# Patient Record
Sex: MALE | Race: BLACK OR AFRICAN AMERICAN | NOT HISPANIC OR LATINO | Employment: FULL TIME | URBAN - METROPOLITAN AREA
[De-identification: names, ages, dates, MRNs, and addresses within clinical notes are randomized per-mention and may not be internally consistent; named-entity substitution may affect disease eponyms.]

---

## 2017-02-07 ENCOUNTER — PRE VISIT (OUTPATIENT)
Dept: CARDIOLOGY | Facility: CLINIC | Age: 52
End: 2017-02-07

## 2017-02-07 DIAGNOSIS — I50.22 CHRONIC SYSTOLIC HEART FAILURE (H): Primary | ICD-10-CM

## 2017-02-07 PROBLEM — I47.29 PAROXYSMAL VENTRICULAR TACHYCARDIA (H): Status: ACTIVE | Noted: 2017-02-07

## 2017-02-07 PROBLEM — I47.20 PAROXYSMAL VENTRICULAR TACHYCARDIA (H): Status: ACTIVE | Noted: 2017-02-07

## 2017-02-07 PROBLEM — Z95.2 S/P MITRAL VALVE REPLACEMENT: Status: ACTIVE | Noted: 2017-02-07

## 2017-02-09 ENCOUNTER — ALLIED HEALTH/NURSE VISIT (OUTPATIENT)
Dept: CARDIOLOGY | Facility: CLINIC | Age: 52
End: 2017-02-09
Attending: INTERNAL MEDICINE
Payer: COMMERCIAL

## 2017-02-09 VITALS
SYSTOLIC BLOOD PRESSURE: 128 MMHG | HEIGHT: 70 IN | OXYGEN SATURATION: 99 % | DIASTOLIC BLOOD PRESSURE: 93 MMHG | WEIGHT: 197.1 LBS | BODY MASS INDEX: 28.22 KG/M2 | HEART RATE: 80 BPM

## 2017-02-09 DIAGNOSIS — I50.22 CHRONIC SYSTOLIC HEART FAILURE (H): ICD-10-CM

## 2017-02-09 DIAGNOSIS — I50.22 CHRONIC SYSTOLIC CONGESTIVE HEART FAILURE (H): Primary | ICD-10-CM

## 2017-02-09 DIAGNOSIS — I47.29 PAROXYSMAL VENTRICULAR TACHYCARDIA (H): Primary | ICD-10-CM

## 2017-02-09 LAB
ANION GAP SERPL CALCULATED.3IONS-SCNC: 6 MMOL/L (ref 3–14)
BUN SERPL-MCNC: 19 MG/DL (ref 7–30)
CALCIUM SERPL-MCNC: 8.8 MG/DL (ref 8.5–10.1)
CHLORIDE SERPL-SCNC: 109 MMOL/L (ref 94–109)
CO2 SERPL-SCNC: 29 MMOL/L (ref 20–32)
CREAT SERPL-MCNC: 1.25 MG/DL (ref 0.66–1.25)
GFR SERPL CREATININE-BSD FRML MDRD: 61 ML/MIN/1.7M2
GLUCOSE SERPL-MCNC: 88 MG/DL (ref 70–99)
POTASSIUM SERPL-SCNC: 4.2 MMOL/L (ref 3.4–5.3)
SODIUM SERPL-SCNC: 144 MMOL/L (ref 133–144)

## 2017-02-09 PROCEDURE — 99212 OFFICE O/P EST SF 10 MIN: CPT | Mod: 25,ZF

## 2017-02-09 PROCEDURE — 80048 BASIC METABOLIC PNL TOTAL CA: CPT | Performed by: INTERNAL MEDICINE

## 2017-02-09 PROCEDURE — 93289 INTERROG DEVICE EVAL HEART: CPT | Mod: 26 | Performed by: INTERNAL MEDICINE

## 2017-02-09 PROCEDURE — 36415 COLL VENOUS BLD VENIPUNCTURE: CPT | Performed by: INTERNAL MEDICINE

## 2017-02-09 PROCEDURE — 99214 OFFICE O/P EST MOD 30 MIN: CPT | Performed by: INTERNAL MEDICINE

## 2017-02-09 PROCEDURE — 99212 OFFICE O/P EST SF 10 MIN: CPT

## 2017-02-09 PROCEDURE — 93284 PRGRMG EVAL IMPLANTABLE DFB: CPT | Mod: ZF

## 2017-02-09 RX ORDER — LISINOPRIL 5 MG/1
5 TABLET ORAL 2 TIMES DAILY
Qty: 60 TABLET | Refills: 6 | Status: SHIPPED | OUTPATIENT
Start: 2017-02-09 | End: 2017-08-10

## 2017-02-09 RX ORDER — POTASSIUM CHLORIDE 1.5 G/1.58G
20 POWDER, FOR SOLUTION ORAL 2 TIMES DAILY
COMMUNITY
End: 2019-06-11

## 2017-02-09 ASSESSMENT — PAIN SCALES - GENERAL: PAINLEVEL: NO PAIN (0)

## 2017-02-09 NOTE — PATIENT INSTRUCTIONS
It was a pleasure seeing you in clinic today.  Please do not hesitate to call with any questions or concerns. We look forward to seeing you in clinic at your next device check on 8/10/2017.      Georgia Prado RN  Electrophysiology Nurse Clinician  [facility]    During Business Hours Please Call:  [phone]  After Hours Please Call:  [phone]- select option #4 and ask for job code [id number]

## 2017-02-09 NOTE — PROGRESS NOTES
Quick Note:    Results discussed directly with patient while patient was present. Any further details documented in the note.     Tamie Kasper RN  ______

## 2017-02-09 NOTE — MR AVS SNAPSHOT
After Visit Summary   2/9/2017    Tej Morfin    MRN: [id number]           Patient Information     Date Of Birth          1965        Visit Information        Provider Department      2/9/2017 11:30 AM 1, Uc Cv Device [facility]         Follow-ups after your visit        Your next 10 appointments already scheduled     Aug 10, 2017  2:30 PM   Lab with UC LAB   [facility] ([facility])    [address]  [address] [facility] [address]   [phone]            Aug 10, 2017  3:00 PM   (Arrive by 2:45 PM)   Implanted Defibulator with Uc Cv Device 1   [facility] ([facility])    [address] [place] [address]  3rd [facility] [id number] [phone]            Aug 10, 2017  3:30 PM   (Arrive by 3:15 PM)   RETURN HEART FAILURE with Dunia Hdz MD   [facility] ([facility])    [address] [place] [id number] [phone]              Future tests that were ordered for you today     Open Future Orders        Priority Expected Expires Ordered    Basic metabolic panel Routine 2/16/2017 2/17/2017 2/9/2017    Echocardiogram Routine  2/9/2018 2/9/2017            Who to contact     If you have questions or need follow up information about today's clinic visit or your schedule please contact [facility] directly at [phone].  Normal or non-critical lab and imaging results will be communicated to you by MyChart, letter or phone within 4 business days after the clinic has received the results. If you do not hear from us within 7 days, please contact the clinic through MyChart or phone. If you have a critical or abnormal lab result, we will notify you by phone as soon as possible.  Submit refill requests through [email or web address] or call your pharmacy and they will forward the refill request to us. Please allow 3 business days for your refill to be  completed.          Additional Information About Your Visit        [employer] System[employer] Information     [employer] gives you secure access to your electronic health record. If you see a primary care provider, you can also send messages to your care team and make appointments. If you have questions, please call your primary care clinic.  If you do not have a primary care provider, please call [phone] and they will assist you.        Care EveryWhere ID     This is your Care EveryWhere ID. This could be used by other organizations to access your [place] medical records  [id number]         Blood Pressure from Last 3 Encounters:   02/09/17 128/93   08/11/16 114/75   06/16/16 105/73    Weight from Last 3 Encounters:   02/09/17 89.404 kg (197 lb 1.6 oz)   08/11/16 85.73 kg (189 lb)   06/16/16 87.091 kg (192 lb)              Today, you had the following     No orders found for display         Today's Medication Changes          These changes are accurate as of: 2/9/17  1:20 PM.  If you have any questions, ask your nurse or doctor.               These medicines have changed or have updated prescriptions.        Dose/Directions    lisinopril 5 MG tablet   Commonly known as:  PRINIVIL/ZESTRIL   This may have changed:    - medication strength  - how much to take  - how to take this  - when to take this  - additional instructions   Used for:  Chronic systolic congestive heart failure (H)   Changed by:  Dunia Hdz MD        Dose:  5 mg   Take 1 tablet (5 mg) by mouth 2 times daily   Quantity:  60 tablet   Refills:  6         Stop taking these medicines if you haven't already. Please contact your care team if you have questions.     sacubitril-valsartan 24-26 MG per tablet   Commonly known as:  ENTRESTO   Stopped by:  Dunia Hdz MD                Where to get your medicines      These medications were sent to [id number] - [place], [place] - [address]  [address], [place] [address]      Phone:  [phone]    - lisinopril 5 MG tablet             Primary Care Provider Office Phone # Fax #    Marlin Hunter [phone]       [facility] MEDICINE [address] [place] [address]        Thank you!     Thank you for choosing [facility]  for your care. Our goal is always to provide you with excellent care. Hearing back from our patients is one way we can continue to improve our services. Please take a few minutes to complete the written survey that you may receive in the mail after your visit with us. Thank you!             Your Updated Medication List - Protect others around you: Learn how to safely use, store and throw away your medicines at www.disposemymeds.org.          This list is accurate as of: 2/9/17  1:20 PM.  Always use your most recent med list.                   Brand Name Dispense Instructions for use    albuterol 108 (90 BASE) MCG/ACT Inhaler    PROAIR HFA/PROVENTIL HFA/VENTOLIN HFA     Inhale 2 puffs into the lungs every 4 hours as needed for shortness of breath / dyspnea or wheezing       ASPIRIN PO      Take 81 mg by mouth daily       budesonide 32 MCG/ACT spray    RINOCORT AQUA     Spray 1 spray into both nostrils daily       CARVEDILOL PO      Take 6.25 mg by mouth 2 times daily (with meals)       CLONAZEPAM PO      Take 0.5 mg by mouth At Bedtime       furosemide 20 MG tablet    LASIX    90 tablet    Take 1 tablet (20 mg) by mouth daily       hydrALAZINE 25 MG tablet    APRESOLINE    270 tablet    Take 1 tablet (25 mg) by mouth 3 times daily       isosorbide mononitrate 30 MG 24 hr tablet    IMDUR    90 tablet    Take 1 tablet (30 mg) by mouth daily       lisinopril 5 MG tablet    PRINIVIL/ZESTRIL    60 tablet    Take 1 tablet (5 mg) by mouth 2 times daily       METOPROLOL SUCCINATE ER PO      Take 50 mg by mouth 2 times daily       NITROSTAT SL      Place 0.4 mg under the tongue every 5 minutes as needed for chest pain       PANTOPRAZOLE  SODIUM PO      Take 40 mg by mouth 2 times daily (before meals)       potassium chloride 20 MEQ Packet    KLOR-CON     Take 20 mEq by mouth 2 times daily       SOTALOL HCL PO      Take 80 mg by mouth

## 2017-02-09 NOTE — NURSING NOTE
Chief Complaint   Patient presents with     Follow Up For     51 yr old male with h/o chronic systolic HF presenting for follow up with labs and device check

## 2017-02-09 NOTE — PROGRESS NOTES
Patient seen in clinic for evaluation and iterative programming of a [employer] Multi Lead ICD per MD orders. Patient has an appointment to see Dr. Hdz today. Normal ICD function.  4 Ventricular episodes recorded between the dates of 4/11/2016 - 2/9/2017, 1 VF episode on 9/26/2016 at 5:41 AM for 43 seconds, rate of 224 BPM with ATP times one and a 31 J shock delivered with a successful shock. 3 NSVT, durations of 14-15 seconds.  His intrinsic rate is SB @ 54 BPM. AP= 89% and = 97%. Estimated battery longevity = 8 years to JACOB. No short v-v intervals recorded.  RV lead impedance trends appear stable. Patient reports that he is feeling well and denies complaints. Plan for patient to return to clinic on 8/10/2017.

## 2017-02-09 NOTE — PATIENT INSTRUCTIONS
You were seen today in the Cardiovascular Clinic at the [facility].     Cardiology Providers you saw during your visit:  Dr. Hdz    Recommendations:  Do not take Entresto  Increase Lisinopril to 5 mg twice per day (we've prescribed new 5 mg tablets)  Have labs repeated next week, locally ([facility])  Have an echo done at your convenience  Eat a heart healthy, low sodium diet.  Get 20 to 30 minutes of aerobic exercise 4 to 5 times per week as tolerated. (Examples of aerobic exercise include: walking, bicycling, swimming, running).    Follow-up:  With Dr. Hdz in 6 months with labs and device check    Results:      Orders Only on 02/09/2017   Component Date Value Ref Range Status     Sodium 02/09/2017 144  133 - 144 mmol/L Final     Potassium 02/09/2017 4.2  3.4 - 5.3 mmol/L Final     Chloride 02/09/2017 109  94 - 109 mmol/L Final     Carbon Dioxide 02/09/2017 29  20 - 32 mmol/L Final     Anion Gap 02/09/2017 6  3 - 14 mmol/L Final     Glucose 02/09/2017 88  70 - 99 mg/dL Final     Urea Nitrogen 02/09/2017 19  7 - 30 mg/dL Final     Creatinine 02/09/2017 1.25  0.66 - 1.25 mg/dL Final     GFR Estimate 02/09/2017 61  >60 mL/min/1.7m2 Final    Non  GFR Calc     GFR Estimate If Black 02/09/2017 74  >60 mL/min/1.7m2 Final    African American GFR Calc     Calcium 02/09/2017 8.8  8.5 - 10.1 mg/dL Final       For emergencies call 911.    For any scheduling needs, please call [phone], option #1 then option # 1.    Thank you for entrusting us with your care!     Please call if you have any questions or concerns.    Michelle Kasper RN  Cardiology Care Coordinator  [phone], press option # 1 to be routed to the [place] then option # 3 for medical questions to speak with a nurse    If you have an urgent need after business hours or over the weekend please call [phone] and ask for the cardiology fellow on call.     If you have a hard time paying for your medications visit  http://www.needymeds.org/ to see where you might be able to get your medications the cheapest along with patient assistance programs available.    Heart failure patients and family members are welcome you to come to one of our heart failure support group meetings on the following dates from 1-2 pm :12/5/16. These all take place on the 8th floor of the [place] hospital building in the [facility] Cafeteria Conference Room. Let us know if you have any questions.

## 2017-02-09 NOTE — NURSING NOTE
Diet: Patient instructed regarding a heart healthy diet, including discussion of reduced fat and sodium intake. Patient demonstrated understanding of this information and agreed to call with further questions or concerns.  Labs: Patient was given results of the laboratory testing obtained today. Patient was instructed to return for the next laboratory testing in 1 week. Patient demonstrated understanding of this information and agreed to call with further questions or concerns.   Return Appointment: Patient given instructions regarding scheduling next clinic visit. Patient demonstrated understanding of this information and agreed to call with further questions or concerns.  Medication Change: Patient was educated regarding prescribed medication change, including discussion of the indication, administration, side effects, and when to report to MD or RN. Patient demonstrated understanding of this information and agreed to call with further questions or concerns.  Patient stated he understood all health information given and agreed to call with further questions or concerns.

## 2017-02-09 NOTE — MR AVS SNAPSHOT
After Visit Summary   2/9/2017    Tej Morfin    MRN: [id number]           Patient Information     Date Of Birth          1965        Visit Information        Provider Department      2/9/2017 12:00 PM Dunia Hdz MD [facility]        Today's Diagnoses     Chronic systolic congestive heart failure (H)    -  1       Care Instructions    You were seen today in the Cardiovascular Clinic at the [facility].     Cardiology Providers you saw during your visit:  Dr. Hdz    Recommendations:  Do not take Entresto  Increase Lisinopril to 5 mg twice per day (we've prescribed new 5 mg tablets)  Have labs repeated next week, locally ([facility])  Have an echo done at your convenience  Eat a heart healthy, low sodium diet.  Get 20 to 30 minutes of aerobic exercise 4 to 5 times per week as tolerated. (Examples of aerobic exercise include: walking, bicycling, swimming, running).    Follow-up:  With Dr. Hdz in 6 months with labs and device check    Results:      Orders Only on 02/09/2017   Component Date Value Ref Range Status     Sodium 02/09/2017 144  133 - 144 mmol/L Final     Potassium 02/09/2017 4.2  3.4 - 5.3 mmol/L Final     Chloride 02/09/2017 109  94 - 109 mmol/L Final     Carbon Dioxide 02/09/2017 29  20 - 32 mmol/L Final     Anion Gap 02/09/2017 6  3 - 14 mmol/L Final     Glucose 02/09/2017 88  70 - 99 mg/dL Final     Urea Nitrogen 02/09/2017 19  7 - 30 mg/dL Final     Creatinine 02/09/2017 1.25  0.66 - 1.25 mg/dL Final     GFR Estimate 02/09/2017 61  >60 mL/min/1.7m2 Final    Non  GFR Calc     GFR Estimate If Black 02/09/2017 74  >60 mL/min/1.7m2 Final    African American GFR Calc     Calcium 02/09/2017 8.8  8.5 - 10.1 mg/dL Final       For emergencies call 911.    For any scheduling needs, please call [phone], option #1 then option # 1.    Thank you for entrusting us with your care!     Please call if you have any questions or  concerns.    Michelle Kasper RN  Cardiology Care Coordinator  [phone], press option # 1 to be routed to the [place] then option # 3 for medical questions to speak with a nurse    If you have an urgent need after business hours or over the weekend please call [phone] and ask for the cardiology fellow on call.     If you have a hard time paying for your medications visit http://www.needymeds.org/ to see where you might be able to get your medications the cheapest along with patient assistance programs available.    Heart failure patients and family members are welcome you to come to one of our heart failure support group meetings on the following dates from 1-2 pm :12/5/16. These all take place on the 8th floor of the our hospital building in the [facility] Cafeteria Conference Room. Let us know if you have any questions.          Follow-ups after your visit        Your next 10 appointments already scheduled     Aug 10, 2017  2:30 PM   Lab with UC LAB   [facility] ([facility])    [address] [place] [id number] [phone]            Aug 10, 2017  3:00 PM   (Arrive by 2:45 PM)   Implanted Defibulator with Uc Cv Device 1   [facility] ([facility])    [address] [place] [address] [phone]            Aug 10, 2017  3:30 PM   (Arrive by 3:15 PM)   RETURN HEART FAILURE with Dunia Hdz MD   [facility] ([facility])    [address] [place] [address] [phone]              Future tests that were ordered for you today     Open Future Orders        Priority Expected Expires Ordered    Basic metabolic panel Routine 2/16/2017 2/17/2017 2/9/2017    Echocardiogram Routine  2/9/2018 2/9/2017            Who to contact     If you have questions or need follow up information about today's clinic visit or your schedule  "please contact [facility] directly at [phone].  Normal or non-critical lab and imaging results will be communicated to you by MyChart, letter or phone within 4 business days after the clinic has received the results. If you do not hear from us within 7 days, please contact the clinic through [employer] or phone. If you have a critical or abnormal lab result, we will notify you by phone as soon as possible.  Submit refill requests through yeppt or call your pharmacy and they will forward the refill request to us. Please allow 3 business days for your refill to be completed.          Additional Information About Your Visit        yeppt Information     yeppt gives you secure access to your electronic health record. If you see a primary care provider, you can also send messages to your care team and make appointments. If you have questions, please call your primary care clinic.  If you do not have a primary care provider, please call [phone] and they will assist you.        Care EveryWhere ID     This is your Care EveryWhere ID. This could be used by other organizations to access your [place] medical records  [id number]        Your Vitals Were     Pulse Height BMI (Body Mass Index) Pulse Oximetry          80 1.778 m (5' 10\") 28.28 kg/m2 99%         Blood Pressure from Last 3 Encounters:   02/09/17 128/93   08/11/16 114/75   06/16/16 105/73    Weight from Last 3 Encounters:   02/09/17 89.404 kg (197 lb 1.6 oz)   08/11/16 85.73 kg (189 lb)   06/16/16 87.091 kg (192 lb)                 Today's Medication Changes          These changes are accurate as of: 2/9/17  1:20 PM.  If you have any questions, ask your nurse or doctor.               These medicines have changed or have updated prescriptions.        Dose/Directions    lisinopril 5 MG tablet   Commonly known as:  PRINIVIL/ZESTRIL   This may have changed:    - medication strength  - how much to take  - how to take this  - when to take this  - " additional instructions   Used for:  Chronic systolic congestive heart failure (H)   Changed by:  Dunia Hdz MD        Dose:  5 mg   Take 1 tablet (5 mg) by mouth 2 times daily   Quantity:  60 tablet   Refills:  6         Stop taking these medicines if you haven't already. Please contact your care team if you have questions.     sacubitril-valsartan 24-26 MG per tablet   Commonly known as:  ENTRESTO   Stopped by:  Dunia Hdz MD                Where to get your medicines      These medications were sent to [facility] [address] [facility] - [place], [place] - [address]  [address], [place] [address]     Phone:  [phone]    - lisinopril 5 MG tablet             Primary Care Provider Office Phone # Fax #    Marlin Hunter [phone]       [facility] MEDICINE [address]  [place] [address]        Thank you!     Thank you for choosing [facility]  for your care. Our goal is always to provide you with excellent care. Hearing back from our patients is one way we can continue to improve our services. Please take a few minutes to complete the written survey that you may receive in the mail after your visit with us. Thank you!             Your Updated Medication List - Protect others around you: Learn how to safely use, store and throw away your medicines at www.disposemymeds.org.          This list is accurate as of: 2/9/17  1:20 PM.  Always use your most recent med list.                   Brand Name Dispense Instructions for use    albuterol 108 (90 BASE) MCG/ACT Inhaler    PROAIR HFA/PROVENTIL HFA/VENTOLIN HFA     Inhale 2 puffs into the lungs every 4 hours as needed for shortness of breath / dyspnea or wheezing       ASPIRIN PO      Take 81 mg by mouth daily       budesonide 32 MCG/ACT spray    RINOCORT AQUA     Spray 1 spray into both nostrils daily       CARVEDILOL PO      Take 6.25 mg by mouth 2 times daily (with meals)       CLONAZEPAM PO       Take 0.5 mg by mouth At Bedtime       furosemide 20 MG tablet    LASIX    90 tablet    Take 1 tablet (20 mg) by mouth daily       hydrALAZINE 25 MG tablet    APRESOLINE    270 tablet    Take 1 tablet (25 mg) by mouth 3 times daily       isosorbide mononitrate 30 MG 24 hr tablet    IMDUR    90 tablet    Take 1 tablet (30 mg) by mouth daily       lisinopril 5 MG tablet    PRINIVIL/ZESTRIL    60 tablet    Take 1 tablet (5 mg) by mouth 2 times daily       METOPROLOL SUCCINATE ER PO      Take 50 mg by mouth 2 times daily       NITROSTAT SL      Place 0.4 mg under the tongue every 5 minutes as needed for chest pain       PANTOPRAZOLE SODIUM PO      Take 40 mg by mouth 2 times daily (before meals)       potassium chloride 20 MEQ Packet    KLOR-CON     Take 20 mEq by mouth 2 times daily       SOTALOL HCL PO      Take 80 mg by mouth

## 2017-02-09 NOTE — PROGRESS NOTES
February 9, 2017     Dear Dr. Felix:      I had the pleasure of seeing, Tej Morfin in the [facility] today.     As you know, he is a very pleasant 51-year-old man with a first presentation with cardiomegaly approximately 3 years ago.  He underwent echocardiogram and he was found to have severe mitral regurgitation and an ejection fraction of 25%.  He underwent an angiogram at that time that showed only mild coronary disease.  He was referred to Dr. Graves for mitral regurgitation, and on 01/21/2015, he underwent mitral valve repair with an annuloplasty ring and was discharged home with an eventful postop course.        He later presented with ventricular tachycardia which was sustained and therefore he underwent a single-chamber defibrillator for secondary prevention.  Shortly after that, he had his first hospitalization for heart failure with reduced ejection fraction, and given his left ventricular end-diastolic dimension was 7 cm and the ventricular arrhythmias, you sent him to me for consideration of advanced therapies.      When I first saw him, he had an excellent cardiopulmonary stress test.  He now had a BIV upgrade to a CRT.     He reports overall no real changes in health or symptoms since I last saw him.  He did have a recent URI that wiped him out. He denies PND or orthopnea.  He is able to exercise on a treadmill for 15min at a speed 3 at a time, walking at a brisk pace.  He denies syncope.  He denies palpitations. But did have ICD shock in September 2016, since then 2 more episodes of NSVT reported in October and November, no further shocks. At that time he had a low K and was repleted. He otherwise denies any chest pain, fatigue, or lightheadedness / syncope. He underwent a repeat coronary angiography that showed mild non obstructive disease.     He underwent a PET scan of his chest to rule out cardiac sarcoidosis, which was not detected on this test.   However, there was an area showed ischemia subtended by the circumflex artery and he underwent a follow up coronary angiography as noted above which showed mild non obstructive disease.     PAST MEDICAL HISTORY:     1.  Hemorrhoidectomy.     2.  Arthroscopy.     3.  Right partial medial meniscectomy 08/2013.     4.  Status post ICD implantation 02/02/2015, [employer], by Dr. Felix.     5.  Mitral valve repair with annuloplasty ring and atrial appendage ligation by Dr. Graves in [place], [place]; date on that was 01/21/2015.     CURRENT MEDICATIONS:    Prescription Medications as of 2/9/2017             lisinopril (PRINIVIL,ZESTRIL) 10 MG tablet Take 30 mg (3 tabs) daily.    hydrALAZINE (APRESOLINE) 25 MG tablet Take 1 tablet (25 mg) by mouth 3 times daily    furosemide (LASIX) 20 MG tablet Take 1 tablet (20 mg) by mouth daily    isosorbide mononitrate (IMDUR) 30 MG 24 hr tablet Take 1 tablet (30 mg) by mouth daily    ASPIRIN PO Take 81 mg by mouth daily    CARVEDILOL PO Take 6.25 mg by mouth 2 times daily (with meals)    METOPROLOL SUCCINATE ER PO Take 50 mg by mouth 2 times daily    Nitroglycerin (NITROSTAT SL) Place 0.4 mg under the tongue every 5 minutes as needed for chest pain    PANTOPRAZOLE SODIUM PO Take 40 mg by mouth 2 times daily (before meals)    CLONAZEPAM PO Take 0.5 mg by mouth At Bedtime    SOTALOL HCL PO Take 80 mg by mouth    albuterol (PROAIR HFA, PROVENTIL HFA, VENTOLIN HFA) 108 (90 BASE) MCG/ACT inhaler Inhale 2 puffs into the lungs every 4 hours as needed for shortness of breath / dyspnea or wheezing          SOCIAL HISTORY:  The patient lives with his wife.  They have 2 children who live at home.  One is 10, and one is 15.  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  Alcohol rarely, 1-2 times per month.  No drug use.  Smoking, he smoked 1/2 pack per day for 10 years, quit in 2014.        FAMILY HISTORY:   "Mother had coronary disease and end-stage renal disease in her 40s.  He has 1 younger brother and 1 sister who are alive and well.  Dad  at age 51, but not related to cardiac problems.  There is no other family history of cardiomyopathy or sudden cardiac death.     ROS:   Constitutional: No fever, chills, or sweats. Weight is stable    ENT: No visual disturbance, ear ache, epistaxis, sore throat.   Allergies/Immunologic: Negative.   Respiratory: No cough, hemoptysis.   Cardiovascular: As per HPI.   GI: No nausea, vomiting, hematemesis, melena, or hematochezia.   : No urinary frequency, dysuria, or hematuria.   Integument: Negative.   Psychiatric: negative   Neuro: Negative.   Endocrinology: Negative.   Musculoskeletal: Negative.    EXAM:  /93 mmHg  Pulse 80  Ht 1.778 m (5' 10\")  Wt 89.404 kg (197 lb 1.6 oz)  BMI 28.28 kg/m2  SpO2 99%  General: appears comfortable, alert and articulate  Head: normocephalic, atraumatic  Eyes: anicteric sclera, EOMI  Neck: no adenopathy  Orophyarynx: moist mucosa, no lesions, dentition intact  Heart: PMI sustained,  regular, S1/S2, II/IV systolic murmur at the apex, no gallop, no rub, estimated JVP < 10 cm   Lungs: clear, no rales or wheezing  Abdomen: soft, non-tender, bowel sounds present, no hepatosplenomegaly  Extremities: no clubbing, cyanosis, no LE edema  Neurological: normal speech and affect, no gross motor deficits    Labs:  CBC RESULTS:  Lab Results   Component Value Date    WBC 3.7* 2016    RBC 4.11* 2016    HGB 13.1* 2016    HCT 40.0 2016    MCV 97 2016    MCH 31.9 2016    MCHC 32.8 2016    RDW 12.3 2016    * 2016       CMP RESULTS:  Lab Results   Component Value Date     2016    POTASSIUM 3.8 2016    CHLORIDE 108 2016    CO2 22 2016    ANIONGAP 10 2016    * 2016    BUN 27 2016    CR 1.17 2016    GFRESTIMATED 66 2016    " GFRESTBLACK 80 08/11/2016    YESICA 9.1 08/11/2016    BILITOTAL 0.8 11/30/2015    ALBUMIN 3.8 11/30/2015    ALKPHOS 60 11/30/2015    ALT 29 11/30/2015    AST 22 11/30/2015        INR RESULTS:  Lab Results   Component Value Date    INR 1.05 11/30/2015       Lab Results   Component Value Date    MAG 2.1 11/30/2015       Exercise stress test performed today at [facility] on 12/01/2015 shows resting heart rate of 50, resting blood pressure of 120/74, peak heart rate of 144, peak blood pressure of 144/81, peak VO2 of 27.7, which is 80% of predicted.  PVCs during rest recovery.  EKG is negative for ischemia.  RER is 1.2.  VE/VCO2 slope is 32.5.      Echocardiogram from 11/09/2015:  Ejection fraction 10%-15%, global hypokinesis, grade 3 diastolic dysfunction.  RV is dilated.  LA is moderately dilated.  Trace aortic regurgitation, moderate mitral annular calcification.  Slow fill exacerbation suggestive of functional mitral stenosis, mild MR, thickening of posterior annulus consistent with previous repair, mild tricuspid regurgitation by Doppler, moderate pulmonic regurgitation, LV end-diastolic dimension 6.6 cm.  There is no comment on RV function, although the RV is reported to be mildly dilated.  Echo images here have yet to be loaded for my view.     Coronary angiogram from 01/12/2015:  30% mid LAD, normal left main, circumflex normal.  Right coronary artery patent.  Op report from 01/21/2015:  Median sternotomy, mitral valve repair with 28 mm annuloplasty ring, left atrial appendage ligation with a 45 mm atrial clip.     Right heart catheterization   RA: 2   PA: 23/13   PCWP: 6   PVR: 3.0   CI: 1.7   CO: 3.7   SVR: 1600     Assessment and Plan:   In summary, this is a very pleasant, 51-year-old man with a history of what is likely valvular cardiomyopathy, status post mitral valve repair in 01/2015, who then had continued symptoms, a failure to improve his LV ejection fraction. He is presently New York Heart Association  functional class II and is euvolemic on exam. He has tolerated up titration of neurohormonal blockade and also now has a biventricular pacer.     His baseline pulmonary artery pressures are within an acceptable range.  His cardiac output was a the lower end, however, he is dry during his right heart catheterization.  Overall, his filling pressures were relatively reassuring.  He would like to hold off on entresto at this time due to expense. He may also have been too well to be included in the trial at this point.  He has blood pressure room, and is currently on coreg, lisinopril, hydralazine and imdur. We are increasing his ace-i today.     For now we can hold off on LVAD/transplant eval as he is doing well enough from a cardiac perspective.       1. Chronic systolic heart failure secondary to valvular cardiomyopathy   Stage C  NYHA Class II  ACEi/ARB yes  BB titration ongoing  Aldosterone antagonist no (high potassium in the past with administration)  SCD prophylaxis ICD  % BiV pacing: yes   Fluid status euvolemic  NSAID use: none  Sleep Apnea Evaluation: now on CPAP   Follow-up 6 months with me, echo today, [place]  Cardiovascular fellow  [phone]    I have seen and examined the patient with the house staff on February 9, 2017 and agree with the outlined assessment and plan.      Dunia Hdz MD   of Medicine   [facility] Division of Cardiology       CC  Yoan Felix,   Gerry, ROSMERY Norman, ROSMERY Roberts

## 2017-02-09 NOTE — Clinical Note
2/9/2017      RE: Tej Morfin  [address]  [place] [id number]       Dear Colleague,    Thank you for the opportunity to participate in the care of your patient, Tej Morfin, at the [facility] at [facility]. Please see a copy of my visit note below.    February 9, 2017     Dear Dr. Felix:      I had the pleasure of seeing, Tej Morfin in the [facility] today.     As you know, he is a very pleasant 51-year-old man with a first presentation with cardiomegaly approximately 3 years ago.  He underwent echocardiogram and he was found to have severe mitral regurgitation and an ejection fraction of 25%.  He underwent an angiogram at that time that showed only mild coronary disease.  He was referred to Dr. Graves for mitral regurgitation, and on 01/21/2015, he underwent mitral valve repair with an annuloplasty ring and was discharged home with an eventful postop course.        He later presented with ventricular tachycardia which was sustained and therefore he underwent a single-chamber defibrillator for secondary prevention.  Shortly after that, he had his first hospitalization for heart failure with reduced ejection fraction, and given his left ventricular end-diastolic dimension was 7 cm and the ventricular arrhythmias, you sent him to me for consideration of advanced therapies.      When I first saw him, he had an excellent cardiopulmonary stress test.  He now had a BIV upgrade to a CRT.     He reports overall no real changes in health or symptoms since I last saw him.  He did have a recent URI that wiped him out. He denies PND or orthopnea.  He is able to exercise on a treadmill for 15min at a speed 3 at a time, walking at a brisk pace.  He denies syncope.  He denies palpitations. But did have ICD shock in September 2016, since then 2 more episodes of NSVT reported in October and November, no further  shocks. At that time he had a low K and was repleted. He otherwise denies any chest pain, fatigue, or lightheadedness / syncope. He underwent a repeat coronary angiography that showed mild non obstructive disease.     He underwent a PET scan of his chest to rule out cardiac sarcoidosis, which was not detected on this test.  However, there was an area showed ischemia subtended by the circumflex artery and he underwent a follow up coronary angiography as noted above which showed mild non obstructive disease.     PAST MEDICAL HISTORY:     1.  Hemorrhoidectomy.     2.  Arthroscopy.     3.  Right partial medial meniscectomy 08/2013.     4.  Status post ICD implantation 02/02/2015, [employer], by Dr. Felix.     5.  Mitral valve repair with annuloplasty ring and atrial appendage ligation by Dr. Graves in [place], [place]; date on that was 01/21/2015.     CURRENT MEDICATIONS:    Prescription Medications as of 2/9/2017             lisinopril (PRINIVIL,ZESTRIL) 10 MG tablet Take 30 mg (3 tabs) daily.    hydrALAZINE (APRESOLINE) 25 MG tablet Take 1 tablet (25 mg) by mouth 3 times daily    furosemide (LASIX) 20 MG tablet Take 1 tablet (20 mg) by mouth daily    isosorbide mononitrate (IMDUR) 30 MG 24 hr tablet Take 1 tablet (30 mg) by mouth daily    ASPIRIN PO Take 81 mg by mouth daily    CARVEDILOL PO Take 6.25 mg by mouth 2 times daily (with meals)    METOPROLOL SUCCINATE ER PO Take 50 mg by mouth 2 times daily    Nitroglycerin (NITROSTAT SL) Place 0.4 mg under the tongue every 5 minutes as needed for chest pain    PANTOPRAZOLE SODIUM PO Take 40 mg by mouth 2 times daily (before meals)    CLONAZEPAM PO Take 0.5 mg by mouth At Bedtime    SOTALOL HCL PO Take 80 mg by mouth    albuterol (PROAIR HFA, PROVENTIL HFA, VENTOLIN HFA) 108 (90 BASE) MCG/ACT inhaler Inhale 2 puffs into the lungs every 4 hours as needed for shortness of breath / dyspnea or wheezing          SOCIAL HISTORY:  The patient lives with his wife.   "They have 2 children who live at home.  One is 10, and one is 15.  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  Alcohol rarely, 1-2 times per month.  No drug use.  Smoking, he smoked 1/2 pack per day for 10 years, quit in .        FAMILY HISTORY:  Mother had coronary disease and end-stage renal disease in her 40s.  He has 1 younger brother and 1 sister who are alive and well.  Dad  at age 51, but not related to cardiac problems.  There is no other family history of cardiomyopathy or sudden cardiac death.     ROS:   Constitutional: No fever, chills, or sweats. Weight is stable    ENT: No visual disturbance, ear ache, epistaxis, sore throat.   Allergies/Immunologic: Negative.   Respiratory: No cough, hemoptysis.   Cardiovascular: As per HPI.   GI: No nausea, vomiting, hematemesis, melena, or hematochezia.   : No urinary frequency, dysuria, or hematuria.   Integument: Negative.   Psychiatric: negative   Neuro: Negative.   Endocrinology: Negative.   Musculoskeletal: Negative.    EXAM:  /93 mmHg  Pulse 80  Ht 1.778 m (5' 10\")  Wt 89.404 kg (197 lb 1.6 oz)  BMI 28.28 kg/m2  SpO2 99%  General: appears comfortable, alert and articulate  Head: normocephalic, atraumatic  Eyes: anicteric sclera, EOMI  Neck: no adenopathy  Orophyarynx: moist mucosa, no lesions, dentition intact  Heart: PMI sustained,  regular, S1/S2, II/IV systolic murmur at the apex, no gallop, no rub, estimated JVP < 10 cm   Lungs: clear, no rales or wheezing  Abdomen: soft, non-tender, bowel sounds present, no hepatosplenomegaly  Extremities: no clubbing, cyanosis, no LE edema  Neurological: normal speech and affect, no gross motor deficits    Labs:  CBC RESULTS:  Lab Results   Component Value Date    WBC 3.7* 2016    RBC 4.11* 2016    HGB 13.1* 2016    HCT 40.0 2016    MCV 97 2016    MCH 31.9 2016    MCHC 32.8 2016    RDW 12.3 " 06/16/2016    * 06/16/2016       CMP RESULTS:  Lab Results   Component Value Date     08/11/2016    POTASSIUM 3.8 08/11/2016    CHLORIDE 108 08/11/2016    CO2 22 08/11/2016    ANIONGAP 10 08/11/2016    * 08/11/2016    BUN 27 08/11/2016    CR 1.17 08/11/2016    GFRESTIMATED 66 08/11/2016    GFRESTBLACK 80 08/11/2016    YESICA 9.1 08/11/2016    BILITOTAL 0.8 11/30/2015    ALBUMIN 3.8 11/30/2015    ALKPHOS 60 11/30/2015    ALT 29 11/30/2015    AST 22 11/30/2015        INR RESULTS:  Lab Results   Component Value Date    INR 1.05 11/30/2015       Lab Results   Component Value Date    MAG 2.1 11/30/2015       Exercise stress test performed today at [facility] on 12/01/2015 shows resting heart rate of 50, resting blood pressure of 120/74, peak heart rate of 144, peak blood pressure of 144/81, peak VO2 of 27.7, which is 80% of predicted.  PVCs during rest recovery.  EKG is negative for ischemia.  RER is 1.2.  VE/VCO2 slope is 32.5.      Echocardiogram from 11/09/2015:  Ejection fraction 10%-15%, global hypokinesis, grade 3 diastolic dysfunction.  RV is dilated.  LA is moderately dilated.  Trace aortic regurgitation, moderate mitral annular calcification.  Slow fill exacerbation suggestive of functional mitral stenosis, mild MR, thickening of posterior annulus consistent with previous repair, mild tricuspid regurgitation by Doppler, moderate pulmonic regurgitation, LV end-diastolic dimension 6.6 cm.  There is no comment on RV function, although the RV is reported to be mildly dilated.  Echo images here have yet to be loaded for my view.     Coronary angiogram from 01/12/2015:  30% mid LAD, normal left main, circumflex normal.  Right coronary artery patent.  Op report from 01/21/2015:  Median sternotomy, mitral valve repair with 28 mm annuloplasty ring, left atrial appendage ligation with a 45 mm atrial clip.     Right heart catheterization   RA: 2   PA: 23/13   PCWP: 6   PVR: 3.0   CI: 1.7   CO: 3.7   SVR: 1600      Assessment and Plan:   In summary, this is a very pleasant, 51-year-old man with a history of what is likely valvular cardiomyopathy, status post mitral valve repair in 01/2015, who then had continued symptoms, a failure to improve his LV ejection fraction. He is presently New York Heart Association functional class II and is euvolemic on exam. He has tolerated up titration of neurohormonal blockade and also now has a biventricular pacer.     His baseline pulmonary artery pressures are within an acceptable range.  His cardiac output was a the lower end, however, he is dry during his right heart catheterization.  Overall, his filling pressures were relatively reassuring.  He would like to hold off on entresto at this time due to expense. He may also have been too well to be included in the trial at this point.  He has blood pressure room, and is currently on coreg, lisinopril, hydralazine and imdur. We are increasing his ace-i today.     For now we can hold off on LVAD/transplant eval as he is doing well enough from a cardiac perspective.       1. Chronic systolic heart failure secondary to valvular cardiomyopathy   Stage C  NYHA Class II  ACEi/ARB yes  BB titration ongoing  Aldosterone antagonist no (high potassium in the past with administration)  SCD prophylaxis ICD  % BiV pacing: yes   Fluid status euvolemic  NSAID use: none  Sleep Apnea Evaluation: now on CPAP   Follow-up 6 months with me, echo today, BMP next week in [place]       Luis Miguel Bartonh  Cardiovascular fellow  [phone]    I have seen and examined the patient with the house staff on February 9, 2017 and agree with the outlined assessment and plan.      Dunia Hdz MD   of Medicine   [facility] Division of Cardiology       CC  Yoan Felix,   Gerry, ROSMERY Norman, ROSMERY Roberts

## 2017-02-22 ENCOUNTER — TRANSFERRED RECORDS (OUTPATIENT)
Dept: HEALTH INFORMATION MANAGEMENT | Facility: CLINIC | Age: 52
End: 2017-02-22

## 2017-02-22 DIAGNOSIS — I50.31 ACUTE DIASTOLIC CONGESTIVE HEART FAILURE (H): ICD-10-CM

## 2017-02-22 RX ORDER — HYDRALAZINE HYDROCHLORIDE 25 MG/1
25 TABLET, FILM COATED ORAL 3 TIMES DAILY
Qty: 270 TABLET | Refills: 3 | Status: SHIPPED | OUTPATIENT
Start: 2017-02-22 | End: 2018-03-31

## 2017-02-24 ENCOUNTER — DOCUMENTATION ONLY (OUTPATIENT)
Dept: CARDIOLOGY | Facility: CLINIC | Age: 52
End: 2017-02-24

## 2017-02-24 LAB
ANION GAP SERPL CALCULATED.3IONS-SCNC: 12 MMOL/L
BUN SERPL-MCNC: 19 MG/DL
CALCIUM SERPL-MCNC: 9.3 MG/DL
CHLORIDE SERPLBLD-SCNC: 104 MMOL/L
CO2 SERPL-SCNC: 26 MMOL/L
CREAT SERPL-MCNC: 1.32 MG/DL
GFR SERPL CREATININE-BSD FRML MDRD: 58 ML/MIN/1.73M2
GLUCOSE SERPL-MCNC: 81 MG/DL (ref 70–99)
POTASSIUM SERPL-SCNC: 4.2 MMOL/L
SODIUM SERPL-SCNC: 138 MMOL/L

## 2017-03-03 DIAGNOSIS — I42.9 CARDIOMYOPATHY (H): ICD-10-CM

## 2017-03-06 RX ORDER — ISOSORBIDE MONONITRATE 30 MG/1
30 TABLET, EXTENDED RELEASE ORAL DAILY
Qty: 90 TABLET | Refills: 3 | Status: SHIPPED | OUTPATIENT
Start: 2017-03-06 | End: 2017-08-10

## 2017-05-05 DIAGNOSIS — I42.9 CARDIOMYOPATHY (H): ICD-10-CM

## 2017-05-05 DIAGNOSIS — I50.22 CHRONIC SYSTOLIC HEART FAILURE (H): Primary | ICD-10-CM

## 2017-05-05 RX ORDER — FUROSEMIDE 20 MG
20 TABLET ORAL DAILY
Qty: 90 TABLET | Refills: 3 | Status: SHIPPED | OUTPATIENT
Start: 2017-05-05 | End: 2017-05-18

## 2017-05-18 DIAGNOSIS — I42.9 CARDIOMYOPATHY (H): ICD-10-CM

## 2017-05-18 RX ORDER — FUROSEMIDE 20 MG
20 TABLET ORAL DAILY
Qty: 90 TABLET | Refills: 3 | Status: SHIPPED | OUTPATIENT
Start: 2017-05-18 | End: 2018-08-03

## 2017-07-31 ASSESSMENT — ENCOUNTER SYMPTOMS
BLOATING: 1
JAUNDICE: 0
HOARSE VOICE: 0
SYNCOPE: 0
HYPOTENSION: 1
SORE THROAT: 0
PALPITATIONS: 0
TASTE DISTURBANCE: 0
BOWEL INCONTINENCE: 0
TROUBLE SWALLOWING: 0
LEG SWELLING: 0
BLOOD IN STOOL: 0
SINUS PAIN: 0
DIARRHEA: 1
RECTAL BLEEDING: 0
HEARTBURN: 1
RECTAL PAIN: 0
CLAUDICATION: 0
SINUS CONGESTION: 1
ORTHOPNEA: 0
HYPERTENSION: 0
TACHYCARDIA: 0
NECK MASS: 0
LIGHT-HEADEDNESS: 0
SMELL DISTURBANCE: 0
CONSTIPATION: 1
ABDOMINAL PAIN: 1
VOMITING: 1
LEG PAIN: 0
EXERCISE INTOLERANCE: 0
NAUSEA: 1
SLEEP DISTURBANCES DUE TO BREATHING: 0

## 2017-08-03 ENCOUNTER — PRE VISIT (OUTPATIENT)
Dept: CARDIOLOGY | Facility: CLINIC | Age: 52
End: 2017-08-03

## 2017-08-03 DIAGNOSIS — I50.22 CHRONIC SYSTOLIC HEART FAILURE (H): Primary | ICD-10-CM

## 2017-08-03 DIAGNOSIS — I47.29 PAROXYSMAL VENTRICULAR TACHYCARDIA (H): ICD-10-CM

## 2017-08-03 DIAGNOSIS — Z95.2 S/P MITRAL VALVE REPLACEMENT: ICD-10-CM

## 2017-08-10 ENCOUNTER — OFFICE VISIT (OUTPATIENT)
Dept: CARDIOLOGY | Facility: CLINIC | Age: 52
End: 2017-08-10
Attending: INTERNAL MEDICINE
Payer: COMMERCIAL

## 2017-08-10 ENCOUNTER — TRANSFERRED RECORDS (OUTPATIENT)
Dept: HEALTH INFORMATION MANAGEMENT | Facility: CLINIC | Age: 52
End: 2017-08-10

## 2017-08-10 VITALS
BODY MASS INDEX: 27.97 KG/M2 | WEIGHT: 195.4 LBS | HEART RATE: 52 BPM | HEIGHT: 70 IN | OXYGEN SATURATION: 97 % | SYSTOLIC BLOOD PRESSURE: 124 MMHG | DIASTOLIC BLOOD PRESSURE: 88 MMHG

## 2017-08-10 DIAGNOSIS — I50.22 CHRONIC SYSTOLIC CONGESTIVE HEART FAILURE (H): ICD-10-CM

## 2017-08-10 DIAGNOSIS — I50.22 CHRONIC SYSTOLIC HEART FAILURE (H): Primary | ICD-10-CM

## 2017-08-10 DIAGNOSIS — Z95.2 S/P MITRAL VALVE REPLACEMENT: ICD-10-CM

## 2017-08-10 DIAGNOSIS — I50.22 CHRONIC SYSTOLIC HEART FAILURE (H): ICD-10-CM

## 2017-08-10 DIAGNOSIS — I47.29 PAROXYSMAL VENTRICULAR TACHYCARDIA (H): ICD-10-CM

## 2017-08-10 DIAGNOSIS — I42.8 VALVULAR CARDIOMYOPATHY (H): Primary | ICD-10-CM

## 2017-08-10 LAB
ALBUMIN SERPL-MCNC: 3.8 G/DL (ref 3.4–5)
ALP SERPL-CCNC: 62 U/L (ref 40–150)
ALT SERPL W P-5'-P-CCNC: 48 U/L (ref 0–70)
ANION GAP SERPL CALCULATED.3IONS-SCNC: 6 MMOL/L (ref 3–14)
AST SERPL W P-5'-P-CCNC: 21 U/L (ref 0–45)
BILIRUB SERPL-MCNC: 0.6 MG/DL (ref 0.2–1.3)
BUN SERPL-MCNC: 23 MG/DL (ref 7–30)
CALCIUM SERPL-MCNC: 9.3 MG/DL (ref 8.5–10.1)
CHLORIDE SERPL-SCNC: 106 MMOL/L (ref 94–109)
CO2 SERPL-SCNC: 28 MMOL/L (ref 20–32)
CREAT SERPL-MCNC: 1.2 MG/DL (ref 0.66–1.25)
GFR SERPL CREATININE-BSD FRML MDRD: 64 ML/MIN/1.7M2
GLUCOSE SERPL-MCNC: 81 MG/DL (ref 70–99)
MAGNESIUM SERPL-MCNC: 2.1 MG/DL (ref 1.6–2.3)
POTASSIUM SERPL-SCNC: 4.1 MMOL/L (ref 3.4–5.3)
PROT SERPL-MCNC: 7.3 G/DL (ref 6.8–8.8)
SODIUM SERPL-SCNC: 141 MMOL/L (ref 133–144)

## 2017-08-10 PROCEDURE — 83735 ASSAY OF MAGNESIUM: CPT | Performed by: INTERNAL MEDICINE

## 2017-08-10 PROCEDURE — 80053 COMPREHEN METABOLIC PANEL: CPT | Performed by: INTERNAL MEDICINE

## 2017-08-10 PROCEDURE — 93284 PRGRMG EVAL IMPLANTABLE DFB: CPT | Mod: ZF

## 2017-08-10 PROCEDURE — 99214 OFFICE O/P EST MOD 30 MIN: CPT | Mod: 25 | Performed by: INTERNAL MEDICINE

## 2017-08-10 PROCEDURE — 36415 COLL VENOUS BLD VENIPUNCTURE: CPT | Performed by: INTERNAL MEDICINE

## 2017-08-10 PROCEDURE — 93284 PRGRMG EVAL IMPLANTABLE DFB: CPT | Mod: 26 | Performed by: INTERNAL MEDICINE

## 2017-08-10 RX ORDER — ONDANSETRON 8 MG/1
8 TABLET, FILM COATED ORAL
COMMUNITY
Start: 2017-07-12 | End: 2019-06-11

## 2017-08-10 RX ORDER — DIAZEPAM 5 MG
5 TABLET ORAL
COMMUNITY
Start: 2017-05-31 | End: 2019-06-11

## 2017-08-10 RX ORDER — LISINOPRIL 5 MG/1
7.5 TABLET ORAL 2 TIMES DAILY
Qty: 60 TABLET | Refills: 6 | Status: SHIPPED | OUTPATIENT
Start: 2017-08-10 | End: 2017-08-10

## 2017-08-10 RX ORDER — ONDANSETRON 4 MG/1
TABLET, FILM COATED ORAL
COMMUNITY
Start: 2017-08-01 | End: 2019-06-11

## 2017-08-10 RX ORDER — LISINOPRIL 5 MG/1
7.5 TABLET ORAL 2 TIMES DAILY
Qty: 270 TABLET | Refills: 3 | Status: SHIPPED | OUTPATIENT
Start: 2017-08-10 | End: 2018-10-18

## 2017-08-10 RX ORDER — METOCLOPRAMIDE 10 MG/1
10 TABLET ORAL
COMMUNITY
Start: 2017-07-25 | End: 2019-06-11

## 2017-08-10 RX ORDER — OMEPRAZOLE 40 MG/1
40 CAPSULE, DELAYED RELEASE ORAL
COMMUNITY
Start: 2017-07-25 | End: 2019-06-11

## 2017-08-10 ASSESSMENT — PAIN SCALES - GENERAL: PAINLEVEL: NO PAIN (0)

## 2017-08-10 NOTE — PROGRESS NOTES
Patient seen in clinic for evaluation and iterative programming of his [employer] multi lead ICD per MD orders.  Patient has an appointment to see Dr. Hdz today.  Normal ICD function.  2 NSVT episodes recorded - 9-18 beats, 178-199 bpm.  Intrinsic rhythm = AS- @ 30 bpm.  AP = 98%.   = 96%.  BVP = 96%.  Estimated battery longevity to JACOB = 8 years.  BiV trigger programmed on.  Max track rate programmed from 140 to 130 bpm due to interlocks.  RVRP programmed from 230 to 190 ms due to interlocks.  Patient reports that he is feeling pretty well today.  Patient does report that he has been experiencing chest tightness and pain over the past several weeks.  The chest discomfort does not appear to be associated with exertion.  Patient reports that the chest discomfort is a new finding for him.  In addition, the patient reports that he has been having digestive problems.  Patient routinely has his device checked in [place], [place].  Dr. Hdz notified of interrogation results.  Programming changes made per Dr. Hdz's orders.    Multi lead ICD

## 2017-08-10 NOTE — LETTER
8/10/2017      RE: Tej Morfin  [address]  [place] [address]       Dear Colleague,    Thank you for the opportunity to participate in the care of your patient, Tej Morfin, at the [facility] HEART [facility]. Please see a copy of my visit note below.    See above     August 10, 2017    Dear Dr. Felix:      I had the pleasure of seeing, Tej Morfin in the [facility] today.     As you know, he is a very pleasant 51-year-old man with a first presentation with cardiomegaly approximately 4 years ago.  He underwent echocardiogram and he was found to have severe mitral regurgitation and an ejection fraction of 25%.  He underwent an angiogram at that time that showed only mild coronary disease.  He was referred to Dr. Graves for mitral regurgitation, and on 01/21/2015, he underwent mitral valve repair with an annuloplasty ring and was discharged home.        He later presented with ventricular tachycardia which was sustained and therefore he underwent a single-chamber defibrillator for secondary prevention.  Shortly after that, he had his first hospitalization for heart failure with reduced ejection fraction, and given his left ventricular end-diastolic dimension was 7 cm and the ventricular arrhythmias, you sent him to me for consideration of advanced therapies.      When I first saw him, he had an excellent cardiopulmonary stress test.  He now had a BIV upgrade to a CRT.     He reports overall no real changes in health or symptoms since I last saw him.   He denies PND or orthopnea.  He is able to exercise on a treadmill for 15min at a speed 3 at a time, walking at a brisk pace.  He denies syncope.  He denies palpitations. But did have ICD shock in September 2016, since then 2 more episodes of NSVT reported in October and November, no further shocks.  He otherwise denies any chest pain, fatigue, or  lightheadedness / syncope. He underwent a repeat coronary angiography that showed mild non obstructive disease.     He underwent a PET scan of his chest to rule out cardiac sarcoidosis, which was not detected on this test.  However, there was an area showed ischemia subtended by the circumflex artery and he underwent a follow up coronary angiography as noted above which showed mild non obstructive disease.    The biggest issue he is faced in the last few months has been gastroparesis with several ER visits. This is thought to be idiopathic. Otherwise he reports a good quality of life.        PAST MEDICAL HISTORY:     1.  Hemorrhoidectomy.     2.  Arthroscopy.     3.  Right partial medial meniscectomy 08/2013.     4.  Status post ICD implantation 02/02/2015, [employer], by Dr. Felix.     5.  Mitral valve repair with annuloplasty ring and atrial appendage ligation by Dr. Graves in [place], [place]; date on that was 01/21/2015.     CURRENT MEDICATIONS:    Prescription Medications as of 8/10/2017             diazepam (VALIUM) 5 MG tablet Take 5 mg by mouth    metoclopramide (REGLAN) 10 MG tablet Take 10 mg by mouth    omeprazole (PRILOSEC) 40 MG capsule Take 40 mg by mouth    ondansetron (ZOFRAN) 4 MG tablet Take one tablet po 30 minutes before each half of bowel prep    ondansetron (ZOFRAN) 8 MG tablet Take 8 mg by mouth    furosemide (LASIX) 20 MG tablet Take 1 tablet (20 mg) by mouth daily    hydrALAZINE (APRESOLINE) 25 MG tablet Take 1 tablet (25 mg) by mouth 3 times daily    potassium chloride (KLOR-CON) 20 MEQ Packet Take 20 mEq by mouth 2 times daily    lisinopril (PRINIVIL/ZESTRIL) 5 MG tablet Take 1 tablet (5 mg) by mouth 2 times daily    ASPIRIN PO Take 81 mg by mouth daily    CARVEDILOL PO Take 6.25 mg by mouth 2 times daily (with meals)    METOPROLOL SUCCINATE ER PO Take 50 mg by mouth 2 times daily    CLONAZEPAM PO Take 0.5 mg by mouth At Bedtime    SOTALOL HCL PO Take 80 mg by mouth     "albuterol (PROAIR HFA, PROVENTIL HFA, VENTOLIN HFA) 108 (90 BASE) MCG/ACT inhaler Inhale 2 puffs into the lungs every 4 hours as needed for shortness of breath / dyspnea or wheezing    budesonide (RINOCORT AQUA) 32 MCG/ACT spray Spray 1 spray into both nostrils daily    Nitroglycerin (NITROSTAT SL) Place 0.4 mg under the tongue every 5 minutes as needed for chest pain    PANTOPRAZOLE SODIUM PO Take 40 mg by mouth 2 times daily (before meals)          SOCIAL HISTORY:  The patient lives with his wife.  They have 2 children who live at home.  One is 10, and one is 15.  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  Alcohol rarely, 1-2 times per month.  No drug use.  Smoking, he smoked 1/2 pack per day for 10 years, quit in .        FAMILY HISTORY:  Mother had coronary disease and end-stage renal disease in her 40s.  He has 1 younger brother and 1 sister who are alive and well.  Dad  at age 51, but not related to cardiac problems.  There is no other family history of cardiomyopathy or sudden cardiac death.     ROS:   Constitutional: No fever, chills, or sweats. Weight is stable    ENT: No visual disturbance, ear ache, epistaxis, sore throat.   Allergies/Immunologic: Negative.   Respiratory: No cough, hemoptysis.   Cardiovascular: As per HPI.   GI: No nausea, vomiting, hematemesis, melena, or hematochezia.   : No urinary frequency, dysuria, or hematuria.   Integument: Negative.   Psychiatric: negative   Neuro: Negative.   Endocrinology: Negative.   Musculoskeletal: Negative.    EXAM:  /88 (BP Location: Left arm, Patient Position: Chair, Cuff Size: Adult Regular)  Pulse 52  Ht 1.778 m (5' 10\")  Wt 88.6 kg (195 lb 6.4 oz)  SpO2 97%  BMI 28.04 kg/m2  General: appears comfortable, alert and articulate  Head: normocephalic, atraumatic  Eyes: anicteric sclera, EOMI  Neck: no adenopathy  Orophyarynx: moist mucosa, no lesions, dentition " intact  Heart: PMI sustained,  regular, S1/S2, II/IV systolic murmur at the apex, no gallop, no rub, estimated JVP < 10 cm   Lungs: clear, no rales or wheezing  Abdomen: soft, non-tender, bowel sounds present, no hepatosplenomegaly  Extremities: no clubbing, cyanosis, no LE edema  Neurological: normal speech and affect, no gross motor deficits    Labs:  CBC RESULTS:  Lab Results   Component Value Date    WBC 3.7 (L) 06/16/2016    RBC 4.11 (L) 06/16/2016    HGB 13.1 (L) 06/16/2016    HCT 40.0 06/16/2016    MCV 97 06/16/2016    MCH 31.9 06/16/2016    MCHC 32.8 06/16/2016    RDW 12.3 06/16/2016     (L) 06/16/2016       CMP RESULTS:  Lab Results   Component Value Date     08/10/2017    POTASSIUM 4.1 08/10/2017    CHLORIDE 106 08/10/2017    CO2 28 08/10/2017    ANIONGAP 6 08/10/2017    GLC 81 08/10/2017    BUN 23 08/10/2017    CR 1.20 08/10/2017    GFRESTIMATED 64 08/10/2017    GFRESTBLACK 77 08/10/2017    YESICA 9.3 08/10/2017    BILITOTAL 0.6 08/10/2017    ALBUMIN 3.8 08/10/2017    ALKPHOS 62 08/10/2017    ALT 48 08/10/2017    AST 21 08/10/2017        INR RESULTS:  Lab Results   Component Value Date    INR 1.05 11/30/2015       Lab Results   Component Value Date    MAG 2.1 08/10/2017       Exercise stress test performed today at [facility] on 12/01/2015 shows resting heart rate of 50, resting blood pressure of 120/74, peak heart rate of 144, peak blood pressure of 144/81, peak VO2 of 27.7, which is 80% of predicted.  PVCs during rest recovery.  EKG is negative for ischemia.  RER is 1.2.  VE/VCO2 slope is 32.5.      Echocardiogram from 11/09/2015:  Ejection fraction 10%-15%, global hypokinesis, grade 3 diastolic dysfunction.  RV is dilated.  LA is moderately dilated.  Trace aortic regurgitation, moderate mitral annular calcification.  Slow fill exacerbation suggestive of functional mitral stenosis, mild MR, thickening of posterior annulus consistent with previous repair, mild tricuspid regurgitation by Doppler,  moderate pulmonic regurgitation, LV end-diastolic dimension 6.6 cm.  There is no comment on RV function, although the RV is reported to be mildly dilated.  Echo images here have yet to be loaded for my view.     Coronary angiogram from 01/12/2015:  30% mid LAD, normal left main, circumflex normal.  Right coronary artery patent.  Op report from 01/21/2015:  Median sternotomy, mitral valve repair with 28 mm annuloplasty ring, left atrial appendage ligation with a 45 mm atrial clip.     Right heart catheterization   RA: 2   PA: 23/13   PCWP: 6   PVR: 3.0   CI: 1.7   CO: 3.7   SVR: 1600     Las echo from [facility] [place]- EF in the 15-20 % range - LV dimension improved to 6.4 cm, MV without regurgitation, MV gradient mean 4 mmHg    ICD interrogation:  Bi-V pacing only at 96 %    Assessment and Plan:   In summary, this is a very pleasant, 51-year-old man with a history of what is likely valvular cardiomyopathy, status post mitral valve repair in 01/2015, who then had continued symptoms, a failure to improve his LV ejection fraction. He is presently New York Heart Association functional class II and is euvolemic on exam. He has tolerated up titration of neurohormonal blockade and also now has a biventricular pacer.     His baseline pulmonary artery pressures are within an acceptable range.  His cardiac output was a the lower end, however, he is dry during his right heart catheterization.  Overall, his filling pressures were relatively reassuring.  He would like to hold off on entresto at this time due to expense..  He has blood pressure room, and is currently on coreg, lisinopril, hydralazine.  We are increasing his lisinoprili today to 7.5 mg BID.    For now we can hold off on LVAD/transplant eval as he is doing well enough from a cardiac perspective.  At this point I will plan to see him back in a year with a cardiopulmonary stress test prior.      1. 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 Felix, 1 year with me with CPX        Bi-V pacing: - given complete AV block the somewhat low bi-v pacing indicates he maybe having a high PVC burden- it may be worth a holter to get a capture a more accurate 24-48 hours to determine PVC burden. We did enable programing to increase BI-V pacing.     Dunia Hdz MD   of Medicine   [facility] Division of Cardiology       CC  Yoan Felix,   Gerry, ROSMERY Norman, ROSMERY Roberts

## 2017-08-10 NOTE — MR AVS SNAPSHOT
After Visit Summary   8/10/2017    Tej Morfin    MRN: [id number]           Patient Information     Date Of Birth          1965        Visit Information        Provider Department      8/10/2017 3:00 PM 1, Uc Cv Device [facility]        Today's Diagnoses     Valvular cardiomyopathy (H)    -  1      Care Instructions    It was a pleasure to see you in clinic today.  Please do not hesitate to call with any questions or concerns.      Triny Ley, RN, MS, CCRN  Electrophysiology Nurse Clinician  [facility] [place]    During Business Hours Please Call:  [phone]  After Hours Please Call:  [phone] - select option #4 and ask for job code [phone]                    Follow-ups after your visit        Follow-up notes from your care team     Return if symptoms worsen or fail to improve, for ICD Check.      Who to contact     If you have questions or need follow up information about today's clinic visit or your schedule please contact [facility] directly at [phone].  Normal or non-critical lab and imaging results will be communicated to you by Accupalhart, letter or phone within 4 business days after the clinic has received the results. If you do not hear from us within 7 days, please contact the clinic through X-IOt or phone. If you have a critical or abnormal lab result, we will notify you by phone as soon as possible.  Submit refill requests through Chrends or call your pharmacy and they will forward the refill request to us. Please allow 3 business days for your refill to be completed.          Additional Information About Your Visit        Accupalhart Information     Chrends gives you secure access to your electronic health record. If you see a primary care provider, you can also send messages to your care team and make appointments. If you have questions, please call your primary care clinic.  If you do not have a primary care provider, please call  [phone] and they will assist you.        Care EveryWhere ID     This is your Care EveryWhere ID. This could be used by other organizations to access your [place] medical records  [id number]         Blood Pressure from Last 3 Encounters:   08/10/17 124/88   02/09/17 (!) 128/93   08/11/16 114/75    Weight from Last 3 Encounters:   08/10/17 88.6 kg (195 lb 6.4 oz)   02/09/17 89.4 kg (197 lb 1.6 oz)   08/11/16 85.7 kg (189 lb)              We Performed the Following     ICD DEVICE PROGRAMMING EVAL, MULTI LEAD ICD        Primary Care Provider Office Phone # Fax #    Marlin Hunter [phone]       [facility] MEDICINE [address]  [place] [facility] [address]        Equal Access to Services     [facility] : Hadii aad ku hadasho Soomaali, waaxda luqadaha, qaybta kaalmada adeegyada, arnold florianin haynathan vinson . So [facility] [phone].    ATENCIÓN: Si habla español, tiene a cameron disposición servicios gratuitos de asistencia lingüística. Llame al [phone].    We comply with applicable federal civil rights laws and [place] laws. We do not discriminate on the basis of race, color, national origin, age, disability sex, sexual orientation or gender identity.            Thank you!     Thank you for choosing [facility]  for your care. Our goal is always to provide you with excellent care. Hearing back from our patients is one way we can continue to improve our services. Please take a few minutes to complete the written survey that you may receive in the mail after your visit with us. Thank you!             Your Updated Medication List - Protect others around you: Learn how to safely use, store and throw away your medicines at www.disposemymeds.org.          This list is accurate as of: 8/10/17  3:58 PM.  Always use your most recent med list.                   Brand Name Dispense Instructions for use Diagnosis    albuterol 108 (90 BASE) MCG/ACT Inhaler    PROAIR  HFA/PROVENTIL HFA/VENTOLIN HFA     Inhale 2 puffs into the lungs every 4 hours as needed for shortness of breath / dyspnea or wheezing        ASPIRIN PO      Take 81 mg by mouth daily        budesonide 32 MCG/ACT spray    RINOCORT AQUA     Spray 1 spray into both nostrils daily        CARVEDILOL PO      Take 6.25 mg by mouth 2 times daily (with meals)        CLONAZEPAM PO      Take 0.5 mg by mouth At Bedtime        diazepam 5 MG tablet    VALIUM     Take 5 mg by mouth        furosemide 20 MG tablet    LASIX    90 tablet    Take 1 tablet (20 mg) by mouth daily    Cardiomyopathy (H)       hydrALAZINE 25 MG tablet    APRESOLINE    270 tablet    Take 1 tablet (25 mg) by mouth 3 times daily    Acute diastolic congestive heart failure (H)       isosorbide mononitrate 30 MG 24 hr tablet    IMDUR    90 tablet    Take 1 tablet (30 mg) by mouth daily    Cardiomyopathy (H)       lisinopril 5 MG tablet    PRINIVIL/ZESTRIL    60 tablet    Take 1 tablet (5 mg) by mouth 2 times daily    Chronic systolic congestive heart failure (H)       metoclopramide 10 MG tablet    REGLAN     Take 10 mg by mouth        METOPROLOL SUCCINATE ER PO      Take 50 mg by mouth 2 times daily        NITROSTAT SL      Place 0.4 mg under the tongue every 5 minutes as needed for chest pain        omeprazole 40 MG capsule    priLOSEC     Take 40 mg by mouth        * ondansetron 8 MG tablet    ZOFRAN     Take 8 mg by mouth        * ondansetron 4 MG tablet    ZOFRAN     Take one tablet po 30 minutes before each half of bowel prep        PANTOPRAZOLE SODIUM PO      Take 40 mg by mouth 2 times daily (before meals)        potassium chloride 20 MEQ Packet    KLOR-CON     Take 20 mEq by mouth 2 times daily        SOTALOL HCL PO      Take 80 mg by mouth        * Notice:  This list has 2 medication(s) that are the same as other medications prescribed for you. Read the directions carefully, and ask your doctor or other care provider to review them with you.

## 2017-08-10 NOTE — PATIENT INSTRUCTIONS
It was a pleasure to see you in clinic today.  Please do not hesitate to call with any questions or concerns.      Triny Ley, RN, MS, CCRN  Electrophysiology Nurse Clinician  [facility]    During Business Hours Please Call:  [phone]  After Hours Please Call:  [phone] - select option #4 and ask for job code [phone]

## 2017-08-10 NOTE — PATIENT INSTRUCTIONS
Return to see Dr. Hdz in a year.     We will do an exercise stress test at that time.     Increase your lisinopril to 7.5 mg twice daily.

## 2017-08-10 NOTE — MR AVS SNAPSHOT
After Visit Summary   8/10/2017    Tej Morfin    MRN: [id number]           Patient Information     Date Of Birth          1965        Visit Information        Provider Department      8/10/2017 3:30 PM Dunia Hdz MD [facility]        Today's Diagnoses     Chronic systolic heart failure (H)    -  1    Chronic systolic congestive heart failure (H)          Care Instructions    Return to see Dr. Hdz in a year.     We will do an exercise stress test at that time.     Increase your lisinopril to 7.5 mg twice daily.               Follow-ups after your visit        Follow-up notes from your care team     Return in about 1 year (around 8/10/2018).      Who to contact     If you have questions or need follow up information about today's clinic visit or your schedule please contact [facility] directly at [phone].  Normal or non-critical lab and imaging results will be communicated to you by Appseehart, letter or phone within 4 business days after the clinic has received the results. If you do not hear from us within 7 days, please contact the clinic through Appseehart or phone. If you have a critical or abnormal lab result, we will notify you by phone as soon as possible.  Submit refill requests through [email or web address] or call your pharmacy and they will forward the refill request to us. Please allow 3 business days for your refill to be completed.          Additional Information About Your Visit        MyChart Information     [email or web address] gives you secure access to your electronic health record. If you see a primary care provider, you can also send messages to your care team and make appointments. If you have questions, please call your primary care clinic.  If you do not have a primary care provider, please call [phone] and they will assist you.        Care EveryWhere ID     This is your Care EveryWhere ID. This could be used by other organizations to access your [place]  "medical records  [id number]        Your Vitals Were     Pulse Height Pulse Oximetry BMI (Body Mass Index)          52 1.778 m (5' 10\") 97% 28.04 kg/m2         Blood Pressure from Last 3 Encounters:   08/10/17 124/88   02/09/17 (!) 128/93   08/11/16 114/75    Weight from Last 3 Encounters:   08/10/17 88.6 kg (195 lb 6.4 oz)   02/09/17 89.4 kg (197 lb 1.6 oz)   08/11/16 85.7 kg (189 lb)              Today, you had the following     No orders found for display         Today's Medication Changes          These changes are accurate as of: 8/10/17  4:16 PM.  If you have any questions, ask your nurse or doctor.               These medicines have changed or have updated prescriptions.        Dose/Directions    lisinopril 5 MG tablet   Commonly known as:  PRINIVIL/ZESTRIL   This may have changed:  how much to take   Used for:  Chronic systolic congestive heart failure (H)   Changed by:  Dunia Hdz MD        Dose:  7.5 mg   Take 1.5 tablets (7.5 mg) by mouth 2 times daily   Quantity:  60 tablet   Refills:  6         Stop taking these medicines if you haven't already. Please contact your care team if you have questions.     isosorbide mononitrate 30 MG 24 hr tablet   Commonly known as:  IMDUR   Stopped by:  Dunia Hdz MD                Where to get your medicines      These medications were sent to [facility] [facility] [place] - [address]  [address] [place] [address]     Phone:  [phone]     lisinopril 5 MG tablet                Primary Care Provider Office Phone # Fax #    Marlin ALMEIDA Elsa [phone]       [facility] FAM[facility] MEDICINE [address]  [place] [address]        Equal Access to Services     [facility] DAVIDE : Beto champagne Soeaston, waaxda luqadaha, qaybta kaalmada adeegyaedi, arnold vinson . [facility] [phone].    ATENCIÓN: Si habla español, tiene a cameron disposición servicios gratuitos de asistencia " lingüística. Virgen al [phone].    We comply with applicable federal civil rights laws and [place] laws. We do not discriminate on the basis of race, color, national origin, age, disability sex, sexual orientation or gender identity.            Thank you!     Thank you for choosing [facility]  for your care. Our goal is always to provide you with excellent care. Hearing back from our patients is one way we can continue to improve our services. Please take a few minutes to complete the written survey that you may receive in the mail after your visit with us. Thank you!             Your Updated Medication List - Protect others around you: Learn how to safely use, store and throw away your medicines at www.disposemymeds.org.          This list is accurate as of: 8/10/17  4:16 PM.  Always use your most recent med list.                   Brand Name Dispense Instructions for use Diagnosis    albuterol 108 (90 BASE) MCG/ACT Inhaler    PROAIR HFA/PROVENTIL HFA/VENTOLIN HFA     Inhale 2 puffs into the lungs every 4 hours as needed for shortness of breath / dyspnea or wheezing        ASPIRIN PO      Take 81 mg by mouth daily        budesonide 32 MCG/ACT spray    RINOCORT AQUA     Spray 1 spray into both nostrils daily        CARVEDILOL PO      Take 6.25 mg by mouth 2 times daily (with meals)        CLONAZEPAM PO      Take 0.5 mg by mouth At Bedtime        diazepam 5 MG tablet    VALIUM     Take 5 mg by mouth        furosemide 20 MG tablet    LASIX    90 tablet    Take 1 tablet (20 mg) by mouth daily    Cardiomyopathy (H)       hydrALAZINE 25 MG tablet    APRESOLINE    270 tablet    Take 1 tablet (25 mg) by mouth 3 times daily    Acute diastolic congestive heart failure (H)       lisinopril 5 MG tablet    PRINIVIL/ZESTRIL    60 tablet    Take 1.5 tablets (7.5 mg) by mouth 2 times daily    Chronic systolic congestive heart failure (H)       metoclopramide 10 MG tablet    REGLAN     Take 10 mg by mouth         METOPROLOL SUCCINATE ER PO      Take 50 mg by mouth 2 times daily        NITROSTAT SL      Place 0.4 mg under the tongue every 5 minutes as needed for chest pain        omeprazole 40 MG capsule    priLOSEC     Take 40 mg by mouth        * ondansetron 8 MG tablet    ZOFRAN     Take 8 mg by mouth        * ondansetron 4 MG tablet    ZOFRAN     Take one tablet po 30 minutes before each half of bowel prep        PANTOPRAZOLE SODIUM PO      Take 40 mg by mouth 2 times daily (before meals)        potassium chloride 20 MEQ Packet    KLOR-CON     Take 20 mEq by mouth 2 times daily        SOTALOL HCL PO      Take 80 mg by mouth        * Notice:  This list has 2 medication(s) that are the same as other medications prescribed for you. Read the directions carefully, and ask your doctor or other care provider to review them with you.

## 2017-08-10 NOTE — NURSING NOTE
Chief Complaint   Patient presents with     Follow Up For     6 mo. follow up for HF, labs, device ck     Vitals were taken and medications were reconciled.     Danial Napier MA  3:01 PM

## 2017-08-10 NOTE — PROGRESS NOTES
August 10, 2017    Dear Dr. Felix:      I had the pleasure of seeing, Tej Morfin in the [facility] today.     As you know, he is a very pleasant 51-year-old man with a first presentation with cardiomegaly approximately 4 years ago.  He underwent echocardiogram and he was found to have severe mitral regurgitation and an ejection fraction of 25%.  He underwent an angiogram at that time that showed only mild coronary disease.  He was referred to Dr. Graves for mitral regurgitation, and on 01/21/2015, he underwent mitral valve repair with an annuloplasty ring and was discharged home.        He later presented with ventricular tachycardia which was sustained and therefore he underwent a single-chamber defibrillator for secondary prevention.  Shortly after that, he had his first hospitalization for heart failure with reduced ejection fraction, and given his left ventricular end-diastolic dimension was 7 cm and the ventricular arrhythmias, you sent him to me for consideration of advanced therapies.      When I first saw him, he had an excellent cardiopulmonary stress test.  He now had a BIV upgrade to a CRT.     He reports overall no real changes in health or symptoms since I last saw him.   He denies PND or orthopnea.  He is able to exercise on a treadmill for 15min at a speed 3 at a time, walking at a brisk pace.  He denies syncope.  He denies palpitations. But did have ICD shock in September 2016, since then 2 more episodes of NSVT reported in October and November, no further shocks.  He otherwise denies any chest pain, fatigue, or lightheadedness / syncope. He underwent a repeat coronary angiography that showed mild non obstructive disease.     He underwent a PET scan of his chest to rule out cardiac sarcoidosis, which was not detected on this test.  However, there was an area showed ischemia subtended by the circumflex artery and he underwent a follow up coronary  angiography as noted above which showed mild non obstructive disease.    The biggest issue he is faced in the last few months has been gastroparesis with several ER visits. This is thought to be idiopathic. Otherwise he reports a good quality of life.        PAST MEDICAL HISTORY:     1.  Hemorrhoidectomy.     2.  Arthroscopy.     3.  Right partial medial meniscectomy 08/2013.     4.  Status post ICD implantation 02/02/2015, [employer], by Dr. Felix.     5.  Mitral valve repair with annuloplasty ring and atrial appendage ligation by Dr. Graves in [place], [place]; date on that was 01/21/2015.     CURRENT MEDICATIONS:    Prescription Medications as of 8/10/2017             diazepam (VALIUM) 5 MG tablet Take 5 mg by mouth    metoclopramide (REGLAN) 10 MG tablet Take 10 mg by mouth    omeprazole (PRILOSEC) 40 MG capsule Take 40 mg by mouth    ondansetron (ZOFRAN) 4 MG tablet Take one tablet po 30 minutes before each half of bowel prep    ondansetron (ZOFRAN) 8 MG tablet Take 8 mg by mouth    furosemide (LASIX) 20 MG tablet Take 1 tablet (20 mg) by mouth daily    hydrALAZINE (APRESOLINE) 25 MG tablet Take 1 tablet (25 mg) by mouth 3 times daily    potassium chloride (KLOR-CON) 20 MEQ Packet Take 20 mEq by mouth 2 times daily    lisinopril (PRINIVIL/ZESTRIL) 5 MG tablet Take 1 tablet (5 mg) by mouth 2 times daily    ASPIRIN PO Take 81 mg by mouth daily    CARVEDILOL PO Take 6.25 mg by mouth 2 times daily (with meals)    METOPROLOL SUCCINATE ER PO Take 50 mg by mouth 2 times daily    CLONAZEPAM PO Take 0.5 mg by mouth At Bedtime    SOTALOL HCL PO Take 80 mg by mouth    albuterol (PROAIR HFA, PROVENTIL HFA, VENTOLIN HFA) 108 (90 BASE) MCG/ACT inhaler Inhale 2 puffs into the lungs every 4 hours as needed for shortness of breath / dyspnea or wheezing    budesonide (RINOCORT AQUA) 32 MCG/ACT spray Spray 1 spray into both nostrils daily    Nitroglycerin (NITROSTAT SL) Place 0.4 mg under the tongue every 5  "minutes as needed for chest pain    PANTOPRAZOLE SODIUM PO Take 40 mg by mouth 2 times daily (before meals)          SOCIAL HISTORY:  The patient lives with his wife.  They have 2 children who live at home.  One is 10, and one is 15.  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  Alcohol rarely, 1-2 times per month.  No drug use.  Smoking, he smoked 1/2 pack per day for 10 years, quit in .        FAMILY HISTORY:  Mother had coronary disease and end-stage renal disease in her 40s.  He has 1 younger brother and 1 sister who are alive and well.  Dad  at age 51, but not related to cardiac problems.  There is no other family history of cardiomyopathy or sudden cardiac death.     ROS:   Constitutional: No fever, chills, or sweats. Weight is stable    ENT: No visual disturbance, ear ache, epistaxis, sore throat.   Allergies/Immunologic: Negative.   Respiratory: No cough, hemoptysis.   Cardiovascular: As per HPI.   GI: No nausea, vomiting, hematemesis, melena, or hematochezia.   : No urinary frequency, dysuria, or hematuria.   Integument: Negative.   Psychiatric: negative   Neuro: Negative.   Endocrinology: Negative.   Musculoskeletal: Negative.    EXAM:  /88 (BP Location: Left arm, Patient Position: Chair, Cuff Size: Adult Regular)  Pulse 52  Ht 1.778 m (5' 10\")  Wt 88.6 kg (195 lb 6.4 oz)  SpO2 97%  BMI 28.04 kg/m2  General: appears comfortable, alert and articulate  Head: normocephalic, atraumatic  Eyes: anicteric sclera, EOMI  Neck: no adenopathy  Orophyarynx: moist mucosa, no lesions, dentition intact  Heart: PMI sustained,  regular, S1/S2, II/IV systolic murmur at the apex, no gallop, no rub, estimated JVP < 10 cm   Lungs: clear, no rales or wheezing  Abdomen: soft, non-tender, bowel sounds present, no hepatosplenomegaly  Extremities: no clubbing, cyanosis, no LE edema  Neurological: normal speech and affect, no gross motor " deficits    Labs:  CBC RESULTS:  Lab Results   Component Value Date    WBC 3.7 (L) 06/16/2016    RBC 4.11 (L) 06/16/2016    HGB 13.1 (L) 06/16/2016    HCT 40.0 06/16/2016    MCV 97 06/16/2016    MCH 31.9 06/16/2016    MCHC 32.8 06/16/2016    RDW 12.3 06/16/2016     (L) 06/16/2016       CMP RESULTS:  Lab Results   Component Value Date     08/10/2017    POTASSIUM 4.1 08/10/2017    CHLORIDE 106 08/10/2017    CO2 28 08/10/2017    ANIONGAP 6 08/10/2017    GLC 81 08/10/2017    BUN 23 08/10/2017    CR 1.20 08/10/2017    GFRESTIMATED 64 08/10/2017    GFRESTBLACK 77 08/10/2017    YESICA 9.3 08/10/2017    BILITOTAL 0.6 08/10/2017    ALBUMIN 3.8 08/10/2017    ALKPHOS 62 08/10/2017    ALT 48 08/10/2017    AST 21 08/10/2017        INR RESULTS:  Lab Results   Component Value Date    INR 1.05 11/30/2015       Lab Results   Component Value Date    MAG 2.1 08/10/2017       Exercise stress test performed today at [facility] on 12/01/2015 shows resting heart rate of 50, resting blood pressure of 120/74, peak heart rate of 144, peak blood pressure of 144/81, peak VO2 of 27.7, which is 80% of predicted.  PVCs during rest recovery.  EKG is negative for ischemia.  RER is 1.2.  VE/VCO2 slope is 32.5.      Echocardiogram from 11/09/2015:  Ejection fraction 10%-15%, global hypokinesis, grade 3 diastolic dysfunction.  RV is dilated.  LA is moderately dilated.  Trace aortic regurgitation, moderate mitral annular calcification.  Slow fill exacerbation suggestive of functional mitral stenosis, mild MR, thickening of posterior annulus consistent with previous repair, mild tricuspid regurgitation by Doppler, moderate pulmonic regurgitation, LV end-diastolic dimension 6.6 cm.  There is no comment on RV function, although the RV is reported to be mildly dilated.  Echo images here have yet to be loaded for my view.     Coronary angiogram from 01/12/2015:  30% mid LAD, normal left main, circumflex normal.  Right coronary artery patent.  Op report  from 01/21/2015:  Median sternotomy, mitral valve repair with 28 mm annuloplasty ring, left atrial appendage ligation with a 45 mm atrial clip.     Right heart catheterization   RA: 2   PA: 23/13   PCWP: 6   PVR: 3.0   CI: 1.7   CO: 3.7   SVR: 1600     Las echo from [facility]- EF in the 15-20 % range - LV dimension improved to 6.4 cm, MV without regurgitation, MV gradient mean 4 mmHg    ICD interrogation:  Bi-V pacing only at 96 %    Assessment and Plan:   In summary, this is a very pleasant, 51-year-old man with a history of what is likely valvular cardiomyopathy, status post mitral valve repair in 01/2015, who then had continued symptoms, a failure to improve his LV ejection fraction. He is presently New York Heart Association functional class II and is euvolemic on exam. He has tolerated up titration of neurohormonal blockade and also now has a biventricular pacer.     His baseline pulmonary artery pressures are within an acceptable range.  His cardiac output was a the lower end, however, he is dry during his right heart catheterization.  Overall, his filling pressures were relatively reassuring.  He would like to hold off on entresto at this time due to expense..  He has blood pressure room, and is currently on coreg, lisinopril, hydralazine.  We are increasing his lisinoprili today to 7.5 mg BID.    For now we can hold off on LVAD/transplant eval as he is doing well enough from a cardiac perspective.  At this point I will plan to see him back in a year with a cardiopulmonary stress test prior.      1. Chronic systolic heart failure secondary to valvular cardiomyopathy   Stage C  NYHA Class II  ACEi/ARB yes  BB titration ongoing  Aldosterone antagonist no (high potassium in the past with administration)  SCD prophylaxis ICD  % BiV pacing: yes    Fluid status euvolemic  NSAID use: none  Sleep Apnea Evaluation: now on CPAP   Follow-up 3-4 montsh with Dr. Felix, 1 year with me with CPX        Bi-V pacing:  - given complete AV block the somewhat low bi-v pacing indicates he maybe having a high PVC burden- it may be worth a holter to get a capture a more accurate 24-48 hours to determine PVC burden. We did enable programing to increase BI-V pacing.     Dunia Hdz MD   of Medicine   [facility] Division of Cardiology       CC  Gerry Chatman, ROSMERY Norman, RUTHANN Bahen[place]

## 2018-03-31 DIAGNOSIS — I50.31 ACUTE DIASTOLIC CONGESTIVE HEART FAILURE (H): ICD-10-CM

## 2018-04-02 RX ORDER — HYDRALAZINE HYDROCHLORIDE 25 MG/1
TABLET, FILM COATED ORAL
Qty: 270 TABLET | Refills: 3 | Status: SHIPPED | OUTPATIENT
Start: 2018-04-02 | End: 2019-04-01

## 2018-08-03 DIAGNOSIS — I42.9 CARDIOMYOPATHY (H): ICD-10-CM

## 2018-08-03 RX ORDER — FUROSEMIDE 20 MG
TABLET ORAL
Qty: 90 TABLET | Refills: 3 | Status: SHIPPED | OUTPATIENT
Start: 2018-08-03 | End: 2019-06-11

## 2018-10-15 DIAGNOSIS — I50.22 CHRONIC SYSTOLIC HEART FAILURE (H): Primary | ICD-10-CM

## 2018-10-18 ENCOUNTER — OFFICE VISIT (OUTPATIENT)
Dept: CARDIOLOGY | Facility: CLINIC | Age: 53
End: 2018-10-18
Attending: INTERNAL MEDICINE
Payer: COMMERCIAL

## 2018-10-18 VITALS
OXYGEN SATURATION: 97 % | BODY MASS INDEX: 29.13 KG/M2 | SYSTOLIC BLOOD PRESSURE: 124 MMHG | HEART RATE: 87 BPM | DIASTOLIC BLOOD PRESSURE: 87 MMHG | HEIGHT: 71 IN | WEIGHT: 208.1 LBS

## 2018-10-18 DIAGNOSIS — I50.22 CHRONIC SYSTOLIC HEART FAILURE (H): Primary | ICD-10-CM

## 2018-10-18 DIAGNOSIS — I50.22 CHRONIC SYSTOLIC HEART FAILURE (H): ICD-10-CM

## 2018-10-18 LAB
ANION GAP SERPL CALCULATED.3IONS-SCNC: 5 MMOL/L (ref 3–14)
BUN SERPL-MCNC: 17 MG/DL (ref 7–30)
CALCIUM SERPL-MCNC: 8.4 MG/DL (ref 8.5–10.1)
CHLORIDE SERPL-SCNC: 108 MMOL/L (ref 94–109)
CO2 SERPL-SCNC: 26 MMOL/L (ref 20–32)
CREAT SERPL-MCNC: 1.28 MG/DL (ref 0.66–1.25)
GFR SERPL CREATININE-BSD FRML MDRD: 59 ML/MIN/1.7M2
GLUCOSE SERPL-MCNC: 88 MG/DL (ref 70–99)
NT-PROBNP SERPL-MCNC: 714 PG/ML (ref 0–125)
POTASSIUM SERPL-SCNC: 3.9 MMOL/L (ref 3.4–5.3)
SODIUM SERPL-SCNC: 139 MMOL/L (ref 133–144)

## 2018-10-18 PROCEDURE — 99215 OFFICE O/P EST HI 40 MIN: CPT | Mod: ZP | Performed by: INTERNAL MEDICINE

## 2018-10-18 PROCEDURE — 83880 ASSAY OF NATRIURETIC PEPTIDE: CPT | Performed by: INTERNAL MEDICINE

## 2018-10-18 PROCEDURE — 80048 BASIC METABOLIC PNL TOTAL CA: CPT | Performed by: INTERNAL MEDICINE

## 2018-10-18 PROCEDURE — G0463 HOSPITAL OUTPT CLINIC VISIT: HCPCS

## 2018-10-18 PROCEDURE — 36415 COLL VENOUS BLD VENIPUNCTURE: CPT | Performed by: INTERNAL MEDICINE

## 2018-10-18 ASSESSMENT — PAIN SCALES - GENERAL: PAINLEVEL: NO PAIN (0)

## 2018-10-18 NOTE — PATIENT INSTRUCTIONS
Return to see Dr. Hdz in 6 months.     We will do an exercise stress test and echo at that time.     Increase your Entresto to 97/103 at night (2 tabs of the 47/51 mg) at night and one tab in the morning for one month, an then increase to 97/103 two times daily (this is the maximum dose) - we will send in your new script.

## 2018-10-18 NOTE — LETTER
10/18/2018      RE: Tej Morfin  [address]  [place] [address]       Dear Colleague,    Thank you for the opportunity to participate in the care of your patient, Tej Morfin, at the [facility]. Please see a copy of my visit note below.    October 18, 2018    Dear Dr. Felix:      I had the pleasure of seeing, Tej Morfin in the [facility] today.     As you know, he is a very pleasant 53-year-old man with a first presentation with cardiomegaly approximately 4 years ago.  He underwent echocardiogram and he was found to have severe mitral regurgitation and an ejection fraction of 25%.  He underwent an angiogram at that time that showed only mild coronary disease.  He was referred to Dr. Graves for mitral regurgitation, and on 01/21/2015, he underwent mitral valve repair with an annuloplasty ring and was discharged home.        He later presented with ventricular tachycardia which was sustained and therefore he underwent a single-chamber defibrillator for secondary prevention.  Shortly after that, he had his first hospitalization for heart failure with reduced ejection fraction, and given his left ventricular end-diastolic dimension was 7 cm and the ventricular arrhythmias, you sent him to me for consideration of advanced therapies.      When I first saw him, he had an excellent cardiopulmonary stress test.  He now had a BiV upgrade to a CRT. He also underwent a PET scan of his chest to rule out cardiac sarcoidosis, which was not detected on this test.  However, there was an area showed ischemia subtended by the circumflex artery and he underwent a follow up coronary angiography as noted above which showed mild non obstructive disease.    The biggest issue he is faced in the last few months has been gastroparesis with several ER visits. This is thought to be idiopathic.     Presently he  says that his exercise tolerance is stable.  He has not been exercising as much as he did previously due to hip pain.  He did have one brief admission to the hospital for diuresis the summer.  He has recently been started on Entresto as his insurance changed and he has been feeling pretty well on this medication.  He denies any dizziness or lightheadedness.  He denies lower extremity edema PND orthopnea.  He says his quality of life is excellent.        PAST MEDICAL HISTORY:     1.  Hemorrhoidectomy.     2.  Arthroscopy.     3.  Right partial medial meniscectomy 08/2013.     4.  Status post ICD implantation 02/02/2015, [employer], by Dr. Felix.     5.  Mitral valve repair with annuloplasty ring and atrial appendage ligation by Dr. Graves in [place], [place]; date on that was 01/21/2015.     CURRENT MEDICATIONS:    Prescription Medications as of 10/18/2018             ASPIRIN PO Take 81 mg by mouth daily    CARVEDILOL PO Take 6.25 mg by mouth 2 times daily (with meals)    CLONAZEPAM PO Take 0.5 mg by mouth At Bedtime    diazepam (VALIUM) 5 MG tablet Take 5 mg by mouth    furosemide (LASIX) 20 MG tablet TAKE 1 TABLET (20 MG) BY MOUTH DAILY    hydrALAZINE (APRESOLINE) 25 MG tablet TAKE 1 TABLET (25 MG) BY MOUTH 3 TIMES DAILY    metoclopramide (REGLAN) 10 MG tablet Take 10 mg by mouth    METOPROLOL SUCCINATE ER PO Take 50 mg by mouth 2 times daily    Nitroglycerin (NITROSTAT SL) Place 0.4 mg under the tongue every 5 minutes as needed for chest pain    omeprazole (PRILOSEC) 40 MG capsule Take 40 mg by mouth    potassium chloride (KLOR-CON) 20 MEQ Packet Take 20 mEq by mouth 2 times daily    sacubitril-valsartan (ENTRESTO) 49-51 MG per tablet Take 1 tablet by mouth 2 times daily    SOTALOL HCL PO Take 80 mg by mouth    albuterol (PROAIR HFA, PROVENTIL HFA, VENTOLIN HFA) 108 (90 BASE) MCG/ACT inhaler Inhale 2 puffs into the lungs every 4 hours as needed for shortness of breath / dyspnea or wheezing     "budesonide (RINOCORT AQUA) 32 MCG/ACT spray Spray 1 spray into both nostrils daily    lisinopril (PRINIVIL/ZESTRIL) 5 MG tablet Take 1.5 tablets (7.5 mg) by mouth 2 times daily    ondansetron (ZOFRAN) 4 MG tablet Take one tablet po 30 minutes before each half of bowel prep    ondansetron (ZOFRAN) 8 MG tablet Take 8 mg by mouth    PANTOPRAZOLE SODIUM PO Take 40 mg by mouth 2 times daily (before meals)          SOCIAL HISTORY:  The patient lives with his wife.  They have 2 children who live at home.  One is 10, and one is 15.  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  Alcohol rarely, 1-2 times per month.  No drug use.  Smoking, he smoked 1/2 pack per day for 10 years, quit in .        FAMILY HISTORY:  Mother had coronary disease and end-stage renal disease in her 40s.  He has 1 younger brother and 1 sister who are alive and well.  Dad  at age 51, but not related to cardiac problems.  There is no other family history of cardiomyopathy or sudden cardiac death.     ROS:   Constitutional: No fever, chills, or sweats. Weight is stable    ENT: No visual disturbance, ear ache, epistaxis, sore throat.   Allergies/Immunologic: Negative.   Respiratory: No cough, hemoptysis.   Cardiovascular: As per HPI.   GI: No nausea, vomiting, hematemesis, melena, or hematochezia.   : No urinary frequency, dysuria, or hematuria.   Integument: Negative.   Psychiatric: negative   Neuro: Negative.   Endocrinology: Negative.   Musculoskeletal: Negative.    EXAM:  /87 (BP Location: Right arm, Patient Position: Chair, Cuff Size: Adult Large)  Pulse 87  Ht 1.791 m (5' 10.5\")  Wt 94.4 kg (208 lb 1.6 oz)  SpO2 97%  BMI 29.44 kg/m2  General: appears comfortable, alert and articulate  Head: normocephalic, atraumatic  Eyes: anicteric sclera, EOMI  Neck: no adenopathy  Orophyarynx: moist mucosa, no lesions, dentition intact  Heart: PMI sustained,  regular, " S1/S2, II/IV systolic murmur at the apex, no gallop, no rub, estimated JVP < 10 cm   Lungs: clear, no rales or wheezing  Abdomen: soft, non-tender, bowel sounds present, no hepatosplenomegaly  Extremities: no clubbing, cyanosis, no LE edema  Neurological: normal speech and affect, no gross motor deficits    Labs:  CBC RESULTS:  Lab Results   Component Value Date    WBC 3.7 (L) 06/16/2016    RBC 4.11 (L) 06/16/2016    HGB 13.1 (L) 06/16/2016    HCT 40.0 06/16/2016    MCV 97 06/16/2016    MCH 31.9 06/16/2016    MCHC 32.8 06/16/2016    RDW 12.3 06/16/2016     (L) 06/16/2016       CMP RESULTS:  Lab Results   Component Value Date     10/18/2018    POTASSIUM 3.9 10/18/2018    CHLORIDE 108 10/18/2018    CO2 26 10/18/2018    ANIONGAP 5 10/18/2018    GLC 88 10/18/2018    BUN 17 10/18/2018    CR 1.28 (H) 10/18/2018    GFRESTIMATED 59 (L) 10/18/2018    GFRESTBLACK 72 10/18/2018    YESICA 8.4 (L) 10/18/2018    BILITOTAL 0.6 08/10/2017    ALBUMIN 3.8 08/10/2017    ALKPHOS 62 08/10/2017    ALT 48 08/10/2017    AST 21 08/10/2017        INR RESULTS:  Lab Results   Component Value Date    INR 1.05 11/30/2015       Lab Results   Component Value Date    MAG 2.1 08/10/2017       Exercise stress test performed today at [facility] on 12/01/2015 shows resting heart rate of 50, resting blood pressure of 120/74, peak heart rate of 144, peak blood pressure of 144/81, peak VO2 of 27.7, which is 80% of predicted.  PVCs during rest recovery.  EKG is negative for ischemia.  RER is 1.2.  VE/VCO2 slope is 32.5.      Echocardiogram from 11/09/2015:  Ejection fraction 10%-15%, global hypokinesis, grade 3 diastolic dysfunction.  RV is dilated.  LA is moderately dilated.  Trace aortic regurgitation, moderate mitral annular calcification.  Slow fill exacerbation suggestive of functional mitral stenosis, mild MR, thickening of posterior annulus consistent with previous repair, mild tricuspid regurgitation by Doppler, moderate pulmonic regurgitation,  LV end-diastolic dimension 6.6 cm.  There is no comment on RV function, although the RV is reported to be mildly dilated.  Echo images here have yet to be loaded for my view.     Coronary angiogram from 01/12/2015:  30% mid LAD, normal left main, circumflex normal.  Right coronary artery patent.  Op report from 01/21/2015:  Median sternotomy, mitral valve repair with 28 mm annuloplasty ring, left atrial appendage ligation with a 45 mm atrial clip.     Right heart catheterization   RA: 2   PA: 23/13   PCWP: 6   PVR: 3.0   CI: 1.7   CO: 3.7   SVR: 1600     Last echocardiogram from [place]-in September 2017 LV ejection fraction was around 10-15%-see scanned in report for further details    lastICD interrogation:  Bi-V pacing at 96 %    Assessment and Plan:   In summary, this is a very pleasant, 53-year-old man with a history of what is likely valvular cardiomyopathy, status post mitral valve repair in 01/2015, who then had continued symptoms, a failure to improve his LV ejection fraction. He is presently New York Heart Association functional class II and is euvolemic on exam. He has tolerated up titration of neurohormonal blockade and also now has a biventricular pacer.    Overall we have had a period of stability for several years, however am concerned that he had one brief admission to the hospital for diuresis within the last year.  He has since been put on Entresto - and given his stable endorgan function and overall quality of life I believe it is reasonable to continue postponing an evaluation for advanced therapies.      I will see him back in 6 months with a cardiopulmonary stress test and echocardiogram at that visit.  If he has any further decline in functional status, any further ventricular arrhythmias, any further admissions to the hospital for fluid overload, an increase in his LV size or elevation in PA pressures I would recommend completing a full evaluation.     Chronic systolic heart failure  secondary to valvular cardiomyopathy   Stage C  NYHA Class II  ACEi/ARB/ARNI yes-increasing his Entresto today to maximum dose as listed below   BB titration ongoing  Aldosterone antagonist no (high potassium in the past with administration)  SCD prophylaxis ICD  % BiV pacing: yes    Fluid status euvolemic  NSAID use: none  Sleep Apnea Evaluation: now on CPAP     Bi-V pacing: - given complete AV block the somewhat low bi-v pacing indicates he maybe having a high PVC burden- it may be worth a holter to get a capture a more accurate 24-48 hours to determine PVC burden. We did enable programing to increase Bi-V pacing.     Instructions given to the patient:   Increase your Entresto to 97/103 at night (2 tabs of the 47/51 mg) at night and one tab in the morning for one month, an then increase to 97/103 two times daily (this is the maximum dose)    We will schedule echocardiogram and cardiopulmonary stress test at the return visit.  Return to clinic sooner as needed    Dunia Hdz MD   of Medicine   [facility] Division of Cardiology       CC  Yoan Felix,   Gerry, ROSMERY Norman, ROSMERY Roberts

## 2018-10-18 NOTE — PROGRESS NOTES
October 18, 2018    Dear Dr. Felix:      I had the pleasure of seeing, Tej Morfin in the [facility] today.     As you know, he is a very pleasant 53-year-old man with a first presentation with cardiomegaly approximately 4 years ago.  He underwent echocardiogram and he was found to have severe mitral regurgitation and an ejection fraction of 25%.  He underwent an angiogram at that time that showed only mild coronary disease.  He was referred to Dr. Graves for mitral regurgitation, and on 01/21/2015, he underwent mitral valve repair with an annuloplasty ring and was discharged home.        He later presented with ventricular tachycardia which was sustained and therefore he underwent a single-chamber defibrillator for secondary prevention.  Shortly after that, he had his first hospitalization for heart failure with reduced ejection fraction, and given his left ventricular end-diastolic dimension was 7 cm and the ventricular arrhythmias, you sent him to me for consideration of advanced therapies.      When I first saw him, he had an excellent cardiopulmonary stress test.  He now had a BiV upgrade to a CRT. He also underwent a PET scan of his chest to rule out cardiac sarcoidosis, which was not detected on this test.  However, there was an area showed ischemia subtended by the circumflex artery and he underwent a follow up coronary angiography as noted above which showed mild non obstructive disease.    The biggest issue he is faced in the last few months has been gastroparesis with several ER visits. This is thought to be idiopathic.     Presently he says that his exercise tolerance is stable.  He has not been exercising as much as he did previously due to hip pain.  He did have one brief admission to the hospital for diuresis the summer.  He has recently been started on Entresto as his insurance changed and he has been feeling pretty well on this medication.  He denies  any dizziness or lightheadedness.  He denies lower extremity edema PND orthopnea.  He says his quality of life is excellent.        PAST MEDICAL HISTORY:     1.  Hemorrhoidectomy.     2.  Arthroscopy.     3.  Right partial medial meniscectomy 08/2013.     4.  Status post ICD implantation 02/02/2015, Variable, by Dr. Felix.     5.  Mitral valve repair with annuloplasty ring and atrial appendage ligation by Dr. Graves in [place], [place]; date on that was 01/21/2015.     CURRENT MEDICATIONS:    Prescription Medications as of 10/18/2018             ASPIRIN PO Take 81 mg by mouth daily    CARVEDILOL PO Take 6.25 mg by mouth 2 times daily (with meals)    CLONAZEPAM PO Take 0.5 mg by mouth At Bedtime    diazepam (VALIUM) 5 MG tablet Take 5 mg by mouth    furosemide (LASIX) 20 MG tablet TAKE 1 TABLET (20 MG) BY MOUTH DAILY    hydrALAZINE (APRESOLINE) 25 MG tablet TAKE 1 TABLET (25 MG) BY MOUTH 3 TIMES DAILY    metoclopramide (REGLAN) 10 MG tablet Take 10 mg by mouth    METOPROLOL SUCCINATE ER PO Take 50 mg by mouth 2 times daily    Nitroglycerin (NITROSTAT SL) Place 0.4 mg under the tongue every 5 minutes as needed for chest pain    omeprazole (PRILOSEC) 40 MG capsule Take 40 mg by mouth    potassium chloride (KLOR-CON) 20 MEQ Packet Take 20 mEq by mouth 2 times daily    sacubitril-valsartan (ENTRESTO) 49-51 MG per tablet Take 1 tablet by mouth 2 times daily    SOTALOL HCL PO Take 80 mg by mouth    albuterol (PROAIR HFA, PROVENTIL HFA, VENTOLIN HFA) 108 (90 BASE) MCG/ACT inhaler Inhale 2 puffs into the lungs every 4 hours as needed for shortness of breath / dyspnea or wheezing    budesonide (RINOCORT AQUA) 32 MCG/ACT spray Spray 1 spray into both nostrils daily    lisinopril (PRINIVIL/ZESTRIL) 5 MG tablet Take 1.5 tablets (7.5 mg) by mouth 2 times daily    ondansetron (ZOFRAN) 4 MG tablet Take one tablet po 30 minutes before each half of bowel prep    ondansetron (ZOFRAN) 8 MG tablet Take 8 mg by mouth     "PANTOPRAZOLE SODIUM PO Take 40 mg by mouth 2 times daily (before meals)          SOCIAL HISTORY:  The patient lives with his wife.  They have 2 children who live at home.  One is 10, and one is 15.  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  Alcohol rarely, 1-2 times per month.  No drug use.  Smoking, he smoked 1/2 pack per day for 10 years, quit in .        FAMILY HISTORY:  Mother had coronary disease and end-stage renal disease in her 40s.  He has 1 younger brother and 1 sister who are alive and well.  Dad  at age 51, but not related to cardiac problems.  There is no other family history of cardiomyopathy or sudden cardiac death.     ROS:   Constitutional: No fever, chills, or sweats. Weight is stable    ENT: No visual disturbance, ear ache, epistaxis, sore throat.   Allergies/Immunologic: Negative.   Respiratory: No cough, hemoptysis.   Cardiovascular: As per HPI.   GI: No nausea, vomiting, hematemesis, melena, or hematochezia.   : No urinary frequency, dysuria, or hematuria.   Integument: Negative.   Psychiatric: negative   Neuro: Negative.   Endocrinology: Negative.   Musculoskeletal: Negative.    EXAM:  /87 (BP Location: Right arm, Patient Position: Chair, Cuff Size: Adult Large)  Pulse 87  Ht 1.791 m (5' 10.5\")  Wt 94.4 kg (208 lb 1.6 oz)  SpO2 97%  BMI 29.44 kg/m2  General: appears comfortable, alert and articulate  Head: normocephalic, atraumatic  Eyes: anicteric sclera, EOMI  Neck: no adenopathy  Orophyarynx: moist mucosa, no lesions, dentition intact  Heart: PMI sustained,  regular, S1/S2, II/IV systolic murmur at the apex, no gallop, no rub, estimated JVP < 10 cm   Lungs: clear, no rales or wheezing  Abdomen: soft, non-tender, bowel sounds present, no hepatosplenomegaly  Extremities: no clubbing, cyanosis, no LE edema  Neurological: normal speech and affect, no gross motor deficits    Labs:  CBC RESULTS:  Lab " Results   Component Value Date    WBC 3.7 (L) 06/16/2016    RBC 4.11 (L) 06/16/2016    HGB 13.1 (L) 06/16/2016    HCT 40.0 06/16/2016    MCV 97 06/16/2016    MCH 31.9 06/16/2016    MCHC 32.8 06/16/2016    RDW 12.3 06/16/2016     (L) 06/16/2016       CMP RESULTS:  Lab Results   Component Value Date     10/18/2018    POTASSIUM 3.9 10/18/2018    CHLORIDE 108 10/18/2018    CO2 26 10/18/2018    ANIONGAP 5 10/18/2018    GLC 88 10/18/2018    BUN 17 10/18/2018    CR 1.28 (H) 10/18/2018    GFRESTIMATED 59 (L) 10/18/2018    GFRESTBLACK 72 10/18/2018    YESICA 8.4 (L) 10/18/2018    BILITOTAL 0.6 08/10/2017    ALBUMIN 3.8 08/10/2017    ALKPHOS 62 08/10/2017    ALT 48 08/10/2017    AST 21 08/10/2017        INR RESULTS:  Lab Results   Component Value Date    INR 1.05 11/30/2015       Lab Results   Component Value Date    MAG 2.1 08/10/2017       Exercise stress test performed today at [facility] on 12/01/2015 shows resting heart rate of 50, resting blood pressure of 120/74, peak heart rate of 144, peak blood pressure of 144/81, peak VO2 of 27.7, which is 80% of predicted.  PVCs during rest recovery.  EKG is negative for ischemia.  RER is 1.2.  VE/VCO2 slope is 32.5.      Echocardiogram from 11/09/2015:  Ejection fraction 10%-15%, global hypokinesis, grade 3 diastolic dysfunction.  RV is dilated.  LA is moderately dilated.  Trace aortic regurgitation, moderate mitral annular calcification.  Slow fill exacerbation suggestive of functional mitral stenosis, mild MR, thickening of posterior annulus consistent with previous repair, mild tricuspid regurgitation by Doppler, moderate pulmonic regurgitation, LV end-diastolic dimension 6.6 cm.  There is no comment on RV function, although the RV is reported to be mildly dilated.  Echo images here have yet to be loaded for my view.     Coronary angiogram from 01/12/2015:  30% mid LAD, normal left main, circumflex normal.  Right coronary artery patent.  Op report from 01/21/2015:  Median  sternotomy, mitral valve repair with 28 mm annuloplasty ring, left atrial appendage ligation with a 45 mm atrial clip.     Right heart catheterization   RA: 2   PA: 23/13   PCWP: 6   PVR: 3.0   CI: 1.7   CO: 3.7   SVR: 1600     Last echocardiogram from [facility]-in September 2017 LV ejection fraction was around 10-15%-see scanned in report for further details    lastICD interrogation:  Bi-V pacing at 96 %    Assessment and Plan:   In summary, this is a very pleasant, 53-year-old man with a history of what is likely valvular cardiomyopathy, status post mitral valve repair in 01/2015, who then had continued symptoms, a failure to improve his LV ejection fraction. He is presently New York Heart Association functional class II and is euvolemic on exam. He has tolerated up titration of neurohormonal blockade and also now has a biventricular pacer.    Overall we have had a period of stability for several years, however am concerned that he had one brief admission to the hospital for diuresis within the last year.  He has since been put on Entresto - and given his stable endorgan function and overall quality of life I believe it is reasonable to continue postponing an evaluation for advanced therapies.      I will see him back in 6 months with a cardiopulmonary stress test and echocardiogram at that visit.  If he has any further decline in functional status, any further ventricular arrhythmias, any further admissions to the hospital for fluid overload, an increase in his LV size or elevation in PA pressures I would recommend completing a full evaluation.     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 We did enable programing to increase Bi-V pacing.     Instructions given to the patient:   Increase your Entresto to 97/103 at night (2 tabs of the 47/51 mg) at night and one tab in the morning for one month, an then increase to 97/103 two times daily (this is the maximum dose)    We will schedule echocardiogram and cardiopulmonary stress test at the return visit.  Return to clinic sooner as needed    Dunia Hdz MD   of Medicine   [facility] Division of Cardiology       CC  Yoan Felix,   Gerry, ROSMERY Norman, ROSMERY Roberts

## 2018-10-18 NOTE — MR AVS SNAPSHOT
After Visit Summary   10/18/2018    Tej Morfin    MRN: [id number]           Patient Information     Date Of Birth          1965        Visit Information        Provider Department      10/18/2018 2:00 PM Dunia Hdz MD [facility]        Today's Diagnoses     Chronic systolic heart failure (H)    -  1      Care Instructions    Return to see Dr. Hdz in 6 months.     We will do an exercise stress test and echo at that time.     Increase your Entresto to 97/103 at night (2 tabs of the 47/51 mg) at night and one tab in the morning for one month, an then increase to 97/103 two times daily (this is the maximum dose) - we will send in your new script.                             Follow-ups after your visit        Additional Services     Follow-Up with Advanced Heart Failure Cardiologist       With cpx and echo                  Your next 10 appointments already scheduled     Apr 11, 2019 10:30 AM CDT   Card Cardpul Stress Tst Adult with UUEKGS   [facility] ([facility] [place])    [address]  [facility] [address]               Apr 11, 2019  1:30 PM CDT   Lab with  LAB   [facility] Lab ([facility])    [address] [place] [id number] [phone]            Apr 11, 2019  2:00 PM CDT   Ech Complete with [id number]   [facility] Echo ([facility])    [address] [place] [address]  [address] [facility] [id number] [phone]           1.  Please bring or wear a comfortable two-piece outfit. 2.  You may eat, drink and take your normal medicines. 3.  For any questions that cannot be answered, please contact the ordering physician 4.  Please do not wear perfumes or scented lotions on the day of your exam.            Apr 11, 2019  3:00 PM CDT   (Arrive by 2:45 PM)   RETURN HEART FAILURE with Dunia Hdz MD   [facility] Heart Care ([facility])    [address] [facility]  [address] [place] [address] [id number] [phone]              Future tests that were ordered for you today     Open Future Orders        Priority Expected Expires Ordered    Follow-Up with Advanced Heart Failure Cardiologist Routine 4/25/2019 10/18/2019 10/18/2018    Card Cardiopulmonary Stress Test - Adult Routine 4/25/2019 10/18/2019 10/18/2018    Echocardiogram Routine 4/25/2019 10/18/2019 10/18/2018    Basic metabolic panel Routine 4/25/2019 10/18/2019 10/18/2018            Who to contact     If you have questions or need follow up information about today's clinic visit or your schedule please contact [facility] directly at [phone].  Normal or non-critical lab and imaging results will be communicated to you by [employer], letter or phone within 4 business days after the clinic has received the results. If you do not hear from us within 7 days, please contact the clinic through [employer] or phone. If you have a critical or abnormal lab result, we will notify you by phone as soon as possible.  Submit refill requests through Inter[employer] or call your pharmacy and they will forward the refill request to us. Please allow 3 business days for your refill to be completed.          Additional Information About Your Visit        Intercom Information     [employer] gives you secure access to your electronic health record. If you see a primary care provider, you can also send messages to your care team and make appointments. If you have questions, please call your primary care clinic.  If you do not have a primary care provider, please call [phone] and they will assist you.        Care EveryWhere ID     This is your Care EveryWhere ID. This could be used by other organizations to access your [facility] medical records  [id number]        Your Vitals Were     Pulse Height Pulse Oximetry BMI (Body Mass Index)          87 1.791 m (5' 10.5\") 97% 29.44 kg/m2         Blood Pressure " from Last 3 Encounters:   10/18/18 124/87   08/10/17 124/88   02/09/17 (!) 128/93    Weight from Last 3 Encounters:   10/18/18 94.4 kg (208 lb 1.6 oz)   08/10/17 88.6 kg (195 lb 6.4 oz)   02/09/17 89.4 kg (197 lb 1.6 oz)                 Today's Medication Changes          These changes are accurate as of 10/18/18  3:32 PM.  If you have any questions, ask your nurse or doctor.               Stop taking these medicines if you haven't already. Please contact your care team if you have questions.     lisinopril 5 MG tablet   Commonly known as:  PRINIVIL/ZESTRIL   Stopped by:  Dunia Hdz MD                    Primary Care Provider Office Phone # Fax #    Marlin Hunter [phone]       [facility] MEDICINE [address]  [place] [address]        Equal Access to Services     [facility]: Hadii aad ku hadasho Soomaali, waaxda luqadaha, qaybta kaalmada adeegyada, waxay idiin hayaan adeeg kharajami vinson . So [facility] [phone].    ATENCIÓN: Si habla español, tiene a cameron disposición servicios gratuitos de asistencia lingüística. Llame al [phone].    We comply with applicable federal civil rights laws and [place] laws. We do not discriminate on the basis of race, color, national origin, age, disability, sex, sexual orientation, or gender identity.            Thank you!     Thank you for choosing [facility]  for your care. Our goal is always to provide you with excellent care. Hearing back from our patients is one way we can continue to improve our services. Please take a few minutes to complete the written survey that you may receive in the mail after your visit with us. Thank you!             Your Updated Medication List - Protect others around you: Learn how to safely use, store and throw away your medicines at www.disposemymeds.org.          This list is accurate as of 10/18/18  3:32 PM.  Always use your most recent med list.                   Brand Name  Dispense Instructions for use Diagnosis    albuterol 108 (90 Base) MCG/ACT inhaler    PROAIR HFA/PROVENTIL HFA/VENTOLIN HFA     Inhale 2 puffs into the lungs every 4 hours as needed for shortness of breath / dyspnea or wheezing        ASPIRIN PO      Take 81 mg by mouth daily        budesonide 32 MCG/ACT spray    RINOCORT AQUA     Spray 1 spray into both nostrils daily        CARVEDILOL PO      Take 6.25 mg by mouth 2 times daily (with meals)        CLONAZEPAM PO      Take 0.5 mg by mouth At Bedtime        diazepam 5 MG tablet    VALIUM     Take 5 mg by mouth        furosemide 20 MG tablet    LASIX    90 tablet    TAKE 1 TABLET (20 MG) BY MOUTH DAILY    Cardiomyopathy (H)       hydrALAZINE 25 MG tablet    APRESOLINE    270 tablet    TAKE 1 TABLET (25 MG) BY MOUTH 3 TIMES DAILY    Acute diastolic congestive heart failure (H)       metoclopramide 10 MG tablet    REGLAN     Take 10 mg by mouth        METOPROLOL SUCCINATE ER PO      Take 50 mg by mouth 2 times daily        NITROSTAT SL      Place 0.4 mg under the tongue every 5 minutes as needed for chest pain        omeprazole 40 MG capsule    priLOSEC     Take 40 mg by mouth        * ondansetron 8 MG tablet    ZOFRAN     Take 8 mg by mouth        * ondansetron 4 MG tablet    ZOFRAN     Take one tablet po 30 minutes before each half of bowel prep        PANTOPRAZOLE SODIUM PO      Take 40 mg by mouth 2 times daily (before meals)        potassium chloride 20 MEQ Packet    KLOR-CON     Take 20 mEq by mouth 2 times daily        sacubitril-valsartan 49-51 MG per tablet    ENTRESTO     Take 1 tablet by mouth 2 times daily        SOTALOL HCL PO      Take 80 mg by mouth        * Notice:  This list has 2 medication(s) that are the same as other medications prescribed for you. Read the directions carefully, and ask your doctor or other care provider to review them with you.

## 2018-10-18 NOTE — NURSING NOTE
Chief Complaint   Patient presents with     Follow Up For     reason for visit: Chronic systolic congestive heart failure     Vitals were taken and medications were reconciled.     HUMPHREY Gross  2:22 PM

## 2018-10-19 NOTE — NURSING NOTE
Cardiac Testing: Patient given instructions regarding  echocardiogram and CPX. Discussed purpose, preparation, procedure and when to expect results reported back to the patient. Patient demonstrated understanding of this information and agreed to call with further questions or concerns.    Labs: Patient was given results of the laboratory testing obtained today. Patient was instructed to return for the next laboratory testing in 6 mo . Patient demonstrated understanding of this information and agreed to call with further questions or concerns.     Return Appointment: Follow up in 6 mo with echo, CPX, and labs. Patient given instructions regarding scheduling next clinic visit. Patient demonstrated understanding of this information and agreed to call with further questions or concerns.    Medication Change: Patient was educated regarding prescribed medication change, including discussion of the indication, administration, side effects, and when to report to MD or RN. Patient demonstrated understanding of this information and agreed to call with further questions or concerns.    Patient stated he understood all health information given and agreed to call with further questions or concerns.    Shan Self, RN  RN Care Coordinator  [facility]  [phone]

## 2019-04-01 DIAGNOSIS — I50.31 ACUTE DIASTOLIC CONGESTIVE HEART FAILURE (H): ICD-10-CM

## 2019-04-04 RX ORDER — HYDRALAZINE HYDROCHLORIDE 25 MG/1
25 TABLET, FILM COATED ORAL 3 TIMES DAILY
Qty: 270 TABLET | Refills: 2 | Status: SHIPPED | OUTPATIENT
Start: 2019-04-04 | End: 2021-01-12

## 2019-04-25 ENCOUNTER — TELEPHONE (OUTPATIENT)
Dept: CARDIOLOGY | Facility: CLINIC | Age: 54
End: 2019-04-25

## 2019-04-25 NOTE — TELEPHONE ENCOUNTER
[facility] Call Center    Phone Message    May a detailed message be left on voicemail: yes    Reason for Call: Other: Pt called stating that he was supposed to see Dr. Hdz on April 11th but had to cancel due to bad weather. Pt states that his doctor at [facility] didn't want him to wait so he had some tests done there. Pt is wondering since he had some stuff done there if he still needs to come to the clinic for anything or to see Dr. Hdz. Please give pt a call back to advise.     Action Taken: Message routed to:  [facility] ([facility]): Cardiology

## 2019-04-29 NOTE — TELEPHONE ENCOUNTER
Attempted to call patient back but cell phone wouldn't let me leave message.  Patient was initially seen her and after his last visit it was recommended he have a CPX and echo.  He was unable to keep appt on 4/11 due to inclement weather and as per his message, he had the testing done in [place].  As Dr. Hdz will be going to [place] once/[facility] for outreach clinic, I will let patient know he may be able to see her there possibly in June.

## 2019-06-11 PROBLEM — K31.84 GASTROPARESIS: Status: ACTIVE | Noted: 2018-04-23

## 2019-06-11 PROBLEM — Z86.14 HX MRSA INFECTION: Status: ACTIVE | Noted: 2019-06-11

## 2019-06-11 PROBLEM — J32.9 SINUSITIS, CHRONIC: Status: ACTIVE | Noted: 2019-06-11

## 2019-06-11 RX ORDER — DIAZEPAM 10 MG
10 TABLET ORAL EVERY 4 HOURS PRN
COMMUNITY
Start: 2018-04-09 | End: 2022-05-18

## 2019-06-11 RX ORDER — CARVEDILOL 12.5 MG/1
1 TABLET ORAL 2 TIMES DAILY WITH MEALS
COMMUNITY
Start: 2018-10-24 | End: 2022-02-04

## 2019-06-11 RX ORDER — METOCLOPRAMIDE 5 MG/1
1 TABLET ORAL 2 TIMES DAILY
COMMUNITY
Start: 2018-12-07 | End: 2021-01-12

## 2019-06-11 RX ORDER — FLUTICASONE PROPIONATE 50 MCG
1 SPRAY, SUSPENSION (ML) NASAL 2 TIMES DAILY PRN
COMMUNITY
Start: 2018-04-09 | End: 2022-04-12

## 2019-06-11 RX ORDER — CEFDINIR 300 MG/1
300 CAPSULE ORAL 2 TIMES DAILY
COMMUNITY
Start: 2017-12-15 | End: 2021-01-12

## 2019-06-11 RX ORDER — FUROSEMIDE 40 MG
TABLET ORAL
COMMUNITY
Start: 2018-11-12 | End: 2021-01-12

## 2019-06-11 RX ORDER — SOTALOL HYDROCHLORIDE 80 MG/1
80 TABLET ORAL 2 TIMES DAILY
COMMUNITY
Start: 2018-11-16 | End: 2021-01-12

## 2019-06-12 ENCOUNTER — OFFICE VISIT (OUTPATIENT)
Dept: CARDIOLOGY | Facility: CLINIC | Age: 54
End: 2019-06-12
Attending: INTERNAL MEDICINE
Payer: MEDICARE

## 2019-06-12 VITALS
WEIGHT: 209.3 LBS | TEMPERATURE: 97.7 F | OXYGEN SATURATION: 100 % | HEART RATE: 83 BPM | DIASTOLIC BLOOD PRESSURE: 76 MMHG | SYSTOLIC BLOOD PRESSURE: 108 MMHG | BODY MASS INDEX: 29.61 KG/M2

## 2019-06-12 DIAGNOSIS — I50.21 ACUTE SYSTOLIC HEART FAILURE (H): ICD-10-CM

## 2019-06-12 DIAGNOSIS — I42.8 NICM (NONISCHEMIC CARDIOMYOPATHY) (H): Primary | ICD-10-CM

## 2019-06-12 PROCEDURE — 99215 OFFICE O/P EST HI 40 MIN: CPT | Mod: ZP | Performed by: INTERNAL MEDICINE

## 2019-06-12 PROCEDURE — G0463 HOSPITAL OUTPT CLINIC VISIT: HCPCS | Mod: ZF

## 2019-06-12 ASSESSMENT — PAIN SCALES - GENERAL: PAINLEVEL: NO PAIN (0)

## 2019-06-12 NOTE — LETTER
6/12/2019      RE: Tej Morfin  [address]  [place] [address]       Dear Colleague,    Thank you for the opportunity to participate in the care of your patient, Tej Morfin, at the [facility] at [facility]. Please see a copy of my visit note below.      June 11, 2019    Dear Dr. Felix:      I had the pleasure of seeing, Tej Morfin in the [facility] today.     As you know, he is a very pleasant 53-year-old man with a first presentation with cardiomegaly approximately 4 years ago.  He underwent echocardiogram and he was found to have severe mitral regurgitation and an ejection fraction of 25%.  He underwent an angiogram at that time that showed only mild coronary disease.  He was referred to Dr. Graves for mitral regurgitation, and on 01/21/2015, he underwent mitral valve repair with an annuloplasty ring and was discharged home.        He later presented with ventricular tachycardia which was sustained and therefore he underwent a single-chamber defibrillator for secondary prevention.  Shortly after that, he had his first hospitalization for heart failure with reduced ejection fraction, and given his left ventricular end-diastolic dimension was 7 cm and the ventricular arrhythmias, you sent him to me for consideration of advanced therapies.      When I first saw him, he had an excellent cardiopulmonary stress test.  He now had a BiV upgrade to a CRT. He also underwent a PET scan of his chest to rule out cardiac sarcoidosis, which was not detected on this test.  However, there was an area showed ischemia subtended by the circumflex artery and he underwent a follow up coronary angiography as noted above which showed mild non obstructive disease.    The biggest issue he is faced in the last few months has been gastroparesis with several ER visits. This is thought to be idiopathic.     Presently  he says that his exercise tolerance is stable.  He has not been exercising as much as he did previously due to hip pain.  He did have one brief admission to the hospital for diuresis the summer.  He has recently been started on Entresto as his insurance changed and he has been feeling pretty well on this medication.  He denies any dizziness or lightheadedness.  He denies lower extremity edema PND orthopnea.  He says his quality of life is excellent.        PAST MEDICAL HISTORY:     1.  Hemorrhoidectomy.     2.  Arthroscopy.     3.  Right partial medial meniscectomy 2013.     4.  Status post ICD implantation 2015, [employer], by Dr. Felix.     5.  Mitral valve repair with annuloplasty ring and atrial appendage ligation by Dr. Graves in [place], [place]; date on that was 2015.     CURRENT MEDICATIONS:    SOCIAL HISTORY:  The patient lives with his wife.  They have 2 children who live at home.  One is 10, and one is 15.  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  Alcohol rarely, 1-2 times per month.  No drug use.  Smoking, he smoked 1/2 pack per day for 10 years, quit in .        FAMILY HISTORY:  Mother had coronary disease and end-stage renal disease in her 40s.  He has 1 younger brother and 1 sister who are alive and well.  Dad  at age 51, but not related to cardiac problems.  There is no other family history of cardiomyopathy or sudden cardiac death.     ROS:   Constitutional: No fever, chills, or sweats. Weight is stable    ENT: No visual disturbance, ear ache, epistaxis, sore throat.   Allergies/Immunologic: Negative.   Respiratory: No cough, hemoptysis.   Cardiovascular: As per HPI.   GI: No nausea, vomiting, hematemesis, melena, or hematochezia.   : No urinary frequency, dysuria, or hematuria.   Integument: Negative.   Psychiatric: negative   Neuro: Negative.   Endocrinology: Negative.    Musculoskeletal: Negative.    EXAM:  There were no vitals taken for this visit.  General: appears comfortable, alert and articulate  Head: normocephalic, atraumatic  Eyes: anicteric sclera, EOMI  Neck: no adenopathy  Orophyarynx: moist mucosa, no lesions, dentition intact  Heart: PMI sustained,  regular, S1/S2, II/IV systolic murmur at the apex, no gallop, no rub, estimated JVP < 10 cm   Lungs: clear, no rales or wheezing  Abdomen: soft, non-tender, bowel sounds present, no hepatosplenomegaly  Extremities: no clubbing, cyanosis, no LE edema  Neurological: normal speech and affect, no gross motor deficits    Labs:  CBC RESULTS:  Lab Results   Component Value Date    WBC 3.7 (L) 06/16/2016    RBC 4.11 (L) 06/16/2016    HGB 13.1 (L) 06/16/2016    HCT 40.0 06/16/2016    MCV 97 06/16/2016    MCH 31.9 06/16/2016    MCHC 32.8 06/16/2016    RDW 12.3 06/16/2016     (L) 06/16/2016       CMP RESULTS:  Lab Results   Component Value Date     10/18/2018    POTASSIUM 3.9 10/18/2018    CHLORIDE 108 10/18/2018    CO2 26 10/18/2018    ANIONGAP 5 10/18/2018    GLC 88 10/18/2018    BUN 17 10/18/2018    CR 1.28 (H) 10/18/2018    GFRESTIMATED 59 (L) 10/18/2018    GFRESTBLACK 72 10/18/2018    YESICA 8.4 (L) 10/18/2018    BILITOTAL 0.6 08/10/2017    ALBUMIN 3.8 08/10/2017    ALKPHOS 62 08/10/2017    ALT 48 08/10/2017    AST 21 08/10/2017        INR RESULTS:  Lab Results   Component Value Date    INR 1.05 11/30/2015       Lab Results   Component Value Date    MAG 2.1 08/10/2017         Last Right heart catheterization   RA: 2   PA: 23/13   PCWP: 6   PVR: 3.0   CI: 1.7   CO: 3.7   SVR: 1600     Last echocardiogram from [facility] September 2017 LV ejection fraction was around 10-15%-see scanned in report for further details    lastICD interrogation:  Bi-V pacing at 96 %    CPX: December 2019     RESULTS:  Oxygen Consumption: The patient's VO2 max was 1790 ml/min, which represents 62% of predicted maximum.  When normalize for  body weight, VO2 max was 19.2 ml/kg/min, which was 62% of predicted maximum.  Anaerobic or ventilatory threshold (AT) was noted at 1064 ml/min or 11.4 ml/kg/min, which represents 59% of predicted maximum. RER was 0.87 at rest and increased to 1.13 at maximal exercise.    Cardiac Response: The resting EKG showed paced rhythm.  At maximal exercise, no ischemic changes were noted.  The HR was 80 beats/min, and alonso to 123 beats/min at maximal exercise, which is 73% of age-predicted maximum. HRR calculated as 46.  The O2 pulse was 4 ml/beat at rest and increased to 15 ml/beat at maximal exercise, which is 85 % of age-predicted maximum. The BP at rest was 138/80 and increased to 166/76 at maximal exercise.    6MWD:    Distance:    360 meters    Dec 2018 echo    Patient Information Name: NATASHA HARVEY  Study Date: 2018  MRN: [id number]  : 1965  Gender: M  Account Number: [id number]  Reason for Study: Low Ejection Fraction  Patient Location: Adult Echo Lab  Height: 177.8cm  Weight: 92.988kg  Patient Header: BSA: 2.51941 m2  Study Location: [facility]     Interp Summary M-Mode measurements reveal the left ventricle is moderately enlarged.  Left ventricular systolic function is severely diminished with an estimated left ventricular  ejection fraction of 15%.  Global hypokinesis is noted.  The left atrium is moderately enlarged.  Trace aortic regurgitation.     Procedures The study performed was a(n) complete 2-D echo with M-Mode, color and spectral Doppler.  The study was performed by [facility].  The study quality was diagnostic.  The study was performed in the Echo Lab.     Aortic Valve There is no aortic valvular vegetation.     Right Ventricle There is normal right ventricular wall thickness.     Left Ventricle Global hypokinesis is noted.  There is no thrombus.     Right Ventricle The right ventricular wall motion is normal.     Atria The left atrium is moderately  enlarged.  The interatrial septum is intact with no evidence for an atrial septal defect.     Aortic Valve Trace aortic regurgitation.     Mitral Valve The mitral valve is normal in structure.  There is no evidence of mitral valve prolapse.  There is no vegetation seen on the mitral valve.  No mitral valve stenosis.  There is mild (+1) mitral regurgitation.     Tricuspid Valve The tricuspid valve is normal in structure.     Pulmonary Valve The pulmonary valve is poorly visualized.     Aorta and PA The pulmonary artery is normal size.     Left Ventricle M-Mode measurements reveal the left ventricle is moderately enlarged.  There is no evidence of left ventricular hypertrophy.  Left ventricular systolic function is severely diminished with an estimated left ventricular  ejection fraction of 15%.     Right Ventricle M-Mode measurements reveal the right ventricle is enlarged.     Atria The right atrium is enlarged.     Aortic Valve The aortic valve is normal in structure.     Aorta and PA The aortic root is normal in size.     Tricuspid Valve Mild tricuspid regurgitation by color Doppler.  Right ventricular systolic pressure is normal.     Pericardial Pleural Effusion Pericardium without effusion.  There is no pleural effusion.     Aortic Valve No aortic valve stenosis.     Pulmonary Valve There is trace pulmonic regurgitation.     MMODE 2D MEASUREMENTS CALCULATIONS IVSd: 1.00780xe  IVSs: 1.74508cn  LVIDd: 6.10062lw  LVIDs: 6.30702nf  LVPWd: 1.30593dk  LVPWs: 1.2991cm  IVS/LVPW: 0.98  FS: 2.81424%  EDV(Teich): 203.702ml  ESV(Teich): 192.791ml  EF(Teich): 5.97119%  EDV(cubed): 254.175ml  ESV(cubed): 236.394ml  EF(cubed): 6.18698%  % IVS thick: 28.8074%  % LVPW thick: 24.5238%  LV mass(C)d: 281.882grams  LV mass(C)dI: 133.635grams/m^2  LV mass(C)s: 370.19grams  LV mass(C)sI: 175.5grams/m^2  SV(Teich): 10.9109ml  SI(Teich): 5.58003up/m^2  SV(cubed): 17.7814ml  SI(cubed): 8.98187jj/m^2  Ao root diam: 2.53098xe  Ao root  area: 6.42548cu^2  ACS: 2.10252gd  LA dimension: 5.94828oe  LA/Ao: 1.7  LVOT diam: 2.2253cm  LVOT area: 3.08434yl^2  LVOT area(traced): 3.8cm^2  LVLd ap4: 8.78cm  EDV(MOD-sp4): 139ml  LVLs ap4: 8.38cm  ESV(MOD-sp4): 117ml  EF(MOD-sp4): 15.8273%  LVLd ap2: 8.33cm  EDV(MOD-sp2): 167ml  LVLs ap2: 7.45cm  ESV(MOD-sp2): 137ml  EF(MOD-sp2): 17.9641%  SV(MOD-sp4): 22ml  SI(MOD-sp4): 10.4298ml/m^2  SV(MOD-sp2): 30ml  SI(MOD-sp2): 14.2224ml/m^2     Time Measurements Aortic R-R: 0.75sec  Aortic HR: 80BPM     DOPPLER MEASUREMENTS CALCULATIONS MV A dur: 0.843441fao  MV E max walt: 169.4cm/sec  MV A max walt: 85.7859cm/sec  MV E/A: 2.0  MV V2 max: 173.744cm/sec  MV max P.0747mmHg  MV V2 mean: 99.6359cm/sec  MV mean P.53301muSl  MV V2 VTI: 39.2923cm  MVA(VTI): 1.17158wz^2  MV dec time: 0.462367zqq  Ao V2 max: 103.168cm/sec  Ao max P.2575mmHg  Ao max PG (full): 2.26715tdOz  SHUN(V,A): 2.86065sf^2  SHUN(V,D): 2.44997om^2  LV V1 max P.28983haYc  LV V1 mean P.99611pyWu  LV V1 max: 70.028cm/sec  LV V1 mean: 56.8906cm/sec  LV V1 VTI: 12.8202cm  CO(LVOT): 3.72192m/min  CI(LVOT): 1.21748l/min/m^2  SV(LVOT): 49.8609ml  SI(LVOT): 23.6381ml/m^2  TV V2 max: 28.3814cm/sec  TV max P.324746myRt  PA V2 max: 48.5877cm/sec  PA max P.949182iuQp  PA acc slope: 325.88cm/sec^2  PA acc time: 0.920064man  TR max walt: 257.913cm/sec  RVSP(TR): 29.6076mmHg  RAP systole: 3mmHg  PA pr(Accel): 14.0364mmHg  Pulm Sys Walt: 17.7384cm/sec          Assessment and Plan:   In summary, this is a very pleasant, 53-year-old man with a history of what is likely valvular cardiomyopathy, status post mitral valve repair in 2015, who then had continued symptoms, a failure to improve his LV ejection fraction. He is presently New York Heart Association functional class II and is euvolemic on exam. He has tolerated up titration of neurohormonal blockade and also now has a biventricular pacer.    Overall we have had a period of stability for several  years, however am concerned that he had one brief admission to the hospital for diuresis within the last year.  He has since been put on Entresto - and given his stable endorgan function and overall quality of life I believe it is reasonable to continue postponing an evaluation for advanced therapies.      I will see him back in 6 months with a cardiopulmonary stress test and echocardiogram at that visit.  If he has any further decline in functional status, any further ventricular arrhythmias, any further admissions to the hospital for fluid overload, an increase in his LV size or elevation in PA pressures I would recommend completing a full evaluation.     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 We did enable programing to increase Bi-V pacing.     Instructions given to the patient:   Increase your Entresto to 97/103 at night (2 tabs of the 47/51 mg) at night and one tab in the morning for one month, an then increase to 97/103 two times daily (this is the maximum dose)    We will schedule echocardiogram and cardiopulmonary stress test at the return visit.  Return to clinic sooner as needed    Dunia Hdz MD   of Medicine   [facility] Division of Cardiology       CC  Yoan Felix,   Gerry, ROSMERY Norman, ROSMERY Roberts         Review symptoms and labs   Had recent CPX     June 12, 2019      Dear Dr. Felix:      I had the pleasure of seeing, Tej Morfin in the [facility] today.     As you know, he is a very pleasant 53-year-old man with a first presentation with cardiomegaly approximately 4 years ago.  He underwent echocardiogram and he was found to have severe mitral regurgitation and an ejection fraction of 25%.  He underwent an angiogram at that time that showed only mild coronary disease.  He was referred to Dr. Graves for mitral regurgitation, and on 01/21/2015, he underwent mitral valve repair with an annuloplasty ring and was discharged home.        He later presented with ventricular tachycardia which was sustained and therefore he underwent a single-chamber defibrillator for secondary prevention.  Shortly after that, he had his first hospitalization for heart failure with reduced ejection fraction, and given his left ventricular end-diastolic dimension was 7 cm and the ventricular arrhythmias, you sent him to me for consideration of advanced therapies.      When I first saw him, he had an excellent cardiopulmonary stress test.  He now had a BiV upgrade to a CRT. He also underwent a PET scan of his chest to rule out cardiac sarcoidosis, which was not detected on this test.  However, there was an area showed ischemia subtended by the circumflex artery and he underwent a follow up coronary angiography as noted above which showed mild non obstructive disease.      Last saw him around 6 months ago and at that time he was starting to feel worse than when I had seen him previously.  We increase his Entresto.  The main symptom he has had since increasing his current Entresto is dizziness.  Per his wife he has been slowing down gradually over the last year.  He is taking naps during the day he is engaging in less and less family activities.  He is exercising less as well.  He is also been having some anxiety and difficulty laying flat he denies lower  extremity edema or increasing abdominal girth.  Appetite has been stable overall he is feeling     Overall his quality of life is on the decline.         PAST MEDICAL HISTORY:     1.  Hemorrhoidectomy.     2.  Arthroscopy.     3.  Right partial medial meniscectomy 08/2013.     4.  Status post ICD implantation 02/02/2015, [email or web address], by Dr. Felix.     5.  Mitral valve repair with annuloplasty ring and atrial appendage ligation by Dr. Graves in [place], [place]; date on that was 01/21/2015.     CURRENT MEDICATIONS:    Current Outpatient Medications   Medication Sig Dispense Refill     aspirin (ASA) 81 MG EC tablet Take 81 mg by mouth daily       carvedilol (COREG) 12.5 MG tablet Take 1 tablet by mouth 2 times daily (with meals)       cefdinir (OMNICEF) 300 MG capsule Take 300 mg by mouth 2 times daily       diazepam (VALIUM) 10 MG tablet Take 10 mg by mouth every 4 hours as needed for anxiety       fluticasone (FLONASE) 50 MCG/ACT nasal spray Spray 1 spray into both nostrils 2 times daily       furosemide (LASIX) 40 MG tablet 40 mg in the morning and a 40 mg PRN dose in the afternoon for weight gain of 3 lbs/day or 5 lbs/week or increase in shortness of breath.       hydrALAZINE (APRESOLINE) 25 MG tablet Take 1 tablet (25 mg) by mouth 3 times daily 270 tablet 2     metoclopramide (REGLAN) 5 MG tablet Take 1 tablet by mouth 2 times daily       Nitroglycerin (NITROSTAT SL) Place 0.4 mg under the tongue every 5 minutes as needed for chest pain       omeprazole (PRILOSEC) 20 MG DR capsule Take 20 mg by mouth every morning       sacubitril-valsartan (ENTRESTO)  MG per tablet Take 1 tablet by mouth 2 times daily       sotalol (BETAPACE) 80 MG tablet Take 80 mg by mouth 2 times daily         SOCIAL HISTORY:  The patient lives with his wife.  They have 2 children who live at home.  One is 10, and one is 15.  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  Alcohol rarely, 1-2 times per month.  No drug use.  Smoking, he smoked 1/2 pack per day for 10 years, quit in .        FAMILY HISTORY:  Mother had coronary disease and end-stage renal disease in her 40s.  He has 1 younger brother and 1 sister who are alive and well.  Dad  at age 51, but not related to cardiac problems.  There is no other family history of cardiomyopathy or sudden cardiac death.     ROS:   Constitutional: No fever, chills, or sweats. Weight is stable    ENT: No visual disturbance, ear ache, epistaxis, sore throat.   Allergies/Immunologic: Negative.   Respiratory: No cough, hemoptysis.   Cardiovascular: As per HPI.   GI: No nausea, vomiting, hematemesis, melena, or hematochezia.   : No urinary frequency, dysuria, or hematuria.   Integument: Negative.   Psychiatric: negative   Neuro: Negative.   Endocrinology: Negative.   Musculoskeletal: Negative.    EXAM:  Vital signs:  Temp: 97.7  F (36.5  C)   BP: 108/76 Pulse: 83     SpO2: 100 %       Weight: 94.9 kg (209 lb 4.8 oz)  Estimated body mass index is 29.61 kg/m  as calculated from the following:    Height as of 10/18/18: 1.791 m (5' 10.5\").    Weight as of this encounter: 94.9 kg (209 lb 4.8 oz).    General: appears comfortable, alert and articulate-appears to have low energy  Head: normocephalic, atraumatic  Eyes: anicteric sclera, EOMI  Neck: no adenopathy  Orophyarynx: moist mucosa, no lesions, dentition intact  Heart: PMI sustained,  regular, S1/S2, II/IV systolic murmur at the apex, no gallop, no rub, estimated JVP 9 cm Lungs: clear, no rales or wheezing  Abdomen: soft, non-tender, bowel sounds present, no hepatosplenomegaly  Extremities: no clubbing, cyanosis, trace lower extremity edema  Neurological: normal speech and affect, no gross motor deficits    Labs:     Labs pending from today    Last Right heart catheterization   RA: 2   PA: 23/13   PCWP: 6   PVR: 3.0   CI: 1.7   CO: 3.7   SVR: 1600     Last echocardiogram " from [facility]-in 2017 LV ejection fraction was around 10-15%-see scanned in report for further details    lastICD interrogation:  Bi-V pacing at 96 %    CPX: 2019     RESULTS:  Oxygen Consumption: The patient's VO2 max was 1790 ml/min, which represents 62% of predicted maximum.  When normalize for body weight, VO2 max was 19.2 ml/kg/min, which was 62% of predicted maximum.  Anaerobic or ventilatory threshold (AT) was noted at 1064 ml/min or 11.4 ml/kg/min, which represents 59% of predicted maximum. RER was 0.87 at rest and increased to 1.13 at maximal exercise.    Cardiac Response: The resting EKG showed paced rhythm.  At maximal exercise, no ischemic changes were noted.  The HR was 80 beats/min, and alonso to 123 beats/min at maximal exercise, which is 73% of age-predicted maximum. HRR calculated as 46.  The O2 pulse was 4 ml/beat at rest and increased to 15 ml/beat at maximal exercise, which is 85 % of age-predicted maximum. The BP at rest was 138/80 and increased to 166/76 at maximal exercise.    6MWD:    Distance:    360 meters    Dec 2018 echo    Patient Information Name: NATASHA HARVEY  Study Date: 2018  MRN: [id number]  : 1965  Gender: M  Account Number: [id number]  Reason for Study: Low Ejection Fraction  Patient Location: Adult Echo Lab  Height: 177.8cm  Weight: 92.988kg  Patient Header: BSA: 2.19200 m2  Study Location: [facility]     Interp Summary M-Mode measurements reveal the left ventricle is moderately enlarged.  Left ventricular systolic function is severely diminished with an estimated left ventricular  ejection fraction of 15%.  Global hypokinesis is noted.  The left atrium is moderately enlarged.  Trace aortic regurgitation.     Procedures The study performed was a(n) complete 2-D echo with M-Mode, color and spectral Doppler.  The study was performed by [facility].  The study quality was diagnostic.  The study was performed in the Echo  Lab.     Aortic Valve There is no aortic valvular vegetation.     Right Ventricle There is normal right ventricular wall thickness.     Left Ventricle Global hypokinesis is noted.  There is no thrombus.     Right Ventricle The right ventricular wall motion is normal.     Atria The left atrium is moderately enlarged.  The interatrial septum is intact with no evidence for an atrial septal defect.     Aortic Valve Trace aortic regurgitation.     Mitral Valve The mitral valve is normal in structure.  There is no evidence of mitral valve prolapse.  There is no vegetation seen on the mitral valve.  No mitral valve stenosis.  There is mild (+1) mitral regurgitation.     Tricuspid Valve The tricuspid valve is normal in structure.     Pulmonary Valve The pulmonary valve is poorly visualized.     Aorta and PA The pulmonary artery is normal size.     Left Ventricle M-Mode measurements reveal the left ventricle is moderately enlarged.  There is no evidence of left ventricular hypertrophy.  Left ventricular systolic function is severely diminished with an estimated left ventricular  ejection fraction of 15%.     Right Ventricle M-Mode measurements reveal the right ventricle is enlarged.     Atria The right atrium is enlarged.     Aortic Valve The aortic valve is normal in structure.     Aorta and PA The aortic root is normal in size.     Tricuspid Valve Mild tricuspid regurgitation by color Doppler.  Right ventricular systolic pressure is normal.     Pericardial Pleural Effusion Pericardium without effusion.  There is no pleural effusion.     Aortic Valve No aortic valve stenosis.     Pulmonary Valve There is trace pulmonic regurgitation.     MMODE 2D MEASUREMENTS CALCULATIONS IVSd: 1.70328hj  IVSs: 1.74387nb  LVIDd: 6.85090rn  LVIDs: 6.57892li  LVPWd: 1.23458al  LVPWs: 1.2991cm  IVS/LVPW: 0.98  FS: 2.33618%  EDV(Teich): 203.702ml  ESV(Teich): 192.791ml  EF(Teich): 5.71298%  EDV(cubed): 254.175ml  ESV(cubed):  236.394ml  EF(cubed): 6.53413%  % IVS thick: 28.8074%  % LVPW thick: 24.5238%  LV mass(C)d: 281.882grams  LV mass(C)dI: 133.635grams/m^2  LV mass(C)s: 370.19grams  LV mass(C)sI: 175.5grams/m^2  SV(Teich): 10.9109ml  SI(Teich): 5.57744bt/m^2  SV(cubed): 17.7814ml  SI(cubed): 8.58883nt/m^2  Ao root diam: 2.68462ep  Ao root area: 6.21419is^2  ACS: 2.01435vb  LA dimension: 5.91008rf  LA/Ao: 1.7  LVOT diam: 2.2253cm  LVOT area: 3.72272nw^2  LVOT area(traced): 3.8cm^2  LVLd ap4: 8.78cm  EDV(MOD-sp4): 139ml  LVLs ap4: 8.38cm  ESV(MOD-sp4): 117ml  EF(MOD-sp4): 15.8273%  LVLd ap2: 8.33cm  EDV(MOD-sp2): 167ml  LVLs ap2: 7.45cm  ESV(MOD-sp2): 137ml  EF(MOD-sp2): 17.9641%  SV(MOD-sp4): 22ml  SI(MOD-sp4): 10.4298ml/m^2  SV(MOD-sp2): 30ml  SI(MOD-sp2): 14.2224ml/m^2     Time Measurements Aortic R-R: 0.75sec  Aortic HR: 80BPM     DOPPLER MEASUREMENTS CALCULATIONS MV A dur: 0.211096erl  MV E max walt: 169.4cm/sec  MV A max walt: 85.7859cm/sec  MV E/A: 2.0  MV V2 max: 173.744cm/sec  MV max P.0747mmHg  MV V2 mean: 99.6359cm/sec  MV mean P.40066idPk  MV V2 VTI: 39.2923cm  MVA(VTI): 1.40328vz^2  MV dec time: 0.218336omz  Ao V2 max: 103.168cm/sec  Ao max P.2575mmHg  Ao max PG (full): 2.45260szYc  SHUN(V,A): 2.32974uw^2  SHUN(V,D): 2.98234qr^2  LV V1 max P.16363gqPs  LV V1 mean P.00115dsHf  LV V1 max: 70.028cm/sec  LV V1 mean: 56.8906cm/sec  LV V1 VTI: 12.8202cm  CO(LVOT): 3.21408r/min  CI(LVOT): 1.07291d/min/m^2  SV(LVOT): 49.8609ml  SI(LVOT): 23.6381ml/m^2  TV V2 max: 28.3814cm/sec  TV max P.506681fnLz  PA V2 max: 48.5877cm/sec  PA max P.795430hoZx  PA acc slope: 325.88cm/sec^2  PA acc time: 0.451455vmb  TR max walt: 257.913cm/sec  RVSP(TR): 29.6076mmHg  RAP systole: 3mmHg  PA pr(Accel): 14.0364mmHg  Pulm Sys Walt: 17.7384cm/sec          Assessment and Plan:   In summary, this is a very pleasant, 53-year-old man with a history of what is likely valvular cardiomyopathy, status post mitral valve repair in 2015,  who then had continued symptoms, a failure to improve his LV ejection fraction.      I have been following him for years with stable cardiopulmonary stress testing and endorgan function without hospitalizations.    I am concerned about him today as I feel as though he has had a slow, a progressive decline.  Although his peak VO2 was 19 mL's per kilogram per meter squared on his last visit this is been declining steadily over time.  At this point given his progressive symptoms and decreasing exercise tolerance I would like to repeat his right heart catheterization.  In my mind it is not a matter of if but a matter of when he will need to move forward with advanced therapies.  We will obtain a right heart catheterization locally here if the hemodynamics are poor (low index, high left-sided filling pressures or high PVR) we will plan to proceed with an advanced therapy evaluation.  90% of this could be done in the [place] and with final visits for neuropsych, LVAD so until transplant teaching etc. in the [facility].    We spent 45 minutes today reviewing the process of advanced therapies.  Ideally would like to go to cardiac transplant first however he is a blood group O and would not have another bad contraindication and so we may end up giving him 2 weeks in the hospital with an attempt at status to when the time comes to see if we get him transplanted otherwise he will need to have an LVAD on the way to cardiac transplant.       Chronic systolic heart failure secondary to valvular cardiomyopathy   Stage C--> D   NYHA Class III B   ACEi/ARB/ARNI yes   BB titration ongoing  Aldosterone antagonist no (high potassium in the past with administration)  SCD prophylaxis ICD  % BiV pacing: yes    Fluid status euvolemic  NSAID use: none  Sleep Apnea Evaluation: now on CPAP     Bi-V pacing: - given complete AV block the somewhat low bi-v pacing indicates he maybe having a high PVC burden- it may be worth a holter to get a  capture a more accurate 24-48 hours to determine PVC burden. We did enable programing to increase Bi-V pacing.         Dunia Hdz MD   of Medicine   [facility] Division of Cardiology       CC  Gerry Chatman, ROSMERY Norman, RUTHANN Bahena SD

## 2019-06-12 NOTE — PROGRESS NOTES
June 11, 2019    Dear Dr. Felix:      I had the pleasure of seeing, Tej Morfin in the [facility] today.     As you know, he is a very pleasant 53-year-old man with a first presentation with cardiomegaly approximately 4 years ago.  He underwent echocardiogram and he was found to have severe mitral regurgitation and an ejection fraction of 25%.  He underwent an angiogram at that time that showed only mild coronary disease.  He was referred to Dr. Graves for mitral regurgitation, and on 01/21/2015, he underwent mitral valve repair with an annuloplasty ring and was discharged home.        He later presented with ventricular tachycardia which was sustained and therefore he underwent a single-chamber defibrillator for secondary prevention.  Shortly after that, he had his first hospitalization for heart failure with reduced ejection fraction, and given his left ventricular end-diastolic dimension was 7 cm and the ventricular arrhythmias, you sent him to me for consideration of advanced therapies.      When I first saw him, he had an excellent cardiopulmonary stress test.  He now had a BiV upgrade to a CRT. He also underwent a PET scan of his chest to rule out cardiac sarcoidosis, which was not detected on this test.  However, there was an area showed ischemia subtended by the circumflex artery and he underwent a follow up coronary angiography as noted above which showed mild non obstructive disease.    The biggest issue he is faced in the last few months has been gastroparesis with several ER visits. This is thought to be idiopathic.     Presently he says that his exercise tolerance is stable.  He has not been exercising as much as he did previously due to hip pain.  He did have one brief admission to the hospital for diuresis the summer.  He has recently been started on Entresto as his insurance changed and he has been feeling pretty well on this medication.  He denies any  dizziness or lightheadedness.  He denies lower extremity edema PND orthopnea.  He says his quality of life is excellent.        PAST MEDICAL HISTORY:     1.  Hemorrhoidectomy.     2.  Arthroscopy.     3.  Right partial medial meniscectomy 2013.     4.  Status post ICD implantation 2015, [employer], by Dr. Felix.     5.  Mitral valve repair with annuloplasty ring and atrial appendage ligation by Dr. Graves in [place], [place]; date on that was 2015.     CURRENT MEDICATIONS:    SOCIAL HISTORY:  The patient lives with his wife.  They have 2 children who live at home.  One is 10, and one is 15.  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  Alcohol rarely, 1-2 times per month.  No drug use.  Smoking, he smoked 1/2 pack per day for 10 years, quit in .        FAMILY HISTORY:  Mother had coronary disease and end-stage renal disease in her 40s.  He has 1 younger brother and 1 sister who are alive and well.  Dad  at age 51, but not related to cardiac problems.  There is no other family history of cardiomyopathy or sudden cardiac death.     ROS:   Constitutional: No fever, chills, or sweats. Weight is stable    ENT: No visual disturbance, ear ache, epistaxis, sore throat.   Allergies/Immunologic: Negative.   Respiratory: No cough, hemoptysis.   Cardiovascular: As per HPI.   GI: No nausea, vomiting, hematemesis, melena, or hematochezia.   : No urinary frequency, dysuria, or hematuria.   Integument: Negative.   Psychiatric: negative   Neuro: Negative.   Endocrinology: Negative.   Musculoskeletal: Negative.    EXAM:  There were no vitals taken for this visit.  General: appears comfortable, alert and articulate  Head: normocephalic, atraumatic  Eyes: anicteric sclera, EOMI  Neck: no adenopathy  Orophyarynx: moist mucosa, no lesions, dentition intact  Heart: PMI sustained,  regular, S1/S2, II/IV systolic murmur at the  apex, no gallop, no rub, estimated JVP < 10 cm   Lungs: clear, no rales or wheezing  Abdomen: soft, non-tender, bowel sounds present, no hepatosplenomegaly  Extremities: no clubbing, cyanosis, no LE edema  Neurological: normal speech and affect, no gross motor deficits    Labs:  CBC RESULTS:  Lab Results   Component Value Date    WBC 3.7 (L) 06/16/2016    RBC 4.11 (L) 06/16/2016    HGB 13.1 (L) 06/16/2016    HCT 40.0 06/16/2016    MCV 97 06/16/2016    MCH 31.9 06/16/2016    MCHC 32.8 06/16/2016    RDW 12.3 06/16/2016     (L) 06/16/2016       CMP RESULTS:  Lab Results   Component Value Date     10/18/2018    POTASSIUM 3.9 10/18/2018    CHLORIDE 108 10/18/2018    CO2 26 10/18/2018    ANIONGAP 5 10/18/2018    GLC 88 10/18/2018    BUN 17 10/18/2018    CR 1.28 (H) 10/18/2018    GFRESTIMATED 59 (L) 10/18/2018    GFRESTBLACK 72 10/18/2018    YESICA 8.4 (L) 10/18/2018    BILITOTAL 0.6 08/10/2017    ALBUMIN 3.8 08/10/2017    ALKPHOS 62 08/10/2017    ALT 48 08/10/2017    AST 21 08/10/2017        INR RESULTS:  Lab Results   Component Value Date    INR 1.05 11/30/2015       Lab Results   Component Value Date    MAG 2.1 08/10/2017         Last Right heart catheterization   RA: 2   PA: 23/13   PCWP: 6   PVR: 3.0   CI: 1.7   CO: 3.7   SVR: 1600     Last echocardiogram from [facility] September 2017 LV ejection fraction was around 10-15%-see scanned in report for further details    lastICD interrogation:  Bi-V pacing at 96 %    CPX: December 2019     RESULTS:  Oxygen Consumption: The patient's VO2 max was 1790 ml/min, which represents 62% of predicted maximum.  When normalize for body weight, VO2 max was 19.2 ml/kg/min, which was 62% of predicted maximum.  Anaerobic or ventilatory threshold (AT) was noted at 1064 ml/min or 11.4 ml/kg/min, which represents 59% of predicted maximum. RER was 0.87 at rest and increased to 1.13 at maximal exercise.    Cardiac Response: The resting EKG showed paced rhythm.  At maximal  exercise, no ischemic changes were noted.  The HR was 80 beats/min, and alonso to 123 beats/min at maximal exercise, which is 73% of age-predicted maximum. HRR calculated as 46.  The O2 pulse was 4 ml/beat at rest and increased to 15 ml/beat at maximal exercise, which is 85 % of age-predicted maximum. The BP at rest was 138/80 and increased to 166/76 at maximal exercise.    6MWD:    Distance:    360 meters    Dec 2018 echo    Patient Information Name: NATASHA HARVEY  Study Date: 2018  MRN: [id number]  : 1965  Gender: M  Account Number: [id number]  Reason for Study: Low Ejection Fraction  Patient Location: Adult Echo Lab  Height: 177.8cm  Weight: 92.988kg  Patient Header: BSA: 2.61006 m2  Study Location: [facility]     Interp Summary M-Mode measurements reveal the left ventricle is moderately enlarged.  Left ventricular systolic function is severely diminished with an estimated left ventricular  ejection fraction of 15%.  Global hypokinesis is noted.  The left atrium is moderately enlarged.  Trace aortic regurgitation.     Procedures The study performed was a(n) complete 2-D echo with M-Mode, color and spectral Doppler.  The study was performed by [facility].  The study quality was diagnostic.  The study was performed in the Echo Lab.     Aortic Valve There is no aortic valvular vegetation.     Right Ventricle There is normal right ventricular wall thickness.     Left Ventricle Global hypokinesis is noted.  There is no thrombus.     Right Ventricle The right ventricular wall motion is normal.     Atria The left atrium is moderately enlarged.  The interatrial septum is intact with no evidence for an atrial septal defect.     Aortic Valve Trace aortic regurgitation.     Mitral Valve The mitral valve is normal in structure.  There is no evidence of mitral valve prolapse.  There is no vegetation seen on the mitral valve.  No mitral valve stenosis.  There is mild (+1) mitral  regurgitation.     Tricuspid Valve The tricuspid valve is normal in structure.     Pulmonary Valve The pulmonary valve is poorly visualized.     Aorta and PA The pulmonary artery is normal size.     Left Ventricle M-Mode measurements reveal the left ventricle is moderately enlarged.  There is no evidence of left ventricular hypertrophy.  Left ventricular systolic function is severely diminished with an estimated left ventricular  ejection fraction of 15%.     Right Ventricle M-Mode measurements reveal the right ventricle is enlarged.     Atria The right atrium is enlarged.     Aortic Valve The aortic valve is normal in structure.     Aorta and PA The aortic root is normal in size.     Tricuspid Valve Mild tricuspid regurgitation by color Doppler.  Right ventricular systolic pressure is normal.     Pericardial Pleural Effusion Pericardium without effusion.  There is no pleural effusion.     Aortic Valve No aortic valve stenosis.     Pulmonary Valve There is trace pulmonic regurgitation.     MMODE 2D MEASUREMENTS CALCULATIONS IVSd: 1.52695ca  IVSs: 1.30382bz  LVIDd: 6.96120jf  LVIDs: 6.93925sp  LVPWd: 1.61596yy  LVPWs: 1.2991cm  IVS/LVPW: 0.98  FS: 2.19761%  EDV(Teich): 203.702ml  ESV(Teich): 192.791ml  EF(Teich): 5.09144%  EDV(cubed): 254.175ml  ESV(cubed): 236.394ml  EF(cubed): 6.27726%  % IVS thick: 28.8074%  % LVPW thick: 24.5238%  LV mass(C)d: 281.882grams  LV mass(C)dI: 133.635grams/m^2  LV mass(C)s: 370.19grams  LV mass(C)sI: 175.5grams/m^2  SV(Teich): 10.9109ml  SI(Teich): 5.18359rq/m^2  SV(cubed): 17.7814ml  SI(cubed): 8.60569mj/m^2  Ao root diam: 2.64855ga  Ao root area: 6.20143cn^2  ACS: 2.70455zu  LA dimension: 5.63915hu  LA/Ao: 1.7  LVOT diam: 2.2253cm  LVOT area: 3.07586mf^2  LVOT area(traced): 3.8cm^2  LVLd ap4: 8.78cm  EDV(MOD-sp4): 139ml  LVLs ap4: 8.38cm  ESV(MOD-sp4): 117ml  EF(MOD-sp4): 15.8273%  LVLd ap2: 8.33cm  EDV(MOD-sp2): 167ml  LVLs ap2: 7.45cm  ESV(MOD-sp2): 137ml  EF(MOD-sp2):  17.9641%  SV(MOD-sp4): 22ml  SI(MOD-sp4): 10.4298ml/m^2  SV(MOD-sp2): 30ml  SI(MOD-sp2): 14.2224ml/m^2     Time Measurements Aortic R-R: 0.75sec  Aortic HR: 80BPM     DOPPLER MEASUREMENTS CALCULATIONS MV A dur: 0.213778rym  MV E max walt: 169.4cm/sec  MV A max walt: 85.7859cm/sec  MV E/A: 2.0  MV V2 max: 173.744cm/sec  MV max P.0747mmHg  MV V2 mean: 99.6359cm/sec  MV mean P.42586wiBq  MV V2 VTI: 39.2923cm  MVA(VTI): 1.86882hn^2  MV dec time: 0.265673amj  Ao V2 max: 103.168cm/sec  Ao max P.2575mmHg  Ao max PG (full): 2.46284qoIk  SHUN(V,A): 2.19273gn^2  SHUN(V,D): 2.49118lr^2  LV V1 max P.03340rnFv  LV V1 mean P.68572nxLh  LV V1 max: 70.028cm/sec  LV V1 mean: 56.8906cm/sec  LV V1 VTI: 12.8202cm  CO(LVOT): 3.24852t/min  CI(LVOT): 1.05907i/min/m^2  SV(LVOT): 49.8609ml  SI(LVOT): 23.6381ml/m^2  TV V2 max: 28.3814cm/sec  TV max P.903015crMi  PA V2 max: 48.5877cm/sec  PA max P.990404zqXu  PA acc slope: 325.88cm/sec^2  PA acc time: 0.840479fen  TR max walt: 257.913cm/sec  RVSP(TR): 29.6076mmHg  RAP systole: 3mmHg  PA pr(Accel): 14.0364mmHg  Pulm Sys Walt: 17.7384cm/sec          Assessment and Plan:   In summary, this is a very pleasant, 53-year-old man with a history of what is likely valvular cardiomyopathy, status post mitral valve repair in 2015, who then had continued symptoms, a failure to improve his LV ejection fraction. He is presently New York Heart Association functional class II and is euvolemic on exam. He has tolerated up titration of neurohormonal blockade and also now has a biventricular pacer.    Overall we have had a period of stability for several years, however am concerned that he had one brief admission to the hospital for diuresis within the last year.  He has since been put on Entresto - and given his stable endorgan function and overall quality of life I believe it is reasonable to continue postponing an evaluation for advanced therapies.      I will see him back in 6  months with a cardiopulmonary stress test and echocardiogram at that visit.  If he has any further decline in functional status, any further ventricular arrhythmias, any further admissions to the hospital for fluid overload, an increase in his LV size or elevation in PA pressures I would recommend completing a full evaluation.     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 We did enable programing to increase Bi-V pacing.     Instructions given to the patient:   Increase your Entresto to 97/103 at night (2 tabs of the 47/51 mg) at night and one tab in the morning for one month, an then increase to 97/103 two times daily (this is the maximum dose)    We will schedule echocardiogram and cardiopulmonary stress test at the return visit.  Return to clinic sooner as needed    Dunia Hdz MD   of Medicine   [facility] Division of Cardiology       CC  Yoan Felix,   Gerry, ROSMERY Norman, ROSMERY Roberts

## 2019-06-12 NOTE — NURSING NOTE
Chief Complaint   Patient presents with     Heart Failure     Med rec done   Marcos Victoria CMA   CORE and Heart Failure   Advanced Heart Failure Referrals

## 2019-06-12 NOTE — PROGRESS NOTES
Review symptoms and labs   Had recent CPX     June 12, 2019     Dear Dr. Felix:      I had the pleasure of seeing, Tej Morfin in the [facility] today.     As you know, he is a very pleasant 53-year-old man with a first presentation with cardiomegaly approximately 4 years ago.  He underwent echocardiogram and he was found to have severe mitral regurgitation and an ejection fraction of 25%.  He underwent an angiogram at that time that showed only mild coronary disease.  He was referred to Dr. Graves for mitral regurgitation, and on 01/21/2015, he underwent mitral valve repair with an annuloplasty ring and was discharged home.        He later presented with ventricular tachycardia which was sustained and therefore he underwent a single-chamber defibrillator for secondary prevention.  Shortly after that, he had his first hospitalization for heart failure with reduced ejection fraction, and given his left ventricular end-diastolic dimension was 7 cm and the ventricular arrhythmias, you sent him to me for consideration of advanced therapies.      When I first saw him, he had an excellent cardiopulmonary stress test.  He now had a BiV upgrade to a CRT. He also underwent a PET scan of his chest to rule out cardiac sarcoidosis, which was not detected on this test.  However, there was an area showed ischemia subtended by the circumflex artery and he underwent a follow up coronary angiography as noted above which showed mild non obstructive disease.      Last saw him around 6 months ago and at that time he was starting to feel worse than when I had seen him previously.  We increase his Entresto.  The main symptom he has had since increasing his current Entresto is dizziness.  Per his wife he has been slowing down gradually over the last year.  He is taking naps during the day he is engaging in less and less family activities.  He is exercising less as well.  He is also been  having some anxiety and difficulty laying flat he denies lower extremity edema or increasing abdominal girth.  Appetite has been stable overall he is feeling     Overall his quality of life is on the decline.         PAST MEDICAL HISTORY:     1.  Hemorrhoidectomy.     2.  Arthroscopy.     3.  Right partial medial meniscectomy 08/2013.     4.  Status post ICD implantation 02/02/2015, "[employer] by Dr. Felix.     5.  Mitral valve repair with annuloplasty ring and atrial appendage ligation by Dr. Graves in [place], [place]; date on that was 01/21/2015.     CURRENT MEDICATIONS:    Current Outpatient Medications   Medication Sig Dispense Refill     aspirin (ASA) 81 MG EC tablet Take 81 mg by mouth daily       carvedilol (COREG) 12.5 MG tablet Take 1 tablet by mouth 2 times daily (with meals)       cefdinir (OMNICEF) 300 MG capsule Take 300 mg by mouth 2 times daily       diazepam (VALIUM) 10 MG tablet Take 10 mg by mouth every 4 hours as needed for anxiety       fluticasone (FLONASE) 50 MCG/ACT nasal spray Spray 1 spray into both nostrils 2 times daily       furosemide (LASIX) 40 MG tablet 40 mg in the morning and a 40 mg PRN dose in the afternoon for weight gain of 3 lbs/day or 5 lbs/week or increase in shortness of breath.       hydrALAZINE (APRESOLINE) 25 MG tablet Take 1 tablet (25 mg) by mouth 3 times daily 270 tablet 2     metoclopramide (REGLAN) 5 MG tablet Take 1 tablet by mouth 2 times daily       Nitroglycerin (NITROSTAT SL) Place 0.4 mg under the tongue every 5 minutes as needed for chest pain       omeprazole (PRILOSEC) 20 MG DR capsule Take 20 mg by mouth every morning       sacubitril-valsartan (ENTRESTO)  MG per tablet Take 1 tablet by mouth 2 times daily       sotalol (BETAPACE) 80 MG tablet Take 80 mg by mouth 2 times daily         SOCIAL HISTORY:  The patient lives with his wife.  They have 2 children who live at home.  One is 10, and one is 15.  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  Alcohol rarely, 1-2 times per month.  No drug use.  Smoking, he smoked 1/2 pack per day for 10 years, quit in .        FAMILY HISTORY:  Mother had coronary disease and end-stage renal disease in her 40s.  He has 1 younger brother and 1 sister who are alive and well.  Dad  at age 51, but not related to cardiac problems.  There is no other family history of cardiomyopathy or sudden cardiac death.     ROS:   Constitutional: No fever, chills, or sweats. Weight is stable    ENT: No visual disturbance, ear ache, epistaxis, sore throat.   Allergies/Immunologic: Negative.   Respiratory: No cough, hemoptysis.   Cardiovascular: As per HPI.   GI: No nausea, vomiting, hematemesis, melena, or hematochezia.   : No urinary frequency, dysuria, or hematuria.   Integument: Negative.   Psychiatric: negative   Neuro: Negative.   Endocrinology: Negative.   Musculoskeletal: Negative.    EXAM:  Vital signs:  Temp: 97.7  F (36.5  C)   BP: 108/76 Pulse: 83     SpO2: 100 %       Weight: 94.9 kg (209 lb 4.8 oz)  Estimated body mass index is 29.61 kg/m  as calculated from the following:    Height as of 10/18/18: 1.791 m (5' 10.5\").    Weight as of this encounter: 94.9 kg (209 lb 4.8 oz).    General: appears comfortable, alert and articulate-appears to have low energy  Head: normocephalic, atraumatic  Eyes: anicteric sclera, EOMI  Neck: no adenopathy  Orophyarynx: moist mucosa, no lesions, dentition intact  Heart: PMI sustained,  regular, S1/S2, II/IV systolic murmur at the apex, no gallop, no rub, estimated JVP 9 cm Lungs: clear, no rales or wheezing  Abdomen: soft, non-tender, bowel sounds present, no hepatosplenomegaly  Extremities: no clubbing, cyanosis, trace lower extremity edema  Neurological: normal speech and affect, no gross motor deficits    Labs:     Labs pending from today    Last Right heart catheterization   RA: 2   PA:    PCWP: 6 "   PVR: 3.0   CI: 1.7   CO: 3.7   SVR: 1600     Last echocardiogram from [place]-in 2017 LV ejection fraction was around 10-15%-see scanned in report for further details    lastICD interrogation:  Bi-V pacing at 96 %    CPX: 2019     RESULTS:  Oxygen Consumption: The patient's VO2 max was 1790 ml/min, which represents 62% of predicted maximum.  When normalize for body weight, VO2 max was 19.2 ml/kg/min, which was 62% of predicted maximum.  Anaerobic or ventilatory threshold (AT) was noted at 1064 ml/min or 11.4 ml/kg/min, which represents 59% of predicted maximum. RER was 0.87 at rest and increased to 1.13 at maximal exercise.    Cardiac Response: The resting EKG showed paced rhythm.  At maximal exercise, no ischemic changes were noted.  The HR was 80 beats/min, and alonso to 123 beats/min at maximal exercise, which is 73% of age-predicted maximum. HRR calculated as 46.  The O2 pulse was 4 ml/beat at rest and increased to 15 ml/beat at maximal exercise, which is 85 % of age-predicted maximum. The BP at rest was 138/80 and increased to 166/76 at maximal exercise.    6MWD:    Distance:    360 meters    Dec 2018 echo    Patient Information Name: NATASHA HARVEY  Study Date: 2018  MRN: [id number]  : 1965  Gender: M  Account Number: [id number]  Reason for Study: Low Ejection Fraction  Patient Location: Adult Echo Lab  Height: 177.8cm  Weight: 92.988kg  Patient Header: BSA: 2.22401 m2  Study Location: [facility]     Interp Summary M-Mode measurements reveal the left ventricle is moderately enlarged.  Left ventricular systolic function is severely diminished with an estimated left ventricular  ejection fraction of 15%.  Global hypokinesis is noted.  The left atrium is moderately enlarged.  Trace aortic regurgitation.     Procedures The study performed was a(n) complete 2-D echo with M-Mode, color and spectral Doppler.  The study was performed by [facility].  The  study quality was diagnostic.  The study was performed in the Echo Lab.     Aortic Valve There is no aortic valvular vegetation.     Right Ventricle There is normal right ventricular wall thickness.     Left Ventricle Global hypokinesis is noted.  There is no thrombus.     Right Ventricle The right ventricular wall motion is normal.     Atria The left atrium is moderately enlarged.  The interatrial septum is intact with no evidence for an atrial septal defect.     Aortic Valve Trace aortic regurgitation.     Mitral Valve The mitral valve is normal in structure.  There is no evidence of mitral valve prolapse.  There is no vegetation seen on the mitral valve.  No mitral valve stenosis.  There is mild (+1) mitral regurgitation.     Tricuspid Valve The tricuspid valve is normal in structure.     Pulmonary Valve The pulmonary valve is poorly visualized.     Aorta and PA The pulmonary artery is normal size.     Left Ventricle M-Mode measurements reveal the left ventricle is moderately enlarged.  There is no evidence of left ventricular hypertrophy.  Left ventricular systolic function is severely diminished with an estimated left ventricular  ejection fraction of 15%.     Right Ventricle M-Mode measurements reveal the right ventricle is enlarged.     Atria The right atrium is enlarged.     Aortic Valve The aortic valve is normal in structure.     Aorta and PA The aortic root is normal in size.     Tricuspid Valve Mild tricuspid regurgitation by color Doppler.  Right ventricular systolic pressure is normal.     Pericardial Pleural Effusion Pericardium without effusion.  There is no pleural effusion.     Aortic Valve No aortic valve stenosis.     Pulmonary Valve There is trace pulmonic regurgitation.     MMODE 2D MEASUREMENTS CALCULATIONS IVSd: 1.57757ql  IVSs: 1.67102wa  LVIDd: 6.89796fg  LVIDs: 6.47067si  LVPWd: 1.18854ku  LVPWs: 1.2991cm  IVS/LVPW: 0.98  FS: 2.61354%  EDV(Teich): 203.702ml  ESV(Teich):  192.791ml  EF(Teich): 5.02504%  EDV(cubed): 254.175ml  ESV(cubed): 236.394ml  EF(cubed): 6.96164%  % IVS thick: 28.8074%  % LVPW thick: 24.5238%  LV mass(C)d: 281.882grams  LV mass(C)dI: 133.635grams/m^2  LV mass(C)s: 370.19grams  LV mass(C)sI: 175.5grams/m^2  SV(Teich): 10.9109ml  SI(Teich): 5.99372ow/m^2  SV(cubed): 17.7814ml  SI(cubed): 8.02141if/m^2  Ao root diam: 2.21305gv  Ao root area: 6.18115ga^2  ACS: 2.49151aw  LA dimension: 5.62342km  LA/Ao: 1.7  LVOT diam: 2.2253cm  LVOT area: 3.86085jr^2  LVOT area(traced): 3.8cm^2  LVLd ap4: 8.78cm  EDV(MOD-sp4): 139ml  LVLs ap4: 8.38cm  ESV(MOD-sp4): 117ml  EF(MOD-sp4): 15.8273%  LVLd ap2: 8.33cm  EDV(MOD-sp2): 167ml  LVLs ap2: 7.45cm  ESV(MOD-sp2): 137ml  EF(MOD-sp2): 17.9641%  SV(MOD-sp4): 22ml  SI(MOD-sp4): 10.4298ml/m^2  SV(MOD-sp2): 30ml  SI(MOD-sp2): 14.2224ml/m^2     Time Measurements Aortic R-R: 0.75sec  Aortic HR: 80BPM     DOPPLER MEASUREMENTS CALCULATIONS MV A dur: 0.265431cma  MV E max walt: 169.4cm/sec  MV A max walt: 85.7859cm/sec  MV E/A: 2.0  MV V2 max: 173.744cm/sec  MV max P.0747mmHg  MV V2 mean: 99.6359cm/sec  MV mean P.53225wmMy  MV V2 VTI: 39.2923cm  MVA(VTI): 1.11084iw^2  MV dec time: 0.081418voo  Ao V2 max: 103.168cm/sec  Ao max P.2575mmHg  Ao max PG (full): 2.07520seXj  SHUN(V,A): 2.74422sc^2  SHUN(V,D): 2.55505qy^2  LV V1 max P.09152voKy  LV V1 mean P.22716tfHz  LV V1 max: 70.028cm/sec  LV V1 mean: 56.8906cm/sec  LV V1 VTI: 12.8202cm  CO(LVOT): 3.85817d/min  CI(LVOT): 1.94377f/min/m^2  SV(LVOT): 49.8609ml  SI(LVOT): 23.6381ml/m^2  TV V2 max: 28.3814cm/sec  TV max P.794980fsYv  PA V2 max: 48.5877cm/sec  PA max P.629178akVj  PA acc slope: 325.88cm/sec^2  PA acc time: 0.615047rza  TR max walt: 257.913cm/sec  RVSP(TR): 29.6076mmHg  RAP systole: 3mmHg  PA pr(Accel): 14.0364mmHg  Pulm Sys Walt: 17.7384cm/sec          Assessment and Plan:   In summary, this is a very pleasant, 53-year-old man with a history of what is likely  valvular cardiomyopathy, status post mitral valve repair in 01/2015, who then had continued symptoms, a failure to improve his LV ejection fraction.      I have been following him for years with stable cardiopulmonary stress testing and endorgan function without hospitalizations.    I am concerned about him today as I feel as though he has had a slow, a progressive decline.  Although his peak VO2 was 19 mL's per kilogram per meter squared on his last visit this is been declining steadily over time.  At this point given his progressive symptoms and decreasing exercise tolerance I would like to repeat his right heart catheterization.  In my mind it is not a matter of if but a matter of when he will need to move forward with advanced therapies.  We will obtain a right heart catheterization locally here if the hemodynamics are poor (low index, high left-sided filling pressures or high PVR) we will plan to proceed with an advanced therapy evaluation.  90% of this could be done in the [place] and with final visits for neuropsych, LVAD so until transplant teaching etc. in the [facility].    We spent 45 minutes today reviewing the process of advanced therapies.  Ideally would like to go to cardiac transplant first however he is a blood group O and would not have another bad contraindication and so we may end up giving him 2 weeks in the hospital with an attempt at status to when the time comes to see if we get him transplanted otherwise he will need to have an LVAD on the way to cardiac transplant.       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 We did enable programing to increase Bi-V pacing.         Dunia Hdz MD   of Medicine   [facility] Division of Cardiology       CC  Yoan Felix,   Gerry, ROSMERY Norman, RUTHANN Bahena [place]

## 2019-06-20 ENCOUNTER — CARE COORDINATION (OUTPATIENT)
Dept: TRANSPLANT | Facility: CLINIC | Age: 54
End: 2019-06-20

## 2019-07-22 ENCOUNTER — TELEPHONE (OUTPATIENT)
Dept: CARDIOLOGY | Facility: CLINIC | Age: 54
End: 2019-07-22

## 2019-07-22 NOTE — TELEPHONE ENCOUNTER
Health Call Center    Phone Message    May a detailed message be left on voicemail: no    Reason for Call: Medication Question or concern regarding medication   Prescription Clarification  Name of Medication: sacubitril-valsartan (ENTRESTO)  MG per tablet  Prescribing Provider: listed as reported, Dr. Hdz   Pharmacy: n/a   What on the order needs clarification? Pt says [employer], the , faxed over a form/paperwork for the Pt to help with copay assistance due to high cost of the med. Pt is OUT of his medication and needs a refill. Please call Pt back to discuss.          Action Taken: Message routed to:  [facility] ([facility]): [facility] CARDIOLOGY ADULT [facility]

## 2019-07-22 NOTE — TELEPHONE ENCOUNTER
Returned Tej's call. Was able to locate prescription request, have Dr Hdz sign it and get it faxed to [facility].  Per the assistance program, the process will take 2-3 days. Tej is currently out of the medication, so spoke with the pharmacy and they will fill 6 pills, which will hopefully get Tej to the point where patient assistance is approved.  He stated understanding of the plan and will call me by Thursday if he hasn't heard from [employer].     Margret Self RN

## 2019-07-23 NOTE — TELEPHONE ENCOUNTER
Followed up with [employer] to ensure they had received fax.  They informed me that the fax number they had provided was incorrect, obtained correct fax and refaxed.  They will send the formal paperwork that will need to be completed in the next 90 days to both the clinic and to the patient.  Tej has his paperwork and is working on getting all the necessary information and will send it in.    Margret Self RN

## 2019-07-25 ENCOUNTER — MYC MEDICAL ADVICE (OUTPATIENT)
Dept: CARDIOLOGY | Facility: CLINIC | Age: 54
End: 2019-07-25

## 2019-07-25 NOTE — TELEPHONE ENCOUNTER
Health Call Center    Phone Message    May a detailed message be left on voicemail: no    Reason for Call: Other: Pt is following up on his Entresto medication refill and he wants to follow-up with Margret to get it taken care of. Please call Pt back to provide progress update.     Action Taken: Message routed to:  [facility] ([facility]): [facility]OLOG[facility] ADULT [facility]

## 2019-07-25 NOTE — TELEPHONE ENCOUNTER
Returned Tej's call after speaking to [employer].  [employer] reports that they have received a fax but are not sure yet whether it is the prescription or another document, they are waiting for  to upload the information.  They advised me to call back tomorrow for updates.  Additonally, they reported that they would likely be unable to get the medication sent to Tej before Tuesday.  Updated Tej with this information. He confirmed that he is now out of Entresto and paying for small amounts is becoming difficult (last prescription was for 6 pills and cost approx $64).  Will discuss with Dr Hdz and come up with a plan    Margret Self RN

## 2019-07-26 NOTE — TELEPHONE ENCOUNTER
Called Tej to ensure that he had received medications from the [facility] yesterday and to update him on his [employer] patient assistance application.  They have received the prescription and are waiting for the remaining pieces of the application. Those have all been faxed, but will call back next week to ensure that they have made it through the  step    Margret Self RN

## 2019-07-30 NOTE — TELEPHONE ENCOUNTER
Followed up with [facility] Patient assistance.  They report that they have received all the necessary paperwork from the provider but are waiting on the patient application, income verification and insurance information.  They further reported that the prescription had been sent to their speciality pharmacy and Tej could expect a phone call to set up delivery in the next 1-2 days.  I updated Tej with all this information.  He plans to call [facility] to confirm the fax number and will watch for the 1800 number to answer for medication delivery.  He still has medications from the [facility] that were delivered last week.  Will follow up with Tej at the end of the week to confirm that his application is continuing to move forward.    Margret Self RN

## 2019-08-05 ENCOUNTER — DOCUMENTATION ONLY (OUTPATIENT)
Dept: CARDIOLOGY | Facility: CLINIC | Age: 54
End: 2019-08-05

## 2019-08-05 DIAGNOSIS — I50.20 SYSTOLIC HEART FAILURE (H): ICD-10-CM

## 2019-08-05 DIAGNOSIS — Z01.89 PATIENT ASSESSMENT FOR PROCEDURE NOT DONE: ICD-10-CM

## 2019-08-05 DIAGNOSIS — Z53.9 PATIENT ASSESSMENT FOR PROCEDURE NOT DONE: ICD-10-CM

## 2019-08-05 DIAGNOSIS — Z01.818 PREOP EXAMINATION: ICD-10-CM

## 2019-08-05 DIAGNOSIS — M85.80 OSTEOPENIA: ICD-10-CM

## 2019-08-05 DIAGNOSIS — Z76.82 HEART TRANSPLANT CANDIDATE: ICD-10-CM

## 2019-08-05 DIAGNOSIS — Z13.1 DIABETES MELLITUS SCREENING: ICD-10-CM

## 2019-08-05 DIAGNOSIS — Z01.818 ENCOUNTER FOR OTHER PREPROCEDURAL EXAMINATION: Primary | ICD-10-CM

## 2019-08-05 DIAGNOSIS — Z87.891 HISTORY OF TOBACCO USE: ICD-10-CM

## 2019-08-05 DIAGNOSIS — I42.9 CARDIOMYOPATHY (H): ICD-10-CM

## 2019-08-05 DIAGNOSIS — Z12.5 SCREENING FOR PROSTATE CANCER: ICD-10-CM

## 2019-08-05 DIAGNOSIS — Z12.12 SCREENING FOR COLORECTAL CANCER: ICD-10-CM

## 2019-08-05 DIAGNOSIS — Z78.9 PROBLEM WITH VASCULAR ACCESS: ICD-10-CM

## 2019-08-05 DIAGNOSIS — Z12.11 SCREENING FOR COLORECTAL CANCER: ICD-10-CM

## 2019-08-05 DIAGNOSIS — I70.209 ATHEROSCLEROTIC PERIPHERAL VASCULAR DISEASE (H): ICD-10-CM

## 2019-08-05 DIAGNOSIS — Z11.59 SCREENING FOR VIRAL DISEASE: ICD-10-CM

## 2019-08-05 NOTE — TELEPHONE ENCOUNTER
Followed up with [employer] about application, they still haven't received Tej's portions. Followed up with Tej.  Apparently the fax machine that he used was malfunctioning and he was currently on the way to re-fax.  He has received medications from the company under the prescreen process so is currently set with medication.    Will follow up on application next week    Margret Self RN

## 2019-08-06 NOTE — PROGRESS NOTES
Transplant Evaluation:  Mr Morfin is in need of a hybrid evaluation for VAD/Transplant.  Today we received confirmation that his insurance coverage has approved this evaluation, which will be done as an outpt.

## 2019-08-07 ENCOUNTER — TELEPHONE (OUTPATIENT)
Dept: TRANSPLANT | Facility: CLINIC | Age: 54
End: 2019-08-07

## 2019-08-07 DIAGNOSIS — I50.20 SYSTOLIC HEART FAILURE (H): Primary | ICD-10-CM

## 2019-08-07 PROBLEM — Z87.891 HISTORY OF TOBACCO USE: Status: ACTIVE | Noted: 2019-08-07

## 2019-08-07 NOTE — PROGRESS NOTES
Needs repeat CPEST:  Tej had a CPEST with his referring cardiologist in [place] last November, and his functional status was fairly good, with an MVO2 of 19.2.  Per Dr Hdz, we will repeat the CPEST here.

## 2019-08-07 NOTE — PROGRESS NOTES
Spoke to patient's wife, Offered date of 8/26/19-8/29/19 for Transplant Evaluation Appointments. Patient's wife states that that week will not work for them and she requested the week of 9/9/19.

## 2019-08-07 NOTE — TELEPHONE ENCOUNTER
"Needs Transplant Eval:  Tej was referred for heart transplant in June, but he needed time to get insurance issues worked out.  We received notice this week that insurance is in place and we are ready to move forward with his eval.  Today I spoke to Tej to see if he has any questions, and to review his health status since his initial tx referral in 2015.  At that time, he was smoking 2-3 cigars per week, and also was using marijuana on occasion.  His response when I inquired about these substances was, \"It will be taken care of by the time I get there.\"  Will plan to check serum cotinine and drug screen testing, as per protocol, when the patient is here for his eval.  Tej was given the [email or web address] website, as well as the phone number for the Transplant Office if he has questions or concerns in the interim.  "

## 2019-08-14 ENCOUNTER — TELEPHONE (OUTPATIENT)
Dept: TRANSPLANT | Facility: CLINIC | Age: 54
End: 2019-08-14

## 2019-08-14 NOTE — TELEPHONE ENCOUNTER
Spoke with the patient and confirmed Evals: Pre-Heart Eval appointments on 9/10/19, 9/11/19, 9/12/19 and 9/13/19.  Informed patient an itinerary can be accessed via [employer], and will be sent via [facility].

## 2019-08-14 NOTE — LETTER
PRE HEART TRANSPLANT EVALUATION/CLINIC APPOINTMENTS    Patient: Tej Morfin  MR#: [id number]  Coordinator: Deuce [phone]  : Stacey STAPLETON [phone]  Dates: September 10, 2019 & September 11, 2019 September 12, 2019 & September 13, 2019    This is your evaluation schedule, please follow dates and times.  You will receive reminder phone calls for other tests, but please follow this schedule only!  If you have any questions about dates and times, please call us on number listed above.  Thank you, Transplant Clinic         No caffeine for 8 hours before testing    Nothing to eat or drink after 10:00 PM        Day/Date:  Tuesday, September 10, 2019  Time Location Activity   6:00 AM [facility] [place] [id number] [facility]; [facility] [address]  Imaging and Lab Testing  1st floor Blood tests   (Nothing to eat or drink after 10:00 PM)   6:15 AM [facility]  Imaging and Lab Testing  1st floor Abdominal Ultrasound  (Nothing to eat or drink after midnight)   7:00 AM [facility] [employer]  Imaging and Lab Testing  1st floor Head CT  (You do not need to do anything special to prepare for your exam.)   7:20 AM [facility] [employer]  Imaging and Lab Testing  1st floor Chest/Abdomen/Pelvis CT   8:00 AM [facility]  Imaging and Lab Testing  1st floor ROSALIND Doppler Ultrasound, No Exercise  (No caffeine or tobacco for 1 hour prior to exam.)   8:30 AM [facility]  Imaging and Lab Testing  1st floor Bilateral Carotid Ultrasound  (You do not need to do anything special to prepare for your exam.)   9:30 AM [facility]  Imaging and Lab Testing  1st floor Bilateral Upper Extremity Arterial Ultrasound  (You do not need to do anything special to prepare for your exam.)   10:30 AM [facility]  Imaging and Lab Testing  1st floor Bilateral Lower Extremity Arterial Ultrasound  (You do not need to do anything special to prepare for your exam.)   11:30 AM [facility]   Surgery Center  Imaging and Lab Testing  1st floor Chest X-ray   1:00 PM [facility]  Pulmonary Function Lab  3rd floor Pulmonary Function Test  (Please do not wear any perfume, deodorant or scented products)   2:00 PM [facility]  Pulmonary Function Lab  3rd floor Six-Minute Walk                 Day/Date:  Wednesday, September 11, 2019  Time Location Activity   8:00 AM [facility]  Heart Care Clinic  3rd floor; Clinic 3L; Room 3.81 Appointment with Batool Martinez,  Transplant    9:00 AM [facility]  Palliative Care Clinic  [facility]  2nd floor; Clinic 2C Appointment with Dr. Hay,   Pallitive Care   10:00 AM [facility]  Heart Care Clinic  3rd floor; Clinic 3L LVAD teaching Show and Tell     12:30 PM-  4:30 PM [facility]  Neurosciences Services  3rd floor Appointment with Emmett Jimenez, Ph.D., Neuropsychologist                       Day/Date:  Thursday, September 12, 2019  Time Location Activity   7:00 AM [facility]  Heart Care Clinic  3rd floor; Clinic 3L Echocardiogram   8:30 AM [facility]  Transplant Clinic  3rd floor; Clinic 3A Appointment with Sarah Butler,  Nutritionist   9:30 AM [facility]  Heart Care Clinic  3rd floor; Clinic 3L Appointment with Dr. Hdz,  Cardiologist   10:00 AM [facility]  Heart Care Clinic  3rd floor; Clinic 3L Appointment with Dr. Lechuga,  Thoracic Surgeon   11:00 AM Take SHUTTLE to the [facility] at  [address]   12:00 PM-  1:00 PM     [facility]  [employer]  Room [phone]  8th floor Transplant Support Group   1:00 PM [address]; [facility] [address]  [facility] Waiting Room  2nd floor  Exercise Stress Test  (No caffeine or alcohol for twelve (12) hours before testing; do not eat or drink for three  hours before test)               Day/Date:  Friday, September 13, 2019  Time Location Activity   10:00 AM-  12:00 PM   [facility]  Transplant Clinic  3rd floor; Clinic 3A; Room 3.330 Transplant teaching; Deuce Hernandez RN, Transplant Coordinator

## 2019-08-19 ENCOUNTER — MYC MEDICAL ADVICE (OUTPATIENT)
Dept: CARDIOLOGY | Facility: CLINIC | Age: 54
End: 2019-08-19

## 2019-08-27 NOTE — TELEPHONE ENCOUNTER
Called Tej to let him know that [employer] Patient assistance still had not received his application.  His application will  on 10/14/19.  Tej was able to tell me that they had received his application back in the mail and resent it today.  In the even that [facility] does not receive the application by the time of his next appointment on , he will bring it with him and I will fax it from here.  I will call on  to verify.    Margret Self RN'

## 2019-09-06 NOTE — TELEPHONE ENCOUNTER
Called [facility] regarding Tej's patient assistance.  They still haven't received his paperwork.  Called Tej to let him know to bring his paperwork with to clinic next week.  He told me that he had to switch his insurance coverage and now has coverage for [employer].    Margret Self RN

## 2019-09-09 ENCOUNTER — CARE COORDINATION (OUTPATIENT)
Dept: CARDIOLOGY | Facility: CLINIC | Age: 54
End: 2019-09-09

## 2019-09-09 DIAGNOSIS — I50.22 CHRONIC SYSTOLIC CONGESTIVE HEART FAILURE (H): Primary | ICD-10-CM

## 2019-09-09 NOTE — PROGRESS NOTES
I called Tej to make sure he had a 30 day caregiver plan for the potential VAD. He said his 30 day caregiver will be his wife and she is coming to [place] this week with him for the eval appointments and VAD Show and Tell.

## 2019-09-10 ENCOUNTER — ANCILLARY PROCEDURE (OUTPATIENT)
Dept: GENERAL RADIOLOGY | Facility: CLINIC | Age: 54
End: 2019-09-10
Attending: INTERNAL MEDICINE
Payer: MEDICARE

## 2019-09-10 ENCOUNTER — ANCILLARY PROCEDURE (OUTPATIENT)
Dept: CT IMAGING | Facility: CLINIC | Age: 54
End: 2019-09-10
Attending: INTERNAL MEDICINE
Payer: MEDICARE

## 2019-09-10 ENCOUNTER — ANCILLARY PROCEDURE (OUTPATIENT)
Dept: ULTRASOUND IMAGING | Facility: CLINIC | Age: 54
End: 2019-09-10
Attending: INTERNAL MEDICINE
Payer: MEDICARE

## 2019-09-10 DIAGNOSIS — Z13.1 DIABETES MELLITUS SCREENING: ICD-10-CM

## 2019-09-10 DIAGNOSIS — Z01.89 PATIENT ASSESSMENT FOR PROCEDURE NOT DONE: ICD-10-CM

## 2019-09-10 DIAGNOSIS — Z76.82 HEART TRANSPLANT CANDIDATE: ICD-10-CM

## 2019-09-10 DIAGNOSIS — M85.80 OSTEOPENIA: ICD-10-CM

## 2019-09-10 DIAGNOSIS — Z12.12 SCREENING FOR COLORECTAL CANCER: ICD-10-CM

## 2019-09-10 DIAGNOSIS — I70.209 ATHEROSCLEROTIC PERIPHERAL VASCULAR DISEASE (H): ICD-10-CM

## 2019-09-10 DIAGNOSIS — Z12.5 SCREENING FOR PROSTATE CANCER: ICD-10-CM

## 2019-09-10 DIAGNOSIS — I42.9 CARDIOMYOPATHY (H): ICD-10-CM

## 2019-09-10 DIAGNOSIS — Z01.818 ENCOUNTER FOR OTHER PREPROCEDURAL EXAMINATION: ICD-10-CM

## 2019-09-10 DIAGNOSIS — Z53.9 PATIENT ASSESSMENT FOR PROCEDURE NOT DONE: ICD-10-CM

## 2019-09-10 DIAGNOSIS — I50.20 SYSTOLIC HEART FAILURE (H): ICD-10-CM

## 2019-09-10 DIAGNOSIS — Z01.818 PREOP EXAMINATION: ICD-10-CM

## 2019-09-10 DIAGNOSIS — Z11.59 SCREENING FOR VIRAL DISEASE: ICD-10-CM

## 2019-09-10 DIAGNOSIS — Z12.11 SCREENING FOR COLORECTAL CANCER: ICD-10-CM

## 2019-09-10 DIAGNOSIS — Z78.9 PROBLEM WITH VASCULAR ACCESS: ICD-10-CM

## 2019-09-10 DIAGNOSIS — I50.22 CHRONIC SYSTOLIC CONGESTIVE HEART FAILURE (H): ICD-10-CM

## 2019-09-10 DIAGNOSIS — I50.22 CHRONIC SYSTOLIC HEART FAILURE (H): ICD-10-CM

## 2019-09-10 LAB
6 MIN WALK (FT): 1465 FT
6 MIN WALK (M): 447 M
ABO + RH BLD: NORMAL
ABO + RH BLD: NORMAL
ALBUMIN SERPL-MCNC: 4 G/DL (ref 3.4–5)
ALBUMIN UR-MCNC: 100 MG/DL
ALP SERPL-CCNC: 61 U/L (ref 40–150)
ALT SERPL W P-5'-P-CCNC: 25 U/L (ref 0–70)
ANION GAP SERPL CALCULATED.3IONS-SCNC: 4 MMOL/L (ref 3–14)
APPEARANCE UR: ABNORMAL
APTT PPP: 30 SEC (ref 22–37)
AST SERPL W P-5'-P-CCNC: 17 U/L (ref 0–45)
BASOPHILS # BLD AUTO: 0 10E9/L (ref 0–0.2)
BASOPHILS NFR BLD AUTO: 0.7 %
BILIRUB SERPL-MCNC: 0.6 MG/DL (ref 0.2–1.3)
BILIRUB UR QL STRIP: NEGATIVE
BUN SERPL-MCNC: 18 MG/DL (ref 7–30)
CALCIUM SERPL-MCNC: 9.1 MG/DL (ref 8.5–10.1)
CARDIOLIPIN ANTIBODY IGG: <1.6 GPL-U/ML (ref 0–19.9)
CARDIOLIPIN ANTIBODY IGM: 0.7 MPL-U/ML (ref 0–19.9)
CHLORIDE SERPL-SCNC: 108 MMOL/L (ref 94–109)
CHOLEST SERPL-MCNC: 205 MG/DL
CMV IGG SERPL QL IA: >8 AI (ref 0–0.8)
CO2 SERPL-SCNC: 26 MMOL/L (ref 20–32)
COLOR UR AUTO: YELLOW
CREAT SERPL-MCNC: 1.23 MG/DL (ref 0.66–1.25)
CRP SERPL HS-MCNC: 2 MG/L
CRP SERPL-MCNC: <2.9 MG/L (ref 0–8)
DIFFERENTIAL METHOD BLD: NORMAL
DLCOCOR-%PRED-PRE: 76 %
DLCOCOR-PRE: 22.66 ML/MIN/MMHG
DLCOUNC-%PRED-PRE: 79 %
DLCOUNC-PRE: 23.4 ML/MIN/MMHG
DLCOUNC-PRED: 29.47 ML/MIN/MMHG
EBV VCA IGG SER QL IA: 6.4 AI (ref 0–0.8)
EOSINOPHIL # BLD AUTO: 0.2 10E9/L (ref 0–0.7)
EOSINOPHIL NFR BLD AUTO: 4.6 %
ERV-%PRED-PRE: 53 %
ERV-PRE: 0.7 L
ERV-PRED: 1.31 L
ERYTHROCYTE [DISTWIDTH] IN BLOOD BY AUTOMATED COUNT: 12.9 % (ref 10–15)
EXPTIME-PRE: 7.59 SEC
FEF2575-%PRED-PRE: 65 %
FEF2575-PRE: 2.19 L/SEC
FEF2575-PRED: 3.32 L/SEC
FEFMAX-%PRED-PRE: 82 %
FEFMAX-PRE: 8.15 L/SEC
FEFMAX-PRED: 9.84 L/SEC
FERRITIN SERPL-MCNC: 95 NG/ML (ref 26–388)
FEV1-%PRED-PRE: 81 %
FEV1-PRE: 2.98 L
FEV1FEV6-PRE: 73 %
FEV1FEV6-PRED: 80 %
FEV1FVC-PRE: 73 %
FEV1FVC-PRED: 80 %
FEV1SVC-PRE: 72 %
FEV1SVC-PRED: 70 %
FIFMAX-PRE: 5.86 L/SEC
FRCPLETH-%PRED-PRE: 97 %
FRCPLETH-PRE: 3.47 L
FRCPLETH-PRED: 3.57 L
FVC-%PRED-PRE: 88 %
FVC-PRE: 4.07 L
FVC-PRED: 4.61 L
GFR SERPL CREATININE-BSD FRML MDRD: 67 ML/MIN/{1.73_M2}
GLUCOSE SERPL-MCNC: 89 MG/DL (ref 70–99)
GLUCOSE UR STRIP-MCNC: NEGATIVE MG/DL
HBA1C MFR BLD: 5.8 % (ref 0–5.6)
HCT VFR BLD AUTO: 48.9 % (ref 40–53)
HDLC SERPL-MCNC: 50 MG/DL
HGB BLD-MCNC: 15.8 G/DL (ref 13.3–17.7)
HGB FREE PLAS-MCNC: 30 MG/DL
HGB UR QL STRIP: NEGATIVE
HSV1 IGG SERPL QL IA: >8 AI (ref 0–0.8)
HSV2 IGG SERPL QL IA: 8 AI (ref 0–0.8)
IC-%PRED-PRE: 86 %
IC-PRE: 3.42 L
IC-PRED: 3.95 L
IMM GRANULOCYTES # BLD: 0 10E9/L (ref 0–0.4)
IMM GRANULOCYTES NFR BLD: 0.5 %
INR PPP: 1.02 (ref 0.86–1.14)
IRON SATN MFR SERPL: 22 % (ref 15–46)
IRON SERPL-MCNC: 73 UG/DL (ref 35–180)
KETONES UR STRIP-MCNC: NEGATIVE MG/DL
LDH SERPL L TO P-CCNC: 224 U/L (ref 85–227)
LDLC SERPL CALC-MCNC: 133 MG/DL
LEUKOCYTE ESTERASE UR QL STRIP: NEGATIVE
LYMPHOCYTES # BLD AUTO: 1.5 10E9/L (ref 0.8–5.3)
LYMPHOCYTES NFR BLD AUTO: 37 %
MAGNESIUM SERPL-MCNC: 1.8 MG/DL (ref 1.6–2.3)
MCH RBC QN AUTO: 31.7 PG (ref 26.5–33)
MCHC RBC AUTO-ENTMCNC: 32.3 G/DL (ref 31.5–36.5)
MCV RBC AUTO: 98 FL (ref 78–100)
MISCELLANEOUS TEST: NORMAL
MISCELLANEOUS TEST: NORMAL
MONOCYTES # BLD AUTO: 0.5 10E9/L (ref 0–1.3)
MONOCYTES NFR BLD AUTO: 10.8 %
MUCOUS THREADS #/AREA URNS LPF: PRESENT /LPF
NEUTROPHILS # BLD AUTO: 1.9 10E9/L (ref 1.6–8.3)
NEUTROPHILS NFR BLD AUTO: 46.4 %
NITRATE UR QL: NEGATIVE
NONHDLC SERPL-MCNC: 156 MG/DL
NRBC # BLD AUTO: 0 10*3/UL
NRBC BLD AUTO-RTO: 0 /100
PH UR STRIP: 6 PH (ref 5–7)
PHOSPHATE SERPL-MCNC: 4 MG/DL (ref 2.5–4.5)
PLATELET # BLD AUTO: 177 10E9/L (ref 150–450)
POTASSIUM SERPL-SCNC: 3.4 MMOL/L (ref 3.4–5.3)
PROT SERPL-MCNC: 8.1 G/DL (ref 6.8–8.8)
PSA SERPL-ACNC: 1.44 UG/L (ref 0–4)
RBC # BLD AUTO: 4.98 10E12/L (ref 4.4–5.9)
RBC #/AREA URNS AUTO: 4 /HPF (ref 0–2)
RVPLETH-%PRED-PRE: 122 %
RVPLETH-PRE: 2.78 L
RVPLETH-PRED: 2.26 L
SODIUM SERPL-SCNC: 139 MMOL/L (ref 133–144)
SOURCE: ABNORMAL
SP GR UR STRIP: 1.02 (ref 1–1.03)
SPECIMEN EXP DATE BLD: NORMAL
SQUAMOUS #/AREA URNS AUTO: <1 /HPF (ref 0–1)
T GONDII IGG SER QL: <3 IU/ML (ref 0–7.1)
T PALLIDUM AB SER QL: NONREACTIVE
TIBC SERPL-MCNC: 338 UG/DL (ref 240–430)
TLCPLETH-%PRED-PRE: 95 %
TLCPLETH-PRE: 6.9 L
TLCPLETH-PRED: 7.23 L
TRANSFERRIN SERPL-MCNC: 267 MG/DL (ref 210–360)
TRIGL SERPL-MCNC: 112 MG/DL
TSH SERPL DL<=0.005 MIU/L-ACNC: 3.49 MU/L (ref 0.4–4)
URATE SERPL-MCNC: 8 MG/DL (ref 3.5–7.2)
UROBILINOGEN UR STRIP-MCNC: 2 MG/DL (ref 0–2)
VA-%PRED-PRE: 90 %
VA-PRE: 6.12 L
VC-%PRED-PRE: 78 %
VC-PRE: 4.12 L
VC-PRED: 5.26 L
VIT B12 SERPL-MCNC: 453 PG/ML (ref 193–986)
VZV IGG SER QL IA: >8 AI (ref 0–0.8)
WBC # BLD AUTO: 4.2 10E9/L (ref 4–11)
WBC #/AREA URNS AUTO: 1 /HPF (ref 0–5)

## 2019-09-10 NOTE — PROGRESS NOTES
Palliative Care Outpatient Clinic Consultation Note    Patient:  Tej Morfin    Chief Complaint:   Tej Morfin 54 year old male who is presenting to the palliative medicine clinic today at the request of Dr. Hdz for a palliative care consultation secondary to pre-heart transplant.   The patient's primary care provider is:  Marlin Hunter     History of Present Illness:  Mr. Morfin is a 54 yo M with a Hx of severe mitral regurgitation s/p MV repair, severe NICM with EF 15% and ICD in place, hx of VT, RYAN on CPAP and idiopathic gastroparesis who presents for palliative care evaluation pre-heart transplant or LVAD as planned BTT.      He says he felt pretty well after his surgery, but over the last few years has had decreasing function and increasing fatigue.  He previously was very active and frequently exercised at the gym, became progressively unable to do this, and was having to call off work frequently due to feeling poorly.  He stopped working about 2 years ago.  He is driving infrequently, and cannot do heavy chores like vacuuming or outdoor work, but can do laundry and other IADLs.  Feels most limited by fatigue, does not feel short of breath.  Has occasional palpitations.  Will sometimes nap during the day.      Patient's Disease Understanding: Good - understands that his heart failure will be slowly progressive.  Relates his changes in energy and function to worsening heart failure.  Has discussed prognosis with heart transplant, has not discussed prognosis without or with LVAD.     Coping:  Says family and wanting to be there for his daughter's milestones (graduation, eventual college, grandkids)    Social History  Living Situation: Lives with wife  Children: 2 children, 2 grandchildren  Actual/Potential Caregiver(s): Wife  Support System: Mainly immediate family, has extended family in [place]  Occupation: Previously worked for [employer] in [employer]  Hobbies: Exercising,  "playing with grandkids  Substance Use/History of misuse: Rare alcohol, quit smoking in 2014.    Financial Concerns: Is concerned about wife taking off work to care for him as that's their only income  Spiritual Background: Taoism - grew up going to [employer], does not now but has private spirituality  Spiritual Concerns/Needs: None  Social History     Tobacco Use     Smoking status: Former Smoker     Smokeless tobacco: Never Used   Substance Use Topics     Alcohol use: No     Alcohol/week: 0.0 oz     Drug use: No       Family History  Family History   Problem Relation Age of Onset     Coronary Artery Disease Mother      Kidney failure Mother      Cardiomyopathy Father          age 51     Patient's Involvement with Prior History of Serious Illness in Family: Mother with ESRD who eventually had worsening QOL and decided to stop HD and go home to die with family.  Also had a family member with hemiplegia from a stroke.     Advance Care Planning:  Advance Directive:    Has not completed  Health Care Agent Contact Information: Would be his wife    Palliative Care questions for pre- LVAD patients:    What are your personal hopes/goals for receiving the LVAD? To be able to live more \"normally\" - which to him is to be able to play with grandkids and do some exercise.  Is hoping to live longer, to be able to see daughter go to college.  Would like more energy.     What are the activities/abilities that make your life worth living? Being able to play with grandkids, be active    What does a typical day look like for you? Will wake up and take meds, walk his dog, often naps during the day.  Has more energy in the morning, usually has to be sedentary at the end of the day.     There are risks for kidney failure in patients with advancing heart disease who need LVAD support.  The places/locations that offer dialysis and accept patients with LVADs may be limited in your community.  What do you know about dialysis? How would " your possible need for dialysis influence your quality of life? He is familiar with dialysis from his mother's experience, and recounts how you have to spend the time at dialysis, not with his family,  and then she'd feel wiped out the rest of the day.  He says it would be a big change and impact on his QOL, but if he were able to keep living and have more time, would be worth it to him.       The need to be on a ventilator/breathing machine through a tracheostomy (a tube in your neck) is a risk with LVAD. What are with circumstances you would want your medical providers and family to keep you alive with long term mechanical ventilation?  He cannot think of a circumstance that he would want to stop, but if were not communicative would want his family to make that decision.     An LVAD carries a risk of stroke which, for some people, may limit their ability to communicate their wishes to others.  After a stroke, what sort of outcomes would be unacceptable to you (ie needing to live in nursing facility, loss of independence.. Etc.) He cannot think of a condition that he would not want to live.  He had a family member with hemiplegia and saw how that affected him, but that he thinks he'd weather that as it happened.      LVAD s are often placed with future heart transplant consideration. Some of our patients are determined not to be heart transplant candidates, or choose to forego heart transplant surgery for personal reasons.  If you had an LVAD placed inside of you for the remainder of your life, what would be your concerns?  He doesn't feel he knows enough about how long he could expect to live with this, what would be his function, and would want to know more about care.  He is most worried about being a burden for his family.      What circumstances or events would lead you to decide or ask your health care agent to decide that you would want the LVAD turned off and be allowed to die a natural death? He says if he  were in an otherwise terminal condition or in terrible pain, that he would consider wanting a natural death like his mother did.      Allergies   Allergen Reactions     Lactose Cramps     Lactase Diarrhea     Current Outpatient Medications   Medication Sig Dispense Refill     aspirin (ASA) 81 MG EC tablet Take 81 mg by mouth daily       carvedilol (COREG) 12.5 MG tablet Take 1 tablet by mouth 2 times daily (with meals)       cefdinir (OMNICEF) 300 MG capsule Take 300 mg by mouth 2 times daily       diazepam (VALIUM) 10 MG tablet Take 10 mg by mouth every 4 hours as needed for anxiety       fluticasone (FLONASE) 50 MCG/ACT nasal spray Spray 1 spray into both nostrils 2 times daily       furosemide (LASIX) 40 MG tablet 40 mg in the morning and a 40 mg PRN dose in the afternoon for weight gain of 3 lbs/day or 5 lbs/week or increase in shortness of breath.       hydrALAZINE (APRESOLINE) 25 MG tablet Take 1 tablet (25 mg) by mouth 3 times daily 270 tablet 2     metoclopramide (REGLAN) 5 MG tablet Take 1 tablet by mouth 2 times daily       Nitroglycerin (NITROSTAT SL) Place 0.4 mg under the tongue every 5 minutes as needed for chest pain       omeprazole (PRILOSEC) 20 MG DR capsule Take 20 mg by mouth every morning       sacubitril-valsartan (ENTRESTO)  MG per tablet Take 1 tablet by mouth 2 times daily       sotalol (BETAPACE) 80 MG tablet Take 80 mg by mouth 2 times daily       Past Medical History:   Diagnosis Date     Chronic systolic heart failure (H) 2/7/2017     Gastroparesis      ICD (implantable cardioverter-defibrillator) in place      Non-ischemic cardiomyopathy (H)      Paroxysmal ventricular tachycardia (H) 2/7/2017     S/P mitral valve replacement 2/7/2017     Tobacco abuse, episodic     occasional cigar - ~ 3x/wk     Past Surgical History:   Procedure Laterality Date     HEMORRHOIDECTOMY       ICD implantation        R partial medial meniscectomy       REPAIR VALVE MITRAL         REVIEW OF SYSTEMS:    "ROS: 10 point ROS neg other than the symptoms noted above in the HPI and here:  Palliative Symptom Review (0=no symptom/no concern, 1=mild, 2=moderate, 3=severe):      Pain: 0      Fatigue: 2      Nausea: 0      Constipation: 0      Diarrhea: 0      Depressive Symptoms: 0      Anxiety: 1 - starting to get worries about the transplant and surgery      Drowsiness: 0      Poor Appetite: 0      Shortness of Breath: 0      Insomnia: 0      Other: 0      Overall (0 good/no concerns, 3 very poor):  1    /87 (BP Location: Right arm, Patient Position: Sitting, Cuff Size: Adult Regular)   Pulse 78   Temp 96.6  F (35.9  C) (Oral)   Resp 16   Ht 1.791 m (5' 10.51\")   Wt 93.5 kg (206 lb 3.2 oz)   SpO2 100%   BMI 29.16 kg/m       Constitutional: Sitting up in chair, robust-appearing, comfortable, in no distress   HENT: MMM, no oropharyngeal lesions  Eyes: Conjunctiva clear. Sclera anicteric  Cardiovascular: Normal rate, regular rhythm.  No murmur.   Respiratory:  Normal respiratory effort and breath sounds.  No wheezes or rales.   GI:  Soft, normoactive bowel sounds; Non-tender, non-distended  Musculoskeletal: No lower extremity edema.  Gait: ambulating with cane, steady  Neuro: Conversational, normal bulk and tone, no tremor.  Motor and sensation grossly intact.  Psych: Alert, appropriately interactive, full affect, clear sensorium.   Skin: Warm, no rash    Data Reviewed:  LABS:   Recent Labs   Lab Test 09/10/19  0649 10/18/18  1401    139   POTASSIUM 3.4 3.9   CHLORIDE 108 108   CO2 26 26   ANIONGAP 4 5   GLC 89 88   BUN 18 17   CR 1.23 1.28*   YESICA 9.1 8.4*     GFR 67  LFTs normal  a1c 5.8    WBC 4.2, Hb 15.8, Plt 177    TTE 11/19/18 - LV EF 15%    Impressions:  Palliative Performance Score:  70  Decision Making Capacity:  Intact    Mr. Morfin is a 55yo M with NICM with EF 15% being considered for an LVAD/heart transplant. He comes today with his wife.  He has good understanding of his disease, and is " appropriately concerned about the upcoming decisions and likely surgery, but also willing to do this if it will help him live longer and have more energy to spend time with his family.   He was engaged in the process and understands this procedure comes with risk but is hopeful and willing to work for a positive outcome. I believe he has the emotional capability to cope with complications, if they occur.  I have no concerns from a palliative standpoint with going forward with LVAD as bridge to transplant.      Recommendations & Counseling:  - Recommended reviewing and completing a health care directive  - Discussed the role of palliative care team, and our role in patient support in and out of the hospital, before and after transplantation.   - If anxiety is increasing, discussed palliative SW or behavioral health for counseling.      RTC PRN    Mila Hay MD  Palliative Medicine  Pager [phone]     60 minutes spent face-to-face, >50% in counseling

## 2019-09-11 ENCOUNTER — CARE COORDINATION (OUTPATIENT)
Dept: CARDIOLOGY | Facility: CLINIC | Age: 54
End: 2019-09-11

## 2019-09-11 ENCOUNTER — OFFICE VISIT (OUTPATIENT)
Dept: PALLIATIVE CARE | Facility: CLINIC | Age: 54
End: 2019-09-11
Attending: INTERNAL MEDICINE
Payer: MEDICARE

## 2019-09-11 ENCOUNTER — ALLIED HEALTH/NURSE VISIT (OUTPATIENT)
Dept: CARDIOLOGY | Facility: CLINIC | Age: 54
End: 2019-09-11
Attending: INTERNAL MEDICINE
Payer: MEDICARE

## 2019-09-11 ENCOUNTER — OFFICE VISIT (OUTPATIENT)
Dept: NEUROPSYCHOLOGY | Facility: CLINIC | Age: 54
End: 2019-09-11
Payer: MEDICARE

## 2019-09-11 VITALS
RESPIRATION RATE: 16 BRPM | DIASTOLIC BLOOD PRESSURE: 87 MMHG | BODY MASS INDEX: 28.87 KG/M2 | TEMPERATURE: 96.6 F | OXYGEN SATURATION: 100 % | HEART RATE: 78 BPM | WEIGHT: 206.2 LBS | SYSTOLIC BLOOD PRESSURE: 122 MMHG | HEIGHT: 71 IN

## 2019-09-11 DIAGNOSIS — F41.9 HYPOSOMNIA, INSOMNIA, OR SLEEPLESSNESS ASSOCIATED WITH ANXIETY: ICD-10-CM

## 2019-09-11 DIAGNOSIS — I50.22 CHRONIC SYSTOLIC HEART FAILURE (H): ICD-10-CM

## 2019-09-11 DIAGNOSIS — Z76.82 HEART TRANSPLANT CANDIDATE: ICD-10-CM

## 2019-09-11 DIAGNOSIS — Z51.5 ENCOUNTER FOR PALLIATIVE CARE: Primary | ICD-10-CM

## 2019-09-11 DIAGNOSIS — F51.05 HYPOSOMNIA, INSOMNIA, OR SLEEPLESSNESS ASSOCIATED WITH ANXIETY: ICD-10-CM

## 2019-09-11 DIAGNOSIS — F54 PSYCHOLOGICAL AND BEHAVIORAL FACTORS ASSOCIATED WITH DISORDERS OR DISEASES CLASSIFIED ELSEWHERE: Primary | ICD-10-CM

## 2019-09-11 LAB
GAMMA INTERFERON BACKGROUND BLD IA-ACNC: 0.03 IU/ML
HBV CORE AB SERPL QL IA: NONREACTIVE
HBV SURFACE AB SERPL IA-ACNC: 0.86 M[IU]/ML
HBV SURFACE AG SERPL QL IA: NONREACTIVE
HCV AB SERPL QL IA: NONREACTIVE
HIV 1+2 AB+HIV1 P24 AG SERPL QL IA: NONREACTIVE
LA PPP-IMP: NEGATIVE
M TB IFN-G BLD-IMP: NEGATIVE
M TB IFN-G CD4+ BCKGRND COR BLD-ACNC: >10 IU/ML
MITOGEN IGNF BCKGRD COR BLD-ACNC: 0 IU/ML
MITOGEN IGNF BCKGRD COR BLD-ACNC: 0 IU/ML

## 2019-09-11 PROCEDURE — G0463 HOSPITAL OUTPT CLINIC VISIT: HCPCS | Mod: ZF

## 2019-09-11 PROCEDURE — 99205 OFFICE O/P NEW HI 60 MIN: CPT | Performed by: INTERNAL MEDICINE

## 2019-09-11 ASSESSMENT — PAIN SCALES - GENERAL: PAINLEVEL: NO PAIN (0)

## 2019-09-11 ASSESSMENT — MIFFLIN-ST. JEOR: SCORE: 1799.7

## 2019-09-11 NOTE — LETTER
9/11/2019       RE: Tej Morfin  [address]  [place] [id number]     Dear Colleague,    Thank you for referring your patient, Tej Morfin, to the [facility] at [facility]. Please see a copy of my visit note below.    Palliative Care Outpatient Clinic Consultation Note    Patient:  Tej Morfin    Chief Complaint:   Tej Morfin 54 year old male who is presenting to the palliative medicine clinic today at the request of Dr. Hdz for a palliative care consultation secondary to pre-heart transplant.   The patient's primary care provider is:  Marlin Hunter     History of Present Illness:  Mr. Morfin is a 52 yo M with a Hx of severe mitral regurgitation s/p MV repair, severe NICM with EF 15% and ICD in place, hx of VT, RYAN on CPAP and idiopathic gastroparesis who presents for palliative care evaluation pre-heart transplant or LVAD as planned BTT.      He says he felt pretty well after his surgery, but over the last few years has had decreasing function and increasing fatigue.  He previously was very active and frequently exercised at the gym, became progressively unable to do this, and was having to call off work frequently due to feeling poorly.  He stopped working about 2 years ago.  He is driving infrequently, and cannot do heavy chores like vacuuming or outdoor work, but can do laundry and other IADLs.  Feels most limited by fatigue, does not feel short of breath.  Has occasional palpitations.  Will sometimes nap during the day.      Patient's Disease Understanding: Good - understands that his heart failure will be slowly progressive.  Relates his changes in energy and function to worsening heart failure.  Has discussed prognosis with heart transplant, has not discussed prognosis without or with LVAD.     Coping:  Says family and wanting to be there for his daughter's milestones (graduation, eventual college,  "grandkids)    Social History  Living Situation: Lives with wife  Children: 2 children, 2 grandchildren  Actual/Potential Caregiver(s): Wife  Support System: Mainly immediate family, has extended family in [place]  Occupation: Previously worked for [employer] in [employer]  Hobbies: Exercising, playing with grandkids  Substance Use/History of misuse: Rare alcohol, quit smoking in .    Financial Concerns: Is concerned about wife taking off work to care for him as that's their only income  Spiritual Background: Mu-ism - grew up going to [employer], does not now but has private spirituality  Spiritual Concerns/Needs: None  Social History     Tobacco Use     Smoking status: Former Smoker     Smokeless tobacco: Never Used   Substance Use Topics     Alcohol use: No     Alcohol/week: 0.0 oz     Drug use: No       Family History  Family History   Problem Relation Age of Onset     Coronary Artery Disease Mother      Kidney failure Mother      Cardiomyopathy Father          age 51     Patient's Involvement with Prior History of Serious Illness in Family: Mother with ESRD who eventually had worsening QOL and decided to stop HD and go home to die with family.  Also had a family member with hemiplegia from a stroke.     Advance Care Planning:  Advance Directive:    Has not completed  Health Care Agent Contact Information: Would be his wife    Palliative Care questions for pre- LVAD patients:    What are your personal hopes/goals for receiving the LVAD? To be able to live more \"normally\" - which to him is to be able to play with grandkids and do some exercise.  Is hoping to live longer, to be able to see daughter go to college.  Would like more energy.     What are the activities/abilities that make your life worth living? Being able to play with grandkids, be active    What does a typical day look like for you? Will wake up and take meds, walk his dog, often naps during the day.  Has more energy in the morning, usually " has to be sedentary at the end of the day.     There are risks for kidney failure in patients with advancing heart disease who need LVAD support.  The places/locations that offer dialysis and accept patients with LVADs may be limited in your community.  What do you know about dialysis? How would your possible need for dialysis influence your quality of life? He is familiar with dialysis from his mother's experience, and recounts how you have to spend the time at dialysis, not with his family,  and then she'd feel wiped out the rest of the day.  He says it would be a big change and impact on his QOL, but if he were able to keep living and have more time, would be worth it to him.       The need to be on a ventilator/breathing machine through a tracheostomy (a tube in your neck) is a risk with LVAD. What are with circumstances you would want your medical providers and family to keep you alive with long term mechanical ventilation?  He cannot think of a circumstance that he would want to stop, but if were not communicative would want his family to make that decision.     An LVAD carries a risk of stroke which, for some people, may limit their ability to communicate their wishes to others.  After a stroke, what sort of outcomes would be unacceptable to you (ie needing to live in nursing facility, loss of independence.. Etc.) He cannot think of a condition that he would not want to live.  He had a family member with hemiplegia and saw how that affected him, but that he thinks he'd weather that as it happened.      LVAD s are often placed with future heart transplant consideration. Some of our patients are determined not to be heart transplant candidates, or choose to forego heart transplant surgery for personal reasons.  If you had an LVAD placed inside of you for the remainder of your life, what would be your concerns?  He doesn't feel he knows enough about how long he could expect to live with this, what would be his  function, and would want to know more about care.  He is most worried about being a burden for his family.      What circumstances or events would lead you to decide or ask your health care agent to decide that you would want the LVAD turned off and be allowed to die a natural death? He says if he were in an otherwise terminal condition or in terrible pain, that he would consider wanting a natural death like his mother did.      Allergies   Allergen Reactions     Lactose Cramps     Lactase Diarrhea     Current Outpatient Medications   Medication Sig Dispense Refill     aspirin (ASA) 81 MG EC tablet Take 81 mg by mouth daily       carvedilol (COREG) 12.5 MG tablet Take 1 tablet by mouth 2 times daily (with meals)       cefdinir (OMNICEF) 300 MG capsule Take 300 mg by mouth 2 times daily       diazepam (VALIUM) 10 MG tablet Take 10 mg by mouth every 4 hours as needed for anxiety       fluticasone (FLONASE) 50 MCG/ACT nasal spray Spray 1 spray into both nostrils 2 times daily       furosemide (LASIX) 40 MG tablet 40 mg in the morning and a 40 mg PRN dose in the afternoon for weight gain of 3 lbs/day or 5 lbs/week or increase in shortness of breath.       hydrALAZINE (APRESOLINE) 25 MG tablet Take 1 tablet (25 mg) by mouth 3 times daily 270 tablet 2     metoclopramide (REGLAN) 5 MG tablet Take 1 tablet by mouth 2 times daily       Nitroglycerin (NITROSTAT SL) Place 0.4 mg under the tongue every 5 minutes as needed for chest pain       omeprazole (PRILOSEC) 20 MG DR capsule Take 20 mg by mouth every morning       sacubitril-valsartan (ENTRESTO)  MG per tablet Take 1 tablet by mouth 2 times daily       sotalol (BETAPACE) 80 MG tablet Take 80 mg by mouth 2 times daily       Past Medical History:   Diagnosis Date     Chronic systolic heart failure (H) 2/7/2017     Gastroparesis      ICD (implantable cardioverter-defibrillator) in place      Non-ischemic cardiomyopathy (H)      Paroxysmal ventricular tachycardia (H)  "2/7/2017     S/P mitral valve replacement 2/7/2017     Tobacco abuse, episodic     occasional cigar - ~ 3x/wk     Past Surgical History:   Procedure Laterality Date     HEMORRHOIDECTOMY       ICD implantation        R partial medial meniscectomy       REPAIR VALVE MITRAL         REVIEW OF SYSTEMS:   ROS: 10 point ROS neg other than the symptoms noted above in the HPI and here:  Palliative Symptom Review (0=no symptom/no concern, 1=mild, 2=moderate, 3=severe):      Pain: 0      Fatigue: 2      Nausea: 0      Constipation: 0      Diarrhea: 0      Depressive Symptoms: 0      Anxiety: 1 - starting to get worries about the transplant and surgery      Drowsiness: 0      Poor Appetite: 0      Shortness of Breath: 0      Insomnia: 0      Other: 0      Overall (0 good/no concerns, 3 very poor):  1    /87 (BP Location: Right arm, Patient Position: Sitting, Cuff Size: Adult Regular)   Pulse 78   Temp 96.6  F (35.9  C) (Oral)   Resp 16   Ht 1.791 m (5' 10.51\")   Wt 93.5 kg (206 lb 3.2 oz)   SpO2 100%   BMI 29.16 kg/m        Constitutional: Sitting up in chair, robust-appearing, comfortable, in no distress   HENT: MMM, no oropharyngeal lesions  Eyes: Conjunctiva clear. Sclera anicteric  Cardiovascular: Normal rate, regular rhythm.  No murmur.   Respiratory:  Normal respiratory effort and breath sounds.  No wheezes or rales.   GI:  Soft, normoactive bowel sounds; Non-tender, non-distended  Musculoskeletal: No lower extremity edema.  Gait: ambulating with cane, steady  Neuro: Conversational, normal bulk and tone, no tremor.  Motor and sensation grossly intact.  Psych: Alert, appropriately interactive, full affect, clear sensorium.   Skin: Warm, no rash    Data Reviewed:  LABS:   Recent Labs   Lab Test 09/10/19  0649 10/18/18  1401    139   POTASSIUM 3.4 3.9   CHLORIDE 108 108   CO2 26 26   ANIONGAP 4 5   GLC 89 88   BUN 18 17   CR 1.23 1.28*   YESICA 9.1 8.4*     GFR 67  LFTs normal  a1c 5.8    WBC 4.2, Hb 15.8, " Plt 177    TTE 11/19/18 - LV EF 15%    Impressions:  Palliative Performance Score:  70  Decision Making Capacity:  Intact    Mr. Morfin is a 55yo M with NICM with EF 15% being considered for an LVAD/heart transplant. He comes today with his wife.  He has good understanding of his disease, and is appropriately concerned about the upcoming decisions and likely surgery, but also willing to do this if it will help him live longer and have more energy to spend time with his family.   He was engaged in the process and understands this procedure comes with risk but is hopeful and willing to work for a positive outcome. I believe he has the emotional capability to cope with complications, if they occur.  I have no concerns from a palliative standpoint with going forward with LVAD as bridge to transplant.      Recommendations & Counseling:  - Recommended reviewing and completing a health care directive  - Discussed the role of palliative care team, and our role in patient support in and out of the hospital, before and after transplantation.   - If anxiety is increasing, discussed palliative SW or behavioral health for counseling.      RTC PRN    Mila Hay MD  Palliative Medicine  Pager [phone]     60 minutes spent face-to-face, >50% in counseling

## 2019-09-11 NOTE — PATIENT INSTRUCTIONS
Thank you for coming into the Palliative Care Clinic today.      We discussed advanced care planning and risks today.      I encourage you to complete a health care directive.     Return in clinic as needed for a follow-up.      You can reach the Palliative Care Team during business hours at the following numbers:   -For the [facility], call [phone]     To reach the Palliative Care Provider on-call After-hours or on holidays and weekends, call: [phone].  Please note that we are not able to provide pain medication refills on evenings or weekends.

## 2019-09-11 NOTE — NURSING NOTE
"Oncology Rooming Note    September 11, 2019 9:23 AM   Tej Morfin is a 54 year old male who presents for:    Chief Complaint   Patient presents with     Oncology Clinic Visit     pre heart transplant     Initial Vitals: /87 (BP Location: Right arm, Patient Position: Sitting, Cuff Size: Adult Regular)   Pulse 78   Temp 96.6  F (35.9  C) (Oral)   Resp 16   Ht 1.791 m (5' 10.51\")   Wt 93.5 kg (206 lb 3.2 oz)   SpO2 100%   BMI 29.16 kg/m   Estimated body mass index is 29.16 kg/m  as calculated from the following:    Height as of this encounter: 1.791 m (5' 10.51\").    Weight as of this encounter: 93.5 kg (206 lb 3.2 oz). Body surface area is 2.16 meters squared.  No Pain (0) Comment: Data Unavailable   No LMP for male patient.  Allergies reviewed: Yes  Medications reviewed: Yes    Medications: Medication refills not needed today.  Pharmacy name entered into [facility]: [id number] [place] [address]    Clinical concerns: none        Breonna Cal, CMA              "

## 2019-09-11 NOTE — PROGRESS NOTES
"Met with patient and wife Jessenia to present the HeartWare and HeartMate 3 VADs as possible treatment option.     Discussed patient and caregiver responsibilities before and after VAD implant.  Clarified the need for a caregiver to be present for education and to assist patient for at least first 30 days after a patient returns home. Patient's designated caregiver is Jessenia. There is another family member who may be able to serve as a 30 day caregiver but they think she would only be able to be here on one day per week for training. I said it is best if the caregivers all come for the training during the training period while the patient is inpatient.    Discussed basic overview of VAD equipment, on going management, need for anticoagulation, regular dressing change, grounded three-pronged outlet and functioning telephone. Also discussed frequency of follow-up clinic appointments and the need to stay locally for at least 4 weeks. They said they have a relative in [facility] who they will stay with for the 30 days. Reviewed restrictions of having a VAD such as no swimming, bathing, boats, MRI's, or arc welding.     Provided and discussed the VAD educational brochures, information regarding the VAD and transplant support groups, information on INTERMACs registry, and \"VAD What You Should Know\" with additional details. Patient and Jessenia signed \"VAD What You Should Know\" document. VAD coordinator contact information provided.  Encouraged patient and Jessenia to call with questions.       "

## 2019-09-11 NOTE — PROGRESS NOTES
The patient was seen for neuropsychological evaluation at the request of Marlin Hunter MD for the purposes of diagnostic clarification and treatment planning.  165 minutes of test administration and scoring were provided by this writer.  Please see Dr. Emmett Jimenez's report for a full interpretation of the findings.    Martha Campos  Psychometrist

## 2019-09-11 NOTE — TELEPHONE ENCOUNTER
Received message from patient Transplant Coordinator to move the Transplant Class from Friday (9/13) to Thursday (9/12) This has been rescheduled to 9/12. Left voicemail to patient's wife requesting she call me back regarding this appointment.

## 2019-09-12 ENCOUNTER — OFFICE VISIT (OUTPATIENT)
Dept: CARDIOLOGY | Facility: CLINIC | Age: 54
End: 2019-09-12
Attending: INTERNAL MEDICINE
Payer: COMMERCIAL

## 2019-09-12 ENCOUNTER — HOSPITAL ENCOUNTER (OUTPATIENT)
Dept: CARDIOLOGY | Facility: CLINIC | Age: 54
Discharge: HOME | End: 2019-09-12
Attending: INTERNAL MEDICINE | Admitting: INTERNAL MEDICINE
Payer: COMMERCIAL

## 2019-09-12 ENCOUNTER — DOCUMENTATION ONLY (OUTPATIENT)
Dept: TRANSPLANT | Facility: CLINIC | Age: 54
End: 2019-09-12

## 2019-09-12 ENCOUNTER — ANCILLARY PROCEDURE (OUTPATIENT)
Dept: CARDIOLOGY | Facility: CLINIC | Age: 54
End: 2019-09-12
Attending: INTERNAL MEDICINE
Payer: MEDICARE

## 2019-09-12 ENCOUNTER — APPOINTMENT (OUTPATIENT)
Dept: TRANSPLANT | Facility: CLINIC | Age: 54
End: 2019-09-12
Attending: INTERNAL MEDICINE
Payer: COMMERCIAL

## 2019-09-12 ENCOUNTER — APPOINTMENT (OUTPATIENT)
Dept: TRANSPLANT | Facility: CLINIC | Age: 54
End: 2019-09-12
Attending: INTERNAL MEDICINE
Payer: MEDICARE

## 2019-09-12 VITALS
WEIGHT: 205 LBS | HEART RATE: 79 BPM | BODY MASS INDEX: 29.35 KG/M2 | OXYGEN SATURATION: 100 % | SYSTOLIC BLOOD PRESSURE: 131 MMHG | DIASTOLIC BLOOD PRESSURE: 83 MMHG | HEIGHT: 70 IN

## 2019-09-12 VITALS
HEART RATE: 79 BPM | WEIGHT: 205 LBS | SYSTOLIC BLOOD PRESSURE: 131 MMHG | DIASTOLIC BLOOD PRESSURE: 83 MMHG | HEIGHT: 70 IN | OXYGEN SATURATION: 100 % | BODY MASS INDEX: 29.35 KG/M2

## 2019-09-12 DIAGNOSIS — Z11.59 SCREENING FOR VIRAL DISEASE: ICD-10-CM

## 2019-09-12 DIAGNOSIS — Z01.818 ENCOUNTER FOR OTHER PREPROCEDURAL EXAMINATION: ICD-10-CM

## 2019-09-12 DIAGNOSIS — Z12.5 SCREENING FOR PROSTATE CANCER: ICD-10-CM

## 2019-09-12 DIAGNOSIS — Z78.9 PROBLEM WITH VASCULAR ACCESS: ICD-10-CM

## 2019-09-12 DIAGNOSIS — I70.209 ATHEROSCLEROTIC PERIPHERAL VASCULAR DISEASE (H): ICD-10-CM

## 2019-09-12 DIAGNOSIS — Z01.818 PREOP EXAMINATION: ICD-10-CM

## 2019-09-12 DIAGNOSIS — Z13.1 DIABETES MELLITUS SCREENING: ICD-10-CM

## 2019-09-12 DIAGNOSIS — Z76.82 HEART TRANSPLANT CANDIDATE: ICD-10-CM

## 2019-09-12 DIAGNOSIS — I50.22 CHRONIC SYSTOLIC HEART FAILURE (H): ICD-10-CM

## 2019-09-12 DIAGNOSIS — Z53.9 PATIENT ASSESSMENT FOR PROCEDURE NOT DONE: ICD-10-CM

## 2019-09-12 DIAGNOSIS — Z12.12 SCREENING FOR COLORECTAL CANCER: ICD-10-CM

## 2019-09-12 DIAGNOSIS — I50.20 SYSTOLIC HEART FAILURE (H): ICD-10-CM

## 2019-09-12 DIAGNOSIS — I42.9 CARDIOMYOPATHY (H): ICD-10-CM

## 2019-09-12 DIAGNOSIS — M85.80 OSTEOPENIA: ICD-10-CM

## 2019-09-12 DIAGNOSIS — I50.22 CHRONIC SYSTOLIC CHF (CONGESTIVE HEART FAILURE) (H): Primary | ICD-10-CM

## 2019-09-12 DIAGNOSIS — Z12.11 SCREENING FOR COLORECTAL CANCER: ICD-10-CM

## 2019-09-12 DIAGNOSIS — I42.8 NON-ISCHEMIC CARDIOMYOPATHY (H): ICD-10-CM

## 2019-09-12 DIAGNOSIS — Z01.89 PATIENT ASSESSMENT FOR PROCEDURE NOT DONE: ICD-10-CM

## 2019-09-12 DIAGNOSIS — M85.80 OSTEOPENIA, UNSPECIFIED LOCATION: ICD-10-CM

## 2019-09-12 LAB — COPATH REPORT: NORMAL

## 2019-09-12 PROCEDURE — 94621 CARDIOPULM EXERCISE TESTING: CPT | Mod: 26 | Performed by: INTERNAL MEDICINE

## 2019-09-12 PROCEDURE — G0463 HOSPITAL OUTPT CLINIC VISIT: HCPCS | Mod: 25

## 2019-09-12 PROCEDURE — 99215 OFFICE O/P EST HI 40 MIN: CPT | Mod: GC | Performed by: INTERNAL MEDICINE

## 2019-09-12 PROCEDURE — 94621 CARDIOPULM EXERCISE TESTING: CPT

## 2019-09-12 RX ADMIN — Medication 5 ML: at 07:45

## 2019-09-12 ASSESSMENT — PAIN SCALES - GENERAL
PAINLEVEL: NO PAIN (0)
PAINLEVEL: NO PAIN (0)

## 2019-09-12 ASSESSMENT — ENCOUNTER SYMPTOMS
HEARTBURN: 0
LEG PAIN: 0
BLOOD IN STOOL: 0
NAUSEA: 0
PALPITATIONS: 1
NAUSEA: 0
PALPITATIONS: 1
ABDOMINAL PAIN: 0
HYPOTENSION: 0
HEARTBURN: 0
BLOOD IN STOOL: 0
SLEEP DISTURBANCES DUE TO BREATHING: 0
VOMITING: 0
CONSTIPATION: 0
BLOATING: 1
HYPOTENSION: 0
LIGHT-HEADEDNESS: 0
DIARRHEA: 0
EXERCISE INTOLERANCE: 0
SYNCOPE: 0
VOMITING: 0
CONSTIPATION: 0
SLEEP DISTURBANCES DUE TO BREATHING: 0
ORTHOPNEA: 0
BLOATING: 1
HYPERTENSION: 0
EXERCISE INTOLERANCE: 0
SYNCOPE: 0
DIARRHEA: 0
HYPERTENSION: 0
LEG PAIN: 0
ABDOMINAL PAIN: 0
ORTHOPNEA: 0
LIGHT-HEADEDNESS: 0

## 2019-09-12 ASSESSMENT — MIFFLIN-ST. JEOR
SCORE: 1786.12
SCORE: 1786.12

## 2019-09-12 NOTE — NURSING NOTE
Diet: Patient instructed regarding a heart failure healthy diet, including discussion of reduced fat and 2000 mg daily sodium restriction, daily weights, medication purpose and compliance, fluid restrictions and resources for patient and family to access for assistance with heart failure management.       Labs: Patient was given results of the laboratory testing obtained today and patient was instructed about when to return for the next laboratory testing.     Med Reconcile: Reviewed and verified all current medications with the patient. The updated medication list was printed and given to the patient.     Med changes: change lasix to lasix 40 mg twice a day    Return Appointment: Patient given instructions regarding scheduling next clinic visit: Follow up with Dr Hdz in [place] in 4 months    Patient stated he understood all health information given and agreed to call with further questions or concerns.     Margret Self RN

## 2019-09-12 NOTE — PROGRESS NOTES
Clinic with Dr Hdz:  Tej is here completing an outpt hybrid evaluation for heart tx/VAD this week, and today he met with Dr Hdz to review his current status and discuss eval results that are available so far.   Per Dr Hdz, the pt has had some gradual progression of his AHF symptoms, though does not seem to be in urgent need of transplant or VAD at this point.   In addition to his HF sx, he has also had trouble with abdominal discomfort related to gastroparesis, etiology unknown.  He was prescribed to take Reglan, which improved his sx, but when he tried to wean off of this med, his symptoms worsened.  In addition, he has had issues with anxiety and insomnia, and admits to using THC on occasion to help with these problems.  We discussed the Transplant Program's policy on Chemical Cessation, along with rationale for our recommendations to stop THC use.   He and his wife understand our recommendations, and Tej states he's hoping to connect with a therapist in his hometown to help him work with these issues.      Plan:   Will do Transplant Teaching with Tej and Jessenia this afternoon, and will formally present his case the AHF team at our weekly meeting next Friday.  Tej will be scheduled to see Dr Hdz at her [place] outreach clinic in 4 months, but knows to contact the Transplant Office in the interim for questions or concerns.

## 2019-09-12 NOTE — NURSING NOTE
Chief Complaint   Patient presents with     New Patient     Heart eval     Vitals were taken and medications were reconciled.     Amanda Carter CMA    7:47 AM

## 2019-09-12 NOTE — LETTER
9/12/2019      RE: Tej Morfin  [address]  [place] [id number]       Dear Colleague,    Thank you for the opportunity to participate in the care of your patient, Tej Morfin, at the [facility] acute hospital. Please see a copy of my visit note below.    09/12/2019    Dear Dr. Felix:      We had the pleasure of seeing, Tej Morfin in the [facility] today.     As you know, he is a very pleasant 54-year-old man with a first presentation with cardiomegaly approximately 4 years ago.  He underwent echocardiogram and he was found to have severe mitral regurgitation and an ejection fraction of 25%.  He underwent an angiogram at that time that showed only mild coronary disease. He was referred to Dr. Graves for mitral regurgitation, and on 01/21/2015, he underwent mitral valve repair with an annuloplasty ring and was discharged home.        He later presented with ventricular tachycardia which was sustained and therefore he underwent a single-chamber defibrillator for secondary prevention. Shortly after that, he had his first hospitalization for heart failure with reduced ejection fraction, and given his left ventricular end-diastolic dimension was 7 cm and the ventricular arrhythmias, you sent him to us for consideration of advanced therapies.      When we first saw him, he had an excellent cardiopulmonary stress test.  He now had a BiV upgrade to a CRT. He also underwent a PET scan of his chest to rule out cardiac sarcoidosis, which was not detected on this test. However, there was an area showed ischemia subtended by the circumflex artery and he underwent a follow up coronary angiography as noted above which showed mild non obstructive disease.    Last saw him around 3 months ago and at that time he was starting to feel worse than when I had seen him previously. We repeated RHC in June 2019 that  showed elevated PCWP, normal right-sided filling pressures, mild pulmonary artery pressures, and CI 1.8. Per his wife he has been slowing down gradually over the last year. He feels a little better in the last month and has been doing more activity. He is exercising a little more recently. He is also been having some anxiety with his sleep. Appetite has been stable overall and he has been maintaining a stable weight. Overall his quality of life is on the decline in the last year or so. In the last month, he has been taking his PRN dose of afternoon lasix scheduled instead.       PAST MEDICAL HISTORY:     1.  Hemorrhoidectomy.     2.  Arthroscopy.     3.  Right partial medial meniscectomy 08/2013.     4.  Status post ICD implantation 02/02/2015, [employer], by Dr. Felix.     5.  Mitral valve repair with annuloplasty ring and atrial appendage ligation by Dr. Graves in [place], [place]; date on that was 01/21/2015.     CURRENT MEDICATIONS:  Current Outpatient Medications   Medication Sig Dispense Refill     aspirin (ASA) 81 MG EC tablet Take 81 mg by mouth daily       carvedilol (COREG) 12.5 MG tablet Take 1 tablet by mouth 2 times daily (with meals)       cefdinir (OMNICEF) 300 MG capsule Take 300 mg by mouth 2 times daily       diazepam (VALIUM) 10 MG tablet Take 10 mg by mouth every 4 hours as needed for anxiety       fluticasone (FLONASE) 50 MCG/ACT nasal spray Spray 1 spray into both nostrils 2 times daily       furosemide (LASIX) 40 MG tablet 40 mg in the morning and a 40 mg PRN dose in the afternoon for weight gain of 3 lbs/day or 5 lbs/week or increase in shortness of breath.       hydrALAZINE (APRESOLINE) 25 MG tablet Take 1 tablet (25 mg) by mouth 3 times daily 270 tablet 2     metoclopramide (REGLAN) 5 MG tablet Take 1 tablet by mouth 2 times daily       Nitroglycerin (NITROSTAT SL) Place 0.4 mg under the tongue every 5 minutes as needed for chest pain       omeprazole (PRILOSEC) 20 MG   "capsule Take 20 mg by mouth every morning       sacubitril-valsartan (ENTRESTO)  MG per tablet Take 1 tablet by mouth 2 times daily       sotalol (BETAPACE) 80 MG tablet Take 80 mg by mouth 2 times daily         SOCIAL HISTORY:  The patient lives with his wife. They have 2 children who live at home. One is 10, and one is 15.  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 Alcohol rarely, 1-2 times per month. No drug use.  Smoking, he smoked 1/2 pack per day for 10 years, quit in .        FAMILY HISTORY:  Mother had coronary disease and end-stage renal disease in her 40s. He has 1 younger brother and 1 sister who are alive and well.  Dad  at age 51, but not related to cardiac problems. There is no other family history of cardiomyopathy or sudden cardiac death.     ROS:   Constitutional: No fever, chills, or sweats. Weight is stable    ENT: No visual disturbance, ear ache, epistaxis, sore throat.   Allergies/Immunologic: Negative.   Respiratory: No cough, hemoptysis.   Cardiovascular: As per HPI.   GI: No nausea, vomiting, hematemesis, melena, or hematochezia.   : No urinary frequency, dysuria, or hematuria.   Integument: Negative.   Psychiatric: negative   Neuro: Negative.   Endocrinology: Negative.   Musculoskeletal: Negative.    EXAM:  Vital signs:      BP: 131/83 Pulse: 79     SpO2: 100 %     Height: 177.8 cm (5' 10\") Weight: 93 kg (205 lb)  Estimated body mass index is 29.41 kg/m  as calculated from the following:    Height as of this encounter: 1.778 m (5' 10\").    Weight as of this encounter: 93 kg (205 lb).  General: appears comfortable, alert and articulate-appears to have low energy  Head: normocephalic, atraumatic  Eyes: anicteric sclera, EOMI  Neck: no adenopathy  Orophyarynx: moist mucosa, no lesions, dentition intact  Heart: PMI sustained,  regular, S1/S2, II/IV systolic murmur at the apex, no gallop, no rub, estimated JVP " 8 cm   Lungs: clear, no rales or wheezing  Abdomen: soft, non-tender, bowel sounds present, no hepatosplenomegaly  Extremities: no clubbing, cyanosis, no lower extremity edema  Neurological: normal speech and affect, no gross motor deficits    Labs:  Last Comprehensive Metabolic Panel:  Sodium   Date Value Ref Range Status   09/10/2019 139 133 - 144 mmol/L Final     Potassium   Date Value Ref Range Status   09/10/2019 3.4 3.4 - 5.3 mmol/L Final     Chloride   Date Value Ref Range Status   09/10/2019 108 94 - 109 mmol/L Final     Carbon Dioxide   Date Value Ref Range Status   09/10/2019 26 20 - 32 mmol/L Final     Anion Gap   Date Value Ref Range Status   09/10/2019 4 3 - 14 mmol/L Final     Glucose   Date Value Ref Range Status   09/10/2019 89 70 - 99 mg/dL Final     Urea Nitrogen   Date Value Ref Range Status   09/10/2019 18 7 - 30 mg/dL Final     Creatinine   Date Value Ref Range Status   09/10/2019 1.23 0.66 - 1.25 mg/dL Final     GFR Estimate   Date Value Ref Range Status   09/10/2019 67 >60 mL/min/[1.73_m2] Final     Comment:     Non  GFR Calc  Starting 12/18/2018, serum creatinine based estimated GFR (eGFR) will be   calculated using the Chronic Kidney Disease Epidemiology Collaboration   (CKD-EPI) equation.       Calcium   Date Value Ref Range Status   09/10/2019 9.1 8.5 - 10.1 mg/dL Final     Bilirubin Total   Date Value Ref Range Status   09/10/2019 0.6 0.2 - 1.3 mg/dL Final     Alkaline Phosphatase   Date Value Ref Range Status   09/10/2019 61 40 - 150 U/L Final     ALT   Date Value Ref Range Status   09/10/2019 25 0 - 70 U/L Final     AST   Date Value Ref Range Status   09/10/2019 17 0 - 45 U/L Final     Last Right heart catheterization       Last echocardiogram from [facility]-in September 2017 LV ejection fraction was around 10-15%-see scanned in report for further details    Last ICD interrogation:  Bi-V pacing at 96 %    CPX: December 2019   RESULTS:  Oxygen Consumption: The patient's  VO2 max was 1790 ml/min, which represents 62% of predicted maximum.  When normalize for body weight, VO2 max was 19.2 ml/kg/min, which was 62% of predicted maximum.  Anaerobic or ventilatory threshold (AT) was noted at 1064 ml/min or 11.4 ml/kg/min, which represents 59% of predicted maximum. RER was 0.87 at rest and increased to 1.13 at maximal exercise.    Cardiac Response: The resting EKG showed paced rhythm.  At maximal exercise, no ischemic changes were noted.  The HR was 80 beats/min, and alonso to 123 beats/min at maximal exercise, which is 73% of age-predicted maximum. HRR calculated as 46.  The O2 pulse was 4 ml/beat at rest and increased to 15 ml/beat at maximal exercise, which is 85 % of age-predicted maximum. The BP at rest was 138/80 and increased to 166/76 at maximal exercise.    6MWD:  Distance:    360 meters    Dec 2018 echo  Patient Information Name: NATASHA HARVEY  Study Date: 2018  MRN: [id number]  : 1965  Gender: M  Account Number: [id number]  Reason for Study: Low Ejection Fraction  Patient Location: Adult Echo Lab  Height: 177.8cm  Weight: 92.988kg  Patient Header: BSA: 2.45117 m2  Study Location: [facility]     Interp Summary M-Mode measurements reveal the left ventricle is moderately enlarged.  Left ventricular systolic function is severely diminished with an estimated left ventricular  ejection fraction of 15%.  Global hypokinesis is noted.  The left atrium is moderately enlarged.  Trace aortic regurgitation.     Procedures The study performed was a(n) complete 2-D echo with M-Mode, color and spectral Doppler.  The study was performed by [facility].  The study quality was diagnostic.  The study was performed in the Echo Lab.     Aortic Valve There is no aortic valvular vegetation.     Right Ventricle There is normal right ventricular wall thickness.     Left Ventricle Global hypokinesis is noted.  There is no thrombus.     Right Ventricle The right  ventricular wall motion is normal.     Atria The left atrium is moderately enlarged.  The interatrial septum is intact with no evidence for an atrial septal defect.     Aortic Valve Trace aortic regurgitation.     Mitral Valve The mitral valve is normal in structure.  There is no evidence of mitral valve prolapse.  There is no vegetation seen on the mitral valve.  No mitral valve stenosis.  There is mild (+1) mitral regurgitation.     Tricuspid Valve The tricuspid valve is normal in structure.     Pulmonary Valve The pulmonary valve is poorly visualized.     Aorta and PA The pulmonary artery is normal size.     Left Ventricle M-Mode measurements reveal the left ventricle is moderately enlarged.  There is no evidence of left ventricular hypertrophy.  Left ventricular systolic function is severely diminished with an estimated left ventricular  ejection fraction of 15%.     Right Ventricle M-Mode measurements reveal the right ventricle is enlarged.     Atria The right atrium is enlarged.     Aortic Valve The aortic valve is normal in structure.     Aorta and PA The aortic root is normal in size.     Tricuspid Valve Mild tricuspid regurgitation by color Doppler.  Right ventricular systolic pressure is normal.     Pericardial Pleural Effusion Pericardium without effusion.  There is no pleural effusion.     Aortic Valve No aortic valve stenosis.     Pulmonary Valve There is trace pulmonic regurgitation.     MMODE 2D MEASUREMENTS CALCULATIONS IVSd: 1.69221si  IVSs: 1.85150zn  LVIDd: 6.94946pd  LVIDs: 6.89621lw  LVPWd: 1.81290ab  LVPWs: 1.2991cm  IVS/LVPW: 0.98  FS: 2.55059%  EDV(Teich): 203.702ml  ESV(Teich): 192.791ml  EF(Teich): 5.71988%  EDV(cubed): 254.175ml  ESV(cubed): 236.394ml  EF(cubed): 6.75120%  % IVS thick: 28.8074%  % LVPW thick: 24.5238%  LV mass(C)d: 281.882grams  LV mass(C)dI: 133.635grams/m^2  LV mass(C)s: 370.19grams  LV mass(C)sI: 175.5grams/m^2  SV(Teich): 10.9109ml  SI(Teich): 5.54686pj/m^2  SV(cubed):  17.7814ml  SI(cubed): 8.86661rh/m^2  Ao root diam: 2.05073di  Ao root area: 6.62774yh^2  ACS: 2.83396vu  LA dimension: 5.02764py  LA/Ao: 1.7  LVOT diam: 2.2253cm  LVOT area: 3.25011oz^2  LVOT area(traced): 3.8cm^2  LVLd ap4: 8.78cm  EDV(MOD-sp4): 139ml  LVLs ap4: 8.38cm  ESV(MOD-sp4): 117ml  EF(MOD-sp4): 15.8273%  LVLd ap2: 8.33cm  EDV(MOD-sp2): 167ml  LVLs ap2: 7.45cm  ESV(MOD-sp2): 137ml  EF(MOD-sp2): 17.9641%  SV(MOD-sp4): 22ml  SI(MOD-sp4): 10.4298ml/m^2  SV(MOD-sp2): 30ml  SI(MOD-sp2): 14.2224ml/m^2     Time Measurements Aortic R-R: 0.75sec  Aortic HR: 80BPM     DOPPLER MEASUREMENTS CALCULATIONS MV A dur: 0.983257wix  MV E max walt: 169.4cm/sec  MV A max walt: 85.7859cm/sec  MV E/A: 2.0  MV V2 max: 173.744cm/sec  MV max P.0747mmHg  MV V2 mean: 99.6359cm/sec  MV mean P.39547psEu  MV V2 VTI: 39.2923cm  MVA(VTI): 1.44741mm^2  MV dec time: 0.223521bzn  Ao V2 max: 103.168cm/sec  Ao max P.2575mmHg  Ao max PG (full): 2.08720vnPa  SHUN(V,A): 2.31257bk^2  SHUN(V,D): 2.09599ix^2  LV V1 max P.90123uaNu  LV V1 mean P.36823kgKi  LV V1 max: 70.028cm/sec  LV V1 mean: 56.8906cm/sec  LV V1 VTI: 12.8202cm  CO(LVOT): 3.82415y/min  CI(LVOT): 1.51551l/min/m^2  SV(LVOT): 49.8609ml  SI(LVOT): 23.6381ml/m^2  TV V2 max: 28.3814cm/sec  TV max P.287503qfPi  PA V2 max: 48.5877cm/sec  PA max P.516671buEh  PA acc slope: 325.88cm/sec^2  PA acc time: 0.035563aiq  TR max walt: 257.913cm/sec  RVSP(TR): 29.6076mmHg  RAP systole: 3mmHg  PA pr(Accel): 14.0364mmHg  Pulm Sys Walt: 17.7384cm/sec        Assessment and Plan:   In summary, this is a very pleasant, 54-year-old man with a history of what is likely valvular cardiomyopathy, status post mitral valve repair in 2015, who then had continued symptoms, a failure to improve his LV ejection fraction.    We have been following him for years with stable cardiopulmonary stress testing and endorgan function without hospitalizations.    At this point his symptoms are somewhat  stable, and slightly better than his last appt. Weight stable, renal function stable, and recent RHC showed elevated left-sided filling pressures with slightly reduced CI/CO. It is not a matter of if, but a matter of when he will need to move forward with advanced therapies, and we will continue his advanced therapy evaluation. 90% of this could be done in the [place] and with final visits for neuropsych, LVAD so until transplant teaching etc. in the [facility].  For now, we will continue watchful waiting period. We have increased his lasix to 40 mg twice daily given he has been taking his PRN dose regularly for the last month with improvement in his symptoms.    We spent half of the clinic time today reviewing the process of advanced therapies. Ideally would like to go to cardiac transplant first however he is a blood group O and would not have another bad contraindication and so we may end up giving him 2 weeks in the hospital with an attempt at status to when the time comes to see if we get him transplanted otherwise he will need to have an LVAD on the way to cardiac transplant.     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     Follow-up: 4 months in [place] with Dr. Hdz or sooner if needed    Pt was seen and discussed with staff attending, Dr. Hdz, who agrees with the above.    Dianne Damon MD  Cardiology Fellow      I have seen and examined the patient with the house staff on September 12, 2019  and agree with the outlined assessment and plan.      Dunia Hdz MD   of Medicine   [facility] Division of  Cardiology         CC  Yoan Felix,   Gerry, Helena Bahen[place], ROSMERY [place], NP   Gerry [place], [place]

## 2019-09-12 NOTE — PROGRESS NOTES
09/12/2019    Dear Dr. Felix:      We had the pleasure of seeing, Tej Morfin in the [facility] today.     As you know, he is a very pleasant 54-year-old man with a first presentation with cardiomegaly approximately 4 years ago.  He underwent echocardiogram and he was found to have severe mitral regurgitation and an ejection fraction of 25%.  He underwent an angiogram at that time that showed only mild coronary disease. He was referred to Dr. Graves for mitral regurgitation, and on 01/21/2015, he underwent mitral valve repair with an annuloplasty ring and was discharged home.        He later presented with ventricular tachycardia which was sustained and therefore he underwent a single-chamber defibrillator for secondary prevention. Shortly after that, he had his first hospitalization for heart failure with reduced ejection fraction, and given his left ventricular end-diastolic dimension was 7 cm and the ventricular arrhythmias, you sent him to us for consideration of advanced therapies.      When we first saw him, he had an excellent cardiopulmonary stress test.  He now had a BiV upgrade to a CRT. He also underwent a PET scan of his chest to rule out cardiac sarcoidosis, which was not detected on this test. However, there was an area showed ischemia subtended by the circumflex artery and he underwent a follow up coronary angiography as noted above which showed mild non obstructive disease.    Last saw him around 3 months ago and at that time he was starting to feel worse than when I had seen him previously. We repeated RHC in June 2019 that showed elevated PCWP, normal right-sided filling pressures, mild pulmonary artery pressures, and CI 1.8. Per his wife he has been slowing down gradually over the last year. He feels a little better in the last month and has been doing more activity. He is exercising a little more recently. He is also been having some anxiety with his  sleep. Appetite has been stable overall and he has been maintaining a stable weight. Overall his quality of life is on the decline in the last year or so. In the last month, he has been taking his PRN dose of afternoon lasix scheduled instead.       PAST MEDICAL HISTORY:     1.  Hemorrhoidectomy.     2.  Arthroscopy.     3.  Right partial medial meniscectomy 08/2013.     4.  Status post ICD implantation 02/02/2015, [employer], by Dr. Felix.     5.  Mitral valve repair with annuloplasty ring and atrial appendage ligation by Dr. Graves in [place], [place]; date on that was 01/21/2015.     CURRENT MEDICATIONS:  Current Outpatient Medications   Medication Sig Dispense Refill     aspirin (ASA) 81 MG EC tablet Take 81 mg by mouth daily       carvedilol (COREG) 12.5 MG tablet Take 1 tablet by mouth 2 times daily (with meals)       cefdinir (OMNICEF) 300 MG capsule Take 300 mg by mouth 2 times daily       diazepam (VALIUM) 10 MG tablet Take 10 mg by mouth every 4 hours as needed for anxiety       fluticasone (FLONASE) 50 MCG/ACT nasal spray Spray 1 spray into both nostrils 2 times daily       furosemide (LASIX) 40 MG tablet 40 mg in the morning and a 40 mg PRN dose in the afternoon for weight gain of 3 lbs/day or 5 lbs/week or increase in shortness of breath.       hydrALAZINE (APRESOLINE) 25 MG tablet Take 1 tablet (25 mg) by mouth 3 times daily 270 tablet 2     metoclopramide (REGLAN) 5 MG tablet Take 1 tablet by mouth 2 times daily       Nitroglycerin (NITROSTAT SL) Place 0.4 mg under the tongue every 5 minutes as needed for chest pain       omeprazole (PRILOSEC) 20 MG DR capsule Take 20 mg by mouth every morning       sacubitril-valsartan (ENTRESTO)  MG per tablet Take 1 tablet by mouth 2 times daily       sotalol (BETAPACE) 80 MG tablet Take 80 mg by mouth 2 times daily         SOCIAL HISTORY:  The patient lives with his wife. They have 2 children who live at home. One is 10, and one is 15.  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 Alcohol rarely, 1-2 times per month. No drug use.  Smoking, he smoked 1/2 pack per day for 10 years, quit in .        FAMILY HISTORY:  Mother had coronary disease and end-stage renal disease in her 40s. He has 1 younger brother and 1 sister who are alive and well.  Dad  at age 51, but not related to cardiac problems. There is no other family history of cardiomyopathy or sudden cardiac death.     ROS:   Constitutional: No fever, chills, or sweats. Weight is stable    ENT: No visual disturbance, ear ache, epistaxis, sore throat.   Allergies/Immunologic: Negative.   Respiratory: No cough, hemoptysis.   Cardiovascular: As per HPI.   GI: No nausea, vomiting, hematemesis, melena, or hematochezia.   : No urinary frequency, dysuria, or hematuria.   Integument: Negative.   Psychiatric: negative   Neuro: Negative.   Endocrinology: Negative.   Musculoskeletal: Negative.    EXAM:  Vital signs:      BP: 131/83 Pulse: 79     SpO2: 100 %     Height: 177.8 cm (5' 10\") Weight: 93 kg (205 lb)  Estimated body mass index is 29.41 kg/m  as calculated from the following:    Height as of this encounter: 1.778 m (5' 10\").    Weight as of this encounter: 93 kg (205 lb).  General: appears comfortable, alert and articulate-appears to have low energy  Head: normocephalic, atraumatic  Eyes: anicteric sclera, EOMI  Neck: no adenopathy  Orophyarynx: moist mucosa, no lesions, dentition intact  Heart: PMI sustained,  regular, S1/S2, II/IV systolic murmur at the apex, no gallop, no rub, estimated JVP 8 cm   Lungs: clear, no rales or wheezing  Abdomen: soft, non-tender, bowel sounds present, no hepatosplenomegaly  Extremities: no clubbing, cyanosis, no lower extremity edema  Neurological: normal speech and affect, no gross motor deficits    Labs:  Last Comprehensive Metabolic Panel:  Sodium   Date Value Ref Range Status "   09/10/2019 139 133 - 144 mmol/L Final     Potassium   Date Value Ref Range Status   09/10/2019 3.4 3.4 - 5.3 mmol/L Final     Chloride   Date Value Ref Range Status   09/10/2019 108 94 - 109 mmol/L Final     Carbon Dioxide   Date Value Ref Range Status   09/10/2019 26 20 - 32 mmol/L Final     Anion Gap   Date Value Ref Range Status   09/10/2019 4 3 - 14 mmol/L Final     Glucose   Date Value Ref Range Status   09/10/2019 89 70 - 99 mg/dL Final     Urea Nitrogen   Date Value Ref Range Status   09/10/2019 18 7 - 30 mg/dL Final     Creatinine   Date Value Ref Range Status   09/10/2019 1.23 0.66 - 1.25 mg/dL Final     GFR Estimate   Date Value Ref Range Status   09/10/2019 67 >60 mL/min/[1.73_m2] Final     Comment:     Non  GFR Calc  Starting 12/18/2018, serum creatinine based estimated GFR (eGFR) will be   calculated using the Chronic Kidney Disease Epidemiology Collaboration   (CKD-EPI) equation.       Calcium   Date Value Ref Range Status   09/10/2019 9.1 8.5 - 10.1 mg/dL Final     Bilirubin Total   Date Value Ref Range Status   09/10/2019 0.6 0.2 - 1.3 mg/dL Final     Alkaline Phosphatase   Date Value Ref Range Status   09/10/2019 61 40 - 150 U/L Final     ALT   Date Value Ref Range Status   09/10/2019 25 0 - 70 U/L Final     AST   Date Value Ref Range Status   09/10/2019 17 0 - 45 U/L Final     Last Right heart catheterization       Last echocardiogram from [facility]-in September 2017 LV ejection fraction was around 10-15%-see scanned in report for further details    Last ICD interrogation:  Bi-V pacing at 96 %    CPX: December 2019   RESULTS:  Oxygen Consumption: The patient's VO2 max was 1790 ml/min, which represents 62% of predicted maximum.  When normalize for body weight, VO2 max was 19.2 ml/kg/min, which was 62% of predicted maximum.  Anaerobic or ventilatory threshold (AT) was noted at 1064 ml/min or 11.4 ml/kg/min, which represents 59% of predicted maximum. RER was 0.87 at rest and  increased to 1.13 at maximal exercise.    Cardiac Response: The resting EKG showed paced rhythm.  At maximal exercise, no ischemic changes were noted.  The HR was 80 beats/min, and alonso to 123 beats/min at maximal exercise, which is 73% of age-predicted maximum. HRR calculated as 46.  The O2 pulse was 4 ml/beat at rest and increased to 15 ml/beat at maximal exercise, which is 85 % of age-predicted maximum. The BP at rest was 138/80 and increased to 166/76 at maximal exercise.    6MWD:  Distance:    360 meters    Dec 2018 echo  Patient Information Name: NATASHA HARVEY  Study Date: 2018  MRN: [id number]  : 1965  Gender: M  Account Number: [id number]  Reason for Study: Low Ejection Fraction  Patient Location: Adult Echo Lab  Height: 177.8cm  Weight: 92.988kg  Patient Header: BSA: 2.41302 m2  Study Location: [facility]     Interp Summary M-Mode measurements reveal the left ventricle is moderately enlarged.  Left ventricular systolic function is severely diminished with an estimated left ventricular  ejection fraction of 15%.  Global hypokinesis is noted.  The left atrium is moderately enlarged.  Trace aortic regurgitation.     Procedures The study performed was a(n) complete 2-D echo with M-Mode, color and spectral Doppler.  The study was performed by [facility].  The study quality was diagnostic.  The study was performed in the Echo Lab.     Aortic Valve There is no aortic valvular vegetation.     Right Ventricle There is normal right ventricular wall thickness.     Left Ventricle Global hypokinesis is noted.  There is no thrombus.     Right Ventricle The right ventricular wall motion is normal.     Atria The left atrium is moderately enlarged.  The interatrial septum is intact with no evidence for an atrial septal defect.     Aortic Valve Trace aortic regurgitation.     Mitral Valve The mitral valve is normal in structure.  There is no evidence of mitral valve  prolapse.  There is no vegetation seen on the mitral valve.  No mitral valve stenosis.  There is mild (+1) mitral regurgitation.     Tricuspid Valve The tricuspid valve is normal in structure.     Pulmonary Valve The pulmonary valve is poorly visualized.     Aorta and PA The pulmonary artery is normal size.     Left Ventricle M-Mode measurements reveal the left ventricle is moderately enlarged.  There is no evidence of left ventricular hypertrophy.  Left ventricular systolic function is severely diminished with an estimated left ventricular  ejection fraction of 15%.     Right Ventricle M-Mode measurements reveal the right ventricle is enlarged.     Atria The right atrium is enlarged.     Aortic Valve The aortic valve is normal in structure.     Aorta and PA The aortic root is normal in size.     Tricuspid Valve Mild tricuspid regurgitation by color Doppler.  Right ventricular systolic pressure is normal.     Pericardial Pleural Effusion Pericardium without effusion.  There is no pleural effusion.     Aortic Valve No aortic valve stenosis.     Pulmonary Valve There is trace pulmonic regurgitation.     MMODE 2D MEASUREMENTS CALCULATIONS IVSd: 1.60534og  IVSs: 1.51949sc  LVIDd: 6.68889zj  LVIDs: 6.42616pi  LVPWd: 1.42993kc  LVPWs: 1.2991cm  IVS/LVPW: 0.98  FS: 2.07852%  EDV(Teich): 203.702ml  ESV(Teich): 192.791ml  EF(Teich): 5.59706%  EDV(cubed): 254.175ml  ESV(cubed): 236.394ml  EF(cubed): 6.31825%  % IVS thick: 28.8074%  % LVPW thick: 24.5238%  LV mass(C)d: 281.882grams  LV mass(C)dI: 133.635grams/m^2  LV mass(C)s: 370.19grams  LV mass(C)sI: 175.5grams/m^2  SV(Teich): 10.9109ml  SI(Teich): 5.41468kg/m^2  SV(cubed): 17.7814ml  SI(cubed): 8.26334ws/m^2  Ao root diam: 2.33800ru  Ao root area: 6.96270dv^2  ACS: 2.99584ne  LA dimension: 5.09700tk  LA/Ao: 1.7  LVOT diam: 2.2253cm  LVOT area: 3.22037ia^2  LVOT area(traced): 3.8cm^2  LVLd ap4: 8.78cm  EDV(MOD-sp4): 139ml  LVLs ap4: 8.38cm  ESV(MOD-sp4): 117ml  EF(MOD-sp4):  15.8273%  LVLd ap2: 8.33cm  EDV(MOD-sp2): 167ml  LVLs ap2: 7.45cm  ESV(MOD-sp2): 137ml  EF(MOD-sp2): 17.9641%  SV(MOD-sp4): 22ml  SI(MOD-sp4): 10.4298ml/m^2  SV(MOD-sp2): 30ml  SI(MOD-sp2): 14.2224ml/m^2     Time Measurements Aortic R-R: 0.75sec  Aortic HR: 80BPM     DOPPLER MEASUREMENTS CALCULATIONS MV A dur: 0.492265cnb  MV E max walt: 169.4cm/sec  MV A max walt: 85.7859cm/sec  MV E/A: 2.0  MV V2 max: 173.744cm/sec  MV max P.0747mmHg  MV V2 mean: 99.6359cm/sec  MV mean P.55962tsVr  MV V2 VTI: 39.2923cm  MVA(VTI): 1.45843qs^2  MV dec time: 0.517797uos  Ao V2 max: 103.168cm/sec  Ao max P.2575mmHg  Ao max PG (full): 2.47827xgCj  SHUN(V,A): 2.94690up^2  SHUN(V,D): 2.29513zt^2  LV V1 max P.50456klZl  LV V1 mean P.26991wxQh  LV V1 max: 70.028cm/sec  LV V1 mean: 56.8906cm/sec  LV V1 VTI: 12.8202cm  CO(LVOT): 3.01540r/min  CI(LVOT): 1.42635u/min/m^2  SV(LVOT): 49.8609ml  SI(LVOT): 23.6381ml/m^2  TV V2 max: 28.3814cm/sec  TV max P.926147xxGh  PA V2 max: 48.5877cm/sec  PA max P.216421lcTi  PA acc slope: 325.88cm/sec^2  PA acc time: 0.146064cru  TR max walt: 257.913cm/sec  RVSP(TR): 29.6076mmHg  RAP systole: 3mmHg  PA pr(Accel): 14.0364mmHg  Pulm Sys Walt: 17.7384cm/sec        Assessment and Plan:   In summary, this is a very pleasant, 54-year-old man with a history of what is likely valvular cardiomyopathy, status post mitral valve repair in 2015, who then had continued symptoms, a failure to improve his LV ejection fraction.    We have been following him for years with stable cardiopulmonary stress testing and endorgan function without hospitalizations.    At this point his symptoms are somewhat stable, and slightly better than his last appt. Weight stable, renal function stable, and recent RHC showed elevated left-sided filling pressures with slightly reduced CI/CO. It is not a matter of if, but a matter of when he will need to move forward with advanced therapies, and we will continue his  advanced therapy evaluation. 90% of this could be done in the [place] and with final visits for neuropsych, LVAD so until transplant teaching etc. in the [facility].  For now, we will continue watchful waiting period. We have increased his lasix to 40 mg twice daily given he has been taking his PRN dose regularly for the last month with improvement in his symptoms.    We spent half of the clinic time today reviewing the process of advanced therapies. Ideally would like to go to cardiac transplant first however he is a blood group O and would not have another bad contraindication and so we may end up giving him 2 weeks in the hospital with an attempt at status to when the time comes to see if we get him transplanted otherwise he will need to have an LVAD on the way to cardiac transplant.     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     Follow-up: 4 months in [place] with Dr. Hdz or sooner if needed    Pt was seen and discussed with staff attending, Dr. Hdz, who agrees with the above.    Dianne Damon MD  Cardiology Fellow      I have seen and examined the patient with the house staff on September 12, 2019  and agree with the outlined assessment and plan.      Dunia Hdz MD   of Medicine   [facility] of Card[facility]         CC  Yoan Felix,   Gerry, ROSMERY Norman, ROSMERY Roberts

## 2019-09-12 NOTE — NURSING NOTE
Chief Complaint   Patient presents with     Follow Up     RHF-- Okay per Dr. Hdz     Vitals were taken and medications were reconciled.     Amanda Carter CMA    7:50 AM

## 2019-09-12 NOTE — LETTER
9/12/2019       RE: Tej Morfin  [address]  [place] [id number]     Dear Colleague,    Thank you for referring your patient, Tej Morfin, to the [facility] HEART CARE at [facility]. Please see a copy of my visit note below.    [facility] CARDIOTHORACIC SURGERY CONSULT  Patient Name: Tej Morfin  Medical Record Number: [id number]  YOB: 1965  Room Number: Room/bed info not found  Referring Physician: Dr. 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  He later developed ventricular tachycardia and had single-chamber defibrillator placed.  He has had hospitalizations for heart failure with reduced ejection fraction and recent echocardiogram showed left ventricular end diastolic dimension of 7 cm.  He was referred to Dr. Hdz for evaluation of his heart failure.  He was recently started on Entresto.  His stamina has decreased over the last 6 months.  He is seen in clinic today with his wife present.  They are eager to proceed with heart failure evaluation for LVAD or transplant.    Assessment and Plan:  Tej Morfin is a 54 year old male with valvular cardiomyopathy.  1) Agree with advanced therapy work-up - no surgical contraindication to heart transplant or LVAD  2) Please call with questions or concerns.     Thank you for the opportunity to participate in the care of this patient.    Mainor Lechuga MD  Cardiothoracic Surgery  [phone]    Past Medical History:  Past Medical History:   Diagnosis Date     Chronic systolic heart failure (H) 2/7/2017     Gastroparesis      ICD (implantable cardioverter-defibrillator) in place     [employer]     Non-ischemic cardiomyopathy (H)      Paroxysmal ventricular tachycardia (H) 2/7/2017     S/P mitral valve replacement  2017     Tetrahydrocannabinol (THC) use disorder, mild, in controlled environment, abuse 2019    occasional edible THC. reportedly for sleep, anxiety     Tobacco abuse, episodic     occasional cigar - ~ 3x/wk       Past Surgical History:  Past Surgical History:   Procedure Laterality Date     HEMORRHOIDECTOMY       ICD implantation        R partial medial meniscectomy       REPAIR VALVE MITRAL          Family History:   Family History   Problem Relation Age of Onset     Coronary Artery Disease Mother      Kidney failure Mother      Cardiomyopathy Father          age 51       Social History:  Social History     Socioeconomic History     Marital status:      Spouse name: Not on file     Number of children: Not on file     Years of education: Not on file     Highest education level: Not on file   Occupational History     Not on file   Social Needs     Financial resource strain: Not on file     Food insecurity:     Worry: Not on file     Inability: Not on file     Transportation needs:     Medical: Not on file     Non-medical: Not on file   Tobacco Use     Smoking status: Former Smoker     Smokeless tobacco: Never Used   Substance and Sexual Activity     Alcohol use: No     Alcohol/week: 0.0 oz     Drug use: No     Sexual activity: Not on file   Lifestyle     Physical activity:     Days per week: Not on file     Minutes per session: Not on file     Stress: Not on file   Relationships     Social connections:     Talks on phone: Not on file     Gets together: Not on file     Attends [employer] service: Not on file     Active member of club or organization: Not on file     Attends meetings of clubs or organizations: Not on file     Relationship status: Not on file     Intimate partner violence:     Fear of current or ex partner: Not on file     Emotionally abused: Not on file     Physically abused: Not on file     Forced sexual activity: Not on file   Other Topics Concern     Not on file   Social  History Narrative     Not on file       Allergies:     Allergies   Allergen Reactions     Lactose Cramps     Lactase Diarrhea       Medications:  Current Outpatient Medications   Medication     aspirin (ASA) 81 MG EC tablet     carvedilol (COREG) 12.5 MG tablet     cefdinir (OMNICEF) 300 MG capsule     diazepam (VALIUM) 10 MG tablet     fluticasone (FLONASE) 50 MCG/ACT nasal spray     furosemide (LASIX) 40 MG tablet     hydrALAZINE (APRESOLINE) 25 MG tablet     metoclopramide (REGLAN) 5 MG tablet     Nitroglycerin (NITROSTAT SL)     omeprazole (PRILOSEC) 20 MG DR capsule     sacubitril-valsartan (ENTRESTO)  MG per tablet     sotalol (BETAPACE) 80 MG tablet     No current facility-administered medications for this visit.      Facility-Administered Medications Ordered in Other Visits   Medication     sodium chloride (PF) 0.9% PF flush 10 mL       Review of Systems: A 10 point ROS was performed and is negative other than HPI.    Physical Exam:       Gen: NAD, resting comfortably in chair   Lungs: CTAB, non-labored breathing   CV: regular rhythm, normal rate   Abd: Soft, not tender, not distended   Ext: Motor, sensation, pulses intact   Neuro: AOx3    Labs:  Lab Results   Component Value Date    WBC 4.2 09/10/2019     Lab Results   Component Value Date    RBC 4.98 09/10/2019     Lab Results   Component Value Date    HGB 15.8 09/10/2019     Lab Results   Component Value Date    HCT 48.9 09/10/2019     No components found for: MCT  Lab Results   Component Value Date    MCV 98 09/10/2019     Lab Results   Component Value Date    MCH 31.7 09/10/2019     Lab Results   Component Value Date    MCHC 32.3 09/10/2019     Lab Results   Component Value Date    RDW 12.9 09/10/2019     Lab Results   Component Value Date     09/10/2019       Last Comprehensive Metabolic Panel:  Sodium   Date Value Ref Range Status   09/10/2019 139 133 - 144 mmol/L Final     Potassium   Date Value Ref Range Status   09/10/2019 3.4 3.4 -  5.3 mmol/L Final     Chloride   Date Value Ref Range Status   09/10/2019 108 94 - 109 mmol/L Final     Carbon Dioxide   Date Value Ref Range Status   09/10/2019 26 20 - 32 mmol/L Final     Anion Gap   Date Value Ref Range Status   09/10/2019 4 3 - 14 mmol/L Final     Glucose   Date Value Ref Range Status   09/10/2019 89 70 - 99 mg/dL Final     Urea Nitrogen   Date Value Ref Range Status   09/10/2019 18 7 - 30 mg/dL Final     Creatinine   Date Value Ref Range Status   09/10/2019 1.23 0.66 - 1.25 mg/dL Final     GFR Estimate   Date Value Ref Range Status   09/10/2019 67 >60 mL/min/[1.73_m2] Final     Comment:     Non  GFR Calc  Starting 12/18/2018, serum creatinine based estimated GFR (eGFR) will be   calculated using the Chronic Kidney Disease Epidemiology Collaboration   (CKD-EPI) equation.       Calcium   Date Value Ref Range Status   09/10/2019 9.1 8.5 - 10.1 mg/dL Final     Imaging:  Transthoracic echocardiogram (9/12/19):  Interpretation Summary  Severe left ventricular dilation is present (LVIDd 7.6cm). Severely (EF 10-  20%) reduced left ventricular function is present. Biplane LVEF of 18%. There  is no LV thrombus.  The right ventricle is normal size and global function.  An annuloplasty ring is noted in the mitral position. Mean gradient of 5mmHg  across the mitral valve at 80bpm.  IVC diameter <2.1 cm collapsing >50% with sniff suggests a normal RA pressure  of 3 mmHg.     Compared with the study from 2015, the LV function and mitral valve  hemodynamics are unchanged. The LV diameter has increased.  _____________________________________________________________________________  __        Left Ventricle  Severe left ventricular dilation is present. Eccentric hypertrophy. Diastolic  function not assessed due to presence of prosthetic mitral valve. Severely (EF  10-20%) reduced left ventricular function is present. Severe diffuse  hypokinesis is present. Akinesis in the lateral segments. There  is no  thrombus.     Right Ventricle  The right ventricle is normal size. Global right ventricular function is  normal. A pacemaker lead is noted in the right ventricle.     Atria  Severe left atrial enlargement is present. Mild right atrial enlargement is  present.     Mitral Valve  An annuloplasty ring is noted in the mitral position. Mean gradient of 5mmHg  across the mitral valve at 80bpm.        Aortic Valve  Aortic valve is normal in structure and function. The aortic valve is  tricuspid.     Tricuspid Valve  The tricuspid valve is normal. Trace tricuspid insufficiency is present. The  right ventricular systolic pressure is approximated at 19.5 mmHg plus the  right atrial pressure. Pulmonary artery systolic pressure is normal.     Pulmonic Valve  The pulmonic valve is normal. Trace pulmonic insufficiency is present.  Estimated diastolic PA pressure is 11 mmHg.     Vessels  Normal diameter aortic root and proximal ascending aorta. IVC diameter <2.1 cm  collapsing >50% with sniff suggests a normal RA pressure of 3 mmHg.     Pericardium  No pericardial effusion is present.        Compared to Previous Study  Compared with the study from 2015, the LV function and mitral valve  hemodynamics are unchanged. The LV diameter has increased.  _____________________________________________________________________________  __  MMode/2D Measurements & Calculations  RVDd: 4.1 cm  IVSd: 0.94 cm     LVIDd: 7.6 cm  LVIDs: 7.0 cm  LVPWd: 0.66 cm  FS: 8.9 %  LV mass(C)d: 286.3 grams  LV mass(C)dI: 135.4 grams/m2  Ao root diam: 3.3 cm  asc Aorta Diam: 3.1 cm  LVOT diam: 2.3 cm  LVOT area: 4.3 cm2  LA Volume (BP): 111.0 ml  LA Volume Index (BP): 52.6 ml/m2  RWT: 0.17           Doppler Measurements & Calculations  MV max P.0 mmHg  MV mean P.7 mmHg  MV V2 VTI: 38.5 cm  MV P1/2t max will: 166.0 cm/sec  MV P1/2t: 93.0 msec  MVA(P1/2t): 2.4 cm2  MV dec slope: 522.9 cm/sec2  PA V2 max: 90.1 cm/sec  PA max PG: 3.2 mmHg  PA acc time:  0.08 sec  TR max will: 219.4 cm/sec  TR max P.5 mmHg       Coronary angiogram (16):    POST PROCEDURE NOTES:  Operative Date: 2016  Postoperative Diagnosis: Mild coronary artery disease. Severe non-ischemic  cardiomyopathy.  Surgeon: Yoan Felix MD  Findings:  DOMINANCE:  Right  LMCA:  Patent, Short - Left Main  LAD:  30 % Lesion Distal - LAD  CIRCUMFLEX:  30 % Lesion Ostial - Circumflex  RCA:  20 % Lesion Proximal - RCA  Large - RCA  Estimated Blood Loss :  Negligible  Specimens/Disposition of :  None    Signed By Yoan Felix MD On 2016 09:44:36    Right heart cath (19):  AIR REST  RA  /  (8)  SV    06:22:23  RA    (8)      06:22:32  RV  35/5,  8      06:23:29  PW  20/  (25)  PV    06:24:18  PA  34/19  (25)  PA    06:25:06    CT Chest (9/10/19):  Study Result     EXAMINATION: CT CHEST ABDOMEN PELVIS W/O CONTRAST  9/10/2019 7:27 AM       CLINICAL HISTORY: potential VAD/Transplant candidate; Systolic heart  failure (H)     COMPARISON: None         PROCEDURE COMMENTS: CT of the chest, abdomen, and pelvis was performed  without intravenous and oral contrast. Axial MIP  images of the chest,  and coronal and sagittal reformatted images of the chest, abdomen, and  pelvis obtained.     FINDINGS:    Support devices: Left-sided implantable cardiac defibrillator device  is intact.     Chest: Heart size is mildly enlarged. No significant pericardial  effusion. Postsurgical changes of mitral valve replacement and left  atrial appendage clip placement. No mediastinal lymphadenopathy.     Tracheobronchial tree is patent. No focal consolidation, pleural  effusion, or pneumothorax. No suspicious pulmonary nodule.     Abdomen/pelvis:  Nonenhanced liver, gallbladder, spleen, pancreas, and adrenal glands  are unremarkable. Mild perinephric fat stranding without  hydronephrosis. No nephrolithiasis. Urinary bladder is unremarkable.  Centrally calcified prostate and right spermatic cord.     Esophagus  and stomach are unremarkable. Small and large bowels are not  dilated. Normal appendix. Several ovoid hyperdensity within the small  and large bowels, presumably related to prior contrast. Colonic  diverticulosis without diverticulitis. Few prominent right inguinal  lymph node with the largest one measuring up to 1.0 cm (series 4 image  [phone]), presumed to be reactive. No retroperitoneal or mesenteric  lymphadenopathy.     Soft tissue/bones: Mediastinal wires. Mild to moderate degenerative  changes of bilateral hips, right greater than left associated with  subchondral cysts formation and subchondral sclerosis. Mild  degenerative changes of pubic symphysis and bilateral sacroiliac  joints.                                                                      IMPRESSION:  1. Mild cardiomegaly. No pericardial effusion.  2. No acute finding in the chest, abdomen or pelvis.     I have personally reviewed the examination and initial interpretation  and I agree with the findings.     DENISE MERCHANT, DO       Again, thank you for allowing me to participate in the care of your patient.      Sincerely,    Mainor Lechuga MD

## 2019-09-12 NOTE — PATIENT INSTRUCTIONS
Cardiology Providers you saw during your visit:  Dr. Hdz    Medication changes:  1.  Take lasix 40 mg BID    Follow up:  1.  Follow up with Dr Hdz in 4 months in [place].  Our  will reach out to you with the time and date.      Labs:  Results for NATASHA HARVEY (MRN [id number]) as of 9/12/2019 09:38   Ref. Range 9/10/2019 06:49   Sodium Latest Ref Range: 133 - 144 mmol/L 139   Potassium Latest Ref Range: 3.4 - 5.3 mmol/L 3.4   Chloride Latest Ref Range: 94 - 109 mmol/L 108   Carbon Dioxide Latest Ref Range: 20 - 32 mmol/L 26   Urea Nitrogen Latest Ref Range: 7 - 30 mg/dL 18   Creatinine Latest Ref Range: 0.66 - 1.25 mg/dL 1.23   GFR Estimate Latest Ref Range: >60 mL/min/1.73_m2 67   GFR Estimate If Black Latest Ref Range: >60 mL/min/1.73_m2 78   Calcium Latest Ref Range: 8.5 - 10.1 mg/dL 9.1   Anion Gap Latest Ref Range: 3 - 14 mmol/L 4   Magnesium Latest Ref Range: 1.6 - 2.3 mg/dL 1.8   Phosphorus Latest Ref Range: 2.5 - 4.5 mg/dL 4.0   Albumin Latest Ref Range: 3.4 - 5.0 g/dL 4.0   Protein Total Latest Ref Range: 6.8 - 8.8 g/dL 8.1   Bilirubin Total Latest Ref Range: 0.2 - 1.3 mg/dL 0.6   Alkaline Phosphatase Latest Ref Range: 40 - 150 U/L 61   ALT Latest Ref Range: 0 - 70 U/L 25   AST Latest Ref Range: 0 - 45 U/L 17   Hemoglobin A1C Latest Ref Range: 0 - 5.6 % 5.8 (H)   Cholesterol Latest Ref Range: <200 mg/dL 205 (H)   CRP Inflammation Latest Ref Range: 0.0 - 8.0 mg/L <2.9   Ferritin Latest Ref Range: 26 - 388 ng/mL 95   HDL Cholesterol Latest Ref Range: >39 mg/dL 50   Hemoglobin Plasma Latest Ref Range: <30 mg/dL 30 (H)   Iron Latest Ref Range: 35 - 180 ug/dL 73   Iron Binding Cap Latest Ref Range: 240 - 430 ug/dL 338   Iron Saturation Index Latest Ref Range: 15 - 46 % 22   Lactate Dehydrogenase Latest Ref Range: 85 - 227 U/L 224   LDL Cholesterol Calculated Latest Ref Range: <100 mg/dL 133 (H)   Non HDL Cholesterol Latest Ref Range: <130 mg/dL 156 (H)   PSA Latest Ref Range: 0 - 4  ug/L 1.44   Transferrin Latest Ref Range: 210 - 360 mg/dL 267   Triglycerides Latest Ref Range: <150 mg/dL 112   TSH Latest Ref Range: 0.40 - 4.00 mU/L 3.49   Uric Acid Latest Ref Range: 3.5 - 7.2 mg/dL 8.0 (H)   Vitamin B12 Latest Ref Range: 193 - 986 pg/mL 453   Glucose Latest Ref Range: 70 - 99 mg/dL 89   WBC Latest Ref Range: 4.0 - 11.0 10e9/L 4.2   Hemoglobin Latest Ref Range: 13.3 - 17.7 g/dL 15.8   Hematocrit Latest Ref Range: 40.0 - 53.0 % 48.9   Platelet Count Latest Ref Range: 150 - 450 10e9/L 177   RBC Count Latest Ref Range: 4.4 - 5.9 10e12/L 4.98   MCV Latest Ref Range: 78 - 100 fl 98   MCH Latest Ref Range: 26.5 - 33.0 pg 31.7   MCHC Latest Ref Range: 31.5 - 36.5 g/dL 32.3   RDW Latest Ref Range: 10.0 - 15.0 % 12.9   Diff Method Unknown Automated Method   % Neutrophils Latest Units: % 46.4   % Lymphocytes Latest Units: % 37.0   % Monocytes Latest Units: % 10.8   % Eosinophils Latest Units: % 4.6   % Basophils Latest Units: % 0.7   % Immature Granulocytes Latest Units: % 0.5   Nucleated RBCs Latest Ref Range: 0 /100 0   Absolute Neutrophil Latest Ref Range: 1.6 - 8.3 10e9/L 1.9   Absolute Lymphocytes Latest Ref Range: 0.8 - 5.3 10e9/L 1.5   Absolute Monocytes Latest Ref Range: 0.0 - 1.3 10e9/L 0.5   Absolute Eosinophils Latest Ref Range: 0.0 - 0.7 10e9/L 0.2   Absolute Basophils Latest Ref Range: 0.0 - 0.2 10e9/L 0.0   Abs Immature Granulocytes Latest Ref Range: 0 - 0.4 10e9/L 0.0   Absolute Nucleated RBC Unknown 0.0   INR Latest Ref Range: 0.86 - 1.14  1.02   PTT Latest Ref Range: 22 - 37 sec 30   Lupus Result Latest Ref Range: NEG^Negative  Negative   ABO Unknown O   RH(D) Unknown Pos   Specimen Expires Unknown 09/13/2019   QUANTIFERON TB GOLD PLUS Unknown Rpt   Toxoplasma Antibody IgG Latest Ref Range: 0.0 - 7.1 IU/mL <3.0   Treponema Antibodies Latest Ref Range: NR^Nonreactive  Nonreactive   CMV Antibody IgG Latest Ref Range: 0.0 - 0.8 AI >8.0 (H)   EBV Capsid Antibody IgG Latest Ref Range: 0.0 -  "0.8 AI 6.4 (H)   HERPES SIMPLEX VIRUS TYPE 1 AND 2 IGG Unknown Rpt (A)   Hep B Surface Agn Latest Ref Range: NR^Nonreactive  Nonreactive   Hepatitis B Surface Antibody Latest Ref Range: <8.00 m[IU]/mL 0.86   Hepatitis B Core Beba Latest Ref Range: NR^Nonreactive  Nonreactive   Hepatitis C Antibody Latest Ref Range: NR^Nonreactive  Nonreactive   HIV Antigen Antibody Combo Pretransplant Latest Ref Range: NR^Nonreactive  Nonreactive   Varicella Zoster Virus Antibody IgG Latest Ref Range: 0.0 - 0.8 AI >8.0 (H)   Cardiolipin Antibody IgG Latest Ref Range: 0.0 - 19.9 GPL-U/mL <1.6   Cardiolipin Antibody IgM Latest Ref Range: 0.0 - 19.9 MPL-U/mL 0.7   CRP Cardiac Risk Latest Units: mg/L 2.0       Please call if you have :  1. Weight gain of more than 2 pounds in a day or 5 pounds in a week  2. Increased shortness of breath, swelling or bloating  3. Dizziness, lightheadedness   4. Any questions or concerns.       Follow the American Heart Association Diet and Lifestyle recommendations:  Limit saturated fat, trans fat, sodium, red meat, sweets and sugar-sweetened beverages. If you choose to eat red meat, compare labels and select the leanest cuts available.  Aim for at least 150 minutes of moderate physical activity or 75 minutes of vigorous physical activity - or an equal combination of both - each week.      During business hours: [phone], press option # 1 to be routed to the [employer] then option # 4 for \"To send a message to your care team\"      After hours, weekends or holidays: On Call Cardiologist- [phone]   option #4 and ask to speak to the on-call Cardiologist. Inform them you are a CORE/heart failure patient at the [facility].      Margret Self RN BSN  Cardiology Care Coordinator - Heart Failure/ [facility] Clinic  [facility]   Questions and [phone]   First press #1 for the [employer] and then press #4 for \"To send a message to your care team\"    "

## 2019-09-12 NOTE — LETTER
9/12/2019      RE: Tej Morfin  [address]  [place] [id number]       Dear Colleague,    Thank you for the opportunity to participate in the care of your patient, Tej Mofrin, at the [facility] at [facility]. Please see a copy of my visit note below.    [facility] CARDIOTHORACIC SURGERY CONSULT  Patient Name: Tej Morfin  Medical Record Number: [id number]  YOB: 1965  Room Number: Room/bed info not found  Referring Physician: Dr. 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  He later developed ventricular tachycardia and had single-chamber defibrillator placed.  He has had hospitalizations for heart failure with reduced ejection fraction and recent echocardiogram showed left ventricular end diastolic dimension of 7 cm.  He was referred to Dr. Hdz for evaluation of his heart failure.  He was recently started on Entresto.  His stamina has decreased over the last 6 months.  He is seen in clinic today with his wife present.  They are eager to proceed with heart failure evaluation for LVAD or transplant.    Assessment and Plan:  Tej Morfin is a 54 year old male with valvular cardiomyopathy.  1) Agree with advanced therapy work-up - no surgical contraindication to heart transplant or LVAD  2) Please call with questions or concerns.     Thank you for the opportunity to participate in the care of this patient.    Mainor Lechuga MD  Cardiothoracic Surgery  [phone]    Past Medical History:  Past Medical History:   Diagnosis Date     Chronic systolic heart failure (H) 2/7/2017     Gastroparesis      ICD (implantable cardioverter-defibrillator) in place     [employer]     Non-ischemic cardiomyopathy (H)      Paroxysmal ventricular tachycardia (H)  2017     S/P mitral valve replacement 2017     Tetrahydrocannabinol (THC) use disorder, mild, in controlled environment, abuse 2019    occasional edible THC. reportedly for sleep, anxiety     Tobacco abuse, episodic     occasional cigar - ~ 3x/wk       Past Surgical History:  Past Surgical History:   Procedure Laterality Date     HEMORRHOIDECTOMY       ICD implantation        R partial medial meniscectomy       REPAIR VALVE MITRAL          Family History:   Family History   Problem Relation Age of Onset     Coronary Artery Disease Mother      Kidney failure Mother      Cardiomyopathy Father          age 51       Social History:  Social History     Socioeconomic History     Marital status:      Spouse name: Not on file     Number of children: Not on file     Years of education: Not on file     Highest education level: Not on file   Occupational History     Not on file   Social Needs     Financial resource strain: Not on file     Food insecurity:     Worry: Not on file     Inability: Not on file     Transportation needs:     Medical: Not on file     Non-medical: Not on file   Tobacco Use     Smoking status: Former Smoker     Smokeless tobacco: Never Used   Substance and Sexual Activity     Alcohol use: No     Alcohol/week: 0.0 oz     Drug use: No     Sexual activity: Not on file   Lifestyle     Physical activity:     Days per week: Not on file     Minutes per session: Not on file     Stress: Not on file   Relationships     Social connections:     Talks on phone: Not on file     Gets together: Not on file     Attends Religion service: Not on file     Active member of club or organization: Not on file     Attends meetings of clubs or organizations: Not on file     Relationship status: Not on file     Intimate partner violence:     Fear of current or ex partner: Not on file     Emotionally abused: Not on file     Physically abused: Not on file     Forced sexual activity: Not on file   Other  Topics Concern     Not on file   Social History Narrative     Not on file       Allergies:     Allergies   Allergen Reactions     Lactose Cramps     Lactase Diarrhea       Medications:  Current Outpatient Medications   Medication     aspirin (ASA) 81 MG EC tablet     carvedilol (COREG) 12.5 MG tablet     cefdinir (OMNICEF) 300 MG capsule     diazepam (VALIUM) 10 MG tablet     fluticasone (FLONASE) 50 MCG/ACT nasal spray     furosemide (LASIX) 40 MG tablet     hydrALAZINE (APRESOLINE) 25 MG tablet     metoclopramide (REGLAN) 5 MG tablet     Nitroglycerin (NITROSTAT SL)     omeprazole (PRILOSEC) 20 MG DR capsule     sacubitril-valsartan (ENTRESTO)  MG per tablet     sotalol (BETAPACE) 80 MG tablet     No current facility-administered medications for this visit.      Facility-Administered Medications Ordered in Other Visits   Medication     sodium chloride (PF) 0.9% PF flush 10 mL       Review of Systems: A 10 point ROS was performed and is negative other than HPI.    Physical Exam:       Gen: NAD, resting comfortably in chair   Lungs: CTAB, non-labored breathing   CV: regular rhythm, normal rate   Abd: Soft, not tender, not distended   Ext: Motor, sensation, pulses intact   Neuro: AOx3    Labs:  Lab Results   Component Value Date    WBC 4.2 09/10/2019     Lab Results   Component Value Date    RBC 4.98 09/10/2019     Lab Results   Component Value Date    HGB 15.8 09/10/2019     Lab Results   Component Value Date    HCT 48.9 09/10/2019     No components found for: MCT  Lab Results   Component Value Date    MCV 98 09/10/2019     Lab Results   Component Value Date    MCH 31.7 09/10/2019     Lab Results   Component Value Date    MCHC 32.3 09/10/2019     Lab Results   Component Value Date    RDW 12.9 09/10/2019     Lab Results   Component Value Date     09/10/2019       Last Comprehensive Metabolic Panel:  Sodium   Date Value Ref Range Status   09/10/2019 139 133 - 144 mmol/L Final     Potassium   Date Value  Ref Range Status   09/10/2019 3.4 3.4 - 5.3 mmol/L Final     Chloride   Date Value Ref Range Status   09/10/2019 108 94 - 109 mmol/L Final     Carbon Dioxide   Date Value Ref Range Status   09/10/2019 26 20 - 32 mmol/L Final     Anion Gap   Date Value Ref Range Status   09/10/2019 4 3 - 14 mmol/L Final     Glucose   Date Value Ref Range Status   09/10/2019 89 70 - 99 mg/dL Final     Urea Nitrogen   Date Value Ref Range Status   09/10/2019 18 7 - 30 mg/dL Final     Creatinine   Date Value Ref Range Status   09/10/2019 1.23 0.66 - 1.25 mg/dL Final     GFR Estimate   Date Value Ref Range Status   09/10/2019 67 >60 mL/min/[1.73_m2] Final     Comment:     Non  GFR Calc  Starting 12/18/2018, serum creatinine based estimated GFR (eGFR) will be   calculated using the Chronic Kidney Disease Epidemiology Collaboration   (CKD-EPI) equation.       Calcium   Date Value Ref Range Status   09/10/2019 9.1 8.5 - 10.1 mg/dL Final     Imaging:  Transthoracic echocardiogram (9/12/19):  Interpretation Summary  Severe left ventricular dilation is present (LVIDd 7.6cm). Severely (EF 10-  20%) reduced left ventricular function is present. Biplane LVEF of 18%. There  is no LV thrombus.  The right ventricle is normal size and global function.  An annuloplasty ring is noted in the mitral position. Mean gradient of 5mmHg  across the mitral valve at 80bpm.  IVC diameter <2.1 cm collapsing >50% with sniff suggests a normal RA pressure  of 3 mmHg.     Compared with the study from 2015, the LV function and mitral valve  hemodynamics are unchanged. The LV diameter has increased.  _____________________________________________________________________________  __        Left Ventricle  Severe left ventricular dilation is present. Eccentric hypertrophy. Diastolic  function not assessed due to presence of prosthetic mitral valve. Severely (EF  10-20%) reduced left ventricular function is present. Severe diffuse  hypokinesis is present.  Akinesis in the lateral segments. There is no  thrombus.     Right Ventricle  The right ventricle is normal size. Global right ventricular function is  normal. A pacemaker lead is noted in the right ventricle.     Atria  Severe left atrial enlargement is present. Mild right atrial enlargement is  present.     Mitral Valve  An annuloplasty ring is noted in the mitral position. Mean gradient of 5mmHg  across the mitral valve at 80bpm.        Aortic Valve  Aortic valve is normal in structure and function. The aortic valve is  tricuspid.     Tricuspid Valve  The tricuspid valve is normal. Trace tricuspid insufficiency is present. The  right ventricular systolic pressure is approximated at 19.5 mmHg plus the  right atrial pressure. Pulmonary artery systolic pressure is normal.     Pulmonic Valve  The pulmonic valve is normal. Trace pulmonic insufficiency is present.  Estimated diastolic PA pressure is 11 mmHg.     Vessels  Normal diameter aortic root and proximal ascending aorta. IVC diameter <2.1 cm  collapsing >50% with sniff suggests a normal RA pressure of 3 mmHg.     Pericardium  No pericardial effusion is present.        Compared to Previous Study  Compared with the study from 2015, the LV function and mitral valve  hemodynamics are unchanged. The LV diameter has increased.  _____________________________________________________________________________  __  MMode/2D Measurements & Calculations  RVDd: 4.1 cm  IVSd: 0.94 cm     LVIDd: 7.6 cm  LVIDs: 7.0 cm  LVPWd: 0.66 cm  FS: 8.9 %  LV mass(C)d: 286.3 grams  LV mass(C)dI: 135.4 grams/m2  Ao root diam: 3.3 cm  asc Aorta Diam: 3.1 cm  LVOT diam: 2.3 cm  LVOT area: 4.3 cm2  LA Volume (BP): 111.0 ml  LA Volume Index (BP): 52.6 ml/m2  RWT: 0.17           Doppler Measurements & Calculations  MV max P.0 mmHg  MV mean P.7 mmHg  MV V2 VTI: 38.5 cm  MV P1/2t max will: 166.0 cm/sec  MV P1/2t: 93.0 msec  MVA(P1/2t): 2.4 cm2  MV dec slope: 522.9 cm/sec2  PA V2 max: 90.1  cm/sec  PA max PG: 3.2 mmHg  PA acc time: 0.08 sec  TR max will: 219.4 cm/sec  TR max P.5 mmHg       Coronary angiogram (16):    POST PROCEDURE NOTES:  Operative Date: 2016  Postoperative Diagnosis: Mild coronary artery disease. Severe non-ischemic  cardiomyopathy.  Surgeon: Yoan Felix MD  Findings:  DOMINANCE:  Right  LMCA:  Patent, Short - Left Main  LAD:  30 % Lesion Distal - LAD  CIRCUMFLEX:  30 % Lesion Ostial - Circumflex  RCA:  20 % Lesion Proximal - RCA  Large - RCA  Estimated Blood Loss :  Negligible  Specimens/Disposition of :  None    Signed By Yoan Felix MD On 2016 09:44:36    Right heart cath (19):  AIR REST  RA  /  (8)  SV    06:22:23  RA    (8)      06:22:32  RV  35/5,  8      06:23:29  PW  20/21  (25)  PV    06:24:18  PA  34/19  (25)  PA    06:25:06    CT Chest (9/10/19):  Study Result     EXAMINATION: CT CHEST ABDOMEN PELVIS W/O CONTRAST  9/10/2019 7:27 AM       CLINICAL HISTORY: potential VAD/Transplant candidate; Systolic heart  failure (H)     COMPARISON: None         PROCEDURE COMMENTS: CT of the chest, abdomen, and pelvis was performed  without intravenous and oral contrast. Axial MIP  images of the chest,  and coronal and sagittal reformatted images of the chest, abdomen, and  pelvis obtained.     FINDINGS:    Support devices: Left-sided implantable cardiac defibrillator device  is intact.     Chest: Heart size is mildly enlarged. No significant pericardial  effusion. Postsurgical changes of mitral valve replacement and left  atrial appendage clip placement. No mediastinal lymphadenopathy.     Tracheobronchial tree is patent. No focal consolidation, pleural  effusion, or pneumothorax. No suspicious pulmonary nodule.     Abdomen/pelvis:  Nonenhanced liver, gallbladder, spleen, pancreas, and adrenal glands  are unremarkable. Mild perinephric fat stranding without  hydronephrosis. No nephrolithiasis. Urinary bladder is unremarkable.  Centrally calcified prostate  and right spermatic cord.     Esophagus and stomach are unremarkable. Small and large bowels are not  dilated. Normal appendix. Several ovoid hyperdensity within the small  and large bowels, presumably related to prior contrast. Colonic  diverticulosis without diverticulitis. Few prominent right inguinal  lymph node with the largest one measuring up to 1.0 cm (series 4 image  624), presumed to be reactive. No retroperitoneal or mesenteric  lymphadenopathy.     Soft tissue/bones: Mediastinal wires. Mild to moderate degenerative  changes of bilateral hips, right greater than left associated with  subchondral cysts formation and subchondral sclerosis. Mild  degenerative changes of pubic symphysis and bilateral sacroiliac  joints.                                                                      IMPRESSION:  1. Mild cardiomegaly. No pericardial effusion.  2. No acute finding in the chest, abdomen or pelvis.     I have personally reviewed the examination and initial interpretation  and I agree with the findings.     DENISE MERCHANT, DO       Please do not hesitate to contact me if you have any questions/concerns.     Sincerely,     Mainor Lechuga MD

## 2019-09-12 NOTE — PROGRESS NOTES
Patient of Dr. Hdz seen in clinic for psychosocial assessment.   60 minutes spent with patient. 100% of visit consisted of counseling related to issues surrounding LVAD and Heart Transplant.    Psychosocial Assessment   Name: Tej Morfin     MRN:  [id number]        Patient Name / Age / Race: Tej Morfin 54 year old  and -American   Source of Information: Patient and his wife, Jessenia   : [facility]   Interview Date: September 11, 2019   Reason for Referral: Heart Transplant and Mechanical Circulatory Support     Current Living Situation   Location (City/State): [address]  [place] [id number]   With Whom: Living arrangements - the patient lives w/ his wife, Jessenia, and 14yr old dtr, Salma. Pt's stepdaughter, Monse, has also been living in the home but will be moving out in the next several weeks. .   Type of Home: [facility]-12 steps up to bedroom, but all needs could be met on first floor   Working Phone? Yes    Three Pronged Outlet? Yes    Distance from Hospital: 280mi/4hrs 15min   Need to Relocate Post Surgery? Yes    Discussed? Yes -pt's sister-in-law lives in [place] and pt and wife will be able to stay there. Pt also reports he has several friends in the area that he would also be options. Pt and wife are not stressed or concerned about temporary re-location requirement.      Vocational/Employment/Financial   Employment: Disabled-has been unable to work for 2yrs   Occupation: Pt had worked at [employer] as a  for [employer] [place].   Education: High School and college-bachelor's degree in Criminal Justice   ? No   Income: SSD and spouse's income   Insurance: Medicare and Private Insurance   Name of Private Insurance: BCBS   Medication Coverage: Yes    In current situation, pt. can afford medication and supply costs:  Yes    Other Financial Concerns/Issues: Finances are tight, but pt and wife report being able to  manage.      Family/Social Support   Marital Status:    Partner Name: Jessenia   Length of Time : 8yrs, but have been together for 16yrs   Partner s Employment: [employer]   Relationship: stable/supportive   Children: Pt and Jessenia have one child together, Salma, Pt has two children from previous relationship, Chriss (29yrs old) and Tammy (22 yrs old), and Jessenia has two dtrs from a previous relationship, Lexis (24yo) and Monse (19yrs old)   Parents:     Siblings: Pt has two siblings    Other Family or Friends Close by: Sister-in-law, Miladis-lives in [place]  Has multiple friends in the [facility]   Support System: available, helpful   Primary Support Person: Pt's wife Jessenia   Issues: Discussed caregiver support role and responsibilities including 30 day 24hr caregiver. Pt's wife had no concerns and feels capable of performing caregiver role and responsibilities.      Activities/Functional Ability   Current Level: independent with ADL's   Daily Activities: Household tasks, walks the dog around the block-has been limited by fatigue   Transportation: self      Medical Status   Patient s understanding of need for surgical intervention: Pt and wife expressed good understanding in need for advanced therapy. Pt had started LVAD/Heart Transplant evaluation in , but was doing great and did not complete it.    Advanced Directive? No    Details: Provided education and copy of HCD.    Adherence History: Pt reports that he has done well with adherence in going to appts, following recommendations and taking medications. Pt's wife concurs that pt has done well.    Ability to Adhere to Complex Medical Regime: Good     Behavioral   Chemical Dependency Issues: Yes : Pt endorses using THC (edibles) periodically to help with anxiety and sleep. Pt reports last use was the  weekend (2019).   Pt denies IV drug use or ETOH.   Smoker? No: Pt reports he quite in 2015  "  Psychiatric impairment: Yes : Pt is reporting a high level of anxiety that is impacting daily life. Pt reports that he is hypervigilant about any slight heart pain and being alone. Pt reports this is impacting his sleep and there are times when he wakes up in the middle of the night worried that he is having a heart attack. Pt's wife has noticed increased anxiety and pt being worried about being alone.   Pt reports his PCP has put him on diazepam, but he does not feel it is working. Pt is planning on returning to PCP to discuss switching to a different medication or increasing current medication. Writer also advised that he could also request psychiatry consult. Pt and wife appear to be on top of this issue and plan to f/u. Pt reports \"if I could get rid of this anxiety I would be fine\".    Coping Style/Strategies: Being around his family   Recent Legal History: No   Teaching Completed During Assessment Related To Transplant and Mechanical Circulatory Support: 1. Housing and relocation needs post surgery.  2. Caregiver needs post surgery.  3. Financial issues related to surgery.  4. Risks of alcohol use post surgery.  5. Common psychosocial stressors pre/post surgery.  6. Support group availability.   Psychosocial Risks Reviewed Related To Transplant and Mechanical Circulatory Support: 1. Increased stress related to your emotional, family, social, employment, or financial situation.  2. Affect on work and/or disability benefits.  3. Affect on future health and life insurance.  4. Outcome expectations may not be met.  5. Mental Health risks: anxiety, depression, PTSD, guilt, grief and chronic fatigue.     Observed Behavior   Well informed? Yes  Angry? No   Process info well? Yes  Hostile? No   Evasive? No Oriented X3? Yes    Cautious/Suspicious? No Motivated? Yes    Appropriate Behavior? Yes  Depressed? No   Appropriate Affect? Yes  Anxious? Yes    Interview Observations: Pt and wife are very engaged and eagerly " participated in assessment.      Selection Criteria Met   Plan for Support Yes    Chemical Dependence No: Pending tox screen   Smoking Yes    Mental Health Yes    Adequate Finances Yes      Risk Issues:    Tox screen not back and pt reports THC use in w/in last two months.     Final Evaluation/Assessment:     Pt and wife were pleasant and engaged throughout assessment. Pt and wife asked appropriate questions and processed information well.     Pt has an established caregiver plan in that his wife will be the primary caregiver. They have a good support network in the [place] and temporary relocation is not a concern for them.     Pt reports he occasionally does use THC, in the form of edibles, to help with his anxiety and sleep. Pt is well aware that he should not be using THC and this problematic if he would go forward with heart transplantation. At time of assessment tox screen was not yet in. Pt denies ETOH abuse, IV drug use, Vaping or smokeless tobacco.     Pt has a hx of smoking, but reports that he quit in December 2015. Awaiting test results.     Pt expresses concerns regarding his anxiety and feels it has gotten worse as his tolerance for activity has decreased steadily over the past several months. Pt reports he has been self medicating with THC periodically; last use was July 4th weekend (2019). Pt is on an antianxiety medication, but does not feel that is working. Pt and wife plan to schedule an appt w/ PCP to discuss. Writer also suggested to them that they could also have their PCP refer pt to a psychiatric provider for medication management. Pt and wife appear to be motivated as they recognize that pt's anxiety is beginning to affect pt's quality of life.     Pt has adequate insurance and finances.    From a psychosocial perspective, pt appears to be a good candidate for LVAD or Heart Transplant as long as drug screening is negative.       Patient understands risks and benefits of Heart Transplant  and Mechanical Circulatory Support: Yes     Recommendation:    Good-pending drug test results     Signature: JOHN Andrade     Title: Licensed Independent Clinical                Interview Date: September 11, 2019

## 2019-09-13 PROBLEM — Z76.82 HEART TRANSPLANT CANDIDATE: Status: ACTIVE | Noted: 2019-09-13

## 2019-09-13 LAB
SA1 CELL: NORMAL
SA1 COMMENTS: NORMAL
SA1 HI RISK ABY: NORMAL
SA1 MOD RISK ABY: NORMAL
SA1 TEST METHOD: NORMAL
SA2 CELL: NORMAL
SA2 COMMENTS: NORMAL
SA2 HI RISK ABY UA: NORMAL
SA2 MOD RISK ABY: NORMAL
SA2 TEST METHOD: NORMAL
UNACCEPTABLE ANTIGEN: NORMAL
UNOS CPRA: 0

## 2019-09-13 NOTE — PROGRESS NOTES
Transplant Teaching:  Tej has been completing an outpt hybrid evaluation for transplant/VAD, and today I did transplant teaching with him and his wife, Jessenia.   He was given a copy of the UNOS brochure on Multiple Waitlisting, the  Heart Transplant booklet including current program statistics as published in July 2019, and a copy of the Transplant Handbook.   He was previously given the tutorial on [email or web address].   In addition, we reviewed the Program's Chemical Cessation Policy, and he agreed to random testing.   He has smoked an occasional cigar in the past, and also used recreational marijuana on occasion for sleep and anxiety.      We reviewed the candidate selection process, insurance prior authorization, UNOS listing and priority categories, the waiting period, donor issues, and the possibility of a CDC Increased Risk donor.  We also discussed the pre-, roge-, and post-op periods, the post-hospital clinic and biopsy routines, and the major potential risks of transplant including rejection, transplant vasculopathy, infection, and rejection.   Throughout the session, the Chappells were attentive, took written notes, and asked appropriate questions.  They are aware that Tej might not be sick enough at this point to be listed for transplant, but they should continue with regular follow-up, and call the Transplant Office if he has a worsening of his condition.

## 2019-09-16 LAB
COTININE SERPL-MCNC: 2 NG/ML
NICOTINE SERPL-MCNC: NORMAL NG/ML

## 2019-09-16 NOTE — PROGRESS NOTES
[facility] CARDIOTHORACIC SURGERY CONSULT  Patient Name: Tej Morfin  Medical Record Number: [id number]  YOB: 1965  Room Number: Room/bed info not found  Referring Physician: Dr. 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  He later developed ventricular tachycardia and had single-chamber defibrillator placed.  He has had hospitalizations for heart failure with reduced ejection fraction and recent echocardiogram showed left ventricular end diastolic dimension of 7 cm.  He was referred to Dr. Hdz for evaluation of his heart failure.  He was recently started on Entresto.  His stamina has decreased over the last 6 months.  He is seen in clinic today with his wife present.  They are eager to proceed with heart failure evaluation for LVAD or transplant.    Assessment and Plan:  Tej Morfin is a 54 year old male with valvular cardiomyopathy.  1) Agree with advanced therapy work-up - no surgical contraindication to heart transplant or LVAD  2) Please call with questions or concerns.     Thank you for the opportunity to participate in the care of this patient.    Mainor Lechuga MD  Cardiothoracic Surgery  [phone]    Past Medical History:  Past Medical History:   Diagnosis Date     Chronic systolic heart failure (H) 2/7/2017     Gastroparesis      ICD (implantable cardioverter-defibrillator) in place     [employer]     Non-ischemic cardiomyopathy (H)      Paroxysmal ventricular tachycardia (H) 2/7/2017     S/P mitral valve replacement 2/7/2017     Tetrahydrocannabinol (THC) use disorder, mild, in controlled environment, abuse 09/12/2019    occasional edible THC. reportedly for sleep, anxiety     Tobacco abuse, episodic     occasional cigar - ~ 3x/wk       Past Surgical History:  Past Surgical History:   Procedure Laterality  Date     HEMORRHOIDECTOMY       ICD implantation        R partial medial meniscectomy       REPAIR VALVE MITRAL          Family History:   Family History   Problem Relation Age of Onset     Coronary Artery Disease Mother      Kidney failure Mother      Cardiomyopathy Father          age 51       Social History:  Social History     Socioeconomic History     Marital status:      Spouse name: Not on file     Number of children: Not on file     Years of education: Not on file     Highest education level: Not on file   Occupational History     Not on file   Social Needs     Financial resource strain: Not on file     Food insecurity:     Worry: Not on file     Inability: Not on file     Transportation needs:     Medical: Not on file     Non-medical: Not on file   Tobacco Use     Smoking status: Former Smoker     Smokeless tobacco: Never Used   Substance and Sexual Activity     Alcohol use: No     Alcohol/week: 0.0 oz     Drug use: No     Sexual activity: Not on file   Lifestyle     Physical activity:     Days per week: Not on file     Minutes per session: Not on file     Stress: Not on file   Relationships     Social connections:     Talks on phone: Not on file     Gets together: Not on file     Attends Mosque service: Not on file     Active member of club or organization: Not on file     Attends meetings of clubs or organizations: Not on file     Relationship status: Not on file     Intimate partner violence:     Fear of current or ex partner: Not on file     Emotionally abused: Not on file     Physically abused: Not on file     Forced sexual activity: Not on file   Other Topics Concern     Not on file   Social History Narrative     Not on file       Allergies:     Allergies   Allergen Reactions     Lactose Cramps     Lactase Diarrhea       Medications:  Current Outpatient Medications   Medication     aspirin (ASA) 81 MG EC tablet     carvedilol (COREG) 12.5 MG tablet     cefdinir (OMNICEF) 300 MG capsule      diazepam (VALIUM) 10 MG tablet     fluticasone (FLONASE) 50 MCG/ACT nasal spray     furosemide (LASIX) 40 MG tablet     hydrALAZINE (APRESOLINE) 25 MG tablet     metoclopramide (REGLAN) 5 MG tablet     Nitroglycerin (NITROSTAT SL)     omeprazole (PRILOSEC) 20 MG DR capsule     sacubitril-valsartan (ENTRESTO)  MG per tablet     sotalol (BETAPACE) 80 MG tablet     No current facility-administered medications for this visit.      Facility-Administered Medications Ordered in Other Visits   Medication     sodium chloride (PF) 0.9% PF flush 10 mL       Review of Systems: A 10 point ROS was performed and is negative other than HPI.    Physical Exam:       Gen: NAD, resting comfortably in chair   Lungs: CTAB, non-labored breathing   CV: regular rhythm, normal rate   Abd: Soft, not tender, not distended   Ext: Motor, sensation, pulses intact   Neuro: AOx3    Labs:  Lab Results   Component Value Date    WBC 4.2 09/10/2019     Lab Results   Component Value Date    RBC 4.98 09/10/2019     Lab Results   Component Value Date    HGB 15.8 09/10/2019     Lab Results   Component Value Date    HCT 48.9 09/10/2019     No components found for: MCT  Lab Results   Component Value Date    MCV 98 09/10/2019     Lab Results   Component Value Date    MCH 31.7 09/10/2019     Lab Results   Component Value Date    MCHC 32.3 09/10/2019     Lab Results   Component Value Date    RDW 12.9 09/10/2019     Lab Results   Component Value Date     09/10/2019       Last Comprehensive Metabolic Panel:  Sodium   Date Value Ref Range Status   09/10/2019 139 133 - 144 mmol/L Final     Potassium   Date Value Ref Range Status   09/10/2019 3.4 3.4 - 5.3 mmol/L Final     Chloride   Date Value Ref Range Status   09/10/2019 108 94 - 109 mmol/L Final     Carbon Dioxide   Date Value Ref Range Status   09/10/2019 26 20 - 32 mmol/L Final     Anion Gap   Date Value Ref Range Status   09/10/2019 4 3 - 14 mmol/L Final     Glucose   Date Value Ref Range  Status   09/10/2019 89 70 - 99 mg/dL Final     Urea Nitrogen   Date Value Ref Range Status   09/10/2019 18 7 - 30 mg/dL Final     Creatinine   Date Value Ref Range Status   09/10/2019 1.23 0.66 - 1.25 mg/dL Final     GFR Estimate   Date Value Ref Range Status   09/10/2019 67 >60 mL/min/[1.73_m2] Final     Comment:     Non  GFR Calc  Starting 12/18/2018, serum creatinine based estimated GFR (eGFR) will be   calculated using the Chronic Kidney Disease Epidemiology Collaboration   (CKD-EPI) equation.       Calcium   Date Value Ref Range Status   09/10/2019 9.1 8.5 - 10.1 mg/dL Final     Imaging:  Transthoracic echocardiogram (9/12/19):  Interpretation Summary  Severe left ventricular dilation is present (LVIDd 7.6cm). Severely (EF 10-  20%) reduced left ventricular function is present. Biplane LVEF of 18%. There  is no LV thrombus.  The right ventricle is normal size and global function.  An annuloplasty ring is noted in the mitral position. Mean gradient of 5mmHg  across the mitral valve at 80bpm.  IVC diameter <2.1 cm collapsing >50% with sniff suggests a normal RA pressure  of 3 mmHg.     Compared with the study from 2015, the LV function and mitral valve  hemodynamics are unchanged. The LV diameter has increased.  _____________________________________________________________________________  __        Left Ventricle  Severe left ventricular dilation is present. Eccentric hypertrophy. Diastolic  function not assessed due to presence of prosthetic mitral valve. Severely (EF  10-20%) reduced left ventricular function is present. Severe diffuse  hypokinesis is present. Akinesis in the lateral segments. There is no  thrombus.     Right Ventricle  The right ventricle is normal size. Global right ventricular function is  normal. A pacemaker lead is noted in the right ventricle.     Atria  Severe left atrial enlargement is present. Mild right atrial enlargement is  present.     Mitral Valve  An annuloplasty  ring is noted in the mitral position. Mean gradient of 5mmHg  across the mitral valve at 80bpm.        Aortic Valve  Aortic valve is normal in structure and function. The aortic valve is  tricuspid.     Tricuspid Valve  The tricuspid valve is normal. Trace tricuspid insufficiency is present. The  right ventricular systolic pressure is approximated at 19.5 mmHg plus the  right atrial pressure. Pulmonary artery systolic pressure is normal.     Pulmonic Valve  The pulmonic valve is normal. Trace pulmonic insufficiency is present.  Estimated diastolic PA pressure is 11 mmHg.     Vessels  Normal diameter aortic root and proximal ascending aorta. IVC diameter <2.1 cm  collapsing >50% with sniff suggests a normal RA pressure of 3 mmHg.     Pericardium  No pericardial effusion is present.        Compared to Previous Study  Compared with the study from 2015, the LV function and mitral valve  hemodynamics are unchanged. The LV diameter has increased.  _____________________________________________________________________________  __  MMode/2D Measurements & Calculations  RVDd: 4.1 cm  IVSd: 0.94 cm     LVIDd: 7.6 cm  LVIDs: 7.0 cm  LVPWd: 0.66 cm  FS: 8.9 %  LV mass(C)d: 286.3 grams  LV mass(C)dI: 135.4 grams/m2  Ao root diam: 3.3 cm  asc Aorta Diam: 3.1 cm  LVOT diam: 2.3 cm  LVOT area: 4.3 cm2  LA Volume (BP): 111.0 ml  LA Volume Index (BP): 52.6 ml/m2  RWT: 0.17           Doppler Measurements & Calculations  MV max P.0 mmHg  MV mean P.7 mmHg  MV V2 VTI: 38.5 cm  MV P1/2t max will: 166.0 cm/sec  MV P1/2t: 93.0 msec  MVA(P1/2t): 2.4 cm2  MV dec slope: 522.9 cm/sec2  PA V2 max: 90.1 cm/sec  PA max PG: 3.2 mmHg  PA acc time: 0.08 sec  TR max will: 219.4 cm/sec  TR max P.5 mmHg       Coronary angiogram (16):    POST PROCEDURE NOTES:  Operative Date: 2016  Postoperative Diagnosis: Mild coronary artery disease. Severe non-ischemic  cardiomyopathy.  Surgeon: Elvira  Yoan COX  Findings:  DOMINANCE:  Right  LMCA:  Patent, Short - Left Main  LAD:  30 % Lesion Distal - LAD  CIRCUMFLEX:  30 % Lesion Ostial - Circumflex  RCA:  20 % Lesion Proximal - RCA  Large - RCA  Estimated Blood Loss :  Negligible  Specimens/Disposition of :  None    Signed By Yoan Felix MD On 8/26/2016 09:44:36    Right heart cath (6/19/19):  AIR REST  RA  8/12  (8)  SV    06:22:23  RA  9/8  (8)      06:22:32  RV  35/5,  8      06:23:29  PW  20/21  (25)  PV    06:24:18  PA  34/19  (25)  PA    06:25:06    CT Chest (9/10/19):  Study Result     EXAMINATION: CT CHEST ABDOMEN PELVIS W/O CONTRAST  9/10/2019 7:27 AM       CLINICAL HISTORY: potential VAD/Transplant candidate; Systolic heart  failure (H)     COMPARISON: None         PROCEDURE COMMENTS: CT of the chest, abdomen, and pelvis was performed  without intravenous and oral contrast. Axial MIP  images of the chest,  and coronal and sagittal reformatted images of the chest, abdomen, and  pelvis obtained.     FINDINGS:    Support devices: Left-sided implantable cardiac defibrillator device  is intact.     Chest: Heart size is mildly enlarged. No significant pericardial  effusion. Postsurgical changes of mitral valve replacement and left  atrial appendage clip placement. No mediastinal lymphadenopathy.     Tracheobronchial tree is patent. No focal consolidation, pleural  effusion, or pneumothorax. No suspicious pulmonary nodule.     Abdomen/pelvis:  Nonenhanced liver, gallbladder, spleen, pancreas, and adrenal glands  are unremarkable. Mild perinephric fat stranding without  hydronephrosis. No nephrolithiasis. Urinary bladder is unremarkable.  Centrally calcified prostate and right spermatic cord.     Esophagus and stomach are unremarkable. Small and large bowels are not  dilated. Normal appendix. Several ovoid hyperdensity within the small  and large bowels, presumably related to prior contrast. Colonic  diverticulosis without diverticulitis. Few prominent right  inguinal  lymph node with the largest one measuring up to 1.0 cm (series 4 image  [phone]), presumed to be reactive. No retroperitoneal or mesenteric  lymphadenopathy.     Soft tissue/bones: Mediastinal wires. Mild to moderate degenerative  changes of bilateral hips, right greater than left associated with  subchondral cysts formation and subchondral sclerosis. Mild  degenerative changes of pubic symphysis and bilateral sacroiliac  joints.                                                                      IMPRESSION:  1. Mild cardiomegaly. No pericardial effusion.  2. No acute finding in the chest, abdomen or pelvis.     I have personally reviewed the examination and initial interpretation  and I agree with the findings.     DENISE MERCHANT, DO

## 2019-09-18 LAB
LAB SCANNED RESULT: ABNORMAL
LAB SCANNED RESULT: NORMAL

## 2019-09-20 ENCOUNTER — CARE COORDINATION (OUTPATIENT)
Dept: CARDIOLOGY | Facility: CLINIC | Age: 54
End: 2019-09-20

## 2019-09-20 DIAGNOSIS — I50.22 CHRONIC SYSTOLIC CONGESTIVE HEART FAILURE (H): Primary | ICD-10-CM

## 2019-10-01 NOTE — PROGRESS NOTES
NAME: Tej Morfin  MRN: [id number]  : 1965  FINNEGAN: 2019  [facility]  [address]  [address] [phone]    NEUROPSYCHOLOGICAL EVALUATION    RELEVANT HISTORY AND REASON FOR REFERRAL    This is a report of neuropsychological consultation regarding Tej Morfin, a 54-year-old, right-handed, -American man with 16 years of formal education. Medical history includes cardiomegaly, congestive heart failure, severe mitral regurgitation, s/p mitral valve replacement, ventricular tachycardia, and s/p defibrillator placement. He is being considered for heart transplant or possibly LVAD as a bridge to transplant. He is referred for neuropsychological evaluation of brain function by Dr. Dunia Hdz.     Mr. Morfin is known to this service. He was first evaluated by Dr. Mei Zamora in 2015. Please see her report dated 2015 for additional background not recapitulated here. In , the test results were seen as mildly abnormal. There were inconsistent problems with learning/memory, attentional/executive control, and fine-motor speed. Limited word reading and pronunciation skills were suggestive of neurodevelopmental limitations on reading abilities. Dr. Zamora felt there were no strong, consistent indicators of focal brain disease. On the mental and behavioral health side, there were suggestions of reduced self-awareness or defensiveness, as well as some social withdrawal, on his MMPI-2-RF profile. However, there were no overt indications of significant psychopathology. There were no indications of chemical dependency, at that time or historically, though he had had some scattered use of various substances in the past and marijuana use a few times per month. He was advised to discontinue marijuana use.    In today s interview, Mr. Morfin demonstrates a good understanding of his medical concerns requiring  consideration of transplantation and LVAD. He has an appropriate understanding of where he stands in the workup process. Treatment goals include having more energy, hopefully getting back to work, and being able to spend more time with his daughters and grandchildren. He understands that he faces risks including stroke and possibly death. He is not aware of specific long-term risks, though he does note that some medical providers have told him there might be some issues with his kidney functioning and possible needs to change medications to avoid adverse interactions.    His wife daughter, and other members of his immediate family provide excellent support. His wife, Jessenia, will be able to stay with him to provide postsurgical aftercare as needed.    He describes anxiety that interferes with sleep, as well as ruminative worry in general. He tends to have problems with insomnia because he worries his ICD might go off. There have been issues in the past about shortness of breath leading to anxiety when lying down. He says he is open to considering talk therapy as well as taking psychiatric medications, though he does have a general wish to minimize his medication load. He has never participated in psychotherapy previously. He has not had a psychiatric hospitalization. He has a prescription for diazepam to help with anxiety and sleep, but he does not use it every night.    Historically, marijuana had been helpful for sleep. He says his last use of a cannabis product was using edible cannabis to help with sleep, while he was traveling out of state to go to a . He did not have his diazepam with him, and a local pharmacy wanted $500 to refill his prescription remotely. He says he does not use any cannabis products on a regular basis anymore. He does not feel compelled to use or any craving to use, and he denies any history of problematic use or dependence. He denies any alcohol use currently and denies any history  of alcohol problems. He quit smoking cigarettes in 2015, after receiving his initial cardiac diagnoses.    He denies any legal issues, gambling problems, or addictive/compulsive behavioral problems.    He does not perceive any cognitive problems in his daily life. He lives at home with his wife, 14-year-old daughter, and 20-year-old stepdaughter (she will be moving out next week). His wife has always done the financial management at home, but there has been no change in his level of involvement. His wife also sorts out his medications for him, but he takes them independently. He does not feel comfortable driving these days, for concern about the prospect of sudden medical events (e.g., his ICD firing). There has been no cognitively-based interference with cooking/housekeeping or general basic personal care.    Academic history includes earning a college degree on a football scholarship. He previously worked as a  in a [employer] and later in a [employer] and [employer] center. 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 He wants to get back to work at some point. Work is a main source of getting out and socializing, which see finds beneficial for his anxiety.    Neurologic history is negative for concerns about stroke, seizure, TBI, tremor/parkinsonism, migraines, unilateral weakness or numbness, or balance or coordination difficulties. There have been no declines in his senses of smell or taste (they have improved since he quit smoking). He does not have any issues with hearing. He feels like his visual acuity is changing, but there have been no obvious visual abnormalities. He does have some headaches and issues related so neck stiffness, seen as related to sleep positioning (cannot lie flat).     BEHAVIORAL OBSERVATIONS    Mr. Morfin was polite and cooperative with the evaluation. Mood was euthymic to  neutral, with some slight underlying tension notable during the testing session. He was alert and not somnolent. He was open candid in interview. Immediate attentional control was appropriate. There were some articulation and pronunciation errors in open conversation, suggestive of possible developmental limitations on vocabulary knowledge. Speech was normal in terms of fluency, prosody, and speed. Comprehension was full and immediate. Effort and persistence were good throughout the evaluation. The test results are seen as valid estimations of his cognitive functioning.    MEASURES ADMINISTERED    The following measures were administered by a trained psychometrist, under my direct supervision:    Orientation: Time, Place, Basic Personal Information, Recent [place] Presidents; Wide Range Achievement Test 4: Word Reading; Wechsler Abbreviated Scales of Intelligence-2: Vocabulary, Block Design; Wechsler Adult Intelligence Scale-IV: Coding; Controlled Oral Word Association Test; Shiloh Naming Test; Theron Visual Acuity Screen; Finger Tapping; Trail Making Test; Test of Sustained Attention & Tracking; Tommy Auditory Verbal Learning Test; Wechsler Memory Scale-IV: Logical Memory, Visual Reproduction; Robert Depression Inventory-II; State-Trait Anxiety Inventory; [place] Multiphasic Personality Joueoeozm-5-VX.    RESULTS AND INTERPRETATION    Orientation: Orientation was normal for time, place, basic personal information, and basic cultural information.    Intellectual Abilities: Demonstrating vocabulary knowledge was average. Nonverbal reasoning through hands-on object assembly was low average.     Language & Related Skills: Basic word reading and pronunciation skills were mildly impaired (1st %ile, 3rd grade equivalent). Confrontation naming was lower average to borderline. Associative verbal fluency was average.    Visual Perceptual & Constructional Skills: Binocular, corrected, near-point visual acuity was 20/70 on  Theron screening. None of his confrontation naming errors could be attributed to misperceptions. Drawings of simple shapes and figures were a little rushed but reasonably organized and immediately recognizable.    Motor Skills: Speeded finger tapping performances were below average to borderline and bilaterally equivalent.    Mental Speed & Executive Functioning: As noted above, associative verbal fluency was average. Performances were average to low average across a variety of other brief verbal tasks requiring executive control over attention and working memory. Graphomotor clerical speed was average. Visual scanning and graphomotor sequencing was average under simple conditions and high average under more complex executive demands to control divided attention.    Learning & Anterograde Memory: Learning an extensive word list over repeated readings was high average. Free recall of the list was high average after a brief delay with active interference. List recall remained high average after a 30-minute delay, and delayed recognition of the list was normal. Immediate verbal memory for short stories was low average, delayed recall of the stories was commensurately low average, and delayed recognition of story details was average. Immediate visual memory for simple designs was average, delayed free recall of the designs was average, and recognition of the designs was perfect.    Emotional, Behavioral, & Personality Functioning: On brief self-report inventories, he endorsed minimally present symptoms related to depression (BDI-II = 3) and average-range experiences with anxiety in his daily life (Trait Anxiety = 39th percentile) and for his in-the-moment anxiety during the testing session (State Anxiety = 52nd percentile).    His response set to a longer questionnaire for objective assessment of personality functioning and emotional coping patterns (MMPI-2-RF) was reasonably interpretable, although there were  indications of mild tendencies for defensiveness or reduced insight into mental health concerns. This was the case in 2015 as well, and most likely represents some form of impression management, such that he would want to  look good  in order to receive desired medical services. He is very open in interview about the presence of significant anxiety that interferes with activities like sleep and reduces his willingness to drive. In contrast, on the MMPI-2-RF, measures related to anxiety and worry, were all at or near their lowest possible levels. He did endorse high degree of gastrointestinal complaints and tendencies for excessive behavioral activation. It is possible that these reflect some manifestations of anxious stress or agitation.    IMPRESSIONS AND RECOMMENDATIONS    The neuropsychological results are mildly abnormal but do not suggest the presence of the serious cognitive disorder that would interfere with LVAD/transplant candidacy.    Compared to testing on the same or similar measures in 2015, all of his performances are stable or improved. In fact, several areas that were abnormally low last time around are average to high average this time. 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 Otherwise, his cognitive functioning is largely intact and in the average range. I have no concerns about his ability to understand his medical issues and to make medical decisions in his own best interest.    He reports excellent psychosocial support from family members. He is receiving clinical attention to anxiety, and he is open to adding psychotherapeutic approaches to current pharmacologic approaches. This would certainly be appropriate.    He has some chronic sleep difficulties at least partially based and anxiety. He is so occasionally use marijuana to help with sleep, but he says he is no longer doing so. He now has a benzodiazepine prescription that he uses less than  nightly. At this time, there are no indications for concern about substance abuse. I presume the transplant team will tell him that he needs to be fully abstinent from cannabis products, and he and his family member should be clearly informed about any consequences for noncompliance in these regards.    Overall, he continues to be an appropriate candidate for LVAD/transplant consideration, from a neuropsychological perspective. I would be happy to see him for reevaluation against this up-to-date baseline, whenever clinically indicated.    Emmett Jimenez, PhD, LP, ABPP-CN  Board Certified in Clinical Neuropsychology  Licensed Psychologist [id number]     Department of Rehabilitation Medicine  Division of Adult Neuropsychology  [facility]      Time spent: One hour neurobehavioral status exam including interview, clinical assessment by licensed and board-certified neuropsychologist (CPT 91292). One hour neuropsychological testing evaluation by licensed and board-certified neuropsychologist, including integration of patient data, interpretation of standardized test results and clinical data, clinical decision-making, treatment planning, report, and interactive feedback to the patient, first hour (CPT 64210). One hour of neuropsychological testing evaluation by licensed and board-certified neuropsychologist, including integration of patient data, interpretation of standardized test results and clinical data, clinical decision-making, treatment planning, report, and interactive feedback to the patient, subsequent hours (CPT 95552). 30 minutes of psychological and neuropsychological test administration and scoring by technician, first 30 minutes (CPT 34847). 135 minutes psychological or neuropsychological test administration and scoring by technician, subsequent 30-minute intervals (CPT 19706). Diagnoses: F54, I50.22, R41.9

## 2019-10-01 NOTE — PROGRESS NOTES
Name: Tej Morfin MRN: [id number]  :   FINNEGAN: 2019  Staff: STEPHANIE Tech: NN Age: 54  Sex: M Hand: RH Educ: 16  Vision: 20/70 ?with correction / ?without correction    ORIENTATION     Time  -0     Place       Personal info          Presidents     WRAT4   SS %ile Grade Equiv.     Word Reading  64 1 3.2    WASI-2  Raw SSa     Vocabulary 29 43     Block Design 39 50    WAIS-IV  Raw SSa     Coding 62 9    COWAT (FAS)     Raw: 43  SS: 11 T: 52    BOSTON NAMING TEST     Raw: 26  SS: 6 T: 35    FINGER TAPPING   Avg  SS T     RH  40.67 6 34     LH 39.67 7 37    TRAILS Raw  Err SS T     A 37  0 8 46     B 59  0 11 59    TSAT     Time: 93 z: 0.06     Errors: 6 z: -1.48    AVLT (<55, [place]es metanorms)     Trial 1 2 3 4 5 B 6 30              7 11 10 13 14 4 12 13      Raw M(SD)     Trial 5    14 12.1(2.1)     Short Delay Recall  12 9.9(2.8)     30  Recall   13 9.9(3.2)     30  Recognition Hits/FPs  13/ 13.9(1.4)    WMS-IV  Raw SS / %ile     LM I  8 7     LM II  11 6     LM Recog. 23 26th-50th     VR I  36 10     VR II  24 10     VR Recog. 7 >75th    BDI-II     Raw:  3 Interpretation: Minimal    STAI     S: 33; %ile: 52 Interpretation: Average     T: 30; %ile: 39 Interpretation: Average    MMPI-2-RF   RCd: 46 VRIN-r:  34   RC1: 51 MANJINDER-r:  50   RC2: 46 F-r:  47   RC3: 43 Fp-r:  42   RC4: 43 Fs:  42   RC6: 43 FBS-r:  70   RC7: 38 RBS:  54   RC8: 52 L-r:  62   RC9: 46 K-r:  62

## 2019-11-29 DIAGNOSIS — I50.31 ACUTE DIASTOLIC CONGESTIVE HEART FAILURE (H): ICD-10-CM

## 2020-01-02 RX ORDER — POTASSIUM CHLORIDE 1500 MG/1
2 TABLET, EXTENDED RELEASE ORAL DAILY
COMMUNITY
Start: 2019-11-27 | End: 2021-01-12

## 2020-01-02 RX ORDER — OMEPRAZOLE 40 MG/1
1 CAPSULE, DELAYED RELEASE ORAL DAILY
COMMUNITY
Start: 2019-11-27 | End: 2021-01-12

## 2020-01-08 ENCOUNTER — OFFICE VISIT (OUTPATIENT)
Dept: CARDIOLOGY | Facility: CLINIC | Age: 55
End: 2020-01-08
Attending: INTERNAL MEDICINE
Payer: MEDICARE

## 2020-01-08 VITALS
HEIGHT: 70 IN | HEART RATE: 79 BPM | WEIGHT: 213.2 LBS | TEMPERATURE: 98 F | DIASTOLIC BLOOD PRESSURE: 89 MMHG | BODY MASS INDEX: 30.52 KG/M2 | SYSTOLIC BLOOD PRESSURE: 118 MMHG | OXYGEN SATURATION: 100 %

## 2020-01-08 DIAGNOSIS — I50.22 CHRONIC SYSTOLIC HEART FAILURE (H): ICD-10-CM

## 2020-01-08 PROCEDURE — 99214 OFFICE O/P EST MOD 30 MIN: CPT | Mod: ZP | Performed by: INTERNAL MEDICINE

## 2020-01-08 ASSESSMENT — PAIN SCALES - GENERAL: PAINLEVEL: NO PAIN (0)

## 2020-01-08 ASSESSMENT — MIFFLIN-ST. JEOR: SCORE: 1823.32

## 2020-01-08 NOTE — LETTER
1/8/2020      RE: Tej Morfin  [address]  [place] [address]       Dear Colleague,    Thank you for the opportunity to participate in the care of your patient, Tej Morfin, at the [facility]. Please see a copy of my visit note below.    01/07/2020    Dear Dr. Felix:      We had the pleasure of seeing, Tej Morfin in the [facility] today.     As you know, he is a very pleasant 54-year-old man with a first presentation with cardiomegaly approximately 4 years ago.  He underwent echocardiogram and he was found to have severe mitral regurgitation and an ejection fraction of 25%.  He underwent an angiogram at that time that showed only mild coronary disease. He was referred to Dr. Graves for mitral regurgitation, and on 01/21/2015, he underwent mitral valve repair with an annuloplasty ring and was discharged home.        He later presented with ventricular tachycardia which was sustained and therefore he underwent a single-chamber defibrillator for secondary prevention. Shortly after that, he had his first hospitalization for heart failure with reduced ejection fraction, and given his left ventricular end-diastolic dimension was 7 cm and the ventricular arrhythmias, you sent him to us for consideration of advanced therapies.      When we first saw him, he had an excellent cardiopulmonary stress test.  He now had a BiV upgrade to a CRT. He also underwent a PET scan of his chest to rule out cardiac sarcoidosis, which was not detected on this test. However, there was an area showed ischemia subtended by the circumflex artery and he underwent a follow up coronary angiography as noted above which showed mild non obstructive disease.    Around  6 months ago and at that time he was starting to feel worse than when I had seen him previously. We repeated RHC in June 2019 that showed elevated  PCWP, normal right-sided filling pressures, mild pulmonary artery pressure elevation, and CI 1.8. Per his wife he has been slowing down gradually over the last year.  At that time we started his evaluation for both LVAD and transplant and most of this is been complete.  Since that time he is settled out into a new normal.  He feels similar to our last visit.  He has not had any further admissions for heart failure.  He is fatigued most days but is able to do what he wants to do and feels he has stable quality of life.  He is walking the dog a few blocks every morning.  He can do 2 flights of stairs.  He is doing some exercising at the gym although not as much as he was previously.  He presently denies PND orthopnea.  He denies any ICD shocks.     He continues to have difficulty with gastroparesis and that is being evaluated by GI.  He occasionally does take an extra dose of diuretic .       PAST MEDICAL HISTORY:     1.  Hemorrhoidectomy.     2.  Arthroscopy.     3.  Right partial medial meniscectomy 08/2013.     4.  Status post ICD implantation 02/02/2015, [facility], by Dr. Felix.     5.  Mitral valve repair with annuloplasty ring and atrial appendage ligation by Dr. Graves in [place], [place]; date on that was 01/21/2015.   6.  Gastroparesis    CURRENT MEDICATIONS:  Current Outpatient Medications   Medication Sig Dispense Refill     aspirin (ASA) 81 MG EC tablet Take 81 mg by mouth daily       carvedilol (COREG) 12.5 MG tablet Take 1 tablet by mouth 2 times daily (with meals)       cefdinir (OMNICEF) 300 MG capsule Take 300 mg by mouth 2 times daily       diazepam (VALIUM) 10 MG tablet Take 10 mg by mouth every 4 hours as needed for anxiety       fluticasone (FLONASE) 50 MCG/ACT nasal spray Spray 1 spray into both nostrils 2 times daily       furosemide (LASIX) 40 MG tablet 40 mg in the morning and a 40 mg PRN dose in the afternoon for weight gain of 3 lbs/day or 5 lbs/week or increase in shortness  of breath.       hydrALAZINE (APRESOLINE) 25 MG tablet Take 1 tablet (25 mg) by mouth 3 times daily 270 tablet 2     metoclopramide (REGLAN) 5 MG tablet Take 1 tablet by mouth 2 times daily       Nitroglycerin (NITROSTAT SL) Place 0.4 mg under the tongue every 5 minutes as needed for chest pain       omeprazole (PRILOSEC) 20 MG DR capsule Take 20 mg by mouth every morning       omeprazole (PRILOSEC) 40 MG DR capsule Take 1 capsule by mouth daily       potassium chloride ER (KLOR-CON) 20 MEQ CR tablet Take 2 tablets by mouth daily       sacubitril-valsartan (ENTRESTO)  MG per tablet Take 1 tablet by mouth 2 times daily       sotalol (BETAPACE) 80 MG tablet Take 80 mg by mouth 2 times daily         SOCIAL HISTORY:  The patient lives with his wife. They have 2 children who live at home.  They are both teenagers.  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 Alcohol rarely, 1-2 times per month. No drug use.  Smoking, he smoked 1/2 pack per day for 10 years, quit in .        FAMILY HISTORY:  Mother had coronary disease and end-stage renal disease in her 40s. He has 1 younger brother and 1 sister who are alive and well.  Dad  at age 51, but not related to cardiac problems. There is no other family history of cardiomyopathy or sudden cardiac death.     ROS:   Constitutional: No fever, chills, or sweats. Weight is stable    ENT: No visual disturbance, ear ache, epistaxis, sore throat.   Allergies/Immunologic: Negative.   Respiratory: No cough, hemoptysis.   Cardiovascular: As per HPI.   GI: No nausea, vomiting, hematemesis, melena, or hematochezia.   : No urinary frequency, dysuria, or hematuria.   Integument: Negative.   Psychiatric: negative   Neuro: Negative.   Endocrinology: Negative.   Musculoskeletal: He had a rib injury after a mechanical fall    EXAM:  Vital signs:    /89 (BP Location: Right arm, Patient Position: Chair, Cuff  "Size: Adult Large)   Pulse 79   Temp 98  F (36.7  C) (Temporal)   Ht 1.778 m (5' 10\")   Wt 96.7 kg (213 lb 3.2 oz)   SpO2 100%   BMI 30.59 kg/m     General: appears comfortable, alert and articulate-  Head: normocephalic, atraumatic  Eyes: anicteric sclera, EOMI  Neck: no adenopathy  Orophyarynx: moist mucosa, no lesions, dentition intact  Heart: PMI sustained,  regular, S1/S2, II/IV systolic murmur at the apex, no gallop, no rub, estimated JVP 9 cm   Lungs: clear, no rales or wheezing  Abdomen: soft, non-tender, bowel sounds present, no hepatosplenomegaly  Extremities: no clubbing, cyanosis, no lower extremity edema  Neurological: normal speech and affect, no gross motor deficits    Labs:      Last Right heart catheterization       Last echocardiogram from [place]-in 2017 LV ejection fraction was around 10-15%-see scanned in report for further details    Last ICD interrogation:  Bi-V pacing at 96 %    CPX: 2019   RESULTS:  Oxygen Consumption: The patient's VO2 max was 1790 ml/min, which represents 62% of predicted maximum.  When normalize for body weight, VO2 max was 19.2 ml/kg/min, which was 62% of predicted maximum.  Anaerobic or ventilatory threshold (AT) was noted at 1064 ml/min or 11.4 ml/kg/min, which represents 59% of predicted maximum. RER was 0.87 at rest and increased to 1.13 at maximal exercise.    Cardiac Response: The resting EKG showed paced rhythm.  At maximal exercise, no ischemic changes were noted.  The HR was 80 beats/min, and alonso to 123 beats/min at maximal exercise, which is 73% of age-predicted maximum. HRR calculated as 46.  The O2 pulse was 4 ml/beat at rest and increased to 15 ml/beat at maximal exercise, which is 85 % of age-predicted maximum. The BP at rest was 138/80 and increased to 166/76 at maximal exercise.    6MWD:  Distance:    360 meters    Dec 2018 echo  Patient Information Name: NATASHA HARVEY  Study Date: 2018  MRN: [id number]  : " 1965  Gender: M  Account Number: [id number]  Reason for Study: Low Ejection Fraction  Patient Location: Adult Echo Lab  Height: 177.8cm  Weight: 92.988kg  Patient Header: BSA: 2.26140 m2  Study Location: [facility]     Interp Summary M-Mode measurements reveal the left ventricle is moderately enlarged.  Left ventricular systolic function is severely diminished with an estimated left ventricular  ejection fraction of 15%.  Global hypokinesis is noted.  The left atrium is moderately enlarged.  Trace aortic regurgitation.     Procedures The study performed was a(n) complete 2-D echo with M-Mode, color and spectral Doppler.  The study was performed by [facility].  The study quality was diagnostic.  The study was performed in the Echo Lab.     Aortic Valve There is no aortic valvular vegetation.     Right Ventricle There is normal right ventricular wall thickness.     Left Ventricle Global hypokinesis is noted.  There is no thrombus.     Right Ventricle The right ventricular wall motion is normal.     Atria The left atrium is moderately enlarged.  The interatrial septum is intact with no evidence for an atrial septal defect.     Aortic Valve Trace aortic regurgitation.     Mitral Valve The mitral valve is normal in structure.  There is no evidence of mitral valve prolapse.  There is no vegetation seen on the mitral valve.  No mitral valve stenosis.  There is mild (+1) mitral regurgitation.     Tricuspid Valve The tricuspid valve is normal in structure.     Pulmonary Valve The pulmonary valve is poorly visualized.     Aorta and PA The pulmonary artery is normal size.     Left Ventricle M-Mode measurements reveal the left ventricle is moderately enlarged.  There is no evidence of left ventricular hypertrophy.  Left ventricular systolic function is severely diminished with an estimated left ventricular  ejection fraction of 15%.     Right Ventricle M-Mode measurements reveal the right  ventricle is enlarged.     Atria The right atrium is enlarged.     Aortic Valve The aortic valve is normal in structure.     Aorta and PA The aortic root is normal in size.     Tricuspid Valve Mild tricuspid regurgitation by color Doppler.  Right ventricular systolic pressure is normal.     Pericardial Pleural Effusion Pericardium without effusion.  There is no pleural effusion.     Aortic Valve No aortic valve stenosis.     Pulmonary Valve There is trace pulmonic regurgitation.     MMODE 2D MEASUREMENTS CALCULATIONS IVSd: 1.73552io  IVSs: 1.46510wf  LVIDd: 6.94585ar  LVIDs: 6.57877tb  LVPWd: 1.00366zv  LVPWs: 1.2991cm  IVS/LVPW: 0.98  FS: 2.29235%  EDV(Teich): 203.702ml  ESV(Teich): 192.791ml  EF(Teich): 5.01716%  EDV(cubed): 254.175ml  ESV(cubed): 236.394ml  EF(cubed): 6.78337%  % IVS thick: 28.8074%  % LVPW thick: 24.5238%  LV mass(C)d: 281.882grams  LV mass(C)dI: 133.635grams/m^2  LV mass(C)s: 370.19grams  LV mass(C)sI: 175.5grams/m^2  SV(Teich): 10.9109ml  SI(Teich): 5.62431jm/m^2  SV(cubed): 17.7814ml  SI(cubed): 8.25421wc/m^2  Ao root diam: 2.24318ff  Ao root area: 6.74334rt^2  ACS: 2.31331tu  LA dimension: 5.69244fy  LA/Ao: 1.7  LVOT diam: 2.2253cm  LVOT area: 3.00252lv^2  LVOT area(traced): 3.8cm^2  LVLd ap4: 8.78cm  EDV(MOD-sp4): 139ml  LVLs ap4: 8.38cm  ESV(MOD-sp4): 117ml  EF(MOD-sp4): 15.8273%  LVLd ap2: 8.33cm  EDV(MOD-sp2): 167ml  LVLs ap2: 7.45cm  ESV(MOD-sp2): 137ml  EF(MOD-sp2): 17.9641%  SV(MOD-sp4): 22ml  SI(MOD-sp4): 10.4298ml/m^2  SV(MOD-sp2): 30ml  SI(MOD-sp2): 14.2224ml/m^2     Time Measurements Aortic R-R: 0.75sec  Aortic HR: 80BPM     DOPPLER MEASUREMENTS CALCULATIONS MV A dur: 0.906213crp  MV E max will: 169.4cm/sec  MV A max will: 85.7859cm/sec  MV E/A: 2.0  MV V2 max: 173.744cm/sec  MV max P.0747mmHg  MV V2 mean: 99.6359cm/sec  MV mean P.35112cdMo  MV V2 VTI: 39.2923cm  MVA(VTI): 1.65234dw^2  MV dec time: 0.735482hav  Ao V2 max: 103.168cm/sec  Ao max P.2575mmHg  Ao max PG  (full): 2.21420ssOq  SHUN(V,A): 2.26519sp^2  SHUN(V,D): 2.45711np^2  LV V1 max P.81122tuHf  LV V1 mean P.26666eeZg  LV V1 max: 70.028cm/sec  LV V1 mean: 56.8906cm/sec  LV V1 VTI: 12.8202cm  CO(LVOT): 3.21516v/min  CI(LVOT): 1.27665d/min/m^2  SV(LVOT): 49.8609ml  SI(LVOT): 23.6381ml/m^2  TV V2 max: 28.3814cm/sec  TV max P.609936nxOq  PA V2 max: 48.5877cm/sec  PA max P.416954kyEm  PA acc slope: 325.88cm/sec^2  PA acc time: 0.873162msf  TR max walt: 257.913cm/sec  RVSP(TR): 29.6076mmHg  RAP systole: 3mmHg  PA pr(Accel): 14.0364mmHg  Pulm Sys Walt: 17.7384cm/sec      Nov labs 2019 - [facility]    BNP low 200s       Assessment and Plan:   In summary, this is a very pleasant, 54-year-old man with a history of what is likely valvular cardiomyopathy, status post mitral valve repair in 2015, who then had continued symptoms, a failure to improve his LV ejection fraction.    He definitively has low cardiac output and significantly reduced ejection fraction however his endorgan function is remained stable and his exercise test is above where we would need to move forward.  He also feels his quality of life is acceptable at the moment.  We are in a holding pattern on timing.     I do think that advanced therapies are in his future although we will need to likely wait for slight deterioration from here (either more fatigue, fluid overload hospitalization for heart failure or VT or slightly worsened renal function or unintentional weight loss) any of these would prompt me to repeat his right heart catheterization and if cardiac output is worsened then we would be moving forward.     I do think he has adapted to a new normal and so we will have to be on the look out for subtle signs of worsening.     I will presently keep his neurohormonal blockade at their current doses and his diuretics as well.     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     Status post mitral repair: Stable gradient on last echo    I will plan to see him back in July in the [place] sooner as needed.     Dunia Hdz MD        CC  Gerry Chatman, ROSMERY Norman, ROSMERY Roberts

## 2020-01-08 NOTE — NURSING NOTE
Chief Complaint   Patient presents with     RECHECK     return HF     VITALS DONE AND MEDICATIONS REVIEWED    Marcos Victoria  2:00 PM

## 2020-01-08 NOTE — PROGRESS NOTES
01/07/2020    Dear Dr. Felix:      We had the pleasure of seeing, Tej Morfin in the [facility] today.     As you know, he is a very pleasant 54-year-old man with a first presentation with cardiomegaly approximately 4 years ago.  He underwent echocardiogram and he was found to have severe mitral regurgitation and an ejection fraction of 25%.  He underwent an angiogram at that time that showed only mild coronary disease. He was referred to Dr. Graves for mitral regurgitation, and on 01/21/2015, he underwent mitral valve repair with an annuloplasty ring and was discharged home.        He later presented with ventricular tachycardia which was sustained and therefore he underwent a single-chamber defibrillator for secondary prevention. Shortly after that, he had his first hospitalization for heart failure with reduced ejection fraction, and given his left ventricular end-diastolic dimension was 7 cm and the ventricular arrhythmias, you sent him to us for consideration of advanced therapies.      When we first saw him, he had an excellent cardiopulmonary stress test.  He now had a BiV upgrade to a CRT. He also underwent a PET scan of his chest to rule out cardiac sarcoidosis, which was not detected on this test. However, there was an area showed ischemia subtended by the circumflex artery and he underwent a follow up coronary angiography as noted above which showed mild non obstructive disease.    Around  6 months ago and at that time he was starting to feel worse than when I had seen him previously. We repeated RHC in June 2019 that showed elevated PCWP, normal right-sided filling pressures, mild pulmonary artery pressure elevation, and CI 1.8. Per his wife he has been slowing down gradually over the last year.  At that time we started his evaluation for both LVAD and transplant and most of this is been complete.  Since that time he is settled out into a new normal.  He  feels similar to our last visit.  He has not had any further admissions for heart failure.  He is fatigued most days but is able to do what he wants to do and feels he has stable quality of life.  He is walking the dog a few blocks every morning.  He can do 2 flights of stairs.  He is doing some exercising at the gym although not as much as he was previously.  He presently denies PND orthopnea.  He denies any ICD shocks.     He continues to have difficulty with gastroparesis and that is being evaluated by GI.  He occasionally does take an extra dose of diuretic .       PAST MEDICAL HISTORY:     1.  Hemorrhoidectomy.     2.  Arthroscopy.     3.  Right partial medial meniscectomy 08/2013.     4.  Status post ICD implantation 02/02/2015, [facility], by Dr. Felix.     5.  Mitral valve repair with annuloplasty ring and atrial appendage ligation by Dr. Graves in [place], [place]; date on that was 01/21/2015.   6.  Gastroparesis    CURRENT MEDICATIONS:  Current Outpatient Medications   Medication Sig Dispense Refill     aspirin (ASA) 81 MG EC tablet Take 81 mg by mouth daily       carvedilol (COREG) 12.5 MG tablet Take 1 tablet by mouth 2 times daily (with meals)       cefdinir (OMNICEF) 300 MG capsule Take 300 mg by mouth 2 times daily       diazepam (VALIUM) 10 MG tablet Take 10 mg by mouth every 4 hours as needed for anxiety       fluticasone (FLONASE) 50 MCG/ACT nasal spray Spray 1 spray into both nostrils 2 times daily       furosemide (LASIX) 40 MG tablet 40 mg in the morning and a 40 mg PRN dose in the afternoon for weight gain of 3 lbs/day or 5 lbs/week or increase in shortness of breath.       hydrALAZINE (APRESOLINE) 25 MG tablet Take 1 tablet (25 mg) by mouth 3 times daily 270 tablet 2     metoclopramide (REGLAN) 5 MG tablet Take 1 tablet by mouth 2 times daily       Nitroglycerin (NITROSTAT SL) Place 0.4 mg under the tongue every 5 minutes as needed for chest pain       omeprazole (PRILOSEC) 20  "MG DR capsule Take 20 mg by mouth every morning       omeprazole (PRILOSEC) 40 MG DR capsule Take 1 capsule by mouth daily       potassium chloride ER (KLOR-CON) 20 MEQ CR tablet Take 2 tablets by mouth daily       sacubitril-valsartan (ENTRESTO)  MG per tablet Take 1 tablet by mouth 2 times daily       sotalol (BETAPACE) 80 MG tablet Take 80 mg by mouth 2 times daily         SOCIAL HISTORY:  The patient lives with his wife. They have 2 children who live at home.  They are both teenagers.  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 Alcohol rarely, 1-2 times per month. No drug use.  Smoking, he smoked 1/2 pack per day for 10 years, quit in .        FAMILY HISTORY:  Mother had coronary disease and end-stage renal disease in her 40s. He has 1 younger brother and 1 sister who are alive and well.  Dad  at age 51, but not related to cardiac problems. There is no other family history of cardiomyopathy or sudden cardiac death.     ROS:   Constitutional: No fever, chills, or sweats. Weight is stable    ENT: No visual disturbance, ear ache, epistaxis, sore throat.   Allergies/Immunologic: Negative.   Respiratory: No cough, hemoptysis.   Cardiovascular: As per HPI.   GI: No nausea, vomiting, hematemesis, melena, or hematochezia.   : No urinary frequency, dysuria, or hematuria.   Integument: Negative.   Psychiatric: negative   Neuro: Negative.   Endocrinology: Negative.   Musculoskeletal: He had a rib injury after a mechanical fall    EXAM:  Vital signs:    /89 (BP Location: Right arm, Patient Position: Chair, Cuff Size: Adult Large)   Pulse 79   Temp 98  F (36.7  C) (Temporal)   Ht 1.778 m (5' 10\")   Wt 96.7 kg (213 lb 3.2 oz)   SpO2 100%   BMI 30.59 kg/m    General: appears comfortable, alert and articulate-  Head: normocephalic, atraumatic  Eyes: anicteric sclera, EOMI  Neck: no adenopathy  Orophyarynx: moist mucosa, no " lesions, dentition intact  Heart: PMI sustained,  regular, S1/S2, II/IV systolic murmur at the apex, no gallop, no rub, estimated JVP 9 cm   Lungs: clear, no rales or wheezing  Abdomen: soft, non-tender, bowel sounds present, no hepatosplenomegaly  Extremities: no clubbing, cyanosis, no lower extremity edema  Neurological: normal speech and affect, no gross motor deficits    Labs:      Last Right heart catheterization       Last echocardiogram from [place]-in 2017 LV ejection fraction was around 10-15%-see scanned in report for further details    Last ICD interrogation:  Bi-V pacing at 96 %    CPX: 2019   RESULTS:  Oxygen Consumption: The patient's VO2 max was 1790 ml/min, which represents 62% of predicted maximum.  When normalize for body weight, VO2 max was 19.2 ml/kg/min, which was 62% of predicted maximum.  Anaerobic or ventilatory threshold (AT) was noted at 1064 ml/min or 11.4 ml/kg/min, which represents 59% of predicted maximum. RER was 0.87 at rest and increased to 1.13 at maximal exercise.    Cardiac Response: The resting EKG showed paced rhythm.  At maximal exercise, no ischemic changes were noted.  The HR was 80 beats/min, and alonso to 123 beats/min at maximal exercise, which is 73% of age-predicted maximum. HRR calculated as 46.  The O2 pulse was 4 ml/beat at rest and increased to 15 ml/beat at maximal exercise, which is 85 % of age-predicted maximum. The BP at rest was 138/80 and increased to 166/76 at maximal exercise.    6MWD:  Distance:    360 meters    Dec 2018 echo  Patient Information Name: NATASHA HARVEY  Study Date: 2018  MRN: [id number]  : 1965  Gender: M  Account Number: [id number]  Reason for Study: Low Ejection Fraction  Patient Location: Adult Echo Lab  Height: 177.8cm  Weight: 92.988kg  Patient Header: BSA: 2.35788 m2  Study Location: [facility]     Interp Summary M-Mode measurements reveal the left ventricle is moderately enlarged.  Left  ventricular systolic function is severely diminished with an estimated left ventricular  ejection fraction of 15%.  Global hypokinesis is noted.  The left atrium is moderately enlarged.  Trace aortic regurgitation.     Procedures The study performed was a(n) complete 2-D echo with M-Mode, color and spectral Doppler.  The study was performed by [facility].  The study quality was diagnostic.  The study was performed in the Echo Lab.     Aortic Valve There is no aortic valvular vegetation.     Right Ventricle There is normal right ventricular wall thickness.     Left Ventricle Global hypokinesis is noted.  There is no thrombus.     Right Ventricle The right ventricular wall motion is normal.     Atria The left atrium is moderately enlarged.  The interatrial septum is intact with no evidence for an atrial septal defect.     Aortic Valve Trace aortic regurgitation.     Mitral Valve The mitral valve is normal in structure.  There is no evidence of mitral valve prolapse.  There is no vegetation seen on the mitral valve.  No mitral valve stenosis.  There is mild (+1) mitral regurgitation.     Tricuspid Valve The tricuspid valve is normal in structure.     Pulmonary Valve The pulmonary valve is poorly visualized.     Aorta and PA The pulmonary artery is normal size.     Left Ventricle M-Mode measurements reveal the left ventricle is moderately enlarged.  There is no evidence of left ventricular hypertrophy.  Left ventricular systolic function is severely diminished with an estimated left ventricular  ejection fraction of 15%.     Right Ventricle M-Mode measurements reveal the right ventricle is enlarged.     Atria The right atrium is enlarged.     Aortic Valve The aortic valve is normal in structure.     Aorta and PA The aortic root is normal in size.     Tricuspid Valve Mild tricuspid regurgitation by color Doppler.  Right ventricular systolic pressure is normal.     Pericardial Pleural Effusion Pericardium  without effusion.  There is no pleural effusion.     Aortic Valve No aortic valve stenosis.     Pulmonary Valve There is trace pulmonic regurgitation.     MMODE 2D MEASUREMENTS CALCULATIONS IVSd: 1.67150hp  IVSs: 1.82874rw  LVIDd: 6.98192ae  LVIDs: 6.13400im  LVPWd: 1.27710ms  LVPWs: 1.2991cm  IVS/LVPW: 0.98  FS: 2.53587%  EDV(Teich): 203.702ml  ESV(Teich): 192.791ml  EF(Teich): 5.55369%  EDV(cubed): 254.175ml  ESV(cubed): 236.394ml  EF(cubed): 6.39229%  % IVS thick: 28.8074%  % LVPW thick: 24.5238%  LV mass(C)d: 281.882grams  LV mass(C)dI: 133.635grams/m^2  LV mass(C)s: 370.19grams  LV mass(C)sI: 175.5grams/m^2  SV(Teich): 10.9109ml  SI(Teich): 5.70412iy/m^2  SV(cubed): 17.7814ml  SI(cubed): 8.61596rt/m^2  Ao root diam: 2.17424tx  Ao root area: 6.37965tt^2  ACS: 2.00763vl  LA dimension: 5.75132lx  LA/Ao: 1.7  LVOT diam: 2.2253cm  LVOT area: 3.55467ek^2  LVOT area(traced): 3.8cm^2  LVLd ap4: 8.78cm  EDV(MOD-sp4): 139ml  LVLs ap4: 8.38cm  ESV(MOD-sp4): 117ml  EF(MOD-sp4): 15.8273%  LVLd ap2: 8.33cm  EDV(MOD-sp2): 167ml  LVLs ap2: 7.45cm  ESV(MOD-sp2): 137ml  EF(MOD-sp2): 17.9641%  SV(MOD-sp4): 22ml  SI(MOD-sp4): 10.4298ml/m^2  SV(MOD-sp2): 30ml  SI(MOD-sp2): 14.2224ml/m^2     Time Measurements Aortic R-R: 0.75sec  Aortic HR: 80BPM     DOPPLER MEASUREMENTS CALCULATIONS MV A dur: 0.472438vcf  MV E max will: 169.4cm/sec  MV A max will: 85.7859cm/sec  MV E/A: 2.0  MV V2 max: 173.744cm/sec  MV max P.0747mmHg  MV V2 mean: 99.6359cm/sec  MV mean P.66027zlFk  MV V2 VTI: 39.2923cm  MVA(VTI): 1.30487sx^2  MV dec time: 0.331564upg  Ao V2 max: 103.168cm/sec  Ao max P.2575mmHg  Ao max PG (full): 2.06822zoLn  SHUN(V,A): 2.72368gq^2  SHUN(V,D): 2.16621ld^2  LV V1 max P.88444ojGe  LV V1 mean P.39950lfOg  LV V1 max: 70.028cm/sec  LV V1 mean: 56.8906cm/sec  LV V1 VTI: 12.8202cm  CO(LVOT): 3.42269a/min  CI(LVOT): 1.76217t/min/m^2  SV(LVOT): 49.8609ml  SI(LVOT): 23.6381ml/m^2  TV V2 max: 28.3814cm/sec  TV max PG:  0.298406pbFh  PA V2 max: 48.5877cm/sec  PA max P.503356zkXx  PA acc slope: 325.88cm/sec^2  PA acc time: 0.537119dqq  TR max walt: 257.913cm/sec  RVSP(TR): 29.6076mmHg  RAP systole: 3mmHg  PA pr(Accel): 14.0364mmHg  Pulm Sys Walt: 17.7384cm/sec      Nov labs 2019 - [place]    BNP low 200s       Assessment and Plan:   In summary, this is a very pleasant, 54-year-old man with a history of what is likely valvular cardiomyopathy, status post mitral valve repair in 2015, who then had continued symptoms, a failure to improve his LV ejection fraction.    He definitively has low cardiac output and significantly reduced ejection fraction however his endorgan function is remained stable and his exercise test is above where we would need to move forward.  He also feels his quality of life is acceptable at the moment.  We are in a holding pattern on timing.     I do think that advanced therapies are in his future although we will need to likely wait for slight deterioration from here (either more fatigue, fluid overload hospitalization for heart failure or VT or slightly worsened renal function or unintentional weight loss) any of these would prompt me to repeat his right heart catheterization and if cardiac output is worsened then we would be moving forward.     I do think he has adapted to a new normal and so we will have to be on the look out for subtle signs of worsening.     I will presently keep his neurohormonal blockade at their current doses and his diuretics as well.     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     Status post mitral repair: Stable gradient on last  echo    I will plan to see him back in July in the [place] clinic sooner as needed.     Dunia Hdz MD        CC  Gerry Chatman, Helena Bahena, RUTHANN [place]

## 2020-03-10 ENCOUNTER — HEALTH MAINTENANCE LETTER (OUTPATIENT)
Age: 55
End: 2020-03-10

## 2020-07-22 ENCOUNTER — VIRTUAL VISIT (OUTPATIENT)
Dept: CARDIOLOGY | Facility: CLINIC | Age: 55
End: 2020-07-22
Attending: INTERNAL MEDICINE
Payer: MEDICARE

## 2020-07-22 DIAGNOSIS — I50.22 CHRONIC SYSTOLIC HEART FAILURE (H): Primary | ICD-10-CM

## 2020-07-22 PROCEDURE — 99214 OFFICE O/P EST MOD 30 MIN: CPT | Mod: 95 | Performed by: INTERNAL MEDICINE

## 2020-07-22 NOTE — PROGRESS NOTES
"Tej Morfin is a 55 year old male who is being evaluated via a billable video visit.      The patient has been notified of following:     \"This video visit will be conducted via a call between you and your physician/provider. We have found that certain health care needs can be provided without the need for an in-person physical exam.  This service lets us provide the care you need with a video conversation.  If a prescription is necessary we can send it directly to your pharmacy.  If lab work is needed we can place an order for that and you can then stop by our lab to have the test done at a later time.    Video visits are billed at different rates depending on your insurance coverage.  Please reach out to your insurance provider with any questions.    If during the course of the call the physician/provider feels a video visit is not appropriate, you will not be charged for this service.\"    Patient has given verbal consent for Video visit? Yes  How would you like to obtain your AVS? MyChart  If you are dropped from the video visit, the video invite should be resent to: Text to cell phone: [phone]  Will anyone else be joining your video visit? No        Video-Visit Details    Type of service:  Video Visit    Video Start Time: 2:40   Video End Time: 3:05     Originating Location (pt. Location): home    Distant Location (provider location):  [facility]     Platform used for Video Visit: [employer]       07/22/2020    Dear Dr. Felix:      Follow up HFrEF:     54, first diagnosed with severe mitral regurgitation and an ejection fraction of 25%.  He underwent an angiogram at that time that showed only mild coronary disease. He was referred to Dr. Graves for mitral regurgitation, and on 01/21/2015, he underwent mitral valve repair with an annuloplasty ring and was discharged home.        He later presented with ventricular tachycardia which was sustained and therefore he underwent a single-chamber " defibrillator for secondary prevention. Shortly after that, he had his first hospitalization for heart failure with reduced ejection fraction, and given his left ventricular end-diastolic dimension was 7 cm and the ventricular arrhythmias, you sent him to us for consideration of advanced therapies.      When we first saw him, he had an excellent cardiopulmonary stress test.  He now had a BiV upgrade to a CRT. He also underwent a PET scan of his chest to rule out cardiac sarcoidosis, which was not detected on this test. However, there was an area showed ischemia subtended by the circumflex artery and he underwent a follow up coronary angiography as noted above which showed mild non obstructive disease.    Around  6 months ago and at that time he was starting to feel worse than when I had seen him previously. We repeated RHC in June 2019 that showed elevated PCWP, normal right-sided filling pressures, mild pulmonary artery pressure elevation, and CI 1.8. Per his wife he has been slowing down gradually over the last year.  At that time we started his evaluation for both LVAD and transplant and most of this is been complete.     Since that time he is settled out into a new normal.  He feels similar to our last visit.  He has not had any further admissions for heart failure.  He is fatigued most days but is able to do what he wants to do and feels he has stable quality of life.  He is walking the dog a few blocks every morning.  He can do 2 flights of stairs.  He presently denies PND orthopnea.  He denies any ICD shocks.     He does feel he is getting less activity due to COVID-19.       PAST MEDICAL HISTORY:     1.  Hemorrhoidectomy.     2.  Arthroscopy.     3.  Right partial medial meniscectomy 08/2013.     4.  Status post ICD implantation 02/02/2015, [employer], by Dr. Felix.     5.  Mitral valve repair with annuloplasty ring and atrial appendage ligation by Dr. Graves in [place], [place]; date on that  was 2015.   6.  Gastroparesis    CURRENT MEDICATIONS:  Current Outpatient Medications   Medication Sig Dispense Refill     aspirin (ASA) 81 MG EC tablet Take 81 mg by mouth daily       carvedilol (COREG) 12.5 MG tablet Take 1 tablet by mouth 2 times daily (with meals)       cefdinir (OMNICEF) 300 MG capsule Take 300 mg by mouth 2 times daily       diazepam (VALIUM) 10 MG tablet Take 10 mg by mouth every 4 hours as needed for anxiety       fluticasone (FLONASE) 50 MCG/ACT nasal spray Spray 1 spray into both nostrils 2 times daily       furosemide (LASIX) 40 MG tablet 40 mg in the morning and a 40 mg PRN dose in the afternoon for weight gain of 3 lbs/day or 5 lbs/week or increase in shortness of breath.       hydrALAZINE (APRESOLINE) 25 MG tablet Take 1 tablet (25 mg) by mouth 3 times daily 270 tablet 2     metoclopramide (REGLAN) 5 MG tablet Take 1 tablet by mouth 2 times daily       Nitroglycerin (NITROSTAT SL) Place 0.4 mg under the tongue every 5 minutes as needed for chest pain       omeprazole (PRILOSEC) 20 MG DR capsule Take 20 mg by mouth every morning       omeprazole (PRILOSEC) 40 MG DR capsule Take 1 capsule by mouth daily       potassium chloride ER (KLOR-CON) 20 MEQ CR tablet Take 2 tablets by mouth daily       sacubitril-valsartan (ENTRESTO)  MG per tablet Take 1 tablet by mouth 2 times daily       sotalol (BETAPACE) 80 MG tablet Take 80 mg by mouth 2 times daily         SOCIAL HISTORY:  The patient lives with his wife. They have 2 children who live at home.  They are both teenagers.  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 Alcohol rarely, 1-2 times per month. No drug use.  Smoking, he smoked 1/2 pack per day for 10 years, quit in .        FAMILY HISTORY:  Mother had coronary disease and end-stage renal disease in her 40s. He has 1 younger brother and 1 sister who are alive and well.  Dad  at age 51, but not  related to cardiac problems. There is no other family history of cardiomyopathy or sudden cardiac death.     ROS:   Constitutional: No fever, chills, or sweats. Weight is stable    ENT: No visual disturbance, ear ache, epistaxis, sore throat.   Allergies/Immunologic: Negative.   Respiratory: No cough, hemoptysis.   Cardiovascular: As per HPI.   GI: No nausea, vomiting, hematemesis, melena, or hematochezia.   : No urinary frequency, dysuria, or hematuria.   Integument: Negative.   Psychiatric: negative   Neuro: Negative.   Endocrinology: Negative.   Musculoskeletal: He had a rib injury after a mechanical fall    EXAM:  Vital signs:      Physical Exam Elements:    Constitutional - well appearing     Eyes - no redness, discharge    Respiratory - no cough, breathing is comfortable     Musculoskeletal - normal range of motion    Skin - no discoloration, lesions    Neurological - no tremors, headaches, normal gait     Psychiatric - no anxiety, patient is alert & oriented    The rest of a comprehensive physical examination is deferred due to PHE (public health emergency) video visit restrictions      Labs:      Last Right heart catheterization       Last echocardiogram from [place]-in September 2017 LV ejection fraction was around 10-15%-see scanned in report for further details    Last ICD interrogation:  Bi-V pacing at 96 %    CPX: December 2019   RESULTS:  Oxygen Consumption: The patient's VO2 max was 1790 ml/min, which represents 62% of predicted maximum.  When normalize for body weight, VO2 max was 19.2 ml/kg/min, which was 62% of predicted maximum.  Anaerobic or ventilatory threshold (AT) was noted at 1064 ml/min or 11.4 ml/kg/min, which represents 59% of predicted maximum. RER was 0.87 at rest and increased to 1.13 at maximal exercise.    Cardiac Response: The resting EKG showed paced rhythm.  At maximal exercise, no ischemic changes were noted.  The HR was 80 beats/min, and alonso to 123 beats/min at maximal  exercise, which is 73% of age-predicted maximum. HRR calculated as 46.  The O2 pulse was 4 ml/beat at rest and increased to 15 ml/beat at maximal exercise, which is 85 % of age-predicted maximum. The BP at rest was 138/80 and increased to 166/76 at maximal exercise.    6MWD:  Distance:    360 meters    Nov labs 2019 - [place]    Repeat labs being ordered today     Assessment and Plan:   54-year-old man with a history of what is likely valvular cardiomyopathy, status post mitral valve repair in 01/2015, who then had continued symptoms, a failure to improve his LV ejection fraction.    He definitively has low cardiac output and significantly reduced ejection fraction however his endorgan function is remained stable and his exercise test is above where we would need to move forward.  He also feels his quality of life is acceptable at the moment.  We are in a holding pattern on timing.     I do think that advanced therapies are in his future although we will need to likely wait for slight deterioration from here (either more fatigue, fluid overload hospitalization for heart failure or VT or slightly worsened renal function or unintentional weight loss) any of these would prompt me to repeat his right heart catheterization and if cardiac output is worsened then we would be moving forward.     I do think he has adapted to a new normal and so we will have to be on the look out for subtle signs of worsening.     I will presently keep his neurohormonal blockade at their current doses and his diuretics as well.     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     Status post mitral  repair: Stable gradient on last echo    Will plan echo for now, labs, 1 will see in 6 months, sooner as needed     Dunia Hdz MD      CC  Yoan Felix,   Gerry, Helena Bahena, ROSMERY [place], RUTHANN Bahena [place]

## 2020-07-22 NOTE — LETTER
7/22/2020      RE: Tej Morfin  [address]  [place] [id number]       Dear Colleague,    Thank you for the opportunity to participate in the care of your patient, Tej Morfin, at the [facility] at [facility]. Please see a copy of my visit note below.    Tej Morfin is a 55 year old male who is being evaluated via a billable video visit.      07/22/2020    Dear Dr. Felix:      Follow up HFrEF:     54, first diagnosed with severe mitral regurgitation and an ejection fraction of 25%.  He underwent an angiogram at that time that showed only mild coronary disease. He was referred to Dr. Graves for mitral regurgitation, and on 01/21/2015, he underwent mitral valve repair with an annuloplasty ring and was discharged home.        He later presented with ventricular tachycardia which was sustained and therefore he underwent a single-chamber defibrillator for secondary prevention. Shortly after that, he had his first hospitalization for heart failure with reduced ejection fraction, and given his left ventricular end-diastolic dimension was 7 cm and the ventricular arrhythmias, you sent him to us for consideration of advanced therapies.      When we first saw him, he had an excellent cardiopulmonary stress test.  He now had a BiV upgrade to a CRT. He also underwent a PET scan of his chest to rule out cardiac sarcoidosis, which was not detected on this test. However, there was an area showed ischemia subtended by the circumflex artery and he underwent a follow up coronary angiography as noted above which showed mild non obstructive disease.    Around  6 months ago and at that time he was starting to feel worse than when I had seen him previously. We repeated RHC in June 2019 that showed elevated PCWP, normal right-sided filling pressures, mild pulmonary artery pressure elevation, and CI 1.8. Per his wife he has been slowing down gradually over  the last year.  At that time we started his evaluation for both LVAD and transplant and most of this is been complete.     Since that time he is settled out into a new normal.  He feels similar to our last visit.  He has not had any further admissions for heart failure.  He is fatigued most days but is able to do what he wants to do and feels he has stable quality of life.  He is walking the dog a few blocks every morning.  He can do 2 flights of stairs.  He presently denies PND orthopnea.  He denies any ICD shocks.     He does feel he is getting less activity due to COVID-19.       PAST MEDICAL HISTORY:     1.  Hemorrhoidectomy.     2.  Arthroscopy.     3.  Right partial medial meniscectomy 08/2013.     4.  Status post ICD implantation 02/02/2015, [employer], by Dr. Felix.     5.  Mitral valve repair with annuloplasty ring and atrial appendage ligation by Dr. Graves in [place], [place]; date on that was 01/21/2015.   6.  Gastroparesis    CURRENT MEDICATIONS:  Current Outpatient Medications   Medication Sig Dispense Refill     aspirin (ASA) 81 MG EC tablet Take 81 mg by mouth daily       carvedilol (COREG) 12.5 MG tablet Take 1 tablet by mouth 2 times daily (with meals)       cefdinir (OMNICEF) 300 MG capsule Take 300 mg by mouth 2 times daily       diazepam (VALIUM) 10 MG tablet Take 10 mg by mouth every 4 hours as needed for anxiety       fluticasone (FLONASE) 50 MCG/ACT nasal spray Spray 1 spray into both nostrils 2 times daily       furosemide (LASIX) 40 MG tablet 40 mg in the morning and a 40 mg PRN dose in the afternoon for weight gain of 3 lbs/day or 5 lbs/week or increase in shortness of breath.       hydrALAZINE (APRESOLINE) 25 MG tablet Take 1 tablet (25 mg) by mouth 3 times daily 270 tablet 2     metoclopramide (REGLAN) 5 MG tablet Take 1 tablet by mouth 2 times daily       Nitroglycerin (NITROSTAT SL) Place 0.4 mg under the tongue every 5 minutes as needed for chest pain       omeprazole  (PRILOSEC) 20 MG DR capsule Take 20 mg by mouth every morning       omeprazole (PRILOSEC) 40 MG DR capsule Take 1 capsule by mouth daily       potassium chloride ER (KLOR-CON) 20 MEQ CR tablet Take 2 tablets by mouth daily       sacubitril-valsartan (ENTRESTO)  MG per tablet Take 1 tablet by mouth 2 times daily       sotalol (BETAPACE) 80 MG tablet Take 80 mg by mouth 2 times daily         SOCIAL HISTORY:  The patient lives with his wife. They have 2 children who live at home.  They are both teenagers.  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 Alcohol rarely, 1-2 times per month. No drug use.  Smoking, he smoked 1/2 pack per day for 10 years, quit in .        FAMILY HISTORY:  Mother had coronary disease and end-stage renal disease in her 40s. He has 1 younger brother and 1 sister who are alive and well.  Dad  at age 51, but not related to cardiac problems. There is no other family history of cardiomyopathy or sudden cardiac death.     ROS:   Constitutional: No fever, chills, or sweats. Weight is stable    ENT: No visual disturbance, ear ache, epistaxis, sore throat.   Allergies/Immunologic: Negative.   Respiratory: No cough, hemoptysis.   Cardiovascular: As per HPI.   GI: No nausea, vomiting, hematemesis, melena, or hematochezia.   : No urinary frequency, dysuria, or hematuria.   Integument: Negative.   Psychiatric: negative   Neuro: Negative.   Endocrinology: Negative.   Musculoskeletal: He had a rib injury after a mechanical fall    EXAM:  Vital signs:      Physical Exam Elements:    Constitutional - well appearing     Eyes - no redness, discharge    Respiratory - no cough, breathing is comfortable     Musculoskeletal - normal range of motion    Skin - no discoloration, lesions    Neurological - no tremors, headaches, normal gait     Psychiatric - no anxiety, patient is alert & oriented    The rest of a comprehensive physical  examination is deferred due to PHE (public health emergency) video visit restrictions      Labs:      Last Right heart catheterization       Last echocardiogram from [facility]-in September 2017 LV ejection fraction was around 10-15%-see scanned in report for further details    Last ICD interrogation:  Bi-V pacing at 96 %    CPX: December 2019   RESULTS:  Oxygen Consumption: The patient's VO2 max was 1790 ml/min, which represents 62% of predicted maximum.  When normalize for body weight, VO2 max was 19.2 ml/kg/min, which was 62% of predicted maximum.  Anaerobic or ventilatory threshold (AT) was noted at 1064 ml/min or 11.4 ml/kg/min, which represents 59% of predicted maximum. RER was 0.87 at rest and increased to 1.13 at maximal exercise.    Cardiac Response: The resting EKG showed paced rhythm.  At maximal exercise, no ischemic changes were noted.  The HR was 80 beats/min, and alonso to 123 beats/min at maximal exercise, which is 73% of age-predicted maximum. HRR calculated as 46.  The O2 pulse was 4 ml/beat at rest and increased to 15 ml/beat at maximal exercise, which is 85 % of age-predicted maximum. The BP at rest was 138/80 and increased to 166/76 at maximal exercise.    6MWD:  Distance:    360 meters    Nov labs 2019 - [place]    Repeat labs being ordered today     Assessment and Plan:   54-year-old man with a history of what is likely valvular cardiomyopathy, status post mitral valve repair in 01/2015, who then had continued symptoms, a failure to improve his LV ejection fraction.    He definitively has low cardiac output and significantly reduced ejection fraction however his endorgan function is remained stable and his exercise test is above where we would need to move forward.  He also feels his quality of life is acceptable at the moment.  We are in a holding pattern on timing.     I do think that advanced therapies are in his future although we will need to likely wait for slight deterioration from  here (either more fatigue, fluid overload hospitalization for heart failure or VT or slightly worsened renal function or unintentional weight loss) any of these would prompt me to repeat his right heart catheterization and if cardiac output is worsened then we would be moving forward.     I do think he has adapted to a new normal and so we will have to be on the look out for subtle signs of worsening.     I will presently keep his neurohormonal blockade at their current doses and his diuretics as well.     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     Status post mitral repair: Stable gradient on last echo    Will plan echo for now, labs, 1 will see in 6 months, sooner as needed     Dunia Hdz MD      CC  Gerry Chatman [place], [place] Kristi Metzger, ROSMERY [place]         Please do not hesitate to contact me if you have any questions/concerns.     Sincerely,     Dunia Hdz MD

## 2020-12-27 ENCOUNTER — HEALTH MAINTENANCE LETTER (OUTPATIENT)
Age: 55
End: 2020-12-27

## 2021-01-12 ENCOUNTER — CARE COORDINATION (OUTPATIENT)
Dept: CARDIOLOGY | Facility: CLINIC | Age: 56
End: 2021-01-12

## 2021-01-12 RX ORDER — SPIRONOLACTONE 50 MG/1
50 TABLET, FILM COATED ORAL DAILY
COMMUNITY
Start: 2020-09-23 | End: 2021-09-28

## 2021-01-12 RX ORDER — SOTALOL HYDROCHLORIDE 80 MG/1
TABLET ORAL
COMMUNITY
Start: 2020-08-31 | End: 2022-07-01

## 2021-01-12 RX ORDER — FUROSEMIDE 40 MG
TABLET ORAL
COMMUNITY
Start: 2020-12-03 | End: 2022-03-03

## 2021-01-12 RX ORDER — POTASSIUM CHLORIDE 1500 MG/1
20 TABLET, EXTENDED RELEASE ORAL 2 TIMES DAILY
COMMUNITY
Start: 2020-09-30 | End: 2022-03-03

## 2021-01-12 RX ORDER — CYCLOBENZAPRINE HCL 10 MG
10 TABLET ORAL 3 TIMES DAILY PRN
Status: ON HOLD | COMMUNITY
Start: 2020-03-18 | End: 2022-03-23

## 2021-01-12 RX ORDER — HYDRALAZINE HYDROCHLORIDE 25 MG/1
TABLET, FILM COATED ORAL
COMMUNITY
Start: 2020-06-01 | End: 2022-03-03

## 2021-01-12 RX ORDER — METOCLOPRAMIDE 10 MG/1
10 TABLET ORAL 3 TIMES DAILY
Status: ON HOLD | COMMUNITY
Start: 2021-01-04 | End: 2022-04-08

## 2021-01-12 RX ORDER — DAPAGLIFLOZIN 10 MG/1
10 TABLET, FILM COATED ORAL DAILY
COMMUNITY
End: 2022-03-03

## 2021-01-12 RX ORDER — OMEPRAZOLE 40 MG/1
40 CAPSULE, DELAYED RELEASE ORAL EVERY MORNING
COMMUNITY
Start: 2021-01-05

## 2021-01-12 NOTE — PROGRESS NOTES
Patient is scheduled for video outreach visit tomorrow with Dr. Hdz.  He was last seen by her July, 2020.  He had f/u echo in [place] on 8/31/20 and a 6MWT on 8/28.     Meds updated for appt tomorrow  Last llabs 10/2020 but too short notice to get before tomorrow appt.

## 2021-01-12 NOTE — PROGRESS NOTES
"Tej Morfin is a 55 year old male who is being evaluated via a billable video visit.      Echo same 8/2020     Labs good (bno 500s)     Neck mass?         ** note not complete     The patient has been notified of following:     \"This video visit will be conducted via a call between you and your physician/provider. We have found that certain health care needs can be provided without the need for an in-person physical exam.  This service lets us provide the care you need with a video conversation.  If a prescription is necessary we can send it directly to your pharmacy.  If lab work is needed we can place an order for that and you can then stop by our lab to have the test done at a later time.    Video visits are billed at different rates depending on your insurance coverage.  Please reach out to your insurance provider with any questions.    If during the course of the call the physician/provider feels a video visit is not appropriate, you will not be charged for this service.\"    Patient has given verbal consent for Video visit? Yes  How would you like to obtain your AVS? MyChart  If you are dropped from the video visit, the video invite should be resent to: Text to cell phone: [phone]  Will anyone else be joining your video visit? No        Video-Visit Details    Type of service:  Video Visit    Video Start Time:    Video End Time:       Originating Location (pt. Location): home    Distant Location (provider location):  [facility]     Platform used for Video Visit: [employer]       01/12/2021    Dear Dr. Felix:      Follow up HFrEF:     54, first diagnosed with severe mitral regurgitation and an ejection fraction of 25%.  He underwent an angiogram at that time that showed only mild coronary disease. He was referred to Dr. Graves for mitral regurgitation, and on 01/21/2015, he underwent mitral valve repair with an annuloplasty ring and was discharged home.        He later presented with ventricular " tachycardia which was sustained and therefore he underwent a single-chamber defibrillator for secondary prevention. Shortly after that, he had his first hospitalization for heart failure with reduced ejection fraction, and given his left ventricular end-diastolic dimension was 7 cm and the ventricular arrhythmias, you sent him to us for consideration of advanced therapies.      When we first saw him, he had an excellent cardiopulmonary stress test.  He now had a BiV upgrade to a CRT. He also underwent a PET scan of his chest to rule out cardiac sarcoidosis, which was not detected on this test. However, there was an area showed ischemia subtended by the circumflex artery and he underwent a follow up coronary angiography as noted above which showed mild non obstructive disease.    Around  6 months ago and at that time he was starting to feel worse than when I had seen him previously. We repeated RHC in June 2019 that showed elevated PCWP, normal right-sided filling pressures, mild pulmonary artery pressure elevation, and CI 1.8. Per his wife he has been slowing down gradually over the last year.  At that time we started his evaluation for both LVAD and transplant and most of this is been complete.     Since that time he is settled out into a new normal.  He feels similar to our last visit.  He has not had any further admissions for heart failure.  He is fatigued most days but is able to do what he wants to do and feels he has stable quality of life.  He is walking the dog a few blocks every morning.  He can do 2 flights of stairs.  He presently denies PND orthopnea.  He denies any ICD shocks.     He does feel he is getting less activity due to COVID-19.     PAST MEDICAL HISTORY:     1.  Hemorrhoidectomy.     2.  Arthroscopy.     3.  Right partial medial meniscectomy 08/2013.     4.  Status post ICD implantation 02/02/2015, [employer], by Dr. Felix.     5.  Mitral valve repair with annuloplasty ring and atrial  appendage ligation by Dr. Graves in [place], [place]; date on that was 2015.   6.  Gastroparesis    CURRENT MEDICATIONS:  Current Outpatient Medications   Medication Sig Dispense Refill     carvedilol (COREG) 12.5 MG tablet Take 1 tablet by mouth 2 times daily (with meals)       cyclobenzaprine (FLEXERIL) 10 MG tablet Take 10 mg by mouth 3 times daily as needed for muscle spasms       dapagliflozin (FARXIGA) 10 MG TABS tablet Take 10 mg by mouth daily       diazepam (VALIUM) 10 MG tablet Take 10 mg by mouth every 4 hours as needed for anxiety       fluticasone (FLONASE) 50 MCG/ACT nasal spray Spray 1 spray into both nostrils 2 times daily       furosemide (LASIX) 40 MG tablet TAKE 1 TABLET BY MOUTH EVERY DAY IN THE MORNING AND 1 TABLET IN THE AFTERNOON AS NEEDED FOR SWELLING       hydrALAZINE (APRESOLINE) 25 MG tablet TAKE 1 TABLET BY MOUTH THREE TIMES A DAY       metoclopramide (REGLAN) 10 MG tablet Take 10 mg by mouth 3 times daily For 3 weeks then off 1 week       omeprazole (PRILOSEC) 40 MG DR capsule Take 40 mg by mouth every morning       potassium chloride ER (KLOR-CON M) 20 MEQ CR tablet Take 20 mEq by mouth daily       sacubitril-valsartan (ENTRESTO)  MG per tablet TAKE 1 TABLET BY MOUTH TWICE A DAY       sotalol (BETAPACE) 80 MG tablet TAKE 1 TABLET BY MOUTH TWICE A DAY       spironolactone (ALDACTONE) 50 MG tablet Take 50 mg by mouth daily         SOCIAL HISTORY:  The patient lives with his wife. They have 2 children who live at home.  They are both teenagers.  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 Alcohol rarely, 1-2 times per month. No drug use.  Smoking, he smoked 1/2 pack per day for 10 years, quit in .        FAMILY HISTORY:  Mother had coronary disease and end-stage renal disease in her 40s. He has 1 younger brother and 1 sister who are alive and well.  Dad  at age 51, but not related to cardiac  problems. There is no other family history of cardiomyopathy or sudden cardiac death.     ROS:   Constitutional: No fever, chills, or sweats. Weight is stable    ENT: No visual disturbance, ear ache, epistaxis, sore throat.   Allergies/Immunologic: Negative.   Respiratory: No cough, hemoptysis.   Cardiovascular: As per HPI.   GI: No nausea, vomiting, hematemesis, melena, or hematochezia.   : No urinary frequency, dysuria, or hematuria.   Integument: Negative.   Psychiatric: negative   Neuro: Negative.   Endocrinology: Negative.   Musculoskeletal: He had a rib injury after a mechanical fall    EXAM:  Vital signs:    Physical Exam Elements:    Constitutional - well appearing     Eyes - no redness, discharge    Respiratory - no cough, breathing is comfortable     Musculoskeletal - normal range of motion    Skin - no discoloration, lesions    Neurological - no tremors, headaches, normal gait     Psychiatric - no anxiety, patient is alert & oriented    The rest of a comprehensive physical examination is deferred due to PHE (public health emergency) video visit restrictions    Labs:    Last Right heart catheterization       Last echocardiogram from [facility]-in September 2017 LV ejection fraction was around 10-15%-see scanned in report for further details    Last ICD interrogation:  Bi-V pacing at 96 %    CPX: December 2019   RESULTS:  Oxygen Consumption: The patient's VO2 max was 1790 ml/min, which represents 62% of predicted maximum.  When normalize for body weight, VO2 max was 19.2 ml/kg/min, which was 62% of predicted maximum.  Anaerobic or ventilatory threshold (AT) was noted at 1064 ml/min or 11.4 ml/kg/min, which represents 59% of predicted maximum. RER was 0.87 at rest and increased to 1.13 at maximal exercise.    Cardiac Response: The resting EKG showed paced rhythm.  At maximal exercise, no ischemic changes were noted.  The HR was 80 beats/min, and alonso to 123 beats/min at maximal exercise, which is 73% of  age-predicted maximum. HRR calculated as 46.  The O2 pulse was 4 ml/beat at rest and increased to 15 ml/beat at maximal exercise, which is 85 % of age-predicted maximum. The BP at rest was 138/80 and increased to 166/76 at maximal exercise.    6MWD:  Distance:    360 meters    Nov labs 2019 - [place]    Repeat labs being ordered today     Echo 2020     Patient Information Name: NATASHA HARVEY  Study Date: 2020  MRN: [id number]  : 1965  Gender: M  Account Number: [id number]  Reason for Study: Congestive heart failure  Patient Location: Adult Echo Lab  Height: 180.3cm  Weight: 98.4kg  Patient Header: BSA: 2.18 m2  Study Location: [facility]     Interp Summary M-Mode measurements reveal the left ventricle is moderately to severely enlarged.  Left ventricular systolic function is severely diminished with an estimated left ventricular  ejection fraction of 15-20%.  Global hypokinesis is noted.  The left atrium is moderately enlarged.  Mild aortic regurgitation.  There is mild mitral valve stenosis.  Right sided pressures indicate mild pulmonary hypertension.     Procedures The study performed was a(n) complete 2-D echo with M-Mode, color and spectral Doppler.  The study was performed by [facility].  The study quality was diagnostic.  The study was performed in the Echo Lab.     Right Ventricle There is normal right ventricular wall thickness.     Left Ventricle Global hypokinesis is noted.  There is no thrombus.     Right Ventricle The right ventricular wall motion is normal.     Atria The left atrium is moderately enlarged.  The interatrial septum is intact with no evidence for an atrial septal defect.     Aortic Valve Mild aortic regurgitation.     Mitral Valve The anterior and posterior mitral valve leaflets appear thickened, but opens well.  There is mild mitral valve stenosis.  There is mild (+1) mitral regurgitation.     Tricuspid Valve The tricuspid valve is normal  in structure.     Pulmonary Valve The pulmonary valve is poorly visualized.     Aorta and PA The pulmonary artery is normal size.     Left Ventricle M-Mode measurements reveal the left ventricle is moderately to severely enlarged.  There is no evidence of left ventricular hypertrophy.  Left ventricular systolic function is severely diminished with an estimated left ventricular  ejection fraction of 15-20%.     Right Ventricle M-Mode measurements reveal the right ventricle is moderately enlarged.     Atria The right atrium is enlarged.     Aortic Valve The aortic valve is normal in structure.     Aorta and PA The aortic root is normal in size.     Tricuspid Valve Moderate tricuspid regurgitation by color Doppler.  The calculated right ventricular systolic pressure is 41 mmHg.  Right sided pressures indicate mild pulmonary hypertension.     Pericardial Pleural Effusion Pericardium without effusion.     Aortic Valve No aortic valve stenosis.     Pulmonary Valve There is trace pulmonic regurgitation.     MMODE 2D MEASUREMENTS CALCULATIONS IVSd: 1.2cm  IVSs: 1.8cm  LVIDd: 6.6cm  LVIDs: 5.4cm  LVPWd: 1.2cm  LVPWs: 1.6cm  Ao root diam: 3.5cm  Ao root area: 9.6cm^2  ACS: 2.0cm  LA dimension: 5.1cm  asc Aorta Diam: 3.5cm  LVOT diam: 2.1cm  LVOT area: 3.5cm^2  LVOT area(traced): 3.8cm^2  EDV(MOD-sp4): 242ml  ESV(MOD-sp4): 181ml  EF(MOD-sp4): 25.2%  LVLd ap2: 9.6cm  EDV(MOD-sp2): 144ml  ESV(MOD-sp2): 120ml  EF(MOD-sp2): 16.7%     Time Measurements Aortic R-R: 0.8sec  Aortic HR: 80BPM     DOPPLER MEASUREMENTS CALCULATIONS MV A dur: 0.1sec  MV E max will: 193.2cm/sec  MV A max will: 39.5cm/sec  MV E/A: 4.9  MV V2 max: 206.5cm/sec  MV max P.1mmHg  MV mean P.8mmHg  MV V2 VTI: 39.0cm  MVA(VTI): 0.8cm^2  MV P1/2t: 71.8msec  MVA(P1/2t): 3.1cm^2  MV dec slope: 850.3cm/sec^2  MV dec time: 0.25sec  Ao V2 max: 119.4cm/sec  Ao max P.7mmHg  SHUN(V,A): 1.4cm^2  SHUN(V,D): 1.4cm^2  LV V1 max P.9mmHg  LV V1 mean P.5mmHg  LV  V1 max: 48.1cm/sec  LV V1 VTI: 8.4cm  CO(LVOT): 2.4l/min  CI(LVOT): 1.1l/min/m^2  SV(LVOT): 29.5ml  TV V2 max: 73.4cm/sec  TV max P.2mmHg  PA V2 max: 69.0cm/sec  PA acc time: 0.18sec  TR max will: 288.9cm/sec  RVSP(TR): 41.4mmHg  RAP systole: 8mmHg        Assessment and Plan:   54-year-old man with a history of what is likely valvular cardiomyopathy, status post mitral valve repair in 2015, who then had continued symptoms, a failure to improve his LV ejection fraction.    He definitively has low cardiac output and significantly reduced ejection fraction however his endorgan function is remained stable and his exercise test is above where we would need to move forward.  He also feels his quality of life is acceptable at the moment.  We are in a holding pattern on timing.     I do think that advanced therapies are in his future although we will need to likely wait for slight deterioration from here (either more fatigue, fluid overload hospitalization for heart failure or VT or slightly worsened renal function or unintentional weight loss) any of these would prompt me to repeat his right heart catheterization and if cardiac output is worsened then we would be moving forward.     I do think he has adapted to a new normal and so we will have to be on the look out for subtle signs of worsening.     I will presently keep his neurohormonal blockade at their current doses and his diuretics as well.     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     Status post mitral repair: Stable gradient on last echo    Will plan echo for now, labs, 1 will see in 6 months, sooner as needed      Dunia Hdz MD      Anesthesia :  perfer local   If not- then with cardiac anesthesia in [place]   if hypotensive would recommend dopamine and not fluids and vasopressors         CC  Gerry Chatman, [place], ROSMERY [place], RUTHANN [place], [place]

## 2021-01-13 ENCOUNTER — VIRTUAL VISIT (OUTPATIENT)
Dept: CARDIOLOGY | Facility: CLINIC | Age: 56
End: 2021-01-13
Attending: INTERNAL MEDICINE
Payer: MEDICARE

## 2021-01-13 DIAGNOSIS — I50.22 CHRONIC SYSTOLIC HEART FAILURE (H): Primary | ICD-10-CM

## 2021-01-13 PROCEDURE — 99215 OFFICE O/P EST HI 40 MIN: CPT | Mod: 95 | Performed by: INTERNAL MEDICINE

## 2021-01-13 NOTE — PROGRESS NOTES
"Tej Morfin is a 55 year old male who is being evaluated via a billable video visit.      The patient has been notified of following:     \"This video visit will be conducted via a call between you and your physician/provider. We have found that certain health care needs can be provided without the need for an in-person physical exam.  This service lets us provide the care you need with a video conversation.  If a prescription is necessary we can send it directly to your pharmacy.  If lab work is needed we can place an order for that and you can then stop by our lab to have the test done at a later time.    Video visits are billed at different rates depending on your insurance coverage.  Please reach out to your insurance provider with any questions.    If during the course of the call the physician/provider feels a video visit is not appropriate, you will not be charged for this service.\"    Patient has given verbal consent for Video visit? Yes  How would you like to obtain your AVS? MyChart  If you are dropped from the video visit, the video invite should be resent to: Text to cell phone: [phone]  Will anyone else be joining your video visit? No        Video-Visit Details    Type of service:  Video Visit    Video Start Time:    Video End Time:       Originating Location (pt. Location): home    Distant Location (provider location):  [facility]     Platform used for Video Visit: [employer]     Application: MD Lingo   patient location: her house    My location [facility] remote clinic   Start time: 9:30 am     End time: 9::55 am     01/12/2021       Follow up HFrEF:     54, first diagnosed with severe mitral regurgitation and an ejection fraction of 25%.  He underwent an angiogram at that time that showed only mild coronary disease. He was referred to Dr. Graves for mitral regurgitation, and on 01/21/2015, he underwent mitral valve repair with an annuloplasty ring and was discharged home.        He " later presented with ventricular tachycardia which was sustained and therefore he underwent a single-chamber defibrillator for secondary prevention. Shortly after that, he had his first hospitalization for heart failure with reduced ejection fraction, and given his left ventricular end-diastolic dimension was 7 cm and the ventricular arrhythmias, you sent him to us for consideration of advanced therapies.      When we first saw him, he had an excellent cardiopulmonary stress test.  He now had a BiV upgrade to a CRT. He also underwent a PET scan of his chest to rule out cardiac sarcoidosis, which was not detected on this test. However, there was an area showed ischemia subtended by the circumflex artery and he underwent a follow up coronary angiography as noted above which showed mild non obstructive disease.    Around  6 months ago and at that time he was starting to feel worse than when I had seen him previously. We repeated RHC in June 2019 that showed elevated PCWP, normal right-sided filling pressures, mild pulmonary artery pressure elevation, and CI 1.8. Per his wife he has been slowing down gradually over the last year.  At that time we started his evaluation for both LVAD and transplant and most of this is been complete.     Since that time he is settled out into a new normal.  He feels similar to our last visit.    The only exception is that he had more fluid retention around the holidays- about 10 lbs, requiring more diuretics. He is back down to his baseline now and feels how he did before. Of note, he is exercising much less than he was previously so it is hard for him to gauge his limitation.        He has not had any further admissions for heart failure.  He is fatigued most days but is able to do what he wants to do and feels he has stable quality of life. .  He can do 2 flights of stairs.  He presently denies PND orthopnea.  He denies any ICD shocks. Wife also feels his symptoms are the same.     Of  note, he is planning to have  A neck lipoma removed and there is consideration for general anesthesia.       PAST MEDICAL HISTORY:     1.  Hemorrhoidectomy.     2.  Arthroscopy.     3.  Right partial medial meniscectomy 08/2013.     4.  Status post ICD implantation 02/02/2015, [employer], by Dr. Felix.     5.  Mitral valve repair with annuloplasty ring and atrial appendage ligation by Dr. Graves in [place], [place]; date on that was 01/21/2015.   6.  Gastroparesis    CURRENT MEDICATIONS:  Current Outpatient Medications   Medication Sig Dispense Refill     carvedilol (COREG) 12.5 MG tablet Take 1 tablet by mouth 2 times daily (with meals)       cyclobenzaprine (FLEXERIL) 10 MG tablet Take 10 mg by mouth 3 times daily as needed for muscle spasms       dapagliflozin (FARXIGA) 10 MG TABS tablet Take 10 mg by mouth daily       diazepam (VALIUM) 10 MG tablet Take 10 mg by mouth every 4 hours as needed for anxiety       fluticasone (FLONASE) 50 MCG/ACT nasal spray Spray 1 spray into both nostrils 2 times daily       furosemide (LASIX) 40 MG tablet TAKE 1 TABLET BY MOUTH EVERY DAY IN THE MORNING AND 1 TABLET IN THE AFTERNOON AS NEEDED FOR SWELLING       hydrALAZINE (APRESOLINE) 25 MG tablet TAKE 1 TABLET BY MOUTH THREE TIMES A DAY       metoclopramide (REGLAN) 10 MG tablet Take 10 mg by mouth 3 times daily For 3 weeks then off 1 week       omeprazole (PRILOSEC) 40 MG DR capsule Take 40 mg by mouth every morning       potassium chloride ER (KLOR-CON M) 20 MEQ CR tablet Take 20 mEq by mouth daily       sacubitril-valsartan (ENTRESTO)  MG per tablet TAKE 1 TABLET BY MOUTH TWICE A DAY       sotalol (BETAPACE) 80 MG tablet TAKE 1 TABLET BY MOUTH TWICE A DAY       spironolactone (ALDACTONE) 50 MG tablet Take 50 mg by mouth daily         SOCIAL HISTORY:  The patient lives with his wife. They have 2 children who live at home.  They are both teenagers.  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 Alcohol rarely, 1-2 times per month. No drug use.  Smoking, he smoked 1/2 pack per day for 10 years, quit in .        FAMILY HISTORY:  Mother had coronary disease and end-stage renal disease in her 40s. He has 1 younger brother and 1 sister who are alive and well.  Dad  at age 51, but not related to cardiac problems. There is no other family history of cardiomyopathy or sudden cardiac death.     ROS:   Constitutional: No fever, chills, or sweats. Weight is stable    ENT: No visual disturbance, ear ache, epistaxis, sore throat.   Allergies/Immunologic: Negative.   Respiratory: No cough, hemoptysis.   Cardiovascular: As per HPI.   GI: No nausea, vomiting, hematemesis, melena, or hematochezia.   : No urinary frequency, dysuria, or hematuria.   Integument: Negative.   Psychiatric: negative   Neuro: Negative.   Endocrinology: Negative.   Musculoskeletal:negative     EXAM:  Vital signs:    Physical Exam Elements:    Constitutional - well appearing     Eyes - no redness, discharge    Respiratory - no cough, breathing is comfortable     Musculoskeletal - normal range of motion    Skin - no discoloration, lesions    Neurological - no tremors, headaches, normal gait     Psychiatric - no anxiety, patient is alert & oriented    The rest of a comprehensive physical examination is deferred due to PHE (public health emergency) video visit restrictions    Labs:    Last Right heart catheterization       Last echocardiogram from [place]-in 2017 LV ejection fraction was around 10-15%-see scanned in report for further details    Last ICD interrogation:  Bi-V pacing at 96 %    CPX: 2019   RESULTS:  Oxygen Consumption: The patient's VO2 max was 1790 ml/min, which represents 62% of predicted maximum.  When normalize for body weight, VO2 max was 19.2 ml/kg/min, which was 62% of predicted maximum.  Anaerobic or ventilatory threshold (AT) was noted at  1064 ml/min or 11.4 ml/kg/min, which represents 59% of predicted maximum. RER was 0.87 at rest and increased to 1.13 at maximal exercise.    Cardiac Response: The resting EKG showed paced rhythm.  At maximal exercise, no ischemic changes were noted.  The HR was 80 beats/min, and alonso to 123 beats/min at maximal exercise, which is 73% of age-predicted maximum. HRR calculated as 46.  The O2 pulse was 4 ml/beat at rest and increased to 15 ml/beat at maximal exercise, which is 85 % of age-predicted maximum. The BP at rest was 138/80 and increased to 166/76 at maximal exercise.    6MWD:  Distance:    360 meters    Nov labs 2019 - [place]    Repeat labs being ordered today     Echo 2020     Patient Information Name: NATASHA HARVEY  Study Date: 2020  MRN: [id number]  : 1965  Gender: M  Account Number: [id number]  Reason for Study: Congestive heart failure  Patient Location: Adult Echo Lab  Height: 180.3cm  Weight: 98.4kg  Patient Header: BSA: 2.18 m2  Study Location: [facility]     Interp Summary M-Mode measurements reveal the left ventricle is moderately to severely enlarged.  Left ventricular systolic function is severely diminished with an estimated left ventricular  ejection fraction of 15-20%.  Global hypokinesis is noted.  The left atrium is moderately enlarged.  Mild aortic regurgitation.  There is mild mitral valve stenosis.  Right sided pressures indicate mild pulmonary hypertension.     Procedures The study performed was a(n) complete 2-D echo with M-Mode, color and spectral Doppler.  The study was performed by [facility].  The study quality was diagnostic.  The study was performed in the Echo Lab.     Right Ventricle There is normal right ventricular wall thickness.     Left Ventricle Global hypokinesis is noted.  There is no thrombus.     Right Ventricle The right ventricular wall motion is normal.     Atria The left atrium is moderately enlarged.  The  interatrial septum is intact with no evidence for an atrial septal defect.     Aortic Valve Mild aortic regurgitation.     Mitral Valve The anterior and posterior mitral valve leaflets appear thickened, but opens well.  There is mild mitral valve stenosis.  There is mild (+1) mitral regurgitation.     Tricuspid Valve The tricuspid valve is normal in structure.     Pulmonary Valve The pulmonary valve is poorly visualized.     Aorta and PA The pulmonary artery is normal size.     Left Ventricle M-Mode measurements reveal the left ventricle is moderately to severely enlarged.  There is no evidence of left ventricular hypertrophy.  Left ventricular systolic function is severely diminished with an estimated left ventricular  ejection fraction of 15-20%.     Right Ventricle M-Mode measurements reveal the right ventricle is moderately enlarged.     Atria The right atrium is enlarged.     Aortic Valve The aortic valve is normal in structure.     Aorta and PA The aortic root is normal in size.     Tricuspid Valve Moderate tricuspid regurgitation by color Doppler.  The calculated right ventricular systolic pressure is 41 mmHg.  Right sided pressures indicate mild pulmonary hypertension.     Pericardial Pleural Effusion Pericardium without effusion.     Aortic Valve No aortic valve stenosis.     Pulmonary Valve There is trace pulmonic regurgitation.     MMODE 2D MEASUREMENTS CALCULATIONS IVSd: 1.2cm  IVSs: 1.8cm  LVIDd: 6.6cm  LVIDs: 5.4cm  LVPWd: 1.2cm  LVPWs: 1.6cm  Ao root diam: 3.5cm  Ao root area: 9.6cm^2  ACS: 2.0cm  LA dimension: 5.1cm  asc Aorta Diam: 3.5cm  LVOT diam: 2.1cm  LVOT area: 3.5cm^2  LVOT area(traced): 3.8cm^2  EDV(MOD-sp4): 242ml  ESV(MOD-sp4): 181ml  EF(MOD-sp4): 25.2%  LVLd ap2: 9.6cm  EDV(MOD-sp2): 144ml  ESV(MOD-sp2): 120ml  EF(MOD-sp2): 16.7%     Time Measurements Aortic R-R: 0.8sec  Aortic HR: 80BPM     DOPPLER MEASUREMENTS CALCULATIONS MV A dur: 0.1sec  MV E max will: 193.2cm/sec  MV A max will:  39.5cm/sec  MV E/A: 4.9  MV V2 max: 206.5cm/sec  MV max P.1mmHg  MV mean P.8mmHg  MV V2 VTI: 39.0cm  MVA(VTI): 0.8cm^2  MV P1/2t: 71.8msec  MVA(P1/2t): 3.1cm^2  MV dec slope: 850.3cm/sec^2  MV dec time: 0.25sec  Ao V2 max: 119.4cm/sec  Ao max P.7mmHg  SHUN(V,A): 1.4cm^2  SHUN(V,D): 1.4cm^2  LV V1 max P.9mmHg  LV V1 mean P.5mmHg  LV V1 max: 48.1cm/sec  LV V1 VTI: 8.4cm  CO(LVOT): 2.4l/min  CI(LVOT): 1.1l/min/m^2  SV(LVOT): 29.5ml  TV V2 max: 73.4cm/sec  TV max P.2mmHg  PA V2 max: 69.0cm/sec  PA acc time: 0.18sec  TR max will: 288.9cm/sec  RVSP(TR): 41.4mmHg  RAP systole: 8mmHg        Assessment and Plan:   54-year-old man with a history of what is likely valvular cardiomyopathy, status post mitral valve repair in 2015, who then had continued symptoms, a failure to improve his LV ejection fraction.    He definitively has low cardiac output and significantly reduced ejection fraction however his endorgan function is remained stable and his exercise test is above where we would need to move forward.  He also feels his quality of life is acceptable at the moment.  We are in a holding pattern on timing.     I do think that advanced therapies are in his future although we will need to likely wait for slight deterioration from here (either more fatigue, fluid overload hospitalization for heart failure or VT or slightly worsened renal function or unintentional weight loss) any of these would prompt me to repeat his right heart catheterization and if cardiac output is worsened then we would be moving forward.     I do think he has adapted to a new normal and so we will have to be on the look out for subtle signs of worsening.     I will presently keep his neurohormonal blockade at their current doses and his diuretics as well.     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     Status post mitral repair: Stable gradient on last echo    Will plan to see him in 4 months in person in [place] with CMP, [id number]-bnp , sooner if needed.     Regarding his surgical risk for lipoma resection      He is at moderate risk for hypotension, arrhthymias with general anesthesia - and pulmonary edema with fluid administration  or if vasopressors are given.     Would prefer local anaesthesia.     perfer local anesthesia.     If not- then with cardiac anesthesia support in [place], ROSMERY Norman, NP   ROSMERY Harmon

## 2021-01-13 NOTE — LETTER
"1/13/2021      RE: Tej Morfin  [address]  [place] [address]       Dear Colleague,    Thank you for the opportunity to participate in the care of your patient, Tej Morfin, at the [facility] [place] at [facility]. Please see a copy of my visit note below.    Tej Morfin is a 55 year old male who is being evaluated via a billable video visit.      Echo same 8/2020     Labs good (bno 500s)     Neck mass?         ** note not complete     The patient has been notified of following:     \"This video visit will be conducted via a call between you and your physician/provider. We have found that certain health care needs can be provided without the need for an in-person physical exam.  This service lets us provide the care you need with a video conversation.  If a prescription is necessary we can send it directly to your pharmacy.  If lab work is needed we can place an order for that and you can then stop by our lab to have the test done at a later time.    Video visits are billed at different rates depending on your insurance coverage.  Please reach out to your insurance provider with any questions.    If during the course of the call the physician/provider feels a video visit is not appropriate, you will not be charged for this service.\"    Patient has given verbal consent for Video visit? Yes  How would you like to obtain your AVS? MyChart  If you are dropped from the video visit, the video invite should be resent to: Text to cell phone: [phone]  Will anyone else be joining your video visit? No        Video-Visit Details    Type of service:  Video Visit    Video Start Time:    Video End Time:       Originating Location (pt. Location): home    Distant Location (provider location):  [facility]     Platform used for Video Visit: [employer]       01/12/2021    Dear Dr. Felix:      Follow up HFrEF:     54, first diagnosed " with severe mitral regurgitation and an ejection fraction of 25%.  He underwent an angiogram at that time that showed only mild coronary disease. He was referred to Dr. Graves for mitral regurgitation, and on 01/21/2015, he underwent mitral valve repair with an annuloplasty ring and was discharged home.        He later presented with ventricular tachycardia which was sustained and therefore he underwent a single-chamber defibrillator for secondary prevention. Shortly after that, he had his first hospitalization for heart failure with reduced ejection fraction, and given his left ventricular end-diastolic dimension was 7 cm and the ventricular arrhythmias, you sent him to us for consideration of advanced therapies.      When we first saw him, he had an excellent cardiopulmonary stress test.  He now had a BiV upgrade to a CRT. He also underwent a PET scan of his chest to rule out cardiac sarcoidosis, which was not detected on this test. However, there was an area showed ischemia subtended by the circumflex artery and he underwent a follow up coronary angiography as noted above which showed mild non obstructive disease.    Around  6 months ago and at that time he was starting to feel worse than when I had seen him previously. We repeated RHC in June 2019 that showed elevated PCWP, normal right-sided filling pressures, mild pulmonary artery pressure elevation, and CI 1.8. Per his wife he has been slowing down gradually over the last year.  At that time we started his evaluation for both LVAD and transplant and most of this is been complete.     Since that time he is settled out into a new normal.  He feels similar to our last visit.  He has not had any further admissions for heart failure.  He is fatigued most days but is able to do what he wants to do and feels he has stable quality of life.  He is walking the dog a few blocks every morning.  He can do 2 flights of stairs.  He presently denies PND orthopnea.  He  denies any ICD shocks.     He does feel he is getting less activity due to COVID-19.     PAST MEDICAL HISTORY:     1.  Hemorrhoidectomy.     2.  Arthroscopy.     3.  Right partial medial meniscectomy 08/2013.     4.  Status post ICD implantation 02/02/2015, [employer] (acquired by [employer]), by Dr. Felix.     5.  Mitral valve repair with annuloplasty ring and atrial appendage ligation by Dr. Graves in [place], [place]; date on that was 01/21/2015.   6.  Gastroparesis    CURRENT MEDICATIONS:  Current Outpatient Medications   Medication Sig Dispense Refill     carvedilol (COREG) 12.5 MG tablet Take 1 tablet by mouth 2 times daily (with meals)       cyclobenzaprine (FLEXERIL) 10 MG tablet Take 10 mg by mouth 3 times daily as needed for muscle spasms       dapagliflozin (FARXIGA) 10 MG TABS tablet Take 10 mg by mouth daily       diazepam (VALIUM) 10 MG tablet Take 10 mg by mouth every 4 hours as needed for anxiety       fluticasone (FLONASE) 50 MCG/ACT nasal spray Spray 1 spray into both nostrils 2 times daily       furosemide (LASIX) 40 MG tablet TAKE 1 TABLET BY MOUTH EVERY DAY IN THE MORNING AND 1 TABLET IN THE AFTERNOON AS NEEDED FOR SWELLING       hydrALAZINE (APRESOLINE) 25 MG tablet TAKE 1 TABLET BY MOUTH THREE TIMES A DAY       metoclopramide (REGLAN) 10 MG tablet Take 10 mg by mouth 3 times daily For 3 weeks then off 1 week       omeprazole (PRILOSEC) 40 MG DR capsule Take 40 mg by mouth every morning       potassium chloride ER (KLOR-CON M) 20 MEQ CR tablet Take 20 mEq by mouth daily       sacubitril-valsartan (ENTRESTO)  MG per tablet TAKE 1 TABLET BY MOUTH TWICE A DAY       sotalol (BETAPACE) 80 MG tablet TAKE 1 TABLET BY MOUTH TWICE A DAY       spironolactone (ALDACTONE) 50 MG tablet Take 50 mg by mouth daily         SOCIAL HISTORY:  The patient lives with his wife. They have 2 children who live at home.  They are both teenagers.  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 Alcohol rarely, 1-2 times per month. No drug use.  Smoking, he smoked 1/2 pack per day for 10 years, quit in .        FAMILY HISTORY:  Mother had coronary disease and end-stage renal disease in her 40s. He has 1 younger brother and 1 sister who are alive and well.  Dad  at age 51, but not related to cardiac problems. There is no other family history of cardiomyopathy or sudden cardiac death.     ROS:   Constitutional: No fever, chills, or sweats. Weight is stable    ENT: No visual disturbance, ear ache, epistaxis, sore throat.   Allergies/Immunologic: Negative.   Respiratory: No cough, hemoptysis.   Cardiovascular: As per HPI.   GI: No nausea, vomiting, hematemesis, melena, or hematochezia.   : No urinary frequency, dysuria, or hematuria.   Integument: Negative.   Psychiatric: negative   Neuro: Negative.   Endocrinology: Negative.   Musculoskeletal: He had a rib injury after a mechanical fall    EXAM:  Vital signs:    Physical Exam Elements:    Constitutional - well appearing     Eyes - no redness, discharge    Respiratory - no cough, breathing is comfortable     Musculoskeletal - normal range of motion    Skin - no discoloration, lesions    Neurological - no tremors, headaches, normal gait     Psychiatric - no anxiety, patient is alert & oriented    The rest of a comprehensive physical examination is deferred due to PHE (public health emergency) video visit restrictions    Labs:    Last Right heart catheterization       Last echocardiogram from [place]-in 2017 LV ejection fraction was around 10-15%-see scanned in report for further details    Last ICD interrogation:  Bi-V pacing at 96 %    CPX: 2019   RESULTS:  Oxygen Consumption: The patient's VO2 max was 1790 ml/min, which represents 62% of predicted maximum.  When normalize for body weight, VO2 max was 19.2 ml/kg/min, which was 62% of predicted maximum.  Anaerobic or ventilatory threshold  (AT) was noted at 1064 ml/min or 11.4 ml/kg/min, which represents 59% of predicted maximum. RER was 0.87 at rest and increased to 1.13 at maximal exercise.    Cardiac Response: The resting EKG showed paced rhythm.  At maximal exercise, no ischemic changes were noted.  The HR was 80 beats/min, and alonso to 123 beats/min at maximal exercise, which is 73% of age-predicted maximum. HRR calculated as 46.  The O2 pulse was 4 ml/beat at rest and increased to 15 ml/beat at maximal exercise, which is 85 % of age-predicted maximum. The BP at rest was 138/80 and increased to 166/76 at maximal exercise.    6MWD:  Distance:    360 meters    Nov labs 2019 - [place]    Repeat labs being ordered today     Echo 2020     Patient Information Name: NATASHA HARVEY  Study Date: 2020  MRN: [id number]  : 1965  Gender: M  Account Number: [id number]  Reason for Study: Congestive heart failure  Patient Location: Adult Echo Lab  Height: 180.3cm  Weight: 98.4kg  Patient Header: BSA: 2.18 m2  Study Location: [facility]     Interp Summary M-Mode measurements reveal the left ventricle is moderately to severely enlarged.  Left ventricular systolic function is severely diminished with an estimated left ventricular  ejection fraction of 15-20%.  Global hypokinesis is noted.  The left atrium is moderately enlarged.  Mild aortic regurgitation.  There is mild mitral valve stenosis.  Right sided pressures indicate mild pulmonary hypertension.     Procedures The study performed was a(n) complete 2-D echo with M-Mode, color and spectral Doppler.  The study was performed by [facility].  The study quality was diagnostic.  The study was performed in the Echo Lab.     Right Ventricle There is normal right ventricular wall thickness.     Left Ventricle Global hypokinesis is noted.  There is no thrombus.     Right Ventricle The right ventricular wall motion is normal.     Atria The left atrium is moderately  enlarged.  The interatrial septum is intact with no evidence for an atrial septal defect.     Aortic Valve Mild aortic regurgitation.     Mitral Valve The anterior and posterior mitral valve leaflets appear thickened, but opens well.  There is mild mitral valve stenosis.  There is mild (+1) mitral regurgitation.     Tricuspid Valve The tricuspid valve is normal in structure.     Pulmonary Valve The pulmonary valve is poorly visualized.     Aorta and PA The pulmonary artery is normal size.     Left Ventricle M-Mode measurements reveal the left ventricle is moderately to severely enlarged.  There is no evidence of left ventricular hypertrophy.  Left ventricular systolic function is severely diminished with an estimated left ventricular  ejection fraction of 15-20%.     Right Ventricle M-Mode measurements reveal the right ventricle is moderately enlarged.     Atria The right atrium is enlarged.     Aortic Valve The aortic valve is normal in structure.     Aorta and PA The aortic root is normal in size.     Tricuspid Valve Moderate tricuspid regurgitation by color Doppler.  The calculated right ventricular systolic pressure is 41 mmHg.  Right sided pressures indicate mild pulmonary hypertension.     Pericardial Pleural Effusion Pericardium without effusion.     Aortic Valve No aortic valve stenosis.     Pulmonary Valve There is trace pulmonic regurgitation.     MMODE 2D MEASUREMENTS CALCULATIONS IVSd: 1.2cm  IVSs: 1.8cm  LVIDd: 6.6cm  LVIDs: 5.4cm  LVPWd: 1.2cm  LVPWs: 1.6cm  Ao root diam: 3.5cm  Ao root area: 9.6cm^2  ACS: 2.0cm  LA dimension: 5.1cm  asc Aorta Diam: 3.5cm  LVOT diam: 2.1cm  LVOT area: 3.5cm^2  LVOT area(traced): 3.8cm^2  EDV(MOD-sp4): 242ml  ESV(MOD-sp4): 181ml  EF(MOD-sp4): 25.2%  LVLd ap2: 9.6cm  EDV(MOD-sp2): 144ml  ESV(MOD-sp2): 120ml  EF(MOD-sp2): 16.7%     Time Measurements Aortic R-R: 0.8sec  Aortic HR: 80BPM     DOPPLER MEASUREMENTS CALCULATIONS MV A dur: 0.1sec  MV E max will: 193.2cm/sec  MV A  max will: 39.5cm/sec  MV E/A: 4.9  MV V2 max: 206.5cm/sec  MV max P.1mmHg  MV mean P.8mmHg  MV V2 VTI: 39.0cm  MVA(VTI): 0.8cm^2  MV P1/2t: 71.8msec  MVA(P1/2t): 3.1cm^2  MV dec slope: 850.3cm/sec^2  MV dec time: 0.25sec  Ao V2 max: 119.4cm/sec  Ao max P.7mmHg  SHUN(V,A): 1.4cm^2  SHUN(V,D): 1.4cm^2  LV V1 max P.9mmHg  LV V1 mean P.5mmHg  LV V1 max: 48.1cm/sec  LV V1 VTI: 8.4cm  CO(LVOT): 2.4l/min  CI(LVOT): 1.1l/min/m^2  SV(LVOT): 29.5ml  TV V2 max: 73.4cm/sec  TV max P.2mmHg  PA V2 max: 69.0cm/sec  PA acc time: 0.18sec  TR max will: 288.9cm/sec  RVSP(TR): 41.4mmHg  RAP systole: 8mmHg        Assessment and Plan:   54-year-old man with a history of what is likely valvular cardiomyopathy, status post mitral valve repair in 2015, who then had continued symptoms, a failure to improve his LV ejection fraction.    He definitively has low cardiac output and significantly reduced ejection fraction however his endorgan function is remained stable and his exercise test is above where we would need to move forward.  He also feels his quality of life is acceptable at the moment.  We are in a holding pattern on timing.     I do think that advanced therapies are in his future although we will need to likely wait for slight deterioration from here (either more fatigue, fluid overload hospitalization for heart failure or VT or slightly worsened renal function or unintentional weight loss) any of these would prompt me to repeat his right heart catheterization and if cardiac output is worsened then we would be moving forward.     I do think he has adapted to a new normal and so we will have to be on the look out for subtle signs of worsening.     I will presently keep his neurohormonal blockade at their current doses and his diuretics as well.     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     Status post mitral repair: Stable gradient on last echo    Will plan echo for now, labs, 1 will see in 6 months, sooner as needed     Dunia Hdz MD      Anesthesia :  perfer local   If not- then with cardiac anesthesia in [facility]   if hypotensive would recommend dopamine and not fluids and vasopressors         CC  Yoan Felix,   [place], [facility], [place]     Miladis Oviedo, RUTHANN   [facility], [place]           Tej Morfin is a 55 year old male who is being evaluated via a billable video visit.      The patient has been notified of following:     \"This video visit will be conducted via a call between you and your physician/provider. We have found that certain health care needs can be provided without the need for an in-person physical exam.  This service lets us provide the care you need with a video conversation.  If a prescription is necessary we can send it directly to your pharmacy.  If lab work is needed we can place an order for that and you can then stop by our lab to have the test done at a later time.    Video visits are billed at different rates depending on your insurance coverage.  Please reach out to your insurance provider with any questions.    If during the course of the call the physician/provider feels a video visit is not appropriate, you will not be charged for this service.\"    Patient has given verbal consent for Video visit? Yes  How would you like to obtain your AVS? MyChart  If you are dropped from the video visit, the video invite should be resent to: Text to cell phone: [phone]  Will anyone else be joining your video visit? No        Video-Visit Details    Type of service:  Video Visit    Video Start Time:    Video End Time:   "     Originating Location (pt. Location): home    Distant Location (provider location):  [facility]     Platform used for Video Visit: lars     Application: Page[employer]amp  Emily[employer]er   patient location: her house    My location [facility] remote clinic   Start time: 9:30 am     End time: 9::55 am     01/12/2021       Follow up HFrEF:     54, first diagnosed with severe mitral regurgitation and an ejection fraction of 25%.  He underwent an angiogram at that time that showed only mild coronary disease. He was referred to Dr. Graves for mitral regurgitation, and on 01/21/2015, he underwent mitral valve repair with an annuloplasty ring and was discharged home.        He later presented with ventricular tachycardia which was sustained and therefore he underwent a single-chamber defibrillator for secondary prevention. Shortly after that, he had his first hospitalization for heart failure with reduced ejection fraction, and given his left ventricular end-diastolic dimension was 7 cm and the ventricular arrhythmias, you sent him to us for consideration of advanced therapies.      When we first saw him, he had an excellent cardiopulmonary stress test.  He now had a BiV upgrade to a CRT. He also underwent a PET scan of his chest to rule out cardiac sarcoidosis, which was not detected on this test. However, there was an area showed ischemia subtended by the circumflex artery and he underwent a follow up coronary angiography as noted above which showed mild non obstructive disease.    Around  6 months ago and at that time he was starting to feel worse than when I had seen him previously. We repeated RHC in June 2019 that showed elevated PCWP, normal right-sided filling pressures, mild pulmonary artery pressure elevation, and CI 1.8. Per his wife he has been slowing down gradually over the last year.  At that time we started his evaluation for both LVAD and transplant and most of this is been complete.     Since that time he is  settled out into a new normal.  He feels similar to our last visit.    The only exception is that he had more fluid retention around the holidays- about 10 lbs, requiring more diuretics. He is back down to his baseline now and feels how he did before. Of note, he is exercising much less than he was previously so it is hard for him to gauge his limitation.        He has not had any further admissions for heart failure.  He is fatigued most days but is able to do what he wants to do and feels he has stable quality of life. .  He can do 2 flights of stairs.  He presently denies PND orthopnea.  He denies any ICD shocks. Wife also feels his symptoms are the same.     Of note, he is planning to have  A neck lipoma removed and there is consideration for general anesthesia.       PAST MEDICAL HISTORY:     1.  Hemorrhoidectomy.     2.  Arthroscopy.     3.  Right partial medial meniscectomy 08/2013.     4.  Status post ICD implantation 02/02/2015, [employer], by Dr. Felix.     5.  Mitral valve repair with annuloplasty ring and atrial appendage ligation by Dr. Graves in [place], [place]; date on that was 01/21/2015.   6.  Gastroparesis    CURRENT MEDICATIONS:  Current Outpatient Medications   Medication Sig Dispense Refill     carvedilol (COREG) 12.5 MG tablet Take 1 tablet by mouth 2 times daily (with meals)       cyclobenzaprine (FLEXERIL) 10 MG tablet Take 10 mg by mouth 3 times daily as needed for muscle spasms       dapagliflozin (FARXIGA) 10 MG TABS tablet Take 10 mg by mouth daily       diazepam (VALIUM) 10 MG tablet Take 10 mg by mouth every 4 hours as needed for anxiety       fluticasone (FLONASE) 50 MCG/ACT nasal spray Spray 1 spray into both nostrils 2 times daily       furosemide (LASIX) 40 MG tablet TAKE 1 TABLET BY MOUTH EVERY DAY IN THE MORNING AND 1 TABLET IN THE AFTERNOON AS NEEDED FOR SWELLING       hydrALAZINE (APRESOLINE) 25 MG tablet TAKE 1 TABLET BY MOUTH THREE TIMES A DAY        metoclopramide (REGLAN) 10 MG tablet Take 10 mg by mouth 3 times daily For 3 weeks then off 1 week       omeprazole (PRILOSEC) 40 MG DR capsule Take 40 mg by mouth every morning       potassium chloride ER (KLOR-CON M) 20 MEQ CR tablet Take 20 mEq by mouth daily       sacubitril-valsartan (ENTRESTO)  MG per tablet TAKE 1 TABLET BY MOUTH TWICE A DAY       sotalol (BETAPACE) 80 MG tablet TAKE 1 TABLET BY MOUTH TWICE A DAY       spironolactone (ALDACTONE) 50 MG tablet Take 50 mg by mouth daily         SOCIAL HISTORY:  The patient lives with his wife. They have 2 children who live at home.  They are both teenagers.  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 Alcohol rarely, 1-2 times per month. No drug use.  Smoking, he smoked 1/2 pack per day for 10 years, quit in .        FAMILY HISTORY:  Mother had coronary disease and end-stage renal disease in her 40s. He has 1 younger brother and 1 sister who are alive and well.  Dad  at age 51, but not related to cardiac problems. There is no other family history of cardiomyopathy or sudden cardiac death.     ROS:   Constitutional: No fever, chills, or sweats. Weight is stable    ENT: No visual disturbance, ear ache, epistaxis, sore throat.   Allergies/Immunologic: Negative.   Respiratory: No cough, hemoptysis.   Cardiovascular: As per HPI.   GI: No nausea, vomiting, hematemesis, melena, or hematochezia.   : No urinary frequency, dysuria, or hematuria.   Integument: Negative.   Psychiatric: negative   Neuro: Negative.   Endocrinology: Negative.   Musculoskeletal:negative     EXAM:  Vital signs:    Physical Exam Elements:    Constitutional - well appearing     Eyes - no redness, discharge    Respiratory - no cough, breathing is comfortable     Musculoskeletal - normal range of motion    Skin - no discoloration, lesions    Neurological - no tremors, headaches, normal gait     Psychiatric - no  anxiety, patient is alert & oriented    The rest of a comprehensive physical examination is deferred due to PHE (public health emergency) video visit restrictions    Labs:    Last Right heart catheterization       Last echocardiogram from [facility] 2017 LV ejection fraction was around 10-15%-see scanned in report for further details    Last ICD interrogation:  Bi-V pacing at 96 %    CPX: 2019   RESULTS:  Oxygen Consumption: The patient's VO2 max was 1790 ml/min, which represents 62% of predicted maximum.  When normalize for body weight, VO2 max was 19.2 ml/kg/min, which was 62% of predicted maximum.  Anaerobic or ventilatory threshold (AT) was noted at 1064 ml/min or 11.4 ml/kg/min, which represents 59% of predicted maximum. RER was 0.87 at rest and increased to 1.13 at maximal exercise.    Cardiac Response: The resting EKG showed paced rhythm.  At maximal exercise, no ischemic changes were noted.  The HR was 80 beats/min, and alonso to 123 beats/min at maximal exercise, which is 73% of age-predicted maximum. HRR calculated as 46.  The O2 pulse was 4 ml/beat at rest and increased to 15 ml/beat at maximal exercise, which is 85 % of age-predicted maximum. The BP at rest was 138/80 and increased to 166/76 at maximal exercise.    6MWD:  Distance:    360 meters    Nov labs  - [place]    Repeat labs being ordered today     Echo 2020     Patient Information Name: NATASHA HARVEY  Study Date: 2020  MRN: [id number]  : 1965  Gender: M  Account Number: [id number]  Reason for Study: Congestive heart failure  Patient Location: Adult Echo Lab  Height: 180.3cm  Weight: 98.4kg  Patient Header: BSA: 2.18 m2  Study Location: [facility]     Inter Summary M-Mode measurements reveal the left ventricle is moderately to severely enlarged.  Left ventricular systolic function is severely diminished with an estimated left ventricular  ejection fraction of 15-20%.  Global hypokinesis  is noted.  The left atrium is moderately enlarged.  Mild aortic regurgitation.  There is mild mitral valve stenosis.  Right sided pressures indicate mild pulmonary hypertension.     Procedures The study performed was a(n) complete 2-D echo with M-Mode, color and spectral Doppler.  The study was performed by [facility].  The study quality was diagnostic.  The study was performed in the Echo Lab.     Right Ventricle There is normal right ventricular wall thickness.     Left Ventricle Global hypokinesis is noted.  There is no thrombus.     Right Ventricle The right ventricular wall motion is normal.     Atria The left atrium is moderately enlarged.  The interatrial septum is intact with no evidence for an atrial septal defect.     Aortic Valve Mild aortic regurgitation.     Mitral Valve The anterior and posterior mitral valve leaflets appear thickened, but opens well.  There is mild mitral valve stenosis.  There is mild (+1) mitral regurgitation.     Tricuspid Valve The tricuspid valve is normal in structure.     Pulmonary Valve The pulmonary valve is poorly visualized.     Aorta and PA The pulmonary artery is normal size.     Left Ventricle M-Mode measurements reveal the left ventricle is moderately to severely enlarged.  There is no evidence of left ventricular hypertrophy.  Left ventricular systolic function is severely diminished with an estimated left ventricular  ejection fraction of 15-20%.     Right Ventricle M-Mode measurements reveal the right ventricle is moderately enlarged.     Atria The right atrium is enlarged.     Aortic Valve The aortic valve is normal in structure.     Aorta and PA The aortic root is normal in size.     Tricuspid Valve Moderate tricuspid regurgitation by color Doppler.  The calculated right ventricular systolic pressure is 41 mmHg.  Right sided pressures indicate mild pulmonary hypertension.     Pericardial Pleural Effusion Pericardium without effusion.     Aortic  Valve No aortic valve stenosis.     Pulmonary Valve There is trace pulmonic regurgitation.     MMODE 2D MEASUREMENTS CALCULATIONS IVSd: 1.2cm  IVSs: 1.8cm  LVIDd: 6.6cm  LVIDs: 5.4cm  LVPWd: 1.2cm  LVPWs: 1.6cm  Ao root diam: 3.5cm  Ao root area: 9.6cm^2  ACS: 2.0cm  LA dimension: 5.1cm  asc Aorta Diam: 3.5cm  LVOT diam: 2.1cm  LVOT area: 3.5cm^2  LVOT area(traced): 3.8cm^2  EDV(MOD-sp4): 242ml  ESV(MOD-sp4): 181ml  EF(MOD-sp4): 25.2%  LVLd ap2: 9.6cm  EDV(MOD-sp2): 144ml  ESV(MOD-sp2): 120ml  EF(MOD-sp2): 16.7%     Time Measurements Aortic R-R: 0.8sec  Aortic HR: 80BPM     DOPPLER MEASUREMENTS CALCULATIONS MV A dur: 0.1sec  MV E max will: 193.2cm/sec  MV A max will: 39.5cm/sec  MV E/A: 4.9  MV V2 max: 206.5cm/sec  MV max P.1mmHg  MV mean P.8mmHg  MV V2 VTI: 39.0cm  MVA(VTI): 0.8cm^2  MV P1/2t: 71.8msec  MVA(P1/2t): 3.1cm^2  MV dec slope: 850.3cm/sec^2  MV dec time: 0.25sec  Ao V2 max: 119.4cm/sec  Ao max P.7mmHg  SHUN(V,A): 1.4cm^2  SHUN(V,D): 1.4cm^2  LV V1 max P.9mmHg  LV V1 mean P.5mmHg  LV V1 max: 48.1cm/sec  LV V1 VTI: 8.4cm  CO(LVOT): 2.4l/min  CI(LVOT): 1.1l/min/m^2  SV(LVOT): 29.5ml  TV V2 max: 73.4cm/sec  TV max P.2mmHg  PA V2 max: 69.0cm/sec  PA acc time: 0.18sec  TR max will: 288.9cm/sec  RVSP(TR): 41.4mmHg  RAP systole: 8mmHg        Assessment and Plan:   54-year-old man with a history of what is likely valvular cardiomyopathy, status post mitral valve repair in 2015, who then had continued symptoms, a failure to improve his LV ejection fraction.    He definitively has low cardiac output and significantly reduced ejection fraction however his endorgan function is remained stable and his exercise test is above where we would need to move forward.  He also feels his quality of life is acceptable at the moment.  We are in a holding pattern on timing.     I do think that advanced therapies are in his future although we will need to likely wait for slight deterioration from here  (either more fatigue, fluid overload hospitalization for heart failure or VT or slightly worsened renal function or unintentional weight loss) any of these would prompt me to repeat his right heart catheterization and if cardiac output is worsened then we would be moving forward.     I do think he has adapted to a new normal and so we will have to be on the look out for subtle signs of worsening.     I will presently keep his neurohormonal blockade at their current doses and his diuretics as well.     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     Status post mitral repair: Stable gradient on last echo    Will plan to see him in 4 months in person in [place] with [facility], [id number] , sooner if needed.     Regarding his surgical risk for lipoma resection      He is at moderate risk for hypotension, arrhthymias with general anesthesia - and pulmonary edema with fluid administration  or if vasopressors are given.     Would prefer local anaesthesia.     perfer local anesthesia.     If not- then with cardiac anesthesia support in [place], [facility] [place], [facility] [place]             Please do not hesitate to contact me if you have any questions/concerns.     Sincerely,     Dunia Hdz MD

## 2021-01-13 NOTE — LETTER
"1/13/2021      RE: Tej Morfin  [address]  [place] [address]       Tej Morfin is a 55 year old male who is being evaluated via a billable video visit.      Echo same 8/2020     Labs good (bno 500s)     Neck mass?         ** note not complete     The patient has been notified of following:     \"This video visit will be conducted via a call between you and your physician/provider. We have found that certain health care needs can be provided without the need for an in-person physical exam.  This service lets us provide the care you need with a video conversation.  If a prescription is necessary we can send it directly to your pharmacy.  If lab work is needed we can place an order for that and you can then stop by our lab to have the test done at a later time.    Video visits are billed at different rates depending on your insurance coverage.  Please reach out to your insurance provider with any questions.    If during the course of the call the physician/provider feels a video visit is not appropriate, you will not be charged for this service.\"    Patient has given verbal consent for Video visit? Yes  How would you like to obtain your AVS? MyChart  If you are dropped from the video visit, the video invite should be resent to: Text to cell phone: [phone]  Will anyone else be joining your video visit? No        Video-Visit Details    Type of service:  Video Visit    Video Start Time:    Video End Time:       Originating Location (pt. Location): home    Distant Location (provider location):  [facility]     Platform used for Video Visit: [employer]       01/12/2021    Dear Dr. Felix:      Follow up HFrEF:     54, first diagnosed with severe mitral regurgitation and an ejection fraction of 25%.  He underwent an angiogram at that time that showed only mild coronary disease. He was referred to Dr. Graves for mitral regurgitation, and on 01/21/2015, he underwent mitral valve " repair with an annuloplasty ring and was discharged home.        He later presented with ventricular tachycardia which was sustained and therefore he underwent a single-chamber defibrillator for secondary prevention. Shortly after that, he had his first hospitalization for heart failure with reduced ejection fraction, and given his left ventricular end-diastolic dimension was 7 cm and the ventricular arrhythmias, you sent him to us for consideration of advanced therapies.      When we first saw him, he had an excellent cardiopulmonary stress test.  He now had a BiV upgrade to a CRT. He also underwent a PET scan of his chest to rule out cardiac sarcoidosis, which was not detected on this test. However, there was an area showed ischemia subtended by the circumflex artery and he underwent a follow up coronary angiography as noted above which showed mild non obstructive disease.    Around  6 months ago and at that time he was starting to feel worse than when I had seen him previously. We repeated RHC in June 2019 that showed elevated PCWP, normal right-sided filling pressures, mild pulmonary artery pressure elevation, and CI 1.8. Per his wife he has been slowing down gradually over the last year.  At that time we started his evaluation for both LVAD and transplant and most of this is been complete.     Since that time he is settled out into a new normal.  He feels similar to our last visit.  He has not had any further admissions for heart failure.  He is fatigued most days but is able to do what he wants to do and feels he has stable quality of life.  He is walking the dog a few blocks every morning.  He can do 2 flights of stairs.  He presently denies PND orthopnea.  He denies any ICD shocks.     He does feel he is getting less activity due to COVID-19.     PAST MEDICAL HISTORY:     1.  Hemorrhoidectomy.     2.  Arthroscopy.     3.  Right partial medial meniscectomy 08/2013.     4.  Status post ICD implantation  02/02/2015, [employer], by Dr. Felix.     5.  Mitral valve repair with annuloplasty ring and atrial appendage ligation by Dr. Graves in [place], [place]; date on that was 01/21/2015.   6.  Gastroparesis    CURRENT MEDICATIONS:  Current Outpatient Medications   Medication Sig Dispense Refill     carvedilol (COREG) 12.5 MG tablet Take 1 tablet by mouth 2 times daily (with meals)       cyclobenzaprine (FLEXERIL) 10 MG tablet Take 10 mg by mouth 3 times daily as needed for muscle spasms       dapagliflozin (FARXIGA) 10 MG TABS tablet Take 10 mg by mouth daily       diazepam (VALIUM) 10 MG tablet Take 10 mg by mouth every 4 hours as needed for anxiety       fluticasone (FLONASE) 50 MCG/ACT nasal spray Spray 1 spray into both nostrils 2 times daily       furosemide (LASIX) 40 MG tablet TAKE 1 TABLET BY MOUTH EVERY DAY IN THE MORNING AND 1 TABLET IN THE AFTERNOON AS NEEDED FOR SWELLING       hydrALAZINE (APRESOLINE) 25 MG tablet TAKE 1 TABLET BY MOUTH THREE TIMES A DAY       metoclopramide (REGLAN) 10 MG tablet Take 10 mg by mouth 3 times daily For 3 weeks then off 1 week       omeprazole (PRILOSEC) 40 MG DR capsule Take 40 mg by mouth every morning       potassium chloride ER (KLOR-CON M) 20 MEQ CR tablet Take 20 mEq by mouth daily       sacubitril-valsartan (ENTRESTO)  MG per tablet TAKE 1 TABLET BY MOUTH TWICE A DAY       sotalol (BETAPACE) 80 MG tablet TAKE 1 TABLET BY MOUTH TWICE A DAY       spironolactone (ALDACTONE) 50 MG tablet Take 50 mg by mouth daily         SOCIAL HISTORY:  The patient lives with his wife. They have 2 children who live at home.  They are both teenagers.  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 Alcohol rarely, 1-2 times per month. No drug use.  Smoking, he smoked 1/2 pack per day for 10 years, quit in 2014.        FAMILY HISTORY:  Mother had coronary disease and end-stage renal disease in her 40s. He  has 1 younger brother and 1 sister who are alive and well.  Dad  at age 51, but not related to cardiac problems. There is no other family history of cardiomyopathy or sudden cardiac death.     ROS:   Constitutional: No fever, chills, or sweats. Weight is stable    ENT: No visual disturbance, ear ache, epistaxis, sore throat.   Allergies/Immunologic: Negative.   Respiratory: No cough, hemoptysis.   Cardiovascular: As per HPI.   GI: No nausea, vomiting, hematemesis, melena, or hematochezia.   : No urinary frequency, dysuria, or hematuria.   Integument: Negative.   Psychiatric: negative   Neuro: Negative.   Endocrinology: Negative.   Musculoskeletal: He had a rib injury after a mechanical fall    EXAM:  Vital signs:    Physical Exam Elements:    Constitutional - well appearing     Eyes - no redness, discharge    Respiratory - no cough, breathing is comfortable     Musculoskeletal - normal range of motion    Skin - no discoloration, lesions    Neurological - no tremors, headaches, normal gait     Psychiatric - no anxiety, patient is alert & oriented    The rest of a comprehensive physical examination is deferred due to PHE (public health emergency) video visit restrictions    Labs:    Last Right heart catheterization       Last echocardiogram from [place] 2017 LV ejection fraction was around 10-15%-see scanned in report for further details    Last ICD interrogation:  Bi-V pacing at 96 %    CPX: 2019   RESULTS:  Oxygen Consumption: The patient's VO2 max was 1790 ml/min, which represents 62% of predicted maximum.  When normalize for body weight, VO2 max was 19.2 ml/kg/min, which was 62% of predicted maximum.  Anaerobic or ventilatory threshold (AT) was noted at 1064 ml/min or 11.4 ml/kg/min, which represents 59% of predicted maximum. RER was 0.87 at rest and increased to 1.13 at maximal exercise.    Cardiac Response: The resting EKG showed paced rhythm.  At maximal exercise, no ischemic  changes were noted.  The HR was 80 beats/min, and alonso to 123 beats/min at maximal exercise, which is 73% of age-predicted maximum. HRR calculated as 46.  The O2 pulse was 4 ml/beat at rest and increased to 15 ml/beat at maximal exercise, which is 85 % of age-predicted maximum. The BP at rest was 138/80 and increased to 166/76 at maximal exercise.    6MWD:  Distance:    360 meters    Nov labs  - [place]    Repeat labs being ordered today     Echo 2020     Patient Information Name: NATASHA HARVEY  Study Date: 2020  MRN: [id number]  : 1965  Gender: M  Account Number: [id number]  Reason for Study: Congestive heart failure  Patient Location: Adult Echo Lab  Height: 180.3cm  Weight: 98.4kg  Patient Header: BSA: 2.18 m2  Study Location: [facility]     Interp Summary M-Mode measurements reveal the left ventricle is moderately to severely enlarged.  Left ventricular systolic function is severely diminished with an estimated left ventricular  ejection fraction of 15-20%.  Global hypokinesis is noted.  The left atrium is moderately enlarged.  Mild aortic regurgitation.  There is mild mitral valve stenosis.  Right sided pressures indicate mild pulmonary hypertension.     Procedures The study performed was a(n) complete 2-D echo with M-Mode, color and spectral Doppler.  The study was performed by [facility].  The study quality was diagnostic.  The study was performed in the Echo Lab.     Right Ventricle There is normal right ventricular wall thickness.     Left Ventricle Global hypokinesis is noted.  There is no thrombus.     Right Ventricle The right ventricular wall motion is normal.     Atria The left atrium is moderately enlarged.  The interatrial septum is intact with no evidence for an atrial septal defect.     Aortic Valve Mild aortic regurgitation.     Mitral Valve The anterior and posterior mitral valve leaflets appear thickened, but opens well.  There is mild mitral  valve stenosis.  There is mild (+1) mitral regurgitation.     Tricuspid Valve The tricuspid valve is normal in structure.     Pulmonary Valve The pulmonary valve is poorly visualized.     Aorta and PA The pulmonary artery is normal size.     Left Ventricle M-Mode measurements reveal the left ventricle is moderately to severely enlarged.  There is no evidence of left ventricular hypertrophy.  Left ventricular systolic function is severely diminished with an estimated left ventricular  ejection fraction of 15-20%.     Right Ventricle M-Mode measurements reveal the right ventricle is moderately enlarged.     Atria The right atrium is enlarged.     Aortic Valve The aortic valve is normal in structure.     Aorta and PA The aortic root is normal in size.     Tricuspid Valve Moderate tricuspid regurgitation by color Doppler.  The calculated right ventricular systolic pressure is 41 mmHg.  Right sided pressures indicate mild pulmonary hypertension.     Pericardial Pleural Effusion Pericardium without effusion.     Aortic Valve No aortic valve stenosis.     Pulmonary Valve There is trace pulmonic regurgitation.     MMODE 2D MEASUREMENTS CALCULATIONS IVSd: 1.2cm  IVSs: 1.8cm  LVIDd: 6.6cm  LVIDs: 5.4cm  LVPWd: 1.2cm  LVPWs: 1.6cm  Ao root diam: 3.5cm  Ao root area: 9.6cm^2  ACS: 2.0cm  LA dimension: 5.1cm  asc Aorta Diam: 3.5cm  LVOT diam: 2.1cm  LVOT area: 3.5cm^2  LVOT area(traced): 3.8cm^2  EDV(MOD-sp4): 242ml  ESV(MOD-sp4): 181ml  EF(MOD-sp4): 25.2%  LVLd ap2: 9.6cm  EDV(MOD-sp2): 144ml  ESV(MOD-sp2): 120ml  EF(MOD-sp2): 16.7%     Time Measurements Aortic R-R: 0.8sec  Aortic HR: 80BPM     DOPPLER MEASUREMENTS CALCULATIONS MV A dur: 0.1sec  MV E max will: 193.2cm/sec  MV A max will: 39.5cm/sec  MV E/A: 4.9  MV V2 max: 206.5cm/sec  MV max P.1mmHg  MV mean P.8mmHg  MV V2 VTI: 39.0cm  MVA(VTI): 0.8cm^2  MV P1/2t: 71.8msec  MVA(P1/2t): 3.1cm^2  MV dec slope: 850.3cm/sec^2  MV dec time: 0.25sec  Ao V2 max:  119.4cm/sec  Ao max P.7mmHg  SHUN(V,A): 1.4cm^2  SHUN(V,D): 1.4cm^2  LV V1 max P.9mmHg  LV V1 mean P.5mmHg  LV V1 max: 48.1cm/sec  LV V1 VTI: 8.4cm  CO(LVOT): 2.4l/min  CI(LVOT): 1.1l/min/m^2  SV(LVOT): 29.5ml  TV V2 max: 73.4cm/sec  TV max P.2mmHg  PA V2 max: 69.0cm/sec  PA acc time: 0.18sec  TR max will: 288.9cm/sec  RVSP(TR): 41.4mmHg  RAP systole: 8mmHg        Assessment and Plan:   54-year-old man with a history of what is likely valvular cardiomyopathy, status post mitral valve repair in 2015, who then had continued symptoms, a failure to improve his LV ejection fraction.    He definitively has low cardiac output and significantly reduced ejection fraction however his endorgan function is remained stable and his exercise test is above where we would need to move forward.  He also feels his quality of life is acceptable at the moment.  We are in a holding pattern on timing.     I do think that advanced therapies are in his future although we will need to likely wait for slight deterioration from here (either more fatigue, fluid overload hospitalization for heart failure or VT or slightly worsened renal function or unintentional weight loss) any of these would prompt me to repeat his right heart catheterization and if cardiac output is worsened then we would be moving forward.     I do think he has adapted to a new normal and so we will have to be on the look out for subtle signs of worsening.     I will presently keep his neurohormonal blockade at their current doses and his diuretics as well.     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     Status post  "mitral repair: Stable gradient on last echo    Will plan echo for now, labs, 1 will see in 6 months, sooner as needed     Dunia Hdz MD      Anesthesia :  perfer local   If not- then with cardiac anesthesia in [place]   if hypotensive would recommend dopamine and not fluids and vasopressors         CC  Yoan Felix,   [place], [facility] [place], [place]     Miladis Oviedo, RUTHANN   [facility] [place], [place]           Tej Morfin is a 55 year old male who is being evaluated via a billable video visit.      The patient has been notified of following:     \"This video visit will be conducted via a call between you and your physician/provider. We have found that certain health care needs can be provided without the need for an in-person physical exam.  This service lets us provide the care you need with a video conversation.  If a prescription is necessary we can send it directly to your pharmacy.  If lab work is needed we can place an order for that and you can then stop by our lab to have the test done at a later time.    Video visits are billed at different rates depending on your insurance coverage.  Please reach out to your insurance provider with any questions.    If during the course of the call the physician/provider feels a video visit is not appropriate, you will not be charged for this service.\"    Patient has given verbal consent for Video visit? Yes  How would you like to obtain your AVS? MyChart  If you are dropped from the video visit, the video invite should be resent to: Text to cell phone: [phone]  Will anyone else be joining your video visit? No        Video-Visit Details    Type of service:  Video Visit    Video Start Time:    Video End Time:       Originating Location (pt. Location): home    Distant Location (provider location):  [facility]     Platform used for Video Visit: [employer]     Application: [employer]  Raj   patient location: her house    My location [facility] remote clinic "   Start time: 9:30 am     End time: 9::55 am     01/12/2021       Follow up HFrEF:     54, first diagnosed with severe mitral regurgitation and an ejection fraction of 25%.  He underwent an angiogram at that time that showed only mild coronary disease. He was referred to Dr. Graves for mitral regurgitation, and on 01/21/2015, he underwent mitral valve repair with an annuloplasty ring and was discharged home.        He later presented with ventricular tachycardia which was sustained and therefore he underwent a single-chamber defibrillator for secondary prevention. Shortly after that, he had his first hospitalization for heart failure with reduced ejection fraction, and given his left ventricular end-diastolic dimension was 7 cm and the ventricular arrhythmias, you sent him to us for consideration of advanced therapies.      When we first saw him, he had an excellent cardiopulmonary stress test.  He now had a BiV upgrade to a CRT. He also underwent a PET scan of his chest to rule out cardiac sarcoidosis, which was not detected on this test. However, there was an area showed ischemia subtended by the circumflex artery and he underwent a follow up coronary angiography as noted above which showed mild non obstructive disease.    Around  6 months ago and at that time he was starting to feel worse than when I had seen him previously. We repeated RHC in June 2019 that showed elevated PCWP, normal right-sided filling pressures, mild pulmonary artery pressure elevation, and CI 1.8. Per his wife he has been slowing down gradually over the last year.  At that time we started his evaluation for both LVAD and transplant and most of this is been complete.     Since that time he is settled out into a new normal.  He feels similar to our last visit.    The only exception is that he had more fluid retention around the holidays- about 10 lbs, requiring more diuretics. He is back down to his baseline now and feels how he did before.  Of note, he is exercising much less than he was previously so it is hard for him to gauge his limitation.        He has not had any further admissions for heart failure.  He is fatigued most days but is able to do what he wants to do and feels he has stable quality of life. .  He can do 2 flights of stairs.  He presently denies PND orthopnea.  He denies any ICD shocks. Wife also feels his symptoms are the same.     Of note, he is planning to have  A neck lipoma removed and there is consideration for general anesthesia.       PAST MEDICAL HISTORY:     1.  Hemorrhoidectomy.     2.  Arthroscopy.     3.  Right partial medial meniscectomy 08/2013.     4.  Status post ICD implantation 02/02/2015, [employer], by Dr. Felix.     5.  Mitral valve repair with annuloplasty ring and atrial appendage ligation by Dr. Graves in [place], [place]; date on that was 01/21/2015.   6.  Gastroparesis    CURRENT MEDICATIONS:  Current Outpatient Medications   Medication Sig Dispense Refill     carvedilol (COREG) 12.5 MG tablet Take 1 tablet by mouth 2 times daily (with meals)       cyclobenzaprine (FLEXERIL) 10 MG tablet Take 10 mg by mouth 3 times daily as needed for muscle spasms       dapagliflozin (FARXIGA) 10 MG TABS tablet Take 10 mg by mouth daily       diazepam (VALIUM) 10 MG tablet Take 10 mg by mouth every 4 hours as needed for anxiety       fluticasone (FLONASE) 50 MCG/ACT nasal spray Spray 1 spray into both nostrils 2 times daily       furosemide (LASIX) 40 MG tablet TAKE 1 TABLET BY MOUTH EVERY DAY IN THE MORNING AND 1 TABLET IN THE AFTERNOON AS NEEDED FOR SWELLING       hydrALAZINE (APRESOLINE) 25 MG tablet TAKE 1 TABLET BY MOUTH THREE TIMES A DAY       metoclopramide (REGLAN) 10 MG tablet Take 10 mg by mouth 3 times daily For 3 weeks then off 1 week       omeprazole (PRILOSEC) 40 MG DR capsule Take 40 mg by mouth every morning       potassium chloride ER (KLOR-CON M) 20 MEQ CR tablet Take 20 mEq by mouth daily        sacubitril-valsartan (ENTRESTO)  MG per tablet TAKE 1 TABLET BY MOUTH TWICE A DAY       sotalol (BETAPACE) 80 MG tablet TAKE 1 TABLET BY MOUTH TWICE A DAY       spironolactone (ALDACTONE) 50 MG tablet Take 50 mg by mouth daily         SOCIAL HISTORY:  The patient lives with his wife. They have 2 children who live at home.  They are both teenagers.  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 Alcohol rarely, 1-2 times per month. No drug use.  Smoking, he smoked 1/2 pack per day for 10 years, quit in .        FAMILY HISTORY:  Mother had coronary disease and end-stage renal disease in her 40s. He has 1 younger brother and 1 sister who are alive and well.  Dad  at age 51, but not related to cardiac problems. There is no other family history of cardiomyopathy or sudden cardiac death.     ROS:   Constitutional: No fever, chills, or sweats. Weight is stable    ENT: No visual disturbance, ear ache, epistaxis, sore throat.   Allergies/Immunologic: Negative.   Respiratory: No cough, hemoptysis.   Cardiovascular: As per HPI.   GI: No nausea, vomiting, hematemesis, melena, or hematochezia.   : No urinary frequency, dysuria, or hematuria.   Integument: Negative.   Psychiatric: negative   Neuro: Negative.   Endocrinology: Negative.   Musculoskeletal:negative     EXAM:  Vital signs:    Physical Exam Elements:    Constitutional - well appearing     Eyes - no redness, discharge    Respiratory - no cough, breathing is comfortable     Musculoskeletal - normal range of motion    Skin - no discoloration, lesions    Neurological - no tremors, headaches, normal gait     Psychiatric - no anxiety, patient is alert & oriented    The rest of a comprehensive physical examination is deferred due to PHE (public health emergency) video visit restrictions    Labs:    Last Right heart catheterization       Last echocardiogram from [place]-in September  2017 LV ejection fraction was around 10-15%-see scanned in report for further details    Last ICD interrogation:  Bi-V pacing at 96 %    CPX: 2019   RESULTS:  Oxygen Consumption: The patient's VO2 max was 1790 ml/min, which represents 62% of predicted maximum.  When normalize for body weight, VO2 max was 19.2 ml/kg/min, which was 62% of predicted maximum.  Anaerobic or ventilatory threshold (AT) was noted at 1064 ml/min or 11.4 ml/kg/min, which represents 59% of predicted maximum. RER was 0.87 at rest and increased to 1.13 at maximal exercise.    Cardiac Response: The resting EKG showed paced rhythm.  At maximal exercise, no ischemic changes were noted.  The HR was 80 beats/min, and alonso to 123 beats/min at maximal exercise, which is 73% of age-predicted maximum. HRR calculated as 46.  The O2 pulse was 4 ml/beat at rest and increased to 15 ml/beat at maximal exercise, which is 85 % of age-predicted maximum. The BP at rest was 138/80 and increased to 166/76 at maximal exercise.    6MWD:  Distance:    360 meters    Nov labs 2019 - [place]    Repeat labs being ordered today     Echo 2020     Patient Information Name: NATASHA HARVEY  Study Date: 2020  MRN: [id number]  : 1965  Gender: M  Account Number: [id number]  Reason for Study: Congestive heart failure  Patient Location: Adult Echo Lab  Height: 180.3cm  Weight: 98.4kg  Patient Header: BSA: 2.18 m2  Study Location: [facility]     Inter Summary M-Mode measurements reveal the left ventricle is moderately to severely enlarged.  Left ventricular systolic function is severely diminished with an estimated left ventricular  ejection fraction of 15-20%.  Global hypokinesis is noted.  The left atrium is moderately enlarged.  Mild aortic regurgitation.  There is mild mitral valve stenosis.  Right sided pressures indicate mild pulmonary hypertension.     Procedures The study performed was a(n) complete 2-D echo with M-Mode, color and  spectral Doppler.  The study was performed by [facility].  The study quality was diagnostic.  The study was performed in the Echo Lab.     Right Ventricle There is normal right ventricular wall thickness.     Left Ventricle Global hypokinesis is noted.  There is no thrombus.     Right Ventricle The right ventricular wall motion is normal.     Atria The left atrium is moderately enlarged.  The interatrial septum is intact with no evidence for an atrial septal defect.     Aortic Valve Mild aortic regurgitation.     Mitral Valve The anterior and posterior mitral valve leaflets appear thickened, but opens well.  There is mild mitral valve stenosis.  There is mild (+1) mitral regurgitation.     Tricuspid Valve The tricuspid valve is normal in structure.     Pulmonary Valve The pulmonary valve is poorly visualized.     Aorta and PA The pulmonary artery is normal size.     Left Ventricle M-Mode measurements reveal the left ventricle is moderately to severely enlarged.  There is no evidence of left ventricular hypertrophy.  Left ventricular systolic function is severely diminished with an estimated left ventricular  ejection fraction of 15-20%.     Right Ventricle M-Mode measurements reveal the right ventricle is moderately enlarged.     Atria The right atrium is enlarged.     Aortic Valve The aortic valve is normal in structure.     Aorta and PA The aortic root is normal in size.     Tricuspid Valve Moderate tricuspid regurgitation by color Doppler.  The calculated right ventricular systolic pressure is 41 mmHg.  Right sided pressures indicate mild pulmonary hypertension.     Pericardial Pleural Effusion Pericardium without effusion.     Aortic Valve No aortic valve stenosis.     Pulmonary Valve There is trace pulmonic regurgitation.     MMODE 2D MEASUREMENTS CALCULATIONS IVSd: 1.2cm  IVSs: 1.8cm  LVIDd: 6.6cm  LVIDs: 5.4cm  LVPWd: 1.2cm  LVPWs: 1.6cm  Ao root diam: 3.5cm  Ao root area: 9.6cm^2  ACS:  2.0cm  LA dimension: 5.1cm  asc Aorta Diam: 3.5cm  LVOT diam: 2.1cm  LVOT area: 3.5cm^2  LVOT area(traced): 3.8cm^2  EDV(MOD-sp4): 242ml  ESV(MOD-sp4): 181ml  EF(MOD-sp4): 25.2%  LVLd ap2: 9.6cm  EDV(MOD-sp2): 144ml  ESV(MOD-sp2): 120ml  EF(MOD-sp2): 16.7%     Time Measurements Aortic R-R: 0.8sec  Aortic HR: 80BPM     DOPPLER MEASUREMENTS CALCULATIONS MV A dur: 0.1sec  MV E max will: 193.2cm/sec  MV A max will: 39.5cm/sec  MV E/A: 4.9  MV V2 max: 206.5cm/sec  MV max P.1mmHg  MV mean P.8mmHg  MV V2 VTI: 39.0cm  MVA(VTI): 0.8cm^2  MV P1/2t: 71.8msec  MVA(P1/2t): 3.1cm^2  MV dec slope: 850.3cm/sec^2  MV dec time: 0.25sec  Ao V2 max: 119.4cm/sec  Ao max P.7mmHg  SHUN(V,A): 1.4cm^2  SHUN(V,D): 1.4cm^2  LV V1 max P.9mmHg  LV V1 mean P.5mmHg  LV V1 max: 48.1cm/sec  LV V1 VTI: 8.4cm  CO(LVOT): 2.4l/min  CI(LVOT): 1.1l/min/m^2  SV(LVOT): 29.5ml  TV V2 max: 73.4cm/sec  TV max P.2mmHg  PA V2 max: 69.0cm/sec  PA acc time: 0.18sec  TR max will: 288.9cm/sec  RVSP(TR): 41.4mmHg  RAP systole: 8mmHg        Assessment and Plan:   54-year-old man with a history of what is likely valvular cardiomyopathy, status post mitral valve repair in 2015, who then had continued symptoms, a failure to improve his LV ejection fraction.    He definitively has low cardiac output and significantly reduced ejection fraction however his endorgan function is remained stable and his exercise test is above where we would need to move forward.  He also feels his quality of life is acceptable at the moment.  We are in a holding pattern on timing.     I do think that advanced therapies are in his future although we will need to likely wait for slight deterioration from here (either more fatigue, fluid overload hospitalization for heart failure or VT or slightly worsened renal function or unintentional weight loss) any of these would prompt me to repeat his right heart catheterization and if cardiac output is worsened then we would be  moving forward.     I do think he has adapted to a new normal and so we will have to be on the look out for subtle signs of worsening.     I will presently keep his neurohormonal blockade at their current doses and his diuretics as well.     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     Status post mitral repair: Stable gradient on last echo    Will plan to see him in 4 months in person in [place] with CMP, [id number]-bnp , sooner if needed.     Regarding his surgical risk for lipoma resection      He is at moderate risk for hypotension, arrhthymias with general anesthesia - and pulmonary edema with fluid administration  or if vasopressors are given.     Would prefer local anaesthesia.     perfer local anesthesia.     If not- then with cardiac anesthesia support in [place],   Gerry, ROSMERY Norman, ROSMERY Roberts MD

## 2021-02-11 RX ORDER — HYDRALAZINE HYDROCHLORIDE 25 MG/1
25 TABLET, FILM COATED ORAL 3 TIMES DAILY
Qty: 270 TABLET | Refills: 1
Start: 2021-02-11

## 2021-04-24 ENCOUNTER — HEALTH MAINTENANCE LETTER (OUTPATIENT)
Age: 56
End: 2021-04-24

## 2021-07-27 ENCOUNTER — TRANSFERRED RECORDS (OUTPATIENT)
Dept: HEALTH INFORMATION MANAGEMENT | Facility: CLINIC | Age: 56
End: 2021-07-27
Payer: MEDICARE

## 2021-10-04 ENCOUNTER — HEALTH MAINTENANCE LETTER (OUTPATIENT)
Age: 56
End: 2021-10-04

## 2021-11-19 PROBLEM — D17.0 LIPOMA OF NECK: Status: ACTIVE | Noted: 2021-01-12

## 2021-11-19 PROBLEM — H52.4 PRESBYOPIA: Status: ACTIVE | Noted: 2021-05-28

## 2021-11-19 PROBLEM — H52.13 MYOPIA OF BOTH EYES: Status: ACTIVE | Noted: 2021-05-28

## 2021-11-19 PROBLEM — Z98.890 S/P MITRAL VALVE REPAIR: Status: ACTIVE | Noted: 2020-08-24

## 2021-11-19 PROBLEM — H25.13 AGE-RELATED NUCLEAR CATARACT OF BOTH EYES: Status: ACTIVE | Noted: 2021-05-28

## 2021-11-19 PROBLEM — E04.9 THYROID GOITER: Status: ACTIVE | Noted: 2020-11-04

## 2021-11-19 PROBLEM — F41.9 ANXIETY: Status: ACTIVE | Noted: 2020-10-21

## 2021-11-19 RX ORDER — ONDANSETRON 4 MG/1
4 TABLET, ORALLY DISINTEGRATING ORAL EVERY 8 HOURS PRN
COMMUNITY
Start: 2021-09-15 | End: 2022-03-03

## 2021-11-19 RX ORDER — HYDROCODONE BITARTRATE AND ACETAMINOPHEN 5; 325 MG/1; MG/1
1-2 TABLET ORAL EVERY 6 HOURS PRN
Status: ON HOLD | COMMUNITY
Start: 2021-05-28 | End: 2022-03-24

## 2021-11-30 ENCOUNTER — OFFICE VISIT (OUTPATIENT)
Dept: CARDIOLOGY | Facility: OTHER | Age: 56
End: 2021-11-30
Payer: MEDICARE

## 2021-11-30 DIAGNOSIS — I50.22 CHRONIC SYSTOLIC HEART FAILURE (H): Primary | ICD-10-CM

## 2021-11-30 PROCEDURE — 99215 OFFICE O/P EST HI 40 MIN: CPT | Performed by: INTERNAL MEDICINE

## 2021-11-30 NOTE — PATIENT INSTRUCTIONS
You were seen today by [facility] Advanced Heart Failure Cardiologist, Dr. Hdz, in partnership with [facility] in [place], [place]       Medication or Plan of Care changes:          Follow up:            If there are any questions about this visit please call us at [phone].      Follow the American Heart Association Diet and Lifestyle recommendations:      Limit saturated fat, trans fat, sodium, red meat, sweets and sugar-sweetened beverages.     If you choose to eat red meat, compare labels and select the leanest cuts available.    Aim for at least 150 minutes of moderate physical activity or 75 minutes of vigorous physical activity - or an equal combination of both - each week.      If you have any other questions or concerns regarding your heart care please contact your [facility] Team at [phone].

## 2021-11-30 NOTE — NURSING NOTE
Chief Complaint   Patient presents with     RECHECK     Heart Failure     Medications reviewed, vitals taken by [facility] nurse.

## 2021-11-30 NOTE — PROGRESS NOTES
November 30, 2021    Follow up HFrEF:     54, first diagnosed with severe mitral regurgitation and an ejection fraction of 25%.  He underwent an angiogram at that time that showed only mild coronary disease. He was referred to Dr. Graves for mitral regurgitation, and on 01/21/2015, he underwent mitral valve repair with an annuloplasty ring.      He later presented with ventricular tachycardia which was sustained and therefore he underwent a single-chamber defibrillator for secondary prevention. Shortly after that, he had his first hospitalization for heart failure with reduced ejection fraction, and given his left ventricular end-diastolic dimension was 7      When we first saw him, he had an excellent cardiopulmonary stress test.  He now had a BiV upgrade to a CRT. He also underwent a PET scan of his chest to rule out cardiac sarcoidosis, which was not detected on this test. However, there was an area showed ischemia subtended by the circumflex artery and he underwent a follow up coronary angiography as noted above which showed mild non obstructive disease.    We repeated RHC in June 2019 that showed elevated PCWP, normal right-sided filling pressures, mild pulmonary artery pressure elevation, and CI 1.8. Per his wife he has been slowing down gradually over the last year.  At that time we started his evaluation for both LVAD and transplant and most of this is been complete.    Medication adjustments at that time and he settled into a stable state for now 2 years after that.  Since I saw him last he has had continued issues with what is being labeled his gastroparesis.  He has nausea in the mornings and thinks some of this is related to his potassium replacement.  Later in the day his appetite is fine.  He has unfortunately lost about 15 to 20 pounds.  He is scheduled to see GI in about 2 months, that is the soonest he can get in.  He also has abdominal pain in the morning.  He and his wife are wondering whether  this is anxiety all with the weight loss I am worried it might be more than that.  He has been told that his gastroparesis is due to history of thyroid problems plus his diabetes-although he is a type II diabetic with good diabetes control.     From a functional standpoint he has not been working out as much as he used to do.  He can get up and down the block walking with his dog without too much difficulty.  He does not have a ton of energy day-to-day but this is how he is felt for some time.  He denies PND orthopnea.  He denies any shocks from his ICD.  He does not have chest pain or heaviness.  He did stand up quickly a few weeks ago and nearly lost consciousness.  Otherwise he has not had dizziness.         PAST MEDICAL HISTORY:     1.  Hemorrhoidectomy.     2.  Arthroscopy.     3.  Right partial medial meniscectomy 08/2013.     4.  Status post ICD implantation 02/02/2015, [facility], by Dr. Felix.     5.  Mitral valve repair with annuloplasty ring and atrial appendage ligation by Dr. Graves in [place], [place]; date on that was 01/21/2015.   6.  Gastroparesis    CURRENT MEDICATIONS:  Current Outpatient Medications   Medication Sig Dispense Refill     carvedilol (COREG) 12.5 MG tablet Take 1 tablet by mouth 2 times daily (with meals)       cyclobenzaprine (FLEXERIL) 10 MG tablet Take 10 mg by mouth 3 times daily as needed for muscle spasms       dapagliflozin (FARXIGA) 10 MG TABS tablet Take 10 mg by mouth daily       diazepam (VALIUM) 10 MG tablet Take 10 mg by mouth every 4 hours as needed for anxiety       fluticasone (FLONASE) 50 MCG/ACT nasal spray Spray 1 spray into both nostrils 2 times daily       furosemide (LASIX) 40 MG tablet TAKE 1 TABLET BY MOUTH EVERY DAY IN THE MORNING AND 1 TABLET IN THE AFTERNOON AS NEEDED FOR SWELLING       hydrALAZINE (APRESOLINE) 25 MG tablet TAKE 1 TABLET BY MOUTH THREE TIMES A DAY       HYDROcodone-acetaminophen (NORCO) 5-325 MG tablet Take 1-2 tablets by  mouth every 6 hours as needed for moderate pain       metoclopramide (REGLAN) 10 MG tablet Take 10 mg by mouth 3 times daily For 3 weeks then off 1 week       omeprazole (PRILOSEC) 40 MG DR capsule Take 40 mg by mouth every morning       ondansetron (ZOFRAN-ODT) 4 MG ODT tab Take 4 mg by mouth every 8 hours as needed for nausea       potassium chloride ER (KLOR-CON M) 20 MEQ CR tablet Take 20 mEq by mouth daily       sacubitril-valsartan (ENTRESTO)  MG per tablet TAKE 1 TABLET BY MOUTH TWICE A DAY       sotalol (BETAPACE) 80 MG tablet TAKE 1 TABLET BY MOUTH TWICE A DAY         SOCIAL HISTORY:  The patient lives with his wife. They have 2 children who live at home.  They are both teenagers.  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 Alcohol rarely, 1-2 times per month. No drug use.  Smoking, he smoked 1/2 pack per day for 10 years, quit in .        FAMILY HISTORY:  Mother had coronary disease and end-stage renal disease in her 40s. He has 1 younger brother and 1 sister who are alive and well.  Dad  at age 51, but not related to cardiac problems. There is no other family history of cardiomyopathy or sudden cardiac death.     ROS:   Constitutional: No fever, chills, or sweats. Weight is down 15 to 20 pounds per HPI  ENT: No visual disturbance, ear ache, epistaxis, sore throat.   Allergies/Immunologic: Negative.   Respiratory: No cough, hemoptysis.   Cardiovascular: As per HPI.   GI: + nausea, +vomiting, no hematemesis, melena, or hematochezia.   : No urinary frequency, dysuria, or hematuria.   Integument: Negative.   Psychiatric: Especially about the nausea does report increased stress  Neuro: Negative.   Endocrinology: Negative.   Musculoskeletal:negative     EXAM:  Blood pressure 104/64 pulse 68 temperature 97 weight 191.3 pounds.   General: appears comfortable, alert and articulate  Head: normocephalic, atraumatic  Eyes:  anicteric sclera, EOMI  Neck: no adenopathy  Orophyarynx: moist mucosa, no lesions, dentition intact  Heart: PMI diffuse,trace systolic murmur at LLSB, regular, S1/S2, rub, estimated JVP 9 cm   Lungs: clear, no rales or wheezing  Abdomen: soft, non-tender, bowel sounds present, no hepatosplenomegaly  Extremities: no clubbing, cyanosis or edema  Neurological: normal speech and affect, no gross motor deficits      Labs:    Last Right heart catheterization           Last ICD interrogation:  Bi-V pacing at 96 %    CPX: 2019   RESULTS:  Oxygen Consumption: The patient's VO2 max was 1790 ml/min, which represents 62% of predicted maximum.  When normalize for body weight, VO2 max was 19.2 ml/kg/min, which was 62% of predicted maximum.  Anaerobic or ventilatory threshold (AT) was noted at 1064 ml/min or 11.4 ml/kg/min, which represents 59% of predicted maximum. RER was 0.87 at rest and increased to 1.13 at maximal exercise.    Cardiac Response: The resting EKG showed paced rhythm.  At maximal exercise, no ischemic changes were noted.  The HR was 80 beats/min, and alonso to 123 beats/min at maximal exercise, which is 73% of age-predicted maximum. HRR calculated as 46.  The O2 pulse was 4 ml/beat at rest and increased to 15 ml/beat at maximal exercise, which is 85 % of age-predicted maximum. The BP at rest was 138/80 and increased to 166/76 at maximal exercise.        Echo 2020     Patient Information Name: NATASHA HARVEY  Study Date: 2020  MRN: [id number]  : 1965  Gender: M  Account Number: [id number]  Reason for Study: Congestive heart failure  Patient Location: Adult Echo Lab  Height: 180.3cm  Weight: 98.4kg  Patient Header: BSA: 2.18 m2  Study Location: [facility]     Inter Summary M-Mode measurements reveal the left ventricle is moderately to severely enlarged.  Left ventricular systolic function is severely diminished with an estimated left ventricular  ejection fraction of  15-20%.  Global hypokinesis is noted.  The left atrium is moderately enlarged.  Mild aortic regurgitation.  There is mild mitral valve stenosis.  Right sided pressures indicate mild pulmonary hypertension.     Procedures The study performed was a(n) complete 2-D echo with M-Mode, color and spectral Doppler.  The study was performed by [facility].  The study quality was diagnostic.  The study was performed in the Echo Lab.     Right Ventricle There is normal right ventricular wall thickness.     Left Ventricle Global hypokinesis is noted.  There is no thrombus.     Right Ventricle The right ventricular wall motion is normal.     Atria The left atrium is moderately enlarged.  The interatrial septum is intact with no evidence for an atrial septal defect.     Aortic Valve Mild aortic regurgitation.     Mitral Valve The anterior and posterior mitral valve leaflets appear thickened, but opens well.  There is mild mitral valve stenosis.  There is mild (+1) mitral regurgitation.     Tricuspid Valve The tricuspid valve is normal in structure.     Pulmonary Valve The pulmonary valve is poorly visualized.     Aorta and PA The pulmonary artery is normal size.     Left Ventricle M-Mode measurements reveal the left ventricle is moderately to severely enlarged.  There is no evidence of left ventricular hypertrophy.  Left ventricular systolic function is severely diminished with an estimated left ventricular  ejection fraction of 15-20%.     Right Ventricle M-Mode measurements reveal the right ventricle is moderately enlarged.     Atria The right atrium is enlarged.     Aortic Valve The aortic valve is normal in structure.     Aorta and PA The aortic root is normal in size.     Tricuspid Valve Moderate tricuspid regurgitation by color Doppler.  The calculated right ventricular systolic pressure is 41 mmHg.  Right sided pressures indicate mild pulmonary hypertension.     Pericardial Pleural Effusion Pericardium  without effusion.     Aortic Valve No aortic valve stenosis.     Pulmonary Valve There is trace pulmonic regurgitation.     MMODE 2D MEASUREMENTS CALCULATIONS IVSd: 1.2cm  IVSs: 1.8cm  LVIDd: 6.6cm  LVIDs: 5.4cm  LVPWd: 1.2cm  LVPWs: 1.6cm  Ao root diam: 3.5cm  Ao root area: 9.6cm^2  ACS: 2.0cm  LA dimension: 5.1cm  asc Aorta Diam: 3.5cm  LVOT diam: 2.1cm  LVOT area: 3.5cm^2  LVOT area(traced): 3.8cm^2  EDV(MOD-sp4): 242ml  ESV(MOD-sp4): 181ml  EF(MOD-sp4): 25.2%  LVLd ap2: 9.6cm  EDV(MOD-sp2): 144ml  ESV(MOD-sp2): 120ml  EF(MOD-sp2): 16.7%     Time Measurements Aortic R-R: 0.8sec  Aortic HR: 80BPM     DOPPLER MEASUREMENTS CALCULATIONS MV A dur: 0.1sec  MV E max will: 193.2cm/sec  MV A max will: 39.5cm/sec  MV E/A: 4.9  MV V2 max: 206.5cm/sec  MV max P.1mmHg  MV mean P.8mmHg  MV V2 VTI: 39.0cm  MVA(VTI): 0.8cm^2  MV P1/2t: 71.8msec  MVA(P1/2t): 3.1cm^2  MV dec slope: 850.3cm/sec^2  MV dec time: 0.25sec  Ao V2 max: 119.4cm/sec  Ao max P.7mmHg  SHUN(V,A): 1.4cm^2  SHUN(V,D): 1.4cm^2  LV V1 max P.9mmHg  LV V1 mean P.5mmHg  LV V1 max: 48.1cm/sec  LV V1 VTI: 8.4cm  CO(LVOT): 2.4l/min  CI(LVOT): 1.1l/min/m^2  SV(LVOT): 29.5ml  TV V2 max: 73.4cm/sec  TV max P.2mmHg  PA V2 max: 69.0cm/sec  PA acc time: 0.18sec  TR max will: 288.9cm/sec  RVSP(TR): 41.4mmHg  RAP systole: 8mmHg        Assessment and Plan:   56-year-old man with a history of what is likely valvular cardiomyopathy, status post mitral valve repair in 2015, who then had continued symptoms, a failure to improve his LV ejection fraction.    He has been falling him over a period of years and he has had some stability.  I am concerned about his nausea and weight loss recently.  I do not think anxiety would cause him to lose 15 to 20 pounds.  I am scheduling a repeat right heart catheterization and echocardiogram locally.     If his cardiac index is low -the plan would be to bring him to the [facility] for trial of an inotrope to see if this  improves symptoms and to move forward with his evaluation for advanced therapies.     If his hemodynamics are very reassuring and his echo has not showed further LV dilation, then we will need to aggressively work-up the cause of his GI distress as this degree of weight loss and muscle wasting may eventually prevent candidacy for advanced  HF therapies.       Chronic systolic heart failure secondary to valvular cardiomyopathy   Stage C   NYHA Class III   ACEi/ARB/ARNI yes   BB yes  Aldosterone antagonist yes you know  SGL2i- yes   SCD prophylaxis ICD  % BiV pacing: yes    Fluid status euvolemic  NSAID use: none  Sleep Apnea Evaluation: now on CPAP     Bi-V pacing: - yes      Status post mitral repair: Stable gradient on last echo      Once we have echo and RHC results we will decide next steps     Gerry Wills [place], [place] Kristi Metzger, ROSMERY Roberts

## 2021-11-30 NOTE — LETTER
11/30/2021      RE: Tej Morfin  [address]  [place] [address]       Dear Colleague,    Thank you for the opportunity to participate in the care of your patient, Tej Morfin, at the [facility] at [facility]. Please see a copy of my visit note below.      November 30, 2021    Follow up HFrEF:     54, first diagnosed with severe mitral regurgitation and an ejection fraction of 25%.  He underwent an angiogram at that time that showed only mild coronary disease. He was referred to Dr. Graves for mitral regurgitation, and on 01/21/2015, he underwent mitral valve repair with an annuloplasty ring.      He later presented with ventricular tachycardia which was sustained and therefore he underwent a single-chamber defibrillator for secondary prevention. Shortly after that, he had his first hospitalization for heart failure with reduced ejection fraction, and given his left ventricular end-diastolic dimension was 7      When we first saw him, he had an excellent cardiopulmonary stress test.  He now had a BiV upgrade to a CRT. He also underwent a PET scan of his chest to rule out cardiac sarcoidosis, which was not detected on this test. However, there was an area showed ischemia subtended by the circumflex artery and he underwent a follow up coronary angiography as noted above which showed mild non obstructive disease.    We repeated RHC in June 2019 that showed elevated PCWP, normal right-sided filling pressures, mild pulmonary artery pressure elevation, and CI 1.8. Per his wife he has been slowing down gradually over the last year.  At that time we started his evaluation for both LVAD and transplant and most of this is been complete.    Medication adjustments at that time and he settled into a stable state for now 2 years after that.  Since I saw him last he has had continued issues with what is being  labeled his gastroparesis.  He has nausea in the mornings and thinks some of this is related to his potassium replacement.  Later in the day his appetite is fine.  He has unfortunately lost about 15 to 20 pounds.  He is scheduled to see GI in about 2 months, that is the soonest he can get in.  He also has abdominal pain in the morning.  He and his wife are wondering whether this is anxiety all with the weight loss I am worried it might be more than that.  He has been told that his gastroparesis is due to history of thyroid problems plus his diabetes-although he is a type II diabetic with good diabetes control.     From a functional standpoint he has not been working out as much as he used to do.  He can get up and down the block walking with his dog without too much difficulty.  He does not have a ton of energy day-to-day but this is how he is felt for some time.  He denies PND orthopnea.  He denies any shocks from his ICD.  He does not have chest pain or heaviness.  He did stand up quickly a few weeks ago and nearly lost consciousness.  Otherwise he has not had dizziness.         PAST MEDICAL HISTORY:     1.  Hemorrhoidectomy.     2.  Arthroscopy.     3.  Right partial medial meniscectomy 08/2013.     4.  Status post ICD implantation 02/02/2015, [email or web address], by Dr. Felix.     5.  Mitral valve repair with annuloplasty ring and atrial appendage ligation by Dr. Graves in [place], [place]; date on that was 01/21/2015.   6.  Gastroparesis    CURRENT MEDICATIONS:  Current Outpatient Medications   Medication Sig Dispense Refill     carvedilol (COREG) 12.5 MG tablet Take 1 tablet by mouth 2 times daily (with meals)       cyclobenzaprine (FLEXERIL) 10 MG tablet Take 10 mg by mouth 3 times daily as needed for muscle spasms       dapagliflozin (FARXIGA) 10 MG TABS tablet Take 10 mg by mouth daily       diazepam (VALIUM) 10 MG tablet Take 10 mg by mouth every 4 hours as needed for anxiety       fluticasone  (FLONASE) 50 MCG/ACT nasal spray Spray 1 spray into both nostrils 2 times daily       furosemide (LASIX) 40 MG tablet TAKE 1 TABLET BY MOUTH EVERY DAY IN THE MORNING AND 1 TABLET IN THE AFTERNOON AS NEEDED FOR SWELLING       hydrALAZINE (APRESOLINE) 25 MG tablet TAKE 1 TABLET BY MOUTH THREE TIMES A DAY       HYDROcodone-acetaminophen (NORCO) 5-325 MG tablet Take 1-2 tablets by mouth every 6 hours as needed for moderate pain       metoclopramide (REGLAN) 10 MG tablet Take 10 mg by mouth 3 times daily For 3 weeks then off 1 week       omeprazole (PRILOSEC) 40 MG DR capsule Take 40 mg by mouth every morning       ondansetron (ZOFRAN-ODT) 4 MG ODT tab Take 4 mg by mouth every 8 hours as needed for nausea       potassium chloride ER (KLOR-CON M) 20 MEQ CR tablet Take 20 mEq by mouth daily       sacubitril-valsartan (ENTRESTO)  MG per tablet TAKE 1 TABLET BY MOUTH TWICE A DAY       sotalol (BETAPACE) 80 MG tablet TAKE 1 TABLET BY MOUTH TWICE A DAY         SOCIAL HISTORY:  The patient lives with his wife. They have 2 children who live at home.  They are both teenagers.  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 Alcohol rarely, 1-2 times per month. No drug use.  Smoking, he smoked 1/2 pack per day for 10 years, quit in .        FAMILY HISTORY:  Mother had coronary disease and end-stage renal disease in her 40s. He has 1 younger brother and 1 sister who are alive and well.  Dad  at age 51, but not related to cardiac problems. There is no other family history of cardiomyopathy or sudden cardiac death.     ROS:   Constitutional: No fever, chills, or sweats. Weight is down 15 to 20 pounds per HPI  ENT: No visual disturbance, ear ache, epistaxis, sore throat.   Allergies/Immunologic: Negative.   Respiratory: No cough, hemoptysis.   Cardiovascular: As per HPI.   GI: + nausea, +vomiting, no hematemesis, melena, or hematochezia.   : No urinary  frequency, dysuria, or hematuria.   Integument: Negative.   Psychiatric: Especially about the nausea does report increased stress  Neuro: Negative.   Endocrinology: Negative.   Musculoskeletal:negative     EXAM:  Blood pressure 104/64 pulse 68 temperature 97 weight 191.3 pounds.   General: appears comfortable, alert and articulate  Head: normocephalic, atraumatic  Eyes: anicteric sclera, EOMI  Neck: no adenopathy  Orophyarynx: moist mucosa, no lesions, dentition intact  Heart: PMI diffuse,trace systolic murmur at LLSB, regular, S1/S2, rub, estimated JVP 9 cm   Lungs: clear, no rales or wheezing  Abdomen: soft, non-tender, bowel sounds present, no hepatosplenomegaly  Extremities: no clubbing, cyanosis or edema  Neurological: normal speech and affect, no gross motor deficits      Labs:    Last Right heart catheterization           Last ICD interrogation:  Bi-V pacing at 96 %    CPX: 2019   RESULTS:  Oxygen Consumption: The patient's VO2 max was 1790 ml/min, which represents 62% of predicted maximum.  When normalize for body weight, VO2 max was 19.2 ml/kg/min, which was 62% of predicted maximum.  Anaerobic or ventilatory threshold (AT) was noted at 1064 ml/min or 11.4 ml/kg/min, which represents 59% of predicted maximum. RER was 0.87 at rest and increased to 1.13 at maximal exercise.    Cardiac Response: The resting EKG showed paced rhythm.  At maximal exercise, no ischemic changes were noted.  The HR was 80 beats/min, and alonso to 123 beats/min at maximal exercise, which is 73% of age-predicted maximum. HRR calculated as 46.  The O2 pulse was 4 ml/beat at rest and increased to 15 ml/beat at maximal exercise, which is 85 % of age-predicted maximum. The BP at rest was 138/80 and increased to 166/76 at maximal exercise.        Echo 2020     Patient Information Name: NATASHA HARVEY  Study Date: 2020  MRN: [id number]  : 1965  Gender: M  Account Number: [id number]  Reason for Study: Congestive heart  failure  Patient Location: Adult Echo Lab  Height: 180.3cm  Weight: 98.4kg  Patient Header: BSA: 2.18 m2  Study Location: [facility]     Interp Summary M-Mode measurements reveal the left ventricle is moderately to severely enlarged.  Left ventricular systolic function is severely diminished with an estimated left ventricular  ejection fraction of 15-20%.  Global hypokinesis is noted.  The left atrium is moderately enlarged.  Mild aortic regurgitation.  There is mild mitral valve stenosis.  Right sided pressures indicate mild pulmonary hypertension.     Procedures The study performed was a(n) complete 2-D echo with M-Mode, color and spectral Doppler.  The study was performed by [facility].  The study quality was diagnostic.  The study was performed in the Echo Lab.     Right Ventricle There is normal right ventricular wall thickness.     Left Ventricle Global hypokinesis is noted.  There is no thrombus.     Right Ventricle The right ventricular wall motion is normal.     Atria The left atrium is moderately enlarged.  The interatrial septum is intact with no evidence for an atrial septal defect.     Aortic Valve Mild aortic regurgitation.     Mitral Valve The anterior and posterior mitral valve leaflets appear thickened, but opens well.  There is mild mitral valve stenosis.  There is mild (+1) mitral regurgitation.     Tricuspid Valve The tricuspid valve is normal in structure.     Pulmonary Valve The pulmonary valve is poorly visualized.     Aorta and PA The pulmonary artery is normal size.     Left Ventricle M-Mode measurements reveal the left ventricle is moderately to severely enlarged.  There is no evidence of left ventricular hypertrophy.  Left ventricular systolic function is severely diminished with an estimated left ventricular  ejection fraction of 15-20%.     Right Ventricle M-Mode measurements reveal the right ventricle is moderately enlarged.     Atria The right atrium is  enlarged.     Aortic Valve The aortic valve is normal in structure.     Aorta and PA The aortic root is normal in size.     Tricuspid Valve Moderate tricuspid regurgitation by color Doppler.  The calculated right ventricular systolic pressure is 41 mmHg.  Right sided pressures indicate mild pulmonary hypertension.     Pericardial Pleural Effusion Pericardium without effusion.     Aortic Valve No aortic valve stenosis.     Pulmonary Valve There is trace pulmonic regurgitation.     MMODE 2D MEASUREMENTS CALCULATIONS IVSd: 1.2cm  IVSs: 1.8cm  LVIDd: 6.6cm  LVIDs: 5.4cm  LVPWd: 1.2cm  LVPWs: 1.6cm  Ao root diam: 3.5cm  Ao root area: 9.6cm^2  ACS: 2.0cm  LA dimension: 5.1cm  asc Aorta Diam: 3.5cm  LVOT diam: 2.1cm  LVOT area: 3.5cm^2  LVOT area(traced): 3.8cm^2  EDV(MOD-sp4): 242ml  ESV(MOD-sp4): 181ml  EF(MOD-sp4): 25.2%  LVLd ap2: 9.6cm  EDV(MOD-sp2): 144ml  ESV(MOD-sp2): 120ml  EF(MOD-sp2): 16.7%     Time Measurements Aortic R-R: 0.8sec  Aortic HR: 80BPM     DOPPLER MEASUREMENTS CALCULATIONS MV A dur: 0.1sec  MV E max will: 193.2cm/sec  MV A max will: 39.5cm/sec  MV E/A: 4.9  MV V2 max: 206.5cm/sec  MV max P.1mmHg  MV mean P.8mmHg  MV V2 VTI: 39.0cm  MVA(VTI): 0.8cm^2  MV P1/2t: 71.8msec  MVA(P1/2t): 3.1cm^2  MV dec slope: 850.3cm/sec^2  MV dec time: 0.25sec  Ao V2 max: 119.4cm/sec  Ao max P.7mmHg  SHUN(V,A): 1.4cm^2  SHUN(V,D): 1.4cm^2  LV V1 max P.9mmHg  LV V1 mean P.5mmHg  LV V1 max: 48.1cm/sec  LV V1 VTI: 8.4cm  CO(LVOT): 2.4l/min  CI(LVOT): 1.1l/min/m^2  SV(LVOT): 29.5ml  TV V2 max: 73.4cm/sec  TV max P.2mmHg  PA V2 max: 69.0cm/sec  PA acc time: 0.18sec  TR max will: 288.9cm/sec  RVSP(TR): 41.4mmHg  RAP systole: 8mmHg        Assessment and Plan:   56-year-old man with a history of what is likely valvular cardiomyopathy, status post mitral valve repair in 2015, who then had continued symptoms, a failure to improve his LV ejection fraction.    He has been falling him over a period of years  and he has had some stability.  I am concerned about his nausea and weight loss recently.  I do not think anxiety would cause him to lose 15 to 20 pounds.  I am scheduling a repeat right heart catheterization and echocardiogram locally.     If his cardiac index is low -the plan would be to bring him to the [facility] for trial of an inotrope to see if this improves symptoms and to move forward with his evaluation for advanced therapies.     If his hemodynamics are very reassuring and his echo has not showed further LV dilation, then we will need to aggressively work-up the cause of his GI distress as this degree of weight loss and muscle wasting may eventually prevent candidacy for advanced  HF therapies.       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     Sincerely,     Dunia Hdz MD    CC  Gerry Chatman, ROSMERY Norman, ROSMERY Roberts

## 2022-01-31 ENCOUNTER — MYC MEDICAL ADVICE (OUTPATIENT)
Dept: CARDIOLOGY | Facility: OTHER | Age: 57
End: 2022-01-31
Payer: MEDICARE

## 2022-01-31 DIAGNOSIS — I50.22 CHRONIC SYSTOLIC CHF (CONGESTIVE HEART FAILURE) (H): Primary | ICD-10-CM

## 2022-02-04 RX ORDER — CARVEDILOL 12.5 MG/1
6.25 TABLET ORAL 2 TIMES DAILY WITH MEALS
Qty: 90 TABLET | Refills: 3 | Status: SHIPPED | OUTPATIENT
Start: 2022-02-04 | End: 2022-03-03

## 2022-02-04 NOTE — TELEPHONE ENCOUNTER
Date: 2/4/2022    Time of Call: 5:23 PM     Diagnosis:  Heart failure     [ VORB ] Ordering provider: Dr Hdz    Order: Coreg 6.25 BID     Order received by: Margret Self RN       Follow-up/additional notes: sent mychart to harvinder

## 2022-02-08 ENCOUNTER — CARE COORDINATION (OUTPATIENT)
Dept: CARDIOLOGY | Facility: CLINIC | Age: 57
End: 2022-02-08
Payer: MEDICARE

## 2022-02-08 DIAGNOSIS — Z79.899 OTHER LONG TERM (CURRENT) DRUG THERAPY: ICD-10-CM

## 2022-02-08 DIAGNOSIS — M85.89 OTHER SPECIFIED DISORDERS OF BONE DENSITY AND STRUCTURE, MULTIPLE SITES: ICD-10-CM

## 2022-02-08 DIAGNOSIS — Z12.5 SCREENING PSA (PROSTATE SPECIFIC ANTIGEN): ICD-10-CM

## 2022-02-08 DIAGNOSIS — I21.A9 OTHER TYPE OF MYOCARDIAL INFARCTION (H): ICD-10-CM

## 2022-02-08 DIAGNOSIS — I50.22 CHRONIC SYSTOLIC CONGESTIVE HEART FAILURE (H): Primary | ICD-10-CM

## 2022-02-08 DIAGNOSIS — M81.0 AGE-RELATED OSTEOPOROSIS WITHOUT CURRENT PATHOLOGICAL FRACTURE: ICD-10-CM

## 2022-02-08 DIAGNOSIS — Z51.81 ENCOUNTER FOR THERAPEUTIC DRUG LEVEL MONITORING: ICD-10-CM

## 2022-02-08 DIAGNOSIS — T50.915A ADVERSE EFFECT OF MULTIPLE UNSPECIFIED DRUGS, MEDICAMENTS AND BIOLOGICAL SUBSTANCES, INITIAL ENCOUNTER: ICD-10-CM

## 2022-02-08 NOTE — PROGRESS NOTES
I called Tej to re-introduce myself to him. I did his original pre-VAD education in September of 2019. Dr. Hdz is reactivating his VAD/tranplant hybrid eval. I told Tej there were a few tests and consults that we needed to repeat. He said he is willing to come to [place] in the next few weeks to finish these tests and consults.    I asked Tej to request of his dentist a letter clearing him for heart surgery. Jun said he only needs a deep cleaning and has been do the dentist in the past year. I gave him the VAD office fax number to fax this letter.    I told Tej our  would call him in the next few days to set up a day or two in [place] to complete his hybrid eval  testing.

## 2022-02-09 ENCOUNTER — TELEPHONE (OUTPATIENT)
Dept: CARDIOLOGY | Facility: CLINIC | Age: 57
End: 2022-02-09
Payer: MEDICARE

## 2022-02-09 NOTE — TELEPHONE ENCOUNTER
Nicki Patel,     Thank you for clarifying. It is interesting Im glad you sent it to me though. Well see this one through. Appreciate all you guys do!!!      Marcos

## 2022-02-09 NOTE — TELEPHONE ENCOUNTER
Oh interesting! I am not sure exactly. I received the referral in the cardiology referral WQ. So it might have been sent to the wrong WQ, maybe?    Amanda Harp on 2/9/2022 at 1:07 PM

## 2022-02-09 NOTE — TELEPHONE ENCOUNTER
Nicki Patel,     Im wondering who this call came from?     This is not a new referral, this is actually a patient Dr. Hdz sees in her [place] outreach clinic. Although I am the  for these patients as well the request for an appointment is supposed to come from their care team at [facility] in [place] that's why Im curious as to who called.        Marcos

## 2022-02-09 NOTE — TELEPHONE ENCOUNTER
Please call patient to schedule cardiology referral. Dx is not within the scope of [facility] scheduling guidelines. Thank you!    Amanda Harp on 2/9/2022 at 8:55 AM

## 2022-02-10 ENCOUNTER — CARE COORDINATION (OUTPATIENT)
Dept: CARDIOLOGY | Facility: CLINIC | Age: 57
End: 2022-02-10
Payer: MEDICARE

## 2022-02-10 DIAGNOSIS — I50.22 CHRONIC SYSTOLIC CONGESTIVE HEART FAILURE (H): Primary | ICD-10-CM

## 2022-02-21 ENCOUNTER — HOSPITAL ENCOUNTER (OUTPATIENT)
Age: 57
End: 2022-02-21
Attending: INTERNAL MEDICINE | Admitting: INTERNAL MEDICINE
Payer: MEDICARE

## 2022-02-28 ENCOUNTER — CARE COORDINATION (OUTPATIENT)
Dept: CARDIOLOGY | Facility: CLINIC | Age: 57
End: 2022-02-28
Payer: MEDICARE

## 2022-02-28 NOTE — PROGRESS NOTES
"[facility]  Outpatient MNT: Heart Transplant/VAD Evaluation    Current BMI: 28 (HT 70 in,  lbs/89 kg)  BMI is within recommendation of <35 for heart transplant    Frailty Assessment-- Not Frail (1/5 points) scored for unintentional weight loss (has since resolved)         Time Spent: 15 minutes  Visit Type: Initial   Referring Physician: Andreina   Pt accompanied by: self     History of previous txp: none     Nutrition Assessment  Pt reports vomiting for ~1 month around the new year. He relates this to his heart condition. He reports losing ~15 lbs, but has since regained what he lost. He does endorse muscle loss (mild), but has not regained this yet. He does note he has early satiety, GP noted in chart.     - Appetite: back to baseline after vomiting episodes a few months ago  - Food allergies/intolerances: lactose intolerant  - Meal prep & grocery shopping: pt does   - Previous RD education: yes  - Issues chewing or swallowing: no   - N/V/D/C: vomiting for 1 month, now has resolved (right around the new year)  - Food access concerns: no    Vitamins, Supplements, Pertinent Meds: none   Herbal Medicines/Supplements: none     Edema: none     Weight hx: down to 181 lbs; prior ubw 195  - lost muscle, has not regained     Diet Recall  Breakfast Scrambled eggs (2) with 1 toast w/ occasionally jelly - 12 g protein   Lunch Salad w/ veggies and chicken (4 oz) (watches the dressing for Na) or PBJ s/w - 28 g protein   Dinner Beef burgers or homemade chili; likes fish/chicken/less pork + cooked veggies - 28 g protein   Snacks Fruit, trying to limit chips/chocolate    Beverages Water, occasional Sprite, OJ (8 oz/day)   Alcohol None    Dining out 2x/week     Physical Activity  ADLs, does some activity 30 min/day \"getting back into it\" after feeling ill a few months ago   Energy up and down, but overall improving     Anthropometrics   IBW/%IBW: 166/117  Dosing wt: 195 lbs/89 kg     Estimated " Nutrition Needs   Protein: 71-89 grams/day (0.8-1 g/kg) for maintenance / repletion     Nutrition Diagnosis  No nutrition diagnosis identified at this time.     Nutrition Intervention  Nutrition education provided:  Discussed sodium intake (low sodium foods and drinks, seasoning food without salt and tips for low sodium diet). Pt doing well with this.     Reviewed protein needs and role of protein + activity in repleting muscle mass. Could add in a protein shake to fill in the gaps or add some high-protein snacks (peanut butter, trail mix/nuts, boiled egg, cheese stick). Many pre-made ONS are lactose free, or could consider a whey or plant-based protein powder mixed w/ almond milk and fruit, etc.     Consider small/frequent meals or grazing to help with early satiety. Reviewed eating protein first at meal times and to take note that raw veggies or salad may contribute to early satiety.     Reviewed post txp diet guidelines in brief (will review in further detail post txp):  (1) Review of proper food safety measures d/t immunosuppressant therapy post-op and increased risk for food-borne illness    (2) Avoid the following post txp d/t risk for rejection, unknown effects on the organs, and/or potential interactions with immunosuppressants:  - Herbal, Chinese, holistic, chiropractic, natural, alternative medicines and supplements  - Detoxes and cleanses  - Weight loss pills  - Protein powders or other products with extracts or herbs (ie green tea extract)    (3) Med regimen and possible side effects    Reviewed post VAD nutrition:  High likelihood of early satiety postop with placement of VAD, requiring small/frequent meals. Discussed need for high protein intake for healing and how adequate protein intake may be impacted by early satiety.     Patient Understanding: Pt verbalized understanding of education provided.  Expected Engagement: Good  Follow-Up Plans: PRN     Nutrition Goals  No nutrition goals identified at  this time     Sarah Butler, RD, LD, CCTD

## 2022-02-28 NOTE — PROGRESS NOTES
D: Pt scheduled for VAD/tx hybrid eval this week. Called pt to review schedule.  I/A: Pt verbalized understanding of schedule and stated that he didn't have any questions at this time. Confirmed that pt's wife will attend PVE.   Of note, pt had COVID 12/29/21. It was still detected 2/2/22 because it was w/in 90 days so per protocol pt able to keep his appts.   P: Hybrid eval this week.

## 2022-03-01 ENCOUNTER — OFFICE VISIT (OUTPATIENT)
Dept: NEUROPSYCHOLOGY | Facility: CLINIC | Age: 57
End: 2022-03-01
Attending: INTERNAL MEDICINE
Payer: MEDICARE

## 2022-03-01 ENCOUNTER — CARE COORDINATION (OUTPATIENT)
Dept: CARDIOLOGY | Facility: CLINIC | Age: 57
End: 2022-03-01
Payer: MEDICARE

## 2022-03-01 DIAGNOSIS — I50.22 CHRONIC SYSTOLIC CONGESTIVE HEART FAILURE (H): Primary | ICD-10-CM

## 2022-03-01 DIAGNOSIS — F54 PSYCHOLOGICAL AND BEHAVIORAL FACTORS ASSOCIATED WITH DISORDERS OR DISEASES CLASSIFIED ELSEWHERE: ICD-10-CM

## 2022-03-01 DIAGNOSIS — R41.9 COGNITIVE COMPLAINTS: ICD-10-CM

## 2022-03-01 DIAGNOSIS — Z01.818 PREOP EXAMINATION: ICD-10-CM

## 2022-03-01 PROCEDURE — 96132 NRPSYC TST EVAL PHYS/QHP 1ST: CPT | Performed by: CLINICAL NEUROPSYCHOLOGIST

## 2022-03-01 PROCEDURE — 96139 PSYCL/NRPSYC TST TECH EA: CPT | Performed by: CLINICAL NEUROPSYCHOLOGIST

## 2022-03-01 PROCEDURE — 90791 PSYCH DIAGNOSTIC EVALUATION: CPT | Performed by: CLINICAL NEUROPSYCHOLOGIST

## 2022-03-01 PROCEDURE — 96138 PSYCL/NRPSYC TECH 1ST: CPT | Performed by: CLINICAL NEUROPSYCHOLOGIST

## 2022-03-01 NOTE — LETTER
3/1/2022       RE: Tej Morfin  [address]  [place] [address]     Dear Colleague,    Thank you for referring your patient, Tej Morfin, to the [facility] [place] at [facility]. Please see a copy of my visit note below.    [facility] [place] [address]        NEUROPSYCHOLOGICAL EVALUATION     RELEVANT HISTORY AND REASON FOR REFERRAL     This is a report of neuropsychological consultation regarding Tej Morfin, a 56-year-old, right-handed -American man with 16 years of formal education. Mr. Morfin s medical and psychiatric history includes anxiety, cardiomegaly, congestive heart failure, valvular cardiomyopathy, severe mitral regurgitation, s/p mitral valve replacement, ventricular tachycardia, and s/p defibrillator placement, obstructive sleep apnea (on CPAP), age-related nuclear cataract of both eyes, myopia of both eyes, presbyopia, thyroid goiter, and gastroparesis. He is being considered for a heart transplant or possibly LVAD as a bridge to transplant. He is referred for neuropsychological evaluation of brain function by Dr. Dunia Hdz.    Mr. Morfin is known to this service. He previously had transplant/LVAD candidacy evaluations in both 2015 and 2019 with Dr. Mei Zamora and Dr. Emmett Jimenez, respectively. Please see the reports dated 12/01/2015 and 9/11/2019 for additional background not recapitulated here. Briefly, in 2015, the test results were seen as mildly abnormal. There were inconsistent problems with learning/memory, attentional/executive control, and fine-motor speed. Limited word reading and pronunciation skills were suggestive of neurodevelopmental limitations on reading abilities. Dr. Zamora felt there were no strong, consistent indicators of focal brain disease. Results of his 2019 neuropsychological evaluation was  again seen as mildly abnormal, but not suggestive of a serious cognitive disorder that would interfere with LVAD/transplant candidacy. Compared to his 2015 results, his performances were stable or improved. 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 On the mental and behavioral health side, there were suggestions of reduced self-awareness or defensiveness, as well as some social withdrawal, on his MMPI-2-RF profile during both evaluations. However, there were no overt signs of psychopathology. Neither evaluation evidenced indications of chemical dependency, though occasional cannabis use (mostly for management of stress and sleep) was reported. He was advised to discontinue cannabis use.    Currently, Mr. Morfin reported that he underwent evaluation for heart transplant in 2019. His heart health was generally stable since then until he was hospitalized last week. He stated that he was initially evaluated in the emergency department and discharged home, but he was subsequently admitted for 5 days. He reported that he was unable to keep his heart rate above 80 and noted that his heart rate fluctuated between 74 and 56. He now has a PICC line. His treatment team is again revisiting transplant and LVAD as a bridge to transplant. He described feeling weaker than normal and hoped with treatment he could return to having a decent quality of life. He was not able to recall specific risks of transplant or LVAD treatment, however it has been several years since he completed LVAD education. He is scheduled to complete LVAD education again this week.        Mr. Morfin lives at home with his wife and one of his children. He stated that his 21-year-old stepdaughter has  left the nest.  He says that his wife would be the one to provide postsurgical aftercare as necessary.    In terms of his cognitive functioning, he reported  misplacing items on occasion and sometimes losing his train of thought. However, he attributed these changes to getting older. His wife continues to manage the family finances, which she has always done. She also manages his medications, but he takes them independently. He drives. He denied instances of getting lost, close calls, and accidents. He has no cognitively based interference with cooking or basic personal care.    Mr. Morfin described his mood as  positive.  He denied concerns with depression. He reported that his anxiety had  gotten bad  for a while, which he believed increased after he was diagnosed with COVID-19 in December 2021. He stated that his anxiety might have been one of the reasons he was in the hospital in recent weeks. He reported having panic attacks monthly, with the most recent panic attack occurring while he was hospitalized. He is prescribed diazepam, which he uses for sleep, as he reported experiencing increased anxiety when he does not sleep well. He takes diazepam a couple times per week. He reported some anxiety surrounding being shocked by his defibrillator during his last evaluation. He noted that he was only shocked once immediately after it was implanted, and his anxiety surrounding subsequent shocks has generally subsided. He has never participated in psychotherapy. He stated that he talks with his aunt when he feels anxious, and she helps him talk though his worry and panic symptoms. He reported that he continues to be interested and motivated to engage in the activities that he enjoys. However, he is limited by his health and defibrillator. He denied current suicidal ideation. He denied a history of hallucinations.    He reported that his sleep is improving. However, he stated that he generally sleeps 4-6 hours at night. He described tossing and turning and waking up for a few hours before being able to return to sleep. He stated that he feels rested when he wakes up on some  days. He reported taking short naps occasionally, particularly after doing housework.    Mr. Morfin denied current use of alcohol, tobacco, and illicit substances. He reported a history of cannabis use. He stated that he began vomiting in the mornings, so he began using editable cannabis. He stated that he stopped using all cannabis approximately 30 days ago, after he was told to stop by his treatment team. He denied cravings for cannabis use.    He reported no change in his educational or work history. He last worked approximately 7 years ago. He currently subsists on Social Security Disability.    Mr. Morfin denied interval history of head injury, seizure, or stroke. He was diagnosed with COVID-19 in December 2021. He reported that his course was mild. He did not require hospitalization for COVID-19 treatment. He reported that he was vaccinated and received his COVID-19 booster.     His current medication list includes dobutamine, hydrocodone-acetaminophen, omeprazole, metoclopramide, sacubitril-valsartan, sotalol, cyclobenzaprine, sodium chloride, diazepam, fluticasone, and aspirin.      BEHAVIORAL OBSERVATIONS     Mr. Morfin was polite and cooperative with the evaluation. He wore glasses. His speech production was normal and language comprehension was normal. Thought processes were linear and goal-directed. He demonstrated good insight into his cardiac history. He was open, candid, and friendly. He was a good historian and he demonstrated adequate knowledge of his recent medical history and treatment. In the testing session, he was alert and engaged. His effort and persistence were good. The test results are seen as valid estimations of his cognitive status.      MEASURES ADMINISTERED     The following measures were administered by a trained psychometrist, under my supervision:     Orientation: Time, Place, Basic Personal Information, Recent [place] Presidents; Wechsler Abbreviated Scale of Intelligence:  Vocabulary, Matrix Reasoning; Wechsler Adult Intelligence Scale-IV: Coding; Controlled Oral Word Association Test; [place] Naming Test; Theron Visual Acuity Screen; Finger Tapping Test; Trail Making Test; Test of Sustained Attention & Tracking; Tommy Auditory Verbal Learning Test; Wechsler Memory Scale-IV: Logical Memory, Visual Reproduction; Robert Depression Inventory-II; State-Trait Anxiety Inventory.     RESULTS AND INTERPRETATION     Orientation: Orientation was normal for time, place, basic personal information, and basic cultural information.     Intellectual Abilities: Demonstrating vocabulary knowledge was low average. Visual reasoning through pattern identification was below average.     Language & Related Skills: Confrontation naming was below average. Associative verbal fluency was average.     Visual Perceptual & Constructional Skills: None of his confrontation naming errors could be attributed to misperceptions. Drawings of simple shapes and figures were reasonably organized and recognizable.    Motor Skills: Speeded finger tapping performances were exceptionally high bilaterally.    Mental Speed and Executive Functioning: As noted above, speeded verbal fluency was average. He was below average for speed, with significant inattentive and executive control errors, across a variety of verbal tasks requiring executive management of attention and working memory. Graphomotor clerical speed was average. Visual scanning and graphomotor sequencing under simple conditions was average for speed with no errors. Scanning and sequencing under greater executive demands to control divided attention was performed in the low average range for speed and had three errors.     Learning & Anterograde Memory: Learning an extensive word list over repeated readings was average. Free recall of the list was average after a brief delay with active interference. List recall remained average after a 30-minute delay, and delayed  recognition of the list was low average. Immediate verbal memory for short stories was average, delayed recall of the stories was low average, and delayed recognition of story details was average. Immediate visual memory for simple designs was average, delayed free recall of the designs was average, and recognition of the designs was average.    Emotional, Behavioral, & Personality Functioning: On brief self-report inventories, he endorsed minimally elevated symptoms related to depression (BDI-2 = 5), low average-range experiences with anxiety in his daily life (Trait Anxiety = 19th percentile), and average-range experiences for his in-the-moment anxiety during the testing session (State Anxiety = 26th percentile).     IMPRESSIONS AND RECOMMENDATIONS     The cognitive test results are again mildly abnormal but do not suggest the presence of the serious cognitive disorder that would interfere with LVAD/transplant candidacy.    Compared to testing on the same measures in 2019, he demonstrated a mild decline on tasks of speeded complex attention and executive functioning. His performances on tests across all other cognitive domains remained stable or slightly improved and were in the average range. These findings suggest a relative weakness of frontal-subcortical systems. We believe the findings are reasonably attributed to his cardiovascular risk factors. The data and clinical history do not suggest an additional encroaching neurodegenerative disease. He is not in a state of dementia. We are not concerned about his ability to understand his own medical needs and to make independent decisions in his own best interest.    Mr. Morfin describes ongoing symptoms of anxiety with recent panic attacks. He may benefit from consultation with a health psychologist regarding his worry and physiological symptoms surrounding his heart functioning. Additionally, he has some chronic sleep difficulties that appear to be at least  partially based on anxiety. He has a benzodiazepine prescription that he uses less than nightly, and there is continued sleep dysfunction. It would be reasonable for him to consult with his treatment team regarding adjusting his pharmacological treatments for sleep and anxiety. He may also work with a sleep psychologist for behavioral interventions for improved sleep.    Mr. Morfin has a history of cannabis use for treatment of health-related conditions, such as poor sleep and nausea/vomiting. He stated that he has not used cannabis for approximately 30 days, since he was instructed to stop by his cardiologist. At this time, there are no concerns about substance abuse or dependence. His cardiology team should clearly inform him that he needs to continue to be fully abstinent from cannabis products to sustain LVAD/transplant candidacy, and he and his family should be clearly informed about any consequences for non-compliance in these regards.    Overall, he continues to be an appropriate candidate for LVAD/transplant consideration, from a neuropsychological perspective. We would be happy to see him for reevaluation against his up-to-date baseline, whenever clinically indicated.      Amanda Bhakta, Ph.D.  Postdoctoral Fellow    Emmett Jimenez, PhD, LP, ABPP-CN  Board Certified in Clinical Neuropsychology  Licensed Psychologist [id number]     All services provided by the Postdoctoral Fellow were supervised by this licensed psychologist and all billing noted here is for professional services provided by the psychologist and psychometrist.    Time spent: One unit psychiatric evaluation including records review, interview, and clinical assessment licensed and board-certified neuropsychologist (CPT 44885). 40 minutes neuropsychological testing evaluation by licensed and board-certified neuropsychologist, including integration of patient data, interpretation of standardized test results and clinical data, clinical  decision-making, treatment planning, supervision of the fellow, report writing, and interactive feedback to the patient (CPT 59350, [id number]). 195 minutes of psychological and neuropsychological test administration and scoring by technician (CPT 86895, 63950). Diagnoses: I50.22, F54, R41.9, Z01.818      Again, thank you for allowing me to participate in the care of your patient.      Sincerely,    Emmett Jimenez, PhD

## 2022-03-01 NOTE — NURSING NOTE
The patient was seen for neuropsychological evaluation at the request of Dr. Dunia Hdz for the purposes of diagnostic clarification and treatment planning.  195 minutes of test administration and scoring were provided by this writer.  Please see Dr. Emmett Jimenez's report for a full interpretation of the findings.    Lis Izquierdo  Psychometrist

## 2022-03-03 ENCOUNTER — CARE COORDINATION (OUTPATIENT)
Dept: CARDIOLOGY | Facility: CLINIC | Age: 57
End: 2022-03-03

## 2022-03-03 ENCOUNTER — ANCILLARY PROCEDURE (OUTPATIENT)
Dept: CT IMAGING | Facility: CLINIC | Age: 57
End: 2022-03-03
Attending: INTERNAL MEDICINE
Payer: MEDICARE

## 2022-03-03 ENCOUNTER — LAB (OUTPATIENT)
Dept: LAB | Facility: CLINIC | Age: 57
End: 2022-03-03
Attending: INTERNAL MEDICINE
Payer: MEDICARE

## 2022-03-03 ENCOUNTER — OFFICE VISIT (OUTPATIENT)
Dept: CARDIOLOGY | Facility: CLINIC | Age: 57
End: 2022-03-03
Attending: INTERNAL MEDICINE
Payer: MEDICARE

## 2022-03-03 ENCOUNTER — ANCILLARY PROCEDURE (OUTPATIENT)
Dept: ULTRASOUND IMAGING | Facility: CLINIC | Age: 57
End: 2022-03-03
Attending: INTERNAL MEDICINE
Payer: MEDICARE

## 2022-03-03 ENCOUNTER — TELEPHONE (OUTPATIENT)
Dept: CARE COORDINATION | Facility: CLINIC | Age: 57
End: 2022-03-03

## 2022-03-03 ENCOUNTER — ANCILLARY PROCEDURE (OUTPATIENT)
Dept: BONE DENSITY | Facility: CLINIC | Age: 57
End: 2022-03-03
Attending: INTERNAL MEDICINE
Payer: MEDICARE

## 2022-03-03 ENCOUNTER — TELEPHONE (OUTPATIENT)
Dept: CARDIOLOGY | Facility: CLINIC | Age: 57
End: 2022-03-03

## 2022-03-03 ENCOUNTER — ALLIED HEALTH/NURSE VISIT (OUTPATIENT)
Dept: TRANSPLANT | Facility: CLINIC | Age: 57
End: 2022-03-03
Attending: INTERNAL MEDICINE
Payer: MEDICARE

## 2022-03-03 VITALS
BODY MASS INDEX: 27.92 KG/M2 | SYSTOLIC BLOOD PRESSURE: 127 MMHG | DIASTOLIC BLOOD PRESSURE: 88 MMHG | OXYGEN SATURATION: 100 % | WEIGHT: 195 LBS | HEART RATE: 80 BPM | HEIGHT: 70 IN

## 2022-03-03 DIAGNOSIS — I50.22 CHRONIC SYSTOLIC CONGESTIVE HEART FAILURE (H): Primary | ICD-10-CM

## 2022-03-03 DIAGNOSIS — T50.915A ADVERSE EFFECT OF MULTIPLE UNSPECIFIED DRUGS, MEDICAMENTS AND BIOLOGICAL SUBSTANCES, INITIAL ENCOUNTER: ICD-10-CM

## 2022-03-03 DIAGNOSIS — Z51.81 ENCOUNTER FOR THERAPEUTIC DRUG LEVEL MONITORING: ICD-10-CM

## 2022-03-03 DIAGNOSIS — I21.A9 OTHER TYPE OF MYOCARDIAL INFARCTION (H): ICD-10-CM

## 2022-03-03 DIAGNOSIS — I50.22 CHRONIC SYSTOLIC CONGESTIVE HEART FAILURE (H): ICD-10-CM

## 2022-03-03 DIAGNOSIS — M81.0 AGE-RELATED OSTEOPOROSIS WITHOUT CURRENT PATHOLOGICAL FRACTURE: ICD-10-CM

## 2022-03-03 DIAGNOSIS — I42.8 NON-ISCHEMIC CARDIOMYOPATHY (H): Primary | ICD-10-CM

## 2022-03-03 DIAGNOSIS — I42.8 NON-ISCHEMIC CARDIOMYOPATHY (H): ICD-10-CM

## 2022-03-03 DIAGNOSIS — Z12.5 SCREENING FOR PROSTATE CANCER: ICD-10-CM

## 2022-03-03 DIAGNOSIS — I50.22 CHRONIC SYSTOLIC CHF (CONGESTIVE HEART FAILURE) (H): Primary | ICD-10-CM

## 2022-03-03 LAB
ABO/RH(D): NORMAL
ALBUMIN SERPL-MCNC: 2.8 G/DL (ref 3.4–5)
ALP SERPL-CCNC: 55 U/L (ref 40–150)
ALT SERPL W P-5'-P-CCNC: 19 U/L (ref 0–70)
AMPHETAMINES UR QL SCN: ABNORMAL
ANION GAP SERPL CALCULATED.3IONS-SCNC: 5 MMOL/L (ref 3–14)
ANTIBODY SCREEN: NEGATIVE
AST SERPL W P-5'-P-CCNC: 16 U/L (ref 0–45)
BARBITURATES UR QL: ABNORMAL
BENZODIAZ UR QL: ABNORMAL
BILIRUB DIRECT SERPL-MCNC: 0.1 MG/DL (ref 0–0.2)
BILIRUB SERPL-MCNC: 0.3 MG/DL (ref 0.2–1.3)
BUN SERPL-MCNC: 16 MG/DL (ref 7–30)
CALCIUM SERPL-MCNC: 8.5 MG/DL (ref 8.5–10.1)
CANNABINOIDS UR QL SCN: ABNORMAL
CHLORIDE BLD-SCNC: 116 MMOL/L (ref 94–109)
CHOLEST SERPL-MCNC: 121 MG/DL
CO2 SERPL-SCNC: 28 MMOL/L (ref 20–32)
COCAINE UR QL: ABNORMAL
CREAT SERPL-MCNC: 1.04 MG/DL (ref 0.66–1.25)
ETHANOL UR QL SCN: ABNORMAL
FASTING STATUS PATIENT QL REPORTED: NO
GFR SERPL CREATININE-BSD FRML MDRD: 85 ML/MIN/1.73M2
GLUCOSE BLD-MCNC: 91 MG/DL (ref 70–99)
HBA1C MFR BLD: 5.9 % (ref 0–5.6)
HDLC SERPL-MCNC: 45 MG/DL
LDLC SERPL CALC-MCNC: 65 MG/DL
NONHDLC SERPL-MCNC: 76 MG/DL
NT-PROBNP SERPL-MCNC: 2594 PG/ML (ref 0–125)
OPIATES UR QL SCN: ABNORMAL
PCP UR QL SCN: ABNORMAL
POTASSIUM BLD-SCNC: 3.4 MMOL/L (ref 3.4–5.3)
PREALB SERPL IA-MCNC: 21 MG/DL (ref 15–45)
PROT SERPL-MCNC: 5.6 G/DL (ref 6.8–8.8)
PSA SERPL-MCNC: 1.66 UG/L (ref 0–4)
PSA SERPL-MCNC: 1.7 UG/L (ref 0–4)
SODIUM SERPL-SCNC: 149 MMOL/L (ref 133–144)
SPECIMEN EXPIRATION DATE: NORMAL
TRIGL SERPL-MCNC: 56 MG/DL

## 2022-03-03 PROCEDURE — 76705 ECHO EXAM OF ABDOMEN: CPT | Mod: GC | Performed by: RADIOLOGY

## 2022-03-03 PROCEDURE — G0463 HOSPITAL OUTPT CLINIC VISIT: HCPCS

## 2022-03-03 PROCEDURE — 80323 ALKALOIDS NOS: CPT | Mod: 90 | Performed by: PATHOLOGY

## 2022-03-03 PROCEDURE — 86901 BLOOD TYPING SEROLOGIC RH(D): CPT | Performed by: INTERNAL MEDICINE

## 2022-03-03 PROCEDURE — 80321 ALCOHOLS BIOMARKERS 1OR 2: CPT | Mod: 90 | Performed by: PATHOLOGY

## 2022-03-03 PROCEDURE — G1004 CDSM NDSC: HCPCS | Mod: GC | Performed by: RADIOLOGY

## 2022-03-03 PROCEDURE — 74176 CT ABD & PELVIS W/O CONTRAST: CPT | Mod: MG | Performed by: RADIOLOGY

## 2022-03-03 PROCEDURE — 82248 BILIRUBIN DIRECT: CPT | Performed by: PATHOLOGY

## 2022-03-03 PROCEDURE — 84134 ASSAY OF PREALBUMIN: CPT | Performed by: PATHOLOGY

## 2022-03-03 PROCEDURE — 71250 CT THORAX DX C-: CPT | Mod: MG | Performed by: RADIOLOGY

## 2022-03-03 PROCEDURE — G0103 PSA SCREENING: HCPCS | Performed by: PATHOLOGY

## 2022-03-03 PROCEDURE — 83880 ASSAY OF NATRIURETIC PEPTIDE: CPT | Performed by: PATHOLOGY

## 2022-03-03 PROCEDURE — 36415 COLL VENOUS BLD VENIPUNCTURE: CPT | Performed by: PATHOLOGY

## 2022-03-03 PROCEDURE — 80307 DRUG TEST PRSMV CHEM ANLYZR: CPT | Performed by: INTERNAL MEDICINE

## 2022-03-03 PROCEDURE — 80053 COMPREHEN METABOLIC PANEL: CPT | Performed by: PATHOLOGY

## 2022-03-03 PROCEDURE — 80349 CANNABINOIDS NATURAL: CPT | Performed by: INTERNAL MEDICINE

## 2022-03-03 PROCEDURE — 86850 RBC ANTIBODY SCREEN: CPT | Performed by: INTERNAL MEDICINE

## 2022-03-03 PROCEDURE — 99000 SPECIMEN HANDLING OFFICE-LAB: CPT | Performed by: PATHOLOGY

## 2022-03-03 PROCEDURE — 80061 LIPID PANEL: CPT | Performed by: PATHOLOGY

## 2022-03-03 PROCEDURE — 77080 DXA BONE DENSITY AXIAL: CPT | Performed by: INTERNAL MEDICINE

## 2022-03-03 PROCEDURE — 82610 CYSTATIN C: CPT | Mod: 90 | Performed by: PATHOLOGY

## 2022-03-03 PROCEDURE — 99214 OFFICE O/P EST MOD 30 MIN: CPT | Mod: GC | Performed by: INTERNAL MEDICINE

## 2022-03-03 PROCEDURE — 83036 HEMOGLOBIN GLYCOSYLATED A1C: CPT | Performed by: PATHOLOGY

## 2022-03-03 RX ORDER — DOBUTAMINE HYDROCHLORIDE 200 MG/100ML
2.5 INJECTION INTRAVENOUS CONTINUOUS
Status: ON HOLD | COMMUNITY
Start: 2022-02-24 | End: 2022-04-08

## 2022-03-03 ASSESSMENT — PAIN SCALES - GENERAL: PAINLEVEL: NO PAIN (0)

## 2022-03-03 NOTE — NURSING NOTE
Chief Complaint   Patient presents with     Follow Up     Chronic systolic congestive heart failure- labs prior, has many appointments today     Vitals were taken and medications reconciled.    Marvin Ramires, EMT  8:24 AM

## 2022-03-03 NOTE — NURSING NOTE
"Met with patient and wife, Monique, to present the HM3 as a possible treatment option.    Discussed patient and caregiver responsibilities before and after VAD implant. Clarified that, r/t COVID-19 visitor restrictions, only 2 caregivers may be trained. Clarified the need for a caregiver to be present for education and to assist patient for at least first 30 days after a patient returns home. Patient's designated caregiver is Monique.     Discussed basic overview of VAD equipment, on going management, need for anticoagulation, regular dressing change, grounded three-pronged outlet and functioning telephone. Also discussed frequency of follow-up clinic appointments and the need to stay locally for at least 2-4 weeks.  Reviewed restrictions of having a VAD such as no swimming, bathing, boats, MRI's, or arc welding.     Provided and discussed the VAD educational brochure, information regarding the VAD and transplant support groups, and \"VAD What You Should Know\" with additional details. Patient and Monique signed \"VAD What You Should Know\" document (or agreed to have VAD Coordinator sign on their behalf). VAD coordinator contact information provided.  Encouraged patient and wife to call with questions. Learners verbalized understanding of the above information.      "

## 2022-03-03 NOTE — LETTER
3/3/2022      RE: Tej Morfin  [address]  [place] [address]       Dear Colleague,    Thank you for the opportunity to participate in the care of your patient, Tej Morfin, at the [facility] [place] at [facility]. Please see a copy of my visit note below.    [facility]  Advanced Heart Failure Clinic    Follow up Visit  Date of Service: 03/03/22    Tej Morfin is a 56 year old male with NICM secondary to valvulopathy severe MR s/p MV ring repair ACC AHA Stage D NYHA Class 3b, now on home inotrope (Dobutamine 2.5), sustained VT s/p secondary prevention ICD, CKD stage II (baseline 1.5) who presents in follow up.     Cardiac history:  He wsas first diagnosed with severe mitral regurgitation and an ejection fraction of 25%.  He underwent an angiogram at that time that showed only mild coronary disease. He was referred to Dr. Graves for mitral regurgitation, and on 01/21/2015, he underwent mitral valve repair with an annuloplasty ring.      He later presented with ventricular tachycardia which was sustained and therefore he underwent a single-chamber defibrillator for secondary prevention. Shortly after that, he had his first HF hospitalization and was referred to us.       When we first saw him, he had an excellent cardiopulmonary stress test (peak VO2 27 Dec 2015).  He now had a BiV upgrade to a CRT. He also underwent a PET scan of his chest to rule out cardiac sarcoidosis, which was not detected on this test. However, there was an area showed ischemia subtended by the circumflex artery and he underwent a follow up coronary angiography as noted above which showed mild non obstructive disease. Repeat RHC in June 2019 that showed elevated PCWP, normal right-sided filling pressures, mild pulmonary artery pressure elevation, and CI 1.8. Per his wife he has been slowing down gradually over the last  year.  At that time we started his evaluation for both LVAD and transplant and most of this is been complete.    After med adjustment he settled into a stable state for about 2 years.  He has some issues with gastroparesis that he was told was due to thyroid issues and diabetes. and anxiety.    LOV: Nov 2021 with Dr. Hdz- had nausea, weight loss 15 to 20 lbs, some decline in functional capacity.  We planned for repeat RHC and echo with plan for inotropes if low CI however this was delayed as he developed COVID with only mild sxs.      Since then: Admitted 2/18-2/24 in [place] for orthostatic hypotension.  RHC 1/19 mRA 9, RV 27/5/8, PA 31/15/22,  mWP 11, CI 1.5.  We started him on dobutamine 2.5mcg/kg/min- attempted 5 but had some NSVT.  His Coreg, aldactone, farxiga, lasix, hydralazine and potassium were held.  He was continued on mid dose entresto bid and sotalol. Had RUE PICC placed.  Since discharge he has been doing well, managing infusion and PICC line well.  He is feeling better mostly in the way of his fatigue says his energy level pre dobutamine was 3/10 now is 7/10.  He is still having orthostatic sxs when he stands up too fast, not checking his BP.  Appetite and nausea have improved.  Weight at home stable 188lbs checks daily.  Has 8 steps to his apartment and feels fatigued and winded after that which is chronic.      No palpitations or ICD shocks. No LE edema, bloating, worsening orthopnea or PND.  No syncope.   No other ED visits or hospitalizations. Not using marijuana anymore as he was for nausea.  Wife Jessenia here today.    PAST MEDICAL HISTORY:     1.  Hemorrhoidectomy.     2.  Arthroscopy.     3.  Right partial medial meniscectomy 08/2013.     4.  Status post ICD implantation 02/02/2015, [employer], by Dr. Felix.     5.  Mitral valve repair with annuloplasty ring and atrial appendage ligation by Dr. Graves in [place], [place]; date on that was 01/21/2015.   6.   "Gastroparesis    CURRENT MEDICATIONS:  Current Outpatient Medications   Medication Sig Dispense Refill     aspirin (ASA) 81 MG EC tablet Take 81 mg by mouth       cyclobenzaprine (FLEXERIL) 10 MG tablet Take 10 mg by mouth 3 times daily as needed for muscle spasms       diazepam (VALIUM) 10 MG tablet Take 10 mg by mouth every 4 hours as needed for anxiety       DOBUTamine 500 mg in dextrose 5% 250 mL (adult std conc) premix Inject 211.5 mcg/min into the vein       fluticasone (FLONASE) 50 MCG/ACT nasal spray Spray 1 spray into both nostrils 2 times daily as needed        HYDROcodone-acetaminophen (NORCO) 5-325 MG tablet Take 1-2 tablets by mouth every 6 hours as needed for moderate pain        metoclopramide (REGLAN) 10 MG tablet Take 10 mg by mouth 3 times daily For 3 weeks then off 1 week       omeprazole (PRILOSEC) 40 MG DR capsule Take 40 mg by mouth every morning       sacubitril-valsartan (ENTRESTO)  MG per tablet Take 0.5 tablets by mouth 2 times daily        sotalol (BETAPACE) 80 MG tablet TAKE 1 TABLET BY MOUTH TWICE A DAY         SOCIAL HISTORY:    The patient lives with his wife Jessenia. They have 2 children who live at home.  They are both teenagers.  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 Alcohol rarely, 1-2 times per month..  Smoking, he smoked 1/2 pack per day for 10 years, quit in .     -Was using marijuana to treat nausea but has since stopped.       FAMILY HISTORY:  Mother had coronary disease and end-stage renal disease in her 40s. He has 1 younger brother and 1 sister who are alive and well.  Dad  at age 51, but not related to cardiac problems. There is no other family history of cardiomyopathy or sudden cardiac death.     ROS:   10 point ROS conducted and negative except as per HPI.     EXAM:  /88 (BP Location: Left arm, Patient Position: Chair, Cuff Size: Adult Large)   Pulse 80   Ht 1.777 m (5' 9.96\") "   Wt 88.5 kg (195 lb)   SpO2 100%   BMI 28.01 kg/m      General: appears comfortable, alert and articulate  Head: normocephalic, atraumatic  Eyes: anicteric sclera, EOMI  Neck: no adenopathy  Orophyarynx: moist mucosa, no lesions, dentition intact  Heart: PMI diffuse,trace systolic murmur at LLSB, regular, S1/S2, rub, estimated JVP 9 cm   Lungs: clear, no rales or wheezing  Abdomen: soft, non-tender, bowel sounds present, no hepatosplenomegaly  Extremities: no clubbing, cyanosis or edema  RUE PICC line clean dry no erythema or pus.   Neurological: normal speech and affect, no gross motor deficits    LABS:  Reviewed- mild hypernatremia, Cr 1.04, LFTs normal, albumin 2.8 but prealbumin 21, A1C 5.9%, NT proBNP 2600    TESTING:  Right heart catheterization Jan 2021 while admitted pre dobutamine   1/19 mRA 9, RV 27/5/8, PA 31/15/22,  mWP 11, CI 1.5.    CPX: December 2019   RESULTS:  Oxygen Consumption: The patient's VO2 max was 1790 ml/min, which represents 62% of predicted maximum.  When normalize for body weight, VO2 max was 19.2 ml/kg/min, which was 62% of predicted maximum.  Anaerobic or ventilatory threshold (AT) was noted at 1064 ml/min or 11.4 ml/kg/min, which represents 59% of predicted maximum. RER was 0.87 at rest and increased to 1.13 at maximal exercise.    Cardiac Response: The resting EKG showed paced rhythm.  At maximal exercise, no ischemic changes were noted.  The HR was 80 beats/min, and alonso to 123 beats/min at maximal exercise, which is 73% of age-predicted maximum. HRR calculated as 46.  The O2 pulse was 4 ml/beat at rest and increased to 15 ml/beat at maximal exercise, which is 85 % of age-predicted maximum. The BP at rest was 138/80 and increased to 166/76 at maximal exercise.    TTE 2/19/22 limited     Left Ventricle: The left ventricle is severely dilated. The EF is   visually estimated to be 15-20%.   Left Ventricle   The left ventricle is severely dilated. Wall thickness is normal. The EF is  visually estimated to be 15-20%. Severe global hypokinesis of the left ventricular wall segments. Grade III diastolic dysfunction.   Right Ventricle   The right ventricle is mild-to-moderately dilated. The RVSP measures 25.3 mmHg.   Tricuspid Valve   RVSP measurin.3 mmHg.   Pericardium   There is no pericardial effusion.      TTE 22     Left Ventricle: The left ventricle is severely dilated. The EF is visually estimated to be 10-15%.      Right Ventricle: The right ventricle is mild-to-moderately dilated.      Left Atrium: The left atrium is moderately dilated.     Left Ventricle   The left ventricle is severely dilated. Wall thickness is normal. The EF is visually estimated to be 10-15%. Global hypokinesis of the left ventricular wall segments. Grade I diastolic dysfunction.   Right Ventricle   The right ventricle is mild-to-moderately dilated. Systolic function is mildly reduced. The RVSP measures 26.0 mmHg.   Left Atrium   The left atrium is moderately dilated.   Right Atrium   The right atrium is dilated.   IVC/SVC   Normal IVC size with normal respirophasic changes.   Mitral Valve   Mitral valve structure is normal. The leaflets are mildly calcified. There is mild regurgitation with an eccentrically directed jet. There is mild stenosis.   Tricuspid Valve   Tricuspid valve structure is normal. There is mild regurgitation. RVSP measurin.0 mmHg.   Aortic Valve   The aortic valve is tricuspid. There is no regurgitation or stenosis.   Pulmonic Valve   Pulmonic valve structure is normal. There is trace regurgitation. There is no evidence of pulmonic valve stenosis.   Pericardium   There is no pericardial effusion.   Pulmonary Artery   The pulmonary artery is normal.   Aorta   The sinus of Valsalva is normal. The ascending aorta is normal.     Echo 17: LVEF: 10-15%; LVEDd 7.1 cm  Echo 18: LVEF 15%; +1 MR: LVEDd 6.4 cm  [facility] LVEF 10-20%; LVEDd 7.6 cm  Echo 2020:  LVEF 15-20%; LVEDd 6.6 cm; mild MS  Echo 1/18/22: LVEF 10-15%; LVEDd 7.4 cm; RV is mild to mod dilated      Assessment and Plan:   56-year-old man with a history of NICM what is likely valvular cardiomyopathy ACC AHA Stage D NYHA Class 3b, severe MR s/p mitral valve repair in 01/2015, who then had continued symptoms, a failure to improve his LV ejection fraction.  He had a recent admission for hypotension a couple weeks ago where we had to peel off most of his GDMT, RHC showed that he was relatively euvolemic with a lower CI now of 1.5, and as such we started him on dobutamine 2.5mcg/kg/min via RUE PICC line.  This has improved his symptoms slightly. Clinically today he appears well perfused with improved BP, not in clinical heart failure.  Labs today show mild hypernatremia, K 3.4, Cr 1.04, normal LFTs, still elevated NT proBNP 2594.     His weight is stable. He is tolerating the dobutamine has not had any ICD shocks. He is maintained on Sotalol 80mg bid.  Obviously we are continuing to see a decline in him now requiring inotropic support and we are now at the place where advanced therapy is imminent.  He has stopped using the marjuana for nausea drug test is pending.  Barring that there are no other contraindications that we can see.  With regards to LVAD vs transplant, he is a bigger BGO and now on dobutamine, the likelihood that he will be able to get a transplant soon is low.  He also has no contraindication to LVAD. So our most linear pathway is likely VAD in the near future, but we will fully present him soon.     Chronic systolic heart failure secondary to valvular cardiomyopathy   Stage D  NYHA Class III   ACEi/ARB/ARNI low dose Entresto  BB off now on dobutamine  Aldosterone antagonist: off now due to hypotension  SGL2i: off  SCD prophylaxis ICD: yes CRT-D      Fluid status euvolemic off diuretic now  NSAID use: none      Status post mitral repair: Stable gradient on last echo    Advanced therapies  evaluation: Now on home dobutamine 2.5mcg/kg/min, will present for LVAD/TxP. Pending drug screens.    Plan:  -no med changes at this time  -will present for VAD/Txp    Patient seen and discussed with Dr. Hdz.  Devika Olson MD        Please do not hesitate to contact me if you have any questions/concerns.     Sincerely,     Dunia Hdz MD

## 2022-03-03 NOTE — TELEPHONE ENCOUNTER
Patient informed that Dr. Lechuga has to cancel his clinic visits today and that we need to reschedule the visit; patient given option on 3/10/22 with times starting at 1430; patient will call back with time that work for him once he talks to his wife. Patient verbalized understanding and agreed to the plan.

## 2022-03-03 NOTE — PATIENT INSTRUCTIONS
"Cardiology Providers you saw during your visit:  Dr. Hdz    Medication changes:      Follow up:    Labs:      Please call if you have :  1. Weight gain of more than 2 pounds in a day or 5 pounds in a week  2. Increased shortness of breath, swelling or bloating  3. Dizziness, lightheadedness   4. Any questions or concerns.       Follow the American Heart Association Diet and Lifestyle recommendations:  Limit saturated fat, trans fat, sodium, red meat, sweets and sugar-sweetened beverages. If you choose to eat red meat, compare labels and select the leanest cuts available.  Aim for at least 150 minutes of moderate physical activity or 75 minutes of vigorous physical activity - or an equal combination of both - each week.      During business hours: [phone], press option # 1 to schedule and leave a message for your care team.        After hours, weekends or holidays: On Call Cardiologist- [phone]   option #4 and ask to speak to the on-call Cardiologist. Inform them you are a CORE/heart failure patient at the [facility].      Heart Failure Support Group  Virtual meetings 2022, , 1-2 p.m.  Monday, , 1-2 p.m.  Monday, , 1-2 p.m.  Monday, , 1-2 p.m.  Monday, May 2nd, 1-2 p.m.  Monday, , 1-2 p.m.  Monday, , 1-2 p.m.  Monday, -2 p.m.  Monday, , 1-2 p.m.  Monday, , 1-2 p.m.  Monday, , 1-2 p.m.  Monday, , 1-2 p.m.      Margret Self RN BSN CHFN  Cardiology Care Coordinator - Heart Failure/ [facility]  [facility]   Questions and [phone]   First press #1 for the [employer] and then press #4 for \"To send a message to your care team\"    "

## 2022-03-03 NOTE — PROGRESS NOTES
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     Cardiac history:  He wsas first diagnosed with severe mitral regurgitation and an ejection fraction of 25%.  He underwent an angiogram at that time that showed only mild coronary disease. He was referred to Dr. Graves for mitral regurgitation, and on 01/21/2015, he underwent mitral valve repair with an annuloplasty ring.      He later presented with ventricular tachycardia which was sustained and therefore he underwent a single-chamber defibrillator for secondary prevention. Shortly after that, he had his first HF hospitalization and was referred to us.       When we first saw him, he had an excellent cardiopulmonary stress test (peak VO2 27 Dec 2015).  He now had a BiV upgrade to a CRT. He also underwent a PET scan of his chest to rule out cardiac sarcoidosis, which was not detected on this test. However, there was an area showed ischemia subtended by the circumflex artery and he underwent a follow up coronary angiography as noted above which showed mild non obstructive disease. Repeat RHC in June 2019 that showed elevated PCWP, normal right-sided filling pressures, mild pulmonary artery pressure elevation, and CI 1.8. Per his wife he has been slowing down gradually over the last year.  At that time we started his evaluation for both LVAD and transplant and most of this is been complete.    After med adjustment he settled into a stable state for about 2 years.  He has some issues with gastroparesis that he was told was due to thyroid issues and diabetes. and anxiety.    LOV: Nov 2021 with Dr. Hdz- had nausea, weight loss 15 to 20 lbs,  some decline in functional capacity.  We planned for repeat RHC and echo with plan for inotropes if low CI however this was delayed as he developed COVID with only mild sxs.      Since then: Admitted 2/18-2/24 in [place] for orthostatic hypotension.  RHC 1/19 mRA 9, RV 27/5/8, PA 31/15/22,  mWP 11, CI 1.5.  We started him on dobutamine 2.5mcg/kg/min- attempted 5 but had some NSVT.  His Coreg, aldactone, farxiga, lasix, hydralazine and potassium were held.  He was continued on mid dose entresto bid and sotalol. Had RUE PICC placed.  Since discharge he has been doing well, managing infusion and PICC line well.  He is feeling better mostly in the way of his fatigue says his energy level pre dobutamine was 3/10 now is 7/10.  He is still having orthostatic sxs when he stands up too fast, not checking his BP.  Appetite and nausea have improved.  Weight at home stable 188lbs checks daily.  Has 8 steps to his apartment and feels fatigued and winded after that which is chronic.      No palpitations or ICD shocks. No LE edema, bloating, worsening orthopnea or PND.  No syncope.   No other ED visits or hospitalizations. Not using marijuana anymore as he was for nausea.  Wife Jessenia here today.    PAST MEDICAL HISTORY:     1.  Hemorrhoidectomy.     2.  Arthroscopy.     3.  Right partial medial meniscectomy 08/2013.     4.  Status post ICD implantation 02/02/2015, [employer], by Dr. Felix.     5.  Mitral valve repair with annuloplasty ring and atrial appendage ligation by Dr. Graves in [place], [place]; date on that was 01/21/2015.   6.  Gastroparesis    CURRENT MEDICATIONS:  Current Outpatient Medications   Medication Sig Dispense Refill     aspirin (ASA) 81 MG EC tablet Take 81 mg by mouth       cyclobenzaprine (FLEXERIL) 10 MG tablet Take 10 mg by mouth 3 times daily as needed for muscle spasms       diazepam (VALIUM) 10 MG tablet Take 10 mg by mouth every 4 hours as needed for anxiety       DOBUTamine 500 mg  "in dextrose 5% 250 mL (adult std conc) premix Inject 211.5 mcg/min into the vein       fluticasone (FLONASE) 50 MCG/ACT nasal spray Spray 1 spray into both nostrils 2 times daily as needed        HYDROcodone-acetaminophen (NORCO) 5-325 MG tablet Take 1-2 tablets by mouth every 6 hours as needed for moderate pain        metoclopramide (REGLAN) 10 MG tablet Take 10 mg by mouth 3 times daily For 3 weeks then off 1 week       omeprazole (PRILOSEC) 40 MG DR capsule Take 40 mg by mouth every morning       sacubitril-valsartan (ENTRESTO)  MG per tablet Take 0.5 tablets by mouth 2 times daily        sotalol (BETAPACE) 80 MG tablet TAKE 1 TABLET BY MOUTH TWICE A DAY         SOCIAL HISTORY:    The patient lives with his wife Jessenia. They have 2 children who live at home.  They are both teenagers.  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 Alcohol rarely, 1-2 times per month..  Smoking, he smoked 1/2 pack per day for 10 years, quit in .     -Was using marijuana to treat nausea but has since stopped.       FAMILY HISTORY:  Mother had coronary disease and end-stage renal disease in her 40s. He has 1 younger brother and 1 sister who are alive and well.  Dad  at age 51, but not related to cardiac problems. There is no other family history of cardiomyopathy or sudden cardiac death.     ROS:   10 point ROS conducted and negative except as per HPI.     EXAM:  /88 (BP Location: Left arm, Patient Position: Chair, Cuff Size: Adult Large)   Pulse 80   Ht 1.777 m (5' 9.96\")   Wt 88.5 kg (195 lb)   SpO2 100%   BMI 28.01 kg/m      General: appears comfortable, alert and articulate  Head: normocephalic, atraumatic  Eyes: anicteric sclera, EOMI  Neck: no adenopathy  Orophyarynx: moist mucosa, no lesions, dentition intact  Heart: PMI diffuse,trace systolic murmur at LLSB, regular, S1/S2, rub, estimated JVP 9 cm   Lungs: clear, no rales or " wheezing  Abdomen: soft, non-tender, bowel sounds present, no hepatosplenomegaly  Extremities: no clubbing, cyanosis or edema  RUE PICC line clean dry no erythema or pus.   Neurological: normal speech and affect, no gross motor deficits    LABS:  Reviewed- mild hypernatremia, Cr 1.04, LFTs normal, albumin 2.8 but prealbumin 21, A1C 5.9%, NT proBNP 2600    TESTING:  Right heart catheterization 2021 while admitted pre dobutamine    mRA 9, RV 27//8, PA 31/15/22,  mWP 11, CI 1.5.    CPX: 2019   RESULTS:  Oxygen Consumption: The patient's VO2 max was 1790 ml/min, which represents 62% of predicted maximum.  When normalize for body weight, VO2 max was 19.2 ml/kg/min, which was 62% of predicted maximum.  Anaerobic or ventilatory threshold (AT) was noted at 1064 ml/min or 11.4 ml/kg/min, which represents 59% of predicted maximum. RER was 0.87 at rest and increased to 1.13 at maximal exercise.    Cardiac Response: The resting EKG showed paced rhythm.  At maximal exercise, no ischemic changes were noted.  The HR was 80 beats/min, and alonso to 123 beats/min at maximal exercise, which is 73% of age-predicted maximum. HRR calculated as 46.  The O2 pulse was 4 ml/beat at rest and increased to 15 ml/beat at maximal exercise, which is 85 % of age-predicted maximum. The BP at rest was 138/80 and increased to 166/76 at maximal exercise.    TTE 22 limited     Left Ventricle: The left ventricle is severely dilated. The EF is   visually estimated to be 15-20%.   Left Ventricle   The left ventricle is severely dilated. Wall thickness is normal. The EF is visually estimated to be 15-20%. Severe global hypokinesis of the left ventricular wall segments. Grade III diastolic dysfunction.   Right Ventricle   The right ventricle is mild-to-moderately dilated. The RVSP measures 25.3 mmHg.   Tricuspid Valve   RVSP measurin.3 mmHg.   Pericardium   There is no pericardial effusion.      TTE 22     Left Ventricle: The  left ventricle is severely dilated. The EF is visually estimated to be 10-15%.      Right Ventricle: The right ventricle is mild-to-moderately dilated.      Left Atrium: The left atrium is moderately dilated.     Left Ventricle   The left ventricle is severely dilated. Wall thickness is normal. The EF is visually estimated to be 10-15%. Global hypokinesis of the left ventricular wall segments. Grade I diastolic dysfunction.   Right Ventricle   The right ventricle is mild-to-moderately dilated. Systolic function is mildly reduced. The RVSP measures 26.0 mmHg.   Left Atrium   The left atrium is moderately dilated.   Right Atrium   The right atrium is dilated.   IVC/SVC   Normal IVC size with normal respirophasic changes.   Mitral Valve   Mitral valve structure is normal. The leaflets are mildly calcified. There is mild regurgitation with an eccentrically directed jet. There is mild stenosis.   Tricuspid Valve   Tricuspid valve structure is normal. There is mild regurgitation. RVSP measurin.0 mmHg.   Aortic Valve   The aortic valve is tricuspid. There is no regurgitation or stenosis.   Pulmonic Valve   Pulmonic valve structure is normal. There is trace regurgitation. There is no evidence of pulmonic valve stenosis.   Pericardium   There is no pericardial effusion.   Pulmonary Artery   The pulmonary artery is normal.   Aorta   The sinus of Valsalva is normal. The ascending aorta is normal.     Echo 17: LVEF: 10-15%; LVEDd 7.1 cm  Echo 18: LVEF 15%; +1 MR: LVEDd 6.4 cm  [facility] LVEF 10-20%; LVEDd 7.6 cm  Echo 2020: LVEF 15-20%; LVEDd 6.6 cm; mild MS  Echo 22: LVEF 10-15%; LVEDd 7.4 cm; RV is mild to mod dilated      Assessment and Plan:   56-year-old man with a history of NICM what is likely valvular cardiomyopathy ACC AHA Stage D NYHA Class 3b, severe MR s/p mitral valve repair in 2015, who then had continued symptoms, a failure to improve his LV ejection fraction.  He  had a recent admission for hypotension a couple weeks ago where we had to peel off most of his GDMT, RHC showed that he was relatively euvolemic with a lower CI now of 1.5, and as such we started him on dobutamine 2.5mcg/kg/min via RUE PICC line.  This has improved his symptoms slightly. Clinically today he appears well perfused with improved BP, not in clinical heart failure.  Labs today show mild hypernatremia, K 3.4, Cr 1.04, normal LFTs, still elevated NT proBNP 2594.     His weight is stable. He is tolerating the dobutamine has not had any ICD shocks. He is maintained on Sotalol 80mg bid.  Obviously we are continuing to see a decline in him now requiring inotropic support and we are now at the place where advanced therapy is imminent.  He has stopped using the marjuana for nausea drug test is pending.  Barring that there are no other contraindications that we can see.  With regards to LVAD vs transplant, he is a bigger BGO and now on dobutamine, the likelihood that he will be able to get a transplant soon is low.  He also has no contraindication to LVAD. So our most linear pathway is likely VAD in the near future, but we will fully present him soon.     Chronic systolic heart failure secondary to valvular cardiomyopathy   Stage D  NYHA Class III   ACEi/ARB/ARNI low dose Entresto  BB off now on dobutamine  Aldosterone antagonist: off now due to hypotension  SGL2i: off  SCD prophylaxis ICD: yes CRT-D      Fluid status euvolemic off diuretic now  NSAID use: none      Status post mitral repair: Stable gradient on last echo    Advanced therapies evaluation: Now on home dobutamine 2.5mcg/kg/min, will present for LVAD/TxP. Pending drug screens.    Plan:  -no med changes at this time  -will present for VAD/Txp    Patient seen and discussed with Dr. Hdz.  Devika Olson MD

## 2022-03-04 ENCOUNTER — TEAM CONFERENCE (OUTPATIENT)
Dept: CARDIOLOGY | Facility: CLINIC | Age: 57
End: 2022-03-04

## 2022-03-04 ENCOUNTER — CARE COORDINATION (OUTPATIENT)
Dept: CARDIOLOGY | Facility: CLINIC | Age: 57
End: 2022-03-04
Payer: MEDICARE

## 2022-03-04 DIAGNOSIS — I50.22 CHRONIC SYSTOLIC CONGESTIVE HEART FAILURE (H): Primary | ICD-10-CM

## 2022-03-04 LAB
CREAT UR-MCNC: 468 MG/DL
CYSTATIN C SERPL-MCNC: 0.9 MG/L
GFR/BSA.PRED SERPLBLD CYS-BASED-ARV: 91 ML/MIN/BSA

## 2022-03-04 NOTE — PROGRESS NOTES
Tej has been approved by the [facility] Committee for LVAD implant pending two quantitative confirmation THC urine levels and a repeat RHC. The first THC was added on to the specimen from 3/2/2022 here at [facility]. The second level will be obtained at [facility] on 3/9/2022.    Outstanding items:  Tej is requesting on 3/4 his dentist send the clearance for heart surgery letter to the VAD office.  Dr Lechuga requested rescheduling the surgery consult  The Committee requested a repeat RHC here at [facility].  Insurance approval for VAD implant. -requested.

## 2022-03-04 NOTE — PROGRESS NOTES
[facility] [address]        NEUROPSYCHOLOGICAL EVALUATION     RELEVANT HISTORY AND REASON FOR REFERRAL     This is a report of neuropsychological consultation regarding Tej Morfin, a 56-year-old, right-handed -American man with 16 years of formal education. Mr. Morfin s medical and psychiatric history includes anxiety, cardiomegaly, congestive heart failure, valvular cardiomyopathy, severe mitral regurgitation, s/p mitral valve replacement, ventricular tachycardia, and s/p defibrillator placement, obstructive sleep apnea (on CPAP), age-related nuclear cataract of both eyes, myopia of both eyes, presbyopia, thyroid goiter, and gastroparesis. He is being considered for a heart transplant or possibly LVAD as a bridge to transplant. He is referred for neuropsychological evaluation of brain function by Dr. Dunia Hdz.    Mr. Morfin is known to this service. He previously had transplant/LVAD candidacy evaluations in both 2015 and 2019 with Dr. Mei Zamora and Dr. Emmett Jimenez, respectively. Please see the reports dated 12/01/2015 and 9/11/2019 for additional background not recapitulated here. Briefly, in 2015, the test results were seen as mildly abnormal. There were inconsistent problems with learning/memory, attentional/executive control, and fine-motor speed. Limited word reading and pronunciation skills were suggestive of neurodevelopmental limitations on reading abilities. Dr. Zamora felt there were no strong, consistent indicators of focal brain disease. Results of his 2019 neuropsychological evaluation was again seen as mildly abnormal, but not suggestive of a serious cognitive disorder that would interfere with LVAD/transplant candidacy. Compared to his 2015 results, his performances were stable or improved. 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 On the mental and behavioral health side, there were suggestions of reduced self-awareness or defensiveness, as well as some social withdrawal, on his MMPI-2-RF profile during both evaluations. However, there were no overt signs of psychopathology. Neither evaluation evidenced indications of chemical dependency, though occasional cannabis use (mostly for management of stress and sleep) was reported. He was advised to discontinue cannabis use.    Currently, Mr. Morfin reported that he underwent evaluation for heart transplant in 2019. His heart health was generally stable since then until he was hospitalized last week. He stated that he was initially evaluated in the emergency department and discharged home, but he was subsequently admitted for 5 days. He reported that he was unable to keep his heart rate above 80 and noted that his heart rate fluctuated between 74 and 56. He now has a PICC line. His treatment team is again revisiting transplant and LVAD as a bridge to transplant. He described feeling weaker than normal and hoped with treatment he could return to having a decent quality of life. He was not able to recall specific risks of transplant or LVAD treatment, however it has been several years since he completed LVAD education. He is scheduled to complete LVAD education again this week.        Mr. Morfin lives at home with his wife and one of his children. He stated that his 21-year-old stepdaughter has  left the nest.  He says that his wife would be the one to provide postsurgical aftercare as necessary.    In terms of his cognitive functioning, he reported misplacing items on occasion and sometimes losing his train of thought. However, he attributed these changes to getting older. His wife continues to manage the family finances, which she has always done. She also manages his medications, but he takes them independently. He drives. He denied instances of getting lost, close calls, and  accidents. He has no cognitively based interference with cooking or basic personal care.    Mr. Morfin described his mood as  positive.  He denied concerns with depression. He reported that his anxiety had  gotten bad  for a while, which he believed increased after he was diagnosed with COVID-19 in December 2021. He stated that his anxiety might have been one of the reasons he was in the hospital in recent weeks. He reported having panic attacks monthly, with the most recent panic attack occurring while he was hospitalized. He is prescribed diazepam, which he uses for sleep, as he reported experiencing increased anxiety when he does not sleep well. He takes diazepam a couple times per week. He reported some anxiety surrounding being shocked by his defibrillator during his last evaluation. He noted that he was only shocked once immediately after it was implanted, and his anxiety surrounding subsequent shocks has generally subsided. He has never participated in psychotherapy. He stated that he talks with his aunt when he feels anxious, and she helps him talk though his worry and panic symptoms. He reported that he continues to be interested and motivated to engage in the activities that he enjoys. However, he is limited by his health and defibrillator. He denied current suicidal ideation. He denied a history of hallucinations.    He reported that his sleep is improving. However, he stated that he generally sleeps 4-6 hours at night. He described tossing and turning and waking up for a few hours before being able to return to sleep. He stated that he feels rested when he wakes up on some days. He reported taking short naps occasionally, particularly after doing housework.    Mr. Morfin denied current use of alcohol, tobacco, and illicit substances. He reported a history of cannabis use. He stated that he began vomiting in the mornings, so he began using editable cannabis. He stated that he stopped using all  cannabis approximately 30 days ago, after he was told to stop by his treatment team. He denied cravings for cannabis use.    He reported no change in his educational or work history. He last worked approximately 7 years ago. He currently subsists on Social Security Disability.    Mr. Morfin denied interval history of head injury, seizure, or stroke. He was diagnosed with COVID-19 in December 2021. He reported that his course was mild. He did not require hospitalization for COVID-19 treatment. He reported that he was vaccinated and received his COVID-19 booster.     His current medication list includes dobutamine, hydrocodone-acetaminophen, omeprazole, metoclopramide, sacubitril-valsartan, sotalol, cyclobenzaprine, sodium chloride, diazepam, fluticasone, and aspirin.      BEHAVIORAL OBSERVATIONS     Mr. Morfin was polite and cooperative with the evaluation. He wore glasses. His speech production was normal and language comprehension was normal. Thought processes were linear and goal-directed. He demonstrated good insight into his cardiac history. He was open, candid, and friendly. He was a good historian and he demonstrated adequate knowledge of his recent medical history and treatment. In the testing session, he was alert and engaged. His effort and persistence were good. The test results are seen as valid estimations of his cognitive status.      MEASURES ADMINISTERED     The following measures were administered by a trained psychometrist, under my supervision:     Orientation: Time, Place, Basic Personal Information, Recent [place] Presidents; Wechsler Abbreviated Scale of Intelligence: Vocabulary, Matrix Reasoning; Wechsler Adult Intelligence Scale-IV: Coding; Controlled Oral Word Association Test; [place] Naming Test; Theron Visual Acuity Screen; Finger Tapping Test; Trail Making Test; Test of Sustained Attention & Tracking; Tommy Auditory Verbal Learning Test; Wechsler Memory Scale-IV: Logical Memory, Visual  Reproduction; Robert Depression Inventory-II; State-Trait Anxiety Inventory.     RESULTS AND INTERPRETATION     Orientation: Orientation was normal for time, place, basic personal information, and basic cultural information.     Intellectual Abilities: Demonstrating vocabulary knowledge was low average. Visual reasoning through pattern identification was below average.     Language & Related Skills: Confrontation naming was below average. Associative verbal fluency was average.     Visual Perceptual & Constructional Skills: None of his confrontation naming errors could be attributed to misperceptions. Drawings of simple shapes and figures were reasonably organized and recognizable.    Motor Skills: Speeded finger tapping performances were exceptionally high bilaterally.    Mental Speed and Executive Functioning: As noted above, speeded verbal fluency was average. He was below average for speed, with significant inattentive and executive control errors, across a variety of verbal tasks requiring executive management of attention and working memory. Graphomotor clerical speed was average. Visual scanning and graphomotor sequencing under simple conditions was average for speed with no errors. Scanning and sequencing under greater executive demands to control divided attention was performed in the low average range for speed and had three errors.     Learning & Anterograde Memory: Learning an extensive word list over repeated readings was average. Free recall of the list was average after a brief delay with active interference. List recall remained average after a 30-minute delay, and delayed recognition of the list was low average. Immediate verbal memory for short stories was average, delayed recall of the stories was low average, and delayed recognition of story details was average. Immediate visual memory for simple designs was average, delayed free recall of the designs was average, and recognition of the designs was  average.    Emotional, Behavioral, & Personality Functioning: On brief self-report inventories, he endorsed minimally elevated symptoms related to depression (BDI-2 = 5), low average-range experiences with anxiety in his daily life (Trait Anxiety = 19th percentile), and average-range experiences for his in-the-moment anxiety during the testing session (State Anxiety = 26th percentile).     IMPRESSIONS AND RECOMMENDATIONS     The cognitive test results are again mildly abnormal but do not suggest the presence of the serious cognitive disorder that would interfere with LVAD/transplant candidacy.    Compared to testing on the same measures in 2019, he demonstrated a mild decline on tasks of speeded complex attention and executive functioning. His performances on tests across all other cognitive domains remained stable or slightly improved and were in the average range. These findings suggest a relative weakness of frontal-subcortical systems. We believe the findings are reasonably attributed to his cardiovascular risk factors. The data and clinical history do not suggest an additional encroaching neurodegenerative disease. He is not in a state of dementia. We are not concerned about his ability to understand his own medical needs and to make independent decisions in his own best interest.    Mr. Morfin describes ongoing symptoms of anxiety with recent panic attacks. He may benefit from consultation with a health psychologist regarding his worry and physiological symptoms surrounding his heart functioning. Additionally, he has some chronic sleep difficulties that appear to be at least partially based on anxiety. He has a benzodiazepine prescription that he uses less than nightly, and there is continued sleep dysfunction. It would be reasonable for him to consult with his treatment team regarding adjusting his pharmacological treatments for sleep and anxiety. He may also work with a sleep psychologist for behavioral  interventions for improved sleep.    Mr. Morfin has a history of cannabis use for treatment of health-related conditions, such as poor sleep and nausea/vomiting. He stated that he has not used cannabis for approximately 30 days, since he was instructed to stop by his cardiologist. At this time, there are no concerns about substance abuse or dependence. His cardiology team should clearly inform him that he needs to continue to be fully abstinent from cannabis products to sustain LVAD/transplant candidacy, and he and his family should be clearly informed about any consequences for non-compliance in these regards.    Overall, he continues to be an appropriate candidate for LVAD/transplant consideration, from a neuropsychological perspective. We would be happy to see him for reevaluation against his up-to-date baseline, whenever clinically indicated.      Amanda Bhakta, Ph.D.  Postdoctoral Fellow    Emmett Jimenez, PhD, LP, ABPP-CN  Board Certified in Clinical Neuropsychology  Licensed Psychologist [id number]     All services provided by the Postdoctoral Fellow were supervised by this licensed psychologist and all billing noted here is for professional services provided by the psychologist and psychometrist.    Time spent: One unit psychiatric evaluation including records review, interview, and clinical assessment licensed and board-certified neuropsychologist (CPT 84113). 40 minutes neuropsychological testing evaluation by licensed and board-certified neuropsychologist, including integration of patient data, interpretation of standardized test results and clinical data, clinical decision-making, treatment planning, supervision of the fellow, report writing, and interactive feedback to the patient (CPT 45963, 86848). 195 minutes of psychological and neuropsychological test administration and scoring by technician (CPT 86944, 72045). Diagnoses: I50.22, F54, R41.9, Z01.818

## 2022-03-04 NOTE — LETTER
Faxed to:  [facility]  Fax Number: [phone]      Patient Name: Tej Morfin   : 1965   Diagnosis/ICD-10: Heart Failure, unspecified I50.9; LVAD Z95.811   Requesting Physician: Dr. Dunia Hdz   Date of Request: 2022   Date to Draw 2022                                                Please fax results to [phone], [phone]      or email to [email or web address]                              Call [phone] with Questions  ORDER TEST    Complete Metabolic Panel    Complete Blood Count    Lactate Dehydrogenase    INR    D-Dimer   X THC confirmation quantitative urine             Signed,      Dunia Hdz MD  Heart Failure, Mechanical Circulatory Support and Transplant Cardiology   of Medicine,  Division of Cardiology, [facility]

## 2022-03-04 NOTE — PROGRESS NOTES
NAME  Tej Morfin    MRN  [id number]      [birth year]     AGE  56     SEX  Male     HANDEDNESS Right     EDUCATION 16     FINNEGAN  3/1/22     PROVIDER  STEPHANIE     HELM Boots  AJ     STATION  OP            ORIENTATION      Time  0     Place      Personal Info.     Presidents             WASI-II       FSIQ: 77        Raw T    Vocabulary  31 41    Matrix Reasoning 10 32           WAIS-IV         Raw SSa    Coding  57 9            COWAT   FAS   Raw 35      SS 9      T 44              BOSTON NAMING TEST     Raw 45 /60     SS 6      T 35             FINGER TAPPING       Avg SSa T    RH 58.33 13 73    LH 54 14 76           TRAIL MAKING TEST       Time Errors SSa T   A 25 0 10 54   B 112 3 7 38          TSAT        Total z     Time 163 -1.93     Errors 14 -4.68             WMS-IV LOGICAL MEMORY YA    Raw SSa %ile    LM I 20 8     LM II 15 7     LM Rec. 23  26-50    VR I 32 8     VR II 24 10     VR Rec. 5  26-50            AVLT 30-91   3   T1 T2 T3 T4 T5   4 9 9 10 10   TB T6 30'     5 9 10       Raw T %ile   Trials 1-5  42 46 32-68   Short Delay Recall 9 51 32-68   30' Recall  10 56 69-83   30' Recog. Hits/FPs  43 17-31   Mem. Efficiency 1.92 54 32-68          BDI-II       Raw 5      Interp. Minimal             STAI        Raw %ile Interpretation   State: 26 26 Low Normal   Trait:  25 19 Low Normal

## 2022-03-04 NOTE — PROGRESS NOTES
Pre-LVAD Social Work Services Phone Call      Data: Pt undergoing LVAD evaluation. Writer previously evaluated pt 9/11/2019. At that time only barrier towards transplant and LVAD was positive drug screen for cannabis. At that time, he had been forthcoming that he was using cannabis to help with anxiety. Drug screen today is positive for cannabis.   Spoke to pt via phone. Overwhelmed with today, but is feeling even more motivated to proceed with LVAD. Pt reports he stopped cannabis use 30 days ago. Pt has a caregiver plan, no concerns in area of mental health, finances or insurance. Pt and wife are aware of temporary relocation and plan to stay with wife's sister, in [place].     Intervention: Supportive Phone Call, LVAD Education   Assessment: Pt and wife pleasant and engaged. They felt today was informative and pt is motivated to receive the LVAD. From a psychosocial perspective pt is a good candidate to proceed with LVAD.

## 2022-03-07 ENCOUNTER — PREP FOR PROCEDURE (OUTPATIENT)
Dept: CARDIOLOGY | Facility: CLINIC | Age: 57
End: 2022-03-07
Payer: MEDICARE

## 2022-03-07 DIAGNOSIS — I50.9 HEART FAILURE (H): Primary | ICD-10-CM

## 2022-03-07 LAB
ANABASINE UR-MCNC: <5 NG/ML
COTININE UR-MCNC: <15 NG/ML
NICOTINE UR-MCNC: <15 NG/ML
PLPETH BLD-MCNC: <10 NG/ML
POPETH BLD-MCNC: <10 NG/ML
TRANS-3-OH-COTININE UR-MCNC: <50 NG/ML

## 2022-03-08 ENCOUNTER — HOSPITAL ENCOUNTER (INPATIENT)
Facility: CLINIC | Age: 57
Setting detail: SURGERY ADMIT
End: 2022-03-08
Attending: SURGERY | Admitting: SURGERY
Payer: MEDICARE

## 2022-03-11 LAB
CANNABINOIDS UR CFM-MCNC: 1664 NG/ML
CARBOXYTHC/CREAT UR: 356 NG/MG CREAT

## 2022-03-15 ENCOUNTER — CARE COORDINATION (OUTPATIENT)
Dept: CARDIOLOGY | Facility: CLINIC | Age: 57
End: 2022-03-15
Payer: MEDICARE

## 2022-03-15 NOTE — LETTER
Mechanical Circulatory Support Program  [address], [facility] [address] [place] [address]  [phone] Office Phone  [phone] Fax Number    Faxed to:  [facility], Lab  Fax Number:  [phone]      Patient Name: Tej Morfin   : 1965   Diagnosis/ICD-10: Heart Failure, unspecified I50.9; LVAD Z95.811   Requesting Physician: Dr. Dunia Hdz   Date of Request: 03/15/22   Date to Draw 3/15/22                                                Please fax results to [phone]       or email to [email or web address]                              Call [phone] with Questions  ORDER TEST   X THC confirmation quantitative urine             Signed,      Dunia Hdz MD  Heart Failure, Mechanical Circulatory Support and Transplant Cardiology   of Medicine,  Division of Cardiology, [facility]

## 2022-03-16 ENCOUNTER — CARE COORDINATION (OUTPATIENT)
Dept: CARDIOLOGY | Facility: CLINIC | Age: 57
End: 2022-03-16
Payer: MEDICARE

## 2022-03-16 NOTE — PROGRESS NOTES
Tej went to the [facility] yesterday for another THC Quantitative. He reports he has stopped using marijuana edibles. His last usage was around 2/1/2022. I updated Tej with the tentative plan for his VAD implant.

## 2022-03-16 NOTE — PROGRESS NOTES
Called pt to request repeat urine thc screening. Pt did not answer. Left voicemail requesting that pt stop at lab today for testing. Lab order faxed to [facility].

## 2022-03-17 ENCOUNTER — CARE COORDINATION (OUTPATIENT)
Dept: CARDIOLOGY | Facility: CLINIC | Age: 57
End: 2022-03-17
Payer: MEDICARE

## 2022-03-17 NOTE — PROGRESS NOTES
Dr Rodríguez ordered Tej to stop taking ASA starting today, 3/17, in anticipation of his VAD implant surgery tentatively scheduled for 3/23. I left Tej LAMB.

## 2022-03-18 ENCOUNTER — ANESTHESIA EVENT (OUTPATIENT)
Dept: SURGERY | Facility: CLINIC | Age: 57
DRG: 001 | End: 2022-03-18
Payer: MEDICARE

## 2022-03-18 ENCOUNTER — CARE COORDINATION (OUTPATIENT)
Dept: CARDIOLOGY | Facility: CLINIC | Age: 57
End: 2022-03-18
Payer: MEDICARE

## 2022-03-18 DIAGNOSIS — Z11.59 ENCOUNTER FOR SCREENING FOR OTHER VIRAL DISEASES: Primary | ICD-10-CM

## 2022-03-18 DIAGNOSIS — I50.22 CHRONIC SYSTOLIC CONGESTIVE HEART FAILURE (H): Primary | ICD-10-CM

## 2022-03-18 NOTE — PROGRESS NOTES
The plan has been finalized for dental, cath lab, and surgery. Tej stopped his ASA after the 3/16 dose.    1. Tej tested positive for COVID at the end of December 2021. Arnav the RN in the HF clinic at [place] is faxing this positive result to the [facility] cath lab. Tej, per protocol, will not need to get a COVID test prior to the cath lab procedure.  2. Monday, 3/21 at 1pm Dr. Bush's team at the [place] [facility] will do any needed dental work. There are three teeth that are concerning.  3. At 1245pm on Tuesday Tej will report to the [facility] waiting room for labs and the RHC and IABP insertion. A bed request was made for 4E. Tej will be NPO except for meds and sips of water starting at 8am.  4. Wednesday, 3/23 at 730am LVAD implant surgery with Dr. Rodríguez.

## 2022-03-18 NOTE — PROGRESS NOTES
VAD Right Heart Catheterization / IABP Insertion Instructions    Reason for Visit:  Telephone call to discuss pre-procedure education in preparation for: Right Heart Catheterization    Patient Education    1. The date of your procedure is 3/22. Arrival time is 1245pm.  Location is [address] Waiting Room  2. Please plan on being admitted to the hospital after the procedure.  3. Anticoagulation: ASA has been held since 3/16  4. At any time, emergencies and/or urgent cases may come up which could delay the start of your procedure. If you have a clinic appointment afterwards please contact the VAD team to let them know you will be delayed.    COVID Testing Instructions  *Mandatory COVID Testing:   ALL Patients will need to complete a COVID test no sooner than 4 days prior to their procedure (regardless of vaccination status).      To schedule COVID testing, please call [phone]    If you want to complete this at an outside facility please call them to find out if they will have the results within the appropriate time frame and their fax number.  You will need to provide us with that information so we can send the order.    The facility completing the test will need to fax the results to [phone]    If you are running into and issues please call us.     Pre-procedure instructions  Patient instructed to not eat or drink 4 hours prior to your appointment.  Patient instructed to shower the evening before or the morning of the procedure.    Pre-procedure medication instructions.  Continue medications as scheduled, with a small amount of water on the day of the procedure unless indicated.  Pt instructed not to consume Alcohol, Tobacco, Caffeine, or Carbonated beverages 12 hours prior to procedure.              Diabetic Medication Instructions  ? DO NOT take any oral diabetic medication, short-acting diabetes  medications/insulin, humalog or regular insulin the morning of your test  ? Take   dose of long-acting insulin (Lantus, Levemir) the day of your test  ? Hold Oral Diabetic on the day of the procedure and for 48 hours after IV contrast given  ? Remember to  bring your glucometer and insulin with you to take after your test if needed

## 2022-03-18 NOTE — PROGRESS NOTES
Jessenia and Tej called to ask what the plan was for dental, admission, etc. Tej has an outpatient dental appointment on 3/22 at 1030am at the [facility], [address] [place] [address], [address], [place]. He will have an exam, x-rays as needed, and extraction as needed.    The plan for admission and VAD implant is still being worked out. There is a tentative admission scheduled for Wednesday, 3/23 and possibly surgery on 3/24 or 3/25.

## 2022-03-21 ENCOUNTER — DOCUMENTATION ONLY (OUTPATIENT)
Dept: DENTISTRY | Facility: CLINIC | Age: 57
End: 2022-03-21
Payer: MEDICARE

## 2022-03-21 ENCOUNTER — CARE COORDINATION (OUTPATIENT)
Dept: CARDIOLOGY | Facility: CLINIC | Age: 57
End: 2022-03-21
Payer: MEDICARE

## 2022-03-21 ENCOUNTER — TELEPHONE (OUTPATIENT)
Dept: CARDIOLOGY | Facility: CLINIC | Age: 57
End: 2022-03-21
Payer: MEDICARE

## 2022-03-21 NOTE — CONFIDENTIAL NOTE
Dental Service Clearance Letter  Tej Morfin is cleared for LVAD placement from a dental standpoint after having received care at the [facility]. Extraction lower left molar (tooth #19) He has been given post operative, and oral hygiene instructions and oral health management products to help with any potential adverse effects of his future treatments. We will provide him with follow-up care to assist with any concerns that may arise.  Thank you for allowing us to participate in the comprehensive care of Tej Morfin.  For any questions or concerns, please contact me or the dental care coordinator ([phone]).        Semaj Banda MD  PGY 1  Pager: [phone]

## 2022-03-21 NOTE — PROGRESS NOTES
Pt is scheduled for VAD implant this week. Pt tested positive for COVID on 12/29/22 (at [place], in Care Everywhere).  Pt is within 90 days of testing positive, therefore, per protocol, pt should not be retested.

## 2022-03-22 ENCOUNTER — LAB (OUTPATIENT)
Dept: LAB | Facility: CLINIC | Age: 57
DRG: 001 | End: 2022-03-22
Attending: INTERNAL MEDICINE
Payer: MEDICARE

## 2022-03-22 ENCOUNTER — APPOINTMENT (OUTPATIENT)
Dept: MEDSURG UNIT | Facility: CLINIC | Age: 57
DRG: 001 | End: 2022-03-22
Attending: INTERNAL MEDICINE
Payer: MEDICARE

## 2022-03-22 ENCOUNTER — HOSPITAL ENCOUNTER (INPATIENT)
Facility: CLINIC | Age: 57
LOS: 17 days | Discharge: HOME | DRG: 001 | End: 2022-04-08
Attending: INTERNAL MEDICINE | Admitting: INTERNAL MEDICINE
Payer: MEDICARE

## 2022-03-22 DIAGNOSIS — M25.461 EFFUSION OF RIGHT KNEE: ICD-10-CM

## 2022-03-22 DIAGNOSIS — Z95.811 LVAD (LEFT VENTRICULAR ASSIST DEVICE) PRESENT (H): Primary | ICD-10-CM

## 2022-03-22 DIAGNOSIS — I50.22 CHRONIC SYSTOLIC CONGESTIVE HEART FAILURE (H): ICD-10-CM

## 2022-03-22 LAB
ABO/RH(D): NORMAL
ALBUMIN SERPL-MCNC: 2.6 G/DL (ref 3.4–5)
ALP SERPL-CCNC: 67 U/L (ref 40–150)
ALT SERPL W P-5'-P-CCNC: 16 U/L (ref 0–70)
ANION GAP SERPL CALCULATED.3IONS-SCNC: 4 MMOL/L (ref 3–14)
ANTIBODY SCREEN: NEGATIVE
AST SERPL W P-5'-P-CCNC: 12 U/L (ref 0–45)
BASE EXCESS BLDV CALC-SCNC: 7.5 MMOL/L (ref -7.7–1.9)
BILIRUB SERPL-MCNC: 0.4 MG/DL (ref 0.2–1.3)
BUN SERPL-MCNC: 10 MG/DL (ref 7–30)
CALCIUM SERPL-MCNC: 8.4 MG/DL (ref 8.5–10.1)
CHLORIDE BLD-SCNC: 111 MMOL/L (ref 94–109)
CO2 SERPL-SCNC: 32 MMOL/L (ref 20–32)
CREAT SERPL-MCNC: 0.99 MG/DL (ref 0.66–1.25)
ERYTHROCYTE [DISTWIDTH] IN BLOOD BY AUTOMATED COUNT: 13.3 % (ref 10–15)
GFR SERPL CREATININE-BSD FRML MDRD: >90 ML/MIN/1.73M2
GLUCOSE BLD-MCNC: 82 MG/DL (ref 70–99)
GLUCOSE BLDC GLUCOMTR-MCNC: 133 MG/DL (ref 70–99)
GLUCOSE BLDC GLUCOMTR-MCNC: 72 MG/DL (ref 70–99)
HCO3 BLDV-SCNC: 33 MMOL/L (ref 21–28)
HCT VFR BLD AUTO: 37.2 % (ref 40–53)
HGB BLD-MCNC: 11.5 G/DL (ref 13.3–17.7)
HGB BLD-MCNC: 12 G/DL (ref 13.3–17.7)
HGB BLD-MCNC: 12 G/DL (ref 13.3–17.7)
INR PPP: 1.05 (ref 0.85–1.15)
LDH SERPL L TO P-CCNC: 248 U/L (ref 85–227)
MCH RBC QN AUTO: 31.9 PG (ref 26.5–33)
MCHC RBC AUTO-ENTMCNC: 32.3 G/DL (ref 31.5–36.5)
MCV RBC AUTO: 99 FL (ref 78–100)
O2/TOTAL GAS SETTING VFR VENT: 21 %
OXYHGB MFR BLDV: 56 % (ref 92–100)
OXYHGB MFR BLDV: 62 % (ref 70–75)
OXYHGB MFR BLDV: 93 % (ref 92–100)
PCO2 BLDV: 51 MM HG (ref 40–50)
PH BLDV: 7.42 [PH] (ref 7.32–7.43)
PLATELET # BLD AUTO: 206 10E3/UL (ref 150–450)
PO2 BLDV: 35 MM HG (ref 25–47)
POTASSIUM BLD-SCNC: 3.5 MMOL/L (ref 3.4–5.3)
PROT SERPL-MCNC: 6.3 G/DL (ref 6.8–8.8)
RBC # BLD AUTO: 3.76 10E6/UL (ref 4.4–5.9)
SODIUM SERPL-SCNC: 147 MMOL/L (ref 133–144)
SPECIMEN EXPIRATION DATE: NORMAL
WBC # BLD AUTO: 4.9 10E3/UL (ref 4–11)

## 2022-03-22 PROCEDURE — 5A02210 ASSISTANCE WITH CARDIAC OUTPUT USING BALLOON PUMP, CONTINUOUS: ICD-10-PCS | Performed by: INTERNAL MEDICINE

## 2022-03-22 PROCEDURE — 85610 PROTHROMBIN TIME: CPT | Performed by: INTERNAL MEDICINE

## 2022-03-22 PROCEDURE — 82810 BLOOD GASES O2 SAT ONLY: CPT

## 2022-03-22 PROCEDURE — 999N000075 HC STATISTIC IABP MONITORING

## 2022-03-22 PROCEDURE — 86923 COMPATIBILITY TEST ELECTRIC: CPT | Performed by: INTERNAL MEDICINE

## 2022-03-22 PROCEDURE — 999N000142 HC STATISTIC PROCEDURE PREP ONLY

## 2022-03-22 PROCEDURE — 86901 BLOOD TYPING SEROLOGIC RH(D): CPT | Performed by: INTERNAL MEDICINE

## 2022-03-22 PROCEDURE — 85027 COMPLETE CBC AUTOMATED: CPT | Performed by: INTERNAL MEDICINE

## 2022-03-22 PROCEDURE — 999N000045 HC STATISTIC DAILY SWAN MONITORING

## 2022-03-22 PROCEDURE — 93451 RIGHT HEART CATH: CPT | Performed by: INTERNAL MEDICINE

## 2022-03-22 PROCEDURE — 200N000002 HC R&B ICU UMMC

## 2022-03-22 PROCEDURE — 82805 BLOOD GASES W/O2 SATURATION: CPT | Performed by: STUDENT IN AN ORGANIZED HEALTH CARE EDUCATION/TRAINING PROGRAM

## 2022-03-22 PROCEDURE — 82040 ASSAY OF SERUM ALBUMIN: CPT | Performed by: INTERNAL MEDICINE

## 2022-03-22 PROCEDURE — 250N000011 HC RX IP 250 OP 636: Performed by: INTERNAL MEDICINE

## 2022-03-22 PROCEDURE — 82962 GLUCOSE BLOOD TEST: CPT

## 2022-03-22 PROCEDURE — 999N000077 HC STATISTIC INSERT IABP

## 2022-03-22 PROCEDURE — 99291 CRITICAL CARE FIRST HOUR: CPT | Mod: 25 | Performed by: INTERNAL MEDICINE

## 2022-03-22 PROCEDURE — 272N000001 HC OR GENERAL SUPPLY STERILE: Performed by: INTERNAL MEDICINE

## 2022-03-22 PROCEDURE — 999N000155 HC STATISTIC RAPCV CVP MONITORING

## 2022-03-22 PROCEDURE — 36415 COLL VENOUS BLD VENIPUNCTURE: CPT | Performed by: INTERNAL MEDICINE

## 2022-03-22 PROCEDURE — 999N000185 HC STATISTIC TRANSPORT TIME EA 15 MIN

## 2022-03-22 PROCEDURE — 93005 ELECTROCARDIOGRAM TRACING: CPT

## 2022-03-22 PROCEDURE — C1894 INTRO/SHEATH, NON-LASER: HCPCS | Performed by: INTERNAL MEDICINE

## 2022-03-22 PROCEDURE — 83615 LACTATE (LD) (LDH) ENZYME: CPT | Performed by: INTERNAL MEDICINE

## 2022-03-22 PROCEDURE — 80053 COMPREHEN METABOLIC PANEL: CPT | Performed by: INTERNAL MEDICINE

## 2022-03-22 PROCEDURE — 4A023N6 MEASUREMENT OF CARDIAC SAMPLING AND PRESSURE, RIGHT HEART, PERCUTANEOUS APPROACH: ICD-10-PCS | Performed by: INTERNAL MEDICINE

## 2022-03-22 PROCEDURE — 250N000009 HC RX 250: Performed by: INTERNAL MEDICINE

## 2022-03-22 PROCEDURE — 250N000009 HC RX 250: Performed by: NURSE PRACTITIONER

## 2022-03-22 PROCEDURE — 250N000013 HC RX MED GY IP 250 OP 250 PS 637: Performed by: STUDENT IN AN ORGANIZED HEALTH CARE EDUCATION/TRAINING PROGRAM

## 2022-03-22 PROCEDURE — 99152 MOD SED SAME PHYS/QHP 5/>YRS: CPT | Performed by: INTERNAL MEDICINE

## 2022-03-22 PROCEDURE — 99153 MOD SED SAME PHYS/QHP EA: CPT | Performed by: INTERNAL MEDICINE

## 2022-03-22 PROCEDURE — 86850 RBC ANTIBODY SCREEN: CPT | Performed by: INTERNAL MEDICINE

## 2022-03-22 PROCEDURE — 33967 INSERT I-AORT PERCUT DEVICE: CPT | Mod: GC | Performed by: INTERNAL MEDICINE

## 2022-03-22 PROCEDURE — 999N000157 HC STATISTIC RCP TIME EA 10 MIN

## 2022-03-22 PROCEDURE — 99152 MOD SED SAME PHYS/QHP 5/>YRS: CPT | Mod: GC | Performed by: INTERNAL MEDICINE

## 2022-03-22 PROCEDURE — 33967 INSERT I-AORT PERCUT DEVICE: CPT | Performed by: INTERNAL MEDICINE

## 2022-03-22 PROCEDURE — 250N000013 HC RX MED GY IP 250 OP 250 PS 637: Performed by: NURSE PRACTITIONER

## 2022-03-22 PROCEDURE — 93010 ELECTROCARDIOGRAM REPORT: CPT | Performed by: INTERNAL MEDICINE

## 2022-03-22 PROCEDURE — 250N000011 HC RX IP 250 OP 636: Performed by: STUDENT IN AN ORGANIZED HEALTH CARE EDUCATION/TRAINING PROGRAM

## 2022-03-22 PROCEDURE — 85018 HEMOGLOBIN: CPT

## 2022-03-22 PROCEDURE — 93451 RIGHT HEART CATH: CPT | Mod: 26 | Performed by: INTERNAL MEDICINE

## 2022-03-22 RX ORDER — POTASSIUM CHLORIDE 750 MG/1
40 TABLET, EXTENDED RELEASE ORAL
Status: DISCONTINUED | OUTPATIENT
Start: 2022-03-22 | End: 2022-03-22 | Stop reason: HOSPADM

## 2022-03-22 RX ORDER — LIDOCAINE 40 MG/G
CREAM TOPICAL
Status: DISCONTINUED | OUTPATIENT
Start: 2022-03-22 | End: 2022-03-23 | Stop reason: HOSPADM

## 2022-03-22 RX ORDER — SODIUM CHLORIDE, SODIUM LACTATE, POTASSIUM CHLORIDE, CALCIUM CHLORIDE 600; 310; 30; 20 MG/100ML; MG/100ML; MG/100ML; MG/100ML
INJECTION, SOLUTION INTRAVENOUS CONTINUOUS
Status: DISCONTINUED | OUTPATIENT
Start: 2022-03-22 | End: 2022-03-22

## 2022-03-22 RX ORDER — POTASSIUM CHLORIDE 1500 MG/1
40 TABLET, EXTENDED RELEASE ORAL DAILY
Status: ON HOLD | COMMUNITY
End: 2022-04-08

## 2022-03-22 RX ORDER — ACETAMINOPHEN 325 MG/1
975 TABLET ORAL ONCE
Status: CANCELLED | OUTPATIENT
Start: 2022-03-22 | End: 2022-03-22

## 2022-03-22 RX ORDER — DEXTROSE MONOHYDRATE 25 G/50ML
25-50 INJECTION, SOLUTION INTRAVENOUS
Status: DISCONTINUED | OUTPATIENT
Start: 2022-03-22 | End: 2022-03-23

## 2022-03-22 RX ORDER — NICOTINE POLACRILEX 4 MG
15-30 LOZENGE BUCCAL
Status: DISCONTINUED | OUTPATIENT
Start: 2022-03-22 | End: 2022-03-23

## 2022-03-22 RX ORDER — FENTANYL CITRATE 50 UG/ML
INJECTION, SOLUTION INTRAMUSCULAR; INTRAVENOUS
Status: DISCONTINUED | OUTPATIENT
Start: 2022-03-22 | End: 2022-03-22 | Stop reason: HOSPADM

## 2022-03-22 RX ORDER — ASPIRIN 81 MG/1
81 TABLET ORAL DAILY
Status: CANCELLED | OUTPATIENT
Start: 2022-03-23

## 2022-03-22 RX ORDER — POTASSIUM CHLORIDE 750 MG/1
20 TABLET, EXTENDED RELEASE ORAL
Status: COMPLETED | OUTPATIENT
Start: 2022-03-22 | End: 2022-03-22

## 2022-03-22 RX ORDER — DIAZEPAM 5 MG
5 TABLET ORAL EVERY 4 HOURS PRN
Status: DISCONTINUED | OUTPATIENT
Start: 2022-03-22 | End: 2022-03-23

## 2022-03-22 RX ORDER — SOTALOL HYDROCHLORIDE 80 MG/1
80 TABLET ORAL EVERY 12 HOURS SCHEDULED
Status: DISCONTINUED | OUTPATIENT
Start: 2022-03-22 | End: 2022-03-23

## 2022-03-22 RX ORDER — LIDOCAINE 40 MG/G
CREAM TOPICAL
Status: CANCELLED | OUTPATIENT
Start: 2022-03-22

## 2022-03-22 RX ORDER — ACETAMINOPHEN 325 MG/1
975 TABLET ORAL ONCE
Status: DISCONTINUED | OUTPATIENT
Start: 2022-03-22 | End: 2022-03-23 | Stop reason: HOSPADM

## 2022-03-22 RX ORDER — DOBUTAMINE HYDROCHLORIDE 200 MG/100ML
2.5 INJECTION INTRAVENOUS CONTINUOUS
Status: CANCELLED | OUTPATIENT
Start: 2022-03-23

## 2022-03-22 RX ORDER — SODIUM CHLORIDE, SODIUM LACTATE, POTASSIUM CHLORIDE, CALCIUM CHLORIDE 600; 310; 30; 20 MG/100ML; MG/100ML; MG/100ML; MG/100ML
INJECTION, SOLUTION INTRAVENOUS CONTINUOUS
Status: CANCELLED | OUTPATIENT
Start: 2022-03-22

## 2022-03-22 RX ORDER — NOREPINEPHRINE BITARTRATE 0.06 MG/ML
.01-.6 INJECTION, SOLUTION INTRAVENOUS CONTINUOUS
Status: CANCELLED | OUTPATIENT
Start: 2022-03-23

## 2022-03-22 RX ORDER — DOBUTAMINE HYDROCHLORIDE 200 MG/100ML
2.5 INJECTION INTRAVENOUS CONTINUOUS
Status: DISCONTINUED | OUTPATIENT
Start: 2022-03-22 | End: 2022-03-23

## 2022-03-22 RX ORDER — LIDOCAINE 40 MG/G
CREAM TOPICAL
Status: DISCONTINUED | OUTPATIENT
Start: 2022-03-22 | End: 2022-03-22 | Stop reason: HOSPADM

## 2022-03-22 RX ORDER — FUROSEMIDE 10 MG/ML
40 INJECTION INTRAMUSCULAR; INTRAVENOUS ONCE
Status: COMPLETED | OUTPATIENT
Start: 2022-03-22 | End: 2022-03-22

## 2022-03-22 RX ORDER — DOBUTAMINE HYDROCHLORIDE 200 MG/100ML
2.5 INJECTION INTRAVENOUS CONTINUOUS
Status: DISCONTINUED | OUTPATIENT
Start: 2022-03-22 | End: 2022-03-22

## 2022-03-22 RX ORDER — NOREPINEPHRINE BITARTRATE 0.06 MG/ML
.01-.6 INJECTION, SOLUTION INTRAVENOUS CONTINUOUS
Status: DISCONTINUED | OUTPATIENT
Start: 2022-03-22 | End: 2022-03-23 | Stop reason: HOSPADM

## 2022-03-22 RX ADMIN — FUROSEMIDE 40 MG: 10 INJECTION, SOLUTION INTRAMUSCULAR; INTRAVENOUS at 19:42

## 2022-03-22 RX ADMIN — POTASSIUM CHLORIDE 20 MEQ: 750 TABLET, EXTENDED RELEASE ORAL at 14:27

## 2022-03-22 RX ADMIN — LIDOCAINE: 40 CREAM TOPICAL at 14:24

## 2022-03-22 RX ADMIN — SOTALOL HYDROCHLORIDE 80 MG: 80 TABLET ORAL at 20:48

## 2022-03-22 RX ADMIN — DOBUTAMINE IN DEXTROSE 2.5 MCG/KG/MIN: 200 INJECTION, SOLUTION INTRAVENOUS at 18:24

## 2022-03-22 ASSESSMENT — ACTIVITIES OF DAILY LIVING (ADL)
ADLS_ACUITY_SCORE: 12
DIFFICULTY_EATING/SWALLOWING: NO
WALKING_OR_CLIMBING_STAIRS_DIFFICULTY: NO
DOING_ERRANDS_INDEPENDENTLY_DIFFICULTY: NO
TOILETING_ISSUES: NO
ADLS_ACUITY_SCORE: 4
CHANGE_IN_FUNCTIONAL_STATUS_SINCE_ONSET_OF_CURRENT_ILLNESS/INJURY: NO
DRESSING/BATHING_DIFFICULTY: NO
FALL_HISTORY_WITHIN_LAST_SIX_MONTHS: NO
ADLS_ACUITY_SCORE: 6
VISION_MANAGEMENT: GLASSES
ADLS_ACUITY_SCORE: 6
WEAR_GLASSES_OR_BLIND: YES
ADLS_ACUITY_SCORE: 12
CONCENTRATING,_REMEMBERING_OR_MAKING_DECISIONS_DIFFICULTY: NO

## 2022-03-22 ASSESSMENT — NEW YORK HEART ASSOCIATION (NYHA) CLASSIFICATION: NYHA FUNCTIONAL CLASS: III

## 2022-03-22 ASSESSMENT — ENCOUNTER SYMPTOMS: ORTHOPNEA: 1

## 2022-03-22 ASSESSMENT — LIFESTYLE VARIABLES: TOBACCO_USE: 1

## 2022-03-22 NOTE — H&P
[facility]    Cardiology History and Physical - Cardiology       Date of Admission:  3/22/2022    Assessment & Plan: SL    Tej Morfin is a 56 year old male admitted on 3/22/2022. He has history of NICM secondary to severe MR s/p MV ring repair (2015), now on home inotrope (Dobutamine 2.5), sustained VT s/p secondary prevention ICD, who presents for scheduled LVAD placement.    #. Chronic systolic heart failure secondary to valvular cardiomyopathy   #. Stage D; NYHA Class III     - Hemodynamics: RHC/IABP placement this evening.   - MCS: LVAD placement tomorrow.  - Inotrope:  Dobutamine 2.5 mcg/kgs  - ACE-I/ARB/ARNi: Holding pre-op.  - BB: Not on BB at this time due to .  - Aldosterone antagonist: Not on it at this time.   - SCD prophylaxis: Yes. CRT-D  - Fluid status: Assess daily for the need of diuretics.      Diet:   Cardiac Diet. NPO after midnight.   DVT Prophylaxis: Anti-embolisim stockings (TEDs)  Calderon Catheter: Not present  Code Status: Full      Disposition Plan   Expected discharge: > 7 days, recommended to prior living arrangement once LVAD is placed and is hemodynamically stable..    The patient's care was discussed with  Dr. Jimenez.    Jas Gunderson MD  Cardiology fellow    ______________________________________________________________________    Chief Complaint   Here for LVAD placement    History of Present Illness   Tej Morfin is a 56 year old male with NICM secondary to valvulopathy severe MR s/p MV ring repair, now on home inotrope (Dobutamine 2.5), sustained VT s/p secondary prevention ICD, CKD stage II (baseline 1.5) who presents for LVAD placement.    He wsas first diagnosed with severe mitral regurgitation and an ejection fraction of 25%.  He underwent an angiogram at that time that showed only mild coronary disease. He was referred to Dr. Graves for mitral regurgitation, and on 01/21/2015, he underwent mitral  valve repair with an annuloplasty ring.      He later presented with ventricular tachycardia which was sustained and therefore he underwent a single-chamber defibrillator for secondary prevention.       Today patient presents for scheduled RHC/IABP placement and LVAD placement on 3/23/2022. Patient continues to be on Dobutamine. He denies any chest pain or shortness of breath. He endorses fatigue.  No palpitations or ICD shocks. No LE edema, bloating, worsening orthopnea or PND.  No syncope.     Review of Systems    The 10 point Review of Systems is negative other than noted in the HPI or here.     Past Medical History    I have reviewed this patient's medical history and updated it with pertinent information if needed.   Past Medical History:   Diagnosis Date     Chronic systolic heart failure (H) 2/7/2017     Gastroparesis      ICD (implantable cardioverter-defibrillator) in place     [employer]     Non-ischemic cardiomyopathy (H)      Paroxysmal ventricular tachycardia (H) 2/7/2017     S/P mitral valve replacement 2/7/2017     Tetrahydrocannabinol (THC) use disorder, mild, in controlled environment, abuse 09/12/2019    occasional edible THC. reportedly for sleep, anxiety     Tobacco abuse, episodic     occasional cigar - ~ 3x/wk       Past Surgical History   I have reviewed this patient's surgical history and updated it with pertinent information if needed.  Past Surgical History:   Procedure Laterality Date     HEMORRHOIDECTOMY       ICD implantation        R partial medial meniscectomy       REPAIR VALVE MITRAL         Social History   I have reviewed this patient's social history and updated it with pertinent information if needed.  Social History     Tobacco Use     Smoking status: Former Smoker     Smokeless tobacco: Never Used   Substance Use Topics     Alcohol use: No     Alcohol/week: 0.0 standard drinks     Drug use: No     Family History   I have reviewed this patient's family history and updated  it with pertinent information if needed.   I have reviewed this patient's family history and updated it with pertinent information if needed.  Family History   Problem Relation Age of Onset     Coronary Artery Disease Mother      Kidney failure Mother      Cardiomyopathy Father          age 51       Prior to Admission Medications   Prior to Admission Medications   Prescriptions Last Dose Informant Patient Reported? Taking?   DOBUTamine 500 mg in dextrose 5% 250 mL (adult std conc) premix 3/22/2022  Yes Yes   Sig: Inject 211.5 mcg/min into the vein   HYDROcodone-acetaminophen (NORCO) 5-325 MG tablet Past Month at Unknown time  Yes Yes   Sig: Take 1-2 tablets by mouth every 6 hours as needed for moderate pain    aspirin (ASA) 81 MG EC tablet Past Week at Unknown time  Yes Yes   Sig: Take 81 mg by mouth   cyclobenzaprine (FLEXERIL) 10 MG tablet Unknown at Unknown time  Yes Yes   Sig: Take 10 mg by mouth 3 times daily as needed for muscle spasms   diazepam (VALIUM) 10 MG tablet Past Week at Unknown time  Yes Yes   Sig: Take 10 mg by mouth every 4 hours as needed for anxiety   fluticasone (FLONASE) 50 MCG/ACT nasal spray More than a month at Unknown time  Yes Yes   Sig: Spray 1 spray into both nostrils 2 times daily as needed    metoclopramide (REGLAN) 10 MG tablet 3/21/2022 at Unknown time  Yes Yes   Sig: Take 10 mg by mouth 3 times daily For 3 weeks then off 1 week   omeprazole (PRILOSEC) 40 MG DR capsule 3/22/2022 at 0800  Yes Yes   Sig: Take 40 mg by mouth every morning   potassium chloride ER (KLOR-CON M) 20 MEQ CR tablet 3/22/2022 at 0800  Yes Yes   Sig: Take 20 mEq by mouth 2 times daily   sacubitril-valsartan (ENTRESTO)  MG per tablet 3/22/2022 at 0800  Yes Yes   Sig: Take 0.5 tablets by mouth 2 times daily    sotalol (BETAPACE) 80 MG tablet 3/22/2022 at 0800  Yes Yes   Sig: TAKE 1 TABLET BY MOUTH TWICE A DAY      Facility-Administered Medications: None     Allergies   Allergies   Allergen Reactions      Lactose Cramps     Lactase Diarrhea       Physical Exam   Vital Signs: Temp: 97.7  F (36.5  C) Temp src: Oral BP: 123/82 Pulse: 83   Resp: 18 SpO2: 100 %      Weight: 191 lbs 11.2 oz    General: Awake, alert, cooperative, and no apparent distress.  Respiratory: No increased work of breathing, good air exchange.  Cardiovascular: Regular rate and rhythm, normal S1 and S2. JVP ~ 8.  GI: Soft, non-distended, non-tender, no masses palpated.  Extremities: Full range of motion noted.  Mild LE edema.  Neurologic: Awake, alert, oriented to name, place and time.         Data   Data reviewed today: I reviewed all medications, new labs and imaging results over the last 24 hours.    Recent Labs   Lab 03/22/22  1343   WBC 4.9   HGB 12.0*   MCV 99      INR 1.05   *   POTASSIUM 3.5   CHLORIDE 111*   CO2 32   BUN 10   CR 0.99   ANIONGAP 4   EYSICA 8.4*   GLC 82   ALBUMIN 2.6*   PROTTOTAL 6.3*   BILITOTAL 0.4   ALKPHOS 67   ALT 16   AST 12

## 2022-03-22 NOTE — Clinical Note
IABP inserted in the right femoral artery. IABP inserted with 50 cc balloon volume Lot number is: [id number]

## 2022-03-22 NOTE — Clinical Note
dry, intact, no bleeding and no hematoma. See flowsheet. 9fr sheath in RIJ remains in place w/ michele, CDI. 9fr sheath in right fem artery with IABP in place, CDI

## 2022-03-22 NOTE — PROGRESS NOTES
Research Consent Note:  IRB#:  [id number]    PI: Dunia Hdz, phone: [phone]   : Stacey Eaton RN at [phone] or cell: [phone]     Trial Name: Antiplatelet Removal and Hemocompatability Events with the HeartMate 3 Pump (RICARDO HM3)  Estimated duration of study: 1-3 years      I met with Mr. Morfin in the [facility] for a consent visit and discussion of the RICARDO trial. He requested a discussion for possible participation in the above study.  The joint decision making document was referred to for points of consent discussion to be completed.  The current IRB approved consent form was reviewed and discussed at length with the patient. This included purpose, research hypothesis, nature of the research, risks & benefits, and procedures required for screening, enrollment, randomization as well as all required follow up. Randomization arms (Aspirin versus placebo) were explained in detail.  Confidentiality issues, compensation for injury, and alternatives available were also explained.  Subject was informed that participation is voluntary and that refusal to participate will involve no penalty or decreased benefits to which the subject is otherwise entitled. Patient had the opportunity to read the entire written consent, ask questions and express concerns and offered sufficient time to consider the research trial. Patient was able to clearly state what study participation involved and the associated requirements. All questions and concerns were addressed. Patient voluntarily signed the consent and HIPAA forms on 22 MAR 2022 @ 13:30 prior to any research required tests/procedures and was given a copy of the signed forms.  Subject verbalizes understanding that his participation is voluntary and may withdraw participation at any time.  .  Consent version: 1.12.2021 Signed on 22 MAR 2022  Hippa version: 1.12.2021  Signed on 22 MAR 2022

## 2022-03-22 NOTE — ANESTHESIA PREPROCEDURE EVALUATION
Anesthesia Pre-Procedure Evaluation    Patient: Tej Morfin   MRN: [id number] : 1965        Procedure : Procedure(s):  Insertion  Left Ventricular Assist Device  (HEARTMATE III) and Associated Procedures          Past Medical History:   Diagnosis Date     Chronic systolic heart failure (H) 2017     Gastroparesis      ICD (implantable cardioverter-defibrillator) in place     [employer]     Non-ischemic cardiomyopathy (H)      Paroxysmal ventricular tachycardia (H) 2017     S/P mitral valve replacement 2017     Tetrahydrocannabinol (THC) use disorder, mild, in controlled environment, abuse 2019    occasional edible THC. reportedly for sleep, anxiety     Tobacco abuse, episodic     occasional cigar - ~ 3x/wk      Past Surgical History:   Procedure Laterality Date     HEMORRHOIDECTOMY       ICD implantation        R partial medial meniscectomy       REPAIR VALVE MITRAL        Allergies   Allergen Reactions     Lactose Cramps     Lactase Diarrhea      Social History     Tobacco Use     Smoking status: Former Smoker     Smokeless tobacco: Never Used   Substance Use Topics     Alcohol use: No     Alcohol/week: 0.0 standard drinks      Wt Readings from Last 1 Encounters:   22 88.5 kg (195 lb)        Anesthesia Evaluation   Pt has had prior anesthetic. Type: General.    No history of anesthetic complications       ROS/MED HX  ENT/Pulmonary:     (+) sleep apnea, doesn't use CPAP, tobacco use, Past use,     Neurologic:       Cardiovascular: Comment: [facility] 3/22/22 - PA52/32, CI 2.78    (+) -----CHF etiology: non-ischemic  Last EF: 15-20 date: 22 NYHA classification: III. FINNEGAN. orthopnea/PND, ICD Reason placed:paroxysmal VT.  type;[employer] valvular problems/murmurs Previous cardiac testing   Echo: Date: 22 Results:  Left Ventricle   The left ventricle is severely dilated. Wall thickness is normal. The EF is visually estimated to be 15-20%. Severe global hypokinesis of  the left ventricular wall segments. Grade III diastolic dysfunction.     Right Ventricle   The right ventricle is mild-to-moderately dilated. The RVSP measures 25.3 mmHg.     Tricuspid Valve   RVSP measurin.3 mmHg.     Pericardium   There is no pericardial effusion.     Study Details   A limited echo was performed with limited 2D, color flow Doppler and limited spectral Doppler. The study was performed in the patient's room. Overall the study quality was adequate. The study was difficult due to patient's lung configuration.    Stress Test: Date: Results:    ECG Reviewed: Date: Results:    Cath: Date: Results:   Irregular Heartbeat/Palpitations: severe MR s/p MV repair 2015 currently mild MR/MS.   METS/Exercise Tolerance:     Hematologic:       Musculoskeletal:       GI/Hepatic: Comment: Gastroparesis       Renal/Genitourinary: Comment: Cardiorenal     (+) renal disease, type: CRI, Pt does not require dialysis,     Endo:     (+) thyroid problem, hypothyroidism,     Psychiatric/Substance Use:     (+) psychiatric history anxiety Recreational drug usage: Cannabis.    Infectious Disease: Comment: Hx of MRSA  COVID 21      Malignancy:       Other:            Physical Exam    Airway        Mallampati: III   TM distance: > 3 FB   Neck ROM: full   Mouth opening: > 3 cm    Respiratory Devices and Support         Dental     Comment: Crowns - top front teeth        Cardiovascular          Rhythm and rate: regular and normal     Pulmonary                   OUTSIDE LABS:  CBC:   Lab Results   Component Value Date    WBC 4.2 09/10/2019    WBC 3.7 (L) 2016    HGB 15.8 09/10/2019    HGB 13.1 (L) 2016    HCT 48.9 09/10/2019    HCT 40.0 2016     09/10/2019     (L) 2016     BMP:   Lab Results   Component Value Date     (H) 2022     09/10/2019    POTASSIUM 3.4 2022    POTASSIUM 3.4 09/10/2019    CHLORIDE 116 (H) 2022    CHLORIDE 108 09/10/2019    CO2 28  03/03/2022    CO2 26 09/10/2019    BUN 16 03/03/2022    BUN 18 09/10/2019    CR 1.04 03/03/2022    CR 1.23 09/10/2019    GLC 91 03/03/2022    GLC 89 09/10/2019     COAGS:   Lab Results   Component Value Date    PTT 30 09/10/2019    INR 1.02 09/10/2019     POC: No results found for: BGM, HCG, HCGS  HEPATIC:   Lab Results   Component Value Date    ALBUMIN 2.8 (L) 03/03/2022    PROTTOTAL 5.6 (L) 03/03/2022    ALT 19 03/03/2022    AST 16 03/03/2022    ALKPHOS 55 03/03/2022    BILITOTAL 0.3 03/03/2022     OTHER:   Lab Results   Component Value Date    A1C 5.9 (H) 03/03/2022    YESICA 8.5 03/03/2022    PHOS 4.0 09/10/2019    MAG 1.8 09/10/2019    TSH 3.49 09/10/2019    CRP <2.9 09/10/2019    CRP 2.0 09/10/2019       Anesthesia Plan    ASA Status:  4   NPO Status:  NPO Appropriate    Anesthesia Type: General.     - Airway: ETT   Induction: RSI.   Maintenance: Inhalation.   Techniques and Equipment:     - Airway: Video-Laryngoscope     - Lines/Monitors: 2nd IV, Arterial Line, Central Line, CVP, BIS, PAC, NIRS, EMILY            EMILY Absolute Contra-indication: NONE            EMILY Relative Contra-indication: NONE     - Blood: T&C, Blood in Room, PRBC, Cell Saver     - Drips/Meds: Norepi, Epinephrine, Vasopressin     Consents    Anesthesia Plan(s) and associated risks, benefits, and realistic alternatives discussed. Questions answered and patient/representative(s) expressed understanding.    - Discussed:     - Discussed with:  Patient      - Extended Intubation/Ventilatory Support Discussed: Yes.      - Patient is DNR/DNI Status: No    Use of blood products discussed: Yes.     - Discussed with: Patient.     - Consented: consented to blood products            Reason for refusal: other.     Postoperative Care    Pain management: IV analgesics, Peripheral nerve block (Continuous).   PONV prophylaxis: Dexamethasone or Solumedrol     Comments:                Cindy Cramer MD     I have seen and evaluated the patient myself and  agree with the above assessment and plan.  I have explained the risks/benefits of anesthesia to the patient who understands and agrees to proceed.    María Kuhn MD  Staff Anesthesiologist  Pager [phone]

## 2022-03-22 NOTE — PROGRESS NOTES
[facility], Procedure Note          Intra-Aortic Balloon Pump Insertion:       Tej Morfin  MRN# [id number]   6:25 PM, March 22, 2022 Indication: Pre op VAD           Procedure performed: 5:45 PM, March 22, 2022   Location: Cardiac cath Lab   Catheter size: 50cc   Inserted: Sheathless insertion   Catheter placed: Right femoral artery   Complications:: None   Assist initiated: 1:1 ratio   Percent augmentation: 100   Timing adjusted: Adjusted to achieve optimal assist   Verification of position: Fluoroscopy   Comments: Patient was transfared to 4E ICU      Recorded by Bright Farnsworth

## 2022-03-22 NOTE — LETTER
Faxed to:  [facility]  Fax Number:  [phone]                                                                                                                                                                                                   [email or web address]   Email Order Form to [email or web address]  Phone: [phone] Fax: [phone]      Patient Name: Tej Morfin Date: 04/01/22   Facility Name: [facility]  Fax Number: [phone]    VAD Coordinator/ :   Vandana Headley RN Phone: [phone]    Email Address:         [email or web address]   Patient's Primary Insurance Upon Receiving the VAD unit:          Dressing Change Frequency: Daily x Every Other Day   Other (Specify)              Duration of Need:  DT (Lifetime) x BTT (until transplant)   Other (Specify)      NEW ORDERS ONLY: PLEASE SUBMIT PATIENT DEMOGRAPHICS, POST OP NOTES & NOTES FROM MOST RECENT CLINICAL VISIT WITH ORDER FORM.                  Fax: [phone]  Or Email Order Form to   [email or web address].     ck Product Size/ Description Quantity    Kit Options:     x Centurion Driveline Management Tray      Daily Up to 30    Centurion Driveline Management Tray      Weekly Up to 5   x Centurion Fluvanna Rogers  [id number] Up to 30    Centurion Calderon Rogers [id number] Up to 30   x Aquaguard/Shower Shield 7 x 7       9  x 9      10  x 12  Up to 30    Blenderm Surgical Tape 2 inch     Sensitive Skin:      Uni-Solve Adhesive Remover Wipes  1 box    FreeDerm Adhesive Remover 1 oz. Spray Bottle Up to 8    Betadine Swabs  3/pack Up to 30    Hibiclens 16 oz. Bottle Up to 3    MicroKlenz Wound Cleanser Bottle Up to 3    Medipore H Tape 2  Roll Up to 5    Micropore Paper Tape 2  Roll Up to 5    CE 3M Blue Silicone Tape  Up to 5    MC Aramis Secure  Rogers  Up to 8    CathGrip Rogers Med/2 Strap    Large/2 Strap Up to 10    Abdominal Binder Sm     Med     Lg      Up  to 2    Simpurity Transparent Film 4 x 5 Up to 30    Individual Supplies:      Earloop Surgical Mask 100/box Up to 3    Alcohol Wipe  1 Box    Non Sterile Gloves  100/box        Size: S    M    L   XL Up to 3    Chlorascrub Swabsticks   Each     1  Up to 30    Sterile Vinyl PF gloves Sizes   6   7   8   9 Up to 30    Sterile Gauze Sponge 4  x 4   (2/pkg) Up to 30    Sterile Drain Sponge 4  x 4   (2/pkg) Up to 30    Biopatch 1  Up to 30    3M No-sting Barrier Wipes 50/box Up to 3    Sorbaview Shield  Up to 30     My signature below certifies the medical necessity of the above approved products and I certify the patient has been trained in the use of all products. The patient is aware that [facility] will contact him/her regarding the shipment of ordered dressing supplies.     Provider Name: Dunia Stanton NPI#: [id number]   Provider Signature:                                                                                                                                                                                                                                                                                                                                                                                                                                                                                                           Provider Number: [id number]     [facility] will confirm receipt for all initial orders by sending a fax to the provider listed above.  \

## 2022-03-22 NOTE — LETTER
"Faxed to:  [facility]  Fax Number:  [phone]        [facility]  DOPPLER / BP CUFF ORDER FORM  Please submit this document along with patient's supply order. one doppler and one BP cuff per patient  Date: 4/1/22  Facility Name: [facility]    Patient: Tej Morfin  YOB: 1965    VAD Team Member:  MARYCHUY KHAN RN  Phone Number:  [phone]  Fax Number: [phone]    Check products desired:  ___x_        Sam Sonotrax Vascular Doppler (w/ 8 MHz probe)  __x__       Clear Ultrasound Gel  __x__        [id number] Blood Pressure Cuff w/ lifetime calibration  Please measure to ensure correct size as equipment cannot be returned.   __x__    Adult circumference 9.8\" x 15.7\" (24.9 cm x 39.8 cm) overall length 20.9\" (53.9 cm)          ____    Pediatric Circumference 7.5\" X 9.8\" (19 cm x 24.9 cm) overall length  15\" (38 cm)          ____    Adult XL circumference 13.4\" X 19.7\" (34 cm x 50 cm) overall length 25.6\" (65 cm)      Signed,        Dunia Hdz MD  Heart Failure, Mechanical Circulatory Support and Transplant Cardiology   of Medicine,  Division of Cardiology, [facility]  "

## 2022-03-22 NOTE — PROGRESS NOTES
Pt up from Cath lab @ 4468  Oriented to room & instructed on IABP settings.   Dobutamine infusing at home rate, dinner order- Pre-Op orders to b released tonight.

## 2022-03-22 NOTE — LETTER
Mechanical Circulatory Support Program  [place] [address], [facility] [address] [place] [address]  [phone] Office Phone  [phone] Fax Number          NOTIFICATION OF SPECIAL MEDICAL EQUIPMENT  To Whom it May Concern,    I attest to the existence of life sustaining equipment at the home of the below individual. I recommend that power never be interrupted from the below residence and that special attention be given to this customer in the event of disruption of power service. Lack of power could result in patient injury or death. If additional information or paperwork is needed, please contact the Ventricular Assist Device (VAD) office: phone [phone], fax [phone].     Name of Patient:   Tej Morfin    Address:   [address] [place] [address] [address] [place] [id number]    Type of medical equipment:  Ventricular Assist Device  Length of time equipment will be necessary: Indefinitely   Is this equipment life supporting?   Yes   Physician Name:  Dunia Hdz MD     Thank you for your consideration in this important matter,    Dr. Hdz                     For the purpose of the form and its possible verification, I hereby authorize release of medical information to [employer] or its representative.     Signature of patient: ______________________________________________Date:__________

## 2022-03-22 NOTE — PROGRESS NOTES
Patient prepped for RH/IABP insertion.  Denies pain.  No family with him at this time.  Needs consent.  H & P current.  Labs pending.

## 2022-03-22 NOTE — LETTER
Faxed to:  [facility]  Fax Number:  [phone]                                                                                                                                                                                                   [email or web address]   Email Order Form to [email or web address]  Phone: [phone] Fax: [phone]      Patient Name: Tej Morfin Date: 04/07/22   Facility Name: [facility]  Fax Number: [phone]    VAD Coordinator/ :   Vandana Headley RN Phone: [phone]    Email Address:         [email or web address]   Patient's Primary Insurance Upon Receiving the VAD unit:          Dressing Change Frequency: Daily x Every Other Day   Other (Specify)              Duration of Need:  DT (Lifetime) x BTT (until transplant)   Other (Specify)      NEW ORDERS ONLY: PLEASE SUBMIT PATIENT DEMOGRAPHICS, POST OP NOTES & NOTES FROM MOST RECENT CLINICAL VISIT WITH ORDER FORM.                  Fax: [phone]  Or Email Order Form to   [email or web address]     ck Product Size/ Description Quantity    Kit Options:     x Centurion Driveline Management Tray      Daily Up to 30    Centurion Driveline Management Tray      Weekly Up to 5   x Centurion Sandpoint Crocker  [id number] Up to 30    Centurion Calderon Crocker [id number] Up to 30    Aquaguard/Shower Shield 7 x 7       9  x 9      10  x 12  Up to 30    Blenderm Surgical Tape 2 inch     Sensitive Skin:      Uni-Solve Adhesive Remover Wipes  1 box    FreeDerm Adhesive Remover 1 oz. Spray Bottle Up to 8    Betadine Swabs  3/pack Up to 30    Hibiclens 16 oz. Bottle Up to 3    MicroKlenz Wound Cleanser Bottle Up to 3    Medipore H Tape 2  Roll Up to 5    Micropore Paper Tape 2  Roll Up to 5   x CE 3M Blue Silicone Tape  Up to 5    MC Aramis Secure  Crocker  Up to 8    CathGrip Crocker Med/2 Strap    Large/2 Strap Up to 10    Abdominal Binder Sm     Med      Lg      Up to 2    Simpurity Transparent Film 4 x 5 Up to 30    Individual Supplies:      Earloop Surgical Mask 100/box Up to 3    Alcohol Wipe  1 Box    Non Sterile Gloves  100/box        Size: S    M    L   XL Up to 3    Chlorascrub Swabsticks   Each     1  Up to 30   x Sterile Vinyl PF gloves Sizes   6   7   8   9 Up to 30    Sterile Gauze Sponge 4  x 4   (2/pkg) Up to 30    Sterile Drain Sponge 4  x 4   (2/pkg) Up to 30    Biopatch 1  Up to 30    3M No-sting Barrier Wipes 50/box Up to 3    Sorbaview Shield  Up to 30     My signature below certifies the medical necessity of the above approved products and I certify the patient has been trained in the use of all products. The patient is aware that [facility] will contact him/her regarding the shipment of ordered dressing supplies.     Provider Name: Dunia Hidalgoswell NPI#: [id number]   Provider Signature:                                                                                                                                                                                                                                                                                                                                                                                                                                                                                                           Provider Number: [id number]     [facility] will confirm receipt for all initial orders by sending a fax to the provider listed above.

## 2022-03-22 NOTE — LETTER
Mechanical Circulatory Support Program  [address], [facility] [address] [place] [address]  [phone] Office Phone  [phone] Fax Number          NOTIFICATION OF SPECIAL MEDICAL EQUIPMENT  To Whom it May Concern,    I attest to the existence of life sustaining equipment at the home of the below individual. I recommend that power never be interrupted from the below residence and that special attention be given to this customer in the event of disruption of power service. Lack of power could result in patient injury or death. If additional information or paperwork is needed, please contact the Ventricular Assist Device (VAD) office: phone [phone], fax [phone].     Name of Patient:   Tej Morfin    Address:   [address] [place] [address] [address] [place] [id number]    Type of medical equipment:  Ventricular Assist Device  Length of time equipment will be necessary: Indefinitely   Is this equipment life supporting?   Yes   Physician Name: Dunia Hdz MD     Thank you for your consideration in this important matter,    Dr. Hdz                    For the purpose of the form and its possible verification, I hereby authorize release of medical information to X or its representative.     Signature of patient:          ______________________________________________Date:__________

## 2022-03-23 ENCOUNTER — ANCILLARY PROCEDURE (OUTPATIENT)
Dept: CARDIOLOGY | Facility: CLINIC | Age: 57
DRG: 001 | End: 2022-03-23
Attending: INTERNAL MEDICINE
Payer: MEDICARE

## 2022-03-23 ENCOUNTER — APPOINTMENT (OUTPATIENT)
Dept: GENERAL RADIOLOGY | Facility: CLINIC | Age: 57
DRG: 001 | End: 2022-03-23
Attending: ANESTHESIOLOGY
Payer: MEDICARE

## 2022-03-23 ENCOUNTER — ANESTHESIA (OUTPATIENT)
Dept: SURGERY | Facility: CLINIC | Age: 57
DRG: 001 | End: 2022-03-23
Payer: MEDICARE

## 2022-03-23 ENCOUNTER — APPOINTMENT (OUTPATIENT)
Dept: GENERAL RADIOLOGY | Facility: CLINIC | Age: 57
DRG: 001 | End: 2022-03-23
Attending: INTERNAL MEDICINE
Payer: MEDICARE

## 2022-03-23 ENCOUNTER — APPOINTMENT (OUTPATIENT)
Dept: GENERAL RADIOLOGY | Facility: CLINIC | Age: 57
DRG: 001 | End: 2022-03-23
Attending: STUDENT IN AN ORGANIZED HEALTH CARE EDUCATION/TRAINING PROGRAM
Payer: MEDICARE

## 2022-03-23 DIAGNOSIS — I42.8 VALVULAR CARDIOMYOPATHY (H): Primary | ICD-10-CM

## 2022-03-23 DIAGNOSIS — Z45.02 ENCOUNTER FOR ADJUSTMENT AND MANAGEMENT OF AUTOMATIC IMPLANTABLE CARDIAC DEFIBRILLATOR: ICD-10-CM

## 2022-03-23 DIAGNOSIS — I42.8 VALVULAR CARDIOMYOPATHY (H): ICD-10-CM

## 2022-03-23 LAB
ALBUMIN SERPL-MCNC: 1.3 G/DL (ref 3.4–5)
ALBUMIN SERPL-MCNC: 2.3 G/DL (ref 3.4–5)
ALP SERPL-CCNC: 35 U/L (ref 40–150)
ALP SERPL-CCNC: 63 U/L (ref 40–150)
ALT SERPL W P-5'-P-CCNC: 14 U/L (ref 0–70)
ALT SERPL W P-5'-P-CCNC: 8 U/L (ref 0–70)
ANION GAP SERPL CALCULATED.3IONS-SCNC: 5 MMOL/L (ref 3–14)
ANION GAP SERPL CALCULATED.3IONS-SCNC: 7 MMOL/L (ref 3–14)
APTT PPP: 27 SECONDS (ref 22–38)
APTT PPP: 34 SECONDS (ref 22–38)
APTT PPP: 39 SECONDS (ref 22–38)
AST SERPL W P-5'-P-CCNC: 13 U/L (ref 0–45)
AST SERPL W P-5'-P-CCNC: 30 U/L (ref 0–45)
ATRIAL RATE - MUSE: 82 BPM
BASE EXCESS BLDA CALC-SCNC: 0.9 MMOL/L (ref -9–1.8)
BASE EXCESS BLDA CALC-SCNC: 1.9 MMOL/L (ref -9.6–2)
BASE EXCESS BLDA CALC-SCNC: 1.9 MMOL/L (ref -9–1.8)
BASE EXCESS BLDA CALC-SCNC: 3.2 MMOL/L (ref -9–1.8)
BASE EXCESS BLDA CALC-SCNC: 3.6 MMOL/L (ref -9.6–2)
BASE EXCESS BLDA CALC-SCNC: 3.7 MMOL/L (ref -9.6–2)
BASE EXCESS BLDA CALC-SCNC: 4.3 MMOL/L (ref -9.6–2)
BASE EXCESS BLDA CALC-SCNC: 4.9 MMOL/L (ref -9.6–2)
BASE EXCESS BLDA CALC-SCNC: 5.3 MMOL/L (ref -9.6–2)
BASE EXCESS BLDA CALC-SCNC: 6 MMOL/L (ref -9.6–2)
BASE EXCESS BLDV CALC-SCNC: 3.1 MMOL/L (ref -7.7–1.9)
BASE EXCESS BLDV CALC-SCNC: 5.9 MMOL/L (ref -7.7–1.9)
BASE EXCESS BLDV CALC-SCNC: 7.3 MMOL/L (ref -8.1–1.9)
BILIRUB SERPL-MCNC: 0.4 MG/DL (ref 0.2–1.3)
BILIRUB SERPL-MCNC: 1.4 MG/DL (ref 0.2–1.3)
BLD PROD TYP BPU: NORMAL
BLOOD COMPONENT TYPE: NORMAL
BUN SERPL-MCNC: 11 MG/DL (ref 7–30)
BUN SERPL-MCNC: 9 MG/DL (ref 7–30)
CA-I BLD-MCNC: 3.9 MG/DL (ref 4.4–5.2)
CA-I BLD-MCNC: 4.1 MG/DL (ref 4.4–5.2)
CA-I BLD-MCNC: 4.3 MG/DL (ref 4.4–5.2)
CA-I BLD-MCNC: 4.4 MG/DL (ref 4.4–5.2)
CA-I BLD-MCNC: 4.5 MG/DL (ref 4.4–5.2)
CALCIUM SERPL-MCNC: 6.4 MG/DL (ref 8.5–10.1)
CALCIUM SERPL-MCNC: 8.1 MG/DL (ref 8.5–10.1)
CF REDUC 30M P MA P HEP LENFR BLD TEG: 0.3 % (ref 0–8)
CF REDUC 60M P MA LENFR BLD TEG: 2.2 % (ref 0–15)
CF REDUC 60M P MA LENFR BLD TEG: 5.6 % (ref 0–15)
CF REDUC 60M P MA P HEPASE LENFR BLD TEG: 2.4 % (ref 0–15)
CFT BLD TEG: 1.1 MINUTE (ref 1–3)
CFT BLD TEG: 1.4 MINUTE (ref 1–3)
CFT P HPASE BLD TEG: 1.3 MINUTE (ref 1–3)
CHLORIDE BLD-SCNC: 108 MMOL/L (ref 94–109)
CHLORIDE BLD-SCNC: 116 MMOL/L (ref 94–109)
CI (COAGULATION INDEX)(Z) NON NATIVE: 2.1 (ref -3–3)
CI (COAGULATION INDEX)(Z) NON NATIVE: 2.5 (ref -3–3)
CI (COAGULATION INDEX)(Z): 1.5 (ref -3–3)
CLOT ANGLE BLD TEG: 68.4 DEGREES (ref 53–72)
CLOT ANGLE BLD TEG: 74.6 DEGREES (ref 53–72)
CLOT ANGLE P HPASE BLD TEG: 68.5 DEGREES (ref 53–72)
CLOT INIT BLD TEG: 3.8 MINUTE (ref 5–10)
CLOT INIT BLD TEG: 4.3 MINUTE (ref 5–10)
CLOT INIT P HPASE BLD TEG: 4.5 MINUTE (ref 5–10)
CLOT LYSIS 30M P MA LENFR BLD TEG: 0.3 % (ref 0–8)
CLOT LYSIS 30M P MA LENFR BLD TEG: 1.7 % (ref 0–8)
CLOT STRENGTH BLD TEG: 8.4 KD/SC (ref 4.5–11)
CLOT STRENGTH BLD TEG: 9.1 KD/SC (ref 4.5–11)
CLOT STRENGTH P HPASE BLD TEG: 7.9 KD/SC (ref 4.5–11)
CO2 SERPL-SCNC: 23 MMOL/L (ref 20–32)
CO2 SERPL-SCNC: 32 MMOL/L (ref 20–32)
CODING SYSTEM: NORMAL
CREAT SERPL-MCNC: 0.84 MG/DL (ref 0.66–1.25)
CREAT SERPL-MCNC: 0.93 MG/DL (ref 0.66–1.25)
CROSSMATCH: NORMAL
DIASTOLIC BLOOD PRESSURE - MUSE: NORMAL MMHG
ERYTHROCYTE [DISTWIDTH] IN BLOOD BY AUTOMATED COUNT: 13.1 % (ref 10–15)
ERYTHROCYTE [DISTWIDTH] IN BLOOD BY AUTOMATED COUNT: 13.1 % (ref 10–15)
ERYTHROCYTE [DISTWIDTH] IN BLOOD BY AUTOMATED COUNT: 13.2 % (ref 10–15)
ERYTHROCYTE [DISTWIDTH] IN BLOOD BY AUTOMATED COUNT: 13.3 % (ref 10–15)
FIBRINOGEN PPP-MCNC: 216 MG/DL (ref 170–490)
GFR SERPL CREATININE-BSD FRML MDRD: >90 ML/MIN/1.73M2
GFR SERPL CREATININE-BSD FRML MDRD: >90 ML/MIN/1.73M2
GLUCOSE BLD-MCNC: 114 MG/DL (ref 70–99)
GLUCOSE BLD-MCNC: 114 MG/DL (ref 70–99)
GLUCOSE BLD-MCNC: 118 MG/DL (ref 70–99)
GLUCOSE BLD-MCNC: 121 MG/DL (ref 70–99)
GLUCOSE BLD-MCNC: 136 MG/DL (ref 70–99)
GLUCOSE BLD-MCNC: 137 MG/DL (ref 70–99)
GLUCOSE BLD-MCNC: 143 MG/DL (ref 70–99)
GLUCOSE BLD-MCNC: 147 MG/DL (ref 70–99)
GLUCOSE BLD-MCNC: 157 MG/DL (ref 70–99)
GLUCOSE BLD-MCNC: 96 MG/DL (ref 70–99)
GLUCOSE BLDC GLUCOMTR-MCNC: 102 MG/DL (ref 70–99)
GLUCOSE BLDC GLUCOMTR-MCNC: 138 MG/DL (ref 70–99)
GLUCOSE BLDC GLUCOMTR-MCNC: 143 MG/DL (ref 70–99)
GLUCOSE BLDC GLUCOMTR-MCNC: 150 MG/DL (ref 70–99)
GLUCOSE BLDC GLUCOMTR-MCNC: 151 MG/DL (ref 70–99)
GLUCOSE BLDC GLUCOMTR-MCNC: 157 MG/DL (ref 70–99)
GLUCOSE BLDC GLUCOMTR-MCNC: 98 MG/DL (ref 70–99)
HCO3 BLD-SCNC: 25 MMOL/L (ref 21–28)
HCO3 BLD-SCNC: 26 MMOL/L (ref 21–28)
HCO3 BLD-SCNC: 27 MMOL/L (ref 21–28)
HCO3 BLDA-SCNC: 27 MMOL/L (ref 21–28)
HCO3 BLDA-SCNC: 28 MMOL/L (ref 21–28)
HCO3 BLDA-SCNC: 29 MMOL/L (ref 21–28)
HCO3 BLDA-SCNC: 30 MMOL/L (ref 21–28)
HCO3 BLDA-SCNC: 31 MMOL/L (ref 21–28)
HCO3 BLDV-SCNC: 29 MMOL/L (ref 21–28)
HCO3 BLDV-SCNC: 31 MMOL/L (ref 21–28)
HCO3 BLDV-SCNC: 33 MMOL/L (ref 21–28)
HCT VFR BLD AUTO: 24 % (ref 40–53)
HCT VFR BLD AUTO: 28 % (ref 40–53)
HCT VFR BLD AUTO: 29.3 % (ref 40–53)
HCT VFR BLD AUTO: 35.8 % (ref 40–53)
HGB BLD-MCNC: 10 G/DL (ref 13.3–17.7)
HGB BLD-MCNC: 10.7 G/DL (ref 13.3–17.7)
HGB BLD-MCNC: 11.1 G/DL (ref 13.3–17.7)
HGB BLD-MCNC: 11.7 G/DL (ref 13.3–17.7)
HGB BLD-MCNC: 7.9 G/DL (ref 13.3–17.7)
HGB BLD-MCNC: 8.1 G/DL (ref 13.3–17.7)
HGB BLD-MCNC: 8.2 G/DL (ref 13.3–17.7)
HGB BLD-MCNC: 8.4 G/DL (ref 13.3–17.7)
HGB BLD-MCNC: 8.6 G/DL (ref 13.3–17.7)
HGB BLD-MCNC: 9 G/DL (ref 13.3–17.7)
HGB BLD-MCNC: 9 G/DL (ref 13.3–17.7)
HGB BLD-MCNC: 9.4 G/DL (ref 13.3–17.7)
INR PPP: 1.11 (ref 0.85–1.15)
INR PPP: 1.61 (ref 0.85–1.15)
INR PPP: 1.72 (ref 0.85–1.15)
INTERPRETATION ECG - MUSE: NORMAL
ISSUE DATE AND TIME: NORMAL
ISSUE DATE AND TIME: NORMAL
LACTATE BLD-SCNC: 0.5 MMOL/L
LACTATE BLD-SCNC: 0.5 MMOL/L
LACTATE BLD-SCNC: 0.7 MMOL/L
LACTATE BLD-SCNC: 0.8 MMOL/L
LACTATE BLD-SCNC: 0.8 MMOL/L
LACTATE BLD-SCNC: 1 MMOL/L
LACTATE BLD-SCNC: 1.2 MMOL/L
LACTATE BLD-SCNC: 1.3 MMOL/L
LACTATE SERPL-SCNC: 2.1 MMOL/L (ref 0.7–2)
LACTATE SERPL-SCNC: 2.9 MMOL/L (ref 0.7–2)
MAGNESIUM SERPL-MCNC: 1.1 MG/DL (ref 1.6–2.3)
MAGNESIUM SERPL-MCNC: 1.7 MG/DL (ref 1.6–2.3)
MCF BLD TEG: 62.6 MM (ref 50–70)
MCF BLD TEG: 64.6 MM (ref 50–70)
MCF P HPASE BLD TEG: 61.3 MM (ref 50–70)
MCH RBC QN AUTO: 30.5 PG (ref 26.5–33)
MCH RBC QN AUTO: 31 PG (ref 26.5–33)
MCH RBC QN AUTO: 31.6 PG (ref 26.5–33)
MCH RBC QN AUTO: 31.9 PG (ref 26.5–33)
MCHC RBC AUTO-ENTMCNC: 31 G/DL (ref 31.5–36.5)
MCHC RBC AUTO-ENTMCNC: 32.1 G/DL (ref 31.5–36.5)
MCHC RBC AUTO-ENTMCNC: 32.1 G/DL (ref 31.5–36.5)
MCHC RBC AUTO-ENTMCNC: 32.9 G/DL (ref 31.5–36.5)
MCV RBC AUTO: 97 FL (ref 78–100)
MCV RBC AUTO: 97 FL (ref 78–100)
MCV RBC AUTO: 98 FL (ref 78–100)
MCV RBC AUTO: 98 FL (ref 78–100)
MDC_IDC_LEAD_IMPLANT_DT: NORMAL
MDC_IDC_LEAD_LOCATION: NORMAL
MDC_IDC_LEAD_LOCATION_DETAIL_1: NORMAL
MDC_IDC_LEAD_MFG: NORMAL
MDC_IDC_LEAD_MODEL: NORMAL
MDC_IDC_LEAD_POLARITY_TYPE: NORMAL
MDC_IDC_LEAD_SERIAL: NORMAL
MDC_IDC_MSMT_BATTERY_DTM: NORMAL
MDC_IDC_MSMT_BATTERY_DTM: NORMAL
MDC_IDC_MSMT_BATTERY_REMAINING_LONGEVITY: 24 MO
MDC_IDC_MSMT_BATTERY_REMAINING_LONGEVITY: 42 MO
MDC_IDC_MSMT_BATTERY_REMAINING_PERCENTAGE: 38 %
MDC_IDC_MSMT_BATTERY_REMAINING_PERCENTAGE: 58 %
MDC_IDC_MSMT_BATTERY_STATUS: NORMAL
MDC_IDC_MSMT_BATTERY_STATUS: NORMAL
MDC_IDC_MSMT_CAP_CHARGE_DTM: NORMAL
MDC_IDC_MSMT_CAP_CHARGE_ENERGY: 31 J
MDC_IDC_MSMT_CAP_CHARGE_ENERGY: 31 J
MDC_IDC_MSMT_CAP_CHARGE_TIME: 11.7 S
MDC_IDC_MSMT_CAP_CHARGE_TIME: 11.7 S
MDC_IDC_MSMT_CAP_CHARGE_TIME: 6.3 S
MDC_IDC_MSMT_CAP_CHARGE_TIME: 6.3 S
MDC_IDC_MSMT_CAP_CHARGE_TYPE: NORMAL
MDC_IDC_MSMT_LEADCHNL_LV_IMPEDANCE_VALUE: 511 OHM
MDC_IDC_MSMT_LEADCHNL_LV_IMPEDANCE_VALUE: 895 OHM
MDC_IDC_MSMT_LEADCHNL_LV_LEAD_CHANNEL_STATUS: NORMAL
MDC_IDC_MSMT_LEADCHNL_LV_PACING_THRESHOLD_AMPLITUDE: 0.8 V
MDC_IDC_MSMT_LEADCHNL_LV_PACING_THRESHOLD_AMPLITUDE: 2.4 V
MDC_IDC_MSMT_LEADCHNL_LV_PACING_THRESHOLD_PULSEWIDTH: 0.5 MS
MDC_IDC_MSMT_LEADCHNL_LV_PACING_THRESHOLD_PULSEWIDTH: 1 MS
MDC_IDC_MSMT_LEADCHNL_RA_IMPEDANCE_VALUE: 452 OHM
MDC_IDC_MSMT_LEADCHNL_RA_IMPEDANCE_VALUE: 470 OHM
MDC_IDC_MSMT_LEADCHNL_RA_PACING_THRESHOLD_AMPLITUDE: 0.4 V
MDC_IDC_MSMT_LEADCHNL_RA_PACING_THRESHOLD_AMPLITUDE: 0.5 V
MDC_IDC_MSMT_LEADCHNL_RA_PACING_THRESHOLD_PULSEWIDTH: 0.5 MS
MDC_IDC_MSMT_LEADCHNL_RA_PACING_THRESHOLD_PULSEWIDTH: 0.5 MS
MDC_IDC_MSMT_LEADCHNL_RV_IMPEDANCE_VALUE: 332 OHM
MDC_IDC_MSMT_LEADCHNL_RV_IMPEDANCE_VALUE: 366 OHM
MDC_IDC_MSMT_LEADCHNL_RV_PACING_THRESHOLD_AMPLITUDE: 1 V
MDC_IDC_MSMT_LEADCHNL_RV_PACING_THRESHOLD_AMPLITUDE: 1.3 V
MDC_IDC_MSMT_LEADCHNL_RV_PACING_THRESHOLD_PULSEWIDTH: 0.5 MS
MDC_IDC_MSMT_LEADCHNL_RV_PACING_THRESHOLD_PULSEWIDTH: 0.5 MS
MDC_IDC_PG_IMPLANT_DTM: NORMAL
MDC_IDC_PG_IMPLANT_DTM: NORMAL
MDC_IDC_PG_MFG: NORMAL
MDC_IDC_PG_MFG: NORMAL
MDC_IDC_PG_MODEL: NORMAL
MDC_IDC_PG_MODEL: NORMAL
MDC_IDC_PG_SERIAL: NORMAL
MDC_IDC_PG_SERIAL: NORMAL
MDC_IDC_PG_TYPE: NORMAL
MDC_IDC_PG_TYPE: NORMAL
MDC_IDC_SESS_CLINIC_NAME: NORMAL
MDC_IDC_SESS_CLINIC_NAME: NORMAL
MDC_IDC_SESS_DTM: NORMAL
MDC_IDC_SESS_DTM: NORMAL
MDC_IDC_SESS_TYPE: NORMAL
MDC_IDC_SESS_TYPE: NORMAL
MDC_IDC_SET_BRADY_AT_MODE_SWITCH_MODE: NORMAL
MDC_IDC_SET_BRADY_AT_MODE_SWITCH_MODE: NORMAL
MDC_IDC_SET_BRADY_AT_MODE_SWITCH_RATE: 170 {BEATS}/MIN
MDC_IDC_SET_BRADY_AT_MODE_SWITCH_RATE: 170 {BEATS}/MIN
MDC_IDC_SET_BRADY_LOWRATE: 80 {BEATS}/MIN
MDC_IDC_SET_BRADY_LOWRATE: 80 {BEATS}/MIN
MDC_IDC_SET_BRADY_MAX_SENSOR_RATE: 120 {BEATS}/MIN
MDC_IDC_SET_BRADY_MAX_SENSOR_RATE: 120 {BEATS}/MIN
MDC_IDC_SET_BRADY_MAX_TRACKING_RATE: 130 {BEATS}/MIN
MDC_IDC_SET_BRADY_MAX_TRACKING_RATE: 130 {BEATS}/MIN
MDC_IDC_SET_BRADY_MODE: NORMAL
MDC_IDC_SET_BRADY_MODE: NORMAL
MDC_IDC_SET_BRADY_PAV_DELAY_LOW: 110 MS
MDC_IDC_SET_BRADY_PAV_DELAY_LOW: 110 MS
MDC_IDC_SET_BRADY_SAV_DELAY_LOW: 110 MS
MDC_IDC_SET_BRADY_SAV_DELAY_LOW: 110 MS
MDC_IDC_SET_CRT_LVRV_DELAY: 80 MS
MDC_IDC_SET_CRT_LVRV_DELAY: 80 MS
MDC_IDC_SET_CRT_PACED_CHAMBERS: NORMAL
MDC_IDC_SET_CRT_PACED_CHAMBERS: NORMAL
MDC_IDC_SET_LEADCHNL_LV_PACING_AMPLITUDE: 1.5 V
MDC_IDC_SET_LEADCHNL_LV_PACING_AMPLITUDE: 3.4 V
MDC_IDC_SET_LEADCHNL_LV_PACING_ANODE_ELECTRODE_1: NORMAL
MDC_IDC_SET_LEADCHNL_LV_PACING_ANODE_ELECTRODE_1: NORMAL
MDC_IDC_SET_LEADCHNL_LV_PACING_ANODE_LOCATION_1: NORMAL
MDC_IDC_SET_LEADCHNL_LV_PACING_ANODE_LOCATION_1: NORMAL
MDC_IDC_SET_LEADCHNL_LV_PACING_CATHODE_ELECTRODE_1: NORMAL
MDC_IDC_SET_LEADCHNL_LV_PACING_CATHODE_ELECTRODE_1: NORMAL
MDC_IDC_SET_LEADCHNL_LV_PACING_CATHODE_LOCATION_1: NORMAL
MDC_IDC_SET_LEADCHNL_LV_PACING_CATHODE_LOCATION_1: NORMAL
MDC_IDC_SET_LEADCHNL_LV_PACING_PULSEWIDTH: 0.5 MS
MDC_IDC_SET_LEADCHNL_LV_PACING_PULSEWIDTH: 1 MS
MDC_IDC_SET_LEADCHNL_LV_SENSING_ADAPTATION_MODE: NORMAL
MDC_IDC_SET_LEADCHNL_LV_SENSING_ADAPTATION_MODE: NORMAL
MDC_IDC_SET_LEADCHNL_LV_SENSING_ANODE_ELECTRODE_1: NORMAL
MDC_IDC_SET_LEADCHNL_LV_SENSING_ANODE_ELECTRODE_1: NORMAL
MDC_IDC_SET_LEADCHNL_LV_SENSING_ANODE_LOCATION_1: NORMAL
MDC_IDC_SET_LEADCHNL_LV_SENSING_ANODE_LOCATION_1: NORMAL
MDC_IDC_SET_LEADCHNL_LV_SENSING_CATHODE_ELECTRODE_1: NORMAL
MDC_IDC_SET_LEADCHNL_LV_SENSING_CATHODE_ELECTRODE_1: NORMAL
MDC_IDC_SET_LEADCHNL_LV_SENSING_CATHODE_LOCATION_1: NORMAL
MDC_IDC_SET_LEADCHNL_LV_SENSING_CATHODE_LOCATION_1: NORMAL
MDC_IDC_SET_LEADCHNL_LV_SENSING_SENSITIVITY: 1 MV
MDC_IDC_SET_LEADCHNL_LV_SENSING_SENSITIVITY: 1 MV
MDC_IDC_SET_LEADCHNL_RA_PACING_AMPLITUDE: 1.5 V
MDC_IDC_SET_LEADCHNL_RA_PACING_AMPLITUDE: 1.5 V
MDC_IDC_SET_LEADCHNL_RA_PACING_POLARITY: NORMAL
MDC_IDC_SET_LEADCHNL_RA_PACING_POLARITY: NORMAL
MDC_IDC_SET_LEADCHNL_RA_PACING_PULSEWIDTH: 0.5 MS
MDC_IDC_SET_LEADCHNL_RA_PACING_PULSEWIDTH: 0.5 MS
MDC_IDC_SET_LEADCHNL_RA_SENSING_ADAPTATION_MODE: NORMAL
MDC_IDC_SET_LEADCHNL_RA_SENSING_ADAPTATION_MODE: NORMAL
MDC_IDC_SET_LEADCHNL_RA_SENSING_POLARITY: NORMAL
MDC_IDC_SET_LEADCHNL_RA_SENSING_POLARITY: NORMAL
MDC_IDC_SET_LEADCHNL_RA_SENSING_SENSITIVITY: 0.25 MV
MDC_IDC_SET_LEADCHNL_RA_SENSING_SENSITIVITY: 0.25 MV
MDC_IDC_SET_LEADCHNL_RV_PACING_AMPLITUDE: 2.5 V
MDC_IDC_SET_LEADCHNL_RV_PACING_AMPLITUDE: 2.5 V
MDC_IDC_SET_LEADCHNL_RV_PACING_POLARITY: NORMAL
MDC_IDC_SET_LEADCHNL_RV_PACING_POLARITY: NORMAL
MDC_IDC_SET_LEADCHNL_RV_PACING_PULSEWIDTH: 0.5 MS
MDC_IDC_SET_LEADCHNL_RV_PACING_PULSEWIDTH: 0.5 MS
MDC_IDC_SET_LEADCHNL_RV_SENSING_ADAPTATION_MODE: NORMAL
MDC_IDC_SET_LEADCHNL_RV_SENSING_ADAPTATION_MODE: NORMAL
MDC_IDC_SET_LEADCHNL_RV_SENSING_POLARITY: NORMAL
MDC_IDC_SET_LEADCHNL_RV_SENSING_POLARITY: NORMAL
MDC_IDC_SET_LEADCHNL_RV_SENSING_SENSITIVITY: 0.6 MV
MDC_IDC_SET_LEADCHNL_RV_SENSING_SENSITIVITY: 0.6 MV
MDC_IDC_SET_ZONE_DETECTION_INTERVAL: 300 MS
MDC_IDC_SET_ZONE_DETECTION_INTERVAL: 300 MS
MDC_IDC_SET_ZONE_DETECTION_INTERVAL: 353 MS
MDC_IDC_SET_ZONE_DETECTION_INTERVAL: 353 MS
MDC_IDC_SET_ZONE_TYPE: NORMAL
MDC_IDC_SET_ZONE_VENDOR_TYPE: NORMAL
MDC_IDC_STAT_AT_BURDEN_PERCENT: 0 %
MDC_IDC_STAT_AT_BURDEN_PERCENT: 0 %
MDC_IDC_STAT_AT_DTM_END: NORMAL
MDC_IDC_STAT_AT_DTM_END: NORMAL
MDC_IDC_STAT_AT_DTM_START: NORMAL
MDC_IDC_STAT_AT_DTM_START: NORMAL
MDC_IDC_STAT_BRADY_DTM_END: NORMAL
MDC_IDC_STAT_BRADY_DTM_END: NORMAL
MDC_IDC_STAT_BRADY_DTM_START: NORMAL
MDC_IDC_STAT_BRADY_DTM_START: NORMAL
MDC_IDC_STAT_BRADY_RA_PERCENT_PACED: 90 %
MDC_IDC_STAT_BRADY_RA_PERCENT_PACED: 90 %
MDC_IDC_STAT_BRADY_RV_PERCENT_PACED: 94 %
MDC_IDC_STAT_BRADY_RV_PERCENT_PACED: 94 %
MDC_IDC_STAT_CRT_DTM_END: NORMAL
MDC_IDC_STAT_CRT_DTM_END: NORMAL
MDC_IDC_STAT_CRT_DTM_START: NORMAL
MDC_IDC_STAT_CRT_DTM_START: NORMAL
MDC_IDC_STAT_CRT_LV_PERCENT_PACED: 97 %
MDC_IDC_STAT_CRT_LV_PERCENT_PACED: 97 %
MDC_IDC_STAT_EPISODE_RECENT_COUNT: 0
MDC_IDC_STAT_EPISODE_RECENT_COUNT: 1
MDC_IDC_STAT_EPISODE_RECENT_COUNT: 6
MDC_IDC_STAT_EPISODE_RECENT_COUNT: 6
MDC_IDC_STAT_EPISODE_RECENT_COUNT_DTM_END: NORMAL
MDC_IDC_STAT_EPISODE_RECENT_COUNT_DTM_START: NORMAL
MDC_IDC_STAT_EPISODE_TYPE: NORMAL
MDC_IDC_STAT_EPISODE_VENDOR_TYPE: NORMAL
MDC_IDC_STAT_TACHYTHERAPY_ATP_DELIVERED_RECENT: 0
MDC_IDC_STAT_TACHYTHERAPY_ATP_DELIVERED_RECENT: 0
MDC_IDC_STAT_TACHYTHERAPY_ATP_DELIVERED_TOTAL: 2
MDC_IDC_STAT_TACHYTHERAPY_ATP_DELIVERED_TOTAL: 2
MDC_IDC_STAT_TACHYTHERAPY_RECENT_DTM_END: NORMAL
MDC_IDC_STAT_TACHYTHERAPY_RECENT_DTM_END: NORMAL
MDC_IDC_STAT_TACHYTHERAPY_RECENT_DTM_START: NORMAL
MDC_IDC_STAT_TACHYTHERAPY_RECENT_DTM_START: NORMAL
MDC_IDC_STAT_TACHYTHERAPY_SHOCKS_ABORTED_RECENT: 0
MDC_IDC_STAT_TACHYTHERAPY_SHOCKS_ABORTED_RECENT: 0
MDC_IDC_STAT_TACHYTHERAPY_SHOCKS_ABORTED_TOTAL: 1
MDC_IDC_STAT_TACHYTHERAPY_SHOCKS_ABORTED_TOTAL: 1
MDC_IDC_STAT_TACHYTHERAPY_SHOCKS_DELIVERED_RECENT: 0
MDC_IDC_STAT_TACHYTHERAPY_SHOCKS_DELIVERED_RECENT: 0
MDC_IDC_STAT_TACHYTHERAPY_SHOCKS_DELIVERED_TOTAL: 1
MDC_IDC_STAT_TACHYTHERAPY_SHOCKS_DELIVERED_TOTAL: 1
MDC_IDC_STAT_TACHYTHERAPY_TOTAL_DTM_END: NORMAL
MDC_IDC_STAT_TACHYTHERAPY_TOTAL_DTM_END: NORMAL
MDC_IDC_STAT_TACHYTHERAPY_TOTAL_DTM_START: NORMAL
MDC_IDC_STAT_TACHYTHERAPY_TOTAL_DTM_START: NORMAL
O2/TOTAL GAS SETTING VFR VENT: 100 %
O2/TOTAL GAS SETTING VFR VENT: 21 %
O2/TOTAL GAS SETTING VFR VENT: 40 %
O2/TOTAL GAS SETTING VFR VENT: 50 %
O2/TOTAL GAS SETTING VFR VENT: 60 %
O2/TOTAL GAS SETTING VFR VENT: 65 %
O2/TOTAL GAS SETTING VFR VENT: 65 %
O2/TOTAL GAS SETTING VFR VENT: 80 %
O2/TOTAL GAS SETTING VFR VENT: 80 %
OXYHGB MFR BLD: 97 % (ref 92–100)
OXYHGB MFR BLD: 98 % (ref 92–100)
OXYHGB MFR BLD: 98 % (ref 92–100)
OXYHGB MFR BLDA: 97 % (ref 92–100)
OXYHGB MFR BLDA: 97 % (ref 92–100)
OXYHGB MFR BLDA: 98 % (ref 92–100)
OXYHGB MFR BLDV: 60 % (ref 70–75)
OXYHGB MFR BLDV: 64 % (ref 70–75)
OXYHGB MFR BLDV: 65 % (ref 92–100)
P AXIS - MUSE: -3 DEGREES
PCO2 BLD: 30 MM HG (ref 35–45)
PCO2 BLD: 40 MM HG (ref 35–45)
PCO2 BLD: 41 MM HG (ref 35–45)
PCO2 BLDA: 40 MM HG (ref 35–45)
PCO2 BLDA: 40 MM HG (ref 35–45)
PCO2 BLDA: 42 MM HG (ref 35–45)
PCO2 BLDA: 43 MM HG (ref 35–45)
PCO2 BLDA: 45 MM HG (ref 35–45)
PCO2 BLDA: 45 MM HG (ref 35–45)
PCO2 BLDA: 46 MM HG (ref 35–45)
PCO2 BLDV: 46 MM HG (ref 40–50)
PCO2 BLDV: 47 MM HG (ref 40–50)
PCO2 BLDV: 53 MM HG (ref 40–50)
PH BLD: 7.41 [PH] (ref 7.35–7.45)
PH BLD: 7.43 [PH] (ref 7.35–7.45)
PH BLD: 7.54 [PH] (ref 7.35–7.45)
PH BLDA: 7.42 [PH] (ref 7.35–7.45)
PH BLDA: 7.42 [PH] (ref 7.35–7.45)
PH BLDA: 7.43 [PH] (ref 7.35–7.45)
PH BLDA: 7.44 [PH] (ref 7.35–7.45)
PH BLDA: 7.44 [PH] (ref 7.35–7.45)
PH BLDA: 7.45 [PH] (ref 7.35–7.45)
PH BLDA: 7.47 [PH] (ref 7.35–7.45)
PH BLDV: 7.39 [PH] (ref 7.32–7.43)
PH BLDV: 7.4 [PH] (ref 7.32–7.43)
PH BLDV: 7.43 [PH] (ref 7.32–7.43)
PHOSPHATE SERPL-MCNC: 2.6 MG/DL (ref 2.5–4.5)
PHOSPHATE SERPL-MCNC: 3.5 MG/DL (ref 2.5–4.5)
PLATELET # BLD AUTO: 109 10E3/UL (ref 150–450)
PLATELET # BLD AUTO: 110 10E3/UL (ref 150–450)
PLATELET # BLD AUTO: 112 10E3/UL (ref 150–450)
PLATELET # BLD AUTO: 178 10E3/UL (ref 150–450)
PO2 BLD: 122 MM HG (ref 80–105)
PO2 BLD: 152 MM HG (ref 80–105)
PO2 BLD: 310 MM HG (ref 80–105)
PO2 BLDA: 127 MM HG (ref 80–105)
PO2 BLDA: 227 MM HG (ref 80–105)
PO2 BLDA: 245 MM HG (ref 80–105)
PO2 BLDA: 350 MM HG (ref 80–105)
PO2 BLDA: 373 MM HG (ref 80–105)
PO2 BLDA: 395 MM HG (ref 80–105)
PO2 BLDA: 404 MM HG (ref 80–105)
PO2 BLDV: 36 MM HG (ref 25–47)
PO2 BLDV: 36 MM HG (ref 25–47)
PO2 BLDV: 37 MM HG (ref 25–47)
POTASSIUM BLD-SCNC: 3.1 MMOL/L (ref 3.4–5.3)
POTASSIUM BLD-SCNC: 3.6 MMOL/L (ref 3.5–5)
POTASSIUM BLD-SCNC: 3.6 MMOL/L (ref 3.5–5)
POTASSIUM BLD-SCNC: 3.7 MMOL/L (ref 3.4–5.3)
POTASSIUM BLD-SCNC: 3.7 MMOL/L (ref 3.4–5.3)
POTASSIUM BLD-SCNC: 3.7 MMOL/L (ref 3.5–5)
POTASSIUM BLD-SCNC: 3.8 MMOL/L (ref 3.5–5)
POTASSIUM BLD-SCNC: 3.9 MMOL/L (ref 3.5–5)
POTASSIUM BLD-SCNC: 3.9 MMOL/L (ref 3.5–5)
PR INTERVAL - MUSE: 112 MS
PROT SERPL-MCNC: 3.1 G/DL (ref 6.8–8.8)
PROT SERPL-MCNC: 5.8 G/DL (ref 6.8–8.8)
QRS DURATION - MUSE: 140 MS
QT - MUSE: 466 MS
QTC - MUSE: 544 MS
R AXIS - MUSE: 215 DEGREES
RBC # BLD AUTO: 2.48 10E6/UL (ref 4.4–5.9)
RBC # BLD AUTO: 2.85 10E6/UL (ref 4.4–5.9)
RBC # BLD AUTO: 3.03 10E6/UL (ref 4.4–5.9)
RBC # BLD AUTO: 3.64 10E6/UL (ref 4.4–5.9)
SODIUM BLD-SCNC: 142 MMOL/L (ref 133–144)
SODIUM BLD-SCNC: 142 MMOL/L (ref 133–144)
SODIUM BLD-SCNC: 143 MMOL/L (ref 133–144)
SODIUM BLD-SCNC: 144 MMOL/L (ref 133–144)
SODIUM BLD-SCNC: 144 MMOL/L (ref 133–144)
SODIUM BLD-SCNC: 145 MMOL/L (ref 133–144)
SODIUM SERPL-SCNC: 145 MMOL/L (ref 133–144)
SODIUM SERPL-SCNC: 146 MMOL/L (ref 133–144)
SYSTOLIC BLOOD PRESSURE - MUSE: NORMAL MMHG
T AXIS - MUSE: 15 DEGREES
UNIT ABO/RH: NORMAL
UNIT NUMBER: NORMAL
UNIT STATUS: NORMAL
UNIT TYPE ISBT: 5100
VENTRICULAR RATE- MUSE: 82 BPM
WBC # BLD AUTO: 10.5 10E3/UL (ref 4–11)
WBC # BLD AUTO: 11.5 10E3/UL (ref 4–11)
WBC # BLD AUTO: 4.3 10E3/UL (ref 4–11)
WBC # BLD AUTO: 8.4 10E3/UL (ref 4–11)

## 2022-03-23 PROCEDURE — 84155 ASSAY OF PROTEIN SERUM: CPT | Performed by: STUDENT IN AN ORGANIZED HEALTH CARE EDUCATION/TRAINING PROGRAM

## 2022-03-23 PROCEDURE — 999N000065 XR ABDOMEN PORT 1 VIEWS

## 2022-03-23 PROCEDURE — 85027 COMPLETE CBC AUTOMATED: CPT | Performed by: STUDENT IN AN ORGANIZED HEALTH CARE EDUCATION/TRAINING PROGRAM

## 2022-03-23 PROCEDURE — 85396 CLOTTING ASSAY WHOLE BLOOD: CPT | Performed by: INTERNAL MEDICINE

## 2022-03-23 PROCEDURE — 250N000011 HC RX IP 250 OP 636: Performed by: INTERNAL MEDICINE

## 2022-03-23 PROCEDURE — 258N000003 HC RX IP 258 OP 636: Performed by: STUDENT IN AN ORGANIZED HEALTH CARE EDUCATION/TRAINING PROGRAM

## 2022-03-23 PROCEDURE — 250N000011 HC RX IP 250 OP 636: Performed by: STUDENT IN AN ORGANIZED HEALTH CARE EDUCATION/TRAINING PROGRAM

## 2022-03-23 PROCEDURE — 99291 CRITICAL CARE FIRST HOUR: CPT | Mod: 25 | Performed by: INTERNAL MEDICINE

## 2022-03-23 PROCEDURE — 82805 BLOOD GASES W/O2 SATURATION: CPT | Performed by: SURGERY

## 2022-03-23 PROCEDURE — 82330 ASSAY OF CALCIUM: CPT

## 2022-03-23 PROCEDURE — 250N000011 HC RX IP 250 OP 636

## 2022-03-23 PROCEDURE — 274N000011 HC DEVICE HM III IMPLANT KIT

## 2022-03-23 PROCEDURE — 85384 FIBRINOGEN ACTIVITY: CPT | Performed by: INTERNAL MEDICINE

## 2022-03-23 PROCEDURE — 999N000065 XR CHEST PORT 1 VIEW

## 2022-03-23 PROCEDURE — 84100 ASSAY OF PHOSPHORUS: CPT | Performed by: STUDENT IN AN ORGANIZED HEALTH CARE EDUCATION/TRAINING PROGRAM

## 2022-03-23 PROCEDURE — 83605 ASSAY OF LACTIC ACID: CPT

## 2022-03-23 PROCEDURE — 272N000085 HC PACK CELL SAVER CSP: Performed by: SURGERY

## 2022-03-23 PROCEDURE — 99291 CRITICAL CARE FIRST HOUR: CPT | Mod: GC | Performed by: INTERNAL MEDICINE

## 2022-03-23 PROCEDURE — 274N000003 HC DEVICE HM 14-V LITHIUM BATTERY, INITIAL ONLY, EACH

## 2022-03-23 PROCEDURE — 999N000015 HC STATISTIC ARTERIAL MONITORING DAILY

## 2022-03-23 PROCEDURE — 83605 ASSAY OF LACTIC ACID: CPT | Performed by: SURGERY

## 2022-03-23 PROCEDURE — 200N000002 HC R&B ICU UMMC

## 2022-03-23 PROCEDURE — 84132 ASSAY OF SERUM POTASSIUM: CPT

## 2022-03-23 PROCEDURE — 272N000001 HC OR GENERAL SUPPLY STERILE: Performed by: SURGERY

## 2022-03-23 PROCEDURE — 250N000009 HC RX 250: Performed by: INTERNAL MEDICINE

## 2022-03-23 PROCEDURE — 84132 ASSAY OF SERUM POTASSIUM: CPT | Performed by: STUDENT IN AN ORGANIZED HEALTH CARE EDUCATION/TRAINING PROGRAM

## 2022-03-23 PROCEDURE — 82962 GLUCOSE BLOOD TEST: CPT

## 2022-03-23 PROCEDURE — 82805 BLOOD GASES W/O2 SATURATION: CPT | Performed by: STUDENT IN AN ORGANIZED HEALTH CARE EDUCATION/TRAINING PROGRAM

## 2022-03-23 PROCEDURE — 274N000012 HC DEVICE HM POWER UNIT MOBILE W/AC PWR CABLE, INITIAL ONLY, EACH

## 2022-03-23 PROCEDURE — 85730 THROMBOPLASTIN TIME PARTIAL: CPT | Performed by: INTERNAL MEDICINE

## 2022-03-23 PROCEDURE — 02HA0QZ INSERTION OF IMPLANTABLE HEART ASSIST SYSTEM INTO HEART, OPEN APPROACH: ICD-10-PCS | Performed by: SURGERY

## 2022-03-23 PROCEDURE — 250N000013 HC RX MED GY IP 250 OP 250 PS 637: Performed by: STUDENT IN AN ORGANIZED HEALTH CARE EDUCATION/TRAINING PROGRAM

## 2022-03-23 PROCEDURE — 370N000017 HC ANESTHESIA TECHNICAL FEE, PER MIN: Performed by: SURGERY

## 2022-03-23 PROCEDURE — 258N000003 HC RX IP 258 OP 636: Performed by: INTERNAL MEDICINE

## 2022-03-23 PROCEDURE — 410N000003 HC PER-PERFUSION 1ST 30 MIN: Performed by: SURGERY

## 2022-03-23 PROCEDURE — 85018 HEMOGLOBIN: CPT

## 2022-03-23 PROCEDURE — 274N000010 HC DEVICE HM III CONTROLLER, INITIAL ONLY, EACH

## 2022-03-23 PROCEDURE — 5A1221Z PERFORMANCE OF CARDIAC OUTPUT, CONTINUOUS: ICD-10-PCS | Performed by: SURGERY

## 2022-03-23 PROCEDURE — 272N000088 HC PUMP APP ADULT PERFUSION: Performed by: SURGERY

## 2022-03-23 PROCEDURE — 85018 HEMOGLOBIN: CPT | Performed by: STUDENT IN AN ORGANIZED HEALTH CARE EDUCATION/TRAINING PROGRAM

## 2022-03-23 PROCEDURE — 83735 ASSAY OF MAGNESIUM: CPT | Performed by: STUDENT IN AN ORGANIZED HEALTH CARE EDUCATION/TRAINING PROGRAM

## 2022-03-23 PROCEDURE — 250N000013 HC RX MED GY IP 250 OP 250 PS 637: Performed by: INTERNAL MEDICINE

## 2022-03-23 PROCEDURE — 83605 ASSAY OF LACTIC ACID: CPT | Performed by: STUDENT IN AN ORGANIZED HEALTH CARE EDUCATION/TRAINING PROGRAM

## 2022-03-23 PROCEDURE — 93287 PERI-PX DEVICE EVAL & PRGR: CPT

## 2022-03-23 PROCEDURE — 85027 COMPLETE CBC AUTOMATED: CPT | Performed by: INTERNAL MEDICINE

## 2022-03-23 PROCEDURE — 85396 CLOTTING ASSAY WHOLE BLOOD: CPT | Performed by: STUDENT IN AN ORGANIZED HEALTH CARE EDUCATION/TRAINING PROGRAM

## 2022-03-23 PROCEDURE — 88307 TISSUE EXAM BY PATHOLOGIST: CPT | Mod: TC | Performed by: SURGERY

## 2022-03-23 PROCEDURE — 85610 PROTHROMBIN TIME: CPT | Performed by: INTERNAL MEDICINE

## 2022-03-23 PROCEDURE — 33979 INSERT INTRACORPOREAL DEVICE: CPT | Mod: GC | Performed by: SURGERY

## 2022-03-23 PROCEDURE — 85730 THROMBOPLASTIN TIME PARTIAL: CPT | Performed by: STUDENT IN AN ORGANIZED HEALTH CARE EDUCATION/TRAINING PROGRAM

## 2022-03-23 PROCEDURE — 93005 ELECTROCARDIOGRAM TRACING: CPT

## 2022-03-23 PROCEDURE — 85027 COMPLETE CBC AUTOMATED: CPT | Performed by: SURGERY

## 2022-03-23 PROCEDURE — 999N000157 HC STATISTIC RCP TIME EA 10 MIN

## 2022-03-23 PROCEDURE — 274N000005 HC DEVICE HM POCKET BATTERY HOLSTER, INITIAL ONLY

## 2022-03-23 PROCEDURE — 999N000045 HC STATISTIC DAILY SWAN MONITORING

## 2022-03-23 PROCEDURE — 999N000185 HC STATISTIC TRANSPORT TIME EA 15 MIN

## 2022-03-23 PROCEDURE — 272N000002 HC OR SUPPLY OTHER OPNP: Performed by: SURGERY

## 2022-03-23 PROCEDURE — 94002 VENT MGMT INPAT INIT DAY: CPT

## 2022-03-23 PROCEDURE — 84100 ASSAY OF PHOSPHORUS: CPT | Performed by: INTERNAL MEDICINE

## 2022-03-23 PROCEDURE — 93287 PERI-PX DEVICE EVAL & PRGR: CPT | Mod: 26 | Performed by: INTERNAL MEDICINE

## 2022-03-23 PROCEDURE — 250N000009 HC RX 250: Performed by: STUDENT IN AN ORGANIZED HEALTH CARE EDUCATION/TRAINING PROGRAM

## 2022-03-23 PROCEDURE — 71045 X-RAY EXAM CHEST 1 VIEW: CPT | Mod: 26 | Performed by: RADIOLOGY

## 2022-03-23 PROCEDURE — C1768 GRAFT, VASCULAR: HCPCS | Performed by: SURGERY

## 2022-03-23 PROCEDURE — 84295 ASSAY OF SERUM SODIUM: CPT

## 2022-03-23 PROCEDURE — 274N000009 HC DEVICE HM UNIVERSAL BATTERY CHARGER, INITIAL ONLY, EACH

## 2022-03-23 PROCEDURE — P9016 RBC LEUKOCYTES REDUCED: HCPCS | Performed by: INTERNAL MEDICINE

## 2022-03-23 PROCEDURE — 80053 COMPREHEN METABOLIC PANEL: CPT | Performed by: STUDENT IN AN ORGANIZED HEALTH CARE EDUCATION/TRAINING PROGRAM

## 2022-03-23 PROCEDURE — 74018 RADEX ABDOMEN 1 VIEW: CPT | Mod: 26 | Performed by: RADIOLOGY

## 2022-03-23 PROCEDURE — 360N000079 HC SURGERY LEVEL 6, PER MIN: Performed by: SURGERY

## 2022-03-23 PROCEDURE — 82810 BLOOD GASES O2 SAT ONLY: CPT

## 2022-03-23 PROCEDURE — P9041 ALBUMIN (HUMAN),5%, 50ML: HCPCS | Performed by: STUDENT IN AN ORGANIZED HEALTH CARE EDUCATION/TRAINING PROGRAM

## 2022-03-23 PROCEDURE — 82805 BLOOD GASES W/O2 SATURATION: CPT

## 2022-03-23 PROCEDURE — 274N000002 HC DEVICE HM 14-V LITH BATTERY CLIP, INITIAL ONLY

## 2022-03-23 PROCEDURE — 258N000003 HC RX IP 258 OP 636: Performed by: SURGERY

## 2022-03-23 PROCEDURE — P9041 ALBUMIN (HUMAN),5%, 50ML: HCPCS

## 2022-03-23 PROCEDURE — 83735 ASSAY OF MAGNESIUM: CPT | Performed by: INTERNAL MEDICINE

## 2022-03-23 PROCEDURE — 410N000004: Performed by: SURGERY

## 2022-03-23 PROCEDURE — 250N000024 HC ISOFLURANE, PER MIN: Performed by: SURGERY

## 2022-03-23 PROCEDURE — 250N000009 HC RX 250: Performed by: SURGERY

## 2022-03-23 PROCEDURE — 71045 X-RAY EXAM CHEST 1 VIEW: CPT | Mod: 26 | Performed by: STUDENT IN AN ORGANIZED HEALTH CARE EDUCATION/TRAINING PROGRAM

## 2022-03-23 PROCEDURE — 85610 PROTHROMBIN TIME: CPT | Performed by: STUDENT IN AN ORGANIZED HEALTH CARE EDUCATION/TRAINING PROGRAM

## 2022-03-23 PROCEDURE — 82330 ASSAY OF CALCIUM: CPT | Performed by: STUDENT IN AN ORGANIZED HEALTH CARE EDUCATION/TRAINING PROGRAM

## 2022-03-23 PROCEDURE — 71045 X-RAY EXAM CHEST 1 VIEW: CPT

## 2022-03-23 PROCEDURE — 250N000011 HC RX IP 250 OP 636: Performed by: SURGERY

## 2022-03-23 PROCEDURE — 999N000155 HC STATISTIC RAPCV CVP MONITORING

## 2022-03-23 PROCEDURE — 93010 ELECTROCARDIOGRAM REPORT: CPT | Mod: 59 | Performed by: INTERNAL MEDICINE

## 2022-03-23 PROCEDURE — 999N000075 HC STATISTIC IABP MONITORING

## 2022-03-23 PROCEDURE — 250N000009 HC RX 250

## 2022-03-23 PROCEDURE — 33968 REMOVE AORTIC ASSIST DEVICE: CPT

## 2022-03-23 PROCEDURE — 274N000006 HC DEVICE HM POCKET SHOWER BAG, INITIAL ONLY, EACH

## 2022-03-23 DEVICE — IMPLANTABLE DEVICE: Type: IMPLANTABLE DEVICE | Site: HEART | Status: FUNCTIONAL

## 2022-03-23 DEVICE — STRETCH VASCULAR GRAFT SW 18MMX40CM
Type: IMPLANTABLE DEVICE | Site: CHEST | Status: FUNCTIONAL
Brand: GORE-TEX   STRETCH VASCULAR GRAFT

## 2022-03-23 RX ORDER — DEXMEDETOMIDINE HYDROCHLORIDE 4 UG/ML
.2-.7 INJECTION, SOLUTION INTRAVENOUS CONTINUOUS
Status: DISCONTINUED | OUTPATIENT
Start: 2022-03-23 | End: 2022-03-24

## 2022-03-23 RX ORDER — FENTANYL CITRATE 50 UG/ML
INJECTION, SOLUTION INTRAMUSCULAR; INTRAVENOUS PRN
Status: DISCONTINUED | OUTPATIENT
Start: 2022-03-23 | End: 2022-03-23

## 2022-03-23 RX ORDER — FLUCONAZOLE 2 MG/ML
200 INJECTION, SOLUTION INTRAVENOUS EVERY 24 HOURS
Status: COMPLETED | OUTPATIENT
Start: 2022-03-24 | End: 2022-03-25

## 2022-03-23 RX ORDER — HYDRALAZINE HYDROCHLORIDE 25 MG/1
25 TABLET, FILM COATED ORAL 3 TIMES DAILY
Status: ON HOLD | COMMUNITY
End: 2022-04-08

## 2022-03-23 RX ORDER — ALBUMIN, HUMAN INJ 5% 5 %
25 SOLUTION INTRAVENOUS ONCE
Status: COMPLETED | OUTPATIENT
Start: 2022-03-23 | End: 2022-03-23

## 2022-03-23 RX ORDER — LEVOFLOXACIN 5 MG/ML
500 INJECTION, SOLUTION INTRAVENOUS
Status: COMPLETED | OUTPATIENT
Start: 2022-03-23 | End: 2022-03-23

## 2022-03-23 RX ORDER — PROCHLORPERAZINE MALEATE 10 MG
10 TABLET ORAL EVERY 6 HOURS PRN
Status: DISCONTINUED | OUTPATIENT
Start: 2022-03-23 | End: 2022-04-08 | Stop reason: HOSPADM

## 2022-03-23 RX ORDER — HEPARIN SODIUM 5000 [USP'U]/.5ML
5000 INJECTION, SOLUTION INTRAVENOUS; SUBCUTANEOUS EVERY 8 HOURS
Status: DISCONTINUED | OUTPATIENT
Start: 2022-03-24 | End: 2022-03-25

## 2022-03-23 RX ORDER — SODIUM CHLORIDE 9 MG/ML
25 INJECTION, SOLUTION INTRAVENOUS CONTINUOUS
Status: DISCONTINUED | OUTPATIENT
Start: 2022-03-23 | End: 2022-03-25

## 2022-03-23 RX ORDER — NOREPINEPHRINE BITARTRATE 0.06 MG/ML
.01-.4 INJECTION, SOLUTION INTRAVENOUS CONTINUOUS PRN
Status: DISCONTINUED | OUTPATIENT
Start: 2022-03-23 | End: 2022-03-23

## 2022-03-23 RX ORDER — NALOXONE HYDROCHLORIDE 0.4 MG/ML
0.2 INJECTION, SOLUTION INTRAMUSCULAR; INTRAVENOUS; SUBCUTANEOUS
Status: DISCONTINUED | OUTPATIENT
Start: 2022-03-23 | End: 2022-04-08 | Stop reason: HOSPADM

## 2022-03-23 RX ORDER — ACETAMINOPHEN 325 MG/1
650 TABLET ORAL EVERY 4 HOURS PRN
Status: DISCONTINUED | OUTPATIENT
Start: 2022-03-26 | End: 2022-04-08 | Stop reason: HOSPADM

## 2022-03-23 RX ORDER — PROPOFOL 10 MG/ML
INJECTION, EMULSION INTRAVENOUS CONTINUOUS PRN
Status: DISCONTINUED | OUTPATIENT
Start: 2022-03-23 | End: 2022-03-23

## 2022-03-23 RX ORDER — ACETAMINOPHEN 325 MG/1
975 TABLET ORAL EVERY 8 HOURS
Status: COMPLETED | OUTPATIENT
Start: 2022-03-23 | End: 2022-03-26

## 2022-03-23 RX ORDER — HEPARIN SODIUM 1000 [USP'U]/ML
INJECTION, SOLUTION INTRAVENOUS; SUBCUTANEOUS PRN
Status: DISCONTINUED | OUTPATIENT
Start: 2022-03-23 | End: 2022-03-23

## 2022-03-23 RX ORDER — SODIUM CHLORIDE, SODIUM GLUCONATE, SODIUM ACETATE, POTASSIUM CHLORIDE AND MAGNESIUM CHLORIDE 526; 502; 368; 37; 30 MG/100ML; MG/100ML; MG/100ML; MG/100ML; MG/100ML
INJECTION, SOLUTION INTRAVENOUS CONTINUOUS PRN
Status: DISCONTINUED | OUTPATIENT
Start: 2022-03-23 | End: 2022-03-23

## 2022-03-23 RX ORDER — ALBUTEROL SULFATE 90 UG/1
AEROSOL, METERED RESPIRATORY (INHALATION) PRN
Status: DISCONTINUED | OUTPATIENT
Start: 2022-03-23 | End: 2022-03-23

## 2022-03-23 RX ORDER — CALCIUM GLUCONATE 20 MG/ML
1 INJECTION, SOLUTION INTRAVENOUS
Status: DISPENSED | OUTPATIENT
Start: 2022-03-23 | End: 2022-03-26

## 2022-03-23 RX ORDER — LEVOFLOXACIN 5 MG/ML
500 INJECTION, SOLUTION INTRAVENOUS
Status: DISCONTINUED | OUTPATIENT
Start: 2022-03-23 | End: 2022-03-23

## 2022-03-23 RX ORDER — FAMOTIDINE 20 MG/1
20 TABLET, FILM COATED ORAL 2 TIMES DAILY
Status: DISCONTINUED | OUTPATIENT
Start: 2022-03-23 | End: 2022-03-25

## 2022-03-23 RX ORDER — FIBRINOGEN (HUMAN) 700-1300MG
1 KIT INTRAVENOUS ONCE
Status: DISCONTINUED | OUTPATIENT
Start: 2022-03-23 | End: 2022-03-23

## 2022-03-23 RX ORDER — ONDANSETRON 4 MG/1
4 TABLET, ORALLY DISINTEGRATING ORAL EVERY 6 HOURS PRN
Status: DISCONTINUED | OUTPATIENT
Start: 2022-03-23 | End: 2022-04-08 | Stop reason: HOSPADM

## 2022-03-23 RX ORDER — DEXTROSE MONOHYDRATE 25 G/50ML
25-50 INJECTION, SOLUTION INTRAVENOUS
Status: DISCONTINUED | OUTPATIENT
Start: 2022-03-23 | End: 2022-03-24

## 2022-03-23 RX ORDER — NOREPINEPHRINE BITARTRATE 0.06 MG/ML
.01-.6 INJECTION, SOLUTION INTRAVENOUS CONTINUOUS
Status: DISCONTINUED | OUTPATIENT
Start: 2022-03-23 | End: 2022-03-24

## 2022-03-23 RX ORDER — HYDRALAZINE HYDROCHLORIDE 20 MG/ML
10 INJECTION INTRAMUSCULAR; INTRAVENOUS EVERY 30 MIN PRN
Status: DISCONTINUED | OUTPATIENT
Start: 2022-03-23 | End: 2022-04-08 | Stop reason: HOSPADM

## 2022-03-23 RX ORDER — SODIUM CHLORIDE, SODIUM LACTATE, POTASSIUM CHLORIDE, CALCIUM CHLORIDE 600; 310; 30; 20 MG/100ML; MG/100ML; MG/100ML; MG/100ML
INJECTION, SOLUTION INTRAVENOUS CONTINUOUS PRN
Status: DISCONTINUED | OUTPATIENT
Start: 2022-03-23 | End: 2022-03-23

## 2022-03-23 RX ORDER — POTASSIUM CHLORIDE 1500 MG/1
60 TABLET, EXTENDED RELEASE ORAL ONCE
Status: COMPLETED | OUTPATIENT
Start: 2022-03-23 | End: 2022-03-23

## 2022-03-23 RX ORDER — POTASSIUM CHLORIDE 14.9 MG/ML
20 INJECTION INTRAVENOUS
Status: COMPLETED | OUTPATIENT
Start: 2022-03-23 | End: 2022-03-23

## 2022-03-23 RX ORDER — CARVEDILOL 12.5 MG/1
12.5 TABLET ORAL 2 TIMES DAILY WITH MEALS
Status: ON HOLD | COMMUNITY
End: 2022-03-23

## 2022-03-23 RX ORDER — PANTOPRAZOLE SODIUM 40 MG/1
40 TABLET, DELAYED RELEASE ORAL DAILY
Status: DISCONTINUED | OUTPATIENT
Start: 2022-03-23 | End: 2022-03-26

## 2022-03-23 RX ORDER — MAGNESIUM SULFATE HEPTAHYDRATE 40 MG/ML
2 INJECTION, SOLUTION INTRAVENOUS ONCE
Status: COMPLETED | OUTPATIENT
Start: 2022-03-23 | End: 2022-03-23

## 2022-03-23 RX ORDER — BISACODYL 10 MG
10 SUPPOSITORY, RECTAL RECTAL DAILY PRN
Status: DISCONTINUED | OUTPATIENT
Start: 2022-03-23 | End: 2022-04-08 | Stop reason: HOSPADM

## 2022-03-23 RX ORDER — POLYETHYLENE GLYCOL 3350 17 G/17G
17 POWDER, FOR SOLUTION ORAL DAILY
Status: DISCONTINUED | OUTPATIENT
Start: 2022-03-24 | End: 2022-04-07

## 2022-03-23 RX ORDER — FLUCONAZOLE 2 MG/ML
200 INJECTION, SOLUTION INTRAVENOUS
Status: COMPLETED | OUTPATIENT
Start: 2022-03-23 | End: 2022-03-23

## 2022-03-23 RX ORDER — FLUCONAZOLE 2 MG/ML
200 INJECTION, SOLUTION INTRAVENOUS
Status: DISCONTINUED | OUTPATIENT
Start: 2022-03-23 | End: 2022-03-23

## 2022-03-23 RX ORDER — NALOXONE HYDROCHLORIDE 0.4 MG/ML
0.4 INJECTION, SOLUTION INTRAMUSCULAR; INTRAVENOUS; SUBCUTANEOUS
Status: DISCONTINUED | OUTPATIENT
Start: 2022-03-23 | End: 2022-04-08 | Stop reason: HOSPADM

## 2022-03-23 RX ORDER — PROPOFOL 10 MG/ML
5-75 INJECTION, EMULSION INTRAVENOUS CONTINUOUS
Status: DISCONTINUED | OUTPATIENT
Start: 2022-03-23 | End: 2022-03-24

## 2022-03-23 RX ORDER — MUPIROCIN 20 MG/G
OINTMENT TOPICAL 2 TIMES DAILY
Status: DISCONTINUED | OUTPATIENT
Start: 2022-03-23 | End: 2022-03-23 | Stop reason: HOSPADM

## 2022-03-23 RX ORDER — FEXOFENADINE HCL 180 MG/1
180 TABLET ORAL DAILY PRN
COMMUNITY
End: 2022-04-12

## 2022-03-23 RX ORDER — FUROSEMIDE 40 MG
40 TABLET ORAL 2 TIMES DAILY
Status: ON HOLD | COMMUNITY
End: 2022-04-08

## 2022-03-23 RX ORDER — OXYCODONE HYDROCHLORIDE 5 MG/1
5 TABLET ORAL EVERY 4 HOURS PRN
Status: DISCONTINUED | OUTPATIENT
Start: 2022-03-23 | End: 2022-04-08 | Stop reason: HOSPADM

## 2022-03-23 RX ORDER — CALCIUM CHLORIDE 100 MG/ML
INJECTION INTRAVENOUS; INTRAVENTRICULAR PRN
Status: DISCONTINUED | OUTPATIENT
Start: 2022-03-23 | End: 2022-03-23

## 2022-03-23 RX ORDER — OXYCODONE HYDROCHLORIDE 10 MG/1
10 TABLET ORAL EVERY 4 HOURS PRN
Status: DISCONTINUED | OUTPATIENT
Start: 2022-03-23 | End: 2022-04-08 | Stop reason: HOSPADM

## 2022-03-23 RX ORDER — LIDOCAINE HYDROCHLORIDE 20 MG/ML
INJECTION, SOLUTION INFILTRATION; PERINEURAL PRN
Status: DISCONTINUED | OUTPATIENT
Start: 2022-03-23 | End: 2022-03-23

## 2022-03-23 RX ORDER — DOBUTAMINE HYDROCHLORIDE 200 MG/100ML
2.5 INJECTION INTRAVENOUS CONTINUOUS
Status: DISCONTINUED | OUTPATIENT
Start: 2022-03-23 | End: 2022-03-24

## 2022-03-23 RX ORDER — AMOXICILLIN 250 MG
1 CAPSULE ORAL 2 TIMES DAILY
Status: DISCONTINUED | OUTPATIENT
Start: 2022-03-23 | End: 2022-04-08 | Stop reason: HOSPADM

## 2022-03-23 RX ORDER — MUPIROCIN 20 MG/G
0.5 OINTMENT TOPICAL 2 TIMES DAILY
Status: DISPENSED | OUTPATIENT
Start: 2022-03-23 | End: 2022-03-28

## 2022-03-23 RX ORDER — HYDROMORPHONE HYDROCHLORIDE 1 MG/ML
0.5 INJECTION, SOLUTION INTRAMUSCULAR; INTRAVENOUS; SUBCUTANEOUS
Status: DISCONTINUED | OUTPATIENT
Start: 2022-03-23 | End: 2022-04-08 | Stop reason: HOSPADM

## 2022-03-23 RX ORDER — NICOTINE POLACRILEX 4 MG
15-30 LOZENGE BUCCAL
Status: DISCONTINUED | OUTPATIENT
Start: 2022-03-23 | End: 2022-03-24

## 2022-03-23 RX ORDER — CALCIUM GLUCONATE 20 MG/ML
2 INJECTION, SOLUTION INTRAVENOUS
Status: ACTIVE | OUTPATIENT
Start: 2022-03-23 | End: 2022-03-26

## 2022-03-23 RX ORDER — LEVOFLOXACIN 5 MG/ML
500 INJECTION, SOLUTION INTRAVENOUS SEE ADMIN INSTRUCTIONS
Status: DISCONTINUED | OUTPATIENT
Start: 2022-03-23 | End: 2022-03-23 | Stop reason: HOSPADM

## 2022-03-23 RX ORDER — ONDANSETRON 2 MG/ML
4 INJECTION INTRAMUSCULAR; INTRAVENOUS EVERY 6 HOURS PRN
Status: DISCONTINUED | OUTPATIENT
Start: 2022-03-23 | End: 2022-04-08 | Stop reason: HOSPADM

## 2022-03-23 RX ORDER — PROTAMINE SULFATE 10 MG/ML
INJECTION, SOLUTION INTRAVENOUS PRN
Status: DISCONTINUED | OUTPATIENT
Start: 2022-03-23 | End: 2022-03-23

## 2022-03-23 RX ORDER — LIDOCAINE 40 MG/G
CREAM TOPICAL
Status: DISCONTINUED | OUTPATIENT
Start: 2022-03-23 | End: 2022-04-08 | Stop reason: HOSPADM

## 2022-03-23 RX ORDER — POTASSIUM CHLORIDE 14.9 MG/ML
20 INJECTION INTRAVENOUS ONCE
Status: COMPLETED | OUTPATIENT
Start: 2022-03-23 | End: 2022-03-23

## 2022-03-23 RX ORDER — PROPOFOL 10 MG/ML
INJECTION, EMULSION INTRAVENOUS PRN
Status: DISCONTINUED | OUTPATIENT
Start: 2022-03-23 | End: 2022-03-23

## 2022-03-23 RX ORDER — LEVOFLOXACIN 5 MG/ML
500 INJECTION, SOLUTION INTRAVENOUS EVERY 24 HOURS
Status: COMPLETED | OUTPATIENT
Start: 2022-03-24 | End: 2022-03-25

## 2022-03-23 RX ORDER — CEFAZOLIN SODIUM 2 G/100ML
2 INJECTION, SOLUTION INTRAVENOUS EVERY 8 HOURS
Status: DISCONTINUED | OUTPATIENT
Start: 2022-03-23 | End: 2022-03-23

## 2022-03-23 RX ORDER — SPIRONOLACTONE 25 MG/1
25 TABLET ORAL DAILY
Status: ON HOLD | COMMUNITY
End: 2022-03-23

## 2022-03-23 RX ADMIN — POTASSIUM CHLORIDE 20 MEQ: 14.9 INJECTION, SOLUTION INTRAVENOUS at 07:02

## 2022-03-23 RX ADMIN — POTASSIUM CHLORIDE 60 MEQ: 1500 TABLET, EXTENDED RELEASE ORAL at 06:09

## 2022-03-23 RX ADMIN — SODIUM CHLORIDE 600 MG: 9 INJECTION, SOLUTION INTRAVENOUS at 08:24

## 2022-03-23 RX ADMIN — ACETAMINOPHEN 975 MG: 325 TABLET ORAL at 22:09

## 2022-03-23 RX ADMIN — HEPARIN SODIUM 35000 UNITS: 1000 INJECTION INTRAVENOUS; SUBCUTANEOUS at 10:02

## 2022-03-23 RX ADMIN — VANCOMYCIN HYDROCHLORIDE 1500 MG: 10 INJECTION, POWDER, LYOPHILIZED, FOR SOLUTION INTRAVENOUS at 08:24

## 2022-03-23 RX ADMIN — MUPIROCIN 0.5 G: 20 OINTMENT TOPICAL at 22:44

## 2022-03-23 RX ADMIN — SODIUM CHLORIDE, POTASSIUM CHLORIDE, SODIUM LACTATE AND CALCIUM CHLORIDE 1000 ML: 600; 310; 30; 20 INJECTION, SOLUTION INTRAVENOUS at 15:22

## 2022-03-23 RX ADMIN — HYDROMORPHONE HYDROCHLORIDE 0.5 MG: 1 INJECTION, SOLUTION INTRAMUSCULAR; INTRAVENOUS; SUBCUTANEOUS at 09:12

## 2022-03-23 RX ADMIN — EPINEPHRINE 0.05 MCG/KG/MIN: 1 INJECTION PARENTERAL at 15:09

## 2022-03-23 RX ADMIN — ROCURONIUM BROMIDE 100 MG: 50 INJECTION, SOLUTION INTRAVENOUS at 07:41

## 2022-03-23 RX ADMIN — SODIUM CHLORIDE, POTASSIUM CHLORIDE, SODIUM LACTATE AND CALCIUM CHLORIDE: 600; 310; 30; 20 INJECTION, SOLUTION INTRAVENOUS at 08:21

## 2022-03-23 RX ADMIN — NOREPINEPHRINE BITARTRATE 6.4 MCG: 1 INJECTION, SOLUTION, CONCENTRATE INTRAVENOUS at 07:42

## 2022-03-23 RX ADMIN — EPINEPHRINE 0.07 MCG/KG/MIN: 1 INJECTION PARENTERAL at 11:54

## 2022-03-23 RX ADMIN — ROCURONIUM BROMIDE 20 MG: 50 INJECTION, SOLUTION INTRAVENOUS at 12:18

## 2022-03-23 RX ADMIN — PROPOFOL 30 MCG/KG/MIN: 10 INJECTION, EMULSION INTRAVENOUS at 13:40

## 2022-03-23 RX ADMIN — PROPOFOL 70 MCG/KG/MIN: 10 INJECTION, EMULSION INTRAVENOUS at 15:10

## 2022-03-23 RX ADMIN — PROTAMINE SULFATE 200 MG: 10 INJECTION, SOLUTION INTRAVENOUS at 12:11

## 2022-03-23 RX ADMIN — CALCIUM CHLORIDE 500 MG: 100 INJECTION INTRAVENOUS; INTRAVENTRICULAR at 13:06

## 2022-03-23 RX ADMIN — VANCOMYCIN HYDROCHLORIDE 1500 MG: 10 INJECTION, POWDER, LYOPHILIZED, FOR SOLUTION INTRAVENOUS at 21:09

## 2022-03-23 RX ADMIN — ALBUTEROL SULFATE 4 PUFF: 108 INHALANT RESPIRATORY (INHALATION) at 07:54

## 2022-03-23 RX ADMIN — MAGNESIUM SULFATE HEPTAHYDRATE 2 G: 40 INJECTION, SOLUTION INTRAVENOUS at 05:57

## 2022-03-23 RX ADMIN — ROCURONIUM BROMIDE 20 MG: 50 INJECTION, SOLUTION INTRAVENOUS at 11:21

## 2022-03-23 RX ADMIN — LIDOCAINE HYDROCHLORIDE 100 MG: 20 INJECTION, SOLUTION INFILTRATION; PERINEURAL at 07:41

## 2022-03-23 RX ADMIN — NOREPINEPHRINE BITARTRATE 6.4 MCG: 1 INJECTION, SOLUTION, CONCENTRATE INTRAVENOUS at 12:11

## 2022-03-23 RX ADMIN — AMINOCAPROIC ACID 7.5 G: 250 INJECTION, SOLUTION INTRAVENOUS at 08:41

## 2022-03-23 RX ADMIN — NOREPINEPHRINE BITARTRATE 3.2 MCG: 1 INJECTION, SOLUTION, CONCENTRATE INTRAVENOUS at 07:44

## 2022-03-23 RX ADMIN — PROPOFOL 30 MG: 10 INJECTION, EMULSION INTRAVENOUS at 14:18

## 2022-03-23 RX ADMIN — FENTANYL CITRATE 100 MCG: 50 INJECTION, SOLUTION INTRAMUSCULAR; INTRAVENOUS at 14:10

## 2022-03-23 RX ADMIN — ROCURONIUM BROMIDE 30 MG: 50 INJECTION, SOLUTION INTRAVENOUS at 09:58

## 2022-03-23 RX ADMIN — SUGAMMADEX 200 MG: 100 INJECTION, SOLUTION INTRAVENOUS at 13:42

## 2022-03-23 RX ADMIN — FENTANYL CITRATE 250 MCG: 50 INJECTION, SOLUTION INTRAMUSCULAR; INTRAVENOUS at 07:41

## 2022-03-23 RX ADMIN — PROPOFOL 80 MG: 10 INJECTION, EMULSION INTRAVENOUS at 07:41

## 2022-03-23 RX ADMIN — ALBUMIN HUMAN 25 G: 0.05 INJECTION, SOLUTION INTRAVENOUS at 17:09

## 2022-03-23 RX ADMIN — POTASSIUM PHOSPHATE, MONOBASIC AND POTASSIUM PHOSPHATE, DIBASIC 9 MMOL: 224; 236 INJECTION, SOLUTION, CONCENTRATE INTRAVENOUS at 17:25

## 2022-03-23 RX ADMIN — PROPOFOL 30 MCG/KG/MIN: 10 INJECTION, EMULSION INTRAVENOUS at 22:45

## 2022-03-23 RX ADMIN — Medication 0.04 MCG/KG/MIN: at 15:08

## 2022-03-23 RX ADMIN — FAMOTIDINE 20 MG: 20 TABLET ORAL at 21:09

## 2022-03-23 RX ADMIN — FLUCONAZOLE IN SODIUM CHLORIDE 200 MG: 2 INJECTION, SOLUTION INTRAVENOUS at 08:24

## 2022-03-23 RX ADMIN — NOREPINEPHRINE BITARTRATE 0.02 MCG/KG/MIN: 1 INJECTION, SOLUTION, CONCENTRATE INTRAVENOUS at 07:36

## 2022-03-23 RX ADMIN — FENTANYL CITRATE 100 MCG: 50 INJECTION, SOLUTION INTRAMUSCULAR; INTRAVENOUS at 14:21

## 2022-03-23 RX ADMIN — ROCURONIUM BROMIDE 50 MG: 50 INJECTION, SOLUTION INTRAVENOUS at 08:35

## 2022-03-23 RX ADMIN — FENTANYL CITRATE 250 MCG: 50 INJECTION, SOLUTION INTRAMUSCULAR; INTRAVENOUS at 08:34

## 2022-03-23 RX ADMIN — PROPOFOL 70 MCG/KG/MIN: 10 INJECTION, EMULSION INTRAVENOUS at 15:58

## 2022-03-23 RX ADMIN — AMINOCAPROIC ACID 1.25 G/HR: 250 INJECTION, SOLUTION INTRAVENOUS at 09:36

## 2022-03-23 RX ADMIN — NOREPINEPHRINE BITARTRATE 6.4 MCG: 1 INJECTION, SOLUTION, CONCENTRATE INTRAVENOUS at 07:41

## 2022-03-23 RX ADMIN — POTASSIUM CHLORIDE 20 MEQ: 14.9 INJECTION, SOLUTION INTRAVENOUS at 17:42

## 2022-03-23 RX ADMIN — PROTAMINE SULFATE 20 MG: 10 INJECTION, SOLUTION INTRAVENOUS at 12:05

## 2022-03-23 RX ADMIN — ACETAMINOPHEN 975 MG: 325 TABLET ORAL at 16:40

## 2022-03-23 RX ADMIN — SODIUM CHLORIDE, SODIUM GLUCONATE, SODIUM ACETATE, POTASSIUM CHLORIDE AND MAGNESIUM CHLORIDE: 526; 502; 368; 37; 30 INJECTION, SOLUTION INTRAVENOUS at 07:57

## 2022-03-23 RX ADMIN — SENNOSIDES AND DOCUSATE SODIUM 1 TABLET: 8.6; 5 TABLET ORAL at 21:09

## 2022-03-23 RX ADMIN — ALBUTEROL SULFATE 6 PUFF: 108 INHALANT RESPIRATORY (INHALATION) at 14:14

## 2022-03-23 RX ADMIN — Medication 40 MG: at 16:40

## 2022-03-23 RX ADMIN — POTASSIUM CHLORIDE 20 MEQ: 14.9 INJECTION, SOLUTION INTRAVENOUS at 05:54

## 2022-03-23 RX ADMIN — HUMAN INSULIN 1 UNITS/HR: 100 INJECTION, SOLUTION SUBCUTANEOUS at 18:16

## 2022-03-23 RX ADMIN — MAGNESIUM SULFATE 2 G: 2 INJECTION INTRAVENOUS at 17:17

## 2022-03-23 RX ADMIN — CALCIUM CHLORIDE 500 MG: 100 INJECTION INTRAVENOUS; INTRAVENTRICULAR at 12:28

## 2022-03-23 RX ADMIN — SODIUM CHLORIDE, SODIUM GLUCONATE, SODIUM ACETATE, POTASSIUM CHLORIDE AND MAGNESIUM CHLORIDE: 526; 502; 368; 37; 30 INJECTION, SOLUTION INTRAVENOUS at 07:39

## 2022-03-23 RX ADMIN — SODIUM CHLORIDE, POTASSIUM CHLORIDE, SODIUM LACTATE AND CALCIUM CHLORIDE 500 ML: 600; 310; 30; 20 INJECTION, SOLUTION INTRAVENOUS at 23:18

## 2022-03-23 RX ADMIN — DOBUTAMINE IN DEXTROSE 2.5 MCG/KG/MIN: 200 INJECTION, SOLUTION INTRAVENOUS at 15:10

## 2022-03-23 RX ADMIN — LEVOFLOXACIN 500 MG: 5 INJECTION, SOLUTION INTRAVENOUS at 08:24

## 2022-03-23 RX ADMIN — Medication 100 MCG/HR: at 16:26

## 2022-03-23 RX ADMIN — PROPOFOL 70 MG: 10 INJECTION, EMULSION INTRAVENOUS at 14:07

## 2022-03-23 RX ADMIN — MIDAZOLAM 2 MG: 1 INJECTION INTRAMUSCULAR; INTRAVENOUS at 07:41

## 2022-03-23 ASSESSMENT — ACTIVITIES OF DAILY LIVING (ADL)
ADLS_ACUITY_SCORE: 6
ADLS_ACUITY_SCORE: 12
ADLS_ACUITY_SCORE: 6
ADLS_ACUITY_SCORE: 12
DEPENDENT_IADLS:: INDEPENDENT
ADLS_ACUITY_SCORE: 6
ADLS_ACUITY_SCORE: 14
ADLS_ACUITY_SCORE: 6
ADLS_ACUITY_SCORE: 6
ADLS_ACUITY_SCORE: 14
ADLS_ACUITY_SCORE: 6
ADLS_ACUITY_SCORE: 6
ADLS_ACUITY_SCORE: 14

## 2022-03-23 NOTE — PROGRESS NOTES
Pt admitted to 4E ICU s/p LVAD placement. Pt placed a CMV 16 500 100 +5. Please see flowsheets for further information.

## 2022-03-23 NOTE — BRIEF OP NOTE
[facility]    Brief Operative Note    Pre-operative diagnosis: Heart failure (H) [I50.9]  Post-operative diagnosis Same as pre-operative diagnosis    Procedure: Procedure(s):  Redo Sterotomy, Insertion  Left Ventricular Assist Device  (HEARTMATE III) on Cardiopulmonary Bypass, Transesophageal Echocardiogram by Anesthesia  Surgeon: Surgeon(s) and Role:     * Wilver Rodríguez MD - Primary     * Harpal Armstrong MD - Fellow - Assisting   Anesthesia: General   Estimated Blood Loss: 1000 mL    Drains: Mediastinal x 2; L pleural x 1; R pleural x 1; pump/pericardial leny drain x 1  Specimens:   ID Type Source Tests Collected by Time Destination   1 : Left ventricle apical core Tissue Heart SURGICAL PATHOLOGY EXAM Wilver Rodríguez MD 3/23/2022 11:06 AM      Findings:   good RV function; uneventful wean from CPB with preserved RV function;.  Complications: None.  Implants:   Implant Name Type Inv. Item Serial No.  Lot No. LRB No. Used Action   Thoratec HeartMate 3 - Ventricular Assist Device-Blood Pump   [id number] [employer]EC LABS EDILMA   N/A 1 Implanted   Thoratec HeartMate 3 Sealed Outflow Graft    THORATEC LABS EDILMA  [id number] N/A 1 Implanted   Thoratec Heartmate 3 Apical Cuff    THORATE"[employer]  [id number] N/A 1 Implanted   GRAFT GORTEX [id number] STRETCH [id number] - [id number] Graft GRAFT GORTEX [id number] STRETCH [id number] W[employer]E [employer]OC[employer]ATE  N/A 1 Implanted

## 2022-03-23 NOTE — H&P
CV ICU H&P  3/23/2022      CO-MORBIDITIES:   Patient Active Problem List   Diagnosis     Cardiac defibrillator in situ- [employer], 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     Valvular cardiomyopathy (H)     S/P mitral valve replacement     Paroxysmal ventricular tachycardia (H)     Gastroparesis     Hx MRSA infection     MR (mitral regurgitation)     Obstructive sleep apnea     Sinusitis, chronic     Chronic systolic CHF (congestive heart failure) (H)     History of tobacco use     Heart transplant candidate     Age-related nuclear cataract of both eyes     Anxiety     Lipoma of neck     Myopia of both eyes     Presbyopia     S/P mitral valve repair     Thyroid goiter     Chronic systolic congestive heart failure (H)       ASSESSMENT: Tej Morfin is a 56 year old male with NICM secondary to valvulopathy severe MR s/p MV ring repair, now on home inotrope (Dobutamine 2.5), sustained VT s/p secondary prevention ICD, CKD stage II (baseline 1.5) who presents for LVAD placement with heartmate 3 by Dr. Rodríguez on 3/23/22    Cell saver 565, no other products.    PLAN:  Neuro/ pain/ sedation:  Acute Postoperative pain  - Monitor neurological status. Notify the MD for any acute changes in exam.  - Pain: fentanyl gtt. Scheduled tylenol and gabapentin. PRN tylenol, oxycodone, robaxin  - Sedation: propofol gtt    Pulmonary care:   Postoperative ventilation management  - Intubated, ventilated  - Titrate supplemental oxygen to maintain saturation above 92%.  - Pulmonary hygiene: Incentive spirometer every 15- 30 minutes when awake, flutter valve, C&DB    Cardiovascular:    S/p LVAD placement (HeartMate 3) on 3/23/2022 by Dr. Rodríguez  History of NICM, MR with VT and permanent defib  Mild MR, moderate RV dysfunction on EMILY  Recent echo on 7/2020 with LVEF of 20%  - Monitor hemodynamic status.   - PTA ASA, dobutamine infusion, entresto, sotalol, hydralazine  - Goal MAP 65-85  - Epi, norepi, vaso gtt; wean as tolerated  -  Immunosuppression per surgery    - balloon pump in place to 2:1 ratio    GI care/ Nutrition:   - NPO   - PPI  - Continue bowel regimen: miralax, senna    Renal/ Fluid Balance/ Electrolytes:   BL creat appears to be ~ 0.9  - Strict I/O, daily weights  - Avoid/limit nephrotoxins as able  - Replete lytes PRN per protocol  PTA lasix    Endocrine:    Stress induced hyperglycemia  Preop A1c 5.9  - Insulin gtt  - Goal BG <180 for optimal healing    ID/ Antibiotics:  Stress induced leukocytosis  - To complete perioperative regimen  - Continue to monitor fever curve, WBC and inflammatory markers as appropriate    Heme:     Stress induced leukocytosis  Acute blood loss anemia  Acute blood loss thrombocytopenia  No s/sx active bleeding  - Continue to monitor  - CBC     MSK/ Skin:  Sternotomy  Surgical Incision  - Sternal precautions  - Postoperative incision management per protocol  - PT/OT/CR    Prophylaxis:    - Mechanical prophylaxis for DVT  - Chemical DVT prophylaxis - start subcutaneous heparin tomorrow  - PPI     Lines/ tubes/ drains:  - Arterial Line, ETT, CTs, PA catheter, RIJ, scott, OG    Disposition:  - CVICU    Patient seen, findings and plan discussed with CVICU staff    Discussed with CVTS staff and fellow    Helder Chaudhari DO CA-2  Department of Anesthesiology  [phone]      ====================================    HPI:   Tej Morfin is a 56 year old male with NICM secondary to valvulopathy severe MR s/p MV ring repair, now on home inotrope (Dobutamine 2.5), sustained VT s/p secondary prevention ICD, CKD stage II (baseline 1.5) who presents for LVAD placement.     He wsas first diagnosed with severe mitral regurgitation and an ejection fraction of 25%.  He underwent an angiogram at that time that showed only mild coronary disease. He was referred to Dr. Graves for mitral regurgitation, and on 01/21/2015, he underwent mitral valve repair with an annuloplasty ring.      He later presented with  "ventricular tachycardia which was sustained and therefore he underwent a single-chamber defibrillator for secondary prevention.     PAST MEDICAL HISTORY:   Past Medical History:   Diagnosis Date     Chronic systolic heart failure (H) 2017     Gastroparesis      ICD (implantable cardioverter-defibrillator) in place     Flypeeps     Non-ischemic cardiomyopathy (H)      Paroxysmal ventricular tachycardia (H) 2017     S/P mitral valve replacement 2017     Tetrahydrocannabinol (THC) use disorder, mild, in controlled environment, abuse 2019    occasional edible THC. reportedly for sleep, anxiety     Tobacco abuse, episodic     occasional cigar - ~ 3x/wk       PAST SURGICAL HISTORY:   Past Surgical History:   Procedure Laterality Date     HEMORRHOIDECTOMY       ICD implantation        R partial medial meniscectomy       REPAIR VALVE MITRAL         FAMILY HISTORY:   Family History   Problem Relation Age of Onset     Coronary Artery Disease Mother      Kidney failure Mother      Cardiomyopathy Father          age 51       SOCIAL HISTORY:   Social History     Tobacco Use     Smoking status: Former Smoker     Smokeless tobacco: Never Used   Substance Use Topics     Alcohol use: No     Alcohol/week: 0.0 standard drinks         OBJECTIVE:   1. VITAL SIGNS:    BP (!) 131/90   Pulse 80   Temp 97.9  F (36.6  C) (Oral)   Resp 22   Ht 1.791 m (5' 10.51\")   Wt 85.9 kg (189 lb 6 oz)   SpO2 100%   BMI 26.78 kg/m      2. INTAKE/ OUTPUT:        3. PHYSICAL EXAMINATION:   General: sedated  Neuro: sedated  Resp: intubated, ventilated  CV: S1, S2, RRR, no m/r/g   Abdomen: Soft, non-distended, non-tender  Incisions: c/d/i  Extremities: warm and well perfused  CT: To suction, serosang output, no airleak, crepitus    4. INVESTIGATIONS:   Arterial Blood Gases   Recent Labs   Lab 22  1302 22  1221 22  1132 22  1057   PH 7.42 7.45 7.44 7.43   PCO2 42 40 43 46*   PO2 127* 373* 227* 245* "   HCO3 27 28 29* 30*     Complete Blood Count   Recent Labs   Lab 03/23/22  1302 03/23/22  1221 03/23/22  1220 03/23/22  1132 03/23/22  0809 03/23/22  0347 03/22/22  1725 03/22/22  1343   WBC  --   --  10.5  --   --  4.3  --  4.9   HGB 10.0* 8.2* 7.9* 8.6*   < > 11.1*   < > 12.0*   PLT  --   --  110*  --   --  178  --  206    < > = values in this interval not displayed.     Basic Metabolic Panel  Recent Labs   Lab 03/23/22  1302 03/23/22  1221 03/23/22  1132 03/23/22  1057 03/23/22  0358 03/23/22  0347 03/22/22  1840 03/22/22  1343    142 143 143   < > 145*  --  147*   POTASSIUM 3.9 3.9 3.7 3.8   < > 3.1*  --  3.5   CHLORIDE  --   --   --   --   --  108  --  111*   CO2  --   --   --   --   --  32  --  32   BUN  --   --   --   --   --  11  --  10   CR  --   --   --   --   --  0.93  --  0.99   * 157* 147* 136*   < > 121*   < > 82    < > = values in this interval not displayed.     Liver Function Tests  Recent Labs   Lab 03/23/22  1220 03/23/22  0347 03/22/22  1343   AST  --  13 12   ALT  --  14 16   ALKPHOS  --  63 67   BILITOTAL  --  0.4 0.4   ALBUMIN  --  2.3* 2.6*   INR 1.72* 1.11 1.05     Pancreatic Enzymes  No lab results found in last 7 days.  Coagulation Profile  Recent Labs   Lab 03/23/22  1220 03/23/22  0347 03/22/22  1343   INR 1.72* 1.11 1.05   PTT 27 34  --          5. RADIOLOGY:   Recent Results (from the past 24 hour(s))   Cardiac Catheterization    Narrative      Right sided filling pressures are moderately elevated.    Moderately elevated pulmonary artery hypertension.    Left sided filling pressures are severely elevated.    Reduced cardiac output level.    Hemodynamic data has been modified in Epic per physician review.     1. Elevated biventricular filling pressure with normal CO on chronic home   inotropic support (2.5mcg/kg/min)  2. Successful IABP insertion without complication     XR Chest Port 1 View    Narrative    EXAM: XR CHEST PORT 1 VIEW  3/23/2022 1:43 AM     HISTORY:  IABP        COMPARISON:  3/3/2022    TECHNIQUE: Single frontal radiograph of the chest    FINDINGS:     Left chest wall cardiac device with leads projecting over the right  atrium and right ventricle. Prosthetic cardiac valve. Pulmonary artery  catheter tip projects over the distal right pulmonary artery. Atrial  appendage clip. Superior marker of intra-aortic balloon pump projects  about 6 cm below the superior margin of the descending thoracic aorta.  Postsurgical changes of the chest with median sternotomy wires    Midline trachea. Well-defined and enlarged cardiomediastinal  silhouette. Distinct pulmonary vasculature. No consolidation, pleural  effusion or appreciable pneumothorax.      Impression    IMPRESSION:  1. Superior marker of intra-aortic balloon pump projects about 6 cm  below the superior margin of the descending thoracic aorta. Consider  repositioning.  2. Support devices as described above.  3. Prominent cardiomediastinal silhouette.    I have personally reviewed the examination and initial interpretation  and I agree with the findings.    PANCHO PRESLEY MD         SYSTEM ID:  [id number]   Cardiac Device Check - Inpatient   Result Value    Date Time Interrogation Session [id number]    Implantable Pulse Generator  [employer]    Implantable Pulse Generator Model G148 INOGEN X4 CRT-D    Implantable Pulse Generator Serial Number [id number]    Type Interrogation Session In Clinic    Clinic Name [facility]    Implantable Pulse Generator Type Cardiac Resynchronization Therapy - Defibrillator    Implantable Pulse Generator Implant Date 20160108    Implantable Lead  Guidant    Implantable Lead Model 4136 Dextrus    Implantable Lead Serial Number [id number]    Implantable Lead Implant Date 20160108    Implantable Lead Polarity Type Bipolar Lead    Implantable Lead Location Detail 1 UNKNOWN    Implantable Lead Location Right Atrium    Implantable Lead   Medtronic    Implantable Lead Model 4598 Attain Performa S    Implantable Lead Serial Number [id number]    Implantable Lead Implant Date 20160108    Implantable Lead Polarity Type Quadripolar Lead    Implantable Lead Location Detail 1 UNKNOWN    Implantable Lead Location Left Ventricle    Implantable Lead  [employer]    Implantable Lead Model 0295 Reliance 4-Site G    Implantable Lead Serial Number [id number]    Implantable Lead Implant Date 20150202    Implantable Lead Polarity Type Tripolar Lead    Implantable Lead Location Detail 1 UNKNOWN    Implantable Lead Location Right Ventricle    Joshua Setting Mode (NBG Code) DDDR    Joshua Setting Lower Rate Limit 80    Joshua Setting Maximum Tracking Rate 130    Joshua Setting Maximum Sensor Rate 120    Joshua Setting ASHWIN Delay Low 110    Joshua Setting PAV Delay Low 110    Joshua Setting AT Mode Switch Rate 170    Joshua Setting AT Mode Switch Mode DDI    Lead Channel Setting Sensing Polarity Bipolar    Lead Channel Setting Sensing Sensitivity 0.25    Lead Channel Setting Sensing Adaptation Mode Adaptive    Lead Channel Setting Sensing Polarity Bipolar    Lead Channel Setting Sensing Sensitivity 0.6    Lead Channel Setting Sensing Adaptation Mode Adaptive    Lead Channel Setting Sensing Anode Location Left Ventricle    Lead Channel Setting Sensing Anode Terminal Ring2    Lead Channel Setting Sensing Cathode Location Left Ventricle    Lead Channel Setting Sensing Cathode Terminal Tip    Lead Channel Setting Sensing Sensitivity 1.0    Lead Channel Setting Sensing Adaptation Mode Adaptive    Ventricular chambers paced during CRT pacing. BiV    CRT LV-RV Delay 80    Lead Channel Setting Pacing Polarity Bipolar    Lead Channel Setting Pacing Pulse Width 0.5    Lead Channel Setting Pacing Amplitude 1.5    Lead Channel Setting Pacing Polarity Bipolar    Lead Channel Setting Pacing Pulse Width 0.5    Lead Channel Setting Pacing Amplitude 2.5    Lead Channel Setting  Pacing Anode Location Left Ventricle    Lead Channel Setting Pacing Anode Terminal Ring2    Lead Channel Setting Sensing Cathode Location Left Ventricle    Lead Channel Setting Sensing Cathode Terminal Tip    Lead Channel Setting Pacing Pulse Width 0.5    Lead Channel Setting Pacing Amplitude 1.5    Zone Setting Type Category VF    Zone Setting Vendor Type Category VF    Zone Setting Detection Interval 300    Zone Setting Type Category VT    Zone Setting Vendor Type Category VT    Zone Setting Detection Interval 353    Lead Channel Status Check Lead    Lead Channel Impedance Value 895    Lead Channel Pacing Threshold Amplitude 0.8    Lead Channel Pacing Threshold Pulse Width 0.5    Lead Channel Impedance Value 470    Lead Channel Pacing Threshold Amplitude 0.5    Lead Channel Pacing Threshold Pulse Width 0.5    Lead Channel Impedance Value 366    Lead Channel Pacing Threshold Amplitude 1.3    Lead Channel Pacing Threshold Pulse Width 0.5    Battery Date Time of Measurements 20220323074000    Battery Status Beginning of Service    Battery Remaining Longevity 42    Battery Remaining Percentage 58    Capacitor Charge Type Reformation    Capacitor Last Charge Date Time 05008742535761    Capacitor Charge Time 11.7    Capacitor Charge Type Shock    Capacitor Last Charge Date Time [id number]    Capacitor Charge Time 6.3    Capacitor Charge Energy 31    Joshua Statistic Date Time Start 20220118000000    Joshua Statistic Date Time End 20220323000000    Joshua Statistic RA Percent Paced 90    Joshua Statistic RV Percent Paced 94    CRT Statistic LV Percent Paced 97    CRT Statistic Date Time Start 20220118000000    CRT Statistic Date Time End 20220323000000    Atrial Tachy Statistic Date Time Start 20220119000000    Atrial Tachy Statistic Date Time End 20220322000000    Atrial Tachy Statistic AT/AF [place] Percent 0    Therapy Statistic Recent Shocks Delivered 0    Therapy Statistic Recent Shocks Aborted 0    Therapy Statistic  Recent ATP Delivered 0    Therapy Statistic Recent Date Time Start 20220118000000    Therapy Statistic Recent Date Time End 20220323000000    Therapy Statistic Total Shocks Delivered 1    Therapy Statistic Total Shocks Aborted 1    Therapy Statistic Total ATP Delivered 2    Therapy Statistic Total  Date Time Start 20160108000000    Therapy Statistic Total  Date Time End 20220323000000    Episode Statistic Recent Count 0    Episode Statistic Type Category AT/AF    Episode Statistic Vendor Type Category AF    Episode Statistic Recent Count 1    Episode Statistic Type Category Other    Episode Statistic Recent Count 6    Episode Statistic Type Category VT    Episode Statistic Vendor Type Category NSVT    Episode Statistic Recent Count 1    Episode Statistic Type Category VF    Episode Statistic Vendor Type Category VF    Episode Statistic Recent Count 0    Episode Statistic Type Category VT    Episode Statistic Vendor Type Category VT    Episode Statistic Recent Count 0    Episode Statistic Type Category VT    Episode Statistic Vendor Type Category VT-1    Episode Statistic Recent Date Time Start 72326121555861    Episode Statistic Recent Date Time End 23076814749088    Episode Statistic Recent Date Time Start 94240333352399    Episode Statistic Recent Date Time End 06084478217751    Episode Statistic Recent Date Time Start 06818089822937    Episode Statistic Recent Date Time End 20220323000000    Episode Statistic Recent Date Time Start 32592105450155    Episode Statistic Recent Date Time End 20220323000000    Episode Statistic Recent Date Time Start 40061623786755    Episode Statistic Recent Date Time End 20220323000000    Episode Statistic Recent Date Time Start 20220118000000    Episode Statistic Recent Date Time End 20220323000000    Narrative    Patient seen in OR #27 pre-op LVAD implant for evaluation and iterative programming of their ICD per MD orders.     Device: [employer] [place] [employer] G148 INOGEN X4 CRT-D  Normal  device function  Mode: DDDR /120 bpm  AP: 90%  : 94%  BVP: 97%  Intrinsic rhythm: SB @ 39 bpm  Lead Trends Appear Stable: yes  Estimated battery longevity to RRT = 3.5 years  Atrial Arrhythmia: 0  Ventricular Arrhythmia: 6 NSVT episodes recorded - 3-6 sec, 175-195 bpm. 1 stored EGM reveals true NSVT and the other reveals SVT.  1 episode of VT recorded in the VF zone on 3/8/22 - 6 sec, 210 bpm, no therapy delivered.  Setting Changes: Pacing mode programmed DOO 80 bpm. ICD tachy therapies programmed off.    CARLA Ley RN    Multi lead ICD    I have reviewed and interpreted the device interrogation, settings, programming and nurse's summary. The device is functioning within normal device parameters. I agree with the current findings, assessment and plan.       =========================================

## 2022-03-23 NOTE — ANESTHESIA POSTPROCEDURE EVALUATION
Patient: Tej Morfin    Procedure: Procedure(s):  Redo Sterotomy, Insertion  Left Ventricular Assist Device  (HEARTMATE III) on Cardiopulmonary Bypass, Transesophageal Echocardiogram by Anesthesia       Anesthesia Type:  General    Note:  Disposition: ICU            ICU Sign Out: Anesthesiologist/ICU physician sign out WAS performed   Postop Pain Control: Uneventful            Sign Out: Well controlled pain   PONV: No   Neuro/Psych: Uneventful            Sign Out: PLANNED postop sedation   Airway/Respiratory: Uneventful            Sign Out: AIRWAY IN SITU/Resp. Support               Airway in situ/Resp. Support: ETT                 Reason: Planned Pre-op   CV/Hemodynamics: Uneventful            Sign Out: Acceptable CV status; No obvious hypovolemia; No obvious fluid overload   Other NRE: NONE   DID A NON-ROUTINE EVENT OCCUR? No    Event details/Postop Comments:  Patient transported directly from the OR to the ICU sedated and intubated.  Paralytic reversed prior to leaving OR.  Vitals stable on epi 0.07, NE 0.03, dobutamine 2.5.  IABP in place at 1:2.  Report given to ICU.           Last vitals:  Vitals Value Taken Time   BP     Temp     Pulse 82 03/23/22 1439   Resp 16 03/23/22 1439   SpO2 99 % 03/23/22 1433   Vitals shown include unvalidated device data.    Electronically Signed By: María Kuhn MD  March 23, 2022  2:40 PM

## 2022-03-23 NOTE — PROGRESS NOTES
Cardiology Progress Note    Assessment & Plan   Tej Morfin is a 56 year old male admitted on 3/22/2022. He has history of NICM secondary to severe MR s/p MV ring repair (2015), now on home inotrope (Dobutamine 2.5), sustained VT s/p secondary prevention ICD, who presents for scheduled LVAD placement.    Changes Today:  - To OR today with CVTS for LVAD placement  - Further recs pending results of OR     #. Chronic systolic heart failure secondary to valvular cardiomyopathy, inotrope dependent  #. Stage D; NYHA Class III   - Hemodynamics: RHC; monitor hemodynamics q6h  - MCS:  IABP 1:1 EKG triggered placement tomorrow.  - Inotrope:  Dobutamine 2.5 mcg/kgs  - ACE-I/ARB/ARNi: Holding entresto pre-op.  - BB: Not on BB at this time due to .  - Aldosterone antagonist: Not on it at this time.   - SCD prophylaxis: Yes. CRT-D  - Fluid status: Status post IV lasix last night with good urine output. Hold further diuretics this AM given adequate filling pressures.    Diet:  NPO.   DVT Prophylaxis: Anti-embolisim stockings (TEDs)  Calderon Catheter: Not present  Code Status: Full    Disposition Plan   Expected discharge in >7 days to prior living arrangement once LVAD completed.     Entered: Marquis Hernandez 03/23/2022, 12:26 PM    This patient was seen and discussed with Dr. Jimenez who agrees with the above assessment and plan.     Marquis Luciano MD  Cardiology Fellow  [phone]    Interval History   MELANIE overnight. Good UOP. Plan for OR today    Physical Exam   Temp: 97.9  F (36.6  C) Temp src: Oral BP: (!) 131/90 Pulse: 80   Resp: 22 SpO2: 100 % O2 Device: None (Room air)    Vitals:    03/22/22 1410 03/22/22 1442 03/23/22 0500   Weight: 90.7 kg (200 lb) 87 kg (191 lb 11.2 oz) 85.9 kg (189 lb 6 oz)     Vital Signs with Ranges  Temp:  [97.7  F (36.5  C)-98.5  F (36.9  C)] 97.9  F (36.6  C)  Pulse:  [80-83] 80  Resp:  [12-22] 22  BP: ()/(65-96) 131/90  SpO2:  [95 %-100 %] 100 %  I/O last 3 completed shifts:  In:  "655.4 [P.O.:580; I.V.:75.4]  Out: 2425 [Urine:2425]     , Blood pressure (!) 131/90, pulse 80, temperature 97.9  F (36.6  C), temperature source Oral, resp. rate 22, height 1.791 m (5' 10.51\"), weight 85.9 kg (189 lb 6 oz), SpO2 100 %.  189 lbs 6 oz  General: Awake, alert, cooperative, and no apparent distress.  Respiratory: No increased work of breathing, good air exchange.  Cardiovascular: Regular rate and rhythm, normal S1 and S2.   GI: Soft, non-distended, non-tender, no masses palpated.  Extremities: Warm, no LE edema. R groin IABP sheath c/d/i.   Neurologic: Awake, alert, oriented to name, place and time.          Medications     aminocaproic acid (AMICAR) infusion 7.5gm/150mL ADULT 1.25 g/hr (03/23/22 0936)     DOBUTamine 2.5 mcg/kg/min (03/23/22 1151)     EPINEPHrine       epoprostenol (VELETRI) 20 mcg/mL in sterile water inhalation solution       norepinephrine       vasopressin         acetaminophen  975 mg Oral Once     fibrinogen concentrate (Human)  1 g Intravenous Once     fibrinogen concentrate (Human)  1 g Intravenous Once     pump prime ([facility] formula) solution   PERFUSION Once     [Auto Hold] heparin in saline (CELL SAVER) formula   PERFUSION Once     levofloxacin  500 mg Intravenous See Admin Instructions     [Auto Hold] omeprazole  40 mg Oral QAM     prothrombin 4 factor complex concentrate  1,108 Units Intravenous Once     prothrombin 4 factor complex concentrate  546 Units Intravenous Once     sodium chloride (PF)  3 mL Intracatheter Q8H     [Auto Hold] sotalol  80 mg Oral Q12H PARKER     vancomycin  1,500 mg Intravenous See Admin Instructions       Data   Recent Labs   Lab 03/23/22  1221 03/23/22  1132 03/23/22  1057 03/23/22  0358 03/23/22  0347 03/22/22  1725 03/22/22  1343   WBC  --   --   --   --  4.3  --  4.9   HGB 8.2* 8.6* 9.0*   < > 11.1*   < > 12.0*   MCV  --   --   --   --  98  --  99   PLT  --   --   --   --  178  --  206   INR  --   --   --   --  1.11  --  1.05    143 143   < > " 145*  --  147*   POTASSIUM 3.9 3.7 3.8   < > 3.1*  --  3.5   CHLORIDE  --   --   --   --  108  --  111*   CO2  --   --   --   --  32  --  32   BUN  --   --   --   --  11  --  10   CR  --   --   --   --  0.93  --  0.99   ANIONGAP  --   --   --   --  5  --  4   YESICA  --   --   --   --  8.1*  --  8.4*   * 147* 136*   < > 121*   < > 82   ALBUMIN  --   --   --   --  2.3*  --  2.6*   PROTTOTAL  --   --   --   --  5.8*  --  6.3*   BILITOTAL  --   --   --   --  0.4  --  0.4   ALKPHOS  --   --   --   --  63  --  67   ALT  --   --   --   --  14  --  16   AST  --   --   --   --  13  --  12    < > = values in this interval not displayed.       Recent Results (from the past 24 hour(s))   Cardiac Catheterization    Narrative      Right sided filling pressures are moderately elevated.    Moderately elevated pulmonary artery hypertension.    Left sided filling pressures are severely elevated.    Reduced cardiac output level.    Hemodynamic data has been modified in Epic per physician review.     1. Elevated biventricular filling pressure with normal CO on chronic home   inotropic support (2.5mcg/kg/min)  2. Successful IABP insertion without complication     XR Chest Port 1 View    Narrative    EXAM: XR CHEST PORT 1 VIEW  3/23/2022 1:43 AM     HISTORY:  IABP       COMPARISON:  3/3/2022    TECHNIQUE: Single frontal radiograph of the chest    FINDINGS:     Left chest wall cardiac device with leads projecting over the right  atrium and right ventricle. Prosthetic cardiac valve. Pulmonary artery  catheter tip projects over the distal right pulmonary artery. Atrial  appendage clip. Superior marker of intra-aortic balloon pump projects  about 6 cm below the superior margin of the descending thoracic aorta.  Postsurgical changes of the chest with median sternotomy wires    Midline trachea. Well-defined and enlarged cardiomediastinal  silhouette. Distinct pulmonary vasculature. No consolidation, pleural  effusion or appreciable  pneumothorax.      Impression    IMPRESSION:  1. Superior marker of intra-aortic balloon pump projects about 6 cm  below the superior margin of the descending thoracic aorta. Consider  repositioning.  2. Support devices as described above.  3. Prominent cardiomediastinal silhouette.    I have personally reviewed the examination and initial interpretation  and I agree with the findings.    PANCHO PRESLEY MD         SYSTEM ID:  [id number]   Cardiac Device Check - Inpatient   Result Value    Date Time Interrogation Session [id number]    Implantable Pulse Generator  [employer]    Implantable Pulse Generator Model G148 INOGEN X4 CRT-D    Implantable Pulse Generator Serial Number [id number]    Type Interrogation Session In Clinic    Clinic Name [facility] [place] [facility]    Implantable Pulse Generator Type Cardiac Resynchronization Therapy - Defibrillator    Implantable Pulse Generator Implant Date 20160108    Implantable Lead  Guidant    Implantable Lead Model 4136 Dextrus    Implantable Lead Serial Number [id number]    Implantable Lead Implant Date 20160108    Implantable Lead Polarity Type Bipolar Lead    Implantable Lead Location Detail 1 UNKNOWN    Implantable Lead Location Right Atrium    Implantable Lead  Medtronic    Implantable Lead Model 4598 Attain Performa S    Implantable Lead Serial Number [id number]    Implantable Lead Implant Date 20160108    Implantable Lead Polarity Type Quadripolar Lead    Implantable Lead Location Detail 1 UNKNOWN    Implantable Lead Location Left Ventricle    Implantable Lead  [employer]    Implantable Lead Model 0295 Reliance 4-Site G    Implantable Lead Serial Number [id number]    Implantable Lead Implant Date 20150202    Implantable Lead Polarity Type Tripolar Lead    Implantable Lead Location Detail 1 UNKNOWN    Implantable Lead Location Right Ventricle    Joshua Setting Mode (NBG Code) DDDR    Joshua Setting Lower Rate  Limit 80    Joshua Setting Maximum Tracking Rate 130    Joshua Setting Maximum Sensor Rate 120    Joshua Setting ASHWIN Delay Low 110    Joshua Setting PAV Delay Low 110    Joshua Setting AT Mode Switch Rate 170    Joshua Setting AT Mode Switch Mode DDI    Lead Channel Setting Sensing Polarity Bipolar    Lead Channel Setting Sensing Sensitivity 0.25    Lead Channel Setting Sensing Adaptation Mode Adaptive    Lead Channel Setting Sensing Polarity Bipolar    Lead Channel Setting Sensing Sensitivity 0.6    Lead Channel Setting Sensing Adaptation Mode Adaptive    Lead Channel Setting Sensing Anode Location Left Ventricle    Lead Channel Setting Sensing Anode Terminal Ring2    Lead Channel Setting Sensing Cathode Location Left Ventricle    Lead Channel Setting Sensing Cathode Terminal Tip    Lead Channel Setting Sensing Sensitivity 1.0    Lead Channel Setting Sensing Adaptation Mode Adaptive    Ventricular chambers paced during CRT pacing. BiV    CRT LV-RV Delay 80    Lead Channel Setting Pacing Polarity Bipolar    Lead Channel Setting Pacing Pulse Width 0.5    Lead Channel Setting Pacing Amplitude 1.5    Lead Channel Setting Pacing Polarity Bipolar    Lead Channel Setting Pacing Pulse Width 0.5    Lead Channel Setting Pacing Amplitude 2.5    Lead Channel Setting Pacing Anode Location Left Ventricle    Lead Channel Setting Pacing Anode Terminal Ring2    Lead Channel Setting Sensing Cathode Location Left Ventricle    Lead Channel Setting Sensing Cathode Terminal Tip    Lead Channel Setting Pacing Pulse Width 0.5    Lead Channel Setting Pacing Amplitude 1.5    Zone Setting Type Category VF    Zone Setting Vendor Type Category VF    Zone Setting Detection Interval 300    Zone Setting Type Category VT    Zone Setting Vendor Type Category VT    Zone Setting Detection Interval 353    Lead Channel Status Check Lead    Lead Channel Impedance Value 895    Lead Channel Pacing Threshold Amplitude 0.8    Lead Channel Pacing Threshold Pulse  Width 0.5    Lead Channel Impedance Value 470    Lead Channel Pacing Threshold Amplitude 0.5    Lead Channel Pacing Threshold Pulse Width 0.5    Lead Channel Impedance Value 366    Lead Channel Pacing Threshold Amplitude 1.3    Lead Channel Pacing Threshold Pulse Width 0.5    Battery Date Time of Measurements [id number]    Battery Status Beginning of Service    Battery Remaining Longevity 42    Battery Remaining Percentage 58    Capacitor Charge Type Reformation    Capacitor Last Charge Date Time 14694063347303    Capacitor Charge Time 11.7    Capacitor Charge Type Shock    Capacitor Last Charge Date Time [id number]    Capacitor Charge Time 6.3    Capacitor Charge Energy 31    Joshua Statistic Date Time Start 20220118000000    Joshua Statistic Date Time End 20220323000000    Joshua Statistic RA Percent Paced 90    Joshua Statistic RV Percent Paced 94    CRT Statistic LV Percent Paced 97    CRT Statistic Date Time Start 20220118000000    CRT Statistic Date Time End 20220323000000    Atrial Tachy Statistic Date Time Start 20220119000000    Atrial Tachy Statistic Date Time End 20220322000000    Atrial Tachy Statistic AT/AF [place] Percent 0    Therapy Statistic Recent Shocks Delivered 0    Therapy Statistic Recent Shocks Aborted 0    Therapy Statistic Recent ATP Delivered 0    Therapy Statistic Recent Date Time Start 20220118000000    Therapy Statistic Recent Date Time End 20220323000000    Therapy Statistic Total Shocks Delivered 1    Therapy Statistic Total Shocks Aborted 1    Therapy Statistic Total ATP Delivered 2    Therapy Statistic Total  Date Time Start 20160108000000    Therapy Statistic Total  Date Time End 20220323000000    Episode Statistic Recent Count 0    Episode Statistic Type Category AT/AF    Episode Statistic Vendor Type Category AF    Episode Statistic Recent Count 1    Episode Statistic Type Category Other    Episode Statistic Recent Count 6    Episode Statistic Type Category VT    Episode  Statistic Vendor Type Category NSVT    Episode Statistic Recent Count 1    Episode Statistic Type Category VF    Episode Statistic Vendor Type Category VF    Episode Statistic Recent Count 0    Episode Statistic Type Category VT    Episode Statistic Vendor Type Category VT    Episode Statistic Recent Count 0    Episode Statistic Type Category VT    Episode Statistic Vendor Type Category VT-1    Episode Statistic Recent Date Time Start 20220118000000    Episode Statistic Recent Date Time End 20220323000000    Episode Statistic Recent Date Time Start 20220118000000    Episode Statistic Recent Date Time End 20220323000000    Episode Statistic Recent Date Time Start 20220118000000    Episode Statistic Recent Date Time End 20220323000000    Episode Statistic Recent Date Time Start 20220118000000    Episode Statistic Recent Date Time End 20220323000000    Episode Statistic Recent Date Time Start 20220118000000    Episode Statistic Recent Date Time End 20220323000000    Episode Statistic Recent Date Time Start 20220118000000    Episode Statistic Recent Date Time End 20220323000000    Narrative    Patient seen in OR #27 pre-op LVAD implant for evaluation and iterative programming of their ICD per MD orders.     Device: [employer] G148 INOGEN X4 CRT-D  Normal device function  Mode: DDDR /120 bpm  AP: 90%  : 94%  BVP: 97%  Intrinsic rhythm: SB @ 39 bpm  Lead Trends Appear Stable: yes  Estimated battery longevity to RRT = 3.5 years  Atrial Arrhythmia: 0  Ventricular Arrhythmia: 6 NSVT episodes recorded - 3-6 sec, 175-195 bpm. 1 stored EGM reveals true NSVT and the other reveals SVT.  1 episode of VT recorded in the VF zone on 3/8/22 - 6 sec, 210 bpm, no therapy delivered.  Setting Changes: Pacing mode programmed DOO 80 bpm. ICD tachy therapies programmed off.    CARLA Ley, RN    Multi lead ICD    I have reviewed and interpreted the device interrogation, settings, programming and nurse's summary. The device is  functioning within normal device parameters. I agree with the current findings, assessment and plan.

## 2022-03-23 NOTE — ANESTHESIA CARE TRANSFER NOTE
Patient: Tej Morfin    Procedure: Procedure(s):  Redo Sterotomy, Insertion  Left Ventricular Assist Device  (HEARTMATE III) on Cardiopulmonary Bypass, Transesophageal Echocardiogram by Anesthesia       Diagnosis: Heart failure (H) [I50.9]  Diagnosis Additional Information: No value filed.    Anesthesia Type:   General     Note:    Oropharynx: endotracheal tube in place  Level of Consciousness: iatrogenic sedation      Independent Airway: airway patency not satisfactory and stable  Dentition: dentition unchanged  Vital Signs Stable: post-procedure vital signs reviewed and stable  Report to RN Given: handoff report given  Patient transferred to: ICU  Comments: Patient transferred to ICU with monitors applied and ventilated via AMBU bag. Vitally stable throughout transport. Sign out given to bedside RN, case and complications discussed, recommendations relayed, all questions answered. No apparent complications of anesthesia.    Cindy Cramer MD  Anesthesiology Resident, CA-2  [facility]   [phone]        ICU Handoff: Call for PAUSE to initiate/utilize ICU HANDOFF, Identified Patient, Identified Responsible Provider, Reviewed the Pertinent Medical History, Discussed Surgical Course, Reviewed Intra-OP Anesthesia Management and Issues during Anesthesia, Set Expectations for Post Procedure Period and Allowed Opportunity for Questions and Acknowledgement of Understanding      Vitals:  Vitals Value Taken Time   BP     Temp     Pulse 83 03/23/22 1442   Resp 22 03/23/22 1442   SpO2 99 % 03/23/22 1433   Vitals shown include unvalidated device data.    Electronically Signed By: Cindy Cramer MD  March 23, 2022  2:43 PM

## 2022-03-23 NOTE — PROGRESS NOTES
CLINICAL NUTRITION SERVICES - ASSESSMENT NOTE     Nutrition Prescription    RECOMMENDATIONS FOR MDs/PROVIDERS TO ORDER:  Recommend nutrition support if unable to advance diet within 2-3 days     Malnutrition Status:    Unable to determine due to incomplete NFPA    Recommendations already ordered by Registered Dietitian (RD):  None at this time     Future/Additional Recommendations:  -- If TF warranted this admission recommend Osmolite 1.5 Leroy @ goal of 65ml/hr (1560ml/day) +4 packets Prosource will provide: 2500 kcals (29 kcals/kg), 141 g PRO (1.6 g/kg), 1188 ml free H20, 317 g CHO, and 0 g fiber daily.  - 30 ml h2O flush q 4 hrs for tube patency  - liquid Multivitamin 15 ml/daily  - start TF at 15 ml/hr for 8 hrs and increase 10 ml q 8 hrs until goal rate  - monitor K+, Mg++ and phos with TF advancement     -- when able to advance diet recommend nutritional supplements and calorie counts       REASON FOR ASSESSMENT  Tej Morfin is a/an 56 year old male assessed by the dietitian for Provider Order - Status Post Cardiac Surgery (education)    Per chart review patient with a has history of NICM secondary to severe MR s/p MV ring repair (2015), now on home inotrope (Dobutamine 2.5), sustained VT s/p secondary prevention ICD    S/p LVAD (Heartmate III)    NUTRITION HISTORY  Per chart review patient was seen by outpatient RD (3/3/22) at that time he reported vomiting for ~1 month around the new year. He relates this to his heart condition. He reports losing ~15 lbs, but has since regained what he lost. He does endorse muscle loss (mild), but has not regained this yet. He does note he has early satiety, GP noted in chart.    - Appetite: back to baseline after vomiting episodes a few months ago  - Food allergies/intolerances: lactose intolerant  - Meal prep & grocery shopping: pt does   - Previous RD education: yes  - Issues chewing or swallowing: no   - N/V/D/C: vomiting for 1 month, now has resolved (right around  "the new year)  - Food access concerns: no    Intubated. Busy with other team members when this writer attempted to visit.     CURRENT NUTRITION ORDERS  Diet: NPO    LABS  Labs reviewed  (3/23): Na 145 mmol/L (H), K+ 3.1 mmol/L (L), Mg++ 1.1 mg/dL (L)    MEDICATIONS  Medications reviewed    ANTHROPOMETRICS  Height: 179.1 cm (5' 10.512\")  Most Recent Weight: 85.9 kg (189 lb 6 oz)    IBW: 76.8 kg  BMI: Overweight BMI 25-29.9  Weight History:   Wt Readings from Last 10 Encounters:   03/23/22 85.9 kg (189 lb 6 oz)   03/03/22 88.5 kg (195 lb)   01/08/20 96.7 kg (213 lb 3.2 oz)   09/12/19 93 kg (205 lb)   09/12/19 93 kg (205 lb)   09/11/19 93.5 kg (206 lb 3.2 oz)   06/12/19 94.9 kg (209 lb 4.8 oz)   10/18/18 94.4 kg (208 lb 1.6 oz)   08/10/17 88.6 kg (195 lb 6.4 oz)   02/09/17 89.4 kg (197 lb 1.6 oz)   per chart review usual body weight 195 lbs. 3% weight loss in ~ 2 weeks  Dosing Weight: 86 kg (admit weight)    ASSESSED NUTRITION NEEDS  Estimated Energy Needs: 8602-8956 kcals/day (25 - 30 kcals/kg)  Justification: Maintenance and Post-op  Estimated Protein Needs: 129-172 grams protein/day (1.5 - 2 grams of pro/kg)  Justification: Post-op  LVAD  Estimated Fluid Needs: (1 mL/kcal)   Justification: Maintenance    PHYSICAL FINDINGS  See malnutrition section below.    MALNUTRITION  % Intake: Unable to assess  % Weight Loss: Weight loss does not meet criteria  Subcutaneous Fat Loss: Unable to assess  Muscle Loss: None observed  Fluid Accumulation/Edema: None noted  Malnutrition Diagnosis: Unable to determine due to incomplete NFPA    NUTRITION DIAGNOSIS  Inadequate oral intake related to intubated/sedated, s/p surgery as evidenced by NPO status       INTERVENTIONS  Implementation  Nutrition Education: Not appropriate at this time due to patient condition     Goals  Diet adv v nutrition support within 2-3 days.     Monitoring/Evaluation  Progress toward goals will be monitored and evaluated per protocol.    Jennifer Dietz, " MS/RD/LD  4E ([facility]) RD pager: [phone]  Ascom: [phone]  Weekend/Holiday RD pager: [phone]

## 2022-03-23 NOTE — PROGRESS NOTES
Major Shift Events:  Pt is A&Ox4, follows all commands. Pt CVP 17 on arrival to floor, gave 40mg lasix, pt has 2200 mL urine out. CVP at 2200 was 11, and 7 at 0400. JOANNA #s done Q6h, in flowsheet. IABP. Dobutamine 2.5mcg/kg/min overnight. OR checklist completed and pt CHG bath. Pt slept in between cares. Replaced potassium and magnesium.  Plan: LVAD today.  For vital signs and complete assessments, please see documentation flowsheets.

## 2022-03-23 NOTE — PROGRESS NOTES
BRIEF CVTS PROGRESS NOTE    Right groin IABP indicated for removal s/p HM3 implant earlier today.   Line freed from all device securement tethers.  RT present at bedside and verified pump off and balloon disconnected and fully deflated.    IABP removed and small amount of retrograde and antegrade bleeding permitted though retrograde bleeding minimal.  Pressure applied for 20 minutes; occlusion of femoral artery verified via absent DP Doppler pulse at 3-minute belen.  Puncture site noted to be hemostatic at the conclusion of 20 minutes of pressure; nevertheless, Femostop placed.  No obvious immediate complications noted; groin soft without evidence of significant hematoma.  Doppler DP and PT pulses located with depressurized Femostop in place.    Femostop device may be weaned to discontinuation per nursing protocol and pressure dressing may be applied.    Dr. Rodríguez was immediately available throughout all key aspects of the procedure.    Jennifer Soliman, CNP, [facility]  Cardiothoracic Surgery  Pager [phone]

## 2022-03-23 NOTE — CONSULTS
LVAD Admission Psychosocial Assessment    Patient Name: Tej Morfin  : 1965  Age: 56 year old  MRN: [id number]  Date of Initial Social Work Evaluation: 2019    Patient undergoing LVAD implant today.  Met with pt's wife, Jessenia, to update psychosocial assessment and provide ongoing education about SW role while inpatient, and to continue discussion of expectations/requirements, caregiver needs and follow up needs post-LVAD.     Presenting Information   Living Situation: The patient lives w/ his wife, Jessenia, and 16yr old dtr, Salma, in a townhouse. Pt has 12 steps to his bedroom, but all needs could be met on first level.  If not local, plans for short term stay:  Pt and wife plan on staying with her sister in [place]  Previous Functional Status: Independent w/ ADLs and IADLs. Has had increased fatigue and decreased exercise tolerance since about .   Cultural/Language/Spiritual Considerations: none     Support System  Primary Support Person Pt's wife, Jessenia  Other support:  Pt's adult children: Chriss (31yrs old) and Tammy (24 yrs old), and Jessenia has two dtrs from a previous relationship, Lexis (26yo) and Monse (21yrs old)  Plan for support in immediate post-LVAD period: Pt's wife will provide 24hr supervision and will stay at her sister's home in [place].     Health Care Directive  Decision Maker: Pt   Alternate Decision Maker: Pt's wife is [facility]K  Health Care Directive: Pt did not complete prior to LVAD surgery    Mental Health/Coping:   History of Mental Health: Anxiety as pt's heart failure has progressed and has had periods of SOB  History of Chemical Health: Hx of THC use. No concerns with alcohol use or other drug use.   Current status: Pt has abstained from cannabis since early part of February ().   Coping: Unable to assess   Services Needed/Recommended: Will continue to assess coping and mental health needs post-LVAD and provide resources as needed.     Financial   Income: SSD and  spouse's income  Impact of LVAD on income: Anticipate minimal impact  Insurance and medication coverage:   Financial concerns: Yes-temporary relocation and wife being unable to work  Resources needed: Provided monthly parking voucher     Education provided by SW: Social Work role inpatient setting, availability of support groups, parking information and anticipated discharge planning     Assessment and recommendations and plan:      Pt and wife familiar to writer and spoke to them a few weeks ago in preparation for coming for LVAD implant. Met w/ wife today as part of supportive visit as she waited for pt to get out of the OR.     Pt was recently admitted to [facility] 2/18-2/24/2022 and discharged on IV dobutamine ([facility]). Wife reports he was independent w/ ADLs and IADLs but was still struggling with low exercise tolerance.     Pt's wife is staying in [place] with her sister and this is where pt will go once discharged. Reviewed anticipated LOS and helped manage expectations. Reviewed need to attend LVAD education, which would anticipate to start sometime next week.    Will assess discharge needs after LVAD implant. Have previously discussed potential for [facility].

## 2022-03-23 NOTE — ANESTHESIA PROCEDURE NOTES
Arterial Line Procedure Note    Pre-Procedure   Staff -        Anesthesiologist:  María Kuhn MD       Resident/Fellow: Cindy Cramer MD       Performed By: resident       Location: OR       Pre-Anesthestic Checklist: patient identified, IV checked, risks and benefits discussed, informed consent, monitors and equipment checked, pre-op evaluation and at physician/surgeon's request  Timeout:       Correct Patient: Yes   Line Placement:   This line was placed Post Induction  Procedure   Procedure: arterial line       Laterality: left       Insertion Site: radial.  Sterile Prep        Standard elements of sterile barrier followed       Skin prep: Chloraprep  Insertion/Injection        Technique: ultrasound guided and Seldinger Technique        1. Ultrasound was used to evaluate the access site.       2. Artery evaluated via ultrasound for patency/adequacy.       3. Using real-time ultrasound the needle/catheter was observed entering the artery/vein.       Catheter Type/Size: 20 G, 12 cm  Narrative         Secured by: suture       Tegaderm dressing used.       Complications: None apparent,        Arterial waveform: Yes        IBP within 10% of NIBP: Yes

## 2022-03-23 NOTE — ANESTHESIA PROCEDURE NOTES
Perioperative EMILY Procedure Note    Staff -        Anesthesiologist:  María Kuhn MD       Resident/Fellow: Mark Huffman MD       Performed By: anesthesiologist and with residents       Procedure performed by resident/fellow/CRNA in presence of a teaching physician.    Preanesthesia Checklist:  Patient identified, IV assessed, risks and benefits discussed, monitors and equipment assessed, procedure being performed at surgeon's request and anesthesia consent obtained.    EMILY Probe Insertion    Probe Status PRE Insertion: NO obvious damage  Probe type:  Adult 2D  Bite block used:   None           Reason: Edentulous  Insertion Technique: Easy, no oropharyngeal manipulation  Insertion complications: None obvious  Billing Report:EMILY report by Anesthesiologist (See Separate Report note)  Probe Status POST Removal: NO obvious damage  Comments: Pre bypass- Dilated LV, Severely depressed LV function --EF- 10%, Mild MR, Mitral ring in situ, IABP in situ below the arch,  Mildly dilated RV, Mild-Moderate RV dysfunction, mild TR, No PFO/Aortic dissection    Post  Bypass- Dilated LV, Severely depressed LV function --EF- 10%,LVAD inflow and outflow cannula in position, laminar flow,   Mild MR, Mitral ring in situ, IABP in situ below the arch,  Mildly dilated RV, Mild-Moderate RV dysfunction, mild TR, No PFO per bubble study. No Aortic dissection.      EMILY Report  General Procedure Information  Fellow/Resident Interpretation: I personally reviewed the images and the fellow/resident interpretation.  I agree with the fellow/resident interpretation as amended by myself.   Images for this study have been archived.  Modalities: 2D, 3D, CW Doppler, PW Doppler and Color flow mapping  Diagnostic indications for EMILY:   Non-ischemic cardiomyopathy  Echocardiographic and Doppler Measurements  Right Ventricle:  Cavity size dilated.   Hypertrophy not present.   Thrombus not present.    Global function moderately impaired.      Left Ventricle:  Cavity size dilated.   Hypertrophy not present.   Global Function severely impaired.   Ejection Fraction 10%.      Ventricular Regional Function:  1- Basal Anteroseptal:  hypokinetic  2- Basal Anterior:  hypokinetic  3- Basal Anterolateral:  hypokinetic  4- Basal Inferolateral:  hypokinetic  5- Basal Inferior:  hypokinetic  6- Basal Inferoseptal:  hypokinetic  7- Mid Anteroseptal:  hypokinetic  8- Mid Anterior:  hypokinetic  9- Mid Anterolateral:  hypokinetic  10- Mid Inferolateral:  hypokinetic  11- Mid Inferior:  hypokinetic  12- Mid Inferoseptal:  hypokinetic  13- Apical Anterior:  hypokinetic  14- Apical Lateral:  hypokinetic  15- Apical Inferior:  hypokinetic  16- Apical Septal:  hypokinetic  17- Gray:  hypokinetic    Valves  Aortic Valve: Annulus normal.  Stenosis not present.  Regurgitation absent.  Leaflets normal.  Leaflet motions normal.    Mitral Valve: Annulus normal.  Stenosis not present.  Regurgitation +1.    Tricuspid Valve: Annulus normal.  Stenosis not present.  Regurgitation +2.  Leaflets normal.    Pulmonic Valve: Annulus normal.  Stenosis not present.  Regurgitation absent.      Aorta: Ascending Aorta: Size normal.  Dissection not present.  Plaque thickness less than 3 mm.  Mobile plaque not present.    Aortic Arch: Size normal.   Dissection not present.   Plaque thickness less than 3 mm.   Mobile plaque not present.    Descending Aorta: Size normal.   Dissection not present.   Plaque thickness less than 3 mm.   Mobile plaque not present.        Right Atrium:  Size normal.   Spontaneous echo contrast not present.   Thrombus not present.   Tumor not present.   Device not present.     Left Atrium: Size normal.  Spontaneous echo contrast not present.  Thrombus not present.  Tumor not present.  Device not present.    Left atrial appendage normal.     Atrial Septum: Intra-atrial septal morphology normal.     Ventricular Septum: Intra-ventricular septum morphology normal.      Diastolic Function Measurements:  Diastolic Dysfunction Grade= indeterminate. E= ms. A= ms. E/A Ratio= . DT=  ms.  S/D= .  IVRT= ms.  Other Findings:   Pericardium:  normal. Pleural Effusion:  none. Pulmonary Arteries:  normal. Pulmonary Venous Flow:  blunted (decreased) systolic flow. No coronary sinus catheter present.   Post Intervention Findings  Procedure(s) performed:  LVAD Placement. Global function:  Unchanged. Regional wall motion: Unchanged. Surgeon(s) notified of all postintervention findings: Yes. LVAD Placement:  LV decompressed.         Echocardiogram Comments

## 2022-03-23 NOTE — PROGRESS NOTES
[facility], Procedure Note           Intra-Aortic Balloon Pump Discontinuation:       Tej Morfin  MRN# [id number]   March 23, 2022, 4:02 PM             IABP discontinued: March 23, 2022, 4:02 PM   Removed by: ZABRINA Soliman N.P.   Catheter saved: No      Recorded by Jackie Florian

## 2022-03-23 NOTE — PLAN OF CARE
Pt s/p LVAD implantation with Dr. Rodríguez.     Major Shift Events: Pt back from OR around 1430. Immediately upon arrival, LVAD flows low ~ 2.5-2.8, MAPs 50s- MDs in room, pt given 1L plasmalyte. Flows improved. RASS -4, pt sedation lightened, followed commands, GARY. HR paced at 80 (pacer and defib reactivated per device nurse). MAP now 80s on levo/epi gtts. 500ml albumin given for low PI and CVP of 6. CI 2.6. Plan to keep Epi on overnight per Dr. Rodríguez. Flows now 3.8, speed 5200, PI 2.5-4.5. IABP removed per CV surgery, hemostasis achieved in 30 min. Vent CMV 12/450/5 40%. Both med/pleural CTs have air leaks, MDs aware. Adequate UOP. Insulin gtt initiated.     Plan: keep sedated/intubated overnight. Wean pressors as able, with exception of Epi kept on at least 0.01 per CV surg.       For vital signs and complete assessments, please see documentation flowsheets.               Problem: Device-Related Complication Risk (Intra-Aortic Balloon Pump)  Goal: Effective, Safe Device Function  Outcome: Met  Intervention: Prevent Peripheral Vascular Complications  Recent Flowsheet Documentation  Taken 3/23/2022 1800 by Mila Anguiano, RN  Head of Bed (HOB) Positioning: HOB not elevated due to postsurgical restriction  Taken 3/23/2022 1600 by Mila Anguiano, RN  Head of Bed (HOB) Positioning:   HOB flat   HOB not elevated due to medical condition   HOB not elevated due to instrumentation present

## 2022-03-23 NOTE — ANESTHESIA PROCEDURE NOTES
Airway         Procedure Start/Stop Times: 3/23/2022 7:49 AM  Staff -        Anesthesiologist:  María Kuhn MD       Resident/Fellow: Mark Huffman MD       Performed By: resident and fellow  Consent for Airway        Urgency: elective  Indications and Patient Condition       Indications for airway management: roge-procedural       Induction type:intravenous       Mask difficulty assessment: 2 - vent by mask + OA or adjuvant +/- NMBA    Final Airway Details       Final airway type: endotracheal airway       Successful airway: ETT - single  Endotracheal Airway Details        ETT size (mm): 8.0       Cuffed: yes       Successful intubation technique: video laryngoscopy       VL Blade Size: MAC 4       Grade View of Cords: 1 (Grade 3 view on DL - large floppy epiglottis)       Adjucts: stylet       Position: Right       Measured from: lips       Secured at (cm): 23    Post intubation assessment        Placement verified by: capnometry and chest rise        Number of attempts at approach: 3       Number of other approaches attempted: 0       Ease of procedure: easy (challenging DL with MAC 4 blade)       Dentition: Intact and Unchanged

## 2022-03-24 ENCOUNTER — APPOINTMENT (OUTPATIENT)
Dept: OCCUPATIONAL THERAPY | Facility: CLINIC | Age: 57
DRG: 001 | End: 2022-03-24
Attending: INTERNAL MEDICINE
Payer: MEDICARE

## 2022-03-24 ENCOUNTER — APPOINTMENT (OUTPATIENT)
Dept: GENERAL RADIOLOGY | Facility: CLINIC | Age: 57
DRG: 001 | End: 2022-03-24
Attending: ANESTHESIOLOGY
Payer: MEDICARE

## 2022-03-24 DIAGNOSIS — Z95.811 LVAD (LEFT VENTRICULAR ASSIST DEVICE) PRESENT (H): Primary | ICD-10-CM

## 2022-03-24 LAB
ALBUMIN SERPL-MCNC: 2.3 G/DL (ref 3.4–5)
ALP SERPL-CCNC: 42 U/L (ref 40–150)
ALT SERPL W P-5'-P-CCNC: 14 U/L (ref 0–70)
ANION GAP SERPL CALCULATED.3IONS-SCNC: 3 MMOL/L (ref 3–14)
ANION GAP SERPL CALCULATED.3IONS-SCNC: 4 MMOL/L (ref 3–14)
ANION GAP SERPL CALCULATED.3IONS-SCNC: 5 MMOL/L (ref 3–14)
AST SERPL W P-5'-P-CCNC: 48 U/L (ref 0–45)
ATRIAL RATE - MUSE: 67 BPM
ATRIAL RATE - MUSE: 78 BPM
BASE EXCESS BLDA CALC-SCNC: 4.2 MMOL/L (ref -9–1.8)
BASE EXCESS BLDA CALC-SCNC: 4.3 MMOL/L (ref -9–1.8)
BASE EXCESS BLDV CALC-SCNC: 3.7 MMOL/L (ref -7.7–1.9)
BASE EXCESS BLDV CALC-SCNC: 5.5 MMOL/L (ref -7.7–1.9)
BASE EXCESS BLDV CALC-SCNC: 5.6 MMOL/L (ref -7.7–1.9)
BASE EXCESS BLDV CALC-SCNC: 5.9 MMOL/L (ref -7.7–1.9)
BILIRUB SERPL-MCNC: 1 MG/DL (ref 0.2–1.3)
BUN SERPL-MCNC: 15 MG/DL (ref 7–30)
BUN SERPL-MCNC: 16 MG/DL (ref 7–30)
BUN SERPL-MCNC: 17 MG/DL (ref 7–30)
CA-I BLD-MCNC: 4.3 MG/DL (ref 4.4–5.2)
CALCIUM SERPL-MCNC: 7.6 MG/DL (ref 8.5–10.1)
CALCIUM SERPL-MCNC: 7.8 MG/DL (ref 8.5–10.1)
CALCIUM SERPL-MCNC: 8.2 MG/DL (ref 8.5–10.1)
CHLORIDE BLD-SCNC: 109 MMOL/L (ref 94–109)
CHLORIDE BLD-SCNC: 111 MMOL/L (ref 94–109)
CHLORIDE BLD-SCNC: 112 MMOL/L (ref 94–109)
CO2 SERPL-SCNC: 27 MMOL/L (ref 20–32)
CO2 SERPL-SCNC: 28 MMOL/L (ref 20–32)
CO2 SERPL-SCNC: 29 MMOL/L (ref 20–32)
CREAT SERPL-MCNC: 0.81 MG/DL (ref 0.66–1.25)
CREAT SERPL-MCNC: 0.82 MG/DL (ref 0.66–1.25)
CREAT SERPL-MCNC: 0.94 MG/DL (ref 0.66–1.25)
DIASTOLIC BLOOD PRESSURE - MUSE: NORMAL MMHG
DIASTOLIC BLOOD PRESSURE - MUSE: NORMAL MMHG
ERYTHROCYTE [DISTWIDTH] IN BLOOD BY AUTOMATED COUNT: 13.4 % (ref 10–15)
GFR SERPL CREATININE-BSD FRML MDRD: >90 ML/MIN/1.73M2
GLUCOSE BLD-MCNC: 116 MG/DL (ref 70–99)
GLUCOSE BLD-MCNC: 127 MG/DL (ref 70–99)
GLUCOSE BLD-MCNC: 131 MG/DL (ref 70–99)
GLUCOSE BLDC GLUCOMTR-MCNC: 106 MG/DL (ref 70–99)
GLUCOSE BLDC GLUCOMTR-MCNC: 114 MG/DL (ref 70–99)
GLUCOSE BLDC GLUCOMTR-MCNC: 121 MG/DL (ref 70–99)
GLUCOSE BLDC GLUCOMTR-MCNC: 135 MG/DL (ref 70–99)
GLUCOSE BLDC GLUCOMTR-MCNC: 95 MG/DL (ref 70–99)
HCO3 BLD-SCNC: 28 MMOL/L (ref 21–28)
HCO3 BLD-SCNC: 29 MMOL/L (ref 21–28)
HCO3 BLDV-SCNC: 29 MMOL/L (ref 21–28)
HCO3 BLDV-SCNC: 31 MMOL/L (ref 21–28)
HCT VFR BLD AUTO: 29.4 % (ref 40–53)
HGB BLD-MCNC: 9.4 G/DL (ref 13.3–17.7)
INR PPP: 1.33 (ref 0.85–1.15)
INTERPRETATION ECG - MUSE: NORMAL
INTERPRETATION ECG - MUSE: NORMAL
MAGNESIUM SERPL-MCNC: 2.2 MG/DL (ref 1.6–2.3)
MAGNESIUM SERPL-MCNC: 2.2 MG/DL (ref 1.6–2.3)
MCH RBC QN AUTO: 31.2 PG (ref 26.5–33)
MCHC RBC AUTO-ENTMCNC: 32 G/DL (ref 31.5–36.5)
MCV RBC AUTO: 98 FL (ref 78–100)
O2/TOTAL GAS SETTING VFR VENT: 3 %
O2/TOTAL GAS SETTING VFR VENT: 3 %
O2/TOTAL GAS SETTING VFR VENT: 40 %
OXYHGB MFR BLD: 97 % (ref 92–100)
OXYHGB MFR BLDV: 44 % (ref 70–75)
OXYHGB MFR BLDV: 51 % (ref 70–75)
OXYHGB MFR BLDV: 59 % (ref 70–75)
OXYHGB MFR BLDV: 60 % (ref 70–75)
P AXIS - MUSE: 149 DEGREES
P AXIS - MUSE: NORMAL DEGREES
PATH REPORT.COMMENTS IMP SPEC: NORMAL
PATH REPORT.COMMENTS IMP SPEC: NORMAL
PATH REPORT.FINAL DX SPEC: NORMAL
PATH REPORT.GROSS SPEC: NORMAL
PATH REPORT.MICROSCOPIC SPEC OTHER STN: NORMAL
PATH REPORT.RELEVANT HX SPEC: NORMAL
PCO2 BLD: 40 MM HG (ref 35–45)
PCO2 BLD: 42 MM HG (ref 35–45)
PCO2 BLDV: 45 MM HG (ref 40–50)
PCO2 BLDV: 47 MM HG (ref 40–50)
PCO2 BLDV: 47 MM HG (ref 40–50)
PCO2 BLDV: 48 MM HG (ref 40–50)
PH BLD: 7.45 [PH] (ref 7.35–7.45)
PH BLD: 7.46 [PH] (ref 7.35–7.45)
PH BLDV: 7.41 [PH] (ref 7.32–7.43)
PH BLDV: 7.42 [PH] (ref 7.32–7.43)
PH BLDV: 7.43 [PH] (ref 7.32–7.43)
PH BLDV: 7.43 [PH] (ref 7.32–7.43)
PHOSPHATE SERPL-MCNC: 2.6 MG/DL (ref 2.5–4.5)
PHOSPHATE SERPL-MCNC: 3.7 MG/DL (ref 2.5–4.5)
PHOTO IMAGE: NORMAL
PLATELET # BLD AUTO: 99 10E3/UL (ref 150–450)
PO2 BLD: 112 MM HG (ref 80–105)
PO2 BLD: 114 MM HG (ref 80–105)
PO2 BLDV: 27 MM HG (ref 25–47)
PO2 BLDV: 30 MM HG (ref 25–47)
PO2 BLDV: 33 MM HG (ref 25–47)
PO2 BLDV: 34 MM HG (ref 25–47)
POTASSIUM BLD-SCNC: 3.9 MMOL/L (ref 3.4–5.3)
POTASSIUM BLD-SCNC: 4.1 MMOL/L (ref 3.4–5.3)
POTASSIUM BLD-SCNC: 4.6 MMOL/L (ref 3.4–5.3)
PR INTERVAL - MUSE: 102 MS
PR INTERVAL - MUSE: NORMAL MS
PROT SERPL-MCNC: 4.9 G/DL (ref 6.8–8.8)
QRS DURATION - MUSE: 138 MS
QRS DURATION - MUSE: 140 MS
QT - MUSE: 482 MS
QT - MUSE: 484 MS
QTC - MUSE: 555 MS
QTC - MUSE: 575 MS
R AXIS - MUSE: -84 DEGREES
R AXIS - MUSE: 206 DEGREES
RBC # BLD AUTO: 3.01 10E6/UL (ref 4.4–5.9)
SODIUM SERPL-SCNC: 140 MMOL/L (ref 133–144)
SODIUM SERPL-SCNC: 144 MMOL/L (ref 133–144)
SODIUM SERPL-SCNC: 144 MMOL/L (ref 133–144)
SYSTOLIC BLOOD PRESSURE - MUSE: NORMAL MMHG
SYSTOLIC BLOOD PRESSURE - MUSE: NORMAL MMHG
T AXIS - MUSE: -20 DEGREES
T AXIS - MUSE: 102 DEGREES
VENTRICULAR RATE- MUSE: 80 BPM
VENTRICULAR RATE- MUSE: 85 BPM
WBC # BLD AUTO: 7.3 10E3/UL (ref 4–11)

## 2022-03-24 PROCEDURE — 99291 CRITICAL CARE FIRST HOUR: CPT | Mod: GC | Performed by: INTERNAL MEDICINE

## 2022-03-24 PROCEDURE — 84100 ASSAY OF PHOSPHORUS: CPT | Performed by: STUDENT IN AN ORGANIZED HEALTH CARE EDUCATION/TRAINING PROGRAM

## 2022-03-24 PROCEDURE — 200N000002 HC R&B ICU UMMC

## 2022-03-24 PROCEDURE — 999N000045 HC STATISTIC DAILY SWAN MONITORING

## 2022-03-24 PROCEDURE — 250N000009 HC RX 250: Performed by: INTERNAL MEDICINE

## 2022-03-24 PROCEDURE — 85027 COMPLETE CBC AUTOMATED: CPT | Performed by: STUDENT IN AN ORGANIZED HEALTH CARE EDUCATION/TRAINING PROGRAM

## 2022-03-24 PROCEDURE — 82040 ASSAY OF SERUM ALBUMIN: CPT | Performed by: STUDENT IN AN ORGANIZED HEALTH CARE EDUCATION/TRAINING PROGRAM

## 2022-03-24 PROCEDURE — 94003 VENT MGMT INPAT SUBQ DAY: CPT

## 2022-03-24 PROCEDURE — 82803 BLOOD GASES ANY COMBINATION: CPT | Performed by: STUDENT IN AN ORGANIZED HEALTH CARE EDUCATION/TRAINING PROGRAM

## 2022-03-24 PROCEDURE — 83735 ASSAY OF MAGNESIUM: CPT | Performed by: STUDENT IN AN ORGANIZED HEALTH CARE EDUCATION/TRAINING PROGRAM

## 2022-03-24 PROCEDURE — 85610 PROTHROMBIN TIME: CPT | Performed by: INTERNAL MEDICINE

## 2022-03-24 PROCEDURE — 80053 COMPREHEN METABOLIC PANEL: CPT | Performed by: STUDENT IN AN ORGANIZED HEALTH CARE EDUCATION/TRAINING PROGRAM

## 2022-03-24 PROCEDURE — 71045 X-RAY EXAM CHEST 1 VIEW: CPT

## 2022-03-24 PROCEDURE — 999N000253 HC STATISTIC WEANING TRIALS

## 2022-03-24 PROCEDURE — 250N000009 HC RX 250: Performed by: STUDENT IN AN ORGANIZED HEALTH CARE EDUCATION/TRAINING PROGRAM

## 2022-03-24 PROCEDURE — 93010 ELECTROCARDIOGRAM REPORT: CPT | Mod: 59 | Performed by: INTERNAL MEDICINE

## 2022-03-24 PROCEDURE — 97165 OT EVAL LOW COMPLEX 30 MIN: CPT | Mod: GO | Performed by: OCCUPATIONAL THERAPIST

## 2022-03-24 PROCEDURE — 82310 ASSAY OF CALCIUM: CPT | Performed by: STUDENT IN AN ORGANIZED HEALTH CARE EDUCATION/TRAINING PROGRAM

## 2022-03-24 PROCEDURE — 88307 TISSUE EXAM BY PATHOLOGIST: CPT | Mod: 26 | Performed by: PATHOLOGY

## 2022-03-24 PROCEDURE — 250N000013 HC RX MED GY IP 250 OP 250 PS 637: Performed by: STUDENT IN AN ORGANIZED HEALTH CARE EDUCATION/TRAINING PROGRAM

## 2022-03-24 PROCEDURE — 258N000003 HC RX IP 258 OP 636: Performed by: STUDENT IN AN ORGANIZED HEALTH CARE EDUCATION/TRAINING PROGRAM

## 2022-03-24 PROCEDURE — 999N000015 HC STATISTIC ARTERIAL MONITORING DAILY

## 2022-03-24 PROCEDURE — 71045 X-RAY EXAM CHEST 1 VIEW: CPT | Mod: 26 | Performed by: RADIOLOGY

## 2022-03-24 PROCEDURE — 999N000155 HC STATISTIC RAPCV CVP MONITORING

## 2022-03-24 PROCEDURE — 93005 ELECTROCARDIOGRAM TRACING: CPT

## 2022-03-24 PROCEDURE — 250N000011 HC RX IP 250 OP 636: Performed by: STUDENT IN AN ORGANIZED HEALTH CARE EDUCATION/TRAINING PROGRAM

## 2022-03-24 PROCEDURE — 99291 CRITICAL CARE FIRST HOUR: CPT | Performed by: INTERNAL MEDICINE

## 2022-03-24 PROCEDURE — P9041 ALBUMIN (HUMAN),5%, 50ML: HCPCS | Performed by: STUDENT IN AN ORGANIZED HEALTH CARE EDUCATION/TRAINING PROGRAM

## 2022-03-24 PROCEDURE — 82805 BLOOD GASES W/O2 SATURATION: CPT | Performed by: STUDENT IN AN ORGANIZED HEALTH CARE EDUCATION/TRAINING PROGRAM

## 2022-03-24 PROCEDURE — 999N000157 HC STATISTIC RCP TIME EA 10 MIN

## 2022-03-24 PROCEDURE — 999N000259 HC STATISTIC EXTUBATION

## 2022-03-24 PROCEDURE — 250N000011 HC RX IP 250 OP 636: Performed by: INTERNAL MEDICINE

## 2022-03-24 PROCEDURE — 258N000003 HC RX IP 258 OP 636: Performed by: INTERNAL MEDICINE

## 2022-03-24 PROCEDURE — 82330 ASSAY OF CALCIUM: CPT | Performed by: STUDENT IN AN ORGANIZED HEALTH CARE EDUCATION/TRAINING PROGRAM

## 2022-03-24 PROCEDURE — 250N000013 HC RX MED GY IP 250 OP 250 PS 637: Performed by: INTERNAL MEDICINE

## 2022-03-24 PROCEDURE — 97535 SELF CARE MNGMENT TRAINING: CPT | Mod: GO | Performed by: OCCUPATIONAL THERAPIST

## 2022-03-24 RX ORDER — DEXTROSE MONOHYDRATE 25 G/50ML
25-50 INJECTION, SOLUTION INTRAVENOUS
Status: DISCONTINUED | OUTPATIENT
Start: 2022-03-24 | End: 2022-04-08 | Stop reason: HOSPADM

## 2022-03-24 RX ORDER — NICOTINE POLACRILEX 4 MG
15-30 LOZENGE BUCCAL
Status: DISCONTINUED | OUTPATIENT
Start: 2022-03-24 | End: 2022-04-08 | Stop reason: HOSPADM

## 2022-03-24 RX ORDER — WARFARIN SODIUM 2.5 MG/1
2.5 TABLET ORAL
Status: COMPLETED | OUTPATIENT
Start: 2022-03-24 | End: 2022-03-24

## 2022-03-24 RX ORDER — ALBUMIN, HUMAN INJ 5% 5 %
12.5 SOLUTION INTRAVENOUS ONCE
Status: COMPLETED | OUTPATIENT
Start: 2022-03-24 | End: 2022-03-24

## 2022-03-24 RX ORDER — HYDRALAZINE HYDROCHLORIDE 25 MG/1
25 TABLET, FILM COATED ORAL EVERY 8 HOURS SCHEDULED
Status: DISCONTINUED | OUTPATIENT
Start: 2022-03-24 | End: 2022-03-25

## 2022-03-24 RX ADMIN — PROPOFOL 30 MCG/KG/MIN: 10 INJECTION, EMULSION INTRAVENOUS at 05:12

## 2022-03-24 RX ADMIN — HYDRALAZINE HYDROCHLORIDE 25 MG: 25 TABLET, FILM COATED ORAL at 22:18

## 2022-03-24 RX ADMIN — OXYCODONE HYDROCHLORIDE 5 MG: 5 TABLET ORAL at 20:41

## 2022-03-24 RX ADMIN — FLUCONAZOLE IN SODIUM CHLORIDE 200 MG: 2 INJECTION, SOLUTION INTRAVENOUS at 07:26

## 2022-03-24 RX ADMIN — OXYCODONE HYDROCHLORIDE 5 MG: 5 TABLET ORAL at 12:43

## 2022-03-24 RX ADMIN — SODIUM NITROPRUSSIDE 1.75 MCG/KG/MIN: 25 INJECTION, SOLUTION, CONCENTRATE INTRAVENOUS at 23:01

## 2022-03-24 RX ADMIN — MUPIROCIN 0.5 G: 20 OINTMENT TOPICAL at 07:37

## 2022-03-24 RX ADMIN — ACETAMINOPHEN 975 MG: 325 TABLET ORAL at 13:43

## 2022-03-24 RX ADMIN — HEPARIN SODIUM 5000 UNITS: 5000 INJECTION, SOLUTION INTRAVENOUS; SUBCUTANEOUS at 20:02

## 2022-03-24 RX ADMIN — VANCOMYCIN HYDROCHLORIDE 1500 MG: 10 INJECTION, POWDER, LYOPHILIZED, FOR SOLUTION INTRAVENOUS at 20:09

## 2022-03-24 RX ADMIN — HYDRALAZINE HYDROCHLORIDE 25 MG: 25 TABLET, FILM COATED ORAL at 15:55

## 2022-03-24 RX ADMIN — SENNOSIDES AND DOCUSATE SODIUM 1 TABLET: 8.6; 5 TABLET ORAL at 07:36

## 2022-03-24 RX ADMIN — Medication 40 MG: at 07:36

## 2022-03-24 RX ADMIN — FAMOTIDINE 20 MG: 20 TABLET ORAL at 09:35

## 2022-03-24 RX ADMIN — HYDROMORPHONE HYDROCHLORIDE 0.5 MG: 1 INJECTION, SOLUTION INTRAMUSCULAR; INTRAVENOUS; SUBCUTANEOUS at 14:45

## 2022-03-24 RX ADMIN — HEPARIN SODIUM 5000 UNITS: 5000 INJECTION, SOLUTION INTRAVENOUS; SUBCUTANEOUS at 12:13

## 2022-03-24 RX ADMIN — EPINEPHRINE 0.01 MCG/KG/MIN: 1 INJECTION PARENTERAL at 06:49

## 2022-03-24 RX ADMIN — SODIUM CHLORIDE, POTASSIUM CHLORIDE, SODIUM LACTATE AND CALCIUM CHLORIDE 500 ML: 600; 310; 30; 20 INJECTION, SOLUTION INTRAVENOUS at 13:43

## 2022-03-24 RX ADMIN — SODIUM NITROPRUSSIDE 0.25 MCG/KG/MIN: 25 INJECTION, SOLUTION, CONCENTRATE INTRAVENOUS at 11:58

## 2022-03-24 RX ADMIN — SODIUM CHLORIDE 600 MG: 9 INJECTION, SOLUTION INTRAVENOUS at 09:33

## 2022-03-24 RX ADMIN — OXYCODONE HYDROCHLORIDE 5 MG: 5 TABLET ORAL at 17:05

## 2022-03-24 RX ADMIN — MUPIROCIN 0.5 G: 20 OINTMENT TOPICAL at 20:02

## 2022-03-24 RX ADMIN — ACETAMINOPHEN 975 MG: 325 TABLET ORAL at 06:32

## 2022-03-24 RX ADMIN — LEVOFLOXACIN 500 MG: 5 INJECTION, SOLUTION INTRAVENOUS at 08:29

## 2022-03-24 RX ADMIN — DOBUTAMINE IN DEXTROSE 2.5 MCG/KG/MIN: 200 INJECTION, SOLUTION INTRAVENOUS at 06:49

## 2022-03-24 RX ADMIN — VANCOMYCIN HYDROCHLORIDE 1500 MG: 10 INJECTION, POWDER, LYOPHILIZED, FOR SOLUTION INTRAVENOUS at 07:27

## 2022-03-24 RX ADMIN — ALBUMIN HUMAN 12.5 G: 0.05 INJECTION, SOLUTION INTRAVENOUS at 06:32

## 2022-03-24 RX ADMIN — WARFARIN SODIUM 2.5 MG: 2.5 TABLET ORAL at 17:58

## 2022-03-24 RX ADMIN — CALCIUM GLUCONATE 1 G: 20 INJECTION, SOLUTION INTRAVENOUS at 05:24

## 2022-03-24 RX ADMIN — FAMOTIDINE 20 MG: 20 TABLET ORAL at 20:02

## 2022-03-24 RX ADMIN — ACETAMINOPHEN 975 MG: 325 TABLET ORAL at 22:17

## 2022-03-24 RX ADMIN — OXYCODONE HYDROCHLORIDE 10 MG: 10 TABLET ORAL at 08:28

## 2022-03-24 RX ADMIN — SENNOSIDES AND DOCUSATE SODIUM 1 TABLET: 8.6; 5 TABLET ORAL at 20:09

## 2022-03-24 RX ADMIN — POLYETHYLENE GLYCOL 3350 17 G: 17 POWDER, FOR SOLUTION ORAL at 07:36

## 2022-03-24 ASSESSMENT — ACTIVITIES OF DAILY LIVING (ADL)
ADLS_ACUITY_SCORE: 13
ADLS_ACUITY_SCORE: 14
ADLS_ACUITY_SCORE: 13
ADLS_ACUITY_SCORE: 10
ADLS_ACUITY_SCORE: 14
ADLS_ACUITY_SCORE: 13
ADLS_ACUITY_SCORE: 14
ADLS_ACUITY_SCORE: 14
ADLS_ACUITY_SCORE: 13
ADLS_ACUITY_SCORE: 10
ADLS_ACUITY_SCORE: 13
ADLS_ACUITY_SCORE: 13
ADLS_ACUITY_SCORE: 14
ADLS_ACUITY_SCORE: 13
ADLS_ACUITY_SCORE: 14
ADLS_ACUITY_SCORE: 10
ADLS_ACUITY_SCORE: 14
ADLS_ACUITY_SCORE: 14
ADLS_ACUITY_SCORE: 13
ADLS_ACUITY_SCORE: 14
ADLS_ACUITY_SCORE: 13

## 2022-03-24 NOTE — PROGRESS NOTES
CV ICU PROGRESS NOTE  March 24, 2022      CO-MORBIDITIES:   LVAD (left ventricular assist device) present (H)  (primary encounter diagnosis)  Chronic systolic congestive heart failure (H)      ASSESSMENT: Tej Morfin is a 56 year old male with NICM secondary to valvulopathy severe MR s/p MV ring repair, now on home inotrope (Dobutamine 2.5), sustained VT s/p secondary prevention ICD, CKD stage II (baseline 1.5) who presents for LVAD placement with heartmate 3 by Dr. Rodríguez on 3/23/22     TODAY'S PROGRESS:   - plan to extubate  - nitroprusside for HTN  - continue dobutamine  - split chest tubes     PLAN:  Neuro/ pain/ sedation:  Acute Postoperative pain  - Monitor neurological status. Notify the MD for any acute changes in exam.  - Pain: fentanyl gtt. Scheduled tylenol and gabapentin. PRN tylenol, oxycodone, robaxin  - Sedation: propofol gtt     Pulmonary care:   Postoperative ventilation management  - Intubated, ventilated  - Titrate supplemental oxygen to maintain saturation above 92%.  - Pulmonary hygiene: Incentive spirometer every 15- 30 minutes when awake, flutter valve, C&DB     Cardiovascular:    S/p LVAD placement (HeartMate 3) on 3/23/2022 by Dr. Rodríguez  History of NICM, MR with VT and permanent defib  Mild MR, moderate RV dysfunction on EMILY  #balloon pump - removed  Recent echo on 7/2020 with LVEF of 20%  - Monitor hemodynamic status.   - PTA ASA, dobutamine infusion, entresto, sotalol, hydralazine  - Goal MAP 65-85  - Epi, norepi, vaso gtt; wean as tolerated  - Immunosuppression per surgery       GI care/ Nutrition:   - PPI  - Continue bowel regimen: miralax, senna    Renal/ Fluid Balance/ Electrolytes:   BL creat appears to be ~ 0.9  - Strict I/O, daily weights  - Avoid/limit nephrotoxins as able  - Replete lytes PRN per protocol  PTA lasix    Will talk with cards for possible lasix today     Endocrine:    Stress induced hyperglycemia  Preop A1c 5.9  - hdSSI  - Goal BG <180 for optimal  healing     ID/ Antibiotics:  Stress induced leukocytosis  - To complete perioperative regimen  - Continue to monitor fever curve, WBC and inflammatory markers as appropriate     Heme:     Stress induced leukocytosis  Acute blood loss anemia  Acute blood loss thrombocytopenia  No s/sx active bleeding  - Continue to monitor  - CBC      MSK/ Skin:  Sternotomy  Surgical Incision  - Sternal precautions  - Postoperative incision management per protocol  - PT/OT/CR     Prophylaxis:    - Mechanical prophylaxis for DVT  - Chemical DVT prophylaxis - start subcutaneous heparin tomorrow  - PPI      Lines/ tubes/ drains:  - Arterial Line, ETT, CTs, PA catheter, RIJ, scott, OG     Disposition:  - CVICU     Patient seen, findings and plan discussed with CVICU staff     Discussed with CVTS staff and fellow     Helder Chaudhari DO CA-2  Department of Anesthesiology  [phone]      ====================================    SUBJECTIVE:   Remains intubated but is calm with decreased sedation    OBJECTIVE:   1. VITAL SIGNS:   Temp:  [99.2  F (37.3  C)-100.6  F (38.1  C)] 99.2  F (37.3  C)  Pulse:  [79-97] 90  Resp:  [9-31] 31  MAP:  [66 mmHg-95 mmHg] 72 mmHg  Arterial Line BP: ()/(45-84) 77/62  FiO2 (%):  [40 %-100 %] 40 %  SpO2:  [92 %-100 %] 92 %  Vent Mode: (S) CPAP/PS  (Continuous positive airway pressure with Pressure Support)  FiO2 (%): 40 %  Resp Rate (Set): 12 breaths/min  Tidal Volume (Set, mL): 450 mL  PEEP (cm H2O): 5 cmH2O  Pressure Support (cm H2O): 7 cmH2O  Resp: (!) 31      2. INTAKE/ OUTPUT:   I/O last 3 completed shifts:  In: 7883.78 [I.V.:4638.78; Other:565; NG/GT:180; IV Piggyback:1750]  Out: 2199 [Urine:1209; Chest Tube:990]    3. PHYSICAL EXAMINATION:   General: sedated and intubated  Neuro: sedated but responds to voice  Resp: mechanically ventilated  CV: soft heart sounds, hum of VAD  Abdomen: Soft, Non-distended, Non-tender  Incisions: c/d/i  Extremities: warm and well perfused    4. INVESTIGATIONS:    Arterial Blood Gases   Recent Labs   Lab 03/24/22  1104 03/24/22  0353 03/23/22  1809 03/23/22  1655   PH 7.46* 7.45 7.43 7.54*   PCO2 40 42 40 30*   PO2 114* 112* 122* 152*   HCO3 28 29* 27 25     Complete Blood Count   Recent Labs   Lab 03/24/22  0354 03/23/22  1809 03/23/22  1433 03/23/22  1302 03/23/22  1221 03/23/22  1220   WBC 7.3 8.4 11.5*  --   --  10.5   HGB 9.4* 9.4* 9.0* 10.0*   < > 7.9*   PLT 99* 112* 109*  --   --  110*    < > = values in this interval not displayed.     Basic Metabolic Panel  Recent Labs   Lab 03/24/22  1210 03/24/22  0746 03/24/22  0357 03/24/22  0354 03/23/22  1443 03/23/22  1433 03/23/22  1302 03/23/22  1221 03/23/22  0358 03/23/22  0347 03/22/22  1840 03/22/22  1343   NA  --   --   --  144  --  146* 142 142   < > 145*  --  147*   POTASSIUM  --   --   --  4.6  --  3.7  3.7 3.9 3.9   < > 3.1*  --  3.5   CHLORIDE  --   --   --  111*  --  116*  --   --   --  108  --  111*   CO2  --   --   --  28  --  23  --   --   --  32  --  32   BUN  --   --   --  15  --  9  --   --   --  11  --  10   CR  --   --   --  0.94  --  0.84  --   --   --  0.93  --  0.99   * 106* 121* 131*   < > 137* 143* 157*   < > 121*   < > 82    < > = values in this interval not displayed.     Liver Function Tests  Recent Labs   Lab 03/24/22  1104 03/24/22  0354 03/23/22  1433 03/23/22  1220 03/23/22  0347 03/22/22  1343   AST  --  48* 30  --  13 12   ALT  --  14 8  --  14 16   ALKPHOS  --  42 35*  --  63 67   BILITOTAL  --  1.0 1.4*  --  0.4 0.4   ALBUMIN  --  2.3* 1.3*  --  2.3* 2.6*   INR 1.33*  --  1.61* 1.72* 1.11 1.05     Pancreatic Enzymes  No lab results found in last 7 days.  Coagulation Profile  Recent Labs   Lab 03/24/22  1104 03/23/22  1433 03/23/22  1220 03/23/22  0347   INR 1.33* 1.61* 1.72* 1.11   PTT  --  39* 27 34         5. RADIOLOGY:   Recent Results (from the past 24 hour(s))   XR Abdomen Port 1 View    Narrative    EXAM: XR ABDOMEN PORT 1 VIEWS  3/23/2022 2:58 PM     HISTORY:  OG tube        COMPARISON:  CT chest abdomen and pelvis 3/3/2022    FINDINGS:   Portable supine AP view of the abdomen. Enteric tube with tip in  sidehole projecting in the stomach. LVAD noted. Postoperative changes  of the chest with multiple partially visualized chest tubes.    No pneumatosis or portal venous gas within the field of view.      Impression    IMPRESSION: Gastric tube with tip and sidehole projecting in stomach.     I have personally reviewed the examination and initial interpretation  and I agree with the findings.    HUY AVELAR MD         SYSTEM ID:  [id number]   XR Chest Port 1 View    Narrative    EXAM: XR CHEST PORT 1 VIEW  3/23/2022 2:59 PM    HISTORY: 56-year-old male with nonischemic cardiomyopathy secondary to  severe mitral regurgitation status post repair 2/7/2017 and LVAD  placement 3/23/2022.    COMPARISON: Same day chest radiograph.    TECHNIQUE: Portable frontal view of the chest.    FINDINGS:   Postsurgical chest with new LVAD device, intact median sternotomy  wires, and heart valve prosthesis. Endotracheal tube tip is at the mid  thoracic trachea. Right arm PICC tip is at the superior cavoatrial  junction. Left chest wall pacemaker with intact lead tips projecting  over the right atrium and right ventricle. Right IJ Bent-Liudmila catheter  tip is in the distal right pulmonary artery. Mediastinal drains.  Atrial appendage clip. Enteric tube tip and sidehole terminate in the  stomach.    Midline trachea. Stable cardiomediastinal silhouette. Indistinct  pulmonary vasculature. No significant right-sided pleural effusion,  left side is obscured by the LVAD device. No discernible pneumothorax.  Slightly increased perihilar and retrocardiac streaky opacities.  Unremarkable upper abdomen. No acute osseous abnormalities.      Impression    IMPRESSION:   1.  Postsurgical chest with new LVAD device. Support devices as  described above.  2.  Slightly increased perihilar predominant atelectasis versus  edema.    JAYA FOSTER DO    I have personally reviewed the examination and initial interpretation  and I agree with the findings.    CARLA PAREDES MD         SYSTEM ID:  [id number]   Cardiac Device Check - Inpatient   Result Value    Date Time Interrogation Session [id number]    Implantable Pulse Generator  [employer]    Implantable Pulse Generator Model G148 INOGEN X4 CRT-D    Implantable Pulse Generator Serial Number [id number]    Type Interrogation Session In Clinic    Clinic Name [facility]    Implantable Pulse Generator Type Cardiac Resynchronization Therapy - Defibrillator    Implantable Pulse Generator Implant Date 20160108    Implantable Lead  Guidant    Implantable Lead Model 4136 Dextrus    Implantable Lead Serial Number [id number]    Implantable Lead Implant Date 20160108    Implantable Lead Polarity Type Bipolar Lead    Implantable Lead Location Detail 1 UNKNOWN    Implantable Lead Location Right Atrium    Implantable Lead  [employer]    Implantable Lead Model 4598 Attain Performa S    Implantable Lead Serial Number [id number]    Implantable Lead Implant Date 20160108    Implantable Lead Polarity Type Quadripolar Lead    Implantable Lead Location Detail 1 UNKNOWN    Implantable Lead Location Left Ventricle    Implantable Lead  [employer]    Implantable Lead Model 0295 Reliance 4-Site G    Implantable Lead Serial Number [id number]    Implantable Lead Implant Date 20150202    Implantable Lead Polarity Type Tripolar Lead    Implantable Lead Location Detail 1 UNKNOWN    Implantable Lead Location Right Ventricle    Joshua Setting Mode (NBG Code) DDDR    Joshua Setting Lower Rate Limit 80    Joshua Setting Maximum Tracking Rate 130    Joshua Setting Maximum Sensor Rate 120    Joshua Setting ASHWIN Delay Low 110    Joshua Setting PAV Delay Low 110    Joshua Setting AT Mode Switch Rate 170    Joshua Setting AT Mode Switch Mode DDI     Lead Channel Setting Sensing Polarity Bipolar    Lead Channel Setting Sensing Sensitivity 0.25    Lead Channel Setting Sensing Adaptation Mode Adaptive    Lead Channel Setting Sensing Polarity Bipolar    Lead Channel Setting Sensing Sensitivity 0.6    Lead Channel Setting Sensing Adaptation Mode Adaptive    Lead Channel Setting Sensing Anode Location Left Ventricle    Lead Channel Setting Sensing Anode Terminal Ring2    Lead Channel Setting Sensing Cathode Location Left Ventricle    Lead Channel Setting Sensing Cathode Terminal Tip    Lead Channel Setting Sensing Sensitivity 1.0    Lead Channel Setting Sensing Adaptation Mode Adaptive    Ventricular chambers paced during CRT pacing. BiV    CRT LV-RV Delay 80    Lead Channel Setting Pacing Polarity Bipolar    Lead Channel Setting Pacing Pulse Width 0.5    Lead Channel Setting Pacing Amplitude 1.5    Lead Channel Setting Pacing Polarity Bipolar    Lead Channel Setting Pacing Pulse Width 0.5    Lead Channel Setting Pacing Amplitude 2.5    Lead Channel Setting Pacing Anode Location Left Ventricle    Lead Channel Setting Pacing Anode Terminal Ring2    Lead Channel Setting Sensing Cathode Location Left Ventricle    Lead Channel Setting Sensing Cathode Terminal Tip    Lead Channel Setting Pacing Pulse Width 1.0    Lead Channel Setting Pacing Amplitude 3.4    Zone Setting Type Category VF    Zone Setting Vendor Type Category VF    Zone Setting Detection Interval 300    Zone Setting Type Category VT    Zone Setting Vendor Type Category VT    Zone Setting Detection Interval 353    Lead Channel Impedance Value 511    Lead Channel Pacing Threshold Amplitude 2.4    Lead Channel Pacing Threshold Pulse Width 1.0    Lead Channel Impedance Value 452    Lead Channel Pacing Threshold Amplitude 0.4    Lead Channel Pacing Threshold Pulse Width 0.5    Lead Channel Impedance Value 332    Lead Channel Pacing Threshold Amplitude 1.0    Lead Channel Pacing Threshold Pulse Width 0.5     Battery Date Time of Measurements [id number]    Battery Status Beginning of Service    Battery Remaining Longevity 24    Battery Remaining Percentage 38    Capacitor Charge Type Reformation    Capacitor Last Charge Date Time 04509717190626    Capacitor Charge Time 11.7    Capacitor Charge Type Shock    Capacitor Last Charge Date Time [id number]    Capacitor Charge Time 6.3    Capacitor Charge Energy 31    Joshua Statistic Date Time Start 20220118000000    Joshua Statistic Date Time End 20220323000000    Joshua Statistic RA Percent Paced 90    Joshua Statistic RV Percent Paced 94    CRT Statistic LV Percent Paced 97    CRT Statistic Date Time Start 20220118000000    CRT Statistic Date Time End 20220323000000    Atrial Tachy Statistic Date Time Start 20220119000000    Atrial Tachy Statistic Date Time End 20220323000000    Atrial Tachy Statistic AT/AF [place] Percent 0    Therapy Statistic Recent Shocks Delivered 0    Therapy Statistic Recent Shocks Aborted 0    Therapy Statistic Recent ATP Delivered 0    Therapy Statistic Recent Date Time Start 20220118000000    Therapy Statistic Recent Date Time End 20220323000000    Therapy Statistic Total Shocks Delivered 1    Therapy Statistic Total Shocks Aborted 1    Therapy Statistic Total ATP Delivered 2    Therapy Statistic Total  Date Time Start 20160108000000    Therapy Statistic Total  Date Time End 20220323000000    Episode Statistic Recent Count 0    Episode Statistic Type Category AT/AF    Episode Statistic Vendor Type Category AF    Episode Statistic Recent Count 1    Episode Statistic Type Category Other    Episode Statistic Recent Count 6    Episode Statistic Type Category VT    Episode Statistic Vendor Type Category NSVT    Episode Statistic Recent Count 0    Episode Statistic Type Category VF    Episode Statistic Vendor Type Category VF    Episode Statistic Recent Count 0    Episode Statistic Type Category VT    Episode Statistic Vendor Type Category VT     Episode Statistic Recent Count 0    Episode Statistic Type Category VT    Episode Statistic Vendor Type Category VT-1    Episode Statistic Recent Date Time Start 20220118000000    Episode Statistic Recent Date Time End 20220323000000    Episode Statistic Recent Date Time Start 20220118000000    Episode Statistic Recent Date Time End 20220323000000    Episode Statistic Recent Date Time Start 20220118000000    Episode Statistic Recent Date Time End 20220323000000    Episode Statistic Recent Date Time Start 20220118000000    Episode Statistic Recent Date Time End 20220323000000    Episode Statistic Recent Date Time Start 20220118000000    Episode Statistic Recent Date Time End 20220323000000    Episode Statistic Recent Date Time Start 20220118000000    Episode Statistic Recent Date Time End 20220323000000    Narrative    Patient seen on 4E s/p LVAD implant for evaluation and iterative   programming of their ICD per MD orders.     Device: [place] G148 INOGEN X4 CRT-D  Normal device function  Mode: DDDR /120 bpm  AP: 90%  : 94%  BVP: 97%  Intrinsic rhythm: NSR @ 75 bpm  LV threshold is elevated post-operatively. LV threshold = 2.8 V @ 0.5 ms   and 2.4 V @ 1 ms. Unable to obtain LV sensing.  Estimated battery longevity to RRT = 2 years  Setting Changes: ICD tachy therapies programmed back on. Pacing mode   programmed from DOO 80 bpm to DDDR /120 bpm. LV amplitude increased   from 1.5 V to 3.4 V. LV pulse width programmed from 0.5 ms to 1 ms.  Dr. Rodríguez notified of elevated LV threshold.  CARLA Ley, RN    Multi lead ICD    I have reviewed and interpreted the device interrogation, settings,   programming and nurse's summary. The device is functioning within normal   device parameters. I agree with the current findings, assessment and plan.   XR Chest Port 1 View    Narrative    EXAM: XR CHEST PORT 1 VIEW  3/24/2022 1:16 AM     HISTORY:  s/p LVAD placement       COMPARISON:  3/23/2022    TECHNIQUE:  Single frontal radiograph of the chest    FINDINGS:   Endotracheal tube, right IJ central venous catheter tip, LVAD, cardiac  defibrillator, mediastinal drains and postsurgical chest are stable.    Midline trachea. Stable cardiomediastinal silhouette. No pleural  effusion or appreciable pneumothorax.  Unchanged retrocardiac opacity.      Impression    IMPRESSION: Stable postsurgical chest, support devices. Unchanged  retrocardiac opacity.    I have personally reviewed the examination and initial interpretation  and I agree with the findings.    ANDRZEJ CM MD         SYSTEM ID:  [id number]       =========================================

## 2022-03-24 NOTE — PROGRESS NOTES
Patient suctioned and electively extubated per physician order. Placed on LFNC @ 2 LPM, breath sounds were clear, diminished, labs pending. Patient tolerated procedure well without any immediate complications.    Amanda Greenwood, SHONA, RT  3/24/2022 12:29 PM

## 2022-03-24 NOTE — OP NOTE
Procedure Date: 03/23/2022    PREOPERATIVE DIAGNOSES:     1.  Cardiogenic shock secondary to dilated cardiomyopathy.  2.  Severe mitral regurgitation, status post mitral valve repair.  3.  Cardiogenic shock, on IV inotropes and intraaortic balloon pump support.  4.  Sustained ventricular tachycardia, status post automatic implantable cardioverter-defibrillator.     POSTOPERATIVE DIAGNOSES:    1.  Cardiogenic shock secondary to dilated cardiomyopathy.  2.  Severe mitral regurgitation, status post mitral valve repair.  3.  Cardiogenic shock, on IV inotropes and intraaortic balloon pump support.  4.  Sustained ventricular tachycardia, status post automatic implantable cardioverter-defibrillator.    PROCEDURE:    1.  Redo median sternotomy.  2.  Placement of HeartMate 3 left ventricular assist device (intracorporeal left ventricular assist device).         SURGEON: Wilver Rodríguez MD  ASST; Harpal GÓMEZ MD      OPERATIVE INDICATIONS:  The patient is a 56-year-old gentleman with severe nonischemic cardiomyopathy secondary to valvular disease with severe mitral regurgitation, status post mitral valve repair, now on home inotropes for ambulatory cardiogenic shock.  I discussed risks and benefits of HeartMate 3 LVAD placement with the patient's family including risk of death, stroke, bleeding, wound infection, renal failure, and arrhythmias.  They consent to proceed with surgery.    OPERATIVE FINDINGS:  The patient's sternum was of adequate quality.  Pericardial space had moderate adhesions.  There was severe left ventricular dysfunction.  There was no clot in the apex of the left ventricle.  There was no aortic regurgitation, tricuspid regurgitation, or patent foramen ovale.    DESCRIPTION OF PROCEDURE:  The patient was brought to the operating room in stable condition, underwent general anesthesia.  The patient's chest, abdomen, lower extremities were prepped and draped in usual manner.  Redo median sternotomy was performed  with the oscillating saw.  The adhesions were carefully dissected to expose the aorta in the right atrium.  Preparation of cardiopulmonary bypass included ACT-guided heparinization and admission of [place].  Aorta was scanned with a 20-Uzbek arterial cannula via 3-0 Tevdek pursestring pledgeted suture x2.  Dual stage venous cannula was inserted in the right atrium.  Full cardiopulmonary bypass was initiated.   The rest of heart was carefully dissected out.  [place] of the heart was exposed.  Inflow cannula was sewn onto the anterolateral surface of the heart in the usual standard fashion.  Coring knife was used to core a portion the apex of left ventricle.  Inflow cannula was inserted into the heart and secured adequately.  Driveline was tunneled out to right upper quadrant incision.  Outflow graft was measured and sewn to a longitudinal aortotomy using continuous 4-0 Prolene sutures.  De-airing maneuvers were performed in usual fashion, confirmed by echography.  The patient was gradually weaned off cardiopulmonary bypass and the LVAD pump was initiated with adequate inotropic and intraaortic balloon pump support.  Initial hemodynamics were stable.  Echo showed mild to moderate LV decompression, mild to moderate RV dysfunction, with no patent foramen ovale.  Protamine was given.  All cannulas removed.  Careful hemostasis was obtained.  Two anterior mediastinal chest tubes, posterior Ramiro drain, bilateral pleural tubes were placed.  A Smoot-Zander graft was used to cover the operative portion of the graft and a portion of the driveline.  Sternum was closed with surgical steel wires.  Fascia, subcutaneous tissue, skin of chest were closed in layers.  The patient transferred to ICU in stable critical condition.    Wilver Rodríguez MD        D: 2022   T: 2022   MT: FILIBERTO/SHOBHA    Name:     NATASHA HARVEY  MRN:      [id number]        Account:        [id number]   :      1965           Procedure Date:  03/23/2022     Document: [id number]    cc:  Dunia Hdz MD

## 2022-03-24 NOTE — PLAN OF CARE
Major Shift Events:  Low UOP throughout shift. Reported same to Dr. Cobb; orders received for 500mL LR bolus and then 250mL 5% albumin. Minimal improvement from same. No other major events this shift. Remains intubated, sedated, hemodynamically stable.  Plan: Continue to monitor. Potential for PST and extubation later today.  For vital signs and complete assessments, please see documentation flowsheets.

## 2022-03-24 NOTE — PHARMACY-ADMISSION MEDICATION HISTORY
Admission Medication History Completed by Pharmacy    See [facility] Admission Navigator for allergy information, preferred outpatient pharmacy, prior to admission medications and immunization status.     Medication History Sources:     Dispense History, Chart Review, Care Everywhere, last doses per pre-op RN     Changes made to PTA medication list (reason):    Added: fexofenadine, furosemide, hydralazine     Deleted: hydrocodone-acetaminophen (not taking, last filled 5/2021 #20), cyclobenzaprine (no recent fills)     Changed: Entresto  mg taking 1/2 tablet BID, potassium 20 mEq BID to 40 mEq (2 tablets) once daily, added rate to dobutamine sig (2.5 mcg/kg/min)         Additional Information:     Per Care Everywhere, patient prescribed to take Entresto 49-51 mg twice daily. Per fill history, it would appear patient is taking 1/2 tablets of the  mg tablets to achieve close to his new dose of 49-51 mg.      Medications recently discontinued last hospitalization (2/18-2/24): carvedilol 12.5 BID, hydralazine 25 mg TID, spironolactone 25/day, dapagliflozin 10 mg/day, Entresto  mg BID- decreased to 49-51 mg BID, potassium discontinued (however per last doses patient reported taking pre-op).     Prior to Admission medications    Medication Sig Last Dose Taking? Auth Provider   aspirin (ASA) 81 MG EC tablet Take 81 mg by mouth 3/16/2022 at Unknown time Yes Reported, Patient   diazepam (VALIUM) 10 MG tablet  Last filled 3/15/22 #30   Take 10 mg by mouth every 4 hours as needed for anxiety Past Week at Unknown time Yes Reported, Patient   DOBUTamine 500 mg in dextrose 5% 250 mL (adult std conc) premix Inject 211.5 mcg/min into the vein continuously (2.5 mcg/kg/min x84.6 kg) 3/22/2022 Yes Reported, Patient   fexofenadine (ALLEGRA) 180 MG tablet  Last filled 2/8/22 #30   Take 180 mg by mouth daily  Yes Unknown, Entered By History   fluticasone (FLONASE) 50 MCG/ACT nasal spray  Last filled 2/8/22 #32g   Spray 1  spray into both nostrils 2 times daily as needed  More than a month at Unknown time Yes Reported, Patient   furosemide (LASIX) 40 MG tablet  Last filled 2/1/22 #180 Take 40 mg by mouth 2 times daily  Yes Unknown, Entered By History   hydrALAZINE (APRESOLINE) 25 MG tablet  Last filled 1/14/22 #90   Take 25 mg by mouth 3 times daily  Yes Unknown, Entered By History   metoclopramide (REGLAN) 10 MG tablet  Last filled 2/3/22 #63   Take 10 mg by mouth 3 times daily For 3 weeks then off 1 week 3/21/2022 at Unknown time Yes Reported, Patient   omeprazole (PRILOSEC) 40 MG DR capsule  Last filled 2/21/22 #90   Take 40 mg by mouth every morning 3/22/2022 at 0800 Yes Reported, Patient   potassium chloride ER (KLOR-CON M) 20 MEQ CR tablet  Last filled 2/21/22 #180    Take 40 mEq by mouth once daily 3/22/2022 at 0800 Yes Reported, Patient   sacubitril-valsartan (ENTRESTO)  MG per tablet  Last filled 9/2/21 #60   Take 1 tablet by mouth 2 times daily     **taking 1/2 tablet BID  3/22/2022 at 0800 Yes Reported, Patient   sotalol (BETAPACE) 80 MG tablet  Last filled 2/17/22 #90 TAKE 1 TABLET BY MOUTH TWICE A DAY 3/22/2022 at 0800 Yes Reported, Patient       Date completed: 03/24/22    Medication history completed by: Telma Nina, PharmD

## 2022-03-24 NOTE — PLAN OF CARE
Major Shift Events:     Sedation turned off this morning. Pt following commands; extubated around 1200. A&O x4. C/o moderate/severe chest pain- PRN oxy/dilaudid given with some relief. HR 80-90s, SR w/ frequent PVCs/short runs of VT. On 2-3L NC. Fair, productive cough. Using IS w/ encouragement. Tolerating PO liquids. UOP marginal ~ 15-30ml/hr. 500ml LR bolus given, no change to UOP following. CVP 7-11, PA 35-40/15, PAWP 14-15, Svo2 44-60, CI 1.8-2.4. Dobutamine and epi gtt stopped per CVTS/Cards II teams. Nipride initiated, MAP goal 75-85. LVAD speed increased to 5300, PIs 3-5, flows 3.8-4. Initiated subQ heparin.     Plan: continue to trend hemodynamics, CVP goal 10-12. PT/OT as able. Nipride gtt for MAP 75-85. Start warfarin.       For vital signs and complete assessments, please see documentation flowsheets.     Problem: Activity Intolerance (Cardiovascular Surgery)  Goal: Improved Activity Tolerance  Outcome: Ongoing, Progressing     Problem: Adjustment to Surgery (Cardiovascular Surgery)  Goal: Optimal Coping with Heart Surgery  Outcome: Ongoing, Progressing

## 2022-03-24 NOTE — PROGRESS NOTES
"   03/24/22 1411   Quick Adds   Type of Visit Initial Occupational Therapy Evaluation  (Cardiac Rehab Evaluation)   Living Environment   People in Home spouse;child(lizeth), dependent   Current Living Arrangements apartment   Home Accessibility no concerns   Transportation Anticipated car, drives self;family or friend will provide   Living Environment Comments Pt lives with his wife and daughter in a first floor apartment w/ no access concerns. Pt has bathtub w/ grab bars, no shower chair.   Self-Care   Usual Activity Tolerance poor   Current Activity Tolerance moderate   Regular Exercise No   Equipment Currently Used at Home grab bar, tub/shower   Fall history within last six months no   Activity/Exercise/Self-Care Comment Pt was IND w/ ADLs at baseline. Pt has not had regular exercise routine recently, but liked walking on treadmill in the past.   Instrumental Activities of Daily Living (IADL)   Previous Responsibilities housekeeping;medication management;finances;driving;meal prep   IADL Comments Pt's wife performed majority of IADLs due to pt w/ limited activity tolerance/fatigued easily. Pt is able to complete IADLs IND, with extra time. Pt's wife and daughter will provide assist.    General Information   Onset of Illness/Injury or Date of Surgery 03/22/22   Referring Physician Harpal Armstrong MD   Patient/Family Therapy Goal Statement (OT) To have more energy. To be able to walk outdoors w/ family.   Additional Occupational Profile Info/Pertinent History of Current Problem Per chart: \"Tej Morfin is a 56 year old male admitted on 3/22/2022. He has history of NICM secondary to severe MR s/p MV ring repair (2015), now on home inotrope (Dobutamine 2.5), sustained VT s/p secondary prevention ICD, who presents for scheduled LVAD placement. Now status post HM3 on 3/23/2022.\"   Existing Precautions/Restrictions cardiac;sternal   Left Upper Extremity (Weight-bearing Status) partial weight-bearing (PWB)  (10 " lb limit per sternal precautions)   Right Upper Extremity (Weight-bearing Status) partial weight-bearing (PWB)  (10 lb limit per sternal precautions)   Left Lower Extremity (Weight-bearing Status) full weight-bearing (FWB)   Right Lower Extremity (Weight-bearing Status) full weight-bearing (FWB)   General Observations and Info Activity: Progress to amulate w/ assist   Cognitive Status Examination   Orientation Status orientation to person, place and time   Cognitive Status Comments Pt fatigued, in pain, but answering all questions and following cues.   Visual Perception   Visual Impairment/Limitations WFL   Impact of Vision Impairment on Function (Vision) No acute concerns   Sensory   Sensory Quick Adds No deficits were identified   Pain Assessment   Patient Currently in Pain Yes, see Vital Sign flowsheet   Range of Motion Comprehensive   General Range of Motion no range of motion deficits identified   Comment, General Range of Motion BUE ROM not tested beyond 90 deg shoulder flexion due to sternal precautions.   Strength Comprehensive (MMT)   General Manual Muscle Testing (MMT) Assessment no strength deficits identified   Comment, General Manual Muscle Testing (MMT) Assessment Daniel  strength 5/5. BUE strength appears WFL, but not formally tested due to sternal precautions.   Coordination   Upper Extremity Coordination No deficits were identified   Coordination Comments No deficits during G/H ADLs. Pt moving slowly.   Bed Mobility   Bed Mobility rolling left;rolling right   Rolling Left [place] (Bed Mobility) maximum assist (25% patient effort);2 person assist   Supine-Sit [place] (Bed Mobility) maximum assist (25% patient effort)   Comment (Bed Mobility) Will further assess in future sessions.   Transfers   Transfers bed-chair transfer   Transfer Skill: Bed to Chair/Chair to Bed   Bed-Chair [place] (Transfers) dependent (less than 25% patient effort)   Transfer Comments Pt would need OH lift if  attempting transfer today. Will further assess in future session.   Bathing Assessment/Intervention   [place] (Bathing) maximum assist (25% patient effort)   Upper Body Dressing Assessment/Training   [place] (Upper Body Dressing) moderate assist (50% patient effort)   Lower Body Dressing Assessment/Training   [place] (Lower Body Dressing) maximum assist (25% patient effort)   Grooming Assessment/Training   [place] (Grooming) minimum assist (75% patient effort)   Comment, (Grooming) SBA-Min A for washing face.    Eating/Self Feeding   [place] (Feeding) minimum assist (75% patient effort);set up   Toileting   [place] (Toileting) maximum assist (25% patient effort)   Clinical Impression   Criteria for Skilled Therapeutic Interventions Met (OT) Yes, treatment indicated   OT Diagnosis Impaired ADL independence   OT Problem List-Impairments impacting ADL problems related to;activity tolerance impaired;strength;post-surgical precautions   ADL comments/analysis Evaluation limited by pt's fatigue and pain. Will continue to assess.   Assessment of Occupational Performance 3-5 Performance Deficits   Identified Performance Deficits dressing, bathing, meal prep, driving   Planned Therapy Interventions (OT) ADL retraining;IADL retraining;strengthening;transfer training;home program guidelines;progressive activity/exercise;risk factor education   Intervention Comments sternal precautions, toilet transfer, tub transfer, graded CR exercise   Clinical Decision Making Complexity (OT) low complexity   Anticipated Equipment Needs Upon Discharge (OT) shower chair  (If pt chooses to use tub. With LVAD, pt may sponge bathe)   Risk & Benefits of therapy have been explained evaluation/treatment results reviewed;care plan/treatment goals reviewed;risks/benefits reviewed;current/potential barriers reviewed;participants voiced agreement with care plan;participants  included;patient;spouse/significant other   Clinical Impression Comments Pt presents today below ADL baseline, limited by pain, reduced activity tolerance, and new post-surgical and LVAD precautions.    OT Discharge Planning   OT Discharge Recommendation (DC Rec) Transitional Care Facility   OT Rationale for DC Rec Limited activity observed today. Pt currently requiring assist w/ all ADLs and mobility. Will update discharge recommendations as pt progresses w/ IP therapies.   OT Brief overview of current status Assist for all ADLs. Goal to transfer to recliner tomorrow.   Total Evaluation Time (Minutes)   Total Evaluation Time (Minutes) 6

## 2022-03-24 NOTE — PHARMACY-ANTICOAGULATION SERVICE
Clinical Pharmacy - Warfarin Dosing Consult     Pharmacy has been consulted to manage this patient s warfarin therapy.  Indication: LVAD/RVAD  Therapy Goal: INR 2-3  Provider/Team: CVTS  Warfarin Prior to Admission: No  Significant drug interactions: fluconazole, heparin, IV abx increase risk for bleeds  Recent documented change in oral intake/nutrition: Yes (currently NPO)    INR   Date Value Ref Range Status   03/24/2022 1.33 (H) 0.85 - 1.15 Final   03/23/2022 1.61 (H) 0.85 - 1.15 Final       Recommend warfarin 2.5 mg today.  Pharmacy will monitor Tej Morfin daily and order warfarin doses to achieve specified goal.      Please contact pharmacy as soon as possible if the warfarin needs to be held for a procedure or if the warfarin goals change.

## 2022-03-24 NOTE — PROGRESS NOTES
D: I stopped in to talk to Tej, his wife Jessenia, and their daughter Nicol POD1. None of them have any VAD-related questions or concerns at this time.  I: Discussed POC and provided support and listened to patient and care giver's thoughts and concerns.  P: Continue to follow patient and address any questions or concerns patient and or caregiver may have. I encouraged them to page the VAD coordinator with any questions.

## 2022-03-24 NOTE — PROGRESS NOTES
Cards 2 Consult Progress Note    Assessment & Plan   Tej Morfin is a 56 year old male admitted on 3/22/2022. He has history of NICM secondary to severe MR s/p MV ring repair (), now on home inotrope (Dobutamine 2.5), sustained VT s/p secondary prevention ICD, who presents for scheduled LVAD placement. Now status post 3 on 3/23/2022.     Changes Today:  - Wean off epi, dobutamine  - Start nipride gtt for afterload management  - Plan for extubation  - Increase speed to 5300  - Goal CVP 10-12 mmHg. Give 500cc LR this afternoon for low UOP and CVP 7.  - Anticoagulation per surgery     #. Non-ischemic Cardiomyopathy status post 3 (3/23/2022)  #. Stage D; NYHA Class III        DATE MAP CVP PAP PCWP Harris CO Harris CI SVR MVo2 Therapies   3/24 91 10 45/15 15 5.2 2.4 1200 60 LVAD 5200,  2.5              LVAD: Speed 5200 RPM, 3.4 LPM, PI 5.5, Prw 3.5   - Hemodynamics: RHC; monitor hemodynamics q6h  - Inotrope:  Dobutamine 2.5 mcg/kgs, wean to off today  - Pressors: Epi 0.01, wean to off today  - Afterload: Start nipride with MAP goal of ~75. Will plan to transition to orals tomorrow if stable.   - BB: Not on BB at this time due to cardiogenic shock  - Aldosterone antagonist: Not on it at this time.   - SCD prophylaxis: Yes. CRT-D  - AC: Heparin per CVTS  - Anti-platelet: RICARDO trial, no aspirin products  - Fluid status: CVP 7-10 today, give 500cc of IVF this afternoon for low UOP       This patient was seen and discussed with Dr. Jimenez who agrees with the above assessment and plan.     Marquis Luciano MD  Cardiology Fellow  [phone]    Interval History   MELANIE overnight. PST this AM. Following commands.    Physical Exam   Temp: 99.7  F (37.6  C) Temp src: Pulmonary Artery  Pulse: 84   Resp: 12 SpO2: 100 % O2 Device: Mechanical Ventilator    Vitals:    22 1442 22 0500 22 0000   Weight: 87 kg (191 lb 11.2 oz) 85.9 kg (189 lb 6 oz) 92.3 kg (203 lb 7.8 oz)     Vital Signs with Ranges  Temp:   "[99.7  F (37.6  C)-100.6  F (38.1  C)] 99.7  F (37.6  C)  Pulse:  [79-85] 84  Resp:  [12-17] 12  MAP:  [66 mmHg-95 mmHg] 91 mmHg  Arterial Line BP: ()/(45-84) 101/81  FiO2 (%):  [40 %-100 %] 40 %  SpO2:  [95 %-100 %] 100 %  I/O last 3 completed shifts:  In: 7883.78 [I.V.:4638.78; Other:565; NG/GT:180; IV Piggyback:1750]  Out: 2199 [Urine:1209; Chest Tube:990]     , Blood pressure (!) 131/90, pulse 84, temperature 99.7  F (37.6  C), temperature source Pulmonary Artery, resp. rate 12, height 1.791 m (5' 10.51\"), weight 92.3 kg (203 lb 7.8 oz), SpO2 100 %.  203 lbs 7.75 oz  GEN: Intubated, sedated  CV:  LVAD Hum +   LUNGS:  Mechanical breath sounds bilaterally  ABD:  Active bowel sounds, soft, non-tender/non-distended.  No rebound/guarding/rigidity.  EXT:  No edema or cyanosis. WWP.     Medications     dexmedetomidine       DOBUTamine 2.5 mcg/kg/min (03/24/22 0700)     EPINEPHrine 0.01 mcg/kg/min (03/24/22 0700)     fentaNYL 50 mcg/hr (03/24/22 0700)     insulin regular Stopped (03/24/22 0200)     norepinephrine Stopped (03/23/22 2100)     propofol (DIPRIVAN) infusion 30 mcg/kg/min (03/24/22 0700)     sodium chloride       vasopressin         acetaminophen  975 mg Oral or Feeding Tube Q8H     famotidine  20 mg Oral or Feeding Tube BID    Or     famotidine  20 mg Intravenous BID     fluconazole  200 mg Intravenous Q24H     heparin ANTICOAGULANT  5,000 Units Subcutaneous Q8H     lactated ringers  250 mL Intravenous Once     levofloxacin  500 mg Intravenous Q24H     mupirocin  0.5 g Both Nostrils BID     pantoprazole  40 mg Oral or NG Tube Daily    Or     pantoprazole  40 mg Oral Daily     polyethylene glycol  17 g Oral or Feeding Tube Daily     rifampin  600 mg Intravenous Q24H     senna-docusate  1 tablet Oral or Feeding Tube BID     sodium chloride (PF)  3 mL Intracatheter Q8H     vancomycin  1,500 mg Intravenous Q12H       Data   Recent Labs   Lab 03/24/22  0357 03/24/22  0354 03/24/22  0300 03/23/22  1812 " 03/23/22  1809 03/23/22  1443 03/23/22  1433 03/23/22  1302 03/23/22  1221 03/23/22  1220 03/23/22  0358 03/23/22  0347   WBC  --  7.3  --   --  8.4  --  11.5*  --   --  10.5  --  4.3   HGB  --  9.4*  --   --  9.4*  --  9.0* 10.0*   < > 7.9*   < > 11.1*   MCV  --  98  --   --  97  --  98  --   --  97  --  98   PLT  --  99*  --   --  112*  --  109*  --   --  110*  --  178   INR  --   --   --   --   --   --  1.61*  --   --  1.72*  --  1.11   NA  --  144  --   --   --   --  146* 142   < >  --    < > 145*   POTASSIUM  --  4.6  --   --   --   --  3.7  3.7 3.9   < >  --    < > 3.1*   CHLORIDE  --  111*  --   --   --   --  116*  --   --   --   --  108   CO2  --  28  --   --   --   --  23  --   --   --   --  32   BUN  --  15  --   --   --   --  9  --   --   --   --  11   CR  --  0.94  --   --   --   --  0.84  --   --   --   --  0.93   ANIONGAP  --  5  --   --   --   --  7  --   --   --   --  5   YESICA  --  7.8*  --   --   --   --  6.4*  --   --   --   --  8.1*   * 131* 106*   < >  --    < > 137* 143*   < >  --    < > 121*   ALBUMIN  --  2.3*  --   --   --   --  1.3*  --   --   --   --  2.3*   PROTTOTAL  --  4.9*  --   --   --   --  3.1*  --   --   --   --  5.8*   BILITOTAL  --  1.0  --   --   --   --  1.4*  --   --   --   --  0.4   ALKPHOS  --  42  --   --   --   --  35*  --   --   --   --  63   ALT  --  14  --   --   --   --  8  --   --   --   --  14   AST  --  48*  --   --   --   --  30  --   --   --   --  13    < > = values in this interval not displayed.       Recent Results (from the past 24 hour(s))   Cardiac Device Check - Inpatient   Result Value    Date Time Interrogation Session [id number]    Implantable Pulse Generator  SendMe    Implantable Pulse Generator Model G148 INOGEN X4 CRT-D    Implantable Pulse Generator Serial Number [id number]    Type Interrogation Session In Clinic    Clinic Name [facility]    Implantable Pulse Generator Type Cardiac  Resynchronization Therapy - Defibrillator    Implantable Pulse Generator Implant Date 20160108    Implantable Lead  Guidant    Implantable Lead Model 4136 Dextrus    Implantable Lead Serial Number [id number]    Implantable Lead Implant Date 20160108    Implantable Lead Polarity Type Bipolar Lead    Implantable Lead Location Detail 1 UNKNOWN    Implantable Lead Location Right Atrium    Implantable Lead  Medtronic    Implantable Lead Model 4598 Attain Performa S    Implantable Lead Serial Number [id number]    Implantable Lead Implant Date 20160108    Implantable Lead Polarity Type Quadripolar Lead    Implantable Lead Location Detail 1 UNKNOWN    Implantable Lead Location Left Ventricle    Implantable Lead  [employer]    Implantable Lead Model 0295 Reliance 4-Site G    Implantable Lead Serial Number [id number]    Implantable Lead Implant Date 20150202    Implantable Lead Polarity Type Tripolar Lead    Implantable Lead Location Detail 1 UNKNOWN    Implantable Lead Location Right Ventricle    Joshua Setting Mode (NBG Code) DDDR    Joshua Setting Lower Rate Limit 80    Joshua Setting Maximum Tracking Rate 130    Joshua Setting Maximum Sensor Rate 120    Joshua Setting ASHWIN Delay Low 110    Joshua Setting PAV Delay Low 110    Joshua Setting AT Mode Switch Rate 170    Joshua Setting AT Mode Switch Mode DDI    Lead Channel Setting Sensing Polarity Bipolar    Lead Channel Setting Sensing Sensitivity 0.25    Lead Channel Setting Sensing Adaptation Mode Adaptive    Lead Channel Setting Sensing Polarity Bipolar    Lead Channel Setting Sensing Sensitivity 0.6    Lead Channel Setting Sensing Adaptation Mode Adaptive    Lead Channel Setting Sensing Anode Location Left Ventricle    Lead Channel Setting Sensing Anode Terminal Ring2    Lead Channel Setting Sensing Cathode Location Left Ventricle    Lead Channel Setting Sensing Cathode Terminal Tip    Lead Channel Setting Sensing Sensitivity 1.0    Lead  Channel Setting Sensing Adaptation Mode Adaptive    Ventricular chambers paced during CRT pacing. BiV    CRT LV-RV Delay 80    Lead Channel Setting Pacing Polarity Bipolar    Lead Channel Setting Pacing Pulse Width 0.5    Lead Channel Setting Pacing Amplitude 1.5    Lead Channel Setting Pacing Polarity Bipolar    Lead Channel Setting Pacing Pulse Width 0.5    Lead Channel Setting Pacing Amplitude 2.5    Lead Channel Setting Pacing Anode Location Left Ventricle    Lead Channel Setting Pacing Anode Terminal Ring2    Lead Channel Setting Sensing Cathode Location Left Ventricle    Lead Channel Setting Sensing Cathode Terminal Tip    Lead Channel Setting Pacing Pulse Width 0.5    Lead Channel Setting Pacing Amplitude 1.5    Zone Setting Type Category VF    Zone Setting Vendor Type Category VF    Zone Setting Detection Interval 300    Zone Setting Type Category VT    Zone Setting Vendor Type Category VT    Zone Setting Detection Interval 353    Lead Channel Status Check Lead    Lead Channel Impedance Value 895    Lead Channel Pacing Threshold Amplitude 0.8    Lead Channel Pacing Threshold Pulse Width 0.5    Lead Channel Impedance Value 470    Lead Channel Pacing Threshold Amplitude 0.5    Lead Channel Pacing Threshold Pulse Width 0.5    Lead Channel Impedance Value 366    Lead Channel Pacing Threshold Amplitude 1.3    Lead Channel Pacing Threshold Pulse Width 0.5    Battery Date Time of Measurements [id number]    Battery Status Beginning of Service    Battery Remaining Longevity 42    Battery Remaining Percentage 58    Capacitor Charge Type Reformation    Capacitor Last Charge Date Time 71920862203250    Capacitor Charge Time 11.7    Capacitor Charge Type Shock    Capacitor Last Charge Date Time [id number]    Capacitor Charge Time 6.3    Capacitor Charge Energy 31    Joshua Statistic Date Time Start 20220118000000    Joshua Statistic Date Time End 20220323000000    Joshua Statistic RA Percent Paced 90    Joshua  Statistic RV Percent Paced 94    CRT Statistic LV Percent Paced 97    CRT Statistic Date Time Start 20220118000000    CRT Statistic Date Time End 20220323000000    Atrial Tachy Statistic Date Time Start 20220119000000    Atrial Tachy Statistic Date Time End 20220322000000    Atrial Tachy Statistic AT/AF [place] Percent 0    Therapy Statistic Recent Shocks Delivered 0    Therapy Statistic Recent Shocks Aborted 0    Therapy Statistic Recent ATP Delivered 0    Therapy Statistic Recent Date Time Start 20220118000000    Therapy Statistic Recent Date Time End 20220323000000    Therapy Statistic Total Shocks Delivered 1    Therapy Statistic Total Shocks Aborted 1    Therapy Statistic Total ATP Delivered 2    Therapy Statistic Total  Date Time Start 20160108000000    Therapy Statistic Total  Date Time End 20220323000000    Episode Statistic Recent Count 0    Episode Statistic Type Category AT/AF    Episode Statistic Vendor Type Category AF    Episode Statistic Recent Count 1    Episode Statistic Type Category Other    Episode Statistic Recent Count 6    Episode Statistic Type Category VT    Episode Statistic Vendor Type Category NSVT    Episode Statistic Recent Count 1    Episode Statistic Type Category VF    Episode Statistic Vendor Type Category VF    Episode Statistic Recent Count 0    Episode Statistic Type Category VT    Episode Statistic Vendor Type Category VT    Episode Statistic Recent Count 0    Episode Statistic Type Category VT    Episode Statistic Vendor Type Category VT-1    Episode Statistic Recent Date Time Start 20220118000000    Episode Statistic Recent Date Time End 20220323000000    Episode Statistic Recent Date Time Start 20220118000000    Episode Statistic Recent Date Time End 20220323000000    Episode Statistic Recent Date Time Start 20220118000000    Episode Statistic Recent Date Time End 20220323000000    Episode Statistic Recent Date Time Start 20220118000000    Episode Statistic Recent Date Time  End 20220323000000    Episode Statistic Recent Date Time Start 20220118000000    Episode Statistic Recent Date Time End 20220323000000    Episode Statistic Recent Date Time Start 20220118000000    Episode Statistic Recent Date Time End 20220323000000    Narrative    Patient seen in OR #27 pre-op LVAD implant for evaluation and iterative   programming of their ICD per MD orders.     Device: [employer] G148 INOGEN X4 CRT-D  Normal device function  Mode: DDDR /120 bpm  AP: 90%  : 94%  BVP: 97%  Intrinsic rhythm: SB @ 39 bpm  Lead Trends Appear Stable: yes  Estimated battery longevity to RRT = 3.5 years  Atrial Arrhythmia: 0  Ventricular Arrhythmia: 6 NSVT episodes recorded - 3-6 sec, 175-195 bpm. 1   stored EGM reveals true NSVT and the other reveals SVT.  1 episode of VT recorded in the VF zone on 3/8/22 - 6 sec, 210 bpm, no   therapy delivered.  Setting Changes: Pacing mode programmed DOO 80 bpm. ICD tachy therapies   programmed off.    CARLA Ley RN    Multi lead ICD    I have reviewed and interpreted the device interrogation, settings,   programming and nurse's summary. The device is functioning within normal   device parameters. I agree with the current findings, assessment and plan.   XR Abdomen Port 1 View    Narrative    EXAM: XR ABDOMEN PORT 1 VIEWS  3/23/2022 2:58 PM     HISTORY:  OG tube       COMPARISON:  CT chest abdomen and pelvis 3/3/2022    FINDINGS:   Portable supine AP view of the abdomen. Enteric tube with tip in  sidehole projecting in the stomach. LVAD noted. Postoperative changes  of the chest with multiple partially visualized chest tubes.    No pneumatosis or portal venous gas within the field of view.      Impression    IMPRESSION: Gastric tube with tip and sidehole projecting in stomach.     I have personally reviewed the examination and initial interpretation  and I agree with the findings.    HUY AVELAR MD         SYSTEM ID:  [id number]   XR Chest Port 1 View    Narrative     EXAM: XR CHEST PORT 1 VIEW  3/23/2022 2:59 PM    HISTORY: 56-year-old male with nonischemic cardiomyopathy secondary to  severe mitral regurgitation status post repair 2/7/2017 and LVAD  placement 3/23/2022.    COMPARISON: Same day chest radiograph.    TECHNIQUE: Portable frontal view of the chest.    FINDINGS:   Postsurgical chest with new LVAD device, intact median sternotomy  wires, and heart valve prosthesis. Endotracheal tube tip is at the mid  thoracic trachea. Right arm PICC tip is at the superior cavoatrial  junction. Left chest wall pacemaker with intact lead tips projecting  over the right atrium and right ventricle. Right IJ Lucama-Liudmila catheter  tip is in the distal right pulmonary artery. Mediastinal drains.  Atrial appendage clip. Enteric tube tip and sidehole terminate in the  stomach.    Midline trachea. Stable cardiomediastinal silhouette. Indistinct  pulmonary vasculature. No significant right-sided pleural effusion,  left side is obscured by the LVAD device. No discernible pneumothorax.  Slightly increased perihilar and retrocardiac streaky opacities.  Unremarkable upper abdomen. No acute osseous abnormalities.      Impression    IMPRESSION:   1.  Postsurgical chest with new LVAD device. Support devices as  described above.  2.  Slightly increased perihilar predominant atelectasis versus edema.    JAYA FOSTER DO    I have personally reviewed the examination and initial interpretation  and I agree with the findings.    CARLA PAREDES MD         SYSTEM ID:  [id number]   Cardiac Device Check - Inpatient   Result Value    Date Time Interrogation Session [id number]    Implantable Pulse Generator  [employer]    Implantable Pulse Generator Model G148 INOGEN X4 CRT-D    Implantable Pulse Generator Serial Number [id number]    Type Interrogation Session In Clinic    Clinic Name [facility]    Implantable Pulse Generator Type Cardiac Resynchronization Therapy -  Defibrillator    Implantable Pulse Generator Implant Date 20160108    Implantable Lead  Guidant    Implantable Lead Model 4136 Dextrus    Implantable Lead Serial Number [id number]    Implantable Lead Implant Date 20160108    Implantable Lead Polarity Type Bipolar Lead    Implantable Lead Location Detail 1 UNKNOWN    Implantable Lead Location Right Atrium    Implantable Lead  Medtronic    Implantable Lead Model 4598 Attain Performa S    Implantable Lead Serial Number [id number]    Implantable Lead Implant Date 20160108    Implantable Lead Polarity Type Quadripolar Lead    Implantable Lead Location Detail 1 UNKNOWN    Implantable Lead Location Left Ventricle    Implantable Lead  [employer]    Implantable Lead Model 0295 Reliance 4-Site G    Implantable Lead Serial Number [id number]    Implantable Lead Implant Date 20150202    Implantable Lead Polarity Type Tripolar Lead    Implantable Lead Location Detail 1 UNKNOWN    Implantable Lead Location Right Ventricle    Joshua Setting Mode (NBG Code) DDDR    Joshua Setting Lower Rate Limit 80    Joshua Setting Maximum Tracking Rate 130    Joshua Setting Maximum Sensor Rate 120    Joshua Setting ASHWIN Delay Low 110    Joshua Setting PAV Delay Low 110    Joshua Setting AT Mode Switch Rate 170    Joshua Setting AT Mode Switch Mode DDI    Lead Channel Setting Sensing Polarity Bipolar    Lead Channel Setting Sensing Sensitivity 0.25    Lead Channel Setting Sensing Adaptation Mode Adaptive    Lead Channel Setting Sensing Polarity Bipolar    Lead Channel Setting Sensing Sensitivity 0.6    Lead Channel Setting Sensing Adaptation Mode Adaptive    Lead Channel Setting Sensing Anode Location Left Ventricle    Lead Channel Setting Sensing Anode Terminal Ring2    Lead Channel Setting Sensing Cathode Location Left Ventricle    Lead Channel Setting Sensing Cathode Terminal Tip    Lead Channel Setting Sensing Sensitivity 1.0    Lead Channel Setting Sensing  Adaptation Mode Adaptive    Ventricular chambers paced during CRT pacing. BiV    CRT LV-RV Delay 80    Lead Channel Setting Pacing Polarity Bipolar    Lead Channel Setting Pacing Pulse Width 0.5    Lead Channel Setting Pacing Amplitude 1.5    Lead Channel Setting Pacing Polarity Bipolar    Lead Channel Setting Pacing Pulse Width 0.5    Lead Channel Setting Pacing Amplitude 2.5    Lead Channel Setting Pacing Anode Location Left Ventricle    Lead Channel Setting Pacing Anode Terminal Ring2    Lead Channel Setting Sensing Cathode Location Left Ventricle    Lead Channel Setting Sensing Cathode Terminal Tip    Lead Channel Setting Pacing Pulse Width 1.0    Lead Channel Setting Pacing Amplitude 3.4    Zone Setting Type Category VF    Zone Setting Vendor Type Category VF    Zone Setting Detection Interval 300    Zone Setting Type Category VT    Zone Setting Vendor Type Category VT    Zone Setting Detection Interval 353    Lead Channel Impedance Value 511    Lead Channel Pacing Threshold Amplitude 2.4    Lead Channel Pacing Threshold Pulse Width 1.0    Lead Channel Impedance Value 452    Lead Channel Pacing Threshold Amplitude 0.4    Lead Channel Pacing Threshold Pulse Width 0.5    Lead Channel Impedance Value 332    Lead Channel Pacing Threshold Amplitude 1.0    Lead Channel Pacing Threshold Pulse Width 0.5    Battery Date Time of Measurements [id number]    Battery Status Beginning of Service    Battery Remaining Longevity 24    Battery Remaining Percentage 38    Capacitor Charge Type Reformation    Capacitor Last Charge Date Time [id number]    Capacitor Charge Time 11.7    Capacitor Charge Type Shock    Capacitor Last Charge Date Time [id number]    Capacitor Charge Time 6.3    Capacitor Charge Energy 31    Joshua Statistic Date Time Start 20220118000000    Joshua Statistic Date Time End 20220323000000    Joshua Statistic RA Percent Paced 90    Joshua Statistic RV Percent Paced 94    CRT Statistic LV Percent Paced  97    CRT Statistic Date Time Start 20220118000000    CRT Statistic Date Time End 20220323000000    Atrial Tachy Statistic Date Time Start 20220119000000    Atrial Tachy Statistic Date Time End 20220323000000    Atrial Tachy Statistic AT/AF [place] Percent 0    Therapy Statistic Recent Shocks Delivered 0    Therapy Statistic Recent Shocks Aborted 0    Therapy Statistic Recent ATP Delivered 0    Therapy Statistic Recent Date Time Start 20220118000000    Therapy Statistic Recent Date Time End 20220323000000    Therapy Statistic Total Shocks Delivered 1    Therapy Statistic Total Shocks Aborted 1    Therapy Statistic Total ATP Delivered 2    Therapy Statistic Total  Date Time Start 20160108000000    Therapy Statistic Total  Date Time End 20220323000000    Episode Statistic Recent Count 0    Episode Statistic Type Category AT/AF    Episode Statistic Vendor Type Category AF    Episode Statistic Recent Count 1    Episode Statistic Type Category Other    Episode Statistic Recent Count 6    Episode Statistic Type Category VT    Episode Statistic Vendor Type Category NSVT    Episode Statistic Recent Count 0    Episode Statistic Type Category VF    Episode Statistic Vendor Type Category VF    Episode Statistic Recent Count 0    Episode Statistic Type Category VT    Episode Statistic Vendor Type Category VT    Episode Statistic Recent Count 0    Episode Statistic Type Category VT    Episode Statistic Vendor Type Category VT-1    Episode Statistic Recent Date Time Start 20220118000000    Episode Statistic Recent Date Time End 20220323000000    Episode Statistic Recent Date Time Start 20220118000000    Episode Statistic Recent Date Time End 20220323000000    Episode Statistic Recent Date Time Start 20220118000000    Episode Statistic Recent Date Time End 20220323000000    Episode Statistic Recent Date Time Start 20220118000000    Episode Statistic Recent Date Time End 20220323000000    Episode Statistic Recent Date Time Start  20220118000000    Episode Statistic Recent Date Time End 20220323000000    Episode Statistic Recent Date Time Start 20220118000000    Episode Statistic Recent Date Time End 20220323000000    Narrative    Patient seen on 4E s/p LVAD implant for evaluation and iterative   programming of their ICD per MD orders.     Device: [employer] G148 INOGEN X4 CRT-D  Normal device function  Mode: DDDR /120 bpm  AP: 90%  : 94%  BVP: 97%  Intrinsic rhythm: NSR @ 75 bpm  LV threshold is elevated post-operatively. LV threshold = 2.8 V @ 0.5 ms   and 2.4 V @ 1 ms. Unable to obtain LV sensing.  Estimated battery longevity to RRT = 2 years  Setting Changes: ICD tachy therapies programmed back on. Pacing mode   programmed from DOO 80 bpm to DDDR /120 bpm. LV amplitude increased   from 1.5 V to 3.4 V. LV pulse width programmed from 0.5 ms to 1 ms.  Dr. Rodríguez notified of elevated LV threshold.  CARLA Ley RN    Multi lead ICD    I have reviewed and interpreted the device interrogation, settings,   programming and nurse's summary. The device is functioning within normal   device parameters. I agree with the current findings, assessment and plan.

## 2022-03-25 ENCOUNTER — APPOINTMENT (OUTPATIENT)
Dept: CARDIOLOGY | Facility: CLINIC | Age: 57
DRG: 001 | End: 2022-03-25
Attending: STUDENT IN AN ORGANIZED HEALTH CARE EDUCATION/TRAINING PROGRAM
Payer: MEDICARE

## 2022-03-25 ENCOUNTER — APPOINTMENT (OUTPATIENT)
Dept: GENERAL RADIOLOGY | Facility: CLINIC | Age: 57
DRG: 001 | End: 2022-03-25
Attending: ANESTHESIOLOGY
Payer: MEDICARE

## 2022-03-25 ENCOUNTER — APPOINTMENT (OUTPATIENT)
Dept: CARDIOLOGY | Facility: CLINIC | Age: 57
DRG: 001 | End: 2022-03-25
Attending: INTERNAL MEDICINE
Payer: MEDICARE

## 2022-03-25 LAB
ALBUMIN SERPL-MCNC: 2.2 G/DL (ref 3.4–5)
ALP SERPL-CCNC: 40 U/L (ref 40–150)
ALT SERPL W P-5'-P-CCNC: 14 U/L (ref 0–70)
ANION GAP SERPL CALCULATED.3IONS-SCNC: 7 MMOL/L (ref 3–14)
ANION GAP SERPL CALCULATED.3IONS-SCNC: 7 MMOL/L (ref 3–14)
AST SERPL W P-5'-P-CCNC: 47 U/L (ref 0–45)
BASE EXCESS BLDA CALC-SCNC: 4.7 MMOL/L (ref -9–1.8)
BASE EXCESS BLDV CALC-SCNC: 4.5 MMOL/L (ref -7.7–1.9)
BASE EXCESS BLDV CALC-SCNC: 5.2 MMOL/L (ref -7.7–1.9)
BASE EXCESS BLDV CALC-SCNC: 5.4 MMOL/L (ref -7.7–1.9)
BASE EXCESS BLDV CALC-SCNC: 5.6 MMOL/L (ref -7.7–1.9)
BASE EXCESS BLDV CALC-SCNC: 5.9 MMOL/L (ref -7.7–1.9)
BILIRUB SERPL-MCNC: 1.7 MG/DL (ref 0.2–1.3)
BUN SERPL-MCNC: 16 MG/DL (ref 7–30)
BUN SERPL-MCNC: 17 MG/DL (ref 7–30)
CA-I BLD-MCNC: 4.6 MG/DL (ref 4.4–5.2)
CALCIUM SERPL-MCNC: 8.2 MG/DL (ref 8.5–10.1)
CALCIUM SERPL-MCNC: 8.3 MG/DL (ref 8.5–10.1)
CHLORIDE BLD-SCNC: 107 MMOL/L (ref 94–109)
CHLORIDE BLD-SCNC: 108 MMOL/L (ref 94–109)
CO2 SERPL-SCNC: 25 MMOL/L (ref 20–32)
CO2 SERPL-SCNC: 27 MMOL/L (ref 20–32)
CREAT SERPL-MCNC: 0.84 MG/DL (ref 0.66–1.25)
CREAT SERPL-MCNC: 0.85 MG/DL (ref 0.66–1.25)
ERYTHROCYTE [DISTWIDTH] IN BLOOD BY AUTOMATED COUNT: 13.6 % (ref 10–15)
ERYTHROCYTE [DISTWIDTH] IN BLOOD BY AUTOMATED COUNT: 13.7 % (ref 10–15)
ERYTHROCYTE [DISTWIDTH] IN BLOOD BY AUTOMATED COUNT: 13.8 % (ref 10–15)
GFR SERPL CREATININE-BSD FRML MDRD: >90 ML/MIN/1.73M2
GFR SERPL CREATININE-BSD FRML MDRD: >90 ML/MIN/1.73M2
GLUCOSE BLD-MCNC: 124 MG/DL (ref 70–99)
GLUCOSE BLD-MCNC: 125 MG/DL (ref 70–99)
GLUCOSE BLDC GLUCOMTR-MCNC: 109 MG/DL (ref 70–99)
GLUCOSE BLDC GLUCOMTR-MCNC: 117 MG/DL (ref 70–99)
GLUCOSE BLDC GLUCOMTR-MCNC: 121 MG/DL (ref 70–99)
GLUCOSE BLDC GLUCOMTR-MCNC: 121 MG/DL (ref 70–99)
GLUCOSE BLDC GLUCOMTR-MCNC: 125 MG/DL (ref 70–99)
GLUCOSE BLDC GLUCOMTR-MCNC: 129 MG/DL (ref 70–99)
HCO3 BLD-SCNC: 29 MMOL/L (ref 21–28)
HCO3 BLDV-SCNC: 29 MMOL/L (ref 21–28)
HCO3 BLDV-SCNC: 30 MMOL/L (ref 21–28)
HCT VFR BLD AUTO: 25.6 % (ref 40–53)
HCT VFR BLD AUTO: 26.2 % (ref 40–53)
HCT VFR BLD AUTO: 27.2 % (ref 40–53)
HGB BLD-MCNC: 8 G/DL (ref 13.3–17.7)
HGB BLD-MCNC: 8.3 G/DL (ref 13.3–17.7)
HGB BLD-MCNC: 8.6 G/DL (ref 13.3–17.7)
INR PPP: 1.26 (ref 0.85–1.15)
LVEF ECHO: NORMAL
LVEF ECHO: NORMAL
MAGNESIUM SERPL-MCNC: 2 MG/DL (ref 1.6–2.3)
MAGNESIUM SERPL-MCNC: 2.1 MG/DL (ref 1.6–2.3)
MCH RBC QN AUTO: 30.8 PG (ref 26.5–33)
MCH RBC QN AUTO: 30.9 PG (ref 26.5–33)
MCH RBC QN AUTO: 31 PG (ref 26.5–33)
MCHC RBC AUTO-ENTMCNC: 31.3 G/DL (ref 31.5–36.5)
MCHC RBC AUTO-ENTMCNC: 31.6 G/DL (ref 31.5–36.5)
MCHC RBC AUTO-ENTMCNC: 31.7 G/DL (ref 31.5–36.5)
MCV RBC AUTO: 98 FL (ref 78–100)
MCV RBC AUTO: 98 FL (ref 78–100)
MCV RBC AUTO: 99 FL (ref 78–100)
O2/TOTAL GAS SETTING VFR VENT: 30 %
O2/TOTAL GAS SETTING VFR VENT: 37 %
O2/TOTAL GAS SETTING VFR VENT: 4 %
O2/TOTAL GAS SETTING VFR VENT: 6 %
OXYHGB MFR BLD: 91 % (ref 92–100)
OXYHGB MFR BLDV: 44 % (ref 70–75)
OXYHGB MFR BLDV: 49 % (ref 70–75)
OXYHGB MFR BLDV: 50 % (ref 70–75)
OXYHGB MFR BLDV: 54 % (ref 70–75)
OXYHGB MFR BLDV: 54 % (ref 70–75)
PCO2 BLD: 39 MM HG (ref 35–45)
PCO2 BLDV: 42 MM HG (ref 40–50)
PCO2 BLDV: 42 MM HG (ref 40–50)
PCO2 BLDV: 44 MM HG (ref 40–50)
PCO2 BLDV: 44 MM HG (ref 40–50)
PCO2 BLDV: 45 MM HG (ref 40–50)
PH BLD: 7.48 [PH] (ref 7.35–7.45)
PH BLDV: 7.43 [PH] (ref 7.32–7.43)
PH BLDV: 7.44 [PH] (ref 7.32–7.43)
PH BLDV: 7.45 [PH] (ref 7.32–7.43)
PH BLDV: 7.46 [PH] (ref 7.32–7.43)
PH BLDV: 7.46 [PH] (ref 7.32–7.43)
PHOSPHATE SERPL-MCNC: 2.4 MG/DL (ref 2.5–4.5)
PHOSPHATE SERPL-MCNC: 2.5 MG/DL (ref 2.5–4.5)
PLATELET # BLD AUTO: 108 10E3/UL (ref 150–450)
PLATELET # BLD AUTO: 111 10E3/UL (ref 150–450)
PLATELET # BLD AUTO: 99 10E3/UL (ref 150–450)
PO2 BLD: 63 MM HG (ref 80–105)
PO2 BLDV: 27 MM HG (ref 25–47)
PO2 BLDV: 28 MM HG (ref 25–47)
PO2 BLDV: 29 MM HG (ref 25–47)
PO2 BLDV: 29 MM HG (ref 25–47)
PO2 BLDV: 30 MM HG (ref 25–47)
POTASSIUM BLD-SCNC: 3.8 MMOL/L (ref 3.4–5.3)
POTASSIUM BLD-SCNC: 3.9 MMOL/L (ref 3.4–5.3)
PROT SERPL-MCNC: 5.2 G/DL (ref 6.8–8.8)
RBC # BLD AUTO: 2.59 10E6/UL (ref 4.4–5.9)
RBC # BLD AUTO: 2.68 10E6/UL (ref 4.4–5.9)
RBC # BLD AUTO: 2.79 10E6/UL (ref 4.4–5.9)
SODIUM SERPL-SCNC: 140 MMOL/L (ref 133–144)
SODIUM SERPL-SCNC: 141 MMOL/L (ref 133–144)
TSH SERPL DL<=0.005 MIU/L-ACNC: 1.72 MU/L (ref 0.4–4)
UFH PPP CHRO-ACNC: <0.1 IU/ML
WBC # BLD AUTO: 10.7 10E3/UL (ref 4–11)
WBC # BLD AUTO: 11 10E3/UL (ref 4–11)
WBC # BLD AUTO: 11.1 10E3/UL (ref 4–11)

## 2022-03-25 PROCEDURE — 250N000011 HC RX IP 250 OP 636: Performed by: INTERNAL MEDICINE

## 2022-03-25 PROCEDURE — 82805 BLOOD GASES W/O2 SATURATION: CPT | Performed by: STUDENT IN AN ORGANIZED HEALTH CARE EDUCATION/TRAINING PROGRAM

## 2022-03-25 PROCEDURE — 99291 CRITICAL CARE FIRST HOUR: CPT | Mod: 25 | Performed by: INTERNAL MEDICINE

## 2022-03-25 PROCEDURE — 99233 SBSQ HOSP IP/OBS HIGH 50: CPT | Mod: GC | Performed by: INTERNAL MEDICINE

## 2022-03-25 PROCEDURE — 99223 1ST HOSP IP/OBS HIGH 75: CPT | Mod: 25 | Performed by: INTERNAL MEDICINE

## 2022-03-25 PROCEDURE — 999N000155 HC STATISTIC RAPCV CVP MONITORING

## 2022-03-25 PROCEDURE — 250N000013 HC RX MED GY IP 250 OP 250 PS 637: Performed by: STUDENT IN AN ORGANIZED HEALTH CARE EDUCATION/TRAINING PROGRAM

## 2022-03-25 PROCEDURE — 250N000011 HC RX IP 250 OP 636: Performed by: STUDENT IN AN ORGANIZED HEALTH CARE EDUCATION/TRAINING PROGRAM

## 2022-03-25 PROCEDURE — 93325 DOPPLER ECHO COLOR FLOW MAPG: CPT

## 2022-03-25 PROCEDURE — 85610 PROTHROMBIN TIME: CPT | Performed by: STUDENT IN AN ORGANIZED HEALTH CARE EDUCATION/TRAINING PROGRAM

## 2022-03-25 PROCEDURE — 84100 ASSAY OF PHOSPHORUS: CPT | Performed by: STUDENT IN AN ORGANIZED HEALTH CARE EDUCATION/TRAINING PROGRAM

## 2022-03-25 PROCEDURE — 999N000128 HC STATISTIC PERIPHERAL IV START W/O US GUIDANCE

## 2022-03-25 PROCEDURE — 258N000003 HC RX IP 258 OP 636: Performed by: INTERNAL MEDICINE

## 2022-03-25 PROCEDURE — 250N000013 HC RX MED GY IP 250 OP 250 PS 637: Performed by: INTERNAL MEDICINE

## 2022-03-25 PROCEDURE — 85520 HEPARIN ASSAY: CPT | Performed by: STUDENT IN AN ORGANIZED HEALTH CARE EDUCATION/TRAINING PROGRAM

## 2022-03-25 PROCEDURE — 71045 X-RAY EXAM CHEST 1 VIEW: CPT

## 2022-03-25 PROCEDURE — 80053 COMPREHEN METABOLIC PANEL: CPT | Performed by: STUDENT IN AN ORGANIZED HEALTH CARE EDUCATION/TRAINING PROGRAM

## 2022-03-25 PROCEDURE — 82962 GLUCOSE BLOOD TEST: CPT

## 2022-03-25 PROCEDURE — 258N000003 HC RX IP 258 OP 636: Performed by: STUDENT IN AN ORGANIZED HEALTH CARE EDUCATION/TRAINING PROGRAM

## 2022-03-25 PROCEDURE — 93325 DOPPLER ECHO COLOR FLOW MAPG: CPT | Mod: 26 | Performed by: INTERNAL MEDICINE

## 2022-03-25 PROCEDURE — 93284 PRGRMG EVAL IMPLANTABLE DFB: CPT

## 2022-03-25 PROCEDURE — 93284 PRGRMG EVAL IMPLANTABLE DFB: CPT | Mod: 26 | Performed by: INTERNAL MEDICINE

## 2022-03-25 PROCEDURE — 200N000002 HC R&B ICU UMMC

## 2022-03-25 PROCEDURE — 83735 ASSAY OF MAGNESIUM: CPT | Performed by: STUDENT IN AN ORGANIZED HEALTH CARE EDUCATION/TRAINING PROGRAM

## 2022-03-25 PROCEDURE — 250N000009 HC RX 250: Performed by: INTERNAL MEDICINE

## 2022-03-25 PROCEDURE — 250N000011 HC RX IP 250 OP 636

## 2022-03-25 PROCEDURE — 85027 COMPLETE CBC AUTOMATED: CPT | Performed by: STUDENT IN AN ORGANIZED HEALTH CARE EDUCATION/TRAINING PROGRAM

## 2022-03-25 PROCEDURE — 250N000013 HC RX MED GY IP 250 OP 250 PS 637: Performed by: SURGERY

## 2022-03-25 PROCEDURE — 250N000009 HC RX 250

## 2022-03-25 PROCEDURE — 82330 ASSAY OF CALCIUM: CPT | Performed by: STUDENT IN AN ORGANIZED HEALTH CARE EDUCATION/TRAINING PROGRAM

## 2022-03-25 PROCEDURE — 999N000015 HC STATISTIC ARTERIAL MONITORING DAILY

## 2022-03-25 PROCEDURE — 82310 ASSAY OF CALCIUM: CPT | Performed by: STUDENT IN AN ORGANIZED HEALTH CARE EDUCATION/TRAINING PROGRAM

## 2022-03-25 PROCEDURE — 93750 INTERROGATION VAD IN PERSON: CPT | Performed by: INTERNAL MEDICINE

## 2022-03-25 PROCEDURE — 93320 DOPPLER ECHO COMPLETE: CPT | Mod: 26 | Performed by: INTERNAL MEDICINE

## 2022-03-25 PROCEDURE — 999N000045 HC STATISTIC DAILY SWAN MONITORING

## 2022-03-25 PROCEDURE — 84443 ASSAY THYROID STIM HORMONE: CPT | Performed by: INTERNAL MEDICINE

## 2022-03-25 PROCEDURE — 93312 ECHO TRANSESOPHAGEAL: CPT | Mod: 26 | Performed by: INTERNAL MEDICINE

## 2022-03-25 PROCEDURE — 71045 X-RAY EXAM CHEST 1 VIEW: CPT | Mod: 26 | Performed by: RADIOLOGY

## 2022-03-25 RX ORDER — WARFARIN SODIUM 5 MG/1
5 TABLET ORAL
Status: DISCONTINUED | OUTPATIENT
Start: 2022-03-25 | End: 2022-03-25

## 2022-03-25 RX ORDER — FENTANYL CITRATE 50 UG/ML
INJECTION, SOLUTION INTRAMUSCULAR; INTRAVENOUS
Status: DISCONTINUED
Start: 2022-03-25 | End: 2022-03-25 | Stop reason: HOSPADM

## 2022-03-25 RX ORDER — POTASSIUM CHLORIDE 750 MG/1
20 TABLET, EXTENDED RELEASE ORAL ONCE
Status: COMPLETED | OUTPATIENT
Start: 2022-03-25 | End: 2022-03-25

## 2022-03-25 RX ORDER — HYDRALAZINE HYDROCHLORIDE 50 MG/1
50 TABLET, FILM COATED ORAL ONCE
Status: COMPLETED | OUTPATIENT
Start: 2022-03-26 | End: 2022-03-26

## 2022-03-25 RX ORDER — NALOXONE HYDROCHLORIDE 0.4 MG/ML
0.4 INJECTION, SOLUTION INTRAMUSCULAR; INTRAVENOUS; SUBCUTANEOUS
Status: DISCONTINUED | OUTPATIENT
Start: 2022-03-25 | End: 2022-03-25

## 2022-03-25 RX ORDER — METOCLOPRAMIDE 5 MG/1
10 TABLET ORAL
Status: DISCONTINUED | OUTPATIENT
Start: 2022-03-25 | End: 2022-04-07

## 2022-03-25 RX ORDER — HEPARIN SODIUM 10000 [USP'U]/100ML
500 INJECTION, SOLUTION INTRAVENOUS CONTINUOUS
Status: DISCONTINUED | OUTPATIENT
Start: 2022-03-25 | End: 2022-03-25

## 2022-03-25 RX ORDER — HYDRALAZINE HYDROCHLORIDE 50 MG/1
100 TABLET, FILM COATED ORAL ONCE
Status: DISCONTINUED | OUTPATIENT
Start: 2022-03-26 | End: 2022-03-26

## 2022-03-25 RX ORDER — HEPARIN SODIUM 10000 [USP'U]/100ML
0-5000 INJECTION, SOLUTION INTRAVENOUS CONTINUOUS
Status: DISCONTINUED | OUTPATIENT
Start: 2022-03-25 | End: 2022-03-28

## 2022-03-25 RX ORDER — FLUMAZENIL 0.1 MG/ML
0.2 INJECTION, SOLUTION INTRAVENOUS
Status: DISCONTINUED | OUTPATIENT
Start: 2022-03-25 | End: 2022-03-25

## 2022-03-25 RX ORDER — FLUTICASONE PROPIONATE 50 MCG
1 SPRAY, SUSPENSION (ML) NASAL DAILY PRN
Status: DISCONTINUED | OUTPATIENT
Start: 2022-03-25 | End: 2022-04-08 | Stop reason: HOSPADM

## 2022-03-25 RX ORDER — LIDOCAINE 40 MG/G
CREAM TOPICAL
Status: DISCONTINUED | OUTPATIENT
Start: 2022-03-25 | End: 2022-03-25

## 2022-03-25 RX ORDER — ISOSORBIDE DINITRATE 10 MG/1
10 TABLET ORAL ONCE
Status: COMPLETED | OUTPATIENT
Start: 2022-03-25 | End: 2022-03-25

## 2022-03-25 RX ORDER — POTASSIUM CHLORIDE 14.9 MG/ML
20 INJECTION INTRAVENOUS ONCE
Status: COMPLETED | OUTPATIENT
Start: 2022-03-25 | End: 2022-03-25

## 2022-03-25 RX ORDER — NALOXONE HYDROCHLORIDE 0.4 MG/ML
0.2 INJECTION, SOLUTION INTRAMUSCULAR; INTRAVENOUS; SUBCUTANEOUS
Status: DISCONTINUED | OUTPATIENT
Start: 2022-03-25 | End: 2022-03-25

## 2022-03-25 RX ORDER — FENTANYL CITRATE 50 UG/ML
25 INJECTION, SOLUTION INTRAMUSCULAR; INTRAVENOUS
Status: DISCONTINUED | OUTPATIENT
Start: 2022-03-25 | End: 2022-03-25

## 2022-03-25 RX ORDER — ISOSORBIDE DINITRATE 30 MG/1
60 TABLET ORAL ONCE
Status: DISCONTINUED | OUTPATIENT
Start: 2022-03-26 | End: 2022-03-26

## 2022-03-25 RX ORDER — WARFARIN SODIUM 2.5 MG/1
2.5 TABLET ORAL
Status: COMPLETED | OUTPATIENT
Start: 2022-03-25 | End: 2022-03-25

## 2022-03-25 RX ORDER — ISOSORBIDE DINITRATE 10 MG/1
40 TABLET ORAL ONCE
Status: COMPLETED | OUTPATIENT
Start: 2022-03-26 | End: 2022-03-26

## 2022-03-25 RX ORDER — ACYCLOVIR 200 MG/1
9.5 CAPSULE ORAL
Status: DISCONTINUED | OUTPATIENT
Start: 2022-03-25 | End: 2022-03-25

## 2022-03-25 RX ORDER — LIDOCAINE HYDROCHLORIDE 20 MG/ML
15 SOLUTION OROPHARYNGEAL ONCE
Status: DISCONTINUED | OUTPATIENT
Start: 2022-03-25 | End: 2022-03-25

## 2022-03-25 RX ORDER — FENTANYL CITRATE 50 UG/ML
50 INJECTION, SOLUTION INTRAMUSCULAR; INTRAVENOUS ONCE
Status: COMPLETED | OUTPATIENT
Start: 2022-03-25 | End: 2022-03-25

## 2022-03-25 RX ORDER — HYDRALAZINE HYDROCHLORIDE 25 MG/1
25 TABLET, FILM COATED ORAL ONCE
Status: COMPLETED | OUTPATIENT
Start: 2022-03-25 | End: 2022-03-25

## 2022-03-25 RX ORDER — HYDRALAZINE HYDROCHLORIDE 50 MG/1
50 TABLET, FILM COATED ORAL EVERY 8 HOURS SCHEDULED
Status: DISCONTINUED | OUTPATIENT
Start: 2022-03-25 | End: 2022-03-27

## 2022-03-25 RX ORDER — SODIUM CHLORIDE 9 MG/ML
INJECTION, SOLUTION INTRAVENOUS CONTINUOUS PRN
Status: DISCONTINUED | OUTPATIENT
Start: 2022-03-25 | End: 2022-03-25

## 2022-03-25 RX ORDER — HYDRALAZINE HYDROCHLORIDE 10 MG/1
10 TABLET, FILM COATED ORAL ONCE
Status: COMPLETED | OUTPATIENT
Start: 2022-03-25 | End: 2022-03-25

## 2022-03-25 RX ORDER — ISOSORBIDE DINITRATE 10 MG/1
20 TABLET ORAL ONCE
Status: COMPLETED | OUTPATIENT
Start: 2022-03-25 | End: 2022-03-25

## 2022-03-25 RX ORDER — FEXOFENADINE HCL 180 MG/1
180 TABLET ORAL DAILY PRN
Status: DISCONTINUED | OUTPATIENT
Start: 2022-03-25 | End: 2022-04-08 | Stop reason: HOSPADM

## 2022-03-25 RX ADMIN — AMIODARONE HYDROCHLORIDE 1 MG/MIN: 50 INJECTION, SOLUTION INTRAVENOUS at 12:28

## 2022-03-25 RX ADMIN — LEVOFLOXACIN 500 MG: 5 INJECTION, SOLUTION INTRAVENOUS at 08:30

## 2022-03-25 RX ADMIN — MUPIROCIN 0.5 G: 20 OINTMENT TOPICAL at 08:31

## 2022-03-25 RX ADMIN — ACETAMINOPHEN 975 MG: 325 TABLET ORAL at 06:08

## 2022-03-25 RX ADMIN — OXYCODONE HYDROCHLORIDE 10 MG: 10 TABLET ORAL at 08:30

## 2022-03-25 RX ADMIN — ACETAMINOPHEN 975 MG: 325 TABLET ORAL at 13:40

## 2022-03-25 RX ADMIN — MIDAZOLAM 2 MG: 1 INJECTION INTRAMUSCULAR; INTRAVENOUS at 11:58

## 2022-03-25 RX ADMIN — SENNOSIDES AND DOCUSATE SODIUM 1 TABLET: 8.6; 5 TABLET ORAL at 08:31

## 2022-03-25 RX ADMIN — METOCLOPRAMIDE 10 MG: 10 TABLET ORAL at 13:41

## 2022-03-25 RX ADMIN — VANCOMYCIN HYDROCHLORIDE 1500 MG: 10 INJECTION, POWDER, LYOPHILIZED, FOR SOLUTION INTRAVENOUS at 08:30

## 2022-03-25 RX ADMIN — HYDRALAZINE HYDROCHLORIDE 25 MG: 25 TABLET, FILM COATED ORAL at 13:40

## 2022-03-25 RX ADMIN — FLUCONAZOLE IN SODIUM CHLORIDE 200 MG: 2 INJECTION, SOLUTION INTRAVENOUS at 08:30

## 2022-03-25 RX ADMIN — AMIODARONE HYDROCHLORIDE 1 MG/MIN: 50 INJECTION, SOLUTION INTRAVENOUS at 10:14

## 2022-03-25 RX ADMIN — HYDRALAZINE HYDROCHLORIDE 25 MG: 25 TABLET, FILM COATED ORAL at 20:28

## 2022-03-25 RX ADMIN — OXYCODONE HYDROCHLORIDE 10 MG: 10 TABLET ORAL at 20:28

## 2022-03-25 RX ADMIN — FAMOTIDINE 20 MG: 20 TABLET ORAL at 08:32

## 2022-03-25 RX ADMIN — BENZOCAINE 1 ML: 200 SPRAY DENTAL; ORAL; PERIODONTAL at 12:01

## 2022-03-25 RX ADMIN — HYDRALAZINE HYDROCHLORIDE 50 MG: 50 TABLET, FILM COATED ORAL at 22:29

## 2022-03-25 RX ADMIN — POTASSIUM CHLORIDE 20 MEQ: 750 TABLET, EXTENDED RELEASE ORAL at 22:29

## 2022-03-25 RX ADMIN — ISOSORBIDE DINITRATE 20 MG: 10 TABLET ORAL at 20:28

## 2022-03-25 RX ADMIN — MUPIROCIN 0.5 G: 20 OINTMENT TOPICAL at 20:30

## 2022-03-25 RX ADMIN — DEXTROSE MONOHYDRATE 150 MG: 50 INJECTION, SOLUTION INTRAVENOUS at 08:50

## 2022-03-25 RX ADMIN — HEPARIN SODIUM 5000 UNITS: 5000 INJECTION, SOLUTION INTRAVENOUS; SUBCUTANEOUS at 04:10

## 2022-03-25 RX ADMIN — OXYCODONE HYDROCHLORIDE 10 MG: 10 TABLET ORAL at 00:20

## 2022-03-25 RX ADMIN — WARFARIN SODIUM 2.5 MG: 2.5 TABLET ORAL at 17:20

## 2022-03-25 RX ADMIN — POLYETHYLENE GLYCOL 3350 17 G: 17 POWDER, FOR SOLUTION ORAL at 08:31

## 2022-03-25 RX ADMIN — FENTANYL CITRATE 50 MCG: 50 INJECTION INTRAMUSCULAR; INTRAVENOUS at 11:59

## 2022-03-25 RX ADMIN — SENNOSIDES AND DOCUSATE SODIUM 1 TABLET: 8.6; 5 TABLET ORAL at 20:29

## 2022-03-25 RX ADMIN — PANTOPRAZOLE SODIUM 40 MG: 40 TABLET, DELAYED RELEASE ORAL at 08:30

## 2022-03-25 RX ADMIN — SODIUM NITROPRUSSIDE 1.5 MCG/KG/MIN: 25 INJECTION, SOLUTION, CONCENTRATE INTRAVENOUS at 17:51

## 2022-03-25 RX ADMIN — OXYCODONE HYDROCHLORIDE 10 MG: 10 TABLET ORAL at 04:10

## 2022-03-25 RX ADMIN — ACETAMINOPHEN 975 MG: 325 TABLET ORAL at 22:29

## 2022-03-25 RX ADMIN — METOCLOPRAMIDE 10 MG: 10 TABLET ORAL at 16:57

## 2022-03-25 RX ADMIN — OXYCODONE HYDROCHLORIDE 10 MG: 10 TABLET ORAL at 12:51

## 2022-03-25 RX ADMIN — POTASSIUM CHLORIDE 20 MEQ: 14.9 INJECTION, SOLUTION INTRAVENOUS at 12:28

## 2022-03-25 RX ADMIN — HYDRALAZINE HYDROCHLORIDE 25 MG: 25 TABLET, FILM COATED ORAL at 06:08

## 2022-03-25 RX ADMIN — HYDRALAZINE HYDROCHLORIDE 10 MG: 10 TABLET, FILM COATED ORAL at 18:02

## 2022-03-25 RX ADMIN — ISOSORBIDE DINITRATE 10 MG: 10 TABLET ORAL at 18:03

## 2022-03-25 RX ADMIN — HEPARIN SODIUM AND DEXTROSE 500 UNITS/HR: 10000; 5 INJECTION INTRAVENOUS at 10:10

## 2022-03-25 RX ADMIN — SODIUM CHLORIDE 600 MG: 9 INJECTION, SOLUTION INTRAVENOUS at 08:30

## 2022-03-25 RX ADMIN — SODIUM NITROPRUSSIDE 2 MCG/KG/MIN: 25 INJECTION, SOLUTION, CONCENTRATE INTRAVENOUS at 09:14

## 2022-03-25 ASSESSMENT — ACTIVITIES OF DAILY LIVING (ADL)
ADLS_ACUITY_SCORE: 10

## 2022-03-25 NOTE — PLAN OF CARE
Around 0835 Vtach 180-200s for 5-6 seconds, ICD shocked once, LVAD no alarms and no low flow or low PIs during this. Amiodarone bolus and drip given. Bedside ECHO showed vegetation/clot on AV. EMILY done at bedside- it was a shadow not a vegetation/clot on AV. 1630 8 beat run of VTach, no LVAD alarms, vital signs stable, asymtomatic. 6 liters via nasal cannula. Chest tube 10-50ml/q2 hours.  Calderon catheter marginal output (10-38ml/hr).   Heparin Low intensity normogram initiated- antiXa at 2100. Titrated Nipride and Amiodarone continued.

## 2022-03-25 NOTE — PLAN OF CARE
Shift Summary [id number]    Major Shift Events: No acute changes during shift. Pt remains A/Ox4, slightly febrile see flowsheets. Remains on 3.5L NC tolerating well, pt has shallow rapid breathing, treated w/pain meds. NSR to ST; Nipride remains, see flowsheets for JOANNA. LVAD flows, PI, and power WDL, see flowsheets. Calderon, marginal output, no BM. No new skin issues present during shift.    Plan: continue current  For vital signs and complete assessments, please see documentation flowsheets.

## 2022-03-25 NOTE — PROGRESS NOTES
Cardiology Progress Note    Assessment & Plan   Tej Morfin is a 56 year old male admitted on 3/22/2022. He has history of NICM secondary to severe MR s/p MV ring repair (), now on home inotrope (Dobutamine 2.5), sustained VT s/p secondary prevention ICD, who presents for scheduled LVAD placement. Now status post HM3 on 3/23/2022.     Changes Today:  - Consider increasing PO afterload if stable after EMILY   - Could go up to 37.5 on Hydral and add Isordil 10 TID   - Wean nirpide  - ICD fire for possible VT; Amiodarone gtt and infusion  - Electrophysiology consult  - Limited echo to eval LVAD inflow cannula position and LV size   - Showed possible AV thrombus   - Plan to pursue a EMILY (Consent obtained)  - Goal CVP ~10   - Started on straight rate heparin     #. Non-ischemic Cardiomyopathy status post HM3 (3/23/2022)  #. Stage D; NYHA Class III     LVAD: Speed 5300 RPM, 3.4 LPM, PI 5.5, Prw 3.5       DATE MAP CVP PAP PCWP Harris CO Harris CI SVR MVo2 Therapies   3/24 91 10 45/15 15 5.2 2.4 1200 60 LVAD 5200,  2.5   3/25 83 8 39/16 14 5.6 2.6 1200 50 LVAD 5300,   Nipride            - Repeat TTE today showed LVIDd 6.8, apppropriate LV cannula position, Aortic Valve opening, and a small mobile echodensity on valvular side of AV   - Will plan to pursue further workup with a EMILY     - Hemodynamics: RHC; monitor hemodynamics q6h  - Inotrope:  Weaned off  - Pressors: Weaned off   - Afterload: Start nipride with MAP goal of ~75. Up-titrate PO hydralazine and isordil as tolerated.  - BB: Not on BB at this time due to cardiogenic shock  - Aldosterone antagonist: Not on it at this time.   - SCD prophylaxis: Yes. CRT-D. Will discuss with EP if we should turn off the LV lead  - AC: Straight rate heparin today, will talk to CVTS about increasing given possible AV thrombus  - Anti-platelet: RICARDO trial, no aspirin products  - Fluid status: Euvolemic, goal CVP ~10    #Hx of Ventricular Arrhythmias:  Previously on sotalol which  "was held in the perioperative period. Episode of VT with ICD fire on 3/25. Hemodynamically stable with no LVAD alarms during the episode.  - Amiodarone bolus and gtt  - ICD interrogation  - Electrophysiology consult   - Consider increasing upper limit for tachy therapies  - Consider transitioning back to sotalol when hemodynamically stable    #Possible AV thrombus  Noted on echo today. Will plan to purse a EMILY today.    This patient was seen and discussed with Dr. Elizabeth who agrees with the above assessment and plan.     Marquis Luciano MD  Cardiology Fellow  [phone]    Interval History   MELANIE overnight. Pain better controlled. Had an episode of VT with ICD fire this AM.     Physical Exam   Temp: 98.7  F (37.1  C) Temp src: Oral  Pulse: 101   Resp: 26 SpO2: 92 % O2 Device: Nasal cannula Oxygen Delivery: 3 LPM  Vitals:    03/23/22 0500 03/24/22 0000 03/25/22 0000   Weight: 85.9 kg (189 lb 6 oz) 92.3 kg (203 lb 7.8 oz) 93.2 kg (205 lb 7.5 oz)     Vital Signs with Ranges  Temp:  [98.7  F (37.1  C)-99.5  F (37.5  C)] 98.7  F (37.1  C)  Pulse:  [] 101  Resp:  [9-35] 26  MAP:  [72 mmHg-159 mmHg] 83 mmHg  Arterial Line BP: ()/(62-82) 94/78  SpO2:  [75 %-100 %] 92 %  I/O last 3 completed shifts:  In: 2744.73 [P.O.:780; I.V.:1294.73; NG/GT:170; IV Piggyback:500]  Out: 1627 [Urine:777; Chest Tube:850]     , Blood pressure (!) 131/90, pulse 101, temperature 98.7  F (37.1  C), temperature source Oral, resp. rate 26, height 1.791 m (5' 10.51\"), weight 93.2 kg (205 lb 7.5 oz), SpO2 92 %.  205 lbs 7.5 oz  GEN: Alert, no acute distress   CV:  LVAD Hum +   LUNGS:  Clear to auscultation bilaterally  ABD:  Active bowel sounds, soft, non-tender/non-distended.  No rebound/guarding/rigidity.  EXT:  No edema or cyanosis. WWP.      Medications     heparin       nitroPRUsside 2 mcg/kg/min (03/25/22 0700)     sodium chloride       Warfarin Therapy Reminder         acetaminophen  975 mg Oral or Feeding Tube Q8H     famotidine  " 20 mg Oral or Feeding Tube BID     fluconazole  200 mg Intravenous Q24H     heparin ANTICOAGULANT  5,000 Units Subcutaneous Q8H     hydrALAZINE  25 mg Oral Q8H PARKER     insulin aspart  1-7 Units Subcutaneous TID AC     insulin aspart  1-5 Units Subcutaneous At Bedtime     levofloxacin  500 mg Intravenous Q24H     mupirocin  0.5 g Both Nostrils BID     pantoprazole  40 mg Oral or NG Tube Daily    Or     pantoprazole  40 mg Oral Daily     polyethylene glycol  17 g Oral or Feeding Tube Daily     senna-docusate  1 tablet Oral or Feeding Tube BID     sodium chloride (PF)  3 mL Intracatheter Q8H     vancomycin  1,500 mg Intravenous Q12H     warfarin ANTICOAGULANT  5 mg Oral ONCE at 18:00       Data   Recent Labs   Lab 03/25/22  0821 03/25/22  0607 03/25/22  0424 03/24/22  2208 03/24/22  1956 03/24/22  1540 03/24/22  1210 03/24/22  1104 03/24/22  0357 03/24/22  0354 03/23/22  1812 03/23/22  1809 03/23/22  1443 03/23/22  1433   WBC  --   --  11.0  --   --   --   --   --   --  7.3  --  8.4  --  11.5*   HGB  --   --  8.6*  --   --   --   --   --   --  9.4*  --  9.4*  --  9.0*   MCV  --   --  98  --   --   --   --   --   --  98  --  97  --  98   PLT  --   --  99*  --   --   --   --   --   --  99*  --  112*  --  109*   INR  --   --  1.26*  --   --   --   --  1.33*  --   --   --   --   --  1.61*   NA  --   --  141 140  --  144  --   --   --  144  --   --   --  146*   POTASSIUM  --   --  3.9 4.1  --  3.9  --   --   --  4.6  --   --   --  3.7  3.7   CHLORIDE  --   --  107 109  --  112*  --   --   --  111*  --   --   --  116*   CO2  --   --  27 27  --  29  --   --   --  28  --   --   --  23   BUN  --   --  16 16  --  17  --   --   --  15  --   --   --  9   CR  --   --  0.85 0.82  --  0.81  --   --   --  0.94  --   --   --  0.84   ANIONGAP  --   --  7 4  --  3  --   --   --  5  --   --   --  7   YESICA  --   --  8.3* 8.2*  --  7.6*  --   --   --  7.8*  --   --   --  6.4*   * 125* 124* 127*   < > 116*   < >  --    < > 131*   < >   --    < > 137*   ALBUMIN  --   --  2.2*  --   --   --   --   --   --  2.3*  --   --   --  1.3*   PROTTOTAL  --   --  5.2*  --   --   --   --   --   --  4.9*  --   --   --  3.1*   BILITOTAL  --   --  1.7*  --   --   --   --   --   --  1.0  --   --   --  1.4*   ALKPHOS  --   --  40  --   --   --   --   --   --  42  --   --   --  35*   ALT  --   --  14  --   --   --   --   --   --  14  --   --   --  8   AST  --   --  47*  --   --   --   --   --   --  48*  --   --   --  30    < > = values in this interval not displayed.       Recent Results (from the past 24 hour(s))   XR Chest Port 1 View    Narrative    EXAM: XR CHEST PORT 1 VIEW  3/25/2022 1:39 AM     HISTORY:  s/p LVAD placement       COMPARISON:  3/24/2022    TECHNIQUE: Single frontal radiograph of the chest    FINDINGS:   Interval extubation. Otherwise stable right IJ central venous catheter  tip, LVAD, cardiac defibrillator, mediastinal drains and postsurgical  chest.    Midline trachea. Stable enlarged cardiomediastinal silhouette. No  pleural effusion or appreciable pneumothorax. Stable retrocardiac  opacities.      Impression    IMPRESSION: Interval extubation. Otherwise stable postsurgical chest,  support devices and retrocardiac opacities.    I have personally reviewed the examination and initial interpretation  and I agree with the findings.    CARLA PAREDES MD         SYSTEM ID:  [id number]

## 2022-03-25 NOTE — PROGRESS NOTES
D: Stopped by patient's room for routine VAD rounding. Patient awake, follows commands. Wife Jessenia and daughter Nicol present.  I: Discussed POC and provided support and listened to patient and care giver's thoughts and concerns. Education binder, caregiver packet, link to LVAD videos, and contact info given.   A: Patient and caregivers need post-VAD education.  P: Start education on Monday from 1pm-3pm with wife only and coordinate further education with sister-in-law next week pending schedule and patient status. Continue to follow patient and address any questions or concerns patient and or caregiver may have.

## 2022-03-25 NOTE — CONSULTS
Cardiac Electrophysiology consultation      Reason For Consultation: ICD Shock     HPI:  56 year old gentleman with non-ischemic cardiomyopathy ACC/AHA Stage D, NYHA Class IV cardiomyopathy that presented to the hospital for LVAD evaluation and placement after being on home inotropes.  From an EP standpoint, he had a history of VT and received a [employer] CRT-D for secondary prevention.  He was previously on sotalol that was stopped.    He received an LVAD on 3/23/22 and has been recovering well.  This AM, at approximately 0830, he had an arrhythmia leading to an ICD shock.  We are asked to comment and help with management.  In addition, there is question regarding sotalol therapy.    Lastly, his LV lead has increasing-post-operative-thresholds and there are questions as to whether this should be kept on.    Mr. Morfin feels significantly better post operatively.  He has no active complaints.     Past Medical History:      Past Medical History:   Diagnosis Date     Chronic systolic heart failure (H) 2/7/2017     Gastroparesis      ICD (implantable cardioverter-defibrillator) in place     [employer]     Non-ischemic cardiomyopathy (H)      Paroxysmal ventricular tachycardia (H) 2/7/2017     S/P mitral valve replacement 2/7/2017     Tetrahydrocannabinol (THC) use disorder, mild, in controlled environment, abuse 09/12/2019    occasional edible THC. reportedly for sleep, anxiety     Tobacco abuse, episodic     occasional cigar - ~ 3x/wk       Past Surgical  History:      Past Surgical History:   Procedure Laterality Date     HEMORRHOIDECTOMY       ICD implantation        INSERT VENTRICULAR ASSIST DEVICE LEFT (HEARTMATE II) N/A 3/23/2022    Procedure: Redo Sterotomy, Insertion  Left Ventricular Assist Device  (HEARTMATE III) on Cardiopulmonary Bypass, Transesophageal Echocardiogram by Anesthesia;  Surgeon: Wilver Rodríguez MD;  Location: [facility] OR     R partial medial meniscectomy       REPAIR VALVE MITRAL    "      Family History:      Family History   Problem Relation Age of Onset     Coronary Artery Disease Mother      Kidney failure Mother      Cardiomyopathy Father          age 51       Social History:      Social History     Socioeconomic History     Marital status:      Spouse name: Not on file     Number of children: Not on file     Years of education: Not on file     Highest education level: Not on file   Occupational History     Not on file   Tobacco Use     Smoking status: Former Smoker     Smokeless tobacco: Never Used   Substance and Sexual Activity     Alcohol use: No     Alcohol/week: 0.0 standard drinks     Drug use: No     Sexual activity: Not on file   Other Topics Concern     Not on file   Social History Narrative     Not on file     Social Determinants of Health     Financial Resource Strain: Not on file   Food Insecurity: Not on file   Transportation Needs: Not on file   Physical Activity: Not on file   Stress: Not on file   Social Connections: Not on file   Intimate Partner Violence: Not on file   Housing Stability: Not on file       ROS:    A complete review of systems is negative except as noted above in the HPI.    Physical Exam:   Vitals: BP (!) 131/90   Pulse 95   Temp 99.2  F (37.3  C) (Oral)   Resp 28   Ht 1.791 m (5' 10.51\")   Wt 93.2 kg (205 lb 7.5 oz)   SpO2 91%   BMI 29.06 kg/m       Gen: Fatigued appearing although no distress  HEENT: NC AT  Chest: Midline bandage  Cardiovascular: Tachycardia, VAD hum  Abd: Non tender  Neuro: A&OX4         Relevant labs, imaging, medications and procedures were reviewed.    Assessment and Recommendations:     Pleasant 56 year old gentleman with NICM, CRTD now s/p VAD with HMIII on 3/23.  His postoperative LV thresholds have been rising.  In addition, he received an ICD shock this AM.  We are asked to opine.    ICD Shock: Device interrogation reveals AF with RVR as the etiology.  ICD shock converted to sinus rhythm.  Ventricular rate was " in the VT detection zone. We will make the following changed to minimize risk of shock;  -Single VF zone at 220bpm with 15 second detection time.  Given LVAD, there is less risk of hemodynamic instability and this should allow for other therapies (such as ATP) work better if in VT.    Atrial Fibrillation: Post-operative: ICD shock secondary to AF with RVR.  He received amiodarone today.  Would recommend resuming sotalol when ok to start BB from an RV standpoint.  Please continue home dose 80mg BID when appropriate from a postoperative standpoint.  He will be on long term anticoagulation given his LVAD.    CRT: Patient has a CRT device with rising thresholds on the LV.  This may be postoperative inflammation.  However, there exists emerging data to suggest LV lead in LVAD patient is not only unneccessary, but potentially harmful.  We will turn this off.  Given lack of need for pacing, we will reprogram the AV delay on the RV lead to minimize need for pacing.  -Intrinsic A-V interval detected at 110ms.  We will increase sensed and paced AV delay to 300/280ms respectively.    -LV lead is turned off.  This will also help with device longevity, decrease need for changeouts and reduce risk of infections.    Care discussed with primary team in person.    Paul Salcedo MD   Cardiac electrophysiology fellow    Addendum:  I saw and evaluated the patient and agree with the fellow s finding and plans as written.  The ICD shock was inappropriate one to AF.  His AF is likely to be postoperative, and we will start him on Sotalol and see how it works.  Megha Levin MD

## 2022-03-25 NOTE — PROGRESS NOTES
CV ICU PROGRESS NOTE  March 25, 2022      CO-MORBIDITIES:   LVAD (left ventricular assist device) present (H)  (primary encounter diagnosis)  Chronic systolic congestive heart failure (H)      ASSESSMENT: Tej Morfin is a 56 year old male with NICM secondary to valvulopathy severe MR s/p MV ring repair, now on home inotrope (Dobutamine 2.5), sustained VT s/p secondary prevention ICD, CKD stage II (baseline 1.5) who presents for LVAD placement with heartmate 3 by Dr. Rodríguez on 3/23/22     TODAY'S PROGRESS:   - EMILY to assess possible RV thrombus. This will guide heparin dosing  - EP consulted regarding VT episode, will direct PTA meds  - amio bolused   - restart home reglan  - cards to restart home sotalol  - restart home allergy medications  - warfarin     PLAN:  Neuro/ pain/ sedation:  Acute Postoperative pain  - Monitor neurological status. Notify the MD for any acute changes in exam.  - Pain: Scheduled tylenol and gabapentin. PRN tylenol, oxycodone, robaxin     Pulmonary care:   Postoperative ventilation management  #allergic rhinitis  - Titrate supplemental oxygen to maintain saturation above 92%.  - Pulmonary hygiene: Incentive spirometer every 15- 30 minutes when awake, flutter valve, C&DB  - restart home allergy meds     Cardiovascular:    S/p LVAD placement (HeartMate 3) on 3/23/2022 by Dr. Rodríguez  History of NICM, MR with VT and permanent defib  Mild MR, moderate RV dysfunction on EMILY  #balloon pump - removed  Recent echo on 7/2020 with LVEF of 20%  - Monitor hemodynamic status.   - PTA ASA, dobutamine infusion, entresto, sotalol, hydralazine  - Goal MAP 65-85  - Epi, norepi, vaso gtt; wean as tolerated  - Immunosuppression per surgery    EMILY today to better view the AV, possible increase of heparin  EP to look at VT event       GI care/ Nutrition:   - PPI  - Continue bowel regimen: miralax, senna    Renal/ Fluid Balance/ Electrolytes:   BL creat appears to be ~ 0.9  - Strict I/O, daily weights  -  Avoid/limit nephrotoxins as able  - Replete lytes PRN per protocol  - PTA lasix held, dosing as needed       Endocrine:    Stress induced hyperglycemia  Preop A1c 5.9  - hdSSI  - Goal BG <180 for optimal healing     ID/ Antibiotics:  Stress induced leukocytosis  - To complete perioperative regimen  - Continue to monitor fever curve, WBC and inflammatory markers as appropriate     Heme:     Stress induced leukocytosis  Acute blood loss anemia  Acute blood loss thrombocytopenia  No s/sx active bleeding  - Continue to monitor  - CBC      MSK/ Skin:  Sternotomy  Surgical Incision  - Sternal precautions  - Postoperative incision management per protocol  - PT/OT/CR     Prophylaxis:    - Mechanical prophylaxis for DVT  - Chemical DVT prophylaxis  - PPI      Lines/ tubes/ drains:  - Arterial Line, CTs, PA catheter, RIJ, scott     Disposition:  - CVICU     Patient seen, findings and plan discussed with CVICU staff     Discussed with CVTS staff and fellow     Helder Chaudhari DO CA-2  Department of Anesthesiology  [phone]      ====================================    SUBJECTIVE:   Calm and relaxed, feels well after his defibrillation event    OBJECTIVE:   1. VITAL SIGNS:   Temp:  [98.7  F (37.1  C)-99.5  F (37.5  C)] 98.7  F (37.1  C)  Pulse:  [] 101  Resp:  [9-35] 26  MAP:  [72 mmHg-159 mmHg] 83 mmHg  Arterial Line BP: ()/(62-81) 94/78  SpO2:  [75 %-100 %] 92 %  Vent Mode: (S) CPAP/PS  (Continuous positive airway pressure with Pressure Support)  FiO2 (%): 40 %  Resp Rate (Set): 12 breaths/min  Tidal Volume (Set, mL): 450 mL  PEEP (cm H2O): 5 cmH2O  Pressure Support (cm H2O): 7 cmH2O  Resp: 26      2. INTAKE/ OUTPUT:   I/O last 3 completed shifts:  In: 2744.73 [P.O.:780; I.V.:1294.73; NG/GT:170; IV Piggyback:500]  Out: 1627 [Urine:777; Chest Tube:850]    3. PHYSICAL EXAMINATION:   General: calm and sitting in bed  Neuro: awake and orientated x3  Resp: on nasal cannula  CV: soft heart sounds, hum of VAD  Abdomen:  Soft, Non-distended, Non-tender  Incisions: c/d/i  Extremities: warm and well perfused    4. INVESTIGATIONS:   Arterial Blood Gases   Recent Labs   Lab 03/25/22  0423 03/24/22  1104 03/24/22  0353 03/23/22  1809   PH 7.48* 7.46* 7.45 7.43   PCO2 39 40 42 40   PO2 63* 114* 112* 122*   HCO3 29* 28 29* 27     Complete Blood Count   Recent Labs   Lab 03/25/22  0828 03/25/22  0424 03/24/22  0354 03/23/22  1809   WBC 11.1* 11.0 7.3 8.4   HGB 8.3* 8.6* 9.4* 9.4*   * 99* 99* 112*     Basic Metabolic Panel  Recent Labs   Lab 03/25/22  0821 03/25/22  0607 03/25/22  0424 03/24/22  2208 03/24/22  1956 03/24/22  1540 03/24/22  0357 03/24/22  0354   NA  --   --  141 140  --  144  --  144   POTASSIUM  --   --  3.9 4.1  --  3.9  --  4.6   CHLORIDE  --   --  107 109  --  112*  --  111*   CO2  --   --  27 27  --  29  --  28   BUN  --   --  16 16  --  17  --  15   CR  --   --  0.85 0.82  --  0.81  --  0.94   * 125* 124* 127*   < > 116*   < > 131*    < > = values in this interval not displayed.     Liver Function Tests  Recent Labs   Lab 03/25/22  0424 03/24/22  1104 03/24/22  0354 03/23/22  1433 03/23/22  1220 03/23/22  0347   AST 47*  --  48* 30  --  13   ALT 14  --  14 8  --  14   ALKPHOS 40  --  42 35*  --  63   BILITOTAL 1.7*  --  1.0 1.4*  --  0.4   ALBUMIN 2.2*  --  2.3* 1.3*  --  2.3*   INR 1.26* 1.33*  --  1.61* 1.72* 1.11     Pancreatic Enzymes  No lab results found in last 7 days.  Coagulation Profile  Recent Labs   Lab 03/25/22  0424 03/24/22  1104 03/23/22  1433 03/23/22  1220 03/23/22  0347   INR 1.26* 1.33* 1.61* 1.72* 1.11   PTT  --   --  39* 27 34         5. RADIOLOGY:   Recent Results (from the past 24 hour(s))   XR Chest Port 1 View    Narrative    EXAM: XR CHEST PORT 1 VIEW  3/25/2022 1:39 AM     HISTORY:  s/p LVAD placement       COMPARISON:  3/24/2022    TECHNIQUE: Single frontal radiograph of the chest    FINDINGS:   Interval extubation. Otherwise stable right IJ central venous catheter  tip, LVAD,  cardiac defibrillator, mediastinal drains and postsurgical  chest.    Midline trachea. Stable enlarged cardiomediastinal silhouette. No  pleural effusion or appreciable pneumothorax. Stable retrocardiac  opacities.      Impression    IMPRESSION: Interval extubation. Otherwise stable postsurgical chest,  support devices and retrocardiac opacities.    I have personally reviewed the examination and initial interpretation  and I agree with the findings.    CARLA PAREDES MD         SYSTEM ID:  [id number]   Echocardiogram Limited   Result Value    LVEF  10-15% (severely reduced)    [facility]    [id number]  [id number]  [id number]  [id number]^ORQUIDEA     [facility],[place]  Echocardiography Laboratory  [address] [place] [id number]     Name: NATASHA HARVEY  MRN: [id number]  : 1965  Study Date: 2022 09:08 AM  Age: 56 yrs  Gender: Male  Patient Location: [facility]  Reason For Study: LVAD, limited for LV size and cannula position  Ordering Physician: ORQUIDEA FENG  Referring Physician: LOUISA LIRA  Performed By: Jimi Burden RDCS     BSA: 2.1 m2  Height: 70 in  Weight: 205 lb  HR: 99  BP: 131/90 mmHg  ______________________________________________________________________________  Procedure  Limited Portable Echo Adult.  ______________________________________________________________________________  Interpretation Summary  Limited Portable Echo Adult.  S/p HM3 LVAD at 5300 RPM  There is a small mobile echodensity (0.8cm) noted on the ventricular side of  the aortic valve. There is no associated aortic insuffeciency. This could  potentially represent thrombus vs vegetation. Consider EMILY for better  classification.  Left ventricular function is severely decreased. The ejection fraction is 10-  15%. There is moderate to severe dilation of the left ventricle (LVIDd =  6.8cm).  The LVAD inflow cannula appears appropriately positioned.  The aortic valve opens with  every beat. No aortic regurgitation is present.  The right ventricle is incompletely visualized. The right ventricular function  appears mildly reduced.     This study was compared with the study from 3/23/2022 (EMILY). .  The mobile echodenisty was not previously seen. The LVAD inflow cannula is  similarly positioned.  ______________________________________________________________________________  Left Ventricle  Moderate to severe left ventricular dilation is present. LVIDd = 6.8cm.  Eccentric hypertrophy. Left ventricular function is decreased. The ejection  fraction is 10-15% (severely reduced). LVAD cannula was seen in the expected  anatomic position in the LV apex. The LVAD inflow cannula appears  appropriately positioned.     Right Ventricle  The right ventricle is incompletely visualized. The systolic function appears  mildly reduced. The right ventricular size cannot be assessed. A right heart  catheter is noted in the right ventricle.     Mitral Valve  Trace mitral insufficiency is present. An annuloplasty ring is noted in the  mitral position.     Aortic Valve  The aortic valve opens with every beat. There is a mobile echodensity (0.8cm)  noted on the ventricular side of the aortic valve. No aortic regurgitation is  present.     Vessels  The inferior vena cava was normal in size with preserved respiratory  variability.     Pericardium  No pericardial effusion is present.     Compared to Previous Study  This study was compared with the study from 3/23/2022 (EMILY). . The mobile  echodenisty was not previously seen. The LVAD inflow cannula is similarly  positioned.  ______________________________________________________________________________  MMode/2D Measurements & Calculations  IVSd: 1.2 cm  LVIDd: 6.8 cm  LVIDs: 6.4 cm  LVPWd: 0.97 cm  FS: 6.5 %  LV mass(C)d: 340.6 grams  LV mass(C)dI: 161.5 grams/m2  RWT: 0.29     ______________________________________________________________________________  Report  approved by: Linda DREW 03/25/2022 10:26 AM             =========================================

## 2022-03-25 NOTE — PRE-PROCEDURE
GENERAL PRE-PROCEDURE:   Procedure:  Transesophageal Echocardiogram  Date/Time:  3/25/2022 11:14 AM    Verbal consent obtained?: Yes    Written consent obtained?: Yes    Risks and benefits: Risks, benefits and alternatives were discussed    Consent given by:  Patient  Patient states understanding of procedure being performed: Yes    Patient's understanding of procedure matches consent: Yes    Procedure consent matches procedure scheduled: Yes    Expected level of sedation:  Moderate  Appropriately NPO:  Yes  ASA Class:  4  Mallampati  :  Grade 2- soft palate, base of uvula, tonsillar pillars, and portion of posterior pharyngeal wall visible  Lungs:  Lungs clear with good breath sounds bilaterally  Heart:  Normal heart sounds and rate  History & Physical reviewed:  History and physical reviewed and no updates needed  Statement of review:  I have reviewed the lab findings, diagnostic data, medications, and the plan for sedation

## 2022-03-26 ENCOUNTER — APPOINTMENT (OUTPATIENT)
Dept: GENERAL RADIOLOGY | Facility: CLINIC | Age: 57
DRG: 001 | End: 2022-03-26
Attending: ANESTHESIOLOGY
Payer: MEDICARE

## 2022-03-26 LAB
ABO/RH(D): NORMAL
ALBUMIN SERPL-MCNC: 2 G/DL (ref 3.4–5)
ALP SERPL-CCNC: 44 U/L (ref 40–150)
ALT SERPL W P-5'-P-CCNC: 12 U/L (ref 0–70)
ANION GAP SERPL CALCULATED.3IONS-SCNC: 5 MMOL/L (ref 3–14)
ANTIBODY SCREEN: NEGATIVE
AST SERPL W P-5'-P-CCNC: 28 U/L (ref 0–45)
BASE EXCESS BLDA CALC-SCNC: 3.4 MMOL/L (ref -9–1.8)
BASE EXCESS BLDA CALC-SCNC: 3.8 MMOL/L (ref -9–1.8)
BASE EXCESS BLDV CALC-SCNC: 2.9 MMOL/L (ref -7.7–1.9)
BASE EXCESS BLDV CALC-SCNC: 3.5 MMOL/L (ref -7.7–1.9)
BASE EXCESS BLDV CALC-SCNC: 3.9 MMOL/L (ref -7.7–1.9)
BASE EXCESS BLDV CALC-SCNC: 4.6 MMOL/L (ref -7.7–1.9)
BILIRUB SERPL-MCNC: 1.3 MG/DL (ref 0.2–1.3)
BUN SERPL-MCNC: 17 MG/DL (ref 7–30)
CA-I BLD-MCNC: 4.6 MG/DL (ref 4.4–5.2)
CALCIUM SERPL-MCNC: 8.4 MG/DL (ref 8.5–10.1)
CHLORIDE BLD-SCNC: 108 MMOL/L (ref 94–109)
CO2 SERPL-SCNC: 26 MMOL/L (ref 20–32)
CREAT SERPL-MCNC: 0.82 MG/DL (ref 0.66–1.25)
ERYTHROCYTE [DISTWIDTH] IN BLOOD BY AUTOMATED COUNT: 14.1 % (ref 10–15)
GFR SERPL CREATININE-BSD FRML MDRD: >90 ML/MIN/1.73M2
GLUCOSE BLD-MCNC: 110 MG/DL (ref 70–99)
GLUCOSE BLDC GLUCOMTR-MCNC: 105 MG/DL (ref 70–99)
GLUCOSE BLDC GLUCOMTR-MCNC: 106 MG/DL (ref 70–99)
GLUCOSE BLDC GLUCOMTR-MCNC: 113 MG/DL (ref 70–99)
GLUCOSE BLDC GLUCOMTR-MCNC: 119 MG/DL (ref 70–99)
GLUCOSE BLDC GLUCOMTR-MCNC: 130 MG/DL (ref 70–99)
GLUCOSE BLDC GLUCOMTR-MCNC: 94 MG/DL (ref 70–99)
HCO3 BLD-SCNC: 27 MMOL/L (ref 21–28)
HCO3 BLD-SCNC: 27 MMOL/L (ref 21–28)
HCO3 BLDV-SCNC: 27 MMOL/L (ref 21–28)
HCO3 BLDV-SCNC: 28 MMOL/L (ref 21–28)
HCT VFR BLD AUTO: 26.7 % (ref 40–53)
HGB BLD-MCNC: 8.5 G/DL (ref 13.3–17.7)
INR PPP: 1.3 (ref 0.85–1.15)
MAGNESIUM SERPL-MCNC: 2.2 MG/DL (ref 1.6–2.3)
MCH RBC QN AUTO: 31.8 PG (ref 26.5–33)
MCHC RBC AUTO-ENTMCNC: 31.8 G/DL (ref 31.5–36.5)
MCV RBC AUTO: 100 FL (ref 78–100)
O2/TOTAL GAS SETTING VFR VENT: 21 %
O2/TOTAL GAS SETTING VFR VENT: 21 %
O2/TOTAL GAS SETTING VFR VENT: 56 %
O2/TOTAL GAS SETTING VFR VENT: 56 %
O2/TOTAL GAS SETTING VFR VENT: 6 %
O2/TOTAL GAS SETTING VFR VENT: 6 %
OXYHGB MFR BLD: 91 % (ref 92–100)
OXYHGB MFR BLD: 95 % (ref 92–100)
OXYHGB MFR BLDV: 47 % (ref 70–75)
OXYHGB MFR BLDV: 57 % (ref 70–75)
OXYHGB MFR BLDV: 59 % (ref 70–75)
OXYHGB MFR BLDV: 60 % (ref 70–75)
PCO2 BLD: 32 MM HG (ref 35–45)
PCO2 BLD: 34 MM HG (ref 35–45)
PCO2 BLDV: 36 MM HG (ref 40–50)
PCO2 BLDV: 37 MM HG (ref 40–50)
PCO2 BLDV: 38 MM HG (ref 40–50)
PCO2 BLDV: 39 MM HG (ref 40–50)
PH BLD: 7.5 [PH] (ref 7.35–7.45)
PH BLD: 7.53 [PH] (ref 7.35–7.45)
PH BLDV: 7.46 [PH] (ref 7.32–7.43)
PH BLDV: 7.46 [PH] (ref 7.32–7.43)
PH BLDV: 7.48 [PH] (ref 7.32–7.43)
PH BLDV: 7.5 [PH] (ref 7.32–7.43)
PHOSPHATE SERPL-MCNC: 2.1 MG/DL (ref 2.5–4.5)
PLATELET # BLD AUTO: 111 10E3/UL (ref 150–450)
PO2 BLD: 60 MM HG (ref 80–105)
PO2 BLD: 71 MM HG (ref 80–105)
PO2 BLDV: 28 MM HG (ref 25–47)
PO2 BLDV: 30 MM HG (ref 25–47)
PO2 BLDV: 31 MM HG (ref 25–47)
PO2 BLDV: 33 MM HG (ref 25–47)
POTASSIUM BLD-SCNC: 3.6 MMOL/L (ref 3.4–5.3)
POTASSIUM BLD-SCNC: 3.7 MMOL/L (ref 3.4–5.3)
POTASSIUM BLD-SCNC: 3.8 MMOL/L (ref 3.4–5.3)
POTASSIUM BLD-SCNC: 3.8 MMOL/L (ref 3.4–5.3)
POTASSIUM BLD-SCNC: 4 MMOL/L (ref 3.4–5.3)
PROT SERPL-MCNC: 5.5 G/DL (ref 6.8–8.8)
RBC # BLD AUTO: 2.67 10E6/UL (ref 4.4–5.9)
SODIUM SERPL-SCNC: 139 MMOL/L (ref 133–144)
SPECIMEN EXPIRATION DATE: NORMAL
UFH PPP CHRO-ACNC: 0.18 IU/ML
UFH PPP CHRO-ACNC: 0.24 IU/ML
UFH PPP CHRO-ACNC: 0.67 IU/ML
WBC # BLD AUTO: 12.6 10E3/UL (ref 4–11)

## 2022-03-26 PROCEDURE — 82805 BLOOD GASES W/O2 SATURATION: CPT | Performed by: STUDENT IN AN ORGANIZED HEALTH CARE EDUCATION/TRAINING PROGRAM

## 2022-03-26 PROCEDURE — 93010 ELECTROCARDIOGRAM REPORT: CPT | Performed by: INTERNAL MEDICINE

## 2022-03-26 PROCEDURE — 250N000013 HC RX MED GY IP 250 OP 250 PS 637: Performed by: STUDENT IN AN ORGANIZED HEALTH CARE EDUCATION/TRAINING PROGRAM

## 2022-03-26 PROCEDURE — 71045 X-RAY EXAM CHEST 1 VIEW: CPT

## 2022-03-26 PROCEDURE — 250N000011 HC RX IP 250 OP 636: Performed by: INTERNAL MEDICINE

## 2022-03-26 PROCEDURE — 84132 ASSAY OF SERUM POTASSIUM: CPT | Performed by: STUDENT IN AN ORGANIZED HEALTH CARE EDUCATION/TRAINING PROGRAM

## 2022-03-26 PROCEDURE — 999N000015 HC STATISTIC ARTERIAL MONITORING DAILY

## 2022-03-26 PROCEDURE — 93750 INTERROGATION VAD IN PERSON: CPT | Performed by: INTERNAL MEDICINE

## 2022-03-26 PROCEDURE — 99291 CRITICAL CARE FIRST HOUR: CPT | Mod: 25 | Performed by: INTERNAL MEDICINE

## 2022-03-26 PROCEDURE — 250N000013 HC RX MED GY IP 250 OP 250 PS 637: Performed by: INTERNAL MEDICINE

## 2022-03-26 PROCEDURE — 85027 COMPLETE CBC AUTOMATED: CPT | Performed by: STUDENT IN AN ORGANIZED HEALTH CARE EDUCATION/TRAINING PROGRAM

## 2022-03-26 PROCEDURE — 250N000011 HC RX IP 250 OP 636: Performed by: STUDENT IN AN ORGANIZED HEALTH CARE EDUCATION/TRAINING PROGRAM

## 2022-03-26 PROCEDURE — 85520 HEPARIN ASSAY: CPT | Performed by: SURGERY

## 2022-03-26 PROCEDURE — 71045 X-RAY EXAM CHEST 1 VIEW: CPT | Mod: 26 | Performed by: RADIOLOGY

## 2022-03-26 PROCEDURE — 85610 PROTHROMBIN TIME: CPT | Performed by: STUDENT IN AN ORGANIZED HEALTH CARE EDUCATION/TRAINING PROGRAM

## 2022-03-26 PROCEDURE — 200N000002 HC R&B ICU UMMC

## 2022-03-26 PROCEDURE — 84132 ASSAY OF SERUM POTASSIUM: CPT | Performed by: INTERNAL MEDICINE

## 2022-03-26 PROCEDURE — 85520 HEPARIN ASSAY: CPT | Performed by: INTERNAL MEDICINE

## 2022-03-26 PROCEDURE — 258N000003 HC RX IP 258 OP 636: Performed by: INTERNAL MEDICINE

## 2022-03-26 PROCEDURE — 80053 COMPREHEN METABOLIC PANEL: CPT | Performed by: STUDENT IN AN ORGANIZED HEALTH CARE EDUCATION/TRAINING PROGRAM

## 2022-03-26 PROCEDURE — 82330 ASSAY OF CALCIUM: CPT | Performed by: STUDENT IN AN ORGANIZED HEALTH CARE EDUCATION/TRAINING PROGRAM

## 2022-03-26 PROCEDURE — 93005 ELECTROCARDIOGRAM TRACING: CPT

## 2022-03-26 PROCEDURE — 86850 RBC ANTIBODY SCREEN: CPT | Performed by: INTERNAL MEDICINE

## 2022-03-26 PROCEDURE — 250N000009 HC RX 250: Performed by: INTERNAL MEDICINE

## 2022-03-26 PROCEDURE — 84100 ASSAY OF PHOSPHORUS: CPT | Performed by: STUDENT IN AN ORGANIZED HEALTH CARE EDUCATION/TRAINING PROGRAM

## 2022-03-26 PROCEDURE — 258N000003 HC RX IP 258 OP 636: Performed by: STUDENT IN AN ORGANIZED HEALTH CARE EDUCATION/TRAINING PROGRAM

## 2022-03-26 PROCEDURE — 999N000045 HC STATISTIC DAILY SWAN MONITORING

## 2022-03-26 PROCEDURE — 86901 BLOOD TYPING SEROLOGIC RH(D): CPT | Performed by: INTERNAL MEDICINE

## 2022-03-26 PROCEDURE — 99233 SBSQ HOSP IP/OBS HIGH 50: CPT | Mod: GC | Performed by: INTERNAL MEDICINE

## 2022-03-26 PROCEDURE — 83735 ASSAY OF MAGNESIUM: CPT | Performed by: STUDENT IN AN ORGANIZED HEALTH CARE EDUCATION/TRAINING PROGRAM

## 2022-03-26 PROCEDURE — 82962 GLUCOSE BLOOD TEST: CPT

## 2022-03-26 PROCEDURE — 999N000155 HC STATISTIC RAPCV CVP MONITORING

## 2022-03-26 RX ORDER — FUROSEMIDE 10 MG/ML
40 INJECTION INTRAMUSCULAR; INTRAVENOUS ONCE
Status: COMPLETED | OUTPATIENT
Start: 2022-03-26 | End: 2022-03-26

## 2022-03-26 RX ORDER — ISOSORBIDE DINITRATE 10 MG/1
20 TABLET ORAL EVERY 8 HOURS
Status: DISCONTINUED | OUTPATIENT
Start: 2022-03-27 | End: 2022-03-27

## 2022-03-26 RX ORDER — SIMETHICONE 80 MG
80 TABLET,CHEWABLE ORAL EVERY 6 HOURS PRN
Status: DISCONTINUED | OUTPATIENT
Start: 2022-03-26 | End: 2022-04-08 | Stop reason: HOSPADM

## 2022-03-26 RX ORDER — SOTALOL HYDROCHLORIDE 80 MG/1
80 TABLET ORAL EVERY 12 HOURS SCHEDULED
Status: DISCONTINUED | OUTPATIENT
Start: 2022-03-26 | End: 2022-04-08 | Stop reason: HOSPADM

## 2022-03-26 RX ORDER — POTASSIUM CHLORIDE 14.9 MG/ML
20 INJECTION INTRAVENOUS ONCE
Status: COMPLETED | OUTPATIENT
Start: 2022-03-26 | End: 2022-03-26

## 2022-03-26 RX ORDER — SPIRONOLACTONE 25 MG/1
25 TABLET ORAL DAILY
Status: DISCONTINUED | OUTPATIENT
Start: 2022-03-26 | End: 2022-04-06

## 2022-03-26 RX ORDER — WARFARIN SODIUM 5 MG/1
5 TABLET ORAL
Status: COMPLETED | OUTPATIENT
Start: 2022-03-26 | End: 2022-03-26

## 2022-03-26 RX ORDER — POTASSIUM CHLORIDE 750 MG/1
20 TABLET, EXTENDED RELEASE ORAL ONCE
Status: COMPLETED | OUTPATIENT
Start: 2022-03-26 | End: 2022-03-26

## 2022-03-26 RX ORDER — PANTOPRAZOLE SODIUM 40 MG/1
40 TABLET, DELAYED RELEASE ORAL
Status: DISCONTINUED | OUTPATIENT
Start: 2022-03-27 | End: 2022-04-08 | Stop reason: HOSPADM

## 2022-03-26 RX ORDER — ISOSORBIDE DINITRATE 10 MG/1
40 TABLET ORAL EVERY 8 HOURS
Status: DISCONTINUED | OUTPATIENT
Start: 2022-03-26 | End: 2022-03-26

## 2022-03-26 RX ADMIN — SOTALOL HYDROCHLORIDE 80 MG: 80 TABLET ORAL at 19:37

## 2022-03-26 RX ADMIN — ACETAMINOPHEN 650 MG: 325 TABLET ORAL at 16:15

## 2022-03-26 RX ADMIN — HYDRALAZINE HYDROCHLORIDE 50 MG: 50 TABLET, FILM COATED ORAL at 13:42

## 2022-03-26 RX ADMIN — OXYCODONE HYDROCHLORIDE 10 MG: 10 TABLET ORAL at 21:34

## 2022-03-26 RX ADMIN — ACETAMINOPHEN 975 MG: 325 TABLET ORAL at 05:32

## 2022-03-26 RX ADMIN — MUPIROCIN 0.5 G: 20 OINTMENT TOPICAL at 19:37

## 2022-03-26 RX ADMIN — POTASSIUM CHLORIDE 20 MEQ: 14.9 INJECTION, SOLUTION INTRAVENOUS at 09:32

## 2022-03-26 RX ADMIN — POTASSIUM CHLORIDE 20 MEQ: 14.9 INJECTION, SOLUTION INTRAVENOUS at 18:10

## 2022-03-26 RX ADMIN — METOCLOPRAMIDE 10 MG: 10 TABLET ORAL at 08:05

## 2022-03-26 RX ADMIN — FUROSEMIDE 40 MG: 10 INJECTION, SOLUTION INTRAVENOUS at 10:30

## 2022-03-26 RX ADMIN — POTASSIUM PHOSPHATE, MONOBASIC AND POTASSIUM PHOSPHATE, DIBASIC 15 MMOL: 224; 236 INJECTION, SOLUTION, CONCENTRATE INTRAVENOUS at 05:28

## 2022-03-26 RX ADMIN — HYDROMORPHONE HYDROCHLORIDE 0.5 MG: 1 INJECTION, SOLUTION INTRAMUSCULAR; INTRAVENOUS; SUBCUTANEOUS at 16:15

## 2022-03-26 RX ADMIN — POLYETHYLENE GLYCOL 3350 17 G: 17 POWDER, FOR SOLUTION ORAL at 08:04

## 2022-03-26 RX ADMIN — OXYCODONE HYDROCHLORIDE 5 MG: 5 TABLET ORAL at 08:36

## 2022-03-26 RX ADMIN — HYDRALAZINE HYDROCHLORIDE 50 MG: 50 TABLET, FILM COATED ORAL at 05:32

## 2022-03-26 RX ADMIN — HEPARIN SODIUM AND DEXTROSE 1400 UNITS/HR: 10000; 5 INJECTION INTRAVENOUS at 20:26

## 2022-03-26 RX ADMIN — EPINEPHRINE 0.03 MCG/KG/MIN: 1 INJECTION PARENTERAL at 18:14

## 2022-03-26 RX ADMIN — ISOSORBIDE DINITRATE 40 MG: 10 TABLET ORAL at 08:04

## 2022-03-26 RX ADMIN — ISOSORBIDE DINITRATE 40 MG: 10 TABLET ORAL at 16:15

## 2022-03-26 RX ADMIN — ISOSORBIDE DINITRATE 40 MG: 10 TABLET ORAL at 03:19

## 2022-03-26 RX ADMIN — OXYCODONE HYDROCHLORIDE 5 MG: 5 TABLET ORAL at 13:42

## 2022-03-26 RX ADMIN — POTASSIUM CHLORIDE 20 MEQ: 14.9 INJECTION, SOLUTION INTRAVENOUS at 12:40

## 2022-03-26 RX ADMIN — POTASSIUM CHLORIDE 20 MEQ: 750 TABLET, EXTENDED RELEASE ORAL at 05:28

## 2022-03-26 RX ADMIN — SENNOSIDES AND DOCUSATE SODIUM 1 TABLET: 8.6; 5 TABLET ORAL at 08:04

## 2022-03-26 RX ADMIN — SPIRONOLACTONE 25 MG: 25 TABLET, FILM COATED ORAL at 10:30

## 2022-03-26 RX ADMIN — HYDRALAZINE HYDROCHLORIDE 50 MG: 50 TABLET, FILM COATED ORAL at 21:34

## 2022-03-26 RX ADMIN — METOCLOPRAMIDE 10 MG: 10 TABLET ORAL at 12:39

## 2022-03-26 RX ADMIN — METOCLOPRAMIDE 10 MG: 10 TABLET ORAL at 16:16

## 2022-03-26 RX ADMIN — HYDRALAZINE HYDROCHLORIDE 10 MG: 20 INJECTION INTRAMUSCULAR; INTRAVENOUS at 12:58

## 2022-03-26 RX ADMIN — OXYCODONE HYDROCHLORIDE 5 MG: 5 TABLET ORAL at 12:39

## 2022-03-26 RX ADMIN — SOTALOL HYDROCHLORIDE 80 MG: 80 TABLET ORAL at 10:35

## 2022-03-26 RX ADMIN — HEPARIN SODIUM AND DEXTROSE 1400 UNITS/HR: 10000; 5 INJECTION INTRAVENOUS at 02:01

## 2022-03-26 RX ADMIN — HYDRALAZINE HYDROCHLORIDE 50 MG: 50 TABLET, FILM COATED ORAL at 02:00

## 2022-03-26 RX ADMIN — WARFARIN SODIUM 5 MG: 5 TABLET ORAL at 17:28

## 2022-03-26 RX ADMIN — PANTOPRAZOLE SODIUM 40 MG: 40 TABLET, DELAYED RELEASE ORAL at 08:04

## 2022-03-26 ASSESSMENT — ACTIVITIES OF DAILY LIVING (ADL)
ADLS_ACUITY_SCORE: 10
ADLS_ACUITY_SCORE: 8
ADLS_ACUITY_SCORE: 8
ADLS_ACUITY_SCORE: 10
ADLS_ACUITY_SCORE: 8
ADLS_ACUITY_SCORE: 10
ADLS_ACUITY_SCORE: 8
ADLS_ACUITY_SCORE: 8
ADLS_ACUITY_SCORE: 10
ADLS_ACUITY_SCORE: 10
ADLS_ACUITY_SCORE: 8
ADLS_ACUITY_SCORE: 10
ADLS_ACUITY_SCORE: 8
ADLS_ACUITY_SCORE: 10
ADLS_ACUITY_SCORE: 8

## 2022-03-26 NOTE — PROGRESS NOTES
Cardiology Progress Note    Assessment & Plan   Tej Morfin is a 56 year old male admitted on 3/22/2022. He has history of NICM secondary to severe MR s/p MV ring repair (), now on home inotrope (Dobutamine 2.5), sustained VT s/p secondary prevention ICD, who presents for scheduled LVAD placement. Now status post 3 on 3/23/2022.     Changes Today:  - Start Sotalol 80 mg BID.  - Stop Amiodarone.   - Increase LVAD speed to 5400 rpm.  - Start Aldactone 25 mg daily.  - Goal CVP ~10      #. Non-ischemic Cardiomyopathy status post 3 (3/23/2022)  #. Stage D; NYHA Class III     DATE MAP CVP PAP PCWP Harris CO Harris CI SVR MVo2 Therapies   3/24 91 10 45/15 15 5.2 2.4 1200 60 LVAD 5200,  2.5   3/25 83 8 39/16 14 5.6 2.6 1200 50 LVAD 5300,   Nipride   3/26 71 10  22 5.9 2.7 827 60 LVAD 5300         - LVAD: Speed 5400 RPM, 3.4 LPM, PI 5.5, Prw 3.5-3.8  - Hemodynamics: RHC; monitor hemodynamics q6h  - Afterload: Hydralazine 50 mg q8 hours. Isordil 40 mg q8 hours.   - BB: Not on BB at this time due to cardiogenic shock  - Aldosterone antagonist: Aldactone 25 mg daily.  - SCD prophylaxis: Yes. CRT-D. LV pacing turned off. VT zone turned off.  - AC: Heparin -> Warfain.  - Anti-platelet: RICARDO trial, no aspirin products  - Fluid status: Euvolemic, goal CVP ~10    #Hx of Ventricular Arrhythmias:  Previously on sotalol which was held in the perioperative period. Episode of a fib with RVR with ICD shock on 3/25.   - Discontinue Amiodarone.  - Start Sotalol 80 mg daily.    This patient was seen and discussed with Dr. Elizabeth who agrees with the above assessment and plan.     Jas Gunderson MD  Cardiology fellow    Interval History   MELANIE overnight. Pain better controlled. Had intermittent Afib with RVR overnight.     Physical Exam   Temp: 98.1  F (36.7  C) Temp src: Oral  Pulse: 97   Resp: 21 SpO2: 97 % O2 Device: Nasal cannula Oxygen Delivery: 6 LPM  Vitals:    22 0000 22 0000 22 0000   Weight:  "92.3 kg (203 lb 7.8 oz) 93.2 kg (205 lb 7.5 oz) 94.5 kg (208 lb 5.4 oz)     Vital Signs with Ranges  Temp:  [98.1  F (36.7  C)-99  F (37.2  C)] 98.1  F (36.7  C)  Pulse:  [] 97  Resp:  [7-36] 21  MAP:  [67 mmHg-100 mmHg] 93 mmHg  Arterial Line BP: ()/(61-91) 107/87  SpO2:  [88 %-97 %] 97 %  I/O last 3 completed shifts:  In: 2262.55 [P.O.:480; I.V.:1782.55]  Out: 1120 [Urine:640; Chest Tube:480]     , Blood pressure (!) 131/90, pulse 97, temperature 98.1  F (36.7  C), temperature source Oral, resp. rate 21, height 1.791 m (5' 10.51\"), weight 94.5 kg (208 lb 5.4 oz), SpO2 97 %.  208 lbs 5.36 oz    GEN: Alert, no acute distress   CV:  LVAD Hum +   LUNGS:  Clear to auscultation bilaterally  ABD:  Active bowel sounds, soft, non-tender/non-distended.  No rebound/guarding/rigidity.  EXT:  No edema or cyanosis. WWP.      Medications     heparin 1,100 Units/hr (03/26/22 1100)     Warfarin Therapy Reminder         hydrALAZINE  50 mg Oral Q8H PARKER     insulin aspart  1-7 Units Subcutaneous TID AC     insulin aspart  1-5 Units Subcutaneous At Bedtime     isosorbide dinitrate  40 mg Oral Q8H     metoclopramide  10 mg Oral TID AC     mupirocin  0.5 g Both Nostrils BID     pantoprazole  40 mg Oral or NG Tube Daily    Or     pantoprazole  40 mg Oral Daily     polyethylene glycol  17 g Oral or Feeding Tube Daily     senna-docusate  1 tablet Oral or Feeding Tube BID     sodium chloride (PF)  3 mL Intracatheter Q8H     sotalol  80 mg Oral Q12H PARKER     spironolactone  25 mg Oral Daily     warfarin ANTICOAGULANT  5 mg Oral ONCE at 18:00       Data   Recent Labs   Lab 03/26/22  1152 03/26/22  0759 03/26/22  0750 03/26/22  0330 03/26/22  0321 03/25/22  2344 03/25/22  2022 03/25/22  1655 03/25/22  1522 03/25/22  1254 03/25/22  0828 03/25/22  0607 03/25/22  0424 03/24/22  1210 03/24/22  1104   WBC  --   --   --   --  12.6*  --   --   --  10.7  --  11.1*  --  11.0  --   --    HGB  --   --   --   --  8.5*  --   --   --  8.0*  --  " 8.3*  --  8.6*  --   --    MCV  --   --   --   --  100  --   --   --  99  --  98  --  98  --   --    PLT  --   --   --   --  111*  --   --   --  111*  --  108*  --  99*  --   --    INR  --   --   --   --  1.30*  --   --   --   --   --   --   --  1.26*  --  1.33*   NA  --   --   --   --  139  --  140  --   --   --   --   --  141   < >  --    POTASSIUM  --  3.7  --   --  3.6  --  3.8  --   --   --   --   --  3.9   < >  --    CHLORIDE  --   --   --   --  108  --  108  --   --   --   --   --  107   < >  --    CO2  --   --   --   --  26  --  25  --   --   --   --   --  27   < >  --    BUN  --   --   --   --  17  --  17  --   --   --   --   --  16   < >  --    CR  --   --   --   --  0.82  --  0.84  --   --   --   --   --  0.85   < >  --    ANIONGAP  --   --   --   --  5  --  7  --   --   --   --   --  7   < >  --    YESICA  --   --   --   --  8.4*  --  8.2*  --   --   --   --   --  8.3*   < >  --    *  --  119* 105* 110*   < > 125*   < >  --    < >  --    < > 124*   < >  --    ALBUMIN  --   --   --   --  2.0*  --   --   --   --   --   --   --  2.2*  --   --    PROTTOTAL  --   --   --   --  5.5*  --   --   --   --   --   --   --  5.2*  --   --    BILITOTAL  --   --   --   --  1.3  --   --   --   --   --   --   --  1.7*  --   --    ALKPHOS  --   --   --   --  44  --   --   --   --   --   --   --  40  --   --    ALT  --   --   --   --  12  --   --   --   --   --   --   --  14  --   --    AST  --   --   --   --  28  --   --   --   --   --   --   --  47*  --   --     < > = values in this interval not displayed.

## 2022-03-26 NOTE — PROGRESS NOTES
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 Rodríguez on 3/23/22     TODAY'S PROGRESS:   - sotalol restarted per cards, amio stopped  LVAD increased  Aldactone started  Likely to floor tomorrow     PLAN:  Neuro/ pain/ sedation:  Acute Postoperative pain  - Monitor neurological status. Notify the MD for any acute changes in exam.  - Pain: Scheduled tylenol and gabapentin. PRN tylenol, oxycodone, robaxin     Pulmonary care:   Postoperative ventilation management  #allergic rhinitis  - Titrate supplemental oxygen to maintain saturation above 92%.  - Pulmonary hygiene: Incentive spirometer every 15- 30 minutes when awake, flutter valve, C&DB  - restart home allergy meds     Cardiovascular:    S/p LVAD placement (HeartMate 3) on 3/23/2022 by Dr. Rodríguez  History of NICM, MR with VT and permanent defib  Mild MR, moderate RV dysfunction on EMILY  #balloon pump - removed  Recent echo on 7/2020 with LVEF of 20%  - Monitor hemodynamic status.   - PTA ASA, dobutamine infusion, entresto, sotalol, hydralazine  - Goal MAP 65-85  - Epi, norepi, vaso gtt; wean as tolerated  - Immunosuppression per surgery       GI care/ Nutrition:   - PPI  - Continue bowel regimen: miralax, senna    Renal/ Fluid Balance/ Electrolytes:   BL creat appears to be ~ 0.9  - Strict I/O, daily weights  - Avoid/limit nephrotoxins as able  - Replete lytes PRN per protocol  - PTA lasix held, dosing as needed       Endocrine:    Stress induced hyperglycemia  Preop A1c 5.9  - hdSSI  - Goal BG <180 for optimal healing     ID/  Antibiotics:  Stress induced leukocytosis  - To complete perioperative regimen  - Continue to monitor fever curve, WBC and inflammatory markers as appropriate     Heme:     Stress induced leukocytosis  Acute blood loss anemia  Acute blood loss thrombocytopenia  No s/sx active bleeding  - Continue to monitor  - CBC      MSK/ Skin:  Sternotomy  Surgical Incision  - Sternal precautions  - Postoperative incision management per protocol  - PT/OT/CR     Prophylaxis:    - Mechanical prophylaxis for DVT  - Chemical DVT prophylaxis  - PPI      Lines/ tubes/ drains:  - Arterial Line, CTs, PA catheter, RIJ, scott     Disposition:  - CVICU     Patient seen, findings and plan discussed with CVICU staff     Discussed with CVTS staff and fellow     Helder Chaudhari DO CA-2  Department of Anesthesiology  [phone]    ====================================    SUBJECTIVE:   Feeling better today, no complaints    OBJECTIVE:   1. VITAL SIGNS:   Temp:  [98.3  F (36.8  C)-99.2  F (37.3  C)] 98.4  F (36.9  C)  Pulse:  [] 96  Resp:  [14-40] 21  MAP:  [67 mmHg-100 mmHg] 74 mmHg  Arterial Line BP: ()/(61-91) 88/68  SpO2:  [86 %-97 %] 96 %  Resp: 21      2. INTAKE/ OUTPUT:   I/O last 3 completed shifts:  In: 1664.13 [P.O.:240; I.V.:1424.13]  Out: 1270 [Urine:730; Chest Tube:540]    3. PHYSICAL EXAMINATION:   General: calm and sitting in bed  Neuro: awake and orientated x3  Resp: on nasal cannula  CV: soft heart sounds, hum of VAD  Abdomen: Soft, Non-distended, Non-tender  Incisions: c/d/i  Extremities: warm and well perfused    4. INVESTIGATIONS:   Arterial Blood Gases   Recent Labs   Lab 03/26/22  0338 03/25/22  0423 03/24/22  1104 03/24/22  0353   PH 7.50* 7.48* 7.46* 7.45   PCO2 34* 39 40 42   PO2 60* 63* 114* 112*   HCO3 27 29* 28 29*     Complete Blood Count   Recent Labs   Lab 03/26/22  0321 03/25/22  1522 03/25/22  0828 03/25/22  0424   WBC 12.6* 10.7 11.1* 11.0   HGB 8.5* 8.0* 8.3* 8.6*   * 111* 108* 99*     Basic  Metabolic Panel  Recent Labs   Lab 22  0330 22  0321 22  2344 22  0607 22  0424 22  2208   NA  --  139  --  140  --  141 140   POTASSIUM  --  3.6  --  3.8  --  3.9 4.1   CHLORIDE  --  108  --  108  --  107 109   CO2  --  26  --  25  --  27 27   BUN  --  17  --  17  --  16 16   CR  --  0.82  --  0.84  --  0.85 0.82   * 110* 117* 125*   < > 124* 127*    < > = values in this interval not displayed.     Liver Function Tests  Recent Labs   Lab 22  03222  0424 22  1104 22  0354 22  1433   AST 28 47*  --  48* 30   ALT 12 14  --  14 8   ALKPHOS 44 40  --  42 35*   BILITOTAL 1.3 1.7*  --  1.0 1.4*   ALBUMIN 2.0* 2.2*  --  2.3* 1.3*   INR 1.30* 1.26* 1.33*  --  1.61*     Pancreatic Enzymes  No lab results found in last 7 days.  Coagulation Profile  Recent Labs   Lab 22  03222  0424 22  1104 22  1433 22  1220 22  0347   INR 1.30* 1.26* 1.33* 1.61* 1.72* 1.11   PTT  --   --   --  39* 27 34         5. RADIOLOGY:   Recent Results (from the past 24 hour(s))   Echocardiogram Limited   Result Value    LVEF  10-15% (severely reduced)    [facility]    [id number]  [id number]  [id number]  [id number]^PING^ORQUIDEA     [facility],[place]  Echocardiography Laboratory  [address] [place] [id number]     Name: NATASHA HARVEY  MRN: [id number]  : 1965  Study Date: 2022 09:08 AM  Age: 56 yrs  Gender: Male  Patient Location: UUU4E  Reason For Study: LVAD, limited for LV size and cannula position  Ordering Physician: ORQUIDEA FENG  Referring Physician: LOUISA LIRA  Performed By: Jimi Burden RDCS     BSA: 2.1 m2  Height: 70 in  Weight: 205 lb  HR: 99  BP: 131/90 mmHg  ______________________________________________________________________________  Procedure  Limited Portable Echo  Adult.  ______________________________________________________________________________  Interpretation Summary  Limited Portable Echo Adult.  S/p HM3 LVAD at 5300 RPM  There is a small mobile echodensity (0.8cm) noted on the ventricular side of  the aortic valve. There is no associated aortic insuffeciency. This could  potentially represent thrombus vs vegetation. Consider EMILY for better  classification.  Left ventricular function is severely decreased. The ejection fraction is 10-  15%. There is moderate to severe dilation of the left ventricle (LVIDd =  6.8cm).  The LVAD inflow cannula appears appropriately positioned.  The aortic valve opens with every beat. No aortic regurgitation is present.  The right ventricle is incompletely visualized. The right ventricular function  appears mildly reduced.     This study was compared with the study from 3/23/2022 (EMILY). .  The mobile echodenisty was not previously seen. The LVAD inflow cannula is  similarly positioned.  ______________________________________________________________________________  Left Ventricle  Moderate to severe left ventricular dilation is present. LVIDd = 6.8cm.  Eccentric hypertrophy. Left ventricular function is decreased. The ejection  fraction is 10-15% (severely reduced). LVAD cannula was seen in the expected  anatomic position in the LV apex. The LVAD inflow cannula appears  appropriately positioned.     Right Ventricle  The right ventricle is incompletely visualized. The systolic function appears  mildly reduced. The right ventricular size cannot be assessed. A right heart  catheter is noted in the right ventricle.     Mitral Valve  Trace mitral insufficiency is present. An annuloplasty ring is noted in the  mitral position.     Aortic Valve  The aortic valve opens with every beat. There is a mobile echodensity (0.8cm)  noted on the ventricular side of the aortic valve. No aortic regurgitation is  present.     Vessels  The inferior vena cava  was normal in size with preserved respiratory  variability.     Pericardium  No pericardial effusion is present.     Compared to Previous Study  This study was compared with the study from 3/23/2022 (EMILY). . The mobile  echodenisty was not previously seen. The LVAD inflow cannula is similarly  positioned.  ______________________________________________________________________________  MMode/2D Measurements & Calculations  IVSd: 1.2 cm  LVIDd: 6.8 cm  LVIDs: 6.4 cm  LVPWd: 0.97 cm  FS: 6.5 %  LV mass(C)d: 340.6 grams  LV mass(C)dI: 161.5 grams/m2  RWT: 0.29     ______________________________________________________________________________  Report approved by: Linda DREW 2022 10:26 AM         Echo EMILY   Result Value    LVEF  10-15% (severely reduced)    Narrative    [id number]  [id number]  [id number]  [id number]^LUANNE^JORGE     [facility],[place]  Echocardiography Laboratory  [address] [place] [id number]     Name: NATASHA HARVEY  MRN: [id number]  : 1965  Study Date: 2022 11:40 AM  Age: 56 yrs  Gender: Male  Patient Location: [facility]  Reason For Study: Aortic Valve Disorder  Ordering Physician: JORGE STROUD  Referring Physician: LOUISA LIRA  Performed By: Fermin Malhotra MD     BSA: 2.1 m2  Height: 71 in  Weight: 205 lb  HR: 95  BP: 110/73 mmHg  ______________________________________________________________________________  Interpretation Summary  The aortic valve is free of echodenisties. The previously visualized mobile  echodensity noted on TTE dated 3/25/2022 is not seen in the present study.  Left ventricular function is severely decreased. The ejection fraction is 10-  15%.  Moderate left ventricular dilation is present.  Global right ventricular function is mildly reduced. The right ventricle is  normal size.  Inflow cannula velocity is normal at 72 cm/. Outflow cannot be assessed.  Patient became quite uncomfortable towards the end  of study, therefore  couldnot obtain KARTIK and transgastric images.     This study was compared with the study from 3/25/2022 .  The aortic valve echodensity seen on the rior study is not present on the  current study.  ______________________________________________________________________________  Procedure  Transesophageal Echocardiogram with color and spectral Doppler performed. I  was present during EMILY probe placement by the Fellow. I personally viewed the  imaging and agree with the interpretation and report as documented by the  Fellow. Procedure location Patient Floor. The procedure was performed on  Patient Floor. Informed consent for Transesophegeal echo obtained. EMILY Probe  #61 was used during the procedure. Patient was sedated using Fentanyl 50 mcg.  Patient was sedated using Versed 2 mg. The heart rate, respiratory rate,  oxygen saturations, blood pressure, and response to care were monitored  throughout the procedure with the assistance of the nurse. Sedation,  endotracheal intubation, and mechanical ventilation were initiated prior to  the EMILY and were monitored by the ICU team. The Transducer was inserted  without difficulty . The EMILY was terminated due to patient intolerance of  procedure. Complications None. The patient's rhythm is indeterminate. Adequate  quality two-dimensional was performed and interpreted. Poor quality color and  spectral Doppler were performed and interpreted.     Left Ventricle  Moderate left ventricular dilation is present. Left ventricular function is  decreased. The ejection fraction is 10-15% (severely reduced). LVAD cannula  was seen in the expected anatomic position in the LV apex.     Right Ventricle  The right ventricle is normal size. Global right ventricular function is  mildly reduced. A right heart catheter is noted in the right ventricle.     Atria  The left atrial appendage cannot be assessed. Moderate to severe biatrial  enlargement is present. The atrial septum  is intact as assessed by color  Doppler . A prominent eustachian valve is noted. There are two pacemaker leads  and a PA catheter visualized within the Right atrium.     Mitral Valve  An annuloplasty ring is noted in the mitral position. The mean gradient across  the mitral valve is 5.1 mmHg. Heart Rate of 96bpm. Doppler interrogation of  the mitral valve is normal.     Aortic Valve  The aortic valve is free of echodenisties. The previously visualized mobile  echodensity noted on TTE dated 3/25/2022 is not seen in the present study. The  aortic valve is tricuspid. The aortic valve opens with every beat. Trace  aortic insufficiency is present.     Tricuspid Valve  The tricuspid valve is normal. Mild tricuspid insufficiency is present.     Pulmonic Valve  The pulmonic valve is normal.     Vessels  The aorta root is normal. The thoracic aorta is normal.     Pericardium  No pericardial effusion is present.     Miscellaneous  Unable to obtain transgastric views.     Compared to Previous Study  This study was compared with the study from 3/25/2022 . The aortic valve  echodensity seen on the rior study is not present on the current study.     ______________________________________________________________________________  Time Measurements  Mitral HR: 95.7 BPM     Doppler Measurements & Calculations  MV max P.2 mmHg  MV mean P.1 mmHg  MV V2 VTI: 36.5 cm     ______________________________________________________________________________  Report approved by: Linda DREW 2022 12:50 PM         Cardiac Device Check - Inpatient   Result Value    Date Time Interrogation Session [id number]    Implantable Pulse Generator  [employer]    Implantable Pulse Generator Model G148 INOGEN X4 CRT-D    Implantable Pulse Generator Serial Number [id number]    Type Interrogation Session In Clinic    Clinic Name [facility]    Implantable Pulse Generator Type Cardiac Resynchronization  Therapy - Defibrillator    Implantable Pulse Generator Implant Date 20160108    Implantable Lead  Guidant    Implantable Lead Model 4136 Dextrus    Implantable Lead Serial Number [id number]    Implantable Lead Implant Date 20160108    Implantable Lead Polarity Type Bipolar Lead    Implantable Lead Location Detail 1 UNKNOWN    Implantable Lead Location Right Atrium    Implantable Lead  [employer]    Implantable Lead Model 4598 Attain Performa S    Implantable Lead Serial Number [id number]    Implantable Lead Implant Date 20160108    Implantable Lead Polarity Type Quadripolar Lead    Implantable Lead Location Detail 1 UNKNOWN    Implantable Lead Location Left Ventricle    Implantable Lead  [place] [employer]    Implantable Lead Model 0295 Reliance 4-Site G    Implantable Lead Serial Number [id number]    Implantable Lead Implant Date 20150202    Implantable Lead Polarity Type Tripolar Lead    Implantable Lead Location Detail 1 UNKNOWN    Implantable Lead Location Right Ventricle    Joshua Setting Mode (NBG Code) DDD    Joshua Setting Lower Rate Limit 80    Joshua Setting Maximum Tracking Rate 130    Joshua Setting ASHWIN Delay Low 300    Joshua Setting PAV Delay Low 300    Joshua Setting PAV Delay High 280    Joshua Setting ASHWIN Delay High 280    Joshua Setting AT Mode Switch Rate 170    Joshua Setting AT Mode Switch Mode VDI    Lead Channel Setting Sensing Polarity Bipolar    Lead Channel Setting Sensing Sensitivity 0.25    Lead Channel Setting Sensing Adaptation Mode Adaptive    Lead Channel Setting Sensing Polarity Bipolar    Lead Channel Setting Sensing Sensitivity 0.6    Lead Channel Setting Sensing Adaptation Mode Adaptive    Lead Channel Setting Sensing Anode Location Left Ventricle    Lead Channel Setting Sensing Anode Terminal Ring2    Lead Channel Setting Sensing Cathode Location Left Ventricle    Lead Channel Setting Sensing Cathode Terminal Tip    Ventricular chambers paced during CRT pacing.  RVOnly    Lead Channel Setting Pacing Polarity Bipolar    Lead Channel Setting Pacing Pulse Width 0.5    Lead Channel Setting Pacing Amplitude 1.5    Lead Channel Setting Pacing Polarity Bipolar    Lead Channel Setting Pacing Pulse Width 0.5    Lead Channel Setting Pacing Amplitude 2.5    Zone Setting Type Category VF    Zone Setting Vendor Type Category VF    Zone Setting Detection Interval 273    Lead Channel Impedance Value 528    Lead Channel Pacing Threshold Amplitude 2.9    Lead Channel Pacing Threshold Pulse Width 1.0    Lead Channel Impedance Value 461    Lead Channel Pacing Threshold Amplitude 0.5    Lead Channel Pacing Threshold Pulse Width 0.5    Lead Channel Impedance Value 309    Lead Channel Pacing Threshold Amplitude 1.4    Lead Channel Pacing Threshold Pulse Width 0.5    Battery Date Time of Measurements 20220325132100    Battery Status Middle of Service    Battery Remaining Longevity 42    Battery Remaining Percentage 56    Capacitor Charge Type Reformation    Capacitor Last Charge Date Time [id number]    Capacitor Charge Time 11.7    Capacitor Charge Type Shock    Capacitor Last Charge Date Time 20220325083500    Capacitor Charge Time 7.2    Capacitor Charge Energy 31    Joshua Statistic Date Time Start 20220325000000    Joshua Statistic Date Time End 20220325000000    Joshua Statistic RA Percent Paced 0    Joshua Statistic RV Percent Paced 0    CRT Statistic LV Percent Paced 99    CRT Statistic Date Time Start 20220325000000    CRT Statistic Date Time End 20220325000000    Therapy Statistic Recent Shocks Delivered 0    Therapy Statistic Recent Shocks Aborted 0    Therapy Statistic Recent ATP Delivered 0    Therapy Statistic Recent Date Time Start 20220325000000    Therapy Statistic Recent Date Time End 20220325000000    Therapy Statistic Total Shocks Delivered 2    Therapy Statistic Total Shocks Aborted 1    Therapy Statistic Total ATP Delivered 3    Therapy Statistic Total  Date Time Start  [id number]    Therapy Statistic Total  Date Time End 20220325000000    Episode Statistic Recent Count 0    Episode Statistic Type Category AT/AF    Episode Statistic Vendor Type Category AF    Episode Statistic Recent Count 0    Episode Statistic Type Category Other    Episode Statistic Recent Count 0    Episode Statistic Type Category VT    Episode Statistic Vendor Type Category NSVT    Episode Statistic Recent Count 0    Episode Statistic Type Category VF    Episode Statistic Vendor Type Category VF    Episode Statistic Recent Count 0    Episode Statistic Type Category VT    Episode Statistic Vendor Type Category VT    Episode Statistic Recent Count 0    Episode Statistic Type Category VT    Episode Statistic Vendor Type Category VT-1    Episode Statistic Recent Date Time Start 20220325000000    Episode Statistic Recent Date Time End 20220325000000    Episode Statistic Recent Date Time Start 20220325000000    Episode Statistic Recent Date Time End 20220325000000    Episode Statistic Recent Date Time Start 20220325000000    Episode Statistic Recent Date Time End 20220325000000    Episode Statistic Recent Date Time Start 20220325000000    Episode Statistic Recent Date Time End 20220325000000    Episode Statistic Recent Date Time Start 20220325000000    Episode Statistic Recent Date Time End 20220325000000    Episode Statistic Recent Date Time Start 20220325000000    Episode Statistic Recent Date Time End 20220325000000    Narrative    Patient seen in [address] [place] [address] for evaluation and iterative programming of their ICD per MD orders. S/P shock.    Device: [employer] Inogen X4 CRT  Normal device function  Mode: DDD  bpm  AP: 29%  : 94%  BVP: 97%  Intrinsic rhythm: SR @ 95 bpm  Lead Trends Appear Stable: LV lead not sensing post LVAD. Pacing threshold increased to 2.9V@1.0ms  Estimated battery longevity to RRT = 2 years.    Atrial Arrhythmia: see below for documentation of Afib episode  AF Perry:  0%  Anticoagulant: warfarin  Ventricular Arrhythmia: 1 nonsustV and 1 VF episode recorded at 0835 am.  Review of EGMs shows Afib w/ RVR. Ventricular response was fast enough to enter VF zone. Shock x1 was successful in converting Afib to sinus rhythm.  Setting Changes: LV lead programmed off. AF delays extended to promote intrinsic conduction. VT zone turned off.  VF zone increased to 220 bpm. VF detection extended to 15 seconds. Changes made under direction of KERRIE De La Cruz.  Plan: Continue inpatient evaluation and treatment.  Aubrey RUIZ RN    multi lead ICD    I have reviewed and interpreted the device interrogation, settings, programming and nurse's summary. The device is functioning within normal device parameters. I agree with the current findings, assessment and plan.       =========================================

## 2022-03-26 NOTE — PLAN OF CARE
Pertinent PMH: Tej is being followed by CVTS/C2 who was admitted on 03/23 for HM3 placement with Dr payton, c/b VT episode s/p ICD cardioversion. Extubated 03/24.  Major Shift Events: A&OX4, neuro intact, moves all extremities, afebrile, oxycodone 10 mg administered X1 for incisional pain, in addition to RTC Rx. Able to maintain SpO2 > 90 % on 6L NC. MAP 70 - 80's, PA 50/20's, CVP 7 - 13, CO/CI 6.7/3.2, . On Nipride off at 0400 and amio at 0.5, hydralazine increased. Heparin gtt at 1100, last 10A was 0.67, algorithm followed. Absence of VT episodes overnight, though Pt will have 3 - 5 min bursts of pSVT. CVTS team aware. Lytes stable. Potassium replaced. Voids minimally, 20 - 30 mL/Hr UOP. CT OP 20 mL/Hr. Air leak present, team aware.  Plan: Wean Nipride, increase hydralazine. Transition to PO amio. Continue to monitor.    For complete vital signs, hemodynamics, medications/drips, and complete assessments please see documentation flowsheets.      Goal Outcome Evaluation:    Plan of Care Reviewed With: patient     Overall Patient Progress: improving

## 2022-03-27 ENCOUNTER — APPOINTMENT (OUTPATIENT)
Dept: OCCUPATIONAL THERAPY | Facility: CLINIC | Age: 57
DRG: 001 | End: 2022-03-27
Attending: INTERNAL MEDICINE
Payer: MEDICARE

## 2022-03-27 ENCOUNTER — APPOINTMENT (OUTPATIENT)
Dept: GENERAL RADIOLOGY | Facility: CLINIC | Age: 57
DRG: 001 | End: 2022-03-27
Attending: ANESTHESIOLOGY
Payer: MEDICARE

## 2022-03-27 LAB
ALBUMIN SERPL-MCNC: 1.8 G/DL (ref 3.4–5)
ALP SERPL-CCNC: 41 U/L (ref 40–150)
ALT SERPL W P-5'-P-CCNC: 12 U/L (ref 0–70)
ANION GAP SERPL CALCULATED.3IONS-SCNC: 4 MMOL/L (ref 3–14)
AST SERPL W P-5'-P-CCNC: 20 U/L (ref 0–45)
BASE EXCESS BLDA CALC-SCNC: 2.2 MMOL/L (ref -9–1.8)
BASE EXCESS BLDV CALC-SCNC: 2.7 MMOL/L (ref -7.7–1.9)
BASE EXCESS BLDV CALC-SCNC: 3.3 MMOL/L (ref -7.7–1.9)
BASE EXCESS BLDV CALC-SCNC: 3.8 MMOL/L (ref -7.7–1.9)
BILIRUB SERPL-MCNC: 0.4 MG/DL (ref 0.2–1.3)
BUN SERPL-MCNC: 22 MG/DL (ref 7–30)
CA-I BLD-MCNC: 4.4 MG/DL (ref 4.4–5.2)
CALCIUM SERPL-MCNC: 8 MG/DL (ref 8.5–10.1)
CHLORIDE BLD-SCNC: 111 MMOL/L (ref 94–109)
CO2 SERPL-SCNC: 26 MMOL/L (ref 20–32)
CREAT SERPL-MCNC: 0.87 MG/DL (ref 0.66–1.25)
ERYTHROCYTE [DISTWIDTH] IN BLOOD BY AUTOMATED COUNT: 14.6 % (ref 10–15)
GFR SERPL CREATININE-BSD FRML MDRD: >90 ML/MIN/1.73M2
GLUCOSE BLD-MCNC: 98 MG/DL (ref 70–99)
GLUCOSE BLDC GLUCOMTR-MCNC: 109 MG/DL (ref 70–99)
GLUCOSE BLDC GLUCOMTR-MCNC: 85 MG/DL (ref 70–99)
GLUCOSE BLDC GLUCOMTR-MCNC: 89 MG/DL (ref 70–99)
GLUCOSE BLDC GLUCOMTR-MCNC: 91 MG/DL (ref 70–99)
GLUCOSE BLDC GLUCOMTR-MCNC: 97 MG/DL (ref 70–99)
HCO3 BLD-SCNC: 25 MMOL/L (ref 21–28)
HCO3 BLDV-SCNC: 27 MMOL/L (ref 21–28)
HCO3 BLDV-SCNC: 27 MMOL/L (ref 21–28)
HCO3 BLDV-SCNC: 28 MMOL/L (ref 21–28)
HCT VFR BLD AUTO: 25.1 % (ref 40–53)
HGB BLD-MCNC: 7.8 G/DL (ref 13.3–17.7)
HOLD SPECIMEN: NORMAL
INR PPP: 1.31 (ref 0.85–1.15)
MAGNESIUM SERPL-MCNC: 2.1 MG/DL (ref 1.6–2.3)
MCH RBC QN AUTO: 31.7 PG (ref 26.5–33)
MCHC RBC AUTO-ENTMCNC: 31.1 G/DL (ref 31.5–36.5)
MCV RBC AUTO: 102 FL (ref 78–100)
MDC_IDC_LEAD_IMPLANT_DT: NORMAL
MDC_IDC_LEAD_LOCATION: NORMAL
MDC_IDC_LEAD_LOCATION_DETAIL_1: NORMAL
MDC_IDC_LEAD_MFG: NORMAL
MDC_IDC_LEAD_MODEL: NORMAL
MDC_IDC_LEAD_POLARITY_TYPE: NORMAL
MDC_IDC_LEAD_SERIAL: NORMAL
MDC_IDC_MSMT_BATTERY_DTM: NORMAL
MDC_IDC_MSMT_BATTERY_REMAINING_LONGEVITY: 42 MO
MDC_IDC_MSMT_BATTERY_REMAINING_PERCENTAGE: 56 %
MDC_IDC_MSMT_BATTERY_STATUS: NORMAL
MDC_IDC_MSMT_CAP_CHARGE_DTM: NORMAL
MDC_IDC_MSMT_CAP_CHARGE_DTM: NORMAL
MDC_IDC_MSMT_CAP_CHARGE_ENERGY: 31 J
MDC_IDC_MSMT_CAP_CHARGE_TIME: 11.7 S
MDC_IDC_MSMT_CAP_CHARGE_TIME: 7.2 S
MDC_IDC_MSMT_CAP_CHARGE_TYPE: NORMAL
MDC_IDC_MSMT_CAP_CHARGE_TYPE: NORMAL
MDC_IDC_MSMT_LEADCHNL_LV_IMPEDANCE_VALUE: 528 OHM
MDC_IDC_MSMT_LEADCHNL_LV_PACING_THRESHOLD_AMPLITUDE: 2.9 V
MDC_IDC_MSMT_LEADCHNL_LV_PACING_THRESHOLD_PULSEWIDTH: 1 MS
MDC_IDC_MSMT_LEADCHNL_RA_IMPEDANCE_VALUE: 461 OHM
MDC_IDC_MSMT_LEADCHNL_RA_PACING_THRESHOLD_AMPLITUDE: 0.5 V
MDC_IDC_MSMT_LEADCHNL_RA_PACING_THRESHOLD_PULSEWIDTH: 0.5 MS
MDC_IDC_MSMT_LEADCHNL_RV_IMPEDANCE_VALUE: 309 OHM
MDC_IDC_MSMT_LEADCHNL_RV_PACING_THRESHOLD_AMPLITUDE: 1.4 V
MDC_IDC_MSMT_LEADCHNL_RV_PACING_THRESHOLD_PULSEWIDTH: 0.5 MS
MDC_IDC_PG_IMPLANT_DTM: NORMAL
MDC_IDC_PG_MFG: NORMAL
MDC_IDC_PG_MODEL: NORMAL
MDC_IDC_PG_SERIAL: NORMAL
MDC_IDC_PG_TYPE: NORMAL
MDC_IDC_SESS_CLINIC_NAME: NORMAL
MDC_IDC_SESS_DTM: NORMAL
MDC_IDC_SESS_TYPE: NORMAL
MDC_IDC_SET_BRADY_AT_MODE_SWITCH_MODE: NORMAL
MDC_IDC_SET_BRADY_AT_MODE_SWITCH_RATE: 170 {BEATS}/MIN
MDC_IDC_SET_BRADY_LOWRATE: 80 {BEATS}/MIN
MDC_IDC_SET_BRADY_MAX_TRACKING_RATE: 130 {BEATS}/MIN
MDC_IDC_SET_BRADY_MODE: NORMAL
MDC_IDC_SET_BRADY_PAV_DELAY_HIGH: 280 MS
MDC_IDC_SET_BRADY_PAV_DELAY_LOW: 300 MS
MDC_IDC_SET_BRADY_SAV_DELAY_HIGH: 280 MS
MDC_IDC_SET_BRADY_SAV_DELAY_LOW: 300 MS
MDC_IDC_SET_CRT_PACED_CHAMBERS: NORMAL
MDC_IDC_SET_LEADCHNL_LV_SENSING_ANODE_ELECTRODE_1: NORMAL
MDC_IDC_SET_LEADCHNL_LV_SENSING_ANODE_LOCATION_1: NORMAL
MDC_IDC_SET_LEADCHNL_LV_SENSING_CATHODE_ELECTRODE_1: NORMAL
MDC_IDC_SET_LEADCHNL_LV_SENSING_CATHODE_LOCATION_1: NORMAL
MDC_IDC_SET_LEADCHNL_RA_PACING_AMPLITUDE: 1.5 V
MDC_IDC_SET_LEADCHNL_RA_PACING_POLARITY: NORMAL
MDC_IDC_SET_LEADCHNL_RA_PACING_PULSEWIDTH: 0.5 MS
MDC_IDC_SET_LEADCHNL_RA_SENSING_ADAPTATION_MODE: NORMAL
MDC_IDC_SET_LEADCHNL_RA_SENSING_POLARITY: NORMAL
MDC_IDC_SET_LEADCHNL_RA_SENSING_SENSITIVITY: 0.25 MV
MDC_IDC_SET_LEADCHNL_RV_PACING_AMPLITUDE: 2.5 V
MDC_IDC_SET_LEADCHNL_RV_PACING_POLARITY: NORMAL
MDC_IDC_SET_LEADCHNL_RV_PACING_PULSEWIDTH: 0.5 MS
MDC_IDC_SET_LEADCHNL_RV_SENSING_ADAPTATION_MODE: NORMAL
MDC_IDC_SET_LEADCHNL_RV_SENSING_POLARITY: NORMAL
MDC_IDC_SET_LEADCHNL_RV_SENSING_SENSITIVITY: 0.6 MV
MDC_IDC_SET_ZONE_DETECTION_INTERVAL: 273 MS
MDC_IDC_SET_ZONE_TYPE: NORMAL
MDC_IDC_SET_ZONE_VENDOR_TYPE: NORMAL
MDC_IDC_STAT_BRADY_DTM_END: NORMAL
MDC_IDC_STAT_BRADY_DTM_START: NORMAL
MDC_IDC_STAT_BRADY_RA_PERCENT_PACED: 0 %
MDC_IDC_STAT_BRADY_RV_PERCENT_PACED: 0 %
MDC_IDC_STAT_CRT_DTM_END: NORMAL
MDC_IDC_STAT_CRT_DTM_START: NORMAL
MDC_IDC_STAT_CRT_LV_PERCENT_PACED: 99 %
MDC_IDC_STAT_EPISODE_RECENT_COUNT: 0
MDC_IDC_STAT_EPISODE_RECENT_COUNT_DTM_END: NORMAL
MDC_IDC_STAT_EPISODE_RECENT_COUNT_DTM_START: NORMAL
MDC_IDC_STAT_EPISODE_TYPE: NORMAL
MDC_IDC_STAT_EPISODE_VENDOR_TYPE: NORMAL
MDC_IDC_STAT_TACHYTHERAPY_ATP_DELIVERED_RECENT: 0
MDC_IDC_STAT_TACHYTHERAPY_ATP_DELIVERED_TOTAL: 3
MDC_IDC_STAT_TACHYTHERAPY_RECENT_DTM_END: NORMAL
MDC_IDC_STAT_TACHYTHERAPY_RECENT_DTM_START: NORMAL
MDC_IDC_STAT_TACHYTHERAPY_SHOCKS_ABORTED_RECENT: 0
MDC_IDC_STAT_TACHYTHERAPY_SHOCKS_ABORTED_TOTAL: 1
MDC_IDC_STAT_TACHYTHERAPY_SHOCKS_DELIVERED_RECENT: 0
MDC_IDC_STAT_TACHYTHERAPY_SHOCKS_DELIVERED_TOTAL: 2
MDC_IDC_STAT_TACHYTHERAPY_TOTAL_DTM_END: NORMAL
MDC_IDC_STAT_TACHYTHERAPY_TOTAL_DTM_START: NORMAL
O2/TOTAL GAS SETTING VFR VENT: 21 %
OXYHGB MFR BLD: 92 % (ref 92–100)
OXYHGB MFR BLDV: 46 % (ref 70–75)
OXYHGB MFR BLDV: 48 % (ref 70–75)
OXYHGB MFR BLDV: 50 % (ref 70–75)
PCO2 BLD: 33 MM HG (ref 35–45)
PCO2 BLDV: 36 MM HG (ref 40–50)
PCO2 BLDV: 37 MM HG (ref 40–50)
PCO2 BLDV: 38 MM HG (ref 40–50)
PH BLD: 7.5 [PH] (ref 7.35–7.45)
PH BLDV: 7.45 [PH] (ref 7.32–7.43)
PH BLDV: 7.48 [PH] (ref 7.32–7.43)
PH BLDV: 7.48 [PH] (ref 7.32–7.43)
PHOSPHATE SERPL-MCNC: 3 MG/DL (ref 2.5–4.5)
PLATELET # BLD AUTO: 116 10E3/UL (ref 150–450)
PO2 BLD: 64 MM HG (ref 80–105)
PO2 BLDV: 27 MM HG (ref 25–47)
PO2 BLDV: 28 MM HG (ref 25–47)
PO2 BLDV: 28 MM HG (ref 25–47)
POTASSIUM BLD-SCNC: 3.8 MMOL/L (ref 3.4–5.3)
POTASSIUM BLD-SCNC: 3.9 MMOL/L (ref 3.4–5.3)
POTASSIUM BLD-SCNC: 3.9 MMOL/L (ref 3.4–5.3)
PROT SERPL-MCNC: 5.3 G/DL (ref 6.8–8.8)
RBC # BLD AUTO: 2.46 10E6/UL (ref 4.4–5.9)
SODIUM SERPL-SCNC: 141 MMOL/L (ref 133–144)
UFH PPP CHRO-ACNC: 0.34 IU/ML
UFH PPP CHRO-ACNC: 0.38 IU/ML
UFH PPP CHRO-ACNC: 0.39 IU/ML
WBC # BLD AUTO: 9.7 10E3/UL (ref 4–11)

## 2022-03-27 PROCEDURE — 999N000155 HC STATISTIC RAPCV CVP MONITORING

## 2022-03-27 PROCEDURE — 84132 ASSAY OF SERUM POTASSIUM: CPT | Performed by: STUDENT IN AN ORGANIZED HEALTH CARE EDUCATION/TRAINING PROGRAM

## 2022-03-27 PROCEDURE — 97110 THERAPEUTIC EXERCISES: CPT | Mod: GO | Performed by: OCCUPATIONAL THERAPIST

## 2022-03-27 PROCEDURE — 250N000013 HC RX MED GY IP 250 OP 250 PS 637: Performed by: STUDENT IN AN ORGANIZED HEALTH CARE EDUCATION/TRAINING PROGRAM

## 2022-03-27 PROCEDURE — 82962 GLUCOSE BLOOD TEST: CPT

## 2022-03-27 PROCEDURE — 84100 ASSAY OF PHOSPHORUS: CPT | Performed by: STUDENT IN AN ORGANIZED HEALTH CARE EDUCATION/TRAINING PROGRAM

## 2022-03-27 PROCEDURE — 999N000045 HC STATISTIC DAILY SWAN MONITORING

## 2022-03-27 PROCEDURE — 82330 ASSAY OF CALCIUM: CPT | Performed by: INTERNAL MEDICINE

## 2022-03-27 PROCEDURE — 36415 COLL VENOUS BLD VENIPUNCTURE: CPT | Performed by: STUDENT IN AN ORGANIZED HEALTH CARE EDUCATION/TRAINING PROGRAM

## 2022-03-27 PROCEDURE — 80053 COMPREHEN METABOLIC PANEL: CPT | Performed by: STUDENT IN AN ORGANIZED HEALTH CARE EDUCATION/TRAINING PROGRAM

## 2022-03-27 PROCEDURE — 120N000003 HC R&B IMCU UMMC

## 2022-03-27 PROCEDURE — 71045 X-RAY EXAM CHEST 1 VIEW: CPT

## 2022-03-27 PROCEDURE — 250N000013 HC RX MED GY IP 250 OP 250 PS 637: Performed by: INTERNAL MEDICINE

## 2022-03-27 PROCEDURE — 85520 HEPARIN ASSAY: CPT | Performed by: INTERNAL MEDICINE

## 2022-03-27 PROCEDURE — 85014 HEMATOCRIT: CPT | Performed by: STUDENT IN AN ORGANIZED HEALTH CARE EDUCATION/TRAINING PROGRAM

## 2022-03-27 PROCEDURE — 99291 CRITICAL CARE FIRST HOUR: CPT | Mod: 25 | Performed by: INTERNAL MEDICINE

## 2022-03-27 PROCEDURE — 82805 BLOOD GASES W/O2 SATURATION: CPT | Performed by: STUDENT IN AN ORGANIZED HEALTH CARE EDUCATION/TRAINING PROGRAM

## 2022-03-27 PROCEDURE — 85520 HEPARIN ASSAY: CPT | Performed by: STUDENT IN AN ORGANIZED HEALTH CARE EDUCATION/TRAINING PROGRAM

## 2022-03-27 PROCEDURE — 999N000015 HC STATISTIC ARTERIAL MONITORING DAILY

## 2022-03-27 PROCEDURE — 999N000157 HC STATISTIC RCP TIME EA 10 MIN

## 2022-03-27 PROCEDURE — 83735 ASSAY OF MAGNESIUM: CPT | Performed by: STUDENT IN AN ORGANIZED HEALTH CARE EDUCATION/TRAINING PROGRAM

## 2022-03-27 PROCEDURE — 93750 INTERROGATION VAD IN PERSON: CPT | Performed by: INTERNAL MEDICINE

## 2022-03-27 PROCEDURE — 250N000011 HC RX IP 250 OP 636: Performed by: STUDENT IN AN ORGANIZED HEALTH CARE EDUCATION/TRAINING PROGRAM

## 2022-03-27 PROCEDURE — 71045 X-RAY EXAM CHEST 1 VIEW: CPT | Mod: 26 | Performed by: RADIOLOGY

## 2022-03-27 PROCEDURE — 97535 SELF CARE MNGMENT TRAINING: CPT | Mod: GO | Performed by: OCCUPATIONAL THERAPIST

## 2022-03-27 PROCEDURE — 85610 PROTHROMBIN TIME: CPT | Performed by: STUDENT IN AN ORGANIZED HEALTH CARE EDUCATION/TRAINING PROGRAM

## 2022-03-27 RX ORDER — VALSARTAN 40 MG/1
20 TABLET ORAL DAILY
Status: DISCONTINUED | OUTPATIENT
Start: 2022-03-27 | End: 2022-03-29

## 2022-03-27 RX ORDER — TORSEMIDE 10 MG/1
20 TABLET ORAL DAILY
Status: DISCONTINUED | OUTPATIENT
Start: 2022-03-27 | End: 2022-03-28

## 2022-03-27 RX ORDER — ISOSORBIDE DINITRATE 10 MG/1
10 TABLET ORAL EVERY 8 HOURS
Status: DISCONTINUED | OUTPATIENT
Start: 2022-03-27 | End: 2022-03-28

## 2022-03-27 RX ORDER — WARFARIN SODIUM 5 MG/1
5 TABLET ORAL
Status: COMPLETED | OUTPATIENT
Start: 2022-03-27 | End: 2022-03-27

## 2022-03-27 RX ORDER — HYDRALAZINE HYDROCHLORIDE 25 MG/1
25 TABLET, FILM COATED ORAL EVERY 8 HOURS SCHEDULED
Status: DISCONTINUED | OUTPATIENT
Start: 2022-03-27 | End: 2022-03-28

## 2022-03-27 RX ORDER — POTASSIUM CHLORIDE 750 MG/1
20 TABLET, EXTENDED RELEASE ORAL ONCE
Status: COMPLETED | OUTPATIENT
Start: 2022-03-27 | End: 2022-03-27

## 2022-03-27 RX ADMIN — OXYCODONE HYDROCHLORIDE 10 MG: 10 TABLET ORAL at 13:41

## 2022-03-27 RX ADMIN — MUPIROCIN 0.5 G: 20 OINTMENT TOPICAL at 19:55

## 2022-03-27 RX ADMIN — OXYCODONE HYDROCHLORIDE 10 MG: 10 TABLET ORAL at 01:06

## 2022-03-27 RX ADMIN — HYDRALAZINE HYDROCHLORIDE 25 MG: 25 TABLET, FILM COATED ORAL at 22:17

## 2022-03-27 RX ADMIN — HYDRALAZINE HYDROCHLORIDE 25 MG: 25 TABLET, FILM COATED ORAL at 13:41

## 2022-03-27 RX ADMIN — SOTALOL HYDROCHLORIDE 80 MG: 80 TABLET ORAL at 19:44

## 2022-03-27 RX ADMIN — POTASSIUM CHLORIDE 20 MEQ: 750 TABLET, EXTENDED RELEASE ORAL at 05:57

## 2022-03-27 RX ADMIN — OXYCODONE HYDROCHLORIDE 5 MG: 5 TABLET ORAL at 08:52

## 2022-03-27 RX ADMIN — METOCLOPRAMIDE 10 MG: 10 TABLET ORAL at 07:40

## 2022-03-27 RX ADMIN — TORSEMIDE 20 MG: 20 TABLET ORAL at 10:10

## 2022-03-27 RX ADMIN — WARFARIN SODIUM 5 MG: 5 TABLET ORAL at 17:37

## 2022-03-27 RX ADMIN — OXYCODONE HYDROCHLORIDE 5 MG: 5 TABLET ORAL at 07:40

## 2022-03-27 RX ADMIN — ACETAMINOPHEN 650 MG: 325 TABLET ORAL at 01:06

## 2022-03-27 RX ADMIN — PANTOPRAZOLE SODIUM 40 MG: 40 TABLET, DELAYED RELEASE ORAL at 07:40

## 2022-03-27 RX ADMIN — Medication 20 MG: at 08:08

## 2022-03-27 RX ADMIN — SPIRONOLACTONE 25 MG: 25 TABLET, FILM COATED ORAL at 07:40

## 2022-03-27 RX ADMIN — OXYCODONE HYDROCHLORIDE 10 MG: 10 TABLET ORAL at 19:44

## 2022-03-27 RX ADMIN — HYDRALAZINE HYDROCHLORIDE 50 MG: 50 TABLET, FILM COATED ORAL at 05:57

## 2022-03-27 RX ADMIN — ISOSORBIDE DINITRATE 20 MG: 10 TABLET ORAL at 03:37

## 2022-03-27 RX ADMIN — SOTALOL HYDROCHLORIDE 80 MG: 80 TABLET ORAL at 07:41

## 2022-03-27 RX ADMIN — ISOSORBIDE DINITRATE 10 MG: 10 TABLET ORAL at 19:44

## 2022-03-27 RX ADMIN — HEPARIN SODIUM AND DEXTROSE 1400 UNITS/HR: 10000; 5 INJECTION INTRAVENOUS at 08:36

## 2022-03-27 RX ADMIN — METOCLOPRAMIDE 10 MG: 10 TABLET ORAL at 17:37

## 2022-03-27 RX ADMIN — ISOSORBIDE DINITRATE 10 MG: 10 TABLET ORAL at 13:41

## 2022-03-27 RX ADMIN — METOCLOPRAMIDE 10 MG: 10 TABLET ORAL at 13:41

## 2022-03-27 ASSESSMENT — ACTIVITIES OF DAILY LIVING (ADL)
ADLS_ACUITY_SCORE: 12
ADLS_ACUITY_SCORE: 12
ADLS_ACUITY_SCORE: 13
ADLS_ACUITY_SCORE: 12
ADLS_ACUITY_SCORE: 8
ADLS_ACUITY_SCORE: 12
ADLS_ACUITY_SCORE: 13
ADLS_ACUITY_SCORE: 12
ADLS_ACUITY_SCORE: 13

## 2022-03-27 NOTE — PLAN OF CARE
Major Shift Events: Off Amiodarone at 1000. LVAD speed increased to 5400. 40 mg IV lasix in AM. Potassium replaced per protocol. Heparin gtt bolus and increased to 1250 unit(s)/hour in afternoon for Hep 10 a of 0.18 with re-check at 1930. MAP goal of 75-80 required PRN 10 mg IV hydralazine in the afternoon, then requiring Epi 0.06 in evening after getting back to bed and PRN dose of Dilaudid. Increased frequency of tachy arrhythmia to 120s after starting Epi that self converts with noted PI drop to 2.0 from ~3.5.   Plan: Continue with POC & update primary team with changes.   For vital signs and complete assessments, please see documentation flowsheets.

## 2022-03-27 NOTE — PLAN OF CARE
Transfer 12:50PM  Transferred from:   Via:bed  Reason for transfer: Pt appropriate for 6B- improved/worsened patient condition  Family: Aware of transfer - wife called and updated.  Belongings: Received with pt  Chart: Received with pt  Medications: Meds received from old unit with pt  Code Status verified on armband: yes  2 RN Skin Assessment Completed By: Adrianna WEAVER  Med rec completed: yes  Bed surface reassessed with algorithm and charted: yes  New bed surface ordered: no  Suction/Ambu bag/Flowmeter at bedside: yes    Report received from: 4E RN  Pt status:    Neuro: A&Ox4.   Cardiac: LVAD HM3, all parameters WDL. SR, paced. MAP >60. VSS. B/P: 102/92, T: 98.8, P: 79, R: 22  Respiratory: O2 sats stable on RA. Chest tube x5 to -20 suction.  GI/: Adequate urine output. No BM this shift.  Diet/appetite: Tolerating regular diet. Minimal appetite   Activity:  Assist of 1-2. Turns and repositions self independently in bed.  Pain: At acceptable level on current regimen. PRN oxycodone given x1  Skin: No new deficits noted. Midsternal dressing with old drainage, Chest tube dressing CDI.  LDA's: PICC, PIV, Chest tubes x5, LVAD HM3.    Heparin gtt @ 1400units/hour. XA recheck in AM    Plan: Continue with POC. Notify primary team with changes.

## 2022-03-27 NOTE — PLAN OF CARE
Pertinent PMH: Tej is being followed by CVTS/C2 who was admitted on 03/23 for HM3 placement with Dr payton, c/b VT episode s/p ICD cardioversion. Extubated 03/24.  Major Shift Events: A&OX4, neuro intact, moves all extremities, Tmax 99.8, oxycodone 10 mg administered X2 for incisional pain, in addition to RTC Rx. Able to maintain SpO2 > 92 % on RA. MAP 75 - 90's, PA 50/20's, CVP 4 - 5, CO/CI 5.8/2.7, SVR 1120. C2 team aware of CVP. LVAD speed 5400, flow 4-4.2, Power 3.9, PI 3.5 -3.9. Pressors discontinued at 2100 on 03/26. Sotalol initiated. Heparin gtt at 1400, last 10A was therapeutic. Absence of VT episodes overnight, though Pt will have 3 - 5 min bursts of pSVT. CVTS team aware. Lytes stable. Voids minimally, 20 - 30 mL/Hr UOP. CT OP 20 - 30 mL/Hr. Air leak present, team aware.  Plan: Transition to floor. Continue to monitor.     For complete vital signs, hemodynamics, medications/drips, and complete assessments please see documentation flowsheets.    Goal Outcome Evaluation:    Plan of Care Reviewed With: patient, caregiver

## 2022-03-27 NOTE — PROCEDURES
Removal [place] (3/27/2022):    LVAD Speed: 5500. (30-45 minutes after going up on the speed).    MAP: 80  CVP: 10  PA: 48/20  W: 18  CO/CI: 4.7/ 2.2    SVO2: 46    Jas Gunderson MD  Cardiology fellow

## 2022-03-27 NOTE — PROGRESS NOTES
CV ICU PROGRESS NOTE  March 27, 2022      CO-MORBIDITIES:   LVAD (left ventricular assist device) present (H)  (primary encounter diagnosis)  Chronic systolic congestive heart failure (H)      ASSESSMENT: Tej Morfin is a 56 year old male with NICM secondary to valvulopathy severe MR s/p MV ring repair, now on home inotrope (Dobutamine 2.5), sustained VT s/p secondary prevention ICD, CKD stage II (baseline 1.5) who presents for LVAD placement with heartmate 3 by Dr. Rodríguez on 3/23/22     TODAY'S PROGRESS:   - floor today, stable     PLAN:  Neuro/ pain/ sedation:  Acute Postoperative pain  - Monitor neurological status. Notify the MD for any acute changes in exam.  - Pain: Scheduled tylenol and gabapentin. PRN tylenol, oxycodone, robaxin     Pulmonary care:   Postoperative ventilation management  #allergic rhinitis  - Titrate supplemental oxygen to maintain saturation above 92%.  - Pulmonary hygiene: Incentive spirometer every 15- 30 minutes when awake, flutter valve, C&DB  - restart home allergy meds     Cardiovascular:    S/p LVAD placement (HeartMate 3) on 3/23/2022 by Dr. Rodríguez  History of NICM, MR with VT and permanent defib  Mild MR, moderate RV dysfunction on EMILY  #balloon pump - removed  Recent echo on 7/2020 with LVEF of 20%  - Monitor hemodynamic status.   - PTA ASA, dobutamine infusion, entresto, sotalol, hydralazine  - Goal MAP 65-85  - Epi, norepi, vaso gtt; wean as tolerated  - Immunosuppression per surgery       GI care/ Nutrition:   - PPI  - Continue bowel regimen: miralax, senna    Renal/ Fluid Balance/ Electrolytes:   BL creat appears to be ~ 0.9  - Strict I/O, daily weights  - Avoid/limit nephrotoxins as able  - Replete lytes PRN per protocol  - PTA lasix held, dosing as needed       Endocrine:    Stress induced hyperglycemia  Preop A1c 5.9  - hdSSI  - Goal BG <180 for optimal healing     ID/ Antibiotics:  Stress induced leukocytosis  - To complete perioperative regimen  - Continue to monitor  fever curve, WBC and inflammatory markers as appropriate     Heme:     Stress induced leukocytosis  Acute blood loss anemia  Acute blood loss thrombocytopenia  No s/sx active bleeding  - Continue to monitor  - CBC      MSK/ Skin:  Sternotomy  Surgical Incision  - Sternal precautions  - Postoperative incision management per protocol  - PT/OT/CR     Prophylaxis:    - Mechanical prophylaxis for DVT  - Chemical DVT prophylaxis  - PPI      Lines/ tubes/ drains:  - Arterial Line, CTs, PA catheter, RIJ, scott     Disposition:  - CVICU     Patient seen, findings and plan discussed with CVICU staff     Discussed with CVTS staff and fellow     Helder Chaudhari DO CA-2  Department of Anesthesiology  [phone]    ====================================    SUBJECTIVE:   Feeling better today, no complaints    OBJECTIVE:   1. VITAL SIGNS:   Temp:  [98.1  F (36.7  C)-99.8  F (37.7  C)] 98.8  F (37.1  C)  Pulse:  [79-86] 79  Resp:  [8-36] 22  BP: ()/(72-92) 102/92  MAP:  [74 mmHg-99 mmHg] 88 mmHg  Arterial Line BP: ()/(67-90) 101/77  SpO2:  [93 %-99 %] 95 %  Resp: 22      2. INTAKE/ OUTPUT:   I/O last 3 completed shifts:  In: 1035.73 [P.O.:350; I.V.:685.73]  Out: 2125 [Urine:1470; Chest Tube:655]    3. PHYSICAL EXAMINATION:   General: calm and sitting in bed  Neuro: awake and orientated x3  Resp: on nasal cannula  CV: soft heart sounds, hum of VAD  Abdomen: Soft, Non-distended, Non-tender  Incisions: c/d/i  Extremities: warm and well perfused    4. INVESTIGATIONS:   Arterial Blood Gases   Recent Labs   Lab 03/27/22  0351 03/26/22  0757 03/26/22  0338 03/25/22  0423   PH 7.50* 7.53* 7.50* 7.48*   PCO2 33* 32* 34* 39   PO2 64* 71* 60* 63*   HCO3 25 27 27 29*     Complete Blood Count   Recent Labs   Lab 03/27/22  0347 03/26/22  0321 03/25/22  1522 03/25/22  0828   WBC 9.7 12.6* 10.7 11.1*   HGB 7.8* 8.5* 8.0* 8.3*   * 111* 111* 108*     Basic Metabolic Panel  Recent Labs   Lab 03/27/22  1248 03/27/22  0748  03/27/22 0351 03/27/22 0347 03/26/22  2335 03/26/22  2332 03/26/22  1953 03/26/22  1713 03/26/22 0330 03/26/22 0321 03/25/22  2344 03/25/22 2022 03/25/22  0607 03/25/22  0424   NA  --   --   --  141  --   --   --   --   --  139  --  140  --  141   POTASSIUM 3.9  --   --  3.8  --  3.9  --  3.8   < > 3.6  --  3.8  --  3.9   CHLORIDE  --   --   --  111*  --   --   --   --   --  108  --  108  --  107   CO2  --   --   --  26  --   --   --   --   --  26  --  25  --  27   BUN  --   --   --  22  --   --   --   --   --  17  --  17  --  16   CR  --   --   --  0.87  --   --   --   --   --  0.82  --  0.84  --  0.85   GLC  --  91 109* 98 106*  --    < >  --    < > 110*   < > 125*   < > 124*    < > = values in this interval not displayed.     Liver Function Tests  Recent Labs   Lab 03/27/22 0347 03/26/22 0321 03/25/22 0424 03/24/22  1104 03/24/22  0354   AST 20 28 47*  --  48*   ALT 12 12 14  --  14   ALKPHOS 41 44 40  --  42   BILITOTAL 0.4 1.3 1.7*  --  1.0   ALBUMIN 1.8* 2.0* 2.2*  --  2.3*   INR 1.31* 1.30* 1.26* 1.33*  --      Pancreatic Enzymes  No lab results found in last 7 days.  Coagulation Profile  Recent Labs   Lab 03/27/22 0347 03/26/22 0321 03/25/22 0424 03/24/22  1104 03/23/22  1433 03/23/22  1220 03/23/22 0347   INR 1.31* 1.30* 1.26* 1.33* 1.61* 1.72* 1.11   PTT  --   --   --   --  39* 27 34         5. RADIOLOGY:   Recent Results (from the past 24 hour(s))   XR Chest Port 1 View    Narrative    EXAM: XR CHEST PORT 1 VIEW  3/27/2022 4:26 AM     HISTORY:  s/p LVAD placement       COMPARISON:  3/26/2022    TECHNIQUE: Single frontal radiograph of the chest    FINDINGS:   Stable support devices and postsurgical chest.    Midline trachea. Stable lung volumes. Stable enlarged  cardiomediastinal silhouette. No appreciable pneumothorax. Stable  perihilar/retrocardiac opacities.      Impression    IMPRESSION: Stable atelectasis, postsurgical chest, pulmonary edema  and cardiomegaly. Implantable cardiac  defibrillator.    I have personally reviewed the examination and initial interpretation  and I agree with the findings.    CARLA PAREDES MD         SYSTEM ID:  [id number]       =========================================

## 2022-03-27 NOTE — PROGRESS NOTES
Transferred to: 6B from   Belongings: Clothing and glasses + pt LVAD supplies sent with patient.   [place] removed? Yes  Central line removed? Yes  Chart and medications sent with patient? Yes  Family notified: Attempted to reach wife Jessenia x2 but unsuccessful. 6B RN to try again later.

## 2022-03-27 NOTE — PROGRESS NOTES
Cardiology Progress Note    Assessment & Plan   Tej Morfin is a 56 year old male admitted on 3/22/2022. He has history of NICM secondary to severe MR s/p MV ring repair (), now on home inotrope (Dobutamine 2.5), sustained VT s/p secondary prevention ICD, who presents for scheduled LVAD placement. Now status post 3 on 3/23/2022.     Changes Today:  - Transfer to floor. Remove swan.  - Continue Sotalol 80 mg BID.  - Increase LVAD speed to 5500 rpm.  - Continue Aldactone 25 mg daily.  - Start Torsemide 20 mg daily.  - Goal CVP ~10      #. Non-ischemic Cardiomyopathy status post 3 (3/23/2022)  #. Stage D; NYHA Class III     DATE MAP CVP PAP PCWP Harris CO Harris CI SVR MVo2 Therapies   3/24 91 10 45/15 15 5.2 2.4 1200 60 LVAD 5200,  2.5   3/25 83 8 39/16 14 5.6 2.6 1200 50 LVAD 5300,   Nipride   3/26 71 10 50/22 22 5.9 2.7 827 60 LVAD 5300   3/27 85 10 52/20 20 5.1 2.4 1099 50 LVAD 5400         - LVAD: Increase speed to 5500 RPM, 3.4 LPM, PI 5.5, Prw 3.5-3.8  - Hemodynamics: Remove [place] today.  - Afterload: Decrease Hydralazine and Isordil. Start Valsartan.  - BB: Not on BB at this time due to cardiogenic shock  - Aldosterone antagonist: Aldactone 25 mg daily.  - SCD prophylaxis: Yes. CRT-D. LV pacing turned off. VT zone turned off.  - AC: Heparin -> Warfain.  - Anti-platelet: RICARDO trial, no aspirin products  - Fluid status: Euvolemic, goal CVP ~10    #Hx of Ventricular Arrhythmias:  Previously on sotalol which was held in the perioperative period. Episode of a fib with RVR with ICD shock on 3/25.   - Continue Sotalol 80 mg daily.    This patient was seen and discussed with Dr. Elizabeth who agrees with the above assessment and plan.     Jas Gunderson MD  Cardiology fellow    Interval History   MELANIE overnight. Pain better controlled. Had intermittent Afib with RVR overnight.     Physical Exam   Temp: 98.8  F (37.1  C) Temp src: Oral BP: (!) 102/92 Pulse: 79   Resp: 22 SpO2: 95 % O2 Device: None (Room  "air)    Vitals:    03/25/22 0000 03/26/22 0000 03/27/22 0000   Weight: 93.2 kg (205 lb 7.5 oz) 94.5 kg (208 lb 5.4 oz) 93.6 kg (206 lb 6.4 oz)     Vital Signs with Ranges  Temp:  [98.1  F (36.7  C)-99.8  F (37.7  C)] 98.8  F (37.1  C)  Pulse:  [79-86] 79  Resp:  [8-36] 22  BP: ()/(72-92) 102/92  MAP:  [74 mmHg-99 mmHg] 88 mmHg  Arterial Line BP: ()/(67-90) 101/77  SpO2:  [93 %-99 %] 95 %  I/O last 3 completed shifts:  In: 1035.73 [P.O.:350; I.V.:685.73]  Out: 2125 [Urine:1470; Chest Tube:655]     , Blood pressure (!) 102/92, pulse 79, temperature 98.8  F (37.1  C), temperature source Oral, resp. rate 22, height 1.791 m (5' 10.51\"), weight 93.6 kg (206 lb 6.4 oz), SpO2 95 %.  206 lbs 6.4 oz    GEN: Alert, no acute distress   CV:  LVAD Hum +   LUNGS:  Clear to auscultation bilaterally  ABD:  Active bowel sounds, soft, non-tender/non-distended.  No rebound/guarding/rigidity.  EXT:  No edema or cyanosis. WWP.      Medications     heparin 1,400 Units/hr (03/27/22 1200)     Warfarin Therapy Reminder         hydrALAZINE  25 mg Oral Q8H PARKER     insulin aspart  1-7 Units Subcutaneous TID AC     insulin aspart  1-5 Units Subcutaneous At Bedtime     isosorbide dinitrate  10 mg Oral Q8H     metoclopramide  10 mg Oral TID AC     mupirocin  0.5 g Both Nostrils BID     pantoprazole  40 mg Oral QAM AC     polyethylene glycol  17 g Oral or Feeding Tube Daily     senna-docusate  1 tablet Oral or Feeding Tube BID     sodium chloride (PF)  3 mL Intracatheter Q8H     sotalol  80 mg Oral Q12H PARKER     spironolactone  25 mg Oral Daily     torsemide  20 mg Oral Daily     valsartan  20 mg Oral Daily       Data   Recent Labs   Lab 03/27/22  1248 03/27/22  0748 03/27/22  0351 03/27/22  0347 03/26/22  2335 03/26/22  2332 03/26/22  0330 03/26/22  0321 03/25/22  2344 03/25/22  2022 03/25/22  1655 03/25/22  1522 03/25/22  0607 03/25/22  0424   WBC  --   --   --  9.7  --   --   --  12.6*  --   --   --  10.7   < > 11.0   HGB  --   --   " --  7.8*  --   --   --  8.5*  --   --   --  8.0*   < > 8.6*   MCV  --   --   --  102*  --   --   --  100  --   --   --  99   < > 98   PLT  --   --   --  116*  --   --   --  111*  --   --   --  111*   < > 99*   INR  --   --   --  1.31*  --   --   --  1.30*  --   --   --   --   --  1.26*   NA  --   --   --  141  --   --   --  139  --  140  --   --   --  141   POTASSIUM 3.9  --   --  3.8  --  3.9   < > 3.6  --  3.8  --   --   --  3.9   CHLORIDE  --   --   --  111*  --   --   --  108  --  108  --   --   --  107   CO2  --   --   --  26  --   --   --  26  --  25  --   --   --  27   BUN  --   --   --  22  --   --   --  17  --  17  --   --   --  16   CR  --   --   --  0.87  --   --   --  0.82  --  0.84  --   --   --  0.85   ANIONGAP  --   --   --  4  --   --   --  5  --  7  --   --   --  7   YESICA  --   --   --  8.0*  --   --   --  8.4*  --  8.2*  --   --   --  8.3*   GLC  --  91 109* 98   < >  --    < > 110*   < > 125*   < >  --    < > 124*   ALBUMIN  --   --   --  1.8*  --   --   --  2.0*  --   --   --   --   --  2.2*   PROTTOTAL  --   --   --  5.3*  --   --   --  5.5*  --   --   --   --   --  5.2*   BILITOTAL  --   --   --  0.4  --   --   --  1.3  --   --   --   --   --  1.7*   ALKPHOS  --   --   --  41  --   --   --  44  --   --   --   --   --  40   ALT  --   --   --  12  --   --   --  12  --   --   --   --   --  14   AST  --   --   --  20  --   --   --  28  --   --   --   --   --  47*    < > = values in this interval not displayed.

## 2022-03-28 ENCOUNTER — APPOINTMENT (OUTPATIENT)
Dept: ULTRASOUND IMAGING | Facility: CLINIC | Age: 57
DRG: 001 | End: 2022-03-28
Attending: PHYSICIAN ASSISTANT
Payer: MEDICARE

## 2022-03-28 ENCOUNTER — APPOINTMENT (OUTPATIENT)
Dept: GENERAL RADIOLOGY | Facility: CLINIC | Age: 57
DRG: 001 | End: 2022-03-28
Attending: ANESTHESIOLOGY
Payer: MEDICARE

## 2022-03-28 ENCOUNTER — APPOINTMENT (OUTPATIENT)
Dept: OCCUPATIONAL THERAPY | Facility: CLINIC | Age: 57
DRG: 001 | End: 2022-03-28
Attending: INTERNAL MEDICINE
Payer: MEDICARE

## 2022-03-28 LAB
ALBUMIN SERPL-MCNC: 1.7 G/DL (ref 3.4–5)
ALBUMIN UR-MCNC: 30 MG/DL
ALP SERPL-CCNC: 47 U/L (ref 40–150)
ALT SERPL W P-5'-P-CCNC: 24 U/L (ref 0–70)
ANION GAP SERPL CALCULATED.3IONS-SCNC: 5 MMOL/L (ref 3–14)
APPEARANCE UR: CLEAR
AST SERPL W P-5'-P-CCNC: 32 U/L (ref 0–45)
ATRIAL RATE - MUSE: 88 BPM
BACTERIA #/AREA URNS HPF: ABNORMAL /HPF
BILIRUB SERPL-MCNC: 0.3 MG/DL (ref 0.2–1.3)
BILIRUB UR QL STRIP: NEGATIVE
BUN SERPL-MCNC: 22 MG/DL (ref 7–30)
CALCIUM SERPL-MCNC: 8.1 MG/DL (ref 8.5–10.1)
CHLORIDE BLD-SCNC: 110 MMOL/L (ref 94–109)
CO2 SERPL-SCNC: 25 MMOL/L (ref 20–32)
COLOR UR AUTO: YELLOW
CREAT SERPL-MCNC: 0.96 MG/DL (ref 0.66–1.25)
DIASTOLIC BLOOD PRESSURE - MUSE: NORMAL MMHG
ERYTHROCYTE [DISTWIDTH] IN BLOOD BY AUTOMATED COUNT: 14.4 % (ref 10–15)
GFR SERPL CREATININE-BSD FRML MDRD: >90 ML/MIN/1.73M2
GLUCOSE BLD-MCNC: 113 MG/DL (ref 70–99)
GLUCOSE BLDC GLUCOMTR-MCNC: 107 MG/DL (ref 70–99)
GLUCOSE BLDC GLUCOMTR-MCNC: 85 MG/DL (ref 70–99)
GLUCOSE BLDC GLUCOMTR-MCNC: 88 MG/DL (ref 70–99)
GLUCOSE BLDC GLUCOMTR-MCNC: 88 MG/DL (ref 70–99)
GLUCOSE UR STRIP-MCNC: NEGATIVE MG/DL
HCT VFR BLD AUTO: 25.6 % (ref 40–53)
HGB BLD-MCNC: 7.8 G/DL (ref 13.3–17.7)
HGB UR QL STRIP: NEGATIVE
HYALINE CASTS: 1 /LPF
INR PPP: 2.03 (ref 0.85–1.15)
INTERPRETATION ECG - MUSE: NORMAL
KETONES UR STRIP-MCNC: ABNORMAL MG/DL
LEUKOCYTE ESTERASE UR QL STRIP: NEGATIVE
MAGNESIUM SERPL-MCNC: 2.2 MG/DL (ref 1.6–2.3)
MCH RBC QN AUTO: 31.1 PG (ref 26.5–33)
MCHC RBC AUTO-ENTMCNC: 30.5 G/DL (ref 31.5–36.5)
MCV RBC AUTO: 102 FL (ref 78–100)
MUCOUS THREADS #/AREA URNS LPF: PRESENT /LPF
NITRATE UR QL: NEGATIVE
P AXIS - MUSE: NORMAL DEGREES
PH UR STRIP: 6.5 [PH] (ref 5–7)
PHOSPHATE SERPL-MCNC: 2.9 MG/DL (ref 2.5–4.5)
PLATELET # BLD AUTO: 108 10E3/UL (ref 150–450)
POTASSIUM BLD-SCNC: 3.8 MMOL/L (ref 3.4–5.3)
PR INTERVAL - MUSE: 148 MS
PROT SERPL-MCNC: 5.4 G/DL (ref 6.8–8.8)
QRS DURATION - MUSE: 94 MS
QT - MUSE: 454 MS
QTC - MUSE: 549 MS
R AXIS - MUSE: 194 DEGREES
RADIOLOGIST FLAGS: ABNORMAL
RBC # BLD AUTO: 2.51 10E6/UL (ref 4.4–5.9)
RBC URINE: 6 /HPF
SARS-COV-2 RNA RESP QL NAA+PROBE: NEGATIVE
SODIUM SERPL-SCNC: 140 MMOL/L (ref 133–144)
SP GR UR STRIP: 1.02 (ref 1–1.03)
SPERM #/AREA URNS HPF: PRESENT /HPF
SQUAMOUS EPITHELIAL: <1 /HPF
SYSTOLIC BLOOD PRESSURE - MUSE: NORMAL MMHG
T AXIS - MUSE: 109 DEGREES
UFH PPP CHRO-ACNC: 0.15 IU/ML
UFH PPP CHRO-ACNC: 0.25 IU/ML
UROBILINOGEN UR STRIP-MCNC: NORMAL MG/DL
VENTRICULAR RATE- MUSE: 88 BPM
WBC # BLD AUTO: 8.6 10E3/UL (ref 4–11)
WBC URINE: <1 /HPF

## 2022-03-28 PROCEDURE — 85027 COMPLETE CBC AUTOMATED: CPT | Performed by: STUDENT IN AN ORGANIZED HEALTH CARE EDUCATION/TRAINING PROGRAM

## 2022-03-28 PROCEDURE — 250N000013 HC RX MED GY IP 250 OP 250 PS 637

## 2022-03-28 PROCEDURE — 82040 ASSAY OF SERUM ALBUMIN: CPT | Performed by: STUDENT IN AN ORGANIZED HEALTH CARE EDUCATION/TRAINING PROGRAM

## 2022-03-28 PROCEDURE — 36592 COLLECT BLOOD FROM PICC: CPT | Performed by: STUDENT IN AN ORGANIZED HEALTH CARE EDUCATION/TRAINING PROGRAM

## 2022-03-28 PROCEDURE — 250N000013 HC RX MED GY IP 250 OP 250 PS 637: Performed by: STUDENT IN AN ORGANIZED HEALTH CARE EDUCATION/TRAINING PROGRAM

## 2022-03-28 PROCEDURE — 85610 PROTHROMBIN TIME: CPT | Performed by: STUDENT IN AN ORGANIZED HEALTH CARE EDUCATION/TRAINING PROGRAM

## 2022-03-28 PROCEDURE — 250N000011 HC RX IP 250 OP 636: Performed by: STUDENT IN AN ORGANIZED HEALTH CARE EDUCATION/TRAINING PROGRAM

## 2022-03-28 PROCEDURE — 99233 SBSQ HOSP IP/OBS HIGH 50: CPT | Mod: 25 | Performed by: INTERNAL MEDICINE

## 2022-03-28 PROCEDURE — 85520 HEPARIN ASSAY: CPT | Performed by: STUDENT IN AN ORGANIZED HEALTH CARE EDUCATION/TRAINING PROGRAM

## 2022-03-28 PROCEDURE — 97535 SELF CARE MNGMENT TRAINING: CPT | Mod: GO | Performed by: OCCUPATIONAL THERAPIST

## 2022-03-28 PROCEDURE — 71045 X-RAY EXAM CHEST 1 VIEW: CPT | Mod: 26 | Performed by: RADIOLOGY

## 2022-03-28 PROCEDURE — 83735 ASSAY OF MAGNESIUM: CPT | Performed by: STUDENT IN AN ORGANIZED HEALTH CARE EDUCATION/TRAINING PROGRAM

## 2022-03-28 PROCEDURE — 250N000013 HC RX MED GY IP 250 OP 250 PS 637: Performed by: INTERNAL MEDICINE

## 2022-03-28 PROCEDURE — 84100 ASSAY OF PHOSPHORUS: CPT | Performed by: STUDENT IN AN ORGANIZED HEALTH CARE EDUCATION/TRAINING PROGRAM

## 2022-03-28 PROCEDURE — 71045 X-RAY EXAM CHEST 1 VIEW: CPT

## 2022-03-28 PROCEDURE — U0003 INFECTIOUS AGENT DETECTION BY NUCLEIC ACID (DNA OR RNA); SEVERE ACUTE RESPIRATORY SYNDROME CORONAVIRUS 2 (SARS-COV-2) (CORONAVIRUS DISEASE [COVID-19]), AMPLIFIED PROBE TECHNIQUE, MAKING USE OF HIGH THROUGHPUT TECHNOLOGIES AS DESCRIBED BY CMS-2020-01-R: HCPCS | Performed by: PHYSICIAN ASSISTANT

## 2022-03-28 PROCEDURE — 85520 HEPARIN ASSAY: CPT | Performed by: INTERNAL MEDICINE

## 2022-03-28 PROCEDURE — 80053 COMPREHEN METABOLIC PANEL: CPT | Performed by: STUDENT IN AN ORGANIZED HEALTH CARE EDUCATION/TRAINING PROGRAM

## 2022-03-28 PROCEDURE — 120N000003 HC R&B IMCU UMMC

## 2022-03-28 PROCEDURE — 99232 SBSQ HOSP IP/OBS MODERATE 35: CPT | Performed by: PHYSICIAN ASSISTANT

## 2022-03-28 PROCEDURE — 93750 INTERROGATION VAD IN PERSON: CPT | Performed by: INTERNAL MEDICINE

## 2022-03-28 PROCEDURE — 81001 URINALYSIS AUTO W/SCOPE: CPT | Performed by: PHYSICIAN ASSISTANT

## 2022-03-28 PROCEDURE — 93970 EXTREMITY STUDY: CPT | Mod: 26 | Performed by: STUDENT IN AN ORGANIZED HEALTH CARE EDUCATION/TRAINING PROGRAM

## 2022-03-28 PROCEDURE — 93970 EXTREMITY STUDY: CPT

## 2022-03-28 PROCEDURE — 36592 COLLECT BLOOD FROM PICC: CPT | Performed by: INTERNAL MEDICINE

## 2022-03-28 RX ORDER — TORSEMIDE 10 MG/1
20 TABLET ORAL
Status: DISCONTINUED | OUTPATIENT
Start: 2022-03-28 | End: 2022-03-30

## 2022-03-28 RX ORDER — QUETIAPINE FUMARATE 25 MG/1
25 TABLET, FILM COATED ORAL ONCE
Status: COMPLETED | OUTPATIENT
Start: 2022-03-28 | End: 2022-03-28

## 2022-03-28 RX ORDER — VALSARTAN 40 MG/1
20 TABLET ORAL 2 TIMES DAILY
Status: CANCELLED | OUTPATIENT
Start: 2022-03-28

## 2022-03-28 RX ORDER — WARFARIN SODIUM 4 MG/1
4 TABLET ORAL
Status: COMPLETED | OUTPATIENT
Start: 2022-03-28 | End: 2022-03-28

## 2022-03-28 RX ORDER — POTASSIUM CHLORIDE 750 MG/1
20 TABLET, EXTENDED RELEASE ORAL ONCE
Status: COMPLETED | OUTPATIENT
Start: 2022-03-28 | End: 2022-03-28

## 2022-03-28 RX ORDER — TORSEMIDE 10 MG/1
20 TABLET ORAL 2 TIMES DAILY
Status: CANCELLED | OUTPATIENT
Start: 2022-03-28

## 2022-03-28 RX ADMIN — POTASSIUM CHLORIDE 20 MEQ: 750 TABLET, EXTENDED RELEASE ORAL at 09:29

## 2022-03-28 RX ADMIN — OXYCODONE HYDROCHLORIDE 5 MG: 5 TABLET ORAL at 16:54

## 2022-03-28 RX ADMIN — METOCLOPRAMIDE 10 MG: 10 TABLET ORAL at 16:54

## 2022-03-28 RX ADMIN — POLYETHYLENE GLYCOL 3350 17 G: 17 POWDER, FOR SOLUTION ORAL at 09:30

## 2022-03-28 RX ADMIN — ISOSORBIDE DINITRATE 10 MG: 10 TABLET ORAL at 03:39

## 2022-03-28 RX ADMIN — MUPIROCIN 0.5 G: 20 OINTMENT TOPICAL at 09:53

## 2022-03-28 RX ADMIN — HYDROMORPHONE HYDROCHLORIDE 0.5 MG: 1 INJECTION, SOLUTION INTRAMUSCULAR; INTRAVENOUS; SUBCUTANEOUS at 11:32

## 2022-03-28 RX ADMIN — SOTALOL HYDROCHLORIDE 80 MG: 80 TABLET ORAL at 09:44

## 2022-03-28 RX ADMIN — ACETAMINOPHEN 650 MG: 325 TABLET ORAL at 21:31

## 2022-03-28 RX ADMIN — QUETIAPINE FUMARATE 25 MG: 25 TABLET ORAL at 23:52

## 2022-03-28 RX ADMIN — OXYCODONE HYDROCHLORIDE 5 MG: 5 TABLET ORAL at 09:27

## 2022-03-28 RX ADMIN — METOCLOPRAMIDE 10 MG: 10 TABLET ORAL at 09:29

## 2022-03-28 RX ADMIN — ACETAMINOPHEN 650 MG: 325 TABLET ORAL at 09:27

## 2022-03-28 RX ADMIN — Medication 20 MG: at 09:48

## 2022-03-28 RX ADMIN — HEPARIN SODIUM AND DEXTROSE 1400 UNITS/HR: 10000; 5 INJECTION INTRAVENOUS at 00:14

## 2022-03-28 RX ADMIN — WARFARIN SODIUM 4 MG: 4 TABLET ORAL at 17:00

## 2022-03-28 RX ADMIN — SPIRONOLACTONE 25 MG: 25 TABLET, FILM COATED ORAL at 09:45

## 2022-03-28 RX ADMIN — PANTOPRAZOLE SODIUM 40 MG: 40 TABLET, DELAYED RELEASE ORAL at 09:26

## 2022-03-28 RX ADMIN — TORSEMIDE 20 MG: 10 TABLET ORAL at 18:53

## 2022-03-28 RX ADMIN — TORSEMIDE 20 MG: 20 TABLET ORAL at 09:45

## 2022-03-28 RX ADMIN — METOCLOPRAMIDE 10 MG: 10 TABLET ORAL at 11:51

## 2022-03-28 RX ADMIN — ISOSORBIDE DINITRATE 10 MG: 10 TABLET ORAL at 11:52

## 2022-03-28 RX ADMIN — SENNOSIDES AND DOCUSATE SODIUM 1 TABLET: 8.6; 5 TABLET ORAL at 09:30

## 2022-03-28 RX ADMIN — HYDRALAZINE HYDROCHLORIDE 25 MG: 25 TABLET, FILM COATED ORAL at 06:21

## 2022-03-28 RX ADMIN — SENNOSIDES AND DOCUSATE SODIUM 1 TABLET: 8.6; 5 TABLET ORAL at 20:25

## 2022-03-28 RX ADMIN — OXYCODONE HYDROCHLORIDE 10 MG: 10 TABLET ORAL at 03:39

## 2022-03-28 RX ADMIN — ACETAMINOPHEN 650 MG: 325 TABLET ORAL at 16:55

## 2022-03-28 RX ADMIN — OXYCODONE HYDROCHLORIDE 5 MG: 5 TABLET ORAL at 21:31

## 2022-03-28 RX ADMIN — HYDRALAZINE HYDROCHLORIDE 25 MG: 25 TABLET, FILM COATED ORAL at 14:48

## 2022-03-28 RX ADMIN — SOTALOL HYDROCHLORIDE 80 MG: 80 TABLET ORAL at 21:26

## 2022-03-28 ASSESSMENT — ACTIVITIES OF DAILY LIVING (ADL)
ADLS_ACUITY_SCORE: 13

## 2022-03-28 NOTE — PLAN OF CARE
Goal Outcome Evaluation:    Neuro: A&Ox4. Calm and cooperative.   Cardiac: SR-ST. Paced. LVAD HM3 values WDL.   Respiratory: Sating >95% on RA. CT x5 to -20 suction.   GI/: Adequate urine output. No BM overnight.   Diet/appetite: Tolerating regular diet. Poor appetite.  Activity:  Assist of 1-2. Able to shift weight in bed.  Pain: At acceptable level on current regimen. PRN oxycodone given x2.  Skin: No new deficits noted. LVAD, CT dressings changed. Sternal incisional wound care complete. Liquid bandged and VALERIE  LDA's: PICC, PIV, CT x 5, LVAD HM3    Plan: Continue with POC. Notify primary team with changes.

## 2022-03-28 NOTE — PROGRESS NOTES
Cards 2 Consult Progress Note    Assessment & Plan   Tej Morfin is a 56 year old male admitted on 3/22/2022. He has history of NICM secondary to severe MR s/p MV ring repair (), now on home inotrope (Dobutamine 2.5), sustained VT s/p secondary prevention ICD, who presents for scheduled LVAD placement. Now status post 3 on 3/23/2022.     Changes Today:  - Continue Sotalol 80 mg BID.  - Hold Hydral/Isordil  - Plan to titrate up valsartan tomorrow with the intension of transitioning to entresto prior to discharge   - Continue LVAD speed to 5500 rpm, may increase speed tomorrow  - Continue Aldactone 25 mg daily.  - Increase torsemide to 20mg BID     #. Non-ischemic Cardiomyopathy status post 3 (3/23/2022)  #. Stage D; NYHA Class III      DATE MAP CVP PAP PCWP Harris CO Harris CI SVR MVo2 Therapies   3/24 91 10 45/15 15 5.2 2.4 1200 60 LVAD 5200,  2.5   3/25 83 8 39/16 14 5.6 2.6 1200 50 LVAD 5300,   Nipride   3/26 71 10 50/22 22 5.9 2.7 827 60 LVAD 5300   3/27 85 10 52/20 20 5.1 2.4 1099 50 LVAD 5400          - LVAD: Speed 5500 RPM, 3.6-3.9 LPM, PI 3.6-3.9, Prw 4-4.1  - Afterload: Continue valsartan 20mg, hold hydral/isordil. Uptitrate valsartan tomorrow with intention of transitioning back to entresto prior to discharge  - BB: Not on BB at this time due to cardiogenic shock  - Aldosterone antagonist: Aldactone 25 mg daily.  - SCD prophylaxis: Yes. CRT-D. LV pacing turned off. VT zone turned off.  - AC: Warfarin  - Anti-platelet: RICARDO trial, no aspirin products  - Fluid status: Hypervolemic, increase torsemide to 20mg BID     #Hx of Ventricular Arrhythmias:  Previously on sotalol which was held in the perioperative period. Episode of a fib with RVR with ICD shock on 3/25.   - Continue Sotalol 80 mg daily.     This patient was seen and discussed with Dr. Elizabeth who agrees with the above assessment and plan.     Marquis Luciano MD  Cardiology Fellow  [phone]    Interval History   Doing well. No  "complaints this AM. Still has some swelling about the ankles.    Physical Exam   Temp: 98.8  F (37.1  C) Temp src: Oral BP: (!) 89/73 Pulse: 96   Resp: 18 SpO2: 97 % O2 Device: None (Room air)    Vitals:    03/26/22 0000 03/27/22 0000 03/28/22 0400   Weight: 94.5 kg (208 lb 5.4 oz) 93.6 kg (206 lb 6.4 oz) 88.7 kg (195 lb 8.8 oz)     Vital Signs with Ranges  Temp:  [98.2  F (36.8  C)-100.5  F (38.1  C)] 98.8  F (37.1  C)  Pulse:  [80-98] 96  Resp:  [16-22] 18  BP: ()/() 89/73  Cuff Mean (mmHg):  [98] 98  SpO2:  [95 %-97 %] 97 %  I/O last 3 completed shifts:  In: 696 [P.O.:360; I.V.:336]  Out: 1565 [Urine:790; Chest Tube:775]     , Blood pressure (!) 89/73, pulse 96, temperature 98.8  F (37.1  C), temperature source Oral, resp. rate 18, height 1.791 m (5' 10.51\"), weight 88.7 kg (195 lb 8.8 oz), SpO2 97 %.  195 lbs 8.77 oz  GEN: Alert, no acute distress   CV:  LVAD Hum +. JVP 8-10 cmH20  LUNGS:  Clear to auscultation bilaterally  ABD:  Active bowel sounds, soft, non-tender/non-distended.  No rebound/guarding/rigidity.  EXT:  1+ edema about the ankles. WWP.     Medications     Warfarin Therapy Reminder         insulin aspart  1-7 Units Subcutaneous TID AC     insulin aspart  1-5 Units Subcutaneous At Bedtime     metoclopramide  10 mg Oral TID AC     mupirocin  0.5 g Both Nostrils BID     pantoprazole  40 mg Oral QAM AC     polyethylene glycol  17 g Oral or Feeding Tube Daily     senna-docusate  1 tablet Oral or Feeding Tube BID     sodium chloride (PF)  3 mL Intracatheter Q8H     sotalol  80 mg Oral Q12H APRKER     spironolactone  25 mg Oral Daily     study - aspirin or placebo (IDS# 5616)  100 mg Oral Daily     torsemide  20 mg Oral BID     valsartan  20 mg Oral Daily       Data   Recent Labs   Lab 03/28/22  1658 03/28/22  1158 03/28/22  0748 03/28/22  0508 03/27/22  1734 03/27/22  1248 03/27/22  0351 03/27/22  0347 03/26/22  0330 03/26/22  0321   WBC  --   --   --  8.6  --   --   --  9.7  --  12.6*   HGB  -- "   --   --  7.8*  --   --   --  7.8*  --  8.5*   MCV  --   --   --  102*  --   --   --  102*  --  100   PLT  --   --   --  108*  --   --   --  116*  --  111*   INR  --   --   --  2.03*  --   --   --  1.31*  --  1.30*   NA  --   --   --  140  --   --   --  141  --  139   POTASSIUM  --   --   --  3.8  --  3.9  --  3.8   < > 3.6   CHLORIDE  --   --   --  110*  --   --   --  111*  --  108   CO2  --   --   --  25  --   --   --  26  --  26   BUN  --   --   --  22  --   --   --  22  --  17   CR  --   --   --  0.96  --   --   --  0.87  --  0.82   ANIONGAP  --   --   --  5  --   --   --  4  --  5   YESICA  --   --   --  8.1*  --   --   --  8.0*  --  8.4*   GLC 88 107* 88 113*   < > 97   < > 98   < > 110*   ALBUMIN  --   --   --  1.7*  --   --   --  1.8*  --  2.0*   PROTTOTAL  --   --   --  5.4*  --   --   --  5.3*  --  5.5*   BILITOTAL  --   --   --  0.3  --   --   --  0.4  --  1.3   ALKPHOS  --   --   --  47  --   --   --  41  --  44   ALT  --   --   --  24  --   --   --  12  --  12   AST  --   --   --  32  --   --   --  20  --  28    < > = values in this interval not displayed.       Recent Results (from the past 24 hour(s))   XR Chest Port 1 View    Narrative    EXAM: XR CHEST 1 VW 3/28/2022      HISTORY: s/p LVAD placement.    COMPARISON: Previous day.     TECHNIQUE: Frontal view of the chest.    FINDINGS:   Lines/tubes/devices as follows:   --LVAD. Visualized driveline is intact.   --Left chest wall ICD with intact leads.   --Right arm PICC tip is near the low SVC.   --Mediastinal and pleural drains.   --Atrial clip.    Unchanged cardiomegaly with dense left basilar opacification and mild  diffuse interstitial opacities. No new airspace disease, pneumothorax,  or acute pathology of the bones or upper abdomen. Median sternotomy  wires are intact.       Impression    IMPRESSION: Unremarkable postoperative chest with LVAD and additional  lines/tubes/devices as detailed above. Cardiomegaly.    I have personally reviewed the  examination and initial interpretation  and I agree with the findings.    CARLA PAREDES MD         SYSTEM ID:  [id number]   US Upper Extremity Venous Duplex Bilat   Result Value    Radiologist flags (Urgent)     Deep vein thrombosis in the right internal jugular,    Narrative    EXAM: DOPPLER VENOUS ULTRASOUND OF BILATERAL UPPER EXTREMTIES,  3/28/2022 2:18 PM     HISTORY: upper extremity swelling.    COMPARISON: Radiograph 3/28/2022.    TECHNIQUE:  Gray-scale evaluation with compression, spectral flow, and  color Doppler assessment of the deep venous system of both upper  extremities.    FINDINGS:  Right: The internal jugular vein is partially compressible with  hypoechoic intraluminal thrombus. Thrombus visualized in the  subclavian vein. The axillary vein is patent with normal Doppler flow.  Partial thrombus in the right brachial vein in the upper arm. The  right cephalic vein is not visualized.      Left: Normal blood flow and waveforms are demonstrated in the internal  jugular, innominate, subclavian, and axillary veins. There is normal  compressibility of the brachial and basilic veins. The left cephalic  vein is not visualized.      Impression    IMPRESSION:    1. Deep venous thrombus in the right internal jugular, subclavian  veins, and brachial veins   2. No deep venous thrombosis in the left upper extremity.  3. Occlusive superficial vein thrombus in the right basilic vein    [Access Center: Deep vein thrombosis in the right internal jugular,  subclavian and brachial veins]    This report will be copied to the [facility] to ensure a  provider acknowledges the finding. [facility] Center is available Monday  through Friday 8am-3:30 pm.     I have personally reviewed the examination and initial interpretation  and I agree with the findings.    PANCHO PRESLEY MD         SYSTEM ID:  [id number]

## 2022-03-28 NOTE — PROGRESS NOTES
Trial: Antiplatelet Removal and Hemocompatibility Events with the HeartMate 3 (RICARDO HM3)    IRB Number: [id number]    PI: Dunia Hdz, [phone]    Primary :  Stacey Eaton -[phone] (cell)      Participant is enrolled into the above study.  Patient was randomized to study medication today:  Aspirin vs.Placebo.  Please do not administer any medications that contain aspirin except this study medication.  If questions, please call a member of the research team.        Consent version: 1.12.2021 Signed on 22 MAR 2022  [place] version:  1.12.2021  Signed on 22 MAR 2022

## 2022-03-29 ENCOUNTER — APPOINTMENT (OUTPATIENT)
Dept: GENERAL RADIOLOGY | Facility: CLINIC | Age: 57
DRG: 001 | End: 2022-03-29
Attending: ANESTHESIOLOGY
Payer: MEDICARE

## 2022-03-29 LAB
ALBUMIN SERPL-MCNC: 1.8 G/DL (ref 3.4–5)
ALP SERPL-CCNC: 51 U/L (ref 40–150)
ALT SERPL W P-5'-P-CCNC: 23 U/L (ref 0–70)
ANION GAP SERPL CALCULATED.3IONS-SCNC: 7 MMOL/L (ref 3–14)
AST SERPL W P-5'-P-CCNC: 16 U/L (ref 0–45)
BILIRUB SERPL-MCNC: 0.4 MG/DL (ref 0.2–1.3)
BUN SERPL-MCNC: 21 MG/DL (ref 7–30)
CALCIUM SERPL-MCNC: 8.4 MG/DL (ref 8.5–10.1)
CHLORIDE BLD-SCNC: 105 MMOL/L (ref 94–109)
CO2 SERPL-SCNC: 27 MMOL/L (ref 20–32)
CREAT SERPL-MCNC: 1.01 MG/DL (ref 0.66–1.25)
ERYTHROCYTE [DISTWIDTH] IN BLOOD BY AUTOMATED COUNT: 14.1 % (ref 10–15)
GFR SERPL CREATININE-BSD FRML MDRD: 88 ML/MIN/1.73M2
GLUCOSE BLD-MCNC: 84 MG/DL (ref 70–99)
GLUCOSE BLDC GLUCOMTR-MCNC: 117 MG/DL (ref 70–99)
GLUCOSE BLDC GLUCOMTR-MCNC: 117 MG/DL (ref 70–99)
GLUCOSE BLDC GLUCOMTR-MCNC: 75 MG/DL (ref 70–99)
GLUCOSE BLDC GLUCOMTR-MCNC: 90 MG/DL (ref 70–99)
GLUCOSE BLDC GLUCOMTR-MCNC: 95 MG/DL (ref 70–99)
HCT VFR BLD AUTO: 27 % (ref 40–53)
HGB BLD-MCNC: 8.5 G/DL (ref 13.3–17.7)
INR PPP: 3.16 (ref 0.85–1.15)
MAGNESIUM SERPL-MCNC: 2.1 MG/DL (ref 1.6–2.3)
MCH RBC QN AUTO: 31.6 PG (ref 26.5–33)
MCHC RBC AUTO-ENTMCNC: 31.5 G/DL (ref 31.5–36.5)
MCV RBC AUTO: 100 FL (ref 78–100)
PHOSPHATE SERPL-MCNC: 3.5 MG/DL (ref 2.5–4.5)
PLATELET # BLD AUTO: 119 10E3/UL (ref 150–450)
POTASSIUM BLD-SCNC: 3.8 MMOL/L (ref 3.4–5.3)
PROT SERPL-MCNC: 5.9 G/DL (ref 6.8–8.8)
RBC # BLD AUTO: 2.69 10E6/UL (ref 4.4–5.9)
SODIUM SERPL-SCNC: 139 MMOL/L (ref 133–144)
WBC # BLD AUTO: 9 10E3/UL (ref 4–11)

## 2022-03-29 PROCEDURE — 80053 COMPREHEN METABOLIC PANEL: CPT | Performed by: STUDENT IN AN ORGANIZED HEALTH CARE EDUCATION/TRAINING PROGRAM

## 2022-03-29 PROCEDURE — 250N000013 HC RX MED GY IP 250 OP 250 PS 637: Performed by: STUDENT IN AN ORGANIZED HEALTH CARE EDUCATION/TRAINING PROGRAM

## 2022-03-29 PROCEDURE — 83735 ASSAY OF MAGNESIUM: CPT | Performed by: STUDENT IN AN ORGANIZED HEALTH CARE EDUCATION/TRAINING PROGRAM

## 2022-03-29 PROCEDURE — 99233 SBSQ HOSP IP/OBS HIGH 50: CPT | Mod: 25 | Performed by: INTERNAL MEDICINE

## 2022-03-29 PROCEDURE — 250N000013 HC RX MED GY IP 250 OP 250 PS 637: Performed by: INTERNAL MEDICINE

## 2022-03-29 PROCEDURE — 85014 HEMATOCRIT: CPT | Performed by: STUDENT IN AN ORGANIZED HEALTH CARE EDUCATION/TRAINING PROGRAM

## 2022-03-29 PROCEDURE — 85610 PROTHROMBIN TIME: CPT | Performed by: STUDENT IN AN ORGANIZED HEALTH CARE EDUCATION/TRAINING PROGRAM

## 2022-03-29 PROCEDURE — 120N000003 HC R&B IMCU UMMC

## 2022-03-29 PROCEDURE — 250N000011 HC RX IP 250 OP 636: Performed by: STUDENT IN AN ORGANIZED HEALTH CARE EDUCATION/TRAINING PROGRAM

## 2022-03-29 PROCEDURE — 71045 X-RAY EXAM CHEST 1 VIEW: CPT

## 2022-03-29 PROCEDURE — 84100 ASSAY OF PHOSPHORUS: CPT | Performed by: STUDENT IN AN ORGANIZED HEALTH CARE EDUCATION/TRAINING PROGRAM

## 2022-03-29 PROCEDURE — 36592 COLLECT BLOOD FROM PICC: CPT | Performed by: STUDENT IN AN ORGANIZED HEALTH CARE EDUCATION/TRAINING PROGRAM

## 2022-03-29 PROCEDURE — 99232 SBSQ HOSP IP/OBS MODERATE 35: CPT | Performed by: PHYSICIAN ASSISTANT

## 2022-03-29 PROCEDURE — 71045 X-RAY EXAM CHEST 1 VIEW: CPT | Mod: 26 | Performed by: RADIOLOGY

## 2022-03-29 PROCEDURE — 93750 INTERROGATION VAD IN PERSON: CPT | Performed by: INTERNAL MEDICINE

## 2022-03-29 RX ORDER — VALSARTAN 40 MG/1
20 TABLET ORAL DAILY
Status: DISCONTINUED | OUTPATIENT
Start: 2022-03-30 | End: 2022-03-30

## 2022-03-29 RX ORDER — VALSARTAN 40 MG/1
20 TABLET ORAL EVERY 12 HOURS SCHEDULED
Status: DISCONTINUED | OUTPATIENT
Start: 2022-03-29 | End: 2022-03-29

## 2022-03-29 RX ORDER — POTASSIUM CHLORIDE 750 MG/1
20 TABLET, EXTENDED RELEASE ORAL ONCE
Status: COMPLETED | OUTPATIENT
Start: 2022-03-29 | End: 2022-03-29

## 2022-03-29 RX ADMIN — METOCLOPRAMIDE 10 MG: 10 TABLET ORAL at 07:59

## 2022-03-29 RX ADMIN — METOCLOPRAMIDE 10 MG: 10 TABLET ORAL at 17:37

## 2022-03-29 RX ADMIN — ACETAMINOPHEN 650 MG: 325 TABLET ORAL at 04:38

## 2022-03-29 RX ADMIN — POTASSIUM CHLORIDE 20 MEQ: 750 TABLET, EXTENDED RELEASE ORAL at 07:59

## 2022-03-29 RX ADMIN — Medication 20 MG: at 09:36

## 2022-03-29 RX ADMIN — HYDROMORPHONE HYDROCHLORIDE 0.5 MG: 1 INJECTION, SOLUTION INTRAMUSCULAR; INTRAVENOUS; SUBCUTANEOUS at 00:00

## 2022-03-29 RX ADMIN — ACETAMINOPHEN 650 MG: 325 TABLET ORAL at 19:51

## 2022-03-29 RX ADMIN — SENNOSIDES AND DOCUSATE SODIUM 1 TABLET: 8.6; 5 TABLET ORAL at 07:59

## 2022-03-29 RX ADMIN — OXYCODONE HYDROCHLORIDE 5 MG: 5 TABLET ORAL at 19:51

## 2022-03-29 RX ADMIN — ACETAMINOPHEN 650 MG: 325 TABLET ORAL at 15:31

## 2022-03-29 RX ADMIN — TORSEMIDE 20 MG: 10 TABLET ORAL at 15:31

## 2022-03-29 RX ADMIN — OXYCODONE HYDROCHLORIDE 10 MG: 10 TABLET ORAL at 10:59

## 2022-03-29 RX ADMIN — SOTALOL HYDROCHLORIDE 80 MG: 80 TABLET ORAL at 19:51

## 2022-03-29 RX ADMIN — TORSEMIDE 20 MG: 10 TABLET ORAL at 07:59

## 2022-03-29 RX ADMIN — OXYCODONE HYDROCHLORIDE 10 MG: 10 TABLET ORAL at 04:37

## 2022-03-29 RX ADMIN — OXYCODONE HYDROCHLORIDE 10 MG: 10 TABLET ORAL at 15:31

## 2022-03-29 RX ADMIN — METOCLOPRAMIDE 10 MG: 10 TABLET ORAL at 12:01

## 2022-03-29 RX ADMIN — OXYCODONE HYDROCHLORIDE 5 MG: 5 TABLET ORAL at 23:52

## 2022-03-29 RX ADMIN — ACETAMINOPHEN 650 MG: 325 TABLET ORAL at 23:52

## 2022-03-29 RX ADMIN — SENNOSIDES AND DOCUSATE SODIUM 1 TABLET: 8.6; 5 TABLET ORAL at 19:51

## 2022-03-29 RX ADMIN — PANTOPRAZOLE SODIUM 40 MG: 40 TABLET, DELAYED RELEASE ORAL at 07:59

## 2022-03-29 RX ADMIN — SPIRONOLACTONE 25 MG: 25 TABLET, FILM COATED ORAL at 07:59

## 2022-03-29 RX ADMIN — SOTALOL HYDROCHLORIDE 80 MG: 80 TABLET ORAL at 07:59

## 2022-03-29 ASSESSMENT — ACTIVITIES OF DAILY LIVING (ADL)
ADLS_ACUITY_SCORE: 12
ADLS_ACUITY_SCORE: 13
ADLS_ACUITY_SCORE: 10
ADLS_ACUITY_SCORE: 12
ADLS_ACUITY_SCORE: 10
ADLS_ACUITY_SCORE: 13
ADLS_ACUITY_SCORE: 10
ADLS_ACUITY_SCORE: 10
ADLS_ACUITY_SCORE: 13
ADLS_ACUITY_SCORE: 13
ADLS_ACUITY_SCORE: 12
ADLS_ACUITY_SCORE: 13
ADLS_ACUITY_SCORE: 10
ADLS_ACUITY_SCORE: 13
ADLS_ACUITY_SCORE: 12
ADLS_ACUITY_SCORE: 12
ADLS_ACUITY_SCORE: 13
ADLS_ACUITY_SCORE: 13

## 2022-03-29 NOTE — PROGRESS NOTES
Cardiology Progress Note    Assessment & Plan   Tej Morfin is a 56 year old male admitted on 3/22/2022. He has history of NICM secondary to severe MR s/p MV ring repair (), now on home inotrope (Dobutamine 2.5), sustained VT s/p secondary prevention ICD, who presents for scheduled LVAD placement. Now status post 3 on 3/23/2022.     Changes Today:  - Increase speed to 5600 RPMs  - Continue other medications, no changes  - Recommend Rheum or ortho consult for evaluation of R knee effusion     #. Non-ischemic Cardiomyopathy status post HM3 (3/23/2022)  #. Stage D; NYHA Class III      DATE MAP CVP PAP PCWP Harris CO Harris CI SVR MVo2 Therapies   3/24 91 10 45/15 15 5.2 2.4 1200 60 LVAD 5200,  2.5   3/25 83 8 39/16 14 5.6 2.6 1200 50 LVAD 5300,   Nipride   3/26 71 10 50/22 22 5.9 2.7 827 60 LVAD 5300   3/27 85 10 52/20 20 5.1 2.4 1099 50 LVAD 5400          - LVAD: Speed 5500 RPM, 3.6-3.9 LPM, PI 3.6-3.9, Prw 4-4.1  - Afterload: Continue valsartan 20mg daily, MAPs well controlled  - BB: Not on BB at this time due to cardiogenic shock  - Aldosterone antagonist: Aldactone 25 mg daily.  - SCD prophylaxis: Yes. CRT-D. LV pacing turned off. VT zone turned off.  - AC: Warfarin  - Anti-platelet: RICARDO trial, no aspirin products  - Fluid status: Continue torsemide  20mg BID     #Hx of Ventricular Arrhythmias:  Previously on sotalol which was held in the perioperative period. Episode of a fib with RVR with ICD shock on 3/25.   - Continue Sotalol 80 mg daily.     This patient was seen and discussed with Dr. Elizabeth who agrees with the above assessment and plan.     Marquis Luciano MD  Cardiology Fellow  [phone]    Interval History   MELANIE overnight. Continues to have knee pain. Otherwise cardiac symptoms are well controlled.    Physical Exam   Temp: 99.1  F (37.3  C) Temp src: Oral BP: (!) 82/59 Pulse: 96   Resp: 17 SpO2: 97 % O2 Device: None (Room air)    Vitals:    22 0000 22 0400 22 0628   Weight:  "93.6 kg (206 lb 6.4 oz) 88.7 kg (195 lb 8.8 oz) 86.2 kg (190 lb 0.6 oz)     Vital Signs with Ranges  Temp:  [98.4  F (36.9  C)-99.3  F (37.4  C)] 99.1  F (37.3  C)  Pulse:  [84-96] 96  Resp:  [16-18] 17  BP: ()/(47-84) 82/59  SpO2:  [95 %-99 %] 97 %  I/O last 3 completed shifts:  In: 781.27 [P.O.:750; I.V.:31.27]  Out: 3360 [Urine:2750; Chest Tube:610]     , Blood pressure (!) 82/59, pulse 96, temperature 99.1  F (37.3  C), temperature source Oral, resp. rate 17, height 1.791 m (5' 10.51\"), weight 86.2 kg (190 lb 0.6 oz), SpO2 97 %.  190 lbs .58 oz  GEN: Alert, no acute distress   CV:  LVAD Hum +. JVP 8 cmH20  LUNGS:  Clear to auscultation bilaterally  ABD:  Active bowel sounds, soft, non-tender/non-distended.  No rebound/guarding/rigidity.  EXT:  1+ edema about the ankles. WWP.     Medications     Warfarin Therapy Reminder         insulin aspart  1-7 Units Subcutaneous TID AC     insulin aspart  1-5 Units Subcutaneous At Bedtime     metoclopramide  10 mg Oral TID AC     pantoprazole  40 mg Oral QAM AC     polyethylene glycol  17 g Oral or Feeding Tube Daily     senna-docusate  1 tablet Oral or Feeding Tube BID     sodium chloride (PF)  3 mL Intracatheter Q8H     sotalol  80 mg Oral Q12H PARKER     spironolactone  25 mg Oral Daily     study - aspirin or placebo (IDS# 5616)  100 mg Oral Daily     torsemide  20 mg Oral BID     [START ON 3/30/2022] valsartan  20 mg Oral Daily     warfarin-No DOSE today  1 each Does not apply no dose today (warfarin)       Data   Recent Labs   Lab 03/29/22  1740 03/29/22  1205 03/29/22  0737 03/29/22  0531 03/28/22  0748 03/28/22  0508 03/27/22  1734 03/27/22  1248 03/27/22  0351 03/27/22  0347   WBC  --   --   --  9.0  --  8.6  --   --   --  9.7   HGB  --   --   --  8.5*  --  7.8*  --   --   --  7.8*   MCV  --   --   --  100  --  102*  --   --   --  102*   PLT  --   --   --  119*  --  108*  --   --   --  116*   INR  --   --   --  3.16*  --  2.03*  --   --   --  1.31*   NA  --   --   " --  139  --  140  --   --   --  141   POTASSIUM  --   --   --  3.8  --  3.8  --  3.9  --  3.8   CHLORIDE  --   --   --  105  --  110*  --   --   --  111*   CO2  --   --   --  27  --  25  --   --   --  26   BUN  --   --   --  21  --  22  --   --   --  22   CR  --   --   --  1.01  --  0.96  --   --   --  0.87   ANIONGAP  --   --   --  7  --  5  --   --   --  4   YESICA  --   --   --  8.4*  --  8.1*  --   --   --  8.0*   * 95 117* 84   < > 113*   < > 97   < > 98   ALBUMIN  --   --   --  1.8*  --  1.7*  --   --   --  1.8*   PROTTOTAL  --   --   --  5.9*  --  5.4*  --   --   --  5.3*   BILITOTAL  --   --   --  0.4  --  0.3  --   --   --  0.4   ALKPHOS  --   --   --  51  --  47  --   --   --  41   ALT  --   --   --  23  --  24  --   --   --  12   AST  --   --   --  16  --  32  --   --   --  20    < > = values in this interval not displayed.       Recent Results (from the past 24 hour(s))   XR Chest Port 1 View    Narrative    EXAM: XR CHEST PORT 1 VIEW  3/29/2022 8:35 AM    HISTORY: 56 year-old male PMH of nonischemic cardiomyopathy secondary  to severe mitral regurgitation status post repair 2015, sustained  ventricular tachycardia second status post ICD, admitted 3/22/2022 for  scheduled LVAD placement.    COMPARISON: 3/28/2022.    TECHNIQUE: Portable frontal view of the chest.    FINDINGS:   Stable LVAD position with intact drive line. Stable left chest wall  ICD position with intact lead tips projecting over the right atrium  and right ventricle. Right arm PICC tip projects over the low SVC.  Mitral valve prosthesis. Left atrial appendage clip. Intact median  sternotomy wires. Mediastinal surgical clips. Mediastinal and pleural  drains.    Midline trachea. Unchanged cardiomegaly with left basilar dense  opacification. No discernible pneumothorax. Normal lung volumes.  Diffuse mild interstitial pulmonary opacities. Unremarkable upper  abdomen. No acute or suspicious osseous abnormalities. Unremarkable  soft  tissues.      Impression    IMPRESSION:   1.  Grossly stable postoperative chest with LVAD and additional  support devices as detailed above.  2.  Stable cardiomegaly with left basilar opacification, probable left  pleural effusion versus consolidation versus pulmonary edema.    JAYA FOSTER,     I have personally reviewed the examination and initial interpretation  and I agree with the findings.    CARLA PAREDES MD         SYSTEM ID:  [id number]

## 2022-03-29 NOTE — PLAN OF CARE
"BP (!) 82/59 (BP Location: Left arm)   Pulse 96   Temp 99.1  F (37.3  C) (Oral)   Resp 17   Ht 1.791 m (5' 10.51\")   Wt 86.2 kg (190 lb 0.6 oz)   SpO2 97%   BMI 26.87 kg/m      Shift: 0700 - 1930  VS: Stable on RA, afebrile  Cardiac: Normal sinus, rhythm is paced in DDDR setting. BP MAPs are measured via doppler, most recent MAP was 93. LVAD settings stable, no critical alarms.  Neuro: AOx4  BG: ACHS (117, 95, 117)  Labs: K: 3.8 (replacement administered, recheck tomorrow AM), Albumin: 1.8, Hgb: 8.5, hematocrit: 27  Respiratory: Dyspnea on exertion. Frequent productive cough noted  Pain/Nausea/PRN: PT denies nausea. PRN tylenol x1, oxycodone x1  Diet: 2 gram sodium diet  LDA: R PICC (SL), L pleural tube and pericardial tube y-sited to the same drainage atrium, R pleural tube connected independently to drainage atrium  GI/: BM this shift. Voiding without difficulty into bedside urinal  Skin: Abdominal bruising, Chest incision, Chest drainage tubes x3  Mobility: Assist x1, pt is calling appropriately  Plan: Continue to monitor pt for signs of pain and assess pt's LVAD function. Continue to complete dressing changes and safety checks as ordered. Pt may have one or more chest tubes removed tomorrow (Depending on amount of output).    Will give report to oncoming nurse. Pt left in stable condition, care relinquished at this time.            "

## 2022-03-29 NOTE — PLAN OF CARE
Neuro: A&Ox4. No numbness/tingling   Cardiac: Sr/paced,  MAPS .  Heartmate 3 LVAD, no abnormal alarms.  lost 5 lbs from last documented wt 3/28.  Respiratory: Sating 92%+ on RA, able to cough up some sputum. Sputum clear and thick in nature.  GI/: Adequate urine output, no BM. Pt passing gas, No N/V.   Diet/appetite: Tolerating 2gm sodium diet, had popsicles and sips of water for this writer's shift.  Activity:  Assist of 1 w/ walker standing at EOB. Reinforcing bracing abdomen/chest when coughing. Up to chair this AM.  Pain: At acceptable level on current regimen. PRN oxy & tylenol give x2. IV dilaudid given for breakthrough pain.  Skin: No new deficits noted. Mid sternal incision CDI/VALERIE driveline site WDL.  LDA's: Heartmate 3 LVAD,  R single PICC with TKO, R PIV saline locked. 2 pleural chest tube and one leny drain to -20 suction.

## 2022-03-29 NOTE — PROGRESS NOTES
LVAD Social Work Services Progress Note      Date of Initial Social Work Evaluation: 3/23/2022  Collaborated with: Pt and pt's wife, Jessenia, via phone ([phone])    Data: Pt is s/p LVAD implant on 3/23/2022. Pt transitioned to 6B on 3/27. Pt's wife started LVAD education on Monday 3/28. Pt working with therapies with current rec of ARU. [facility] following pt progress.   Provided supportive phone call to pt's wife, to check-in. Writer addressed some minor financial concerns.     Intervention: Supportive Visit, Discharge Planning   Assessment: Pt coping appropriately to hospitalization. Pt encouraged by his post-op course. Pt has an excellent support system and his aunt is here from [place] for the next few days. Wife trying to juggle visiting/education with family from out of town wanting to visit pt.   Education provided by SW: Ongoing Social Work support   Plan:    Discharge Plans in Progress: [facility] following     Barriers to d/c plan: Medical Readiness     Follow up Plan: SW to continue to follow.

## 2022-03-29 NOTE — PROGRESS NOTES
Cardiovascular Surgery Progress Note  03/29/2022         Assessment and Plan:     Tej Morfin is a 56 year old male with NICM secondary to valvulopathy severe MR s/p MV ring repair, on home inotrope (Dobutamine 2.5), sustained VT s/p secondary prevention ICD, CKD stage II (baseline 1.5) who presented for LVAD placement with heartmate 3 by Dr. Rodríguez on 3/23/22       Cardiovascular:   Hx of NICM 2/2 severe MR, s/p ring repair - now s/p LVAD HM3  No arrhythmia, HD stable. Occasional V-pacing.   IABP removed post op   ASA study (placebo vs. aspirin), statin not yet started   HTN-Valsartan 20 mg po bid increased 3/29, Sotalol 80 mg [id number] home med, spironolactone 20 mg daily, Isordil 10 mg [id number], hydralazine 25 q8 hours.   Chest tubes: mediastinals removed 3/28, left and right pleural and leny drain remain. Split left and right pleural 3/29. Leny drain y'd with left pleural.      Pulmonary:  - Extubated POD#1, Saturating well on RA.    - Supplemental O2 PRN to keep sats > 92%. Wean off as tolerated.  - Pulm toilet, IS, activity and deep breathing    Neurology / MSK:  - Neuro intact  - Acute post-operative pain; pain well controlled with acetaminophen, PO oxycodone PRN, IV dilaudid PRN     / Renal:  - Hx of renal disease CKD stage II (baseline 1.5). Most recent creatinine 1.01, adequate UOP.   - Pre-op weight 200 lbs, currently weight 190, diuresing well on Torsemide 20 mg bid, cardiology managing.     GI / FEN:   - Regular diet, continue bowel regimen  - Replace electrolytes as needed, hepatic enzymes WNL.    Endocrine:  Stress induced hyperglycemia; initially managed on insulin drip postop, transitioned to sliding scale.    Infectious Disease:  - Stress induced leukocytosis; WBC WNL, tmax 100.5 3/27, no signs or symptoms of infection  - Completed perioperative antibiotics  - UA unremarkable  - COVID screen negative  - If repeat fever, pan culture.   - New right knee effusion. Patient had previous  "tendon/ligament repair. Will consult Rheumatology.     Hematology:   Acute blood loss anemia and thrombocytopenia  Hgb 8.5; Plt WNL, no signs or symptoms of active bleeding    Anticoagulation:   - ASA   - Coumadin for LVAD, INR goal 2-3. Most recent INR 3.16, next dose being held by pharmacy, recheck in AM.    - Low intensity heparin gtt bridging to therapeutic INR. Stopped 3/28    Prophylaxis:   - Stress ulcer prophylaxis: Pantoprazole 40 mg daily for 30 days  - DVT prophylaxis: Subcutaneous heparin, SCD    Disposition:   - Transferred to  on 3/27  - Therapies recommending discharge to rehab     Discussed with Dr. Marcos Abraham PA-C  Cardiothoracic Surgery  Pager [phone]  March 29, 2022          Interval History:     No overnight events.  New right knee effusion. Started yesterday. If painful with ambulation. Previous occasional episodes. Patient has previous hx of knee surgery, no hardware. Ice helps.   States surgical pain is well managed on current regimen. Slept well overnight.  Tolerating diet, is passing flatus, + BM 2 days ago. No nausea or vomiting.  Breathing well without complaints.   Working with therapies.    Denies chest pain, palpitations, dizziness, syncopal symptoms, fevers, chills, myalgias, or sternal popping/clicking.         Physical Exam:   Blood pressure (!) 81/47, pulse 86, temperature 98.7  F (37.1  C), temperature source Oral, resp. rate 17, height 1.791 m (5' 10.51\"), weight 86.2 kg (190 lb 0.6 oz), SpO2 99 %.  Vitals:    03/27/22 0000 03/28/22 0400 03/29/22 0628   Weight: 93.6 kg (206 lb 6.4 oz) 88.7 kg (195 lb 8.8 oz) 86.2 kg (190 lb 0.6 oz)          Gen: A&Ox4, NAD  Neuro: no focal deficits   CV: RRR, lvad Hum   Pulm: +CTA, no wheezing or rhonchi, normal breathing on RA   Abd: nondistended, normal BS, soft, nontender  Ext: 1+ pedal peripheral edema, significant right knee effusion, no erythema, warm to touch, tender.   Incision: clean, dry, intact, no erythema, " sternum stable  Tubes/drain sites: dressing clean and dry. Left and right pleural with leny drain 600cc no air leak.   Lines: driveline dressing clean and dry     Chest tubes split today     LVAD: speed 5500, flow 4.1, PI 3.8, power 4.          Data:    Imaging:  reviewed recent imaging, no acute concerns    Labs:  BMP  Recent Labs   Lab 03/29/22  1205 03/29/22  0737 03/29/22  0531 03/29/22  0442 03/28/22  0748 03/28/22  0508 03/27/22  1734 03/27/22  1248 03/27/22  0351 03/27/22  0347 03/26/22  0330 03/26/22  0321   NA  --   --  139  --   --  140  --   --   --  141  --  139   POTASSIUM  --   --  3.8  --   --  3.8  --  3.9  --  3.8   < > 3.6   CHLORIDE  --   --  105  --   --  110*  --   --   --  111*  --  108   YESICA  --   --  8.4*  --   --  8.1*  --   --   --  8.0*  --  8.4*   CO2  --   --  27  --   --  25  --   --   --  26  --  26   BUN  --   --  21  --   --  22  --   --   --  22  --  17   CR  --   --  1.01  --   --  0.96  --   --   --  0.87  --  0.82   GLC 95 117* 84 75   < > 113*   < > 97   < > 98   < > 110*    < > = values in this interval not displayed.     CBC  Recent Labs   Lab 03/29/22  0531 03/28/22  0508 03/27/22  0347 03/26/22  0321   WBC 9.0 8.6 9.7 12.6*   RBC 2.69* 2.51* 2.46* 2.67*   HGB 8.5* 7.8* 7.8* 8.5*   HCT 27.0* 25.6* 25.1* 26.7*    102* 102* 100   MCH 31.6 31.1 31.7 31.8   MCHC 31.5 30.5* 31.1* 31.8   RDW 14.1 14.4 14.6 14.1   * 108* 116* 111*     INR  Recent Labs   Lab 03/29/22  0531 03/28/22  0508 03/27/22  0347 03/26/22  0321   INR 3.16* 2.03* 1.31* 1.30*      Hepatic Panel  Recent Labs   Lab 03/29/22  0531 03/28/22  0508 03/27/22  0347 03/26/22  0321   AST 16 32 20 28   ALT 23 24 12 12   ALKPHOS 51 47 41 44   BILITOTAL 0.4 0.3 0.4 1.3   ALBUMIN 1.8* 1.7* 1.8* 2.0*     GLUCOSE:   Recent Labs   Lab 03/29/22  1205 03/29/22  0737 03/29/22  0531 03/29/22  0442 03/28/22  2027 03/28/22  1658   GLC 95 117* 84 75 85 88

## 2022-03-29 NOTE — PLAN OF CARE
Time of notification: 8:58 PM  Provider notified: Gen Surg NOC  Patient status: Ashley RN #[id number] Pt W.C. rm 6269 bed 1 has Ax now. Pt usually takes Valium PRN at home. Order one time dose of Valium? also, pt has hx of MRSA. order MRSA swabs? unable to tell if other facilities have swabbed him already.  Temp:  [98.2  F (36.8  C)-99.1  F (37.3  C)] 98.9  F (37.2  C)  Pulse:  [80-96] 95  Resp:  [16-20] 18  BP: ()/() 105/84  Cuff Mean (mmHg):  [98] 98  SpO2:  [95 %-97 %] 97 %  Orders received: seroquel ordered. Day team will be notified in regards to whether MRSA swabs need to be ordered.

## 2022-03-29 NOTE — PLAN OF CARE
Neuro: A&Ox4. No numbness/tingling   Cardiac: SR/paced with intermittent VT/SVT runs where HR increases to 120s, asymptomatic. MAPS 80s-90s. Heartmate 3 LVAD, no abnormal alarms.    Respiratory: Sating 92%+ on RA, denies SOB, encourage IS.   GI/: Adequate urine output, no BM. + bowel sounds, No N/V.   Diet/appetite: Tolerating 2gm Na+ diet, moderate intake.   Activity:  Assist of 1-2, sat in chair for 2-3 hours today, walked with OT.   Pain: At acceptable level on current regimen. PRN oxy & tylenol give x2. IV dilaudid given prior to chest tube removal per PA request.   Skin: No new deficits noted. Mid sternal incision CDI/VALERIE  LDA's: Heartmate 3 LVAD,  R single PICC with TKO, R PIV saline locked. CT x3 to -20 suction    Plan: Continue with POC. Notify primary team with changes.     Goal Outcome Evaluation:    Plan of Care Reviewed With: patient

## 2022-03-30 ENCOUNTER — APPOINTMENT (OUTPATIENT)
Dept: OCCUPATIONAL THERAPY | Facility: CLINIC | Age: 57
DRG: 001 | End: 2022-03-30
Attending: INTERNAL MEDICINE
Payer: MEDICARE

## 2022-03-30 ENCOUNTER — ANCILLARY PROCEDURE (OUTPATIENT)
Dept: CARDIOLOGY | Facility: CLINIC | Age: 57
DRG: 001 | End: 2022-03-30
Attending: INTERNAL MEDICINE
Payer: MEDICARE

## 2022-03-30 ENCOUNTER — APPOINTMENT (OUTPATIENT)
Dept: GENERAL RADIOLOGY | Facility: CLINIC | Age: 57
DRG: 001 | End: 2022-03-30
Attending: PHYSICIAN ASSISTANT
Payer: MEDICARE

## 2022-03-30 LAB
ALBUMIN SERPL-MCNC: 1.8 G/DL (ref 3.4–5)
ALP SERPL-CCNC: 52 U/L (ref 40–150)
ALT SERPL W P-5'-P-CCNC: 23 U/L (ref 0–70)
ANION GAP SERPL CALCULATED.3IONS-SCNC: 7 MMOL/L (ref 3–14)
APPEARANCE FLD: ABNORMAL
AST SERPL W P-5'-P-CCNC: 16 U/L (ref 0–45)
ATRIAL RATE - MUSE: 84 BPM
BILIRUB SERPL-MCNC: 0.3 MG/DL (ref 0.2–1.3)
BUN SERPL-MCNC: 19 MG/DL (ref 7–30)
CALCIUM SERPL-MCNC: 8.2 MG/DL (ref 8.5–10.1)
CELL COUNT BODY FLUID SOURCE: ABNORMAL
CHLORIDE BLD-SCNC: 103 MMOL/L (ref 94–109)
CO2 SERPL-SCNC: 29 MMOL/L (ref 20–32)
COLOR FLD: YELLOW
CREAT SERPL-MCNC: 0.92 MG/DL (ref 0.66–1.25)
CRYSTALS SNV MICRO: NORMAL
DIASTOLIC BLOOD PRESSURE - MUSE: NORMAL MMHG
ERYTHROCYTE [DISTWIDTH] IN BLOOD BY AUTOMATED COUNT: 13.7 % (ref 10–15)
GFR SERPL CREATININE-BSD FRML MDRD: >90 ML/MIN/1.73M2
GLUCOSE BLD-MCNC: 121 MG/DL (ref 70–99)
GLUCOSE BLDC GLUCOMTR-MCNC: 132 MG/DL (ref 70–99)
GLUCOSE BLDC GLUCOMTR-MCNC: 152 MG/DL (ref 70–99)
GLUCOSE BLDC GLUCOMTR-MCNC: 231 MG/DL (ref 70–99)
GLUCOSE BLDC GLUCOMTR-MCNC: 85 MG/DL (ref 70–99)
GLUCOSE BLDC GLUCOMTR-MCNC: 96 MG/DL (ref 70–99)
GRAM STAIN RESULT: NORMAL
GRAM STAIN RESULT: NORMAL
HCT VFR BLD AUTO: 27.9 % (ref 40–53)
HGB BLD-MCNC: 8.6 G/DL (ref 13.3–17.7)
INR PPP: 2.94 (ref 0.85–1.15)
INTERPRETATION ECG - MUSE: NORMAL
LDH SERPL L TO P-CCNC: 237 U/L (ref 85–227)
LYMPHOCYTES NFR FLD MANUAL: 1 %
MAGNESIUM SERPL-MCNC: 2 MG/DL (ref 1.6–2.3)
MCH RBC QN AUTO: 30.5 PG (ref 26.5–33)
MCHC RBC AUTO-ENTMCNC: 30.8 G/DL (ref 31.5–36.5)
MCV RBC AUTO: 99 FL (ref 78–100)
MONOS+MACROS NFR FLD MANUAL: 3 %
NEUTS BAND NFR FLD MANUAL: 97 %
P AXIS - MUSE: NORMAL DEGREES
PHOSPHATE SERPL-MCNC: 3.2 MG/DL (ref 2.5–4.5)
PLATELET # BLD AUTO: 142 10E3/UL (ref 150–450)
POTASSIUM BLD-SCNC: 3.5 MMOL/L (ref 3.4–5.3)
PR INTERVAL - MUSE: NORMAL MS
PROT SERPL-MCNC: 5.9 G/DL (ref 6.8–8.8)
QRS DURATION - MUSE: 134 MS
QT - MUSE: 440 MS
QTC - MUSE: 538 MS
R AXIS - MUSE: -33 DEGREES
RBC # BLD AUTO: 2.82 10E6/UL (ref 4.4–5.9)
SODIUM SERPL-SCNC: 139 MMOL/L (ref 133–144)
SYSTOLIC BLOOD PRESSURE - MUSE: NORMAL MMHG
T AXIS - MUSE: -19 DEGREES
VENTRICULAR RATE- MUSE: 90 BPM
WBC # BLD AUTO: 7.5 10E3/UL (ref 4–11)
WBC # FLD AUTO: 7670 /UL

## 2022-03-30 PROCEDURE — 250N000011 HC RX IP 250 OP 636: Performed by: STUDENT IN AN ORGANIZED HEALTH CARE EDUCATION/TRAINING PROGRAM

## 2022-03-30 PROCEDURE — 87205 SMEAR GRAM STAIN: CPT | Performed by: STUDENT IN AN ORGANIZED HEALTH CARE EDUCATION/TRAINING PROGRAM

## 2022-03-30 PROCEDURE — 83735 ASSAY OF MAGNESIUM: CPT | Performed by: STUDENT IN AN ORGANIZED HEALTH CARE EDUCATION/TRAINING PROGRAM

## 2022-03-30 PROCEDURE — 71045 X-RAY EXAM CHEST 1 VIEW: CPT

## 2022-03-30 PROCEDURE — 84100 ASSAY OF PHOSPHORUS: CPT | Performed by: STUDENT IN AN ORGANIZED HEALTH CARE EDUCATION/TRAINING PROGRAM

## 2022-03-30 PROCEDURE — 99233 SBSQ HOSP IP/OBS HIGH 50: CPT | Mod: 25 | Performed by: INTERNAL MEDICINE

## 2022-03-30 PROCEDURE — 250N000013 HC RX MED GY IP 250 OP 250 PS 637: Performed by: PHYSICIAN ASSISTANT

## 2022-03-30 PROCEDURE — 85027 COMPLETE CBC AUTOMATED: CPT | Performed by: STUDENT IN AN ORGANIZED HEALTH CARE EDUCATION/TRAINING PROGRAM

## 2022-03-30 PROCEDURE — 250N000009 HC RX 250: Performed by: STUDENT IN AN ORGANIZED HEALTH CARE EDUCATION/TRAINING PROGRAM

## 2022-03-30 PROCEDURE — 93284 PRGRMG EVAL IMPLANTABLE DFB: CPT | Mod: 26 | Performed by: INTERNAL MEDICINE

## 2022-03-30 PROCEDURE — 87070 CULTURE OTHR SPECIMN AEROBIC: CPT | Performed by: STUDENT IN AN ORGANIZED HEALTH CARE EDUCATION/TRAINING PROGRAM

## 2022-03-30 PROCEDURE — 93005 ELECTROCARDIOGRAM TRACING: CPT

## 2022-03-30 PROCEDURE — 89050 BODY FLUID CELL COUNT: CPT | Performed by: STUDENT IN AN ORGANIZED HEALTH CARE EDUCATION/TRAINING PROGRAM

## 2022-03-30 PROCEDURE — 250N000013 HC RX MED GY IP 250 OP 250 PS 637: Performed by: STUDENT IN AN ORGANIZED HEALTH CARE EDUCATION/TRAINING PROGRAM

## 2022-03-30 PROCEDURE — 120N000003 HC R&B IMCU UMMC

## 2022-03-30 PROCEDURE — 97535 SELF CARE MNGMENT TRAINING: CPT | Mod: GO | Performed by: OCCUPATIONAL THERAPIST

## 2022-03-30 PROCEDURE — 250N000013 HC RX MED GY IP 250 OP 250 PS 637: Performed by: INTERNAL MEDICINE

## 2022-03-30 PROCEDURE — 89060 EXAM SYNOVIAL FLUID CRYSTALS: CPT | Mod: TC | Performed by: STUDENT IN AN ORGANIZED HEALTH CARE EDUCATION/TRAINING PROGRAM

## 2022-03-30 PROCEDURE — 80053 COMPREHEN METABOLIC PANEL: CPT | Performed by: STUDENT IN AN ORGANIZED HEALTH CARE EDUCATION/TRAINING PROGRAM

## 2022-03-30 PROCEDURE — 87476 LYME DIS DNA AMP PROBE: CPT | Performed by: STUDENT IN AN ORGANIZED HEALTH CARE EDUCATION/TRAINING PROGRAM

## 2022-03-30 PROCEDURE — 93750 INTERROGATION VAD IN PERSON: CPT | Performed by: INTERNAL MEDICINE

## 2022-03-30 PROCEDURE — 87075 CULTR BACTERIA EXCEPT BLOOD: CPT | Performed by: STUDENT IN AN ORGANIZED HEALTH CARE EDUCATION/TRAINING PROGRAM

## 2022-03-30 PROCEDURE — 36592 COLLECT BLOOD FROM PICC: CPT | Performed by: STUDENT IN AN ORGANIZED HEALTH CARE EDUCATION/TRAINING PROGRAM

## 2022-03-30 PROCEDURE — 71045 X-RAY EXAM CHEST 1 VIEW: CPT | Mod: 26 | Performed by: RADIOLOGY

## 2022-03-30 PROCEDURE — 85610 PROTHROMBIN TIME: CPT | Performed by: STUDENT IN AN ORGANIZED HEALTH CARE EDUCATION/TRAINING PROGRAM

## 2022-03-30 PROCEDURE — 250N000012 HC RX MED GY IP 250 OP 636 PS 637: Performed by: STUDENT IN AN ORGANIZED HEALTH CARE EDUCATION/TRAINING PROGRAM

## 2022-03-30 PROCEDURE — 93010 ELECTROCARDIOGRAM REPORT: CPT | Mod: 76 | Performed by: INTERNAL MEDICINE

## 2022-03-30 PROCEDURE — 999N000044 HC STATISTIC CVC DRESSING CHANGE

## 2022-03-30 PROCEDURE — 93284 PRGRMG EVAL IMPLANTABLE DFB: CPT

## 2022-03-30 PROCEDURE — 83615 LACTATE (LD) (LDH) ENZYME: CPT | Performed by: INTERNAL MEDICINE

## 2022-03-30 RX ORDER — VALSARTAN 40 MG/1
20 TABLET ORAL EVERY 12 HOURS SCHEDULED
Status: DISCONTINUED | OUTPATIENT
Start: 2022-03-30 | End: 2022-04-01

## 2022-03-30 RX ORDER — TRIAMCINOLONE ACETONIDE 40 MG/ML
40 INJECTION, SUSPENSION INTRA-ARTICULAR; INTRAMUSCULAR ONCE
Status: COMPLETED | OUTPATIENT
Start: 2022-03-30 | End: 2022-03-30

## 2022-03-30 RX ORDER — METHOCARBAMOL 750 MG/1
750 TABLET, FILM COATED ORAL 3 TIMES DAILY PRN
Status: DISCONTINUED | OUTPATIENT
Start: 2022-03-30 | End: 2022-04-08 | Stop reason: HOSPADM

## 2022-03-30 RX ORDER — POTASSIUM CHLORIDE 750 MG/1
20 TABLET, EXTENDED RELEASE ORAL ONCE
Status: COMPLETED | OUTPATIENT
Start: 2022-03-30 | End: 2022-03-30

## 2022-03-30 RX ORDER — WARFARIN SODIUM 3 MG/1
3 TABLET ORAL
Status: COMPLETED | OUTPATIENT
Start: 2022-03-30 | End: 2022-03-30

## 2022-03-30 RX ORDER — TRIAMCINOLONE ACETONIDE 40 MG/ML
40 INJECTION, SUSPENSION INTRA-ARTICULAR; INTRAMUSCULAR ONCE
Status: DISCONTINUED | OUTPATIENT
Start: 2022-03-30 | End: 2022-03-30

## 2022-03-30 RX ORDER — MAGNESIUM OXIDE 400 MG/1
400 TABLET ORAL 2 TIMES DAILY
Status: COMPLETED | OUTPATIENT
Start: 2022-03-30 | End: 2022-03-31

## 2022-03-30 RX ORDER — LIDOCAINE HYDROCHLORIDE AND EPINEPHRINE 10; 10 MG/ML; UG/ML
5 INJECTION, SOLUTION INFILTRATION; PERINEURAL ONCE
Status: COMPLETED | OUTPATIENT
Start: 2022-03-30 | End: 2022-03-30

## 2022-03-30 RX ORDER — TORSEMIDE 10 MG/1
20 TABLET ORAL DAILY
Status: DISCONTINUED | OUTPATIENT
Start: 2022-03-30 | End: 2022-04-03

## 2022-03-30 RX ADMIN — METOCLOPRAMIDE 10 MG: 10 TABLET ORAL at 18:52

## 2022-03-30 RX ADMIN — METOCLOPRAMIDE 10 MG: 10 TABLET ORAL at 09:41

## 2022-03-30 RX ADMIN — SOTALOL HYDROCHLORIDE 80 MG: 80 TABLET ORAL at 20:05

## 2022-03-30 RX ADMIN — POTASSIUM CHLORIDE 20 MEQ: 750 TABLET, EXTENDED RELEASE ORAL at 09:48

## 2022-03-30 RX ADMIN — TRIAMCINOLONE ACETONIDE 40 MG: 40 INJECTION, SUSPENSION INTRA-ARTICULAR; INTRAMUSCULAR at 15:00

## 2022-03-30 RX ADMIN — Medication 20 MG: at 09:43

## 2022-03-30 RX ADMIN — METHOCARBAMOL 750 MG: 750 TABLET ORAL at 15:40

## 2022-03-30 RX ADMIN — Medication 400 MG: at 20:05

## 2022-03-30 RX ADMIN — SENNOSIDES AND DOCUSATE SODIUM 1 TABLET: 8.6; 5 TABLET ORAL at 09:47

## 2022-03-30 RX ADMIN — WARFARIN SODIUM 3 MG: 3 TABLET ORAL at 18:52

## 2022-03-30 RX ADMIN — SPIRONOLACTONE 25 MG: 25 TABLET, FILM COATED ORAL at 09:41

## 2022-03-30 RX ADMIN — Medication 400 MG: at 09:47

## 2022-03-30 RX ADMIN — LIDOCAINE HYDROCHLORIDE AND EPINEPHRINE 5 ML: 10; 10 INJECTION, SOLUTION INFILTRATION; PERINEURAL at 15:00

## 2022-03-30 RX ADMIN — METOCLOPRAMIDE 10 MG: 10 TABLET ORAL at 12:35

## 2022-03-30 RX ADMIN — PANTOPRAZOLE SODIUM 40 MG: 40 TABLET, DELAYED RELEASE ORAL at 09:41

## 2022-03-30 RX ADMIN — SOTALOL HYDROCHLORIDE 80 MG: 80 TABLET ORAL at 09:41

## 2022-03-30 RX ADMIN — ACETAMINOPHEN 650 MG: 325 TABLET ORAL at 04:06

## 2022-03-30 RX ADMIN — ACETAMINOPHEN 650 MG: 325 TABLET ORAL at 18:52

## 2022-03-30 RX ADMIN — ACETAMINOPHEN 650 MG: 325 TABLET ORAL at 12:35

## 2022-03-30 RX ADMIN — TORSEMIDE 20 MG: 10 TABLET ORAL at 09:41

## 2022-03-30 RX ADMIN — OXYCODONE HYDROCHLORIDE 5 MG: 5 TABLET ORAL at 09:47

## 2022-03-30 RX ADMIN — SENNOSIDES AND DOCUSATE SODIUM 1 TABLET: 8.6; 5 TABLET ORAL at 20:05

## 2022-03-30 RX ADMIN — INSULIN ASPART 2 UNITS: 100 INJECTION, SOLUTION INTRAVENOUS; SUBCUTANEOUS at 15:41

## 2022-03-30 RX ADMIN — OXYCODONE HYDROCHLORIDE 5 MG: 5 TABLET ORAL at 04:07

## 2022-03-30 RX ADMIN — Medication 20 MG: at 20:04

## 2022-03-30 ASSESSMENT — ACTIVITIES OF DAILY LIVING (ADL)
ADLS_ACUITY_SCORE: 10
ADLS_ACUITY_SCORE: 8
ADLS_ACUITY_SCORE: 10
ADLS_ACUITY_SCORE: 10
ADLS_ACUITY_SCORE: 8
ADLS_ACUITY_SCORE: 10

## 2022-03-30 NOTE — PROGRESS NOTES
Cardiology Progress Note    Assessment & Plan   Tej Morfin is a 56 year old male admitted on 3/22/2022. He has history of NICM secondary to severe MR s/p MV ring repair (), now on home inotrope (Dobutamine 2.5), sustained VT s/p secondary prevention ICD, who presents for scheduled LVAD placement. Now status post 3 on 3/23/2022.     Changes Today:  - Possible undersensing noted on tele today, EKG without any concerning findings. Will get an ppm interrogation  - Continue speed to 5600 RPMs  - Decrease torsemide to 20mg daily   - Vasartan 20mg BID   - Recommend Rheum or ortho consult for evaluation of R knee effusion     #. Non-ischemic Cardiomyopathy status post 3 (3/23/2022)  #. Stage D; NYHA Class III      DATE MAP CVP PAP PCWP Harris CO Harris CI SVR MVo2 Therapies   3/24 91 10 45/15 15 5.2 2.4 1200 60 LVAD 5200,  2.5   3/25 83 8 39/16 14 5.6 2.6 1200 50 LVAD 5300,   Nipride   3/26 71 10 50/22 22 5.9 2.7 827 60 LVAD 5300   3/27 85 10 52/20 20 5.1 2.4 1099 50 LVAD 5400          - LVAD: Speed 5600 RPM, 4.2-4.5 LPM, PI 3.8-4.4, Prw 4.2-4.3  - Afterload: Increase valsartan to 20mg BID. May restart low dose entresto prior to discharge  - BB: Not on BB at this time due to cardiogenic shock  - Aldosterone antagonist: Aldactone 25 mg daily.  - SCD prophylaxis: Yes. CRT-D. LV pacing turned off. VT zone turned off.  - AC: Warfarin, INR goal 2-3  - Anti-platelet: RICARDO trial, no aspirin products  - Fluid status: Continue torsemide  20mg BID     #Hx of Ventricular Arrhythmias:  Previously on sotalol which was held in the perioperative period. Episode of a fib with RVR with ICD shock on 3/25.   - Continue Sotalol 80 mg daily.  - ICD interrogation given possible under-sensing on tele today     This patient was seen and discussed with Dr. Elizabeth who agrees with the above assessment and plan.     Marquis Luciano MD  Cardiology Fellow  [phone]    Interval History   MELANIE overnight. Continues to have significant knee  "pain    Physical Exam   Temp: 98.5  F (36.9  C) Temp src: Oral BP: 101/87 Pulse: 90   Resp: 16 SpO2: 97 % O2 Device: None (Room air)    Vitals:    03/28/22 0400 03/29/22 0628 03/30/22 0619   Weight: 88.7 kg (195 lb 8.8 oz) 86.2 kg (190 lb 0.6 oz) 84.1 kg (185 lb 6.5 oz)     Vital Signs with Ranges  Temp:  [97.5  F (36.4  C)-99.1  F (37.3  C)] 98.5  F (36.9  C)  Pulse:  [84-97] 90  Resp:  [16-18] 16  BP: ()/(48-91) 101/87  Cuff Mean (mmHg):  [96-98] 96  SpO2:  [92 %-100 %] 97 %  I/O last 3 completed shifts:  In: 650 [P.O.:650]  Out: 2140 [Urine:2075; Chest Tube:65]     , Blood pressure 101/87, pulse 90, temperature 98.5  F (36.9  C), temperature source Oral, resp. rate 16, height 1.791 m (5' 10.51\"), weight 84.1 kg (185 lb 6.5 oz), SpO2 97 %.  185 lbs 6.51 oz  GEN: Alert, no acute distress   CV:  LVAD Hum +. JVP 6-8 cmH20  LUNGS:  Clear to auscultation bilaterally  ABD:  Active bowel sounds, soft, non-tender/non-distended.  No rebound/guarding/rigidity.  EXT:  1+ edema about the ankles. WWP. Effusion about the R knee    Medications     Warfarin Therapy Reminder         insulin aspart  1-7 Units Subcutaneous TID AC     insulin aspart  1-5 Units Subcutaneous At Bedtime     lidocaine 1% with EPINEPHrine 1:100,000  5 mL Intradermal Once     magnesium oxide  400 mg Oral BID     metoclopramide  10 mg Oral TID AC     pantoprazole  40 mg Oral QAM AC     polyethylene glycol  17 g Oral or Feeding Tube Daily     senna-docusate  1 tablet Oral or Feeding Tube BID     sotalol  80 mg Oral Q12H PARKER     spironolactone  25 mg Oral Daily     study - aspirin or placebo (IDS# 5616)  100 mg Oral Daily     torsemide  20 mg Oral Daily     triamcinolone  40 mg INTRA-ARTICULAR Once     valsartan  20 mg Oral Q12H PARKER     warfarin ANTICOAGULANT  3 mg Oral ONCE at 18:00       Data   Recent Labs   Lab 03/30/22  0737 03/30/22  0447 03/30/22  0406 03/29/22  0737 03/29/22  0531 03/28/22  0748 03/28/22  0508   WBC  --  7.5  --   --  9.0  --  " 8.6   HGB  --  8.6*  --   --  8.5*  --  7.8*   MCV  --  99  --   --  100  --  102*   PLT  --  142*  --   --  119*  --  108*   INR  --  2.94*  --   --  3.16*  --  2.03*   NA  --  139  --   --  139  --  140   POTASSIUM  --  3.5  --   --  3.8  --  3.8   CHLORIDE  --  103  --   --  105  --  110*   CO2  --  29  --   --  27  --  25   BUN  --  19  --   --  21  --  22   CR  --  0.92  --   --  1.01  --  0.96   ANIONGAP  --  7  --   --  7  --  5   YESICA  --  8.2*  --   --  8.4*  --  8.1*   GLC 96 121* 85   < > 84   < > 113*   ALBUMIN  --  1.8*  --   --  1.8*  --  1.7*   PROTTOTAL  --  5.9*  --   --  5.9*  --  5.4*   BILITOTAL  --  0.3  --   --  0.4  --  0.3   ALKPHOS  --  52  --   --  51  --  47   ALT  --  23  --   --  23  --  24   AST  --  16  --   --  16  --  32    < > = values in this interval not displayed.       No results found for this or any previous visit (from the past 24 hour(s)).

## 2022-03-30 NOTE — CONSULTS
Rheumatology Consult Note-Medical Student    Tej Morfin MRN# [id number]   Age: 56 year old YOB: 1965     Date of Admission: 3/22/2022    Reason for consult: Right knee effusion     Requesting Physician: Dr. Mohsen Jimenez     Assessment & Plan:     ASSESSMENT:  Tej Morfin is a 56 year old male with a history of NICM s/p LVAD placement 3/23/22, CKD stage II, and R knee partial medial menisectomy in 2013 and R patellar tendon repair early 1980's. We are consulted to evaluate new, painful, warm right knee effusion.     Differential includes septic arthritis, gout, pseudogout, RA, osteoarthritis. He does not have a leukocytosis and is afebrile, but this does not eliminate septic arthritis. He does not have a history of gout. Last uric acid was 8.0 in 2019. However, improved kidney function may induce uric acid joint space shifting and precipitation. Also considering RA and OA as less likely.      DIAGNOSIS:  1. Right knee effusion   2. Right knee pain  3. History of right knee partial medial menisectomy  4. Radiographic findings consistent with osteoarthritis    PLAN:  - Right knee needle aspiration performed 3/30/2022 (see procedure note)  - Synovial fluid analysis: gram stain, bacterial culture, WBC w/ differential, crystals (monosodium urate and calcium pyrophosphate crystal deposition crystals) sent.   - Labs: uric acid     I discussed the findings and recommendations with the patient.  I communicated the assessment and plan to the consulting team. We will continue to follow. Please contact us if there are any questions.     Case seen and discussed with Dr. Hartman who agrees with above assessment and plan.     Emmett Nguyen, M4  03/30/2022, 10:57 AM     Staff Addendum:  This patient was interviewed and examined in the presence of the medical student who was acting as a scribe, and this note personally edited by me reflects our mutual impression. I personally performed  the history and physical exam. I personally reviewed available lab and imaging studies.    Tonio Luciano MD  Rheumatology        HPI     Tej Morfin is a 56 year old male with a history of NICM secondary to valvulopathy severe MR s/p MV ring repair, VT s/p secondary prevention ICD, CKD stage II, admitted 3/22/2022 for LVAD placement on 3/23/22. The patient reports that he has had pain in his right knee since 3/28/2022. He describes the pain as a burning sensation that does not radiate and is only present with movement/activity. The joint pain does not improve with activity and worsens. It is not present at rest. Makes PT particularly challenging. He has experienced similar pain in early Feb 2022 but notes that this resolved spontaneously. He reports that he always has an effusion present and that he feels that it increases and decreases in size depending on his overall fluid status. He reports that he had a prior patellar tendon tear with surgical repair in the early 1980's and medial meniscus partial removal in 2013. No trauma to R knee. He does not have a personal or family history of gout or RA. Patient denies any fevers, chills, headaches, focal weakness or numbness, morning stiffness, or Raynaud's.       HISTORY REVIEW:  Past Medical History:   Diagnosis Date     Chronic systolic heart failure (H) 2/7/2017     Gastroparesis      ICD (implantable cardioverter-defibrillator) in place     Visys     Non-ischemic cardiomyopathy (H)      Paroxysmal ventricular tachycardia (H) 2/7/2017     S/P mitral valve replacement 2/7/2017     Tetrahydrocannabinol (THC) use disorder, mild, in controlled environment, abuse 09/12/2019    occasional edible THC. reportedly for sleep, anxiety     Tobacco abuse, episodic     occasional cigar - ~ 3x/wk     Past Surgical History:   Procedure Laterality Date     CV INTRA AORTIC BALLOON N/A 3/22/2022    Procedure: Intraprocedure Aortic Balloon Pump Insertion;  Surgeon:  Pedro Callahan MD;  Location:  [facility]     CV RIGHT HEART CATH MEASUREMENTS RECORDED N/A 3/22/2022    Procedure: Right Heart Catheterization;  Surgeon: Pedro Callahan MD;  Location:  [facility]     HEMORRHOIDECTOMY       ICD implantation        INSERT VENTRICULAR ASSIST DEVICE LEFT (HEARTMATE II) N/A 3/23/2022    Procedure: Redo Sterotomy, Insertion  Left Ventricular Assist Device  (HEARTMATE III) on Cardiopulmonary Bypass, Transesophageal Echocardiogram by Anesthesia;  Surgeon: Wilver Rodríguez MD;  Location:  OR     R partial medial meniscectomyRIGHT ARTHROSCOPY KNEE, PARTIAL MEDIAL MENISECTOMY ;; Surgeon: Johann Ojeda MD        REPAIR VALVE MITRAL       Family History   Problem Relation Age of Onset     Coronary Artery Disease Mother      Kidney failure Mother      Cardiomyopathy Father          age 51     Social History     Socioeconomic History     Marital status:      Spouse name: Not on file     Number of children: Not on file     Years of education: Not on file     Highest education level: Not on file   Occupational History     Not on file   Tobacco Use     Smoking status: Former Smoker     Smokeless tobacco: Never Used   Substance and Sexual Activity     Alcohol use: No     Alcohol/week: 0.0 standard drinks     Drug use: No     Sexual activity: Not on file   Other Topics Concern     Not on file   Social History Narrative     Not on file     Social Determinants of Health     Financial Resource Strain: Not on file   Food Insecurity: Not on file   Transportation Needs: Not on file   Physical Activity: Not on file   Stress: Not on file   Social Connections: Not on file   Intimate Partner Violence: Not on file   Housing Stability: Not on file     Patient Active Problem List   Diagnosis     Cardiac defibrillator in situ- [employer], 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     Valvular cardiomyopathy (H)     S/P mitral valve replacement     Paroxysmal  "ventricular tachycardia (H)     Gastroparesis     Hx MRSA infection     MR (mitral regurgitation)     Obstructive sleep apnea     Sinusitis, chronic     Chronic systolic CHF (congestive heart failure) (H)     History of tobacco use     Heart transplant candidate     Age-related nuclear cataract of both eyes     Anxiety     Lipoma of neck     Myopia of both eyes     Presbyopia     S/P mitral valve repair     Thyroid goiter     Chronic systolic congestive heart failure (H)     Allergies   Allergen Reactions     Lactose Cramps     Lactase Diarrhea         ROS     A 10 point ROS was performed with pertinent findings listed above.      Objective     PHYSICAL EXAM  BP 95/84 (BP Location: Left arm)   Pulse 92   Temp 98.3  F (36.8  C) (Oral)   Resp 18   Ht 1.791 m (5' 10.51\")   Wt 84.1 kg (185 lb 6.5 oz)   SpO2 98%   BMI 26.22 kg/m    Wt Readings from Last 4 Encounters:   03/30/22 84.1 kg (185 lb 6.5 oz)   03/03/22 88.5 kg (195 lb)   01/08/20 96.7 kg (213 lb 3.2 oz)   09/12/19 93 kg (205 lb)     Constitutional: WD-WN-WG cooperative  Eyes: nl EOM, conjunctiva, sclera  ENT: nl external ears, nose, hearing, lips, teeth, gums, throat  No mucous membrane lesions, normal saliva pool  Neck: no mass   Resp: lungs clear to auscultation  CV: RRR, no murmurs, rubs or gallops, no edema  GI: no ABD mass or tenderness  Lymph: no cervical  MSK:  On inspection RIGHT knee with prior surgical scar, well healed, and warm and tender to palpation.   Hands: dorsal and palmar surfaces normal, able to spread fingers and make fist, no synovitis or tenderness in MCP, PIP or DIP joints, no swan neck or boutonnieres deforities  Wrist and Elbows: flexion and extension WNL, non tender   Shoulders: abduction preserved to 180 degrees, normal internal and external rotation, nontender AC joint, SC joint, bicipital tendon insertion.  Hips: normal flexion and internal and external rotation of hip with knees flexed.   Knees: RIGHT knee with flexion " limited by 30 degrees and tender to palpation. Visible and palpable right knee effusion. Left knee with flexion and extension of knee normal.   Ankles: non tender, no swelling, normal ROM  Feet: non tender subtalar and talar joint, non tender MTP, PIP and DIP joints, normal dorsiflexion and plantarflexion  Strength/Sensory: normal strength 5/5 in proximal and distal muscle groups, normal sensation in all 4 extremities  Skin: no nail pitting, no nailfold hypertrophy or ragged edges  Psych: nl judgement, orientation, memory, affect.      DATA:  Outside Records: YES  Outside Xrays: YES  CBC:  Recent Labs   Lab Test 03/30/22 0447 03/29/22  0531 03/28/22  0508   WBC 7.5 9.0 8.6   RBC 2.82* 2.69* 2.51*   HGB 8.6* 8.5* 7.8*   HCT 27.9* 27.0* 25.6*   MCV 99 100 102*   MCH 30.5 31.6 31.1   MCHC 30.8* 31.5 30.5*   RDW 13.7 14.1 14.4   * 119* 108*       BMP:  Recent Labs   Lab Test 03/30/22 0737 03/30/22 0447 03/30/22  0406 03/29/22  0737 03/29/22  0531 03/28/22  0748 03/28/22  0508   NA  --  139  --   --  139  --  140   POTASSIUM  --  3.5  --   --  3.8  --  3.8   CHLORIDE  --  103  --   --  105  --  110*   CO2  --  29  --   --  27  --  25   ANIONGAP  --  7  --   --  7  --  5   GLC 96 121* 85   < > 84   < > 113*   BUN  --  19  --   --  21  --  22   CR  --  0.92  --   --  1.01  --  0.96   GFRESTIMATED  --  >90  --   --  88  --  >90   YESICA  --  8.2*  --   --  8.4*  --  8.1*    < > = values in this interval not displayed.       LFT:  Recent Labs   Lab Test 03/30/22 0447 03/29/22  0531 03/28/22  0508   PROTTOTAL 5.9* 5.9* 5.4*   ALBUMIN 1.8* 1.8* 1.7*   BILITOTAL 0.3 0.4 0.3   ALKPHOS 52 51 47   AST 16 16 32   ALT 23 23 24       No results found for: CKTOTAL  TSH   Date Value Ref Range Status   03/25/2022 1.72 0.40 - 4.00 mU/L Final   09/10/2019 3.49 0.40 - 4.00 mU/L Final     Lab Results   Component Value Date    URIC 8.0 09/10/2019    URIC 7.9 11/30/2015       Inflammatory markers  Lab Results   Component Value Date     CRP <2.9 09/10/2019    CRP 2.0 09/10/2019    CRP 4.8 11/30/2015     No results found for: SED  Ferritin   Date Value Ref Range Status   09/10/2019 95 26 - 388 ng/mL Final   11/30/2015 117 26 - 388 ng/mL Final       UA RESULTS:  Recent Labs   Lab Test 03/28/22  1121 09/10/19  0621 11/30/15  0917   COLOR Yellow Yellow Yellow   APPEARANCE Clear Slightly Cloudy Clear   URINEGLC Negative Negative Negative   URINEBILI Negative Negative Negative   URINEKETONE Trace* Negative Negative   SG 1.023 1.023 1.016   UBLD Negative Negative Negative   URINEPH 6.5 6.0 5.5   PROTEIN 30 * 100* Negative   NITRITE Negative Negative Negative   LEUKEST Negative Negative Negative   RBCU 6* 4* 0   WBCU <1 1 1         Autoimmunity labs:     No results found for: RHF  No results found for: CCPIGG  No results found for: ANCA  No results found for: Q4YSQUJ  No results found for: L3IXXEO  No results found for: EMELY  No results found for: DNA  No results found for: RNPIGG, SMIGG, SSAIGG, SSBIGG, SCLIGG    IMAGING:    RIGHT KNEE XRAY 2015:  3 views were obtained of the right knee with comparison weightbearing AP   view and patellofemoral sunrise view of the left knee.     Indication: Right knee pain.     Bilateral weight-bearing AP view of the knees, bilateral patellofemoral   sunrise view and lateral view of the right knee do not show any acute   fracture or dislocation.  Right knee effusion is present.  There is mild   narrowing of the right medial compartment with calcinosis noted in the   area of the medial meniscus of the right knee.  There is no underlying   medullary lesion of the right distal femur or right proximal tibia and   fibula.  There is chronic irregularity noted in the inferior pole of the   right patella on the lateral view that may be related to prior injury or   surgery.  On the patellofemoral sunrise view there is mild calcinosis   noted of the articular cartilage in the right knee along with a lateral   osteophyte in the  patella possibly related to prior surgery or injury.  On   the lateral view there are mild changes near the insertion of the patellar   tendon on the tibia chronic in nature possibly related to prior surgery.     Mild calcinosis as noted in the area of the meniscus posteriorly on the   lateral view.  Exam End: 07/15/15 10:42 AM

## 2022-03-30 NOTE — PLAN OF CARE
Neuro: A&Ox4. No numbness/tingling   Cardiac: SR w/ occasional PVCs  MAPS .  Heartmate 3 LVAD, no abnormal alarms.  minimal drainage through both chest tubes and leny drain.  Respiratory: Sating 92%+ on RA, occasional cough. Sputum clear and thick in nature.  GI/: LBM last evening. Pt passing gas, No N/V. BS stable. AUO.  Diet/appetite: Tolerating 2gm sodium diet, had popsicles and water for this writer's shift.  Activity:  Assist of 1 w/ walker standing at EOB.   Pain: At acceptable level on current regimen. PRN oxy & tylenol given  Skin: No new deficits noted. Mid sternal incision CDI/VALERIE driveline site WDL.  LDA's: Heartmate 3 LVAD,  R single PICC with TKO, L pleural tube y- site w/ leny drain to water seal, R pleural tube to water seal by itself.

## 2022-03-30 NOTE — PROGRESS NOTES
"CLINICAL NUTRITION SERVICES - REASSESSMENT NOTE     Nutrition Prescription    RECOMMENDATIONS FOR MDs/PROVIDERS TO ORDER:  None at this time     Malnutrition Status:    Moderate malnutrition in the context of chronic illness     Recommendations already ordered by Registered Dietitian (RD):  - Schedule Ensure Enlive BID [350 Kcals, 20 gm protein per carton] and a  Special K bar at 2 PM [180 Kcals, 12 gm protein, 22 gm CHO per bar]    -Post LVAD education for high calorie high protein recommendations with handouts completed 3/30     - Allow pt to self-select lactose-containing food items. He is only intolerant to drinking Cow's milk and reports that he tolerates yogurt, cheese, etc without concern. Left VM for NSA.     Future/Additional Recommendations:  Monitor intake trends, supplement/snack preferences, wt trends, fluid status, questions regarding education      EVALUATION OF THE PROGRESS TOWARD GOALS   Diet: 2 g Sodium + Ensure Enlive between meals, Vanilla flavor    PO Intake: 75% intake per food record flowsheet. Pt reports appetite just starting to \"come back\" today. Ate a good breakfast consisting of eggs and [place] toast. Lunch tray arriving during visit. Was very receptive to high Kcal/protein recommendations post LVAD; accepted handouts and agrees to high protein snacks/shakes between meals to optimize intake.        NEW FINDINGS   GI:    0-2 BMs per day for past 3-days per I/O    Weights:    Weight down 6.6 kg since admit (7.3%) since admit; likely 2/2 fluid status given pt condition.   03/30/22 0619 84.1 kg (185 lb 6.5 oz)   03/29/22 0628 86.2 kg (190 lb 0.6 oz)   03/28/22 0400 88.7 kg (195 lb 8.8 oz)   03/27/22 0000 93.6 kg (206 lb 6.4 oz)   03/26/22 0000 94.5 kg (208 lb 5.4 oz)   03/25/22 0000 93.2 kg (205 lb 7.5 oz)   03/24/22 0000 92.3 kg (203 lb 7.8 oz)   03/23/22 0500 85.9 kg (189 lb 6 oz)   03/22/22 1442 87 kg (191 lb 11.2 oz)   03/22/22 1410 90.7 kg (200 lb)      PTA trends below: Also vary. "   03/23/22 85.9 kg (189 lb 6 oz)   03/03/22 88.5 kg (195 lb)   01/08/20 96.7 kg (213 lb 3.2 oz)   09/12/19 93 kg (205 lb)     Labs:    LDH elevated, monitor    Lytes, renal labs, glucose acceptable    Medications:    Medium sliding scale insulin     Reglan    Protonix    Miralax, Senna    Torsemide    Warfarin    MALNUTRITION  % Intake: Decreased intake does not meet criteria  % Weight Loss: Weight loss does not meet criteria 2/2 fluid status  Subcutaneous Fat Loss: None   Muscle Loss: Temporal:  Mild, Upper arm (bicep, tricep):  Mild, Upper leg (quadricep, hamstring):  Mild and Posterior calf:  Mild  Fluid Accumulation/Edema: Mild  Malnutrition Diagnosis: Moderate malnutrition in the context of chronic illness     Previous Goals   Diet adv v nutrition support within 2-3 days.  Evaluation: Met    Previous Nutrition Diagnosis  Inadequate oral intake related to intubated/sedated, s/p surgery as evidenced by NPO status     Evaluation: No longer applicable, nutrition diagnosis changed below    CURRENT NUTRITION DIAGNOSIS  Food- and nutrition-related knowledge deficit r/t limited knowledge of meal planning post-op LVAD placement AEB pt report of not receiving formal nutrition education on meal planning post-op LVAD placement in the past.     Interventions:  Nutrition Education  1.  Provided instruction on the need to eat small, frequent meals. Importance of adequate oral intake post-op, especially for 6-8 weeks, was discussed.  2.  Provided handouts regarding the amounts of protein in various food items, tips for increasing protein intake. Discussed protein goals with pt.   Collaboration with other nutrition professionals - NSA  Medical food supplement therapy - BID   Modify composition of meals/snacks - 2 PM     Goals  1. Patient to consume % of nutritionally adequate meal trays TID, or the equivalent with supplements/snacks.  2.  Pt will verbalize at least three high protein foods.      Monitoring/Evaluation  Progress toward goals will be monitored and evaluated per protocol.    Yovany Skelton RDN, LD, CNSC  [address] RD pager: [phone] Phone: *[phone]

## 2022-03-30 NOTE — PROGRESS NOTES
D:  Pt scheduled to have VAD education on Thursday and Friday.  I:  Called pt's wife to confirm.  A:  Pt's wife states she will not be coming on Thursday, but will be here Friday.  P:  VAD Education on Friday.

## 2022-03-30 NOTE — PROCEDURES
PROCEDURE:  JOINT ASPIRATION/INJECTION.    After a discussion of risks, benefits and side effects of procedure, written informed patient consent was obtained and placed in the chart.  The right knee was prepped in the usual clean fashion. 3 cc of 1 % lidocaine with epinephrine was used for local analgesia.    ASPIRATION: Performed using a 22 gauge needle and 10 cc syringe, 30 cc of clear fluid was aspirated without complications.     INJECTION:  40 mg of triamcinolone was successfully injected in the right knee without complication.  Patient did not experience some pain relief following injection.    Invasive Procedure Safety Checklist completed by nurse? Shahrzad Nguyen, M4  03/30/2022, 3:43 PM     Staff Addendum:  This entire procedure was done with my presence and assistant. Patient was consented and agreeable to allowing our medical student to attempt the procedure. I supervised our and directed our medical student to complete the procedure and our medical student acted as a scribe. This note personally edited by me reflects our mutual impression.    Roberto Patricia MD, PhD  Rheumatology

## 2022-03-30 NOTE — PROGRESS NOTES
Cardiovascular Surgery Progress Note  03/30/2022         Assessment and Plan:     Tej Morfin is a 56 year old male with NICM secondary to valvulopathy severe MR s/p MV ring repair, on home inotrope (Dobutamine 2.5), sustained VT s/p secondary prevention ICD, CKD stage II (baseline 1.5). He presented 3/22 for IABP the evening before his surgery.   Tej presented for LVAD placement with heartmate 3 with Dr. Rodríguez on 3/23/22     Cardiovascular:   Hx of NICM with chronic systolic HF (AHA Stage D, NYHA class 3b) 2/2 severe MR, s/p ring repair 2015  Hx VT with ICD placement  S/p LVAD HM3  No arrhythmia, HD stable. Occasional V-pacing.   IABP removed post op   ASA study (placebo vs. aspirin), statin not indicated (NICM).  HTN  - Valsartan 20 mg po bid increased 3/29, Sotalol 80 mg [id number] home med, spironolactone 20 mg daily  Chest tubes: mediastinals removed 3/28, R pleural and pericardial removed 3/30. L pleural remains to suction.     Pulmonary:  - Extubated POD#1, Saturating well on RA.    - Supplemental O2 PRN to keep sats > 92%. Wean off as tolerated.  - Pulm toilet, IS, activity and deep breathing     Neurology / MSK:  - Neuro intact  - Acute post-operative pain; pain well controlled with acetaminophen, PO oxycodone PRN, IV dilaudid PRN  New right knee effusion  - Patient had previous tendon/ligament repair and partial R medial meniscectomy. Consulted Rheumatology 3/29, awaiting recs.       / Renal:  - Hx of renal disease CKD stage II (baseline 1.5). Most recent creatinine WNL, adequate UOP.   - Pre-op weight 200 lbs, diuresing well on Torsemide 20 mg bid, cardiology managing.      GI / FEN:   Gastroparesis   - Regular diet, continue bowel regimen. Protein supplements between meals.  - Replace electrolytes as needed, hepatic enzymes WNL.  - Reglan 10 mg PO TID     Endocrine:  Stress induced hyperglycemia; initially managed on insulin drip postop, transitioned to sliding scale.     Infectious Disease:  -  "Stress induced leukocytosis; WBC WNL, no signs or symptoms of infection  - Completed perioperative antibiotics.  UA unremarkable  - COVID screen negative.  - If repeat fever, pan culture.      Hematology:   Acute blood loss anemia and thrombocytopenia  Hgb 8's; Plt WNL, no signs or symptoms of active bleeding     Anticoagulation:   - ASA   - Coumadin for LVAD, INR goal 2-3. Most recent INR 2.94, next dose being held by pharmacy, recheck in AM.  - Low intensity heparin gtt bridging stopped 3/28     Prophylaxis:   - Stress ulcer prophylaxis: Pantoprazole 40 mg daily for 30 days  - DVT prophylaxis: Subcutaneous heparin, SCD     Disposition:   - Transferred to  on 3/27  - Therapies recommending discharge to rehab, may need to go to finish VAD teaching    Discussed with Dr Rodríguez through both written and verbal communication.      Kavon Richmond PA-C  Cardiothoracic Surgery  Pager [phone]    9:50 AM   March 30, 2022        Interval History:     No overnight events. Still has R knee pain and edema. This has slowed is PT sessions.   States pain is well managed on current regimen. Slept well overnight.  Tolerating diet and hungry this AM, is passing flatus, + BM. No nausea or vomiting.  Breathing well without complaints.   Working with therapies and ambulating in halls with assistance.   Denies chest pain, palpitations, dizziness, syncopal symptoms, fevers, chills, myalgias, or sternal popping/clicking.         Physical Exam:   Blood pressure 95/84, pulse 92, temperature 98.3  F (36.8  C), temperature source Oral, resp. rate 18, height 1.791 m (5' 10.51\"), weight 84.1 kg (185 lb 6.5 oz), SpO2 92 %.  Vitals:    03/28/22 0400 03/29/22 0628 03/30/22 0619   Weight: 88.7 kg (195 lb 8.8 oz) 86.2 kg (190 lb 0.6 oz) 84.1 kg (185 lb 6.5 oz)      Weight; - 6 lbs since admit and trending down.   24 hr Fluid status; net loss 2.3 L. UOP 2.3 L  MAPs: 70 - 95    Gen: A&Ox4, NAD  Neuro: no focal deficits   CV: VAD humming, RRR, normal " S1 S2, no murmurs, rubs or gallops.   Pulm: CTA, no wheezing or rhonchi, normal breathing on RA  Abd: nondistended, normal BS, soft, nontender  Ext: R knee fluctuant edema without erythema, moderate tenderness  Incision: clean, dry, intact, no erythema, sternum stable  Tubes/drain sites: dressing clean and dry, serosanguinous output, no air leak. 24 hr output 355 mL.       * removed R pleural and pericardial tube without immediate complication  Lines: driveline dressing clean and dry   LVAD: speed 5600, flow 4.2 - 4.5, PI 3.2 - 4.4, power 4.1 - 4.3.          Data:    Imaging:  reviewed recent imaging, no acute concerns    Labs:  BMP  Recent Labs   Lab 03/30/22  0737 03/30/22  0447 03/30/22  0406 03/29/22  2347 03/29/22  0737 03/29/22  0531 03/28/22  0748 03/28/22  0508 03/27/22  1734 03/27/22  1248 03/27/22  0351 03/27/22  0347   NA  --  139  --   --   --  139  --  140  --   --   --  141   POTASSIUM  --  3.5  --   --   --  3.8  --  3.8  --  3.9  --  3.8   CHLORIDE  --  103  --   --   --  105  --  110*  --   --   --  111*   YESICA  --  8.2*  --   --   --  8.4*  --  8.1*  --   --   --  8.0*   CO2  --  29  --   --   --  27  --  25  --   --   --  26   BUN  --  19  --   --   --  21  --  22  --   --   --  22   CR  --  0.92  --   --   --  1.01  --  0.96  --   --   --  0.87   GLC 96 121* 85 90   < > 84   < > 113*   < > 97   < > 98    < > = values in this interval not displayed.     CBC  Recent Labs   Lab 03/30/22  0447 03/29/22  0531 03/28/22  0508 03/27/22  0347   WBC 7.5 9.0 8.6 9.7   RBC 2.82* 2.69* 2.51* 2.46*   HGB 8.6* 8.5* 7.8* 7.8*   HCT 27.9* 27.0* 25.6* 25.1*   MCV 99 100 102* 102*   MCH 30.5 31.6 31.1 31.7   MCHC 30.8* 31.5 30.5* 31.1*   RDW 13.7 14.1 14.4 14.6   * 119* 108* 116*     INR  Recent Labs   Lab 03/30/22  0447 03/29/22  0531 03/28/22  0508 03/27/22  0347   INR 2.94* 3.16* 2.03* 1.31*      Hepatic Panel  Recent Labs   Lab 03/30/22  0447 03/29/22  0531 03/28/22  0508 03/27/22  0347   AST 16 16 32 20    ALT 23 23 24 12   ALKPHOS 52 51 47 41   BILITOTAL 0.3 0.4 0.3 0.4   ALBUMIN 1.8* 1.8* 1.7* 1.8*     GLUCOSE:   Recent Labs   Lab 03/30/22  0737 03/30/22  0447 03/30/22  0406 03/29/22  2347 03/29/22  1740 03/29/22  1205   GLC 96 121* 85 90 117* 95

## 2022-03-30 NOTE — PLAN OF CARE
Neuro: A&Ox4. Able make needs known.   Cardiac: SR, A-pacing a V-pacing at times. EKG done today. Pacemaker interogated today. LVAD, Heartmate 3. LVAD parameters, WNL. Hematocrit adjusted this morning. Drsg changed at 0400, system check done at 1200. LVAD backup supplies at bedside.   Respiratory: Sating >95% on RA. Spot checking. Denies any SOB. IS at bedside.   GI/: Adequate urine output, using bedside. BM x1 today, loose.   Diet/appetite: Tolerating 2g Na diet. Eating well.   Activity:  Assist of 1 with walker, up to chair x3 today w/encouragement. RN encourages pt to walk, refused.   Pain: Adequate relief w/ PRN tylenol x2 and oxy x1.   C/o R knee pain, MD aware. Rheumatology saw today. culture of fluid sent.   Skin: Sternal incision, healing, VALERIE. R CT x2, pulled this mroning by CVTS MD, pt tolerated well. L CT to -20mmHg  suciton, minimal output. No air leak noted. Drsg  changed today, CDI. Generalized bruising to ABD.   LDA's: LVAD, PICC, Single Lumen.     PICC line drsg changed today, verified placement by Xray.  RN notified CVTS team Re: Clot found in RUE on 3/28 ultrasound and that PICC line is in RUE. No interventions ordered.     Plan: Continue with POC. Notify primary team with changes.

## 2022-03-31 ENCOUNTER — APPOINTMENT (OUTPATIENT)
Dept: GENERAL RADIOLOGY | Facility: CLINIC | Age: 57
DRG: 001 | End: 2022-03-31
Attending: PHYSICIAN ASSISTANT
Payer: MEDICARE

## 2022-03-31 ENCOUNTER — APPOINTMENT (OUTPATIENT)
Dept: PHYSICAL THERAPY | Facility: CLINIC | Age: 57
DRG: 001 | End: 2022-03-31
Attending: INTERNAL MEDICINE
Payer: MEDICARE

## 2022-03-31 LAB
ALBUMIN SERPL-MCNC: 1.9 G/DL (ref 3.4–5)
ALP SERPL-CCNC: 72 U/L (ref 40–150)
ALT SERPL W P-5'-P-CCNC: 58 U/L (ref 0–70)
ANION GAP SERPL CALCULATED.3IONS-SCNC: 7 MMOL/L (ref 3–14)
AST SERPL W P-5'-P-CCNC: 41 U/L (ref 0–45)
BILIRUB SERPL-MCNC: 0.3 MG/DL (ref 0.2–1.3)
BUN SERPL-MCNC: 22 MG/DL (ref 7–30)
CALCIUM SERPL-MCNC: 8.5 MG/DL (ref 8.5–10.1)
CHLORIDE BLD-SCNC: 102 MMOL/L (ref 94–109)
CO2 SERPL-SCNC: 26 MMOL/L (ref 20–32)
CREAT SERPL-MCNC: 0.77 MG/DL (ref 0.66–1.25)
ERYTHROCYTE [DISTWIDTH] IN BLOOD BY AUTOMATED COUNT: 13.4 % (ref 10–15)
GFR SERPL CREATININE-BSD FRML MDRD: >90 ML/MIN/1.73M2
GLUCOSE BLD-MCNC: 194 MG/DL (ref 70–99)
GLUCOSE BLDC GLUCOMTR-MCNC: 108 MG/DL (ref 70–99)
GLUCOSE BLDC GLUCOMTR-MCNC: 113 MG/DL (ref 70–99)
GLUCOSE BLDC GLUCOMTR-MCNC: 125 MG/DL (ref 70–99)
GLUCOSE BLDC GLUCOMTR-MCNC: 138 MG/DL (ref 70–99)
GLUCOSE BLDC GLUCOMTR-MCNC: 93 MG/DL (ref 70–99)
HCT VFR BLD AUTO: 27.4 % (ref 40–53)
HGB BLD-MCNC: 8.5 G/DL (ref 13.3–17.7)
INR PPP: 1.89 (ref 0.85–1.15)
MAGNESIUM SERPL-MCNC: 2.2 MG/DL (ref 1.6–2.3)
MCH RBC QN AUTO: 29.9 PG (ref 26.5–33)
MCHC RBC AUTO-ENTMCNC: 31 G/DL (ref 31.5–36.5)
MCV RBC AUTO: 97 FL (ref 78–100)
PHOSPHATE SERPL-MCNC: 2.8 MG/DL (ref 2.5–4.5)
PLATELET # BLD AUTO: 185 10E3/UL (ref 150–450)
POTASSIUM BLD-SCNC: 4.1 MMOL/L (ref 3.4–5.3)
PROT SERPL-MCNC: 6.4 G/DL (ref 6.8–8.8)
RBC # BLD AUTO: 2.84 10E6/UL (ref 4.4–5.9)
SODIUM SERPL-SCNC: 135 MMOL/L (ref 133–144)
URATE SERPL-MCNC: 5.8 MG/DL (ref 3.5–7.2)
WBC # BLD AUTO: 5.4 10E3/UL (ref 4–11)

## 2022-03-31 PROCEDURE — 93750 INTERROGATION VAD IN PERSON: CPT | Performed by: PHYSICIAN ASSISTANT

## 2022-03-31 PROCEDURE — 250N000013 HC RX MED GY IP 250 OP 250 PS 637: Performed by: STUDENT IN AN ORGANIZED HEALTH CARE EDUCATION/TRAINING PROGRAM

## 2022-03-31 PROCEDURE — 36592 COLLECT BLOOD FROM PICC: CPT | Performed by: STUDENT IN AN ORGANIZED HEALTH CARE EDUCATION/TRAINING PROGRAM

## 2022-03-31 PROCEDURE — 85610 PROTHROMBIN TIME: CPT | Performed by: STUDENT IN AN ORGANIZED HEALTH CARE EDUCATION/TRAINING PROGRAM

## 2022-03-31 PROCEDURE — 36592 COLLECT BLOOD FROM PICC: CPT | Performed by: PHYSICIAN ASSISTANT

## 2022-03-31 PROCEDURE — 120N000003 HC R&B IMCU UMMC

## 2022-03-31 PROCEDURE — 86617 LYME DISEASE ANTIBODY: CPT | Performed by: STUDENT IN AN ORGANIZED HEALTH CARE EDUCATION/TRAINING PROGRAM

## 2022-03-31 PROCEDURE — 250N000013 HC RX MED GY IP 250 OP 250 PS 637: Performed by: SURGERY

## 2022-03-31 PROCEDURE — 84100 ASSAY OF PHOSPHORUS: CPT | Performed by: STUDENT IN AN ORGANIZED HEALTH CARE EDUCATION/TRAINING PROGRAM

## 2022-03-31 PROCEDURE — 71046 X-RAY EXAM CHEST 2 VIEWS: CPT | Mod: 77

## 2022-03-31 PROCEDURE — 71046 X-RAY EXAM CHEST 2 VIEWS: CPT | Mod: 26 | Performed by: RADIOLOGY

## 2022-03-31 PROCEDURE — 97530 THERAPEUTIC ACTIVITIES: CPT | Mod: GP | Performed by: PHYSICAL THERAPIST

## 2022-03-31 PROCEDURE — 97161 PT EVAL LOW COMPLEX 20 MIN: CPT | Mod: GP | Performed by: PHYSICAL THERAPIST

## 2022-03-31 PROCEDURE — 80053 COMPREHEN METABOLIC PANEL: CPT | Performed by: STUDENT IN AN ORGANIZED HEALTH CARE EDUCATION/TRAINING PROGRAM

## 2022-03-31 PROCEDURE — 85027 COMPLETE CBC AUTOMATED: CPT | Performed by: STUDENT IN AN ORGANIZED HEALTH CARE EDUCATION/TRAINING PROGRAM

## 2022-03-31 PROCEDURE — 99232 SBSQ HOSP IP/OBS MODERATE 35: CPT | Mod: 25 | Performed by: PHYSICIAN ASSISTANT

## 2022-03-31 PROCEDURE — 71046 X-RAY EXAM CHEST 2 VIEWS: CPT

## 2022-03-31 PROCEDURE — 84550 ASSAY OF BLOOD/URIC ACID: CPT | Performed by: PHYSICIAN ASSISTANT

## 2022-03-31 PROCEDURE — 250N000013 HC RX MED GY IP 250 OP 250 PS 637: Performed by: INTERNAL MEDICINE

## 2022-03-31 PROCEDURE — 83735 ASSAY OF MAGNESIUM: CPT | Performed by: STUDENT IN AN ORGANIZED HEALTH CARE EDUCATION/TRAINING PROGRAM

## 2022-03-31 RX ORDER — WARFARIN SODIUM 4 MG/1
4 TABLET ORAL
Status: COMPLETED | OUTPATIENT
Start: 2022-03-31 | End: 2022-03-31

## 2022-03-31 RX ADMIN — PANTOPRAZOLE SODIUM 40 MG: 40 TABLET, DELAYED RELEASE ORAL at 08:11

## 2022-03-31 RX ADMIN — WARFARIN SODIUM 4 MG: 4 TABLET ORAL at 18:00

## 2022-03-31 RX ADMIN — Medication 400 MG: at 08:11

## 2022-03-31 RX ADMIN — SOTALOL HYDROCHLORIDE 80 MG: 80 TABLET ORAL at 08:11

## 2022-03-31 RX ADMIN — METOCLOPRAMIDE 10 MG: 10 TABLET ORAL at 18:00

## 2022-03-31 RX ADMIN — Medication 20 MG: at 08:11

## 2022-03-31 RX ADMIN — Medication 400 MG: at 20:00

## 2022-03-31 RX ADMIN — METOCLOPRAMIDE 10 MG: 10 TABLET ORAL at 08:11

## 2022-03-31 RX ADMIN — OXYCODONE HYDROCHLORIDE 5 MG: 5 TABLET ORAL at 23:14

## 2022-03-31 RX ADMIN — TORSEMIDE 20 MG: 10 TABLET ORAL at 08:11

## 2022-03-31 RX ADMIN — SOTALOL HYDROCHLORIDE 80 MG: 80 TABLET ORAL at 20:00

## 2022-03-31 RX ADMIN — SPIRONOLACTONE 25 MG: 25 TABLET, FILM COATED ORAL at 08:12

## 2022-03-31 RX ADMIN — METOCLOPRAMIDE 10 MG: 10 TABLET ORAL at 12:12

## 2022-03-31 RX ADMIN — Medication 20 MG: at 19:59

## 2022-03-31 ASSESSMENT — ACTIVITIES OF DAILY LIVING (ADL)
ADLS_ACUITY_SCORE: 8

## 2022-03-31 NOTE — PLAN OF CARE
"30-Second Sit to Stand Test:  The test is designed to be conducted with a straight back chair, without armrests, with a 17-inch seat height.  (Chair heights: High back & Folding = 18\", ICU/5C recliner & window seat = 18 1/2\"  Actual height of chair used: 17 \"    Patient Score (score =0 if must use arms) 8 reps    The 30 Second Sit to Stand Test is considered a test of fall risk.  Data from [place] [employer], cosponsored by [place] Dept of Health:  If must use arms = High Fall Risk regardless of reps  8 or less times = High Fall Risk   9 to 12 times = Moderate Risk  13 or more times = Low Risk    The 30 Second Sit to Stand Test is also considered a test of leg strength and endurance.   Normative Data from Jose et al,. 2001  Age                 Reps: Men        Reps: Women  60-64                14-19                       12-17                               65-69                12-18                       11-16                    70-74                12-17                       10-15              75-79                11-17                       10-15                    80-84                10-15                         9-14  85-89                 8-14                          8-13  90-94                 7-12                          4-11    Assessment (rationale for performing, application to patient s function & care plan): Pt is at high falls risk at this time, would benefit from continued skilled PT in order to address functional balance deficits.  (Physical Therapist: Minutes billed as physical performance)      "

## 2022-03-31 NOTE — PROGRESS NOTES
Cardiovascular Surgery Progress Note  03/31/2022         Assessment and Plan:     Tej Morfin is a 56 year old male with NICM secondary to valvulopathy severe MR s/p MV ring repair, on home inotrope (Dobutamine 2.5), sustained VT s/p secondary prevention ICD, CKD stage II (baseline 1.5). He presented 3/22 for IABP the evening before his surgery.   Tej presented for LVAD placement with heartmate 3 with Dr. Rodríguez on 3/23/22     Cardiovascular:   Hx of NICM with chronic systolic HF (AHA Stage D, NYHA class 3b) 2/2 severe MR, s/p ring repair 2015  Hx VT with ICD placement  S/p LVAD HM3  No arrhythmia, HD stable. Occasional V-pacing.   IABP removed post op   ASA study (placebo vs. aspirin), statin not indicated (NICM).  HTN  - Valsartan 20 mg po bid increased 3/29, Sotalol 80 mg [id number] home med, spironolactone 20 mg daily  Chest tubes: mediastinals removed 3/28, R pleural and pericardial removed 3/30. L pleural removed 3/31.   - recent CXR unremarkable.      Pulmonary:  - Extubated POD#1, Saturating well on RA.    - Supplemental O2 PRN to keep sats > 92%. Wean off as tolerated.  - Pulm toilet, IS, activity and deep breathing     Neurology / MSK:  - Neuro intact  - Acute post-operative pain; pain well controlled with acetaminophen, PO oxycodone PRN, IV dilaudid PRN  New right knee effusion  - Patient had previous patellar tendon tear with surgical repair in the early 1980's and medial meniscus partial removal in 2013   - Consulted Rheumatology 3/29, right knee needle aspiration performed 3/30/2022.  - Synovial fluid analysis: gram stain, bacterial culture, WBC w/ differential, crystals (monosodium urate and calcium pyrophosphate crystal deposition crystals) sent. Cultures negative so far. No crystals seen on analysis of fluid.       / Renal:  - Hx of renal disease CKD stage II (baseline 1.5). Most recent creatinine WNL, adequate UOP.   - Pre-op weight 200 lbs, diuresing well on Torsemide 20 mg bid,  cardiology managing.      GI / FEN:   Gastroparesis   - Regular diet, continue bowel regimen. Protein supplements between meals.  - Replace electrolytes as needed, hepatic enzymes WNL.  - Reglan 10 mg PO TID     Endocrine:  Stress induced hyperglycemia; initially managed on insulin drip postop, transitioned to sliding scale.     Infectious Disease:  - Stress induced leukocytosis; WBC WNL, no signs or symptoms of infection  - Completed perioperative antibiotics.  UA unremarkable  - COVID screen negative.  - If repeat fever, pan culture.      Hematology:   Acute blood loss anemia and thrombocytopenia  Hgb 8's; Plt WNL, no signs or symptoms of active bleeding     Anticoagulation:   - ASA   - Coumadin for LVAD, INR goal 2-3. Most recent INR 1.87, dose on 3/29 was held due to supra theraputic INR, patient now subtherapeutic. Re dose gaby tonight, if low again tomorrow, will need heparin drip.   - Low intensity heparin gtt bridging stopped 3/28     Prophylaxis:   - Stress ulcer prophylaxis: Pantoprazole 40 mg daily for 30 days  - DVT prophylaxis: Subcutaneous heparin, SCD     Disposition:   - Transferred to  on 3/27  - Therapies recommending discharge to home, needs to finish VAD teaching, likely early next week.     Discussed with Dr Rodríguez through both written and verbal communication.      Leigh Abraham PA-C  Cardiothoracic Surgery  Pager [phone]  March 31, 2022          Interval History:     No overnight events. R knee pain and edema resolved after aspiration.   States pain is well managed on current regimen. Slept well overnight.  Tolerating diet and hungry this AM, is passing flatus, + BM. No nausea or vomiting.  Breathing well without complaints.   Working with therapies and ambulating in halls with assistance.   Denies chest pain, palpitations, dizziness, syncopal symptoms, fevers, chills, myalgias, or sternal popping/clicking.         Physical Exam:   Blood pressure 103/80, pulse 66, temperature  "97.5  F (36.4  C), temperature source Oral, resp. rate 18, height 1.791 m (5' 10.51\"), weight 84.1 kg (185 lb 6.5 oz), SpO2 98 %.  Vitals:    03/29/22 0628 03/30/22 0619 03/31/22 0615   Weight: 86.2 kg (190 lb 0.6 oz) 84.1 kg (185 lb 6.5 oz) 84.1 kg (185 lb 6.5 oz)        Gen: A&Ox4, NAD  Neuro: no focal deficits   CV: VAD humming, RRR, normal S1 S2, no murmurs, rubs or gallops.   Pulm: CTA, no wheezing or rhonchi, normal breathing on RA  Abd: nondistended, normal BS, soft, nontender  Ext: R knee no longer tender, no effusion.   Incision: clean, dry, intact, no erythema, sternum stable  Tubes/drain sites: dressing clean and dry, serosanguinous output, no air leak. 24 hr output 30 mL.       * removed L pleural and pericardial tube without immediate complication  Lines: driveline dressing clean and dry   LVAD: speed 5600, flow 4.2 - 4.5, PI 3.2 - 4.4, power 4.1 - 4.3.          Data:    Imaging:  reviewed recent imaging, no acute concerns    Labs:  [facility]  Recent Labs   Lab 03/31/22  1141 03/31/22  0744 03/31/22  0436 03/30/22  2241 03/30/22  0737 03/30/22  0447 03/29/22  0737 03/29/22  0531 03/28/22  0748 03/28/22  0508   NA  --   --  135  --   --  139  --  139  --  140   POTASSIUM  --   --  4.1  --   --  3.5  --  3.8  --  3.8   CHLORIDE  --   --  102  --   --  103  --  105  --  110*   YESICA  --   --  8.5  --   --  8.2*  --  8.4*  --  8.1*   CO2  --   --  26  --   --  29  --  27  --  25   BUN  --   --  22  --   --  19  --  21  --  22   CR  --   --  0.77  --   --  0.92  --  1.01  --  0.96   * 93 194* 152*   < > 121*   < > 84   < > 113*    < > = values in this interval not displayed.     CBC  Recent Labs   Lab 03/31/22  0436 03/30/22  0447 03/29/22  0531 03/28/22  0508   WBC 5.4 7.5 9.0 8.6   RBC 2.84* 2.82* 2.69* 2.51*   HGB 8.5* 8.6* 8.5* 7.8*   HCT 27.4* 27.9* 27.0* 25.6*   MCV 97 99 100 102*   MCH 29.9 30.5 31.6 31.1   MCHC 31.0* 30.8* 31.5 30.5*   RDW 13.4 13.7 14.1 14.4    142* 119* 108*     INR  Recent " Labs   Lab 03/31/22  0436 03/30/22  0447 03/29/22  0531 03/28/22  0508   INR 1.89* 2.94* 3.16* 2.03*      Hepatic Panel  Recent Labs   Lab 03/31/22  0436 03/30/22  0447 03/29/22  0531 03/28/22  0508   AST 41 16 16 32   ALT 58 23 23 24   ALKPHOS 72 52 51 47   BILITOTAL 0.3 0.3 0.4 0.3   ALBUMIN 1.9* 1.8* 1.8* 1.7*     GLUCOSE:   Recent Labs   Lab 03/31/22  1141 03/31/22  0744 03/31/22  0436 03/30/22  2241 03/30/22  1850 03/30/22  1238   * 93 194* 152* 132* 231*

## 2022-03-31 NOTE — PROGRESS NOTES
[facility]   Cardiology II Service / Advanced Heart Failure  Daily Consult Note      Patient: Tej Morfin  MRN: [id number]  Admission Date: 3/22/2022  Hospital Day # 9    Assessment and Plan: Tej Morfin is a 56 year old male admitted on 3/22/2022. He has history of NICM secondary to severe MR s/p MV ring repair (2015), now on home inotrope (Dobutamine 2.5), sustained VT s/p secondary prevention ICD, who presents for scheduled LVAD placement. Now status post HM3 on 3/23/2022. Overall, course has been non-complicated.    Recommendations:  - Recommend compression stockings for pedal edema  - Continue torsemide 20 mg daily  - Arthrocentesis fluid analysis per primary team and rheumatology  - Would defer Heparin gtt today  - If voice hoarseness is not improving, would recommend ENT    # Acute on chronic systolic heart failure secondary to NICM   # s/p HM3 LVAD on 3/23/2022  Stage D. NYHA Class III- confounded by recent surgery    Last hemodynamics: 3/27: MAP 85, CVP 10, PAP 52/20, PCW 20, JOANNA CO/CI 5.1/2.4, SVR 1099, MVO2 50    Fluid status: near euvolemic  ACEi/ARB/ARNi/afterload reduction: valsartan 20 mg BID  BB: deferred given recent LVAD surergy  Aldosterone antagonist: aldactone 25 mg daily  SCD prophylaxis: CRT-D  MAP: Goal 65-85  LDH trends: 237, stable  Anticoagulation: warfarin dosing per pharmacy, INR goal 2-3, today 1.89  Antiplatelet: ARRIES Trial     #Hx of Ventricular Arrhythmias:  Previously on sotalol which was held in the perioperative period, now restarted. Episode of a fib with RVR with ICD shock on 3/25. Occasional NSVT since. ICD interrogated 3/30 for c/f undersensing, but no undersensing appreciated.  - Continue Sotalol 80 mg daily    # Right Knee effusion. S/p arthrocentesis on 3/30 with drainage of non-bloody fluid with a high number of PMNs, gram stain negative.  - Appreciate rheumatology consult  - Management per rheum and primary team  - Cultures NGTD    # Voice  "hoarseness, intermittent since surgery  - If not improving, would recommend ENT    Radha White PA-C  Advanced Heart Failure/Cardiology II Service  Pager [phone] [facility] [phone]    ================================================================    Subjective/24-Hr Events:   Last 24 hr care team notes reviewed. Feeling well today. Knee feels much better. No fevers, chills, lightheadedness, dizziness, chest pain (except at chest tube stie), N/V/D. No drivline concerns. No bleeding symptoms. He does have some swelling in both of his feet and ankles. He does feel that his breathing is improving.     ROS:  4 point ROS including respiratory, CV, GI and  (other than that noted in the HPI) is negative.     Medications: Reviewed in EPIC.     Physical Exam:   /80 (BP Location: Left arm)   Pulse 66   Temp 97.5  F (36.4  C) (Oral)   Resp 18   Ht 1.791 m (5' 10.51\")   Wt 84.1 kg (185 lb 6.5 oz)   SpO2 97%   BMI 26.22 kg/m      GENERAL: Appears comfortable, in no distress.  HEENT: Eye symmetrical, no discharge or icterus bilaterally. Mucous membranes moist and without lesions.  NECK: Supple, JVD lower third of neck at 90 degrees.   CV: Hum of HM3, no adventitious sounds  RESPIRATORY: Respirations regular, even, and unlabored. Lungs CTA throughout.   GI: Soft and non distended. No tenderness, rebound, guarding.   EXTREMITIES: 1+ lower extremity edema to ankles. No pulses.   NEUROLOGIC: Alert and interacting appropriatly. No focal deficits.   MUSCULOSKELETAL: No joint swelling or tenderness.   SKIN: No jaundice. No rashes or lesions. Sternum c/d/i.     Labs:  CMP  Recent Labs   Lab 03/31/22  1141 03/31/22  0744 03/31/22  0436 03/30/22  2241 03/30/22  0737 03/30/22  0447 03/29/22  0737 03/29/22  0531 03/28/22  0748 03/28/22  0508   NA  --   --  135  --   --  139  --  139  --  140   POTASSIUM  --   --  4.1  --   --  3.5  --  3.8  --  3.8   CHLORIDE  --   --  102  --   --  103  --  105  --  110*   CO2  --   --  " 26  --   --  29  --  27  --  25   ANIONGAP  --   --  7  --   --  7  --  7  --  5   * 93 194* 152*   < > 121*   < > 84   < > 113*   BUN  --   --  22  --   --  19  --  21  --  22   CR  --   --  0.77  --   --  0.92  --  1.01  --  0.96   GFRESTIMATED  --   --  >90  --   --  >90  --  88  --  >90   YESICA  --   --  8.5  --   --  8.2*  --  8.4*  --  8.1*   MAG  --   --  2.2  --   --  2.0  --  2.1  --  2.2   PHOS  --   --  2.8  --   --  3.2  --  3.5  --  2.9   PROTTOTAL  --   --  6.4*  --   --  5.9*  --  5.9*  --  5.4*   ALBUMIN  --   --  1.9*  --   --  1.8*  --  1.8*  --  1.7*   BILITOTAL  --   --  0.3  --   --  0.3  --  0.4  --  0.3   ALKPHOS  --   --  72  --   --  52  --  51  --  47   AST  --   --  41  --   --  16  --  16  --  32   ALT  --   --  58  --   --  23  --  23  --  24    < > = values in this interval not displayed.       CBC  Recent Labs   Lab 03/31/22 0436 03/30/22  0447 03/29/22  0531 03/28/22  0508   WBC 5.4 7.5 9.0 8.6   RBC 2.84* 2.82* 2.69* 2.51*   HGB 8.5* 8.6* 8.5* 7.8*   HCT 27.4* 27.9* 27.0* 25.6*   MCV 97 99 100 102*   MCH 29.9 30.5 31.6 31.1   MCHC 31.0* 30.8* 31.5 30.5*   RDW 13.4 13.7 14.1 14.4    142* 119* 108*       INR  Recent Labs   Lab 03/31/22 0436 03/30/22  0447 03/29/22  0531 03/28/22  0508   INR 1.89* 2.94* 3.16* 2.03*       Time/Communication  I personally spent a total of 30 minutes. Of that >15 minutes was counseling/coordination of patient's care. Plan of care discussed with patient. See my note above for details.    Patient discussed with Dr. Elizabeth.

## 2022-03-31 NOTE — PROGRESS NOTES
The patient's HeartMate LVAD was interrogated 3/31/2022  * Speed 5600 rpm   * Pulsatility index 2.9   * Power 4.2 Noriega   * Flow 5.1 L/minute   Fluid status: near euvolemic   Alarms were reviewed, and notable for occasional PI events with rare associated speed drops.   The driveline exit site was inspected, c/d/i.   All external components were inspected and showed no evidence of damage or malfunction, none replaced.   No changes to VAD settings made

## 2022-03-31 NOTE — PLAN OF CARE
"BP (!) 113/102 (BP Location: Left arm, Cuff Size: Adult Regular)   Pulse 79   Temp 98.3  F (36.8  C) (Oral)   Resp 18   Ht 1.791 m (5' 10.51\")   Wt 84.1 kg (185 lb 6.5 oz)   SpO2 100%   BMI 26.22 kg/m      Shift events: LVAD driveline dressing changed at 2200 per pt request. Adipose tissue protruding from removed chest tube site; surgeon removed at bedside. Incisional pain well controlled overnight without meds; denies knee pain.    Admission diagnoses: CHF s/p LVAD placement    Neuro: A&Ox4; pleasant; no deficits noted  CV: Heartmate 3 at 5600; hematocrit setting changed to 27 this AM. Pressures hard to obtain; dopplering MAPs; above goal of 65. Permanent pacemaker/ICD interrogated yesterday with no remarkable findings. Sinus rhythm overnight.  Resp: RA; denies dyspnea; increasing productive cough with thin clear sputum. Using IS.  GI: Last BM 3/30. 2 gram Na+ diet.  : Voids spontaneously with adequate UOP.  Endo: ACHS with sliding scale insulin.  Skin: Midline sternal incision VALERIE with no drainage; removed chest tube sites covered with scant drainage. Dressings changed. BLE edema noted.  LDA: RUE PICC; LVAD driveline; L pleural chest tube.    Plan: Continue to follow POC and update team with changes.    Fawad Monzon RN on 3/31/2022 at 6:26 AM   "

## 2022-03-31 NOTE — PLAN OF CARE
Neuro: A&Ox4.   Cardiac: LVAD HM3 WDL. SR. VSS. B/P: 109/90, T: 98, P: 80, R: 16  Respiratory: O2 sats stable on RA. Chest tube removed.  GI/: Adequate urine output. BM X1  Diet/appetite: Tolerating 2G NA diet. Eating well.  Activity:  Assist of 1, up to chair and in halls.  Pain: At acceptable level on current regimen.   Skin: No new deficits noted. Mild edema in BLE. Old chest tube sites x5 with dressing CDI.  LDA's: LVAD Hm3, PICC    Chest tube removed by MD.  2 view chest xray complete.  Plan for LVAD education tomorrow. Continuing LVAD education complete by the bedside RN.    Plan: Continue with POC. Notify primary team with changes.

## 2022-03-31 NOTE — PROGRESS NOTES
RHEUMATOLOGY PROGRESS NOTE - Medical Student     Tej Morfin MRN# [id number]   Age: 56 year old YOB: 1965     Date of Admission:  3/22/2022  Date of initial consult: 3/30/2022    Assessment and Plan:   Summary: Tej Morfin is a 56 year old male with a history of NICM s/p LVAD placement 3/23/22, CKD stage II, and R knee partial medial menisectomy in 2013 and R patellar tendon repair early 1980's. We were consulted to evaluate new, painful, warm right knee effusion that has since resolved s/p arthrocentesis and TAC injection. Synovial fluid analysis not concerning for infection or gout. Signs and symptoms were most likely due to osteoarthritis or minor crystal arthropathy not identifiable by exam of synovial fluid.     Interval discussion: Needle aspiration +TAC injection was performed 3/30/2022. Given mild total neutrophil count, absence of organisms on gram stain, and no evidence of uric acid crystals it is unlikely that the patient has either septic arthritis or gouty arthritis. Neutrophil predominance is typical whereas lymphocytic predominance would be expected with many atypical and intracellular organisms. There may be a mild component of pseudogout given his excellent response to steroid injection.     Problem list:  1. Right knee effusion   2. Right knee pain  3. History of right knee partial medial menisectomy  4. Radiographic findings consistent with osteoarthritis    Recommendations:  -- Knee effusion likely secondary to degenerative arthritis, perhaps with a component of pseudogout. At this time, outpatient follow-up with Rheumatology is not necessary but if knee swelling is recurrent and if additional joints (e.g. big toe) become involved would consider referral to outpatient Rheumatology versus sports medicine at that time.  -- Rheumatology will sign off at this time however we will continue to follow synovial fluid cultures remotely    Please do not hesitate to contact us  with questions or concerns.  Case seen and discussed with Dr. Patricia who agrees with above assessment and plan.     Emmett NOGUEIRA Loccassandra, M4  03/31/2022, 2:32 PM     Staff Addendum:  This patient was interviewed and examined in the presence of the medical student who was acting as a scribe, and this note personally edited by me reflects our mutual impression. I personally performed the history and physical exam. I personally reviewed available lab and imaging studies.  Addendum: out of abundance of precaution, will add lyme synovial PCR and lyme screen.  Roberto Patricia MD, PhD  Rheumatology      Subjective/24 hour events:   The patient reports improved function and decreased pain of Right knee joint following arthrocentesis and TAC injection yesterday (3/30/2022). He was able to fully participate with PT today. Warmth has also resolved. No concerns.             Medications:     Current Facility-Administered Medications   Medication     acetaminophen (TYLENOL) tablet 650 mg     bisacodyl (DULCOLAX) Suppository 10 mg     glucose gel 15-30 g    Or     dextrose 50 % injection 25-50 mL    Or     glucagon injection 1 mg     fexofenadine (ALLEGRA) tablet 180 mg     fluticasone (FLONASE) 50 MCG/ACT spray 1 spray     hydrALAZINE (APRESOLINE) injection 10 mg     HYDROmorphone (PF) (DILAUDID) injection 0.5 mg     insulin aspart (NovoLOG) injection (RAPID ACTING)     insulin aspart (NovoLOG) injection (RAPID ACTING)     lidocaine (LMX4) cream     lidocaine 1 % 0.1-1 mL     magnesium hydroxide (MILK OF MAGNESIA) suspension 30 mL     magnesium oxide (MAG-OX) tablet 400 mg     methocarbamol (ROBAXIN) tablet 750 mg     metoclopramide (REGLAN) tablet 10 mg     naloxone (NARCAN) injection 0.2 mg    Or     naloxone (NARCAN) injection 0.4 mg    Or     naloxone (NARCAN) injection 0.2 mg    Or     naloxone (NARCAN) injection 0.4 mg     ondansetron (ZOFRAN-ODT) ODT tab 4 mg    Or     ondansetron (ZOFRAN) injection 4 mg      "oxyCODONE (ROXICODONE) tablet 5 mg    Or     oxyCODONE IR (ROXICODONE) tablet 10 mg     pantoprazole (PROTONIX) EC tablet 40 mg     polyethylene glycol (MIRALAX) Packet 17 g     prochlorperazine (COMPAZINE) injection 10 mg    Or     prochlorperazine (COMPAZINE) tablet 10 mg     senna-docusate (SENOKOT-S/PERICOLACE) 8.6-50 MG per tablet 1 tablet     simethicone (MYLICON) chewable tablet 80 mg     sodium chloride (PF) 0.9% PF flush 3 mL     sotalol (BETAPACE) tablet 80 mg     spironolactone (ALDACTONE) tablet 25 mg     study - aspirin or placebo (IDS# 5616) capsule 100 mg     torsemide (DEMADEX) tablet 20 mg     valsartan (DIOVAN) half-tab 20 mg     warfarin ANTICOAGULANT (COUMADIN) tablet 4 mg     Warfarin Therapy Reminder (Check START DATE - warfarin may be starting in the FUTURE)            Review of Systems:   Complete 8 point ROS completed and negative unless mentioned in subjective.          Physical Exam:   /80 (BP Location: Left arm)   Pulse 66   Temp 97.5  F (36.4  C) (Oral)   Resp 18   Ht 1.791 m (5' 10.51\")   Wt 84.1 kg (185 lb 6.5 oz)   SpO2 98%   BMI 26.22 kg/m      PHYSICAL EXAM  /80 (BP Location: Left arm)   Pulse 66   Temp 97.5  F (36.4  C) (Oral)   Resp 18   Ht 1.791 m (5' 10.51\")   Wt 84.1 kg (185 lb 6.5 oz)   SpO2 98%   BMI 26.22 kg/m    Wt Readings from Last 4 Encounters:   03/31/22 84.1 kg (185 lb 6.5 oz)   03/03/22 88.5 kg (195 lb)   01/08/20 96.7 kg (213 lb 3.2 oz)   09/12/19 93 kg (205 lb)     Constitutional: WD-WN-WG cooperative  Eyes: nl EOM, vision, conjunctiva, sclera  ENT: nl external ears, nose, hearing, lips, teeth, gums, throat  No mucous membrane lesions, normal saliva pool  Neck: no mass  Resp: lungs clear to auscultation  CV: RRR, no murmurs, rubs or gallops, no edema  GI: no ABD mass or tenderness  MSK:  On inspection, No visible redness warmth or swelling in any joint   Hands: dorsal and palmar surfaces normal, able to spread fingers and make fist, no " synovitis or tenderness in MCP, PIP or DIP joints, no swan neck or boutonnieres deforities  Wrist and Elbows: flexion and extension WNL, non tender   Shoulders: abduction preserved to 180 degrees, normal internal and external rotation, nontender AC joint, SC joint, bicipital tendon insertion.  Hips: normal flexion and internal and external rotation of hip with knees flexed.   Knees: flexion and extension of knee normal  Ankles: non tender, no swelling, normal ROM  Feet: non tender subtalar and talar joint, non tender MTP, PIP and DIP joints, normal dorsiflexion and plantarflexion  Strength/Sensory: normal strength 5/5 in proximal and distal muscle groups, normal sensation in all 4 extremities  Skin: no nail pitting, alopecia, rash, nodules or lesions, no nailfold hypertrophy or ragged edges          Data:   Labs and imaging reviewed.     Results for NATASHA HARVEY (MRN [id number]) as of 3/31/2022 14:34   Ref. Range 3/30/2022 15:30   AEROBIC BACTERIAL CULTURE ROUTINE Unknown Rpt   ANAEROBIC BACTERIAL CULTURE ROUTINE Unknown Rpt   GRAM STAIN Unknown Rpt   Cell Count Fluid Source Unknown Knee, Right   Color Fluid Latest Ref Range: Colorless, Yellow  Yellow   Appearance Fluid Latest Ref Range: Clear, Bloody  Cloudy (A)   WBC Fluid Latest Units: /uL 7,670   % Lymphocytes Fluid Latest Units: % 1   % Mono/Macro Fluid Latest Units: % 3   % Neutrophils Fluid Latest Units: % 97        Knee, Right; Synovial fluid          0 Result Notes    Culture No growth, less than 1 day         Knee, Right; Synovial fluid          0 Result Notes    Gram Stain Result  No organisms seen      4+ WBC seen   Predominantly PMNs

## 2022-03-31 NOTE — PROGRESS NOTES
03/31/22 1000   Quick Adds   Type of Visit Initial PT Evaluation   Living Environment   People in Home spouse;child(lizeth), dependent   Current Living Arrangements apartment   Home Accessibility no concerns   Transportation Anticipated car, drives self;family or friend will provide   Living Environment Comments Pt lives in one level apartment w/ wife and 17 yo daughter, no DIXIE, all needs met in unit   Self-Care   Usual Activity Tolerance good   Current Activity Tolerance fair   Regular Exercise No   Equipment Currently Used at Home none   Fall history within last six months no   Activity/Exercise/Self-Care Comment Pt states that he was IND w/ ADLs and ambulation prior to hospitalization. He states that he was very active in the past (walking on treadmill every day), but he has not completed this activity recently   General Information   Onset of Illness/Injury or Date of Surgery 03/22/22   Patient/Family Therapy Goals Statement (PT) Return home   Pertinent History of Current Problem (include personal factors and/or comorbidities that impact the POC) Tej Morfin is a 56 year old male with NICM secondary to valvulopathy severe MR s/p MV ring repair, now on home inotrope (Dobutamine 2.5), sustained VT s/p secondary prevention ICD, CKD stage II (baseline 1.5) who presents for LVAD placement with heartmate 3 by Dr. Rodríguez on 3/23/22   Existing Precautions/Restrictions sternal   Cognition   Orientation Status (Cognition) oriented x 4   Pain Assessment   Patient Currently in Pain No   Integumentary/Edema   Integumentary/Edema no deficits were identifed   Posture    Posture Forward head position   Range of Motion (ROM)   ROM Comment BLE WFL   Strength Comprehensive (MMT)   Comment, General Manual Muscle Testing (MMT) Assessment BLE WFL, grossly 4/5   Bed Mobility   Comment, (Bed Mobility) Supine<>sit IND within precautions   Transfers   Comment, (Transfers) Sit<>stand IND within precautions   Gait/Stairs (Locomotion)    Comment, (Gait/Stairs) Pt amb 20 ft without AD, mod I for chest tube management. Slow gait speed, mild path deviations, but able to correct w/o cues   Balance   Balance Comments No overt LOB w/ head turns L&R and dual task cognitive challenges. 30 second STS results (see PT progress note 3/31/22)   Sensory Examination   Sensory Perception patient reports no sensory changes   Coordination   Coordination no deficits were identified   Clinical Impression   Criteria for Skilled Therapeutic Intervention Yes, treatment indicated   PT Diagnosis (PT) Impaired functional mobility    Influenced by the following impairments recent LVAD placement, CT in place, sternal precautions   Functional limitations due to impairments Gait speed, transfers, activity tolerance   Clinical Presentation (PT Evaluation Complexity) Stable/Uncomplicated   Clinical Presentation Rationale clinical judgement   Clinical Decision Making (Complexity) low complexity   Planned Therapy Interventions (PT) balance training;gait training;home exercise program;patient/family education;strengthening;stretching;transfer training   Risk & Benefits of therapy have been explained evaluation/treatment results reviewed;care plan/treatment goals reviewed;risks/benefits reviewed;current/potential barriers reviewed;participants voiced agreement with care plan;participants included;patient   PT Discharge Planning   PT Discharge Recommendation (DC Rec) home with assist;home with outpatient cardiac rehab   PT Rationale for DC Rec Pt appears to be progressing well w/ activity tolerance and safety awareness regarding LVAD and sternal precautions. He has good support system at home from wife and daughter, anticipate continued progress in order to return safely home w/ OP cardiac rehab, once medically stable per MD.   PT Brief overview of current status IND transfers, mod I ambulation   Plan of Care Review   Plan of Care Reviewed With patient   Total Evaluation Time   Total  Evaluation Time (Minutes) 10   Physical Therapy Goals   PT Frequency 6x/week   PT Predicated Duration/Target Date for Goal Attainment 04/14/22   PT Goals Transfers;Gait;Aerobic Activity   PT: Transfers Independent;Sit to/from stand;Within precautions;Goal Met   PT: Gait Independent;Greater than 200 feet   PT: Perform aerobic activity with stable cardiovascular response intermittent activity;30 minutes;ambulation;NuStep;treadmill   PT: Understanding of cardiac education to maximize quality of life, condition management, and health outcomes Patient;Verbalize;Demonstrate

## 2022-03-31 NOTE — PROVIDER NOTIFICATION
2230: Updated general surgery coverage regarding removed chest tube site and tissue protruding from site. MD came to bedside to assess.

## 2022-04-01 ENCOUNTER — APPOINTMENT (OUTPATIENT)
Dept: OCCUPATIONAL THERAPY | Facility: CLINIC | Age: 57
DRG: 001 | End: 2022-04-01
Attending: INTERNAL MEDICINE
Payer: MEDICARE

## 2022-04-01 DIAGNOSIS — I50.22 CHRONIC SYSTOLIC CHF (CONGESTIVE HEART FAILURE) (H): Primary | ICD-10-CM

## 2022-04-01 LAB
ALBUMIN SERPL-MCNC: 2 G/DL (ref 3.4–5)
ALP SERPL-CCNC: 66 U/L (ref 40–150)
ALT SERPL W P-5'-P-CCNC: 89 U/L (ref 0–70)
ANION GAP SERPL CALCULATED.3IONS-SCNC: 4 MMOL/L (ref 3–14)
AST SERPL W P-5'-P-CCNC: 72 U/L (ref 0–45)
ATRIAL RATE - MUSE: 89 BPM
BILIRUB SERPL-MCNC: 0.5 MG/DL (ref 0.2–1.3)
BUN SERPL-MCNC: 25 MG/DL (ref 7–30)
CALCIUM SERPL-MCNC: 8.6 MG/DL (ref 8.5–10.1)
CHLORIDE BLD-SCNC: 103 MMOL/L (ref 94–109)
CO2 SERPL-SCNC: 29 MMOL/L (ref 20–32)
CREAT SERPL-MCNC: 0.73 MG/DL (ref 0.66–1.25)
DIASTOLIC BLOOD PRESSURE - MUSE: NORMAL MMHG
ERYTHROCYTE [DISTWIDTH] IN BLOOD BY AUTOMATED COUNT: 13.5 % (ref 10–15)
ERYTHROCYTE [DISTWIDTH] IN BLOOD BY AUTOMATED COUNT: 13.7 % (ref 10–15)
GFR SERPL CREATININE-BSD FRML MDRD: >90 ML/MIN/1.73M2
GLUCOSE BLD-MCNC: 110 MG/DL (ref 70–99)
GLUCOSE BLDC GLUCOMTR-MCNC: 104 MG/DL (ref 70–99)
GLUCOSE BLDC GLUCOMTR-MCNC: 109 MG/DL (ref 70–99)
GLUCOSE BLDC GLUCOMTR-MCNC: 112 MG/DL (ref 70–99)
GLUCOSE BLDC GLUCOMTR-MCNC: 117 MG/DL (ref 70–99)
GLUCOSE BLDC GLUCOMTR-MCNC: 122 MG/DL (ref 70–99)
HCT VFR BLD AUTO: 26.9 % (ref 40–53)
HCT VFR BLD AUTO: 28.6 % (ref 40–53)
HGB BLD-MCNC: 8.4 G/DL (ref 13.3–17.7)
HGB BLD-MCNC: 9 G/DL (ref 13.3–17.7)
INR PPP: 1.33 (ref 0.85–1.15)
INTERPRETATION ECG - MUSE: NORMAL
MAGNESIUM SERPL-MCNC: 2.3 MG/DL (ref 1.6–2.3)
MCH RBC QN AUTO: 30.7 PG (ref 26.5–33)
MCH RBC QN AUTO: 30.7 PG (ref 26.5–33)
MCHC RBC AUTO-ENTMCNC: 31.2 G/DL (ref 31.5–36.5)
MCHC RBC AUTO-ENTMCNC: 31.5 G/DL (ref 31.5–36.5)
MCV RBC AUTO: 98 FL (ref 78–100)
MCV RBC AUTO: 98 FL (ref 78–100)
P AXIS - MUSE: NORMAL DEGREES
PHOSPHATE SERPL-MCNC: 3.2 MG/DL (ref 2.5–4.5)
PLATELET # BLD AUTO: 207 10E3/UL (ref 150–450)
PLATELET # BLD AUTO: 234 10E3/UL (ref 150–450)
POTASSIUM BLD-SCNC: 4.2 MMOL/L (ref 3.4–5.3)
PR INTERVAL - MUSE: NORMAL MS
PROT SERPL-MCNC: 6.2 G/DL (ref 6.8–8.8)
QRS DURATION - MUSE: 98 MS
QT - MUSE: 408 MS
QTC - MUSE: 496 MS
R AXIS - MUSE: 270 DEGREES
RBC # BLD AUTO: 2.74 10E6/UL (ref 4.4–5.9)
RBC # BLD AUTO: 2.93 10E6/UL (ref 4.4–5.9)
SODIUM SERPL-SCNC: 136 MMOL/L (ref 133–144)
SYSTOLIC BLOOD PRESSURE - MUSE: NORMAL MMHG
T AXIS - MUSE: -15 DEGREES
UFH PPP CHRO-ACNC: <0.1 IU/ML
VENTRICULAR RATE- MUSE: 89 BPM
WBC # BLD AUTO: 7.2 10E3/UL (ref 4–11)
WBC # BLD AUTO: 7.6 10E3/UL (ref 4–11)

## 2022-04-01 PROCEDURE — 250N000011 HC RX IP 250 OP 636: Performed by: PHYSICIAN ASSISTANT

## 2022-04-01 PROCEDURE — 99232 SBSQ HOSP IP/OBS MODERATE 35: CPT | Mod: 25 | Performed by: PHYSICIAN ASSISTANT

## 2022-04-01 PROCEDURE — 120N000003 HC R&B IMCU UMMC

## 2022-04-01 PROCEDURE — 250N000013 HC RX MED GY IP 250 OP 250 PS 637: Performed by: STUDENT IN AN ORGANIZED HEALTH CARE EDUCATION/TRAINING PROGRAM

## 2022-04-01 PROCEDURE — 250N000013 HC RX MED GY IP 250 OP 250 PS 637: Performed by: INTERNAL MEDICINE

## 2022-04-01 PROCEDURE — 85610 PROTHROMBIN TIME: CPT | Performed by: STUDENT IN AN ORGANIZED HEALTH CARE EDUCATION/TRAINING PROGRAM

## 2022-04-01 PROCEDURE — 85027 COMPLETE CBC AUTOMATED: CPT | Performed by: STUDENT IN AN ORGANIZED HEALTH CARE EDUCATION/TRAINING PROGRAM

## 2022-04-01 PROCEDURE — 82040 ASSAY OF SERUM ALBUMIN: CPT | Performed by: STUDENT IN AN ORGANIZED HEALTH CARE EDUCATION/TRAINING PROGRAM

## 2022-04-01 PROCEDURE — 85520 HEPARIN ASSAY: CPT | Performed by: SURGERY

## 2022-04-01 PROCEDURE — 36415 COLL VENOUS BLD VENIPUNCTURE: CPT | Performed by: SURGERY

## 2022-04-01 PROCEDURE — 36592 COLLECT BLOOD FROM PICC: CPT | Performed by: PHYSICIAN ASSISTANT

## 2022-04-01 PROCEDURE — 36592 COLLECT BLOOD FROM PICC: CPT | Performed by: STUDENT IN AN ORGANIZED HEALTH CARE EDUCATION/TRAINING PROGRAM

## 2022-04-01 PROCEDURE — 250N000013 HC RX MED GY IP 250 OP 250 PS 637: Performed by: SURGERY

## 2022-04-01 PROCEDURE — 250N000013 HC RX MED GY IP 250 OP 250 PS 637: Performed by: PHYSICIAN ASSISTANT

## 2022-04-01 PROCEDURE — 85027 COMPLETE CBC AUTOMATED: CPT | Performed by: PHYSICIAN ASSISTANT

## 2022-04-01 PROCEDURE — 80053 COMPREHEN METABOLIC PANEL: CPT | Performed by: STUDENT IN AN ORGANIZED HEALTH CARE EDUCATION/TRAINING PROGRAM

## 2022-04-01 PROCEDURE — 97110 THERAPEUTIC EXERCISES: CPT | Mod: GO | Performed by: OCCUPATIONAL THERAPIST

## 2022-04-01 PROCEDURE — 84100 ASSAY OF PHOSPHORUS: CPT | Performed by: STUDENT IN AN ORGANIZED HEALTH CARE EDUCATION/TRAINING PROGRAM

## 2022-04-01 PROCEDURE — 83735 ASSAY OF MAGNESIUM: CPT | Performed by: STUDENT IN AN ORGANIZED HEALTH CARE EDUCATION/TRAINING PROGRAM

## 2022-04-01 RX ORDER — HEPARIN SODIUM 10000 [USP'U]/100ML
0-5000 INJECTION, SOLUTION INTRAVENOUS CONTINUOUS
Status: DISCONTINUED | OUTPATIENT
Start: 2022-04-01 | End: 2022-04-07

## 2022-04-01 RX ORDER — WARFARIN SODIUM 6 MG/1
6 TABLET ORAL
Status: COMPLETED | OUTPATIENT
Start: 2022-04-01 | End: 2022-04-01

## 2022-04-01 RX ADMIN — OXYCODONE HYDROCHLORIDE 5 MG: 5 TABLET ORAL at 23:08

## 2022-04-01 RX ADMIN — HEPARIN SODIUM AND DEXTROSE 1000 UNITS/HR: 10000; 5 INJECTION INTRAVENOUS at 13:12

## 2022-04-01 RX ADMIN — METOCLOPRAMIDE 10 MG: 10 TABLET ORAL at 13:20

## 2022-04-01 RX ADMIN — SPIRONOLACTONE 25 MG: 25 TABLET, FILM COATED ORAL at 08:19

## 2022-04-01 RX ADMIN — SACUBITRIL AND VALSARTAN 1 TABLET: 24; 26 TABLET, FILM COATED ORAL at 20:05

## 2022-04-01 RX ADMIN — SOTALOL HYDROCHLORIDE 80 MG: 80 TABLET ORAL at 20:05

## 2022-04-01 RX ADMIN — PANTOPRAZOLE SODIUM 40 MG: 40 TABLET, DELAYED RELEASE ORAL at 08:19

## 2022-04-01 RX ADMIN — SENNOSIDES AND DOCUSATE SODIUM 1 TABLET: 8.6; 5 TABLET ORAL at 08:20

## 2022-04-01 RX ADMIN — TORSEMIDE 20 MG: 10 TABLET ORAL at 08:19

## 2022-04-01 RX ADMIN — WARFARIN SODIUM 6 MG: 6 TABLET ORAL at 18:37

## 2022-04-01 RX ADMIN — SENNOSIDES AND DOCUSATE SODIUM 1 TABLET: 8.6; 5 TABLET ORAL at 00:25

## 2022-04-01 RX ADMIN — METOCLOPRAMIDE 10 MG: 10 TABLET ORAL at 08:18

## 2022-04-01 RX ADMIN — METOCLOPRAMIDE 10 MG: 10 TABLET ORAL at 16:37

## 2022-04-01 RX ADMIN — Medication 20 MG: at 08:21

## 2022-04-01 RX ADMIN — SOTALOL HYDROCHLORIDE 80 MG: 80 TABLET ORAL at 08:19

## 2022-04-01 ASSESSMENT — ACTIVITIES OF DAILY LIVING (ADL)
ADLS_ACUITY_SCORE: 8
ADLS_ACUITY_SCORE: 10
ADLS_ACUITY_SCORE: 8
ADLS_ACUITY_SCORE: 10
ADLS_ACUITY_SCORE: 8
ADLS_ACUITY_SCORE: 10
ADLS_ACUITY_SCORE: 8
ADLS_ACUITY_SCORE: 8

## 2022-04-01 NOTE — PLAN OF CARE
Goal Outcome Evaluation:    Neuro: A&Ox4.   Cardiac: LVAD HM3 values WDL. SR. Occassionally V paced.   Respiratory: Sating > 95% on RA. Denies SOB.  GI/: Adequate urine output. Small BM overnight.  Diet/appetite: Tolerating 2g Na diet. Eating well.  Activity:  Assist of 1, up to chair and in halls.  Pain: At acceptable level on current regimen. PRN oxy given x1 for incisional pain.   Skin: No new deficits noted. Dressing over old CT sites reinforced with new ABD. Serosanguinous drainage.   LDA's: SL PICC, LVAD    Plan: Continue with POC. Notify primary team with changes.

## 2022-04-01 NOTE — PROGRESS NOTES
The patient's HeartMate LVAD was interrogated 4/1/2022  * Speed 5600 rpm   * Pulsatility index 4.0   * Power 4.3 Noriega   * Flow 4.4 L/minute   Fluid status: hypervolemic   Alarms were reviewed, and notable for no alarms.   The driveline exit site was inspected, c/d/i.   All external components were inspected and showed no evidence of damage or malfunction, none replaced.   No changes to VAD settings made

## 2022-04-01 NOTE — PROGRESS NOTES
[facility]   Cardiology II Service / Advanced Heart Failure  Daily Consult Note      Patient: Tej Morfin  MRN: [id number]  Admission Date: 3/22/2022  Hospital Day # 10    Assessment and Plan: Tej Morfin is a 56 year old male admitted on 3/22/2022. He has history of NICM secondary to severe MR s/p MV ring repair (2015), now on home inotrope (Dobutamine 2.5), sustained VT s/p secondary prevention ICD, who presents for scheduled LVAD placement. Now status post HM3 on 3/23/2022. Overall, course has been non-complicated.    Recommendations:  - Recommend compression stockings for pedal edema  - Increase torsemide back to 20 mg BID  - Will consider transitioning to entresto today as well  - Start Heparin gtt d/t INR of 1.3  - If voice hoarseness is not improving, would recommend ENT    # Acute on chronic systolic heart failure secondary to NICM   # s/p HM3 LVAD on 3/23/2022  Stage D. NYHA Class III- confounded by recent surgery    Last hemodynamics: 3/27: MAP 85, CVP 10, PAP 52/20, PCW 20, JOANNA CO/CI 5.1/2.4, SVR 1099, MVO2 50    Fluid status: mild hypervolemia, increase torsemide to 20 mg BID  ACEi/ARB/ARNi/afterload reduction: valsartan 20 mg BID  BB: deferred given recent LVAD surgery  Aldosterone antagonist: aldactone 25 mg daily  SCD prophylaxis: CRT-D  MAP: Goal 65-85  LDH trends: 237 on 3/30, check twice weekly   Anticoagulation: warfarin dosing per pharmacy, INR goal 2-3, today 1.33  Antiplatelet: ARRIES Trial     #Hx of Ventricular Arrhythmias:  Previously on sotalol which was held in the perioperative period, now restarted. Episode of a fib with RVR with ICD shock on 3/25. Occasional NSVT since. ICD interrogated 3/30 for c/f undersensing, but no undersensing appreciated.  - Continue Sotalol 80 mg daily    # Right Knee effusion. S/p arthrocentesis on 3/30 with drainage of non-bloody fluid with a high number of PMNs, gram stain negative. Per Rheumatology, not felt to be c/w infection  "nor gout. Possible pseudogout vs degenerative arthritis. S/p intra-articular steroid injection on 3/30.  - Appreciate rheumatology consult  - Management per rheum and primary team  - Cultures NGTD    # Right internal jugular, Subclavian, and brachial vein DVT  - A/C as above    # Voice hoarseness, intermittent since surgery  - If not improving, would recommend ENT    Radha White PA-C  Advanced Heart Failure/Cardiology II Service  Pager [phone] [facility] [phone]    ================================================================    Subjective/24-Hr Events:   Last 24 hr care team notes reviewed. Feeling well today. Knee feels much better. No fevers, chills, lightheadedness, dizziness, chest pain (except at chest tube stie), N/V/D. No drivline concerns. No bleeding symptoms. He does have some swelling in both of his feet and ankles. He does feel that his breathing is improving.     ROS:  4 point ROS including respiratory, CV, GI and  (other than that noted in the HPI) is negative.     Medications: Reviewed in EPIC.     Physical Exam:   BP (!) 113/94 (BP Location: Left arm, Cuff Size: Adult Regular)   Pulse 80   Temp 97.7  F (36.5  C) (Oral)   Resp 18   Ht 1.791 m (5' 10.51\")   Wt 83.7 kg (184 lb 8.4 oz)   SpO2 98%   BMI 26.09 kg/m      GENERAL: Appears comfortable, in no distress.  HEENT: Eye symmetrical, no discharge or icterus bilaterally. Mucous membranes moist and without lesions.  NECK: Supple, JVD mid neck at 90 degrees  CV: Hum of HM3, no adventitious sounds  RESPIRATORY: Respirations regular, even, and unlabored. Lungs CTA throughout.   GI: Soft and non distended. No tenderness, rebound, guarding.   EXTREMITIES: 1+ lower extremity edema to ankles. No pulses.   NEUROLOGIC: Alert and interacting appropriatly. No focal deficits.   MUSCULOSKELETAL: No joint swelling or tenderness.   SKIN: No jaundice. No rashes or lesions. Sternum c/d/i.     Labs:  [facility]  Recent Labs   Lab 04/01/22  0947 04/01/22  0754 " 04/01/22 0438 03/31/22  2154 03/31/22 0744 03/31/22 0436 03/30/22 0737 03/30/22 0447 03/29/22 0737 03/29/22 0531   NA  --   --  136  --   --  135  --  139  --  139   POTASSIUM  --   --  4.2  --   --  4.1  --  3.5  --  3.8   CHLORIDE  --   --  103  --   --  102  --  103  --  105   CO2  --   --  29  --   --  26  --  29  --  27   ANIONGAP  --   --  4  --   --  7  --  7  --  7   * 109* 110* 138*   < > 194*   < > 121*   < > 84   BUN  --   --  25  --   --  22  --  19  --  21   CR  --   --  0.73  --   --  0.77  --  0.92  --  1.01   GFRESTIMATED  --   --  >90  --   --  >90  --  >90  --  88   YESICA  --   --  8.6  --   --  8.5  --  8.2*  --  8.4*   MAG  --   --  2.3  --   --  2.2  --  2.0  --  2.1   PHOS  --   --  3.2  --   --  2.8  --  3.2  --  3.5   PROTTOTAL  --   --  6.2*  --   --  6.4*  --  5.9*  --  5.9*   ALBUMIN  --   --  2.0*  --   --  1.9*  --  1.8*  --  1.8*   BILITOTAL  --   --  0.5  --   --  0.3  --  0.3  --  0.4   ALKPHOS  --   --  66  --   --  72  --  52  --  51   AST  --   --  72*  --   --  41  --  16  --  16   ALT  --   --  89*  --   --  58  --  23  --  23    < > = values in this interval not displayed.       CBC  Recent Labs   Lab 04/01/22 0438 03/31/22 0436 03/30/22 0447 03/29/22 0531   WBC 7.6 5.4 7.5 9.0   RBC 2.74* 2.84* 2.82* 2.69*   HGB 8.4* 8.5* 8.6* 8.5*   HCT 26.9* 27.4* 27.9* 27.0*   MCV 98 97 99 100   MCH 30.7 29.9 30.5 31.6   MCHC 31.2* 31.0* 30.8* 31.5   RDW 13.5 13.4 13.7 14.1    185 142* 119*       INR  Recent Labs   Lab 04/01/22  0438 03/31/22  0436 03/30/22  0447 03/29/22  0531   INR 1.33* 1.89* 2.94* 3.16*       Time/Communication  I personally spent a total of 30 minutes. Of that >15 minutes was counseling/coordination of patient's care. Plan of care discussed with patient. See my note above for details.    Patient discussed with Dr. Carmen.

## 2022-04-01 NOTE — PROGRESS NOTES
Care Management Follow Up    Length of Stay (days): 10    Expected Discharge Date: 04/04/2022     Concerns to be Addressed: Discharge planning, medical readiness.  Patient plan of care discussed at interdisciplinary rounds: Yes    Anticipated Discharge Disposition: local housing in [facility] for 30 days)  Anticipated Discharge Services: None  Anticipated Discharge DME: None    Education Provided on the Discharge Plan:  Yes  Patient/Family in Agreement with the Plan:  Yes    Referrals Placed by CM/SW:  Will place anticoagulation referral  Private pay costs discussed: Not applicable    Additional Information:  Primary team paged at this time for an update. Patient and spouse to complete LVAD teaching today. Patient will need new anticoagulation monitoring at set up. Patient is from [place], [place] and plans to stay local in [place] at his sister in laws home.     Patient's primary care provider is Dr. Hunter at the [address] in [place], [place]. Patient will need warfarin management at discharge, his cardiologist will follow.     Patient completed teaching today, potential to discharge early next week per primary team note. Care coordination will continue to follow for discharge planning.     Shona Ross, RNCC, BSN    [facility]    [address]  [address] [place] [id number]    [email or web address]    Office: [phone] Pager: [phone]  To contact the weekend RNCC, page [phone].

## 2022-04-01 NOTE — PROGRESS NOTES
Cardiovascular Surgery Progress Note  04/01/2022         Assessment and Plan:     Tej Morfin is a 56 year old male with NICM secondary to valvulopathy severe MR s/p MV ring repair, on home inotrope (Dobutamine 2.5), sustained VT s/p secondary prevention ICD, CKD stage II (baseline 1.5). He presented 3/22 for IABP the evening before his surgery.   Tej presented for LVAD placement with heartmate 3 with Dr. Rodríguez on 3/23/22     Cardiovascular:   Hx of NICM with chronic systolic HF (AHA Stage D, NYHA class 3b) 2/2 severe MR, s/p ring repair 2015  Hx VT with ICD placement  S/p LVAD HM3  No arrhythmia, HD stable. Occasional V-pacing.   IABP removed post op   ASA study (placebo vs. aspirin), statin not indicated (NICM).  HTN  - Valsartan 20 mg po bid increased 3/29, Sotalol 80 mg [id number] home med, spironolactone 20 mg daily  Chest tubes: mediastinals removed 3/28, R pleural and pericardial removed 3/30. L pleural removed 3/31.   - recent CXR unremarkable.      Pulmonary:  - Extubated POD#1, Saturating well on RA.    - Supplemental O2 PRN to keep sats > 92%. Wean off as tolerated.  - Pulm toilet, IS, activity and deep breathing     Neurology / MSK:  - Neuro intact  - Acute post-operative pain; pain well controlled with acetaminophen, PO oxycodone PRN, IV dilaudid PRN  New right knee effusion  - Patient had previous patellar tendon tear with surgical repair in the early 1980's and medial meniscus partial removal in 2013   - Consulted Rheumatology 3/29, right knee needle aspiration performed 3/30/2022.  - Synovial fluid analysis: gram stain, bacterial culture, WBC w/ differential, crystals (monosodium urate and calcium pyrophosphate crystal deposition crystals) sent. Cultures negative so far. No crystals seen on analysis of fluid.    - 4/1: discussed results with Rheumatology. They are not concerned for infection or gout and believe effusion likely from osteoarthritis, fluid shifts, and possibly some subclinical  crystals. No additional therapies needed at this time. If effusion becomes large and painful again, re consult them.       / Renal:  - Hx of renal disease CKD stage II (baseline 1.5). Most recent creatinine WNL, adequate UOP.   - Pre-op weight 200 lbs, weight down to 184 lbs, diuresing well on Torsemide 20 mg daily, cardiology managing.  - Increased LE edema today, will consult OT for lymphedema wraps. Cardiology considering increasing Torsemide back to bid.       GI / FEN:   Gastroparesis   - Regular diet, continue bowel regimen. Protein supplements between meals.  - Replace electrolytes as needed, hepatic enzymes WNL.  - Reglan 10 mg PO TID     Endocrine:  Stress induced hyperglycemia; initially managed on insulin drip postop, transitioned to sliding scale.     Infectious Disease:  - Stress induced leukocytosis; WBC WNL, no signs or symptoms of infection  - Completed perioperative antibiotics.  UA unremarkable  - COVID screen negative.  - If repeat fever, pan culture.      Hematology:   Acute blood loss anemia and thrombocytopenia  Hgb 8's; Plt WNL, no signs or symptoms of active bleeding     Anticoagulation:   - ASA   - Coumadin for LVAD, INR goal 2-3. Most recent INR 1.33, dose on 3/29 was held due to supra theraputic INR, patient now subtherapeutic.   -Restart low intensity heparin gtt bridging 4/1     Prophylaxis:   - Stress ulcer prophylaxis: Pantoprazole 40 mg daily for 30 days  - DVT prophylaxis: Subcutaneous heparin, SCD     Disposition:   - Transferred to  on 3/27  - Therapies recommending discharge to home, needs to finish VAD teaching, likely early next week.     Discussed with Dr Rodríguez through both written and verbal communication.      Leigh Abraham PA-C  Cardiothoracic Surgery  Pager [phone]  April 1, 2022            Interval History:     No overnight events. Some re accumulation of fluid in right knee but no knee pain.   States pain is well managed on current regimen. Slept well  "overnight.  Tolerating diet and hungry this AM, is passing flatus, + BM. No nausea or vomiting.  Breathing well without complaints.   Working with therapies and ambulating in halls with assistance.   Denies chest pain, palpitations, dizziness, syncopal symptoms, fevers, chills, myalgias, or sternal popping/clicking.         Physical Exam:   Blood pressure (!) 113/94, pulse 80, temperature 97.7  F (36.5  C), temperature source Oral, resp. rate 18, height 1.791 m (5' 10.51\"), weight 83.7 kg (184 lb 8.4 oz), SpO2 98 %.  Vitals:    03/30/22 0619 03/31/22 0615 04/01/22 0000   Weight: 84.1 kg (185 lb 6.5 oz) 84.1 kg (185 lb 6.5 oz) 83.7 kg (184 lb 8.4 oz)        Gen: A&Ox4, NAD  Neuro: no focal deficits   CV: VAD humming, RRR, normal S1 S2, no murmurs, rubs or gallops.   Pulm: CTA, no wheezing or rhonchi, normal breathing on RA  Abd: nondistended, normal BS, soft, nontender  Ext: R knee no longer tender, small re accumulation of effusion.   Incision: clean, dry, intact, no erythema, sternum stable  Tubes/drain sites: dressing clean and dry  Lines: driveline dressing clean and dry   LVAD: speed 5600, flow 4.2 - 4.5, PI 3.2 - 4.4, power 4.1 - 4.3.          Data:    Imaging:  EXAMINATION: XR CHEST 2 VW, 3/31/2022 3:44 PM     INDICATION: CT removal     COMPARISON:   Chest x-ray 3/31/2021 8:47 AM.      FINDINGS: PA and lateral radiograph of the chest. Interval development  of left chest tube. Remainder of support devices are stable. Trachea  is midline. The cardiomediastinal silhouette is partially obscured by  LVAD. Small left pleural effusion. Left retrocardiac opacities. Trace  left apical pneumothorax, stable from prior. No right pneumothorax. No  right pleural effusion. No focal airspace opacity.     Partially visualized upper abdomen is unremarkable. No acute osseous  abnormality. Soft tissue is within                                                                      IMPRESSION:  1. Interval removal of left chest tube " with trace apical pneumothorax,  unchanged from prior exam. Remainder of support devices are stable.  2. Small left pleural effusion and stable left retrocardiac opacities  which may represent consolidation versus atelectasis.     I have personally reviewed the examination and initial interpretation  and I agree with the findings.     CARLA PAREDES MD     Labs:  BMP  Recent Labs   Lab 04/01/22  0947 04/01/22  0754 04/01/22 0438 03/31/22  2154 03/31/22  0744 03/31/22 0436 03/30/22  0737 03/30/22 0447 03/29/22  0737 03/29/22  0531   NA  --   --  136  --   --  135  --  139  --  139   POTASSIUM  --   --  4.2  --   --  4.1  --  3.5  --  3.8   CHLORIDE  --   --  103  --   --  102  --  103  --  105   YESICA  --   --  8.6  --   --  8.5  --  8.2*  --  8.4*   CO2  --   --  29  --   --  26  --  29  --  27   BUN  --   --  25  --   --  22  --  19  --  21   CR  --   --  0.73  --   --  0.77  --  0.92  --  1.01   * 109* 110* 138*   < > 194*   < > 121*   < > 84    < > = values in this interval not displayed.     CBC  Recent Labs   Lab 04/01/22 0438 03/31/22 0436 03/30/22 0447 03/29/22  0531   WBC 7.6 5.4 7.5 9.0   RBC 2.74* 2.84* 2.82* 2.69*   HGB 8.4* 8.5* 8.6* 8.5*   HCT 26.9* 27.4* 27.9* 27.0*   MCV 98 97 99 100   MCH 30.7 29.9 30.5 31.6   MCHC 31.2* 31.0* 30.8* 31.5   RDW 13.5 13.4 13.7 14.1    185 142* 119*     INR  Recent Labs   Lab 04/01/22 0438 03/31/22 0436 03/30/22 0447 03/29/22  0531   INR 1.33* 1.89* 2.94* 3.16*      Hepatic Panel  Recent Labs   Lab 04/01/22  0438 03/31/22  0436 03/30/22  0447 03/29/22  0531   AST 72* 41 16 16   ALT 89* 58 23 23   ALKPHOS 66 72 52 51   BILITOTAL 0.5 0.3 0.3 0.4   ALBUMIN 2.0* 1.9* 1.8* 1.8*     GLUCOSE:   Recent Labs   Lab 04/01/22  0947 04/01/22  0754 04/01/22  0438 03/31/22  2154 03/31/22  1909 03/31/22  1803   * 109* 110* 138* 113* 108*

## 2022-04-02 ENCOUNTER — APPOINTMENT (OUTPATIENT)
Dept: OCCUPATIONAL THERAPY | Facility: CLINIC | Age: 57
DRG: 001 | End: 2022-04-02
Attending: INTERNAL MEDICINE
Payer: MEDICARE

## 2022-04-02 LAB
ALBUMIN SERPL-MCNC: 2.1 G/DL (ref 3.4–5)
ALP SERPL-CCNC: 78 U/L (ref 40–150)
ALT SERPL W P-5'-P-CCNC: 169 U/L (ref 0–70)
ANION GAP SERPL CALCULATED.3IONS-SCNC: 4 MMOL/L (ref 3–14)
AST SERPL W P-5'-P-CCNC: 106 U/L (ref 0–45)
ATRIAL RATE - MUSE: 80 BPM
B BURGDOR IGG SER QL IB: NEGATIVE
B BURGDOR IGM SER QL IB: NEGATIVE
BILIRUB SERPL-MCNC: 0.4 MG/DL (ref 0.2–1.3)
BUN SERPL-MCNC: 24 MG/DL (ref 7–30)
CALCIUM SERPL-MCNC: 8.4 MG/DL (ref 8.5–10.1)
CHLORIDE BLD-SCNC: 105 MMOL/L (ref 94–109)
CO2 SERPL-SCNC: 28 MMOL/L (ref 20–32)
CREAT SERPL-MCNC: 0.75 MG/DL (ref 0.66–1.25)
DIASTOLIC BLOOD PRESSURE - MUSE: NORMAL MMHG
ERYTHROCYTE [DISTWIDTH] IN BLOOD BY AUTOMATED COUNT: 13.7 % (ref 10–15)
GFR SERPL CREATININE-BSD FRML MDRD: >90 ML/MIN/1.73M2
GLUCOSE BLD-MCNC: 112 MG/DL (ref 70–99)
GLUCOSE BLDC GLUCOMTR-MCNC: 100 MG/DL (ref 70–99)
GLUCOSE BLDC GLUCOMTR-MCNC: 106 MG/DL (ref 70–99)
GLUCOSE BLDC GLUCOMTR-MCNC: 122 MG/DL (ref 70–99)
GLUCOSE BLDC GLUCOMTR-MCNC: 125 MG/DL (ref 70–99)
GLUCOSE BLDC GLUCOMTR-MCNC: 99 MG/DL (ref 70–99)
HCT VFR BLD AUTO: 29.1 % (ref 40–53)
HGB BLD-MCNC: 9 G/DL (ref 13.3–17.7)
INR PPP: 1.16 (ref 0.85–1.15)
INTERPRETATION ECG - MUSE: NORMAL
LDH SERPL L TO P-CCNC: 267 U/L (ref 85–227)
MAGNESIUM SERPL-MCNC: 2.2 MG/DL (ref 1.6–2.3)
MCH RBC QN AUTO: 30.3 PG (ref 26.5–33)
MCHC RBC AUTO-ENTMCNC: 30.9 G/DL (ref 31.5–36.5)
MCV RBC AUTO: 98 FL (ref 78–100)
P AXIS - MUSE: NORMAL DEGREES
PHOSPHATE SERPL-MCNC: 3 MG/DL (ref 2.5–4.5)
PLATELET # BLD AUTO: 232 10E3/UL (ref 150–450)
POTASSIUM BLD-SCNC: 4.1 MMOL/L (ref 3.4–5.3)
PR INTERVAL - MUSE: 170 MS
PROT SERPL-MCNC: 6.2 G/DL (ref 6.8–8.8)
QRS DURATION - MUSE: 108 MS
QT - MUSE: 454 MS
QTC - MUSE: 523 MS
R AXIS - MUSE: 251 DEGREES
RBC # BLD AUTO: 2.97 10E6/UL (ref 4.4–5.9)
SODIUM SERPL-SCNC: 137 MMOL/L (ref 133–144)
SYSTOLIC BLOOD PRESSURE - MUSE: NORMAL MMHG
T AXIS - MUSE: 13 DEGREES
UFH PPP CHRO-ACNC: 0.26 IU/ML
UFH PPP CHRO-ACNC: 0.31 IU/ML
VENTRICULAR RATE- MUSE: 80 BPM
WBC # BLD AUTO: 8.5 10E3/UL (ref 4–11)

## 2022-04-02 PROCEDURE — 93005 ELECTROCARDIOGRAM TRACING: CPT

## 2022-04-02 PROCEDURE — 97110 THERAPEUTIC EXERCISES: CPT | Mod: GO | Performed by: OCCUPATIONAL THERAPIST

## 2022-04-02 PROCEDURE — 83735 ASSAY OF MAGNESIUM: CPT | Performed by: STUDENT IN AN ORGANIZED HEALTH CARE EDUCATION/TRAINING PROGRAM

## 2022-04-02 PROCEDURE — 93750 INTERROGATION VAD IN PERSON: CPT | Performed by: NURSE PRACTITIONER

## 2022-04-02 PROCEDURE — 36415 COLL VENOUS BLD VENIPUNCTURE: CPT | Performed by: NURSE PRACTITIONER

## 2022-04-02 PROCEDURE — 250N000013 HC RX MED GY IP 250 OP 250 PS 637: Performed by: STUDENT IN AN ORGANIZED HEALTH CARE EDUCATION/TRAINING PROGRAM

## 2022-04-02 PROCEDURE — 250N000013 HC RX MED GY IP 250 OP 250 PS 637: Performed by: INTERNAL MEDICINE

## 2022-04-02 PROCEDURE — 83615 LACTATE (LD) (LDH) ENZYME: CPT | Performed by: NURSE PRACTITIONER

## 2022-04-02 PROCEDURE — 250N000013 HC RX MED GY IP 250 OP 250 PS 637: Performed by: PHYSICIAN ASSISTANT

## 2022-04-02 PROCEDURE — 84100 ASSAY OF PHOSPHORUS: CPT | Performed by: STUDENT IN AN ORGANIZED HEALTH CARE EDUCATION/TRAINING PROGRAM

## 2022-04-02 PROCEDURE — 99233 SBSQ HOSP IP/OBS HIGH 50: CPT | Mod: 25 | Performed by: NURSE PRACTITIONER

## 2022-04-02 PROCEDURE — 250N000011 HC RX IP 250 OP 636: Performed by: PHYSICIAN ASSISTANT

## 2022-04-02 PROCEDURE — 36415 COLL VENOUS BLD VENIPUNCTURE: CPT | Performed by: STUDENT IN AN ORGANIZED HEALTH CARE EDUCATION/TRAINING PROGRAM

## 2022-04-02 PROCEDURE — 999N000147 HC STATISTIC PT IP EVAL DEFER: Performed by: REHABILITATION PRACTITIONER

## 2022-04-02 PROCEDURE — 93010 ELECTROCARDIOGRAM REPORT: CPT | Performed by: INTERNAL MEDICINE

## 2022-04-02 PROCEDURE — 80053 COMPREHEN METABOLIC PANEL: CPT | Performed by: STUDENT IN AN ORGANIZED HEALTH CARE EDUCATION/TRAINING PROGRAM

## 2022-04-02 PROCEDURE — 36415 COLL VENOUS BLD VENIPUNCTURE: CPT | Performed by: INTERNAL MEDICINE

## 2022-04-02 PROCEDURE — 120N000003 HC R&B IMCU UMMC

## 2022-04-02 PROCEDURE — 85520 HEPARIN ASSAY: CPT | Performed by: INTERNAL MEDICINE

## 2022-04-02 PROCEDURE — 85027 COMPLETE CBC AUTOMATED: CPT | Performed by: STUDENT IN AN ORGANIZED HEALTH CARE EDUCATION/TRAINING PROGRAM

## 2022-04-02 PROCEDURE — 85610 PROTHROMBIN TIME: CPT | Performed by: STUDENT IN AN ORGANIZED HEALTH CARE EDUCATION/TRAINING PROGRAM

## 2022-04-02 PROCEDURE — 250N000013 HC RX MED GY IP 250 OP 250 PS 637: Performed by: SURGERY

## 2022-04-02 PROCEDURE — 99232 SBSQ HOSP IP/OBS MODERATE 35: CPT | Performed by: SURGERY

## 2022-04-02 RX ORDER — WARFARIN SODIUM 7.5 MG/1
7.5 TABLET ORAL
Status: COMPLETED | OUTPATIENT
Start: 2022-04-02 | End: 2022-04-02

## 2022-04-02 RX ADMIN — HEPARIN SODIUM AND DEXTROSE 1300 UNITS/HR: 10000; 5 INJECTION INTRAVENOUS at 22:17

## 2022-04-02 RX ADMIN — ACETAMINOPHEN 650 MG: 325 TABLET ORAL at 13:25

## 2022-04-02 RX ADMIN — SOTALOL HYDROCHLORIDE 80 MG: 80 TABLET ORAL at 20:01

## 2022-04-02 RX ADMIN — METOCLOPRAMIDE 10 MG: 10 TABLET ORAL at 16:41

## 2022-04-02 RX ADMIN — SPIRONOLACTONE 25 MG: 25 TABLET, FILM COATED ORAL at 08:35

## 2022-04-02 RX ADMIN — METOCLOPRAMIDE 10 MG: 10 TABLET ORAL at 08:34

## 2022-04-02 RX ADMIN — ACETAMINOPHEN 650 MG: 325 TABLET ORAL at 08:33

## 2022-04-02 RX ADMIN — PANTOPRAZOLE SODIUM 40 MG: 40 TABLET, DELAYED RELEASE ORAL at 08:33

## 2022-04-02 RX ADMIN — SACUBITRIL AND VALSARTAN 1 TABLET: 24; 26 TABLET, FILM COATED ORAL at 08:36

## 2022-04-02 RX ADMIN — ACETAMINOPHEN 650 MG: 325 TABLET ORAL at 21:50

## 2022-04-02 RX ADMIN — HEPARIN SODIUM AND DEXTROSE 1300 UNITS/HR: 10000; 5 INJECTION INTRAVENOUS at 04:03

## 2022-04-02 RX ADMIN — METOCLOPRAMIDE 10 MG: 10 TABLET ORAL at 13:25

## 2022-04-02 RX ADMIN — TORSEMIDE 20 MG: 10 TABLET ORAL at 08:35

## 2022-04-02 RX ADMIN — SACUBITRIL AND VALSARTAN 1 TABLET: 24; 26 TABLET, FILM COATED ORAL at 20:01

## 2022-04-02 RX ADMIN — SOTALOL HYDROCHLORIDE 80 MG: 80 TABLET ORAL at 08:35

## 2022-04-02 RX ADMIN — WARFARIN SODIUM 7.5 MG: 7.5 TABLET ORAL at 20:01

## 2022-04-02 ASSESSMENT — ACTIVITIES OF DAILY LIVING (ADL)
ADLS_ACUITY_SCORE: 8
ADLS_ACUITY_SCORE: 10

## 2022-04-02 NOTE — PROGRESS NOTES
Cardiovascular Surgery Progress Note  04/02/2022         Assessment and Plan:     Tej Morfin is a 56 year old male with NICM secondary to valvulopathy severe MR s/p MV ring repair, on home inotrope (Dobutamine 2.5), sustained VT s/p secondary prevention ICD, CKD stage II (baseline 1.5). He presented 3/22 for IABP the evening before his surgery.   Tej presented for LVAD placement with heartmate 3 with Dr. Rodríguez on 3/23/22     Cardiovascular:   Hx of NICM with chronic systolic HF (AHA Stage D, NYHA class 3b) 2/2 severe MR, s/p ring repair 2015  Hx VT with ICD placement  S/p LVAD HM3  No arrhythmia, HD stable. Occasional V-pacing.   IABP removed post op   ASA study (placebo vs. aspirin), statin not indicated (NICM).  HTN  - Valsartan 20 mg po bid increased 3/29, Sotalol 80 mg [id number] home med, spironolactone 20 mg daily  Chest tubes: mediastinals removed 3/28, R pleural and pericardial removed 3/30. L pleural removed 3/31.   - recent CXR unremarkable.      Pulmonary:  - Extubated POD#1, Saturating well on RA.    - Supplemental O2 PRN to keep sats > 92%. Wean off as tolerated.  - Pulm toilet, IS, activity and deep breathing     Neurology / MSK:  - Neuro intact  - Acute post-operative pain; pain well controlled with acetaminophen, PO oxycodone PRN, IV dilaudid PRN  New right knee effusion  - Patient had previous patellar tendon tear with surgical repair in the early 1980's and medial meniscus partial removal in 2013   - Consulted Rheumatology 3/29, right knee needle aspiration performed 3/30/2022.  - Synovial fluid analysis: gram stain, bacterial culture, WBC w/ differential, crystals (monosodium urate and calcium pyrophosphate crystal deposition crystals) sent. Cultures negative so far. No crystals seen on analysis of fluid.    - 4/1: discussed results with Rheumatology. They are not concerned for infection or gout and believe effusion likely from osteoarthritis, fluid shifts, and possibly some subclinical  crystals. No additional therapies needed at this time. If effusion becomes large and painful again, re consult them.       / Renal:  - Hx of renal disease CKD stage II (baseline 1.5). Most recent creatinine WNL, adequate UOP.   - Pre-op weight 200 lbs, weight down to 184 lbs, diuresing well on Torsemide 20 mg daily, cardiology managing.  -  OT for lymphedema stockings.      GI / FEN:   Gastroparesis   - Regular diet, continue bowel regimen. Protein supplements between meals.  - Replace electrolytes as needed, hepatic enzymes WNL.  - Reglan 10 mg PO TID     Endocrine:  Stress induced hyperglycemia; initially managed on insulin drip postop, transitioned to sliding scale.     Infectious Disease:  - Stress induced leukocytosis; WBC WNL, no signs or symptoms of infection  - Completed perioperative antibiotics.  UA unremarkable  - COVID screen negative.  - If repeat fever, pan culture.      Hematology:   Acute blood loss anemia and thrombocytopenia  Hgb 8's; Plt WNL, no signs or symptoms of active bleeding     Anticoagulation:   - ASA   - Coumadin for LVAD, INR goal 2-3. Most recent INR 1.16, dose on 3/29 was held due to supra theraputic INR, patient now subtherapeutic.   -Restart low intensity heparin gtt bridging 4/1     Prophylaxis:   - Stress ulcer prophylaxis: Pantoprazole 40 mg daily for 30 days  - DVT prophylaxis: Subcutaneous heparin, SCD     Disposition:   - Transferred to  on 3/27  - Therapies recommending discharge to home, needs to finish VAD teaching, likely early next week.     Discussed with Dr Rodríguez through both written and verbal communication.      Leigh Abraham PA-C  Cardiothoracic Surgery  Pager [phone]  April 1, 2022            Interval History:     No overnight events. Some re accumulation of fluid in right knee but no knee pain. LE edema better today.    States pain is well managed on current regimen. Slept well overnight.  Tolerating diet and hungry this AM, is passing flatus, + BM.  "No nausea or vomiting.  Breathing well without complaints.   Working with therapies and ambulating in halls with assistance.   Denies chest pain, palpitations, dizziness, syncopal symptoms, fevers, chills, myalgias, or sternal popping/clicking.         Physical Exam:   Blood pressure 119/88, pulse 80, temperature 97.7  F (36.5  C), temperature source Oral, resp. rate 18, height 1.791 m (5' 10.51\"), weight 83.7 kg (184 lb 8.4 oz), SpO2 100 %.  Vitals:    03/30/22 0619 03/31/22 0615 04/01/22 0000   Weight: 84.1 kg (185 lb 6.5 oz) 84.1 kg (185 lb 6.5 oz) 83.7 kg (184 lb 8.4 oz)        Gen: A&Ox4, NAD  Neuro: no focal deficits   CV: VAD humming, RRR, normal S1 S2, no murmurs, rubs or gallops.   Pulm: CTA, no wheezing or rhonchi, normal breathing on RA  Abd: nondistended, normal BS, soft, nontender  Ext: R knee no longer tender, small re accumulation of effusion. 1+ LE edema today    Incision: clean, dry, intact, no erythema, sternum stable  Tubes/drain sites: dressing clean and dry  Lines: driveline dressing clean and dry   LVAD: speed 5600, flow 4.2 - 4.5, PI 3.2 - 4.4, power 4.1 - 4.3.          Data:    Imaging:  EXAMINATION: XR CHEST 2 VW, 3/31/2022 3:44 PM     INDICATION: CT removal     COMPARISON:   Chest x-ray 3/31/2021 8:47 AM.      FINDINGS: PA and lateral radiograph of the chest. Interval development  of left chest tube. Remainder of support devices are stable. Trachea  is midline. The cardiomediastinal silhouette is partially obscured by  LVAD. Small left pleural effusion. Left retrocardiac opacities. Trace  left apical pneumothorax, stable from prior. No right pneumothorax. No  right pleural effusion. No focal airspace opacity.     Partially visualized upper abdomen is unremarkable. No acute osseous  abnormality. Soft tissue is within                                                                      IMPRESSION:  1. Interval removal of left chest tube with trace apical pneumothorax,  unchanged from prior " exam. Remainder of support devices are stable.  2. Small left pleural effusion and stable left retrocardiac opacities  which may represent consolidation versus atelectasis.     I have personally reviewed the examination and initial interpretation  and I agree with the findings.     CARLA PAREDES MD     Labs:  BMP  Recent Labs   Lab 04/02/22  0823 04/02/22 0454 04/02/22  0159 04/01/22  2125 04/01/22  0754 04/01/22  0438 03/31/22  0744 03/31/22  0436 03/30/22  0737 03/30/22 0447   NA  --  137  --   --   --  136  --  135  --  139   POTASSIUM  --  4.1  --   --   --  4.2  --  4.1  --  3.5   CHLORIDE  --  105  --   --   --  103  --  102  --  103   YESICA  --  8.4*  --   --   --  8.6  --  8.5  --  8.2*   CO2  --  28  --   --   --  29  --  26  --  29   BUN  --  24  --   --   --  25  --  22  --  19   CR  --  0.75  --   --   --  0.73  --  0.77  --  0.92   * 112* 106* 112*   < > 110*   < > 194*   < > 121*    < > = values in this interval not displayed.     CBC  Recent Labs   Lab 04/02/22 0454 04/01/22  1041 04/01/22 0438 03/31/22 0436   WBC 8.5 7.2 7.6 5.4   RBC 2.97* 2.93* 2.74* 2.84*   HGB 9.0* 9.0* 8.4* 8.5*   HCT 29.1* 28.6* 26.9* 27.4*   MCV 98 98 98 97   MCH 30.3 30.7 30.7 29.9   MCHC 30.9* 31.5 31.2* 31.0*   RDW 13.7 13.7 13.5 13.4    234 207 185     INR  Recent Labs   Lab 04/02/22  0454 04/01/22  0438 03/31/22  0436 03/30/22 0447   INR 1.16* 1.33* 1.89* 2.94*      Hepatic Panel  Recent Labs   Lab 04/02/22  0454 04/01/22  0438 03/31/22  0436 03/30/22  0447   * 72* 41 16   * 89* 58 23   ALKPHOS 78 66 72 52   BILITOTAL 0.4 0.5 0.3 0.3   ALBUMIN 2.1* 2.0* 1.9* 1.8*     GLUCOSE:   Recent Labs   Lab 04/02/22  0823 04/02/22  0454 04/02/22  0159 04/01/22  2125 04/01/22  1642 04/01/22  1205   * 112* 106* 112* 122* 117*

## 2022-04-02 NOTE — PLAN OF CARE
Neuro: A&Ox4.  Calm and able to make needs known. Denies numbness and tingling in extremities. Afebrile.   Cardiac: SR. VSS. LVAD Heart mate 3. ICD In place.   Respiratory: Sating >99% on RA.  GI/: Adequate urine output . BM X1  Diet/appetite: Tolerating 2g Na  diet. Eating well.  Activity:  Assist of 1, up bedside commode  Pain: At acceptable level on current regimen. PRN oxycodone given 1x for pain around old CT sites.   Skin: No new deficits noted. LVAD site, Dressing CDI. Old CT site, drainage marked. Chest incision, WDL and VALERIE.   LDA's: Single lumen PICC running heparin gtt at 1300 units. LVAD.     Heparin gtt running at 1300 units, 10a recheck 1145    Plan: Continue with POC. Notify primary team with changes.

## 2022-04-02 NOTE — CONSULTS
[facility]   Cardiology II Service / Advanced Heart Failure  Consult Note  Date of Service: 4/2/2022      Patient: Tej Morfin  MRN: [id number]  Admission Date: 3/22/2022  Hospital Day # 11    Assessment and Plan:  Mr. Tej Morfin is a 56yr old male with a history of severe mitral regurgitation (s/p mitral valve repair with an annuloplasty ring, 1/21/15), VT (s/p ICD, 2/2/15), mild nonobstructive CAD, and NICM (on home dobutamine) who was admitted for scheduled LVAD implantation, with IABP support prior.  He is now s/p HM3 LVAD implant 3/23/22.  Note that he is part of the RICARDO trial.      RECOMMENDATIONS:  - LDH today      Chronic systolic heart failure secondary to NICM (LVEF 10-15% per EMILY 3/25/22)  Severe MR (s/p mitral valve repair with an annuloplasty ring, 1/21/15)  Mild RV dysfunction  Stage D, NYHA Class III    Last hemodynamics: 3/27: MAP 85, CVP 10, PAP 52/20, PCW 20, JOANNA CO/CI 5.1/2.4, SVR 1099, MVO2 50 -- weight in Epic 206#    - ACEi/ARB/ARNi:  entresto 24-26mg twice daily  - Afterload reduction:  n/a  - BB:  Sotalol 80mg BID  - Aldosterone antagonist:  Spironolactone 25mg daily  - SGLT2i:  deferred  - SCD ppx:  CRT  - Fluid status: Euvolemic, continue torsemide 20mg daily    s/p HM3 LVAD (3/23/22)  Post-operative course has been c/b AFib with RVR (s/p ICD shock 3/25/22) and right knee effusion (s/p arthrocentesis and intraarticular steroid injection 3/30/22).  - antiplatelet:  RICARDO trial -- aspirin vs placebo  - anticoagulation:  Warfarin, goal INR 2-3.  INR today 1.16.  Continue heparin at low-intensity, with goal Xa 0.25-0.5.  Xa level today 0.31.  - MAPs:  70-90s  - LDH:  237 (3/30/22), repeat level today pending    Mild, nonobstructive CAD  Part of RICARDO trial (aspirin vs placebo), please do not order aspirin.  Not on statin PTA.  PTA sotalol resumed 4/1.    h/o sustained VT (s/p ICD 2/2/15)  NSVT  On Sotalol PTA, which was held on admission for LVAD implant and  restarted 4/1.  - continue sotalol 80mg twice daily    Post-operative AFib with RVR  s/p ICD shock 3/25/22, with return to NSR.  EP consulted, with device interrogation revealing ventricular rates int he VT detection zone.  They adjusted the single VF zone to 220bpm with 15 second detection time to reduce risk of shock and permit other therapies (ie ATP) to work prior to him getting shocked.  He was also given amiodarone, and sotalol was ultimately restarted 4/1.  - continue sotalol 80mg twice daily    CRT  Per EP:  Patient has a CRT device with rising thresholds on the LV.  This may be postoperative inflammation.  However, there exists emerging data to suggest LV lead in LVAD patient is not only unneccessary, but potentially harmful.  We will turn this off.  Given lack of need for pacing, we will reprogram the AV delay on the RV lead to minimize need for pacing.  - Intrinsic A-V interval detected at 110ms.  We will increase sensed and paced AV delay to 300/280ms respectively.    - LV lead is turned off.  This will also help with device longevity, decrease need for changeouts and reduce risk of infections.    RIJ, subclavian, and brachial vein DVT  Anticoagulation, as noted above.    Hoarseness  Present since LVAD implant, likely secondary to intubation.  - consider ENT referral if not improving      =============================================================    Interval History/ROS:   Mr. Morfin states that he continues to feel well.  He has been sleeping, and reports adequate pain control.  He has been ambulating and working with rehab, with ongoing improvements in his strength and endurance.  His breathing remains well, and he denies shortness of breath, PND, and orthopnea.  His appetite has been good, and he is eating regularly without nausea, vomiting, diarrhea, constipation.  He does not feel any fluid retention today.  He otherwise denies chest pain, palpitations, dizziness, lightheadedness, falls,  "headaches, acute vision changes, fevers, chills, cough, sore throat, and signs bleeding.    Last 24 hr care team notes reviewed.   ROS:  4 point ROS including Respiratory, CV, GI and , other than that noted in the HPI, is negative.     Medications: Reviewed in EPIC.     Physical Exam:   BP (!) 108/93   Pulse 80   Temp 98.3  F (36.8  C) (Oral)   Resp 18   Ht 1.791 m (5' 10.51\")   Wt 83.7 kg (184 lb 8.4 oz)   SpO2 98%   BMI 26.09 kg/m    GENERAL: Appears alert and oriented times three.  Sitting upright, NAD  HEENT: Eye symmetrical and free of discharge bilaterally. Mucous membranes moist and without lesions.  NECK: Supple and without lymphadenopathy. JVD at clavicle when sitting upright.   CV: +LVAD hum.   RESPIRATORY: Respirations regular, even, and unlabored. Lungs CTA throughout.   GI: Soft and non distended with normoactive bowel sounds present in all quadrants. No tenderness, rebound, guarding. No organomegaly.   EXTREMITIES: No LE edema. 2+ bilateral pedal pulses.   NEUROLOGIC: Alert and orientated x 3. CN II-XII grossly intact. No focal deficits.   MUSCULOSKELETAL: No joint swelling or tenderness.   SKIN: No jaundice. No rashes or lesions.  Driveline covered, with dressing c/d/i.    Data:  CMPRecent Labs   Lab 04/02/22  0454 04/02/22  0159 04/01/22  2125 04/01/22  1642 04/01/22  0754 04/01/22  0438 03/31/22  0744 03/31/22  0436 03/30/22  0737 03/30/22  0447     --   --   --   --  136  --  135  --  139   POTASSIUM 4.1  --   --   --   --  4.2  --  4.1  --  3.5   CHLORIDE 105  --   --   --   --  103  --  102  --  103   CO2 28  --   --   --   --  29  --  26  --  29   ANIONGAP 4  --   --   --   --  4  --  7  --  7   * 106* 112* 122*   < > 110*   < > 194*   < > 121*   BUN 24  --   --   --   --  25  --  22  --  19   CR 0.75  --   --   --   --  0.73  --  0.77  --  0.92   GFRESTIMATED >90  --   --   --   --  >90  --  >90  --  >90   YESICA 8.4*  --   --   --   --  8.6  --  8.5  --  8.2*   MAG 2.2  --   " --   --   --  2.3  --  2.2  --  2.0   PHOS 3.0  --   --   --   --  3.2  --  2.8  --  3.2   PROTTOTAL 6.2*  --   --   --   --  6.2*  --  6.4*  --  5.9*   ALBUMIN 2.1*  --   --   --   --  2.0*  --  1.9*  --  1.8*   BILITOTAL 0.4  --   --   --   --  0.5  --  0.3  --  0.3   ALKPHOS 78  --   --   --   --  66  --  72  --  52   *  --   --   --   --  72*  --  41  --  16   *  --   --   --   --  89*  --  58  --  23    < > = values in this interval not displayed.     CBC  Recent Labs   Lab 04/02/22 0454 04/01/22  1041 04/01/22 0438 03/31/22 0436   WBC 8.5 7.2 7.6 5.4   RBC 2.97* 2.93* 2.74* 2.84*   HGB 9.0* 9.0* 8.4* 8.5*   HCT 29.1* 28.6* 26.9* 27.4*   MCV 98 98 98 97   MCH 30.3 30.7 30.7 29.9   MCHC 30.9* 31.5 31.2* 31.0*   RDW 13.7 13.7 13.5 13.4    234 207 185     INR  Recent Labs   Lab 04/02/22 0454 04/01/22 0438 03/31/22 0436 03/30/22 0447   INR 1.16* 1.33* 1.89* 2.94*       Patient discussed with Dr. Carmen.      Melva Hector, DNP, FNP-BC, CHFN  Advanced Heart Failure Nurse Practitioner  [facility]

## 2022-04-02 NOTE — PROCEDURES
The patient's HeartMate LVAD was interrogated 4/2/2022  * Speed 5600 rpm   * Pulsatility index 3.1-4.4   * Power 4.1-4.2 Noriega   * Flow 4.4-4.7 L/minute   Fluid status: Euvolemic  Alarms were reviewed, with no LVAD alarms or signs of pump malfunction.   The driveline exit site was covered, with dressing c/d/i.   All external components were inspected and showed no evidence of damage or malfunction, none replaced.   No changes to VAD settings made.      Melva Hector DNP, FNP-BC, CHFN  Advanced Heart Failure Nurse Practitioner  [facility]

## 2022-04-02 NOTE — PROVIDER NOTIFICATION
Time of notification: 10:30 AM  Provider notified:Paged Cards 2 [phone] spoke via phone/  Also, paged and spoke with LVAD coordinator.  Patient status:Pt PI went from 3.2 to 4.1 while sitting with HOB 45% to siting at bedside feet on floor, PI dropped to 1.4-2.7.  Pt was asymptomatic no pain, no lightheaded, no SOB, no dizziness, all other LVAD numbers remained unchanged. Manually doppler MAP at 92 at this time with pt back in bed.  Pt went from SR to Paced rhythm.  Temp:  [97.4  F (36.3  C)-98.6  F (37  C)] 97.7  F (36.5  C)  Pulse:  [79-95] 80  Resp:  [18-20] 18  BP: ()/(63-93) 119/88  Cuff Mean (mmHg):  [83-98] 98  SpO2:  [98 %-100 %] 100 %  Orders received: EKG ordered

## 2022-04-02 NOTE — PROGRESS NOTES
D:  Received page from BRENDA Coombs regarding low PIs     I/A:  Patient states while sitting up at bedside patient's PI has been between 1.5-2, no dizziness/ lightheadedness. Reviewed causes of low PI- bleeding, right heart dysfunction, arrhythmias, dehydration.     P: Asked Malvin to touch base with Cards 2 team regarding patient's low PIs.

## 2022-04-02 NOTE — PLAN OF CARE
Edema consult acknowledged, at this time patient is without any acute inpatient edema therapy needs.  Pt reports LE edema resolved and is not interfering with comfort or mobility,.  Please refer services if new needs arise.

## 2022-04-02 NOTE — PLAN OF CARE
"BP (!) 86/70   Pulse 79   Temp 97.9  F (36.6  C) (Oral)   Resp 19   Ht 1.791 m (5' 10.51\")   Wt 83.7 kg (184 lb 8.4 oz)   SpO2 98%   BMI 26.09 kg/m          Neuro: A&Ox4. Calls as needed.  Cardiac: SR. VSS  Pt is LVAD Heart Mate 3.  Respiratory: Sating 92%> on RA.  GI/: Adequate urine output. BM X1  Diet/appetite: Tolerating regular diet. Eating well.  Activity:  Assist of 1, up to chair and in halls.  Pain: At acceptable level on current regimen.   Skin: No new deficits noted.  LDA's:PICC    Plan: Continue with POC. Notify primary team with changes.    "

## 2022-04-03 ENCOUNTER — APPOINTMENT (OUTPATIENT)
Dept: PHYSICAL THERAPY | Facility: CLINIC | Age: 57
DRG: 001 | End: 2022-04-03
Attending: PHYSICIAN ASSISTANT
Payer: MEDICARE

## 2022-04-03 ENCOUNTER — APPOINTMENT (OUTPATIENT)
Dept: CARDIOLOGY | Facility: CLINIC | Age: 57
DRG: 001 | End: 2022-04-03
Attending: NURSE PRACTITIONER
Payer: MEDICARE

## 2022-04-03 LAB
ALBUMIN SERPL-MCNC: 2.1 G/DL (ref 3.4–5)
ALP SERPL-CCNC: 74 U/L (ref 40–150)
ALT SERPL W P-5'-P-CCNC: 166 U/L (ref 0–70)
ANION GAP SERPL CALCULATED.3IONS-SCNC: 5 MMOL/L (ref 3–14)
AST SERPL W P-5'-P-CCNC: 59 U/L (ref 0–45)
BILIRUB SERPL-MCNC: 0.2 MG/DL (ref 0.2–1.3)
BUN SERPL-MCNC: 20 MG/DL (ref 7–30)
CALCIUM SERPL-MCNC: 8.3 MG/DL (ref 8.5–10.1)
CHLORIDE BLD-SCNC: 106 MMOL/L (ref 94–109)
CO2 SERPL-SCNC: 26 MMOL/L (ref 20–32)
CREAT SERPL-MCNC: 0.7 MG/DL (ref 0.66–1.25)
ERYTHROCYTE [DISTWIDTH] IN BLOOD BY AUTOMATED COUNT: 13.9 % (ref 10–15)
GFR SERPL CREATININE-BSD FRML MDRD: >90 ML/MIN/1.73M2
GLUCOSE BLD-MCNC: 104 MG/DL (ref 70–99)
GLUCOSE BLDC GLUCOMTR-MCNC: 105 MG/DL (ref 70–99)
GLUCOSE BLDC GLUCOMTR-MCNC: 108 MG/DL (ref 70–99)
GLUCOSE BLDC GLUCOMTR-MCNC: 134 MG/DL (ref 70–99)
GLUCOSE BLDC GLUCOMTR-MCNC: 136 MG/DL (ref 70–99)
HCT VFR BLD AUTO: 29.5 % (ref 40–53)
HGB BLD-MCNC: 9.4 G/DL (ref 13.3–17.7)
INR PPP: 1.19 (ref 0.85–1.15)
MAGNESIUM SERPL-MCNC: 2.1 MG/DL (ref 1.6–2.3)
MCH RBC QN AUTO: 31 PG (ref 26.5–33)
MCHC RBC AUTO-ENTMCNC: 31.9 G/DL (ref 31.5–36.5)
MCV RBC AUTO: 97 FL (ref 78–100)
PHOSPHATE SERPL-MCNC: 2.8 MG/DL (ref 2.5–4.5)
PLATELET # BLD AUTO: 240 10E3/UL (ref 150–450)
POTASSIUM BLD-SCNC: 4.2 MMOL/L (ref 3.4–5.3)
PROT SERPL-MCNC: 6.2 G/DL (ref 6.8–8.8)
RBC # BLD AUTO: 3.03 10E6/UL (ref 4.4–5.9)
SODIUM SERPL-SCNC: 137 MMOL/L (ref 133–144)
UFH PPP CHRO-ACNC: 0.19 IU/ML
UFH PPP CHRO-ACNC: 0.25 IU/ML
UFH PPP CHRO-ACNC: 0.35 IU/ML
WBC # BLD AUTO: 10.8 10E3/UL (ref 4–11)

## 2022-04-03 PROCEDURE — 250N000011 HC RX IP 250 OP 636: Performed by: PHYSICIAN ASSISTANT

## 2022-04-03 PROCEDURE — 250N000013 HC RX MED GY IP 250 OP 250 PS 637: Performed by: STUDENT IN AN ORGANIZED HEALTH CARE EDUCATION/TRAINING PROGRAM

## 2022-04-03 PROCEDURE — 93750 INTERROGATION VAD IN PERSON: CPT | Performed by: NURSE PRACTITIONER

## 2022-04-03 PROCEDURE — 99232 SBSQ HOSP IP/OBS MODERATE 35: CPT | Performed by: SURGERY

## 2022-04-03 PROCEDURE — 83735 ASSAY OF MAGNESIUM: CPT | Performed by: STUDENT IN AN ORGANIZED HEALTH CARE EDUCATION/TRAINING PROGRAM

## 2022-04-03 PROCEDURE — 93010 ELECTROCARDIOGRAM REPORT: CPT | Performed by: INTERNAL MEDICINE

## 2022-04-03 PROCEDURE — 36415 COLL VENOUS BLD VENIPUNCTURE: CPT | Performed by: INTERNAL MEDICINE

## 2022-04-03 PROCEDURE — 250N000013 HC RX MED GY IP 250 OP 250 PS 637: Performed by: PHYSICIAN ASSISTANT

## 2022-04-03 PROCEDURE — 85027 COMPLETE CBC AUTOMATED: CPT | Performed by: STUDENT IN AN ORGANIZED HEALTH CARE EDUCATION/TRAINING PROGRAM

## 2022-04-03 PROCEDURE — 97530 THERAPEUTIC ACTIVITIES: CPT | Mod: GP | Performed by: PHYSICAL THERAPIST

## 2022-04-03 PROCEDURE — 85520 HEPARIN ASSAY: CPT | Performed by: INTERNAL MEDICINE

## 2022-04-03 PROCEDURE — 84100 ASSAY OF PHOSPHORUS: CPT | Performed by: STUDENT IN AN ORGANIZED HEALTH CARE EDUCATION/TRAINING PROGRAM

## 2022-04-03 PROCEDURE — 93306 TTE W/DOPPLER COMPLETE: CPT

## 2022-04-03 PROCEDURE — 99233 SBSQ HOSP IP/OBS HIGH 50: CPT | Mod: 25 | Performed by: INTERNAL MEDICINE

## 2022-04-03 PROCEDURE — 93306 TTE W/DOPPLER COMPLETE: CPT | Mod: 26 | Performed by: STUDENT IN AN ORGANIZED HEALTH CARE EDUCATION/TRAINING PROGRAM

## 2022-04-03 PROCEDURE — 120N000003 HC R&B IMCU UMMC

## 2022-04-03 PROCEDURE — 80053 COMPREHEN METABOLIC PANEL: CPT | Performed by: STUDENT IN AN ORGANIZED HEALTH CARE EDUCATION/TRAINING PROGRAM

## 2022-04-03 PROCEDURE — 93005 ELECTROCARDIOGRAM TRACING: CPT

## 2022-04-03 PROCEDURE — 85520 HEPARIN ASSAY: CPT | Performed by: SURGERY

## 2022-04-03 PROCEDURE — 250N000013 HC RX MED GY IP 250 OP 250 PS 637: Performed by: INTERNAL MEDICINE

## 2022-04-03 PROCEDURE — 999N000044 HC STATISTIC CVC DRESSING CHANGE

## 2022-04-03 PROCEDURE — 97110 THERAPEUTIC EXERCISES: CPT | Mod: GP | Performed by: PHYSICAL THERAPIST

## 2022-04-03 PROCEDURE — 250N000013 HC RX MED GY IP 250 OP 250 PS 637: Performed by: NURSE PRACTITIONER

## 2022-04-03 PROCEDURE — 36415 COLL VENOUS BLD VENIPUNCTURE: CPT | Performed by: SURGERY

## 2022-04-03 PROCEDURE — 85610 PROTHROMBIN TIME: CPT | Performed by: STUDENT IN AN ORGANIZED HEALTH CARE EDUCATION/TRAINING PROGRAM

## 2022-04-03 RX ORDER — WARFARIN SODIUM 10 MG/1
10 TABLET ORAL
Status: COMPLETED | OUTPATIENT
Start: 2022-04-03 | End: 2022-04-03

## 2022-04-03 RX ADMIN — ACETAMINOPHEN 650 MG: 325 TABLET ORAL at 12:10

## 2022-04-03 RX ADMIN — SENNOSIDES AND DOCUSATE SODIUM 1 TABLET: 8.6; 5 TABLET ORAL at 07:46

## 2022-04-03 RX ADMIN — SOTALOL HYDROCHLORIDE 80 MG: 80 TABLET ORAL at 20:09

## 2022-04-03 RX ADMIN — OXYCODONE HYDROCHLORIDE 5 MG: 5 TABLET ORAL at 22:43

## 2022-04-03 RX ADMIN — SPIRONOLACTONE 25 MG: 25 TABLET, FILM COATED ORAL at 07:49

## 2022-04-03 RX ADMIN — TORSEMIDE 20 MG: 10 TABLET ORAL at 07:48

## 2022-04-03 RX ADMIN — METOCLOPRAMIDE 10 MG: 10 TABLET ORAL at 16:34

## 2022-04-03 RX ADMIN — SACUBITRIL AND VALSARTAN 1 TABLET: 24; 26 TABLET, FILM COATED ORAL at 07:49

## 2022-04-03 RX ADMIN — METOCLOPRAMIDE 10 MG: 10 TABLET ORAL at 12:10

## 2022-04-03 RX ADMIN — HEPARIN SODIUM AND DEXTROSE 1450 UNITS/HR: 10000; 5 INJECTION INTRAVENOUS at 16:34

## 2022-04-03 RX ADMIN — PANTOPRAZOLE SODIUM 40 MG: 40 TABLET, DELAYED RELEASE ORAL at 07:48

## 2022-04-03 RX ADMIN — METOCLOPRAMIDE 10 MG: 10 TABLET ORAL at 07:48

## 2022-04-03 RX ADMIN — SACUBITRIL AND VALSARTAN 1 TABLET: 24; 26 TABLET, FILM COATED ORAL at 20:09

## 2022-04-03 RX ADMIN — WARFARIN SODIUM 10 MG: 10 TABLET ORAL at 18:51

## 2022-04-03 RX ADMIN — SOTALOL HYDROCHLORIDE 80 MG: 80 TABLET ORAL at 07:48

## 2022-04-03 ASSESSMENT — ACTIVITIES OF DAILY LIVING (ADL)
ADLS_ACUITY_SCORE: 10
ADLS_ACUITY_SCORE: 8
ADLS_ACUITY_SCORE: 8
ADLS_ACUITY_SCORE: 10
ADLS_ACUITY_SCORE: 10
ADLS_ACUITY_SCORE: 12
ADLS_ACUITY_SCORE: 12
ADLS_ACUITY_SCORE: 10
ADLS_ACUITY_SCORE: 8
ADLS_ACUITY_SCORE: 10
ADLS_ACUITY_SCORE: 10
ADLS_ACUITY_SCORE: 8
ADLS_ACUITY_SCORE: 10
ADLS_ACUITY_SCORE: 8
ADLS_ACUITY_SCORE: 10

## 2022-04-03 NOTE — PROVIDER NOTIFICATION
Time of notification: 12:51 PM  Provider notified:Paged CVTS surgery team  PA  Leigh Cuellar and Cards 2 NP Melva Hector. Spoke with Melva via ascom.   Patient status:Pt had been up walking with PT and sat back down on edge of bed developed some acute left side axillary pain.  No SOB, no number changes on LVAD.  Full assessment done by RN neuro's intact, pt laid back down in bed.  Tylenol and ice pack given.  Deep breathing excersies done as well.  Pain did go from being a 9/10 down to 3-4/10 after 15 minutes.  Temp:  [97.2  F (36.2  C)-98.7  F (37.1  C)] 97.2  F (36.2  C)  Pulse:  [79-83] 80  Resp:  [14-18] 18  BP: ()/(51-87) 91/87  Cuff Mean (mmHg):  [83-92] 85  SpO2:  [96 %-99 %] 98 %  Orders received: EKG ordered and done.

## 2022-04-03 NOTE — CONSULTS
[facility]   Cardiology II Service / Advanced Heart Failure  Consult Note  Date of Service: 4/3/2022      Patient: Tej Morfin  MRN: [id number]  Admission Date: 3/22/2022  Hospital Day # 12    Assessment and Plan:  Mr. Tej Morfin is a 56yr old male with a history of severe mitral regurgitation (s/p mitral valve repair with an annuloplasty ring, 1/21/15), VT (s/p ICD, 2/2/15), mild nonobstructive CAD, and NICM (on home dobutamine) who was admitted for scheduled LVAD implantation, with IABP support prior.  He is now s/p HM3 LVAD implant 3/23/22.  Note that he is part of the RICARDO trial.      RECOMMENDATIONS:  - echo today  - increase LVAD speed to 5700  - stop torsemide      Chronic systolic heart failure secondary to NICM (LVEF 10-15% per EMILY 3/25/22)  Severe MR (s/p mitral valve repair with an annuloplasty ring, 1/21/15)  Mild RV dysfunction  Stage D, NYHA Class III    Last hemodynamics: 3/27: MAP 85, CVP 10, PAP 52/20, PCW 20, JOANNA CO/CI 5.1/2.4, SVR 1099, MVO2 50 -- weight in Epic 206#    - ACEi/ARB/ARNi:  entresto 24-26mg twice daily, will increase to 49-51mg tomorrow (PTA dose)  - Afterload reduction:  n/a  - BB:  Sotalol 80mg BID -- as RV looks well on echo today, would start metoprolol XL as outpatient (will follow-up re: this in clinic)  - Aldosterone antagonist:  Spironolactone 25mg daily  - SGLT2i:  deferred  - SCD ppx:  CRT  - Fluid status: Euvolemic, stopping torsemide    s/p HM3 LVAD (3/23/22)  Post-operative course has been c/b AFib with RVR (s/p ICD shock 3/25/22) and right knee effusion (s/p arthrocentesis and intraarticular steroid injection 3/30/22).  - antiplatelet:  RICARDO trial -- aspirin vs placebo  - anticoagulation:  Warfarin, goal INR 2-3.  INR today 1.19.  Continue heparin at low-intensity, with goal Xa 0.25-0.5.  Xa level today 0.19.   - MAPs:  70-90s  - LDH:  267 (4/2)    Mild, nonobstructive CAD  Part of RICARDO trial (aspirin vs placebo), please do not order  aspirin.  Not on statin PTA.  PTA sotalol resumed 4/1.    h/o sustained VT (s/p ICD 2/2/15)  NSVT  On Sotalol PTA, which was held on admission for LVAD implant and restarted 4/1.  - continue sotalol 80mg twice daily    Post-operative AFib with RVR  s/p ICD shock 3/25/22, with return to NSR.  EP consulted, with device interrogation revealing ventricular rates int he VT detection zone.  They adjusted the single VF zone to 220bpm with 15 second detection time to reduce risk of shock and permit other therapies (ie ATP) to work prior to him getting shocked.  He was also given amiodarone, and sotalol was ultimately restarted 4/1.  - continue sotalol 80mg twice daily    CRT  Per EP:  Patient has a CRT device with rising thresholds on the LV.  This may be postoperative inflammation.  However, there exists emerging data to suggest LV lead in LVAD patient is not only unneccessary, but potentially harmful.  We will turn this off.  Given lack of need for pacing, we will reprogram the AV delay on the RV lead to minimize need for pacing.  - Intrinsic A-V interval detected at 110ms.  We will increase sensed and paced AV delay to 300/280ms respectively.    - LV lead is turned off.  This will also help with device longevity, decrease need for changeouts and reduce risk of infections.    RIJ, subclavian, and brachial vein DVT  Anticoagulation, as noted above.    Hoarseness  Present since LVAD implant, likely secondary to intubation.  - consider ENT referral if not improving      =============================================================    Interval History/ROS:   Mr. Morfin states that he continues to feel well.  He has been working with rehab, and notes ongoing improvements.  His breathing feels well, and he denies SOB, PND, and orthopnea.  He is eating, with good appetite, and denies nausea, vomiting, diarrhea, and constipation.  He feels dehydrated, with no signs of fluid retention.  He otherwise denies chest pain,  "palpitations, dizziness, falls, headaches, acute vision changes, fevers, chills, cough, sore throat, and signs of bleeding.    Last 24 hr care team notes reviewed.   ROS:  4 point ROS including Respiratory, CV, GI and , other than that noted in the HPI, is negative.     Medications: Reviewed in EPIC.     Physical Exam:   /71 (BP Location: Left arm, Cuff Size: Adult Regular)   Pulse 82   Temp 98.7  F (37.1  C) (Oral)   Resp 14   Ht 1.791 m (5' 10.51\")   Wt 83.7 kg (184 lb 8.4 oz)   SpO2 96%   BMI 26.09 kg/m    GENERAL: Appears alert and oriented times three.  Sitting upright, NAD  HEENT: Eye symmetrical and free of discharge bilaterally. Mucous membranes moist and without lesions.  NECK: Supple and without lymphadenopathy. JVD negative when sitting upright.   CV: +LVAD hum.   RESPIRATORY: Respirations regular, even, and unlabored. Lungs CTA throughout.   GI: Soft and non distended with normoactive bowel sounds present in all quadrants. No tenderness, rebound, guarding. No organomegaly.   EXTREMITIES: No LE edema. 2+ bilateral pedal pulses.   NEUROLOGIC: Alert and orientated x 3. CN II-XII grossly intact. No focal deficits.   MUSCULOSKELETAL: No joint swelling or tenderness.   SKIN: No jaundice. No rashes or lesions.  Driveline covered, with dressing c/d/i.    Data:  CMP  Recent Labs   Lab 04/03/22  0738 04/03/22  0614 04/02/22  2145 04/02/22  1630 04/02/22  0823 04/02/22  0454 04/01/22  0754 04/01/22  0438 03/31/22  0744 03/31/22  0436   NA  --  137  --   --   --  137  --  136  --  135   POTASSIUM  --  4.2  --   --   --  4.1  --  4.2  --  4.1   CHLORIDE  --  106  --   --   --  105  --  103  --  102   CO2  --  26  --   --   --  28  --  29  --  26   ANIONGAP  --  5  --   --   --  4  --  4  --  7   * 104* 125* 122*   < > 112*   < > 110*   < > 194*   BUN  --  20  --   --   --  24  --  25  --  22   CR  --  0.70  --   --   --  0.75  --  0.73  --  0.77   GFRESTIMATED  --  >90  --   --   --  >90  --  " >90  --  >90   YESICA  --  8.3*  --   --   --  8.4*  --  8.6  --  8.5   MAG  --  2.1  --   --   --  2.2  --  2.3  --  2.2   PHOS  --  2.8  --   --   --  3.0  --  3.2  --  2.8   PROTTOTAL  --  6.2*  --   --   --  6.2*  --  6.2*  --  6.4*   ALBUMIN  --  2.1*  --   --   --  2.1*  --  2.0*  --  1.9*   BILITOTAL  --  0.2  --   --   --  0.4  --  0.5  --  0.3   ALKPHOS  --  74  --   --   --  78  --  66  --  72   AST  --  59*  --   --   --  106*  --  72*  --  41   ALT  --  166*  --   --   --  169*  --  89*  --  58    < > = values in this interval not displayed.     CBC  Recent Labs   Lab 04/03/22 0614 04/02/22 0454 04/01/22  1041 04/01/22 0438   WBC 10.8 8.5 7.2 7.6   RBC 3.03* 2.97* 2.93* 2.74*   HGB 9.4* 9.0* 9.0* 8.4*   HCT 29.5* 29.1* 28.6* 26.9*   MCV 97 98 98 98   MCH 31.0 30.3 30.7 30.7   MCHC 31.9 30.9* 31.5 31.2*   RDW 13.9 13.7 13.7 13.5    232 234 207     INR  Recent Labs   Lab 04/03/22 0614 04/02/22 0454 04/01/22 0438 03/31/22  0436   INR 1.19* 1.16* 1.33* 1.89*       Patient discussed with Dr. Carmen.      Melva Hector DNP, FNP-BC, CHFN  Advanced Heart Failure Nurse Practitioner  [facility]        I have seen and examined the patient as part of a shared visit with Melva Hector NP.  I agree with the note above. I reviewed today's vital signs and medications. I have reviewed and discussed with the advanced practice provider their physical examination, assessment, and plan   Briefly, chronic systolic heart failure secondary to NICM s/p HM3 LVAD implant 3/23/22.   My key history/exam findings are: hemodynamically stable.  Grossly euvolemic on exam this afternoon.  Echo today showed LV dilated and RV function grossly normal.    The key management decisions made by me:  Increase LVAD rpm to 5700.  Increase entresto to 49-51mg tomorrow morning.  Stop torsemide.     Elza Carmen MD  Section Head - Advanced Heart Failure, Transplantation and Mechanical Circulatory  Support  Director - [facility]  Associate Professor of Medicine, [facility]    I spent a total of 25 minutes today in chart review as well as personally reviewing recent cardiac testing and/or laboratory results, today's history and examination, and discussion and counseling with the patient

## 2022-04-03 NOTE — PLAN OF CARE
Goal Outcome Evaluation:                     Neuro: A&Ox4. Calls as needed.  Cardiac: SR. VSS  Pt is LVAD Heart Mate 3.  Respiratory: Sating 92%> on RA.  GI/: Adequate urine output. BM X1  Diet/appetite: Tolerating regular diet. Eating well.  Activity:  Assist of 1, up to chair and in halls.  Pain: At acceptable level on current regimen.   Skin: No new deficits noted.  LDA's:PICC     Plan: Continue with POC. Notify primary team with changes.

## 2022-04-03 NOTE — PROGRESS NOTES
Cardiovascular Surgery Progress Note  04/03/2022         Assessment and Plan:     Tej Morfin is a 56 year old male with NICM secondary to valvulopathy severe MR s/p MV ring repair, on home inotrope (Dobutamine 2.5), sustained VT s/p secondary prevention ICD, CKD stage II (baseline 1.5). He presented 3/22 for IABP the evening before his surgery.   Tej presented for LVAD placement with heartmate 3 with Dr. Rodríguez on 3/23/22     Cardiovascular:   Hx of NICM with chronic systolic HF (AHA Stage D, NYHA class 3b) 2/2 severe MR, s/p ring repair 2015  Hx VT with ICD placement  S/p LVAD HM3  No arrhythmia, HD stable. Occasional V-pacing.   IABP removed post op   ASA study (placebo vs. aspirin), statin not indicated (NICM).  HTN  - Valsartan 20 mg po bid increased 3/29, Sotalol 80 mg [id number] home med, spironolactone 20 mg daily  Chest tubes: mediastinals removed 3/28, R pleural and pericardial removed 3/30. L pleural removed 3/31.   - recent CXR unremarkable.      Pulmonary:  - Extubated POD#1, Saturating well on RA.    - Supplemental O2 PRN to keep sats > 92%. Wean off as tolerated.  - Pulm toilet, IS, activity and deep breathing     Neurology / MSK:  - Neuro intact  - Acute post-operative pain; pain well controlled with acetaminophen, PO oxycodone PRN, IV dilaudid PRN  New right knee effusion  - Patient had previous patellar tendon tear with surgical repair in the early 1980's and medial meniscus partial removal in 2013   - Consulted Rheumatology 3/29, right knee needle aspiration performed 3/30/2022.  - Synovial fluid analysis: gram stain, bacterial culture, WBC w/ differential, crystals (monosodium urate and calcium pyrophosphate crystal deposition crystals) sent. Cultures negative so far. No crystals seen on analysis of fluid.    - 4/1: discussed results with Rheumatology. They are not concerned for infection or gout and believe effusion likely from osteoarthritis, fluid shifts, and possibly some subclinical  crystals. No additional therapies needed at this time. If effusion becomes large and painful again, re consult them.       / Renal:  - Hx of renal disease CKD stage II (baseline 1.5). Most recent creatinine WNL, adequate UOP.   - Pre-op weight 200 lbs, weight down to 184 lbs, diuresing well on Torsemide 20 mg daily, cardiology managing.       GI / FEN:   Gastroparesis   - Regular diet, continue bowel regimen. Protein supplements between meals.  - Replace electrolytes as needed, hepatic enzymes WNL.  - Reglan 10 mg PO TID     Endocrine:  Stress induced hyperglycemia; initially managed on insulin drip postop, transitioned to sliding scale.     Infectious Disease:  - Stress induced leukocytosis; WBC WNL, no signs or symptoms of infection  - Completed perioperative antibiotics.  UA unremarkable  - COVID screen negative.  - If repeat fever, pan culture.      Hematology:   Acute blood loss anemia and thrombocytopenia  Hgb 8's; Plt WNL, no signs or symptoms of active bleeding     Anticoagulation:   - ASA   - Coumadin for LVAD, INR goal 2-3. Most recent INR 1.19, dose on 3/29 was held due to supra theraputic INR, patient now subtherapeutic.   -Restart low intensity heparin gtt bridging 4/1     Prophylaxis:   - Stress ulcer prophylaxis: Pantoprazole 40 mg daily for 30 days  - DVT prophylaxis: Subcutaneous heparin, SCD     Disposition:   - Transferred to  on 3/27  - Therapies recommending discharge to home, needs to finish VAD teaching, likely early next week when INr therapeutic.      Discussed with Dr. Marinelli through both written and verbal communication.      Leigh Abraham PA-C  Cardiothoracic Surgery  Pager [phone]  April 3, 2022            Interval History:     No overnight events over night.   States pain is well managed on current regimen. Slept well overnight.  Tolerating diet and hungry this AM, is passing flatus, + BM. No nausea or vomiting.  Breathing well without complaints.   Working with  "therapies and ambulating in halls with assistance.   Denies chest pain, palpitations, dizziness, syncopal symptoms, fevers, chills, myalgias, or sternal popping/clicking.         Physical Exam:   Blood pressure 90/40, pulse 79, temperature 98.9  F (37.2  C), temperature source Oral, resp. rate 16, height 1.791 m (5' 10.51\"), weight 80.9 kg (178 lb 5.6 oz), SpO2 94 %.  Vitals:    03/31/22 0615 04/01/22 0000 04/03/22 1101   Weight: 84.1 kg (185 lb 6.5 oz) 83.7 kg (184 lb 8.4 oz) 80.9 kg (178 lb 5.6 oz)        Gen: A&Ox4, NAD  Neuro: no focal deficits   CV: VAD humming, RRR, normal S1 S2, no murmurs, rubs or gallops.   Pulm: CTA, no wheezing or rhonchi, normal breathing on RA  Abd: nondistended, normal BS, soft, nontender  Ext: R knee no longer tender, small re accumulation of effusion improved today. Trace LE edema today    Incision: clean, dry, no erythema, sternum stable. Small area of superficial dehiscence at bottom of incision. No signs of infection.   Tubes/drain sites: dressing clean and dry  Lines: driveline dressing clean and dry   LVAD: speed 5600, flow 4.2 - 4.5, PI 3.2 - 4.4, power 4.1 - 4.3.          Data:    Imaging:  EXAMINATION: XR CHEST 2 VW, 3/31/2022 3:44 PM     INDICATION: CT removal     COMPARISON:   Chest x-ray 3/31/2021 8:47 AM.      FINDINGS: PA and lateral radiograph of the chest. Interval development  of left chest tube. Remainder of support devices are stable. Trachea  is midline. The cardiomediastinal silhouette is partially obscured by  LVAD. Small left pleural effusion. Left retrocardiac opacities. Trace  left apical pneumothorax, stable from prior. No right pneumothorax. No  right pleural effusion. No focal airspace opacity.     Partially visualized upper abdomen is unremarkable. No acute osseous  abnormality. Soft tissue is within                                                                      IMPRESSION:  1. Interval removal of left chest tube with trace apical " pneumothorax,  unchanged from prior exam. Remainder of support devices are stable.  2. Small left pleural effusion and stable left retrocardiac opacities  which may represent consolidation versus atelectasis.     I have personally reviewed the examination and initial interpretation  and I agree with the findings.     CARLA PAREDES MD     Labs:  BMP  Recent Labs   Lab 04/03/22  1641 04/03/22  1217 04/03/22  0738 04/03/22  0614 04/02/22  0823 04/02/22  0454 04/01/22  0754 04/01/22  0438 03/31/22  0744 03/31/22 0436   NA  --   --   --  137  --  137  --  136  --  135   POTASSIUM  --   --   --  4.2  --  4.1  --  4.2  --  4.1   CHLORIDE  --   --   --  106  --  105  --  103  --  102   YESICA  --   --   --  8.3*  --  8.4*  --  8.6  --  8.5   CO2  --   --   --  26  --  28  --  29  --  26   BUN  --   --   --  20  --  24  --  25  --  22   CR  --   --   --  0.70  --  0.75  --  0.73  --  0.77   * 105* 108* 104*   < > 112*   < > 110*   < > 194*    < > = values in this interval not displayed.     CBC  Recent Labs   Lab 04/03/22  0614 04/02/22  0454 04/01/22  1041 04/01/22 0438   WBC 10.8 8.5 7.2 7.6   RBC 3.03* 2.97* 2.93* 2.74*   HGB 9.4* 9.0* 9.0* 8.4*   HCT 29.5* 29.1* 28.6* 26.9*   MCV 97 98 98 98   MCH 31.0 30.3 30.7 30.7   MCHC 31.9 30.9* 31.5 31.2*   RDW 13.9 13.7 13.7 13.5    232 234 207     INR  Recent Labs   Lab 04/03/22  0614 04/02/22  0454 04/01/22  0438 03/31/22  0436   INR 1.19* 1.16* 1.33* 1.89*      Hepatic Panel  Recent Labs   Lab 04/03/22  0614 04/02/22  0454 04/01/22  0438 03/31/22  0436   AST 59* 106* 72* 41   * 169* 89* 58   ALKPHOS 74 78 66 72   BILITOTAL 0.2 0.4 0.5 0.3   ALBUMIN 2.1* 2.1* 2.0* 1.9*     GLUCOSE:   Recent Labs   Lab 04/03/22  1641 04/03/22  1217 04/03/22  0738 04/03/22  0614 04/02/22  2145 04/02/22  1630   * 105* 108* 104* 125* 122*

## 2022-04-03 NOTE — PLAN OF CARE
Neuro: A&Ox4.  Calm and able to make needs known. Denies numbness and tingling in extremities. Afebrile.   Cardiac: SR. VSS. LVAD Heart mate 3. ICD In place. PI between 2.6-3.4, pt is asymptomatic, team aware.   Respiratory: Sating >99% on RA.  GI/: Adequate urine output . BM X1  Diet/appetite: Tolerating 2g Na  diet. Eating well.  Activity:  Assist of 1, up bedside commode  Pain: At acceptable level on current regimen. PRN tylenol given 1x for pain around old CT sites.   Skin: No new deficits noted. LVAD site, Dressing CDI. Old CT site, CDI   LDA's: Single lumen PICC running heparin gtt at 1300 units. LVAD.       Plan: Continue with POC. Notify primary team with changes.

## 2022-04-04 ENCOUNTER — APPOINTMENT (OUTPATIENT)
Dept: PHYSICAL THERAPY | Facility: CLINIC | Age: 57
DRG: 001 | End: 2022-04-04
Attending: INTERNAL MEDICINE
Payer: MEDICARE

## 2022-04-04 DIAGNOSIS — I42.8 VALVULAR CARDIOMYOPATHY (H): Primary | ICD-10-CM

## 2022-04-04 LAB
ALBUMIN SERPL-MCNC: 2.3 G/DL (ref 3.4–5)
ALP SERPL-CCNC: 82 U/L (ref 40–150)
ALT SERPL W P-5'-P-CCNC: 131 U/L (ref 0–70)
ANION GAP SERPL CALCULATED.3IONS-SCNC: 5 MMOL/L (ref 3–14)
AST SERPL W P-5'-P-CCNC: 27 U/L (ref 0–45)
BACTERIA SNV CULT: NO GROWTH
BILIRUB SERPL-MCNC: 0.3 MG/DL (ref 0.2–1.3)
BUN SERPL-MCNC: 18 MG/DL (ref 7–30)
CALCIUM SERPL-MCNC: 8.7 MG/DL (ref 8.5–10.1)
CHLORIDE BLD-SCNC: 107 MMOL/L (ref 94–109)
CO2 SERPL-SCNC: 25 MMOL/L (ref 20–32)
CREAT SERPL-MCNC: 0.77 MG/DL (ref 0.66–1.25)
ERYTHROCYTE [DISTWIDTH] IN BLOOD BY AUTOMATED COUNT: 14.2 % (ref 10–15)
GFR SERPL CREATININE-BSD FRML MDRD: >90 ML/MIN/1.73M2
GLUCOSE BLD-MCNC: 125 MG/DL (ref 70–99)
GLUCOSE BLDC GLUCOMTR-MCNC: 102 MG/DL (ref 70–99)
GLUCOSE BLDC GLUCOMTR-MCNC: 105 MG/DL (ref 70–99)
GLUCOSE BLDC GLUCOMTR-MCNC: 105 MG/DL (ref 70–99)
GLUCOSE BLDC GLUCOMTR-MCNC: 111 MG/DL (ref 70–99)
GLUCOSE BLDC GLUCOMTR-MCNC: 117 MG/DL (ref 70–99)
GLUCOSE BLDC GLUCOMTR-MCNC: 139 MG/DL (ref 70–99)
HCT VFR BLD AUTO: 31.9 % (ref 40–53)
HGB BLD-MCNC: 9.9 G/DL (ref 13.3–17.7)
INR PPP: 1.23 (ref 0.85–1.15)
MAGNESIUM SERPL-MCNC: 2 MG/DL (ref 1.6–2.3)
MCH RBC QN AUTO: 30.1 PG (ref 26.5–33)
MCHC RBC AUTO-ENTMCNC: 31 G/DL (ref 31.5–36.5)
MCV RBC AUTO: 97 FL (ref 78–100)
PHOSPHATE SERPL-MCNC: 3.3 MG/DL (ref 2.5–4.5)
PLATELET # BLD AUTO: 226 10E3/UL (ref 150–450)
POTASSIUM BLD-SCNC: 4.1 MMOL/L (ref 3.4–5.3)
PROT SERPL-MCNC: 6.5 G/DL (ref 6.8–8.8)
RBC # BLD AUTO: 3.29 10E6/UL (ref 4.4–5.9)
SODIUM SERPL-SCNC: 137 MMOL/L (ref 133–144)
UFH PPP CHRO-ACNC: 0.31 IU/ML
WBC # BLD AUTO: 11.5 10E3/UL (ref 4–11)

## 2022-04-04 PROCEDURE — 80053 COMPREHEN METABOLIC PANEL: CPT | Performed by: STUDENT IN AN ORGANIZED HEALTH CARE EDUCATION/TRAINING PROGRAM

## 2022-04-04 PROCEDURE — 85014 HEMATOCRIT: CPT | Performed by: STUDENT IN AN ORGANIZED HEALTH CARE EDUCATION/TRAINING PROGRAM

## 2022-04-04 PROCEDURE — 250N000013 HC RX MED GY IP 250 OP 250 PS 637: Performed by: INTERNAL MEDICINE

## 2022-04-04 PROCEDURE — 84100 ASSAY OF PHOSPHORUS: CPT | Performed by: STUDENT IN AN ORGANIZED HEALTH CARE EDUCATION/TRAINING PROGRAM

## 2022-04-04 PROCEDURE — 85520 HEPARIN ASSAY: CPT | Performed by: INTERNAL MEDICINE

## 2022-04-04 PROCEDURE — 85610 PROTHROMBIN TIME: CPT | Performed by: STUDENT IN AN ORGANIZED HEALTH CARE EDUCATION/TRAINING PROGRAM

## 2022-04-04 PROCEDURE — 36415 COLL VENOUS BLD VENIPUNCTURE: CPT | Performed by: INTERNAL MEDICINE

## 2022-04-04 PROCEDURE — 99232 SBSQ HOSP IP/OBS MODERATE 35: CPT | Performed by: SURGERY

## 2022-04-04 PROCEDURE — 250N000011 HC RX IP 250 OP 636: Performed by: PHYSICIAN ASSISTANT

## 2022-04-04 PROCEDURE — 250N000013 HC RX MED GY IP 250 OP 250 PS 637: Performed by: NURSE PRACTITIONER

## 2022-04-04 PROCEDURE — 999N000007 HC SITE CHECK

## 2022-04-04 PROCEDURE — 120N000005 HC R&B MS OVERFLOW UMMC

## 2022-04-04 PROCEDURE — 93750 INTERROGATION VAD IN PERSON: CPT | Performed by: NURSE PRACTITIONER

## 2022-04-04 PROCEDURE — 250N000013 HC RX MED GY IP 250 OP 250 PS 637: Performed by: STUDENT IN AN ORGANIZED HEALTH CARE EDUCATION/TRAINING PROGRAM

## 2022-04-04 PROCEDURE — 83735 ASSAY OF MAGNESIUM: CPT | Performed by: STUDENT IN AN ORGANIZED HEALTH CARE EDUCATION/TRAINING PROGRAM

## 2022-04-04 PROCEDURE — 250N000013 HC RX MED GY IP 250 OP 250 PS 637: Performed by: SURGERY

## 2022-04-04 PROCEDURE — 99232 SBSQ HOSP IP/OBS MODERATE 35: CPT | Mod: 25 | Performed by: NURSE PRACTITIONER

## 2022-04-04 PROCEDURE — 97110 THERAPEUTIC EXERCISES: CPT | Mod: GP | Performed by: PHYSICAL THERAPIST

## 2022-04-04 RX ORDER — MAGNESIUM OXIDE 400 MG/1
400 TABLET ORAL 2 TIMES DAILY
Status: COMPLETED | OUTPATIENT
Start: 2022-04-04 | End: 2022-04-05

## 2022-04-04 RX ADMIN — METOCLOPRAMIDE 10 MG: 10 TABLET ORAL at 17:54

## 2022-04-04 RX ADMIN — PANTOPRAZOLE SODIUM 40 MG: 40 TABLET, DELAYED RELEASE ORAL at 07:40

## 2022-04-04 RX ADMIN — Medication 400 MG: at 08:17

## 2022-04-04 RX ADMIN — OXYCODONE HYDROCHLORIDE 5 MG: 5 TABLET ORAL at 21:52

## 2022-04-04 RX ADMIN — SOTALOL HYDROCHLORIDE 80 MG: 80 TABLET ORAL at 07:40

## 2022-04-04 RX ADMIN — SPIRONOLACTONE 25 MG: 25 TABLET, FILM COATED ORAL at 07:40

## 2022-04-04 RX ADMIN — Medication 400 MG: at 20:53

## 2022-04-04 RX ADMIN — OXYCODONE HYDROCHLORIDE 5 MG: 5 TABLET ORAL at 04:17

## 2022-04-04 RX ADMIN — SACUBITRIL AND VALSARTAN 1 TABLET: 49; 51 TABLET, FILM COATED ORAL at 20:53

## 2022-04-04 RX ADMIN — HEPARIN SODIUM AND DEXTROSE 1450 UNITS/HR: 10000; 5 INJECTION INTRAVENOUS at 06:59

## 2022-04-04 RX ADMIN — HEPARIN SODIUM AND DEXTROSE 1450 UNITS/HR: 10000; 5 INJECTION INTRAVENOUS at 23:03

## 2022-04-04 RX ADMIN — SOTALOL HYDROCHLORIDE 80 MG: 80 TABLET ORAL at 20:53

## 2022-04-04 RX ADMIN — ACETAMINOPHEN 650 MG: 325 TABLET ORAL at 14:56

## 2022-04-04 RX ADMIN — SACUBITRIL AND VALSARTAN 1 TABLET: 49; 51 TABLET, FILM COATED ORAL at 08:17

## 2022-04-04 RX ADMIN — WARFARIN SODIUM 12.5 MG: 10 TABLET ORAL at 17:54

## 2022-04-04 RX ADMIN — SENNOSIDES AND DOCUSATE SODIUM 1 TABLET: 8.6; 5 TABLET ORAL at 20:53

## 2022-04-04 RX ADMIN — METOCLOPRAMIDE 10 MG: 10 TABLET ORAL at 07:40

## 2022-04-04 RX ADMIN — METOCLOPRAMIDE 10 MG: 10 TABLET ORAL at 12:56

## 2022-04-04 ASSESSMENT — ACTIVITIES OF DAILY LIVING (ADL)
ADLS_ACUITY_SCORE: 8
ADLS_ACUITY_SCORE: 10
ADLS_ACUITY_SCORE: 8
ADLS_ACUITY_SCORE: 8
ADLS_ACUITY_SCORE: 10
ADLS_ACUITY_SCORE: 8
ADLS_ACUITY_SCORE: 10
ADLS_ACUITY_SCORE: 8
ADLS_ACUITY_SCORE: 10
ADLS_ACUITY_SCORE: 10
ADLS_ACUITY_SCORE: 8

## 2022-04-04 NOTE — PLAN OF CARE
Neuro: A&Ox4.   Cardiac: LVAD Hm3, PI 1.7-3.0, provider notified. SR/paced. VSS. B/P: 97/81, T: 97.5, P: 80, R: 16  Respiratory: O2 sats stable on RA.  GI/: Adequate urine output. No BM this shift.  Diet/appetite: Tolerating 3G sodium diet.  Activity:  SBA working with therapy  Pain: At acceptable level on current regimen.   Skin: No new deficits noted. LVAD dressing change and systems check completed by VAD coordinator.  LDA's: PICC, LVAD    LVAD education completed today with pt and wife. Plan for LVAD education 10-12 Tuesday-Friday    Plan: Continue with POC. Notify primary team with changes.

## 2022-04-04 NOTE — PLAN OF CARE
Neuro: A&Ox4.  Calm and able to make needs known. Denies numbness and tingling in extremities. Afebrile.   Cardiac: SR. VSS. LVAD Heart mate 3. ICD In place. PI between 1.7-3.4, pt is asymptomatic, team aware.   Respiratory: Sating >99% on RA.  GI/: Adequate urine output . BM X1  Diet/appetite: Tolerating 2g Na  diet. Eating well.  Activity:  Assist of 1, up bedside commode  Pain: At acceptable level on current regimen. PRN oxy given 1x  Skin: No new deficits noted. LVAD site, Dressing CDI.   LDA's: Single lumen PICC running heparin gtt at 1450 units. LVAD.       Plan: Continue with POC. Notify primary team with changes.

## 2022-04-04 NOTE — CONSULTS
[facility]   Cardiology II Service / Advanced Heart Failure  Consult Note  Date of Service: 4/4/2022      Patient: Tej Morfin  MRN: [id number]  Admission Date: 3/22/2022  Hospital Day # 13    Assessment and Plan:  Mr. Tej Morfin is a 56yr old male with a history of severe mitral regurgitation (s/p mitral valve repair with an annuloplasty ring, 1/21/15), VT (s/p ICD, 2/2/15), mild nonobstructive CAD, and NICM (on home dobutamine) who was admitted for scheduled LVAD implantation, with IABP support prior.  He is now s/p HM3 LVAD implant 3/23/22.  Note that he is part of the RICARDO trial.      RECOMMENDATIONS:  - increase entresto to 49-51mg twice daily (PTA dose, he has 97-103mg tabs that he cuts in half)      Chronic systolic heart failure secondary to NICM (LVEF 10-15% per EMILY 3/25/22)  Severe MR (s/p mitral valve repair with an annuloplasty ring, 1/21/15)  Mild RV dysfunction  Stage D, NYHA Class III    Last hemodynamics: 3/27: MAP 85, CVP 10, PAP 52/20, PCW 20, JOANNA CO/CI 5.1/2.4, SVR 1099, MVO2 50 -- weight in Epic 206#.     - ACEi/ARB/ARNi:  increase entresto to 49-51mg tomorrow (PTA dose, he has 97-103mg tabs that he cuts in half)  - Afterload reduction:  n/a  - BB:  Sotalol 80mg BID -- as RV looks well on echo yesterday, would plan to start metoprolol XL as outpatient (will follow-up re: this in clinic)  - Aldosterone antagonist:  Spironolactone 25mg daily  - SGLT2i:  deferred  - SCD ppx:  CRT  - Fluid status: Euvolemic, stopping torsemide.  Discussed that when he discharges, that he should call with weight gain (3# overnight, 5# in one week).    s/p HM3 LVAD (3/23/22)  Post-operative course has been c/b AFib with RVR (s/p ICD shock 3/25/22) and right knee effusion (s/p arthrocentesis and intraarticular steroid injection 3/30/22).  - antiplatelet:  RICARDO trial -- aspirin vs placebo  - anticoagulation:  Warfarin, goal INR 2-3.  INR today 1.23.  Continue heparin at low-intensity, with  goal Xa 0.25-0.5.  Xa level today 0.31.   - MAPs:  70-80s  - LDH:  267 (4/2)    Mild, nonobstructive CAD  Part of RICARDO trial (aspirin vs placebo), please do not order aspirin.  Not on statin PTA.  PTA sotalol resumed 4/1.  Planning to start BB as outpatient as his RV looks well per last TTE.    h/o sustained VT (s/p ICD 2/2/15)  NSVT  On Sotalol PTA, which was held on admission for LVAD implant and restarted 4/1.  - continue sotalol 80mg twice daily  - will plan to start metoprolol XL as outpatient    Post-operative AFib with RVR  s/p ICD shock 3/25/22, with return to NSR.  EP consulted, with device interrogation revealing ventricular rates int he VT detection zone.  They adjusted the single VF zone to 220bpm with 15 second detection time to reduce risk of shock and permit other therapies (ie ATP) to work prior to him getting shocked.  He was also given amiodarone, and sotalol was ultimately restarted 4/1.  - continue sotalol 80mg twice daily    CRT  Per EP:  Patient has a CRT device with rising thresholds on the LV.  This may be postoperative inflammation.  However, there exists emerging data to suggest LV lead in LVAD patient is not only unneccessary, but potentially harmful.  We will turn this off.  Given lack of need for pacing, we will reprogram the AV delay on the RV lead to minimize need for pacing.  - Intrinsic A-V interval detected at 110ms.  We will increase sensed and paced AV delay to 300/280ms respectively.    - LV lead is turned off.  This will also help with device longevity, decrease need for changeouts and reduce risk of infections.    RIJ, subclavian, and brachial vein DVT  Anticoagulation, as noted above.      NOTE:  Patient is medically ready for transfer to TCU.  Today represents a medically avoidable hospital day.    =============================================================    Interval History/ROS:   Mr. Morfin states that he feels well.  He continues to note better strength and  "endurance with rehab.  His breathing remains well, and he denies SOB, PND, and orthopnea.  He is eating, with regular appetite and denies nausea, vomiting, diarrhea, and constipation.  He has had an upset stomach since breakfast this morning.  He does not feel any fluid retention.  He otherwise denies chest pain, palpitations, dizziness, falls, headaches, acute vision changes, fevers, chills, cough, sore throat, and signs of bleeding.    Last 24 hr care team notes reviewed.   ROS:  4 point ROS including Respiratory, CV, GI and , other than that noted in the HPI, is negative.     Medications: Reviewed in EPIC.     Physical Exam:   BP (!) 81/72   Pulse 80   Temp 98.6  F (37  C) (Oral)   Resp 18   Ht 1.791 m (5' 10.51\")   Wt 79.8 kg (175 lb 14.8 oz)   SpO2 99%   BMI 24.88 kg/m    GENERAL: Appears alert and oriented times three.  Sitting upright, NAD  HEENT: Eye symmetrical and free of discharge bilaterally. Mucous membranes moist and without lesions.  NECK: Supple and without lymphadenopathy. JVD negative when sitting upright.   CV: +LVAD hum.   RESPIRATORY: Respirations regular, even, and unlabored. Lungs CTA throughout.   GI: Soft and non distended with normoactive bowel sounds present in all quadrants. No tenderness, rebound, guarding. No organomegaly.   EXTREMITIES: No LE edema. 2+ bilateral pedal pulses.   NEUROLOGIC: Alert and orientated x 3. CN II-XII grossly intact. No focal deficits.   MUSCULOSKELETAL: No joint swelling or tenderness.   SKIN: No jaundice. No rashes or lesions.  Driveline covered, with dressing c/d/i.    Data:  CMP  Recent Labs   Lab 04/04/22  0508 04/03/22  2143 04/03/22  1641 04/03/22  1217 04/03/22  0738 04/03/22  0614 04/02/22  0823 04/02/22  0454 04/01/22  0754 04/01/22  0438     --   --   --   --  137  --  137  --  136   POTASSIUM 4.1  --   --   --   --  4.2  --  4.1  --  4.2   CHLORIDE 107  --   --   --   --  106  --  105  --  103   CO2 25  --   --   --   --  26  --  28  " --  29   ANIONGAP 5  --   --   --   --  5  --  4  --  4   * 134* 136* 105*   < > 104*   < > 112*   < > 110*   BUN 18  --   --   --   --  20  --  24  --  25   CR 0.77  --   --   --   --  0.70  --  0.75  --  0.73   GFRESTIMATED >90  --   --   --   --  >90  --  >90  --  >90   YESICA 8.7  --   --   --   --  8.3*  --  8.4*  --  8.6   MAG 2.0  --   --   --   --  2.1  --  2.2  --  2.3   PHOS 3.3  --   --   --   --  2.8  --  3.0  --  3.2   PROTTOTAL 6.5*  --   --   --   --  6.2*  --  6.2*  --  6.2*   ALBUMIN 2.3*  --   --   --   --  2.1*  --  2.1*  --  2.0*   BILITOTAL 0.3  --   --   --   --  0.2  --  0.4  --  0.5   ALKPHOS 82  --   --   --   --  74  --  78  --  66   AST 27  --   --   --   --  59*  --  106*  --  72*   *  --   --   --   --  166*  --  169*  --  89*    < > = values in this interval not displayed.     CBC  Recent Labs   Lab 04/04/22 0508 04/03/22  0614 04/02/22  0454 04/01/22  1041   WBC 11.5* 10.8 8.5 7.2   RBC 3.29* 3.03* 2.97* 2.93*   HGB 9.9* 9.4* 9.0* 9.0*   HCT 31.9* 29.5* 29.1* 28.6*   MCV 97 97 98 98   MCH 30.1 31.0 30.3 30.7   MCHC 31.0* 31.9 30.9* 31.5   RDW 14.2 13.9 13.7 13.7    240 232 234     INR  Recent Labs   Lab 04/04/22 0508 04/03/22 0614 04/02/22  0454 04/01/22  0438   INR 1.23* 1.19* 1.16* 1.33*       Patient discussed with Dr. Carmen.      Melva Hector, TESS, FNP-BC, CHFN  Advanced Heart Failure Nurse Practitioner  [facility]

## 2022-04-04 NOTE — PROGRESS NOTES
LVAD Social Work Services Progress Note      Date of Initial Social Work Evaluation: 3/23/2022  Collaborated with: CVTS, [facility], pt and his wife, Jessenia, at bedside     Data: Pt is s/p LVAD implant on 3/23/2022. Pt transitioned to 6B on 3/27. Pt started LVAD education today and anticipated continue education all week. Per CVTS pt is medically ready to go to [facility] for ongoing education and to work towards therapeutic INR. Contacted [facility] and was informed that there are 3 pt's ahead of pt for [facility] and likely will not have a bed for pt this week. Writer will continue to check-in with [facility] as we are also waiting for therapeutic INR.   Discussed potential to transition to [facility] with pt and his wife and the are agreeable if this is necessary. Writer is assisting pt in obtaining financial assistance to pay for his insurance premium this month. Pt's bill was submitted to [employer] and organization will confirm tomorrow if they are able to pay this bill. Pt and wife updated on status of this situation.     Intervention: Supportive Visit, financial resources  Assessment: Pt and wife continue to be satisfied with pt's progress. They are agreeable to going to [facility] if this is needed.   Education provided by SW: Ongoing Social Work support, discharge planning, financial resources/assistance   Plan:    Discharge Plans in Progress: [facility] following but may not have bed for several days and by that time pt may be done or close to completing LVAD education.     Barriers to d/c plan: No bed at [facility]    Follow up Plan: SW to continue to follow.

## 2022-04-04 NOTE — PROVIDER NOTIFICATION
Time of notification: 8:10 AM  Provider notified: CVTS  Patient status: PI 1.7, pt asymptomatic  Temp:  [97.2  F (36.2  C)-98.9  F (37.2  C)] 98.6  F (37  C)  Pulse:  [79-80] 80  Resp:  [16-20] 18  BP: ()/(40-87) 81/72  Cuff Mean (mmHg):  [82-86] 82  SpO2:  [90 %-100 %] 99 %  Orders received: PI improved to ~2. Nursing will continue to monitor and notify with any changes.

## 2022-04-04 NOTE — PROCEDURES
The patient's HeartMate LVAD was interrogated 4/4/2022  * Speed 5700 rpm   * Pulsatility index 2-2.4   * Power 4.1-4.3 Noriega   * Flow 4.1-4.9 L/minute   Fluid status: euvolemic   Alarms were reviewed, with no LVAD alarms or signs of pump malfunction.  Multiple PI events.  The driveline exit site was covered, with dressing c/d/i.   All external components were inspected and showed no evidence of damage or malfunction, none replaced.   No changes to VAD settings made.      Melva Hector DNP, FNP-BC, CHFN  Advanced Heart Failure Nurse Practitioner  [facility]

## 2022-04-04 NOTE — PLAN OF CARE
"Goal Outcome Evaluation:  BP 90/40 (BP Location: Left arm)   Pulse 79   Temp 98.9  F (37.2  C) (Oral)   Resp 16   Ht 1.791 m (5' 10.51\")   Wt 80.9 kg (178 lb 5.6 oz)   SpO2 94%   BMI 25.22 kg/m        Neuro: A&Ox4. Calls as needed.  Cardiac: SR. VSS  Pt is LVAD Heart Mate 3.  Respiratory: Sating 92%> on RA.  GI/: Adequate urine output. BM X1  Diet/appetite: Tolerating regular diet. Eating well.  Activity:  Assist of 1, up to chair and in halls.  Pain: At acceptable level on current regimen.   Skin: No new deficits noted.  LDA's:PICC     "

## 2022-04-04 NOTE — PROGRESS NOTES
Cardiovascular Surgery Progress Note  04/04/2022         Assessment and Plan:     Tej Morfin is a 56 year old male with NICM secondary to valvulopathy severe MR s/p MV ring repair, on home inotrope (Dobutamine 2.5), sustained VT s/p secondary prevention ICD, CKD stage II (baseline 1.5). He presented 3/22 for IABP the evening before his surgery.   Tej presented for LVAD placement with heartmate 3 with Dr. Rodríguez on 3/23/22     Cardiovascular:   Hx of NICM with chronic systolic HF (AHA Stage D, NYHA class 3b) 2/2 severe MR, s/p ring repair 2015  Hx VT with ICD placement  S/p LVAD HM3  No arrhythmia, HD stable. Occasional V-pacing.   IABP removed post op   ASA study (placebo vs. aspirin), statin not indicated (NICM).  HTN  - Entresto increased 4/4, Sotalol 80 mg q 12 hours home med, spironolactone 25 mg daily.    Chest tubes: all tubes removed. Recent CXR unremarkable.      Pulmonary:  - Extubated POD#1, Saturating well on RA.    - Supplemental O2 PRN to keep sats > 92%. Wean off as tolerated.  - Pulm toilet, IS, activity and deep breathing     Neurology / MSK:  - Neuro intact  - Acute post-operative pain; pain well controlled with acetaminophen, PO oxycodone PRN, IV dilaudid PRN  New right knee effusion  - Patient had previous patellar tendon tear with surgical repair in the early 1980's and medial meniscus partial removal in 2013   - Consulted Rheumatology 3/29, right knee needle aspiration performed 3/30/2022.  - Synovial fluid analysis: gram stain, bacterial culture, WBC w/ differential, crystals (monosodium urate and calcium pyrophosphate crystal deposition crystals) sent. Cultures negative so far. No crystals seen on analysis of fluid.    - 4/1: discussed with Rheumatology. They are not concerned for infection or gout and believe effusion likely from osteoarthritis, fluid shifts, and possibly some subclinical crystals. No additional therapies needed at this time. If effusion becomes large and painful  again, re consult them.       / Renal:  - Hx of renal disease CKD stage II (baseline 1.5). Most recent creatinine WNL, adequate UOP.   - Pre-op weight 200 lbs, weight down to 184 lbs, diuresed well on Torsemide 20 mg daily and is OFF since 4/4, cardiology managing.     GI / FEN:   Gastroparesis   - Regular diet, continue bowel regimen. Protein supplements between meals.  - Replace electrolytes as needed, hepatic enzymes WNL.  - Reglan 10 mg PO TID     Endocrine:  Stress induced hyperglycemia; initially managed on insulin drip postop, transitioned to sliding scale.     Infectious Disease:  - Stress induced leukocytosis; WBC WNL, no signs or symptoms of infection  - Completed perioperative antibiotics.  UA unremarkable  - COVID screen negative.  - If repeat fever, pan culture.      Hematology:   Acute blood loss anemia and thrombocytopenia  Hgb 9's; Plt WNL, no signs or symptoms of active bleeding     Anticoagulation:   - ASA   - Coumadin for LVAD, INR goal 2-3. Most recent INR 1.23, dose on 3/29 was held due to supra theraputic INR, patient now subtherapeutic.   - Restarted low intensity heparin gtt bridging 4/1     Prophylaxis:   - Stress ulcer prophylaxis: Pantoprazole 40 mg daily for 30 days  - DVT prophylaxis: Subcutaneous heparin, SCD     Disposition:   - Transferred to  on 3/27  - PT recommending DC to home, needs VAD teaching (5 sessions) and therapeutic INR    Discussed with Dr Rodríguez through both written and verbal communication.      Kavon Richmond PA-C  Cardiothoracic Surgery  Pager [phone]    10:08 AM   April 4, 2022        Interval History:     No overnight events.  Feeling good overall. Wife coming for teaching this week.   Mild upset stomach this AM. No nausea.    States pain is well managed on current regimen. Slept well overnight. Knee improved.  Tolerating diet and tube feeds, is passing flatus, + BM. No nausea or vomiting.  Breathing well without complaints.   Working with therapies and  "ambulating in halls without assistance.   Denies chest pain, palpitations, dizziness, syncopal symptoms, fevers, chills, myalgias, or sternal popping/clicking.         Physical Exam:   Blood pressure (!) 88/64, pulse 79, temperature 98.8  F (37.1  C), temperature source Oral, resp. rate 16, height 1.791 m (5' 10.51\"), weight 79.8 kg (175 lb 14.8 oz), SpO2 98 %.  Vitals:    04/01/22 0000 04/03/22 1101 04/04/22 0626   Weight: 83.7 kg (184 lb 8.4 oz) 80.9 kg (178 lb 5.6 oz) 79.8 kg (175 lb 14.8 oz)      Weight; - 16  lbs since admit and trending down.   24 hr Fluid status; net loss 566 mL. UOP 2.5 L  MAPs: 70 - 86     Gen: A&Ox4, NAD  Neuro: no focal deficits   CV: VAD humming, RRR, normal S1 S2, no murmurs, rubs or gallops.   Pulm: CTA, no wheezing or rhonchi, normal breathing on RA  Abd: nondistended, normal BS, soft, nontender  Ext: no peripheral edema  Incision: clean, dry, intact, no erythema, sternum stable  Tubes/drain sites: dressing clean and dry  Lines: driveline dressing clean and dry   LVAD: speed 5700, flow 4.2 - 4.9, PI 2.2 - 3.4, power 4.1 - 4.3.          Data:    Imaging:  reviewed recent imaging, no acute concerns    Labs:  [facility]  Recent Labs   Lab 04/04/22  0741 04/04/22  0508 04/03/22  2143 04/03/22  1641 04/03/22  0738 04/03/22  0614 04/02/22  0823 04/02/22  0454 04/01/22  0754 04/01/22  0438   NA  --  137  --   --   --  137  --  137  --  136   POTASSIUM  --  4.1  --   --   --  4.2  --  4.1  --  4.2   CHLORIDE  --  107  --   --   --  106  --  105  --  103   YESICA  --  8.7  --   --   --  8.3*  --  8.4*  --  8.6   CO2  --  25  --   --   --  26  --  28  --  29   BUN  --  18  --   --   --  20  --  24  --  25   CR  --  0.77  --   --   --  0.70  --  0.75  --  0.73   * 125* 134* 136*   < > 104*   < > 112*   < > 110*    < > = values in this interval not displayed.     CBC  Recent Labs   Lab 04/04/22  0508 04/03/22  0614 04/02/22  0454 04/01/22  1041   WBC 11.5* 10.8 8.5 7.2   RBC 3.29* 3.03* 2.97* 2.93* "   HGB 9.9* 9.4* 9.0* 9.0*   HCT 31.9* 29.5* 29.1* 28.6*   MCV 97 97 98 98   MCH 30.1 31.0 30.3 30.7   MCHC 31.0* 31.9 30.9* 31.5   RDW 14.2 13.9 13.7 13.7    240 232 234     INR  Recent Labs   Lab 04/04/22  0508 04/03/22  0614 04/02/22  0454 04/01/22  0438   INR 1.23* 1.19* 1.16* 1.33*      Hepatic Panel  Recent Labs   Lab 04/04/22  0508 04/03/22  0614 04/02/22  0454 04/01/22  0438   AST 27 59* 106* 72*   * 166* 169* 89*   ALKPHOS 82 74 78 66   BILITOTAL 0.3 0.2 0.4 0.5   ALBUMIN 2.3* 2.1* 2.1* 2.0*     GLUCOSE:   Recent Labs   Lab 04/04/22  0741 04/04/22  0508 04/03/22  2143 04/03/22  1641 04/03/22  1217 04/03/22  0738   * 125* 134* 136* 105* 108*

## 2022-04-05 ENCOUNTER — APPOINTMENT (OUTPATIENT)
Dept: PHYSICAL THERAPY | Facility: CLINIC | Age: 57
DRG: 001 | End: 2022-04-05
Attending: INTERNAL MEDICINE
Payer: MEDICARE

## 2022-04-05 LAB
ALBUMIN SERPL-MCNC: 2.3 G/DL (ref 3.4–5)
ALP SERPL-CCNC: 75 U/L (ref 40–150)
ALT SERPL W P-5'-P-CCNC: 100 U/L (ref 0–70)
ANION GAP SERPL CALCULATED.3IONS-SCNC: 8 MMOL/L (ref 3–14)
AST SERPL W P-5'-P-CCNC: 24 U/L (ref 0–45)
BILIRUB SERPL-MCNC: 0.2 MG/DL (ref 0.2–1.3)
BUN SERPL-MCNC: 17 MG/DL (ref 7–30)
CALCIUM SERPL-MCNC: 8.8 MG/DL (ref 8.5–10.1)
CHLORIDE BLD-SCNC: 105 MMOL/L (ref 94–109)
CO2 SERPL-SCNC: 24 MMOL/L (ref 20–32)
CREAT SERPL-MCNC: 0.68 MG/DL (ref 0.66–1.25)
ERYTHROCYTE [DISTWIDTH] IN BLOOD BY AUTOMATED COUNT: 14.3 % (ref 10–15)
GFR SERPL CREATININE-BSD FRML MDRD: >90 ML/MIN/1.73M2
GLUCOSE BLD-MCNC: 118 MG/DL (ref 70–99)
GLUCOSE BLDC GLUCOMTR-MCNC: 108 MG/DL (ref 70–99)
GLUCOSE BLDC GLUCOMTR-MCNC: 96 MG/DL (ref 70–99)
HCT VFR BLD AUTO: 30.7 % (ref 40–53)
HGB BLD-MCNC: 9.4 G/DL (ref 13.3–17.7)
INR PPP: 1.56 (ref 0.85–1.15)
MAGNESIUM SERPL-MCNC: 2 MG/DL (ref 1.6–2.3)
MCH RBC QN AUTO: 29.7 PG (ref 26.5–33)
MCHC RBC AUTO-ENTMCNC: 30.6 G/DL (ref 31.5–36.5)
MCV RBC AUTO: 97 FL (ref 78–100)
PHOSPHATE SERPL-MCNC: 3.5 MG/DL (ref 2.5–4.5)
PLATELET # BLD AUTO: 229 10E3/UL (ref 150–450)
POTASSIUM BLD-SCNC: 4.5 MMOL/L (ref 3.4–5.3)
PROT SERPL-MCNC: 6.3 G/DL (ref 6.8–8.8)
RBC # BLD AUTO: 3.17 10E6/UL (ref 4.4–5.9)
SARS-COV-2 RNA RESP QL NAA+PROBE: NEGATIVE
SODIUM SERPL-SCNC: 137 MMOL/L (ref 133–144)
UFH PPP CHRO-ACNC: 0.28 IU/ML
WBC # BLD AUTO: 9.3 10E3/UL (ref 4–11)

## 2022-04-05 PROCEDURE — 250N000013 HC RX MED GY IP 250 OP 250 PS 637: Performed by: STUDENT IN AN ORGANIZED HEALTH CARE EDUCATION/TRAINING PROGRAM

## 2022-04-05 PROCEDURE — 120N000005 HC R&B MS OVERFLOW UMMC

## 2022-04-05 PROCEDURE — 99232 SBSQ HOSP IP/OBS MODERATE 35: CPT | Performed by: SURGERY

## 2022-04-05 PROCEDURE — 250N000013 HC RX MED GY IP 250 OP 250 PS 637: Performed by: SURGERY

## 2022-04-05 PROCEDURE — 250N000011 HC RX IP 250 OP 636: Performed by: PHYSICIAN ASSISTANT

## 2022-04-05 PROCEDURE — 99232 SBSQ HOSP IP/OBS MODERATE 35: CPT | Mod: 25 | Performed by: NURSE PRACTITIONER

## 2022-04-05 PROCEDURE — 93750 INTERROGATION VAD IN PERSON: CPT | Performed by: NURSE PRACTITIONER

## 2022-04-05 PROCEDURE — 85027 COMPLETE CBC AUTOMATED: CPT | Performed by: STUDENT IN AN ORGANIZED HEALTH CARE EDUCATION/TRAINING PROGRAM

## 2022-04-05 PROCEDURE — 250N000013 HC RX MED GY IP 250 OP 250 PS 637: Performed by: INTERNAL MEDICINE

## 2022-04-05 PROCEDURE — 84100 ASSAY OF PHOSPHORUS: CPT | Performed by: STUDENT IN AN ORGANIZED HEALTH CARE EDUCATION/TRAINING PROGRAM

## 2022-04-05 PROCEDURE — 85610 PROTHROMBIN TIME: CPT | Performed by: STUDENT IN AN ORGANIZED HEALTH CARE EDUCATION/TRAINING PROGRAM

## 2022-04-05 PROCEDURE — U0003 INFECTIOUS AGENT DETECTION BY NUCLEIC ACID (DNA OR RNA); SEVERE ACUTE RESPIRATORY SYNDROME CORONAVIRUS 2 (SARS-COV-2) (CORONAVIRUS DISEASE [COVID-19]), AMPLIFIED PROBE TECHNIQUE, MAKING USE OF HIGH THROUGHPUT TECHNOLOGIES AS DESCRIBED BY CMS-2020-01-R: HCPCS | Performed by: PHYSICIAN ASSISTANT

## 2022-04-05 PROCEDURE — 85520 HEPARIN ASSAY: CPT | Performed by: SURGERY

## 2022-04-05 PROCEDURE — 80053 COMPREHEN METABOLIC PANEL: CPT | Performed by: STUDENT IN AN ORGANIZED HEALTH CARE EDUCATION/TRAINING PROGRAM

## 2022-04-05 PROCEDURE — 97530 THERAPEUTIC ACTIVITIES: CPT | Mod: GP

## 2022-04-05 PROCEDURE — 36415 COLL VENOUS BLD VENIPUNCTURE: CPT | Performed by: STUDENT IN AN ORGANIZED HEALTH CARE EDUCATION/TRAINING PROGRAM

## 2022-04-05 PROCEDURE — 250N000013 HC RX MED GY IP 250 OP 250 PS 637: Performed by: NURSE PRACTITIONER

## 2022-04-05 PROCEDURE — 83735 ASSAY OF MAGNESIUM: CPT | Performed by: STUDENT IN AN ORGANIZED HEALTH CARE EDUCATION/TRAINING PROGRAM

## 2022-04-05 RX ADMIN — SACUBITRIL AND VALSARTAN 1 TABLET: 49; 51 TABLET, FILM COATED ORAL at 08:19

## 2022-04-05 RX ADMIN — Medication 400 MG: at 08:17

## 2022-04-05 RX ADMIN — ACETAMINOPHEN 650 MG: 325 TABLET ORAL at 20:57

## 2022-04-05 RX ADMIN — SACUBITRIL AND VALSARTAN 1 TABLET: 49; 51 TABLET, FILM COATED ORAL at 20:03

## 2022-04-05 RX ADMIN — METOCLOPRAMIDE 10 MG: 10 TABLET ORAL at 12:19

## 2022-04-05 RX ADMIN — HEPARIN SODIUM AND DEXTROSE 1450 UNITS/HR: 10000; 5 INJECTION INTRAVENOUS at 15:34

## 2022-04-05 RX ADMIN — ACETAMINOPHEN 650 MG: 325 TABLET ORAL at 14:53

## 2022-04-05 RX ADMIN — Medication 400 MG: at 20:03

## 2022-04-05 RX ADMIN — PANTOPRAZOLE SODIUM 40 MG: 40 TABLET, DELAYED RELEASE ORAL at 08:17

## 2022-04-05 RX ADMIN — SOTALOL HYDROCHLORIDE 80 MG: 80 TABLET ORAL at 08:17

## 2022-04-05 RX ADMIN — OXYCODONE HYDROCHLORIDE 5 MG: 5 TABLET ORAL at 20:57

## 2022-04-05 RX ADMIN — METOCLOPRAMIDE 10 MG: 10 TABLET ORAL at 17:46

## 2022-04-05 RX ADMIN — METOCLOPRAMIDE 10 MG: 10 TABLET ORAL at 08:17

## 2022-04-05 RX ADMIN — WARFARIN SODIUM 12.5 MG: 10 TABLET ORAL at 17:46

## 2022-04-05 RX ADMIN — SOTALOL HYDROCHLORIDE 80 MG: 80 TABLET ORAL at 20:03

## 2022-04-05 RX ADMIN — SPIRONOLACTONE 25 MG: 25 TABLET, FILM COATED ORAL at 08:17

## 2022-04-05 ASSESSMENT — ACTIVITIES OF DAILY LIVING (ADL)
ADLS_ACUITY_SCORE: 10

## 2022-04-05 NOTE — PROVIDER NOTIFICATION
Time of notification: 5:37 PM  Provider notified: Cards 2  Patient status: Stable. MD notified of low PIs this afternoon 1.6-2.0, other VAD numbers stable. Encouraged patient to drink more fluids.   Temp:  [97.5  F (36.4  C)-98.2  F (36.8  C)] 97.5  F (36.4  C)  Pulse:  [79-83] 80  Resp:  [14-16] 15  BP: (72-88)/(39-76) 82/69  Cuff Mean (mmHg):  [54-61] 54  SpO2:  [92 %-98 %] 94 %  Orders received: No new orders, will continue to monitor.

## 2022-04-05 NOTE — PLAN OF CARE
Physical Therapy Discharge Summary    Reason for therapy discharge:    All goals and outcomes met, no further needs identified.    Progress towards therapy goal(s). See goals on Care Plan in Epic electronic health record for goal details.  Inpatient rehab goals met/adequate for discharge.    Therapy recommendation(s):    Continued therapy is recommended.  Rationale/Recommendations:  patient will continue to work with OT while inpatient for cardiac rehab and mobility progression, recommend OP CR at discharge.

## 2022-04-05 NOTE — PLAN OF CARE
Neuro: A&Ox4. Patient calm and cooperative during shift  Cardiac: SR. VSS. Permanent pace maker. Paced DDD. LVAD hum auscultated.   Respiratory: Sating 95 on RA. Lung sounds clear bilaterally and diminished near bases.   GI/: Adequate urine output. No BM this shift.  Diet/appetite: Tolerating regular diet. Eating well. 3G sodium restriction.   Activity:  Assist of 1, up to chair and in halls. Walked about the unit with nurse today as well as with PT.   Pain: At acceptable level on current regimen. Some mild pain in chest today, relieved with tylenol.   Skin: No new deficits noted. CHG bath given. Dressings changed.   LDA's: Right single lumen PICC    Plan: Continue with POC. Notify primary team with changes. Continue LVAD teaching this week. Wife will be present during sessions. Continue with good fluid and food intake as well as activity.

## 2022-04-05 NOTE — PLAN OF CARE
Goal Outcome Evaluation:    Neuro: A&Ox4.   Cardiac:  LVAD HM3 values WDL. SR/paced. VSS.   Respiratory: Sating >95% on RA. Denies SOB.  GI/: Adequate urine output. BM x1  Diet/appetite: Tolerating regular diet. Eating well.  Activity:  SBA up to chair and in halls.  Pain: At acceptable level on current regimen. PRN oxy given x1.   Skin: No new deficits noted.  LDA's: PICC, LVAD    Plan: Continue with POC. Notify primary team with changes.

## 2022-04-05 NOTE — PROGRESS NOTES
"  [facility]   Cardiology II Service / Advanced Heart Failure  Daily Consult Note      Patient: Tej Morfin  MRN: [id number]  Admission Date: 3/22/2022  Hospital Day # 14    Assessment and Plan: Tej Morfin is a 56 year old male with a history of severe mitral regurgitation (s/p mitral valve repair with an annuloplasty ring, 1/21/15), VT (s/p ICD, 2/2/15), mild nonobstructive CAD, and NICM (on home dobutamine) who was admitted for scheduled LVAD implantation, with IABP support prior.  He is now s/p HM3 LVAD implant 3/23/22.  Note that he is part of the RICARDO trial.    Recommendations:  -no changes     # Chronic systolic heart failure secondary to NICM (LVEF 10-15% per EMILY 3/25/22)  # Severe MR s/p mitral valve repair with an annuloplasty ring, 1/21/15  # s/p HM3 LVAD 3/23/22  # Mild RV dysfunction  # Low PIs  Stage D, NYHA Class III. Last hemodynamics: 3/27: MAP 85, CVP 10, PAP 52/20, PCW 20, JOANNA CO/CI 5.1/2.4, SVR 1099, MVO2 50 -- weight in Epic 206#. Post-operative course c/b AFib with RVR (s/p ICD shock 3/25/22) and right knee effusion (s/p arthrocentesis and intraarticular steroid injection 3/30/22). PIs remain ~2 - RV is normal, likely hypovolemic, torsemide stopped.      - Fluid status: euvolemic, dc'ed torsemide yesterday  - ACEi/ARB/ARNi: Entresto 49-51mg   - BB: sotalol 80mg BID, RV looks well on echo, start metoprolol XL as outpatient  - Aldosterone antagonist: spironolactone 25mg daily  - SGLT2i: deferred  - SCD ppx: CRT  - Antiplatelet:  RICARDO trial -- on aspirin vs placebo  - Anticoagulation: warfarin, goal INR 2-3, today 1.56. Continue heparin at low-intensity, goal Xa 0.25-0.5, Xa 0.28  - MAPs: 61-85  - LDH: 267 (4/2)     # Mild, nonobstructive CAD  - RICARDO trial (aspirin vs placebo), please do not order aspirin  - start BB as outpatient      # H/o sustained VT (s/p ICD 2/2/15)  # NSVT  - sotalol 80mg bid  - start metoprolol XL as outpatient    # CRT  - per EP, \"patient has a " "CRT device with rising thresholds on the LV.  This may be postoperative inflammation. However, there exists emerging data to suggest LV lead in LVAD patient is not only unneccessary, but potentially harmful.  We will turn this off.  Given lack of need for pacing, we will reprogram the AV delay on the RV lead to minimize need for pacing. Intrinsic A-V interval detected at 110ms. We will increase sensed and paced AV delay to 300/280ms respectively. LV lead is turned off. This will also help with device longevity, decrease need for changeouts and reduce risk of infections.\"     # Post-operative AFib with RVR  s/p ICD shock 3/25/22, with return to NSR.  EP consulted, with device interrogation revealing ventricular rates int he VT detection zone.  They adjusted the single VF zone to 220bpm with 15 second detection time to reduce risk of shock and permit other therapies (ie ATP) to work prior to him getting shocked.  He was also given amiodarone, and sotalol was ultimately restarted 4/1.  - sotalol 80mg bid     # RIJ, subclavian, brachial vein DVT  - warfarin as above      NOTE:  Patient is medically ready for transfer to TCU.  Today represents a medically avoidable hospital day.    Thao Donovan, TESS, NP-C  Advanced Heart Failure/Cardiology II Service  Pager [phone] [facility] [id number]    ================================================================    Subjective/24-Hr Events:   Last 24 hr care team notes reviewed. No recent issues. Increased LVAD speed yesterday. PIs remain ~2. LVAD teaching is going well.     ROS:  4 point ROS including respiratory, CV, GI and  (other than that noted in the HPI) is negative.     Medications: Reviewed in EPIC.     Physical Exam:   BP (!) 85/76 (BP Location: Left arm)   Pulse 83   Temp 98  F (36.7  C) (Oral)   Resp 14   Ht 1.791 m (5' 10.51\")   Wt 80.1 kg (176 lb 9.4 oz)   SpO2 98%   BMI 24.97 kg/m      GENERAL: Appears comfortable, in no distress .  HEENT: Eye symmetrical, no " discharge or icterus bilaterally. Mucous membranes moist and without lesions.  NECK: Supple, JVD not visible.   CV: +mechanical LVAD  RESPIRATORY: Respirations regular, even, and unlabored. Lungs CTA throughout.   GI: Soft and non distended with normoactive bowel sounds present in all quadrants. No tenderness, rebound, guarding.   EXTREMITIES: No peripheral edema. Non pulsatile.   NEUROLOGIC: Alert and oriented x 3. No focal deficits.   MUSCULOSKELETAL: No joint swelling or tenderness.   SKIN: No jaundice. No rashes or lesions. Sternum stable.     Labs:  CMP  Recent Labs   Lab 04/05/22  1156 04/05/22  0810 04/05/22  0437 04/04/22  2057 04/04/22  0741 04/04/22  0508 04/03/22  0738 04/03/22  0614 04/02/22  0823 04/02/22  0454   NA  --   --  137  --   --  137  --  137  --  137   POTASSIUM  --   --  4.5  --   --  4.1  --  4.2  --  4.1   CHLORIDE  --   --  105  --   --  107  --  106  --  105   CO2  --   --  24  --   --  25  --  26  --  28   ANIONGAP  --   --  8  --   --  5  --  5  --  4   GLC 96 108* 118* 139*   < > 125*   < > 104*   < > 112*   BUN  --   --  17  --   --  18  --  20  --  24   CR  --   --  0.68  --   --  0.77  --  0.70  --  0.75   GFRESTIMATED  --   --  >90  --   --  >90  --  >90  --  >90   YESICA  --   --  8.8  --   --  8.7  --  8.3*  --  8.4*   MAG  --   --  2.0  --   --  2.0  --  2.1  --  2.2   PHOS  --   --  3.5  --   --  3.3  --  2.8  --  3.0   PROTTOTAL  --   --  6.3*  --   --  6.5*  --  6.2*  --  6.2*   ALBUMIN  --   --  2.3*  --   --  2.3*  --  2.1*  --  2.1*   BILITOTAL  --   --  0.2  --   --  0.3  --  0.2  --  0.4   ALKPHOS  --   --  75  --   --  82  --  74  --  78   AST  --   --  24  --   --  27  --  59*  --  106*   ALT  --   --  100*  --   --  131*  --  166*  --  169*    < > = values in this interval not displayed.       CBC  Recent Labs   Lab 04/05/22  0437 04/04/22  0508 04/03/22  0614 04/02/22  0454   WBC 9.3 11.5* 10.8 8.5   RBC 3.17* 3.29* 3.03* 2.97*   HGB 9.4* 9.9* 9.4* 9.0*   HCT 30.7*  31.9* 29.5* 29.1*   MCV 97 97 97 98   MCH 29.7 30.1 31.0 30.3   MCHC 30.6* 31.0* 31.9 30.9*   RDW 14.3 14.2 13.9 13.7    226 240 232       INR  Recent Labs   Lab 04/05/22  0437 04/04/22  0508 04/03/22  0614 04/02/22  0454   INR 1.56* 1.23* 1.19* 1.16*       Time/Communication  I personally spent a total of 25 minutes. Of that 15 minutes was counseling/coordination of patient's care. Plan of care discussed with patient. See my note above for details.    Patient discussed with Dr. Carmen.

## 2022-04-05 NOTE — PROCEDURES
The patient's HeartMate LVAD was interrogated 4/5/2022  * Speed 5700 rpm   * Pulsatility index 2.1-3   * Power 4.4 Noriega   * Flow 4.8-4 L/minute   Fluid status: euvolemic   Alarms were reviewed, and notable for few PI events.   The driveline exit site was inspected, dressing cdi.   All external components were inspected and showed no evidence of damage or malfunction, none replaced.   No changes to VAD settings made

## 2022-04-05 NOTE — PROGRESS NOTES
LVAD Social Work Services Progress Note      Date of Initial Social Work Evaluation: 3/23/2022  Collaborated with: [facility], [facility]    Data: Pt is s/p LVAD implant on 3/23/2022. Pt doing well and working on LVAD education and therapeutic INR; currently on heparin. Pt medically ready to discharge to [facility]. Writer contacted [facility] and no bed available today.   Writer received confirmation from [employer] that pt's insurance premium was approved and payment was sent today.     Intervention: Discharge Planning   Assessment: Did not meet w/ pt today. Writer discussed with pt and wife yesterday potential for discharge to [facility] for ongoing LVAD education and to achieve therapeutic INR. Pt may complete LVAD education and achieve therapeutic INR before bed ever becomes available.   Education provided by SW: Ongoing Social Work support and discharge planning.   Plan:    Discharge Plans in Progress: Continuing to collaborate with [facility]    Barriers to d/c plan: Bed availability at TCU    Follow up Plan: [facility] to continue to follow.

## 2022-04-05 NOTE — PROGRESS NOTES
Cardiovascular Surgery Progress Note  04/05/2022         Assessment and Plan:     Tej Morfin is a 56 year old male with NICM secondary to valvulopathy severe MR s/p MV ring repair, on home inotrope (Dobutamine 2.5), sustained VT s/p secondary prevention ICD, CKD stage II (baseline 1.5). He presented 3/22 for IABP the evening before his surgery.   Tej presented for LVAD placement with heartmate 3 with Dr. Rodríguez on 3/23/22     Cardiovascular:   Hx of NICM with chronic systolic HF (AHA Stage D, NYHA class 3b) 2/2 severe MR, s/p ring repair 2015  Hx VT with ICD placement  S/p LVAD HM3  Speed increased 5700 4/4. PI's occasionally dip to 1.7-1.8 4/5. Patient asymptomatic. Torsemide stopped 4/4. Discussed with cardiology, will continue to monitor.   No arrhythmia, HD stable. Occasional V-pacing.   IABP removed post op   ASA study (placebo vs. aspirin), statin not indicated (NICM).  HTN  - Entresto increased 4/4, Sotalol 80 mg q 12 hours home med, spironolactone 25 mg daily.    Chest tubes: all tubes removed. Recent CXR unremarkable.      Pulmonary:  - Extubated POD#1, Saturating well on RA.    - Supplemental O2 PRN to keep sats > 92%. Wean off as tolerated.  - Pulm toilet, IS, activity and deep breathing     Neurology / MSK:  - Neuro intact  - Acute post-operative pain; pain well controlled with acetaminophen, PO oxycodone PRN, IV dilaudid PRN  New right knee effusion-resolved 4/5  - Patient had previous patellar tendon tear with surgical repair in the early 1980's and medial meniscus partial removal in 2013   - Consulted Rheumatology 3/29, right knee needle aspiration performed 3/30/2022.  - Synovial fluid analysis: gram stain, bacterial culture, WBC w/ differential, crystals (monosodium urate and calcium pyrophosphate crystal deposition crystals) sent. Cultures negative so far. No crystals seen on analysis of fluid.    - 4/1: discussed with Rheumatology. They are not concerned for infection or gout and believe  effusion likely from osteoarthritis, fluid shifts, and possibly some subclinical crystals. No additional therapies needed at this time. If effusion becomes large and painful again, re consult them.       / Renal:  - Hx of renal disease CKD stage II (baseline 1.5). Most recent creatinine WNL, adequate UOP.   - Pre-op weight 200 lbs, weight down to 176 lbs, diuresed well on Torsemide 20 mg daily and is OFF since 4/4, cardiology managing.     GI / FEN:   Gastroparesis   - Regular diet, continue bowel regimen. Protein supplements between meals.  - Replace electrolytes as needed, hepatic enzymes WNL.  - Reglan 10 mg PO TID     Endocrine:  Stress induced hyperglycemia; initially managed on insulin drip postop, transitioned to sliding scale.     Infectious Disease:  - Stress induced leukocytosis; WBC WNL, no signs or symptoms of infection  - Completed perioperative antibiotics.  UA unremarkable  - COVID screen negative.  - If repeat fever, pan culture.      Hematology:   Acute blood loss anemia and thrombocytopenia  Hgb 9's; Plt WNL, no signs or symptoms of active bleeding     Anticoagulation:   - ASA   - Coumadin for LVAD, INR goal 2-3. Most recent INR 1.56, dose on 3/29 was held due to supra theraputic INR, patient now subtherapeutic.   - Restarted low intensity heparin gtt bridging 4/1     Prophylaxis:   - Stress ulcer prophylaxis: Pantoprazole 40 mg daily for 30 days  - DVT prophylaxis: Subcutaneous heparin, SCD     Disposition:   - Transferred to  on 3/27  - PT recommending DC to home, needs VAD teaching (5 sessions) and therapeutic INR    Discussed with Dr Rodríguez through both written and verbal communication.      Leigh Abraham PA-C  Cardiothoracic Surgery  Pager [phone]  April 5, 2022        Interval History:     No overnight events.  Feeling good overall. Wife coming for teaching this week.     States pain is well managed on current regimen. Slept well overnight. Knee improved.  Tolerating diet and  "tube feeds, is passing flatus, + BM. No nausea or vomiting.  Breathing well without complaints.   Working with therapies and ambulating in halls without assistance.   Denies chest pain, palpitations, dizziness, syncopal symptoms, fevers, chills, myalgias, or sternal popping/clicking.         Physical Exam:   Blood pressure (!) 86/63, pulse 80, temperature 97.7  F (36.5  C), temperature source Oral, resp. rate 15, height 1.791 m (5' 10.51\"), weight 80.1 kg (176 lb 9.4 oz), SpO2 94 %.  Vitals:    04/03/22 1101 04/04/22 0626 04/05/22 0654   Weight: 80.9 kg (178 lb 5.6 oz) 79.8 kg (175 lb 14.8 oz) 80.1 kg (176 lb 9.4 oz)       Gen: A&Ox4, NAD  Neuro: no focal deficits   CV: VAD humming, RRR, normal S1 S2, no murmurs, rubs or gallops.   Pulm: CTA, no wheezing or rhonchi, normal breathing on RA  Abd: nondistended, normal BS, soft, nontender  Ext: no peripheral edema  Incision: clean, dry, intact, no erythema, sternum stable  Tubes/drain sites: dressing clean and dry  Lines: driveline dressing clean and dry   LVAD: speed 5700, flow 4.2 - 4.9, PI 2.2 - 3.4 occasionally 1.8, power 4.1 - 4.3.          Data:    Imaging:  reviewed recent imaging, no acute concerns    Labs:  [facility]  Recent Labs   Lab 04/05/22  0810 04/05/22  0437 04/04/22  2057 04/04/22  1902 04/04/22  0741 04/04/22  0508 04/03/22  0738 04/03/22  0614 04/02/22  0823 04/02/22  0454   NA  --  137  --   --   --  137  --  137  --  137   POTASSIUM  --  4.5  --   --   --  4.1  --  4.2  --  4.1   CHLORIDE  --  105  --   --   --  107  --  106  --  105   YESICA  --  8.8  --   --   --  8.7  --  8.3*  --  8.4*   CO2  --  24  --   --   --  25  --  26  --  28   BUN  --  17  --   --   --  18  --  20  --  24   CR  --  0.68  --   --   --  0.77  --  0.70  --  0.75   * 118* 139* 105*   < > 125*   < > 104*   < > 112*    < > = values in this interval not displayed.     CBC  Recent Labs   Lab 04/05/22  0437 04/04/22  0508 04/03/22  0614 04/02/22  0454   WBC 9.3 11.5* 10.8 8.5   RBC " 3.17* 3.29* 3.03* 2.97*   HGB 9.4* 9.9* 9.4* 9.0*   HCT 30.7* 31.9* 29.5* 29.1*   MCV 97 97 97 98   MCH 29.7 30.1 31.0 30.3   MCHC 30.6* 31.0* 31.9 30.9*   RDW 14.3 14.2 13.9 13.7    226 240 232     INR  Recent Labs   Lab 04/05/22  0437 04/04/22  0508 04/03/22  0614 04/02/22  0454   INR 1.56* 1.23* 1.19* 1.16*      Hepatic Panel  Recent Labs   Lab 04/05/22  0437 04/04/22  0508 04/03/22  0614 04/02/22  0454   AST 24 27 59* 106*   * 131* 166* 169*   ALKPHOS 75 82 74 78   BILITOTAL 0.2 0.3 0.2 0.4   ALBUMIN 2.3* 2.3* 2.1* 2.1*     GLUCOSE:   Recent Labs   Lab 04/05/22  0810 04/05/22  0437 04/04/22  2057 04/04/22  1902 04/04/22  1755 04/04/22  1516   * 118* 139* 105* 117* 111*

## 2022-04-06 ENCOUNTER — APPOINTMENT (OUTPATIENT)
Dept: OCCUPATIONAL THERAPY | Facility: CLINIC | Age: 57
DRG: 001 | End: 2022-04-06
Attending: INTERNAL MEDICINE
Payer: MEDICARE

## 2022-04-06 LAB
ALBUMIN SERPL-MCNC: 2.2 G/DL (ref 3.4–5)
ALP SERPL-CCNC: 80 U/L (ref 40–150)
ALT SERPL W P-5'-P-CCNC: 91 U/L (ref 0–70)
ANION GAP SERPL CALCULATED.3IONS-SCNC: 6 MMOL/L (ref 3–14)
AST SERPL W P-5'-P-CCNC: 22 U/L (ref 0–45)
ATRIAL RATE - MUSE: 81 BPM
B BURGDOR DNA SPEC QL NAA+PROBE: NOT DETECTED
BILIRUB SERPL-MCNC: 0.2 MG/DL (ref 0.2–1.3)
BUN SERPL-MCNC: 17 MG/DL (ref 7–30)
CALCIUM SERPL-MCNC: 8.6 MG/DL (ref 8.5–10.1)
CHLORIDE BLD-SCNC: 106 MMOL/L (ref 94–109)
CO2 SERPL-SCNC: 24 MMOL/L (ref 20–32)
CREAT SERPL-MCNC: 0.63 MG/DL (ref 0.66–1.25)
DIASTOLIC BLOOD PRESSURE - MUSE: NORMAL MMHG
ERYTHROCYTE [DISTWIDTH] IN BLOOD BY AUTOMATED COUNT: 14.3 % (ref 10–15)
ERYTHROCYTE [DISTWIDTH] IN BLOOD BY AUTOMATED COUNT: 14.5 % (ref 10–15)
GFR SERPL CREATININE-BSD FRML MDRD: >90 ML/MIN/1.73M2
GLUCOSE BLD-MCNC: 126 MG/DL (ref 70–99)
HCT VFR BLD AUTO: 33.4 % (ref 40–53)
HCT VFR BLD AUTO: 34.9 % (ref 40–53)
HGB BLD-MCNC: 10.2 G/DL (ref 13.3–17.7)
HGB BLD-MCNC: 10.6 G/DL (ref 13.3–17.7)
HOLD SPECIMEN: NORMAL
INR PPP: 1.85 (ref 0.85–1.15)
INTERPRETATION ECG - MUSE: NORMAL
MAGNESIUM SERPL-MCNC: 2.1 MG/DL (ref 1.6–2.3)
MCH RBC QN AUTO: 29.7 PG (ref 26.5–33)
MCH RBC QN AUTO: 30.2 PG (ref 26.5–33)
MCHC RBC AUTO-ENTMCNC: 30.4 G/DL (ref 31.5–36.5)
MCHC RBC AUTO-ENTMCNC: 30.5 G/DL (ref 31.5–36.5)
MCV RBC AUTO: 98 FL (ref 78–100)
MCV RBC AUTO: 99 FL (ref 78–100)
P AXIS - MUSE: 123 DEGREES
PHOSPHATE SERPL-MCNC: 3.8 MG/DL (ref 2.5–4.5)
PLATELET # BLD AUTO: 239 10E3/UL (ref 150–450)
PLATELET # BLD AUTO: 255 10E3/UL (ref 150–450)
POTASSIUM BLD-SCNC: 4.6 MMOL/L (ref 3.4–5.3)
PR INTERVAL - MUSE: 152 MS
PROT SERPL-MCNC: 6.5 G/DL (ref 6.8–8.8)
QRS DURATION - MUSE: 106 MS
QT - MUSE: 446 MS
QTC - MUSE: 514 MS
R AXIS - MUSE: -79 DEGREES
RBC # BLD AUTO: 3.38 10E6/UL (ref 4.4–5.9)
RBC # BLD AUTO: 3.57 10E6/UL (ref 4.4–5.9)
SODIUM SERPL-SCNC: 136 MMOL/L (ref 133–144)
SYSTOLIC BLOOD PRESSURE - MUSE: NORMAL MMHG
T AXIS - MUSE: -4 DEGREES
UFH PPP CHRO-ACNC: 0.41 IU/ML
VENTRICULAR RATE- MUSE: 80 BPM
WBC # BLD AUTO: 7.5 10E3/UL (ref 4–11)
WBC # BLD AUTO: 8.1 10E3/UL (ref 4–11)

## 2022-04-06 PROCEDURE — 120N000005 HC R&B MS OVERFLOW UMMC

## 2022-04-06 PROCEDURE — 99232 SBSQ HOSP IP/OBS MODERATE 35: CPT | Mod: 25 | Performed by: NURSE PRACTITIONER

## 2022-04-06 PROCEDURE — 250N000013 HC RX MED GY IP 250 OP 250 PS 637: Performed by: STUDENT IN AN ORGANIZED HEALTH CARE EDUCATION/TRAINING PROGRAM

## 2022-04-06 PROCEDURE — 82040 ASSAY OF SERUM ALBUMIN: CPT | Performed by: STUDENT IN AN ORGANIZED HEALTH CARE EDUCATION/TRAINING PROGRAM

## 2022-04-06 PROCEDURE — 250N000013 HC RX MED GY IP 250 OP 250 PS 637: Performed by: INTERNAL MEDICINE

## 2022-04-06 PROCEDURE — 250N000013 HC RX MED GY IP 250 OP 250 PS 637: Performed by: PHYSICIAN ASSISTANT

## 2022-04-06 PROCEDURE — 93750 INTERROGATION VAD IN PERSON: CPT | Performed by: NURSE PRACTITIONER

## 2022-04-06 PROCEDURE — 85610 PROTHROMBIN TIME: CPT | Performed by: STUDENT IN AN ORGANIZED HEALTH CARE EDUCATION/TRAINING PROGRAM

## 2022-04-06 PROCEDURE — 250N000013 HC RX MED GY IP 250 OP 250 PS 637: Performed by: SURGERY

## 2022-04-06 PROCEDURE — 84100 ASSAY OF PHOSPHORUS: CPT | Performed by: STUDENT IN AN ORGANIZED HEALTH CARE EDUCATION/TRAINING PROGRAM

## 2022-04-06 PROCEDURE — 85014 HEMATOCRIT: CPT | Performed by: STUDENT IN AN ORGANIZED HEALTH CARE EDUCATION/TRAINING PROGRAM

## 2022-04-06 PROCEDURE — 80053 COMPREHEN METABOLIC PANEL: CPT | Performed by: STUDENT IN AN ORGANIZED HEALTH CARE EDUCATION/TRAINING PROGRAM

## 2022-04-06 PROCEDURE — 250N000013 HC RX MED GY IP 250 OP 250 PS 637: Performed by: NURSE PRACTITIONER

## 2022-04-06 PROCEDURE — 99232 SBSQ HOSP IP/OBS MODERATE 35: CPT | Performed by: SURGERY

## 2022-04-06 PROCEDURE — 250N000011 HC RX IP 250 OP 636: Performed by: PHYSICIAN ASSISTANT

## 2022-04-06 PROCEDURE — 85520 HEPARIN ASSAY: CPT | Performed by: SURGERY

## 2022-04-06 PROCEDURE — 85027 COMPLETE CBC AUTOMATED: CPT | Performed by: PHYSICIAN ASSISTANT

## 2022-04-06 PROCEDURE — 36415 COLL VENOUS BLD VENIPUNCTURE: CPT | Performed by: PHYSICIAN ASSISTANT

## 2022-04-06 PROCEDURE — 83735 ASSAY OF MAGNESIUM: CPT | Performed by: STUDENT IN AN ORGANIZED HEALTH CARE EDUCATION/TRAINING PROGRAM

## 2022-04-06 PROCEDURE — 36415 COLL VENOUS BLD VENIPUNCTURE: CPT | Performed by: STUDENT IN AN ORGANIZED HEALTH CARE EDUCATION/TRAINING PROGRAM

## 2022-04-06 PROCEDURE — 97110 THERAPEUTIC EXERCISES: CPT | Mod: GO | Performed by: OCCUPATIONAL THERAPIST

## 2022-04-06 RX ADMIN — METOCLOPRAMIDE 10 MG: 10 TABLET ORAL at 08:08

## 2022-04-06 RX ADMIN — SACUBITRIL AND VALSARTAN 1 TABLET: 49; 51 TABLET, FILM COATED ORAL at 08:08

## 2022-04-06 RX ADMIN — PANTOPRAZOLE SODIUM 40 MG: 40 TABLET, DELAYED RELEASE ORAL at 08:08

## 2022-04-06 RX ADMIN — SENNOSIDES AND DOCUSATE SODIUM 1 TABLET: 8.6; 5 TABLET ORAL at 20:43

## 2022-04-06 RX ADMIN — HEPARIN SODIUM AND DEXTROSE 1450 UNITS/HR: 10000; 5 INJECTION INTRAVENOUS at 06:53

## 2022-04-06 RX ADMIN — SOTALOL HYDROCHLORIDE 80 MG: 80 TABLET ORAL at 08:08

## 2022-04-06 RX ADMIN — OXYCODONE HYDROCHLORIDE 5 MG: 5 TABLET ORAL at 13:04

## 2022-04-06 RX ADMIN — WARFARIN SODIUM 12.5 MG: 10 TABLET ORAL at 17:46

## 2022-04-06 RX ADMIN — SPIRONOLACTONE 25 MG: 25 TABLET, FILM COATED ORAL at 08:08

## 2022-04-06 RX ADMIN — SOTALOL HYDROCHLORIDE 80 MG: 80 TABLET ORAL at 20:37

## 2022-04-06 RX ADMIN — ACETAMINOPHEN 650 MG: 325 TABLET ORAL at 17:45

## 2022-04-06 RX ADMIN — HEPARIN SODIUM AND DEXTROSE 1450 UNITS/HR: 10000; 5 INJECTION INTRAVENOUS at 23:36

## 2022-04-06 RX ADMIN — OXYCODONE HYDROCHLORIDE 5 MG: 5 TABLET ORAL at 01:44

## 2022-04-06 RX ADMIN — ACETAMINOPHEN 650 MG: 325 TABLET ORAL at 13:03

## 2022-04-06 RX ADMIN — METOCLOPRAMIDE 10 MG: 10 TABLET ORAL at 13:03

## 2022-04-06 RX ADMIN — OXYCODONE HYDROCHLORIDE 5 MG: 5 TABLET ORAL at 21:29

## 2022-04-06 RX ADMIN — METOCLOPRAMIDE 10 MG: 10 TABLET ORAL at 17:45

## 2022-04-06 RX ADMIN — CEPHALEXIN 250 MG: 250 CAPSULE ORAL at 21:51

## 2022-04-06 RX ADMIN — ACETAMINOPHEN 650 MG: 325 TABLET ORAL at 08:08

## 2022-04-06 RX ADMIN — ACETAMINOPHEN 650 MG: 325 TABLET ORAL at 21:51

## 2022-04-06 RX ADMIN — CEPHALEXIN 250 MG: 250 CAPSULE ORAL at 15:08

## 2022-04-06 RX ADMIN — SACUBITRIL AND VALSARTAN 1 TABLET: 49; 51 TABLET, FILM COATED ORAL at 20:36

## 2022-04-06 ASSESSMENT — ACTIVITIES OF DAILY LIVING (ADL)
ADLS_ACUITY_SCORE: 10

## 2022-04-06 NOTE — PROCEDURES
The patient's HeartMate LVAD was interrogated 4/6/2022  * Speed 5700 rpm   * Pulsatility index 1.7-2.4  * Power 4.4 Noriega   * Flow 4.6-5 L/minute   Fluid status: euvolemic   Alarms were reviewed, and notable for several PI events with speed drops  The driveline exit site was inspected, dressing cdi.   All external components were inspected and showed no evidence of damage or malfunction, none replaced.   No changes to VAD settings made

## 2022-04-06 NOTE — DISCHARGE SUMMARY
[facility], [place]   Cardiothoracic Surgery Hospital Discharge Summary     Tej Morfin MRN# [id number]   Age: 56 year old YOB: 1965     Admitting Physician:  Mohsen Jimenez MD  Discharge Physician:  EMILIA Amanda  Primary Care Physician:        Marlin Hunter     DATE OF ADMISSION: 3/22/2022      DATE OF DISCHARGE: April 8, 2022     Admit Wt: 200 lbs  Surgery Wt: 189 lbs  Discharge Wt: 174 lbs          Primary Diagnoses:   1.  Hx of NICM with chronic systolic HF (AHA Stage D, NYHA class 3b) 2/2 severe MR, s/p ring repair 2015  2. Severe mitral regurgitation, status post mitral valve repair.  3. Cardiogenic shock secondary to dilated cardiomyopathy. S/p LVAD HM3 this admission   4. Sustained VT, resolved  5. Right knee effusion-resolved           Secondary Diagnoses:   1. HTN  2. History of automatic implantable cardioverter-defibrillator.    3. CKD stage II with baseline Cr 1.5, currently Cr WNL.   4. Superficial dehiscence of lower sternal incision, on oral antibiotics for prophylaxis.   5. Gastroparesis   6. Hx tobacco use    PROCEDURES PERFORMED:   Date: 3/23/2022.  Surgeon: Dr. Rodríguez  1.  Redo median sternotomy.  2.  Placement of HeartMate 3 left ventricular assist device (intracorporeal left ventricular assist device).        CONSULTS:    1. PT/OT  2. Heart Failure Cardiology  3. Electrophysiology Cardiology    4. Rheumatology  5. Social Work     BRIEF HISTORY OF ILLNESS:  Tej Morfin is a 56 year old male with NICM secondary to valvulopathy severe MR s/p MV ring repair, on home inotrope (Dobutamine 2.5), sustained VT s/p secondary prevention ICD, CKD stage II (baseline 1.5). He presented 3/22 for IABP the evening before his LVAD placement with heartmate 3 with Dr. Rodríguez on 3/23/22    HOSPITAL COURSE: Patient was admitted to the hospital, he was taken to the OR for the above procedure. He was transferred to the unit in stable condition.  Drips and lines were weaned per routine. Patient was extubated on POD# 1, ICU course was uncomplicated except for an ICD shock on POD #2, Electrophysiology consulted. He had a run of A-fib with RVR (180-200 bpm), ICD interrogation showed A-fib with inappropriate shock, EP adjusted device settings, LV lead turned off, resumed PTA sotalol. No further events.   Patient transferred to the telemetry floor on POD#4. Chest tubes were removed as drainage allowed. The remainder patient's hospital course is detailed below by diagnosis.     LVAD HM3 3/23/22  Speed increased 5700 4/4. PI's stable from mid 2's to 3's. Flows stable in 4's. Patient tolerating LVAD well. No LVAD complications. Patient euvolemic and progressing well. No arrhythmias, sinus rhythm with ICD in place and occasional V pacing. IABP removed immediately post op. Patient is a participant in ASA study (placebo vs. aspirin), statin not indicated (NICM). Patient on Coumadin for LVAD, INR goal 2-3. Patient bridged to therapeutic INR with low intensity heparin gtt. He has completed both LVAD training and coumadin teaching prior to discharge.      HTN  Patient remained hemodynamically stable on the following: Entresto increased 4/4, Sotalol 80 mg q 12 hours home med, spironolactone 25 mg daily. Heart failure cardiology has consulted for assistance in medical management of patient.       New right knee effusion-resolved 4/5  Patient had previous patellar tendon tear with surgical repair in the early 1980's and medial meniscus partial removal in 2013.  Rheumatology consulted 3/29 for large painful right nee effusion, right knee needle aspiration performed 3/30/2022. Synovial fluid analysis: gram stain, bacterial culture, WBC w differential, crystals (monosodium urate and calcium pyrophosphate crystal deposition crystals) sent. Cultures were all negative. No crystals seen on analysis of fluid. Finding were discussed with Rheumatology. They were not concerned for  infection or gout and believe effusion likely from osteoarthritis, fluid shifts, and possibly some subclinical crystals. No additional therapies needed at this time. Patient has not had any further reoccurrences of effusion.     Renal disease CKD stage II (baseline 1.5).   Most recent creatinine WNL, adequate UOP. Pre-op weight 200 lbs, patient is now over 20 pounds below admission weight, he his euvolemic on exam. He initially diuresed well on Torsemide 20 mg daily and is OFF since 4/4.     Gastroparesis   Patent tolerating regular diet, continue bowel regimen. He is eating well and having normal bowel movements. He was on Reglan 10 mg PO TID prior to surgery, this was discontinued prior to discharge hoping that improved blood flow to stomach would improve symptoms. He endorsed a main complaint of bloating prior to starting Reglan.     Stress induced hyperglycemia  initially managed on insulin drip postop, transitioned to sliding scale. All insulin stopped as patient was no longer needing.     Stress induced leukocytosis  WBC now WNL, no signs or symptoms of infection. Completed perioperative antibiotics.  UA unremarkable. COVID screen negative.    Small areas of superficial dehiscence along longer portion of incision  No signs of infection. Small amount of bloody drainage. No erythema. Started Keflex 500 mg tid x 5 days for wound prophylaxis until wound scabs over. Clean daily, cover with gauze while draining, and then leave open to air to promote scab formation.        Prior to discharge, his pain was controlled well, he was able to perform most ADLs and ambulate without difficulty, and had full return of bowel and bladder function.  On April 8, 2022, he was discharged to home in stable condition.    Patient discharged on aspirin:  Study   Patient discharged on beta blocker: no   Patient discharged on ACE Inhibitor/ARB: yes             Discharge Disposition:     Discharged to home            Condition on  "Discharge:     Discharge condition: Stable   Discharge vitals: Blood pressure (!) 86/69, pulse 80, temperature 97.7  F (36.5  C), temperature source Oral, resp. rate 16, height 1.791 m (5' 10.51\"), weight 79.2 kg (174 lb 9.7 oz), SpO2 100 %.     Code status on discharge: Full Code     Vitals:    04/06/22 0508 04/07/22 0351 04/08/22 0500   Weight: 79.2 kg (174 lb 9.7 oz) 78.9 kg (173 lb 15.1 oz) 79.2 kg (174 lb 9.7 oz)       DAY OF DISCHARGE PHYSICAL EXAM:    Blood pressure (!) 86/69, pulse 80, temperature 97.7  F (36.5  C), temperature source Oral, resp. rate 16, height 1.791 m (5' 10.51\"), weight 79.2 kg (174 lb 9.7 oz), SpO2 100 %.  Vitals:    04/06/22 0508 04/07/22 0351 04/08/22 0500   Weight: 79.2 kg (174 lb 9.7 oz) 78.9 kg (173 lb 15.1 oz) 79.2 kg (174 lb 9.7 oz)      Weight; - 26 kg since admit and trending stable.   24 hr Fluid status; net loss 902 mL.   MAPs: 71 -0 82    Gen: A&Ox4, NAD  Neuro: no focal deficits   CV: VAD humming, RRR, normal S1 S2, no murmurs, rubs or gallops.   Pulm: CTA, no wheezing or rhonchi, normal breathing on RA  Abd: nondistended, normal BS, soft, nontender  Ext: no peripheral edema, R knee without edema or tenderness  Incision: overall clean, dry, no erythema; two small 1 cm areas of superficial dehiscence and the inferior 2-3 cm of wound is open and draining. Sternum stable  Tubes/drain sites: dressing clean and dry  Lines: driveline dressing clean and dry   LVAD: speed 5700, flow 4.5 - 4.9, PI 1.8 - 2.4, power 3.9 - 4.4.     BMP  Recent Labs   Lab 04/08/22  0519 04/07/22  0505 04/06/22  0457 04/05/22  1156 04/05/22  0810 04/05/22  0437    138 136  --   --  137   POTASSIUM 4.7 4.6 4.6  --   --  4.5   CHLORIDE 107 109 106  --   --  105   YESICA 9.0 9.3 8.6  --   --  8.8   CO2 24 23 24  --   --  24   BUN 19 17 17  --   --  17   CR 0.69 0.68 0.63*  --   --  0.68   * 125* 126* 96   < > 118*    < > = values in this interval not displayed.     CBC  Recent Labs   Lab " 04/08/22  0519 04/07/22  0505 04/06/22  1147 04/06/22  0457   WBC 5.3 5.9 8.1 7.5   RBC 3.41* 3.40* 3.57* 3.38*   HGB 10.5* 10.3* 10.6* 10.2*   HCT 34.0* 33.8* 34.9* 33.4*    99 98 99   MCH 30.8 30.3 29.7 30.2   MCHC 30.9* 30.5* 30.4* 30.5*   RDW 14.5 14.3 14.5 14.3    239 255 239     INR  Recent Labs   Lab 04/08/22  0519 04/07/22  0505 04/06/22  0457 04/05/22  0437   INR 2.36* 2.12* 1.85* 1.56*      Hepatic Panel  Recent Labs   Lab 04/08/22  0519 04/07/22  0505 04/06/22  0457 04/05/22  0437   AST 16 19 22 24   ALT 67 82* 91* 100*   ALKPHOS 83 85 80 75   BILITOTAL 0.3 0.2 0.2 0.2   ALBUMIN 2.6* 2.4* 2.2* 2.3*     Recent Labs   Lab 04/08/22  0519 04/07/22  0505 04/06/22  0457 04/05/22  1156 04/05/22  0810 04/05/22  0437   * 125* 126* 96 108* 118*       ECHOCARDIOGRAM, 4/3/2022-   Interpretation Summary  HM3 LVAD at 5600 RPM  Left ventricular function is severely decreased. The ejection fraction is  <15%. There is moderate dilation of the left ventricle (LVIDd = 6.5cm).  The LVAD inflow cannula is present in the expected anatomical position with  normal Doppler parameter of the inflow and outflow cannulas.  The aortic valve remains closed during the cardiac cycle, there is mild continuous aortic.  The right ventricular function is mildly to moderately reduced.  The inferior vena cava was normal in size with preserved respiratory variability.  No pericardial effusion is present.    CXR 3/31/2022-   1. Interval removal of left chest tube with trace apical pneumothorax, unchanged from prior exam.    Remainder of support devices are stable.  2. Small left pleural effusion and stable left retrocardiac opacities which may represent consolidation    versus atelectasis.     DISCHARGE INSTRUCTIONS:  You had a sternotomy, avoid lifting anything greater than ten pounds for 6 weeks after surgery and then less than 20 pounds for an additional 6 weeks.   Do not reach backwards or use arms to push out of chair.    Do not let people pull on your arms to assist with standing.   Avoid twisting or reaching too far across your body.    Avoid strenuous activities such as bowling, vacuuming, raking, shoveling, golf or tennis for 12 weeks after your surgery.   It is okay to resume sex if you feel comfortable in doing so. You may have to try different positions with your partner.    Splint your chest incision by hugging a pillow or bringing your arms across your chest when coughing or sneezing.     No driving for 4 weeks after surgery or while on pain medication.     Wash your incisions daily with soap and water (or as instructed), pat dry.   Keep wound clean and dry. No baths or swimming or showers.   Cover chest tube sites with dry gauze until they stop draining, then leave open to air. It is not abnormal for chest tube sites to drain yellowish/clear fluid for up to 2-3 weeks after surgery.   Watch for signs of infection: increased redness, tenderness, warmth or any drainage that appears infected (pus like) or is persistent.  Also a temperature > 100.5 F or chills. Call your surgeon or primary care provider's office immediately.   Remove any skin glue left on incisions after 10-14 days. This will not affect your incision and can speed up healing.    Exercise is very important in your recovery. Please follow the guidelines set up for you in your cardiac rehab classes at the hospital. If outpatient cardiac rehab was ordered for you, we highly recommend you participate. If you have problems arranging your cardiac rehab, please call [phone] for all locations, with the exception of [place], please call [phone] and [place], please call [phone].    Avoid sitting for prolonged periods of time, try to walk every hour during the day. If you have a leg incision, elevate your leg often when you are not walking.    Check your weight when you get home from the hospital and continue to check it daily through your recovery for  at least a month. If you notice a weight gain of 2-3 pounds in a week, notify your primary care physician, cardiologist or surgeon.    Bowel activity may be slow after surgery. If necessary, you may take an over the counter laxative such as Milk of Magnesia or Miralax. You may have stool softeners prescribed (docusate sodium, Senokot). We recommend using stool softeners while using narcotics for pain (oxycodone/percocet, hydrocodone/vicodin, hydromorphone/dilaudid).      Wean OFF of narcotics (oxycodone, dilaudid, hydrocodone) as soon as possible. You should continue taking acetaminophen as long as you have any surgical pain as the first choice for pain control and add narcotics as necessary for pain to be tolerable.      DO NOT SMOKE.  IF YOU NEED HELP QUITTING, PLEASE TALK WITH YOUR CARDIOLOGIST OR PRIMARY DOCTOR.    You are on a blood thinner, follow the instructions you were given in the hospital and DO NOT SKIP this medication. Try and take it the same time everyday. Your primary care physician or coumadin clinic will manage the dosing. INR goal is 2-3 for LVAD.    You have an LVAD device, so call your LVAD coordinator with all questions and concerns.  No swimming, showering, or baths. Daily sterile driveline dressing changes as instructed. You cannot have a MRI with your LVAD in place. No contact sports.     PRE-ADMISSION MEDICATIONS:  Medications Prior to Admission   Medication Sig Dispense Refill Last Dose     diazepam (VALIUM) 10 MG tablet Take 10 mg by mouth every 4 hours as needed for anxiety   Past Week at Unknown time     fexofenadine (ALLEGRA) 180 MG tablet Take 180 mg by mouth daily        fluticasone (FLONASE) 50 MCG/ACT nasal spray Spray 1 spray into both nostrils 2 times daily as needed    More than a month at Unknown time     omeprazole (PRILOSEC) 40 MG DR capsule Take 40 mg by mouth every morning   3/22/2022 at 0800     sotalol (BETAPACE) 80 MG tablet TAKE 1 TABLET BY MOUTH TWICE A DAY    3/22/2022 at 0800     [DISCONTINUED] aspirin (ASA) 81 MG EC tablet Take 81 mg by mouth   3/16/2022 at Unknown time     [DISCONTINUED] DOBUTamine 500 mg in dextrose 5% 250 mL (adult std conc) premix Inject 2.5 mcg/kg/min into the vein continuous . (2.5 mcg/kg/min x84.6 kg)   3/22/2022     [DISCONTINUED] furosemide (LASIX) 40 MG tablet Take 40 mg by mouth 2 times daily        [DISCONTINUED] hydrALAZINE (APRESOLINE) 25 MG tablet Take 25 mg by mouth 3 times daily        [DISCONTINUED] metoclopramide (REGLAN) 10 MG tablet Take 10 mg by mouth 3 times daily For 3 weeks then off 1 week   3/21/2022 at Unknown time     [DISCONTINUED] potassium chloride ER (KLOR-CON M) 20 MEQ CR tablet Take 40 mEq by mouth daily    3/22/2022 at 0800     [DISCONTINUED] sacubitril-valsartan (ENTRESTO)  MG per tablet Take 1 tablet by mouth 2 times daily    3/22/2022 at 0800       DISCHARGE MEDICATIONS:      Medication List      Started    acetaminophen 325 MG tablet  Commonly known as: TYLENOL  650 mg, Oral, EVERY 4 HOURS PRN     cephALEXin 500 MG capsule  Commonly known as: KEFLEX  500 mg, Oral, EVERY 8 HOURS     methocarbamol 750 MG tablet  Commonly known as: ROBAXIN  750 mg, Oral, 3 TIMES DAILY PRN     oxyCODONE 5 MG tablet  Commonly known as: ROXICODONE  5 mg, Oral, EVERY 4 HOURS PRN     polyethylene glycol 17 GM/Dose powder  Commonly known as: MIRALAX  17 g, Oral, DAILY, while taking oxycodone     sacubitril-valsartan 49-51 MG per tablet  Commonly known as: ENTRESTO  1 tablet, Oral, 2 TIMES DAILY  Replaces: Entresto  MG per tablet     senna-docusate 8.6-50 MG tablet  Commonly known as: SENOKOT-S/PERICOLACE  1-2 tablets, Oral, 2 TIMES DAILY PRN, while taking oxycodone     study - aspirin or placebo (IDS# 5616) 100 MG capsule  100 mg, Oral, DAILY, . Swallow whole, do not crush.  Start taking on: April 9, 2022  Replaces: aspirin 81 MG EC tablet     warfarin ANTICOAGULANT 2.5 MG tablet  Commonly known as: COUMADIN  Take as directed.  If you are unsure how to take this medication, talk to your nurse or doctor.  Original instructions: Take 10 mg PO daily at 6 pm until next INR check, then dose per INR goal 2-3        Discontinued    aspirin 81 MG EC tablet  Commonly known as: ASA  Replaced by: study - aspirin or placebo (IDS# 5616) 100 MG capsule     DOBUTamine 2 MG/ML infusion  Commonly known as: DOBUTREX     Entresto  MG per tablet  Generic drug: sacubitril-valsartan  Replaced by: sacubitril-valsartan 49-51 MG per tablet     furosemide 40 MG tablet  Commonly known as: LASIX     hydrALAZINE 25 MG tablet  Commonly known as: APRESOLINE     metoclopramide 10 MG tablet  Commonly known as: REGLAN     potassium chloride ER 20 MEQ CR tablet  Commonly known as: KLOR-CON M                CC:Marlin Varela [facility]   Cardiothoracic Surgery  Office phone: [phone]  Office fax: [phone]

## 2022-04-06 NOTE — PROGRESS NOTES
[facility]   Cardiology II Service / Advanced Heart Failure  Daily Consult Note      Patient: Tej Morfin  MRN: [id number]  Admission Date: 3/22/2022  Hospital Day # 15    Assessment and Plan: Tej Morfin is a 56 year old male with a history of severe mitral regurgitation (s/p mitral valve repair with an annuloplasty ring, 1/21/15), VT (s/p ICD, 2/2/15), mild nonobstructive CAD, and NICM (on home dobutamine) who was admitted for scheduled LVAD implantation, with IABP support prior.  He is now s/p HM3 LVAD implant 3/23/22.  Note that he is part of the RICARDO trial.    Recommendations:  -recommend stopping Reglan if able  -plan to discharge Friday after teaching    # Chronic systolic heart failure secondary to NICM (LVEF 10-15% per EMILY 3/25/22)  # Severe MR s/p mitral valve repair with an annuloplasty ring, 1/21/15  # s/p HM3 LVAD 3/23/22  # Mild RV dysfunction  # Low PIs  Stage D, NYHA Class III. Last hemodynamics: 3/27: MAP 85, CVP 10, PAP 52/20, PCW 20, JOANNA CO/CI 5.1/2.4, SVR 1099, MVO2 50 -- weight in Epic 206#. Post-operative course c/b AFib with RVR (s/p ICD shock 3/25/22) and right knee effusion (s/p arthrocentesis and intraarticular steroid injection 3/30/22). PIs remain ~2 - RV is normal, likely hypovolemic, torsemide stopped.      - Fluid status: euvolemic, off torsemide   - ACEi/ARB/ARNi: Entresto 49-51mg   - BB: sotalol 80mg BID, RV looks well on echo, start metoprolol XL as outpatient  - Aldosterone antagonist: spironolactone 25mg daily  - SGLT2i: deferred  - SCD ppx: CRT  - Antiplatelet:  RICARDO trial -- on aspirin vs placebo  - Anticoagulation: warfarin, goal INR 2-3, today 1.585. Continue heparin at low-intensity, goal Xa 0.25-0.5, Xa 0.4  - MAPs: 80s  - LDH: 267 (4/2)     # Mild, nonobstructive CAD  - RICARDO trial (aspirin vs placebo), please do not order aspirin  - start metoprolol XL as outpatient      # H/o sustained VT (s/p ICD 2/2/15)  # NSVT  # CRT  # Post-operative AFib  "with RVR  s/p ICD shock 3/25/22, return to NSR.  EP consulted, device interrogation revealing ventricular rates in VT detection zone. adjusted the single VF zone to 220bpm with 15 second detection time to reduce risk of shock and permit other therapies (ie ATP) to work prior to him getting shocked.  He was also given amiodarone, and sotalol was ultimately restarted 4/1.  - sotalol 80mg bid  - start metoprolol XL as outpatient  - per EP, \"patient has a CRT device with rising thresholds on the LV.  This may be postoperative inflammation. However, there exists emerging data to suggest LV lead in LVAD patient is not only unneccessary, but potentially harmful.  We will turn this off.  Given lack of need for pacing, we will reprogram the AV delay on the RV lead to minimize need for pacing. Intrinsic A-V interval detected at 110ms. We will increase sensed and paced AV delay to 300/280ms respectively. LV lead is turned off. This will also help with device longevity, decrease need for changeouts and reduce risk of infections.\"    # RIJ, subclavian, brachial vein DVT  - warfarin as above      Thao Donovan, TESS, NP-C  Advanced Heart Failure/Cardiology II Service  Pager [phone] [facility] [id number]    ================================================================    Subjective/24-Hr Events:   Last 24 hr care team notes reviewed. No recent issues. Feeling well. PIs remain ~2, asymptomatic. Working with therapies, cleared for home. No shortness of breath, orthopnea, PND, LE edema. No events on tele. Denies bleeding issues.     ROS:  4 point ROS including respiratory, CV, GI and  (other than that noted in the HPI) is negative.     Medications: Reviewed in EPIC.     Physical Exam:   BP (!) 81/72 (BP Location: Left arm)   Pulse 79   Temp 98.1  F (36.7  C) (Oral)   Resp 17   Ht 1.791 m (5' 10.51\")   Wt 79.2 kg (174 lb 9.7 oz)   SpO2 95%   BMI 24.69 kg/m      GENERAL: Appears comfortable, in no distress .  HEENT: Eye symmetrical, " no discharge or icterus bilaterally. Mucous membranes moist and without lesions.  NECK: Supple, JVD not visible.   CV: +mechanical LVAD  RESPIRATORY: Respirations regular, even, and unlabored. Lungs CTA throughout.   GI: Soft and non distended with normoactive bowel sounds present in all quadrants. No tenderness, rebound, guarding.   EXTREMITIES: No peripheral edema. Non pulsatile.   NEUROLOGIC: Alert and oriented x 3. No focal deficits.   MUSCULOSKELETAL: No joint swelling or tenderness.   SKIN: No jaundice. No rashes or lesions. Sternum stable.     Labs:  CMP  Recent Labs   Lab 04/06/22  0457 04/05/22  1156 04/05/22  0810 04/05/22  0437 04/04/22  0741 04/04/22  0508 04/03/22  0738 04/03/22  0614     --   --  137  --  137  --  137   POTASSIUM 4.6  --   --  4.5  --  4.1  --  4.2   CHLORIDE 106  --   --  105  --  107  --  106   CO2 24  --   --  24  --  25  --  26   ANIONGAP 6  --   --  8  --  5  --  5   * 96 108* 118*   < > 125*   < > 104*   BUN 17  --   --  17  --  18  --  20   CR 0.63*  --   --  0.68  --  0.77  --  0.70   GFRESTIMATED >90  --   --  >90  --  >90  --  >90   YESICA 8.6  --   --  8.8  --  8.7  --  8.3*   MAG 2.1  --   --  2.0  --  2.0  --  2.1   PHOS 3.8  --   --  3.5  --  3.3  --  2.8   PROTTOTAL 6.5*  --   --  6.3*  --  6.5*  --  6.2*   ALBUMIN 2.2*  --   --  2.3*  --  2.3*  --  2.1*   BILITOTAL 0.2  --   --  0.2  --  0.3  --  0.2   ALKPHOS 80  --   --  75  --  82  --  74   AST 22  --   --  24  --  27  --  59*   ALT 91*  --   --  100*  --  131*  --  166*    < > = values in this interval not displayed.       CBC  Recent Labs   Lab 04/06/22 0457 04/05/22  0437 04/04/22  0508 04/03/22  0614   WBC 7.5 9.3 11.5* 10.8   RBC 3.38* 3.17* 3.29* 3.03*   HGB 10.2* 9.4* 9.9* 9.4*   HCT 33.4* 30.7* 31.9* 29.5*   MCV 99 97 97 97   MCH 30.2 29.7 30.1 31.0   MCHC 30.5* 30.6* 31.0* 31.9   RDW 14.3 14.3 14.2 13.9    229 226 240       INR  Recent Labs   Lab 04/06/22 0457 04/05/22 0437 04/04/22  0508  04/03/22  0614   INR 1.85* 1.56* 1.23* 1.19*       Time/Communication  I personally spent a total of 25 minutes. Of that 15 minutes was counseling/coordination of patient's care. Plan of care discussed with patient. See my note above for details.    Patient discussed with Dr. Carmen.

## 2022-04-06 NOTE — PROGRESS NOTES
"CLINICAL NUTRITION SERVICES - REASSESSMENT NOTE     Nutrition Prescription    RECOMMENDATIONS FOR MDs/PROVIDERS TO ORDER:  None at this time.     Malnutrition Status:    Moderate malnutrition in the context of chronic illness    Recommendations already ordered by Registered Dietitian (RD):  None additionally at this time.     Future/Additional Recommendations:  1. Continue 3 g sodium diet, as ordered, to help encourage oral intake. Encourage high protein options (due to increased needs) and intake of small, frequent meals.   2. Monitor BG control. Hgb A1c of 5.9 on 3/3/2022. BG was 126 on 4/6/2022.   3. Consider changing scheduled senna to prn.      EVALUATION OF THE PROGRESS TOWARD GOALS   Diet: 3 g Sodium since 4/4. Has an order for Ensure Enlive (chocolate or strawberry at 10:00 and HS) and a Special K bar at 14:00. Has a room service order for \"Please allow pt to self-select lactose-containing food items if requested. He reports knowing what lactose-containing foods he tolerates. Mostly intolerant to Cow's milk. High protein needs post LVAD.\"  Intake: Good diet tolerance. Per % intake flowsheets, pt consuming % with a good appetite. [employer] (meal ordering system) indicates food/beverages sent 4/3-4/5 totaled 2508 kcals and 106 g protein daily on average. Has been ordering three meals daily. Pt was busy during attempts to see today (LVAD education and then busy with nursing staff). Eating well per RNs.     NEW FINDINGS   Resp: No O2.   GI: Having zero to two stools daily. On scheduled senna, held at times. Formed, brown stools. Last stool on 4/5. Per provider note 4/6, \"gastroparesis.\" Note, on reglan. No N/V.   Wt Hx: 85.2 kg (4/14/16), 93 kg (9/12/19), 96.7 kg (1/8/20), 94.8 kg (1/12/21), 89.8 kg (5/25/21), 88.5 kg (3/3/2022), 79.2 kg (4/6/2022) - Pt has lost 10.5% of his body wt over the last month. Suspect actual and fluid loss.     ASSESSED NUTRITION NEEDS (updated):  Dosing Weight: 79 kg (based on " wt of 79.2 kg on 4/6)  Estimated Energy Needs: 9914-9149 kcals/day (25 - 30 kcals/kg)  Justification: Maintenance and Post-op  Estimated Protein Needs: 119-158 grams protein/day (1.5 - 2 grams of pro/kg)  Justification: Post-op LVAD with increased needs pending renal function  Estimated Fluid Needs: (1 mL/kcal)   Justification: Maintenance    MALNUTRITION  % Intake: No decreased intake noted  % Weight Loss: > 5% in 1 month (severe) - suspect both actual and fluid loss  Subcutaneous Fat Loss: None per prior RD note. Pt busy during attempts x2 today.   Muscle Loss: Temporal:  Mild, Upper arm (bicep, tricep):  Mild, Upper leg (quadricep, hamstring):  Mild and Posterior calf:  Mild per prior RD note. Pt busy during attempts x2 today.   Fluid Accumulation/Edema: None noted  Malnutrition Diagnosis: Moderate malnutrition in the context of chronic illness    Previous Goals   1. Patient to consume % of nutritionally adequate meal trays TID, or the equivalent with supplements/snacks.  Evaluation: Meeting.     2.  Pt will verbalize at least three high protein foods.   Evaluation: RD provided nutrition education regarding LVAD on 3/30. Unable to further assess this goal on 4/6 at pt was busy during attempts x2.      Previous Nutrition Diagnosis  Food- and nutrition-related knowledge deficit r/t limited knowledge of meal planning post-op LVAD placement AEB pt report of not receiving formal nutrition education on meal planning post-op LVAD placement in the past.   Evaluation: RD provided nutrition education regarding LVAD on 3/30. Changed to new nutrition dx below.     CURRENT NUTRITION DIAGNOSIS  Increased nutrient needs (protein) related to increased needs post-op LVAD as evidenced by pt requiring 1.5-2 g protein/kg.     INTERVENTIONS  Implementation  None additionally at this time.     Goals  Patient to consume % of nutritionally adequate meal trays TID, or the equivalent with  supplements/snacks.    Monitoring/Evaluation  Progress toward goals will be monitored and evaluated per protocol.        Ale Lopez MS, RD, LD, [facility]     Nutrition will continue to follow. 6B RD pager: [phone]

## 2022-04-06 NOTE — PROGRESS NOTES
Issued patient owned equipment to patient and instructed pt's wife to bring home. All equipment labeled.   - Mobile Power Unit  - set of 4 HM Batteries  - Battery Charger  - Shower Bag (instructed pt not to use until cleared by VAD Coordinator)

## 2022-04-06 NOTE — PROGRESS NOTES
SPIRITUAL HEALTH SERVICES Progress Note  [facility] ([place] 6B    Visit to patient, Tej, per length of stay. This is day 8 of his hospitalization. Tej acknowledges the length of stay can be hard but he says he is doing fine and is in no need of spiritual health services. Pt has been directed to [facility] for further support upon his request.    Yoan Hardy MDiv  Chaplain Resident  Pager [phone]    * [facility] remains available 24/7 for emergent requests/referrals, either by having the switchboard page the on-call  or by entering an ASAP/STAT consult in Epic (this will also page the on-call ). Routine Epic consults receive an initial response within 24 hours.*

## 2022-04-06 NOTE — PLAN OF CARE
Neuro: A&Ox4.  Calm and able to make needs known. Denies numbness and tingling in extremities. Afebrile.   Cardiac: SR. MAP 80s, SBP 80-90s, LVAD Heart mate 3. ICD in DDD mode. PI between 2.1-2.4 pt is asymptomatic, team aware. Encouraged pt to drink more fluids.  Respiratory: Sating >99% on RA.  GI/: Adequate urine output . BM X1 this shift. Declining scheduled stool softeners this shift.  Diet/appetite: Tolerating 3g Na  diet. Eating well.  Activity:  Assist of SBA, up bedside commode  Pain: At acceptable level on current regimen. PRN oxy  and Tylenol given for incisional chest pain. Given w/ relief. Pt able to sleep between cares.  Skin: No new deficits noted.  LVAD Dressing CDI.   LDA's: Single lumen PICC running heparin gtt at 1450 units. LVAD.         Plan: Continue with POC. Notify primary team with changes.

## 2022-04-06 NOTE — PROGRESS NOTES
Cardiovascular Surgery Progress Note  04/06/2022         Assessment and Plan:     Tje Morfin is a 56 year old male with NICM secondary to valvulopathy severe MR s/p MV ring repair, on home inotrope (Dobutamine 2.5), sustained VT s/p secondary prevention ICD, CKD stage II (baseline 1.5). He presented 3/22 for IABP the evening before his surgery.   Tej presented for LVAD placement with heartmate 3 with Dr. Rodríguez on 3/23/22     Cardiovascular:   Hx of NICM with chronic systolic HF (AHA Stage D, NYHA class 3b) 2/2 severe MR, s/p ring repair 2015  Hx VT with ICD placement  S/p LVAD HM3  Speed increased 5700 4/4. PI's occasionally dip to 1.7-1.8 4/5. Patient asymptomatic. Torsemide stopped 4/4. Discussed with cardiology, will continue to monitor.   No arrhythmia, HD stable. Occasional V-pacing.   IABP removed post op   ASA study (placebo vs. aspirin), statin not indicated (NICM).  HTN  - Entresto increased 4/4, Sotalol 80 mg q 12 hours home med, spironolactone 25 mg daily.    Chest tubes: all tubes removed. Recent CXR unremarkable.      Pulmonary:  - Extubated POD#1, Saturating well on RA.    - Supplemental O2 PRN to keep sats > 92%. Wean off as tolerated.  - Pulm toilet, IS, activity and deep breathing     Neurology / MSK:  - Neuro intact  - Acute post-operative pain; pain well controlled with acetaminophen, PO oxycodone PRN, IV dilaudid PRN  New right knee effusion-resolved 4/5  - Patient had previous patellar tendon tear with surgical repair in the early 1980's and medial meniscus partial removal in 2013   - Consulted Rheumatology 3/29, right knee needle aspiration performed 3/30/2022.  - Synovial fluid analysis: gram stain, bacterial culture, WBC w/ differential, crystals (monosodium urate and calcium pyrophosphate crystal deposition crystals) sent. Cultures negative so far. No crystals seen on analysis of fluid.    - 4/1: discussed with Rheumatology. They are not concerned for infection or gout and believe  effusion likely from osteoarthritis, fluid shifts, and possibly some subclinical crystals. No additional therapies needed at this time. If effusion becomes large and painful again, re consult them.       / Renal:  - Hx of renal disease CKD stage II (baseline 1.5). Most recent creatinine WNL, adequate UOP.   - Pre-op weight 200 lbs, weight down to 176 lbs, diuresed well on Torsemide 20 mg daily and is OFF since 4/4, cardiology managing.     GI / FEN:   Gastroparesis   - Regular diet, continue bowel regimen. Protein supplements between meals.  - Replace electrolytes as needed, hepatic enzymes WNL.  - Reglan 10 mg PO TID     Endocrine:  Stress induced hyperglycemia; initially managed on insulin drip postop, transitioned to sliding scale.     Infectious Disease:  - Stress induced leukocytosis; WBC WNL, no signs or symptoms of infection  - Completed perioperative antibiotics.  UA unremarkable  - COVID screen negative.  - If repeat fever, pan culture.   - 4/6: Small areas of superficial dehiscence along longer portion of incision. No signs of infection. Small amount of bloody drainage. No erythema. Will start Keflex 500 mg tid x 5 days for wound prophylaxis until wound scabs over. Instructions to nursing to clean daily and leave open to air to promote scab formation.      Hematology:   Acute blood loss anemia and thrombocytopenia  Hgb stable; Plt WNL, no signs or symptoms of active bleeding     Anticoagulation:   - ASA   - Coumadin for LVAD, INR goal 2-3. Most recent INR 1.85, dose on 3/29 was held due to supra theraputic INR, patient now subtherapeutic.   - Restarted low intensity heparin gtt bridging 4/1     Prophylaxis:   - Stress ulcer prophylaxis: Pantoprazole 40 mg daily for 30 days  - DVT prophylaxis: Subcutaneous heparin, SCD     Disposition:   - Transferred to  on 3/27  - PT recommending DC to home, needs VAD teaching (5 sessions) and therapeutic INR. Discharge Friday when teaching complete and INr  "therapeutic.     Discussed with Dr Rodríguez through both written and verbal communication.      Leigh Abraham PA-C  Cardiothoracic Surgery  Pager [phone]  April 6, 2022        Interval History:     No overnight events.  Feeling good overall. Wife coming for teaching this week.     States pain is well managed on current regimen. Slept well overnight. Knee improved.  Tolerating diet and tube feeds, is passing flatus, + BM. No nausea or vomiting.  Breathing well without complaints.   Working with therapies and ambulating in halls without assistance.   Denies chest pain, palpitations, dizziness, syncopal symptoms, fevers, chills, myalgias, or sternal popping/clicking.         Physical Exam:   Blood pressure (!) 88/76, pulse 81, temperature 97.9  F (36.6  C), temperature source Oral, resp. rate 18, height 1.791 m (5' 10.51\"), weight 79.2 kg (174 lb 9.7 oz), SpO2 96 %.  Vitals:    04/04/22 0626 04/05/22 0654 04/06/22 0508   Weight: 79.8 kg (175 lb 14.8 oz) 80.1 kg (176 lb 9.4 oz) 79.2 kg (174 lb 9.7 oz)       Gen: A&Ox4, NAD  Neuro: no focal deficits   CV: VAD humming, RRR, normal S1 S2, no murmurs, rubs or gallops.   Pulm: CTA, no wheezing or rhonchi, normal breathing on RA  Abd: nondistended, normal BS, soft, nontender  Ext: no peripheral edema  Incision: small areas of dehiscence along lower portion of sternal incision. No erythema, some scant bloody drainage on gauze. Remainder of incision clean, dry, intact, no erythema, sternum stable  Tubes/drain sites: dressing clean and dry  Lines: driveline dressing clean and dry   LVAD: speed 5700, flow 4.2 - 4.9, PI 2.2 - 3.4 occasionally 1.8, power 4.1 - 4.3.          Data:    Imaging:  reviewed recent imaging, no acute concerns    Labs:  BMP  Recent Labs   Lab 04/06/22  0457 04/05/22  1156 04/05/22  0810 04/05/22  0437 04/04/22  0741 04/04/22  0508 04/03/22  0738 04/03/22  0614     --   --  137  --  137  --  137   POTASSIUM 4.6  --   --  4.5  --  4.1  --  4.2 "   CHLORIDE 106  --   --  105  --  107  --  106   YESICA 8.6  --   --  8.8  --  8.7  --  8.3*   CO2 24  --   --  24  --  25  --  26   BUN 17  --   --  17  --  18  --  20   CR 0.63*  --   --  0.68  --  0.77  --  0.70   * 96 108* 118*   < > 125*   < > 104*    < > = values in this interval not displayed.     CBC  Recent Labs   Lab 04/06/22 0457 04/05/22 0437 04/04/22  0508 04/03/22  0614   WBC 7.5 9.3 11.5* 10.8   RBC 3.38* 3.17* 3.29* 3.03*   HGB 10.2* 9.4* 9.9* 9.4*   HCT 33.4* 30.7* 31.9* 29.5*   MCV 99 97 97 97   MCH 30.2 29.7 30.1 31.0   MCHC 30.5* 30.6* 31.0* 31.9   RDW 14.3 14.3 14.2 13.9    229 226 240     INR  Recent Labs   Lab 04/06/22 0457 04/05/22 0437 04/04/22  0508 04/03/22  0614   INR 1.85* 1.56* 1.23* 1.19*      Hepatic Panel  Recent Labs   Lab 04/06/22 0457 04/05/22 0437 04/04/22  0508 04/03/22  0614   AST 22 24 27 59*   ALT 91* 100* 131* 166*   ALKPHOS 80 75 82 74   BILITOTAL 0.2 0.2 0.3 0.2   ALBUMIN 2.2* 2.3* 2.3* 2.1*     GLUCOSE:   Recent Labs   Lab 04/06/22 0457 04/05/22  1156 04/05/22  0810 04/05/22 0437 04/04/22 2057 04/04/22  1902   * 96 108* 118* 139* 105*

## 2022-04-07 ENCOUNTER — APPOINTMENT (OUTPATIENT)
Dept: OCCUPATIONAL THERAPY | Facility: CLINIC | Age: 57
DRG: 001 | End: 2022-04-07
Attending: INTERNAL MEDICINE
Payer: MEDICARE

## 2022-04-07 ENCOUNTER — TELEPHONE (OUTPATIENT)
Dept: ANTICOAGULATION | Facility: CLINIC | Age: 57
End: 2022-04-07
Payer: MEDICARE

## 2022-04-07 ENCOUNTER — APPOINTMENT (OUTPATIENT)
Dept: EDUCATION SERVICES | Facility: CLINIC | Age: 57
DRG: 001 | End: 2022-04-07
Attending: SURGERY
Payer: MEDICARE

## 2022-04-07 DIAGNOSIS — I50.22 CHRONIC SYSTOLIC CHF (CONGESTIVE HEART FAILURE) (H): Primary | ICD-10-CM

## 2022-04-07 DIAGNOSIS — Z95.811 LVAD (LEFT VENTRICULAR ASSIST DEVICE) PRESENT (H): ICD-10-CM

## 2022-04-07 DIAGNOSIS — I50.22 CHRONIC SYSTOLIC CONGESTIVE HEART FAILURE (H): Primary | ICD-10-CM

## 2022-04-07 DIAGNOSIS — Z95.2 S/P MITRAL VALVE REPLACEMENT: Primary | ICD-10-CM

## 2022-04-07 LAB
ALBUMIN SERPL-MCNC: 2.4 G/DL (ref 3.4–5)
ALP SERPL-CCNC: 85 U/L (ref 40–150)
ALT SERPL W P-5'-P-CCNC: 82 U/L (ref 0–70)
ANION GAP SERPL CALCULATED.3IONS-SCNC: 6 MMOL/L (ref 3–14)
AST SERPL W P-5'-P-CCNC: 19 U/L (ref 0–45)
BILIRUB SERPL-MCNC: 0.2 MG/DL (ref 0.2–1.3)
BUN SERPL-MCNC: 17 MG/DL (ref 7–30)
CALCIUM SERPL-MCNC: 9.3 MG/DL (ref 8.5–10.1)
CHLORIDE BLD-SCNC: 109 MMOL/L (ref 94–109)
CO2 SERPL-SCNC: 23 MMOL/L (ref 20–32)
CREAT SERPL-MCNC: 0.68 MG/DL (ref 0.66–1.25)
ERYTHROCYTE [DISTWIDTH] IN BLOOD BY AUTOMATED COUNT: 14.3 % (ref 10–15)
GFR SERPL CREATININE-BSD FRML MDRD: >90 ML/MIN/1.73M2
GLUCOSE BLD-MCNC: 125 MG/DL (ref 70–99)
HCT VFR BLD AUTO: 33.8 % (ref 40–53)
HGB BLD-MCNC: 10.3 G/DL (ref 13.3–17.7)
INR PPP: 2.12 (ref 0.85–1.15)
MAGNESIUM SERPL-MCNC: 2 MG/DL (ref 1.6–2.3)
MCH RBC QN AUTO: 30.3 PG (ref 26.5–33)
MCHC RBC AUTO-ENTMCNC: 30.5 G/DL (ref 31.5–36.5)
MCV RBC AUTO: 99 FL (ref 78–100)
PHOSPHATE SERPL-MCNC: 4.4 MG/DL (ref 2.5–4.5)
PLATELET # BLD AUTO: 239 10E3/UL (ref 150–450)
POTASSIUM BLD-SCNC: 4.6 MMOL/L (ref 3.4–5.3)
PROT SERPL-MCNC: 6.7 G/DL (ref 6.8–8.8)
RBC # BLD AUTO: 3.4 10E6/UL (ref 4.4–5.9)
SODIUM SERPL-SCNC: 138 MMOL/L (ref 133–144)
UFH PPP CHRO-ACNC: 0.43 IU/ML
WBC # BLD AUTO: 5.9 10E3/UL (ref 4–11)

## 2022-04-07 PROCEDURE — 80053 COMPREHEN METABOLIC PANEL: CPT | Performed by: STUDENT IN AN ORGANIZED HEALTH CARE EDUCATION/TRAINING PROGRAM

## 2022-04-07 PROCEDURE — 97110 THERAPEUTIC EXERCISES: CPT | Mod: GO | Performed by: OCCUPATIONAL THERAPIST

## 2022-04-07 PROCEDURE — 99232 SBSQ HOSP IP/OBS MODERATE 35: CPT | Performed by: SURGERY

## 2022-04-07 PROCEDURE — 250N000013 HC RX MED GY IP 250 OP 250 PS 637: Performed by: STUDENT IN AN ORGANIZED HEALTH CARE EDUCATION/TRAINING PROGRAM

## 2022-04-07 PROCEDURE — 999N000044 HC STATISTIC CVC DRESSING CHANGE

## 2022-04-07 PROCEDURE — 36415 COLL VENOUS BLD VENIPUNCTURE: CPT | Performed by: STUDENT IN AN ORGANIZED HEALTH CARE EDUCATION/TRAINING PROGRAM

## 2022-04-07 PROCEDURE — 85520 HEPARIN ASSAY: CPT | Performed by: SURGERY

## 2022-04-07 PROCEDURE — 250N000013 HC RX MED GY IP 250 OP 250 PS 637: Performed by: NURSE PRACTITIONER

## 2022-04-07 PROCEDURE — 250N000013 HC RX MED GY IP 250 OP 250 PS 637: Performed by: INTERNAL MEDICINE

## 2022-04-07 PROCEDURE — 250N000013 HC RX MED GY IP 250 OP 250 PS 637: Performed by: SURGERY

## 2022-04-07 PROCEDURE — 250N000013 HC RX MED GY IP 250 OP 250 PS 637: Performed by: PHYSICIAN ASSISTANT

## 2022-04-07 PROCEDURE — 85027 COMPLETE CBC AUTOMATED: CPT | Performed by: PHYSICIAN ASSISTANT

## 2022-04-07 PROCEDURE — 99232 SBSQ HOSP IP/OBS MODERATE 35: CPT | Mod: 25 | Performed by: NURSE PRACTITIONER

## 2022-04-07 PROCEDURE — 84100 ASSAY OF PHOSPHORUS: CPT | Performed by: STUDENT IN AN ORGANIZED HEALTH CARE EDUCATION/TRAINING PROGRAM

## 2022-04-07 PROCEDURE — 83735 ASSAY OF MAGNESIUM: CPT | Performed by: STUDENT IN AN ORGANIZED HEALTH CARE EDUCATION/TRAINING PROGRAM

## 2022-04-07 PROCEDURE — 120N000005 HC R&B MS OVERFLOW UMMC

## 2022-04-07 PROCEDURE — 97535 SELF CARE MNGMENT TRAINING: CPT | Mod: GO | Performed by: OCCUPATIONAL THERAPIST

## 2022-04-07 PROCEDURE — 85610 PROTHROMBIN TIME: CPT | Performed by: STUDENT IN AN ORGANIZED HEALTH CARE EDUCATION/TRAINING PROGRAM

## 2022-04-07 PROCEDURE — 93750 INTERROGATION VAD IN PERSON: CPT | Performed by: NURSE PRACTITIONER

## 2022-04-07 RX ORDER — POLYETHYLENE GLYCOL 3350 17 G/17G
17 POWDER, FOR SOLUTION ORAL DAILY PRN
Status: DISCONTINUED | OUTPATIENT
Start: 2022-04-07 | End: 2022-04-08 | Stop reason: HOSPADM

## 2022-04-07 RX ORDER — CEPHALEXIN 500 MG/1
500 CAPSULE ORAL EVERY 8 HOURS SCHEDULED
Status: DISCONTINUED | OUTPATIENT
Start: 2022-04-07 | End: 2022-04-08 | Stop reason: HOSPADM

## 2022-04-07 RX ADMIN — CEPHALEXIN 500 MG: 500 CAPSULE ORAL at 21:34

## 2022-04-07 RX ADMIN — SACUBITRIL AND VALSARTAN 1 TABLET: 49; 51 TABLET, FILM COATED ORAL at 19:53

## 2022-04-07 RX ADMIN — CEPHALEXIN 250 MG: 250 CAPSULE ORAL at 06:17

## 2022-04-07 RX ADMIN — OXYCODONE HYDROCHLORIDE 5 MG: 5 TABLET ORAL at 13:20

## 2022-04-07 RX ADMIN — ACETAMINOPHEN 650 MG: 325 TABLET ORAL at 08:59

## 2022-04-07 RX ADMIN — SOTALOL HYDROCHLORIDE 80 MG: 80 TABLET ORAL at 08:01

## 2022-04-07 RX ADMIN — CEPHALEXIN 500 MG: 500 CAPSULE ORAL at 13:16

## 2022-04-07 RX ADMIN — ACETAMINOPHEN 650 MG: 325 TABLET ORAL at 13:06

## 2022-04-07 RX ADMIN — OXYCODONE HYDROCHLORIDE 5 MG: 5 TABLET ORAL at 17:31

## 2022-04-07 RX ADMIN — ACETAMINOPHEN 650 MG: 325 TABLET ORAL at 17:29

## 2022-04-07 RX ADMIN — OXYCODONE HYDROCHLORIDE 5 MG: 5 TABLET ORAL at 09:00

## 2022-04-07 RX ADMIN — OXYCODONE HYDROCHLORIDE 5 MG: 5 TABLET ORAL at 04:01

## 2022-04-07 RX ADMIN — SACUBITRIL AND VALSARTAN 1 TABLET: 49; 51 TABLET, FILM COATED ORAL at 08:01

## 2022-04-07 RX ADMIN — PANTOPRAZOLE SODIUM 40 MG: 40 TABLET, DELAYED RELEASE ORAL at 08:01

## 2022-04-07 RX ADMIN — SENNOSIDES AND DOCUSATE SODIUM 1 TABLET: 8.6; 5 TABLET ORAL at 19:53

## 2022-04-07 RX ADMIN — ACETAMINOPHEN 650 MG: 325 TABLET ORAL at 04:01

## 2022-04-07 RX ADMIN — WARFARIN SODIUM 12.5 MG: 10 TABLET ORAL at 17:34

## 2022-04-07 RX ADMIN — OXYCODONE HYDROCHLORIDE 5 MG: 5 TABLET ORAL at 21:34

## 2022-04-07 RX ADMIN — SOTALOL HYDROCHLORIDE 80 MG: 80 TABLET ORAL at 19:53

## 2022-04-07 ASSESSMENT — ACTIVITIES OF DAILY LIVING (ADL)
ADLS_ACUITY_SCORE: 12
ADLS_ACUITY_SCORE: 12
ADLS_ACUITY_SCORE: 10
ADLS_ACUITY_SCORE: 12
ADLS_ACUITY_SCORE: 10
ADLS_ACUITY_SCORE: 12
ADLS_ACUITY_SCORE: 10
ADLS_ACUITY_SCORE: 12
ADLS_ACUITY_SCORE: 10
ADLS_ACUITY_SCORE: 12
ADLS_ACUITY_SCORE: 10
ADLS_ACUITY_SCORE: 12
ADLS_ACUITY_SCORE: 10
ADLS_ACUITY_SCORE: 12

## 2022-04-07 NOTE — PROVIDER NOTIFICATION
Neuro: A&Ox4. Pt is up ambulating 3 times today.  Cardiac: Paced. VSS.  Heart Mate 3 LVAD.   Respiratory: Sating 94%> on RA.  GI/: Adequate urine output. BM X1  Diet/appetite: Tolerating 3 G NA+ diet. Eating well.  Activity:  SBA, up to chair and in halls.  Pain: At acceptable level on current regimen.   Skin: No new deficits noted.  LDA's:PICCm LVAD    Plan: Continue with POC. Notify primary team with changes.

## 2022-04-07 NOTE — TELEPHONE ENCOUNTER
Referral Date:4/7/22  Physician Responsible for Anticoagulation:Dr. Dunia Hdz  Indication: LVAD                              Goal Range: 2-3                           Duration:Lifetime  INR Referral is in EPIC:    Yes     Standing Lab Orders are in EPIC:Yes      Patient Contacted: Currently hospitalized  Currently on Warfarin:Yes  HIPAA Release Form Sent:   *Upon discharge  Welcome Letter and Warfarin Educational Packet Sent:  Upon discharge    +++Please check upon discharge if patient is continuing with Ensure BID and special K bars that dietician recommended. +++

## 2022-04-07 NOTE — PROCEDURES
The patient's HeartMate LVAD was interrogated 4/7/2022  * Speed 5700 rpm   * Pulsatility index 1.8-2.6  * Power 4.4 Noriega   * Flow 4.4-5.1 L/minute   Fluid status: euvolemic   Alarms were reviewed, and notable for PI events with speed drops, improved.  The driveline exit site was inspected, dressing cdi.   All external components were inspected and showed no evidence of damage or malfunction, none replaced.   No changes to VAD settings made

## 2022-04-07 NOTE — PLAN OF CARE
Time of notification: 10:36 PM  Provider notified: gen surg NOC  Patient status: FYI Pt W.C. rm 6229 bed 2 @ 2218 had a 7 run episode of vtach. pt asymptomatic, BP 79/71, doppler MAP 84.   Temp:  [97.8  F (36.6  C)-98.3  F (36.8  C)] 98.3  F (36.8  C)  Pulse:  [78-83] 78  Resp:  [16-18] 18  BP: (74-99)/(55-77) 79/71  Cuff Mean (mmHg):  [86] 86  SpO2:  [93 %-96 %] 93 %  Orders received: no call back. Will continue to monitor.

## 2022-04-07 NOTE — PLAN OF CARE
Neuro: A&Ox4.  Calm and able to make needs known. Denies numbness and tingling in extremities. Afebrile.   Cardiac: SR. MAP 80s, SBP 80-90s, LVAD Heart mate 3. ICD in DDD mode. PI between 1.8-2.3 pt is asymptomatic, team aware.   Respiratory: Sating >99% on RA.  GI/: Adequate urine output . BM X1 this shift. Stool softeners given.  Diet/appetite: Tolerating 3g Na  diet. Eating well.  Activity:  Assist of SBA, up bedside commode  Pain: At acceptable level on current regimen. PRN oxy  and Tylenol given for incisional chest pain. Given w/ relief. Pt able to sleep between cares.  Skin: No new deficits noted. Small open area on midline incision VALERIE w/ scant serosanguinous drainage.   LDA's: Single lumen PICC running heparin gtt at 1450 units. LVAD.         Reinforced teaching on the definitions of what hematocrit and INR are. Pt needs teachback method.

## 2022-04-07 NOTE — CONSULTS
Met with patient and significant other at the bedside for warfarin education.    Reviewed reason for taking warfarin, how it works, importance of compliance and taking it as prescribed.    Explained INR lab and how it measures warfarin levels and the goal INR level. Educated on s/sx of clotting and bleeding to watch for.  Discussed diet modifications and how vitamin K interacts with warfarin; medications and herbal supplements to avoid that interfere with warfarin, avoiding alcohol and tobacco.    Educated on lifestyle changes (traveling, exercise/activity considerations, planning for surgery, avoiding injury/bleeding).     Literature given: Guide to Warfarin Therapy, Warfarin Therapy Calendar.

## 2022-04-07 NOTE — PROGRESS NOTES
[facility]   Cardiology II Service / Advanced Heart Failure  Daily Consult Note      Patient: Tej Morfin  MRN: [id number]  Admission Date: 3/22/2022  Hospital Day # 16    Assessment and Plan: Tej Morfin is a 56 year old male with a history of severe mitral regurgitation (s/p mitral valve repair with an annuloplasty ring, 1/21/15), VT (s/p ICD, 2/2/15), mild nonobstructive CAD, and NICM (on home dobutamine) who was admitted for scheduled LVAD implantation, with IABP support prior.  He is now s/p HM3 LVAD implant 3/23/22.  Note that he is part of the RICARDO trial.    Recommendations:  -recommend stopping Reglan if able  -ok to discontinue heparin gtt from our standpoint  -plan to discharge Friday after teaching     # Chronic systolic heart failure secondary to NICM (LVEF 10-15% per EMILY 3/25/22)  # Severe MR s/p mitral valve repair with an annuloplasty ring, 1/21/15  # s/p HM3 LVAD 3/23/22  # Mild RV dysfunction  # Low PIs  Stage D, NYHA Class III. Last hemodynamics: 3/27: MAP 85, CVP 10, PAP 52/20, PCW 20, JOANNA CO/CI 5.1/2.4, SVR 1099, MVO2 50 -- weight i206#. Post-operative course c/b AFib with RVR (s/p ICD shock 3/25/22) and right knee effusion (s/p arthrocentesis and intraarticular steroid injection 3/30/22). PIs remain ~2 - RV is normal, likely hypovolemic, torsemide stopped.      - Fluid status: euvolemic, off torsemide   - ACEi/ARB/ARNi: Entresto 49-51mg   - BB: sotalol 80mg BID, RV looks well on echo, start metoprolol XL as outpatient  - Aldosterone antagonist: spironolactone 25mg daily  - SGLT2i: deferred  - SCD ppx: CRT  - Antiplatelet:  RICARDO trial -- on aspirin vs placebo  - Anticoagulation: warfarin, goal INR 2-3, today 2.1, ok to stop heparin gtt  - MAPs: 73-84  - LDH: 267 (4/2)     # Mild, nonobstructive CAD  - RICARDO trial (aspirin vs placebo), please do not order aspirin  - start metoprolol XL as outpatient      # H/o sustained VT (s/p ICD 2/2/15)  # NSVT  # CRT  #  "Post-operative AFib with RVR  s/p ICD shock 3/25/22, return to NSR.  EP consulted, device interrogation revealing ventricular rates in VT detection zone. adjusted the single VF zone to 220bpm with 15 second detection time to reduce risk of shock and permit other therapies (ie ATP) to work prior to him getting shocked.  He was also given amiodarone, and sotalol was ultimately restarted 4/1.  - sotalol 80mg bid  - start metoprolol XL as outpatient  - per EP, \"patient has a CRT device with rising thresholds on the LV.  This may be postoperative inflammation. However, there exists emerging data to suggest LV lead in LVAD patient is not only unneccessary, but potentially harmful.  We will turn this off.  Given lack of need for pacing, we will reprogram the AV delay on the RV lead to minimize need for pacing. Intrinsic A-V interval detected at 110ms. We will increase sensed and paced AV delay to 300/280ms respectively. LV lead is turned off. This will also help with device longevity, decrease need for changeouts and reduce risk of infections.\"    # RIJ, subclavian, brachial vein DVT  - warfarin as above    Thao Donovan, TESS, NP-C  Advanced Heart Failure/Cardiology II Service  Pager [phone] [facility] [id number]    ================================================================    Subjective/24-Hr Events:   Last 24 hr care team notes reviewed. No overnight events. Feeling well. Therapies are going well. Denies orthopnea, PND, LE edema. Denies bleeding issues.     ROS:  4 point ROS including respiratory, CV, GI and  (other than that noted in the HPI) is negative.     Medications: Reviewed in EPIC.     Physical Exam:   BP (!) 86/79 (BP Location: Left arm, Cuff Size: Adult Regular)   Pulse 79   Temp 97.8  F (36.6  C) (Oral)   Resp 17   Ht 1.791 m (5' 10.51\")   Wt 78.9 kg (173 lb 15.1 oz)   SpO2 95%   BMI 24.60 kg/m      GENERAL: Appears comfortable, in no distress .  HEENT: Eye symmetrical, no discharge or icterus " bilaterally. Mucous membranes moist and without lesions.  NECK: Supple, JVD not visible.   CV: +mechanical LVAD  RESPIRATORY: Respirations regular, even, and unlabored. Lungs CTA throughout.   GI: Soft and non distended with normoactive bowel sounds present in all quadrants. No tenderness, rebound, guarding.   EXTREMITIES: No peripheral edema. Non pulsatile.   NEUROLOGIC: Alert and oriented x 3. No focal deficits.   MUSCULOSKELETAL: No joint swelling or tenderness.   SKIN: No jaundice. No rashes or lesions. Sternum stable.     Labs:  CMP  Recent Labs   Lab 04/07/22  0505 04/06/22  0457 04/05/22  1156 04/05/22  0810 04/05/22  0437 04/04/22  0741 04/04/22  0508    136  --   --  137  --  137   POTASSIUM 4.6 4.6  --   --  4.5  --  4.1   CHLORIDE 109 106  --   --  105  --  107   CO2 23 24  --   --  24  --  25   ANIONGAP 6 6  --   --  8  --  5   * 126* 96 108* 118*   < > 125*   BUN 17 17  --   --  17  --  18   CR 0.68 0.63*  --   --  0.68  --  0.77   GFRESTIMATED >90 >90  --   --  >90  --  >90   YESICA 9.3 8.6  --   --  8.8  --  8.7   MAG 2.0 2.1  --   --  2.0  --  2.0   PHOS 4.4 3.8  --   --  3.5  --  3.3   PROTTOTAL 6.7* 6.5*  --   --  6.3*  --  6.5*   ALBUMIN 2.4* 2.2*  --   --  2.3*  --  2.3*   BILITOTAL 0.2 0.2  --   --  0.2  --  0.3   ALKPHOS 85 80  --   --  75  --  82   AST 19 22  --   --  24  --  27   ALT 82* 91*  --   --  100*  --  131*    < > = values in this interval not displayed.       CBC  Recent Labs   Lab 04/07/22  0505 04/06/22  1147 04/06/22  0457 04/05/22  0437   WBC 5.9 8.1 7.5 9.3   RBC 3.40* 3.57* 3.38* 3.17*   HGB 10.3* 10.6* 10.2* 9.4*   HCT 33.8* 34.9* 33.4* 30.7*   MCV 99 98 99 97   MCH 30.3 29.7 30.2 29.7   MCHC 30.5* 30.4* 30.5* 30.6*   RDW 14.3 14.5 14.3 14.3    255 239 229       INR  Recent Labs   Lab 04/07/22  0505 04/06/22  0457 04/05/22  0437 04/04/22  0508   INR 2.12* 1.85* 1.56* 1.23*       Time/Communication  I personally spent a total of 25 minutes. Of that 15 minutes  was counseling/coordination of patient's care. Plan of care discussed with patient. See my note above for details.    Patient discussed with Dr. Carmen.

## 2022-04-07 NOTE — PROGRESS NOTES
Cardiovascular Surgery Progress Note  04/07/2022         Assessment and Plan:     Tej Morfin is a 56 year old male with NICM secondary to valvulopathy severe MR s/p MV ring repair, on home inotrope (Dobutamine 2.5), sustained VT s/p secondary prevention ICD, CKD stage II (baseline 1.5). He presented 3/22 for IABP the evening before his surgery.   Tej presented for LVAD placement with heartmate 3 with Dr. Rodríguez on 3/23/22     Cardiovascular:   Hx of NICM with chronic systolic HF (AHA Stage D, NYHA class 3b) 2/2 severe MR, s/p ring repair 2015  Hx VT with ICD placement  S/p LVAD HM3  Speed increased 5700 4/4. PI's occasionally dip to 1.7-1.8 4/5. Patient asymptomatic. Torsemide stopped 4/4. Discussed with cardiology, will continue to monitor.   No arrhythmia, HD stable. Occasional V-pacing.   IABP removed post op   ASA study (placebo vs. aspirin), statin not indicated (NICM).  HTN  - Entresto increased 4/4, Sotalol 80 mg q 12 hours home med, spironolactone 25 mg daily.    Chest tubes: all tubes removed. Recent CXR unremarkable.      Pulmonary:  - Extubated POD#1, Saturating well on RA.    - Supplemental O2 PRN to keep sats > 92%. Wean off as tolerated.  - Pulm toilet, IS, activity and deep breathing     Neurology / MSK:  - Neuro intact  - Acute post-operative pain; pain well controlled with acetaminophen, PO oxycodone PRN, IV dilaudid PRN  New right knee effusion-resolved 4/5  - Patient had previous patellar tendon tear with surgical repair in the early 1980's and medial meniscus partial removal in 2013   - Consulted Rheumatology 3/29, right knee needle aspiration performed 3/30/2022.  - Synovial fluid analysis: gram stain, bacterial culture, WBC w/ differential, crystals (monosodium urate and calcium pyrophosphate crystal deposition crystals) sent. Cultures negative so far. No crystals seen on analysis of fluid.    - 4/1: discussed with Rheumatology. They are not concerned for infection or gout and believe  effusion likely from osteoarthritis, fluid shifts, and possibly some subclinical crystals. No additional therapies needed at this time. If effusion becomes large and painful again, re consult them.       / Renal:  - Hx of renal disease CKD stage II (baseline 1.5). Most recent creatinine WNL, adequate UOP.   - Pre-op weight 200 lbs, weight down to 173 lbs, diuresed well on Torsemide 20 mg daily, stopped 4/4, now auto diuresing.      GI / FEN:   Gastroparesis   - Regular diet, decreased bowel regimen, but continued. Protein supplements between meals.  - Replace electrolytes as needed, hepatic enzymes WNL.  - Reglan 10 mg PO TID, prescribed by GI prior to admission, but has had breaks from it in the past and was discussed stopping at some point. Will discontinue today and monitor for recurrent gastroparesis. Patient agreeable to this plan.    - BM + 4/6    Endocrine:  Stress induced hyperglycemia; initially managed on insulin drip postop, transitioned to sliding scale, then stopped.  - BG      Infectious Disease:  - Stress induced leukocytosis; WBC WNL, no signs or symptoms of infection  - Completed perioperative antibiotics.  UA unremarkable  - COVID screen negative.  - If repeat fever, pan culture.   - 4/6: Small areas of superficial dehiscence along longer portion of incision. No signs of infection. Small amount of bloody drainage. No erythema. Started Keflex 500 mg tid x 5 days for wound prophylaxis until wound scabs over. Instructions to nursing to clean daily and leave open to air to promote scab formation.      Hematology:   Acute blood loss anemia and thrombocytopenia  Hgb stable; Plt WNL, no signs or symptoms of active bleeding     Anticoagulation:   - ASA   - Coumadin for LVAD, INR goal 2-3. Most recent INR 2.12  - Restarted low intensity heparin gtt bridging 4/1. Stop today.       Prophylaxis:   - Stress ulcer prophylaxis: Pantoprazole 40 mg daily for 30 days  - DVT prophylaxis: Subcutaneous heparin,  "SCD     Disposition:   - Transferred to  on 3/27  - PT recommending DC to home, needs VAD teaching (5 sessions) and therapeutic INR. Discharge Friday when teaching complete.     Discussed with Dr Rodríguez through both written and verbal communication.      Madelaine Mace, TESS, CNP  [facility] Cardiothoracic Surgery  O: [phone]  Pgr [phone]          Interval History:     No overnight events.  Feeling good overall. Wife coming for teaching this week.     States pain is well managed on current regimen. Slept well overnight. Knee improved.  Tolerating diet and tube feeds, is passing flatus, + BM. No nausea or vomiting.  Breathing well without complaints.   Working with therapies and ambulating in halls without assistance.   Denies chest pain, palpitations, dizziness, syncopal symptoms, fevers, chills, myalgias, or sternal popping/clicking.         Physical Exam:   Blood pressure 96/75, pulse 50, temperature 97.3  F (36.3  C), temperature source Oral, resp. rate 16, height 1.791 m (5' 10.51\"), weight 78.9 kg (173 lb 15.1 oz), SpO2 100 %.  Vitals:    04/05/22 0654 04/06/22 0508 04/07/22 0351   Weight: 80.1 kg (176 lb 9.4 oz) 79.2 kg (174 lb 9.7 oz) 78.9 kg (173 lb 15.1 oz)       Gen: A&Ox4, NAD  Neuro: no focal deficits   CV: VAD humming, RRR, normal S1 S2, no murmurs, rubs or gallops.   Pulm: CTA, no wheezing or rhonchi, normal breathing on RA  Abd: nondistended, normal BS, soft, nontender  Ext: no peripheral edema  Incision: small areas of dehiscence along lower portion of sternal incision. No erythema, some scant bloody drainage on gauze. Remainder of incision clean, dry, intact, no erythema, sternum stable  Tubes/drain sites: dressing clean and dry  Lines: driveline dressing clean and dry   LVAD: speed 5700, flow 4.2 - 4.9, PI 2.2 - 3.4 occasionally 1.8, power 4.1 - 4.3.          Data:    Imaging:  reviewed recent imaging, no acute concerns    Labs:  BMP  Recent Labs   Lab 04/07/22  0505 04/06/22  0457 04/05/22  1156 " 04/05/22  0810 04/05/22  0437 04/04/22  0741 04/04/22  0508    136  --   --  137  --  137   POTASSIUM 4.6 4.6  --   --  4.5  --  4.1   CHLORIDE 109 106  --   --  105  --  107   YESICA 9.3 8.6  --   --  8.8  --  8.7   CO2 23 24  --   --  24  --  25   BUN 17 17  --   --  17  --  18   CR 0.68 0.63*  --   --  0.68  --  0.77   * 126* 96 108* 118*   < > 125*    < > = values in this interval not displayed.     CBC  Recent Labs   Lab 04/07/22  0505 04/06/22  1147 04/06/22  0457 04/05/22  0437   WBC 5.9 8.1 7.5 9.3   RBC 3.40* 3.57* 3.38* 3.17*   HGB 10.3* 10.6* 10.2* 9.4*   HCT 33.8* 34.9* 33.4* 30.7*   MCV 99 98 99 97   MCH 30.3 29.7 30.2 29.7   MCHC 30.5* 30.4* 30.5* 30.6*   RDW 14.3 14.5 14.3 14.3    255 239 229     INR  Recent Labs   Lab 04/07/22  0505 04/06/22  0457 04/05/22  0437 04/04/22  0508   INR 2.12* 1.85* 1.56* 1.23*      Hepatic Panel  Recent Labs   Lab 04/07/22  0505 04/06/22  0457 04/05/22  0437 04/04/22  0508   AST 19 22 24 27   ALT 82* 91* 100* 131*   ALKPHOS 85 80 75 82   BILITOTAL 0.2 0.2 0.2 0.3   ALBUMIN 2.4* 2.2* 2.3* 2.3*     GLUCOSE:   Recent Labs   Lab 04/07/22  0505 04/06/22  0457 04/05/22  1156 04/05/22  0810 04/05/22  0437 04/04/22  2057   * 126* 96 108* 118* 139*

## 2022-04-08 ENCOUNTER — ANTICOAGULATION THERAPY VISIT (OUTPATIENT)
Dept: ANTICOAGULATION | Facility: CLINIC | Age: 57
End: 2022-04-08
Payer: MEDICARE

## 2022-04-08 VITALS
BODY MASS INDEX: 24.44 KG/M2 | HEART RATE: 80 BPM | RESPIRATION RATE: 16 BRPM | DIASTOLIC BLOOD PRESSURE: 72 MMHG | TEMPERATURE: 97.8 F | SYSTOLIC BLOOD PRESSURE: 88 MMHG | HEIGHT: 71 IN | OXYGEN SATURATION: 100 % | WEIGHT: 174.6 LBS

## 2022-04-08 DIAGNOSIS — Z95.811 LVAD (LEFT VENTRICULAR ASSIST DEVICE) PRESENT (H): Primary | ICD-10-CM

## 2022-04-08 DIAGNOSIS — I50.22 CHRONIC SYSTOLIC CONGESTIVE HEART FAILURE (H): ICD-10-CM

## 2022-04-08 DIAGNOSIS — Z95.811 LEFT VENTRICULAR ASSIST DEVICE PRESENT (H): ICD-10-CM

## 2022-04-08 DIAGNOSIS — I50.22 CHRONIC SYSTOLIC CHF (CONGESTIVE HEART FAILURE) (H): ICD-10-CM

## 2022-04-08 DIAGNOSIS — Z79.899 LONG TERM USE OF DRUG: ICD-10-CM

## 2022-04-08 LAB
ALBUMIN SERPL-MCNC: 2.6 G/DL (ref 3.4–5)
ALP SERPL-CCNC: 83 U/L (ref 40–150)
ALT SERPL W P-5'-P-CCNC: 67 U/L (ref 0–70)
ANION GAP SERPL CALCULATED.3IONS-SCNC: 4 MMOL/L (ref 3–14)
AST SERPL W P-5'-P-CCNC: 16 U/L (ref 0–45)
BILIRUB SERPL-MCNC: 0.3 MG/DL (ref 0.2–1.3)
BUN SERPL-MCNC: 19 MG/DL (ref 7–30)
CALCIUM SERPL-MCNC: 9 MG/DL (ref 8.5–10.1)
CHLORIDE BLD-SCNC: 107 MMOL/L (ref 94–109)
CO2 SERPL-SCNC: 24 MMOL/L (ref 20–32)
CREAT SERPL-MCNC: 0.69 MG/DL (ref 0.66–1.25)
ERYTHROCYTE [DISTWIDTH] IN BLOOD BY AUTOMATED COUNT: 14.5 % (ref 10–15)
GFR SERPL CREATININE-BSD FRML MDRD: >90 ML/MIN/1.73M2
GLUCOSE BLD-MCNC: 114 MG/DL (ref 70–99)
HCT VFR BLD AUTO: 34 % (ref 40–53)
HGB BLD-MCNC: 10.5 G/DL (ref 13.3–17.7)
INR PPP: 2.36 (ref 0.85–1.15)
MAGNESIUM SERPL-MCNC: 2.1 MG/DL (ref 1.6–2.3)
MCH RBC QN AUTO: 30.8 PG (ref 26.5–33)
MCHC RBC AUTO-ENTMCNC: 30.9 G/DL (ref 31.5–36.5)
MCV RBC AUTO: 100 FL (ref 78–100)
PHOSPHATE SERPL-MCNC: 4.1 MG/DL (ref 2.5–4.5)
PLATELET # BLD AUTO: 212 10E3/UL (ref 150–450)
POTASSIUM BLD-SCNC: 4.7 MMOL/L (ref 3.4–5.3)
PROT SERPL-MCNC: 7 G/DL (ref 6.8–8.8)
RBC # BLD AUTO: 3.41 10E6/UL (ref 4.4–5.9)
SODIUM SERPL-SCNC: 135 MMOL/L (ref 133–144)
UFH PPP CHRO-ACNC: <0.1 IU/ML
WBC # BLD AUTO: 5.3 10E3/UL (ref 4–11)

## 2022-04-08 PROCEDURE — 85520 HEPARIN ASSAY: CPT | Performed by: SURGERY

## 2022-04-08 PROCEDURE — 250N000013 HC RX MED GY IP 250 OP 250 PS 637: Performed by: NURSE PRACTITIONER

## 2022-04-08 PROCEDURE — 85610 PROTHROMBIN TIME: CPT | Performed by: STUDENT IN AN ORGANIZED HEALTH CARE EDUCATION/TRAINING PROGRAM

## 2022-04-08 PROCEDURE — 36592 COLLECT BLOOD FROM PICC: CPT | Performed by: STUDENT IN AN ORGANIZED HEALTH CARE EDUCATION/TRAINING PROGRAM

## 2022-04-08 PROCEDURE — 250N000013 HC RX MED GY IP 250 OP 250 PS 637: Performed by: INTERNAL MEDICINE

## 2022-04-08 PROCEDURE — 83735 ASSAY OF MAGNESIUM: CPT | Performed by: STUDENT IN AN ORGANIZED HEALTH CARE EDUCATION/TRAINING PROGRAM

## 2022-04-08 PROCEDURE — 84100 ASSAY OF PHOSPHORUS: CPT | Performed by: STUDENT IN AN ORGANIZED HEALTH CARE EDUCATION/TRAINING PROGRAM

## 2022-04-08 PROCEDURE — 80053 COMPREHEN METABOLIC PANEL: CPT | Performed by: STUDENT IN AN ORGANIZED HEALTH CARE EDUCATION/TRAINING PROGRAM

## 2022-04-08 PROCEDURE — 250N000013 HC RX MED GY IP 250 OP 250 PS 637: Performed by: STUDENT IN AN ORGANIZED HEALTH CARE EDUCATION/TRAINING PROGRAM

## 2022-04-08 PROCEDURE — 93750 INTERROGATION VAD IN PERSON: CPT | Performed by: NURSE PRACTITIONER

## 2022-04-08 PROCEDURE — 99232 SBSQ HOSP IP/OBS MODERATE 35: CPT | Mod: 25 | Performed by: NURSE PRACTITIONER

## 2022-04-08 PROCEDURE — 85027 COMPLETE CBC AUTOMATED: CPT | Performed by: PHYSICIAN ASSISTANT

## 2022-04-08 RX ORDER — CEPHALEXIN 500 MG/1
500 CAPSULE ORAL EVERY 8 HOURS
Qty: 15 CAPSULE | Refills: 0 | Status: SHIPPED | OUTPATIENT
Start: 2022-04-07 | End: 2022-04-14

## 2022-04-08 RX ORDER — POLYETHYLENE GLYCOL 3350 17 G/17G
17 POWDER, FOR SOLUTION ORAL DAILY
Qty: 510 G | Refills: 0 | Status: SHIPPED | OUTPATIENT
Start: 2022-04-08 | End: 2022-05-02

## 2022-04-08 RX ORDER — OXYCODONE HYDROCHLORIDE 5 MG/1
5 TABLET ORAL EVERY 4 HOURS PRN
Qty: 40 TABLET | Refills: 0 | Status: SHIPPED | OUTPATIENT
Start: 2022-04-08 | End: 2022-05-02

## 2022-04-08 RX ORDER — ACETAMINOPHEN 325 MG/1
650 TABLET ORAL EVERY 4 HOURS PRN
Qty: 100 TABLET | Refills: 0 | Status: SHIPPED | OUTPATIENT
Start: 2022-04-08

## 2022-04-08 RX ORDER — AMOXICILLIN 250 MG
1-2 CAPSULE ORAL 2 TIMES DAILY PRN
Qty: 60 TABLET | Refills: 0 | Status: SHIPPED | OUTPATIENT
Start: 2022-04-08 | End: 2022-05-02

## 2022-04-08 RX ORDER — WARFARIN SODIUM 10 MG/1
10 TABLET ORAL
Status: DISCONTINUED | OUTPATIENT
Start: 2022-04-08 | End: 2022-04-08 | Stop reason: HOSPADM

## 2022-04-08 RX ORDER — WARFARIN SODIUM 2.5 MG/1
TABLET ORAL
Qty: 150 TABLET | Refills: 0 | Status: SHIPPED | OUTPATIENT
Start: 2022-04-08 | End: 2024-06-03

## 2022-04-08 RX ORDER — METHOCARBAMOL 750 MG/1
750 TABLET, FILM COATED ORAL 3 TIMES DAILY PRN
Qty: 45 TABLET | Refills: 0 | Status: SHIPPED | OUTPATIENT
Start: 2022-04-08 | End: 2022-05-02

## 2022-04-08 RX ADMIN — CEPHALEXIN 500 MG: 500 CAPSULE ORAL at 05:39

## 2022-04-08 RX ADMIN — SOTALOL HYDROCHLORIDE 80 MG: 80 TABLET ORAL at 08:03

## 2022-04-08 RX ADMIN — ACETAMINOPHEN 650 MG: 325 TABLET ORAL at 10:37

## 2022-04-08 RX ADMIN — SACUBITRIL AND VALSARTAN 1 TABLET: 49; 51 TABLET, FILM COATED ORAL at 08:06

## 2022-04-08 RX ADMIN — OXYCODONE HYDROCHLORIDE 5 MG: 5 TABLET ORAL at 05:39

## 2022-04-08 RX ADMIN — OXYCODONE HYDROCHLORIDE 5 MG: 5 TABLET ORAL at 10:37

## 2022-04-08 RX ADMIN — PANTOPRAZOLE SODIUM 40 MG: 40 TABLET, DELAYED RELEASE ORAL at 08:03

## 2022-04-08 ASSESSMENT — ACTIVITIES OF DAILY LIVING (ADL)
ADLS_ACUITY_SCORE: 10
ADLS_ACUITY_SCORE: 10
ADLS_ACUITY_SCORE: 12
ADLS_ACUITY_SCORE: 10
ADLS_ACUITY_SCORE: 12

## 2022-04-08 NOTE — DISCHARGE INSTRUCTIONS
AFTER YOU GO HOME FROM YOUR HEART SURGERY  (LVAD implant - 3/23/2022)    You had a sternotomy, avoid lifting anything greater than ten pounds for 6 weeks after surgery and then less than 20 pounds for an additional 6 weeks.   Do not reach backwards or use arms to push out of chair.   Do not let people pull on your arms to assist with standing.   Avoid twisting or reaching too far across your body.    Avoid strenuous activities such as bowling, vacuuming, raking, shoveling, golf or tennis for 12 weeks after your surgery.   It is okay to resume sex if you feel comfortable in doing so. You may have to try different positions with your partner.    Splint your chest incision by hugging a pillow or bringing your arms across your chest when coughing or sneezing.     No driving for 4 weeks after surgery or while on pain medication.     Wash your incisions daily with soap and water (or as instructed), pat dry.   Keep wound clean and dry. No baths or swimming or showers.   Cover chest tube sites with dry gauze until they stop draining, then leave open to air. It is not abnormal for chest tube sites to drain yellowish/clear fluid for up to 2-3 weeks after surgery.   Watch for signs of infection: increased redness, tenderness, warmth or any drainage that appears infected (pus like) or is persistent.  Also a temperature > 100.5 F or chills. Call your surgeon or primary care provider's office immediately.   Remove any skin glue left on incisions after 10-14 days. This will not affect your incision and can speed up healing.    Exercise is very important in your recovery. Please follow the guidelines set up for you in your cardiac rehab classes at the hospital. If outpatient cardiac rehab was ordered for you, we highly recommend you participate. If you have problems arranging your cardiac rehab, please call [phone] for all locations, with the exception of [place], please call [phone] and [facility], please call  [phone].    Avoid sitting for prolonged periods of time, try to walk every hour during the day. If you have a leg incision, elevate your leg often when you are not walking.    Check your weight when you get home from the hospital and continue to check it daily through your recovery for at least a month. If you notice a weight gain of 2-3 pounds in a week, notify your primary care physician, cardiologist or surgeon.    Bowel activity may be slow after surgery. If necessary, you may take an over the counter laxative such as Milk of Magnesia or Miralax. You may have stool softeners prescribed (docusate sodium, Senokot). We recommend using stool softeners while using narcotics for pain (oxycodone/percocet, hydrocodone/vicodin, hydromorphone/dilaudid).      Wean OFF of narcotics (oxycodone, dilaudid, hydrocodone) as soon as possible. You should continue taking acetaminophen as long as you have any surgical pain as the first choice for pain control and add narcotics as necessary for pain to be tolerable.      DO NOT SMOKE.  IF YOU NEED HELP QUITTING, PLEASE TALK WITH YOUR CARDIOLOGIST OR PRIMARY DOCTOR.    You are on a blood thinner, follow the instructions you were given in the hospital and DO NOT SKIP this medication. Try and take it the same time everyday. Your primary care physician or coumadin clinic will manage the dosing. INR goal is 2-3 for LVAD.    You have an LVAD device, so call your LVAD coordinator with all questions and concerns.  No swimming, showering, or baths. Daily sterile driveline dressing changes as instructed. You cannot have a MRI with your LVAD in place. No contact sports.     REGARDING PRESCRIPTION REFILLS.  If you need a refill on your pain medication contact us to discuss your pain and a possible one time refill.   All other medications will be adjusted, discontinued and re-filled by your primary care physician and/or your cardiologist as they were prior to your surgery. We have given you  enough for one to three month with possibly one refill.    POST-OPERATIVE CLINIC VISITS  You will now return to the care of your primary care provider and your Cardiology care team.  Your LVAD coordinator will assist with your care and can handle all questions, including follow up appointments, lab draws, and other needs.      If there is a need to return to see CT Surgery please call our  at [phone].    SURGICAL QUESTIONS  Please call your LVAD Coordinator with surgical recovery and medication questions. They will assist you with your needs and contact other surgery care team members as indicated.    Thank you -    Going home with your VAD    You are about to leave the hospital with your new VAD. This time can feel overwhelming. Your VAD Coordinator team is here to do everything we can to make this transition as smooth as possible. Below are contact numbers and information to help ease the process. A VAD Coordinator is available 24/7 should you have any questions. We would be more than happy to assist you.     Please remember, if you have a true emergency, ALWAYS call 911 first. Then call the on-call VAD Coordinator.     To Reach the On-Call VAD Coordinator: Dial the main hospital number at [phone]. Choose option 4 to speak with the . Ask him or her to page the on-call VAD Coordinator. We should get back to you within five minutes.     Symptoms to Report: Please contact the on-call VAD Coordinator to report:  - Bleeding   - Dizziness/lightheadedness  - Changes in your VAD parameters (+/- 2 from baseline)  - VAD alarms  - Numbness, tingling, or difficulty moving your arms or legs  - Changes in speech, swallowing, or mental status  - ANY head injury, even if it is just a small bump  - Dark, black, tarry, red, or bloody stools  - If you vomit and it is bloody, black, or looks like coffee grounds  - Increased drainage, redness, tenderness, or trauma to your driveline site, chest incision, or  chest tube sites    - Questions about your medications  - Questions about your Coumadin or INR  - Any other changes or concerns    Dressing Supplies: Your dressing supply company is [employer]. Please call them once you leave the hospital. They can be reached at [phone]. Verify that they have the correct address for delivery, especially if you are staying in the [place] before going home.     Blood Thinners: Your Coumadin (warfarin) will be managed by the [facility]. They can be reached M-F, 8am-4pm. They will communicate with you regarding your blood draws and your Coumadin dose. If you have an INR drawn and you don t hear from the clinic by 2pm please call them at [phone].  Please never allow anyone else to adjust your Coumadin or Aspirin without talking to a VAD Coordinator.     Clinic Appointments: The VAD team will schedule you for all of your follow-up visits. If you have any questions please call the main VAD office at [phone] to speak with our . You can also contact your VAD Coordinator.     VAD Coordinator: Your VAD Coordinator, Vandana Headley RN, can be reached M-F, 8am-4pm, at [phone] or [email or web address].    If you have any further questions or concerns about your VAD please refer to the discharge folder you received from your VAD Coordinator and/or call the on-call VAD Coordinator.

## 2022-04-08 NOTE — PROGRESS NOTES
ANTICOAGULATION  MANAGEMENT: Discharge Review    Tej Morfin chart reviewed for anticoagulation continuity of care    Hospital Admission on 3/22-4/8/22 for LVAD placement.    Discharge disposition: Home    Results:    Recent labs: (last 7 days)     04/01/22  1901 04/02/22  0454 04/02/22  1135 04/03/22  0614 04/03/22  1337 04/03/22  2050 04/04/22  0508 04/05/22  0437 04/06/22  0457 04/07/22  0505 04/08/22  0519   INR  --  1.16*  --  1.19*  --   --  1.23* 1.56* 1.85* 2.12* 2.36*   AAUFH <0.10 0.31 0.26 0.19 0.35 0.25 0.31 0.28 0.41 0.43 <0.10     Anticoagulation inpatient management:     warfarin initiated; see anticoagulation calendar    Anticoagulation discharge instructions:     Warfarin dosing: Discharged on 10 mg daily   Bridging: No   INR goal change: No      Medication changes affecting anticoagulation: Yes: Keflex x 5 days    Additional factors affecting anticoagulation: No    Plan     Agree with discharge plan for follow up on 4/11    Recommended follow up is scheduled  Patient not contacted    Anticoagulation Calendar updated (done as addendum to 4/7 encounter)    Jennifer Spence, [facility]

## 2022-04-08 NOTE — PROGRESS NOTES
[facility]   Cardiology II Service / Advanced Heart Failure  Device Interrogation Note  Date of Service: 4/8/2022    The patient's HeartMate LVAD was interrogated 4/8/2022  * Speed 5700 rpm   * Pulsatility index 2.1   * Power 3.9 Noriega   * Flow 4.5 L/minute   Fluid status: euvolemic   Alarms were reviewed, and notable for frequent PI events.   The driveline exit site was covered with dressing clean, dry, and intact.   All external components were inspected and showed no evidence of damage or malfunction.    GORGE Rogel CNP  4/8/2022

## 2022-04-08 NOTE — PROGRESS NOTES
"  [facility]   Cardiology II Service / Advanced Heart Failure  Daily Progress Note  Date of Service: 4/8/2022      Patient: Tej Morfin  MRN: [id number]  Admission Date: 3/22/2022  Hospital Day # 17    Assessment and Plan: Tej Morfin is a 56 year old male with a PMH of severe MR s/p MVR with ring (1/21/15), VT s/p ICD, nonobstructive CAD, NICM on home inotropes admitted for HM III LVAD implantation 3/23/22. Cardiology consult for LVAD management.     Chronic SHCF s/p HM III LVAD. Severe MR s/p mitral valve repair with annuloplasty ring (1/21/15). Implanted 3/23/22.   Stage D, NYHA Class III  ACEi/ARB Entresto 49/51 mg po BID   BB Sotalol 80 mg po BID, consider transition to Toprol XL   Aldosterone antagonist deferred while other medical therapy is prioritized  SCD prophylaxis ICD  Fluid status euvolemic. Off diuretics  MAP: 78  LDH: 267  Anticoagulation: Coumadin per pharmacy. INR-2.36, goal 2-3.  Antiplatelet: RICARDO trial    History of VT, s/p CRT-D. ICD shock 3/25/22. EP consulted, interrogation consistent with VT with VF zone adjusted to reduce risk for recurrent shock. LV lead turned off in setting of LVAD.   - Continue Sotatol    Postop AF with RVR. Returned to SR.   - Coumadin as above.     Mild nonobstructive CAD.   - RICARDO trial.   - Continue Sotalol.     RIJ, subclavia, and brachial DVT.   - Coumadin per pharmacy.   ================================================================  Interval History/ROS: He denies fever, chills, chest pain, palpitations, cough, nausea, vomiting, diarrhea, melena, hematochezia, and LE edema. He is tolerating oral intake and ambulation.     Last 24 hr care team notes reviewed.   ROS:  4 point ROS including Respiratory, CV, GI and , other than that noted in the HPI, is negative.     Medications: Reviewed in EPIC.     Physical Exam:   BP (!) 86/69 (BP Location: Left arm)   Pulse 80   Temp 97.7  F (36.5  C) (Oral)   Resp 16   Ht 1.791 m (5' 10.51\")  "  Wt 79.2 kg (174 lb 9.7 oz)   SpO2 100%   BMI 24.69 kg/m    GENERAL: Appears alert and oriented times three.   HEENT: Eye symmetrical and free of discharge bilaterally. Mucous membranes moist and without lesions.  NECK: Supple and without lymphadenopathy. JVD at clavicular line.   CV: RRR, S1S2 present with LVAD hum  RESPIRATORY: Respirations regular, even, and unlabored. Lungs CTA throughout.   GI: Soft and non distended with normoactive bowel sounds present in all quadrants. No tenderness, rebound, guarding. No organomegaly.   EXTREMITIES: No peripheral edema. 2+ bilateral pedal pulses.   NEUROLOGIC: Alert and orientated x 3. CN II-XII grossly intact. No focal deficits.   MUSCULOSKELETAL: No joint swelling or tenderness.   SKIN: No jaundice. No rashes or lesions. LVAD drive line covered.     Data:  CMPRecent Labs   Lab 04/08/22  0519 04/07/22  0505 04/06/22  0457 04/05/22  1156 04/05/22  0810 04/05/22  0437    138 136  --   --  137   POTASSIUM 4.7 4.6 4.6  --   --  4.5   CHLORIDE 107 109 106  --   --  105   CO2 24 23 24  --   --  24   ANIONGAP 4 6 6  --   --  8   * 125* 126* 96   < > 118*   BUN 19 17 17  --   --  17   CR 0.69 0.68 0.63*  --   --  0.68   GFRESTIMATED >90 >90 >90  --   --  >90   YESICA 9.0 9.3 8.6  --   --  8.8   MAG 2.1 2.0 2.1  --   --  2.0   PHOS 4.1 4.4 3.8  --   --  3.5   PROTTOTAL 7.0 6.7* 6.5*  --   --  6.3*   ALBUMIN 2.6* 2.4* 2.2*  --   --  2.3*   BILITOTAL 0.3 0.2 0.2  --   --  0.2   ALKPHOS 83 85 80  --   --  75   AST 16 19 22  --   --  24   ALT 67 82* 91*  --   --  100*    < > = values in this interval not displayed.     CBC  Recent Labs   Lab 04/08/22 0519 04/07/22  0505 04/06/22  1147 04/06/22  0457   WBC 5.3 5.9 8.1 7.5   RBC 3.41* 3.40* 3.57* 3.38*   HGB 10.5* 10.3* 10.6* 10.2*   HCT 34.0* 33.8* 34.9* 33.4*    99 98 99   MCH 30.8 30.3 29.7 30.2   MCHC 30.9* 30.5* 30.4* 30.5*   RDW 14.5 14.3 14.5 14.3    239 255 239     INR  Recent Labs   Lab 04/08/22  0519  04/07/22  0505 04/06/22  0457 04/05/22  0437   INR 2.36* 2.12* 1.85* 1.56*       Time/Communication  I personally spent a total of 25 minutes. Of that 15 minutes was counseling/coordination of patient's care. Plan of care discussed with patient. See my note above for details.      Zandra Childs [facility]  4/8/2022

## 2022-04-08 NOTE — PLAN OF CARE
Neuro: A&Ox4.  Calm and able to make needs known. Denies numbness and tingling in extremities. Afebrile.   Cardiac: SR. HR 70-80s. MAPs 80s. SBP 80-90s.   LVAD Heart mate 3. ICD In place. PI between 1.8-3.4, pt is asymptomatic, team aware  Respiratory: Sating >99% on RA.  GI/: Adequate urine output . BM X1, stool softners given  Diet/appetite: Tolerating 2g Na  diet. Eating well.  Activity:  SBA, up bedside commode  Pain: At acceptable level on current regimen. PRN oxy given 2x  Skin: No new deficits noted. LVAD site, Dressing CDI.   LDA's: Single lumen PICC, saline locked. LVAD.       Plan: Continue with POC. Notify primary team with changes.
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
## 2022-04-08 NOTE — PLAN OF CARE
Goal Outcome Evaluation:        Neuro: A&Ox4. Pt is up ambulating 3 times today.  Cardiac: Paced. VSS.  Heart Mate 3 LVAD.   Respiratory: Sating 94%> on RA.  GI/: Adequate urine output. BM X1  Diet/appetite: Tolerating 3 G NA+ diet. Eating well.  Activity:  SBA, up to chair and in halls.  Pain: At acceptable level on current regimen.   Skin: No new deficits noted.  LDA's:PICCm LVAD     Plan: Continue with POC. Notify primary team with changes.

## 2022-04-08 NOTE — PROGRESS NOTES
LVAD Social Work Service Discharge Note      Patient Name:  Tej Morfin     Anticipated Discharge Date:  4/8/2022    Discharge Disposition:   Family member's home in [place]     Plan for 24 hour care for immediate post LVAD period: Pt's wife, Jessenia, will provide 24hr supervision.     If not local, plans for short term stay:  Family member's home in [place]    Additional Services/Equipment Arranged:  Outpatient INR-RNCC coordinated      Persons notified of above discharge plan:  Pt and pt's wife, is at bedside     Patient / Family response to discharge plan:  Pt and wife are eager to discharge today. They have the 24hr LVAD phone number if they have questions or concerns about the LVAD.     Education and resources provided by SW at discharge: role of LVAD  in out patient setting and provided contact info for , and availability of support groups      Plan:     Pt and wife are excited and appropriately anxious with transition out of the hospital. Provided re-assurance and reminded them that the LVAD coordinator is available 24/7. No mental health or coping concerns for pt at discharge. Pt has a great support system.

## 2022-04-08 NOTE — PLAN OF CARE
DISCHARGE                         No discharge date for patient encounter.  ----------------------------------------------------------------------------  Discharged to: Home  Via: private transportation  Accompanied by: Family--wife  Discharge Instructions:  3 G Na+ diet, as tolerated activity, medications, follow up appointments, when to call the MD, aftercare instructions.  Prescriptions: To be filled by  discharge  pharmacy; medication list reviewed & sent with pt  Follow Up Appointments: arranged; information given  Belongings: All sent with pt  IV: d/c'd--PICC removed  Telemetry: d/c'd  Pt  and spouse exhibits understanding of above discharge instructions; all questions answered.    Discharge Paperwork: Signed, copied, and sent home with patient.

## 2022-04-08 NOTE — PROGRESS NOTES
Occupational Therapy Discharge Summary    Reason for therapy discharge:    Discharged to home with outpatient therapy.    Progress towards therapy goal(s). See goals on Care Plan in [facility] electronic health record for goal details.  Goals partially met.  Barriers to achieving goals:   discharge from facility.    Therapy recommendation(s):    Continued therapy is recommended.  Rationale/Recommendations:  rec OP CR to regain strength and activity tolerance for IADLs.  Pt did very well, met most of his basic ADL goals. Made great improvements in activity tolerance. .

## 2022-04-09 ENCOUNTER — CARE COORDINATION (OUTPATIENT)
Dept: CARDIOLOGY | Facility: CLINIC | Age: 57
End: 2022-04-09
Payer: MEDICARE

## 2022-04-09 DIAGNOSIS — Z71.89 OTHER SPECIFIED COUNSELING: ICD-10-CM

## 2022-04-09 NOTE — PROGRESS NOTES
I called patient/caregiver to check in 24 hours post discharge. Pt reports VAD parameters are stable: 5700, 3.7 flow, 3.9-8.0 PI, 4.5 pwr. He has not gotten a weight yet today and he doesn't have his doppler equipment yet to check his BP. I reviewed medications and answered any questions. Patient reports sleeping pretty well and no anxiety since being home with LVAD. He did endorse feelings of nervousness being out of the hospital for the first night. Patient is fully able to move around the house and care for himself independently.    Discussed specific new problems/stressors since being discharged from the hospital: still having pain and is taking oxycodone, robaxin, and tylenol. Empathized with patient and reviewed coping strategies: enlisting support from friends and love ones, attending patient and caregiver support groups, reviewing LVAD educational materials to reinforce knowledge, and talking about concerns with family/care providers/trusted others. Encouraged pt to page VAD Coordinator with any issues or questions. Pt verbalizes understanding.

## 2022-04-11 ENCOUNTER — ANTICOAGULATION THERAPY VISIT (OUTPATIENT)
Dept: ANTICOAGULATION | Facility: CLINIC | Age: 57
End: 2022-04-11

## 2022-04-11 ENCOUNTER — LAB (OUTPATIENT)
Dept: LAB | Facility: CLINIC | Age: 57
End: 2022-04-11
Payer: MEDICARE

## 2022-04-11 ENCOUNTER — PATIENT OUTREACH (OUTPATIENT)
Dept: CARE COORDINATION | Facility: CLINIC | Age: 57
End: 2022-04-11

## 2022-04-11 DIAGNOSIS — Z95.2 S/P MITRAL VALVE REPLACEMENT: ICD-10-CM

## 2022-04-11 DIAGNOSIS — Z95.811 LVAD (LEFT VENTRICULAR ASSIST DEVICE) PRESENT (H): ICD-10-CM

## 2022-04-11 DIAGNOSIS — Z95.811 LVAD (LEFT VENTRICULAR ASSIST DEVICE) PRESENT (H): Primary | ICD-10-CM

## 2022-04-11 DIAGNOSIS — I50.22 CHRONIC SYSTOLIC CHF (CONGESTIVE HEART FAILURE) (H): ICD-10-CM

## 2022-04-11 LAB — INR BLD: 4.7 (ref 0.9–1.1)

## 2022-04-11 PROCEDURE — 36416 COLLJ CAPILLARY BLOOD SPEC: CPT

## 2022-04-11 PROCEDURE — 85610 PROTHROMBIN TIME: CPT

## 2022-04-11 NOTE — PROGRESS NOTES
ANTICOAGULATION MANAGEMENT     Tej Morfin 56 year old male is on warfarin with supratherapeutic INR result. (Goal INR 2.0-3.0)    Recent labs: (last 7 days)     04/11/22  0728   INR 4.7*       ASSESSMENT       Source(s): Chart Review and Patient/Caregiver Call       Warfarin doses taken: Warfarin taken as instructed    Diet: Patient was drinking Ensure 3x/day inpatient.  He had (1) Ensure drink since d/c. He will NOT be drinking Ensure going forward.    New illness, injury, or hospitalization: Yes: New HM 3 placed 3/23/22.    Medication/supplement changes: Introduced patient to [facility].  Gave contact information, answered questions.  See lab note, Dr. Hdz stated we are to HOLD Warfarin on 4/11.    Signs or symptoms of bleeding or clotting: No    Previous INR: Therapeutic last 2(+) visits    Additional findings: New LVAD.       PLAN     Recommended plan for no diet, medication or health factor changes affecting INR     Dosing Instructions: hold dose then decrease your warfarin dose (10.7% change) with next INR in 1 day       Summary  As of 4/11/2022    Full warfarin instructions:  4/11: Hold; Otherwise 7.5 mg every Mon, Wed, Fri; 10 mg all other days   Next INR check:  4/12/2022             Telephone call with Tej who verbalizes understanding and agrees to plan    Lab visit scheduled    Education provided: Importance of therapeutic range, Importance of following up at instructed interval, Importance of taking warfarin as instructed, Monitoring for bleeding signs and symptoms, Monitoring for clotting signs and symptoms and When to seek medical attention/emergency care    Plan made with [facility] Pharmacist Jennifer Spence and per LVAD protocol    Siddharth Greco, RN  Anticoagulation Clinic  4/11/2022    _______________________________________________________________________     Anticoagulation Episode Summary     Current INR goal:  2.0-3.0   TTR:  --   Target end date:  Indefinite   Send INR reminders to:  [facility]    Indications    LVAD (left ventricular assist device) present (H) [Z95.811]  Chronic systolic CHF (congestive heart failure) (H) [I50.22]           Comments:  HeartMate 3   Bridging: No bridging. Reason -- new implant   Date VAD placed: 03/23/22  Hx Mitral valve repair and not replacement         Anticoagulation Care Providers     Provider Role Specialty Phone number    Dunia Hdz MD Referring Cardiovascular Disease [phone]

## 2022-04-12 ENCOUNTER — ANTICOAGULATION THERAPY VISIT (OUTPATIENT)
Dept: ANTICOAGULATION | Facility: CLINIC | Age: 57
End: 2022-04-12

## 2022-04-12 ENCOUNTER — LAB (OUTPATIENT)
Dept: LAB | Facility: CLINIC | Age: 57
End: 2022-04-12
Payer: MEDICARE

## 2022-04-12 ENCOUNTER — VIRTUAL VISIT (OUTPATIENT)
Dept: PHARMACY | Facility: CLINIC | Age: 57
End: 2022-04-12
Payer: COMMERCIAL

## 2022-04-12 ENCOUNTER — CARE COORDINATION (OUTPATIENT)
Dept: CARDIOLOGY | Facility: CLINIC | Age: 57
End: 2022-04-12

## 2022-04-12 DIAGNOSIS — Z95.811 LVAD (LEFT VENTRICULAR ASSIST DEVICE) PRESENT (H): Primary | ICD-10-CM

## 2022-04-12 DIAGNOSIS — Z95.811 LVAD (LEFT VENTRICULAR ASSIST DEVICE) PRESENT (H): ICD-10-CM

## 2022-04-12 DIAGNOSIS — G89.18 ACUTE POST-OPERATIVE PAIN: ICD-10-CM

## 2022-04-12 DIAGNOSIS — I50.22 CHRONIC SYSTOLIC CHF (CONGESTIVE HEART FAILURE) (H): ICD-10-CM

## 2022-04-12 DIAGNOSIS — I50.22 CHRONIC SYSTOLIC CHF (CONGESTIVE HEART FAILURE) (H): Primary | ICD-10-CM

## 2022-04-12 DIAGNOSIS — K31.84 GASTROPARESIS: ICD-10-CM

## 2022-04-12 DIAGNOSIS — I50.22 CHRONIC SYSTOLIC CONGESTIVE HEART FAILURE (H): ICD-10-CM

## 2022-04-12 DIAGNOSIS — Z95.2 S/P MITRAL VALVE REPLACEMENT: ICD-10-CM

## 2022-04-12 LAB — INR BLD: 3.9 (ref 0.9–1.1)

## 2022-04-12 PROCEDURE — 99605 MTMS BY PHARM NP 15 MIN: CPT | Performed by: PHARMACIST

## 2022-04-12 PROCEDURE — 85610 PROTHROMBIN TIME: CPT

## 2022-04-12 PROCEDURE — 36416 COLLJ CAPILLARY BLOOD SPEC: CPT

## 2022-04-12 NOTE — PROGRESS NOTES
Called pt to check in 1 week post discharge from VAD implant.  Pt reports last two weights: 174, 175.  Current LVAD numbers, Speed: 5700, Flow: 4.8, PI: 2.7, Power: 4.4, which have been stable since discharge.  Pt reports sleeping well.  Still has some lingering discomfort over the left side of his chest.  He feels like he can feel the pump.  Pt is still using pain meds as needed and tylenol too.  Dressing supplies mailed out yesterday, but no dopplers available.  [facility] will send them once they are back in stock.    Pt able to walk to clinic today to get labs drawn.  He did report some mild SOB, which improved quickly with rest.  We discussed that his 30 days will be up May 8th.     Empathized with patient and reviewed coping strategies: enlisting support from friends and love ones, attending patient and caregiver support groups, reviewing LVAD educational materials to reinforce knowledge, and talking about concerns with family/care providers/trusted others. Encouraged pt to page VAD Coordinator with any issues or questions. Pt verbalizes understanding.

## 2022-04-12 NOTE — PATIENT INSTRUCTIONS
Recommendations from today's MTM visit:                                                       Avoid taking Diazepam with your Methocarbamol or oxycodone, it can cause a dangerous amount of sedation.     Follow-up: as needed    It was great to speak with you today.  I value your experience and would be very thankful for your time with providing feedback on our clinic survey. You may receive a survey via email or text message in the next few days.     To schedule another MTM appointment, please call the clinic directly or you may call the MTM scheduling line at [phone] or toll-free at [phone].     My Clinical Pharmacist's contact information:                                                      Please feel free to contact me with any questions or concerns you have.      Malvin Mccarty, PharmD  MTM Pharmacist    Phone: [phone]
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
## 2022-04-12 NOTE — PROGRESS NOTES
ANTICOAGULATION MANAGEMENT     Tej SWENSON [place] 56 year old male is on warfarin with supratherapeutic INR result. (Goal INR 2.0-3.0)    Recent labs: (last 7 days)     04/12/22  1026   INR 3.9*       ASSESSMENT       Source(s): Chart Review and Patient/Caregiver Call       Warfarin doses taken: Warfarin taken as instructed    Diet: No new diet changes identified    New illness, injury, or hospitalization: Yes: Patient was just discharged from the hospital with new LVAD placement    Medication/supplement changes: None noted    Signs or symptoms of bleeding or clotting: No    Previous INR: Supratherapeutic    Additional findings: None       PLAN     Recommended plan for no diet, medication or health factor changes affecting INR     Dosing Instructions: Dosage is still being determined with next INR in 2 days       Summary  As of 4/12/2022    Full warfarin instructions:  7.5 mg every Tue, Thu, Sat; 10 mg all other days   Next INR check:  4/14/2022             Telephone call with Tej who verbalizes understanding and agrees to plan and who agrees to plan and repeated back plan correctly    Check at provider office visit    Education provided: Goal range and significance of current result, Importance of therapeutic range, Importance of following up at instructed interval, Importance of taking warfarin as instructed, Monitoring for bleeding signs and symptoms and When to seek medical attention/emergency care    Plan made per [facility] anticoagulation protocol and per LVAD protocol    Audelia Avalos RN  Anticoagulation Clinic  4/12/2022    _______________________________________________________________________     Anticoagulation Episode Summary     Current INR goal:  2.0-3.0   TTR:  --   Target end date:  Indefinite   Send INR reminders to:  [facility]    Indications    LVAD (left ventricular assist device) present (H) [Z95.811]  Chronic systolic CHF (congestive heart failure) (H) [I50.22]           Comments:   HeartMate 3   Bridging: No bridging. Reason -- new implant   Date VAD placed: 03/23/22  Hx Mitral valve repair and not replacement         Anticoagulation Care Providers     Provider Role Specialty Phone number    Dunia Hdz MD Referring Cardiovascular Disease [phone]

## 2022-04-13 LAB — BACTERIA SNV CULT: NORMAL

## 2022-04-13 NOTE — PROGRESS NOTES
"HPI:   Tej Morfin is a 56 year old male with a PMH of severe MR s/p MVR with ring (1/21/15), VT s/p ICD, nonobstructive CAD, NICM previously on home inotropes now s/p HM III LVAD implantation 3/23/22, with course c/b AFib with RVR (s/p ICD shock 3/25/22) and right knee effusion (s/p arthrocentesis and intraarticular steroid injection 3/30/22) who presents for f/up in LVAD clinic.    No SOB at rest. He walked the furthest he has since being home today- walked in  From lab which did make him feel a bit FINNEGAN. No orthopnea or PND. Sleeps on 3 pillows chronically. No LE edema. No abdominal edema. No lightheadedness, dizziness, pre-syncope or syncope. He has had some palpitaitons with anxiety, but not other palpitations. He does feels feel some left sided LVAD \"rub\" when he lays on his left side. this is improving. Appetite is good.    No blood ni the urine or blood in the stool. No prolonged nosebleeds.    No driveline color changes, pain, or drainage. No fevers or chills.    He does have a wound at the bottom of his sternum which continues to be open. Scant drainage which is improving since leaving the hospital. He chest tube sites are note healing, scant drainage.     No headaches or stroke symptomes.    Weight at home has been 174 lbs at home yesterday.    Cardiac Medications  Coumadin   AREIS  Entresto 49-51 mg (1/2 pill BID)  Keflex 500 mg q8h finished last night  Sotalol 80 mg BID    PAST MEDICAL HISTORY:  Past Medical History:   Diagnosis Date     Chronic systolic heart failure (H) 2/7/2017     Gastroparesis      ICD (implantable cardioverter-defibrillator) in place     [facility]     Non-ischemic cardiomyopathy (H)      Paroxysmal ventricular tachycardia (H) 2/7/2017     S/P mitral valve replacement 2/7/2017     Tetrahydrocannabinol (THC) use disorder, mild, in controlled environment, abuse 09/12/2019    occasional edible THC. reportedly for sleep, anxiety     Tobacco abuse, episodic     occasional " "cigar - ~ 3x/wk       FAMILY HISTORY:  Family History   Problem Relation Age of Onset     Coronary Artery Disease Mother      Kidney failure Mother      Cardiomyopathy Father          age 51       SOCIAL HISTORY:  Social History     Socioeconomic History     Marital status:    Tobacco Use     Smoking status: Former Smoker     Smokeless tobacco: Never Used   Substance and Sexual Activity     Alcohol use: No     Alcohol/week: 0.0 standard drinks     Drug use: No       CURRENT MEDICATIONS:  acetaminophen (TYLENOL) 325 MG tablet, Take 2 tablets (650 mg) by mouth every 4 hours as needed for pain  diazepam (VALIUM) 10 MG tablet, Take 10 mg by mouth every 4 hours as needed for anxiety  methocarbamol (ROBAXIN) 750 MG tablet, Take 1 tablet (750 mg) by mouth 3 times daily as needed for muscle spasms (surgical pain)  omeprazole (PRILOSEC) 40 MG DR capsule, Take 40 mg by mouth every morning  oxyCODONE (ROXICODONE) 5 MG tablet, Take 1 tablet (5 mg) by mouth every 4 hours as needed for moderate to severe pain  polyethylene glycol (MIRALAX) 17 GM/Dose powder, Take 17 g by mouth daily while taking oxycodone  sacubitril-valsartan (ENTRESTO) 49-51 MG per tablet, Take 1 tablet by mouth 2 times daily  senna-docusate (SENOKOT-S/PERICOLACE) 8.6-50 MG tablet, Take 1-2 tablets by mouth 2 times daily as needed for constipation while taking oxycodone  sotalol (BETAPACE) 80 MG tablet, TAKE 1 TABLET BY MOUTH TWICE A DAY  study - aspirin or placebo, IDS# [id number], 100 MG capsule, Take 1 capsule (100 mg) by mouth daily . Swallow whole, do not crush.  warfarin ANTICOAGULANT (COUMADIN) 2.5 MG tablet, Take 10 mg PO daily at 6 pm until next INR check, then dose per INR goal 2-3    No current facility-administered medications on file prior to visit.      ROS:   See HPI    EXAM:  BP (!) 84/0 (BP Location: Left arm, Patient Position: Chair, Cuff Size: Adult Regular)   Pulse 77   Ht 1.778 m (5' 10\")   Wt 81.9 kg (180 lb 9.6 oz)   SpO2 100%   " BMI 25.91 kg/m      GENERAL: Appears comfortable, in no distress.  HEENT: Eye symmetrical and without discharge or icterus bilaterally. Mucous membranes moist and without lesions.  NECK: Supple, JVD <6.   CV: Hum of LVAD, no adventitious sounds  RESPIRATORY: Respirations regular, even, and unlabored. Lungs CTA throughout.   GI: Soft and non distended with normoactive bowel sounds present in all quadrants. No tenderness, rebound, guarding. No organomegaly.   EXTREMITIES: No LE peripheral edema. none pulsatile.   NEUROLOGIC: Alert and interacting appropriatly.  No focal deficits.   MUSCULOSKELETAL: No joint swelling or tenderness.   SKIN: No jaundice. No rashes or lesions. Driveline dressing c/d/i. Open wound at bottom of sternum, no purulence. Chest tube sites remain open.    Labs - as reviewed in clinic with patient today:  CBC RESULTS:  Lab Results   Component Value Date    WBC 4.6 04/14/2022    WBC 4.2 09/10/2019    RBC 3.78 (L) 04/14/2022    RBC 4.98 09/10/2019    HGB 11.4 (L) 04/14/2022    HGB 15.8 09/10/2019    HCT 36.9 (L) 04/14/2022    HCT 48.9 09/10/2019    MCV 98 04/14/2022    MCV 98 09/10/2019    MCH 30.2 04/14/2022    MCH 31.7 09/10/2019    MCHC 30.9 (L) 04/14/2022    MCHC 32.3 09/10/2019    RDW 14.0 04/14/2022    RDW 12.9 09/10/2019     04/14/2022     09/10/2019       CMP RESULTS:  Lab Results   Component Value Date     04/14/2022     09/10/2019    POTASSIUM 4.1 04/14/2022    POTASSIUM 3.4 09/10/2019    CHLORIDE 109 04/14/2022    CHLORIDE 108 09/10/2019    CO2 24 04/08/2022    CO2 26 09/10/2019    ANIONGAP 4 04/08/2022    ANIONGAP 4 09/10/2019     (H) 04/08/2022    GLC 89 09/10/2019    BUN 19 04/08/2022    BUN 18 09/10/2019    CR 0.69 04/08/2022    CR 1.23 09/10/2019    GFRESTIMATED >90 04/08/2022    GFRESTIMATED 67 09/10/2019    GFRESTBLACK 78 09/10/2019    YESICA 9.0 04/08/2022    YESICA 9.1 09/10/2019    BILITOTAL 0.3 04/08/2022    BILITOTAL 0.6 09/10/2019    ALBUMIN 2.6 (L)  04/08/2022    ALBUMIN 4.0 09/10/2019    ALKPHOS 83 04/08/2022    ALKPHOS 61 09/10/2019    ALT 67 04/08/2022    ALT 25 09/10/2019    AST 16 04/08/2022    AST 17 09/10/2019        INR RESULTS:  Lab Results   Component Value Date    INR 2.96 (H) 04/14/2022    INR 1.02 09/10/2019       Lab Results   Component Value Date    MAG 2.1 04/08/2022    MAG 1.8 09/10/2019     No results found for: NTBNPI  Lab Results   Component Value Date    NTBNP 2,594 (H) 03/03/2022    NTBNP 714 (H) 10/18/2018       Assessment and Plan:   Tej Morfin is a 56 year old male with a PMH of severe MR s/p MVR with ring (1/21/15), VT s/p ICD, nonobstructive CAD, NICM previously on home inotropes now s/p HM III LVAD implantation 3/23/22, with course c/b sternal wound s/p Keflex treatment, AFib with RVR (s/p ICD shock 3/25/22) and right knee effusion (s/p arthrocentesis and intraarticular steroid injection 3/30/22) who presents for f/up in LVAD clinic.    He is doing very well. Appears mildly hypvolemic, encouraged increased PO intake. Will defer farxiga or entresto increase as I do not think he would tolerate the additional diuresis at this point. Consider at next appointment.     Chronic SHCF s/p HM III LVAD. Severe MR s/p mitral valve repair with annuloplasty ring (1/21/15). Implanted 3/23/22.   Stage D, NYHA Class III. Last hemodynamics: 3/27: MAP 85, CVP 10, PAP 52/20, PCW 20, JOANNA CO/CI 5.1/2.4, SVR 1099, MVO2 50 -- weight i206#. Post-operative course c/b AFib with RVR (s/p ICD shock 3/25/22) and right knee effusion (s/p arthrocentesis and intraarticular steroid injection 3/30/22). PIs remain ~2 - RV is normal, likely hypovolemic, torsemide stopped.      ACEi/ARB Entresto 49/51 mg po BID, consider increasing next week, defered today d/t mild hypovolemia    BB Sotalol 80 mg po BID, consider transition to Toprol XL   Aldosterone antagonist deferred while other medical therapy is prioritized  SGLT2i: Deferred while other medications are  prioretized  SCD prophylaxis ICD  Fluid status euvolemic. Off diuretics  MAP: Within goal of 65-85  LDH: 22, stable  Anticoagulation: Coumadin per pharmacy. INR-2.39 goal 2-3.  Antiplatelet: RICARDO trial      VAD interrogation today: VAD interrogation reviewed with VAD coordinator. Agree with findings. History goes back 24 hours. Frequen PI events with frequent speed drops. No power spikes, speed drops, or other findings suspicious of pump malfunction noted.     Sternal Wound  CT sites, slow healing    - Pictures of sites sent to CVTS  - Extended Keflex course by 5 more days  - Further f/up per CVTs     History of VT, s/p CRT-D. ICD shock 3/25/22. EP consulted, interrogation consistent with VT with VF zone adjusted to reduce risk for recurrent shock. LV lead turned off in setting of LVAD.   - Continue Sotatol     Postop AF with RVR. Returned to SR.   - Coumadin as above.      Mild nonobstructive CAD.   - RICARDO trial.      RIJ, subclavia, and brachial DVT.   - Coumadin per pharmacy.     Anxiety  - Behavioral Health Referal    Follow up   - RTC in 1 week with labs  - CVTS to consider sooner f/up pending their review of his surgial sites       19 minutes spent face-to-face with patient, >50% in counseling and/or coordination of care as described above. An additional 15 minutes were spent completing documentations and coordinating with other providers (CVTS) on the day of service.      Radha White PA-C  [facility]        CC

## 2022-04-14 ENCOUNTER — ANTICOAGULATION THERAPY VISIT (OUTPATIENT)
Dept: ANTICOAGULATION | Facility: CLINIC | Age: 57
End: 2022-04-14

## 2022-04-14 ENCOUNTER — LAB (OUTPATIENT)
Dept: LAB | Facility: CLINIC | Age: 57
End: 2022-04-14
Attending: INTERNAL MEDICINE
Payer: MEDICARE

## 2022-04-14 ENCOUNTER — OFFICE VISIT (OUTPATIENT)
Dept: CARDIOLOGY | Facility: CLINIC | Age: 57
End: 2022-04-14
Attending: INTERNAL MEDICINE
Payer: MEDICARE

## 2022-04-14 VITALS
HEART RATE: 77 BPM | SYSTOLIC BLOOD PRESSURE: 84 MMHG | BODY MASS INDEX: 25.86 KG/M2 | HEIGHT: 70 IN | WEIGHT: 180.6 LBS | OXYGEN SATURATION: 100 %

## 2022-04-14 DIAGNOSIS — Z95.811 LVAD (LEFT VENTRICULAR ASSIST DEVICE) PRESENT (H): ICD-10-CM

## 2022-04-14 DIAGNOSIS — I50.22 CHRONIC SYSTOLIC CONGESTIVE HEART FAILURE (H): ICD-10-CM

## 2022-04-14 DIAGNOSIS — I50.22 CHRONIC SYSTOLIC CHF (CONGESTIVE HEART FAILURE) (H): Primary | ICD-10-CM

## 2022-04-14 DIAGNOSIS — Z95.811 LVAD (LEFT VENTRICULAR ASSIST DEVICE) PRESENT (H): Primary | ICD-10-CM

## 2022-04-14 DIAGNOSIS — I50.22 CHRONIC SYSTOLIC CHF (CONGESTIVE HEART FAILURE) (H): ICD-10-CM

## 2022-04-14 DIAGNOSIS — I50.22 CHRONIC SYSTOLIC CONGESTIVE HEART FAILURE (H): Primary | ICD-10-CM

## 2022-04-14 LAB
ALBUMIN SERPL-MCNC: 3 G/DL (ref 3.4–5)
ALP SERPL-CCNC: 88 U/L (ref 40–150)
ALT SERPL W P-5'-P-CCNC: 46 U/L (ref 0–70)
ANION GAP SERPL CALCULATED.3IONS-SCNC: 6 MMOL/L (ref 3–14)
AST SERPL W P-5'-P-CCNC: 24 U/L (ref 0–45)
BASOPHILS # BLD AUTO: 0 10E3/UL (ref 0–0.2)
BASOPHILS NFR BLD AUTO: 0 %
BILIRUB SERPL-MCNC: 0.2 MG/DL (ref 0.2–1.3)
BUN SERPL-MCNC: 20 MG/DL (ref 7–30)
CALCIUM SERPL-MCNC: 9.2 MG/DL (ref 8.5–10.1)
CHLORIDE BLD-SCNC: 109 MMOL/L (ref 94–109)
CO2 SERPL-SCNC: 26 MMOL/L (ref 20–32)
CREAT SERPL-MCNC: 0.86 MG/DL (ref 0.66–1.25)
D DIMER PPP FEU-MCNC: 7.1 UG/ML FEU (ref 0–0.5)
EOSINOPHIL # BLD AUTO: 0.1 10E3/UL (ref 0–0.7)
EOSINOPHIL NFR BLD AUTO: 3 %
ERYTHROCYTE [DISTWIDTH] IN BLOOD BY AUTOMATED COUNT: 14 % (ref 10–15)
GFR SERPL CREATININE-BSD FRML MDRD: >90 ML/MIN/1.73M2
GLUCOSE BLD-MCNC: 126 MG/DL (ref 70–99)
HCT VFR BLD AUTO: 36.9 % (ref 40–53)
HGB BLD-MCNC: 11.4 G/DL (ref 13.3–17.7)
IMM GRANULOCYTES # BLD: 0 10E3/UL
IMM GRANULOCYTES NFR BLD: 1 %
INR PPP: 2.96 (ref 0.85–1.15)
LDH SERPL L TO P-CCNC: 222 U/L (ref 85–227)
LYMPHOCYTES # BLD AUTO: 1 10E3/UL (ref 0.8–5.3)
LYMPHOCYTES NFR BLD AUTO: 21 %
MCH RBC QN AUTO: 30.2 PG (ref 26.5–33)
MCHC RBC AUTO-ENTMCNC: 30.9 G/DL (ref 31.5–36.5)
MCV RBC AUTO: 98 FL (ref 78–100)
MONOCYTES # BLD AUTO: 0.2 10E3/UL (ref 0–1.3)
MONOCYTES NFR BLD AUTO: 5 %
NEUTROPHILS # BLD AUTO: 3.2 10E3/UL (ref 1.6–8.3)
NEUTROPHILS NFR BLD AUTO: 70 %
NRBC # BLD AUTO: 0 10E3/UL
NRBC BLD AUTO-RTO: 0 /100
NT-PROBNP SERPL-MCNC: 1513 PG/ML (ref 0–125)
PLATELET # BLD AUTO: 201 10E3/UL (ref 150–450)
POTASSIUM BLD-SCNC: 4.1 MMOL/L (ref 3.4–5.3)
PROT SERPL-MCNC: 7.4 G/DL (ref 6.8–8.8)
RBC # BLD AUTO: 3.78 10E6/UL (ref 4.4–5.9)
SODIUM SERPL-SCNC: 141 MMOL/L (ref 133–144)
WBC # BLD AUTO: 4.6 10E3/UL (ref 4–11)

## 2022-04-14 PROCEDURE — 85025 COMPLETE CBC W/AUTO DIFF WBC: CPT | Performed by: PATHOLOGY

## 2022-04-14 PROCEDURE — 93750 INTERROGATION VAD IN PERSON: CPT | Performed by: PHYSICIAN ASSISTANT

## 2022-04-14 PROCEDURE — 99214 OFFICE O/P EST MOD 30 MIN: CPT | Mod: 25 | Performed by: PHYSICIAN ASSISTANT

## 2022-04-14 PROCEDURE — 83615 LACTATE (LD) (LDH) ENZYME: CPT | Performed by: PATHOLOGY

## 2022-04-14 PROCEDURE — 85379 FIBRIN DEGRADATION QUANT: CPT | Performed by: PHYSICIAN ASSISTANT

## 2022-04-14 PROCEDURE — 80053 COMPREHEN METABOLIC PANEL: CPT | Performed by: PATHOLOGY

## 2022-04-14 PROCEDURE — 36415 COLL VENOUS BLD VENIPUNCTURE: CPT | Performed by: PATHOLOGY

## 2022-04-14 PROCEDURE — 83880 ASSAY OF NATRIURETIC PEPTIDE: CPT | Performed by: PATHOLOGY

## 2022-04-14 PROCEDURE — 85610 PROTHROMBIN TIME: CPT | Performed by: PATHOLOGY

## 2022-04-14 PROCEDURE — G0463 HOSPITAL OUTPT CLINIC VISIT: HCPCS | Mod: 25

## 2022-04-14 RX ORDER — CEPHALEXIN 500 MG/1
500 CAPSULE ORAL EVERY 8 HOURS
Qty: 15 CAPSULE | Refills: 0 | Status: SHIPPED | OUTPATIENT
Start: 2022-04-14 | End: 2022-04-19

## 2022-04-14 ASSESSMENT — PAIN SCALES - GENERAL: PAINLEVEL: NO PAIN (0)

## 2022-04-14 NOTE — NURSING NOTE
Chief Complaint   Patient presents with     Follow Up     3 week post vad      Vitals were taken and medications were reconciled.   Lalo Hummel, EMT  7:16 AM

## 2022-04-14 NOTE — LETTER
"4/14/2022      RE: Tej Morfin  [address]  [place] [address]       Dear Colleague,    Thank you for the opportunity to participate in the care of your patient, Tej Morfin, at the [facility] [place] [facility]. Please see a copy of my visit note below.    HPI:   Tej Morfin is a 56 year old male with a PMH of severe MR s/p MVR with ring (1/21/15), VT s/p ICD, nonobstructive CAD, NICM previously on home inotropes now s/p HM III LVAD implantation 3/23/22, with course c/b AFib with RVR (s/p ICD shock 3/25/22) and right knee effusion (s/p arthrocentesis and intraarticular steroid injection 3/30/22) who presents for f/up in LVAD clinic.    No SOB at rest. He walked the furthest he has since being home today- walked in  From lab which did make him feel a bit FINNEGAN. No orthopnea or PND. Sleeps on 3 pillows chronically. No LE edema. No abdominal edema. No lightheadedness, dizziness, pre-syncope or syncope. He has had some palpitaitons with anxiety, but not other palpitations. He does feels feel some left sided LVAD \"rub\" when he lays on his left side. this is improving. Appetite is good.    No blood ni the urine or blood in the stool. No prolonged nosebleeds.    No driveline color changes, pain, or drainage. No fevers or chills.    He does have a wound at the bottom of his sternum which continues to be open. Scant drainage which is improving since leaving the hospital. He chest tube sites are note healing, scant drainage.     No headaches or stroke symptomes.    Weight at home has been 174 lbs at home yesterday.    Cardiac Medications  Coumadin   AREIS  Entresto 49-51 mg (1/2 pill BID)  Keflex 500 mg q8h finished last night  Sotalol 80 mg BID    PAST MEDICAL HISTORY:  Past Medical History:   Diagnosis Date     Chronic systolic heart failure (H) 2/7/2017     Gastroparesis      ICD (implantable " cardioverter-defibrillator) in place     [employer]     Non-ischemic cardiomyopathy (H)      Paroxysmal ventricular tachycardia (H) 2017     S/P mitral valve replacement 2017     Tetrahydrocannabinol (THC) use disorder, mild, in controlled environment, abuse 2019    occasional edible THC. reportedly for sleep, anxiety     Tobacco abuse, episodic     occasional cigar - ~ 3x/wk       FAMILY HISTORY:  Family History   Problem Relation Age of Onset     Coronary Artery Disease Mother      Kidney failure Mother      Cardiomyopathy Father          age 51       SOCIAL HISTORY:  Social History     Socioeconomic History     Marital status:    Tobacco Use     Smoking status: Former Smoker     Smokeless tobacco: Never Used   Substance and Sexual Activity     Alcohol use: No     Alcohol/week: 0.0 standard drinks     Drug use: No       CURRENT MEDICATIONS:  acetaminophen (TYLENOL) 325 MG tablet, Take 2 tablets (650 mg) by mouth every 4 hours as needed for pain  diazepam (VALIUM) 10 MG tablet, Take 10 mg by mouth every 4 hours as needed for anxiety  methocarbamol (ROBAXIN) 750 MG tablet, Take 1 tablet (750 mg) by mouth 3 times daily as needed for muscle spasms (surgical pain)  omeprazole (PRILOSEC) 40 MG DR capsule, Take 40 mg by mouth every morning  oxyCODONE (ROXICODONE) 5 MG tablet, Take 1 tablet (5 mg) by mouth every 4 hours as needed for moderate to severe pain  polyethylene glycol (MIRALAX) 17 GM/Dose powder, Take 17 g by mouth daily while taking oxycodone  sacubitril-valsartan (ENTRESTO) 49-51 MG per tablet, Take 1 tablet by mouth 2 times daily  senna-docusate (SENOKOT-S/PERICOLACE) 8.6-50 MG tablet, Take 1-2 tablets by mouth 2 times daily as needed for constipation while taking oxycodone  sotalol (BETAPACE) 80 MG tablet, TAKE 1 TABLET BY MOUTH TWICE A DAY  study - aspirin or placebo, IDS# [id number], 100 MG capsule, Take 1 capsule (100 mg) by mouth daily . Swallow whole, do not crush.  warfarin  "ANTICOAGULANT (COUMADIN) 2.5 MG tablet, Take 10 mg PO daily at 6 pm until next INR check, then dose per INR goal 2-3    No current facility-administered medications on file prior to visit.      ROS:   See HPI    EXAM:  BP (!) 84/0 (BP Location: Left arm, Patient Position: Chair, Cuff Size: Adult Regular)   Pulse 77   Ht 1.778 m (5' 10\")   Wt 81.9 kg (180 lb 9.6 oz)   SpO2 100%   BMI 25.91 kg/m      GENERAL: Appears comfortable, in no distress.  HEENT: Eye symmetrical and without discharge or icterus bilaterally. Mucous membranes moist and without lesions.  NECK: Supple, JVD <6.   CV: Hum of LVAD, no adventitious sounds  RESPIRATORY: Respirations regular, even, and unlabored. Lungs CTA throughout.   GI: Soft and non distended with normoactive bowel sounds present in all quadrants. No tenderness, rebound, guarding. No organomegaly.   EXTREMITIES: No LE peripheral edema. none pulsatile.   NEUROLOGIC: Alert and interacting appropriatly.  No focal deficits.   MUSCULOSKELETAL: No joint swelling or tenderness.   SKIN: No jaundice. No rashes or lesions. Driveline dressing c/d/i. Open wound at bottom of sternum, no purulence. Chest tube sites remain open.    Labs - as reviewed in clinic with patient today:  CBC RESULTS:  Lab Results   Component Value Date    WBC 4.6 04/14/2022    WBC 4.2 09/10/2019    RBC 3.78 (L) 04/14/2022    RBC 4.98 09/10/2019    HGB 11.4 (L) 04/14/2022    HGB 15.8 09/10/2019    HCT 36.9 (L) 04/14/2022    HCT 48.9 09/10/2019    MCV 98 04/14/2022    MCV 98 09/10/2019    MCH 30.2 04/14/2022    MCH 31.7 09/10/2019    MCHC 30.9 (L) 04/14/2022    MCHC 32.3 09/10/2019    RDW 14.0 04/14/2022    RDW 12.9 09/10/2019     04/14/2022     09/10/2019       CMP RESULTS:  Lab Results   Component Value Date     04/14/2022     09/10/2019    POTASSIUM 4.1 04/14/2022    POTASSIUM 3.4 09/10/2019    CHLORIDE 109 04/14/2022    CHLORIDE 108 09/10/2019    CO2 24 04/08/2022    CO2 26 09/10/2019    " ANIONGAP 4 04/08/2022    ANIONGAP 4 09/10/2019     (H) 04/08/2022    GLC 89 09/10/2019    BUN 19 04/08/2022    BUN 18 09/10/2019    CR 0.69 04/08/2022    CR 1.23 09/10/2019    GFRESTIMATED >90 04/08/2022    GFRESTIMATED 67 09/10/2019    GFRESTBLACK 78 09/10/2019    YESICA 9.0 04/08/2022    YESICA 9.1 09/10/2019    BILITOTAL 0.3 04/08/2022    BILITOTAL 0.6 09/10/2019    ALBUMIN 2.6 (L) 04/08/2022    ALBUMIN 4.0 09/10/2019    ALKPHOS 83 04/08/2022    ALKPHOS 61 09/10/2019    ALT 67 04/08/2022    ALT 25 09/10/2019    AST 16 04/08/2022    AST 17 09/10/2019        INR RESULTS:  Lab Results   Component Value Date    INR 2.96 (H) 04/14/2022    INR 1.02 09/10/2019       Lab Results   Component Value Date    MAG 2.1 04/08/2022    MAG 1.8 09/10/2019     No results found for: NTBNPI  Lab Results   Component Value Date    NTBNP 2,594 (H) 03/03/2022    NTBNP 714 (H) 10/18/2018       Assessment and Plan:   Tej Morfin is a 56 year old male with a PMH of severe MR s/p MVR with ring (1/21/15), VT s/p ICD, nonobstructive CAD, NICM previously on home inotropes now s/p HM III LVAD implantation 3/23/22, with course c/b sternal wound s/p Keflex treatment, AFib with RVR (s/p ICD shock 3/25/22) and right knee effusion (s/p arthrocentesis and intraarticular steroid injection 3/30/22) who presents for f/up in LVAD clinic.    He is doing very well. Appears mildly hypvolemic, encouraged increased PO intake. Will defer farxiga or entresto increase as I do not think he would tolerate the additional diuresis at this point. Consider at next appointment.     Chronic SHCF s/p HM III LVAD. Severe MR s/p mitral valve repair with annuloplasty ring (1/21/15). Implanted 3/23/22.   Stage D, NYHA Class III. Last hemodynamics: 3/27: MAP 85, CVP 10, PAP 52/20, PCW 20, JOANNA CO/CI 5.1/2.4, SVR 1099, MVO2 50 -- weight i206#. Post-operative course c/b AFib with RVR (s/p ICD shock 3/25/22) and right knee effusion (s/p arthrocentesis and intraarticular  steroid injection 3/30/22). PIs remain ~2 - RV is normal, likely hypovolemic, torsemide stopped.      ACEi/ARB Entresto 49/51 mg po BID, consider increasing next week, defered today d/t mild hypovolemia    BB Sotalol 80 mg po BID, consider transition to Toprol XL   Aldosterone antagonist deferred while other medical therapy is prioritized  SGLT2i: Deferred while other medications are prioretized  SCD prophylaxis ICD  Fluid status euvolemic. Off diuretics  MAP: Within goal of 65-85  LDH: 22, stable  Anticoagulation: Coumadin per pharmacy. INR-2.39 goal 2-3.  Antiplatelet: RICARDO trial      VAD interrogation today: VAD interrogation reviewed with VAD coordinator. Agree with findings. History goes back 24 hours. Frequen PI events with frequent speed drops. No power spikes, speed drops, or other findings suspicious of pump malfunction noted.     Sternal Wound  CT sites, slow healing    - Pictures of sites sent to CVTS  - Extended Keflex course by 5 more days  - Further f/up per CVTs     History of VT, s/p CRT-D. ICD shock 3/25/22. EP consulted, interrogation consistent with VT with VF zone adjusted to reduce risk for recurrent shock. LV lead turned off in setting of LVAD.   - Continue Sotatol     Postop AF with RVR. Returned to SR.   - Coumadin as above.      Mild nonobstructive CAD.   - RICARDO trial.      RIJ, subclavia, and brachial DVT.   - Coumadin per pharmacy.     Anxiety  - Behavioral Health Referal    Follow up   - RTC in 1 week with labs  - CVTS to consider sooner f/up pending their review of his surgial sites       19 minutes spent face-to-face with patient, >50% in counseling and/or coordination of care as described above. An additional 15 minutes were spent completing documentations and coordinating with other providers (CVTS) on the day of service.      Radha White PA-C  [facility]        CC        Please do not hesitate to contact me if you have any questions/concerns.     Sincerely,     Radha  TERESA White

## 2022-04-14 NOTE — LETTER
Date:April 15, 2022      Patient was self referred, no letter generated. Do not send.        [facility] Health Information

## 2022-04-14 NOTE — PATIENT INSTRUCTIONS
Medications:  Restart Keflex 1 capsule (500 mg) every 8 hours for 5 days.     Instructions:  Use chloraprep on chest tube sites after you clean your driveline site and then cover chest tube sites with gauze and tape (when they are dry).   Please try to drink 2 liters of fluid every day.   Eat some extra salt today, and then resume low-sodium diet tomorrow.     Follow-up: (make these appointments before you leave)  1. Please follow-up with VAD WILIAN Zandra on 4/21/22 with labs prior.   2. Please follow-up with your surgeon, Dr. Rodríguez, on 5/2/22.  3. Please follow-up with Dr. Hdz on 5/5/22 with labs prior.    4. [facility] will call you to coordinate your care as prescribed by your provider. If you don't hear from a representative within 2 business days, please call [phone].  5. We will speak to the surgery team about your open wounds and call you with an update.       Page the VAD Coordinator on call if you gain more than 3 lb in a day or 5 in a week. Please also page if you feel unwell or have alarms.   Great to see you in clinic today. To Page the VAD Coordinator on call, dial [phone] option #[phone] and ask to speak to the VAD coordinator on call.

## 2022-04-14 NOTE — NURSING NOTE
MCS VAD Pump Info     Row Name 04/14/22 0700             MCS VAD Information    Implant LVAD      LVAD Pump HeartMate 3              Heartmate 3 LEFT VS    Flow (Lpm) 4.5 Lpm      Pulse Index (PI) 4.6 PI      Speed (rpm) 5700 rpm      Power (christiansen) 4.1 christiansen      Current Hct setting --      Retired: Unexpected Alarms --              Heartmate 3 Right (centrifugal flow) VS    Flow (Lpm) --      Pulse Index (PI) --      Speed (rpm) --      Power (christiansen) --              Primary Controller    Serial number [id number]      Low flow alarm setting --      High watt alarm setting --      EBB: Patient use 4      Replace in 32 Months              Backup Controller    Serial number [id number]      EBB: Patient use 7      Replace EBB in 33 Months      Speed & HCT match primary controller --              VAD Interrogation    Alarms reported by patient N      Unexpected alarms noted upon interrogation None      PI events Frequent;w/ associated speed drops  PI range 2-8, 24 hour hx      Damage to equipment is noted N      Action taken --              Driveline Exit Site    Dressing change done N      Driveline properly secured Yes      DLES assessment c/d/i per pt report  Surgical incision and chest tube sites open, draining, no s/sx infection. Open wounds cleaned with betadine and covered with gauze and tape.      Dressing used Daily kit      Frequency patient changes dressing Daily      Dressing modifications --      Dressing change supplier --                    4)  Education Complete: No   Charge the BACKUP controller s backup battery every 6 months  Perform a self test on BACKUP every 6 months  Change the MPU s batteries every 6 months:Yes  Have equipment serviced yearly (if applicable): Yes        Note that top and bottom of surgical incision and chest tube sites are open, draining, no signs of symptoms of infection. Instructed patient and caregiver to clean with chloraprep, allow to fully dry, and cover with gauze and  medipore tape, with caution to keep driveline site sterile.     Patient reports anxiety since VAD implant. Nursing interventions performed: active listening, validation, recommendation to attend support group, referral to Health Psychology. Encouraged to communicate with primary VAD coordinator if any questions/concerns.     Plan to discuss possibly increasing Entresto at next visit.     ADDENDUM: Photo shown to CVTS -- recommendation is to resume Keflex at 500 mg Q8H for an additional 5 days. Called and updated patient via phone, Rx sent.

## 2022-04-14 NOTE — PROGRESS NOTES
ANTICOAGULATION MANAGEMENT     Tej Morfin 56 year old male is on warfarin with therapeutic INR result. (Goal INR 2.0-3.0)    Recent labs: (last 7 days)     04/14/22  0701   INR 2.96*       ASSESSMENT       Source(s): Chart Review and Patient/Caregiver Call       Warfarin doses taken: Warfarin recently held for supratherapeutic INR which may be affecting INR    Diet: No new diet changes identified    New illness, injury, or hospitalization: Yes: VAD placement 3/23/22    Medication/supplement changes: Keflex 5 day course (dates: 4/14/22-4/19/22) which has potential for interaction; increasing INR, increased risk of bleeding    Signs or symptoms of bleeding or clotting: No    Previous INR: Supratherapeutic    Additional findings: recent new start, new VAD        PLAN     Recommended plan for temporary change(s) and ongoing change(s) affecting INR     Dosing Instructions: continue your current warfarin dose with next INR in 4 days       Summary  As of 4/14/2022    Full warfarin instructions:  7.5 mg every Tue, Thu, Sat; 10 mg all other days   Next INR check:  4/18/2022             Telephone call with Tej who verbalizes understanding and agrees to plan and who agrees to plan and repeated back plan correctly    Lab visit scheduled    Education provided: Goal range and significance of current result, Potential interaction between warfarin and Keflex, Monitoring for bleeding signs and symptoms, Monitoring for clotting signs and symptoms and Contact [phone] with any changes, questions or concerns.     Plan made with [facility] Pharmacist Jennifer Spence and per LVAD protocol    SAVANNA MO RN  Anticoagulation Clinic  4/14/2022    _______________________________________________________________________     Anticoagulation Episode Summary     Current INR goal:  2.0-3.0   TTR:  --   Target end date:  Indefinite   Send INR reminders to:  ANTICOAG LVAD    Indications    LVAD (left ventricular assist device) present (H)  [Z95.811]  Chronic systolic CHF (congestive heart failure) (H) [I50.22]           Comments:  HeartMate 3   Bridging: No bridging. Reason -- new implant   Date VAD placed: 03/23/22  Hx Mitral valve repair and not replacement         Anticoagulation Care Providers     Provider Role Specialty Phone number    Dunia Hdz MD Referring Cardiovascular Disease [phone]

## 2022-04-15 ENCOUNTER — CARE COORDINATION (OUTPATIENT)
Dept: CARDIOLOGY | Facility: CLINIC | Age: 57
End: 2022-04-15
Payer: MEDICARE

## 2022-04-18 ENCOUNTER — ANTICOAGULATION THERAPY VISIT (OUTPATIENT)
Dept: ANTICOAGULATION | Facility: CLINIC | Age: 57
End: 2022-04-18

## 2022-04-18 ENCOUNTER — LAB (OUTPATIENT)
Dept: LAB | Facility: CLINIC | Age: 57
End: 2022-04-18
Payer: MEDICARE

## 2022-04-18 DIAGNOSIS — Z95.811 LVAD (LEFT VENTRICULAR ASSIST DEVICE) PRESENT (H): ICD-10-CM

## 2022-04-18 DIAGNOSIS — I50.22 CHRONIC SYSTOLIC CHF (CONGESTIVE HEART FAILURE) (H): ICD-10-CM

## 2022-04-18 DIAGNOSIS — Z95.811 LVAD (LEFT VENTRICULAR ASSIST DEVICE) PRESENT (H): Primary | ICD-10-CM

## 2022-04-18 DIAGNOSIS — Z95.2 S/P MITRAL VALVE REPLACEMENT: ICD-10-CM

## 2022-04-18 LAB — INR BLD: 6 (ref 0.9–1.1)

## 2022-04-18 PROCEDURE — 85610 PROTHROMBIN TIME: CPT

## 2022-04-18 PROCEDURE — 36416 COLLJ CAPILLARY BLOOD SPEC: CPT

## 2022-04-18 NOTE — PROGRESS NOTES
Siena Headley RN Jax, Lis GREWAL RN           Previous Messages       ----- Message -----   From: Dunia Hdz MD   Sent: 4/18/2022   3:51 PM CDT   To: Cardiology Vad Coordinators-     Given very recent LVAD implant (and higher bleeding risk - surgically) would hold anticoag

## 2022-04-18 NOTE — PROGRESS NOTES
ANTICOAGULATION MANAGEMENT     Tej Morfin 56 year old male is on warfarin with supratherapeutic INR result. (Goal INR 2.0-3.0)    Recent labs: (last 7 days)     04/18/22  1029   INR 6.0*       ASSESSMENT       Source(s): Chart Review and Patient/Caregiver Call       Warfarin doses taken: Warfarin taken as instructed    Diet: Protein supplement/shake stopped which maybe affecting INR-stopped at hospital discharge-confirmed with patient that he is not currently drinking Ensure/supplemental drinks.    New illness, injury, or hospitalization: Yes: new VAD-placed 3/23/22    Medication/supplement changes: continues on Keflex 5 day course 4/14/22-4/19/22    Signs or symptoms of bleeding or clotting: No-denies neuro changes, denies any s/s of bleeding including epistaxis, GIB    Previous INR: Therapeutic last visit; previously outside of goal range    Additional findings: Per LVAD supra-therapeutic protocol: Hold Warfarin, Hold ASA (or placebo) until INR in range. Epic message routed to VAD CC pool. Patient did take his ASA/placebo this AM-he will hold tomorrow AM dose.        PLAN     Recommended plan for ongoing change(s) affecting INR     Dosing Instructions: hold dose then decrease your warfarin dose (12% change) with next INR in 1 day  -Daily INR until < 1 point above goal range     Summary  As of 4/18/2022    Full warfarin instructions:  4/18: Hold; 4/19: Hold; Otherwise 10 mg every Sun; 7.5 mg all other days   Next INR check:  4/19/2022             Telephone call with Tej who verbalizes understanding and agrees to plan and who agrees to plan and repeated back plan correctly    Lab visit scheduled    Education provided: Vitamin K content of foods, Goal range and significance of current result, Potential interaction between warfarin and Keflex, Monitoring for bleeding signs and symptoms, When to seek medical attention/emergency care and Contact [phone] with any changes, questions or concerns.     Plan  made with [facility] Pharmacist Jennifer Spence and per LVAD protocol    SAVANNA MO, RN  Anticoagulation Clinic  4/18/2022    _______________________________________________________________________     Anticoagulation Episode Summary     Current INR goal:  2.0-3.0   TTR:  0.0 % (1 d)   Target end date:  Indefinite   Send INR reminders to:  ANTICOAG LVAD    Indications    LVAD (left ventricular assist device) present (H) [Z95.811]  Chronic systolic CHF (congestive heart failure) (H) [I50.22]           Comments:  HeartMate 3   Bridging: No bridging. Reason -- new implant   Date VAD placed: 03/23/22  Hx Mitral valve repair and not replacement         Anticoagulation Care Providers     Provider Role Specialty Phone number    Dunia Hdz MD Referring Cardiovascular Disease [phone]

## 2022-04-19 ENCOUNTER — LAB (OUTPATIENT)
Dept: LAB | Facility: CLINIC | Age: 57
End: 2022-04-19
Payer: MEDICARE

## 2022-04-19 ENCOUNTER — ANTICOAGULATION THERAPY VISIT (OUTPATIENT)
Dept: ANTICOAGULATION | Facility: CLINIC | Age: 57
End: 2022-04-19

## 2022-04-19 ENCOUNTER — OFFICE VISIT (OUTPATIENT)
Dept: CARDIOLOGY | Facility: CLINIC | Age: 57
End: 2022-04-19
Payer: MEDICARE

## 2022-04-19 VITALS
OXYGEN SATURATION: 100 % | HEART RATE: 77 BPM | BODY MASS INDEX: 27.36 KG/M2 | WEIGHT: 190.7 LBS | SYSTOLIC BLOOD PRESSURE: 88 MMHG

## 2022-04-19 DIAGNOSIS — Z95.811 LVAD (LEFT VENTRICULAR ASSIST DEVICE) PRESENT (H): Primary | ICD-10-CM

## 2022-04-19 DIAGNOSIS — I50.22 CHRONIC SYSTOLIC CHF (CONGESTIVE HEART FAILURE) (H): ICD-10-CM

## 2022-04-19 DIAGNOSIS — Z95.2 S/P MITRAL VALVE REPLACEMENT: ICD-10-CM

## 2022-04-19 DIAGNOSIS — Z95.811 LVAD (LEFT VENTRICULAR ASSIST DEVICE) PRESENT (H): ICD-10-CM

## 2022-04-19 LAB — INR BLD: 4.1 (ref 0.9–1.1)

## 2022-04-19 PROCEDURE — 36416 COLLJ CAPILLARY BLOOD SPEC: CPT

## 2022-04-19 PROCEDURE — G0463 HOSPITAL OUTPT CLINIC VISIT: HCPCS | Mod: 25

## 2022-04-19 PROCEDURE — 85610 PROTHROMBIN TIME: CPT

## 2022-04-19 PROCEDURE — 99213 OFFICE O/P EST LOW 20 MIN: CPT | Performed by: PHYSICIAN ASSISTANT

## 2022-04-19 ASSESSMENT — PAIN SCALES - GENERAL: PAINLEVEL: NO PAIN (0)

## 2022-04-19 NOTE — PROGRESS NOTES
CARDIOTHORACIC SURGERY FOLLOW-UP VISIT     Tej Morfin   1965   [id number]      Reason for visit: Post-Op wound check from HeartMate III impantation    HPI: Tej Morfin is a 56 year old year old male seen in clinic for a routine follow-up appointment after surgery. 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 Hospital course was remarkable for Small areas of superficial dehiscence along longer portion of inferior incision and was discharged with 5 days of Cephalexin for prophylaxis until wound scabs over. Patient was instructed to leave open to air to promote scab formation. Patient was discharged to home on 4/8/22.    Most recent WBC count was 4.6 taken from CBC on 4/14/22. He states his last dose of keflex is tonight and plans to finish the course fully. He denies any fever, chills, chest pain, palpitations, edema, SOB, lightheadedness,diarrhea, nausea and vomiting.    Patient is having Normal bowel movements and voiding without problems.    Has been attending cardiac rehabilitation and that is going well.       Patient is on Warfarin and INR is supratherapeutic at 4.1 this morning, but trending down back towards therapeutic.  Weight has been stable overall.      PAST MEDICAL HISTORY:  Past Medical History:   Diagnosis Date     Chronic systolic heart failure (H) 2/7/2017     Gastroparesis      ICD (implantable cardioverter-defibrillator) in place     [employer]     Non-ischemic cardiomyopathy (H)      Paroxysmal ventricular tachycardia (H) 2/7/2017     S/P mitral valve replacement 2/7/2017     Tetrahydrocannabinol (THC) use disorder, mild, in controlled environment, abuse 09/12/2019    occasional edible THC. reportedly for sleep, anxiety     Tobacco abuse, episodic     occasional cigar - ~ 3x/wk       PAST SURGICAL HISTORY:  Past Surgical History:   Procedure  Laterality Date     CV INTRA AORTIC BALLOON N/A 3/22/2022    Procedure: Intraprocedure Aortic Balloon Pump Insertion;  Surgeon: Pedro Callahan MD;  Location:  [facility] CATH LAB     CV RIGHT HEART CATH MEASUREMENTS RECORDED N/A 3/22/2022    Procedure: Right Heart Catheterization;  Surgeon: Pedro Callahan MD;  Location:  HEART CARDIAC CATH LAB     HEMORRHOIDECTOMY       ICD implantation        INSERT VENTRICULAR ASSIST DEVICE LEFT (HEARTMATE II) N/A 3/23/2022    Procedure: Redo Sterotomy, Insertion  Left Ventricular Assist Device  (HEARTMATE III) on Cardiopulmonary Bypass, Transesophageal Echocardiogram by Anesthesia;  Surgeon: Wilver Rodríguez MD;  Location: [facility] OR     R partial medial meniscectomy       REPAIR VALVE MITRAL         CURRENT MEDICATIONS:   Current Outpatient Medications   Medication     acetaminophen (TYLENOL) 325 MG tablet     cephALEXin (KEFLEX) 500 MG capsule     diazepam (VALIUM) 10 MG tablet     methocarbamol (ROBAXIN) 750 MG tablet     omeprazole (PRILOSEC) 40 MG DR capsule     oxyCODONE (ROXICODONE) 5 MG tablet     polyethylene glycol (MIRALAX) 17 GM/Dose powder     sacubitril-valsartan (ENTRESTO) 49-51 MG per tablet     senna-docusate (SENOKOT-S/PERICOLACE) 8.6-50 MG tablet     sotalol (BETAPACE) 80 MG tablet     study - aspirin or placebo, IDS# [id number], 100 MG capsule     warfarin ANTICOAGULANT (COUMADIN) 2.5 MG tablet     No current facility-administered medications for this visit.       ALLERGIES:      Allergies   Allergen Reactions     Lactase Diarrhea     Pt reports intake is in moderation, but can tolerate.         ROS:  Review of symptoms otherwise negative unless commented about in HPI.     LABS:  Last Basic Metabolic Panel:  Lab Results   Component Value Date     04/14/2022     09/10/2019      Lab Results   Component Value Date    POTASSIUM 4.1 04/14/2022    POTASSIUM 3.4 09/10/2019     Lab Results   Component Value Date    CHLORIDE 109 04/14/2022    CHLORIDE  108 09/10/2019     Lab Results   Component Value Date    YESICA 9.2 04/14/2022    YESICA 9.1 09/10/2019     Lab Results   Component Value Date    CO2 26 04/14/2022    CO2 26 09/10/2019     Lab Results   Component Value Date    BUN 20 04/14/2022    BUN 18 09/10/2019     Lab Results   Component Value Date    CR 0.86 04/14/2022    CR 1.23 09/10/2019     Lab Results   Component Value Date     04/14/2022    GLC 89 09/10/2019       Last CBC:   Lab Results   Component Value Date    WBC 4.6 04/14/2022    WBC 4.2 09/10/2019     Lab Results   Component Value Date    RBC 3.78 04/14/2022    RBC 4.98 09/10/2019     Lab Results   Component Value Date    HGB 11.4 04/14/2022    HGB 15.8 09/10/2019     Lab Results   Component Value Date    HCT 36.9 04/14/2022    HCT 48.9 09/10/2019     No components found for: MCT  Lab Results   Component Value Date    MCV 98 04/14/2022    MCV 98 09/10/2019     Lab Results   Component Value Date    MCH 30.2 04/14/2022    MCH 31.7 09/10/2019     Lab Results   Component Value Date    MCHC 30.9 04/14/2022    MCHC 32.3 09/10/2019     Lab Results   Component Value Date    RDW 14.0 04/14/2022    RDW 12.9 09/10/2019     Lab Results   Component Value Date     04/14/2022     09/10/2019       INR:  Lab Results   Component Value Date    INR 4.1 04/19/2022    INR 6.0 04/18/2022    INR 2.96 04/14/2022    INR 3.9 04/12/2022    INR 4.7 04/11/2022    INR 2.36 04/08/2022    INR 2.12 04/07/2022    INR 1.85 04/06/2022    INR 1.56 04/05/2022    INR 1.23 04/04/2022    INR 1.19 04/03/2022    INR 1.16 04/02/2022    INR 1.33 04/01/2022    INR 1.89 03/31/2022    INR 2.94 03/30/2022    INR 3.16 03/29/2022    INR 1.02 09/10/2019    INR 1.05 11/30/2015         IMAGING:  None    PHYSICAL EXAM:   BP (!) 88/0 (BP Location: Left arm, Patient Position: Chair, Cuff Size: Adult Regular)   Pulse 77   Wt 86.5 kg (190 lb 11.2 oz)   SpO2 100%   BMI 27.36 kg/m    General: alert and oriented x 3, pleasant, no acute  distress, normal mood and affect  Neuro: no focal deficits   CV: S1 S2, no murmurs, rubs or gallops, regular rate and rhythm, no peripheral edema  Pulm: bilateral breath sounds, clear to auscultation, easy work of breathing  Incision: incisions clean dry and intact without erythema, swelling or drainage. There is a small area of opening with absorbable sutures appreciated as can be seen below.            PROCEDURES: None       ASSESSMENT/PLAN:  Tej Morfin is a 56 year old year old male status post HeartMate III who returns to clinic for postop visit.     1. Surgically doing well overall.  Incisions are healing well with no signs of infection. Denies systemic signs or symptoms suggestive of infection. Increasing activity and strength overall.   2. Instructed patient and caregiver to clean with chloraprep, allow to fully dry, and leave open to air to allow for scab formation .But can cover the bottom if concern for drainage to keep driveline site sterile. Patient educated that the sutures that are visualized will absorb and fall out with time.  3. Instructed to finish the course of antibiotics, with the last dose being tonight. No further antibiotics will be refilled at this time.  4. Follow up with your [facility] cardiologist as scheduled on 4/25/22.  5. Start Outpatient Cardiac Rehab until completed.   6. Continue sternal precautions for 12 weeks from surgery date.   7. No driving for 4 weeks from surgery date.   8. If symptoms of cough, shortness of breath, chest discomfort with exertion, headache, dizziness, abdominal pain, nausea, vomiting, fever, or chills occur, please seek immediate medical attention at an urgent care, Emergency Department, or set up an appointment with your PCP if not urgent.        The total time spent with the patient was 25 minutes, > 50% of which was spent in counseling.    Riri Manuel PA-c  Pager: [phone]  1:14 PM April 19, 2022

## 2022-04-19 NOTE — PATIENT INSTRUCTIONS
Surgically doing well overall.  Incisions are healing well with no signs of infection. Denies systemic signs or symptoms suggestive of infection. Increasing activity and strength overall.   Instructed patient and caregiver to clean with chloraprep, allow to fully dry, and leave open to air to allow for scab formation .But can cover the bottom if concern for drainage to keep driveline site sterile  Follow up with your [facility] cardiologist as scheduled on 4/25/22.  Start Outpatient Cardiac Rehab until completed.   Continue sternal precautions for 12 weeks from surgery date.   No driving for 4 weeks from surgery date.   If symptoms of cough, shortness of breath, chest discomfort with exertion, headache, dizziness, abdominal pain, nausea, vomiting, fever, or chills occur, please seek immediate medical attention at an urgent care, Emergency Department, or set up an appointment with your PCP if not urgent.

## 2022-04-19 NOTE — PROGRESS NOTES
ANTICOAGULATION MANAGEMENT     Tej Morfin 56 year old male is on warfarin with supratherapeutic INR result. (Goal INR 2.0-3.0)    Recent labs: (last 7 days)     04/19/22  1057   INR 4.1*       ASSESSMENT       Source(s): Patient/Caregiver Call       Warfarin doses taken: Warfarin taken as instructed    Diet: No new diet changes identified. Continues not drinking Ensure per previous note    New illness, injury, or hospitalization: No    Medication/supplement changes: None noted    Signs or symptoms of bleeding or clotting: No    Previous INR: Supratherapeutic    Additional findings: None       PLAN     Recommended plan for no diet, medication or health factor changes affecting INR     Dosing Instructions: partial hold then continue your current warfarin dose with next INR in 1 day       Summary  As of 4/19/2022    Full warfarin instructions:  4/19: 5 mg; Otherwise 10 mg every Sun; 7.5 mg all other days   Next INR check:  4/20/2022             Telephone call with Tej who verbalizes understanding and agrees to plan and who agrees to plan and repeated back plan correctly    Lab visit scheduled    Education provided: None required    Plan made with [facility] Pharmacist Cindy Bruce and per LVAD protocol    Geneva Martinez RN  Anticoagulation Clinic  4/19/2022    _______________________________________________________________________     Anticoagulation Episode Summary     Current INR goal:  2.0-3.0   TTR:  0.0 % (2 d)   Target end date:  Indefinite   Send INR reminders to:  ANTICOAG LVAD    Indications    LVAD (left ventricular assist device) present (H) [Z95.811]  Chronic systolic CHF (congestive heart failure) (H) [I50.22]           Comments:  HeartMate 3   Bridging: No bridging. Reason -- new implant   Date VAD placed: 03/23/22  Hx Mitral valve repair and not replacement         Anticoagulation Care Providers     Provider Role Specialty Phone number    Dunia Hdz MD Referring Cardiovascular Disease  [phone]

## 2022-04-19 NOTE — NURSING NOTE
Chief Complaint   Patient presents with     Follow Up     Wound check, s/p LVAD     Vitals were taken and medications reconciled.    Zan Marshall, EMT  1:29 PM

## 2022-04-20 ENCOUNTER — LAB (OUTPATIENT)
Dept: LAB | Facility: CLINIC | Age: 57
End: 2022-04-20
Payer: MEDICARE

## 2022-04-20 ENCOUNTER — ANTICOAGULATION THERAPY VISIT (OUTPATIENT)
Dept: ANTICOAGULATION | Facility: CLINIC | Age: 57
End: 2022-04-20

## 2022-04-20 DIAGNOSIS — Z95.2 S/P MITRAL VALVE REPLACEMENT: ICD-10-CM

## 2022-04-20 DIAGNOSIS — I50.22 CHRONIC SYSTOLIC CHF (CONGESTIVE HEART FAILURE) (H): ICD-10-CM

## 2022-04-20 DIAGNOSIS — Z95.811 LVAD (LEFT VENTRICULAR ASSIST DEVICE) PRESENT (H): Primary | ICD-10-CM

## 2022-04-20 DIAGNOSIS — Z95.811 LVAD (LEFT VENTRICULAR ASSIST DEVICE) PRESENT (H): ICD-10-CM

## 2022-04-20 LAB — INR BLD: 3.1 (ref 0.9–1.1)

## 2022-04-20 PROCEDURE — 85610 PROTHROMBIN TIME: CPT

## 2022-04-20 PROCEDURE — 36416 COLLJ CAPILLARY BLOOD SPEC: CPT

## 2022-04-20 NOTE — PROGRESS NOTES
ANTICOAGULATION MANAGEMENT     Tej SWENSON Shelbie 56 year old male is on warfarin with supratherapeutic INR result. (Goal INR 2.0-3.0)    Recent labs: (last 7 days)     04/20/22  1031   INR 3.1*       ASSESSMENT       Source(s): Chart Review and Patient/Caregiver Call       Warfarin doses taken: Warfarin taken as instructed    Diet: No new diet changes identified    New illness, injury, or hospitalization: No    Medication/supplement changes: None noted    Signs or symptoms of bleeding or clotting: No    Previous INR: Supratherapeutic    Additional findings: Patient will restart ASA today       PLAN     Recommended plan for no diet, medication or health factor changes affecting INR     Dosing Instructions: continue your current warfarin dose with next INR in 2 days       Summary  As of 4/20/2022    Full warfarin instructions:  10 mg every Sun; 7.5 mg all other days   Next INR check:  4/22/2022             Telephone call with Tej who verbalizes understanding and agrees to plan and who agrees to plan and repeated back plan correctly    Lab visit scheduled    Education provided: Goal range and significance of current result, Importance of therapeutic range, Importance of following up at instructed interval, Importance of taking warfarin as instructed, Monitoring for bleeding signs and symptoms and When to seek medical attention/emergency care    Plan made per [facility] anticoagulation protocol and per LVAD protocol    Audelia Avalos RN  Anticoagulation Clinic  4/20/2022    _______________________________________________________________________     Anticoagulation Episode Summary     Current INR goal:  2.0-3.0   TTR:  0.0 % (3 d)   Target end date:  Indefinite   Send INR reminders to:  ANTICOAG LVAD    Indications    LVAD (left ventricular assist device) present (H) [Z95.811]  Chronic systolic CHF (congestive heart failure) (H) [I50.22]           Comments:  HeartMate 3   Bridging: No bridging. Reason -- new implant    Date VAD placed: 03/23/22  Hx Mitral valve repair and not replacement         Anticoagulation Care Providers     Provider Role Specialty Phone number    Dunia Hdz MD Referring Cardiovascular Disease [phone]

## 2022-04-21 ENCOUNTER — CARE COORDINATION (OUTPATIENT)
Dept: CARDIOLOGY | Facility: CLINIC | Age: 57
End: 2022-04-21

## 2022-04-21 ENCOUNTER — APPOINTMENT (OUTPATIENT)
Dept: GENERAL RADIOLOGY | Facility: CLINIC | Age: 57
End: 2022-04-21
Attending: EMERGENCY MEDICINE
Payer: MEDICARE

## 2022-04-21 ENCOUNTER — TELEPHONE (OUTPATIENT)
Dept: ANTICOAGULATION | Facility: CLINIC | Age: 57
End: 2022-04-21

## 2022-04-21 ENCOUNTER — HOSPITAL ENCOUNTER (EMERGENCY)
Facility: CLINIC | Age: 57
Discharge: HOME | End: 2022-04-21
Attending: EMERGENCY MEDICINE | Admitting: EMERGENCY MEDICINE
Payer: MEDICARE

## 2022-04-21 VITALS
WEIGHT: 190 LBS | BODY MASS INDEX: 27.26 KG/M2 | DIASTOLIC BLOOD PRESSURE: 48 MMHG | OXYGEN SATURATION: 100 % | HEART RATE: 80 BPM | SYSTOLIC BLOOD PRESSURE: 88 MMHG | TEMPERATURE: 98.4 F | RESPIRATION RATE: 16 BRPM

## 2022-04-21 DIAGNOSIS — M79.622 PAIN OF LEFT UPPER ARM: ICD-10-CM

## 2022-04-21 DIAGNOSIS — Z79.01 LONG TERM (CURRENT) USE OF ANTICOAGULANTS: ICD-10-CM

## 2022-04-21 DIAGNOSIS — Z95.811 LVAD (LEFT VENTRICULAR ASSIST DEVICE) PRESENT (H): ICD-10-CM

## 2022-04-21 LAB
ANION GAP SERPL CALCULATED.3IONS-SCNC: 7 MMOL/L (ref 3–14)
ATRIAL RATE - MUSE: 29 BPM
BASOPHILS # BLD AUTO: 0 10E3/UL (ref 0–0.2)
BASOPHILS NFR BLD AUTO: 0 %
BUN SERPL-MCNC: 12 MG/DL (ref 7–30)
CALCIUM SERPL-MCNC: 8.4 MG/DL (ref 8.5–10.1)
CHLORIDE BLD-SCNC: 113 MMOL/L (ref 94–109)
CO2 SERPL-SCNC: 24 MMOL/L (ref 20–32)
CREAT SERPL-MCNC: 0.67 MG/DL (ref 0.66–1.25)
DIASTOLIC BLOOD PRESSURE - MUSE: NORMAL MMHG
EOSINOPHIL # BLD AUTO: 0.1 10E3/UL (ref 0–0.7)
EOSINOPHIL NFR BLD AUTO: 1 %
ERYTHROCYTE [DISTWIDTH] IN BLOOD BY AUTOMATED COUNT: 14.1 % (ref 10–15)
GFR SERPL CREATININE-BSD FRML MDRD: >90 ML/MIN/1.73M2
GLUCOSE BLD-MCNC: 112 MG/DL (ref 70–99)
HCT VFR BLD AUTO: 32.9 % (ref 40–53)
HGB BLD-MCNC: 10.2 G/DL (ref 13.3–17.7)
IMM GRANULOCYTES # BLD: 0 10E3/UL
IMM GRANULOCYTES NFR BLD: 0 %
INR PPP: 2.6 (ref 0.85–1.15)
INTERPRETATION ECG - MUSE: NORMAL
LDH SERPL L TO P-CCNC: NORMAL U/L
LYMPHOCYTES # BLD AUTO: 1 10E3/UL (ref 0.8–5.3)
LYMPHOCYTES NFR BLD AUTO: 26 %
MCH RBC QN AUTO: 30 PG (ref 26.5–33)
MCHC RBC AUTO-ENTMCNC: 31 G/DL (ref 31.5–36.5)
MCV RBC AUTO: 97 FL (ref 78–100)
MDC_IDC_LEAD_IMPLANT_DT: NORMAL
MDC_IDC_LEAD_LOCATION: NORMAL
MDC_IDC_LEAD_LOCATION_DETAIL_1: NORMAL
MDC_IDC_LEAD_MFG: NORMAL
MDC_IDC_LEAD_MODEL: NORMAL
MDC_IDC_LEAD_POLARITY_TYPE: NORMAL
MDC_IDC_LEAD_SERIAL: NORMAL
MDC_IDC_MSMT_BATTERY_DTM: NORMAL
MDC_IDC_MSMT_BATTERY_REMAINING_LONGEVITY: 30 MO
MDC_IDC_MSMT_BATTERY_REMAINING_PERCENTAGE: 47 %
MDC_IDC_MSMT_BATTERY_STATUS: NORMAL
MDC_IDC_MSMT_CAP_CHARGE_DTM: NORMAL
MDC_IDC_MSMT_CAP_CHARGE_DTM: NORMAL
MDC_IDC_MSMT_CAP_CHARGE_ENERGY: 31 J
MDC_IDC_MSMT_CAP_CHARGE_TIME: 11.7 S
MDC_IDC_MSMT_CAP_CHARGE_TIME: 7.2 S
MDC_IDC_MSMT_CAP_CHARGE_TYPE: NORMAL
MDC_IDC_MSMT_CAP_CHARGE_TYPE: NORMAL
MDC_IDC_MSMT_LEADCHNL_LV_IMPEDANCE_VALUE: 530 OHM
MDC_IDC_MSMT_LEADCHNL_LV_LEAD_CHANNEL_STATUS: NORMAL
MDC_IDC_MSMT_LEADCHNL_RA_IMPEDANCE_VALUE: 478 OHM
MDC_IDC_MSMT_LEADCHNL_RA_PACING_THRESHOLD_AMPLITUDE: 0.5 V
MDC_IDC_MSMT_LEADCHNL_RA_PACING_THRESHOLD_PULSEWIDTH: 0.5 MS
MDC_IDC_MSMT_LEADCHNL_RV_IMPEDANCE_VALUE: 331 OHM
MDC_IDC_MSMT_LEADCHNL_RV_PACING_THRESHOLD_AMPLITUDE: 1.4 V
MDC_IDC_MSMT_LEADCHNL_RV_PACING_THRESHOLD_PULSEWIDTH: 0.5 MS
MDC_IDC_PG_IMPLANT_DTM: NORMAL
MDC_IDC_PG_MFG: NORMAL
MDC_IDC_PG_MODEL: NORMAL
MDC_IDC_PG_SERIAL: NORMAL
MDC_IDC_PG_TYPE: NORMAL
MDC_IDC_SESS_CLINIC_NAME: NORMAL
MDC_IDC_SESS_DTM: NORMAL
MDC_IDC_SESS_TYPE: NORMAL
MDC_IDC_SET_BRADY_AT_MODE_SWITCH_MODE: NORMAL
MDC_IDC_SET_BRADY_AT_MODE_SWITCH_RATE: 170 {BEATS}/MIN
MDC_IDC_SET_BRADY_LOWRATE: 80 {BEATS}/MIN
MDC_IDC_SET_BRADY_MAX_TRACKING_RATE: 130 {BEATS}/MIN
MDC_IDC_SET_BRADY_MODE: NORMAL
MDC_IDC_SET_BRADY_PAV_DELAY_HIGH: 280 MS
MDC_IDC_SET_BRADY_PAV_DELAY_LOW: 300 MS
MDC_IDC_SET_BRADY_SAV_DELAY_HIGH: 280 MS
MDC_IDC_SET_BRADY_SAV_DELAY_LOW: 300 MS
MDC_IDC_SET_CRT_PACED_CHAMBERS: NORMAL
MDC_IDC_SET_LEADCHNL_LV_PACING_AMPLITUDE: 3.4 V
MDC_IDC_SET_LEADCHNL_LV_PACING_PULSEWIDTH: 1 MS
MDC_IDC_SET_LEADCHNL_LV_SENSING_ADAPTATION_MODE: NORMAL
MDC_IDC_SET_LEADCHNL_LV_SENSING_ANODE_ELECTRODE_1: NORMAL
MDC_IDC_SET_LEADCHNL_LV_SENSING_ANODE_LOCATION_1: NORMAL
MDC_IDC_SET_LEADCHNL_LV_SENSING_CATHODE_ELECTRODE_1: NORMAL
MDC_IDC_SET_LEADCHNL_LV_SENSING_CATHODE_LOCATION_1: NORMAL
MDC_IDC_SET_LEADCHNL_LV_SENSING_SENSITIVITY: 1 MV
MDC_IDC_SET_LEADCHNL_RA_PACING_AMPLITUDE: 1.5 V
MDC_IDC_SET_LEADCHNL_RA_PACING_POLARITY: NORMAL
MDC_IDC_SET_LEADCHNL_RA_PACING_PULSEWIDTH: 0.5 MS
MDC_IDC_SET_LEADCHNL_RA_SENSING_ADAPTATION_MODE: NORMAL
MDC_IDC_SET_LEADCHNL_RA_SENSING_POLARITY: NORMAL
MDC_IDC_SET_LEADCHNL_RA_SENSING_SENSITIVITY: 0.25 MV
MDC_IDC_SET_LEADCHNL_RV_PACING_AMPLITUDE: 2.8 V
MDC_IDC_SET_LEADCHNL_RV_PACING_POLARITY: NORMAL
MDC_IDC_SET_LEADCHNL_RV_PACING_PULSEWIDTH: 0.5 MS
MDC_IDC_SET_LEADCHNL_RV_SENSING_ADAPTATION_MODE: NORMAL
MDC_IDC_SET_LEADCHNL_RV_SENSING_POLARITY: NORMAL
MDC_IDC_SET_LEADCHNL_RV_SENSING_SENSITIVITY: 0.6 MV
MDC_IDC_SET_ZONE_DETECTION_INTERVAL: 273 MS
MDC_IDC_SET_ZONE_TYPE: NORMAL
MDC_IDC_SET_ZONE_VENDOR_TYPE: NORMAL
MDC_IDC_STAT_AT_BURDEN_PERCENT: 0 %
MDC_IDC_STAT_AT_DTM_END: NORMAL
MDC_IDC_STAT_AT_DTM_START: NORMAL
MDC_IDC_STAT_BRADY_DTM_END: NORMAL
MDC_IDC_STAT_BRADY_DTM_START: NORMAL
MDC_IDC_STAT_BRADY_RA_PERCENT_PACED: 24 %
MDC_IDC_STAT_BRADY_RV_PERCENT_PACED: 3 %
MDC_IDC_STAT_CRT_DTM_END: NORMAL
MDC_IDC_STAT_CRT_DTM_START: NORMAL
MDC_IDC_STAT_CRT_LV_PERCENT_PACED: 0 %
MDC_IDC_STAT_EPISODE_RECENT_COUNT: 0
MDC_IDC_STAT_EPISODE_RECENT_COUNT_DTM_END: NORMAL
MDC_IDC_STAT_EPISODE_RECENT_COUNT_DTM_START: NORMAL
MDC_IDC_STAT_EPISODE_TYPE: NORMAL
MDC_IDC_STAT_EPISODE_VENDOR_TYPE: NORMAL
MDC_IDC_STAT_TACHYTHERAPY_ATP_DELIVERED_RECENT: 0
MDC_IDC_STAT_TACHYTHERAPY_ATP_DELIVERED_TOTAL: 3
MDC_IDC_STAT_TACHYTHERAPY_RECENT_DTM_END: NORMAL
MDC_IDC_STAT_TACHYTHERAPY_RECENT_DTM_START: NORMAL
MDC_IDC_STAT_TACHYTHERAPY_SHOCKS_ABORTED_RECENT: 0
MDC_IDC_STAT_TACHYTHERAPY_SHOCKS_ABORTED_TOTAL: 1
MDC_IDC_STAT_TACHYTHERAPY_SHOCKS_DELIVERED_RECENT: 0
MDC_IDC_STAT_TACHYTHERAPY_SHOCKS_DELIVERED_TOTAL: 2
MDC_IDC_STAT_TACHYTHERAPY_TOTAL_DTM_END: NORMAL
MDC_IDC_STAT_TACHYTHERAPY_TOTAL_DTM_START: NORMAL
MONOCYTES # BLD AUTO: 0.3 10E3/UL (ref 0–1.3)
MONOCYTES NFR BLD AUTO: 8 %
NEUTROPHILS # BLD AUTO: 2.4 10E3/UL (ref 1.6–8.3)
NEUTROPHILS NFR BLD AUTO: 65 %
NRBC # BLD AUTO: 0 10E3/UL
NRBC BLD AUTO-RTO: 0 /100
P AXIS - MUSE: NORMAL DEGREES
PLATELET # BLD AUTO: 171 10E3/UL (ref 150–450)
POTASSIUM BLD-SCNC: 3.5 MMOL/L (ref 3.4–5.3)
PR INTERVAL - MUSE: NORMAL MS
QRS DURATION - MUSE: 174 MS
QT - MUSE: 502 MS
QTC - MUSE: 589 MS
R AXIS - MUSE: -60 DEGREES
RBC # BLD AUTO: 3.4 10E6/UL (ref 4.4–5.9)
SODIUM SERPL-SCNC: 144 MMOL/L (ref 133–144)
SYSTOLIC BLOOD PRESSURE - MUSE: NORMAL MMHG
T AXIS - MUSE: -28 DEGREES
TROPONIN I SERPL HS-MCNC: 101 NG/L
TROPONIN I SERPL HS-MCNC: 94 NG/L
VENTRICULAR RATE- MUSE: 83 BPM
WBC # BLD AUTO: 3.8 10E3/UL (ref 4–11)

## 2022-04-21 PROCEDURE — 71046 X-RAY EXAM CHEST 2 VIEWS: CPT | Mod: 26 | Performed by: RADIOLOGY

## 2022-04-21 PROCEDURE — 99285 EMERGENCY DEPT VISIT HI MDM: CPT | Mod: 25 | Performed by: EMERGENCY MEDICINE

## 2022-04-21 PROCEDURE — 93005 ELECTROCARDIOGRAM TRACING: CPT | Performed by: EMERGENCY MEDICINE

## 2022-04-21 PROCEDURE — 83615 LACTATE (LD) (LDH) ENZYME: CPT | Performed by: EMERGENCY MEDICINE

## 2022-04-21 PROCEDURE — 71046 X-RAY EXAM CHEST 2 VIEWS: CPT

## 2022-04-21 PROCEDURE — 36415 COLL VENOUS BLD VENIPUNCTURE: CPT | Performed by: EMERGENCY MEDICINE

## 2022-04-21 PROCEDURE — 85610 PROTHROMBIN TIME: CPT | Performed by: EMERGENCY MEDICINE

## 2022-04-21 PROCEDURE — 93010 ELECTROCARDIOGRAM REPORT: CPT | Performed by: EMERGENCY MEDICINE

## 2022-04-21 PROCEDURE — 84484 ASSAY OF TROPONIN QUANT: CPT | Performed by: EMERGENCY MEDICINE

## 2022-04-21 PROCEDURE — 85025 COMPLETE CBC W/AUTO DIFF WBC: CPT | Performed by: EMERGENCY MEDICINE

## 2022-04-21 PROCEDURE — 80048 BASIC METABOLIC PNL TOTAL CA: CPT | Performed by: EMERGENCY MEDICINE

## 2022-04-21 ASSESSMENT — ENCOUNTER SYMPTOMS
FEVER: 0
DIFFICULTY URINATING: 0
VOMITING: 0
HEADACHES: 0
WEAKNESS: 0
ARTHRALGIAS: 0
LIGHT-HEADEDNESS: 0
CHILLS: 0
COUGH: 0
CHEST TIGHTNESS: 0
NUMBNESS: 0
ADENOPATHY: 0
SHORTNESS OF BREATH: 0
ABDOMINAL PAIN: 0
NECK STIFFNESS: 0
CONFUSION: 0
POLYDIPSIA: 0
NAUSEA: 0
COLOR CHANGE: 0
EYE REDNESS: 0

## 2022-04-21 NOTE — PROGRESS NOTES
D: Pt left voicemail for writer earlier in the day reporting a two day history of cramping and numbness.  I:  Called pt to follow up to voicemail and to check in 2 weeks post VAD implant discharge.  Pt currently in ER with arm numbness. Pt and wife did not answer.  Unable to leave a message.   A:  Follow up  P:  Will attempt to call again tomorrow.

## 2022-04-21 NOTE — PROGRESS NOTES
Tej and Jessenia called in to the on-call VAD coordinator with complaints of intermittent left arm numbness. Tej says yesterday his left arm went partially numb for several minutes before the numbness resolved. Now today, this has repeated. He said when he was in the hospital this happened once after doing physical therapy. His VAD numbers are baseline and stable 5700, 4.9 flow, 2.7 PI and 4.4 christiansen. He is not yet set up to do a MAP doppler reading at home. He has no chest pain or dizziness.    I asked Tej to come to the [facility] ED to be evaluated. He said Jessenia will drive him to the ED. I notified the ED and the [facility] 2 team Dr. Fleming and Dr. Olson.

## 2022-04-21 NOTE — DISCHARGE INSTRUCTIONS
Please follow up with Cardiology Clinic (phone: [phone]) in 4 days at your already scheduled appointment.  If your symptoms worsen before your appointment please come back to the emergency department.

## 2022-04-21 NOTE — ED TRIAGE NOTES
"Pt report new onsent left arm numbness that starts at the shoulder and radiates down to his left hand. He states that it last 1-2 minutes and then goes away. He states it has been happening since yesterday.     He states the pain 7/10 and describes it as a \"dom horse\" type feeling.    Pt is a new LVAD pt. (03/23/2022.)     Pt denies pain at LVAD site or any other issues.    "

## 2022-04-21 NOTE — TELEPHONE ENCOUNTER
ANTICOAGULATION  MANAGEMENT: Discharge Review    Tej E Shelbie chart reviewed for anticoagulation continuity of care    Emergency room visit on 4/21/22 for Arm numbness.  Findings were negative and patient was discharged to home.     Discharge disposition: Home    Results:    Recent labs: (last 7 days)     04/18/22  1029 04/19/22  1057 04/20/22  1031 04/21/22  1314   INR 6.0* 4.1* 3.1* 2.60*     Anticoagulation inpatient management:     not applicable     Anticoagulation discharge instructions:     Warfarin dosing: home regimen continued   Bridging: No   INR goal change: No      Medication changes affecting anticoagulation: No    Additional factors affecting anticoagulation: No    Plan     No adjustment to anticoagulation plan needed    Patient not contacted    No adjustment to Anticoagulation Calendar was required    Audelia Avalos RN

## 2022-04-21 NOTE — ED PROVIDER NOTES
[place] EMERGENCY DEPARTMENT ([facility])  4/21/22  History     Chief Complaint   Patient presents with     Numbness     Left arm     The history is provided by the patient.     Tej Morfin is a 56 year old male who has a significant medical history of ICD placement, nonobstructive CAD, s/p LVAD implantation (3/23/2022) with course c/b AFib with RVR (s/p ICD shock 3/25/22), amongst others, who presents to the Emergency Department with c/o left arm cramping radiating for two days.  Patient states that his cramping began yesterday and that it is intermittent, cramping in nature, mild, and radiating to left hand on occasion, lasting for few minutes before resolving on its own.  Nothing makes his symptoms better or worse.  Patient denies chest pain or shortness of breath.  No fevers.  Denies abdominal pain. Reports having an LVAD for 1 month, denies missing any Coumadin doses.  Most recent presentation was for blood work.  He also recently saw surgeon for postop wound follow-up.  Patient states that he has been changing dressings daily, denies any redness or drainage coming from surgical site.  No other complaints were noted.    Past Medical History  Past Medical History:   Diagnosis Date     Chronic systolic heart failure (H) 2/7/2017     Gastroparesis      ICD (implantable cardioverter-defibrillator) in place     [place] [id number]     Non-ischemic cardiomyopathy (H)      Paroxysmal ventricular tachycardia (H) 2/7/2017     S/P mitral valve replacement 2/7/2017     Tetrahydrocannabinol (THC) use disorder, mild, in controlled environment, abuse 09/12/2019    occasional edible THC. reportedly for sleep, anxiety     Tobacco abuse, episodic     occasional cigar - ~ 3x/wk     Past Surgical History:   Procedure Laterality Date     CV INTRA AORTIC BALLOON N/A 3/22/2022    Procedure: Intraprocedure Aortic Balloon Pump Insertion;  Surgeon: Pedro Callahan MD;  Location:  HEART CARDIAC CATH LAB     CV  RIGHT HEART CATH MEASUREMENTS RECORDED N/A 3/22/2022    Procedure: Right Heart Catheterization;  Surgeon: Pedro Callahan MD;  Location:  HEART CARDIAC CATH LAB     HEMORRHOIDECTOMY       ICD implantation        INSERT VENTRICULAR ASSIST DEVICE LEFT (HEARTMATE II) N/A 3/23/2022    Procedure: Redo Sterotomy, Insertion  Left Ventricular Assist Device  (HEARTMATE III) on Cardiopulmonary Bypass, Transesophageal Echocardiogram by Anesthesia;  Surgeon: Wilver Rodríguez MD;  Location: [facility] OR     R partial medial meniscectomy       REPAIR VALVE MITRAL       acetaminophen (TYLENOL) 325 MG tablet  diazepam (VALIUM) 10 MG tablet  methocarbamol (ROBAXIN) 750 MG tablet  omeprazole (PRILOSEC) 40 MG DR capsule  oxyCODONE (ROXICODONE) 5 MG tablet  polyethylene glycol (MIRALAX) 17 GM/Dose powder  sacubitril-valsartan (ENTRESTO) 49-51 MG per tablet  senna-docusate (SENOKOT-S/PERICOLACE) 8.6-50 MG tablet  sotalol (BETAPACE) 80 MG tablet  study - aspirin or placebo, IDS# [id number], 100 MG capsule  warfarin ANTICOAGULANT (COUMADIN) 2.5 MG tablet      Allergies   Allergen Reactions     Lactase Diarrhea     Pt reports intake is in moderation, but can tolerate.     Family History  Family History   Problem Relation Age of Onset     Coronary Artery Disease Mother      Kidney failure Mother      Cardiomyopathy Father          age 51     Social History   Social History     Tobacco Use     Smoking status: Former Smoker     Smokeless tobacco: Never Used   Substance Use Topics     Alcohol use: No     Alcohol/week: 0.0 standard drinks     Drug use: No      Past medical history, past surgical history, medications, allergies, family history, and social history were reviewed with the patient. No additional pertinent items.     I have reviewed the Medications, Allergies, Past Medical and Surgical History, and Social History in the Epic system.    Review of Systems   Constitutional: Negative for chills and fever.   HENT: Negative for congestion.     Eyes: Negative for redness.   Respiratory: Negative for cough, chest tightness and shortness of breath.    Cardiovascular: Negative for chest pain.   Gastrointestinal: Negative for abdominal pain, nausea and vomiting.   Endocrine: Negative for polydipsia and polyuria.   Genitourinary: Negative for difficulty urinating.   Musculoskeletal: Negative for arthralgias and neck stiffness.        Right arm pain.   Skin: Negative for color change and pallor.   Allergic/Immunologic: Negative for immunocompromised state.   Neurological: Negative for weakness, light-headedness, numbness and headaches.   Hematological: Negative for adenopathy.   Psychiatric/Behavioral: Negative for confusion.   All other systems reviewed and are negative.    A complete review of systems was performed with pertinent positives and negatives noted in the HPI, and all other systems negative.    Physical Exam   BP: 91/70  Pulse: 80  Temp: 98.4  F (36.9  C)  Resp: 16  Weight: 86.2 kg (190 lb)  SpO2: 100 %      Physical Exam  Vitals and nursing note reviewed.   Constitutional:       General: He is not in acute distress.     Appearance: Normal appearance. He is not ill-appearing, toxic-appearing or diaphoretic.   HENT:      Head: Normocephalic and atraumatic.   Eyes:      General: No scleral icterus.     Extraocular Movements: Extraocular movements intact.      Conjunctiva/sclera: Conjunctivae normal.   Cardiovascular:      Rate and Rhythm: Normal rate.      Comments: Machine-like heart tones.  Pulmonary:      Effort: Pulmonary effort is normal. No respiratory distress.      Breath sounds: Normal breath sounds.   Abdominal:      Palpations: Abdomen is soft.      Tenderness: There is no abdominal tenderness. There is no guarding or rebound.      Comments: Driveline right side of abdomen without erythema or induration or tenderness.   Musculoskeletal:         General: No swelling, tenderness, deformity or signs of injury. Normal range of motion.       Cervical back: Normal range of motion and neck supple.   Skin:     General: Skin is warm.      Findings: No rash.   Neurological:      General: No focal deficit present.      Mental Status: He is alert and oriented to person, place, and time.      GCS: GCS eye subscore is 4. GCS verbal subscore is 5. GCS motor subscore is 6.      Cranial Nerves: Cranial nerves are intact. No cranial nerve deficit.      Sensory: Sensation is intact.      Motor: Motor function is intact.      Coordination: Coordination is intact. Coordination normal.      Gait: Gait is intact.   Psychiatric:         Mood and Affect: Mood normal.         Behavior: Behavior normal.         Thought Content: Thought content normal.         Judgment: Judgment normal.         ED Course     At 1:00 PM the patient was seen and examined by Sandro Perez MD in Room ED19.        Procedures              EKG Interpretation:      Interpreted by Sandro Perez MD  Time reviewed: 1:56 PM  Symptoms at time of EKG: Left arm pain  Rhythm: paced  Rate: normal  Axis: normal  Ectopy: none  Conduction: normal  ST Segments/ T Waves: No ST-T wave changes  Q Waves: none  Comparison to prior: Unchanged from old EKG done on April 3, 2022    Clinical Impression: Paced rhythm without acute ischemic changes               Results for orders placed or performed during the hospital encounter of 04/21/22 (from the past 24 hour(s))   Basic metabolic panel   Result Value Ref Range    Sodium 144 133 - 144 mmol/L    Potassium 3.5 3.4 - 5.3 mmol/L    Chloride 113 (H) 94 - 109 mmol/L    Carbon Dioxide (CO2) 24 20 - 32 mmol/L    Anion Gap 7 3 - 14 mmol/L    Urea Nitrogen 12 7 - 30 mg/dL    Creatinine 0.67 0.66 - 1.25 mg/dL    Calcium 8.4 (L) 8.5 - 10.1 mg/dL    Glucose 112 (H) 70 - 99 mg/dL    GFR Estimate >90 >60 mL/min/1.73m2   Troponin I   Result Value Ref Range    Troponin I High Sensitivity 101 (H) <79 ng/L   INR   Result Value Ref Range    INR 2.60 (H) 0.85 - 1.15   CBC with platelets  differential    Narrative    The following orders were created for panel order CBC with platelets differential.  Procedure                               Abnormality         Status                     ---------                               -----------         ------                     CBC with platelets and d...[[id number]]  Abnormal            Final result                 Please view results for these tests on the individual orders.   CBC with platelets and differential   Result Value Ref Range    WBC Count 3.8 (L) 4.0 - 11.0 10e3/uL    RBC Count 3.40 (L) 4.40 - 5.90 10e6/uL    Hemoglobin 10.2 (L) 13.3 - 17.7 g/dL    Hematocrit 32.9 (L) 40.0 - 53.0 %    MCV 97 78 - 100 fL    MCH 30.0 26.5 - 33.0 pg    MCHC 31.0 (L) 31.5 - 36.5 g/dL    RDW 14.1 10.0 - 15.0 %    Platelet Count 171 150 - 450 10e3/uL    % Neutrophils 65 %    % Lymphocytes 26 %    % Monocytes 8 %    % Eosinophils 1 %    % Basophils 0 %    % Immature Granulocytes 0 %    NRBCs per 100 WBC 0 <1 /100    Absolute Neutrophils 2.4 1.6 - 8.3 10e3/uL    Absolute Lymphocytes 1.0 0.8 - 5.3 10e3/uL    Absolute Monocytes 0.3 0.0 - 1.3 10e3/uL    Absolute Eosinophils 0.1 0.0 - 0.7 10e3/uL    Absolute Basophils 0.0 0.0 - 0.2 10e3/uL    Absolute Immature Granulocytes 0.0 <=0.4 10e3/uL    Absolute NRBCs 0.0 10e3/uL   EKG 12-lead, tracing only   Result Value Ref Range    Systolic Blood Pressure  mmHg    Diastolic Blood Pressure  mmHg    Ventricular Rate 83 BPM    Atrial Rate 29 BPM    KY Interval  ms    QRS Duration 174 ms     ms    QTc 589 ms    P Axis  degrees    R AXIS -60 degrees    T Axis -28 degrees    Interpretation ECG       Wide QRS rhythm with occasional , and consecutive Premature ventricular complexes  Left axis deviation  Non-specific intra-ventricular conduction block  Possible Lateral infarct , age undetermined  Abnormal ECG     XR Chest 2 Views    Narrative    EXAM: XR CHEST 2 VW  4/21/2022 2:05 PM     HISTORY:  LVAD in place, LUE pain concerning  for ACS       COMPARISON:  Chest radiograph 3/31/2022    FINDINGS:   PA and lateral views of the chest. Midline trachea. Cardiomediastinal  silhouette is stable. Similar positioning the left chest wall cardiac  device with stable lead positioning, LVAD, left atrial clip. Intact  median sternotomy wires. Mitral valve prosthesis. No pneumothorax.  Mild blunting of the left costophrenic angle. Retrocardiac opacity  similar to prior examination. Visualized upper abdomen is  unremarkable. No acute osseous abnormality..      Impression    IMPRESSION:     1. Stable cardiomegaly with retrocardiac opacity, possibly due to  consolidation and/or atelectasis.  2. Stable positioning of the LVAD and left chest wall ICD.    I have personally reviewed the examination and initial interpretation  and I agree with the findings.    CARLA PAREDES MD         SYSTEM ID:  [id number]   Lactate Dehydrogenase   Result Value Ref Range    Lactate Dehydrogenase     Troponin I   Result Value Ref Range    Troponin I High Sensitivity 94 (H) <79 ng/L     Medications - No data to display          Assessments & Plan (with Medical Decision Making)   Tej Morfin is a 56 year old male with recently placed elevated presenting with intermittent left arm pain for several days.  Differential diagnosis: ACS, myopathy, nerve impingement, unlikely CVA.    After thorough history and physical exam patient presented no acute distress.  Currently symptom-free.  At no point has he had chest pain or shortness of breath with the left arm pain.  I will obtain EKG chest x-ray and laboratory studies for further diagnostic evaluation consult with the heart failure team.  He and his partner agree with the plan.   This part of the medical record was transcribed by Brenda Reddy [employer].    Patient's laboratory studies returned with leukopenia of 3800 and anemia with hemoglobin of 10.2. Electrolytes show no evidence of dehydration, creatinine is normal at  0.67.  Troponin is elevated at 101.  I reviewed patient's chest x-ray and I read the radiology report; there is stable cardiomegaly with retrocardiac opacity concerning for consolidation and/or atelectasis.  Patient has no respiratory symptoms or shortness of breath and I highly doubt he is acute infection.  LVAD shows stable positioning on chest x-ray.    I discussed patient's laboratory results, including elevated troponin, with heart failure staff on-call, Dr. Lonnie Stephenson; he recommended that it would not be unusual for recent LVAD implantation patient to have an elevated troponin like this.  He recommended obtaining a serial troponin to make sure it is not acutely trending upwards.  I will order this at 2-hour belen.    Serial troponin returned elevated, however, lower than the initial value.  Again, case was discussed with Dr. Fleming who recommended discharging the patient with outpatient clinic follow-up given that he has been symptom-free in the emergency department.  It seems unlikely that his symptoms are due to ACS and his troponin is likely elevated due to recent LVAD implantation.  Patient agrees with this plan.  He has an appointment scheduled in 4 days.  He also agrees to return to the Emergency Department if his symptoms worsen.  At this time he is stable for discharge.        I have reviewed the nursing notes.    I have reviewed the findings, diagnosis, plan and need for follow up with the patient.    Discharge Medication List as of 4/21/2022  4:50 PM          Final diagnoses:   Pain of left upper arm       IDarryl am serving as a trained medical scribe to document services personally performed by Sandro Perez MD, based on the provider's statements to me.      Ana NEW Nikola, MD, was physically present and have reviewed and verified the accuracy of this note documented by Darryl Hoyos.     Sandro Perez MD  4/21/2022   [facility] EMERGENCY DEPARTMENT     Ana  MD Sandro  04/21/22 0814

## 2022-04-22 ENCOUNTER — LAB (OUTPATIENT)
Dept: LAB | Facility: CLINIC | Age: 57
End: 2022-04-22
Payer: MEDICARE

## 2022-04-22 ENCOUNTER — CARE COORDINATION (OUTPATIENT)
Dept: CARDIOLOGY | Facility: CLINIC | Age: 57
End: 2022-04-22
Payer: MEDICARE

## 2022-04-22 ENCOUNTER — PATIENT OUTREACH (OUTPATIENT)
Dept: CARE COORDINATION | Facility: CLINIC | Age: 57
End: 2022-04-22
Payer: MEDICARE

## 2022-04-22 ENCOUNTER — ANTICOAGULATION THERAPY VISIT (OUTPATIENT)
Dept: ANTICOAGULATION | Facility: CLINIC | Age: 57
End: 2022-04-22

## 2022-04-22 DIAGNOSIS — I50.22 CHRONIC SYSTOLIC CHF (CONGESTIVE HEART FAILURE) (H): ICD-10-CM

## 2022-04-22 DIAGNOSIS — Z95.2 S/P MITRAL VALVE REPLACEMENT: ICD-10-CM

## 2022-04-22 DIAGNOSIS — Z95.811 LVAD (LEFT VENTRICULAR ASSIST DEVICE) PRESENT (H): ICD-10-CM

## 2022-04-22 DIAGNOSIS — Z71.89 OTHER SPECIFIED COUNSELING: ICD-10-CM

## 2022-04-22 DIAGNOSIS — Z95.811 LVAD (LEFT VENTRICULAR ASSIST DEVICE) PRESENT (H): Primary | ICD-10-CM

## 2022-04-22 LAB — INR BLD: 3.5 (ref 0.9–1.1)

## 2022-04-22 PROCEDURE — 36416 COLLJ CAPILLARY BLOOD SPEC: CPT

## 2022-04-22 PROCEDURE — 85610 PROTHROMBIN TIME: CPT

## 2022-04-22 NOTE — PROGRESS NOTES
I called Tej the day after he visited the ED for complaints of left arm numbness. He said he is feeling good and has not had any more numbness since his ED visit. His VAD numbers are 5700, 4.8 LPM, 2.9 PI, and 4.5watts. His weight is up 2 lbs to 180. He has no shortness of breath or dizziness. He does not yet have a doppler cuff to check maps but he said his BP was good  In the ED. I encouraged Tej to call the VAD coordinattor on call with any questions or concerns.    RTC next week on 4/25 with Zandra Childs the NP. Tej will be in town for his 30 days post VAD discharge until mid May.

## 2022-04-22 NOTE — PROGRESS NOTES
Clinic Care Coordination Contact    Background: Care Coordination referral placed from [facility] discharge report for reason of patient meeting criteria for a TCM outreach call by [facility] team.    Assessment: Upon chart review, [facility] Team member will cancel/close the referral for TCM outreach due to reason below:    [facility] Care Coordination RN is following up with patient. Will close order to avoid patient receiving multiple calls.     Plan: Care Coordination referral for TCM outreach canceled.    Phuong Kidd MA  [facility] [place], [facility]

## 2022-04-22 NOTE — PROGRESS NOTES
ANTICOAGULATION MANAGEMENT     Tej SWENSON [place] 56 year old male is on warfarin with supratherapeutic INR result. (Goal INR 2.0-3.0)    Recent labs: (last 7 days)     04/22/22  1119   INR 3.5*       ASSESSMENT       Source(s): Patient/Caregiver Call       Warfarin doses taken: Warfarin taken as instructed    Diet: No new diet changes identified    New illness, injury, or hospitalization: Yes: ER visit 4/21 due to arm numbness      Medication/supplement changes: None noted    Signs or symptoms of bleeding or clotting: No    Previous INR: Therapeutic last visit; previously outside of goal range    Additional findings: None       PLAN     Recommended plan for no diet, medication or health factor changes affecting INR     Dosing Instructions: decrease your warfarin dose (13.6% change) with next INR in 3 days       Summary  As of 4/22/2022    Full warfarin instructions:  5 mg every Mon, Fri; 7.5 mg all other days   Next INR check:  4/25/2022             Telephone call with Tej who verbalizes understanding and agrees to plan and who agrees to plan and repeated back plan correctly    Lab visit scheduled    Education provided: None required    Plan made per [facility] anticoagulation protocol and per LVAD protocol    Geneva Martinez RN  Anticoagulation Clinic  4/22/2022    _______________________________________________________________________     Anticoagulation Episode Summary     Current INR goal:  2.0-3.0   TTR:  23.8 % (5 d)   Target end date:  Indefinite   Send INR reminders to:  ANTICOAG LVAD    Indications    LVAD (left ventricular assist device) present (H) [Z95.811]  Chronic systolic CHF (congestive heart failure) (H) [I50.22]           Comments:  HeartMate 3   Bridging: No bridging. Reason -- new implant   Date VAD placed: 03/23/22  Hx Mitral valve repair and not replacement         Anticoagulation Care Providers     Provider Role Specialty Phone number    Dunia Hdz MD Referring Cardiovascular Disease  [phone]

## 2022-04-25 ENCOUNTER — LAB (OUTPATIENT)
Dept: LAB | Facility: CLINIC | Age: 57
End: 2022-04-25
Payer: MEDICARE

## 2022-04-25 ENCOUNTER — CARE COORDINATION (OUTPATIENT)
Dept: CARDIOLOGY | Facility: CLINIC | Age: 57
End: 2022-04-25

## 2022-04-25 ENCOUNTER — ANTICOAGULATION THERAPY VISIT (OUTPATIENT)
Dept: ANTICOAGULATION | Facility: CLINIC | Age: 57
End: 2022-04-25

## 2022-04-25 DIAGNOSIS — Z95.811 LVAD (LEFT VENTRICULAR ASSIST DEVICE) PRESENT (H): Primary | ICD-10-CM

## 2022-04-25 DIAGNOSIS — I50.22 CHRONIC SYSTOLIC CONGESTIVE HEART FAILURE (H): ICD-10-CM

## 2022-04-25 DIAGNOSIS — Z95.2 S/P MITRAL VALVE REPLACEMENT: ICD-10-CM

## 2022-04-25 DIAGNOSIS — Z95.811 LVAD (LEFT VENTRICULAR ASSIST DEVICE) PRESENT (H): ICD-10-CM

## 2022-04-25 DIAGNOSIS — I50.22 CHRONIC SYSTOLIC CHF (CONGESTIVE HEART FAILURE) (H): ICD-10-CM

## 2022-04-25 DIAGNOSIS — I50.22 CHRONIC SYSTOLIC CONGESTIVE HEART FAILURE (H): Primary | ICD-10-CM

## 2022-04-25 LAB
ALBUMIN SERPL-MCNC: 2.8 G/DL (ref 3.4–5)
ALP SERPL-CCNC: 88 U/L (ref 40–150)
ALT SERPL W P-5'-P-CCNC: 30 U/L (ref 0–70)
ANION GAP SERPL CALCULATED.3IONS-SCNC: 5 MMOL/L (ref 3–14)
AST SERPL W P-5'-P-CCNC: 22 U/L (ref 0–45)
BILIRUB SERPL-MCNC: 0.3 MG/DL (ref 0.2–1.3)
BUN SERPL-MCNC: 14 MG/DL (ref 7–30)
CALCIUM SERPL-MCNC: 8.1 MG/DL (ref 8.5–10.1)
CHLORIDE BLD-SCNC: 111 MMOL/L (ref 94–109)
CO2 SERPL-SCNC: 28 MMOL/L (ref 20–32)
CREAT SERPL-MCNC: 0.75 MG/DL (ref 0.66–1.25)
D DIMER PPP FEU-MCNC: 3.54 UG/ML FEU (ref 0–0.5)
ERYTHROCYTE [DISTWIDTH] IN BLOOD BY AUTOMATED COUNT: 13.5 % (ref 10–15)
GFR SERPL CREATININE-BSD FRML MDRD: >90 ML/MIN/1.73M2
GLUCOSE BLD-MCNC: 83 MG/DL (ref 70–99)
HCT VFR BLD AUTO: 35.8 % (ref 40–53)
HGB BLD-MCNC: 10.9 G/DL (ref 13.3–17.7)
INR PPP: 2.8 (ref 0.85–1.15)
LDH SERPL L TO P-CCNC: 327 U/L (ref 85–227)
MCH RBC QN AUTO: 29.6 PG (ref 26.5–33)
MCHC RBC AUTO-ENTMCNC: 30.4 G/DL (ref 31.5–36.5)
MCV RBC AUTO: 97 FL (ref 78–100)
PLATELET # BLD AUTO: 159 10E3/UL (ref 150–450)
POTASSIUM BLD-SCNC: 3.2 MMOL/L (ref 3.4–5.3)
PROT SERPL-MCNC: 7.1 G/DL (ref 6.8–8.8)
RBC # BLD AUTO: 3.68 10E6/UL (ref 4.4–5.9)
SODIUM SERPL-SCNC: 144 MMOL/L (ref 133–144)
WBC # BLD AUTO: 3.6 10E3/UL (ref 4–11)

## 2022-04-25 PROCEDURE — 85379 FIBRIN DEGRADATION QUANT: CPT

## 2022-04-25 PROCEDURE — 80053 COMPREHEN METABOLIC PANEL: CPT

## 2022-04-25 PROCEDURE — 85610 PROTHROMBIN TIME: CPT

## 2022-04-25 PROCEDURE — 83615 LACTATE (LD) (LDH) ENZYME: CPT

## 2022-04-25 PROCEDURE — 85027 COMPLETE CBC AUTOMATED: CPT

## 2022-04-25 PROCEDURE — 36415 COLL VENOUS BLD VENIPUNCTURE: CPT

## 2022-04-25 NOTE — PROGRESS NOTES
D:  Called pt to check in 3 weeks post discharge from LVAD implant hospitalization.  Pt reports feeling well and denies any further episodes of arm discomfort or numbness since being seen in the ER for this.  Pt reports stable LVAD numbers and weights.  Current LVAD numbers, Speed: 5700, Flow: 5.0, PI: 3.1, Power: 4.4, & weight 181 lbs.  He has no medication questions and has been sleeping ok.  He continues to endorse some mild, intermittent, anxiety while adjusting to his new life situation with the LVAD.    I:  Discussed upcoming appts and tentative plans for when pt will be able to return home to [place].      Future Appointments   Date Time Provider Department Center   5/2/2022  3:00 PM Wilver Rodríguez MD [facility]   5/5/2022  7:45 AM [facility] LAB GOLD [facility]   5/5/2022  8:00 AM [facility]   5/5/2022  8:30 AM [id number]   5/5/2022 10:00 AM Dunia Hdz MD [facility]   5/12/2022 12:30 PM  LAB [facility]   5/12/2022  1:00 PM Radha White PA-C [facility]   6/23/2022 12:00 PM  LAB [facility]   6/23/2022 12:30 PM  PFL 6 MINUTE WALK 1 [facility]   6/23/2022  1:00 PM  CV DEVICE [facility]   6/23/2022  1:30 PM Dunia Hdz MD [facility]   I:  Follow up  P:  Pt verbalized understanding of the instructions and plans discussed.  Will call VAD coordinator with further needs and questions.

## 2022-04-25 NOTE — PROGRESS NOTES
ANTICOAGULATION MANAGEMENT     Tej SWENSON [place] 56 year old male is on warfarin with therapeutic INR result. (Goal INR 2.0-3.0)    Recent labs: (last 7 days)     04/25/22  1203   INR 2.80*       ASSESSMENT       Source(s): Chart Review and Patient/Caregiver Call       Warfarin doses taken: Warfarin taken as instructed    Diet: No new diet changes identified    New illness, injury, or hospitalization: No    Medication/supplement changes: None noted    Signs or symptoms of bleeding or clotting: No    Previous INR: Supratherapeutic    Additional findings: None       PLAN     Recommended plan for no diet, medication or health factor changes affecting INR     Dosing Instructions: continue your current warfarin dose with next INR in 3 days       Summary  As of 4/25/2022    Full warfarin instructions:  5 mg every Mon, Fri; 7.5 mg all other days   Next INR check:  4/28/2022             Telephone call with Tej who verbalizes understanding and agrees to plan    Lab visit scheduled    Education provided: Goal range and significance of current result    Plan made per [facility] anticoagulation protocol and per LVAD protocol    Judy Simental RN  Anticoagulation Clinic  4/25/2022    _______________________________________________________________________     Anticoagulation Episode Summary     Current INR goal:  2.0-3.0   TTR:  25.5 % (1.1 wk)   Target end date:  Indefinite   Send INR reminders to:  ANTICOAG LVAD    Indications    LVAD (left ventricular assist device) present (H) [Z95.811]  Chronic systolic CHF (congestive heart failure) (H) [I50.22]           Comments:  HeartMate 3   Bridging: No bridging. Reason -- new implant   Date VAD placed: 03/23/22  Hx Mitral valve repair and not replacement         Anticoagulation Care Providers     Provider Role Specialty Phone number    Dunia Hdz MD Referring Cardiovascular Disease [phone]

## 2022-04-26 ENCOUNTER — CARE COORDINATION (OUTPATIENT)
Dept: CARDIOLOGY | Facility: CLINIC | Age: 57
End: 2022-04-26
Payer: MEDICARE

## 2022-04-26 DIAGNOSIS — I50.22 CHRONIC SYSTOLIC CONGESTIVE HEART FAILURE (H): Primary | ICD-10-CM

## 2022-04-26 RX ORDER — POTASSIUM CHLORIDE 1500 MG/1
20 TABLET, EXTENDED RELEASE ORAL 2 TIMES DAILY
Qty: 180 TABLET | Refills: 3 | Status: SHIPPED | OUTPATIENT
Start: 2022-04-26 | End: 2022-04-29

## 2022-04-26 NOTE — PROGRESS NOTES
"Patient paged to report increased pitting edema in bilateral ankles. He reports that \" I should be urinating more for the amount that I am drinking.\" Patient denies chest pain, palpitations, shortness of breath at rest or with activity, dizziness, lightheadedness, change in VAD parameters and VAD alarms.     Weight  4/26 185lbs  4/25 182lbs  4/24 181lbs    Patient denies taking diuretics at this time.     Notified primary VAD CC, Vandana Headley and Zandra Childs, WILIAN.     Update   4/27 185lbs  4/26 185 lbs    Patients continues to report swelling of bilateral ankles only after sitting for two hours after watching a basketball game. Patient reports bilateral ankle swelling improved todya compared to yesterday. Patient reports increased in urination. Patient denies chest pain, palpitations, shortness of breath, worsening or new edema, change in VAD parameters and VAD alarms.  Patient has appointment on 5/5/22    Zandra Recommendation: START lasix 20mg daily, recheck BMP on Friday.     "

## 2022-04-26 NOTE — PROGRESS NOTES
D:  Rcvd repeat/follow up labs  I:  Discussed results with Zandra AUGUSTIN NP.  Plan: see below.    Date: 4/26/2022    Time of Call: 9:46 AM     [ TORB ] Ordering provider: Zandra STAPLETON NP  Order: WBC remains on the lower side, but grossly unchanged, no change r/t this.  For potassium 3.2,  Take 40 mEq of Potassium BID today.  Start Potassium 20 mEq BID tomorrow, 4/27/22, and going forward.     Order received by: writer     Follow-up/additional notes: Called pt to inform him of changes.  New prescription sent to pharmacy.  Pt verbalized understanding of the instructions given.  Will call VAD coordinator with further needs and questions.

## 2022-04-27 RX ORDER — FUROSEMIDE 20 MG
20 TABLET ORAL DAILY
Qty: 90 TABLET | Refills: 3 | Status: SHIPPED | OUTPATIENT
Start: 2022-04-27 | End: 2022-05-13 | Stop reason: DRUGHIGH

## 2022-04-28 ENCOUNTER — LAB (OUTPATIENT)
Dept: LAB | Facility: CLINIC | Age: 57
End: 2022-04-28
Payer: MEDICARE

## 2022-04-28 ENCOUNTER — ANTICOAGULATION THERAPY VISIT (OUTPATIENT)
Dept: ANTICOAGULATION | Facility: CLINIC | Age: 57
End: 2022-04-28

## 2022-04-28 DIAGNOSIS — I50.22 CHRONIC SYSTOLIC CONGESTIVE HEART FAILURE (H): ICD-10-CM

## 2022-04-28 DIAGNOSIS — Z95.811 LVAD (LEFT VENTRICULAR ASSIST DEVICE) PRESENT (H): ICD-10-CM

## 2022-04-28 DIAGNOSIS — Z95.2 S/P MITRAL VALVE REPLACEMENT: ICD-10-CM

## 2022-04-28 DIAGNOSIS — I50.22 CHRONIC SYSTOLIC CHF (CONGESTIVE HEART FAILURE) (H): ICD-10-CM

## 2022-04-28 DIAGNOSIS — Z95.811 LVAD (LEFT VENTRICULAR ASSIST DEVICE) PRESENT (H): Primary | ICD-10-CM

## 2022-04-28 LAB
ANION GAP SERPL CALCULATED.3IONS-SCNC: 7 MMOL/L (ref 3–14)
BUN SERPL-MCNC: 12 MG/DL (ref 7–30)
CALCIUM SERPL-MCNC: 8 MG/DL (ref 8.5–10.1)
CHLORIDE BLD-SCNC: 111 MMOL/L (ref 94–109)
CO2 SERPL-SCNC: 28 MMOL/L (ref 20–32)
CREAT SERPL-MCNC: 0.76 MG/DL (ref 0.66–1.25)
GFR SERPL CREATININE-BSD FRML MDRD: >90 ML/MIN/1.73M2
GLUCOSE BLD-MCNC: 106 MG/DL (ref 70–99)
INR BLD: 4 (ref 0.9–1.1)
POTASSIUM BLD-SCNC: 3.3 MMOL/L (ref 3.4–5.3)
SODIUM SERPL-SCNC: 146 MMOL/L (ref 133–144)

## 2022-04-28 PROCEDURE — 85610 PROTHROMBIN TIME: CPT

## 2022-04-28 PROCEDURE — 36415 COLL VENOUS BLD VENIPUNCTURE: CPT

## 2022-04-28 PROCEDURE — 80048 BASIC METABOLIC PNL TOTAL CA: CPT

## 2022-04-28 PROCEDURE — 36416 COLLJ CAPILLARY BLOOD SPEC: CPT

## 2022-04-28 NOTE — PROGRESS NOTES
ANTICOAGULATION MANAGEMENT     Tej LEELA Morfin 56 year old male is on warfarin with supratherapeutic INR result. (Goal INR 2.0-3.0)    Recent labs: (last 7 days)     04/28/22  1539   INR 4.0*       ASSESSMENT       Source(s): Chart Review and Patient/Caregiver Call       Warfarin doses taken: Warfarin taken as instructed    Diet: No new diet changes identified    New illness, injury, or hospitalization: Yes: According to notes patient has been retaining fluid    Medication/supplement changes: Patient was restarted on lasix and potassium    Signs or symptoms of bleeding or clotting: No    Previous INR: Therapeutic last visit; previously outside of goal range    Additional findings: None       PLAN     Recommended plan for ongoing change(s) affecting INR     Dosing Instructions: partial hold then decrease your warfarin dose (10.5% change) with next INR in 4 days       Summary  As of 4/28/2022    Full warfarin instructions:  4/28: 2.5 mg; Otherwise 7.5 mg every Mon, Wed, Fri; 5 mg all other days   Next INR check:  5/2/2022             Telephone call with Tej who verbalizes understanding and agrees to plan and who agrees to plan and repeated back plan correctly    Lab visit scheduled    Education provided: Goal range and significance of current result, Importance of therapeutic range, Importance of following up at instructed interval and Importance of taking warfarin as instructed    Plan made per [facility] anticoagulation protocol and per LVAD protocol    Audelia Avalos RN  Anticoagulation Clinic  4/28/2022    _______________________________________________________________________     Anticoagulation Episode Summary     Current INR goal:  2.0-3.0   TTR:  23.1 % (1.6 wk)   Target end date:  Indefinite   Send INR reminders to:  ANTICOAG LVAD    Indications    LVAD (left ventricular assist device) present (H) [Z95.811]  Chronic systolic CHF (congestive heart failure) (H) [I50.22]           Comments:  HeartMate 3    Bridging: No bridging. Reason -- new implant   Date VAD placed: 03/23/22  Hx Mitral valve repair and not replacement         Anticoagulation Care Providers     Provider Role Specialty Phone number    Dunia Hdz MD Referring Cardiovascular Disease [phone]

## 2022-04-29 ENCOUNTER — CARE COORDINATION (OUTPATIENT)
Dept: CARDIOLOGY | Facility: CLINIC | Age: 57
End: 2022-04-29
Payer: MEDICARE

## 2022-04-29 DIAGNOSIS — I50.22 CHRONIC SYSTOLIC CONGESTIVE HEART FAILURE (H): Primary | ICD-10-CM

## 2022-04-29 DIAGNOSIS — I50.22 CHRONIC SYSTOLIC CONGESTIVE HEART FAILURE (H): ICD-10-CM

## 2022-04-29 RX ORDER — POTASSIUM CHLORIDE 1500 MG/1
40 TABLET, EXTENDED RELEASE ORAL 2 TIMES DAILY
Qty: 180 TABLET | Refills: 3 | Status: SHIPPED | OUTPATIENT
Start: 2022-04-29 | End: 2022-05-05

## 2022-04-29 NOTE — PROGRESS NOTES
D:  Pt had potassium of 3.3.  I:  Discussed w/ RUTHANN De Paz.  Instructed pt to  increase his KCL to 40 MEQ BID with an additional 20 MEQ today.    A:  Pt verbalized understanding.  P:  VAD Coordinator available for questions or concerns.

## 2022-04-29 NOTE — PROGRESS NOTES
D:  Rcvd follow up BMP.  Potassium remains low, other labs unchanged.  Pt reports weight of 185 and stable LVAD parameters.  Pt denies swelling and feels ok.  Pt and wife do endorse some ongoing anxiety, which pt took diazepam for prior to admission.  Pt is wondering if he can resume this med.  I:  Message sent to Zandra STAPLETON NP to review labs and discuss anxiety.  Awaiting response.    A:  Follow up-Labs  P:  Pt verbalized understanding of the instructions given.  Will call VAD coordinator with further needs and questions.

## 2022-05-02 ENCOUNTER — LAB (OUTPATIENT)
Dept: LAB | Facility: CLINIC | Age: 57
End: 2022-05-02
Payer: MEDICARE

## 2022-05-02 ENCOUNTER — OFFICE VISIT (OUTPATIENT)
Dept: CARDIOLOGY | Facility: CLINIC | Age: 57
End: 2022-05-02
Attending: INTERNAL MEDICINE
Payer: MEDICARE

## 2022-05-02 ENCOUNTER — CARE COORDINATION (OUTPATIENT)
Dept: CARDIOLOGY | Facility: CLINIC | Age: 57
End: 2022-05-02

## 2022-05-02 VITALS
WEIGHT: 192 LBS | HEART RATE: 78 BPM | BODY MASS INDEX: 27.49 KG/M2 | HEIGHT: 70 IN | SYSTOLIC BLOOD PRESSURE: 98 MMHG | OXYGEN SATURATION: 97 %

## 2022-05-02 DIAGNOSIS — Z95.811 LVAD (LEFT VENTRICULAR ASSIST DEVICE) PRESENT (H): ICD-10-CM

## 2022-05-02 DIAGNOSIS — I50.22 CHRONIC SYSTOLIC CONGESTIVE HEART FAILURE (H): Primary | ICD-10-CM

## 2022-05-02 DIAGNOSIS — I50.22 CHRONIC SYSTOLIC CONGESTIVE HEART FAILURE (H): ICD-10-CM

## 2022-05-02 DIAGNOSIS — Z95.811 LVAD (LEFT VENTRICULAR ASSIST DEVICE) PRESENT (H): Primary | ICD-10-CM

## 2022-05-02 LAB
ALBUMIN SERPL-MCNC: 2.8 G/DL (ref 3.4–5)
ALP SERPL-CCNC: 90 U/L (ref 40–150)
ALT SERPL W P-5'-P-CCNC: 21 U/L (ref 0–70)
ANION GAP SERPL CALCULATED.3IONS-SCNC: 7 MMOL/L (ref 3–14)
AST SERPL W P-5'-P-CCNC: 18 U/L (ref 0–45)
BILIRUB SERPL-MCNC: 0.5 MG/DL (ref 0.2–1.3)
BUN SERPL-MCNC: 7 MG/DL (ref 7–30)
CALCIUM SERPL-MCNC: 8.2 MG/DL (ref 8.5–10.1)
CHLORIDE BLD-SCNC: 111 MMOL/L (ref 94–109)
CO2 SERPL-SCNC: 28 MMOL/L (ref 20–32)
CREAT SERPL-MCNC: 0.86 MG/DL (ref 0.66–1.25)
D DIMER PPP FEU-MCNC: 1.96 UG/ML FEU (ref 0–0.5)
ERYTHROCYTE [DISTWIDTH] IN BLOOD BY AUTOMATED COUNT: 13.8 % (ref 10–15)
GFR SERPL CREATININE-BSD FRML MDRD: >90 ML/MIN/1.73M2
GLUCOSE BLD-MCNC: 94 MG/DL (ref 70–99)
HCT VFR BLD AUTO: 36.7 % (ref 40–53)
HGB BLD-MCNC: 11.2 G/DL (ref 13.3–17.7)
INR PPP: 2.87 (ref 0.85–1.15)
LDH SERPL L TO P-CCNC: 302 U/L (ref 85–227)
MCH RBC QN AUTO: 29.3 PG (ref 26.5–33)
MCHC RBC AUTO-ENTMCNC: 30.5 G/DL (ref 31.5–36.5)
MCV RBC AUTO: 96 FL (ref 78–100)
PLATELET # BLD AUTO: 162 10E3/UL (ref 150–450)
POTASSIUM BLD-SCNC: 3.9 MMOL/L (ref 3.4–5.3)
PROT SERPL-MCNC: 6.8 G/DL (ref 6.8–8.8)
RBC # BLD AUTO: 3.82 10E6/UL (ref 4.4–5.9)
SODIUM SERPL-SCNC: 146 MMOL/L (ref 133–144)
WBC # BLD AUTO: 5.2 10E3/UL (ref 4–11)

## 2022-05-02 PROCEDURE — 85027 COMPLETE CBC AUTOMATED: CPT | Performed by: PATHOLOGY

## 2022-05-02 PROCEDURE — 83615 LACTATE (LD) (LDH) ENZYME: CPT | Performed by: PATHOLOGY

## 2022-05-02 PROCEDURE — 85610 PROTHROMBIN TIME: CPT | Performed by: PATHOLOGY

## 2022-05-02 PROCEDURE — 36415 COLL VENOUS BLD VENIPUNCTURE: CPT | Performed by: PATHOLOGY

## 2022-05-02 PROCEDURE — 99207 PR NON-BILLABLE SERV PER CHARTING: CPT | Performed by: SURGERY

## 2022-05-02 PROCEDURE — 85379 FIBRIN DEGRADATION QUANT: CPT | Performed by: INTERNAL MEDICINE

## 2022-05-02 PROCEDURE — G0463 HOSPITAL OUTPT CLINIC VISIT: HCPCS | Mod: 25

## 2022-05-02 PROCEDURE — 80053 COMPREHEN METABOLIC PANEL: CPT | Performed by: PATHOLOGY

## 2022-05-02 ASSESSMENT — PAIN SCALES - GENERAL: PAINLEVEL: NO PAIN (0)

## 2022-05-02 NOTE — NURSING NOTE
Education Complete: Yes   Charge the BACKUP controller s backup battery every 6 months  Perform a self test on BACKUP every 6 months  Change the MPU s batteries every 6 months:Yes  Have equipment serviced yearly (if applicable):Yes     05/02/22 1500   Heartmate 3 LEFT VS   Flow (Lpm) 4.2 Lpm   Pulse Index (PI) 4.7 PI   Speed (rpm) 5700 rpm   Power (christiansen) 4.5 christiansen   Current Hct setting 37   Primary Controller   Serial number [id number]   EBB: Patient use 4   Replace in 31 Months   Driveline Exit Site   Dressing change done Y   Driveline properly secured Yes   DLES assessment c/d/i;redness;tender  (suture removed, red/tender around taped areas)   Dressing used Sensitive kit   Frequency patient changes dressing Daily     Pt and wife, Jessenia presented to clinic for education on LVAD drive line exit site weekly dressing change and use of shower bag.     Provided instructions on how to safely use shower bag and cover LVAD drive line exit site dressing and modular cable during showers. Instructed that they should change the daily dressing immediately following a shower. If using a weekly dressing, dressing can remain in place for up to 1 week, but must be changed sooner if there is drainage accumulation, if there is moisture under the dressing, or if the dressing is loose.    Provided instructions on performing the weekly dressing and demonstrated.     Suture removed from LVAD drive line exit site. Will discuss with team when patient is appropriate for weekly dressings and showering.     Amy Carolina, ROSALINON, RN, PHN, HNB-BC   5/2/2022 at 3:04 PM

## 2022-05-02 NOTE — LETTER
Faxed to:  [facility]  Fax Number:  [phone]                                                                                                                                                                                                   [email or web address]   Email Order Form to [email or web address]  Phone: [phone] Fax: [phone]      Patient Name: Tej Morfin Date: 05/02/2022   Facility Name: [facility]  Fax Number: [phone]    VAD Coordinator/ :   Vandana Headley RN Phone: [phone]    Email Address:         [email or web address]   Patient's Primary Insurance Upon Receiving the VAD unit:          Dressing Change Frequency: Daily x Every Other Day   Other (Specify)              Duration of Need:  DT (Lifetime) x BTT (until transplant)   Other (Specify)      NEW ORDERS ONLY: PLEASE SUBMIT PATIENT DEMOGRAPHICS, POST OP NOTES & NOTES FROM MOST RECENT CLINICAL VISIT WITH ORDER FORM.                  Fax: [phone]  Or Email Order Form to   [email or web address]     ck Product Size/ Description Quantity    Kit Options:     x Centurion Driveline Management Tray      Daily Up to 30   x Centurion Driveline Management Tray      Weekly Up to 5   x Centurion [place]  [id number] Up to 30    Centurion [place] [id number] Up to 30   x Aquaguard/Shower Shield 7 x 7       9  x 9      10  x 12  Up to 30    Blenderm Surgical Tape 2 inch     Sensitive Skin:      Uni-Solve Adhesive Remover Wipes  1 box   x FreeDerm Adhesive Remover 1 oz. Spray Bottle Up to 8    Betadine Swabs  3/pack Up to 30    Hibiclens 16 oz. Bottle Up to 3    MicroKlenz Wound Cleanser Bottle Up to 3    Medipore H Tape 2  Roll Up to 5    Micropore Paper Tape 2  Roll Up to 5   x CE 3M Blue Silicone Tape  Up to 5    MC Aramis Secure  [place]  Up to 8    CathGrip [place] Med/2 Strap    Large/2 Strap Up to 10    Abdominal Binder Sm      Med     Lg      Up to 2    Simpurity Transparent Film 4 x 5 Up to 30    Individual Supplies:      Earloop Surgical Mask 100/box Up to 3    Alcohol Wipe  1 Box    Non Sterile Gloves  100/box        Size: S    M    L   XL Up to 3    Chlorascrub Swabsticks   Each     1  Up to 30    Sterile Vinyl PF gloves Sizes   6   7   8   9 Up to 30    Sterile Gauze Sponge 4  x 4   (2/pkg) Up to 30    Sterile Drain Sponge 4  x 4   (2/pkg) Up to 30    Biopatch 1  Up to 30    3M No-sting Barrier Wipes 50/box Up to 3    Sorbaview Shield  Up to 30     My signature below certifies the medical necessity of the above approved products and I certify the patient has been trained in the use of all products. The patient is aware that [facility] will contact him/her regarding the shipment of ordered dressing supplies.     Provider Name: Dunia Hidalgoswell NPI#: [id number]   Provider Signature:                                                                                                                                                                                                                                                                                                                                                                                                                                                                                                           Provider Number: [id number]     [facility] will confirm receipt for all initial orders by sending a fax to the provider listed above.

## 2022-05-02 NOTE — NURSING NOTE
Chief Complaint   Patient presents with     Follow Up     6 week post LVAD      Vitals were taken and medications were reconciled.    Lalo Hummel, EMT  2:13 PM

## 2022-05-02 NOTE — LETTER
2022      RE: Tej Morfin  [address]  [place] [address]       Dear Colleague,    Thank you for the opportunity to participate in the care of your patient, Tej Morfin, at the [facility] [place] [facility]. Please see a copy of my visit note below.    HPI  56 year old male with a PMH of severe MR s/p MVR with ring (1/21/15), VT s/p ICD, nonobstructive CAD, NICM previously on home inotropes now s/p HM III LVAD implantation 3/23/22, with course c/b AFib with RVR (s/p ICD shock 3/25/22) and right knee effusion (s/p arthrocentesis and intraarticular steroid injection 3/30/22) who presents for f/up in LVAD clinic.  He is doing well from a functional view point and overall very please with his progress.        PAST MEDICAL HISTORY:       Past Medical History:   Diagnosis Date     Chronic systolic heart failure (H) 2017     Gastroparesis       ICD (implantable cardioverter-defibrillator) in place       [place] [employer]     Non-ischemic cardiomyopathy (H)       Paroxysmal ventricular tachycardia (H) 2017     S/P mitral valve replacement 2017     Tetrahydrocannabinol (THC) use disorder, mild, in controlled environment, abuse 2019     occasional edible THC. reportedly for sleep, anxiety     Tobacco abuse, episodic       occasional cigar - ~ 3x/wk            FAMILY HISTORY:        Family History   Problem Relation Age of Onset     Coronary Artery Disease Mother       Kidney failure Mother       Cardiomyopathy Father            age 51            SOCIAL HISTORY:  Social History            Socioeconomic History     Marital status:    Tobacco Use     Smoking status: Former Smoker     Smokeless tobacco: Never Used   Substance and Sexual Activity     Alcohol use: No       Alcohol/week: 0.0 standard drinks     Drug use: No         CURRENT MEDICATIONS:  acetaminophen (TYLENOL) 325 MG tablet,  "Take 2 tablets (650 mg) by mouth every 4 hours as needed for pain  diazepam (VALIUM) 10 MG tablet, Take 10 mg by mouth every 4 hours as needed for anxiety  furosemide (LASIX) 20 MG tablet, Take 1 tablet (20 mg) by mouth daily  omeprazole (PRILOSEC) 40 MG DR capsule, Take 40 mg by mouth every morning  sacubitril-valsartan (ENTRESTO)  MG per tablet, Take 1 tablet by mouth 2 times daily  sotalol (BETAPACE) 80 MG tablet, TAKE 1 TABLET BY MOUTH TWICE A DAY  study - aspirin or placebo, IDS# [id number], 100 MG capsule, Take 1 capsule (100 mg) by mouth daily . Swallow whole, do not crush.  warfarin ANTICOAGULANT (COUMADIN) 2.5 MG tablet, Take 10 mg PO daily at 6 pm until next INR check, then dose per INR goal 2-3     No current facility-administered medications on file prior to visit.        ROS:   10 point review of systems normal    EXAM:  BP (!) 110/0 (BP Location: Right arm, Patient Position: Chair, Cuff Size: Adult Regular)   Pulse 80   Ht 1.78 m (5' 10.08\")   Wt 82.4 kg (181 lb 9.6 oz)   SpO2 100%   BMI 26.00 kg/m       GENERAL: Appears comfortable, in no distress.  HEENT: Eye symmetrical and without discharge or icterus bilaterally. Mucous membranes moist and without lesions.  NECK: Supple, JVD 8  CV: Hum of LVAD, no adventitious sounds  RESPIRATORY: Respirations regular, even, and unlabored  GI: Soft and non distended with normoactive bowel sounds present in all quadrants. No tenderness, rebound, guarding. No organomegaly. Driveline site clean and healing well.   EXTREMITIES: No LE peripheral edema. none pulsatile.   NEUROLOGIC: Alert and interacting appropriatly.  No focal deficits.   MUSCULOSKELETAL: No joint swelling or tenderness.      Labs - as reviewed in clinic with patient today:  CBC RESULTS:        Lab Results   Component Value Date     WBC 5.2 05/02/2022     WBC 4.2 09/10/2019     RBC 3.82 (L) 05/02/2022     RBC 4.98 09/10/2019     HGB 11.2 (L) 05/02/2022     HGB 15.8 09/10/2019     HCT 36.7 (L) " pt is a 18 y/o female with a pmhx of ovarian cysts presenting to the ed with left lower abdominal pain that started last night. pt reports pain is sharp, severe. pt admits to nausea associated. pt admits to past hx of ovarian cysts, none that had to be surgically removed. pt reports hx of appendectomy. pt denies cp, SOB, fevers, vomiting, diarrhea, vaginal discharge, dysuria, hematuria 05/02/2022     HCT 48.9 09/10/2019     MCV 96 05/02/2022     MCV 98 09/10/2019     MCH 29.3 05/02/2022     MCH 31.7 09/10/2019     MCHC 30.5 (L) 05/02/2022     MCHC 32.3 09/10/2019     RDW 13.8 05/02/2022     RDW 12.9 09/10/2019      05/02/2022      09/10/2019         CMP RESULTS:        Lab Results   Component Value Date      (H) 05/02/2022      09/10/2019     POTASSIUM 3.9 05/02/2022     POTASSIUM 3.4 09/10/2019     CHLORIDE 111 (H) 05/02/2022     CHLORIDE 108 09/10/2019     CO2 28 05/02/2022     CO2 26 09/10/2019     ANIONGAP 7 05/02/2022     ANIONGAP 4 09/10/2019     GLC 94 05/02/2022     GLC 89 09/10/2019     BUN 7 05/02/2022     BUN 18 09/10/2019     CR 0.86 05/02/2022     CR 1.23 09/10/2019     GFRESTIMATED >90 05/02/2022     GFRESTIMATED 67 09/10/2019     GFRESTBLACK 78 09/10/2019     YESICA 8.2 (L) 05/02/2022     YESICA 9.1 09/10/2019     BILITOTAL 0.5 05/02/2022     BILITOTAL 0.6 09/10/2019     ALBUMIN 2.8 (L) 05/02/2022     ALBUMIN 4.0 09/10/2019     ALKPHOS 90 05/02/2022     ALKPHOS 61 09/10/2019     ALT 21 05/02/2022     ALT 25 09/10/2019     AST 18 05/02/2022     AST 17 09/10/2019         INR RESULTS:        Lab Results   Component Value Date     INR 2.65 (H) 05/05/2022     INR 1.02 09/10/2019               Lab Results   Component Value Date     MAG 2.1 04/08/2022     MAG 1.8 09/10/2019      No results found for: NTBNPI        Lab Results   Component Value Date     NTBNP 1,513 (H) 04/14/2022     NTBNP 714 (H) 10/18/2018         Assessment and Plan:   Tej Morfin is a 56 year old male with a PMH of severe MR s/p MVR with ring (1/21/15), VT s/p ICD, nonobstructive CAD, NICM previously on home inotropes now s/p HM III LVAD implantation 3/23/22, with course c/b sternal wound s/p Keflex treatment, AFib with RVR (s/p ICD shock 3/25/22) and right knee effusion (s/p arthrocentesis and intraarticular steroid injection 3/30/22) who presents for f/up in LVAD clinic.    He is doing very  well from a functional view point and I agree with current plan of management.        Please do not hesitate to contact me if you have any questions/concerns.     Sincerely,     Wilver Rodríguez MD

## 2022-05-02 NOTE — LETTER
Date:May 30, 2022      Provider requested that no letter be sent. Do not send.       [facility]

## 2022-05-02 NOTE — PROGRESS NOTES
Called patient/caregiver to check in 4 weeks post discharge. Pt reports VAD parameters stable. Reviewed medications, updated med list and answered any questions. Patient reports sleeping ok, and thinks he will sleep better once home.  Pt is dealing with ongoing baseline anxiety since being home with LVAD. Patient is  able to move around the house and care for himself mostly independently.     Discussed specific new problems/stressors since being discharged from the hospital: adjusting to new lifestyle and being away from his daughter.  Empathized with patient and reviewed coping strategies: enlisting support from friends and love ones, attending patient and caregiver support groups, reviewing LVAD educational materials to reinforce knowledge, and talking about concerns with family/care providers/trusted others. Encouraged pt to page VAD Coordinator with any issues or questions. Pt verbalizes understanding.

## 2022-05-03 ENCOUNTER — ANTICOAGULATION THERAPY VISIT (OUTPATIENT)
Dept: ANTICOAGULATION | Facility: CLINIC | Age: 57
End: 2022-05-03
Payer: MEDICARE

## 2022-05-03 ENCOUNTER — CARE COORDINATION (OUTPATIENT)
Dept: CARDIOLOGY | Facility: CLINIC | Age: 57
End: 2022-05-03
Payer: MEDICARE

## 2022-05-03 DIAGNOSIS — I50.22 CHRONIC SYSTOLIC CHF (CONGESTIVE HEART FAILURE) (H): ICD-10-CM

## 2022-05-03 DIAGNOSIS — Z95.811 LVAD (LEFT VENTRICULAR ASSIST DEVICE) PRESENT (H): ICD-10-CM

## 2022-05-03 DIAGNOSIS — Z95.811 LVAD (LEFT VENTRICULAR ASSIST DEVICE) PRESENT (H): Primary | ICD-10-CM

## 2022-05-03 RX ORDER — SACUBITRIL AND VALSARTAN 97; 103 MG/1; MG/1
1 TABLET, FILM COATED ORAL 2 TIMES DAILY
Qty: 90 TABLET | Refills: 3 | COMMUNITY
Start: 2022-05-03

## 2022-05-03 NOTE — PROGRESS NOTES
ANTICOAGULATION MANAGEMENT     Tej Morfin 56 year old male is on warfarin with therapeutic INR result. (Goal INR 2.0-3.0)    Recent labs: (last 7 days)     05/02/22  1348   INR 2.87*       ASSESSMENT       Source(s): Patient/Caregiver Call       Warfarin doses taken: Warfarin taken as instructed    Diet: No new diet changes identified    New illness, injury, or hospitalization: No    Medication/supplement changes: None noted    Signs or symptoms of bleeding or clotting: No    Previous INR: Supratherapeutic    Additional findings: None       PLAN     Recommended plan for no diet, medication or health factor changes affecting INR     Dosing Instructions: continue your current warfarin dose with next INR in 3 days       Summary  As of 5/3/2022    Full warfarin instructions:  7.5 mg every Mon, Wed, Fri; 5 mg all other days   Next INR check:  5/5/2022             Telephone call with Tej who verbalizes understanding and agrees to plan and who agrees to plan and repeated back plan correctly    Lab visit scheduled    Education provided: None required    Plan made per [facility] anticoagulation protocol and per LVAD protocol    Geneva Martinez RN  Anticoagulation Clinic  5/3/2022    _______________________________________________________________________     Anticoagulation Episode Summary     Current INR goal:  2.0-3.0   TTR:  20.2 % (2.1 wk)   Target end date:  Indefinite   Send INR reminders to:  ANTICOAG LVAD    Indications    LVAD (left ventricular assist device) present (H) [Z95.811]  Chronic systolic CHF (congestive heart failure) (H) [I50.22]           Comments:  HeartMate 3   Bridging: No bridging. Reason -- new implant   Date VAD placed: 03/23/22  Hx Mitral valve repair and not replacement         Anticoagulation Care Providers     Provider Role Specialty Phone number    Dunia Hdz MD Referring Cardiovascular Disease [phone]

## 2022-05-03 NOTE — PROGRESS NOTES
S: Doppler BP of 98 (same with recheck) yesterday in clinic.   B: Saw Dr. Rodríguez for 6-week post-implant follow-up. Prior to yesterday, most recently saw WILIAN Sheikh on 4/21. Has follow-up with Dr. Hdz on Thursday, 5/5.   A: VAD parameters stable in clinic yesterday. Small amount of swelling to BLE. No other concerning symptoms.  R: Routing to Kori for review and advisement.         Future Appointments   Date Time Provider Department Center   5/5/2022  7:45 AM UU LAB GOLD WAITING [facility] [place] [facility]   5/5/2022  8:00 AM UUEKGM [facility] O   5/5/2022  8:30 AM UUECHR2 [facility]   5/5/2022 10:00 AM Dunia Hdz MD [facility]   5/12/2022 12:30 PM  LAB [facility]   5/12/2022  1:00 PM [facility]   6/23/2022 12:00 PM  LAB [facility]   6/23/2022 12:30 PM [facility]   6/23/2022  1:00 PM UC CV DEVICE 1 [facility]   6/23/2022  1:30 PM Dunia Hdz MD [facility]   9/22/2022 12:00 PM  LAB [facility]   9/22/2022 12:30 PM UC PFL 6 MINUTE WALK 1 UCPFT [facility]   9/22/2022  1:00 PM UC CV DEVICE 1 [facility]   9/22/2022  1:30 PM UCECHCR1 [facility]   9/22/2022  2:30 PM Dunia Hdz MD [facility]   12/22/2022 12:00 AM UC ICD REMOTE [facility]CCVSV [facility]       Amy Carolina, BSN, RN, PHN, HNB-BC   5/3/2022 at 7:08 AM

## 2022-05-03 NOTE — PROGRESS NOTES
Called and spoke with Tej. Provided him the below instructions per Kori:    -For 3 days take Entresto 97/103 tabs at the following dose: 1/2 tab in the AM, full tab at night  -After that, increase to full tab in the AM and full tab at night if no adverse reactions.     Tej verbalized understanding and repeated back instructions correctly. Discussed potential side effects of dizziness, lightheadedness, orthostatic hypotension. Med list updated. Also sent Tej a [employer] message with new dose information per his request. Encouraged to page VAD coordinator with questions/concerns.     ROSALINO LeeN, RN, PHN, HNB-BC   5/3/2022 at 2:26 PM

## 2022-05-05 ENCOUNTER — OFFICE VISIT (OUTPATIENT)
Dept: CARDIOLOGY | Facility: CLINIC | Age: 57
End: 2022-05-05
Attending: INTERNAL MEDICINE
Payer: MEDICARE

## 2022-05-05 ENCOUNTER — HOSPITAL ENCOUNTER (OUTPATIENT)
Dept: CARDIOLOGY | Facility: CLINIC | Age: 57
Discharge: HOME | End: 2022-05-05
Attending: INTERNAL MEDICINE
Payer: MEDICARE

## 2022-05-05 ENCOUNTER — ANTICOAGULATION THERAPY VISIT (OUTPATIENT)
Dept: ANTICOAGULATION | Facility: CLINIC | Age: 57
End: 2022-05-05

## 2022-05-05 ENCOUNTER — LAB (OUTPATIENT)
Dept: LAB | Facility: CLINIC | Age: 57
End: 2022-05-05
Attending: INTERNAL MEDICINE
Payer: MEDICARE

## 2022-05-05 VITALS
OXYGEN SATURATION: 100 % | HEART RATE: 80 BPM | BODY MASS INDEX: 26 KG/M2 | SYSTOLIC BLOOD PRESSURE: 110 MMHG | WEIGHT: 181.6 LBS | HEIGHT: 70 IN

## 2022-05-05 DIAGNOSIS — I50.22 CHRONIC SYSTOLIC CONGESTIVE HEART FAILURE (H): ICD-10-CM

## 2022-05-05 DIAGNOSIS — Z95.2 S/P MITRAL VALVE REPLACEMENT: ICD-10-CM

## 2022-05-05 DIAGNOSIS — I50.22 CHRONIC SYSTOLIC CHF (CONGESTIVE HEART FAILURE) (H): ICD-10-CM

## 2022-05-05 DIAGNOSIS — Z95.811 LVAD (LEFT VENTRICULAR ASSIST DEVICE) PRESENT (H): Primary | ICD-10-CM

## 2022-05-05 DIAGNOSIS — Z95.811 LVAD (LEFT VENTRICULAR ASSIST DEVICE) PRESENT (H): ICD-10-CM

## 2022-05-05 LAB
INR PPP: 2.65 (ref 0.85–1.15)
LVEF ECHO: NORMAL

## 2022-05-05 PROCEDURE — 93005 ELECTROCARDIOGRAM TRACING: CPT

## 2022-05-05 PROCEDURE — 99215 OFFICE O/P EST HI 40 MIN: CPT | Mod: 25 | Performed by: INTERNAL MEDICINE

## 2022-05-05 PROCEDURE — 93306 TTE W/DOPPLER COMPLETE: CPT

## 2022-05-05 PROCEDURE — 93750 INTERROGATION VAD IN PERSON: CPT | Performed by: INTERNAL MEDICINE

## 2022-05-05 PROCEDURE — 85610 PROTHROMBIN TIME: CPT

## 2022-05-05 PROCEDURE — 93010 ELECTROCARDIOGRAM REPORT: CPT | Performed by: INTERNAL MEDICINE

## 2022-05-05 PROCEDURE — 36415 COLL VENOUS BLD VENIPUNCTURE: CPT

## 2022-05-05 PROCEDURE — 93306 TTE W/DOPPLER COMPLETE: CPT | Mod: 26 | Performed by: INTERNAL MEDICINE

## 2022-05-05 PROCEDURE — G0463 HOSPITAL OUTPT CLINIC VISIT: HCPCS | Mod: 25

## 2022-05-05 RX ORDER — SPIRONOLACTONE 25 MG/1
25 TABLET ORAL DAILY
Qty: 90 TABLET | Refills: 3 | Status: SHIPPED | OUTPATIENT
Start: 2022-05-05 | End: 2022-08-17

## 2022-05-05 RX ORDER — POTASSIUM CHLORIDE 1500 MG/1
40 TABLET, EXTENDED RELEASE ORAL DAILY
Qty: 180 TABLET | Refills: 3 | Status: SHIPPED | OUTPATIENT
Start: 2022-05-05 | End: 2022-05-13 | Stop reason: DRUGHIGH

## 2022-05-05 RX ORDER — WARFARIN SODIUM 2.5 MG/1
TABLET ORAL
Qty: 100 TABLET | Refills: 3 | Status: SHIPPED | OUTPATIENT
Start: 2022-05-05 | End: 2022-08-10

## 2022-05-05 ASSESSMENT — PAIN SCALES - GENERAL: PAINLEVEL: NO PAIN (0)

## 2022-05-05 NOTE — NURSING NOTE
Chief Complaint   Patient presents with     Follow Up     Return VAD- 6 week post VAD implant     Vitals were taken, medications reconciled, and MAP was recorded.    AZEEM Chavira  10:04 AM

## 2022-05-05 NOTE — LETTER
Date:May 18, 2022      Patient was self referred, no letter generated. Do not send.        [facility] Health Information

## 2022-05-05 NOTE — LETTER
Mechanical Circulatory Support Program  [place] [address], [facility] [address] [place] [address]  [phone] Office Phone  [phone] Fax Number    Faxed to:  [address]  Fax Number:  [phone]      Patient Name: Tej Morfin   : 1965   Diagnosis/ICD-10: Heart Failure, unspecified I50.9; LVAD Z95.811   Requesting Physician: Dr. Dunia Hdz   Date of Request: 22   Date to Draw 2022                                                Please fax results to [phone]       or email to [email or web address]                              Call [phone] with Questions  ORDER TEST   x Complete Metabolic Panel    Complete Blood Count    Lactate Dehydrogenase    INR    D-Dimer                 Signed,      Dnuia Hdz MD  Heart Failure, Mechanical Circulatory Support and Transplant Cardiology   of Medicine,  Division of Cardiology, [facility]

## 2022-05-05 NOTE — PROGRESS NOTES
Adult LVAD clinic    HPI  56 year old male with a PMH of severe MR s/p MVR with ring (1/21/15), VT s/p ICD, nonobstructive CAD, NICM previously on home inotropes now s/p HM III LVAD implantation 3/23/22, with course c/b AFib with RVR (s/p ICD shock 3/25/22) and right knee effusion (s/p arthrocentesis and intraarticular steroid injection 3/30/22) who presents for f/up in LVAD clinic.      Interval history: Since last visit diuretic has helped his fluid significantly. He has remaiend HTN to MAPS  in clinic today. No headaches but some neck pain from lipoma site. No chest pain shortness of breath. No GI bleeding or blood in the urine. LDH, CBC, Cr and electrolytes all stable INR within goal of 2.65      PAST MEDICAL HISTORY:  Past Medical History:   Diagnosis Date     Chronic systolic heart failure (H) 2017     Gastroparesis      ICD (implantable cardioverter-defibrillator) in place     [employer]     Non-ischemic cardiomyopathy (H)      Paroxysmal ventricular tachycardia (H) 2017     S/P mitral valve replacement 2017     Tetrahydrocannabinol (THC) use disorder, mild, in controlled environment, abuse 2019    occasional edible THC. reportedly for sleep, anxiety     Tobacco abuse, episodic     occasional cigar - ~ 3x/wk       FAMILY HISTORY:  Family History   Problem Relation Age of Onset     Coronary Artery Disease Mother      Kidney failure Mother      Cardiomyopathy Father          age 51       SOCIAL HISTORY:  Social History     Socioeconomic History     Marital status:    Tobacco Use     Smoking status: Former Smoker     Smokeless tobacco: Never Used   Substance and Sexual Activity     Alcohol use: No     Alcohol/week: 0.0 standard drinks     Drug use: No       CURRENT MEDICATIONS:  acetaminophen (TYLENOL) 325 MG tablet, Take 2 tablets (650 mg) by mouth every 4 hours as needed for pain  diazepam (VALIUM) 10 MG tablet, Take 10 mg by mouth every 4 hours as needed for  "anxiety  furosemide (LASIX) 20 MG tablet, Take 1 tablet (20 mg) by mouth daily  omeprazole (PRILOSEC) 40 MG DR capsule, Take 40 mg by mouth every morning  sacubitril-valsartan (ENTRESTO)  MG per tablet, Take 1 tablet by mouth 2 times daily  sotalol (BETAPACE) 80 MG tablet, TAKE 1 TABLET BY MOUTH TWICE A DAY  study - aspirin or placebo, IDS# [id number], 100 MG capsule, Take 1 capsule (100 mg) by mouth daily . Swallow whole, do not crush.  warfarin ANTICOAGULANT (COUMADIN) 2.5 MG tablet, Take 10 mg PO daily at 6 pm until next INR check, then dose per INR goal 2-3    No current facility-administered medications on file prior to visit.      ROS:   See HPI    EXAM:  BP (!) 110/0 (BP Location: Right arm, Patient Position: Chair, Cuff Size: Adult Regular)   Pulse 80   Ht 1.78 m (5' 10.08\")   Wt 82.4 kg (181 lb 9.6 oz)   SpO2 100%   BMI 26.00 kg/m      GENERAL: Appears comfortable, in no distress.  HEENT: Eye symmetrical and without discharge or icterus bilaterally. Mucous membranes moist and without lesions.  NECK: Supple, JVD 8  CV: Hum of LVAD, no adventitious sounds  RESPIRATORY: Respirations regular, even, and unlabored  GI: Soft and non distended with normoactive bowel sounds present in all quadrants. No tenderness, rebound, guarding. No organomegaly.   EXTREMITIES: No LE peripheral edema. none pulsatile.   NEUROLOGIC: Alert and interacting appropriatly.  No focal deficits.   MUSCULOSKELETAL: No joint swelling or tenderness.     Labs - as reviewed in clinic with patient today:  CBC RESULTS:  Lab Results   Component Value Date    WBC 5.2 05/02/2022    WBC 4.2 09/10/2019    RBC 3.82 (L) 05/02/2022    RBC 4.98 09/10/2019    HGB 11.2 (L) 05/02/2022    HGB 15.8 09/10/2019    HCT 36.7 (L) 05/02/2022    HCT 48.9 09/10/2019    MCV 96 05/02/2022    MCV 98 09/10/2019    MCH 29.3 05/02/2022    MCH 31.7 09/10/2019    MCHC 30.5 (L) 05/02/2022    MCHC 32.3 09/10/2019    RDW 13.8 05/02/2022    RDW 12.9 09/10/2019     " 05/02/2022     09/10/2019       CMP RESULTS:  Lab Results   Component Value Date     (H) 05/02/2022     09/10/2019    POTASSIUM 3.9 05/02/2022    POTASSIUM 3.4 09/10/2019    CHLORIDE 111 (H) 05/02/2022    CHLORIDE 108 09/10/2019    CO2 28 05/02/2022    CO2 26 09/10/2019    ANIONGAP 7 05/02/2022    ANIONGAP 4 09/10/2019    GLC 94 05/02/2022    GLC 89 09/10/2019    BUN 7 05/02/2022    BUN 18 09/10/2019    CR 0.86 05/02/2022    CR 1.23 09/10/2019    GFRESTIMATED >90 05/02/2022    GFRESTIMATED 67 09/10/2019    GFRESTBLACK 78 09/10/2019    YESICA 8.2 (L) 05/02/2022    YESICA 9.1 09/10/2019    BILITOTAL 0.5 05/02/2022    BILITOTAL 0.6 09/10/2019    ALBUMIN 2.8 (L) 05/02/2022    ALBUMIN 4.0 09/10/2019    ALKPHOS 90 05/02/2022    ALKPHOS 61 09/10/2019    ALT 21 05/02/2022    ALT 25 09/10/2019    AST 18 05/02/2022    AST 17 09/10/2019        INR RESULTS:  Lab Results   Component Value Date    INR 2.65 (H) 05/05/2022    INR 1.02 09/10/2019       Lab Results   Component Value Date    MAG 2.1 04/08/2022    MAG 1.8 09/10/2019     No results found for: NTBNPI  Lab Results   Component Value Date    NTBNP 1,513 (H) 04/14/2022    NTBNP 714 (H) 10/18/2018       Assessment and Plan:   Tej Morfin is a 56 year old male with a PMH of severe MR s/p MVR with ring (1/21/15), VT s/p ICD, nonobstructive CAD, NICM previously on home inotropes now s/p HM III LVAD implantation 3/23/22, with course c/b sternal wound s/p Keflex treatment, AFib with RVR (s/p ICD shock 3/25/22) and right knee effusion (s/p arthrocentesis and intraarticular steroid injection 3/30/22) who presents for f/up in LVAD clinic.    He is doing very well. Euvolemic but slightly hypertensive. We will plan on increasing his Entresto to full strength BID and start aldactone. He will obtain a home doppler monitor for MAP.     Chronic SHCF s/p HM III LVAD. Severe MR s/p mitral valve repair with annuloplasty ring (1/21/15). Implanted 3/23/22.   Stage D, NYHA  Class III. Last hemodynamics: 3/27: MAP 85, CVP 10, PAP 52/20, PCW 20, JOANNA CO/CI 5.1/2.4, SVR 1099, MVO2 50 -- weight i206#. Post-operative course c/b AFib with RVR (s/p ICD shock 3/25/22) and right knee effusion (s/p arthrocentesis and intraarticular steroid injection 3/30/22).     ACEi/ARB Entresto   mg po BID,    BB Sotalol 80 mg po BID,   Aldosterone antagonist Aldactone 25 mg daily   SGLT2i: Deferred while other medications are prioretized  SCD prophylaxis ICD  Diuretic: 20 mg daily, euvolemic today.  Fluid status euvolemic. Off diuretics  MAP: HTN to 110 beginning aldactone and increasing entresto. MAP goal 65-85  LDH: stable  Anticoagulation: Coumadin per pharmacy. INR within goal range  Antiplatelet: RICARDO trial    VAD interrogation today: VAD interrogation reviewed with VAD coordinator. Agree with findings. History goes back 24 hours.     History of VT, s/p CRT-D. ICD shock 3/25/22. EP consulted, interrogation consistent with VT with VF zone adjusted to reduce risk for recurrent shock. LV lead turned off in setting of LVAD.   - Continue Sotatol     Postop AF with RVR. Returned to SR.   - Coumadin as above.      Mild nonobstructive CAD.   - RICARDO trial.      RIJ, subclavia, and brachial DVT.   - Coumadin per pharmacy.     Anxiety  - Behavioral Health Referal    Follow up   1. Please have labs and a blood pressure check in 1 week (Thursday, 5/12) in [place].  2. Please follow-up with the [place] clinic in 2 weeks.  2. Please follow-up with Dr. Hdz on 6/23/22 as scheduled with labs prior.    Bennett Rapp MD Cardiology Fellow PGY 5    Discussed and seen with Dr. Hdz

## 2022-05-05 NOTE — PROGRESS NOTES
ANTICOAGULATION MANAGEMENT     Tej Morfin 56 year old male is on warfarin with therapeutic INR result. (Goal INR 2.0-3.0)    Recent labs: (last 7 days)     05/05/22  0725   INR 2.65*       ASSESSMENT       Source(s): Chart Review and Patient/Caregiver Call       Warfarin doses taken: Warfarin taken as instructed    Diet: No new diet changes identified    New illness, injury, or hospitalization: No    Medication/supplement changes: None noted    Signs or symptoms of bleeding or clotting: No    Previous INR: Therapeutic last 2(+) visits    Additional findings: Patient is going home to [place].  Faxed orders to lab listed in Description.       PLAN     Recommended plan for no diet, medication or health factor changes affecting INR     Dosing Instructions: continue your current warfarin dose with next INR in 1 week       Summary  As of 5/5/2022    Full warfarin instructions:  7.5 mg every Mon, Wed, Fri; 5 mg all other days   Next INR check:  5/12/2022             Telephone call with  Jessenia who agrees to plan and repeated back plan correctly    Patient using outside facility for labs    Education provided: None required and Request call the day of INR draw at local lab if patient/caregiver have not heard from [facility] by 3pm that day.    Plan made per [facility] anticoagulation protocol and per LVAD protocol    Siddharth Greco, RN  Anticoagulation Clinic  5/5/2022    _______________________________________________________________________     Anticoagulation Episode Summary     Current INR goal:  2.0-3.0   TTR:  32.1 % (2.6 wk)   Target end date:  Indefinite   Send INR reminders to:  ANTICOAG LVAD    Indications    LVAD (left ventricular assist device) present (H) [Z95.811]  Chronic systolic CHF (congestive heart failure) (H) [I50.22]           Comments:  HeartMate 3   Bridging: No bridging. Reason -- new implant   Date VAD placed: 03/23/22  Hx Mitral valve repair and not replacement         Anticoagulation Care Providers      Provider Role Specialty Phone number    Dunia Hdz MD Referring Cardiovascular Disease [phone]

## 2022-05-05 NOTE — LETTER
2022      RE: Tej Morfin  [address]  [place] [address]       Dear Colleague,    Thank you for the opportunity to participate in the care of your patient, Tej Morfin, at the [facility] [place] [facility]. Please see a copy of my visit note below.    Adult LVAD clinic    HPI  56 year old male with a PMH of severe MR s/p MVR with ring (1/21/15), VT s/p ICD, nonobstructive CAD, NICM previously on home inotropes now s/p HM III LVAD implantation 3/23/22, with course c/b AFib with RVR (s/p ICD shock 3/25/22) and right knee effusion (s/p arthrocentesis and intraarticular steroid injection 3/30/22) who presents for f/up in LVAD clinic.      Interval history: Since last visit diuretic has helped his fluid significantly. He has remaiend HTN to MAPS  in clinic today. No headaches but some neck pain from lipoma site. No chest pain shortness of breath. No GI bleeding or blood in the urine. LDH, CBC, Cr and electrolytes all stable INR within goal of 2.65      PAST MEDICAL HISTORY:  Past Medical History:   Diagnosis Date     Chronic systolic heart failure (H) 2017     Gastroparesis      ICD (implantable cardioverter-defibrillator) in place     [employer]     Non-ischemic cardiomyopathy (H)      Paroxysmal ventricular tachycardia (H) 2017     S/P mitral valve replacement 2017     Tetrahydrocannabinol (THC) use disorder, mild, in controlled environment, abuse 2019    occasional edible THC. reportedly for sleep, anxiety     Tobacco abuse, episodic     occasional cigar - ~ 3x/wk       FAMILY HISTORY:  Family History   Problem Relation Age of Onset     Coronary Artery Disease Mother      Kidney failure Mother      Cardiomyopathy Father          age 51       SOCIAL HISTORY:  Social History     Socioeconomic History     Marital status:    Tobacco Use     Smoking status: Former Smoker  "    Smokeless tobacco: Never Used   Substance and Sexual Activity     Alcohol use: No     Alcohol/week: 0.0 standard drinks     Drug use: No       CURRENT MEDICATIONS:  acetaminophen (TYLENOL) 325 MG tablet, Take 2 tablets (650 mg) by mouth every 4 hours as needed for pain  diazepam (VALIUM) 10 MG tablet, Take 10 mg by mouth every 4 hours as needed for anxiety  furosemide (LASIX) 20 MG tablet, Take 1 tablet (20 mg) by mouth daily  omeprazole (PRILOSEC) 40 MG DR capsule, Take 40 mg by mouth every morning  sacubitril-valsartan (ENTRESTO)  MG per tablet, Take 1 tablet by mouth 2 times daily  sotalol (BETAPACE) 80 MG tablet, TAKE 1 TABLET BY MOUTH TWICE A DAY  study - aspirin or placebo, IDS# [id number], 100 MG capsule, Take 1 capsule (100 mg) by mouth daily . Swallow whole, do not crush.  warfarin ANTICOAGULANT (COUMADIN) 2.5 MG tablet, Take 10 mg PO daily at 6 pm until next INR check, then dose per INR goal 2-3    No current facility-administered medications on file prior to visit.      ROS:   See HPI    EXAM:  BP (!) 110/0 (BP Location: Right arm, Patient Position: Chair, Cuff Size: Adult Regular)   Pulse 80   Ht 1.78 m (5' 10.08\")   Wt 82.4 kg (181 lb 9.6 oz)   SpO2 100%   BMI 26.00 kg/m      GENERAL: Appears comfortable, in no distress.  HEENT: Eye symmetrical and without discharge or icterus bilaterally. Mucous membranes moist and without lesions.  NECK: Supple, JVD 8  CV: Hum of LVAD, no adventitious sounds  RESPIRATORY: Respirations regular, even, and unlabored  GI: Soft and non distended with normoactive bowel sounds present in all quadrants. No tenderness, rebound, guarding. No organomegaly.   EXTREMITIES: No LE peripheral edema. none pulsatile.   NEUROLOGIC: Alert and interacting appropriatly.  No focal deficits.   MUSCULOSKELETAL: No joint swelling or tenderness.     Labs - as reviewed in clinic with patient today:  CBC RESULTS:  Lab Results   Component Value Date    WBC 5.2 05/02/2022    WBC 4.2 " 09/10/2019    RBC 3.82 (L) 05/02/2022    RBC 4.98 09/10/2019    HGB 11.2 (L) 05/02/2022    HGB 15.8 09/10/2019    HCT 36.7 (L) 05/02/2022    HCT 48.9 09/10/2019    MCV 96 05/02/2022    MCV 98 09/10/2019    MCH 29.3 05/02/2022    MCH 31.7 09/10/2019    MCHC 30.5 (L) 05/02/2022    MCHC 32.3 09/10/2019    RDW 13.8 05/02/2022    RDW 12.9 09/10/2019     05/02/2022     09/10/2019       CMP RESULTS:  Lab Results   Component Value Date     (H) 05/02/2022     09/10/2019    POTASSIUM 3.9 05/02/2022    POTASSIUM 3.4 09/10/2019    CHLORIDE 111 (H) 05/02/2022    CHLORIDE 108 09/10/2019    CO2 28 05/02/2022    CO2 26 09/10/2019    ANIONGAP 7 05/02/2022    ANIONGAP 4 09/10/2019    GLC 94 05/02/2022    GLC 89 09/10/2019    BUN 7 05/02/2022    BUN 18 09/10/2019    CR 0.86 05/02/2022    CR 1.23 09/10/2019    GFRESTIMATED >90 05/02/2022    GFRESTIMATED 67 09/10/2019    GFRESTBLACK 78 09/10/2019    YESICA 8.2 (L) 05/02/2022    YESICA 9.1 09/10/2019    BILITOTAL 0.5 05/02/2022    BILITOTAL 0.6 09/10/2019    ALBUMIN 2.8 (L) 05/02/2022    ALBUMIN 4.0 09/10/2019    ALKPHOS 90 05/02/2022    ALKPHOS 61 09/10/2019    ALT 21 05/02/2022    ALT 25 09/10/2019    AST 18 05/02/2022    AST 17 09/10/2019        INR RESULTS:  Lab Results   Component Value Date    INR 2.65 (H) 05/05/2022    INR 1.02 09/10/2019       Lab Results   Component Value Date    MAG 2.1 04/08/2022    MAG 1.8 09/10/2019     No results found for: NTBNPI  Lab Results   Component Value Date    NTBNP 1,513 (H) 04/14/2022    NTBNP 714 (H) 10/18/2018       Assessment and Plan:   Tej Morfin is a 56 year old male with a PMH of severe MR s/p MVR with ring (1/21/15), VT s/p ICD, nonobstructive CAD, NICM previously on home inotropes now s/p HM III LVAD implantation 3/23/22, with course c/b sternal wound s/p Keflex treatment, AFib with RVR (s/p ICD shock 3/25/22) and right knee effusion (s/p arthrocentesis and intraarticular steroid injection 3/30/22) who presents  for f/up in LVAD clinic.    He is doing very well. Euvolemic but slightly hypertensive. We will plan on increasing his Entresto to full strength BID and start aldactone. He will obtain a home doppler monitor for MAP.     Chronic SHCF s/p HM III LVAD. Severe MR s/p mitral valve repair with annuloplasty ring (1/21/15). Implanted 3/23/22.   Stage D, NYHA Class III. Last hemodynamics: 3/27: MAP 85, CVP 10, PAP 52/20, PCW 20, JOANNA CO/CI 5.1/2.4, SVR 1099, MVO2 50 -- weight i206#. Post-operative course c/b AFib with RVR (s/p ICD shock 3/25/22) and right knee effusion (s/p arthrocentesis and intraarticular steroid injection 3/30/22).     ACEi/ARB Entresto   mg po BID,    BB Sotalol 80 mg po BID,   Aldosterone antagonist Aldactone 25 mg daily   SGLT2i: Deferred while other medications are prioretized  SCD prophylaxis ICD  Diuretic: 20 mg daily, euvolemic today.  Fluid status euvolemic. Off diuretics  MAP: HTN to 110 beginning aldactone and increasing entresto. MAP goal 65-85  LDH: stable  Anticoagulation: Coumadin per pharmacy. INR within goal range  Antiplatelet: RICARDO trial    VAD interrogation today: VAD interrogation reviewed with VAD coordinator. Agree with findings. History goes back 24 hours.     History of VT, s/p CRT-D. ICD shock 3/25/22. EP consulted, interrogation consistent with VT with VF zone adjusted to reduce risk for recurrent shock. LV lead turned off in setting of LVAD.   - Continue Sotatol     Postop AF with RVR. Returned to SR.   - Coumadin as above.      Mild nonobstructive CAD.   - RICARDO trial.      RIJ, subclavia, and brachial DVT.   - Coumadin per pharmacy.     Anxiety  - Behavioral Health Referal    Follow up   1. Please have labs and a blood pressure check in 1 week (Thursday, 5/12) in [place].  2. Please follow-up with the [place] clinic in 2 weeks.  2. Please follow-up with Dr. Hdz on 6/23/22 as scheduled with labs prior.    Bennett Rapp MD Cardiology Fellow PGY  5    Discussed and seen with Dr. Hdz     Attestation signed by Dunia Hdz MD at 5/17/2022  5:31 PM:  I have seen and examined the patient with the house staff on May 5, 2022  and agree with the outlined assessment and plan.      Dunia Hdz MD  Associate Professor of Medicine   [facility] Division of Cardiology         Please do not hesitate to contact me if you have any questions/concerns.     Sincerely,     Dunia Hdz MD

## 2022-05-05 NOTE — PATIENT INSTRUCTIONS
Medications:  Please increase Entresto 97/103 tabs to 1 tab twice daily.  START spironolactone 25 mg (1 tab) daily.   3. DECREASE potassium to 40 mEq (2 tabs) once daily  4. OK to restart diazepam if needed for emergencies only.     Instructions:  You are cleared to go home! Bon voyage!   Keep up the great work you are doing.  Keep an eye on depression/anxiety and follow-up with primary care provider as needed for mood changes.     Follow-up:   1. Please have labs and a blood pressure check in 1 week (Thursday, 5/12) in [place].  2. Please follow-up with the [place] clinic in 2 weeks.  2. Please follow-up with Dr. Hdz on 6/23/22 as scheduled with labs prior.    Page the VAD Coordinator on call if you gain more than 3 lb in a day or 5 in a week. Please also page if you feel unwell or have alarms.   Great to see you in clinic today. To Page the VAD Coordinator on call, dial [phone] option #4 and ask to speak to the VAD coordinator on call.

## 2022-05-05 NOTE — NURSING NOTE
05/05/22 1030   Heartmate 3 LEFT VS   Flow (Lpm) 4.3 Lpm   Pulse Index (PI) 4.7 PI   Speed (rpm) 5700 rpm   Power (christiansen) 4.5 christiansen   Current Hct setting 37   Primary Controller   Serial number [id number]   EBB: Patient use 4   Replace in 31 Months   Backup Controller   Serial number [id number]   EBB: Patient use 7   Replace EBB in 32 Months   VAD Interrogation   Alarms reported by patient N   Unexpected alarms noted upon interrogation None   PI events Occasional  (No speed drops. PI range 3.7-7.2. 6 day hx.)   Damage to equipment is noted N   Action taken Reviewed proper equipment care and maintenance   Driveline Exit Site   Dressing change done N   Driveline properly secured Yes   DLES assessment c/d/i per pt report  (Reports minimal drainage following suture removal, which has now resolved.)   Dressing used Sensitive kit   Frequency patient changes dressing Daily       4)  Education Complete: Yes   Charge the BACKUP controller s backup battery every 6 months  Perform a self test on BACKUP every 6 months  Change the MPU s batteries every 6 months:Yes  Have equipment serviced yearly (if applicable):Yes

## 2022-05-09 ENCOUNTER — CARE COORDINATION (OUTPATIENT)
Dept: CARDIOLOGY | Facility: CLINIC | Age: 57
End: 2022-05-09
Payer: MEDICARE

## 2022-05-09 LAB
ATRIAL RATE - MUSE: 48 BPM
DIASTOLIC BLOOD PRESSURE - MUSE: NORMAL MMHG
INTERPRETATION ECG - MUSE: NORMAL
P AXIS - MUSE: 137 DEGREES
PR INTERVAL - MUSE: 146 MS
QRS DURATION - MUSE: 112 MS
QT - MUSE: 442 MS
QTC - MUSE: 509 MS
R AXIS - MUSE: -67 DEGREES
SYSTOLIC BLOOD PRESSURE - MUSE: NORMAL MMHG
T AXIS - MUSE: -27 DEGREES
VENTRICULAR RATE- MUSE: 80 BPM

## 2022-05-09 NOTE — PROGRESS NOTES
Called patient/caregiver to check in 5 weeks post discharge. Pt reports VAD parameters and weight stable. Reviewed medications and answered any questions. Patient reports sleeping much better and improved anxiety since being back home, in [place], with LVAD. Patient is able to move around the house and care for himself mostly independently.     Pt to see [place] RN for MAP check Wednesday, Dr Davenport in 2 weeks.  Cardiac rehab to start 5/19 and pt still to arrange primary care MD appt.    Discussed specific new problems/stressors since being discharged from the hospital: mood/anxiety, which is much better since being home.  Empathized with patient and reviewed coping strategies: enlisting support from friends and love ones, attending patient and caregiver support groups, reviewing LVAD educational materials to reinforce knowledge, and talking about concerns with family/care providers/trusted others. Encouraged pt to page VAD Coordinator with any issues or questions. Pt verbalizes understanding.

## 2022-05-09 NOTE — LETTER
Faxed to:  [facility]  Fax Number:  [phone]                                                                                                                                                                                                   [email or web address]   Email Order Form to [email or web address]  Phone: [phone] Fax: [phone]      Patient Name: Tej Morfin Date: 05/09/2022   Facility Name: [facility]  Fax Number: [phone]    VAD Coordinator/ :   Vandana Headley RN Phone: [phone]    Email Address:         [email or web address]   Patient's Primary Insurance Upon Receiving the VAD unit:          Dressing Change Frequency: Daily x Every Other Day   Other (Specify)              Duration of Need:  DT (Lifetime) x BTT (until transplant)   Other (Specify)      NEW ORDERS ONLY: PLEASE SUBMIT PATIENT DEMOGRAPHICS, POST OP NOTES & NOTES FROM MOST RECENT CLINICAL VISIT WITH ORDER FORM.                  Fax: [phone]  Or Email Order Form to   [email or web address]     ck Product Size/ Description Quantity    Kit Options:     x [employer] Driveline Management Tray      Daily Up to 30   x [employer] Driveline Management Tray      Weekly Up to 30   x [employer] [place]  [id number] Up to 30    [employer] [place] [id number] Up to 30   x Aquaguard/Shower Shield 7 x 7       9  x 9      10  x 12  Up to 30    Blenderm Surgical Tape 2 inch     Sensitive Skin:     x Uni-Solve Adhesive Remover Wipes  1 box   x FreeDerm Adhesive Remover 1 oz. Spray Bottle Up to 8    Betadine Swabs  3/pack Up to 30    Hibiclens 16 oz. Bottle Up to 3    MicroKlenz Wound Cleanser Bottle Up to 3   x Medipore H Tape 2  Roll Up to 5    Micropore Paper Tape 2  Roll Up to 5   x CE 3M Blue Silicone Tape  Up to 5    MC Aramis Secure  [place]  Up to 8    CathGrip [place] Med/2 Strap    Large/2 Strap Up to 10    Abdominal Binder Sm      Med     Lg      Up to 2    Simpurity Transparent Film 4 x 5 Up to 30    Individual Supplies:      Earloop Surgical Mask 100/box Up to 3    Alcohol Wipe  1 Box    Non Sterile Gloves  100/box        Size: S    M    L   XL Up to 3    Chlorascrub Swabsticks   Each     1  Up to 30    Sterile Vinyl PF gloves Sizes   6   7   8   9 Up to 30    Sterile Gauze Sponge 4  x 4   (2/pkg) Up to 30    Sterile Drain Sponge 4  x 4   (2/pkg) Up to 30    Biopatch 1  Up to 30   x 3M No-sting Barrier Wipes 50/box Up to 3   x Sorbaview Shield  Up to 30     My signature below certifies the medical necessity of the above approved products and I certify the patient has been trained in the use of all products. The patient is aware that [facility] will contact him/her regarding the shipment of ordered dressing supplies.     Provider Name: Dunia Hidalgoswell NPI#: [id number]   Provider Signature:                              Provider Number: [id number]     [facility] will confirm receipt for all initial orders by sending a fax to the provider listed above.

## 2022-05-12 ENCOUNTER — ANTICOAGULATION THERAPY VISIT (OUTPATIENT)
Dept: ANTICOAGULATION | Facility: CLINIC | Age: 57
End: 2022-05-12

## 2022-05-12 DIAGNOSIS — Z95.811 LVAD (LEFT VENTRICULAR ASSIST DEVICE) PRESENT (H): Primary | ICD-10-CM

## 2022-05-12 DIAGNOSIS — I50.22 CHRONIC SYSTOLIC CHF (CONGESTIVE HEART FAILURE) (H): ICD-10-CM

## 2022-05-12 LAB — INR (EXTERNAL): 3.85 (ref 0.9–1.1)

## 2022-05-12 NOTE — PROGRESS NOTES
Addendum 5/16/22  Patient calls for results.  Writer speaks with lab is Jennifer Bahena.  Results won't be back until tonight.  Patient will take a 7.5mg dose of warfarin tonight and wait for results tomorrow. [facility]                  ANTICOAGULATION MANAGEMENT     Tej Morfin 56 year old male is on warfarin with supratherapeutic INR result. (Goal INR 2.0-3.0)    Recent labs: (last 7 days)     05/11/22  0000   INR 3.85*       ASSESSMENT       Source(s): Chart Review and Patient/Caregiver Call       Warfarin doses taken: Warfarin taken as instructed    Diet: No new diet changes identified    New illness, injury, or hospitalization: Yes: Patient reports he is retaining fluid in his legs.    Medication/supplement changes: Patient started Spironolactone    Signs or symptoms of bleeding or clotting: No    Previous INR: Therapeutic last 2(+) visits    Additional findings: None       PLAN     Recommended plan for temporary change(s) affecting INR     Dosing Instructions: partial hold then decrease your warfarin dose (11% change) with next INR in 4 days       Summary  As of 5/12/2022    Full warfarin instructions:  5/12: 2.5 mg; Otherwise 7.5 mg every Mon; 5 mg all other days   Next INR check:  5/16/2022             Telephone call with Tej who verbalizes understanding and agrees to plan and who agrees to plan and repeated back plan correctly    Patient using outside facility for labs    Education provided: Impact of vitamin K foods on INR, Vitamin K content of foods, Goal range and significance of current result, Importance of therapeutic range, Importance of following up at instructed interval and Importance of taking warfarin as instructed    Plan made per [facility] anticoagulation protocol and per LVAD protocol    Audelia Avalos, RN  Anticoagulation Clinic  5/12/2022    _______________________________________________________________________     Anticoagulation Episode Summary     Current INR goal:  2.0-3.0   TTR:  31.4 %  (3.4 wk)   Target end date:  Indefinite   Send INR reminders to:  ANTICOAG LVAD    Indications    LVAD (left ventricular assist device) present (H) [Z95.811]  Chronic systolic CHF (congestive heart failure) (H) [I50.22]           Comments:  HeartMate 3   Bridging: No bridging. Reason -- new implant   Date VAD placed: 03/23/22  Hx Mitral valve repair and not replacement         Anticoagulation Care Providers     Provider Role Specialty Phone number    Dunia Hdz MD Referring Cardiovascular Disease [phone]

## 2022-05-13 RX ORDER — POTASSIUM CHLORIDE 1500 MG/1
40 TABLET, EXTENDED RELEASE ORAL 2 TIMES DAILY
COMMUNITY
Start: 2022-05-12 | End: 2022-06-23

## 2022-05-13 RX ORDER — FUROSEMIDE 40 MG
40 TABLET ORAL DAILY
COMMUNITY
Start: 2022-05-11 | End: 2022-06-23

## 2022-05-13 NOTE — CONFIDENTIAL NOTE
D:  Pt had appointment with [facility] this week.  MAP was 96 at that time, labs were drawn and weight was 185.  Pt had some LE edema.  Lasix and potassium were increased.    I:  Called pt to check in post medication changes and to check on weights & volume status.  Current LVAD numbers, Speed: 5700, Flow: 4.4, PI: 5.6, Power: 4.6, which are all stable & weight 182.  Pt still reportting some mild LE edema.  Encouraged pt to call if weight goes down by 3 pounds in a day or 5 pounds in a week, or if his weight just continues to decrease lower than 180.  A:  Follow up  P:  Pt to return to [facility] for labs and MAP check next week.  Pt verbalized understanding of the instructions given.  Will call VAD coordinator with further needs and questions.

## 2022-05-15 ENCOUNTER — HEALTH MAINTENANCE LETTER (OUTPATIENT)
Age: 57
End: 2022-05-15

## 2022-05-16 LAB — INR (EXTERNAL): 2.76 (ref 0.9–1.1)

## 2022-05-17 ENCOUNTER — ANTICOAGULATION THERAPY VISIT (OUTPATIENT)
Dept: ANTICOAGULATION | Facility: CLINIC | Age: 57
End: 2022-05-17
Payer: MEDICARE

## 2022-05-17 DIAGNOSIS — I50.22 CHRONIC SYSTOLIC CHF (CONGESTIVE HEART FAILURE) (H): ICD-10-CM

## 2022-05-17 DIAGNOSIS — Z95.811 LVAD (LEFT VENTRICULAR ASSIST DEVICE) PRESENT (H): Primary | ICD-10-CM

## 2022-05-17 NOTE — PROGRESS NOTES
ANTICOAGULATION MANAGEMENT     Tej Morfin 56 year old male is on warfarin with therapeutic INR result. (Goal INR 2.0-3.0)    Recent labs: (last 7 days)     05/16/22  0000   INR 2.76*       ASSESSMENT       Source(s): Chart Review and Patient/Caregiver Call       Warfarin doses taken: Warfarin taken as instructed    Diet: No new diet changes identified    New illness, injury, or hospitalization: No    Medication/supplement changes: None noted    Signs or symptoms of bleeding or clotting: No    Previous INR: Supratherapeutic    Additional findings: None       PLAN     Recommended plan for no diet, medication or health factor changes affecting INR     Dosing Instructions: continue your current warfarin dose with next INR in 1 week       Summary  As of 5/17/2022    Full warfarin instructions:  7.5 mg every Mon; 5 mg all other days   Next INR check:  5/24/2022             Telephone call with Tej who agrees to plan and repeated back plan correctly    Patient using outside facility for labs    Education provided: None required    Plan made per [facility] anticoagulation protocol and per LVAD protocol    Siddharth Greco, RN  Anticoagulation Clinic  5/17/2022    _______________________________________________________________________     Anticoagulation Episode Summary     Current INR goal:  2.0-3.0   TTR:  29.8 % (4.1 wk)   Target end date:  Indefinite   Send INR reminders to:  ANTICOAG LVAD    Indications    LVAD (left ventricular assist device) present (H) [Z95.811]  Chronic systolic CHF (congestive heart failure) (H) [I50.22]           Comments:  HeartMate 3   Bridging: No bridging. Reason -- new implant   Date VAD placed: 03/23/22  Hx Mitral valve repair and not replacement         Anticoagulation Care Providers     Provider Role Specialty Phone number    Dunia Hdz MD Referring Cardiovascular Disease [phone]

## 2022-05-18 ENCOUNTER — CARE COORDINATION (OUTPATIENT)
Dept: CARDIOLOGY | Facility: CLINIC | Age: 57
End: 2022-05-18
Payer: MEDICARE

## 2022-05-18 RX ORDER — TRAZODONE HYDROCHLORIDE 50 MG/1
25-50 TABLET, FILM COATED ORAL
COMMUNITY
Start: 2022-05-17 | End: 2024-04-24

## 2022-05-18 NOTE — PROGRESS NOTES
Called patient & caregiver to check in 6 weeks post discharge. Pt reports VAD parameters and weights are stable. Current LVAD numbers, Speed: 5700, Flow: 4.5, PI: 3.6, Power: 4.5, & weight 181.  Reviewed medications and answered any questions. Pt recently visited his primary care MD and rcvd new orders to stop Valium and started Trazodone for sleep.  Patient reports sleeping has been difficult and anxiety is mostly improved since being home with LVAD. Patient is able to move around the house and care for himself independently.  Pt has started cardiac rehab.     Discussed that we will be starting substance abuse monitoring today & that we will be completing this randomly, monthly.  Explained the substance abuse monitoring protocol including the need to complete testing within 24 hours of being requested, or it will be counted as a positive result.  Standing orders sent to [facility] on [address], for this testing.  Pt agree's to go test today.    Discussed specific new problems/stressors since being discharged from the hospital: none Empathized with patient and reviewed coping strategies: enlisting support from friends and love ones, attending patient and caregiver support groups, reviewing LVAD educational materials to reinforce knowledge, and talking about concerns with family/care providers/trusted others. Encouraged pt to page VAD Coordinator with any issues or questions. Pt verbalizes understanding.

## 2022-05-18 NOTE — LETTER
Mechanical Circulatory Support Program  [employer] [address], [facility] [address]  [address] [place] [address]  [phone] Office Phone  [phone] Fax Number    Faxed to:  [facility]  Fax Number:  [phone]      Patient Name: Tej Morfin   : 1965   Diagnosis/ICD-10: Heart Failure, unspecified I50.9; LVAD Z95.811   Requesting Physician: Dr. Dunia Hdz   Date of Request: 22   Date to Draw Standing order to start today, May 18th, 2022                                Date ORDERS   22 Standing order for:     Nicotine/cotinine serum levels      Urine drugs of abuse screen    PEth test (Phosphatidylethanol, serum draws     Quantity: 100  Expires in 1 year     Please fax results to [phone] or email to [email or web address]     Questions call Vandana @ [phone] or page the oncall VAD coordinator @ [phone]              Dunia Tellez MD  Heart Failure, Mechanical Circulatory Support and Transplant Cardiology   of Medicine,  Division of Cardiology, [facility]

## 2022-05-24 ENCOUNTER — CARE COORDINATION (OUTPATIENT)
Dept: CARDIOLOGY | Facility: CLINIC | Age: 57
End: 2022-05-24
Payer: MEDICARE

## 2022-05-24 DIAGNOSIS — I50.22 CHRONIC SYSTOLIC CONGESTIVE HEART FAILURE (H): ICD-10-CM

## 2022-05-24 DIAGNOSIS — Z95.811 LEFT VENTRICULAR ASSIST DEVICE PRESENT (H): ICD-10-CM

## 2022-05-24 DIAGNOSIS — Z79.899 LONG TERM USE OF DRUG: ICD-10-CM

## 2022-05-24 NOTE — PROGRESS NOTES
D:  Results of substance abuse monitoring rcvd.  PEth, nicotine/cotinine negative, U tox returned positive for marijuana/THC.    I:  Called pt to discuss.  Pt adimately denies using any substances containing THC.  Discussed results with Dr. Hdz.  Plan: add THC quantitative to testing at next appt on 6/23.   A:  Substance abuse monitoring  P:  Will follow up with monthly testing in June.  Pt verbalized understanding of the instructions given.  Will call VAD coordinator with further needs and questions.

## 2022-05-25 ENCOUNTER — TELEPHONE (OUTPATIENT)
Dept: ANTICOAGULATION | Facility: CLINIC | Age: 57
End: 2022-05-25
Payer: MEDICARE

## 2022-05-25 NOTE — TELEPHONE ENCOUNTER
Anticoagulation Management    Discussed INR home monitoring program with Tej Morfin reviewing:      [employer] requirements: >= 3 months of anticoagulation therapy, indication for chronic anticoagulation and order from provider    Required testing frequency (q1-2 weeks)    Home meters, testing supplies, meter training, and reporting of INR results done through an outside company. Patient would be contacted by home monitoring company to review insurance coverage with home monitoring company prior to enrolling.    [facility] would continue to receive and manage INR results.    Home monitoring application may take several weeks and must continue to follow up with recommended INR monitoring in clinic until receives monitor and training completed.     Home monitoring terms reviewed with patient      Patient agrees to frequency of testing as directed by referring provider ( weekly or biweekly) Yes    Testing to be performed during business hours of [facility] Yes    Patient agrees they have the skill (or a designated caregiver) necessary to perform the self test Yes    Patient agrees to report all INR results to INR home monitoring company Yes    Patient agrees to have additional INR test in clinic if a home result is critical Yes    Patient agrees to schedule an INR test at a [facility] clinic yearly for technique observation and quality check of INR results with their home meter Yes    Patient agrees to use a [facility] approved service provider and device for home monitoring Yes    [facility]    Referring provider: Dr. Dunia Hdz    Referring providers Clinic Fax number [phone]    Tej Morfin is interested home INR monitoring and requests order be submitted.

## 2022-05-26 ENCOUNTER — ANTICOAGULATION THERAPY VISIT (OUTPATIENT)
Dept: ANTICOAGULATION | Facility: CLINIC | Age: 57
End: 2022-05-26
Payer: MEDICARE

## 2022-05-26 DIAGNOSIS — Z95.811 LVAD (LEFT VENTRICULAR ASSIST DEVICE) PRESENT (H): Primary | ICD-10-CM

## 2022-05-26 DIAGNOSIS — I50.22 CHRONIC SYSTOLIC CHF (CONGESTIVE HEART FAILURE) (H): ICD-10-CM

## 2022-05-26 LAB — INR (EXTERNAL): 2.21 (ref 0.9–1.1)

## 2022-05-26 NOTE — PROGRESS NOTES
ANTICOAGULATION MANAGEMENT     Tej Morfin 56 year old male is on warfarin with therapeutic INR result. (Goal INR 2.0-3.0)    Recent labs: (last 7 days)     05/24/22  0000   INR 2.21*       ASSESSMENT       Source(s): Chart Review and Patient/Caregiver Call       Warfarin doses taken: Warfarin taken as instructed    Diet: No new diet changes identified    New illness, injury, or hospitalization: Yes: Tej has a slight headache today--this is not unusual for him.      Medication/supplement changes: None noted    Signs or symptoms of bleeding or clotting: No    Previous INR: Therapeutic last 2(+) visits    Additional findings: None       PLAN     Recommended plan for no diet, medication or health factor changes affecting INR     Dosing Instructions: continue your current warfarin dose with next INR in 1 week       Summary  As of 5/26/2022    Full warfarin instructions:  7.5 mg every Mon; 5 mg all other days   Next INR check:  5/31/2022             Telephone call with Tej who agrees to plan and repeated back plan correctly    Patient using outside facility for labs    Education provided: Please call back if any changes to your diet, medications or how you've been taking warfarin and Contact [phone] with any changes, questions or concerns.     Plan made per [facility] anticoagulation protocol and per LVAD protocol    Lizabeth Milton RN  Anticoagulation Clinic  5/26/2022    _______________________________________________________________________     Anticoagulation Episode Summary     Current INR goal:  2.0-3.0   TTR:  45.0 % (1.2 mo)   Target end date:  Indefinite   Send INR reminders to:  ANTICOAG LVAD    Indications    LVAD (left ventricular assist device) present (H) [Z95.811]  Chronic systolic CHF (congestive heart failure) (H) [I50.22]           Comments:  HeartMate 3   Bridging: No bridging. Reason -- new implant   Date VAD placed: 03/23/22  Hx Mitral valve repair and not replacement          Anticoagulation Care Providers     Provider Role Specialty Phone number    Dunia Hdz MD Referring Cardiovascular Disease [phone]

## 2022-06-02 ENCOUNTER — CARE COORDINATION (OUTPATIENT)
Dept: CARDIOLOGY | Facility: CLINIC | Age: 57
End: 2022-06-02
Payer: MEDICARE

## 2022-06-02 NOTE — PROGRESS NOTES
Situation:   Writer spoke with Patient today to discuss dizziness.     Background:  Weight today: 183 lb. Compared to recent values this weight is increased.   6/1 184lb   5/31 184lb    Assessment:  NIBP/MAP: 94 yesterday, wife did it at home. At rehab it's been about 84.      Symptoms:   Heart failure symptoms: dizziness, bad when he bends over. Trion once like he was going to fall over and had to sit down. Denies shortness of breath. Denies swelling. Metallic taste in his mouth, mouth is feeling dry. Headaches come and go, has an eye md apt, unsure if it is related to his eyes. Headache resolves with tylenol & water.   Symptoms are same in the past week     06/02/22 1000   Heartmate 3 LEFT VS   Flow (Lpm) 4.4 Lpm   Pulse Index (PI) 3.3 PI   Speed (rpm) 5700 rpm   Power (christiansen) 4.5 christiansen     Applicable medication changes: NA     Called patient/caregiver to check in 8 weeks post discharge. Pt reports VAD parameters above and weight above. Reviewed medications and answered any questions. Patient reports sleeping denies issues and he denies anxiety since being home with LVAD, but his wife thinks that he is having anxiety, has prn medications. Patient is able to move around the house and care for himself independently, walking on treadmill and doing nustep at cardiac rehab.     Discussed specific new problems/stressors since being discharged from the hospital: still just settling into life at home with his lvad.  Empathized with patient and reviewed coping strategies: enlisting support from friends and love ones, attending patient and caregiver support groups, reviewing LVAD educational materials to reinforce knowledge, and talking about concerns with family/care providers/trusted others. Encouraged pt to page VAD Coordinator with any issues or questions. Pt verbalizes understanding.    Recommendation:  Will discuss with  Dr. Hdz & Primary vad coordinator Vandana, Per Dr. Hdz will order labs, continue to have pt  keep follow up appointments with PCP & Eye.  Patient notified to page on-call coordinator if symptoms worsen or with other concerns. Patient verbalized understanding.

## 2022-06-02 NOTE — LETTER
Mechanical Circulatory Support Program  [place] [address], [facility] [address]  [address] [place] [address]  [phone] Office Phone  [phone] Fax Number    Faxed to:  [address]  Fax Number:  [phone]      Patient Name: Tej Morfin   : 1965   Diagnosis/ICD-10: Heart Failure, unspecified I50.9; LVAD Z95.811   Requesting Physician: Dr. Dunia Hdz   Date of Request: 22   Date to Draw 6/3/22                                                Please fax results to [phone]       or email to [email or web address]                              Call [phone] with Questions  ORDER TEST   X Complete Metabolic Panel   X Complete Blood Count    Lactate Dehydrogenase   X INR    D-Dimer                 Signed,      Dunia Hdz MD  Heart Failure, Mechanical Circulatory Support and Transplant Cardiology   of Medicine,  Division of Cardiology, [facility]

## 2022-06-03 ENCOUNTER — TELEPHONE (OUTPATIENT)
Dept: CARDIOLOGY | Facility: CLINIC | Age: 57
End: 2022-06-03

## 2022-06-03 ENCOUNTER — CARE COORDINATION (OUTPATIENT)
Dept: CARDIOLOGY | Facility: CLINIC | Age: 57
End: 2022-06-03
Payer: MEDICARE

## 2022-06-03 ENCOUNTER — ANTICOAGULATION THERAPY VISIT (OUTPATIENT)
Dept: ANTICOAGULATION | Facility: CLINIC | Age: 57
End: 2022-06-03
Payer: MEDICARE

## 2022-06-03 DIAGNOSIS — Z95.811 LVAD (LEFT VENTRICULAR ASSIST DEVICE) PRESENT (H): Primary | ICD-10-CM

## 2022-06-03 DIAGNOSIS — I50.22 CHRONIC SYSTOLIC CHF (CONGESTIVE HEART FAILURE) (H): ICD-10-CM

## 2022-06-03 DIAGNOSIS — I50.22 CHRONIC SYSTOLIC CONGESTIVE HEART FAILURE (H): ICD-10-CM

## 2022-06-03 DIAGNOSIS — Z79.899 LONG TERM USE OF DRUG: ICD-10-CM

## 2022-06-03 LAB — INR (EXTERNAL): 2.04 (ref 0.9–1.1)

## 2022-06-03 NOTE — PROGRESS NOTES
ANTICOAGULATION MANAGEMENT     Tej Morfin 56 year old male is on warfarin with therapeutic INR result. (Goal INR 2.0-3.0)    Recent labs: (last 7 days)     06/03/22  0000   INR 2.04*       ASSESSMENT       Source(s): Patient/Caregiver Call       Warfarin doses taken: Warfarin taken as instructed    Diet: No new diet changes identified    New illness, injury, or hospitalization: No    Medication/supplement changes: None noted    Signs or symptoms of bleeding or clotting: No    Previous INR: Therapeutic last 2(+) visits    Additional findings: INR seems to be trending down. Patient denies any missed doses or changes in diet, but is starting cardiac rehab        PLAN     Recommended plan for no diet, medication or health factor changes affecting INR     Dosing Instructions: Increase your warfarin dose (6.7% change) with next INR in 1 week       Summary  As of 6/3/2022    Full warfarin instructions:  7.5 mg every Mon, Fri; 5 mg all other days   Next INR check:  6/10/2022             Telephone call with Tej who verbalizes understanding and agrees to plan and who agrees to plan and repeated back plan correctly    Patient using outside facility for labs    Education provided: None required    Plan made per [facility] anticoagulation protocol and per LVAD protocol    Geneva Martinez RN  Anticoagulation Clinic  6/3/2022    _______________________________________________________________________     Anticoagulation Episode Summary     Current INR goal:  2.0-3.0   TTR:  56.7 % (1.6 mo)   Target end date:  Indefinite   Send INR reminders to:  ANTICOAG LVAD    Indications    LVAD (left ventricular assist device) present (H) [Z95.811]  Chronic systolic CHF (congestive heart failure) (H) [I50.22]           Comments:  HeartMate 3   Bridging: No bridging. Reason -- new implant   Date VAD placed: 03/23/22  Hx Mitral valve repair and not replacement         Anticoagulation Care Providers     Provider Role Specialty Phone number     Dunia Hdz MD Referring Cardiovascular Disease [phone]

## 2022-06-03 NOTE — PROGRESS NOTES
D:  Called pt to check in in regard to yesterday's symptoms.  Pt reports that his dizziness has improved since increasing his fluid intake yesterday and today.  He reports that he thinks his sinus' are acting up.  He reports dry mouth and congestion.  He also endorses some anxiety with not knowing why he feels a little off.  Current LVAD numbers, Speed: 5700, Flow: 4.5, PI: 3.4, Power: 4.5, & weight 183 today.  I:  Discussed labs and symptoms with Dr. Hdz.  Awaiting plan.  VAD coordinator, Bozena JEFFREY will follow up with pt and plan.  A:  Follow up   P:  Pt will call VAD coordinator with further needs and questions.

## 2022-06-03 NOTE — TELEPHONE ENCOUNTER
[facility] Call Center    Phone Message    May a detailed message be left on voicemail: yes     Reason for Call: Other: Please fax labs for Andreina to [facility] lab at [phone]. Patient is currently waiting at lab.     Action Taken: Other: Cardiology    Travel Screening: Not Applicable

## 2022-06-04 NOTE — PROGRESS NOTES
"      Patient denies chest pain, palpitations, dizziness/lightheaded, shortness of breath, edema, change in VAD parameters,  and VAD alarms.   Dizziness occured if he stood up quickly. Patient denies any episodes of dizziness yesterday and today.     Weight 184lbs     06/04/22 1000   Heartmate 3 LEFT VS   Flow (Lpm) 4.4 Lpm   Pulse Index (PI) 3.6 PI   Speed (rpm) 5700 rpm   Power (christiansen) 4.3 christiansen       Patient reports improvement of sinus symptoms and congestion. Patient scheduled an appointment with PCP to eval sinus symptoms. Patient reports improvement of dry mouth. Patient voices concern that he believes his anxiety \" makes my symptoms worse because then I start worrying about everything.\" Empathized with patient and provided emotional support, encourage patient to use his support system around him and discuss his concern with PCP.   "

## 2022-06-07 ENCOUNTER — CARE COORDINATION (OUTPATIENT)
Dept: CARDIOLOGY | Facility: CLINIC | Age: 57
End: 2022-06-07
Payer: MEDICARE

## 2022-06-07 NOTE — PROGRESS NOTES
D:  Pt's wife had called writer to report concerns with pt's anxiety.  Called pt to check in.  Pt acknowledges that he is feeling more anxious lately.  He feels that part of it, is his current sinus infection, which causes a post nasal drip, which leads into excessive swallowing, which becomes like a nervous tick, leads to a dry throat and the feeling like he's having a hard time swallowing.  Pt was very focused on this problem and repeated the need to have it addressed.  We also discussed some positional dizziness which can cause anxiety as well.  This caused him to cancel cardiac rehab yesterday.  I:  Large amount of time spent empathetically listening.  Discussed the importance of addressing his mental health concerns.  Pt informed me that he made an appointment with a provider at his primary care clinic, tomorrow.  We discussed the possible need for daily medication, as well as breakthrough anxiety medications, as possible treatment options to be discussed with his provider tomorrow.  Encouraged pt to use the LVAD support group, his family and friends as resources.  Pt acknowledges the need for support and a plan to treat his anxiety.  Pt agrees to discuss these things tomorrow with his provider.  With pt's permission care coordination call made to primary care MD RN to discuss pt's concerns in prep for tomorrows appt.  A:  Mental health check in  P:  Pt verbalized understanding of the instructions/plan given.  Will call VAD coordinator with further needs and questions.

## 2022-06-10 ENCOUNTER — TELEPHONE (OUTPATIENT)
Dept: ANTICOAGULATION | Facility: CLINIC | Age: 57
End: 2022-06-10
Payer: MEDICARE

## 2022-06-10 NOTE — TELEPHONE ENCOUNTER
ANTICOAGULATION     eTj Morfin is overdue for INR check.      Spoke with Tej and he will have an INR done on 6/14/22    Audelia Avalos RN

## 2022-06-13 RX ORDER — HYDROXYZINE PAMOATE 50 MG/1
50 CAPSULE ORAL EVERY 12 HOURS PRN
COMMUNITY
Start: 2022-06-08 | End: 2022-07-08

## 2022-06-16 ENCOUNTER — CARE COORDINATION (OUTPATIENT)
Dept: CARDIOLOGY | Facility: CLINIC | Age: 57
End: 2022-06-16
Payer: MEDICARE

## 2022-06-16 NOTE — PROGRESS NOTES
Called patient/caregiver to check in 10 weeks post discharge. Pt reports current LVAD numbers, Speed: 5700, Flow: 4.2, PI: 4.0, Power: 4.5, & weight 184.    Reviewed medications and answered any questions. Pt has recently started lexapro and hydroxyzine for anxiety through his primary care team.  Patient reports sleeping well, and anxiety is improving since being home with LVAD. Patient is able to move around the house and care for himself independently.  He is still noticing some dizziness when he hinges, and bends over at the waist that does not happen when he bends at the knees. Encouraged pt to change positions slowly and only bend at the knees.    Pt to RTC next week.  Plan: shower education.    Encouraged pt to page VAD Coordinator with any issues or questions. Pt verbalizes understanding.

## 2022-06-17 ENCOUNTER — ANTICOAGULATION THERAPY VISIT (OUTPATIENT)
Dept: ANTICOAGULATION | Facility: CLINIC | Age: 57
End: 2022-06-17
Payer: MEDICARE

## 2022-06-17 DIAGNOSIS — Z95.811 LVAD (LEFT VENTRICULAR ASSIST DEVICE) PRESENT (H): Primary | ICD-10-CM

## 2022-06-17 DIAGNOSIS — I50.22 CHRONIC SYSTOLIC CHF (CONGESTIVE HEART FAILURE) (H): ICD-10-CM

## 2022-06-17 LAB — INR HOME MONITORING: 3.6 (ref 2–3)

## 2022-06-17 NOTE — PROGRESS NOTES
ANTICOAGULATION MANAGEMENT     Tej Morfin 57 year old male is on warfarin with supratherapeutic INR result. (Goal INR 2.0-3.0)    Recent labs: (last 7 days)     06/17/22  0000   INR 3.6*       ASSESSMENT       Source(s): Chart Review and Patient/Caregiver Call       Warfarin doses taken: Warfarin taken as instructed    Diet: No new diet changes identified    New illness, injury, or hospitalization: No    Medication/supplement changes: None noted    Signs or symptoms of bleeding or clotting: No    Previous INR: Therapeutic last 2(+) visits    Additional findings: Note: 1st Home monitor POCT result.       PLAN     Recommended plan for no diet, medication or health factor changes affecting INR     Dosing Instructions: partial hold then decrease your warfarin dose (6.2% change) with next INR in 6 days       Summary  As of 6/17/2022    Full warfarin instructions:  6/17: 2.5 mg; Otherwise 7.5 mg every Mon; 5 mg all other days   Next INR check:  6/23/2022             Telephone call with Tej who agrees to plan and repeated back plan correctly    Lab visit scheduled    Education provided: Monitoring for bleeding signs and symptoms, Monitoring for clotting signs and symptoms, When to seek medical attention/emergency care, Importance of notifying clinic for changes in medications; a sooner lab recheck maybe needed., Importance of notifying clinic for diarrhea, nausea/vomiting, reduced intake, and/or illness; a sooner lab recheck maybe needed. and Importance of notifying clinic of upcoming surgeries and procedures 2 weeks in advance    Plan made per [facility] anticoagulation protocol and per LVAD protocol    Siddharth Greco, RN  Anticoagulation Clinic  6/17/2022    _______________________________________________________________________     Anticoagulation Episode Summary     Current INR goal:  2.0-3.0   TTR:  57.8 % (2 mo)   Target end date:  Indefinite   Send INR reminders to:  ANTICOAG LVAD    Indications    LVAD (left  ventricular assist device) present (H) [Z95.811]  Chronic systolic CHF (congestive heart failure) (H) [I50.22]           Comments:  HeartMate 3   Bridging: No bridging. Reason -- new implant   Date VAD placed: 03/23/22  Hx Mitral valve repair and not replacement         Anticoagulation Care Providers     Provider Role Specialty Phone number    Dunia Hdz MD Referring Cardiovascular Disease [phone]

## 2022-06-21 DIAGNOSIS — Z95.811 LVAD (LEFT VENTRICULAR ASSIST DEVICE) PRESENT (H): ICD-10-CM

## 2022-06-22 ENCOUNTER — CARE COORDINATION (OUTPATIENT)
Dept: CARDIOLOGY | Facility: CLINIC | Age: 57
End: 2022-06-22

## 2022-06-22 VITALS — BODY MASS INDEX: 26.34 KG/M2 | WEIGHT: 184 LBS

## 2022-06-22 NOTE — PROGRESS NOTES
"D: pt called to report one, brief, self resolving, low flow, alarm.  Pt had just finished at cardiac rehab. MAP's today: pre 62, during 80 and 72 after.  Current LVAD numbers, Speed: 5700, Flow: 3.4, PI: 4.3, Power: 4.2, & weight 184lbs.  Flow is currently lower than baseline of 4.5. Pt feels \"fine\".  He denies dizziness and lightheadedness and feels like his volume status is WNL.  I:  Discussed symptoms, not entirely sure to reason for alarm.  Pt to RTC with primary cardiologist tomorrow where this can be evaluated further.  Advised pt to monitor and to repage if more alarms occur.  Encouraged him to watch pump parameters, especially if another alarm occurs. Instructed Tej to call or page again if he feels unwell.   A:  Low flow alarm.  P:  Pt verbalized understanding of the instructions given.  Will call VAD coordinator with further needs and questions.    "

## 2022-06-23 ENCOUNTER — ANCILLARY PROCEDURE (OUTPATIENT)
Dept: CARDIOLOGY | Facility: CLINIC | Age: 57
End: 2022-06-23
Attending: INTERNAL MEDICINE
Payer: MEDICARE

## 2022-06-23 ENCOUNTER — LAB (OUTPATIENT)
Dept: LAB | Facility: CLINIC | Age: 57
End: 2022-06-23
Attending: INTERNAL MEDICINE
Payer: MEDICARE

## 2022-06-23 ENCOUNTER — ANCILLARY PROCEDURE (OUTPATIENT)
Dept: GENERAL RADIOLOGY | Facility: CLINIC | Age: 57
End: 2022-06-23
Attending: INTERNAL MEDICINE
Payer: MEDICARE

## 2022-06-23 ENCOUNTER — ANTICOAGULATION THERAPY VISIT (OUTPATIENT)
Dept: ANTICOAGULATION | Facility: CLINIC | Age: 57
End: 2022-06-23

## 2022-06-23 VITALS
WEIGHT: 189.1 LBS | HEART RATE: 60 BPM | RESPIRATION RATE: 20 BRPM | SYSTOLIC BLOOD PRESSURE: 94 MMHG | BODY MASS INDEX: 27.07 KG/M2 | HEIGHT: 70 IN | OXYGEN SATURATION: 100 %

## 2022-06-23 DIAGNOSIS — Z95.811 LVAD (LEFT VENTRICULAR ASSIST DEVICE) PRESENT (H): ICD-10-CM

## 2022-06-23 DIAGNOSIS — I50.22 CHRONIC SYSTOLIC CONGESTIVE HEART FAILURE (H): Primary | ICD-10-CM

## 2022-06-23 DIAGNOSIS — I50.22 CHRONIC SYSTOLIC CHF (CONGESTIVE HEART FAILURE) (H): ICD-10-CM

## 2022-06-23 DIAGNOSIS — Z95.811 LVAD (LEFT VENTRICULAR ASSIST DEVICE) PRESENT (H): Primary | ICD-10-CM

## 2022-06-23 DIAGNOSIS — Z95.811 LEFT VENTRICULAR ASSIST DEVICE PRESENT (H): ICD-10-CM

## 2022-06-23 DIAGNOSIS — Z79.899 LONG TERM USE OF DRUG: ICD-10-CM

## 2022-06-23 DIAGNOSIS — I50.22 CHRONIC SYSTOLIC CONGESTIVE HEART FAILURE (H): ICD-10-CM

## 2022-06-23 LAB
ALBUMIN SERPL-MCNC: 3.2 G/DL (ref 3.4–5)
ALP SERPL-CCNC: 92 U/L (ref 40–150)
ALT SERPL W P-5'-P-CCNC: 35 U/L (ref 0–70)
AMPHETAMINES UR QL SCN: NORMAL
ANION GAP SERPL CALCULATED.3IONS-SCNC: 9 MMOL/L (ref 3–14)
AST SERPL W P-5'-P-CCNC: 28 U/L (ref 0–45)
BARBITURATES UR QL SCN: NORMAL
BENZODIAZ UR QL SCN: NORMAL
BILIRUB SERPL-MCNC: 0.3 MG/DL (ref 0.2–1.3)
BUN SERPL-MCNC: 18 MG/DL (ref 7–30)
BZE UR QL SCN: NORMAL
CALCIUM SERPL-MCNC: 7.7 MG/DL (ref 8.5–10.1)
CANNABINOIDS UR QL SCN: NORMAL
CHLORIDE BLD-SCNC: 110 MMOL/L (ref 94–109)
CO2 SERPL-SCNC: 26 MMOL/L (ref 20–32)
CREAT SERPL-MCNC: 1.14 MG/DL (ref 0.66–1.25)
CREAT UR-MCNC: 42 MG/DL
D DIMER PPP FEU-MCNC: 1.08 UG/ML FEU (ref 0–0.5)
ERYTHROCYTE [DISTWIDTH] IN BLOOD BY AUTOMATED COUNT: 14.8 % (ref 10–15)
ETHANOL UR QL SCN: NORMAL
GFR SERPL CREATININE-BSD FRML MDRD: 76 ML/MIN/1.73M2
GLUCOSE BLD-MCNC: 65 MG/DL (ref 70–99)
HCT VFR BLD AUTO: 44.9 % (ref 40–53)
HGB BLD-MCNC: 14.1 G/DL (ref 13.3–17.7)
INR PPP: 2.76 (ref 0.85–1.15)
LDH SERPL L TO P-CCNC: 317 U/L (ref 85–227)
MCH RBC QN AUTO: 27 PG (ref 26.5–33)
MCHC RBC AUTO-ENTMCNC: 31.4 G/DL (ref 31.5–36.5)
MCV RBC AUTO: 86 FL (ref 78–100)
NT-PROBNP SERPL-MCNC: 273 PG/ML (ref 0–900)
OPIATES UR QL SCN: NORMAL
PCP QUAL URINE (ROCHE): NORMAL
PLATELET # BLD AUTO: 188 10E3/UL (ref 150–450)
POTASSIUM BLD-SCNC: 4 MMOL/L (ref 3.4–5.3)
PROT SERPL-MCNC: 7.4 G/DL (ref 6.8–8.8)
RBC # BLD AUTO: 5.22 10E6/UL (ref 4.4–5.9)
SODIUM SERPL-SCNC: 145 MMOL/L (ref 133–144)
WBC # BLD AUTO: 4.9 10E3/UL (ref 4–11)

## 2022-06-23 PROCEDURE — 83880 ASSAY OF NATRIURETIC PEPTIDE: CPT | Performed by: PATHOLOGY

## 2022-06-23 PROCEDURE — 93284 PRGRMG EVAL IMPLANTABLE DFB: CPT | Performed by: INTERNAL MEDICINE

## 2022-06-23 PROCEDURE — 71046 X-RAY EXAM CHEST 2 VIEWS: CPT | Mod: GC | Performed by: RADIOLOGY

## 2022-06-23 PROCEDURE — 99214 OFFICE O/P EST MOD 30 MIN: CPT | Mod: 25 | Performed by: INTERNAL MEDICINE

## 2022-06-23 PROCEDURE — G0463 HOSPITAL OUTPT CLINIC VISIT: HCPCS | Mod: 25

## 2022-06-23 PROCEDURE — 83615 LACTATE (LD) (LDH) ENZYME: CPT | Performed by: PATHOLOGY

## 2022-06-23 PROCEDURE — 93750 INTERROGATION VAD IN PERSON: CPT | Performed by: INTERNAL MEDICINE

## 2022-06-23 PROCEDURE — 80053 COMPREHEN METABOLIC PANEL: CPT | Performed by: PATHOLOGY

## 2022-06-23 PROCEDURE — 85027 COMPLETE CBC AUTOMATED: CPT | Performed by: PATHOLOGY

## 2022-06-23 PROCEDURE — 80349 CANNABINOIDS NATURAL: CPT | Performed by: PHYSICIAN ASSISTANT

## 2022-06-23 PROCEDURE — 80321 ALCOHOLS BIOMARKERS 1OR 2: CPT | Mod: 90 | Performed by: PATHOLOGY

## 2022-06-23 PROCEDURE — 85610 PROTHROMBIN TIME: CPT | Performed by: PATHOLOGY

## 2022-06-23 PROCEDURE — 85379 FIBRIN DEGRADATION QUANT: CPT | Performed by: PHYSICIAN ASSISTANT

## 2022-06-23 PROCEDURE — 80307 DRUG TEST PRSMV CHEM ANLYZR: CPT | Performed by: PHYSICIAN ASSISTANT

## 2022-06-23 PROCEDURE — 36415 COLL VENOUS BLD VENIPUNCTURE: CPT | Performed by: PATHOLOGY

## 2022-06-23 PROCEDURE — 99207 PR NO CHARGE LOS: CPT

## 2022-06-23 PROCEDURE — 99000 SPECIMEN HANDLING OFFICE-LAB: CPT | Performed by: PATHOLOGY

## 2022-06-23 RX ORDER — FUROSEMIDE 40 MG
20 TABLET ORAL PRN
Qty: 30 TABLET | Refills: 3 | Status: SHIPPED | OUTPATIENT
Start: 2022-06-23 | End: 2022-07-06

## 2022-06-23 ASSESSMENT — PAIN SCALES - GENERAL: PAINLEVEL: NO PAIN (0)

## 2022-06-23 NOTE — PROGRESS NOTES
ANTICOAGULATION MANAGEMENT     Tej Morfin 57 year old male is on warfarin with therapeutic INR result. (Goal INR 2.0-3.0)    Recent labs: (last 7 days)     06/23/22  1227   INR 2.76*       ASSESSMENT       Source(s): Chart Review and Patient/Caregiver Call       Warfarin doses taken: Warfarin taken as instructed    Diet: No new diet changes identified    New illness, injury, or hospitalization: No    Medication/supplement changes: None noted    Signs or symptoms of bleeding or clotting: No    Previous INR: Supratherapeutic    Additional findings: None       PLAN     Recommended plan for no diet, medication or health factor changes affecting INR     Dosing Instructions: continue your current warfarin dose with next INR in 1 week       Summary  As of 6/23/2022    Full warfarin instructions:  7.5 mg every Mon; 5 mg all other days   Next INR check:  6/30/2022             Telephone call with Tej and his wife who verbalizes understanding and agrees to plan    Patient to recheck with home meter, he is new to home monitoring. They are going to pick a day of the week as their routine day to test, unsure yet what will be best. We discussed testing only M-F during clinic hours when ACN is available to discuss results and dosing.     Education provided: Goal range and significance of current result    Plan made per [facility] anticoagulation protocol and per LVAD protocol    Rosario Dyer RN  Anticoagulation Clinic  6/23/2022    _______________________________________________________________________     Anticoagulation Episode Summary     Current INR goal:  2.0-3.0   TTR:  55.1 % (2.2 mo)   Target end date:  Indefinite   Send INR reminders to:  ANTICOAG LVAD    Indications    LVAD (left ventricular assist device) present (H) [Z95.811]  Chronic systolic CHF (congestive heart failure) (H) [I50.22]           Comments:  HeartMate 3   Bridging: No bridging. Reason -- new implant   Date VAD placed: 03/23/22  Hx Mitral valve  repair and not replacement         Anticoagulation Care Providers     Provider Role Specialty Phone number    Dunia Hdz MD Referring Cardiovascular Disease [phone]

## 2022-06-23 NOTE — LETTER
6/23/2022      RE: Tej Morfin  [address]  [place] [address]       Dear Colleague,    Thank you for the opportunity to participate in the care of your patient, Tej Morfin, at the [facility] [place] [facility]. Please see a copy of my visit note below.        June 23, 2022      HPI  56 year old male with a PMH of severe MR s/p MVR with ring (1/21/15), VT s/p ICD, nonobstructive CAD, NICM previously on home inotropes now s/p HM III LVAD implantation 3/23/22 as bridge to decision (presently not listed to recover from LVAD team wanted to see marijuana levels decline consistent with quit date), with course c/b AFib with RVR (s/p ICD shock 3/25/22) and right knee effusion (s/p arthrocentesis and intraarticular steroid injection 3/30/22) who presents for f/up in LVAD clinic.    Overall, Tej has recovered very well from LVAD.     He has been feeling better and better week over week.  He presently denies lower extremity edema, PND orthopnea.  His exercise tolerance is improving at cardiac rehab.  He does have dizziness with standing that occurs almost every time.  He has not had syncope.  This does lead to some anxiety.  He has seen his primary care about anxiety and has stopped marijuana several months ago.     He denies any driveline erythema or blood in the stools or any neurologic symptoms.  He denies fever or chills.     He is doing cardiac rehab and again is getting stronger.  His appetite is resolved and he no longer has any nausea.       PAST MEDICAL HISTORY:  Past Medical History:   Diagnosis Date     Chronic systolic heart failure (H) 2/7/2017     Gastroparesis      ICD (implantable cardioverter-defibrillator) in place     [employer]     Non-ischemic cardiomyopathy (H)      Paroxysmal ventricular tachycardia (H) 2/7/2017     S/P mitral valve replacement 2/7/2017     Tetrahydrocannabinol (THC) use  disorder, mild, in controlled environment, abuse 2019    occasional edible THC. reportedly for sleep, anxiety     Tobacco abuse, episodic     occasional cigar - ~ 3x/wk       FAMILY HISTORY:  Family History   Problem Relation Age of Onset     Coronary Artery Disease Mother      Kidney failure Mother      Cardiomyopathy Father          age 51       SOCIAL HISTORY:  Social History     Socioeconomic History     Marital status:    Tobacco Use     Smoking status: Former Smoker     Smokeless tobacco: Never Used   Substance and Sexual Activity     Alcohol use: No     Alcohol/week: 0.0 standard drinks     Drug use: No       CURRENT MEDICATIONS:    Current Outpatient Medications   Medication Sig Dispense Refill     acetaminophen (TYLENOL) 325 MG tablet Take 2 tablets (650 mg) by mouth every 4 hours as needed for pain 100 tablet 0     furosemide (LASIX) 40 MG tablet Take 0.5 tablets (20 mg) by mouth as needed (as needed, please call VAD team) 30 tablet 3     hydrOXYzine (VISTARIL) 50 MG capsule Take 50 mg by mouth every 12 hours as needed for anxiety       omeprazole (PRILOSEC) 40 MG DR capsule Take 40 mg by mouth every morning       sacubitril-valsartan (ENTRESTO)  MG per tablet Take 1 tablet by mouth 2 times daily 90 tablet 3     sertraline (ZOLOFT) 50 MG tablet Take 50 mg by mouth daily       sotalol (BETAPACE) 80 MG tablet TAKE 1 TABLET BY MOUTH TWICE A DAY       spironolactone (ALDACTONE) 25 MG tablet Take 1 tablet (25 mg) by mouth daily 90 tablet 3     study - aspirin or placebo, IDS# [id number], 100 MG capsule Take 1 capsule (100 mg) by mouth daily . Swallow whole, do not crush. 120 capsule 4     traZODone (DESYREL) 50 MG tablet Take 25-50 mg by mouth nightly as needed       warfarin ANTICOAGULANT (COUMADIN) 2.5 MG tablet Take 2 tablets (5mg) to 3 tablets (7.5mg) by mouth daily or as directed by the Coumadin clinic 100 tablet 3     warfarin ANTICOAGULANT (COUMADIN) 2.5 MG tablet Take 10 mg PO daily at  "6 pm until next INR check, then dose per INR goal 2-3 150 tablet 0   Lasix 40 mg daily      ROS:   Constitutional: No fever, chills, or sweats. Weight gain of 10 pounds intentional  ENT: No visual disturbance, ear ache, epistaxis, sore throat.   Allergies/Immunologic: Negative.   Respiratory: No cough, hemoptysis.   Cardiovascular: As per HPI.   GI: No nausea, vomiting, hematemesis, melena, or hematochezia.   : No urinary frequency, dysuria, or hematuria.   Integument: Negative.   Psychiatric: Anxiety is improving with his primary care physician  Neuro: Negative.   Endocrinology: Negative.   Musculoskeletal: No further knee pains    EXAM:  BP (!) 94/0 (BP Location: Right arm, Patient Position: Chair, Cuff Size: Adult Regular)   Pulse 60   Resp 20   Ht 1.777 m (5' 9.96\")   Wt 85.8 kg (189 lb 1.6 oz)   SpO2 100%   BMI 27.16 kg/m    GENERAL: Appears comfortable, in no distress.  HEENT: Eye symmetrical and without discharge or icterus bilaterally. Mucous membranes moist and without lesions.  NECK: Supple, JVD 8  CV: Hum of LVAD, no adventitious sounds  RESPIRATORY: Respirations regular, even, and unlabored  GI: Soft and non distended with normoactive bowel sounds present in all quadrants. No tenderness, rebound, guarding. No organomegaly.   EXTREMITIES: No LE peripheral edema. none pulsatile.   NEUROLOGIC: Alert and interacting appropriatly.  No focal deficits.   MUSCULOSKELETAL: No joint swelling or tenderness.     Labs-      Albumin 3.2  Sodium 145, AST ALT normal, lactate dehydrogenase 317, NT pro-    INR 2.76    Speed optimization 5/5/2022    Interpretation Summary  Speed optimization study. Baseline HM3 speed is 5700RPM.     LVAD cannula note din the LV apex. At baseline the LV is severely dilated at  7.4cm. The visual ejection fraction is 15-20%. Severe diffuse hypokinesis is  present.  The right ventricle is normal size. Global right ventricular function is  moderately reduced.  The aortic valve " opens partially with each systole. There is mild AI.  Normal outflow graft velocity. Organized material noted anterior to the LV  apex. Unchanged from prior.     Please see speed optimization data in Epic.      LVAD interrogation 6/23/2022:   Occasional power disconnects when changing from battery to wall power, some PI events, no evidence of pulm function-agree with coordinator note      ICD interrogation:   Pending      Assessment and Plan:   Tej Morfin is a 57 year old male with a PMH of severe MR s/p MVR with ring (1/21/15), VT s/p ICD, nonobstructive CAD, NICM previously on home inotropes now s/p HM III LVAD implantation 3/23/22, with course c/b sternal wound s/p Keflex treatment, AFib with RVR (s/p ICD shock 3/25/22) and right knee effusion (s/p arthrocentesis and intraarticular steroid injection 3/30/22)      Overall he is recovered incredibly well.  He is getting stronger.  He has no evidence of fluid overload and NT proBNP is normal.  He is starting to gain weight and get stronger CBC has normalized..     He is on a great trajectory.  We will try to get him listed as quickly as we possibly can need to document that his marijuana is coming down as per quit date.        Chronic SHCF s/p HM III LVAD. Severe MR s/p mitral valve repair with annuloplasty ring (1/21/15). Implanted 3/23/22.   Stage D, NYHA Class II       ACEi/ARB Entresto   mg po BID,    BB Sotalol 80 mg po BID-for rhythm control we will start beta-blocker in a week if dizziness improves  Aldosterone antagonist Aldactone 25 mg daily   SGLT2i: Deferred while other medications are prioretized  SCD prophylaxis ICD  Diuretic: Changing Lasix to 20 mg daily as needed  Fluid status  hypovolemic, changing Lasix to 20 mg as needed only-stopping potassium as well today  MAP: -Maps above goal-going to stop diuretic now, if not show pressure still IV at home we will start Coreg 3.125 twice daily next week with plan to uptitrate as  tolerated    LDH: stable  Anticoagulation: INR goal 2-3  Antiplatelet: RICARDO trial    Dizziness: Stopping diuretic as above and changing as needed.  I am hoping this improves symptoms      History of VT, s/p CRT-D. ICD shock 3/25/22. EP consulted, interrogation consistent with VT with VF zone adjusted to reduce risk for recurrent shock. LV lead turned off in setting of LVAD.   - Continue Sotatol     Postop AF with RVR. Returned to SR.   - Coumadin as above.   -We will check and device interrogation if he has no further A. fib we will have a low threshold to stop sotalol     Mild nonobstructive CAD.   - RICARDO trial.      RIJ, subclavia, and brachial DVT.   - Coumadin per pharmacy.     Anxiety-improving with local treatment closer to home      Return to clinic in 3 months with updated transplant testing at that time.  We will also obtain another quantitative marijuana test between now and then to ensure it is coming down.       Dunia Hdz MD

## 2022-06-23 NOTE — NURSING NOTE
MCS VAD Pump Info     Row Name 06/23/22 1423             MCS VAD Information    Implant LVAD      LVAD Pump HeartMate 3              Heartmate 3 LEFT VS    Flow (Lpm) 4 Lpm      Pulse Index (PI) 2.9 PI      Speed (rpm) 5700 rpm      Power (christiansen) 4.3 christiansen      Current Hct setting 45      Retired: Unexpected Alarms --              Heartmate 3 Right (centrifugal flow) VS    Flow (Lpm) --      Pulse Index (PI) --      Speed (rpm) --      Power (christiansen) --              Heartware LEFT (centrifugal flow) VS    Flow (Lpm) --      Flow waveform PEAK --      Flow waveform TROUGH --      Speed (rpm) --      Power (christiansen) --      Current Hct setting --      Retired: Unexpected Alarms --              Primary Controller    Serial number [facility] [id number]      Low flow alarm setting --      High watt alarm setting --      EBB: Patient use --      Replace in --              Backup Controller    Serial number --      EBB: Patient use 8      Replace EBB in 30 Months      Speed & HCT match primary controller --              VAD Interrogation    Alarms reported by patient Y  patient reports low flow alarm 6/22      Unexpected alarms noted upon interrogation None  history goes back 8 hours PI 2.3-7.0 with speed drops      PI events Frequent;w/ associated speed drops      Damage to equipment is noted N      Action taken Reviewed proper equipment care and maintenance              Driveline Exit Site    Dressing change done N      Driveline properly secured Yes      DLES assessment c/d/i per pt report      Dressing used Weekly kit      Frequency patient changes dressing Weekly      Dressing modifications --      Dressing change supplier --                    4)  Education Complete: Yes   Charge the BACKUP controller s backup battery every 6 months  Perform a self test on BACKUP every 6 months  Change the MPU s batteries every 6 months:Yes  Have equipment serviced yearly (if applicable):Yes    Patient and Jessenia, wife  presented to clinic for  education on showering and the use of the shower bag.       Provided instructions on how to safely use shower bag and cover LVAD drive line exit site dressing and modular cable during showers.     Instructed patient that if they are using a daily dressing, it should be changed immediately following a shower.    Instructed patient that if they are using a weekly dressing, dressing can remain in place for up to 1 week.  o The weekly dressing must be changed if there is an accumulation of drainage.  o The weekly dressing must be changed if there is moisture under the dressing after the shower.   o The weekly dressing needs to be changed earlier if the dressing has come loose after the shower.    The shower bag must stay attached to patient at all times throughout the shower.

## 2022-06-23 NOTE — NURSING NOTE
Chief Complaint   Patient presents with     Follow Up     Chronic systolic congestive heart failure- labs prior, has many appointments today          Vitals were taken and medications reconciled.     Shawn Anders, EMT   1:34 PM

## 2022-06-23 NOTE — PATIENT INSTRUCTIONS
Medications:  STOP potassium  START LASIX 20mg as needed     Instructions:  Continue using weekly dressings, please change weekly dressing if the integrity of the dressing is compromised. ( If it gets wet, peeling up, accumulation of drainage or any signs or symptoms of infection etc.)   Always page the on call VAD CC with concerns of infection at Driveline exit site.   You may start to shower. Please reference the shower bag sheet I provided you today in clinic ( its also in your VAD binder)   Call Vandana in one week with update about dizziness.   GET STRONG gain 5-10lbs of muscle     Follow-up: (make these appointments before you leave)  1. Please follow-up with VAD WILIAN in 6 months with labs prior.   2. Please follow-up with Dr. Hdz ion 9/22/22 with labs prior.        Page the VAD Coordinator on call if you gain more than 3 lb in a day or 5 in a week. Please also page if you feel unwell or have alarms.   Great to see you in clinic today. To Page the VAD Coordinator on call, dial [phone] option #4 and ask to speak to the VAD coordinator on call.

## 2022-06-23 NOTE — PROGRESS NOTES
2022      HPI  56 year old male with a PMH of severe MR s/p MVR with ring (1/21/15), VT s/p ICD, nonobstructive CAD, NICM previously on home inotropes now s/p HM III LVAD implantation 3/23/22 as bridge to decision (presently not listed to recover from LVAD team wanted to see marijuana levels decline consistent with quit date), with course c/b AFib with RVR (s/p ICD shock 3/25/22) and right knee effusion (s/p arthrocentesis and intraarticular steroid injection 3/30/22) who presents for f/up in LVAD clinic.    Overall, Tej has recovered very well from LVAD.     He has been feeling better and better week over week.  He presently denies lower extremity edema, PND orthopnea.  His exercise tolerance is improving at cardiac rehab.  He does have dizziness with standing that occurs almost every time.  He has not had syncope.  This does lead to some anxiety.  He has seen his primary care about anxiety and has stopped marijuana several months ago.     He denies any driveline erythema or blood in the stools or any neurologic symptoms.  He denies fever or chills.     He is doing cardiac rehab and again is getting stronger.  His appetite is resolved and he no longer has any nausea.       PAST MEDICAL HISTORY:  Past Medical History:   Diagnosis Date     Chronic systolic heart failure (H) 2017     Gastroparesis      ICD (implantable cardioverter-defibrillator) in place     [employer]     Non-ischemic cardiomyopathy (H)      Paroxysmal ventricular tachycardia (H) 2017     S/P mitral valve replacement 2017     Tetrahydrocannabinol (THC) use disorder, mild, in controlled environment, abuse 2019    occasional edible THC. reportedly for sleep, anxiety     Tobacco abuse, episodic     occasional cigar - ~ 3x/wk       FAMILY HISTORY:  Family History   Problem Relation Age of Onset     Coronary Artery Disease Mother      Kidney failure Mother      Cardiomyopathy Father          age 51        SOCIAL HISTORY:  Social History     Socioeconomic History     Marital status:    Tobacco Use     Smoking status: Former Smoker     Smokeless tobacco: Never Used   Substance and Sexual Activity     Alcohol use: No     Alcohol/week: 0.0 standard drinks     Drug use: No       CURRENT MEDICATIONS:    Current Outpatient Medications   Medication Sig Dispense Refill     acetaminophen (TYLENOL) 325 MG tablet Take 2 tablets (650 mg) by mouth every 4 hours as needed for pain 100 tablet 0     furosemide (LASIX) 40 MG tablet Take 0.5 tablets (20 mg) by mouth as needed (as needed, please call VAD team) 30 tablet 3     hydrOXYzine (VISTARIL) 50 MG capsule Take 50 mg by mouth every 12 hours as needed for anxiety       omeprazole (PRILOSEC) 40 MG DR capsule Take 40 mg by mouth every morning       sacubitril-valsartan (ENTRESTO)  MG per tablet Take 1 tablet by mouth 2 times daily 90 tablet 3     sertraline (ZOLOFT) 50 MG tablet Take 50 mg by mouth daily       sotalol (BETAPACE) 80 MG tablet TAKE 1 TABLET BY MOUTH TWICE A DAY       spironolactone (ALDACTONE) 25 MG tablet Take 1 tablet (25 mg) by mouth daily 90 tablet 3     study - aspirin or placebo, IDS# [id number], 100 MG capsule Take 1 capsule (100 mg) by mouth daily . Swallow whole, do not crush. 120 capsule 4     traZODone (DESYREL) 50 MG tablet Take 25-50 mg by mouth nightly as needed       warfarin ANTICOAGULANT (COUMADIN) 2.5 MG tablet Take 2 tablets (5mg) to 3 tablets (7.5mg) by mouth daily or as directed by the Coumadin clinic 100 tablet 3     warfarin ANTICOAGULANT (COUMADIN) 2.5 MG tablet Take 10 mg PO daily at 6 pm until next INR check, then dose per INR goal 2-3 150 tablet 0   Lasix 40 mg daily      ROS:   Constitutional: No fever, chills, or sweats. Weight gain of 10 pounds intentional  ENT: No visual disturbance, ear ache, epistaxis, sore throat.   Allergies/Immunologic: Negative.   Respiratory: No cough, hemoptysis.   Cardiovascular: As per HPI.   GI: No  "nausea, vomiting, hematemesis, melena, or hematochezia.   : No urinary frequency, dysuria, or hematuria.   Integument: Negative.   Psychiatric: Anxiety is improving with his primary care physician  Neuro: Negative.   Endocrinology: Negative.   Musculoskeletal: No further knee pains    EXAM:  BP (!) 94/0 (BP Location: Right arm, Patient Position: Chair, Cuff Size: Adult Regular)   Pulse 60   Resp 20   Ht 1.777 m (5' 9.96\")   Wt 85.8 kg (189 lb 1.6 oz)   SpO2 100%   BMI 27.16 kg/m    GENERAL: Appears comfortable, in no distress.  HEENT: Eye symmetrical and without discharge or icterus bilaterally. Mucous membranes moist and without lesions.  NECK: Supple, JVD 8  CV: Hum of LVAD, no adventitious sounds  RESPIRATORY: Respirations regular, even, and unlabored  GI: Soft and non distended with normoactive bowel sounds present in all quadrants. No tenderness, rebound, guarding. No organomegaly.   EXTREMITIES: No LE peripheral edema. none pulsatile.   NEUROLOGIC: Alert and interacting appropriatly.  No focal deficits.   MUSCULOSKELETAL: No joint swelling or tenderness.     Labs-      Albumin 3.2  Sodium 145, AST ALT normal, lactate dehydrogenase 317, NT pro-    INR 2.76    Speed optimization 5/5/2022    Interpretation Summary  Speed optimization study. Baseline HM3 speed is 5700RPM.     LVAD cannula note din the LV apex. At baseline the LV is severely dilated at  7.4cm. The visual ejection fraction is 15-20%. Severe diffuse hypokinesis is  present.  The right ventricle is normal size. Global right ventricular function is  moderately reduced.  The aortic valve opens partially with each systole. There is mild AI.  Normal outflow graft velocity. Organized material noted anterior to the LV  apex. Unchanged from prior.     Please see speed optimization data in Epic.      LVAD interrogation 6/23/2022:   Occasional power disconnects when changing from battery to wall power, some PI events, no evidence of pulm " function-agree with coordinator note      ICD interrogation:   Pending      Assessment and Plan:   Tej Morfin is a 57 year old male with a PMH of severe MR s/p MVR with ring (1/21/15), VT s/p ICD, nonobstructive CAD, NICM previously on home inotropes now s/p HM III LVAD implantation 3/23/22, with course c/b sternal wound s/p Keflex treatment, AFib with RVR (s/p ICD shock 3/25/22) and right knee effusion (s/p arthrocentesis and intraarticular steroid injection 3/30/22)      Overall he is recovered incredibly well.  He is getting stronger.  He has no evidence of fluid overload and NT proBNP is normal.  He is starting to gain weight and get stronger CBC has normalized..     He is on a great trajectory.  We will try to get him listed as quickly as we possibly can need to document that his marijuana is coming down as per quit date.        Chronic SHCF s/p HM III LVAD. Severe MR s/p mitral valve repair with annuloplasty ring (1/21/15). Implanted 3/23/22.   Stage D, NYHA Class II       ACEi/ARB Entresto   mg po BID,    BB Sotalol 80 mg po BID-for rhythm control we will start beta-blocker in a week if dizziness improves  Aldosterone antagonist Aldactone 25 mg daily   SGLT2i: Deferred while other medications are prioretized  SCD prophylaxis ICD  Diuretic: Changing Lasix to 20 mg daily as needed  Fluid status  hypovolemic, changing Lasix to 20 mg as needed only-stopping potassium as well today  MAP: -Maps above goal-going to stop diuretic now, if not show pressure still IV at home we will start Coreg 3.125 twice daily next week with plan to uptitrate as tolerated    LDH: stable  Anticoagulation: INR goal 2-3  Antiplatelet: RICARDO trial    Dizziness: Stopping diuretic as above and changing as needed.  I am hoping this improves symptoms      History of VT, s/p CRT-D. ICD shock 3/25/22. EP consulted, interrogation consistent with VT with VF zone adjusted to reduce risk for recurrent shock. LV lead turned off in  setting of LVAD.   - Continue Sotatol     Postop AF with RVR. Returned to SR.   - Coumadin as above.   -We will check and device interrogation if he has no further A. fib we will have a low threshold to stop sotalol     Mild nonobstructive CAD.   - RICARDO trial.      RIJ, subclavia, and brachial DVT.   - Coumadin per pharmacy.     Anxiety-improving with local treatment closer to home      Return to clinic in 3 months with updated transplant testing at that time.  We will also obtain another quantitative marijuana test between now and then to ensure it is coming down.       Dunia Hdz MD

## 2022-06-23 NOTE — PATIENT INSTRUCTIONS
It was a pleasure to see you in clinic today. Please do not hesitate to call with any questions or concerns. We look forward to seeing you in clinic at your next device check on 9/22/22.    ROSALINO KlineN, RN  Electrophysiology Nurse Clinician  [facility]    During Business Hours Please Call:  [phone]  After Hours Please Call:  [phone] - select option #4 and ask for job code [phone]

## 2022-06-26 LAB
COTININE SERPL-MCNC: <2 NG/ML
NICOTINE SERPL-MCNC: <2 NG/ML
TRANS-3-OH-COTININE SERPL-MCNC: <2 NG/ML

## 2022-06-27 ENCOUNTER — CARE COORDINATION (OUTPATIENT)
Dept: CARDIOLOGY | Facility: CLINIC | Age: 57
End: 2022-06-27

## 2022-06-27 LAB
PLPETH BLD-MCNC: <10 NG/ML
POPETH BLD-MCNC: <10 NG/ML

## 2022-06-27 NOTE — PROGRESS NOTES
"Situation:   Writer spoke with Patient today to discuss concern at LVAD drive line exit site.     Background:  Patient started to feel a \"knot\" above LVAD drive line exit site, states it feels like \"something is going to come up\" out of his skin.    Assessment:    -Denies trauma to driveline  - no drainage  - started this weekend  -painful with moving around, also painful while he was trying to sleep last night.  - cannot see anything at his site- he feels it is underneath skin   - no issues with weekly dressing or showering (new for patient in the last few weeks.)  - feels like something is going to come up     Recommendation:  Discussed with Dr. Hdz, labs were all reassuring last week, we will continue to have patient monitor site and will check back in on Wednesday to see if things are improving/changing. Reminded about good anchoring practice.  Patient notified to page on-call coordinator if symptoms worsen or with other concerns. Family verbalized understanding.        "

## 2022-06-28 ENCOUNTER — ANTICOAGULATION THERAPY VISIT (OUTPATIENT)
Dept: ANTICOAGULATION | Facility: CLINIC | Age: 57
End: 2022-06-28

## 2022-06-28 DIAGNOSIS — I50.22 CHRONIC SYSTOLIC CHF (CONGESTIVE HEART FAILURE) (H): ICD-10-CM

## 2022-06-28 DIAGNOSIS — Z95.811 LVAD (LEFT VENTRICULAR ASSIST DEVICE) PRESENT (H): Primary | ICD-10-CM

## 2022-06-28 LAB — INR HOME MONITORING: 4.9 (ref 2–3)

## 2022-06-28 NOTE — PROGRESS NOTES
ANTICOAGULATION MANAGEMENT     Tej SWENSON Shelbie 57 year old male is on warfarin with supratherapeutic INR result. (Goal INR 2.0-3.0)    Recent labs: (last 7 days)     06/28/22  0000   INR 4.9*       ASSESSMENT       Source(s): Patient/Caregiver Call       Warfarin doses taken: Warfarin taken as instructed    Diet: No new diet changes identified    New illness, injury, or hospitalization: No    Medication/supplement changes: Lasix changed to 20mg daily PRN and Potassium Chloride discontinue     Signs or symptoms of bleeding or clotting: No    Previous INR: Therapeutic last visit; previously outside of goal range    Additional findings: None       PLAN     Recommended plan for no diet, medication or health factor changes affecting INR     Dosing Instructions: partial hold then decrease your warfarin dose (10% change) with next INR in 7-10 days       Summary  As of 6/28/2022    Full warfarin instructions:  6/28: 2.5 mg; Otherwise 3.75 mg every Wed; 5 mg all other days   Next INR check:  7/8/2022             Telephone call with Tej who verbalizes understanding and agrees to plan and who agrees to plan and repeated back plan correctly    Patient to recheck with home meter    Education provided: None required    Plan made per [facility] anticoagulation protocol and per LVAD protocol    Geneva Martinez, RN  Anticoagulation Clinic  6/28/2022    _______________________________________________________________________     Anticoagulation Episode Summary     Current INR goal:  2.0-3.0   TTR:  52.2 % (2.4 mo)   Target end date:  Indefinite   Send INR reminders to:  ANTICOAG LVAD    Indications    LVAD (left ventricular assist device) present (H) [Z95.811]  Chronic systolic CHF (congestive heart failure) (H) [I50.22]           Comments:  HeartMate 3   Bridging: No bridging. Reason -- new implant   Date VAD placed: 03/23/22  Hx Mitral valve repair and not replacement         Anticoagulation Care Providers     Provider Role Specialty  Phone number    Dunia Hdz MD Referring Cardiovascular Disease [phone]

## 2022-06-29 ENCOUNTER — CARE COORDINATION (OUTPATIENT)
Dept: CARDIOLOGY | Facility: CLINIC | Age: 57
End: 2022-06-29

## 2022-06-29 LAB
CANNABINOIDS UR CFM-MCNC: 6 NG/ML
CARBOXYTHC/CREAT UR: 14 NG/MG CREAT

## 2022-06-29 NOTE — PROGRESS NOTES
Tej called the on-call VAD coordinator with dizziness. He said it started right after cardiac rehab today when he got home. He said he had to lie down on the floor and cannot get up. He is home alone and his wife cannot get home form work to help him. His VAD numbers are baseline 5700, 4.5 flow, PI 3.5, and power 4.5. He said at rehab his MAP was 100 and 88. He cannot get a map reading now because he is alone. I asked him to call 911 to have an ambulance take him to [facility] ED. I called the charge nurse them and gave them this report. I gave them the [facility] cardiology consult and LVAD phone numbers if they have any questions [phone], option 2 or option 4.. Another [facility] VAD coordinator was also talking to Shona Bui the Cards NP at [place].

## 2022-07-01 ENCOUNTER — TELEPHONE (OUTPATIENT)
Dept: ANTICOAGULATION | Facility: CLINIC | Age: 57
End: 2022-07-01

## 2022-07-01 ENCOUNTER — CARE COORDINATION (OUTPATIENT)
Dept: CARDIOLOGY | Facility: CLINIC | Age: 57
End: 2022-07-01

## 2022-07-01 DIAGNOSIS — I50.22 CHRONIC SYSTOLIC CHF (CONGESTIVE HEART FAILURE) (H): ICD-10-CM

## 2022-07-01 DIAGNOSIS — Z95.811 LVAD (LEFT VENTRICULAR ASSIST DEVICE) PRESENT (H): Primary | ICD-10-CM

## 2022-07-01 LAB
INR (EXTERNAL): 2.18 (ref 0.9–1.1)
INR (EXTERNAL): 2.2 (ref 0.9–1.1)
INR (EXTERNAL): 2.5 (ref 0.9–1.1)

## 2022-07-01 RX ORDER — SOTALOL HYDROCHLORIDE 120 MG/1
120 TABLET ORAL 2 TIMES DAILY
COMMUNITY
Start: 2022-07-01 | End: 2023-02-01

## 2022-07-01 RX ORDER — MAGNESIUM OXIDE 400 MG/1
400 TABLET ORAL DAILY
COMMUNITY
Start: 2022-07-01 | End: 2022-07-07

## 2022-07-01 NOTE — TELEPHONE ENCOUNTER
ANTICOAGULATION  MANAGEMENT: Discharge Review    Tej Morfin chart reviewed for anticoagulation continuity of care    Hospital Admission on 6/29/22-7/1/22 for symptomatic wide complex tachycardia..    Discharge disposition: Home    Results:    Recent labs: (last 7 days)     06/28/22  0000   INR 4.9*     Anticoagulation inpatient management:     see tracking calendar    Anticoagulation discharge instructions:     Warfarin dosing:                Bridging: No   INR goal change: No      Medication changes affecting anticoagulation: Yes: Spironolactone on hold    Additional factors affecting anticoagulation: Yes: Per AVS from [place]:           weights are up since last week, with taut abdomen per VAD CC follow up note today. Per Tej-VAD CC is discussing with Dr. Hdz decision to restart diuretic     PLAN     Recommend to adjust dose to 2.5 mg one time tomorrow with anticipating restart of diuretic and upcoming fluid shift. , then resume previous home regimen.    Spoke with Tej. He was told that a recheck of labs was not needed until Tues 7/6/22. If checked tomorrow per discharge instructions today-INR will need to be called into VAD CC on call for review and recommendations.     Anticoagulation Calendar updated    SAVANNA MO RN

## 2022-07-01 NOTE — PROGRESS NOTES
D:  Called pt to check in.  Pt reports that he is scheduled to go home this morning.  He reports feeling very much improved.  He notes that his nausea and dizziness have completely resolved.  Of note, his weights are up since last week and his abdomen feels taut.  Pt's wife in room ready to bring him home.  LVAD parameters and labs WNL.  Follow up with labs in a couple days & with  Cardiology appt next week.  I:  Instructed pt to follow up with the heart failure team there regarding weights and potential extra volume on board prior to discharge.  Discussed medication changes.  Med list updated.  Instructed pt to call with concerns or questions.  A:  Hospital check in  P:  Pt verbalized understanding of the instructions given.  Will call VAD coordinator with further needs and questions.

## 2022-07-06 ENCOUNTER — ANTICOAGULATION THERAPY VISIT (OUTPATIENT)
Dept: ANTICOAGULATION | Facility: CLINIC | Age: 57
End: 2022-07-06

## 2022-07-06 ENCOUNTER — CARE COORDINATION (OUTPATIENT)
Dept: TRANSPLANT | Facility: CLINIC | Age: 57
End: 2022-07-06

## 2022-07-06 DIAGNOSIS — Z95.811 LVAD (LEFT VENTRICULAR ASSIST DEVICE) PRESENT (H): Primary | ICD-10-CM

## 2022-07-06 DIAGNOSIS — I50.22 CHRONIC SYSTOLIC CHF (CONGESTIVE HEART FAILURE) (H): ICD-10-CM

## 2022-07-06 LAB — INR HOME MONITORING: 2.2 (ref 2–3)

## 2022-07-06 RX ORDER — MAGNESIUM OXIDE 400 MG/1
400 TABLET ORAL 2 TIMES DAILY
COMMUNITY
Start: 2022-07-05 | End: 2022-07-29

## 2022-07-06 RX ORDER — POTASSIUM CHLORIDE 1500 MG/1
20 TABLET, EXTENDED RELEASE ORAL DAILY
COMMUNITY
Start: 2022-07-05 | End: 2022-07-15 | Stop reason: DRUGHIGH

## 2022-07-06 RX ORDER — FUROSEMIDE 20 MG
20 TABLET ORAL DAILY
COMMUNITY
Start: 2022-07-05 | End: 2022-08-10

## 2022-07-06 NOTE — PROGRESS NOTES
ANTICOAGULATION MANAGEMENT     Tej Morfin 57 year old male is on warfarin with therapeutic INR result. (Goal INR 2.0-3.0)    Recent labs: (last 7 days)     07/06/22  0000   INR 2.2       ASSESSMENT       Source(s): Chart Review and Patient/Caregiver Call       Warfarin doses taken: Warfarin taken as instructed    Diet: No new diet changes identified    New illness, injury, or hospitalization: Yes: Patient was in the hospital for fluid overload. Patient's lasix and K+ was restarted and patient was discharged on Magnesium.    Medication/supplement changes: See above    Signs or symptoms of bleeding or clotting: No    Previous INR: Therapeutic last 2(+) visits    Additional findings: None       PLAN     Recommended plan for no diet, medication or health factor changes affecting INR     Dosing Instructions: continue your current warfarin dose with next INR in 1 week       Summary  As of 7/6/2022    Full warfarin instructions:  3.75 mg every Wed; 5 mg all other days   Next INR check:  7/13/2022             Telephone call with Tej who verbalizes understanding and agrees to plan and who agrees to plan and repeated back plan correctly    Patient using outside facility for labs    Education provided: Goal range and significance of current result and Importance of therapeutic range    Plan made per [facility] anticoagulation protocol and per LVAD protocol    Audelia Avalos RN  Anticoagulation Clinic  7/6/2022    _______________________________________________________________________     Anticoagulation Episode Summary     Current INR goal:  2.0-3.0   TTR:  55.9 % (2.7 mo)   Target end date:  Indefinite   Send INR reminders to:  ANTICOAG LVAD    Indications    LVAD (left ventricular assist device) present (H) [Z95.811]  Chronic systolic CHF (congestive heart failure) (H) [I50.22]           Comments:  HeartMate 3   Bridging: No bridging. Reason -- new implant   Date VAD placed: 03/23/22  Hx Mitral valve repair and not  replacement         Anticoagulation Care Providers     Provider Role Specialty Phone number    Dunia Hdz MD Referring Cardiovascular Disease [phone]

## 2022-07-06 NOTE — PROGRESS NOTES
Patient referred for heart transplant evaluation by Dr. Hdz , as patient meets criteria for heart transplant evaluation.         Cardiac Diagnosis: NICM, LVAD  Age: 57yrs  ABO: O    Chart reviewed and the following barriers for transplant were noted in the chart:          BMI: 27          Substance Use History: current THC use - recently negative testing         Other Known Barriers to Transplant (labs, imaging, previous consults): none     Plan: Will call pt to discuss begining heart transplant evaluation

## 2022-07-07 ENCOUNTER — CARE COORDINATION (OUTPATIENT)
Dept: TRANSPLANT | Facility: CLINIC | Age: 57
End: 2022-07-07

## 2022-07-07 DIAGNOSIS — R09.89 OTHER SPECIFIED SYMPTOMS AND SIGNS INVOLVING THE CIRCULATORY AND RESPIRATORY SYSTEMS: ICD-10-CM

## 2022-07-07 DIAGNOSIS — Z48.812 ENCOUNTER FOR SURGICAL AFTERCARE FOLLOWING SURGERY ON THE CIRCULATORY SYSTEM: ICD-10-CM

## 2022-07-07 DIAGNOSIS — Z12.11 ENCOUNTER FOR SCREENING FOR MALIGNANT NEOPLASM OF COLON: ICD-10-CM

## 2022-07-07 DIAGNOSIS — I50.22 CHRONIC SYSTOLIC CONGESTIVE HEART FAILURE (H): Primary | ICD-10-CM

## 2022-07-07 DIAGNOSIS — Z01.818 ENCOUNTER FOR OTHER PREPROCEDURAL EXAMINATION: ICD-10-CM

## 2022-07-07 NOTE — LETTER
Tej Morfin  [address]  [place] [address]          Dear Tej Morfin       You have been referred for a heart transplant evaluation by Dr. Hdz. Below are some of the things you should expect in the coming days.     1. Please schedule the follow items with your primary care team  a. Dental visit  b. Vaccine update - we will send a letter with information about this    2. You should expect a call from our scheduling team to set up the following items. You can schedule this all at once, or over several days. Plan to discuss this with the scheduling team  a. Consults with: Social work, transplant surgeons, and, palliative care. Please bring your caregiver(s) to these appointments  b. Transplant education - Please bring your caregiver(s) to these appointments  c. Labs  d. CT scan of your head  e. Ultrasounds of your legs. We may also repeat your abdominal ultrasound. Edwina will check with Dr. Hdz on this  f. Pulmonary function testing    3. You will get an email from us via COM DEV. Please sign this documents as soon as possible to being your evaluation    4. You should register on [email or web address]. There are a few videos that would be helpful to watch before we meet.  a. Admission And Preparation   b. What To Bring   c. Meet The Team   d. Transplant Expenses   e. Drug Coverage   f. Your Responsibilities   g. Research Studies         We are looking forward to working with you. For any questions, please contact the Transplant Office at [phone] and ask to speak with one of the pre heart transplant coordinators.     Sincerely,    Your Heart Transplant Team     Arianna Lanier, RN, BSN  Edwina Guardado RN, BSN  Transplant Coordinators            July 7, 2022

## 2022-07-07 NOTE — PROGRESS NOTES
Pt referred for heart transplant evaluation. Called patient and introduced the Heart Transplant Program.  Spoke with patient to discuss the evaluation process for transplant and to make plan for further follow up and education.      Discussed importance of routine health maintenance while on the transplant list.   1. Last Dental Visit: Sept, '21. Discussed importance of seeing dentist at least once every 12 months in order to maintain transplant eligibility. He states he had one tooth removed at [facility], in [place]. Pt encouraged to make his annual dental appts and to ask their dental clinic to send a letter of dental clearance to the transplant office.   2. Last Colonoscopy: 08/16/17 at [facility], in [place]. Records in [facility]  3. Discussed importance of vaccines prior to transplant and will send letter to pt's PCP regarding vaccine recommendations. Pt is aware that the COVID-19 vaccine is currently a requirement for transplant listing    Discussed importance of avoiding nicotine, illegal drugs - including cannabis -, and using alcohol only minimally. Discussed age and BMI requirements for transplant listing.    Heart Transplant Packet and information for [facility] was sent to the patient via email. Patient was encouraged to review and sign this information further prior to the Heart Transplant Education Class.  They were encouraged to bring a caregiver to this class.    Plan: Patient verbalized understanding and in agreement with the plan for evaluation. Was engaged and forthcoming with information. Asked appropriate questions in regard to this process. Patient denied any further transplant related questions and/or concerned.  Patient given the contact information to the transplant office and understands to contact coordinator with any further questions or concerns.

## 2022-07-07 NOTE — LETTER
Tej Morfin  [address]  [place] [address]              2022    Tej Morfin  : 1965  ICD10:  I50    Attn:  Halle Blankenship      Subject: Vaccination Update Request    Tej Morfin is a potential heart transplant recipient currently being followed by the [facility].  We would like to request your assistance in obtaining the following tests and/or procedures in your local community to assist in the care of our mutual patient:    Inactivated Influenza Vaccine (IIV)-yearly    Tetanus, diphtheria, Pertussis (Dtap) if none in 10 years and Tdap booster every 5-10 years    Hepatitis B vaccine series (if HBsAb negative, HBcAB and ABsAg negative)-see results below    Pneumococcal vaccine:  -if naive to any pneumonia vaccine:  PCV-13, PPSV-23 vaccine > or = 8 weeks later, subsequent PPSV-23 vaccine > or = 5 years from the prior PPSV-23    -if with prior PPSV-23 x1 in the past:  PCV-13 > or = 1 year from prior PPSV-23  PPSV -23 > or = 5 years from the prior PPSV-23 and > or = 8 weeks from the prior PCV-13    -if with prior PPSV-23 x2 in the past:  PCV-13 > or = 1 year from the last PPSV-23    Shin[employer]    Please fax results as soon as they are available to:   Thoracic Transplant Department  Fax Number:  [phone]    Thank you  for your continued support and the opportunity to collaborate in the care of this patient.  If you have any questions, please call Edwina Guardado RN, BSN, Transplant Coordinator,  at [phone] (option 2) or [phone].      Dunia Hdz M.D.  Division of Cardiology   of Medicine    Edwina Guardado RN, BSN  Heart Transplant Coordinator  [facility]    CC:         DISPLAY PLAN FREE TEXT

## 2022-07-07 NOTE — PROGRESS NOTES
D:  Called pt to check in post [place] cardiology visit.  Pt reports volume status improved, less abdominal fullness.  Current LVAD numbers, Speed: 5700, Flow: 4.0, PI: 6.8, Power: 4.5, & weight 195 lbs, which is down from 198.  Flow baseline 4.5 and PI usually slightly lower.  Pt has not taken morning medications as of yet.  Pt reports that dizziness has completely resolved since being admitted last week.  SF cards planning for repeat labs next week.  I:  Discussed follow up arranged for September.  Discussed importance of ongoing daily checks for VAD parameters and weights & MAP's when able.  Encouraged pt to call for weight gain of 2 pounds over night or 5 pounds in a week.  Advised pt that we will follow up to labs next week.  A:  Post appt check in  P:  Pt verbalized understanding of the instructions given.  Will call VAD coordinator with further needs and questions.

## 2022-07-13 LAB — INR (EXTERNAL): 2.07 (ref 0.9–1.1)

## 2022-07-15 RX ORDER — MAGNESIUM OXIDE 400 MG/1
400 TABLET ORAL 3 TIMES DAILY
COMMUNITY
Start: 2022-07-14 | End: 2022-07-29

## 2022-07-15 RX ORDER — HYDROXYZINE PAMOATE 50 MG/1
50 CAPSULE ORAL EVERY 12 HOURS PRN
COMMUNITY
Start: 2022-07-01 | End: 2022-07-31

## 2022-07-15 RX ORDER — POTASSIUM CHLORIDE 1500 MG/1
40 TABLET, EXTENDED RELEASE ORAL DAILY
COMMUNITY
Start: 2022-07-14 | End: 2022-07-29 | Stop reason: DRUGHIGH

## 2022-07-15 NOTE — PROGRESS NOTES
D:  Called pt to check in and follow up to med changes made by [facility] team and situation from earlier this week.  Pt reports that he is feeling a bit better. He is currently at [facility] getting an IV Magnesium infusion.  Pt's weight today 191 and has been appropriately, trending downward, since changing his Lasix to daily dosing.  I:  Discussed importance of closely monitoring for symptom resolution, and re-notification to us, or [facility], if this does not happen.  Pt to continue to watch VAD parameters and weights.   A:  Follow up  P:  Pt verbalized understanding of the instructions given.  Will follow up with pt next week.  Will call VAD coordinator with further needs and questions.

## 2022-07-19 ENCOUNTER — ANTICOAGULATION THERAPY VISIT (OUTPATIENT)
Dept: ANTICOAGULATION | Facility: CLINIC | Age: 57
End: 2022-07-19

## 2022-07-19 DIAGNOSIS — Z95.811 LVAD (LEFT VENTRICULAR ASSIST DEVICE) PRESENT (H): Primary | ICD-10-CM

## 2022-07-19 DIAGNOSIS — I50.22 CHRONIC SYSTOLIC CHF (CONGESTIVE HEART FAILURE) (H): ICD-10-CM

## 2022-07-19 NOTE — PROGRESS NOTES
ANTICOAGULATION MANAGEMENT     Tej Morfin 57 year old male is on warfarin with therapeutic INR result. (Goal INR 2.0-3.0)    Recent labs: (last 7 days)     07/13/22  0000   INR 2.07*       ASSESSMENT       Source(s): Patient/Caregiver Call       Warfarin doses taken: Warfarin taken as instructed    Diet: No new diet changes identified    New illness, injury, or hospitalization: No    Medication/supplement changes: Magnesium increased to 400mg TID    Signs or symptoms of bleeding or clotting: No    Previous INR: Therapeutic last 2(+) visits    Additional findings: INR is trending down       PLAN     Recommended plan for no diet, medication or health factor changes affecting INR     Dosing Instructions: Increase your warfarin dose (3.7% change) with next INR in 1 week       Summary  As of 7/19/2022    Full warfarin instructions:  5 mg every day   Next INR check:  7/21/2022             Telephone call with Tej who verbalizes understanding and agrees to plan and who agrees to plan and repeated back plan correctly    Patient using outside facility for labs    Education provided: No interaction anticipated between warfarin and Magnesium    Plan made per [facility] anticoagulation protocol and per LVAD protocol    Geneva Martinez, RN  Anticoagulation Clinic  7/19/2022    _______________________________________________________________________     Anticoagulation Episode Summary     Current INR goal:  2.0-3.0   TTR:  59.5 % (2.9 mo)   Target end date:  Indefinite   Send INR reminders to:  ANTICOAG LVAD    Indications    LVAD (left ventricular assist device) present (H) [Z95.811]  Chronic systolic CHF (congestive heart failure) (H) [I50.22]           Comments:  HeartMate 3   Bridging: No bridging. Reason -- new implant   Date VAD placed: 03/23/22  Hx Mitral valve repair and not replacement         Anticoagulation Care Providers     Provider Role Specialty Phone number    Dunia Hdz MD Referring Cardiovascular  Disease [phone]

## 2022-07-21 LAB — INR (EXTERNAL): 2.27 (ref 0.9–1.1)

## 2022-07-22 ENCOUNTER — ANTICOAGULATION THERAPY VISIT (OUTPATIENT)
Dept: ANTICOAGULATION | Facility: CLINIC | Age: 57
End: 2022-07-22

## 2022-07-22 DIAGNOSIS — Z95.811 LVAD (LEFT VENTRICULAR ASSIST DEVICE) PRESENT (H): Primary | ICD-10-CM

## 2022-07-22 DIAGNOSIS — I50.22 CHRONIC SYSTOLIC CHF (CONGESTIVE HEART FAILURE) (H): ICD-10-CM

## 2022-07-22 NOTE — PROGRESS NOTES
ANTICOAGULATION MANAGEMENT     Tej Morfin 57 year old male is on warfarin with therapeutic INR result. (Goal INR 2.0-3.0)    Recent labs: (last 7 days)     07/21/22  0000   INR 2.27*       ASSESSMENT       Source(s): Chart Review    Previous INR was Therapeutic last 2(+) visits    Medication, diet, health changes since last INR chart reviewed; none identified           PLAN     Recommended plan for no diet, medication or health factor changes affecting INR     Dosing Instructions: continue your current warfarin dose with next INR in 1 week       Summary  As of 7/22/2022    Full warfarin instructions:  5 mg every day   Next INR check:  7/28/2022             Detailed voice message left for Tej with dosing instructions and follow up date.     Patient using outside facility for labs    Education provided: Please call back if any changes to your diet, medications or how you've been taking warfarin and Contact [phone] with any changes, questions or concerns.     Plan made per [facility] anticoagulation protocol and per LVAD protocol    Geneva Martinez RN  Anticoagulation Clinic  7/22/2022    _______________________________________________________________________     Anticoagulation Episode Summary     Current INR goal:  2.0-3.0   TTR:  62.9 % (3.2 mo)   Target end date:  Indefinite   Send INR reminders to:  ANTICOAG LVAD    Indications    LVAD (left ventricular assist device) present (H) [Z95.811]  Chronic systolic CHF (congestive heart failure) (H) [I50.22]           Comments:  HeartMate 3   Bridging: No bridging. Reason -- new implant   Date VAD placed: 03/23/22  Hx Mitral valve repair and not replacement         Anticoagulation Care Providers     Provider Role Specialty Phone number    Dunia Hdz MD Referring Cardiovascular Disease [phone]

## 2022-07-22 NOTE — PROGRESS NOTES
Addendum 7/22/22 Patient called back to go over results and dosing. Denies any changes to diet, medication, health, bleeding, or clotting. Patient is going into his local lab on 7/28.     Geneva Martinez RN

## 2022-07-29 ENCOUNTER — ANTICOAGULATION THERAPY VISIT (OUTPATIENT)
Dept: ANTICOAGULATION | Facility: CLINIC | Age: 57
End: 2022-07-29

## 2022-07-29 ENCOUNTER — CARE COORDINATION (OUTPATIENT)
Dept: CARDIOLOGY | Facility: CLINIC | Age: 57
End: 2022-07-29

## 2022-07-29 DIAGNOSIS — I50.22 CHRONIC SYSTOLIC CHF (CONGESTIVE HEART FAILURE) (H): ICD-10-CM

## 2022-07-29 DIAGNOSIS — Z95.811 LVAD (LEFT VENTRICULAR ASSIST DEVICE) PRESENT (H): Primary | ICD-10-CM

## 2022-07-29 RX ORDER — MAGNESIUM OXIDE 400 MG/1
600 TABLET ORAL 3 TIMES DAILY
COMMUNITY
Start: 2022-07-22 | End: 2022-08-10

## 2022-07-29 RX ORDER — POTASSIUM CHLORIDE 1500 MG/1
40 TABLET, EXTENDED RELEASE ORAL 2 TIMES DAILY
COMMUNITY
Start: 2022-07-22 | End: 2022-08-10

## 2022-07-29 NOTE — PROGRESS NOTES
ANTICOAGULATION MANAGEMENT     Tej SWENSON [place] 57 year old male is on warfarin with subtherapeutic INR result. (Goal INR 2.0-3.0)    Recent labs: (last 7 days)     07/28/22  0857   INR 1.99*       ASSESSMENT       Source(s): Patient/Caregiver Call       Warfarin doses taken: Warfarin taken as instructed    Diet: Increased greens/vitamin K in diet; plans to resume previous intake    New illness, injury, or hospitalization: No    Medication/supplement changes: None noted    Signs or symptoms of bleeding or clotting: No    Previous INR: Therapeutic last 2(+) visits    Additional findings: None       PLAN     Recommended plan for temporary change(s) affecting INR     Dosing Instructions: Continue your current warfarin dose with next INR in 1 week       Summary  As of 7/29/2022    Full warfarin instructions:  5 mg every day   Next INR check:  8/4/2022             Telephone call with Tej who verbalizes understanding and agrees to plan and who agrees to plan and repeated back plan correctly    Patient to recheck with home meter    Education provided: Importance of consistent vitamin K intake, Impact of vitamin K foods on INR and Vitamin K content of foods    Plan made per [facility] anticoagulation protocol and per LVAD protocol    Geneva Martinez RN  Anticoagulation Clinic  7/29/2022    _______________________________________________________________________     Anticoagulation Episode Summary     Current INR goal:  2.0-3.0   TTR:  65.3 % (3.4 mo)   Target end date:  Indefinite   Send INR reminders to:  ANTICOAG LVAD    Indications    LVAD (left ventricular assist device) present (H) [Z95.811]  Chronic systolic CHF (congestive heart failure) (H) [I50.22]           Comments:  HeartMate 3   Bridging: No bridging. Reason -- new implant   Date VAD placed: 03/23/22  Hx Mitral valve repair and not replacement         Anticoagulation Care Providers     Provider Role Specialty Phone number    Dunia Hdz MD Referring  Cardiovascular Disease [phone]

## 2022-07-29 NOTE — PROGRESS NOTES
"Called patient/caregiver to check in 16 weeks post discharge. Current LVAD numbers, Speed: 5700, Flow: 3.9 (usually 4.5), PI: 2.6 (lower than basline), Power: 4.5, & weight 189. Pt reports having 2, brief, self resolving, low flow alarms.  Reviewed medications.  Pt with recent lab draw at [facility].  Magnesium & potassium continue to be on the lower side and medications were adjusted.  Med list updated.  Patient reports sleeping ok and improving anxiety since being home with LVAD. Patient is able to move around the house and care for himself independently.      Discussed specific new problems/stressors since being discharged from the hospital: frequent \"hiccups\".  Empathized with patient and reviewed coping strategies: enlisting support from friends and love ones, attending patient and caregiver support groups, reviewing LVAD educational materials to reinforce knowledge, and talking about concerns with family/care providers/trusted others. Encouraged pt to page VAD Coordinator with any issues or questions. Pt verbalizes understanding.    **Addendum: 8/2/22 1330  MAP's today at cardiac rehab 88, Current LVAD numbers, Speed: 5700, Flow: 4.5, PI: 3.6, Power: 4.5, & weight 191.  Pt has improved fluid intake and is maintaining a 2L fluid restriction.  Encouraged pt to call for more low flows, increased weights or adverse symptoms.      "

## 2022-08-03 LAB — INR HOME MONITORING: 2.7 (ref 2–3)

## 2022-08-04 ENCOUNTER — ANTICOAGULATION THERAPY VISIT (OUTPATIENT)
Dept: ANTICOAGULATION | Facility: CLINIC | Age: 57
End: 2022-08-04

## 2022-08-04 DIAGNOSIS — I50.22 CHRONIC SYSTOLIC CHF (CONGESTIVE HEART FAILURE) (H): ICD-10-CM

## 2022-08-04 DIAGNOSIS — Z95.811 LVAD (LEFT VENTRICULAR ASSIST DEVICE) PRESENT (H): Primary | ICD-10-CM

## 2022-08-04 NOTE — PROGRESS NOTES
ANTICOAGULATION MANAGEMENT     Tej SWENSON Docena 57 year old male is on warfarin with therapeutic INR result. (Goal INR 2.0-3.0)    Recent labs: (last 7 days)     08/03/22  0000   INR 2.7       ASSESSMENT       Source(s): Chart Review and Patient/Caregiver Call       Warfarin doses taken: Warfarin taken as instructed    Diet: No new diet changes identified    New illness, injury, or hospitalization: No    Medication/supplement changes: None noted    Signs or symptoms of bleeding or clotting: No    Previous INR: Therapeutic last 2(+) visits    Additional findings: None       PLAN     Recommended plan for no diet, medication or health factor changes affecting INR     Dosing Instructions: Continue your current warfarin dose with next INR in 1 week       Summary  As of 8/4/2022    Full warfarin instructions:  5 mg every day   Next INR check:  8/11/2022             Telephone call with Tej who verbalizes understanding and agrees to plan and who agrees to plan and repeated back plan correctly    Patient to recheck with home meter    Education provided: Goal range and significance of current result and Importance of therapeutic range    Plan made per [facility] anticoagulation protocol and per LVAD protocol    Audelia Avalos RN  Anticoagulation Clinic  8/4/2022    _______________________________________________________________________     Anticoagulation Episode Summary     Current INR goal:  2.0-3.0   TTR:  67.1 % (3.6 mo)   Target end date:  Indefinite   Send INR reminders to:  ANTICOAG LVAD    Indications    LVAD (left ventricular assist device) present (H) [Z95.811]  Chronic systolic CHF (congestive heart failure) (H) [I50.22]           Comments:  HeartMate 3   Bridging: No bridging. Reason -- new implant   Date VAD placed: 03/23/22  Hx Mitral valve repair and not replacement         Anticoagulation Care Providers     Provider Role Specialty Phone number    Dunia Hdz MD Referring Cardiovascular Disease  [phone]

## 2022-08-09 ENCOUNTER — TELEPHONE (OUTPATIENT)
Dept: CARDIOLOGY | Facility: CLINIC | Age: 57
End: 2022-08-09

## 2022-08-09 ENCOUNTER — DOCUMENTATION ONLY (OUTPATIENT)
Dept: ANTICOAGULATION | Facility: CLINIC | Age: 57
End: 2022-08-09

## 2022-08-09 DIAGNOSIS — Z95.811 LVAD (LEFT VENTRICULAR ASSIST DEVICE) PRESENT (H): ICD-10-CM

## 2022-08-09 DIAGNOSIS — I50.22 CHRONIC SYSTOLIC CHF (CONGESTIVE HEART FAILURE) (H): ICD-10-CM

## 2022-08-09 NOTE — TELEPHONE ENCOUNTER
[facility] Call Center    Phone Message    May a detailed message be left on voicemail: yes     Reason for Call: Medication Refill Request    Has the patient contacted the pharmacy for the refill? Yes   Name of medication being requested: warfarin ANTICOAGULANT  Provider who prescribed the medication: [place]  Pharmacy:  [facility] [address] [place], [place] - [facility] SHAISTA GIRARD    Date medication is needed: Per caller Karissa unsure but is running low please send soon as clinic can       Action Taken: Message routed to:  Other: Cardiology    Travel Screening: Not Applicable

## 2022-08-09 NOTE — PROGRESS NOTES
ANTICOAGULATION  MANAGEMENT: Discharge Review    Tej E Shelbie chart reviewed for anticoagulation continuity of care    Hospital Admission on 8/8/22-8/9/22 for low flow alarms and dizziness.    Discharge disposition: Home    Results:    Recent labs: (last 7 days)     08/03/22  0000   INR 2.7         Anticoagulation inpatient management:     home regimen continued    Anticoagulation discharge instructions:     Warfarin dosing: home regimen continued   Bridging: No   INR goal change: No      Medication changes affecting anticoagulation: No    Additional factors affecting anticoagulation: No     PLAN     No adjustment to anticoagulation plan needed    Patient not contacted    No adjustment to Anticoagulation Calendar was required, recheck on 8/11/22    SAVANNA MO RN

## 2022-08-10 ENCOUNTER — CARE COORDINATION (OUTPATIENT)
Dept: CARDIOLOGY | Facility: CLINIC | Age: 57
End: 2022-08-10

## 2022-08-10 RX ORDER — WARFARIN SODIUM 2.5 MG/1
TABLET ORAL
Qty: 180 TABLET | Refills: 1 | Status: SHIPPED | OUTPATIENT
Start: 2022-08-10 | End: 2023-03-28

## 2022-08-10 RX ORDER — MAGNESIUM OXIDE 400 MG/1
400 TABLET ORAL DAILY
COMMUNITY
Start: 2022-08-09 | End: 2022-09-21 | Stop reason: DRUGHIGH

## 2022-08-10 NOTE — PROGRESS NOTES
D:  Called pt to check in post [facility] hospitalization for low flow alarms.  Per pt, low flow alarms started Monday.  Flow's were 2.7, PI 8.2, power 4.4. Weights were stable at 192.  Pt denies chest/heart fluttering, dizziness and lightheadedness at that time.  Pt was encouraged to have MAP checked, which came back at 75.  Chuy Sinclair NP assessed pt and decided to admit Tej for observation.  While admitted pt had CTA, & echo.  No outflow obstruction found on CTA.  Ultimately VAD speed was decreased to 5500 after discussions with Dr. Hdz.  Pt also rcvd 4 gm's IV magnesium for Magnesium level of 1.5.  Pt reports that he is feeling well and has had no further low flow alarms.  I:  Discussed planned follow up, med changes.  Completed med rec.  Pt to see [facility]ology on Friday with labs.  Instructed pt to call writer to check in Monday with weights, VAD numbers and overall status.  Discussed importance of ongoing mag level monitoring. If levels drop again, per Dr. Hdz, pt will need to see a local nephrologist to evaluate this.  Pt to return to clinic 9/22 with Dr. Hdz.  A:  Post hospital check in   P:  Pt verbalized understanding of the instructions given.  Will call VAD coordinator with further needs and questions.

## 2022-08-10 NOTE — TELEPHONE ENCOUNTER
ANTICOAGULATION MANAGEMENT:  Medication Refill    Anticoagulation Summary  As of 8/4/2022    Warfarin maintenance plan:  5 mg (2.5 mg x 2) every day   Next INR check:  8/11/2022   Target end date:  Indefinite    Indications    LVAD (left ventricular assist device) present (H) [Z95.811]  Chronic systolic CHF (congestive heart failure) (H) [I50.22]             Anticoagulation Care Providers     Provider Role Specialty Phone number    Dunia Hdz MD Referring Cardiovascular Disease [phone]          Visit with referring provider/group within last year: Yes    [facility] referral signed within last year: Yes    Tej meets all criteria for refill (current [facility] referral, office visit with referring provider/group in last year, lab monitoring up to date or not exceeding 2 weeks overdue). Rx instructions and quantity supplied updated to match patient's current dosing plan. Warfarin 90 day supply with 1 refill granted per [facility] protocol     Judy Simental RN  Anticoagulation Clinic

## 2022-08-12 ENCOUNTER — ANTICOAGULATION THERAPY VISIT (OUTPATIENT)
Dept: ANTICOAGULATION | Facility: CLINIC | Age: 57
End: 2022-08-12

## 2022-08-12 DIAGNOSIS — Z95.811 LVAD (LEFT VENTRICULAR ASSIST DEVICE) PRESENT (H): Primary | ICD-10-CM

## 2022-08-12 DIAGNOSIS — I50.22 CHRONIC SYSTOLIC CHF (CONGESTIVE HEART FAILURE) (H): ICD-10-CM

## 2022-08-12 LAB — INR HOME MONITORING: 2.3 (ref 2–3)

## 2022-08-12 NOTE — PROGRESS NOTES
ANTICOAGULATION MANAGEMENT     Tej Morfin 57 year old male is on warfarin with therapeutic INR result. (Goal INR 2.0-3.0)    Recent labs: (last 7 days)     08/12/22  0000   INR 2.3       ASSESSMENT       Source(s): Chart Review and Patient/Caregiver Call       Warfarin doses taken: Warfarin taken as instructed    Diet: No new diet changes identified    New illness, injury, or hospitalization: Yes: hospitalized 8/8/22 - 8/9/22 with low flow alarms and dizziness    Medication/supplement changes: stopped furosemide and potassium    Signs or symptoms of bleeding or clotting: No    Previous INR: Therapeutic last visit; previously outside of goal range    Additional findings: None       PLAN     Recommended plan for no diet, medication or health factor changes affecting INR     Dosing Instructions: Continue your current warfarin dose with next INR in 1 week       Summary  As of 8/12/2022    Full warfarin instructions:  5 mg every day   Next INR check:  8/19/2022             Telephone call with Tej who verbalizes understanding and agrees to plan    Patient to recheck with home meter    Education provided: Please call back if any changes to your diet, medications or how you've been taking warfarin and Contact [phone] with any changes, questions or concerns.     Plan made per [facility] anticoagulation protocol and per LVAD protocol    Lizabeth Milton RN  Anticoagulation Clinic  8/12/2022    _______________________________________________________________________     Anticoagulation Episode Summary     Current INR goal:  2.0-3.0   TTR:  69.7 % (3.9 mo)   Target end date:  Indefinite   Send INR reminders to:  ANTICOAG LVAD    Indications    LVAD (left ventricular assist device) present (H) [Z95.811]  Chronic systolic CHF (congestive heart failure) (H) [I50.22]           Comments:  HeartMate 3   Bridging: No bridging. Reason -- new implant   Date VAD placed: 03/23/22  Hx Mitral valve repair and not replacement          Anticoagulation Care Providers     Provider Role Specialty Phone number    Dunia Hdz MD Referring Cardiovascular Disease [phone]

## 2022-08-17 RX ORDER — SPIRONOLACTONE 25 MG/1
12.5 TABLET ORAL DAILY
COMMUNITY
Start: 2022-08-12 | End: 2023-10-24

## 2022-08-17 NOTE — PROGRESS NOTES
D:  Called pt to check in and order routine substance abuse monitoring.  Pt reporting some recent weight gain from 194-198 today.  He is feeling slightly bloated and some eye puffiness. Current LVAD numbers, Speed: 5500, Flow: 4.2, PI: 3.3, Power: 4.2, which are all baseline for him.  His MAP's have all been in the mid 70's and he has had no additional low flow alarms or dizziness.  Pt reported these symptoms to the cardiology team at [facility] and is supposed to have labs tomorrow for their team.  Pt Spironolactone was decreased to 12.5mg last week.  I:  Instructed pt to notify the VAD coordinator on call with all weight gain's of 2 pounds in a day or 5 pounds in a week.  Instructed pt to page the oncall coordinator if he does not hear from the  team tomorrow, if his weight continues to rise or his symptoms worsen.  Advised pt that he is due for his substance abuse monitoring.  Pt will have drawn tomorrow with other labs.  A:  Follow up  P:  Pt verbalized understanding of the instructions given.  Will call VAD coordinator with further needs and questions.

## 2022-08-18 LAB — INR (EXTERNAL): 1.7 (ref 0.9–1.1)

## 2022-08-19 ENCOUNTER — TELEPHONE (OUTPATIENT)
Dept: ANTICOAGULATION | Facility: CLINIC | Age: 57
End: 2022-08-19

## 2022-08-19 ENCOUNTER — ANTICOAGULATION THERAPY VISIT (OUTPATIENT)
Dept: ANTICOAGULATION | Facility: CLINIC | Age: 57
End: 2022-08-19

## 2022-08-19 DIAGNOSIS — Z95.811 LVAD (LEFT VENTRICULAR ASSIST DEVICE) PRESENT (H): Primary | ICD-10-CM

## 2022-08-19 DIAGNOSIS — I50.22 CHRONIC SYSTOLIC CHF (CONGESTIVE HEART FAILURE) (H): ICD-10-CM

## 2022-08-19 NOTE — TELEPHONE ENCOUNTER
Received a VM from Mayra from [facility] stating INR result today is 1.7 and not sure if we received it or not. Any questions call her at [phone].  Shari Ramos RN  Anticoagulation Nurse - [facility], [place]

## 2022-08-19 NOTE — PROGRESS NOTES
ANTICOAGULATION MANAGEMENT     Tej SWENSON Shelbie 57 year old male is on warfarin with subtherapeutic INR result. (Goal INR 2.0-3.0)    Recent labs: (last 7 days)     08/18/22  1200   INR 1.7*       ASSESSMENT       Source(s): Chart Review and Patient/Caregiver Call       Warfarin doses taken: Warfarin taken as instructed    Diet: Increased greens/vitamin K in diet; plans to resume previous intake    New illness, injury, or hospitalization: No    Medication/supplement changes: None noted    Signs or symptoms of bleeding or clotting: No    Previous INR: Therapeutic last 2(+) visits    Additional findings: weight gain 4#, lasix adjusted by cardiology at [facility] this last week       PLAN     Recommended plan for temporary change(s) affecting INR     Dosing Instructions: booster dose then continue your current warfarin dose with next INR in 5 days       Summary  As of 8/19/2022    Full warfarin instructions:  8/19: 7.5 mg; Otherwise 5 mg every day   Next INR check:  8/23/2022             Telephone call with Tej who verbalizes understanding and agrees to plan    Patient to recheck with home meter    Education provided: Impact of vitamin K foods on INR, Vitamin K content of foods and Goal range and significance of current result    Plan made per [facility] anticoagulation protocol and per LVAD protocol    Rosario Dyer RN  Anticoagulation Clinic  8/19/2022    _______________________________________________________________________     Anticoagulation Episode Summary     Current INR goal:  2.0-3.0   TTR:  68.7 % (4.1 mo)   Target end date:  Indefinite   Send INR reminders to:  ANTICOAG LVAD    Indications    LVAD (left ventricular assist device) present (H) [Z95.811]  Chronic systolic CHF (congestive heart failure) (H) [I50.22]           Comments:  HeartMate 3   Bridging: No bridging. Reason -- new implant   Date VAD placed: 03/23/22  Hx Mitral valve repair and not replacement         Anticoagulation Care Providers      Provider Role Specialty Phone number    Dunia Hdz MD Referring Cardiovascular Disease [phone]

## 2022-08-22 ENCOUNTER — CARE COORDINATION (OUTPATIENT)
Dept: CARDIOLOGY | Facility: CLINIC | Age: 57
End: 2022-08-22

## 2022-08-22 NOTE — PROGRESS NOTES
D: Following up since stopping his diuretics in 8/12/22.    I/A: Feels like he has gained fluid weight in his stomach-bloating. Weight is up to 203lb, previously had been sitting 192/194lb.     P: He has already contacted his [facility] [facility] team since they had recently adjusted his diuretics- he is waiting to hear back from them but wanted to keep us in the loop. I asked Tej to let us know if they restart/change any medications. Patient notified to page on-call coordinator if symptoms worsen or with other concerns. Patient verbalized understanding.

## 2022-08-24 ENCOUNTER — ANTICOAGULATION THERAPY VISIT (OUTPATIENT)
Dept: ANTICOAGULATION | Facility: CLINIC | Age: 57
End: 2022-08-24

## 2022-08-24 DIAGNOSIS — Z95.811 LVAD (LEFT VENTRICULAR ASSIST DEVICE) PRESENT (H): Primary | ICD-10-CM

## 2022-08-24 DIAGNOSIS — I50.22 CHRONIC SYSTOLIC CHF (CONGESTIVE HEART FAILURE) (H): ICD-10-CM

## 2022-08-24 LAB — INR (EXTERNAL): 1.98 (ref 0.9–1.1)

## 2022-08-24 ASSESSMENT — PULMONARY FUNCTION TESTS: FEV1 (%PREDICTED): 2

## 2022-08-24 ASSESSMENT — NEW YORK HEART ASSOCIATION (NYHA) CLASSIFICATION: NYHA FUNCTIONAL CLASS: IV

## 2022-08-24 NOTE — PROGRESS NOTES
ANTICOAGULATION MANAGEMENT     Tej SWENSON Shelbie 57 year old male is on warfarin with subtherapeutic INR result. (Goal INR 2.0-3.0)    Recent labs: (last 7 days)     08/24/22  0000   INR 1.98*       ASSESSMENT       Source(s): Chart Review and Patient/Caregiver Call       Warfarin doses taken: Warfarin taken as instructed    Diet: No new diet changes identified    New illness, injury, or hospitalization: Yes: Patient is retaining fluid still    Medication/supplement changes: Patient will continue with Lasix, Potassium and an extra Magnesium tablet    Signs or symptoms of bleeding or clotting: No    Previous INR: Subtherapeutic    Additional findings: None       PLAN     Recommended plan for temporary change(s) affecting INR     Dosing Instructions: Increase your warfarin dose (7% change) with next INR in 1 week       Summary  As of 8/24/2022    Full warfarin instructions:  7.5 mg every Wed; 5 mg all other days   Next INR check:  8/31/2022             Telephone call with Tej who verbalizes understanding and agrees to plan and who agrees to plan and repeated back plan correctly    Patient using outside facility for labs    Education provided: Goal range and significance of current result and Importance of therapeutic range    Plan made per [facility] anticoagulation protocol and per LVAD protocol    Audelia Avalos, RN  Anticoagulation Clinic  8/24/2022    _______________________________________________________________________     Anticoagulation Episode Summary     Current INR goal:  2.0-3.0   TTR:  65.5 % (4.3 mo)   Target end date:  Indefinite   Send INR reminders to:  ANTICOAG LVAD    Indications    LVAD (left ventricular assist device) present (H) [Z95.811]  Chronic systolic CHF (congestive heart failure) (H) [I50.22]           Comments:  HeartMate 3   Bridging: No bridging. Reason -- new implant   Date VAD placed: 03/23/22  Hx Mitral valve repair and not replacement         Anticoagulation Care Providers      Provider Role Specialty Phone number    Dunia dHz MD Referring Cardiovascular Disease [phone]

## 2022-08-25 NOTE — TELEPHONE ENCOUNTER
VAD Multidisciplinary Review  Multidisciplinary review date: 3/4/22  Inclusion Criteria  Discussed VAD with patient and/or decision makers. Reviewed anticipated survival benefit, anticipated functional improvement, risks, and benefits. Patient and/or decision maker verbalize understanding and agree to VAD implant?: Yes  VAD is elective procedure?: Yes  NYHA class: IV  INTERMACS score: 3  Patient has: been IV inotrope-dependent for at least 14 days  Cardiopulmonary stress testing completed?: None related to acute illness  LVEF is: < 25%  Exclusion Criteria  Has condition, other than heart failure, which would limit survival to < 2 years?: No  Cardiomyopathy with restrictive physiology, unless severe LV systolic failure and LV dilation present?: No  Technical obstacles that pose and inordinately high surgical risk in the judgment of the MCS surgeon?: No  Active systemic infection? (unless ALL of following criteria met: negative blood cultures x 2 days, antibiotic treatment x 7 days and Infectious Disease clearance pre-operatively): No  Presence of active malignancy AND life expectancy < 2 years?: No  Stroke within the last six weeks, unless cleared by neurology team: No  Diagnosed with severe, irreversible pulmonary disease (supplemental O2 usage, FEV1 < 50% predicted): No  Pulmonary hypertension as a primary diagnosis: No  Chronic dialysis: No  Evidence of significant peripheral vascular disease accompanied by resting leg pain or ulceration unless treatment plan is in place: No  Carotid artery disease (>80% extracranial stenosis on Doppler) that could result in an adverse neurological event if left untreated: No  Evidence of an untreated abdominal aortic aneurysm >5 cm as measured by abdominal ultrasounds within the last 180 days unless treatment plan in place: No  Severe or irreversible hepatic disease, evidence of shock liver, and/o biopsy-proven cirrhosis: No  Active contraindication to anticoagulation, INR >  2.5 in absence of concurrent anticoagulation therapy, platelet < 50,000, history of intolerance to anticoagulation, or other coagulopathy unless Hematology consulted and plan is in place: No  Acute valvular infective endocarditis with bacteremia: No  Neuromuscular disease, psychiatric diagnosis, dementia or other process that severely compromises ability to use and care for external system components or to ambulate/exercise: No  Inability to understand the procedure, risk involved, or to comply with follow-up evaluation by VAD team: No  For menstruating females: Current pregnancy or unable/unwilling to follow contraception plan: Not applicable  Relative Contraindications  Alcohol and/or recreational drug abuse within past six months: Yes (Comment: does use THC but does no have a disorder related - okay to proceed with LVAD - will trend to ensure has stopped)  Following Substance Abuse and Dependence Protocol: Yes  Significant history of depression or other mental illness, refractory to therapy or thought to be a risk to successful outcome with MCS, as per assessment of trained professional: No  Demonstrated on-going non-compliance with demonstrated inability to comply with medical recommendations on multiple occasions: No  Unsafe living environment or lack of adequate support system: No  Lack of adequate insurance coverage or waiver by hospital administration: No  Evidence of other ongoing physical, financial, or psychosocial issues that will preclude the intended goal of safety and improvements in quality and duration of life, unless a plan for resolution and support is in process: No  Multidisciplinary Decision  Decision: Approved Comment: will get negative THC screens and given blood group O planning on LVAD as BTT   Implant as: Destination Therapy

## 2022-08-26 ENCOUNTER — CARE COORDINATION (OUTPATIENT)
Dept: CARDIOLOGY | Facility: CLINIC | Age: 57
End: 2022-08-26

## 2022-08-26 LAB
MDC_IDC_LEAD_IMPLANT_DT: NORMAL
MDC_IDC_LEAD_LOCATION: NORMAL
MDC_IDC_LEAD_LOCATION_DETAIL_1: NORMAL
MDC_IDC_LEAD_MFG: NORMAL
MDC_IDC_LEAD_MODEL: NORMAL
MDC_IDC_LEAD_POLARITY_TYPE: NORMAL
MDC_IDC_LEAD_SERIAL: NORMAL
MDC_IDC_MSMT_BATTERY_DTM: NORMAL
MDC_IDC_MSMT_BATTERY_REMAINING_LONGEVITY: 48 MO
MDC_IDC_MSMT_BATTERY_REMAINING_PERCENTAGE: 67 %
MDC_IDC_MSMT_BATTERY_STATUS: NORMAL
MDC_IDC_MSMT_CAP_CHARGE_DTM: NORMAL
MDC_IDC_MSMT_CAP_CHARGE_DTM: NORMAL
MDC_IDC_MSMT_CAP_CHARGE_ENERGY: 31 J
MDC_IDC_MSMT_CAP_CHARGE_TIME: 12.3 S
MDC_IDC_MSMT_CAP_CHARGE_TIME: 7.2 S
MDC_IDC_MSMT_CAP_CHARGE_TYPE: NORMAL
MDC_IDC_MSMT_CAP_CHARGE_TYPE: NORMAL
MDC_IDC_MSMT_LEADCHNL_LV_IMPEDANCE_VALUE: 851 OHM
MDC_IDC_MSMT_LEADCHNL_LV_LEAD_CHANNEL_STATUS: NORMAL
MDC_IDC_MSMT_LEADCHNL_LV_SENSING_INTR_AMPL: 2.7 MV
MDC_IDC_MSMT_LEADCHNL_RA_IMPEDANCE_VALUE: 512 OHM
MDC_IDC_MSMT_LEADCHNL_RA_PACING_THRESHOLD_AMPLITUDE: 0.4 V
MDC_IDC_MSMT_LEADCHNL_RA_PACING_THRESHOLD_PULSEWIDTH: 0.5 MS
MDC_IDC_MSMT_LEADCHNL_RV_IMPEDANCE_VALUE: 380 OHM
MDC_IDC_MSMT_LEADCHNL_RV_PACING_THRESHOLD_AMPLITUDE: 1 V
MDC_IDC_MSMT_LEADCHNL_RV_PACING_THRESHOLD_PULSEWIDTH: 0.5 MS
MDC_IDC_PG_IMPLANT_DTM: NORMAL
MDC_IDC_PG_MFG: NORMAL
MDC_IDC_PG_MODEL: NORMAL
MDC_IDC_PG_SERIAL: NORMAL
MDC_IDC_PG_TYPE: NORMAL
MDC_IDC_SESS_CLINIC_NAME: NORMAL
MDC_IDC_SESS_DTM: NORMAL
MDC_IDC_SESS_TYPE: NORMAL
MDC_IDC_SET_BRADY_AT_MODE_SWITCH_MODE: NORMAL
MDC_IDC_SET_BRADY_AT_MODE_SWITCH_RATE: 170 {BEATS}/MIN
MDC_IDC_SET_BRADY_LOWRATE: 80 {BEATS}/MIN
MDC_IDC_SET_BRADY_MAX_TRACKING_RATE: 130 {BEATS}/MIN
MDC_IDC_SET_BRADY_MODE: NORMAL
MDC_IDC_SET_BRADY_PAV_DELAY_HIGH: 280 MS
MDC_IDC_SET_BRADY_PAV_DELAY_LOW: 300 MS
MDC_IDC_SET_BRADY_SAV_DELAY_HIGH: 280 MS
MDC_IDC_SET_BRADY_SAV_DELAY_LOW: 300 MS
MDC_IDC_SET_CRT_PACED_CHAMBERS: NORMAL
MDC_IDC_SET_LEADCHNL_LV_PACING_AMPLITUDE: 0.1 V
MDC_IDC_SET_LEADCHNL_LV_PACING_PULSEWIDTH: 0.1 MS
MDC_IDC_SET_LEADCHNL_LV_SENSING_ADAPTATION_MODE: NORMAL
MDC_IDC_SET_LEADCHNL_LV_SENSING_ANODE_ELECTRODE_1: NORMAL
MDC_IDC_SET_LEADCHNL_LV_SENSING_ANODE_LOCATION_1: NORMAL
MDC_IDC_SET_LEADCHNL_LV_SENSING_CATHODE_ELECTRODE_1: NORMAL
MDC_IDC_SET_LEADCHNL_LV_SENSING_CATHODE_LOCATION_1: NORMAL
MDC_IDC_SET_LEADCHNL_LV_SENSING_SENSITIVITY: 1 MV
MDC_IDC_SET_LEADCHNL_RA_PACING_AMPLITUDE: 1.5 V
MDC_IDC_SET_LEADCHNL_RA_PACING_POLARITY: NORMAL
MDC_IDC_SET_LEADCHNL_RA_PACING_PULSEWIDTH: 0.5 MS
MDC_IDC_SET_LEADCHNL_RA_SENSING_ADAPTATION_MODE: NORMAL
MDC_IDC_SET_LEADCHNL_RA_SENSING_POLARITY: NORMAL
MDC_IDC_SET_LEADCHNL_RA_SENSING_SENSITIVITY: 0.25 MV
MDC_IDC_SET_LEADCHNL_RV_PACING_AMPLITUDE: 2.8 V
MDC_IDC_SET_LEADCHNL_RV_PACING_POLARITY: NORMAL
MDC_IDC_SET_LEADCHNL_RV_PACING_PULSEWIDTH: 0.5 MS
MDC_IDC_SET_LEADCHNL_RV_SENSING_ADAPTATION_MODE: NORMAL
MDC_IDC_SET_LEADCHNL_RV_SENSING_POLARITY: NORMAL
MDC_IDC_SET_LEADCHNL_RV_SENSING_SENSITIVITY: 0.6 MV
MDC_IDC_SET_ZONE_DETECTION_INTERVAL: 273 MS
MDC_IDC_SET_ZONE_TYPE: NORMAL
MDC_IDC_SET_ZONE_VENDOR_TYPE: NORMAL
MDC_IDC_STAT_AT_BURDEN_PERCENT: 1 %
MDC_IDC_STAT_AT_DTM_END: NORMAL
MDC_IDC_STAT_AT_DTM_START: NORMAL
MDC_IDC_STAT_BRADY_DTM_END: NORMAL
MDC_IDC_STAT_BRADY_DTM_START: NORMAL
MDC_IDC_STAT_BRADY_RA_PERCENT_PACED: 80 %
MDC_IDC_STAT_BRADY_RV_PERCENT_PACED: 3 %
MDC_IDC_STAT_CRT_DTM_END: NORMAL
MDC_IDC_STAT_CRT_DTM_START: NORMAL
MDC_IDC_STAT_CRT_LV_PERCENT_PACED: 0 %
MDC_IDC_STAT_EPISODE_RECENT_COUNT: 0
MDC_IDC_STAT_EPISODE_RECENT_COUNT: 1
MDC_IDC_STAT_EPISODE_RECENT_COUNT_DTM_END: NORMAL
MDC_IDC_STAT_EPISODE_RECENT_COUNT_DTM_START: NORMAL
MDC_IDC_STAT_EPISODE_TYPE: NORMAL
MDC_IDC_STAT_EPISODE_VENDOR_TYPE: NORMAL
MDC_IDC_STAT_TACHYTHERAPY_ATP_DELIVERED_RECENT: 0
MDC_IDC_STAT_TACHYTHERAPY_ATP_DELIVERED_TOTAL: 3
MDC_IDC_STAT_TACHYTHERAPY_RECENT_DTM_END: NORMAL
MDC_IDC_STAT_TACHYTHERAPY_RECENT_DTM_START: NORMAL
MDC_IDC_STAT_TACHYTHERAPY_SHOCKS_ABORTED_RECENT: 0
MDC_IDC_STAT_TACHYTHERAPY_SHOCKS_ABORTED_TOTAL: 1
MDC_IDC_STAT_TACHYTHERAPY_SHOCKS_DELIVERED_RECENT: 0
MDC_IDC_STAT_TACHYTHERAPY_SHOCKS_DELIVERED_TOTAL: 2
MDC_IDC_STAT_TACHYTHERAPY_TOTAL_DTM_END: NORMAL
MDC_IDC_STAT_TACHYTHERAPY_TOTAL_DTM_START: NORMAL

## 2022-08-26 NOTE — PROGRESS NOTES
Routine substance abuse monitoring completed on 8/18/22, all results NEGATIVE. Patient and MD informed

## 2022-08-26 NOTE — PROGRESS NOTES
20 week post LVAD implant check  D:  Called pt to follow up and check in.  Pt reports many changes being made by Jennifer Vera, cardiology team.  Pt informed me of all the most recent changes and plan for repeated labs due next week.  Pt reports feeling well and has had stable VAD parameters.  Pt has had to skip cardiac rehab a few times due to abnormal labs recently, his decision.  Tej reports anxiety is better managed and he is sleeping well.  I:  Discussed current plan of care, follow up and meds.  Offered emotional support.  Discussed with Dr. Hdz pt's ongoing hypomagnesemia.  Per Dr. Hdz, referral placed for local nephrologist.  Encouraged pt to contact insurance for options for nephrologists locally.  A:  Follow up  P:  Pt verbalized understanding of the instructions given.  Will call VAD coordinator with further needs and questions.

## 2022-09-01 ENCOUNTER — ANTICOAGULATION THERAPY VISIT (OUTPATIENT)
Dept: ANTICOAGULATION | Facility: CLINIC | Age: 57
End: 2022-09-01

## 2022-09-01 DIAGNOSIS — Z95.811 LVAD (LEFT VENTRICULAR ASSIST DEVICE) PRESENT (H): Primary | ICD-10-CM

## 2022-09-01 DIAGNOSIS — I50.22 CHRONIC SYSTOLIC CHF (CONGESTIVE HEART FAILURE) (H): ICD-10-CM

## 2022-09-01 LAB
INR (EXTERNAL): 2.05 (ref 0.9–1.1)
INR HOME MONITORING: 2.6 (ref 2–3)

## 2022-09-01 NOTE — PROGRESS NOTES
4pm  INR result is 2.6 (within goal range), no contact with patient.  See recommendation.  NamRN        IV magnesium and IV iron infusions recently, restarted diuretic and potassium supplement for abd and ankle edema-reports decrease in edema. Tej will recheck an INR today to comply with his home monitoring companies requirements, if INR within goal range-no need to call him back-he will recheck again in two weeks with his home monitor.     ANTICOAGULATION MANAGEMENT     Tej Morfin 57 year old male is on warfarin with therapeutic INR result. (Goal INR 2.0-3.0)    Recent labs: (last 7 days)     08/29/22  0000   INR 2.05*       ASSESSMENT       Source(s): Chart Review and Patient/Caregiver Call       Warfarin doses taken: Warfarin taken as instructed    Diet: No new diet changes identified    New illness, injury, or hospitalization:  Yes  IV magnesium and IV iron infusions recently, restarted diuretic and potassium supplement for abd and ankle edema-reports decrease in edema. Tej will recheck an INR today to comply with his home monitoring [employer] requirements, if INR within goal range-no need to call him back-he will recheck again in two weeks with his home monitor.     Medication/supplement changes: restarted diuretic and potassium supplement    Signs or symptoms of bleeding or clotting: No    Previous INR: Subtherapeutic    Additional findings: None       PLAN     Recommended plan for ongoing change(s) affecting INR     Dosing Instructions: Continue your current warfarin dose with next INR in 2 weeks      Summary  As of 9/1/2022    Full warfarin instructions:  7.5 mg every Wed; 5 mg all other days   Next INR check:  9/15/2022             Telephone call with  Tej who verbalizes understanding and agrees to plan and who agrees to plan and repeated back plan correctly    Patient to recheck with home meter    Education provided: Goal range and significance of current result and Contact  [phone] with any changes, questions or concerns.     Plan made per [facility] anticoagulation protocol and per LVAD protocol    SAVANNA MO RN  Anticoagulation Clinic  9/1/2022    _______________________________________________________________________     Anticoagulation Episode Summary     Current INR goal:  2.0-3.0   TTR:  65.9 % (4.4 mo)   Target end date:  Indefinite   Send INR reminders to:  ANTICOAG LVAD    Indications    LVAD (left ventricular assist device) present (H) [Z95.811]  Chronic systolic CHF (congestive heart failure) (H) [I50.22]           Comments:  HeartMate 3   Bridging: No bridging. Reason -- new implant   Date VAD placed: 03/23/22  Hx Mitral valve repair and not replacement         Anticoagulation Care Providers     Provider Role Specialty Phone number    Dunia Hdz MD Referring Cardiovascular Disease [phone]

## 2022-09-11 ENCOUNTER — HEALTH MAINTENANCE LETTER (OUTPATIENT)
Age: 57
End: 2022-09-11

## 2022-09-13 ENCOUNTER — VIRTUAL VISIT (OUTPATIENT)
Dept: PALLIATIVE CARE | Facility: CLINIC | Age: 57
End: 2022-09-13
Attending: INTERNAL MEDICINE
Payer: MEDICARE

## 2022-09-13 DIAGNOSIS — I42.8 VALVULAR CARDIOMYOPATHY (H): Primary | ICD-10-CM

## 2022-09-13 DIAGNOSIS — Z76.82 HEART TRANSPLANT CANDIDATE: ICD-10-CM

## 2022-09-13 DIAGNOSIS — Z71.89 ADVANCE CARE PLANNING: ICD-10-CM

## 2022-09-13 DIAGNOSIS — Z95.811 LVAD (LEFT VENTRICULAR ASSIST DEVICE) PRESENT (H): ICD-10-CM

## 2022-09-13 PROCEDURE — 99204 OFFICE O/P NEW MOD 45 MIN: CPT | Mod: 95 | Performed by: INTERNAL MEDICINE

## 2022-09-13 PROCEDURE — 99497 ADVNCD CARE PLAN 30 MIN: CPT | Performed by: INTERNAL MEDICINE

## 2022-09-13 NOTE — PROGRESS NOTES
Palliative Care Outpatient Clinic      Patient ID:  Medical - He has NICM and severe MR s/p HMIII LVAD 3/2022; with plan to be waitlisted for transplant. Cannabis use has delayed eval until recently.    Social - Lives in [place] with his wife and teenage dtr.    Care Planning -       History:  History gathered today from: patient, family/loved ones, medical chart, [facility] records--cardiac rehab, cardiology notes    He is with his spouse Jessenia, whom he says he'd want to be his health care decision maker.  We met him in 2019, pre LVAD. He is now I believe being evaluated for heart transplantation.    Life with LVAD is going well. More active, more energy. Back in cardiac rehab. No major complications.    They do talk about these matters and Jessenia feels like she would be prepared for any decisions.    He expresses a general reluctance to be permanently dependent on others for his care.   He would not want us to keep him alive if he was totally dependent in his ADLs: feeding, transfers, bathroom, washing.  But he also wonders if he remained cognitively intact he might get used to that (being fully dependent in ADLs). To him, a big part of that decision would depnd on the specific burdens to his family: could Jessenia stop working to support him at home, how much burden would it be     PE: There were no vitals taken for this visit.   Wt Readings from Last 3 Encounters:   06/23/22 85.8 kg (189 lb 1.6 oz)   06/22/22 83.5 kg (184 lb)   05/05/22 82.4 kg (181 lb 9.6 oz)     Alert NAD  Clear sensorium full affect      Data reviewed:  I reviewed recent labs and imaging, my comments:  Cr 1.1  Toxicology labs noted  CT CAP March --nl except cardiomegaly    Echo    LVAD cannula note din the LV apex. At baseline the LV is severely dilated at  7.4cm. The visual ejection fraction is 15-20%. Severe diffuse hypokinesis is  present.  The right ventricle is normal size. Global right ventricular function is  moderately reduced.  The aortic  valve opens partially with each systole. There is mild AI.  Normal outflow graft velocity. Organized material noted anterior to the LV  apex. Unchanged from prior.     database reviewed: y      Impression & Recommendations:    58 yo with NICM s/p LVAD HMIII; now being evaluated for heart transplant  Extensive, disease specific care planning discussion with him as summarized above. Clearly Jessenia and he talk about such matters and he trusts her explicitly to make good decisions for him. Encouraged him to think about our discussion and make sure he gives Jessenia all the guidance he can in case she ever needs to. Glad things are going well with the HM. Follow-up with us prn.      Over 16 min discussion ACP matters today    Thank you for involving us in the patient's care.   Sancho Toth MD / Palliative Medicine / Text me via [facility].      Video-Visit Details    Video Start Time: 308p    Type of service:  Video Visit    Video End Time:3:28 PM    Originating Location (pt. Location): [place]    Distant Location (provider location):  Home    Arianna Maldonado      Platform used for Video Visit: [employer]

## 2022-09-13 NOTE — PROGRESS NOTES
Tej is a 57 year old who is being evaluated via a billable video visit.  Currently in [facility].     How would you like to obtain your AVS? MyChart  If the video visit is dropped, the invitation should be resent by: Text to cell phone: [phone]  Will anyone else be joining your video visit? Yes: wife. How would they like to receive their invitation? Text to cell phone: [phone]

## 2022-09-13 NOTE — LETTER
9/13/2022       RE: Tej Morfin  [address]  [place] [address]     Dear Colleague,    Thank you for referring your patient, Tej Morfin, to the [facility] at [facility]. Please see a copy of my visit note below.    Palliative Care Outpatient Clinic      Patient ID:  Medical - He has NICM and severe MR s/p HMIII LVAD 3/2022; with plan to be waitlisted for transplant. Cannabis use has delayed eval until recently.    Social - Lives in [place] with his wife and teenage dtr.    Care Planning -       History:  History gathered today from: patient, family/loved ones, medical chart, [facility] records--cardiac rehab, cardiology notes    He is with his spouse Jessenia, whom he says he'd want to be his health care decision maker.  We met him in 2019, pre LVAD. He is now I believe being evaluated for heart transplantation.    Life with LVAD is going well. More active, more energy. Back in cardiac rehab. No major complications.    They do talk about these matters and Jessenia feels like she would be prepared for any decisions.    He expresses a general reluctance to be permanently dependent on others for his care.   He would not want us to keep him alive if he was totally dependent in his ADLs: feeding, transfers, bathroom, washing.  But he also wonders if he remained cognitively intact he might get used to that (being fully dependent in ADLs). To him, a big part of that decision would depnd on the specific burdens to his family: could Jessenia stop working to support him at home, how much burden would it be     PE: There were no vitals taken for this visit.   Wt Readings from Last 3 Encounters:   06/23/22 85.8 kg (189 lb 1.6 oz)   06/22/22 83.5 kg (184 lb)   05/05/22 82.4 kg (181 lb 9.6 oz)     Alert NAD  Clear sensorium full affect      Data reviewed:  I reviewed recent labs and imaging, my comments:  Cr 1.1  Toxicology  labs noted  CT CAP March --nl except cardiomegaly    Echo    LVAD cannula note din the LV apex. At baseline the LV is severely dilated at  7.4cm. The visual ejection fraction is 15-20%. Severe diffuse hypokinesis is  present.  The right ventricle is normal size. Global right ventricular function is  moderately reduced.  The aortic valve opens partially with each systole. There is mild AI.  Normal outflow graft velocity. Organized material noted anterior to the LV  apex. Unchanged from prior.     database reviewed: y      Impression & Recommendations:    56 yo with NICM s/p LVAD HMIII; now being evaluated for heart transplant  Extensive, disease specific care planning discussion with him as summarized above. Clearly Jessenia and he talk about such matters and he trusts her explicitly to make good decisions for him. Encouraged him to think about our discussion and make sure he gives Jessenia all the guidance he can in case she ever needs to. Glad things are going well with the HM. Follow-up with us prn.      Over 16 min discussion ACP matters today    Thank you for involving us in the patient's care.     Sancho Toth MD / Palliative Medicine / Text me via [facility].

## 2022-09-15 ENCOUNTER — ANTICOAGULATION THERAPY VISIT (OUTPATIENT)
Dept: ANTICOAGULATION | Facility: CLINIC | Age: 57
End: 2022-09-15

## 2022-09-15 DIAGNOSIS — I50.22 CHRONIC SYSTOLIC CHF (CONGESTIVE HEART FAILURE) (H): ICD-10-CM

## 2022-09-15 DIAGNOSIS — Z95.811 LVAD (LEFT VENTRICULAR ASSIST DEVICE) PRESENT (H): Primary | ICD-10-CM

## 2022-09-15 LAB — INR HOME MONITORING: 2.3 (ref 2–3)

## 2022-09-15 NOTE — PROGRESS NOTES
ANTICOAGULATION MANAGEMENT     Tej SWENSON Sipsey 57 year old male is on warfarin with therapeutic INR result. (Goal INR 2.0-3.0)    Recent labs: (last 7 days)     09/15/22  0000   INR 2.3       ASSESSMENT       Source(s): Chart Review and Patient/Caregiver Call       Warfarin doses taken: Warfarin taken as instructed    Diet: No new diet changes identified    New illness, injury, or hospitalization: No    Medication/supplement changes: None noted    Signs or symptoms of bleeding or clotting: No    Previous INR: Therapeutic last 2(+) visits    Additional findings: None       PLAN     Recommended plan for no diet, medication or health factor changes affecting INR     Dosing Instructions: Continue your current warfarin dose with next INR in 2 weeks       Summary  As of 9/15/2022    Full warfarin instructions:  7.5 mg every Wed; 5 mg all other days   Next INR check:  9/29/2022             Telephone call with Tej who verbalizes understanding and agrees to plan    Patient to recheck with home meter    Education provided: Goal range and significance of current result    Plan made per [facility] anticoagulation protocol and per LVAD protocol    Rosario Dyer RN  Anticoagulation Clinic  9/15/2022    _______________________________________________________________________     Anticoagulation Episode Summary     Current INR goal:  2.0-3.0   TTR:  69.7 % (5 mo)   Target end date:  Indefinite   Send INR reminders to:  ANTICOAG LVAD    Indications    LVAD (left ventricular assist device) present (H) [Z95.811]  Chronic systolic CHF (congestive heart failure) (H) [I50.22]           Comments:  HeartMate 3   Bridging: No bridging. Reason -- new implant   Date VAD placed: 03/23/22  Hx Mitral valve repair and not replacement         Anticoagulation Care Providers     Provider Role Specialty Phone number    Dunia Hdz MD Referring Cardiovascular Disease [phone]

## 2022-09-20 ENCOUNTER — CARE COORDINATION (OUTPATIENT)
Dept: TRANSPLANT | Facility: CLINIC | Age: 57
End: 2022-09-20

## 2022-09-20 DIAGNOSIS — I42.9 CARDIOMYOPATHY (H): Primary | ICD-10-CM

## 2022-09-20 RX ORDER — LIDOCAINE 40 MG/G
CREAM TOPICAL
Status: CANCELLED | OUTPATIENT
Start: 2022-09-20

## 2022-09-20 NOTE — PROGRESS NOTES
Transplant Teaching 9/20/22    Writer met with patient and wife Jessenia via video visit,  to complete heart transplant teaching. We reviewed the candidate selection process, insurance prior authorization, UNOS priority statuses, the waiting period, donor issues, and the pre-, roge-, and post-op periods. We also reviewed the major risks of transplantation including rejection, infection and transplant vasculopathy. We  discussed patient and caregiver responsibilities before and after transplant including the need for patient to identify a caregiver to be present for education and to assist patient post discharge. The patient was informed of the weight and chemical cessation policy and agrees to these requirements.      Throughout the session, the patient and his wife were attentive, eager to learn, and asked appropriate questions.  Patient  was given a copy of the UNOS brochure on Multiple Waitlisting, the  Heart Transplant brochure and current program statistics.  Patient was also encouraged to visit the informational transplant education website for further information and video classes ([email or web address]).      Encouraged pt to contact the pre-heart transplant coordinators via [employer] message.    In follow up, the patient was instructed on completing the following items for his/her transplant evaluation:  Dental appointment  Evaluation appointments scheduled for Thursday

## 2022-09-21 ENCOUNTER — TELEPHONE (OUTPATIENT)
Dept: CARDIOLOGY | Facility: CLINIC | Age: 57
End: 2022-09-21

## 2022-09-21 DIAGNOSIS — I50.22 CHRONIC SYSTOLIC CONGESTIVE HEART FAILURE (H): ICD-10-CM

## 2022-09-21 DIAGNOSIS — I50.22 CHRONIC SYSTOLIC CHF (CONGESTIVE HEART FAILURE) (H): Primary | ICD-10-CM

## 2022-09-21 DIAGNOSIS — I47.20 VENTRICULAR TACHYCARDIA (H): ICD-10-CM

## 2022-09-21 RX ORDER — AMOXICILLIN 500 MG/1
2000 CAPSULE ORAL ONCE
Qty: 4 CAPSULE | Refills: 3 | Status: SHIPPED | OUTPATIENT
Start: 2022-09-21 | End: 2022-09-21

## 2022-09-21 RX ORDER — MAGNESIUM OXIDE 400 MG/1
400 TABLET ORAL 2 TIMES DAILY
COMMUNITY
Start: 2022-08-24 | End: 2022-12-14 | Stop reason: DRUGHIGH

## 2022-09-21 RX ORDER — FUROSEMIDE 40 MG
20 TABLET ORAL 2 TIMES DAILY
COMMUNITY
Start: 2022-08-30 | End: 2022-12-14 | Stop reason: DRUGHIGH

## 2022-09-21 RX ORDER — HYDROXYZINE PAMOATE 50 MG/1
50 CAPSULE ORAL EVERY 12 HOURS PRN
COMMUNITY
Start: 2022-08-22

## 2022-09-21 RX ORDER — NITROGLYCERIN 0.4 MG/1
0.4 TABLET SUBLINGUAL EVERY 5 MIN PRN
COMMUNITY
Start: 2022-08-10

## 2022-09-21 RX ORDER — POTASSIUM CHLORIDE 1500 MG/1
20 TABLET, EXTENDED RELEASE ORAL 2 TIMES DAILY
COMMUNITY
Start: 2022-08-30 | End: 2022-12-14 | Stop reason: DRUGHIGH

## 2022-09-21 NOTE — TELEPHONE ENCOUNTER
Call complete for pre procedure reminder and updated visitor policy.  9/20/22 COVID Negative on home test.   Kym Milton RN

## 2022-09-21 NOTE — TELEPHONE ENCOUNTER
Spoke with pt informing his 9/26 Dr Marcos scherer needed to be moved to 430. Pt will be speaking with his wife to make sure they can do that time and will call to change it if need be. Otherwise pt verbalized understanding and agreed to the plan.

## 2022-09-22 ENCOUNTER — TELEPHONE (OUTPATIENT)
Dept: CARDIOLOGY | Facility: CLINIC | Age: 57
End: 2022-09-22

## 2022-09-22 ENCOUNTER — LAB (OUTPATIENT)
Dept: LAB | Facility: CLINIC | Age: 57
End: 2022-09-22
Attending: INTERNAL MEDICINE

## 2022-09-22 ENCOUNTER — HOSPITAL ENCOUNTER (OUTPATIENT)
Dept: CARDIOLOGY | Facility: CLINIC | Age: 57
Discharge: HOME | End: 2022-09-22
Attending: INTERNAL MEDICINE
Payer: MEDICARE

## 2022-09-22 ENCOUNTER — APPOINTMENT (OUTPATIENT)
Dept: MEDSURG UNIT | Facility: CLINIC | Age: 57
End: 2022-09-22
Attending: INTERNAL MEDICINE
Payer: MEDICARE

## 2022-09-22 ENCOUNTER — ANCILLARY PROCEDURE (OUTPATIENT)
Dept: CT IMAGING | Facility: CLINIC | Age: 57
End: 2022-09-22
Attending: INTERNAL MEDICINE
Payer: MEDICARE

## 2022-09-22 ENCOUNTER — HOSPITAL ENCOUNTER (OUTPATIENT)
Facility: CLINIC | Age: 57
Discharge: HOME | End: 2022-09-22
Attending: INTERNAL MEDICINE | Admitting: INTERNAL MEDICINE
Payer: MEDICARE

## 2022-09-22 ENCOUNTER — ANCILLARY PROCEDURE (OUTPATIENT)
Dept: CARDIOLOGY | Facility: CLINIC | Age: 57
End: 2022-09-22
Attending: INTERNAL MEDICINE
Payer: MEDICARE

## 2022-09-22 VITALS
DIASTOLIC BLOOD PRESSURE: 67 MMHG | SYSTOLIC BLOOD PRESSURE: 94 MMHG | HEIGHT: 70 IN | HEART RATE: 76 BPM | BODY MASS INDEX: 30.46 KG/M2 | OXYGEN SATURATION: 97 % | RESPIRATION RATE: 18 BRPM | WEIGHT: 212.74 LBS | TEMPERATURE: 97.5 F

## 2022-09-22 DIAGNOSIS — I50.22 CHRONIC SYSTOLIC CHF (CONGESTIVE HEART FAILURE) (H): ICD-10-CM

## 2022-09-22 DIAGNOSIS — I42.9 CARDIOMYOPATHY (H): ICD-10-CM

## 2022-09-22 DIAGNOSIS — Z95.811 LVAD (LEFT VENTRICULAR ASSIST DEVICE) PRESENT (H): ICD-10-CM

## 2022-09-22 DIAGNOSIS — I50.22 CHRONIC SYSTOLIC CONGESTIVE HEART FAILURE (H): ICD-10-CM

## 2022-09-22 DIAGNOSIS — Z95.811 LVAD (LEFT VENTRICULAR ASSIST DEVICE) PRESENT (H): Primary | ICD-10-CM

## 2022-09-22 DIAGNOSIS — I47.20 VENTRICULAR TACHYCARDIA (H): ICD-10-CM

## 2022-09-22 LAB
ALBUMIN SERPL BCG-MCNC: 3.9 G/DL (ref 3.5–5.2)
ALBUMIN UR-MCNC: NEGATIVE MG/DL
ALP SERPL-CCNC: 83 U/L (ref 40–129)
ALT SERPL W P-5'-P-CCNC: 15 U/L (ref 10–50)
AMPHETAMINES UR QL SCN: NORMAL
ANION GAP SERPL CALCULATED.3IONS-SCNC: 10 MMOL/L (ref 7–15)
APPEARANCE UR: CLEAR
AST SERPL W P-5'-P-CCNC: 23 U/L (ref 10–50)
BARBITURATES UR QL SCN: NORMAL
BASOPHILS # BLD AUTO: 0 10E3/UL (ref 0–0.2)
BASOPHILS NFR BLD AUTO: 1 %
BENZODIAZ UR QL SCN: NORMAL
BILIRUB SERPL-MCNC: 0.3 MG/DL
BILIRUB UR QL STRIP: NEGATIVE
BUN SERPL-MCNC: 16.7 MG/DL (ref 6–20)
BZE UR QL SCN: NORMAL
CALCIUM SERPL-MCNC: 9.1 MG/DL (ref 8.6–10)
CANNABINOIDS UR QL SCN: NORMAL
CHLORIDE SERPL-SCNC: 107 MMOL/L (ref 98–107)
COLOR UR AUTO: YELLOW
CREAT SERPL-MCNC: 1.07 MG/DL (ref 0.67–1.17)
D DIMER PPP FEU-MCNC: 1.08 UG/ML FEU (ref 0–0.5)
DEPRECATED HCO3 PLAS-SCNC: 25 MMOL/L (ref 22–29)
EOSINOPHIL # BLD AUTO: 0 10E3/UL (ref 0–0.7)
EOSINOPHIL NFR BLD AUTO: 1 %
ERYTHROCYTE [DISTWIDTH] IN BLOOD BY AUTOMATED COUNT: 18.4 % (ref 10–15)
ETHANOL UR QL SCN: NORMAL
GFR SERPL CREATININE-BSD FRML MDRD: 82 ML/MIN/1.73M2
GLUCOSE SERPL-MCNC: 86 MG/DL (ref 70–99)
GLUCOSE UR STRIP-MCNC: NEGATIVE MG/DL
HAV IGG SER QL IA: NONREACTIVE
HCT VFR BLD AUTO: 40 % (ref 40–53)
HCV AB SERPL QL IA: NONREACTIVE
HGB BLD-MCNC: 12.5 G/DL (ref 13.3–17.7)
HGB BLD-MCNC: 12.9 G/DL (ref 13.3–17.7)
HGB UR QL STRIP: ABNORMAL
HIV 1+2 AB+HIV1 P24 AG SERPL QL IA: NONREACTIVE
IMM GRANULOCYTES # BLD: 0 10E3/UL
IMM GRANULOCYTES NFR BLD: 1 %
INR PPP: 1.76 (ref 0.85–1.15)
KETONES UR STRIP-MCNC: NEGATIVE MG/DL
LEUKOCYTE ESTERASE UR QL STRIP: NEGATIVE
LVEF ECHO: NORMAL
LYMPHOCYTES # BLD AUTO: 1.1 10E3/UL (ref 0.8–5.3)
LYMPHOCYTES NFR BLD AUTO: 34 %
MAGNESIUM SERPL-MCNC: 1.6 MG/DL (ref 1.7–2.3)
MCH RBC QN AUTO: 29.2 PG (ref 26.5–33)
MCHC RBC AUTO-ENTMCNC: 32.3 G/DL (ref 31.5–36.5)
MCV RBC AUTO: 91 FL (ref 78–100)
MONOCYTES # BLD AUTO: 0.3 10E3/UL (ref 0–1.3)
MONOCYTES NFR BLD AUTO: 9 %
MUCOUS THREADS #/AREA URNS LPF: PRESENT /LPF
NEUTROPHILS # BLD AUTO: 1.7 10E3/UL (ref 1.6–8.3)
NEUTROPHILS NFR BLD AUTO: 54 %
NITRATE UR QL: NEGATIVE
NRBC # BLD AUTO: 0 10E3/UL
NRBC BLD AUTO-RTO: 0 /100
OPIATES UR QL SCN: NORMAL
OXYHGB MFR BLDV: 60 % (ref 92–100)
PCP QUAL URINE (ROCHE): NORMAL
PH UR STRIP: 6 [PH] (ref 5–7)
PLATELET # BLD AUTO: 158 10E3/UL (ref 150–450)
POTASSIUM SERPL-SCNC: 3.9 MMOL/L (ref 3.4–5.3)
PROT SERPL-MCNC: 6.9 G/DL (ref 6.4–8.3)
RBC # BLD AUTO: 4.42 10E6/UL (ref 4.4–5.9)
RBC URINE: 2 /HPF
SODIUM SERPL-SCNC: 142 MMOL/L (ref 136–145)
SP GR UR STRIP: 1.01 (ref 1–1.03)
T PALLIDUM AB SER QL: NONREACTIVE
UROBILINOGEN UR STRIP-MCNC: NORMAL MG/DL
WBC # BLD AUTO: 3.2 10E3/UL (ref 4–11)
WBC URINE: <1 /HPF

## 2022-09-22 PROCEDURE — 999N000142 HC STATISTIC PROCEDURE PREP ONLY

## 2022-09-22 PROCEDURE — 86833 HLA CLASS II HIGH DEFIN QUAL: CPT | Performed by: INTERNAL MEDICINE

## 2022-09-22 PROCEDURE — 93306 TTE W/DOPPLER COMPLETE: CPT | Mod: 26 | Performed by: INTERNAL MEDICINE

## 2022-09-22 PROCEDURE — 250N000009 HC RX 250: Performed by: INTERNAL MEDICINE

## 2022-09-22 PROCEDURE — 85018 HEMOGLOBIN: CPT

## 2022-09-22 PROCEDURE — 82810 BLOOD GASES O2 SAT ONLY: CPT

## 2022-09-22 PROCEDURE — 86832 HLA CLASS I HIGH DEFIN QUAL: CPT | Performed by: INTERNAL MEDICINE

## 2022-09-22 PROCEDURE — 93284 PRGRMG EVAL IMPLANTABLE DFB: CPT | Performed by: INTERNAL MEDICINE

## 2022-09-22 PROCEDURE — 272N000001 HC OR GENERAL SUPPLY STERILE: Performed by: INTERNAL MEDICINE

## 2022-09-22 PROCEDURE — 93306 TTE W/DOPPLER COMPLETE: CPT

## 2022-09-22 PROCEDURE — 999N000132 HC STATISTIC PP CARE STAGE 1

## 2022-09-22 PROCEDURE — 86803 HEPATITIS C AB TEST: CPT | Performed by: INTERNAL MEDICINE

## 2022-09-22 PROCEDURE — 93451 RIGHT HEART CATH: CPT | Performed by: INTERNAL MEDICINE

## 2022-09-22 PROCEDURE — 93451 RIGHT HEART CATH: CPT | Mod: 26 | Performed by: INTERNAL MEDICINE

## 2022-09-22 PROCEDURE — 70450 CT HEAD/BRAIN W/O DYE: CPT | Mod: MG | Performed by: RADIOLOGY

## 2022-09-22 PROCEDURE — 85379 FIBRIN DEGRADATION QUANT: CPT | Performed by: INTERNAL MEDICINE

## 2022-09-22 PROCEDURE — G1010 CDSM STANSON: HCPCS | Mod: GC | Performed by: RADIOLOGY

## 2022-09-22 PROCEDURE — 86708 HEPATITIS A ANTIBODY: CPT | Performed by: INTERNAL MEDICINE

## 2022-09-22 PROCEDURE — 80307 DRUG TEST PRSMV CHEM ANLYZR: CPT | Performed by: INTERNAL MEDICINE

## 2022-09-22 PROCEDURE — 86780 TREPONEMA PALLIDUM: CPT | Performed by: INTERNAL MEDICINE

## 2022-09-22 PROCEDURE — 83615 LACTATE (LD) (LDH) ENZYME: CPT | Performed by: INTERNAL MEDICINE

## 2022-09-22 RX ORDER — LIDOCAINE 40 MG/G
CREAM TOPICAL
Status: COMPLETED | OUTPATIENT
Start: 2022-09-22 | End: 2022-09-22

## 2022-09-22 RX ADMIN — LIDOCAINE: 40 CREAM TOPICAL at 12:07

## 2022-09-22 ASSESSMENT — ACTIVITIES OF DAILY LIVING (ADL)
ADLS_ACUITY_SCORE: 35

## 2022-09-22 NOTE — PATIENT INSTRUCTIONS
It was a pleasure to see you in clinic today. Please do not hesitate to call with any questions or concerns. You are scheduled for a remote transmission on 12/12/22. We look forward to seeing you in clinic at your next device check in 6 months.     Bharati Hughes, BRENDA  Electrophysiology Nurse Clinician  [employer]    During business hours please call: [phone]  After hours please call: [phone] - select option #4 and ask for job code [id number]

## 2022-09-22 NOTE — DISCHARGE INSTRUCTIONS
[facility]                        Interventional Cardiology  Discharge Instructions   Post Right Heart Cath      AFTER YOU GO HOME:  DO drink plenty of fluids  DO resume your regular diet and medications unless otherwise instructed by your Primary Physician  Do Not scrub the procedure site vigorously  No lotion or powder to the puncture site for 3 days    CALL YOUR PRIMARY PHYSICIAN IF: You may resume all normal activity.  Monitor neck site for bleeding, swelling, or voice changes. If you notice bleeding or swelling immediately apply pressure to the site and call number below to speak with Cardiology Fellow.  If you experience any changes in your breathing you should call your doctor immediately or come to the closest Emergency Department.  Do not drive yourself.    ADDITIONAL INSTRUCTIONS: Medications: You are to resume all home medications including anticoagulation therapy unless otherwise advised by your primary cardiologist or nurse coordinator.    Follow Up: Per your primary cardiology team    If you have any questions or concerns regarding your procedure site please call [phone] at anytime and ask for Cardiology Fellow on call.  They are available 24 hours a day.  You may also contact the Cardiology Clinic after hours number at [phone].                                                       Telephone Numbers [phone] Monday-Friday 8:00 am to 4:30 pm    [phone] After 4:30 pm Monday-Friday, Weekends & Holidays  Ask for Interventional Cardiologist on call. Someone is on call 24 hours/day   [facility] toll free number [phone] Monday-Friday 8:00 am to 4:30 pm   [facility] Emergency Dept [phone]

## 2022-09-22 NOTE — PROGRESS NOTES
Pt arrived to  for RHC. AVSS. Prep complete, LMX applied to R neck. Awaiting consent at this time. Pt spouse, Allyson, will provide transportation post procedure.

## 2022-09-22 NOTE — PROGRESS NOTES
AVSS, pt eating, drinking, ambulated with this RN, denies pain. Discharge instructions provided and all questions answered prior to discharge. Pt met with Dr. Hdz and Device RN for ICD interrogation prior to discharge.

## 2022-09-22 NOTE — PROGRESS NOTES
Pt returned to unit post RHC. AVSS, denies pain, RIJ open to air on arrival, dressed with primapore, CDI with no bleeding or hematoma.

## 2022-09-23 ENCOUNTER — ANTICOAGULATION THERAPY VISIT (OUTPATIENT)
Dept: ANTICOAGULATION | Facility: CLINIC | Age: 57
End: 2022-09-23

## 2022-09-23 DIAGNOSIS — Z95.811 LVAD (LEFT VENTRICULAR ASSIST DEVICE) PRESENT (H): Primary | ICD-10-CM

## 2022-09-23 DIAGNOSIS — I50.22 CHRONIC SYSTOLIC CHF (CONGESTIVE HEART FAILURE) (H): ICD-10-CM

## 2022-09-23 LAB
LDH SERPL L TO P-CCNC: 244 U/L (ref 0–250)
Lab: NORMAL
PERFORMING LABORATORY: NORMAL
SPECIMEN STATUS: NORMAL
TEST NAME: NORMAL

## 2022-09-23 NOTE — PROGRESS NOTES
ANTICOAGULATION MANAGEMENT     Tej Morfin 57 year old male is on warfarin with subtherapeutic INR result. (Goal INR 2.0-3.0)    Recent labs: (last 7 days)     09/22/22  1040   INR 1.76*       ASSESSMENT       Source(s): Patient/Caregiver Call       Warfarin doses taken: Warfarin taken as instructed    Diet: No new diet changes identified    New illness, injury, or hospitalization: No    Medication/supplement changes: None noted    Signs or symptoms of bleeding or clotting: No    Previous INR: Therapeutic last 2(+) visits    Additional findings: None       PLAN     Recommended plan for no diet, medication or health factor changes affecting INR     Dosing Instructions: booster dose then continue your current warfarin dose with next INR in 1 week       Summary  As of 9/23/2022    Full warfarin instructions:  9/23: 7.5 mg; Otherwise 7.5 mg every Wed; 5 mg all other days   Next INR check:  9/29/2022             Telephone call with Tej who verbalizes understanding and agrees to plan and who agrees to plan and repeated back plan correctly    Patient to recheck with home meter    Education provided: None required     Plan made per [facility] anticoagulation protocol and per LVAD protocol    Geneva Martinez RN  Anticoagulation Clinic  9/23/2022    _______________________________________________________________________     Anticoagulation Episode Summary     Current INR goal:  2.0-3.0   TTR:  69.0 % (5.3 mo)   Target end date:  Indefinite   Send INR reminders to:  ANTICOAG LVAD    Indications    LVAD (left ventricular assist device) present (H) [Z95.811]  Chronic systolic CHF (congestive heart failure) (H) [I50.22]           Comments:  HeartMate 3   Bridging: No bridging. Reason -- new implant   Date VAD placed: 03/23/22  Hx Mitral valve repair and not replacement         Anticoagulation Care Providers     Provider Role Specialty Phone number    Dunia Hdz MD Referring Cardiovascular Disease [phone]

## 2022-09-24 LAB
PLPETH BLD-MCNC: <10 NG/ML
POPETH BLD-MCNC: <10 NG/ML

## 2022-09-25 LAB
COTININE SERPL-MCNC: <5 NG/ML
NICOTINE SERPL-MCNC: <5 NG/ML
SA 1 CELL: NORMAL
SA 1 TEST METHOD: NORMAL
SA 2 CELL: NORMAL
SA 2 TEST METHOD: NORMAL
SA1 HI RISK ABY: NORMAL
SA1 MOD RISK ABY: NORMAL
SA2 HI RISK ABY: NORMAL
SA2 MOD RISK ABY: NORMAL
UNACCEPTABLE ANTIGENS: NORMAL
UNOS CPRA: 0
ZZZSA 1  COMMENTS: NORMAL
ZZZSA 2 COMMENTS: NORMAL

## 2022-09-26 ENCOUNTER — CARE COORDINATION (OUTPATIENT)
Dept: TRANSPLANT | Facility: CLINIC | Age: 57
End: 2022-09-26

## 2022-09-27 ENCOUNTER — TELEPHONE (OUTPATIENT)
Dept: TRANSPLANT | Facility: CLINIC | Age: 57
End: 2022-09-27

## 2022-09-27 ENCOUNTER — CARE COORDINATION (OUTPATIENT)
Dept: CARDIOLOGY | Facility: CLINIC | Age: 57
End: 2022-09-27

## 2022-09-27 NOTE — PROGRESS NOTES
Routine substance abuse monitoring completed on 09/22/22, all results NEGATIVE. Patient and MD informed

## 2022-09-27 NOTE — PROGRESS NOTES
Called pt to discuss recent right ear, fluid collection, found on the head CT on 9/22.  Pt reports that he has had some ongoing hearing problems and has seen an ENT once for this.  He reports that his concerns have not resolved and that he will contact the ENT for another follow up appointment and for them to review the CT from 9/22/22.  No further follow up required

## 2022-09-28 LAB — LABCORP INTERFACED MISCELLANEOUS TEST RESULT: NORMAL

## 2022-09-29 ENCOUNTER — ANTICOAGULATION THERAPY VISIT (OUTPATIENT)
Dept: ANTICOAGULATION | Facility: CLINIC | Age: 57
End: 2022-09-29

## 2022-09-29 DIAGNOSIS — I50.22 CHRONIC SYSTOLIC CHF (CONGESTIVE HEART FAILURE) (H): ICD-10-CM

## 2022-09-29 DIAGNOSIS — Z95.811 LVAD (LEFT VENTRICULAR ASSIST DEVICE) PRESENT (H): Primary | ICD-10-CM

## 2022-09-29 LAB — INR HOME MONITORING: 2.5 (ref 2–3)

## 2022-09-29 NOTE — PROGRESS NOTES
ANTICOAGULATION MANAGEMENT     Tej SWENSON [place] 57 year old male is on warfarin with therapeutic INR result. (Goal INR 2.0-3.0)    Recent labs: (last 7 days)     09/29/22  0000   INR 2.5       ASSESSMENT       Source(s): Chart Review and Patient/Caregiver Call       Warfarin doses taken: Warfarin taken as instructed    Diet: No new diet changes identified    New illness, injury, or hospitalization: No    Medication/supplement changes: None noted    Signs or symptoms of bleeding or clotting: No    Previous INR: Subtherapeutic    Additional findings: Last INR reading was done venous draw       PLAN     Recommended plan for no diet, medication or health factor changes affecting INR     Dosing Instructions: Continue your current warfarin dose with next INR in 1 week       Summary  As of 9/29/2022    Full warfarin instructions:  7.5 mg every Wed; 5 mg all other days   Next INR check:  10/6/2022             Telephone call with Tej who verbalizes understanding and agrees to plan and who agrees to plan and repeated back plan correctly    Patient to recheck with home meter    Education provided: Goal range and significance of current result and Importance of therapeutic range    Plan made per [facility] anticoagulation protocol and per LVAD protocol    Audelia Avalos RN  Anticoagulation Clinic  9/29/2022    _______________________________________________________________________     Anticoagulation Episode Summary     Current INR goal:  2.0-3.0   TTR:  69.0 % (5.5 mo)   Target end date:  Indefinite   Send INR reminders to:  [facility] LVAD    Indications    LVAD (left ventricular assist device) present (H) [Z95.811]  Chronic systolic CHF (congestive heart failure) (H) [I50.22]           Comments:  HeartMate 3   Bridging: No bridging. Reason -- new implant   Date VAD placed: 03/23/22  Hx Mitral valve repair and not replacement         Anticoagulation Care Providers     Provider Role Specialty Phone number    Dunia Hdz  MD Telma Referring Cardiovascular Disease [phone]

## 2022-10-04 DIAGNOSIS — I50.22 CHRONIC SYSTOLIC CHF (CONGESTIVE HEART FAILURE) (H): Primary | ICD-10-CM

## 2022-10-04 DIAGNOSIS — I50.22 CHRONIC SYSTOLIC CONGESTIVE HEART FAILURE (H): ICD-10-CM

## 2022-10-06 ENCOUNTER — ANTICOAGULATION THERAPY VISIT (OUTPATIENT)
Dept: ANTICOAGULATION | Facility: CLINIC | Age: 57
End: 2022-10-06

## 2022-10-06 DIAGNOSIS — I50.22 CHRONIC SYSTOLIC CHF (CONGESTIVE HEART FAILURE) (H): ICD-10-CM

## 2022-10-06 DIAGNOSIS — Z95.811 LVAD (LEFT VENTRICULAR ASSIST DEVICE) PRESENT (H): Primary | ICD-10-CM

## 2022-10-06 LAB — INR HOME MONITORING: 2.1 (ref 2–3)

## 2022-10-06 NOTE — PROGRESS NOTES
ANTICOAGULATION MANAGEMENT     Tej Morfin 57 year old male is on warfarin with therapeutic INR result. (Goal INR 2.0-3.0)    Recent labs: (last 7 days)     10/06/22  0000   INR 2.1       ASSESSMENT       Source(s): Chart Review and Patient/Caregiver Call       Warfarin doses taken: Warfarin taken as instructed    Diet: No new diet changes identified    New illness, injury, or hospitalization: No    Medication/supplement changes: None noted    Signs or symptoms of bleeding or clotting: No    Previous INR: Therapeutic last visit; previously outside of goal range    Additional findings: None       PLAN     Recommended plan for no diet, medication or health factor changes affecting INR     Dosing Instructions: Continue your current warfarin dose with next INR in 1 week.  If therapeutic next week, Tej would like to go 2 weeks between checks.       Summary  As of 10/6/2022    Full warfarin instructions:  7.5 mg every Wed; 5 mg all other days   Next INR check:  10/13/2022             Telephone call with Tej who agrees to plan and repeated back plan correctly    Patient to recheck with home meter    Education provided: Please call back if any changes to your diet, medications or how you've been taking warfarin and Contact [phone] with any changes, questions or concerns.     Plan made per [facility] anticoagulation protocol and per LVAD protocol    Lizabeth Milton RN  Anticoagulation Clinic  10/6/2022    _______________________________________________________________________     Anticoagulation Episode Summary     Current INR goal:  2.0-3.0   TTR:  70.3 % (5.7 mo)   Target end date:  Indefinite   Send INR reminders to:  [facility] LVAD    Indications    LVAD (left ventricular assist device) present (H) [Z95.811]  Chronic systolic CHF (congestive heart failure) (H) [I50.22]           Comments:  HeartMate 3   Bridging: No bridging. Reason -- new implant   Date VAD placed: 03/23/22  Hx Mitral valve repair and not  replacement         Anticoagulation Care Providers     Provider Role Specialty Phone number    Dunia Hdz MD Referring Cardiovascular Disease [phone]

## 2022-10-13 ENCOUNTER — ANTICOAGULATION THERAPY VISIT (OUTPATIENT)
Dept: ANTICOAGULATION | Facility: CLINIC | Age: 57
End: 2022-10-13

## 2022-10-13 ENCOUNTER — TRANSFERRED RECORDS (OUTPATIENT)
Dept: HEALTH INFORMATION MANAGEMENT | Facility: CLINIC | Age: 57
End: 2022-10-13

## 2022-10-13 ENCOUNTER — CARE COORDINATION (OUTPATIENT)
Dept: CARDIOLOGY | Facility: CLINIC | Age: 57
End: 2022-10-13

## 2022-10-13 DIAGNOSIS — I50.22 CHRONIC SYSTOLIC CHF (CONGESTIVE HEART FAILURE) (H): ICD-10-CM

## 2022-10-13 DIAGNOSIS — Z95.811 LVAD (LEFT VENTRICULAR ASSIST DEVICE) PRESENT (H): Primary | ICD-10-CM

## 2022-10-13 LAB — INR HOME MONITORING: 3.2 (ref 2–3)

## 2022-10-13 NOTE — PROGRESS NOTES
D:  Pt called to report weight 213 up from from 210-211.  Notes swelling to ankles and abd feels distended.  States his appetite is decreased today.  Pt says he was seen in the Cardiology clinic in [place] yesterday.  Reports they were waiting on his pulmonary tests before they ordered labs or made any changes.  I:  Instructed pt to call HF team in [place] as they were the most recent team to see him.  A:  Pt verbalized understanding.  P:  VAD coordinator available for questions or concerns.

## 2022-10-13 NOTE — PROGRESS NOTES
ANTICOAGULATION MANAGEMENT     Tej Morfin 57 year old male is on warfarin with supratherapeutic INR result. (Goal INR 2.0-3.0)    Recent labs: (last 7 days)     10/13/22  0000   INR 3.2*       ASSESSMENT       Source(s): Chart Review and Patient/Caregiver Call       Warfarin doses taken: Warfarin taken as instructed    Diet: No new diet changes identified    New illness, injury, or hospitalization: Tej reports that he was having some leg swelling earlier in the week and felt he was retaining fluid. Seems better now.    Medication/supplement changes: None noted    Signs or symptoms of bleeding or clotting: No    Previous INR: Therapeutic last 2(+) visits    Additional findings: None       PLAN     Recommended plan for temporary change(s) affecting INR     Dosing Instructions: partial hold then continue your current warfarin dose with next INR in 1 week       Summary  As of 10/13/2022    Full warfarin instructions:  10/13: 2.5 mg; Otherwise 7.5 mg every Wed; 5 mg all other days   Next INR check:  10/20/2022             Telephone call with Tej who verbalizes understanding and agrees to plan    Patient to recheck with home meter    Education provided: Goal range and significance of current result    Plan made per [facility] anticoagulation protocol and per LVAD protocol    Rosario Dyer RN  Anticoagulation Clinic  10/13/2022    _______________________________________________________________________     Anticoagulation Episode Summary     Current INR goal:  2.0-3.0   TTR:  70.4 % (6 mo)   Target end date:  Indefinite   Send INR reminders to:  [facility] LVAD    Indications    LVAD (left ventricular assist device) present (H) [Z95.811]  Chronic systolic CHF (congestive heart failure) (H) [I50.22]           Comments:  HeartMate 3   Bridging: No bridging. Reason -- new implant   Date VAD placed: 03/23/22  Hx Mitral valve repair and not replacement         Anticoagulation Care Providers     Provider Role Specialty  Phone number    Dunia Hdz MD Referring Cardiovascular Disease [phone]

## 2022-10-14 LAB
MDC_IDC_LEAD_IMPLANT_DT: NORMAL
MDC_IDC_LEAD_LOCATION: NORMAL
MDC_IDC_LEAD_LOCATION_DETAIL_1: NORMAL
MDC_IDC_LEAD_MFG: NORMAL
MDC_IDC_LEAD_MODEL: NORMAL
MDC_IDC_LEAD_POLARITY_TYPE: NORMAL
MDC_IDC_LEAD_SERIAL: NORMAL
MDC_IDC_MSMT_BATTERY_DTM: NORMAL
MDC_IDC_MSMT_BATTERY_REMAINING_LONGEVITY: 48 MO
MDC_IDC_MSMT_BATTERY_REMAINING_PERCENTAGE: 66 %
MDC_IDC_MSMT_BATTERY_STATUS: NORMAL
MDC_IDC_MSMT_CAP_CHARGE_DTM: NORMAL
MDC_IDC_MSMT_CAP_CHARGE_DTM: NORMAL
MDC_IDC_MSMT_CAP_CHARGE_ENERGY: 31 J
MDC_IDC_MSMT_CAP_CHARGE_TIME: 12.3 S
MDC_IDC_MSMT_CAP_CHARGE_TIME: 7.2 S
MDC_IDC_MSMT_CAP_CHARGE_TYPE: NORMAL
MDC_IDC_MSMT_CAP_CHARGE_TYPE: NORMAL
MDC_IDC_MSMT_LEADCHNL_LV_IMPEDANCE_VALUE: 863 OHM
MDC_IDC_MSMT_LEADCHNL_LV_LEAD_CHANNEL_STATUS: NORMAL
MDC_IDC_MSMT_LEADCHNL_LV_PACING_THRESHOLD_AMPLITUDE: 1.3 V
MDC_IDC_MSMT_LEADCHNL_LV_PACING_THRESHOLD_PULSEWIDTH: 1 MS
MDC_IDC_MSMT_LEADCHNL_RA_IMPEDANCE_VALUE: 499 OHM
MDC_IDC_MSMT_LEADCHNL_RA_PACING_THRESHOLD_AMPLITUDE: 0.5 V
MDC_IDC_MSMT_LEADCHNL_RA_PACING_THRESHOLD_PULSEWIDTH: 0.5 MS
MDC_IDC_MSMT_LEADCHNL_RV_IMPEDANCE_VALUE: 377 OHM
MDC_IDC_MSMT_LEADCHNL_RV_PACING_THRESHOLD_AMPLITUDE: 1.3 V
MDC_IDC_MSMT_LEADCHNL_RV_PACING_THRESHOLD_PULSEWIDTH: 0.5 MS
MDC_IDC_PG_IMPLANT_DTM: NORMAL
MDC_IDC_PG_MFG: NORMAL
MDC_IDC_PG_MODEL: NORMAL
MDC_IDC_PG_SERIAL: NORMAL
MDC_IDC_PG_TYPE: NORMAL
MDC_IDC_SESS_CLINIC_NAME: NORMAL
MDC_IDC_SESS_DTM: NORMAL
MDC_IDC_SESS_TYPE: NORMAL
MDC_IDC_SET_BRADY_AT_MODE_SWITCH_MODE: NORMAL
MDC_IDC_SET_BRADY_AT_MODE_SWITCH_RATE: 170 {BEATS}/MIN
MDC_IDC_SET_BRADY_LOWRATE: 80 {BEATS}/MIN
MDC_IDC_SET_BRADY_MAX_TRACKING_RATE: 130 {BEATS}/MIN
MDC_IDC_SET_BRADY_MODE: NORMAL
MDC_IDC_SET_BRADY_PAV_DELAY_HIGH: 280 MS
MDC_IDC_SET_BRADY_PAV_DELAY_LOW: 300 MS
MDC_IDC_SET_BRADY_SAV_DELAY_HIGH: 280 MS
MDC_IDC_SET_BRADY_SAV_DELAY_LOW: 300 MS
MDC_IDC_SET_CRT_PACED_CHAMBERS: NORMAL
MDC_IDC_SET_LEADCHNL_LV_PACING_AMPLITUDE: 0.1 V
MDC_IDC_SET_LEADCHNL_LV_PACING_PULSEWIDTH: 0.1 MS
MDC_IDC_SET_LEADCHNL_LV_SENSING_ADAPTATION_MODE: NORMAL
MDC_IDC_SET_LEADCHNL_LV_SENSING_ANODE_ELECTRODE_1: NORMAL
MDC_IDC_SET_LEADCHNL_LV_SENSING_ANODE_LOCATION_1: NORMAL
MDC_IDC_SET_LEADCHNL_LV_SENSING_CATHODE_ELECTRODE_1: NORMAL
MDC_IDC_SET_LEADCHNL_LV_SENSING_CATHODE_LOCATION_1: NORMAL
MDC_IDC_SET_LEADCHNL_LV_SENSING_SENSITIVITY: 1 MV
MDC_IDC_SET_LEADCHNL_RA_PACING_AMPLITUDE: 1.5 V
MDC_IDC_SET_LEADCHNL_RA_PACING_POLARITY: NORMAL
MDC_IDC_SET_LEADCHNL_RA_PACING_PULSEWIDTH: 0.5 MS
MDC_IDC_SET_LEADCHNL_RA_SENSING_ADAPTATION_MODE: NORMAL
MDC_IDC_SET_LEADCHNL_RA_SENSING_POLARITY: NORMAL
MDC_IDC_SET_LEADCHNL_RA_SENSING_SENSITIVITY: 0.25 MV
MDC_IDC_SET_LEADCHNL_RV_PACING_AMPLITUDE: 2.8 V
MDC_IDC_SET_LEADCHNL_RV_PACING_POLARITY: NORMAL
MDC_IDC_SET_LEADCHNL_RV_PACING_PULSEWIDTH: 0.5 MS
MDC_IDC_SET_LEADCHNL_RV_SENSING_ADAPTATION_MODE: NORMAL
MDC_IDC_SET_LEADCHNL_RV_SENSING_POLARITY: NORMAL
MDC_IDC_SET_LEADCHNL_RV_SENSING_SENSITIVITY: 0.6 MV
MDC_IDC_SET_ZONE_DETECTION_INTERVAL: 273 MS
MDC_IDC_SET_ZONE_TYPE: NORMAL
MDC_IDC_SET_ZONE_VENDOR_TYPE: NORMAL
MDC_IDC_STAT_AT_BURDEN_PERCENT: 0 %
MDC_IDC_STAT_AT_DTM_END: NORMAL
MDC_IDC_STAT_AT_DTM_START: NORMAL
MDC_IDC_STAT_BRADY_DTM_END: NORMAL
MDC_IDC_STAT_BRADY_DTM_START: NORMAL
MDC_IDC_STAT_BRADY_RA_PERCENT_PACED: 88 %
MDC_IDC_STAT_BRADY_RV_PERCENT_PACED: 4 %
MDC_IDC_STAT_CRT_DTM_END: NORMAL
MDC_IDC_STAT_CRT_DTM_START: NORMAL
MDC_IDC_STAT_CRT_LV_PERCENT_PACED: 0 %
MDC_IDC_STAT_EPISODE_RECENT_COUNT: 0
MDC_IDC_STAT_EPISODE_RECENT_COUNT_DTM_END: NORMAL
MDC_IDC_STAT_EPISODE_RECENT_COUNT_DTM_START: NORMAL
MDC_IDC_STAT_EPISODE_TYPE: NORMAL
MDC_IDC_STAT_EPISODE_VENDOR_TYPE: NORMAL
MDC_IDC_STAT_TACHYTHERAPY_ATP_DELIVERED_RECENT: 0
MDC_IDC_STAT_TACHYTHERAPY_ATP_DELIVERED_TOTAL: 3
MDC_IDC_STAT_TACHYTHERAPY_RECENT_DTM_END: NORMAL
MDC_IDC_STAT_TACHYTHERAPY_RECENT_DTM_START: NORMAL
MDC_IDC_STAT_TACHYTHERAPY_SHOCKS_ABORTED_RECENT: 0
MDC_IDC_STAT_TACHYTHERAPY_SHOCKS_ABORTED_TOTAL: 1
MDC_IDC_STAT_TACHYTHERAPY_SHOCKS_DELIVERED_RECENT: 0
MDC_IDC_STAT_TACHYTHERAPY_SHOCKS_DELIVERED_TOTAL: 2
MDC_IDC_STAT_TACHYTHERAPY_TOTAL_DTM_END: NORMAL
MDC_IDC_STAT_TACHYTHERAPY_TOTAL_DTM_START: NORMAL

## 2022-10-19 ENCOUNTER — ANTICOAGULATION THERAPY VISIT (OUTPATIENT)
Dept: ANTICOAGULATION | Facility: CLINIC | Age: 57
End: 2022-10-19

## 2022-10-19 DIAGNOSIS — I50.22 CHRONIC SYSTOLIC CHF (CONGESTIVE HEART FAILURE) (H): ICD-10-CM

## 2022-10-19 DIAGNOSIS — Z95.811 LVAD (LEFT VENTRICULAR ASSIST DEVICE) PRESENT (H): Primary | ICD-10-CM

## 2022-10-19 LAB — INR (EXTERNAL): 1.57 (ref 0.9–1.1)

## 2022-10-19 NOTE — PROGRESS NOTES
ANTICOAGULATION MANAGEMENT     Tej LEELA Morfin 57 year old male is on warfarin with subtherapeutic INR result. (Goal INR 2.0-3.0)    Recent labs: (last 7 days)     10/19/22  0000   INR 1.57*       ASSESSMENT       Source(s): Chart Review and Patient/Caregiver Call       Warfarin doses taken: Warfarin taken as instructed    Diet: Increased greens/vitamin K in diet; plans to resume previous intake    New illness, injury, or hospitalization: Yes: INR may have been high last time because patient reports using extra lasix over the weekend    Medication/supplement changes: Lasix dose was increased 10/14-10/17    Signs or symptoms of bleeding or clotting: No    Previous INR: Supratherapeutic    Additional findings: This test was a venous draw and patient usually checks fingerstick INR's.        PLAN     Recommended plan for temporary change(s) affecting INR     Dosing Instructions: booster dose then continue your current warfarin dose with next INR in 1 week       Summary  As of 10/19/2022    Full warfarin instructions:  10/19: 12.5 mg; Otherwise 7.5 mg every Wed; 5 mg all other days   Next INR check:  10/26/2022             Telephone call with Tej who verbalizes understanding and agrees to plan and who agrees to plan and repeated back plan correctly    Patient to recheck with home meter    Education provided: Goal range and significance of current result and Importance of therapeutic range    Plan made per [facility] anticoagulation protocol and per LVAD protocol    Audelia Avalos RN  Anticoagulation Clinic  10/19/2022    _______________________________________________________________________     Anticoagulation Episode Summary     Current INR goal:  2.0-3.0   TTR:  70.0 % (6.2 mo)   Target end date:  Indefinite   Send INR reminders to:  [facility] LVAD    Indications    LVAD (left ventricular assist device) present (H) [Z95.811]  Chronic systolic CHF (congestive heart failure) (H) [I50.22]           Comments:  HeartMate 3    Bridging: No bridging. Reason -- new implant   Date VAD placed: 03/23/22  Hx Mitral valve repair and not replacement         Anticoagulation Care Providers     Provider Role Specialty Phone number    Dunia Hdz MD Referring Cardiovascular Disease [phone]

## 2022-10-21 ENCOUNTER — COMMITTEE REVIEW (OUTPATIENT)
Dept: TRANSPLANT | Facility: CLINIC | Age: 57
End: 2022-10-21

## 2022-10-21 NOTE — COMMITTEE REVIEW
Thoracic Committee Review Note     Evaluation Date:   Committee Review Date: 10/21/2022    Organ being evaluated for: Heart    Transplant Phase: Referral  Transplant Status: Active    Transplant Coordinator: Edwina Guardado  Transplant Cardiologist: Dunia Hdz       Referring Physician: Yoan Felix    Primary Diagnosis: Dilated myopathy: Idiopathic      Committee Review Members:  Cardiovascular Disease Mainor Villavicencio, RN, MIQUEL PATTERSON, RN, Sabine Jones, RN, María Cardona, RN, Siena Headley, RN   Neuropsychology Emmett Jimenez, PhD   Nurse Practitioner - Adult Health María Donovan, APRN CNP   Pharmacist Nadia Auguste, [facility]    - Clinical Batool Martinez, [facility]   Transplant Mainor Lechuga MD, Geneva Gordon, BRENDA, Fermin Malhotra MD, Nakia Lee, BRENDA, Edwina Guardado, BRENDA, Derian Thomson, Talisha Jordan, BRENDA, Bennett Brown, RN, Parris Mancuso, BRENDA, Wilver Rodríguez MD, Dunia Hdz MD, Gil Pagan MD, Sachin Fleming MD, Zaheer Schwab MD, Mohsen Jimenez MD       Transplant Eligibility: Disabling heart disease on maximal medical therapy    Committee Review Decision: Approved    Relative Contraindications: None    Absolute Contraindications: None    Committee Chair Dunia Hdz MD verbally attested to the committee's decision.    Committee Discussion Details:   57yr old, ABO O, BMI 30. LVAD implanted in 03/22 - was using THC at the time. RHC shows RA of 6, PVR 2.1, CI 1.8, PAW 7. Echo shows 15-20%, large LV. Creatinine is normal. He has since stopped THC use and has been negative.     Dr. Jimenez saw in March. He had been off THC for about 30 days at that point. He needed some time for the THC to clear. He was using it for chronic nausea, sleep, and anxiety. Dr. Jimenez does not have concerns about abuse of dependence. No concerns from Social work or VAD Coordinator or Txp Coordinator. He will need support through anxiety.     He has  hypomagnesia currently and will drop further on tacro. We continue to work on the magnesium levels. He is approved for transplant listing pending visit with Dr. Rodríguez in December and repeat chest CT scan. Plan to notify patient after sign off from Dr. Rodríguez.

## 2022-10-24 ENCOUNTER — CARE COORDINATION (OUTPATIENT)
Dept: CARDIOLOGY | Facility: CLINIC | Age: 57
End: 2022-10-24

## 2022-10-24 RX ORDER — POTASSIUM PHOSPHATE, MONOBASIC 500 MG/1
500 TABLET, SOLUBLE ORAL DAILY
COMMUNITY
Start: 2022-10-04 | End: 2023-02-07

## 2022-10-27 ENCOUNTER — ANTICOAGULATION THERAPY VISIT (OUTPATIENT)
Dept: ANTICOAGULATION | Facility: CLINIC | Age: 57
End: 2022-10-27

## 2022-10-27 ENCOUNTER — CARE COORDINATION (OUTPATIENT)
Dept: CARDIOLOGY | Facility: CLINIC | Age: 57
End: 2022-10-27

## 2022-10-27 DIAGNOSIS — I50.22 CHRONIC SYSTOLIC CHF (CONGESTIVE HEART FAILURE) (H): ICD-10-CM

## 2022-10-27 DIAGNOSIS — Z95.811 LVAD (LEFT VENTRICULAR ASSIST DEVICE) PRESENT (H): Primary | ICD-10-CM

## 2022-10-27 LAB — INR HOME MONITORING: 2.4 (ref 2–3)

## 2022-10-27 NOTE — PROGRESS NOTES
Called pt to inform him of the need for substance abuse monitoring testing, to be completed in the next 24 hours.  Pt agrees to go.  Will follow up with results next week.

## 2022-10-27 NOTE — PROGRESS NOTES
ANTICOAGULATION MANAGEMENT     Tej SWENSON [place] 57 year old male is on warfarin with therapeutic INR result. (Goal INR 2.0-3.0)    Recent labs: (last 7 days)     10/27/22  0000   INR 2.4       ASSESSMENT       Source(s): Chart Review and Patient/Caregiver Call       Warfarin doses taken: Warfarin taken as instructed    Diet: No new diet changes identified    New illness, injury, or hospitalization: No    Medication/supplement changes: None noted    Signs or symptoms of bleeding or clotting: No    Previous INR: Subtherapeutic    Additional findings: None       PLAN     Recommended plan for no diet, medication or health factor changes affecting INR     Dosing Instructions: Continue your current warfarin dose with next INR in 2 weeks       Summary  As of 10/27/2022    Full warfarin instructions:  7.5 mg every Wed; 5 mg all other days; Starting 10/27/2022   Next INR check:  11/10/2022             Telephone call with Tej who verbalizes understanding and agrees to plan    Patient to recheck with home meter    Education provided:     Goal range and lab monitoring: goal range and significance of current result    Plan made per [facility] anticoagulation protocol and per LVAD protocol    Judy Simental RN  Anticoagulation Clinic  10/27/2022    _______________________________________________________________________     Anticoagulation Episode Summary     Current INR goal:  2.0-3.0   TTR:  69.1 % (6.4 mo)   Target end date:  Indefinite   Send INR reminders to:  [facility] LV[facility]    Indications    LVAD (left ventricular assist device) present (H) [Z95.811]  Chronic systolic CHF (congestive heart failure) (H) [I50.22]           Comments:  HeartMate 3   Bridging: No bridging. Reason -- new implant   Date VAD placed: 03/23/22  Hx Mitral valve repair and not replacement         Anticoagulation Care Providers     Provider Role Specialty Phone number    Dunia Hdz MD Referring Cardiovascular Disease [phone]

## 2022-11-01 NOTE — PROGRESS NOTES
All substance abuse testing returned negative.  Provider notified of results.  Called pt to inform him, pt did not answer.  Message left requesting return call.

## 2022-11-10 ENCOUNTER — ANTICOAGULATION THERAPY VISIT (OUTPATIENT)
Dept: ANTICOAGULATION | Facility: CLINIC | Age: 57
End: 2022-11-10

## 2022-11-10 DIAGNOSIS — I50.22 CHRONIC SYSTOLIC CHF (CONGESTIVE HEART FAILURE) (H): ICD-10-CM

## 2022-11-10 DIAGNOSIS — Z95.811 LVAD (LEFT VENTRICULAR ASSIST DEVICE) PRESENT (H): Primary | ICD-10-CM

## 2022-11-10 LAB — INR HOME MONITORING: 3.2 (ref 2–3)

## 2022-11-10 NOTE — PROGRESS NOTES
ANTICOAGULATION MANAGEMENT     Tej Morfin 57 year old male is on warfarin with supratherapeutic INR result. (Goal INR 2.0-3.0)    Recent labs: (last 7 days)     11/10/22  0000   INR 3.2*       ASSESSMENT       Source(s): Chart Review and Patient/Caregiver Call       Warfarin doses taken: Warfarin taken as instructed    Diet: No new diet changes identified  Recommended he try to eat greens 2 times/week to try to help stabilize his INR    New illness, injury, or hospitalization: No    Medication/supplement changes: started vitamin D    Signs or symptoms of bleeding or clotting: No    Previous INR: Therapeutic last visit; previously outside of goal range    Additional findings: None       PLAN     Recommended plan for no diet, medication or health factor changes affecting INR     Dosing Instructions: Continue your current warfarin dose with next INR in 1 week       Summary  As of 11/10/2022    Full warfarin instructions:  7.5 mg every Wed; 5 mg all other days; Starting 11/10/2022   Next INR check:  11/17/2022             Telephone call with Tej who agrees to plan and repeated back plan correctly.  Last time Tej's INR was 3.2, we decreased his warfarin dose x 1 day; but his INR fell to sub therapeutic.  Will leave dose the same and he will recheck in one week.  If he develops any bruising he will check INR sooner.    Patient to recheck with home meter    Education provided:     Symptom monitoring: monitoring for bleeding signs and symptoms and when to seek medical attention/emergency care    Contact [phone] with any changes, questions or concerns.     Plan made per [facility] anticoagulation protocol and per LVAD protocol    Lizabeth Milton RN  Anticoagulation Clinic  11/10/2022    _______________________________________________________________________     Anticoagulation Episode Summary     Current INR goal:  2.0-3.0   TTR:  69.4 % (6.9 mo)   Target end date:  Indefinite   Send INR reminders to:  [facility]  LVAD    Indications    LVAD (left ventricular assist device) present (H) [Z95.811]  Chronic systolic CHF (congestive heart failure) (H) [I50.22]           Comments:  HeartMate 3   Bridging: No bridging. Reason -- new implant   Date VAD placed: 03/23/22  Hx Mitral valve repair and not replacement         Anticoagulation Care Providers     Provider Role Specialty Phone number    Dunia Hdz MD Referring Cardiovascular Disease [phone]

## 2022-11-15 NOTE — PROGRESS NOTES
"Neurosurgery Progress Note  11/15/22    A/P: Selina Parikh is a 59 year old female who is  S/p left T12-L1 HLMD for progressive symptoms of myelopathy with Dr. Kent on 9/23/2022. . Also with some lumbar radicular type symptoms that haven't changed. Selina notes she is no longer incontinent but does feel like she cannot pass stool well at all, like there is no strength to do so. She notes ongoing pain into bilateral hips, anterior thigh and groin. Knee feels like it will give out. Balance maybe a bit better.      Plan:  Repeat thoracic MRI, add cervical to look for other compression lesion  If nothing new - suspect this is just ongoing sequella from her previous compression  Okay for PT in the meantime  May consider EMG     Neurosurgery Attending: Dr. Kent    S: Fatigue is better, some symptoms improved but overall was hoping for more. Bottom of the feet are both numb. She is no longer incontinent which is better but feels like she cannot pass stool. Has ongoing lumbar djd symptoms.     PMH: No interval change  PSH: No interval change    ROS: A full 14 point review of systems was otherwise completed and is negative aside from that mentioned above in the HPI    O:  /74   Pulse 81   Ht 1.499 m (4' 11\")   Wt 72.6 kg (160 lb)   LMP 04/10/2016   SpO2 96%   BMI 32.32 kg/m    General: Awake, alert, NAD  HEENT: AT/NC, EOMI, face symmetric, oral mucosa moist and pink  Heart: RRR: Nonlabored respirations without accessory muscle use.  Abd: S, NT, ND  Neuro: Awake, alert, speech is clear and content is appropriate. MAEW x 4  Coordination: poor tandem gait   Musculoskeletal: Negative straight leg raise bl  Psych: Appropriatemood and affect, pleasant, cooperative, alert and oriented x 3  Skin: Incision C/D/I - well healed     I personally visualized all imaging.   None new.   Davida Delvalle CNP  [facility] Neurosurgery  O: [phone]            " Clinic Care Coordination Contact    Background: Care Coordination referral placed from [facility] [place] discharge report for reason of patient meeting criteria for a TCM outreach call by [facility] team.    Assessment: Upon chart review, [facility] Team member will cancel/close the referral for TCM outreach due to reason below:    Patient has a follow up call with CCC RN from the heart clinic  for hospital discharge    Plan: Care Coordination referral for TCM outreach canceled.    Za Alvarez MA  [facility] [place], [facility]

## 2022-11-18 ENCOUNTER — ANTICOAGULATION THERAPY VISIT (OUTPATIENT)
Dept: ANTICOAGULATION | Facility: CLINIC | Age: 57
End: 2022-11-18

## 2022-11-18 DIAGNOSIS — Z95.811 LVAD (LEFT VENTRICULAR ASSIST DEVICE) PRESENT (H): Primary | ICD-10-CM

## 2022-11-18 DIAGNOSIS — I50.22 CHRONIC SYSTOLIC CHF (CONGESTIVE HEART FAILURE) (H): ICD-10-CM

## 2022-11-18 LAB — INR HOME MONITORING: 3 (ref 2–3)

## 2022-11-18 NOTE — PROGRESS NOTES
ANTICOAGULATION MANAGEMENT     Tej Morfin 57 year old male is on warfarin with therapeutic INR result. (Goal INR 2.0-3.0)    Recent labs: (last 7 days)     11/18/22  0000   INR 3.0       ASSESSMENT       Source(s): Chart Review and Patient/Caregiver Call       Warfarin doses taken: Warfarin taken as instructed    Diet: Increased greens/vitamin K in diet; ongoing change    New illness, injury, or hospitalization: No    Medication/supplement changes: None noted    Signs or symptoms of bleeding or clotting: No    Previous INR: Supratherapeutic    Additional findings: None       PLAN     Recommended plan for ongoing change(s) affecting INR     Dosing Instructions: Continue your current warfarin dose with next INR in 1-2 weeks       Summary  As of 11/18/2022    Full warfarin instructions:  7.5 mg every Wed; 5 mg all other days; Starting 11/18/2022   Next INR check:  12/2/2022             Telephone call with Tej who verbalizes understanding and agrees to plan and who agrees to plan and repeated back plan correctly    Patient to recheck with home meter    Education provided:     Symptom monitoring: monitoring for bleeding signs and symptoms    Contact [phone] with any changes, questions or concerns.     Plan made per [facility] anticoagulation protocol and per LVAD protocol    SAVANNA MO RN  Anticoagulation Clinic  11/18/2022    _______________________________________________________________________     Anticoagulation Episode Summary     Current INR goal:  2.0-3.0   TTR:  67.0 % (7.2 mo)   Target end date:  Indefinite   Send INR reminders to:  ANTICOAG LVAD    Indications    LVAD (left ventricular assist device) present (H) [Z95.811]  Chronic systolic CHF (congestive heart failure) (H) [I50.22]           Comments:  HeartMate 3   Bridging: No bridging. Reason -- new implant   Date VAD placed: 03/23/22  Hx Mitral valve repair and not replacement         Anticoagulation Care Providers     Provider Role Specialty  Phone number    Dunia Hdz MD Referring Cardiovascular Disease [phone]

## 2022-12-02 ENCOUNTER — ANTICOAGULATION THERAPY VISIT (OUTPATIENT)
Dept: ANTICOAGULATION | Facility: CLINIC | Age: 57
End: 2022-12-02

## 2022-12-02 DIAGNOSIS — Z95.811 LVAD (LEFT VENTRICULAR ASSIST DEVICE) PRESENT (H): Primary | ICD-10-CM

## 2022-12-02 DIAGNOSIS — I50.22 CHRONIC SYSTOLIC CHF (CONGESTIVE HEART FAILURE) (H): ICD-10-CM

## 2022-12-02 LAB — INR HOME MONITORING: 2.1 (ref 2–3)

## 2022-12-02 NOTE — PROGRESS NOTES
ANTICOAGULATION MANAGEMENT     Tej Morfin 57 year old male is on warfarin with therapeutic INR result. (Goal INR 2.0-3.0)    Recent labs: (last 7 days)     12/02/22  0000   INR 2.1       ASSESSMENT       Source(s): Chart Review    Previous INR was Therapeutic last visit; previously outside of goal range    Medication, diet, health changes since last INR chart reviewed; none identified           PLAN     Recommended plan for no diet, medication or health factor changes affecting INR     Dosing Instructions: Continue your current warfarin dose with next INR in 1.5 weeks       Summary  As of 12/2/2022    Full warfarin instructions:  7.5 mg every Wed; 5 mg all other days; Starting 12/2/2022   Next INR check:  12/13/2022             Detailed voice message left for Tej with dosing instructions and follow up date.   Sent "[facility] message with dosing and follow up instructions    Check at provider office visit    Education provided:     Please call back if any changes to your diet, medications or how you've been taking warfarin    Contact [phone] with any changes, questions or concerns.     Plan made per [facility] anticoagulation protocol and per LVAD protocol    SAVANNA MO RN  Anticoagulation Clinic  12/2/2022    _______________________________________________________________________     Anticoagulation Episode Summary     Current INR goal:  2.0-3.0   TTR:  69.1 % (7.6 mo)   Target end date:  Indefinite   Send INR reminders to:  ANTICOAG LVAD    Indications    LVAD (left ventricular assist device) present (H) [Z95.811]  Chronic systolic CHF (congestive heart failure) (H) [I50.22]           Comments:  HeartMate 3   Bridging: No bridging. Reason -- new implant   Date VAD placed: 03/23/22  Hx Mitral valve repair and not replacement         Anticoagulation Care Providers     Provider Role Specialty Phone number    Dunia Hdz MD Referring Cardiovascular Disease [phone]

## 2022-12-09 DIAGNOSIS — I50.22 CHRONIC SYSTOLIC CONGESTIVE HEART FAILURE (H): Primary | ICD-10-CM

## 2022-12-12 ENCOUNTER — ANCILLARY PROCEDURE (OUTPATIENT)
Dept: CARDIOLOGY | Facility: CLINIC | Age: 57
End: 2022-12-12
Attending: INTERNAL MEDICINE
Payer: MEDICARE

## 2022-12-12 DIAGNOSIS — I42.8 VALVULAR CARDIOMYOPATHY (H): ICD-10-CM

## 2022-12-12 PROCEDURE — 93295 DEV INTERROG REMOTE 1/2/MLT: CPT | Performed by: INTERNAL MEDICINE

## 2022-12-12 PROCEDURE — 93296 REM INTERROG EVL PM/IDS: CPT

## 2022-12-14 DIAGNOSIS — I50.22 CHRONIC SYSTOLIC (CONGESTIVE) HEART FAILURE (H): ICD-10-CM

## 2022-12-14 DIAGNOSIS — I50.22 CHRONIC SYSTOLIC CONGESTIVE HEART FAILURE (H): Primary | ICD-10-CM

## 2022-12-14 DIAGNOSIS — Z79.899 LONG TERM USE OF DRUG: ICD-10-CM

## 2022-12-14 RX ORDER — MAGNESIUM OXIDE 400 MG/1
800 TABLET ORAL 2 TIMES DAILY
COMMUNITY
Start: 2022-11-23

## 2022-12-14 RX ORDER — FUROSEMIDE 40 MG
40 TABLET ORAL 2 TIMES DAILY
COMMUNITY
Start: 2022-10-21 | End: 2023-05-11

## 2022-12-14 RX ORDER — POTASSIUM CHLORIDE 1500 MG/1
40 TABLET, EXTENDED RELEASE ORAL 2 TIMES DAILY
COMMUNITY
Start: 2022-10-21 | End: 2023-05-11

## 2022-12-14 RX ORDER — AMOXICILLIN 500 MG/1
2000 CAPSULE ORAL
COMMUNITY
Start: 2022-09-21

## 2022-12-15 ENCOUNTER — TELEPHONE (OUTPATIENT)
Dept: TRANSPLANT | Facility: CLINIC | Age: 57
End: 2022-12-15

## 2022-12-15 LAB
MDC_IDC_LEAD_IMPLANT_DT: NORMAL
MDC_IDC_LEAD_LOCATION: NORMAL
MDC_IDC_LEAD_LOCATION_DETAIL_1: NORMAL
MDC_IDC_LEAD_MFG: NORMAL
MDC_IDC_LEAD_MODEL: NORMAL
MDC_IDC_LEAD_POLARITY_TYPE: NORMAL
MDC_IDC_LEAD_SERIAL: NORMAL
MDC_IDC_MSMT_BATTERY_DTM: NORMAL
MDC_IDC_MSMT_BATTERY_REMAINING_LONGEVITY: 48 MO
MDC_IDC_MSMT_BATTERY_REMAINING_PERCENTAGE: 64 %
MDC_IDC_MSMT_BATTERY_STATUS: NORMAL
MDC_IDC_MSMT_CAP_CHARGE_DTM: NORMAL
MDC_IDC_MSMT_CAP_CHARGE_DTM: NORMAL
MDC_IDC_MSMT_CAP_CHARGE_ENERGY: 31 J
MDC_IDC_MSMT_CAP_CHARGE_TIME: 12.3 S
MDC_IDC_MSMT_CAP_CHARGE_TIME: 7.2 S
MDC_IDC_MSMT_CAP_CHARGE_TYPE: NORMAL
MDC_IDC_MSMT_CAP_CHARGE_TYPE: NORMAL
MDC_IDC_MSMT_LEADCHNL_LV_IMPEDANCE_VALUE: 942 OHM
MDC_IDC_MSMT_LEADCHNL_LV_LEAD_CHANNEL_STATUS: NORMAL
MDC_IDC_MSMT_LEADCHNL_LV_PACING_THRESHOLD_AMPLITUDE: 1.3 V
MDC_IDC_MSMT_LEADCHNL_LV_PACING_THRESHOLD_PULSEWIDTH: 1 MS
MDC_IDC_MSMT_LEADCHNL_RA_IMPEDANCE_VALUE: 516 OHM
MDC_IDC_MSMT_LEADCHNL_RA_PACING_THRESHOLD_AMPLITUDE: 0.5 V
MDC_IDC_MSMT_LEADCHNL_RA_PACING_THRESHOLD_PULSEWIDTH: 0.5 MS
MDC_IDC_MSMT_LEADCHNL_RV_IMPEDANCE_VALUE: 391 OHM
MDC_IDC_MSMT_LEADCHNL_RV_PACING_THRESHOLD_AMPLITUDE: 1.3 V
MDC_IDC_MSMT_LEADCHNL_RV_PACING_THRESHOLD_PULSEWIDTH: 0.5 MS
MDC_IDC_PG_IMPLANT_DTM: NORMAL
MDC_IDC_PG_MFG: NORMAL
MDC_IDC_PG_MODEL: NORMAL
MDC_IDC_PG_SERIAL: NORMAL
MDC_IDC_PG_TYPE: NORMAL
MDC_IDC_SESS_CLINIC_NAME: NORMAL
MDC_IDC_SESS_DTM: NORMAL
MDC_IDC_SESS_TYPE: NORMAL
MDC_IDC_SET_BRADY_AT_MODE_SWITCH_MODE: NORMAL
MDC_IDC_SET_BRADY_AT_MODE_SWITCH_RATE: 170 {BEATS}/MIN
MDC_IDC_SET_BRADY_LOWRATE: 80 {BEATS}/MIN
MDC_IDC_SET_BRADY_MAX_TRACKING_RATE: 130 {BEATS}/MIN
MDC_IDC_SET_BRADY_MODE: NORMAL
MDC_IDC_SET_BRADY_PAV_DELAY_HIGH: 280 MS
MDC_IDC_SET_BRADY_PAV_DELAY_LOW: 300 MS
MDC_IDC_SET_BRADY_SAV_DELAY_HIGH: 280 MS
MDC_IDC_SET_BRADY_SAV_DELAY_LOW: 300 MS
MDC_IDC_SET_CRT_PACED_CHAMBERS: NORMAL
MDC_IDC_SET_LEADCHNL_LV_PACING_AMPLITUDE: 0.1 V
MDC_IDC_SET_LEADCHNL_LV_PACING_PULSEWIDTH: 0.1 MS
MDC_IDC_SET_LEADCHNL_LV_SENSING_ADAPTATION_MODE: NORMAL
MDC_IDC_SET_LEADCHNL_LV_SENSING_ANODE_ELECTRODE_1: NORMAL
MDC_IDC_SET_LEADCHNL_LV_SENSING_ANODE_LOCATION_1: NORMAL
MDC_IDC_SET_LEADCHNL_LV_SENSING_CATHODE_ELECTRODE_1: NORMAL
MDC_IDC_SET_LEADCHNL_LV_SENSING_CATHODE_LOCATION_1: NORMAL
MDC_IDC_SET_LEADCHNL_LV_SENSING_SENSITIVITY: 1 MV
MDC_IDC_SET_LEADCHNL_RA_PACING_AMPLITUDE: 1.5 V
MDC_IDC_SET_LEADCHNL_RA_PACING_POLARITY: NORMAL
MDC_IDC_SET_LEADCHNL_RA_PACING_PULSEWIDTH: 0.5 MS
MDC_IDC_SET_LEADCHNL_RA_SENSING_ADAPTATION_MODE: NORMAL
MDC_IDC_SET_LEADCHNL_RA_SENSING_POLARITY: NORMAL
MDC_IDC_SET_LEADCHNL_RA_SENSING_SENSITIVITY: 0.25 MV
MDC_IDC_SET_LEADCHNL_RV_PACING_AMPLITUDE: 2.8 V
MDC_IDC_SET_LEADCHNL_RV_PACING_POLARITY: NORMAL
MDC_IDC_SET_LEADCHNL_RV_PACING_PULSEWIDTH: 0.5 MS
MDC_IDC_SET_LEADCHNL_RV_SENSING_ADAPTATION_MODE: NORMAL
MDC_IDC_SET_LEADCHNL_RV_SENSING_POLARITY: NORMAL
MDC_IDC_SET_LEADCHNL_RV_SENSING_SENSITIVITY: 0.6 MV
MDC_IDC_SET_ZONE_DETECTION_INTERVAL: 273 MS
MDC_IDC_SET_ZONE_TYPE: NORMAL
MDC_IDC_SET_ZONE_VENDOR_TYPE: NORMAL
MDC_IDC_STAT_AT_BURDEN_PERCENT: 0 %
MDC_IDC_STAT_AT_DTM_END: NORMAL
MDC_IDC_STAT_AT_DTM_START: NORMAL
MDC_IDC_STAT_BRADY_DTM_END: NORMAL
MDC_IDC_STAT_BRADY_DTM_START: NORMAL
MDC_IDC_STAT_BRADY_RA_PERCENT_PACED: 91 %
MDC_IDC_STAT_BRADY_RV_PERCENT_PACED: 3 %
MDC_IDC_STAT_CRT_DTM_END: NORMAL
MDC_IDC_STAT_CRT_DTM_START: NORMAL
MDC_IDC_STAT_CRT_LV_PERCENT_PACED: 0 %
MDC_IDC_STAT_EPISODE_RECENT_COUNT: 0
MDC_IDC_STAT_EPISODE_RECENT_COUNT_DTM_END: NORMAL
MDC_IDC_STAT_EPISODE_RECENT_COUNT_DTM_START: NORMAL
MDC_IDC_STAT_EPISODE_TYPE: NORMAL
MDC_IDC_STAT_EPISODE_VENDOR_TYPE: NORMAL
MDC_IDC_STAT_TACHYTHERAPY_ATP_DELIVERED_RECENT: 0
MDC_IDC_STAT_TACHYTHERAPY_ATP_DELIVERED_TOTAL: 3
MDC_IDC_STAT_TACHYTHERAPY_RECENT_DTM_END: NORMAL
MDC_IDC_STAT_TACHYTHERAPY_RECENT_DTM_START: NORMAL
MDC_IDC_STAT_TACHYTHERAPY_SHOCKS_ABORTED_RECENT: 0
MDC_IDC_STAT_TACHYTHERAPY_SHOCKS_ABORTED_TOTAL: 1
MDC_IDC_STAT_TACHYTHERAPY_SHOCKS_DELIVERED_RECENT: 0
MDC_IDC_STAT_TACHYTHERAPY_SHOCKS_DELIVERED_TOTAL: 2
MDC_IDC_STAT_TACHYTHERAPY_TOTAL_DTM_END: NORMAL
MDC_IDC_STAT_TACHYTHERAPY_TOTAL_DTM_START: NORMAL

## 2022-12-15 NOTE — TELEPHONE ENCOUNTER
Transplant Update 12/15/22:  Topic:  Eval update    Called patient to review status of heart transplant evaluation. Pt was presented 10/21/22 and approved pending appt with CV[facility] on 12/12/22. Pt canceled CVTS appt d/t weather. Pt working on rescheduling. Pt also stated that he hasn't gone to dentist yet. Encouraged pt to go as soon as possible; pt verbalized understanding.     Plan to list patient pending approval from CV surgeon and after pt goes to dentist.     Provider and VAD coordinator updated on evaluation status.

## 2022-12-19 ENCOUNTER — ANTICOAGULATION THERAPY VISIT (OUTPATIENT)
Dept: ANTICOAGULATION | Facility: CLINIC | Age: 57
End: 2022-12-19

## 2022-12-19 DIAGNOSIS — I50.22 CHRONIC SYSTOLIC (CONGESTIVE) HEART FAILURE (H): Primary | ICD-10-CM

## 2022-12-19 DIAGNOSIS — Z95.811 LVAD (LEFT VENTRICULAR ASSIST DEVICE) PRESENT (H): Primary | ICD-10-CM

## 2022-12-19 DIAGNOSIS — I50.22 CHRONIC SYSTOLIC CHF (CONGESTIVE HEART FAILURE) (H): ICD-10-CM

## 2022-12-19 LAB — INR HOME MONITORING: 2.8 (ref 2–3)

## 2022-12-19 RX ORDER — LIDOCAINE 40 MG/G
CREAM TOPICAL
Status: CANCELLED | OUTPATIENT
Start: 2022-12-19

## 2022-12-19 NOTE — PROGRESS NOTES
ANTICOAGULATION MANAGEMENT     Tej SWENSON [place] 57 year old male is on warfarin with therapeutic INR result. (Goal INR 2.0-3.0)    Recent labs: (last 7 days)     12/19/22  0000   INR 2.8       ASSESSMENT       Source(s): Chart Review and Patient/Caregiver Call       Warfarin doses taken: Warfarin taken as instructed    Diet: No new diet changes identified    New illness, injury, or hospitalization: Yes: Pt reports that he had the stomach bug last week, reports episodes of diarrhea/vomitting last week but it feeling better now    Medication/supplement changes: Patient had adjustments made to Furosemide, Magnesium, & Potassium on 12/14/22, no anticipated interaction between medications and warfarin     Signs or symptoms of bleeding or clotting: No    Previous INR: Therapeutic last 2(+) visits    Additional findings: None       PLAN     Recommended plan for no diet, medication or health factor changes affecting INR     Dosing Instructions: Continue your current warfarin dose with next INR in 2 weeks       Summary  As of 12/19/2022    Full warfarin instructions:  7.5 mg every Wed; 5 mg all other days; Starting 12/19/2022   Next INR check:  1/2/2023             Telephone call with Tej who verbalizes understanding and agrees to plan    Patient to recheck with home meter    Education provided:     Goal range and lab monitoring: goal range and significance of current result and Importance of therapeutic range    Plan made per [facility] anticoagulation protocol and per LVAD protocol    Benjamin Gaspar RN  Anticoagulation Clinic  12/19/2022    _______________________________________________________________________     Anticoagulation Episode Summary     Current INR goal:  2.0-3.0   TTR:  71.2 % (8.2 mo)   Target end date:  Indefinite   Send INR reminders to:  ANTICOAG LVAD    Indications    LVAD (left ventricular assist device) present (H) [Z95.811]  Chronic systolic CHF (congestive heart failure) (H) [I50.22]           Comments:   HeartMate 3   Bridging: No bridging. Reason -- new implant   Date VAD placed: 03/23/22  Hx Mitral valve repair and not replacement         Anticoagulation Care Providers     Provider Role Specialty Phone number    Dunia Hdz MD Referring Cardiovascular Disease [phone]

## 2022-12-27 ENCOUNTER — TRANSFERRED RECORDS (OUTPATIENT)
Dept: HEALTH INFORMATION MANAGEMENT | Facility: CLINIC | Age: 57
End: 2022-12-27

## 2023-01-07 LAB — INR HOME MONITORING: 2.5 (ref 2–3)

## 2023-01-09 ENCOUNTER — ANTICOAGULATION THERAPY VISIT (OUTPATIENT)
Dept: ANTICOAGULATION | Facility: CLINIC | Age: 58
End: 2023-01-09

## 2023-01-09 DIAGNOSIS — I50.22 CHRONIC SYSTOLIC CHF (CONGESTIVE HEART FAILURE) (H): ICD-10-CM

## 2023-01-09 DIAGNOSIS — Z95.811 LVAD (LEFT VENTRICULAR ASSIST DEVICE) PRESENT (H): Primary | ICD-10-CM

## 2023-01-09 NOTE — PROGRESS NOTES
ANTICOAGULATION MANAGEMENT     Tej Morfin 57 year old male is on warfarin with therapeutic INR result. (Goal INR 2.0-3.0)    Recent labs: (last 7 days)     01/07/23  0000   INR 2.5       ASSESSMENT       Source(s): Chart Review and Patient/Caregiver Call       Warfarin doses taken: Warfarin taken as instructed    Diet: No new diet changes identified    New illness, injury, or hospitalization: No    Medication/supplement changes: None noted    Signs or symptoms of bleeding or clotting: No    Previous INR: Therapeutic last 2(+) visits    Additional findings: Patient has a RHC scheduled for 1/24/23.  Patient does not need to hold warfarin in preparation for this       PLAN     Recommended plan for no diet, medication or health factor changes affecting INR     Dosing Instructions: Continue your current warfarin dose with next INR in 2 weeks       Summary  As of 1/9/2023    Full warfarin instructions:  7.5 mg every Wed; 5 mg all other days   Next INR check:  1/20/2023             Telephone call with Tej who verbalizes understanding and agrees to plan and who agrees to plan and repeated back plan correctly    Patient to recheck with home meter    Education provided:     Please call back if any changes to your diet, medications or how you've been taking warfarin    Taking warfarin: Importance of taking warfarin as instructed    Plan made protocol [facility] and LVAD protocol    Audelia Avalos RN  Anticoagulation Clinic  1/9/2023    _______________________________________________________________________     Anticoagulation Episode Summary     Current INR goal:  2.0-3.0   TTR:  73.4 % (8.8 mo)   Target end date:  Indefinite   Send INR reminders to:  ANTICOAG LVAD    Indications    LVAD (left ventricular assist device) present (H) [Z95.811]  Chronic systolic CHF (congestive heart failure) (H) [I50.22]           Comments:  HeartMate 3   Bridging: No bridging. Reason -- new implant   Date VAD placed: 03/23/22  Hx Mitral  valve repair and not replacement         Anticoagulation Care Providers     Provider Role Specialty Phone number    Dunia Hdz MD Referring Cardiovascular Disease [phone]

## 2023-01-20 ENCOUNTER — ANTICOAGULATION THERAPY VISIT (OUTPATIENT)
Dept: ANTICOAGULATION | Facility: CLINIC | Age: 58
End: 2023-01-20
Payer: MEDICARE

## 2023-01-20 DIAGNOSIS — Z79.899 LONG TERM USE OF DRUG: ICD-10-CM

## 2023-01-20 DIAGNOSIS — Z95.811 LVAD (LEFT VENTRICULAR ASSIST DEVICE) PRESENT (H): Primary | ICD-10-CM

## 2023-01-20 DIAGNOSIS — I50.22 CHRONIC SYSTOLIC (CONGESTIVE) HEART FAILURE (H): ICD-10-CM

## 2023-01-20 DIAGNOSIS — I50.22 CHRONIC SYSTOLIC CHF (CONGESTIVE HEART FAILURE) (H): ICD-10-CM

## 2023-01-20 LAB — INR HOME MONITORING: 2.5 (ref 2–3)

## 2023-01-20 NOTE — PROGRESS NOTES
ANTICOAGULATION MANAGEMENT     Tej Morfin 57 year old male is on warfarin with therapeutic INR result. (Goal INR 2.0-3.0)    Recent labs: (last 7 days)     01/20/23  0000   INR 2.5       ASSESSMENT       Source(s): Chart Review and Patient/Caregiver Call       Warfarin doses taken: Warfarin taken as instructed    Diet: No new diet changes identified    New illness, injury, or hospitalization: No    Medication/supplement changes: None noted    Signs or symptoms of bleeding or clotting: No    Previous INR: Therapeutic last 2(+) visits    Additional findings: Upcoming surgery/procedure heart cath on 1/24, he will continue warfarin uninterrupted for this procedure       PLAN     Recommended plan for no diet, medication or health factor changes affecting INR     Dosing Instructions: Continue your current warfarin dose with next INR in 2 weeks       Summary  As of 1/20/2023    Full warfarin instructions:  7.5 mg every Wed; 5 mg all other days   Next INR check:  2/3/2023             Telephone call with Tej who verbalizes understanding and agrees to plan    Patient to recheck with home meter    Education provided:     Contact [phone] with any changes, questions or concerns.     Plan made per [facility] anticoagulation protocol and per LVAD protocol    Rosario Dyer RN  Anticoagulation Clinic  1/20/2023    _______________________________________________________________________     Anticoagulation Episode Summary     Current INR goal:  2.0-3.0   TTR:  74.6 % (9.3 mo)   Target end date:  Indefinite   Send INR reminders to:  ANTICOAG LVAD    Indications    LVAD (left ventricular assist device) present (H) [Z95.811]  Chronic systolic CHF (congestive heart failure) (H) [I50.22]           Comments:  HeartMate 3   Bridging: No bridging. Reason -- new implant   Date VAD placed: 03/23/22  Hx Mitral valve repair and not replacement         Anticoagulation Care Providers     Provider Role Specialty Phone number    Andreina  Dunia Hollis MD Referring Cardiovascular Disease [phone]

## 2023-01-23 ENCOUNTER — TELEPHONE (OUTPATIENT)
Dept: CARDIOLOGY | Facility: CLINIC | Age: 58
End: 2023-01-23

## 2023-01-23 ENCOUNTER — LAB (OUTPATIENT)
Dept: LAB | Facility: CLINIC | Age: 58
End: 2023-01-23
Payer: MEDICARE

## 2023-01-23 ENCOUNTER — ANTICOAGULATION THERAPY VISIT (OUTPATIENT)
Dept: ANTICOAGULATION | Facility: CLINIC | Age: 58
End: 2023-01-23

## 2023-01-23 ENCOUNTER — OFFICE VISIT (OUTPATIENT)
Dept: CARDIOLOGY | Facility: CLINIC | Age: 58
End: 2023-01-23
Attending: SURGERY
Payer: MEDICARE

## 2023-01-23 ENCOUNTER — APPOINTMENT (OUTPATIENT)
Dept: PULMONOLOGY | Facility: CLINIC | Age: 58
End: 2023-01-23
Payer: MEDICARE

## 2023-01-23 VITALS
HEART RATE: 63 BPM | OXYGEN SATURATION: 95 % | HEIGHT: 70 IN | SYSTOLIC BLOOD PRESSURE: 86 MMHG | BODY MASS INDEX: 33.28 KG/M2 | WEIGHT: 232.5 LBS

## 2023-01-23 DIAGNOSIS — Z95.811 LVAD (LEFT VENTRICULAR ASSIST DEVICE) PRESENT (H): Primary | ICD-10-CM

## 2023-01-23 DIAGNOSIS — I50.22 CHRONIC SYSTOLIC CHF (CONGESTIVE HEART FAILURE) (H): Primary | ICD-10-CM

## 2023-01-23 DIAGNOSIS — I50.22 CHRONIC SYSTOLIC (CONGESTIVE) HEART FAILURE (H): ICD-10-CM

## 2023-01-23 DIAGNOSIS — I50.22 CHRONIC SYSTOLIC CONGESTIVE HEART FAILURE (H): ICD-10-CM

## 2023-01-23 DIAGNOSIS — Z79.899 LONG TERM USE OF DRUG: ICD-10-CM

## 2023-01-23 DIAGNOSIS — I50.22 CHRONIC SYSTOLIC CHF (CONGESTIVE HEART FAILURE) (H): ICD-10-CM

## 2023-01-23 LAB
6 MIN WALK (FT): 1565 FT
6 MIN WALK (M): 477 M
ALBUMIN SERPL BCG-MCNC: 3.9 G/DL (ref 3.5–5.2)
ALP SERPL-CCNC: 66 U/L (ref 40–129)
ALT SERPL W P-5'-P-CCNC: 20 U/L (ref 10–50)
AMPHETAMINES UR QL SCN: NORMAL
ANION GAP SERPL CALCULATED.3IONS-SCNC: 9 MMOL/L (ref 7–15)
AST SERPL W P-5'-P-CCNC: 20 U/L (ref 10–50)
BARBITURATES UR QL SCN: NORMAL
BASOPHILS # BLD AUTO: 0 10E3/UL (ref 0–0.2)
BASOPHILS NFR BLD AUTO: 1 %
BENZODIAZ UR QL SCN: NORMAL
BILIRUB SERPL-MCNC: 0.3 MG/DL
BUN SERPL-MCNC: 16.4 MG/DL (ref 6–20)
BZE UR QL SCN: NORMAL
CALCIUM SERPL-MCNC: 9.5 MG/DL (ref 8.6–10)
CANNABINOIDS UR QL SCN: NORMAL
CHLORIDE SERPL-SCNC: 105 MMOL/L (ref 98–107)
CREAT SERPL-MCNC: 1.27 MG/DL (ref 0.67–1.17)
D DIMER PPP FEU-MCNC: 0.85 UG/ML FEU (ref 0–0.5)
DEPRECATED HCO3 PLAS-SCNC: 28 MMOL/L (ref 22–29)
EOSINOPHIL # BLD AUTO: 0.1 10E3/UL (ref 0–0.7)
EOSINOPHIL NFR BLD AUTO: 1 %
ERYTHROCYTE [DISTWIDTH] IN BLOOD BY AUTOMATED COUNT: 14.4 % (ref 10–15)
ETHANOL UR QL SCN: NORMAL
FERRITIN SERPL-MCNC: 387 NG/ML (ref 31–409)
GFR SERPL CREATININE-BSD FRML MDRD: 67 ML/MIN/1.73M2
GLUCOSE SERPL-MCNC: 96 MG/DL (ref 70–99)
HCT VFR BLD AUTO: 41.5 % (ref 40–53)
HGB BLD-MCNC: 13.8 G/DL (ref 13.3–17.7)
IMM GRANULOCYTES # BLD: 0.1 10E3/UL
IMM GRANULOCYTES NFR BLD: 2 %
INR PPP: 2.08 (ref 0.85–1.15)
IRON BINDING CAPACITY (ROCHE): 270 UG/DL (ref 240–430)
IRON SATN MFR SERPL: 32 % (ref 15–46)
IRON SERPL-MCNC: 86 UG/DL (ref 61–157)
LDH SERPL L TO P-CCNC: 242 U/L (ref 0–250)
LYMPHOCYTES # BLD AUTO: 1.4 10E3/UL (ref 0.8–5.3)
LYMPHOCYTES NFR BLD AUTO: 27 %
MCH RBC QN AUTO: 32.2 PG (ref 26.5–33)
MCHC RBC AUTO-ENTMCNC: 33.3 G/DL (ref 31.5–36.5)
MCV RBC AUTO: 97 FL (ref 78–100)
MONOCYTES # BLD AUTO: 0.5 10E3/UL (ref 0–1.3)
MONOCYTES NFR BLD AUTO: 10 %
NEUTROPHILS # BLD AUTO: 3 10E3/UL (ref 1.6–8.3)
NEUTROPHILS NFR BLD AUTO: 59 %
NRBC # BLD AUTO: 0 10E3/UL
NRBC BLD AUTO-RTO: 0 /100
NT-PROBNP SERPL-MCNC: 254 PG/ML (ref 0–900)
OPIATES UR QL SCN: NORMAL
PCP QUAL URINE (ROCHE): NORMAL
PLATELET # BLD AUTO: 175 10E3/UL (ref 150–450)
POTASSIUM SERPL-SCNC: 4.8 MMOL/L (ref 3.4–5.3)
PROT SERPL-MCNC: 6.7 G/DL (ref 6.4–8.3)
RBC # BLD AUTO: 4.28 10E6/UL (ref 4.4–5.9)
SODIUM SERPL-SCNC: 142 MMOL/L (ref 136–145)
TRANSFERRIN SERPL-MCNC: 227 MG/DL (ref 200–360)
URATE SERPL-MCNC: 9.5 MG/DL (ref 3.4–7)
WBC # BLD AUTO: 5 10E3/UL (ref 4–11)

## 2023-01-23 PROCEDURE — 94726 PLETHYSMOGRAPHY LUNG VOLUMES: CPT | Performed by: INTERNAL MEDICINE

## 2023-01-23 PROCEDURE — 83550 IRON BINDING TEST: CPT | Performed by: PATHOLOGY

## 2023-01-23 PROCEDURE — 80053 COMPREHEN METABOLIC PANEL: CPT | Performed by: PATHOLOGY

## 2023-01-23 PROCEDURE — 99207 PR NON-BILLABLE SERV PER CHARTING: CPT | Performed by: SURGERY

## 2023-01-23 PROCEDURE — 83540 ASSAY OF IRON: CPT | Performed by: PATHOLOGY

## 2023-01-23 PROCEDURE — 82728 ASSAY OF FERRITIN: CPT | Performed by: NURSE PRACTITIONER

## 2023-01-23 PROCEDURE — G0463 HOSPITAL OUTPT CLINIC VISIT: HCPCS

## 2023-01-23 PROCEDURE — 84466 ASSAY OF TRANSFERRIN: CPT | Performed by: NURSE PRACTITIONER

## 2023-01-23 PROCEDURE — 84550 ASSAY OF BLOOD/URIC ACID: CPT | Performed by: NURSE PRACTITIONER

## 2023-01-23 PROCEDURE — 80323 ALKALOIDS NOS: CPT | Mod: 90 | Performed by: PATHOLOGY

## 2023-01-23 PROCEDURE — 94618 PULMONARY STRESS TESTING: CPT | Performed by: INTERNAL MEDICINE

## 2023-01-23 PROCEDURE — 36415 COLL VENOUS BLD VENIPUNCTURE: CPT | Performed by: PATHOLOGY

## 2023-01-23 PROCEDURE — 80307 DRUG TEST PRSMV CHEM ANLYZR: CPT | Performed by: NURSE PRACTITIONER

## 2023-01-23 PROCEDURE — 85610 PROTHROMBIN TIME: CPT | Performed by: PATHOLOGY

## 2023-01-23 PROCEDURE — 94375 RESPIRATORY FLOW VOLUME LOOP: CPT | Performed by: INTERNAL MEDICINE

## 2023-01-23 PROCEDURE — 85025 COMPLETE CBC W/AUTO DIFF WBC: CPT | Performed by: PATHOLOGY

## 2023-01-23 PROCEDURE — 99000 SPECIMEN HANDLING OFFICE-LAB: CPT | Performed by: PATHOLOGY

## 2023-01-23 PROCEDURE — 80321 ALCOHOLS BIOMARKERS 1OR 2: CPT | Mod: 90 | Performed by: PATHOLOGY

## 2023-01-23 PROCEDURE — G0463 HOSPITAL OUTPT CLINIC VISIT: HCPCS | Performed by: SURGERY

## 2023-01-23 PROCEDURE — 83880 ASSAY OF NATRIURETIC PEPTIDE: CPT | Performed by: PATHOLOGY

## 2023-01-23 PROCEDURE — 85379 FIBRIN DEGRADATION QUANT: CPT | Performed by: NURSE PRACTITIONER

## 2023-01-23 PROCEDURE — 94729 DIFFUSING CAPACITY: CPT | Performed by: INTERNAL MEDICINE

## 2023-01-23 PROCEDURE — 83615 LACTATE (LD) (LDH) ENZYME: CPT | Performed by: NURSE PRACTITIONER

## 2023-01-23 ASSESSMENT — PAIN SCALES - GENERAL: PAINLEVEL: NO PAIN (0)

## 2023-01-23 NOTE — LETTER
2023      RE: Tej Morfin  [address]  [place] [address]       Dear Colleague,    Thank you for the opportunity to participate in the care of your patient, Tej Morfin, at the [facility] [place] at [facility]. Please see a copy of my visit note below.    History:   Tej Morfin is a 57 year old male with a PMH of severe MR s/p MVR with ring (1/21/15), VT s/p ICD, nonobstructive CAD, NICM s/p HM III LVAD implantation 3/23/22. His postoperative course was complicated by right knee effusion s/p arthrocentesis and Afib with RVR s/p shock. He is here for evaluation for heart transplantation.  He continues to complain of intermittent dizziness on sotalol. He also notes intermittent abdominal distention with constipation, which is nota Chronic issue for him. He denies changes in speech, fever, chills, chest pain, SOB, FINNEGAN, PND, cough, nausea, vomiting, diarrhea, melena, hematochezia, and LE edema. He denies any concerns at his LVAD drive line site       PAST MEDICAL HISTORY:       Past Medical History:   Diagnosis Date     Chronic systolic heart failure (H) 2017     Gastroparesis       ICD (implantable cardioverter-defibrillator) in place       [employer]     Non-ischemic cardiomyopathy (H)       Paroxysmal ventricular tachycardia 2017     S/P mitral valve replacement 2017     Tetrahydrocannabinol (THC) use disorder, mild, in controlled environment, abuse 2019     occasional edible THC. reportedly for sleep, anxiety     Tobacco abuse, episodic       occasional cigar - ~ 3x/wk         FAMILY HISTORY:        Family History   Problem Relation Age of Onset     Coronary Artery Disease Mother       Kidney failure Mother       Cardiomyopathy Father            age 51         SOCIAL HISTORY:  Social History            Socioeconomic History     Marital status:    Tobacco Use      Smoking status: Former     Smokeless tobacco: Never   Substance and Sexual Activity     Alcohol use: No       Alcohol/week: 0.0 standard drinks     Drug use: No         CURRENT MEDICATIONS:         Outpatient Medications Prior to Visit   Medication Sig Dispense Refill     acetaminophen (TYLENOL) 325 MG tablet Take 2 tablets (650 mg) by mouth every 4 hours as needed for pain 100 tablet 0     allopurinol (ZYLOPRIM) 100 MG tablet Take 1 tablet (100 mg) by mouth daily 30 tablet 1     amoxicillin (AMOXIL) 500 MG capsule Take 2,000 mg by mouth once as needed Take 4 tabs 1 hour before all dental cleanings and procedures         cholecalciferol 50 MCG (2000 UT) CAPS Take 50 mcg by mouth daily         furosemide (LASIX) 40 MG tablet Take 40 mg by mouth 2 times daily         hydrOXYzine (VISTARIL) 50 MG capsule Take 50 mg by mouth every 12 hours as needed for anxiety         magnesium oxide (MAG-OX) 400 MG tablet Take 800 mg by mouth 2 times daily         nitroGLYcerin (NITROSTAT) 0.4 MG sublingual tablet Place 0.4 mg under the tongue every 5 minutes as needed for chest pain         omeprazole (PRILOSEC) 40 MG DR capsule Take 40 mg by mouth every morning         potassium chloride ER (KLOR-CON M) 20 MEQ CR tablet Take 40 mEq by mouth 2 times daily         potassium phosphate, monobasic, (K-PHOS) 500 MG tablet Take 500 mg by mouth daily (Patient not taking: Reported on 1/23/2023)         sacubitril-valsartan (ENTRESTO)  MG per tablet Take 1 tablet by mouth 2 times daily 90 tablet 3     sertraline (ZOLOFT) 50 MG tablet Take 50 mg by mouth daily         sotalol (BETAPACE) 120 MG tablet Take 120 mg by mouth 2 times daily         spironolactone (ALDACTONE) 25 MG tablet Take 12.5 mg by mouth daily         study - aspirin or placebo, IDS# [id number], 100 MG capsule Take 1 capsule (100 mg) by mouth daily . Swallow whole, do not crush. 120 capsule 4     traZODone (DESYREL) 50 MG tablet Take 25-50 mg by mouth nightly as needed          warfarin ANTICOAGULANT (COUMADIN) 2.5 MG tablet Take 2 tablets (5mg) by mouth daily or as directed by the Coumadin clinic 180 tablet 1     warfarin ANTICOAGULANT (COUMADIN) 2.5 MG tablet Take 10 mg PO daily at 6 pm until next INR check, then dose per INR goal 2-3 150 tablet 0      No facility-administered medications prior to visit.         ROS:   10 point review of systems normal other than mentioned in history and physical.     EXAM:  MAP-86 Temp-97.4, HR-65, R-20, 95% RA   GENERAL: Appears alert and oriented times three.   HEENT: Eye symmetrical and free of discharge bilaterally. Mucous membranes moist and without lesions.  NECK: Supple and without lymphadenopathy. JVD 8-10 cm.   CV: RRR, S1S2 present with LVAD Hum.   RESPIRATORY: Respirations regular, even, and unlabored. Lungs CTA throughout.   GI: Soft and mildly distended with normoactive bowel sounds present in all quadrants. No tenderness, rebound, guarding. No organomegaly.   EXTREMITIES: No peripheral edema. 2+ bilateral pedal pulses.   NEUROLOGIC: Alert and orientated x 3. CN II-XII grossly intact. No focal deficits.   MUSCULOSKELETAL: No joint swelling or tenderness.   SKIN: No jaundice. No rashes or lesions. LVAD drive line covered.      The following Labs were reviewed today:  CBC RESULTS:        Lab Results   Component Value Date     WBC 5.0 01/23/2023     WBC 4.2 09/10/2019     RBC 4.28 (L) 01/23/2023     RBC 4.98 09/10/2019     HGB 12.9 (L) 01/24/2023     HGB 13.8 01/23/2023     HGB 15.8 09/10/2019     HCT 41.5 01/23/2023     HCT 48.9 09/10/2019     MCV 97 01/23/2023     MCV 98 09/10/2019     MCH 32.2 01/23/2023     MCH 31.7 09/10/2019     MCHC 33.3 01/23/2023     MCHC 32.3 09/10/2019     RDW 14.4 01/23/2023     RDW 12.9 09/10/2019      01/23/2023      09/10/2019         CMP RESULTS:        Lab Results   Component Value Date      01/23/2023      09/10/2019     POTASSIUM 4.8 01/23/2023     POTASSIUM 4.0 06/23/2022      POTASSIUM 3.4 09/10/2019     CHLORIDE 105 01/23/2023     CHLORIDE 110 (H) 06/23/2022     CHLORIDE 110 (H) 06/03/2022     CHLORIDE 108 09/10/2019     CO2 28 01/23/2023     CO2 26 06/23/2022     CO2 26 09/10/2019     ANIONGAP 9 01/23/2023     ANIONGAP 9 06/23/2022     ANIONGAP 4 09/10/2019     GLC 96 01/23/2023     GLC 65 (L) 06/23/2022     GLC 89 09/10/2019     BUN 16.4 01/23/2023     BUN 18 06/23/2022     BUN 18 09/10/2019     CR 1.27 (H) 01/23/2023     CR 1.23 09/10/2019     GFRESTIMATED 67 01/23/2023     GFRESTIMATED 67 09/10/2019     GFRESTBLACK 78 09/10/2019     YESICA 9.5 01/23/2023     YSEICA 9.1 09/10/2019     BILITOTAL 0.3 01/23/2023     BILITOTAL 0.6 09/10/2019     ALBUMIN 3.9 01/23/2023     ALBUMIN 3.2 (L) 06/23/2022     ALBUMIN 4.0 09/10/2019     ALKPHOS 66 01/23/2023     ALKPHOS 61 09/10/2019     ALT 20 01/23/2023     ALT 25 09/10/2019     AST 20 01/23/2023     AST 17 09/10/2019         INR RESULTS:        Lab Results   Component Value Date     INR 2.04 (H) 01/24/2023     INR 2.5 01/20/2023     INR 1.02 09/10/2019               Lab Results   Component Value Date     MAG 1.6 (L) 09/22/2022     MAG 1.8 09/10/2019      No results found for: NTBNPI        Lab Results   Component Value Date     NTBNP 254 01/23/2023     NTBNP 714 (H) 10/18/2018         The following diagnostics were reviewed today:  Echo 1/23/23:   Interpretation Summary  HM3 speed is 5500RPM.  The visual ejection fraction is 15-20% with severe diffuse hypokinesis. The  left ventricular end diastolic diameter is 6.3 cm.  LVAD cannula was seen in the expected anatomic position in the LV apex. LVAD  inflow and outflow cannula Doppler is normal. The aortic valve does not open.  There is mild aortic regurgitation.  Global right ventricular function is moderately to severely reduced.  IVC diameter <2.1 cm collapsing >50% with sniff suggests a normal RA pressure  of 3 mmHg.  No pericardial effusion is present.     This study was compared with the study  from 9/22/2022. The left ventricular  end diastolic diameter is slighly larger at 6.3 cm from 5.9 cm.     RHC 1/24/23:   RA --/--/10 mmHg  RV 29/10 mmHg  PA 29/20/23 mmHg  CWP --/--14 mmHg  CO (Harris) 4.8 L/min  CI (Harris) 2.2 L/min/m2    PVR 1.9 BANEGAS   Right sided filling pressures are mildly elevated. Left sided filling pressures are normal. Mild elevated pulmonary hypertension. Reduced cardiac output level. Hemodynamic data has been modified in Epic per physician review.         Assessment and Plan:   Tej Morfin is a 57 year old male with a PMH of severe MR s/p MVR with ring (1/21/15), VT s/p ICD, nonobstructive CAD, NICM s/p HM III LVAD implantation 3/23/22. His postoperative course was complicated by right knee effusion s/p arthrocentesis and Afib with RVR s/p shock.   He is here for heart transplant evaluation. I discussed the risks and benefits of heart transplantation and LVAD explant including the risks of death, bleeding, stroke, infection, renal failure and arrhythmias. They understand and are willing to proceed with being listed for heart transplantation. I was not able to review the CT done at [facility] which will be transferred here.        Please do not hesitate to contact me if you have any questions/concerns.     Sincerely,     Wilver Rodríguez MD

## 2023-01-23 NOTE — PROGRESS NOTES
ANTICOAGULATION MANAGEMENT     Tej Morfin 57 year old male is on warfarin with therapeutic INR result. (Goal INR 2.0-3.0)    Recent labs: (last 7 days)     01/23/23  1322   INR 2.08*       ASSESSMENT       Source(s): Patient/Caregiver Call       Warfarin doses taken: Warfarin taken as instructed    Diet: No new diet changes identified    New illness, injury, or hospitalization: No    Medication/supplement changes: None noted    Signs or symptoms of bleeding or clotting: No    Previous INR: Therapeutic last 2(+) visits    Additional findings: Upcoming surgery/procedure right heart cath       PLAN     Recommended plan for no diet, medication or health factor changes affecting INR     Dosing Instructions: Continue your current warfarin dose with next INR in 11 days       Summary  As of 1/23/2023    Full warfarin instructions:  7.5 mg every Wed; 5 mg all other days   Next INR check:  2/3/2023             Telephone call with Tej who verbalizes understanding and agrees to plan and who agrees to plan and repeated back plan correctly    Patient to recheck with home meter    Education provided:     None required    Plan made per [facility] anticoagulation protocol and per LVAD protocol    Geneva Martinez RN  Anticoagulation Clinic  1/23/2023    _______________________________________________________________________     Anticoagulation Episode Summary     Current INR goal:  2.0-3.0   TTR:  74.9 % (9.4 mo)   Target end date:  Indefinite   Send INR reminders to:  ANTICOAG LVAD    Indications    LVAD (left ventricular assist device) present (H) [Z95.811]  Chronic systolic CHF (congestive heart failure) (H) [I50.22]           Comments:  HeartMate 3   Bridging: No bridging. Reason -- new implant   Date VAD placed: 03/23/22  Hx Mitral valve repair and not replacement         Anticoagulation Care Providers     Provider Role Specialty Phone number    Dunia Hdz MD Referring Cardiovascular Disease [phone]

## 2023-01-24 ENCOUNTER — APPOINTMENT (OUTPATIENT)
Dept: MEDSURG UNIT | Facility: CLINIC | Age: 58
End: 2023-01-24
Attending: INTERNAL MEDICINE
Payer: MEDICARE

## 2023-01-24 ENCOUNTER — HOSPITAL ENCOUNTER (OUTPATIENT)
Dept: CARDIOLOGY | Facility: CLINIC | Age: 58
Discharge: HOME | End: 2023-01-24
Attending: INTERNAL MEDICINE
Payer: MEDICARE

## 2023-01-24 ENCOUNTER — HOSPITAL ENCOUNTER (OUTPATIENT)
Facility: CLINIC | Age: 58
Discharge: HOME | End: 2023-01-24
Attending: INTERNAL MEDICINE | Admitting: INTERNAL MEDICINE
Payer: MEDICARE

## 2023-01-24 VITALS — HEIGHT: 70 IN | BODY MASS INDEX: 33.28 KG/M2 | WEIGHT: 232.5 LBS

## 2023-01-24 VITALS
TEMPERATURE: 97.4 F | BODY MASS INDEX: 33.3 KG/M2 | HEART RATE: 65 BPM | RESPIRATION RATE: 20 BRPM | WEIGHT: 232.37 LBS | SYSTOLIC BLOOD PRESSURE: 105 MMHG | DIASTOLIC BLOOD PRESSURE: 79 MMHG | OXYGEN SATURATION: 95 %

## 2023-01-24 DIAGNOSIS — I50.22 CHRONIC SYSTOLIC CONGESTIVE HEART FAILURE (H): Primary | ICD-10-CM

## 2023-01-24 DIAGNOSIS — I50.22 CHRONIC SYSTOLIC (CONGESTIVE) HEART FAILURE (H): ICD-10-CM

## 2023-01-24 LAB
DLCOCOR-%PRED-PRE: 58 %
DLCOCOR-PRE: 16.97 ML/MIN/MMHG
DLCOUNC-%PRED-PRE: 57 %
DLCOUNC-PRE: 16.58 ML/MIN/MMHG
DLCOUNC-PRED: 28.95 ML/MIN/MMHG
ERV-%PRED-PRE: 32 %
ERV-PRE: 0.33 L
ERV-PRED: 1.01 L
EXPTIME-PRE: 6.88 SEC
FEF2575-%PRED-PRE: 40 %
FEF2575-PRE: 1.28 L/SEC
FEF2575-PRED: 3.15 L/SEC
FEFMAX-%PRED-PRE: 67 %
FEFMAX-PRE: 6.55 L/SEC
FEFMAX-PRED: 9.67 L/SEC
FEV1-%PRED-PRE: 65 %
FEV1-PRE: 2.35 L
FEV1FEV6-PRE: 66 %
FEV1FEV6-PRED: 80 %
FEV1FVC-PRE: 65 %
FEV1FVC-PRED: 79 %
FEV1SVC-PRE: 68 %
FEV1SVC-PRED: 69 %
FIFMAX-PRE: 4.63 L/SEC
FRCPLETH-%PRED-PRE: 86 %
FRCPLETH-PRE: 3.1 L
FRCPLETH-PRED: 3.6 L
FVC-%PRED-PRE: 79 %
FVC-PRE: 3.59 L
FVC-PRED: 4.52 L
HGB BLD-MCNC: 12.9 G/DL (ref 13.3–17.7)
IC-%PRED-PRE: 74 %
IC-PRE: 3.12 L
IC-PRED: 4.18 L
INR PPP: 2.04 (ref 0.85–1.15)
OXYHGB MFR BLDV: 62 % (ref 92–100)
RVPLETH-%PRED-PRE: 119 %
RVPLETH-PRE: 2.77 L
RVPLETH-PRED: 2.33 L
TLCPLETH-%PRED-PRE: 86 %
TLCPLETH-PRE: 6.22 L
TLCPLETH-PRED: 7.23 L
VA-%PRED-PRE: 75 %
VA-PRE: 5.05 L
VC-%PRED-PRE: 66 %
VC-PRE: 3.45 L
VC-PRED: 5.19 L

## 2023-01-24 PROCEDURE — 999N000142 HC STATISTIC PROCEDURE PREP ONLY

## 2023-01-24 PROCEDURE — 93005 ELECTROCARDIOGRAM TRACING: CPT

## 2023-01-24 PROCEDURE — 999N000054 HC STATISTIC EKG NON-CHARGEABLE

## 2023-01-24 PROCEDURE — 999N000132 HC STATISTIC PP CARE STAGE 1

## 2023-01-24 PROCEDURE — 272N000001 HC OR GENERAL SUPPLY STERILE: Performed by: INTERNAL MEDICINE

## 2023-01-24 PROCEDURE — 250N000009 HC RX 250: Performed by: INTERNAL MEDICINE

## 2023-01-24 PROCEDURE — 82810 BLOOD GASES O2 SAT ONLY: CPT

## 2023-01-24 PROCEDURE — 94621 CARDIOPULM EXERCISE TESTING: CPT

## 2023-01-24 PROCEDURE — 93306 TTE W/DOPPLER COMPLETE: CPT | Mod: 26 | Performed by: STUDENT IN AN ORGANIZED HEALTH CARE EDUCATION/TRAINING PROGRAM

## 2023-01-24 PROCEDURE — 93010 ELECTROCARDIOGRAM REPORT: CPT | Performed by: INTERNAL MEDICINE

## 2023-01-24 PROCEDURE — 94621 CARDIOPULM EXERCISE TESTING: CPT | Mod: 26 | Performed by: INTERNAL MEDICINE

## 2023-01-24 PROCEDURE — C1894 INTRO/SHEATH, NON-LASER: HCPCS | Performed by: INTERNAL MEDICINE

## 2023-01-24 PROCEDURE — 93451 RIGHT HEART CATH: CPT | Mod: 26 | Performed by: INTERNAL MEDICINE

## 2023-01-24 PROCEDURE — 85018 HEMOGLOBIN: CPT

## 2023-01-24 PROCEDURE — 36415 COLL VENOUS BLD VENIPUNCTURE: CPT | Performed by: INTERNAL MEDICINE

## 2023-01-24 PROCEDURE — 93306 TTE W/DOPPLER COMPLETE: CPT

## 2023-01-24 PROCEDURE — 93451 RIGHT HEART CATH: CPT | Performed by: INTERNAL MEDICINE

## 2023-01-24 PROCEDURE — 85610 PROTHROMBIN TIME: CPT | Performed by: INTERNAL MEDICINE

## 2023-01-24 RX ORDER — LIDOCAINE 40 MG/G
CREAM TOPICAL
Status: COMPLETED | OUTPATIENT
Start: 2023-01-24 | End: 2023-01-24

## 2023-01-24 RX ORDER — ONDANSETRON 2 MG/ML
INJECTION INTRAMUSCULAR; INTRAVENOUS
Status: DISCONTINUED | OUTPATIENT
Start: 2023-01-24 | End: 2023-01-24 | Stop reason: HOSPADM

## 2023-01-24 RX ORDER — ALLOPURINOL 100 MG/1
100 TABLET ORAL DAILY
Qty: 30 TABLET | Refills: 1 | Status: SHIPPED | OUTPATIENT
Start: 2023-01-24 | End: 2024-08-26

## 2023-01-24 RX ORDER — FENTANYL CITRATE 50 UG/ML
INJECTION, SOLUTION INTRAMUSCULAR; INTRAVENOUS
Status: DISCONTINUED | OUTPATIENT
Start: 2023-01-24 | End: 2023-01-24 | Stop reason: HOSPADM

## 2023-01-24 RX ADMIN — LIDOCAINE: 40 CREAM TOPICAL at 12:50

## 2023-01-24 ASSESSMENT — ACTIVITIES OF DAILY LIVING (ADL)
ADLS_ACUITY_SCORE: 35

## 2023-01-24 NOTE — DISCHARGE INSTRUCTIONS
[facility]                        Interventional Cardiology  Discharge Instructions   Post Right Heart Cath      AFTER YOU GO HOME:  DO drink plenty of fluids  DO resume your regular diet and medications unless otherwise instructed by your Primary Physician  Do not scrub the procedure site vigorously  No lotion or powder to the puncture site for 3 days  Keep dressing in place for 24 hours    CALL YOUR PRIMARY PHYSICIAN IF: You may resume all normal activity.  Monitor neck site for bleeding, swelling, or voice changes. If you notice bleeding or swelling immediately apply pressure to the site and call number below to speak with Cardiology Fellow.  If you experience any changes in your breathing you should call your doctor immediately or come to the closest Emergency Department.  Do not drive yourself.    ADDITIONAL INSTRUCTIONS: Medications: You are to resume all home medications including anticoagulation therapy unless otherwise advised by your primary cardiologist or nurse coordinator.    Follow Up: Per your primary cardiology team    If you have any questions or concerns regarding your procedure site please call [phone] at anytime and ask for Cardiology Fellow on call.  They are available 24 hours a day.  You may also contact the Cardiology Clinic after hours number at [phone].                                                       Telephone Numbers [phone] Monday-Friday 8:00 am to 4:30 pm    [phone] After 4:30 pm Monday-Friday, Weekends & Holidays  Ask for Interventional Cardiologist on call. Someone is on call 24 hours/day   [facility] toll free number [phone] Monday-Friday 8:00 am to 4:30 pm   [facility] Emergency Dept [phone]

## 2023-01-24 NOTE — PROGRESS NOTES
Pt back from [facility], accompanied by cath lab RN.  VSS.  Denies pain.  RIJ site WDL, dressing CDI.  No bleeding or hematoma.

## 2023-01-24 NOTE — Clinical Note
dry, intact, no bleeding and no hematoma. 7 Fr sheath removed from the RIJ vein. Manual pressure held. Hemostasis obtained. Band aid applied.

## 2023-01-24 NOTE — PROGRESS NOTES
Pt is tolerating liquids and foods, ambulating, and puncture site is stable (no bleeding and no hematoma). Pt has a  - wife.  A/O x4 and making needs known.  VSS. LVAD #s WDL.  Discharge education already completed by previous nurse.  All questions answered and addressed.  Belongings returned to patient at discharge.  Discharged to self care.  Pt ambulated out to front lobby with wife.

## 2023-01-24 NOTE — Clinical Note
Potential access sites were evaluated for patency using ultrasound.   The right jugular vein was selected. Access was obtained under with Sonosite guidance using a standard micro puncture needle with direct visualization of needle entry.

## 2023-01-25 ENCOUNTER — ANTICOAGULATION THERAPY VISIT (OUTPATIENT)
Dept: ANTICOAGULATION | Facility: CLINIC | Age: 58
End: 2023-01-25
Payer: MEDICARE

## 2023-01-25 ENCOUNTER — CARE COORDINATION (OUTPATIENT)
Dept: CARDIOLOGY | Facility: CLINIC | Age: 58
End: 2023-01-25
Payer: MEDICARE

## 2023-01-25 DIAGNOSIS — Z95.811 LVAD (LEFT VENTRICULAR ASSIST DEVICE) PRESENT (H): Primary | ICD-10-CM

## 2023-01-25 DIAGNOSIS — I50.22 CHRONIC SYSTOLIC CHF (CONGESTIVE HEART FAILURE) (H): ICD-10-CM

## 2023-01-25 LAB
CARDIOPULMONARY ANAEROBIC THRESHOLD VO2: 13 ML/KG/MIN
CARDIOPULMONARY BLOOD PRESSURE REST: NORMAL MMHG
CARDIOPULMONARY BREATHING RESERVE REST: 92.1
CARDIOPULMONARY BREATHING RESERVE V02MAX: 44
CARDIOPULMONARY CO2 OUTPUT REST: 268 ML/MIN
CARDIOPULMONARY CO2 OUTPUT VO2MAX: 2082 ML/MIN
CARDIOPULMONARY FEV 1.0 (L) ACTUAL: 2.35
CARDIOPULMONARY FEV 1.0 (L) PRECENT: 66 %
CARDIOPULMONARY FEV 1.0 (L) PREDICTED: 3.57
CARDIOPULMONARY FEV 1.0 FVC (%) ACTUAL: 65
CARDIOPULMONARY FEV 1.0 FVC (%) PERCENT: 82 %
CARDIOPULMONARY FEV 1.0 FVC (%) PREDICTED: 79
CARDIOPULMONARY FUNCTIONAL CAPACITY MAX ML/KG/MIN: 14.4 ML/KG/MIN
CARDIOPULMONARY FUNCTIONAL CAPACITY PERCENT: 44 %
CARDIOPULMONARY FUNCTIONAL CAPACITY PREDICTED: 32.5 ML/KG/MIN
CARDIOPULMONARY FVC (L) ACTUAL: 3.59
CARDIOPULMONARY FVC (L) PERCENT: 79 %
CARDIOPULMONARY FVC (L) PREDICTED: 4.52
CARDIOPULMONARY HEART RATE REST: 80 BPM
CARDIOPULMONARY MET'S REST: 0.8
CARDIOPULMONARY MINUTE VENTILATION REST: 10.5 L/MIN
CARDIOPULMONARY MINUTE VENTILATION VO2MAX: 73.9 L/MIN
CARDIOPULMONARY MYOCARDIAC O2 DEMAND MAX: NORMAL
CARDIOPULMONARY OXYGEN CONSUMPTION REST: 2.7 ML/KG/MIN
CARDIOPULMONARY OXYGEN CONSUMPTION VO2MAX: 14.4 ML/KG/MIN
CARDIOPULMONARY OXYGEN PULSE REST: 3.6 ML/BEAT
CARDIOPULMONARY OXYGEN PULSE VO2MAX: 9.21 ML/BEAT
CARDIOPULMONARY OXYGEN SATURATION- OXIMETRY REST: 100 %
CARDIOPULMONARY OXYGEN SATURATION- OXIMETRY VO2MAX: 98 %
CARDIOPULMONARY PET C02 REST: 36
CARDIOPULMONARY PET C02 VO2MAX: 31
CARDIOPULMONARY PET02 REST: 107
CARDIOPULMONARY PET02 V02 MAX: 121
CARDIOPULMONARY RER: 1.32
CARDIOPULMONARY RESPIRALORY EXCHANGE RATIO VO2MAX: 1.32
CARDIOPULMONARY RESPIRALORY EXCHANGE RATIO: 0.94
CARDIOPULMONARY RESPIRATORY RATE REST: 24 BR/MIN
CARDIOPULMONARY RESPIRATORY RATE VO2MAX: 38 BR/MIN
CARDIOPULMONARY STRESS BASE 1 BPA: 112 BPM
CARDIOPULMONARY STRESS BASE 1 SPO2: 100 % SPO2
CARDIOPULMONARY STRESS BASE 1 TIME SEC: 0 SEC
CARDIOPULMONARY STRESS BASE 1 TIME: 1 MINS
CARDIOPULMONARY STRESS BASE 2 BP MMHG: NORMAL MMHG
CARDIOPULMONARY STRESS BASE 2 BPA: 85 BPM
CARDIOPULMONARY STRESS BASE 2 SPO2: 100 % SPO2
CARDIOPULMONARY STRESS BASE 2 TIME SEC: 0 SEC
CARDIOPULMONARY STRESS BASE 2 TIME: 3 MINS
CARDIOPULMONARY STRESS BASE 3 BP MMHG: NORMAL MMHG
CARDIOPULMONARY STRESS BASE 3 BPA: 80 BPM
CARDIOPULMONARY STRESS BASE 3 SPO2: 100 % SPO2
CARDIOPULMONARY STRESS BASE 3 TIME SEC: 0 SEC
CARDIOPULMONARY STRESS BASE 3 TIME: 5 MINS
CARDIOPULMONARY STRESS PHASE 1 BP MMHG: NORMAL MMHG
CARDIOPULMONARY STRESS PHASE 1 BPM: 80 BPM
CARDIOPULMONARY STRESS PHASE 1 SPO2: 98 % SPO2
CARDIOPULMONARY STRESS PHASE 1 TIME SEC: 0 SEC
CARDIOPULMONARY STRESS PHASE 1 TIME: 2 MINS
CARDIOPULMONARY STRESS PHASE 2 BP MMHG: NORMAL MMHG
CARDIOPULMONARY STRESS PHASE 2 BPM: 80 BPM
CARDIOPULMONARY STRESS PHASE 2 SPO2: 100 % SPO2
CARDIOPULMONARY STRESS PHASE 2 TIME SEC: 0 SEC
CARDIOPULMONARY STRESS PHASE 2 TIME: 4 MINS
CARDIOPULMONARY STRESS PHASE 3 BPM: 84 BPM
CARDIOPULMONARY STRESS PHASE 3 SPO2: 100 % SPO2
CARDIOPULMONARY STRESS PHASE 3 TIME SEC: 0 SEC
CARDIOPULMONARY STRESS PHASE 3 TIME: 6 MINS
CARDIOPULMONARY STRESS PHASE 4 BP MMHG: NORMAL MMHG
CARDIOPULMONARY STRESS PHASE 4 BPM: 99 BPM
CARDIOPULMONARY STRESS PHASE 4 SPO2: 99 % SPO2
CARDIOPULMONARY STRESS PHASE 4 TIME SEC: 0 SEC
CARDIOPULMONARY STRESS PHASE 4 TIME: 8 MINS
CARDIOPULMONARY STRESS PHASE 5 BP MMHG: NORMAL MMHG
CARDIOPULMONARY STRESS PHASE 5 BPM: 109 BPM
CARDIOPULMONARY STRESS PHASE 5 SPO2: 100 % SPO2
CARDIOPULMONARY STRESS PHASE 5 TIME SEC: 0 SEC
CARDIOPULMONARY STRESS PHASE 5 TIME: 10 MINS
CARDIOPULMONARY STRESS PHASE 6 BP MMHG: NORMAL MMHG
CARDIOPULMONARY STRESS PHASE 6 BPA: 129 BPM
CARDIOPULMONARY STRESS PHASE 6 SPO2: 100 % SPO2
CARDIOPULMONARY STRESS PHASE 6 TIME SEC: 20 SEC
CARDIOPULMONARY STRESS PHASE 6 TIME: 12 MINS
CARDIOPULMONARY SVC (L) ACTUAL: 3.45
CARDIOPULMONARY SVC (L) PERCENT: 66 %
CARDIOPULMONARY SVC (L) PREDICTED: 5.19
CARDIOPULMONARY TIDAL VOLUME REST: 431 ML
CARDIOPULMONARY TIDAL VOLUME VO2MAX: 1970 ML
CARDIOPULMONARY VE/VCO2 SLOPE: 33.4
CARDIOPULMONARY VENTILATORY EQUIVALENT 02 REST: 37
CARDIOPULMONARY VENTILATORY EQUIVALENT 02 V02: 47
CARDIOPULMONARY VENTILATORY EQUIVALENT C02 REST: 39
CARDIOPULMONARY VENTILATORY EQUIVALENT C02 SLOPE VO2MAX: 33.4
CARDIOPULMONARY VENTILATORY EQUIVALENT C02 VO2MAX: 35
CV STRESS MAX HR HE: 129
PLPETH BLD-MCNC: <10 NG/ML
POPETH BLD-MCNC: <10 NG/ML
PREDICTED VO2MAX: 32.5
STRESS ANGINA INDEX: 0
STRESS ECHO BASELINE BP: NORMAL MMHG
STRESS ECHO BASELINE HR: 80 BPM
STRESS ECHO CALCULATED PERCENT HR: 78 %
STRESS ECHO LAST STRESS BP: NORMAL MMHG
STRESS ECHO POST ESTIMATED WORKLOAD: 4.1 METS
STRESS ECHO POST EXERCISE DUR MIN: 12 MIN
STRESS ECHO POST EXERCISE DUR SEC: 20 SEC
STRESS ECHO TARGET HR: 165

## 2023-01-25 NOTE — PROGRESS NOTES
ANTICOAGULATION MANAGEMENT     Tej Morfin 57 year old male is on warfarin with therapeutic INR result. (Goal INR 2.0-3.0)    Recent labs: (last 7 days)     01/24/23  1138   INR 2.04*       ASSESSMENT       Source(s): Chart Review and Patient/Caregiver Call       Warfarin doses taken: Warfarin taken as instructed    Diet: No new diet changes identified    New illness, injury, or hospitalization: Patient had a RHC done yesterday.  No new findings and patient was discharged to home.    Medication/supplement changes: None noted    Signs or symptoms of bleeding or clotting: No    Previous INR: Therapeutic last 2(+) visits    Additional findings: None       PLAN     Recommended plan for no diet, medication or health factor changes affecting INR     Dosing Instructions: Continue your current warfarin dose with next INR in 2 weeks       Summary  As of 1/25/2023    Full warfarin instructions:  7.5 mg every Wed; 5 mg all other days   Next INR check:  2/7/2023             Detailed voice message left for Tej with dosing instructions and follow up date.     Contact [phone] to schedule and with any changes, questions or concerns.     Education provided:     Please call back if any changes to your diet, medications or how you've been taking warfarin    Contact [phone] with any changes, questions or concerns.     Plan made per [facility] anticoagulation protocol and per LVAD protocol    Audelia Avalos RN  Anticoagulation Clinic  1/25/2023    _______________________________________________________________________     Anticoagulation Episode Summary     Current INR goal:  2.0-3.0   TTR:  75.0 % (9.4 mo)   Target end date:  Indefinite   Send INR reminders to:  ANTICOAG LVAD    Indications    LVAD (left ventricular assist device) present (H) [Z95.811]  Chronic systolic CHF (congestive heart failure) (H) [I50.22]           Comments:  HeartMate 3   Bridging: No bridging. Reason -- new implant   Date VAD placed:  03/23/22  Hx Mitral valve repair and not replacement         Anticoagulation Care Providers     Provider Role Specialty Phone number    Dunia Hdz MD Referring Cardiovascular Disease [phone]

## 2023-01-26 LAB
ATRIAL RATE - MUSE: 80 BPM
DIASTOLIC BLOOD PRESSURE - MUSE: NORMAL MMHG
INTERPRETATION ECG - MUSE: NORMAL
P AXIS - MUSE: -34 DEGREES
PR INTERVAL - MUSE: 208 MS
QRS DURATION - MUSE: 104 MS
QT - MUSE: 444 MS
QTC - MUSE: 512 MS
R AXIS - MUSE: 187 DEGREES
SYSTOLIC BLOOD PRESSURE - MUSE: NORMAL MMHG
T AXIS - MUSE: 106 DEGREES
VENTRICULAR RATE- MUSE: 80 BPM

## 2023-01-27 ENCOUNTER — CARE COORDINATION (OUTPATIENT)
Dept: TRANSPLANT | Facility: CLINIC | Age: 58
End: 2023-01-27
Payer: MEDICARE

## 2023-01-27 ENCOUNTER — ANCILLARY PROCEDURE (OUTPATIENT)
Dept: CARDIOLOGY | Facility: CLINIC | Age: 58
End: 2023-01-27
Attending: INTERNAL MEDICINE
Payer: MEDICARE

## 2023-01-27 DIAGNOSIS — I42.8 VALVULAR CARDIOMYOPATHY (H): ICD-10-CM

## 2023-01-27 LAB
COTININE SERPL-MCNC: <5 NG/ML
NICOTINE SERPL-MCNC: <5 NG/ML

## 2023-01-27 PROCEDURE — 99207 CARDIAC DEVICE CHECK - REMOTE: CPT | Performed by: INTERNAL MEDICINE

## 2023-01-27 NOTE — PROGRESS NOTES
Status 4 Heart Listing 01/27/23    Tej Morfin was approved for activation on the heart transplant waitlist by the Heart Transplant Committee.      This patient qualifies for Status 4 Listing based on the following criteria:    Dischargeable LVAD without discretionary 30 days       Coordinator reviewed listing criteria with Dr. Hzd prior to submission of Status 4 form in [facility].     Insurance Approval:  YES (verify prior to listing with PFR)  ABO verification complete:  YES  2nd ABO verified by:  Geneva Gordon RN    On-call transplant coordinators, transplant LPN, immunology lab and PFR notified of listing.  Patient's primary cardiologist and/or attending physician is in agreement with this plan.      Status 4 Extension due 04/28/23

## 2023-01-28 LAB
MDC_IDC_EPISODE_DTM: NORMAL
MDC_IDC_EPISODE_ID: NORMAL
MDC_IDC_EPISODE_TYPE: NORMAL
MDC_IDC_LEAD_IMPLANT_DT: NORMAL
MDC_IDC_LEAD_LOCATION: NORMAL
MDC_IDC_LEAD_LOCATION_DETAIL_1: NORMAL
MDC_IDC_LEAD_MFG: NORMAL
MDC_IDC_LEAD_MODEL: NORMAL
MDC_IDC_LEAD_POLARITY_TYPE: NORMAL
MDC_IDC_LEAD_SERIAL: NORMAL
MDC_IDC_MSMT_BATTERY_DTM: NORMAL
MDC_IDC_MSMT_BATTERY_REMAINING_LONGEVITY: 42 MO
MDC_IDC_MSMT_BATTERY_REMAINING_PERCENTAGE: 62 %
MDC_IDC_MSMT_BATTERY_STATUS: NORMAL
MDC_IDC_MSMT_CAP_CHARGE_DTM: NORMAL
MDC_IDC_MSMT_CAP_CHARGE_DTM: NORMAL
MDC_IDC_MSMT_CAP_CHARGE_ENERGY: 31 J
MDC_IDC_MSMT_CAP_CHARGE_TIME: 12.5 S
MDC_IDC_MSMT_CAP_CHARGE_TIME: 7.2 S
MDC_IDC_MSMT_CAP_CHARGE_TYPE: NORMAL
MDC_IDC_MSMT_CAP_CHARGE_TYPE: NORMAL
MDC_IDC_MSMT_LEADCHNL_LV_IMPEDANCE_VALUE: 952 OHM
MDC_IDC_MSMT_LEADCHNL_LV_LEAD_CHANNEL_STATUS: NORMAL
MDC_IDC_MSMT_LEADCHNL_RA_IMPEDANCE_VALUE: 517 OHM
MDC_IDC_MSMT_LEADCHNL_RV_IMPEDANCE_VALUE: 392 OHM
MDC_IDC_PG_IMPLANT_DTM: NORMAL
MDC_IDC_PG_MFG: NORMAL
MDC_IDC_PG_MODEL: NORMAL
MDC_IDC_PG_SERIAL: NORMAL
MDC_IDC_PG_TYPE: NORMAL
MDC_IDC_SESS_CLINIC_NAME: NORMAL
MDC_IDC_SESS_DTM: NORMAL
MDC_IDC_SESS_TYPE: NORMAL
MDC_IDC_SET_BRADY_AT_MODE_SWITCH_MODE: NORMAL
MDC_IDC_SET_BRADY_AT_MODE_SWITCH_RATE: 170 {BEATS}/MIN
MDC_IDC_SET_BRADY_LOWRATE: 80 {BEATS}/MIN
MDC_IDC_SET_BRADY_MAX_TRACKING_RATE: 130 {BEATS}/MIN
MDC_IDC_SET_BRADY_MODE: NORMAL
MDC_IDC_SET_BRADY_PAV_DELAY_HIGH: 280 MS
MDC_IDC_SET_BRADY_PAV_DELAY_LOW: 300 MS
MDC_IDC_SET_BRADY_SAV_DELAY_HIGH: 280 MS
MDC_IDC_SET_BRADY_SAV_DELAY_LOW: 300 MS
MDC_IDC_SET_CRT_PACED_CHAMBERS: NORMAL
MDC_IDC_SET_LEADCHNL_LV_PACING_AMPLITUDE: 0.1 V
MDC_IDC_SET_LEADCHNL_LV_PACING_PULSEWIDTH: 0.1 MS
MDC_IDC_SET_LEADCHNL_LV_SENSING_ADAPTATION_MODE: NORMAL
MDC_IDC_SET_LEADCHNL_LV_SENSING_ANODE_ELECTRODE_1: NORMAL
MDC_IDC_SET_LEADCHNL_LV_SENSING_ANODE_LOCATION_1: NORMAL
MDC_IDC_SET_LEADCHNL_LV_SENSING_CATHODE_ELECTRODE_1: NORMAL
MDC_IDC_SET_LEADCHNL_LV_SENSING_CATHODE_LOCATION_1: NORMAL
MDC_IDC_SET_LEADCHNL_LV_SENSING_SENSITIVITY: 1 MV
MDC_IDC_SET_LEADCHNL_RA_PACING_AMPLITUDE: 1.5 V
MDC_IDC_SET_LEADCHNL_RA_PACING_POLARITY: NORMAL
MDC_IDC_SET_LEADCHNL_RA_PACING_PULSEWIDTH: 0.5 MS
MDC_IDC_SET_LEADCHNL_RA_SENSING_ADAPTATION_MODE: NORMAL
MDC_IDC_SET_LEADCHNL_RA_SENSING_POLARITY: NORMAL
MDC_IDC_SET_LEADCHNL_RA_SENSING_SENSITIVITY: 0.25 MV
MDC_IDC_SET_LEADCHNL_RV_PACING_AMPLITUDE: 2.8 V
MDC_IDC_SET_LEADCHNL_RV_PACING_POLARITY: NORMAL
MDC_IDC_SET_LEADCHNL_RV_PACING_PULSEWIDTH: 0.5 MS
MDC_IDC_SET_LEADCHNL_RV_SENSING_ADAPTATION_MODE: NORMAL
MDC_IDC_SET_LEADCHNL_RV_SENSING_POLARITY: NORMAL
MDC_IDC_SET_LEADCHNL_RV_SENSING_SENSITIVITY: 0.6 MV
MDC_IDC_SET_ZONE_DETECTION_INTERVAL: 273 MS
MDC_IDC_SET_ZONE_TYPE: NORMAL
MDC_IDC_SET_ZONE_VENDOR_TYPE: NORMAL
MDC_IDC_STAT_AT_BURDEN_PERCENT: 0 %
MDC_IDC_STAT_AT_DTM_END: NORMAL
MDC_IDC_STAT_AT_DTM_START: NORMAL
MDC_IDC_STAT_BRADY_DTM_END: NORMAL
MDC_IDC_STAT_BRADY_DTM_START: NORMAL
MDC_IDC_STAT_BRADY_RA_PERCENT_PACED: 92 %
MDC_IDC_STAT_BRADY_RV_PERCENT_PACED: 3 %
MDC_IDC_STAT_CRT_DTM_END: NORMAL
MDC_IDC_STAT_CRT_DTM_START: NORMAL
MDC_IDC_STAT_CRT_LV_PERCENT_PACED: 0 %
MDC_IDC_STAT_EPISODE_RECENT_COUNT: 0
MDC_IDC_STAT_EPISODE_RECENT_COUNT_DTM_END: NORMAL
MDC_IDC_STAT_EPISODE_RECENT_COUNT_DTM_START: NORMAL
MDC_IDC_STAT_EPISODE_TYPE: NORMAL
MDC_IDC_STAT_EPISODE_VENDOR_TYPE: NORMAL
MDC_IDC_STAT_TACHYTHERAPY_ATP_DELIVERED_RECENT: 0
MDC_IDC_STAT_TACHYTHERAPY_ATP_DELIVERED_TOTAL: 3
MDC_IDC_STAT_TACHYTHERAPY_RECENT_DTM_END: NORMAL
MDC_IDC_STAT_TACHYTHERAPY_RECENT_DTM_START: NORMAL
MDC_IDC_STAT_TACHYTHERAPY_SHOCKS_ABORTED_RECENT: 0
MDC_IDC_STAT_TACHYTHERAPY_SHOCKS_ABORTED_TOTAL: 1
MDC_IDC_STAT_TACHYTHERAPY_SHOCKS_DELIVERED_RECENT: 0
MDC_IDC_STAT_TACHYTHERAPY_SHOCKS_DELIVERED_TOTAL: 2
MDC_IDC_STAT_TACHYTHERAPY_TOTAL_DTM_END: NORMAL
MDC_IDC_STAT_TACHYTHERAPY_TOTAL_DTM_START: NORMAL

## 2023-01-28 NOTE — PROGRESS NOTES
History:   Tej Morfin is a 57 year old male with a PMH of severe MR s/p MVR with ring (1/21/15), VT s/p ICD, nonobstructive CAD, NICM s/p HM III LVAD implantation 3/23/22. His postoperative course was complicated by right knee effusion s/p arthrocentesis and Afib with RVR s/p shock. He is here for evaluation for heart transplantation.  He continues to complain of intermittent dizziness on sotalol. He also notes intermittent abdominal distention with constipation, which is nota Chronic issue for him. He denies changes in speech, fever, chills, chest pain, SOB, FINNEGAN, PND, cough, nausea, vomiting, diarrhea, melena, hematochezia, and LE edema. He denies any concerns at his LVAD drive line site       PAST MEDICAL HISTORY:       Past Medical History:   Diagnosis Date     Chronic systolic heart failure (H) 2017     Gastroparesis       ICD (implantable cardioverter-defibrillator) in place       [employer]     Non-ischemic cardiomyopathy (H)       Paroxysmal ventricular tachycardia 2017     S/P mitral valve replacement 2017     Tetrahydrocannabinol (THC) use disorder, mild, in controlled environment, abuse 2019     occasional edible THC. reportedly for sleep, anxiety     Tobacco abuse, episodic       occasional cigar - ~ 3x/wk         FAMILY HISTORY:        Family History   Problem Relation Age of Onset     Coronary Artery Disease Mother       Kidney failure Mother       Cardiomyopathy Father            age 51         SOCIAL HISTORY:  Social History            Socioeconomic History     Marital status:    Tobacco Use     Smoking status: Former     Smokeless tobacco: Never   Substance and Sexual Activity     Alcohol use: No       Alcohol/week: 0.0 standard drinks     Drug use: No         CURRENT MEDICATIONS:         Outpatient Medications Prior to Visit   Medication Sig Dispense Refill     acetaminophen (TYLENOL) 325 MG tablet Take 2 tablets (650 mg) by mouth every 4 hours as needed  for pain 100 tablet 0     allopurinol (ZYLOPRIM) 100 MG tablet Take 1 tablet (100 mg) by mouth daily 30 tablet 1     amoxicillin (AMOXIL) 500 MG capsule Take 2,000 mg by mouth once as needed Take 4 tabs 1 hour before all dental cleanings and procedures         cholecalciferol 50 MCG (2000 UT) CAPS Take 50 mcg by mouth daily         furosemide (LASIX) 40 MG tablet Take 40 mg by mouth 2 times daily         hydrOXYzine (VISTARIL) 50 MG capsule Take 50 mg by mouth every 12 hours as needed for anxiety         magnesium oxide (MAG-OX) 400 MG tablet Take 800 mg by mouth 2 times daily         nitroGLYcerin (NITROSTAT) 0.4 MG sublingual tablet Place 0.4 mg under the tongue every 5 minutes as needed for chest pain         omeprazole (PRILOSEC) 40 MG DR capsule Take 40 mg by mouth every morning         potassium chloride ER (KLOR-CON M) 20 MEQ CR tablet Take 40 mEq by mouth 2 times daily         potassium phosphate, monobasic, (K-PHOS) 500 MG tablet Take 500 mg by mouth daily (Patient not taking: Reported on 1/23/2023)         sacubitril-valsartan (ENTRESTO)  MG per tablet Take 1 tablet by mouth 2 times daily 90 tablet 3     sertraline (ZOLOFT) 50 MG tablet Take 50 mg by mouth daily         sotalol (BETAPACE) 120 MG tablet Take 120 mg by mouth 2 times daily         spironolactone (ALDACTONE) 25 MG tablet Take 12.5 mg by mouth daily         study - aspirin or placebo, IDS# [id number], 100 MG capsule Take 1 capsule (100 mg) by mouth daily . Swallow whole, do not crush. 120 capsule 4     traZODone (DESYREL) 50 MG tablet Take 25-50 mg by mouth nightly as needed         warfarin ANTICOAGULANT (COUMADIN) 2.5 MG tablet Take 2 tablets (5mg) by mouth daily or as directed by the Coumadin clinic 180 tablet 1     warfarin ANTICOAGULANT (COUMADIN) 2.5 MG tablet Take 10 mg PO daily at 6 pm until next INR check, then dose per INR goal 2-3 150 tablet 0      No facility-administered medications prior to visit.         ROS:   10 point review of  systems normal other than mentioned in history and physical.     EXAM:  MAP-86 Temp-97.4, HR-65, R-20, 95% RA   GENERAL: Appears alert and oriented times three.   HEENT: Eye symmetrical and free of discharge bilaterally. Mucous membranes moist and without lesions.  NECK: Supple and without lymphadenopathy. JVD 8-10 cm.   CV: RRR, S1S2 present with LVAD Hum.   RESPIRATORY: Respirations regular, even, and unlabored. Lungs CTA throughout.   GI: Soft and mildly distended with normoactive bowel sounds present in all quadrants. No tenderness, rebound, guarding. No organomegaly.   EXTREMITIES: No peripheral edema. 2+ bilateral pedal pulses.   NEUROLOGIC: Alert and orientated x 3. CN II-XII grossly intact. No focal deficits.   MUSCULOSKELETAL: No joint swelling or tenderness.   SKIN: No jaundice. No rashes or lesions. LVAD drive line covered.      The following Labs were reviewed today:  CBC RESULTS:        Lab Results   Component Value Date     WBC 5.0 01/23/2023     WBC 4.2 09/10/2019     RBC 4.28 (L) 01/23/2023     RBC 4.98 09/10/2019     HGB 12.9 (L) 01/24/2023     HGB 13.8 01/23/2023     HGB 15.8 09/10/2019     HCT 41.5 01/23/2023     HCT 48.9 09/10/2019     MCV 97 01/23/2023     MCV 98 09/10/2019     MCH 32.2 01/23/2023     MCH 31.7 09/10/2019     MCHC 33.3 01/23/2023     MCHC 32.3 09/10/2019     RDW 14.4 01/23/2023     RDW 12.9 09/10/2019      01/23/2023      09/10/2019         CMP RESULTS:        Lab Results   Component Value Date      01/23/2023      09/10/2019     POTASSIUM 4.8 01/23/2023     POTASSIUM 4.0 06/23/2022     POTASSIUM 3.4 09/10/2019     CHLORIDE 105 01/23/2023     CHLORIDE 110 (H) 06/23/2022     CHLORIDE 110 (H) 06/03/2022     CHLORIDE 108 09/10/2019     CO2 28 01/23/2023     CO2 26 06/23/2022     CO2 26 09/10/2019     ANIONGAP 9 01/23/2023     ANIONGAP 9 06/23/2022     ANIONGAP 4 09/10/2019     GLC 96 01/23/2023     GLC 65 (L) 06/23/2022     GLC 89 09/10/2019     BUN 16.4  01/23/2023     BUN 18 06/23/2022     BUN 18 09/10/2019     CR 1.27 (H) 01/23/2023     CR 1.23 09/10/2019     GFRESTIMATED 67 01/23/2023     GFRESTIMATED 67 09/10/2019     GFRESTBLACK 78 09/10/2019     YESICA 9.5 01/23/2023     YESICA 9.1 09/10/2019     BILITOTAL 0.3 01/23/2023     BILITOTAL 0.6 09/10/2019     ALBUMIN 3.9 01/23/2023     ALBUMIN 3.2 (L) 06/23/2022     ALBUMIN 4.0 09/10/2019     ALKPHOS 66 01/23/2023     ALKPHOS 61 09/10/2019     ALT 20 01/23/2023     ALT 25 09/10/2019     AST 20 01/23/2023     AST 17 09/10/2019         INR RESULTS:        Lab Results   Component Value Date     INR 2.04 (H) 01/24/2023     INR 2.5 01/20/2023     INR 1.02 09/10/2019               Lab Results   Component Value Date     MAG 1.6 (L) 09/22/2022     MAG 1.8 09/10/2019      No results found for: NTBNPI        Lab Results   Component Value Date     NTBNP 254 01/23/2023     NTBNP 714 (H) 10/18/2018         The following diagnostics were reviewed today:  Echo 1/23/23:   Interpretation Summary  HM3 speed is 5500RPM.  The visual ejection fraction is 15-20% with severe diffuse hypokinesis. The  left ventricular end diastolic diameter is 6.3 cm.  LVAD cannula was seen in the expected anatomic position in the LV apex. LVAD  inflow and outflow cannula Doppler is normal. The aortic valve does not open.  There is mild aortic regurgitation.  Global right ventricular function is moderately to severely reduced.  IVC diameter <2.1 cm collapsing >50% with sniff suggests a normal RA pressure  of 3 mmHg.  No pericardial effusion is present.     This study was compared with the study from 9/22/2022. The left ventricular  end diastolic diameter is slighly larger at 6.3 cm from 5.9 cm.     RHC 1/24/23:   RA --/--/10 mmHg  RV 29/10 mmHg  PA 29/20/23 mmHg  CWP --/--14 mmHg  CO (Harris) 4.8 L/min  CI (Harris) 2.2 L/min/m2    PVR 1.9 BANEGAS   Right sided filling pressures are mildly elevated. Left sided filling pressures are normal. Mild elevated pulmonary  hypertension. Reduced cardiac output level. Hemodynamic data has been modified in Epic per physician review.         Assessment and Plan:   Tej Morfin is a 57 year old male with a PMH of severe MR s/p MVR with ring (1/21/15), VT s/p ICD, nonobstructive CAD, NICM s/p HM III LVAD implantation 3/23/22. His postoperative course was complicated by right knee effusion s/p arthrocentesis and Afib with RVR s/p shock.   He is here for heart transplant evaluation. I discussed the risks and benefits of heart transplantation and LVAD explant including the risks of death, bleeding, stroke, infection, renal failure and arrhythmias. They understand and are willing to proceed with being listed for heart transplantation. I was not able to review the CT done at OSH which will be transferred here.

## 2023-01-30 ENCOUNTER — CARE COORDINATION (OUTPATIENT)
Dept: TRANSPLANT | Facility: CLINIC | Age: 58
End: 2023-01-30
Payer: MEDICARE

## 2023-01-30 DIAGNOSIS — Z76.82 AWAITING ORGAN TRANSPLANT: Primary | ICD-10-CM

## 2023-02-01 ENCOUNTER — CARE COORDINATION (OUTPATIENT)
Dept: CARDIOLOGY | Facility: CLINIC | Age: 58
End: 2023-02-01
Payer: MEDICARE

## 2023-02-01 DIAGNOSIS — I50.22 CHRONIC SYSTOLIC (CONGESTIVE) HEART FAILURE (H): Primary | ICD-10-CM

## 2023-02-01 RX ORDER — METOPROLOL SUCCINATE 25 MG/1
25 TABLET, EXTENDED RELEASE ORAL DAILY
Qty: 90 TABLET | Refills: 3 | Status: SHIPPED | OUTPATIENT
Start: 2023-02-01 | End: 2023-11-14

## 2023-02-01 NOTE — LETTER
Faxed to:  [facility]  Fax Number:  [phone]                                                                         STANDING Magnesium      Patient Name: Tej SWENSON Shelbie MORIN 1965   Diagnosis/ICD-9: Heart Failure, unspecified I50.9; LVAD Z95.811   Requesting Physician: Dr. Dunia dHz   Date of Request: 23     Date ORDERS   23 Standing order for Magnesium draws.   Quantity: 100  Expires in 1 year  Please fax results to [phone]   For questions please call [phone]       Signed,      Dunia Hdz MD  Heart Failure, Mechanical Circulatory Support and Transplant Cardiology   of Medicine,  Division of Cardiology, [facility]

## 2023-02-01 NOTE — PROGRESS NOTES
D:  Called pt for clinic and device reading follow up.  Pt reports MAP's btwn 80-86, weights 225 - 227lbs, and LVAD parameters stable.  Pt explained that he feels like he has in the past, when his magnesium level is low.  Pt reports that dizziness is rare, brief and mild.  He feels like he is euvolemic.    I:  Discussed device interrogation, and pt's status with Zandra AUGUSTIN NP and Dr. Hdz.  Plan: stop Sotalol, start Metoprolol XL 25mg daily, check magnesium level with next lab draw.  Lab ordered and sent to [facility] on [address].  Pt informed via phone.  A:  Clinic follow up  P:  Pt verbalized understanding of the instructions given.  Will call VAD coordinator with further needs and questions.

## 2023-02-06 ENCOUNTER — CARE COORDINATION (OUTPATIENT)
Dept: CARDIOLOGY | Facility: CLINIC | Age: 58
End: 2023-02-06
Payer: MEDICARE

## 2023-02-06 LAB — INR HOME MONITORING: 4.2 (ref 2–3)

## 2023-02-06 NOTE — PROGRESS NOTES
D: Patient paged on-call coordinator with question.     I/A:   Belly is tight, noticing shortness of breath when he is bending over.   Wt 226 last week, wt now 229/230.   Shortness of breath is not noticeable when walking/resting. Has been able to still walk on the treadmill- 30-45 minutes.    Slightly puffy in his legs.      P:  Will discuss with Dr. Hdz & Vad Coordinator Vandana.  Patient notified to page on-call coordinator if symptoms worsen or with other concerns. Patient verbalized understanding.

## 2023-02-07 ENCOUNTER — ANTICOAGULATION THERAPY VISIT (OUTPATIENT)
Dept: ANTICOAGULATION | Facility: CLINIC | Age: 58
End: 2023-02-07
Payer: MEDICARE

## 2023-02-07 DIAGNOSIS — I50.22 CHRONIC SYSTOLIC CHF (CONGESTIVE HEART FAILURE) (H): ICD-10-CM

## 2023-02-07 DIAGNOSIS — Z95.811 LVAD (LEFT VENTRICULAR ASSIST DEVICE) PRESENT (H): Primary | ICD-10-CM

## 2023-02-07 NOTE — PROGRESS NOTES
D: Called patient to follow up on call last evening.     I/A: Discussed concerns with Dr. Hdz.     P:   Left Voicemail for patient with recommendations, that he increase both potassium & lasix to 60 twice a day this afternoon, Wednesday & Thursday. Asked him to please page on call during the day on Friday to update weights/ symptoms. Asked him to page on-call with any further questions/ concerns.

## 2023-02-07 NOTE — PROGRESS NOTES
ANTICOAGULATION MANAGEMENT     Tej Morfin 57 year old male is on warfarin with supratherapeutic INR result. (Goal INR 2.0-3.0)    Recent labs: (last 7 days)     02/06/23  0000   INR 4.2*       ASSESSMENT       Source(s): Chart Review and Patient/Caregiver Call       Warfarin doses taken: Tej does not think that he took any extra doses of Warfarin but could be a possibility-INR at low end of goal range 2 weeks ago    Diet: reports eating some asparagus a few days ago    New illness, injury, or hospitalization: Yes: belly was feeling tight yesterday, small amount of LE edema.     Medication/supplement changes: Recently started Metoprolol-no drug drug interaction found per [employer]    Signs or symptoms of bleeding or clotting: No    Previous INR: Therapeutic last 2(+) visits    Additional findings: None       PLAN     Recommended plan for temporary change(s) and ongoing change(s) affecting INR     Dosing Instructions: hold dose then decrease your warfarin dose (6.7% change) with next INR in 4 days       Summary  As of 2/7/2023    Full warfarin instructions:  2/7: Hold; Otherwise 5 mg every day   Next INR check:  2/10/2023             Telephone call with Tej who verbalizes understanding and agrees to plan and who agrees to plan and repeated back plan correctly    Patient to recheck with home meter    Education provided:     Dietary considerations: vitamin K content of foods    Interaction IS NOT anticipated between warfarin and Metoprolol    Symptom monitoring: monitoring for bleeding signs and symptoms, when to seek medical attention/emergency care and if you hit your head or have a bad fall seek emergency care    Contact [phone] with any changes, questions or concerns.     Plan made per [facility] anticoagulation protocol and per LVAD protocol    SAVANNA MO RN  Anticoagulation Clinic  2/7/2023    _______________________________________________________________________     Anticoagulation Episode  Summary     Current INR goal:  2.0-3.0   TTR:  73.6 % (9.8 mo)   Target end date:  Indefinite   Send INR reminders to:  ANTICOAG LVAD    Indications    LVAD (left ventricular assist device) present (H) [Z95.811]  Chronic systolic CHF (congestive heart failure) (H) [I50.22]           Comments:  HeartMate 3   Bridging: No bridging. Reason -- new implant   Date VAD placed: 03/23/22  Hx Mitral valve repair and not replacement         Anticoagulation Care Providers     Provider Role Specialty Phone number    Dunia Hdz MD Referring Cardiovascular Disease [phone]

## 2023-02-10 ENCOUNTER — CARE COORDINATION (OUTPATIENT)
Dept: CARDIOLOGY | Facility: CLINIC | Age: 58
End: 2023-02-10

## 2023-02-10 ENCOUNTER — ANCILLARY PROCEDURE (OUTPATIENT)
Dept: CARDIOLOGY | Facility: CLINIC | Age: 58
End: 2023-02-10
Attending: INTERNAL MEDICINE
Payer: MEDICARE

## 2023-02-10 ENCOUNTER — ANTICOAGULATION THERAPY VISIT (OUTPATIENT)
Dept: ANTICOAGULATION | Facility: CLINIC | Age: 58
End: 2023-02-10
Payer: MEDICARE

## 2023-02-10 DIAGNOSIS — Z95.811 LVAD (LEFT VENTRICULAR ASSIST DEVICE) PRESENT (H): Primary | ICD-10-CM

## 2023-02-10 DIAGNOSIS — I50.22 CHRONIC SYSTOLIC CHF (CONGESTIVE HEART FAILURE) (H): ICD-10-CM

## 2023-02-10 DIAGNOSIS — I42.8 VALVULAR CARDIOMYOPATHY (H): ICD-10-CM

## 2023-02-10 LAB
INR HOME MONITORING: 1.9 (ref 2–3)
MDC_IDC_EPISODE_DTM: NORMAL
MDC_IDC_EPISODE_DURATION: 12 S
MDC_IDC_EPISODE_DURATION: 19 S
MDC_IDC_EPISODE_DURATION: 21 S
MDC_IDC_EPISODE_DURATION: 21 S
MDC_IDC_EPISODE_DURATION: 24 S
MDC_IDC_EPISODE_DURATION: 26 S
MDC_IDC_EPISODE_DURATION: 286 S
MDC_IDC_EPISODE_DURATION: 31 S
MDC_IDC_EPISODE_DURATION: 37 S
MDC_IDC_EPISODE_DURATION: 37 S
MDC_IDC_EPISODE_DURATION: 90 S
MDC_IDC_EPISODE_DURATION: NORMAL S
MDC_IDC_EPISODE_DURATION: NORMAL S
MDC_IDC_EPISODE_ID: NORMAL
MDC_IDC_EPISODE_TYPE: NORMAL
MDC_IDC_LEAD_IMPLANT_DT: NORMAL
MDC_IDC_LEAD_LOCATION: NORMAL
MDC_IDC_LEAD_LOCATION_DETAIL_1: NORMAL
MDC_IDC_LEAD_MFG: NORMAL
MDC_IDC_LEAD_MODEL: NORMAL
MDC_IDC_LEAD_POLARITY_TYPE: NORMAL
MDC_IDC_LEAD_SERIAL: NORMAL
MDC_IDC_MSMT_BATTERY_DTM: NORMAL
MDC_IDC_MSMT_BATTERY_REMAINING_LONGEVITY: 42 MO
MDC_IDC_MSMT_BATTERY_REMAINING_PERCENTAGE: 57 %
MDC_IDC_MSMT_BATTERY_STATUS: NORMAL
MDC_IDC_MSMT_CAP_CHARGE_DTM: NORMAL
MDC_IDC_MSMT_CAP_CHARGE_DTM: NORMAL
MDC_IDC_MSMT_CAP_CHARGE_ENERGY: 31 J
MDC_IDC_MSMT_CAP_CHARGE_TIME: 12.5 S
MDC_IDC_MSMT_CAP_CHARGE_TIME: 7.5 S
MDC_IDC_MSMT_CAP_CHARGE_TYPE: NORMAL
MDC_IDC_MSMT_CAP_CHARGE_TYPE: NORMAL
MDC_IDC_MSMT_LEADCHNL_LV_IMPEDANCE_VALUE: 888 OHM
MDC_IDC_MSMT_LEADCHNL_LV_LEAD_CHANNEL_STATUS: NORMAL
MDC_IDC_MSMT_LEADCHNL_RA_IMPEDANCE_VALUE: 495 OHM
MDC_IDC_MSMT_LEADCHNL_RA_PACING_THRESHOLD_AMPLITUDE: 0.5 V
MDC_IDC_MSMT_LEADCHNL_RA_PACING_THRESHOLD_PULSEWIDTH: 0.5 MS
MDC_IDC_MSMT_LEADCHNL_RV_IMPEDANCE_VALUE: 414 OHM
MDC_IDC_PG_IMPLANT_DTM: NORMAL
MDC_IDC_PG_MFG: NORMAL
MDC_IDC_PG_MODEL: NORMAL
MDC_IDC_PG_SERIAL: NORMAL
MDC_IDC_PG_TYPE: NORMAL
MDC_IDC_SESS_CLINIC_NAME: NORMAL
MDC_IDC_SESS_DTM: NORMAL
MDC_IDC_SESS_TYPE: NORMAL
MDC_IDC_SET_BRADY_AT_MODE_SWITCH_MODE: NORMAL
MDC_IDC_SET_BRADY_AT_MODE_SWITCH_RATE: 170 {BEATS}/MIN
MDC_IDC_SET_BRADY_LOWRATE: 80 {BEATS}/MIN
MDC_IDC_SET_BRADY_MAX_TRACKING_RATE: 130 {BEATS}/MIN
MDC_IDC_SET_BRADY_MODE: NORMAL
MDC_IDC_SET_BRADY_PAV_DELAY_HIGH: 280 MS
MDC_IDC_SET_BRADY_PAV_DELAY_LOW: 300 MS
MDC_IDC_SET_BRADY_SAV_DELAY_HIGH: 280 MS
MDC_IDC_SET_BRADY_SAV_DELAY_LOW: 300 MS
MDC_IDC_SET_CRT_PACED_CHAMBERS: NORMAL
MDC_IDC_SET_LEADCHNL_LV_PACING_AMPLITUDE: 0.1 V
MDC_IDC_SET_LEADCHNL_LV_PACING_PULSEWIDTH: 0.1 MS
MDC_IDC_SET_LEADCHNL_LV_SENSING_ADAPTATION_MODE: NORMAL
MDC_IDC_SET_LEADCHNL_LV_SENSING_ANODE_ELECTRODE_1: NORMAL
MDC_IDC_SET_LEADCHNL_LV_SENSING_ANODE_LOCATION_1: NORMAL
MDC_IDC_SET_LEADCHNL_LV_SENSING_CATHODE_ELECTRODE_1: NORMAL
MDC_IDC_SET_LEADCHNL_LV_SENSING_CATHODE_LOCATION_1: NORMAL
MDC_IDC_SET_LEADCHNL_LV_SENSING_SENSITIVITY: 1 MV
MDC_IDC_SET_LEADCHNL_RA_PACING_AMPLITUDE: 1.5 V
MDC_IDC_SET_LEADCHNL_RA_PACING_POLARITY: NORMAL
MDC_IDC_SET_LEADCHNL_RA_PACING_PULSEWIDTH: 0.5 MS
MDC_IDC_SET_LEADCHNL_RA_SENSING_ADAPTATION_MODE: NORMAL
MDC_IDC_SET_LEADCHNL_RA_SENSING_POLARITY: NORMAL
MDC_IDC_SET_LEADCHNL_RA_SENSING_SENSITIVITY: 0.25 MV
MDC_IDC_SET_LEADCHNL_RV_PACING_AMPLITUDE: 2.8 V
MDC_IDC_SET_LEADCHNL_RV_PACING_POLARITY: NORMAL
MDC_IDC_SET_LEADCHNL_RV_PACING_PULSEWIDTH: 0.5 MS
MDC_IDC_SET_LEADCHNL_RV_SENSING_ADAPTATION_MODE: NORMAL
MDC_IDC_SET_LEADCHNL_RV_SENSING_POLARITY: NORMAL
MDC_IDC_SET_LEADCHNL_RV_SENSING_SENSITIVITY: 0.6 MV
MDC_IDC_SET_ZONE_DETECTION_INTERVAL: 273 MS
MDC_IDC_SET_ZONE_TYPE: NORMAL
MDC_IDC_SET_ZONE_VENDOR_TYPE: NORMAL
MDC_IDC_STAT_AT_BURDEN_PERCENT: 1 %
MDC_IDC_STAT_AT_DTM_END: NORMAL
MDC_IDC_STAT_AT_DTM_START: NORMAL
MDC_IDC_STAT_BRADY_DTM_END: NORMAL
MDC_IDC_STAT_BRADY_DTM_START: NORMAL
MDC_IDC_STAT_BRADY_RA_PERCENT_PACED: 88 %
MDC_IDC_STAT_BRADY_RV_PERCENT_PACED: 3 %
MDC_IDC_STAT_CRT_DTM_END: NORMAL
MDC_IDC_STAT_CRT_DTM_START: NORMAL
MDC_IDC_STAT_CRT_LV_PERCENT_PACED: 0 %
MDC_IDC_STAT_EPISODE_RECENT_COUNT: 0
MDC_IDC_STAT_EPISODE_RECENT_COUNT: 1
MDC_IDC_STAT_EPISODE_RECENT_COUNT: 17
MDC_IDC_STAT_EPISODE_RECENT_COUNT: 7
MDC_IDC_STAT_EPISODE_RECENT_COUNT_DTM_END: NORMAL
MDC_IDC_STAT_EPISODE_RECENT_COUNT_DTM_START: NORMAL
MDC_IDC_STAT_EPISODE_TYPE: NORMAL
MDC_IDC_STAT_EPISODE_VENDOR_TYPE: NORMAL
MDC_IDC_STAT_TACHYTHERAPY_ATP_DELIVERED_RECENT: 0
MDC_IDC_STAT_TACHYTHERAPY_ATP_DELIVERED_TOTAL: 3
MDC_IDC_STAT_TACHYTHERAPY_RECENT_DTM_END: NORMAL
MDC_IDC_STAT_TACHYTHERAPY_RECENT_DTM_START: NORMAL
MDC_IDC_STAT_TACHYTHERAPY_SHOCKS_ABORTED_RECENT: 0
MDC_IDC_STAT_TACHYTHERAPY_SHOCKS_ABORTED_TOTAL: 1
MDC_IDC_STAT_TACHYTHERAPY_SHOCKS_DELIVERED_RECENT: 1
MDC_IDC_STAT_TACHYTHERAPY_SHOCKS_DELIVERED_TOTAL: 3
MDC_IDC_STAT_TACHYTHERAPY_TOTAL_DTM_END: NORMAL
MDC_IDC_STAT_TACHYTHERAPY_TOTAL_DTM_START: NORMAL

## 2023-02-10 PROCEDURE — 99207 CARDIAC DEVICE CHECK - REMOTE: CPT | Performed by: INTERNAL MEDICINE

## 2023-02-10 NOTE — PROGRESS NOTES
ANTICOAGULATION MANAGEMENT     Tje Morfin 57 year old male is on warfarin with subtherapeutic INR result. (Goal INR 2.0-3.0)    Recent labs: (last 7 days)     02/10/23  0000   INR 1.9*       ASSESSMENT       Source(s): Chart Review and Patient/Caregiver Call       Warfarin doses taken: Warfarin taken as instructed    Diet: No new diet changes identified    New illness, injury, or hospitalization: No    Medication/supplement changes: None noted    Signs or symptoms of bleeding or clotting: No    Previous INR: Supratherapeutic    Additional findings: Tej reports he had an ICD shock this morning.  He was on his treadmill and fell to his knees.  No signs bleeding.  He is working with his [employer] and device company on this       PLAN     Recommended plan for temporary change(s) affecting INR (recent warfarin hold)    Dosing Instructions: booster dose then continue your current warfarin dose with next INR in 5 - 7 days       Summary  As of 2/10/2023    Full warfarin instructions:  2/10: 7.5 mg; Otherwise 5 mg every day   Next INR check:  2/16/2023             Telephone call with Tej who agrees to plan and repeated back plan correctly    Patient to recheck with home meter    Education provided:     Contact [phone] with any changes, questions or concerns.     Plan made per [facility] anticoagulation protocol and per LVAD protocol    Lizabeth Milton RN  Anticoagulation Clinic  2/10/2023    _______________________________________________________________________     Anticoagulation Episode Summary     Current INR goal:  2.0-3.0   TTR:  73.1 % (10 mo)   Target end date:  Indefinite   Send INR reminders to:  ANTICOAG LVAD    Indications    LVAD (left ventricular assist device) present (H) [Z95.811]  Chronic systolic CHF (congestive heart failure) (H) [I50.22]           Comments:  HeartMate 3   Bridging: No bridging. Reason -- new implant   Date VAD placed: 03/23/22  Hx Mitral valve repair and not replacement          Anticoagulation Care Providers     Provider Role Specialty Phone number    Dunia Hdz MD Referring Cardiovascular Disease [phone]

## 2023-02-10 NOTE — LETTER
Mechanical Circulatory Support Program  [address], [facility] [address] [place] [address]  [phone] Office Phone  [phone] Fax Number    Faxed to:  [address]  Fax Number:  [phone]      Patient Name: Tej Morfin   : 1965   Diagnosis/ICD-10: Heart Failure, unspecified I50.9; LVAD Z95.811   Requesting Physician: Dr. Dunia Hdz   Date of Request: 02/10/23   Date to Draw                                                 Please fax results to [phone]       or email to [email or web address]                              Call [phone] with Questions  ORDER TEST   x Complete Metabolic Panel    Complete Blood Count    Lactate Dehydrogenase    INR             Signed,      Dunia Hdz MD  Heart Failure, Mechanical Circulatory Support and Transplant Cardiology   of Medicine,  Division of Cardiology, [facility]

## 2023-02-10 NOTE — LETTER
Mechanical Circulatory Support Program  [address], [facility] [address]  [address] [place] [address]  [phone] Office Phone  [phone] Fax Number    Faxed to:  [address]  Fax Number:  [phone]      Patient Name: Tej Morfin   : 1965   Diagnosis/ICD-10: Heart Failure, unspecified I50.9; LVAD Z95.811   Requesting Physician: Dr. Dunia Hdz   Date of Request: 02/10/23   Date to Draw                                                 Please fax results to [phone]       or email to [email or web address]                              Call [phone] with Questions  ORDER TEST    Complete Metabolic Panel    Complete Blood Count    Lactate Dehydrogenase    INR             Signed,      Dunia Hdz MD  Heart Failure, Mechanical Circulatory Support and Transplant Cardiology   of Medicine,  Division of Cardiology, [facility]

## 2023-02-10 NOTE — PROGRESS NOTES
S: Patient called after getting an ICD shock.   B: Reports feeling more tired than normal, but parameters were WNL. Decided to walk on treadmill when he felt some chest pain and then got shocked.   A: Patient is feeling fine, a little shook up. Earlier this week, due to weight gain he was to increase his Lasix to 60 BID, the first time being last night and this morning. He said he also increased his KCl to 60 BID.   MAP 80  Wt 226 lbs     02/10/23 1100   Heartmate 3 LEFT VS   Flow (Lpm) 4.4 Lpm   Pulse Index (PI) 4.3 PI   Speed (rpm) 5500 rpm   Power (christiansen) 4.2 christiansen       R: Instructed patient to send remote device transmission, then page device nurse on call. He is also planning on getting his magnesium drawn today, writer will also order a BMP to check other electrolytes. Will forward to Dr. Hdz if other interventions needed.

## 2023-02-13 NOTE — PROGRESS NOTES
2/13/23 Addendum:  Received another INR result from 2/10/23 from clinic lab:  INR was 1.8.  Spoke with Tej on 2/10/23 and he was given instructions to increase his warfarin dose x 1 day.  He will recheck INR on 2/16/23.  No call made today.  Lizabeth Milton RN

## 2023-02-15 ENCOUNTER — CARE COORDINATION (OUTPATIENT)
Dept: CARDIOLOGY | Facility: CLINIC | Age: 58
End: 2023-02-15
Payer: MEDICARE

## 2023-02-15 NOTE — PROGRESS NOTES
D:  Follow up to decreased magnesium level (1.5) and recent shock.  Pt reports feeling much better, stable VAD parameters and weights.    I:  Results discussed with Dr. Hdz.  Plan: pt to contact local nephrology team for suggestions regarding ongoing decreased magnesium levels.  Writer contacted [facility] nephrology team directly to inform them of concerns with lower mag level, and recent shocks.  Nephrology RN to contact patient with recommendations.  Discussed plan with pt.  Pt confirmed that nephrology has increased oral magnesium and has backup recommendations of this is not effective.  Pt will be re-checking mag level next week. Primary cardiologist informed.   A:  Follow up re low magnesium  P:  Pt verbalized understanding of plan.  Will call VAD coordinator with further needs and questions.

## 2023-02-16 ENCOUNTER — ANTICOAGULATION THERAPY VISIT (OUTPATIENT)
Dept: ANTICOAGULATION | Facility: CLINIC | Age: 58
End: 2023-02-16
Payer: MEDICARE

## 2023-02-16 DIAGNOSIS — Z95.811 LVAD (LEFT VENTRICULAR ASSIST DEVICE) PRESENT (H): Primary | ICD-10-CM

## 2023-02-16 DIAGNOSIS — I50.22 CHRONIC SYSTOLIC CHF (CONGESTIVE HEART FAILURE) (H): ICD-10-CM

## 2023-02-16 LAB — INR HOME MONITORING: 2.6 (ref 2–3)

## 2023-02-16 NOTE — PROGRESS NOTES
ANTICOAGULATION MANAGEMENT     Tej Morfin 57 year old male is on warfarin with therapeutic INR result. (Goal INR 2.0-3.0)    Recent labs: (last 7 days)     02/16/23  0000   INR 2.6       ASSESSMENT       Source(s): Chart Review and Patient/Caregiver Call       Warfarin doses taken: More warfarin taken than planned which may be affecting INR--he went back to dose he was previously on with 7.5 mg on W    Diet: No new diet changes identified    New illness, injury, or hospitalization: No    Medication/supplement changes: increase in magnesium dose    Signs or symptoms of bleeding or clotting: No    Previous INR: Subtherapeutic    Additional findings: None       PLAN     Recommended plan for no diet, medication or health factor changes affecting INR     Dosing Instructions: Increase your warfarin dose (7% change) with next INR in 1 week.  Back to maintenance dose he was stable on in past--he took 7.5 mg of W last week also)       Summary  As of 2/16/2023    Full warfarin instructions:  7.5 mg every Wed; 5 mg all other days   Next INR check:  2/24/2023             Telephone call with Tej who agrees to plan and repeated back plan correctly    Patient to recheck with home meter    Education provided:     Contact [phone] with any changes, questions or concerns.     Plan made per [facility] anticoagulation protocol and per LVAD protocol    Lizabeth Milton RN  Anticoagulation Clinic  2/16/2023    _______________________________________________________________________     Anticoagulation Episode Summary     Current INR goal:  2.0-3.0   TTR:  73.5 % (10.2 mo)   Target end date:  Indefinite   Send INR reminders to:  ANTICOAG LVAD    Indications    LVAD (left ventricular assist device) present (H) [Z95.811]  Chronic systolic CHF (congestive heart failure) (H) [I50.22]           Comments:  HeartMate 3   Bridging: No bridging. Reason -- new implant   Date VAD placed: 03/23/22  Hx Mitral valve repair and not replacement          Anticoagulation Care Providers     Provider Role Specialty Phone number    Dunia Hdz MD Referring Cardiovascular Disease [phone]

## 2023-02-17 DIAGNOSIS — I50.22 CHRONIC SYSTOLIC CONGESTIVE HEART FAILURE (H): ICD-10-CM

## 2023-02-20 RX ORDER — ALLOPURINOL 100 MG/1
TABLET ORAL
Qty: 30 TABLET | Refills: 1 | OUTPATIENT
Start: 2023-02-20

## 2023-02-24 ENCOUNTER — DOCUMENTATION ONLY (OUTPATIENT)
Dept: ANTICOAGULATION | Facility: CLINIC | Age: 58
End: 2023-02-24

## 2023-02-24 ENCOUNTER — ANTICOAGULATION THERAPY VISIT (OUTPATIENT)
Dept: ANTICOAGULATION | Facility: CLINIC | Age: 58
End: 2023-02-24
Payer: MEDICARE

## 2023-02-24 ENCOUNTER — LAB (OUTPATIENT)
Dept: LAB | Facility: CLINIC | Age: 58
End: 2023-02-24
Payer: MEDICARE

## 2023-02-24 DIAGNOSIS — Z76.82 AWAITING ORGAN TRANSPLANT: ICD-10-CM

## 2023-02-24 DIAGNOSIS — Z95.811 LVAD (LEFT VENTRICULAR ASSIST DEVICE) PRESENT (H): Primary | ICD-10-CM

## 2023-02-24 DIAGNOSIS — I50.22 CHRONIC SYSTOLIC CHF (CONGESTIVE HEART FAILURE) (H): ICD-10-CM

## 2023-02-24 DIAGNOSIS — Z95.2 S/P MITRAL VALVE REPLACEMENT: ICD-10-CM

## 2023-02-24 LAB — INR HOME MONITORING: 2.6 (ref 2–3)

## 2023-02-24 PROCEDURE — 86832 HLA CLASS I HIGH DEFIN QUAL: CPT

## 2023-02-24 PROCEDURE — 86833 HLA CLASS II HIGH DEFIN QUAL: CPT

## 2023-02-24 NOTE — CONFIDENTIAL NOTE
ANTICOAGULATION CLINIC REFERRAL RENEWAL REQUEST       An annual renewal order is required for all patients referred to [facility] Anticoagulation Clinic.?  Please review and sign the pended referral order for Tej Morfin.       ANTICOAGULATION SUMMARY      Warfarin indication(s)   LVAD and Chronic systolic CHF    Mechanical heart valve present?  NO       Current goal range   INR: 2.0-3.0     Goal appropriate for indication? Goal INR 2-3, standard for indication(s) above     Time in Therapeutic Range (TTR)  (Goal > 60%) 73.5%    .    Follow VAD Anticoag protocol:Yes: HeartMate 3   Bridging: No bridging. Reason -- bridging per protocol  Date VAD placed: 03/23/22   INR Goal: per referral     Office visit with referring provider's group within last year yes on 9/22/22       Nanda Marin RN  [facility] Anticoagulation Clinic

## 2023-02-24 NOTE — PROGRESS NOTES
ANTICOAGULATION MANAGEMENT     Tej Morfin 57 year old male is on warfarin with therapeutic INR result. (Goal INR 2.0-3.0)    Recent labs: (last 7 days)     02/24/23  0000   INR 2.6       ASSESSMENT       Source(s): Chart Review and Patient/Caregiver Call       Warfarin doses taken: Warfarin taken as instructed    Diet: No new diet changes identified    New illness, injury, or hospitalization: No    Medication/supplement changes: None noted    Signs or symptoms of bleeding or clotting: No    Previous INR: Therapeutic last visit; previously outside of goal range    Additional findings: None         PLAN     Recommended plan for no diet, medication or health factor changes affecting INR     Dosing Instructions: Continue your current warfarin dose with next INR in 1 week       Summary  As of 2/24/2023    Full warfarin instructions:  7.5 mg every Wed; 5 mg all other days   Next INR check:  3/3/2023             Telephone call with Tej who verbalizes understanding and agrees to plan    Patient to recheck with home meter    Education provided:     Please call back if any changes to your diet, medications or how you've been taking warfarin    Plan made per [facility] anticoagulation protocol    Nanda Marin RN  Anticoagulation Clinic  2/24/2023    _______________________________________________________________________     Anticoagulation Episode Summary     Current INR goal:  2.0-3.0   TTR:  74.2 % (10.4 mo)   Target end date:  Indefinite   Send INR reminders to:  ANTICOAG LVAD    Indications    LVAD (left ventricular assist device) present (H) [Z95.811]  Chronic systolic CHF (congestive heart failure) (H) [I50.22]           Comments:  HeartMate 3   Bridging: No bridging. Reason -- new implant   Date VAD placed: 03/23/22  Hx Mitral valve repair and not replacement         Anticoagulation Care Providers     Provider Role Specialty Phone number    Dunia Hdz MD Referring Cardiovascular Disease  [phone]

## 2023-02-28 ENCOUNTER — TELEPHONE (OUTPATIENT)
Dept: TRANSPLANT | Facility: CLINIC | Age: 58
End: 2023-02-28
Payer: MEDICARE

## 2023-02-28 VITALS — BODY MASS INDEX: 32.96 KG/M2 | WEIGHT: 230 LBS

## 2023-02-28 LAB
SA 1 CELL: NORMAL
SA 1 TEST METHOD: NORMAL
SA 2 CELL: NORMAL
SA 2 TEST METHOD: NORMAL
SA1 HI RISK ABY: NORMAL
SA1 MOD RISK ABY: NORMAL
SA2 HI RISK ABY: NORMAL
SA2 MOD RISK ABY: NORMAL
UNACCEPTABLE ANTIGENS: NORMAL
UNOS CPRA: 0
ZZZSA 1  COMMENTS: NORMAL
ZZZSA 2 COMMENTS: NORMAL

## 2023-03-08 ENCOUNTER — ANTICOAGULATION THERAPY VISIT (OUTPATIENT)
Dept: ANTICOAGULATION | Facility: CLINIC | Age: 58
End: 2023-03-08
Payer: MEDICARE

## 2023-03-08 DIAGNOSIS — I50.22 CHRONIC SYSTOLIC CHF (CONGESTIVE HEART FAILURE) (H): Primary | ICD-10-CM

## 2023-03-08 DIAGNOSIS — I50.22 CHRONIC SYSTOLIC CHF (CONGESTIVE HEART FAILURE) (H): ICD-10-CM

## 2023-03-08 DIAGNOSIS — Z95.811 LVAD (LEFT VENTRICULAR ASSIST DEVICE) PRESENT (H): Primary | ICD-10-CM

## 2023-03-08 LAB — INR HOME MONITORING: 2.9 (ref 2–3)

## 2023-03-08 NOTE — PROGRESS NOTES
ANTICOAGULATION MANAGEMENT     Tej Morfin 57 year old male is on warfarin with therapeutic INR result. (Goal INR 2.0-3.0)    Recent labs: (last 7 days)     03/08/23  0000   INR 2.9       ASSESSMENT       Source(s): Chart Review and Patient/Caregiver Call       Warfarin doses taken: Warfarin taken as instructed    Diet: No new diet changes identified    New illness, injury, or hospitalization: No    Medication/supplement changes: None noted    Signs or symptoms of bleeding or clotting: No    Previous INR: Therapeutic last 2(+) visits    Additional findings: None         PLAN     Recommended plan for no diet, medication or health factor changes affecting INR     Dosing Instructions: Continue your current warfarin dose with next INR in 2 weeks       Summary  As of 3/8/2023    Full warfarin instructions:  7.5 mg every Wed; 5 mg all other days   Next INR check:  3/22/2023             Telephone call with Tej who verbalizes understanding and agrees to plan and who agrees to plan and repeated back plan correctly    Patient to recheck with home meter    Education provided:     Contact [phone] with any changes, questions or concerns.     Plan made per [facility] anticoagulation protocol and per LVAD protocol    SAVANNA MO RN  Anticoagulation Clinic  3/8/2023    _______________________________________________________________________     Anticoagulation Episode Summary     Current INR goal:  2.0-3.0   TTR:  75.1 % (10.8 mo)   Target end date:  Indefinite   Send INR reminders to:  ANTICOAG LVAD    Indications    LVAD (left ventricular assist device) present (H) [Z95.811]  Chronic systolic CHF (congestive heart failure) (H) [I50.22]           Comments:  HeartMate 3   Bridging: No bridging. Reason -- new implant   Date VAD placed: 03/23/22  Hx Mitral valve repair and not replacement         Anticoagulation Care Providers     Provider Role Specialty Phone number    Dunia Hdz MD Referring Cardiovascular  Disease [phone]

## 2023-03-23 ENCOUNTER — TELEPHONE (OUTPATIENT)
Dept: ANTICOAGULATION | Facility: CLINIC | Age: 58
End: 2023-03-23
Payer: MEDICARE

## 2023-03-23 NOTE — TELEPHONE ENCOUNTER
ANTICOAGULATION     Tej Morfin is overdue for INR check.      Left message reminding patient to check INR with their home meter and call results to the home monitoring company as soon as possible.     Audelia Avalos RN

## 2023-03-24 ENCOUNTER — ANTICOAGULATION THERAPY VISIT (OUTPATIENT)
Dept: ANTICOAGULATION | Facility: CLINIC | Age: 58
End: 2023-03-24
Payer: MEDICARE

## 2023-03-24 DIAGNOSIS — I50.22 CHRONIC SYSTOLIC CHF (CONGESTIVE HEART FAILURE) (H): ICD-10-CM

## 2023-03-24 DIAGNOSIS — Z95.811 LVAD (LEFT VENTRICULAR ASSIST DEVICE) PRESENT (H): Primary | ICD-10-CM

## 2023-03-24 LAB — INR HOME MONITORING: 2.9 (ref 2–3)

## 2023-03-24 NOTE — PROGRESS NOTES
ANTICOAGULATION MANAGEMENT     Tej Morfin 57 year old male is on warfarin with therapeutic INR result. (Goal INR 2.0-3.0)    Recent labs: (last 7 days)     03/24/23  0000   INR 2.9       ASSESSMENT       Source(s): Chart Review and Patient/Caregiver Call       Warfarin doses taken: Warfarin taken as instructed    Diet: No new diet changes identified    New illness, injury, or hospitalization: No    Medication/supplement changes: None noted    Signs or symptoms of bleeding or clotting: No    Previous INR: Therapeutic last 2(+) visits    Additional findings: None         PLAN     Recommended plan for no diet, medication or health factor changes affecting INR     Dosing Instructions: Continue your current warfarin dose with next INR in 2 weeks       Summary  As of 3/24/2023    Full warfarin instructions:  7.5 mg every Wed; 5 mg all other days   Next INR check:  4/7/2023             Telephone call with Tej who verbalizes understanding and agrees to plan    Patient to recheck with home meter    Education provided:     Please call back if any changes to your diet, medications or how you've been taking warfarin    Plan made per [facility] anticoagulation protocol    Nanda Marin RN  Anticoagulation Clinic  3/24/2023    _______________________________________________________________________     Anticoagulation Episode Summary     Current INR goal:  2.0-3.0   TTR:  76.1 % (11.4 mo)   Target end date:  Indefinite   Send INR reminders to:  ANTICOAG LVAD    Indications    LVAD (left ventricular assist device) present (H) [Z95.811]  Chronic systolic CHF (congestive heart failure) (H) [I50.22]           Comments:  HeartMate 3   Bridging: No bridging. Reason -- new implant   Date VAD placed: 03/23/22  Hx Mitral valve repair and not replacement         Anticoagulation Care Providers     Provider Role Specialty Phone number    Dunia Hdz MD Referring Cardiovascular Disease [phone]

## 2023-03-28 ENCOUNTER — DOCUMENTATION ONLY (OUTPATIENT)
Dept: ANTICOAGULATION | Facility: CLINIC | Age: 58
End: 2023-03-28
Payer: MEDICARE

## 2023-03-28 DIAGNOSIS — Z95.811 LVAD (LEFT VENTRICULAR ASSIST DEVICE) PRESENT (H): ICD-10-CM

## 2023-03-28 DIAGNOSIS — I50.22 CHRONIC SYSTOLIC CHF (CONGESTIVE HEART FAILURE) (H): ICD-10-CM

## 2023-03-28 RX ORDER — WARFARIN SODIUM 2.5 MG/1
TABLET ORAL
Qty: 180 TABLET | Refills: 1 | Status: SHIPPED | OUTPATIENT
Start: 2023-03-28 | End: 2023-08-14

## 2023-03-28 NOTE — PROGRESS NOTES
ANTICOAGULATION MANAGEMENT:  Medication Refill    Anticoagulation Summary  As of 3/24/2023    Warfarin maintenance plan:  7.5 mg (2.5 mg x 3) every Wed; 5 mg (2.5 mg x 2) all other days   Next INR check:  4/7/2023   Target end date:  Indefinite    Indications    LVAD (left ventricular assist device) present (H) [Z95.811]  Chronic systolic CHF (congestive heart failure) (H) [I50.22]             Anticoagulation Care Providers     Provider Role Specialty Phone number    Dunia Hdz MD Referring Cardiovascular Disease [phone]          Visit with referring provider/group within last year: Yes    [facility] referral signed within last year: No    Tej meets all criteria for refill (current [facility] referral, office visit with referring provider/group in last year, lab monitoring up to date or not exceeding 2 weeks overdue). Rx instructions and quantity supplied updated to match patient's current dosing plan. 180 day supply with 1 refills granted per [facility] protocol     Geneva Martinez RN  Anticoagulation Clinic

## 2023-03-31 DIAGNOSIS — Z95.811 LVAD (LEFT VENTRICULAR ASSIST DEVICE) PRESENT (H): ICD-10-CM

## 2023-03-31 DIAGNOSIS — Z95.2 S/P MITRAL VALVE REPLACEMENT: Primary | ICD-10-CM

## 2023-03-31 DIAGNOSIS — I47.29 PAROXYSMAL VENTRICULAR TACHYCARDIA (H): ICD-10-CM

## 2023-04-01 ENCOUNTER — TELEPHONE (OUTPATIENT)
Dept: CARDIOLOGY | Facility: CLINIC | Age: 58
End: 2023-04-01
Payer: MEDICARE

## 2023-04-02 NOTE — TELEPHONE ENCOUNTER
D:  Pt called to inquire about his current med list.  I:  As he and his wife were setting up his meds, they couldn't remember for certain whether his sotalol had been discontinued.  He had checked his [facility] chart and saw it wasn't on his list, but wanted to be sure it wasn't on his list at the [facility] as well.  Reviewed his current medication list and confirmed for him that it is not currently on his prescribed medications.  He understood and was happy for the confirmation.  A:  Stable, question about meds.  P:  Follow up per plan.

## 2023-04-07 ENCOUNTER — ANTICOAGULATION THERAPY VISIT (OUTPATIENT)
Dept: ANTICOAGULATION | Facility: CLINIC | Age: 58
End: 2023-04-07
Payer: MEDICARE

## 2023-04-07 DIAGNOSIS — Z95.2 S/P MITRAL VALVE REPLACEMENT: ICD-10-CM

## 2023-04-07 DIAGNOSIS — I50.22 CHRONIC SYSTOLIC CHF (CONGESTIVE HEART FAILURE) (H): ICD-10-CM

## 2023-04-07 DIAGNOSIS — Z95.811 LVAD (LEFT VENTRICULAR ASSIST DEVICE) PRESENT (H): Primary | ICD-10-CM

## 2023-04-07 DIAGNOSIS — I47.29 PAROXYSMAL VENTRICULAR TACHYCARDIA (H): ICD-10-CM

## 2023-04-07 LAB — INR HOME MONITORING: 3 (ref 2–3)

## 2023-04-07 NOTE — PROGRESS NOTES
ANTICOAGULATION MANAGEMENT     Tej Morfin 57 year old male is on warfarin with therapeutic INR result. (Goal INR 2.0-3.0)    Recent labs: (last 7 days)     04/07/23  0000   INR 3.0       ASSESSMENT       Source(s): Chart Review and Patient/Caregiver Call       Warfarin doses taken: Warfarin taken as instructed    Diet: No new diet changes identified    New illness, injury, or hospitalization: No    Medication/supplement changes: None noted    Signs or symptoms of bleeding or clotting: No    Previous INR: Therapeutic last 2(+) visits    Additional findings: None         PLAN     Recommended plan for no diet, medication or health factor changes affecting INR     Dosing Instructions: Continue your current warfarin dose with next INR in 2 weeks       Summary  As of 4/7/2023    Full warfarin instructions:  7.5 mg every Wed; 5 mg all other days   Next INR check:  4/21/2023             Telephone call with Tej who verbalizes understanding and agrees to plan    Patient to recheck with home meter    Education provided:     None required    Plan made per [facility] anticoagulation protocol and per LVAD protocol    Nanda Marin, RN  Anticoagulation Clinic  4/7/2023    _______________________________________________________________________     Anticoagulation Episode Summary     Current INR goal:  2.0-3.0   TTR:  77.0 % (11.8 mo)   Target end date:  Indefinite   Send INR reminders to:  ANTICOAG LVAD    Indications    LVAD (left ventricular assist device) present (H) [Z95.811]  Chronic systolic CHF (congestive heart failure) (H) [I50.22]  S/P mitral valve replacement [Z95.2]  Paroxysmal ventricular tachycardia (H) [I47.29]           Comments:  HeartMate 3   Bridging: No bridging. Reason -- new implant   Date VAD placed: 03/23/22  Hx Mitral valve repair and not replacement         Anticoagulation Care Providers     Provider Role Specialty Phone number    Dunia Hdz MD Referring Cardiovascular Disease  [phone]

## 2023-04-10 ENCOUNTER — TELEPHONE (OUTPATIENT)
Dept: CARDIOLOGY | Facility: CLINIC | Age: 58
End: 2023-04-10
Payer: MEDICARE

## 2023-04-10 NOTE — TELEPHONE ENCOUNTER
[facility] Call Center    Phone Message    May a detailed message be left on voicemail: yes     Reason for Call: Other: Patient explained that he believes he needs to reschedule all of his appointments due to car troubles he has but the next available was in June. He explained that  wanted him to have this appointment before the end of April and he stressed he was not sure if June would be okay with the provider. Patient requested to speak with the care team to see what they would advise and if he can wait until June for these appointments. Thank you!    Action Taken: Message routed to:  Other: Cardiology    Travel Screening: Not Applicable

## 2023-04-12 NOTE — TELEPHONE ENCOUNTER
Called pt to check in regarding message rcvd.  Pt confirms that he will not be able to make his appts.  Instructed him to call Yamila @ [phone] to reschedule testing and appointments as soon as possible.  Pt verbalized understanding.  Dr. Hdz aware.

## 2023-04-20 ENCOUNTER — CARE COORDINATION (OUTPATIENT)
Dept: CARDIOLOGY | Facility: CLINIC | Age: 58
End: 2023-04-20
Payer: MEDICARE

## 2023-04-21 ENCOUNTER — ANTICOAGULATION THERAPY VISIT (OUTPATIENT)
Dept: ANTICOAGULATION | Facility: CLINIC | Age: 58
End: 2023-04-21
Payer: MEDICARE

## 2023-04-21 DIAGNOSIS — Z95.811 LVAD (LEFT VENTRICULAR ASSIST DEVICE) PRESENT (H): Primary | ICD-10-CM

## 2023-04-21 DIAGNOSIS — Z95.2 S/P MITRAL VALVE REPLACEMENT: ICD-10-CM

## 2023-04-21 DIAGNOSIS — I50.22 CHRONIC SYSTOLIC CHF (CONGESTIVE HEART FAILURE) (H): ICD-10-CM

## 2023-04-21 DIAGNOSIS — I47.29 PAROXYSMAL VENTRICULAR TACHYCARDIA (H): ICD-10-CM

## 2023-04-21 LAB — INR HOME MONITORING: 3.3 (ref 2–3)

## 2023-04-21 NOTE — PROGRESS NOTES
ANTICOAGULATION MANAGEMENT     Tej Morfin 57 year old male is on warfarin with supratherapeutic INR result. (Goal INR 2.0-3.0)    Recent labs: (last 7 days)     04/21/23  0000   INR 3.3*       ASSESSMENT       Source(s): Patient/Caregiver Call       Warfarin doses taken: Warfarin taken as instructed    Diet: No new diet changes identified    Medication/supplement changes: None noted    New illness, injury, or hospitalization: No    Signs or symptoms of bleeding or clotting: Yes: patient reports that when he poked his finger this morning that it bled more than it was suppose to. He was able to apply pressure and it stopped.    Previous INR: Therapeutic last 2(+) visits, but INR was on the higher end of the goal range.    Additional findings: None         PLAN     Recommended plan for no diet, medication or health factor changes affecting INR     Dosing Instructions: decrease your warfarin dose (6.7% change) with next INR in 2 weeks       Summary  As of 4/21/2023    Full warfarin instructions:  5 mg every day   Next INR check:  5/5/2023             Telephone call with Tej who verbalizes understanding and agrees to plan and who agrees to plan and repeated back plan correctly    Patient to recheck with home meter    Education provided:     None required    Plan made per [facility] anticoagulation protocol and per LVAD protocol    Geneva Martinez RN  Anticoagulation Clinic  4/21/2023    _______________________________________________________________________     Anticoagulation Episode Summary     Current INR goal:  2.0-3.0   TTR:  74.7 % (1 y)   Target end date:  Indefinite   Send INR reminders to:  ANTICOAG LVAD    Indications    LVAD (left ventricular assist device) present (H) [Z95.811]  Chronic systolic CHF (congestive heart failure) (H) [I50.22]  S/P mitral valve replacement [Z95.2]  Paroxysmal ventricular tachycardia (H) [I47.29]           Comments:  HeartMate 3   Bridging: No bridging. Reason -- new implant    Date VAD placed: 03/23/22  Hx Mitral valve repair and not replacement         Anticoagulation Care Providers     Provider Role Specialty Phone number    Dunia Hdz MD Referring Cardiovascular Disease [phone]

## 2023-04-24 DIAGNOSIS — Z76.82 AWAITING ORGAN TRANSPLANT: Primary | ICD-10-CM

## 2023-04-25 ENCOUNTER — TELEPHONE (OUTPATIENT)
Dept: TRANSPLANT | Facility: CLINIC | Age: 58
End: 2023-04-25
Payer: MEDICARE

## 2023-04-25 VITALS — BODY MASS INDEX: 32.82 KG/M2 | WEIGHT: 229 LBS

## 2023-04-25 DIAGNOSIS — Z95.811 LVAD (LEFT VENTRICULAR ASSIST DEVICE) PRESENT (H): Primary | ICD-10-CM

## 2023-04-27 ENCOUNTER — CARE COORDINATION (OUTPATIENT)
Dept: TRANSPLANT | Facility: CLINIC | Age: 58
End: 2023-04-27
Payer: MEDICARE

## 2023-04-27 NOTE — PROGRESS NOTES
Status 4 Heart Extension Complete 04/27/23    This patient meets status 4 criteria as evidenced by:     Dischargeable LVAD without discretionary 30 days     Patient's primary cardiologist and/or attending physician is in agreement with this plan.      Status 4 Extension due 07/28/23

## 2023-04-28 DIAGNOSIS — I50.22 CHRONIC SYSTOLIC CONGESTIVE HEART FAILURE (H): Primary | ICD-10-CM

## 2023-05-04 ENCOUNTER — OFFICE VISIT (OUTPATIENT)
Dept: CARDIOLOGY | Facility: CLINIC | Age: 58
End: 2023-05-04
Attending: INTERNAL MEDICINE
Payer: MEDICARE

## 2023-05-04 ENCOUNTER — ANTICOAGULATION THERAPY VISIT (OUTPATIENT)
Dept: ANTICOAGULATION | Facility: CLINIC | Age: 58
End: 2023-05-04

## 2023-05-04 ENCOUNTER — LAB (OUTPATIENT)
Dept: LAB | Facility: CLINIC | Age: 58
End: 2023-05-04
Payer: MEDICARE

## 2023-05-04 VITALS — OXYGEN SATURATION: 96 % | BODY MASS INDEX: 34.1 KG/M2 | WEIGHT: 237.9 LBS | SYSTOLIC BLOOD PRESSURE: 88 MMHG

## 2023-05-04 DIAGNOSIS — I50.22 CHRONIC SYSTOLIC CHF (CONGESTIVE HEART FAILURE) (H): ICD-10-CM

## 2023-05-04 DIAGNOSIS — Z95.811 LVAD (LEFT VENTRICULAR ASSIST DEVICE) PRESENT (H): Primary | ICD-10-CM

## 2023-05-04 DIAGNOSIS — I50.22 CHRONIC SYSTOLIC CONGESTIVE HEART FAILURE (H): ICD-10-CM

## 2023-05-04 DIAGNOSIS — I47.29 PAROXYSMAL VENTRICULAR TACHYCARDIA (H): ICD-10-CM

## 2023-05-04 DIAGNOSIS — Z95.811 LEFT VENTRICULAR ASSIST DEVICE PRESENT (H): ICD-10-CM

## 2023-05-04 DIAGNOSIS — Z95.2 S/P MITRAL VALVE REPLACEMENT: ICD-10-CM

## 2023-05-04 DIAGNOSIS — R05.9 COUGH, UNSPECIFIED TYPE: ICD-10-CM

## 2023-05-04 DIAGNOSIS — I50.22 CHRONIC SYSTOLIC (CONGESTIVE) HEART FAILURE (H): ICD-10-CM

## 2023-05-04 DIAGNOSIS — Z95.811 LVAD (LEFT VENTRICULAR ASSIST DEVICE) PRESENT (H): ICD-10-CM

## 2023-05-04 DIAGNOSIS — Z76.82 AWAITING ORGAN TRANSPLANT: ICD-10-CM

## 2023-05-04 DIAGNOSIS — B99.9 INFECTION: Primary | ICD-10-CM

## 2023-05-04 LAB
6 MIN WALK (FT): 1600 FT
6 MIN WALK (M): 488 M
ALBUMIN SERPL BCG-MCNC: 4 G/DL (ref 3.5–5.2)
ALP SERPL-CCNC: 79 U/L (ref 40–129)
ALT SERPL W P-5'-P-CCNC: 21 U/L (ref 10–50)
ANION GAP SERPL CALCULATED.3IONS-SCNC: 9 MMOL/L (ref 7–15)
AST SERPL W P-5'-P-CCNC: 20 U/L (ref 10–50)
BILIRUB SERPL-MCNC: 0.2 MG/DL
BUN SERPL-MCNC: 15.7 MG/DL (ref 6–20)
CALCIUM SERPL-MCNC: 9.5 MG/DL (ref 8.6–10)
CHLORIDE SERPL-SCNC: 109 MMOL/L (ref 98–107)
CREAT SERPL-MCNC: 1.3 MG/DL (ref 0.67–1.17)
CRP SERPL-MCNC: 6.63 MG/L
DEPRECATED HCO3 PLAS-SCNC: 26 MMOL/L (ref 22–29)
ERYTHROCYTE [DISTWIDTH] IN BLOOD BY AUTOMATED COUNT: 13.3 % (ref 10–15)
GFR SERPL CREATININE-BSD FRML MDRD: 65 ML/MIN/1.73M2
GLUCOSE SERPL-MCNC: 104 MG/DL (ref 70–99)
HCT VFR BLD AUTO: 43 % (ref 40–53)
HGB BLD-MCNC: 13.7 G/DL (ref 13.3–17.7)
INR PPP: 2.08 (ref 0.85–1.15)
LDH SERPL L TO P-CCNC: 262 U/L (ref 0–250)
Lab: NORMAL
MAGNESIUM SERPL-MCNC: 1.8 MG/DL (ref 1.7–2.3)
MCH RBC QN AUTO: 30.9 PG (ref 26.5–33)
MCHC RBC AUTO-ENTMCNC: 31.9 G/DL (ref 31.5–36.5)
MCV RBC AUTO: 97 FL (ref 78–100)
PERFORMING LABORATORY: NORMAL
PLATELET # BLD AUTO: 172 10E3/UL (ref 150–450)
POTASSIUM SERPL-SCNC: 4.3 MMOL/L (ref 3.4–5.3)
PROT SERPL-MCNC: 7 G/DL (ref 6.4–8.3)
RBC # BLD AUTO: 4.43 10E6/UL (ref 4.4–5.9)
SODIUM SERPL-SCNC: 144 MMOL/L (ref 136–145)
SPECIMEN STATUS: NORMAL
TEST NAME: NORMAL
WBC # BLD AUTO: 6.3 10E3/UL (ref 4–11)

## 2023-05-04 PROCEDURE — 94618 PULMONARY STRESS TESTING: CPT | Performed by: INTERNAL MEDICINE

## 2023-05-04 PROCEDURE — 83735 ASSAY OF MAGNESIUM: CPT | Performed by: PATHOLOGY

## 2023-05-04 PROCEDURE — 93750 INTERROGATION VAD IN PERSON: CPT | Performed by: NURSE PRACTITIONER

## 2023-05-04 PROCEDURE — G0463 HOSPITAL OUTPT CLINIC VISIT: HCPCS | Mod: 25 | Performed by: NURSE PRACTITIONER

## 2023-05-04 PROCEDURE — 80053 COMPREHEN METABOLIC PANEL: CPT | Performed by: PATHOLOGY

## 2023-05-04 PROCEDURE — 86833 HLA CLASS II HIGH DEFIN QUAL: CPT | Performed by: INTERNAL MEDICINE

## 2023-05-04 PROCEDURE — 85027 COMPLETE CBC AUTOMATED: CPT | Performed by: PATHOLOGY

## 2023-05-04 PROCEDURE — 87075 CULTR BACTERIA EXCEPT BLOOD: CPT | Performed by: NURSE PRACTITIONER

## 2023-05-04 PROCEDURE — 86832 HLA CLASS I HIGH DEFIN QUAL: CPT | Performed by: INTERNAL MEDICINE

## 2023-05-04 PROCEDURE — 87070 CULTURE OTHR SPECIMN AEROBIC: CPT | Performed by: NURSE PRACTITIONER

## 2023-05-04 PROCEDURE — 80307 DRUG TEST PRSMV CHEM ANLYZR: CPT | Performed by: PATHOLOGY

## 2023-05-04 PROCEDURE — 99214 OFFICE O/P EST MOD 30 MIN: CPT | Mod: 25 | Performed by: NURSE PRACTITIONER

## 2023-05-04 PROCEDURE — 86140 C-REACTIVE PROTEIN: CPT | Performed by: PATHOLOGY

## 2023-05-04 PROCEDURE — 83615 LACTATE (LD) (LDH) ENZYME: CPT | Performed by: PATHOLOGY

## 2023-05-04 PROCEDURE — U0003 INFECTIOUS AGENT DETECTION BY NUCLEIC ACID (DNA OR RNA); SEVERE ACUTE RESPIRATORY SYNDROME CORONAVIRUS 2 (SARS-COV-2) (CORONAVIRUS DISEASE [COVID-19]), AMPLIFIED PROBE TECHNIQUE, MAKING USE OF HIGH THROUGHPUT TECHNOLOGIES AS DESCRIBED BY CMS-2020-01-R: HCPCS | Performed by: NURSE PRACTITIONER

## 2023-05-04 PROCEDURE — 85610 PROTHROMBIN TIME: CPT | Performed by: PATHOLOGY

## 2023-05-04 RX ORDER — DOXYCYCLINE 100 MG/1
100 CAPSULE ORAL 2 TIMES DAILY
Qty: 28 CAPSULE | Refills: 0 | Status: SHIPPED | OUTPATIENT
Start: 2023-05-04 | End: 2023-05-18

## 2023-05-04 ASSESSMENT — PAIN SCALES - GENERAL: PAINLEVEL: NO PAIN (0)

## 2023-05-04 NOTE — NURSING NOTE
Chief Complaint   Patient presents with     Follow Up     Return VAD     Vitals were taken and medications reconciled.    Zan Marshall, EMT  2:29 PM

## 2023-05-04 NOTE — LETTER
5/4/2023      RE: Tej Morfin  [address]  [place] [address]       Dear Colleague,    Thank you for the opportunity to participate in the care of your patient, Tej Morfin, at the [facility] [place] [facility]. Please see a copy of my visit note below.    HPI:   Tej Morfin is a 57 year old male with a PMH of severe MR s/p MVR with ring (1/21/15), VT s/p ICD, nonobstructive CAD, NICM s/p HM III LVAD implantation 3/23/22 currently listed status 4 on the cardiac transplant list. His postoperative course was complicated by right knee effusion s/p arthrocentesis and Afib with RVR s/p shock.     He complains of occasional headache, sinus congestion, nonproductive cough, loose stools, and LE edema. His daughter tested for COVID and was negative, his test this AM was inconclusive. His symptoms started 1 day ago. He denies lightheadedness, dizziness, changes in speech, loss of smell, loss of taste, fever, chills, chest pain, SOB, FINNEGAN, PND, nausea, vomiting, melena, hematochezia. He complains of driveline drainage and tenderness at his site. His weight remains stable at home is stable around 229 lbs. He continues to maintain a low sodium diet.     VAD Interrogation on 5/4/2023: VAD interrogation reviewed with VAD coordinator. Agree with findings. No PI events, power spikes, speed drops, or other findings suspicious of pump malfunction noted.     PAST MEDICAL HISTORY:  Past Medical History:   Diagnosis Date    Chronic systolic heart failure (H) 2/7/2017    Gastroparesis     ICD (implantable cardioverter-defibrillator) in place     Forgotten [employer]    Non-ischemic cardiomyopathy (H)     Paroxysmal ventricular tachycardia (H) 2/7/2017    S/P mitral valve replacement 2/7/2017    Tetrahydrocannabinol (THC) use disorder, mild, in controlled environment, abuse 09/12/2019    occasional edible THC. reportedly for sleep,  anxiety    Tobacco abuse, episodic     occasional cigar - ~ 3x/wk       FAMILY HISTORY:  Family History   Problem Relation Age of Onset    Coronary Artery Disease Mother     Kidney failure Mother     Cardiomyopathy Father          age 51       SOCIAL HISTORY:  Social History     Socioeconomic History    Marital status:      Spouse name: None    Number of children: None    Years of education: None    Highest education level: None   Tobacco Use    Smoking status: Former    Smokeless tobacco: Never   Substance and Sexual Activity    Alcohol use: No     Alcohol/week: 0.0 standard drinks of alcohol    Drug use: No       CURRENT MEDICATIONS:  Outpatient Medications Prior to Visit   Medication Sig Dispense Refill    acetaminophen (TYLENOL) 325 MG tablet Take 2 tablets (650 mg) by mouth every 4 hours as needed for pain 100 tablet 0    allopurinol (ZYLOPRIM) 100 MG tablet Take 1 tablet (100 mg) by mouth daily 30 tablet 1    amoxicillin (AMOXIL) 500 MG capsule Take 2,000 mg by mouth once as needed Take 4 tabs 1 hour before all dental cleanings and procedures      cholecalciferol 50 MCG (2000 UT) CAPS Take 50 mcg by mouth daily      furosemide (LASIX) 40 MG tablet Take 40 mg by mouth 2 times daily      hydrOXYzine (VISTARIL) 50 MG capsule Take 50 mg by mouth every 12 hours as needed for anxiety      magnesium oxide (MAG-OX) 400 MG tablet Take 800 mg by mouth 2 times daily      metoprolol succinate ER (TOPROL XL) 25 MG 24 hr tablet Take 1 tablet (25 mg) by mouth daily 90 tablet 3    nitroGLYcerin (NITROSTAT) 0.4 MG sublingual tablet Place 0.4 mg under the tongue every 5 minutes as needed for chest pain      omeprazole (PRILOSEC) 40 MG DR capsule Take 40 mg by mouth every morning      potassium chloride ER (KLOR-CON M) 20 MEQ CR tablet Take 40 mEq by mouth 2 times daily      sacubitril-valsartan (ENTRESTO)  MG per tablet Take 1 tablet by mouth 2 times daily 90 tablet 3    sertraline (ZOLOFT) 50 MG tablet Take 50  mg by mouth daily      spironolactone (ALDACTONE) 25 MG tablet Take 12.5 mg by mouth daily      study - aspirin or placebo, IDS# [id number], 100 MG capsule Take 1 capsule (100 mg) by mouth daily . Swallow whole, do not crush. 120 capsule 4    traZODone (DESYREL) 50 MG tablet Take 25-50 mg by mouth nightly as needed      warfarin ANTICOAGULANT (COUMADIN) 2.5 MG tablet TAKE 2 TABLETS (5MG) BY MOUTH DAILY OR AS DIRECTED BY THE COUMADIN CLINIC 180 tablet 1    warfarin ANTICOAGULANT (COUMADIN) 2.5 MG tablet Take 10 mg PO daily at 6 pm until next INR check, then dose per INR goal 2-3 150 tablet 0     No facility-administered medications prior to visit.       ROS:   CONSTITUTIONAL: Denies fever, chills, fatigue, or weight fluctuations.   HEENT: Denies vision changes and changes in speech. Complains of occasional headaches and sinus congestion.   CV: Refer to HPI.   PULMONARY:Denies shortness of breath or previous TB exposure. Complains of nonproductive cough   GI:Denies nausea, vomiting, and abdominal pain. Bowel movements are regular. Complains of loose stools time 2 days.   :Denies urinary alterations, dysuria, urinary frequency, hematuria, and abnormal drainage.   EXT: Complains of lower extremity edema.   SKIN:Denies abnormal rashes or lesions.   MUSCULOSKELETAL:Denies upper or lower extremity weakness and pain.   NEUROLOGIC:Denies lightheadedness, dizziness, seizures, or upper or lower extremity paresthesia.     EXAM:  BP (!) 88/0 (BP Location: Right arm, Patient Position: Chair, Cuff Size: Adult Regular)   Wt 107.9 kg (237 lb 14.4 oz)   SpO2 96%   BMI 34.10 kg/m    GENERAL: Appears alert and oriented times three.   HEENT: Eye symmetrical and free of discharge bilaterally. Mucous membranes moist and without lesions.  NECK: Supple and without lymphadenopathy. JVD mid neck.   CV: RRR, S1S2 present with LVAD hum.   RESPIRATORY: Respirations regular, even, and unlabored. Lungs CTA throughout.   GI: Soft and non distended  with normoactive bowel sounds present in all quadrants. No tenderness, rebound, guarding. No organomegaly.   EXTREMITIES: +1 bilateral LE peripheral edema. 2+ bilateral pedal pulses.   NEUROLOGIC: Alert and orientated x 3. CN II-XII grossly intact. No focal deficits.   MUSCULOSKELETAL: No joint swelling or tenderness.   SKIN: No jaundice. No rashes or lesions. LVAD drive line covered, RN to assess.     The following Labs were reviewed today:  CBC RESULTS:  Lab Results   Component Value Date    WBC 6.3 05/04/2023    WBC 4.2 09/10/2019    RBC 4.43 05/04/2023    RBC 4.98 09/10/2019    HGB 13.7 05/04/2023    HGB 15.8 09/10/2019    HCT 43.0 05/04/2023    HCT 48.9 09/10/2019    MCV 97 05/04/2023    MCV 98 09/10/2019    MCH 30.9 05/04/2023    MCH 31.7 09/10/2019    MCHC 31.9 05/04/2023    MCHC 32.3 09/10/2019    RDW 13.3 05/04/2023    RDW 12.9 09/10/2019     05/04/2023     09/10/2019       CMP RESULTS:  Lab Results   Component Value Date     05/04/2023     09/10/2019    POTASSIUM 4.3 05/04/2023    POTASSIUM 4.0 06/23/2022    POTASSIUM 3.4 09/10/2019    CHLORIDE 109 (H) 05/04/2023    CHLORIDE 102 02/10/2023    CHLORIDE 108 09/10/2019    CO2 26 05/04/2023    CO2 26 06/23/2022    CO2 26 09/10/2019    ANIONGAP 9 05/04/2023    ANIONGAP 9 06/23/2022    ANIONGAP 4 09/10/2019     (H) 05/04/2023    GLC 65 (L) 06/23/2022    GLC 89 09/10/2019    BUN 15.7 05/04/2023    BUN 18 06/23/2022    BUN 18 09/10/2019    CR 1.30 (H) 05/04/2023    CR 1.23 09/10/2019    GFRESTIMATED 65 05/04/2023    GFRESTIMATED 67 09/10/2019    GFRESTBLACK 78 09/10/2019    YESICA 9.5 05/04/2023    YESICA 9.1 09/10/2019    BILITOTAL 0.2 05/04/2023    BILITOTAL 0.6 09/10/2019    ALBUMIN 4.0 05/04/2023    ALBUMIN 3.2 (L) 06/23/2022    ALBUMIN 4.0 09/10/2019    ALKPHOS 79 05/04/2023    ALKPHOS 61 09/10/2019    ALT 21 05/04/2023    ALT 25 09/10/2019    AST 20 05/04/2023    AST 17 09/10/2019        INR RESULTS:  Lab Results   Component Value  Date    INR 2.08 (H) 05/04/2023    INR 3.3 (H) 04/21/2023    INR 1.02 09/10/2019       Lab Results   Component Value Date    MAG 1.8 05/04/2023    MAG 1.8 09/10/2019     No results found for: NTBNPI  Lab Results   Component Value Date    NTBNP 254 01/23/2023    NTBNP 714 (H) 10/18/2018       The following diagnostics were reviewed today:  RHC 1/24/23:   RA --/--/10 mmHg  RV 29/10 mmHg  PA 29/20/23 mmHg  CWP --/--14 mmHg  CO (Harris) 4.8 L/min  CI (Harris) 2.2 L/min/m2    PVR 1.9 BANEGAS    Echo 1/24/23:   Interpretation Summary  HM3 speed is 5500RPM.  The visual ejection fraction is 15-20% with severe diffuse hypokinesis. The  left ventricular end diastolic diameter is 6.3 cm.  LVAD cannula was seen in the expected anatomic position in the LV apex. LVAD  inflow and outflow cannula Doppler is normal. The aortic valve does not open.  There is mild aortic regurgitation.  Global right ventricular function is moderately to severely reduced.  IVC diameter <2.1 cm collapsing >50% with sniff suggests a normal RA pressure  of 3 mmHg.  No pericardial effusion is present.     This study was compared with the study from 9/22/2022. The left ventricular  end diastolic diameter is slighly larger at 6.3 cm from 5.9 cm.    Assessment and Plan:   Tej Morfin is a 57 year old male with a PMH of severe MR s/p MVR with ring (1/21/15), VT s/p ICD, nonobstructive CAD, NICM s/p HM III LVAD implantation 3/23/22 status 4 on the cardiac transplant list, and Afib with RVR s/p ICD shocks. He presents for routine follow up with mild cold symptoms and mild hypervolemia.      Chronic systolic heart failure secondary to NICM s/p HM III LVAD implantation. Implanted 3/23/22.  Stage D, NYHA Class III  ACEi/ARB/ARNI: Entresto 97/103 BID  BB Toprol XL 25 mg po daily, defer escalation given hypervolemic state  Aldosterone antagonist Aldactone 12.5 mg po daily, defer increase due to dizziness.   SCD prophylaxis ICD  Fluid status Hypervolemic. Increase Lasix  to 60 mg po BID and KCL to 60 MEQ for 3 days, then return to baseline.   SGLT2I: Consider in the future, defer today due to concern for COVID  MAP: 88, attribute higher MAP to hypervolemia  LDH: 262  Anticoagulation: Coumadin per AC. INR-2.08, goal 2-3.   Antiplatelet: RICARDO trial   - Remains status 4 on the cardiac transplant list.   - 6 minute walk remains stable. PFT's pending per protocol.     LVAD drive line drainage.   - LVAD coordinator to assess site and obtain culture.     Viral URI symptoms.   - Mucinex BID prn congestion  - COVID-19 test pending.      History of VT, s/p CRT-D. ICD shock 3/25/22. EP consulted, interrogation consistent with VT with VF zone adjusted to reduce risk for recurrent shock. LV lead turned off in setting of LVAD.   - Continue Toprol XL.   - Encouraged cardiac rehab to gain confidence with activity due to ICD shock. He will let us know when he is reading.      Postop AF with RVR. Returned to SR.   - Coumadin as above.      Mild nonobstructive CAD. LDL-65 3/3/22.   - RICARDO trial.   - Toprol XL.     Follow up in 3-6 months per protocol.      GORGE Rogel CNP  5/4/2023          LORNE REAL

## 2023-05-04 NOTE — PATIENT INSTRUCTIONS
Medications:  Increase Lasix to 60 mg twice per day for 3 days, then return to baseline.   Increase potassium to 60 MEQ twice per day for 3 days, then return to baseline.   START doxycycline 100 mg twice per day, for 14 days.     Instructions:  Your driveline dressing change was completed today. Your driveline exit site was cultured. Please let your primary VAD coordinator know if the status of the site worsens.   Please perform daily dressing changes until your VAD coordinator tells you otherwise.  Your back up controller's back up battery was replaced due to an alarm indicating it was necessary. After replacement, the alarm resolved.    Follow-up: (make these appointments before you leave)  1. Please follow-up with VAD WILIAN in 6 months with labs prior.   2. Please follow-up with Dr. Hdz in 3 months with labs prior.        Page the VAD Coordinator on call if you gain more than 3 lb in a day or 5 in a week. Please also page if you feel unwell or have alarms.   Great to see you in clinic today. To Page the VAD Coordinator on call, dial [phone] option #4 and ask to speak to the VAD coordinator on call.

## 2023-05-04 NOTE — NURSING NOTE
MCS VAD Pump Info     Row Name 05/04/23 1530 05/04/23 1500          MCS VAD Information    Implant -- LVAD     LVAD Pump -- HeartMate 3        Heartmate 3 LEFT VS    Flow (Lpm) -- 4.6 Lpm     Pulse Index (PI) -- 4 PI     Speed (rpm) -- 5500 rpm     Power (christiansen) -- 4.2 christiansen     Current Hct setting -- 43     Retired: Unexpected Alarms -- --        Heartmate 3 Right (centrifugal flow) VS    Flow (Lpm) -- --     Pulse Index (PI) -- --     Speed (rpm) -- --     Power (christiansen) -- --        Primary Controller    Serial number -- [id number]     Low flow alarm setting -- --     High watt alarm setting -- --     EBB: Patient use -- 16     Replace in -- 19 Months        Backup Controller    Serial number [id number]     EBB: Patient use 7 7     Replace EBB in 29 Months 20 Months     Speed & HCT match primary controller -- --        VAD Interrogation    Alarms reported by patient -- N     Unexpected alarms noted upon interrogation -- --  Replace back up battery alarm in back up controller     PI events -- Frequent;w/ associated speed drops  PI range 2.4-10.3, with occasional associated speed drops, hx of 3.5 days     Damage to equipment is noted -- N     Action taken -- Reviewed proper equipment care and maintenance;Equipment replaced (see orders)  Replaced back up battery in back up controller due to alarm. Alarm resolved after replacement.        Driveline Exit Site    Dressing change done -- Y     Driveline properly secured -- Yes     DLES assessment -- drainage;redness     Dressing used -- Daily kit     Frequency patient changes dressing -- Daily     Dressing modifications -- --     Dressing change supplier -- --                   Education Complete: Yes   Charge the BACKUP controller s backup battery every 6 months  Perform a self test on BACKUP every 6 months  Change the MPU s batteries every 6 months:Yes  Have equipment serviced yearly (if applicable):Yes     - The patient's driveline dressing change was  performed due to complaints of drainage. Site had a small amount of drainage, redness and tenderness. Site was cultured. Please see picture below.       -Equipment replaced:  The patient's back up controller's internal battery was replaced due to an alarm stating that it needed to be replaced. Alarm resolved after the equipment was replaced. New internal battery serial number: [id number]; Exp 9/27/24.

## 2023-05-04 NOTE — PROGRESS NOTES
ANTICOAGULATION MANAGEMENT     Tej SWENSON [place] 57 year old male is on warfarin with therapeutic INR result. (Goal INR 2.0-3.0)    Recent labs: (last 7 days)     05/04/23  1328   INR 2.08*       ASSESSMENT       Source(s): Chart Review and Patient/Caregiver Call       Warfarin doses taken: Warfarin taken as instructed    Diet: No new diet changes identified    Medication/supplement changes: None noted    New illness, injury, or hospitalization: No    Signs or symptoms of bleeding or clotting: No    Previous result: Supratherapeutic, now at lower end of target goal range follow dose reduction 2 weeks ago (although noted to be venous today vs POC with his home INR monitor usually)    Additional findings: None         PLAN     Recommended plan for no diet, medication or health factor changes affecting INR     Dosing Instructions: Continue your current warfarin dose with next INR in 1 week       Summary  As of 5/4/2023    Full warfarin instructions:  5 mg every day   Next INR check:  5/11/2023             Telephone call with Tej who verbalizes understanding and agrees to plan    Patient to recheck with home meter    Education provided:     Contact [phone] with any changes, questions or concerns.     Plan made per [facility] anticoagulation protocol and per LVAD protocol    Rosario Dyer, RN  Anticoagulation Clinic  5/4/2023    _______________________________________________________________________     Anticoagulation Episode Summary     Current INR goal:  2.0-3.0   TTR:  76.3 % (1 y)   Target end date:  Indefinite   Send INR reminders to:  ANTICOAG LVAD    Indications    LVAD (left ventricular assist device) present (H) [Z95.811]  Chronic systolic CHF (congestive heart failure) (H) [I50.22]  S/P mitral valve replacement [Z95.2]  Paroxysmal ventricular tachycardia (H) [I47.29]           Comments:  HeartMate 3   Bridging: No bridging. Reason -- new implant   Date VAD placed: 03/23/22  Hx Mitral valve repair and not  replacement         Anticoagulation Care Providers     Provider Role Specialty Phone number    Dunia Hdz MD Referring Cardiovascular Disease [phone]

## 2023-05-04 NOTE — PROGRESS NOTES
HPI:   Tej Morfin is a 57 year old male with a PMH of severe MR s/p MVR with ring (1/21/15), VT s/p ICD, nonobstructive CAD, NICM s/p HM III LVAD implantation 3/23/22 currently listed status 4 on the cardiac transplant list. His postoperative course was complicated by right knee effusion s/p arthrocentesis and Afib with RVR s/p shock.     He complains of occasional headache, sinus congestion, nonproductive cough, loose stools, and LE edema. His daughter tested for COVID and was negative, his test this AM was inconclusive. His symptoms started 1 day ago. He denies lightheadedness, dizziness, changes in speech, loss of smell, loss of taste, fever, chills, chest pain, SOB, FINNEGAN, PND, nausea, vomiting, melena, hematochezia. He complains of driveline drainage and tenderness at his site. His weight remains stable at home is stable around 229 lbs. He continues to maintain a low sodium diet.     VAD Interrogation on 2023: VAD interrogation reviewed with VAD coordinator. Agree with findings. No PI events, power spikes, speed drops, or other findings suspicious of pump malfunction noted.     PAST MEDICAL HISTORY:  Past Medical History:   Diagnosis Date     Chronic systolic heart failure (H) 2017     Gastroparesis      ICD (implantable cardioverter-defibrillator) in place     [place]     Non-ischemic cardiomyopathy (H)      Paroxysmal ventricular tachycardia (H) 2017     S/P mitral valve replacement 2017     Tetrahydrocannabinol (THC) use disorder, mild, in controlled environment, abuse 2019    occasional edible THC. reportedly for sleep, anxiety     Tobacco abuse, episodic     occasional cigar - ~ 3x/wk       FAMILY HISTORY:  Family History   Problem Relation Age of Onset     Coronary Artery Disease Mother      Kidney failure Mother      Cardiomyopathy Father          age 51       SOCIAL HISTORY:  Social History     Socioeconomic History     Marital status:      Spouse name:  None     Number of children: None     Years of education: None     Highest education level: None   Tobacco Use     Smoking status: Former     Smokeless tobacco: Never   Substance and Sexual Activity     Alcohol use: No     Alcohol/week: 0.0 standard drinks of alcohol     Drug use: No       CURRENT MEDICATIONS:  Outpatient Medications Prior to Visit   Medication Sig Dispense Refill     acetaminophen (TYLENOL) 325 MG tablet Take 2 tablets (650 mg) by mouth every 4 hours as needed for pain 100 tablet 0     allopurinol (ZYLOPRIM) 100 MG tablet Take 1 tablet (100 mg) by mouth daily 30 tablet 1     amoxicillin (AMOXIL) 500 MG capsule Take 2,000 mg by mouth once as needed Take 4 tabs 1 hour before all dental cleanings and procedures       cholecalciferol 50 MCG (2000 UT) CAPS Take 50 mcg by mouth daily       furosemide (LASIX) 40 MG tablet Take 40 mg by mouth 2 times daily       hydrOXYzine (VISTARIL) 50 MG capsule Take 50 mg by mouth every 12 hours as needed for anxiety       magnesium oxide (MAG-OX) 400 MG tablet Take 800 mg by mouth 2 times daily       metoprolol succinate ER (TOPROL XL) 25 MG 24 hr tablet Take 1 tablet (25 mg) by mouth daily 90 tablet 3     nitroGLYcerin (NITROSTAT) 0.4 MG sublingual tablet Place 0.4 mg under the tongue every 5 minutes as needed for chest pain       omeprazole (PRILOSEC) 40 MG DR capsule Take 40 mg by mouth every morning       potassium chloride ER (KLOR-CON M) 20 MEQ CR tablet Take 40 mEq by mouth 2 times daily       sacubitril-valsartan (ENTRESTO)  MG per tablet Take 1 tablet by mouth 2 times daily 90 tablet 3     sertraline (ZOLOFT) 50 MG tablet Take 50 mg by mouth daily       spironolactone (ALDACTONE) 25 MG tablet Take 12.5 mg by mouth daily       study - aspirin or placebo, IDS# [id number], 100 MG capsule Take 1 capsule (100 mg) by mouth daily . Swallow whole, do not crush. 120 capsule 4     traZODone (DESYREL) 50 MG tablet Take 25-50 mg by mouth nightly as needed       warfarin  ANTICOAGULANT (COUMADIN) 2.5 MG tablet TAKE 2 TABLETS (5MG) BY MOUTH DAILY OR AS DIRECTED BY THE COUMADIN CLINIC 180 tablet 1     warfarin ANTICOAGULANT (COUMADIN) 2.5 MG tablet Take 10 mg PO daily at 6 pm until next INR check, then dose per INR goal 2-3 150 tablet 0     No facility-administered medications prior to visit.       ROS:   CONSTITUTIONAL: Denies fever, chills, fatigue, or weight fluctuations.   HEENT: Denies vision changes and changes in speech. Complains of occasional headaches and sinus congestion.   CV: Refer to HPI.   PULMONARY:Denies shortness of breath or previous TB exposure. Complains of nonproductive cough   GI:Denies nausea, vomiting, and abdominal pain. Bowel movements are regular. Complains of loose stools time 2 days.   :Denies urinary alterations, dysuria, urinary frequency, hematuria, and abnormal drainage.   EXT: Complains of lower extremity edema.   SKIN:Denies abnormal rashes or lesions.   MUSCULOSKELETAL:Denies upper or lower extremity weakness and pain.   NEUROLOGIC:Denies lightheadedness, dizziness, seizures, or upper or lower extremity paresthesia.     EXAM:  BP (!) 88/0 (BP Location: Right arm, Patient Position: Chair, Cuff Size: Adult Regular)   Wt 107.9 kg (237 lb 14.4 oz)   SpO2 96%   BMI 34.10 kg/m    GENERAL: Appears alert and oriented times three.   HEENT: Eye symmetrical and free of discharge bilaterally. Mucous membranes moist and without lesions.  NECK: Supple and without lymphadenopathy. JVD mid neck.   CV: RRR, S1S2 present with LVAD hum.   RESPIRATORY: Respirations regular, even, and unlabored. Lungs CTA throughout.   GI: Soft and non distended with normoactive bowel sounds present in all quadrants. No tenderness, rebound, guarding. No organomegaly.   EXTREMITIES: +1 bilateral LE peripheral edema. 2+ bilateral pedal pulses.   NEUROLOGIC: Alert and orientated x 3. CN II-XII grossly intact. No focal deficits.   MUSCULOSKELETAL: No joint swelling or tenderness.    SKIN: No jaundice. No rashes or lesions. LVAD drive line covered, RN to assess.     The following Labs were reviewed today:  CBC RESULTS:  Lab Results   Component Value Date    WBC 6.3 05/04/2023    WBC 4.2 09/10/2019    RBC 4.43 05/04/2023    RBC 4.98 09/10/2019    HGB 13.7 05/04/2023    HGB 15.8 09/10/2019    HCT 43.0 05/04/2023    HCT 48.9 09/10/2019    MCV 97 05/04/2023    MCV 98 09/10/2019    MCH 30.9 05/04/2023    MCH 31.7 09/10/2019    MCHC 31.9 05/04/2023    MCHC 32.3 09/10/2019    RDW 13.3 05/04/2023    RDW 12.9 09/10/2019     05/04/2023     09/10/2019       CMP RESULTS:  Lab Results   Component Value Date     05/04/2023     09/10/2019    POTASSIUM 4.3 05/04/2023    POTASSIUM 4.0 06/23/2022    POTASSIUM 3.4 09/10/2019    CHLORIDE 109 (H) 05/04/2023    CHLORIDE 102 02/10/2023    CHLORIDE 108 09/10/2019    CO2 26 05/04/2023    CO2 26 06/23/2022    CO2 26 09/10/2019    ANIONGAP 9 05/04/2023    ANIONGAP 9 06/23/2022    ANIONGAP 4 09/10/2019     (H) 05/04/2023    GLC 65 (L) 06/23/2022    GLC 89 09/10/2019    BUN 15.7 05/04/2023    BUN 18 06/23/2022    BUN 18 09/10/2019    CR 1.30 (H) 05/04/2023    CR 1.23 09/10/2019    GFRESTIMATED 65 05/04/2023    GFRESTIMATED 67 09/10/2019    GFRESTBLACK 78 09/10/2019    YESICA 9.5 05/04/2023    YESICA 9.1 09/10/2019    BILITOTAL 0.2 05/04/2023    BILITOTAL 0.6 09/10/2019    ALBUMIN 4.0 05/04/2023    ALBUMIN 3.2 (L) 06/23/2022    ALBUMIN 4.0 09/10/2019    ALKPHOS 79 05/04/2023    ALKPHOS 61 09/10/2019    ALT 21 05/04/2023    ALT 25 09/10/2019    AST 20 05/04/2023    AST 17 09/10/2019        INR RESULTS:  Lab Results   Component Value Date    INR 2.08 (H) 05/04/2023    INR 3.3 (H) 04/21/2023    INR 1.02 09/10/2019       Lab Results   Component Value Date    MAG 1.8 05/04/2023    MAG 1.8 09/10/2019     No results found for: NTBNPI  Lab Results   Component Value Date    NTBNP 254 01/23/2023    NTBNP 714 (H) 10/18/2018       The following diagnostics  were reviewed today:  [facility] 1/24/23:   RA --/--/10 mmHg  RV 29/10 mmHg  PA 29/20/23 mmHg  CWP --/--14 mmHg  CO (Harris) 4.8 L/min  CI (Harris) 2.2 L/min/m2    PVR 1.9 BANEGAS    Echo 1/24/23:   Interpretation Summary  HM3 speed is 5500RPM.  The visual ejection fraction is 15-20% with severe diffuse hypokinesis. The  left ventricular end diastolic diameter is 6.3 cm.  LVAD cannula was seen in the expected anatomic position in the LV apex. LVAD  inflow and outflow cannula Doppler is normal. The aortic valve does not open.  There is mild aortic regurgitation.  Global right ventricular function is moderately to severely reduced.  IVC diameter <2.1 cm collapsing >50% with sniff suggests a normal RA pressure  of 3 mmHg.  No pericardial effusion is present.     This study was compared with the study from 9/22/2022. The left ventricular  end diastolic diameter is slighly larger at 6.3 cm from 5.9 cm.    Assessment and Plan:   Tej Morfin is a 57 year old male with a PMH of severe MR s/p MVR with ring (1/21/15), VT s/p ICD, nonobstructive CAD, NICM s/p HM III LVAD implantation 3/23/22 status 4 on the cardiac transplant list, and Afib with RVR s/p ICD shocks. He presents for routine follow up with mild cold symptoms and mild hypervolemia.      Chronic systolic heart failure secondary to NICM s/p HM III LVAD implantation. Implanted 3/23/22.  Stage D, NYHA Class III  ACEi/ARB/ARNI: Entresto 97/103 BID  BB Toprol XL 25 mg po daily, defer escalation given hypervolemic state  Aldosterone antagonist Aldactone 12.5 mg po daily, defer increase due to dizziness.   SCD prophylaxis ICD  Fluid status Hypervolemic. Increase Lasix to 60 mg po BID and KCL to 60 MEQ for 3 days, then return to baseline.   SGLT2I: Consider in the future, defer today due to concern for COVID  MAP: 88, attribute higher MAP to hypervolemia  LDH: 262  Anticoagulation: Coumadin per AC. INR-2.08, goal 2-3.   Antiplatelet: RICARDO trial   - Remains status 4 on the cardiac  transplant list.   - 6 minute walk remains stable. PFT's pending per protocol.     LVAD drive line drainage.   - LVAD coordinator to assess site and obtain culture.     Viral URI symptoms.   - Mucinex BID prn congestion  - COVID-19 test pending.      History of VT, s/p CRT-D. ICD shock 3/25/22. EP consulted, interrogation consistent with VT with VF zone adjusted to reduce risk for recurrent shock. LV lead turned off in setting of LVAD.   - Continue Toprol XL.   - Encouraged cardiac rehab to gain confidence with activity due to ICD shock. He will let us know when he is reading.      Postop AF with RVR. Returned to SR.   - Coumadin as above.      Mild nonobstructive CAD. LDL-65 3/3/22.   - RICARDO trial.   - Toprol XL.     Follow up in 3-6 months per protocol.      GORGE Rogel CNP  5/4/2023          CC  LORNE MOREIRA

## 2023-05-05 ENCOUNTER — TELEPHONE (OUTPATIENT)
Dept: ANTICOAGULATION | Facility: CLINIC | Age: 58
End: 2023-05-05
Payer: MEDICARE

## 2023-05-05 DIAGNOSIS — Z95.2 S/P MITRAL VALVE REPLACEMENT: ICD-10-CM

## 2023-05-05 DIAGNOSIS — I50.22 CHRONIC SYSTOLIC CHF (CONGESTIVE HEART FAILURE) (H): ICD-10-CM

## 2023-05-05 DIAGNOSIS — I47.29 PAROXYSMAL VENTRICULAR TACHYCARDIA (H): ICD-10-CM

## 2023-05-05 DIAGNOSIS — Z95.811 LVAD (LEFT VENTRICULAR ASSIST DEVICE) PRESENT (H): Primary | ICD-10-CM

## 2023-05-05 LAB
FIO2-PRE: 21 %
SARS-COV-2 RNA RESP QL NAA+PROBE: NEGATIVE

## 2023-05-05 NOTE — TELEPHONE ENCOUNTER
ANTICOAGULATION  MANAGEMENT     Interacting Medication Review    Interacting medication(s): Doxycycline  with warfarin.    Duration: 14 days  (5/4 to 5/18)    Indication: per chart review possible driveline infection    New medication?: Yes, but no interaction with warfarin anticipated        PLAN     Continue your current warfarin dose with next INR in 1 week        Summary  As of 5/5/2023    Full warfarin instructions:  5 mg every day   Next INR check:  5/11/2023             Telephone call with Tej who verbalizes understanding and agrees to plan and who agrees to plan and repeated back plan correctly    No adjustment to anticoagulation calendar was required    Plan made per [facility] anticoagulation protocol and per LVAD protocol    Geneva Martinez RN  Anticoagulation Clinic

## 2023-05-07 LAB
BACTERIA WND CULT: ABNORMAL
BACTERIA WND CULT: ABNORMAL
GRAM STAIN RESULT: ABNORMAL
GRAM STAIN RESULT: ABNORMAL

## 2023-05-08 ENCOUNTER — ANCILLARY PROCEDURE (OUTPATIENT)
Dept: CARDIOLOGY | Facility: CLINIC | Age: 58
End: 2023-05-08
Attending: INTERNAL MEDICINE
Payer: MEDICARE

## 2023-05-08 ENCOUNTER — CARE COORDINATION (OUTPATIENT)
Dept: CARDIOLOGY | Facility: CLINIC | Age: 58
End: 2023-05-08
Payer: MEDICARE

## 2023-05-08 DIAGNOSIS — I48.0 PAROXYSMAL ATRIAL FIBRILLATION (H): ICD-10-CM

## 2023-05-08 LAB
MDC_IDC_EPISODE_DTM: NORMAL
MDC_IDC_EPISODE_DURATION: 1 S
MDC_IDC_EPISODE_DURATION: 13 S
MDC_IDC_EPISODE_DURATION: 13 S
MDC_IDC_EPISODE_DURATION: 14 S
MDC_IDC_EPISODE_DURATION: 15 S
MDC_IDC_EPISODE_DURATION: 19 S
MDC_IDC_EPISODE_DURATION: 19 S
MDC_IDC_EPISODE_DURATION: 64 S
MDC_IDC_EPISODE_DURATION: NORMAL S
MDC_IDC_EPISODE_DURATION: NORMAL S
MDC_IDC_EPISODE_ID: NORMAL
MDC_IDC_EPISODE_TYPE: NORMAL
MDC_IDC_EPISODE_VENDOR_TYPE: NORMAL
MDC_IDC_LEAD_IMPLANT_DT: NORMAL
MDC_IDC_LEAD_LOCATION: NORMAL
MDC_IDC_LEAD_LOCATION_DETAIL_1: NORMAL
MDC_IDC_LEAD_MFG: NORMAL
MDC_IDC_LEAD_MODEL: NORMAL
MDC_IDC_LEAD_POLARITY_TYPE: NORMAL
MDC_IDC_LEAD_SERIAL: NORMAL
MDC_IDC_MSMT_BATTERY_DTM: NORMAL
MDC_IDC_MSMT_BATTERY_REMAINING_LONGEVITY: 42 MO
MDC_IDC_MSMT_BATTERY_REMAINING_PERCENTAGE: 56 %
MDC_IDC_MSMT_BATTERY_STATUS: NORMAL
MDC_IDC_MSMT_CAP_CHARGE_DTM: NORMAL
MDC_IDC_MSMT_CAP_CHARGE_DTM: NORMAL
MDC_IDC_MSMT_CAP_CHARGE_ENERGY: 31 J
MDC_IDC_MSMT_CAP_CHARGE_TIME: 12.5 S
MDC_IDC_MSMT_CAP_CHARGE_TIME: 7.8 S
MDC_IDC_MSMT_CAP_CHARGE_TYPE: NORMAL
MDC_IDC_MSMT_CAP_CHARGE_TYPE: NORMAL
MDC_IDC_MSMT_LEADCHNL_LV_IMPEDANCE_VALUE: 922 OHM
MDC_IDC_MSMT_LEADCHNL_LV_LEAD_CHANNEL_STATUS: NORMAL
MDC_IDC_MSMT_LEADCHNL_RA_IMPEDANCE_VALUE: 518 OHM
MDC_IDC_MSMT_LEADCHNL_RV_IMPEDANCE_VALUE: 400 OHM
MDC_IDC_PG_IMPLANT_DTM: NORMAL
MDC_IDC_PG_MFG: NORMAL
MDC_IDC_PG_MODEL: NORMAL
MDC_IDC_PG_SERIAL: NORMAL
MDC_IDC_PG_TYPE: NORMAL
MDC_IDC_SESS_CLINIC_NAME: NORMAL
MDC_IDC_SESS_DTM: NORMAL
MDC_IDC_SESS_TYPE: NORMAL
MDC_IDC_SET_BRADY_AT_MODE_SWITCH_MODE: NORMAL
MDC_IDC_SET_BRADY_AT_MODE_SWITCH_RATE: 170 {BEATS}/MIN
MDC_IDC_SET_BRADY_LOWRATE: 80 {BEATS}/MIN
MDC_IDC_SET_BRADY_MAX_TRACKING_RATE: 130 {BEATS}/MIN
MDC_IDC_SET_BRADY_MODE: NORMAL
MDC_IDC_SET_BRADY_PAV_DELAY_HIGH: 280 MS
MDC_IDC_SET_BRADY_PAV_DELAY_LOW: 300 MS
MDC_IDC_SET_BRADY_SAV_DELAY_HIGH: 280 MS
MDC_IDC_SET_BRADY_SAV_DELAY_LOW: 300 MS
MDC_IDC_SET_CRT_PACED_CHAMBERS: NORMAL
MDC_IDC_SET_LEADCHNL_LV_PACING_AMPLITUDE: 0.1 V
MDC_IDC_SET_LEADCHNL_LV_PACING_PULSEWIDTH: 0.1 MS
MDC_IDC_SET_LEADCHNL_LV_SENSING_ADAPTATION_MODE: NORMAL
MDC_IDC_SET_LEADCHNL_LV_SENSING_ANODE_ELECTRODE_1: NORMAL
MDC_IDC_SET_LEADCHNL_LV_SENSING_ANODE_LOCATION_1: NORMAL
MDC_IDC_SET_LEADCHNL_LV_SENSING_CATHODE_ELECTRODE_1: NORMAL
MDC_IDC_SET_LEADCHNL_LV_SENSING_CATHODE_LOCATION_1: NORMAL
MDC_IDC_SET_LEADCHNL_LV_SENSING_SENSITIVITY: 1 MV
MDC_IDC_SET_LEADCHNL_RA_PACING_AMPLITUDE: 1.5 V
MDC_IDC_SET_LEADCHNL_RA_PACING_POLARITY: NORMAL
MDC_IDC_SET_LEADCHNL_RA_PACING_PULSEWIDTH: 0.5 MS
MDC_IDC_SET_LEADCHNL_RA_SENSING_ADAPTATION_MODE: NORMAL
MDC_IDC_SET_LEADCHNL_RA_SENSING_POLARITY: NORMAL
MDC_IDC_SET_LEADCHNL_RA_SENSING_SENSITIVITY: 0.25 MV
MDC_IDC_SET_LEADCHNL_RV_PACING_AMPLITUDE: 2.8 V
MDC_IDC_SET_LEADCHNL_RV_PACING_POLARITY: NORMAL
MDC_IDC_SET_LEADCHNL_RV_PACING_PULSEWIDTH: 0.5 MS
MDC_IDC_SET_LEADCHNL_RV_SENSING_ADAPTATION_MODE: NORMAL
MDC_IDC_SET_LEADCHNL_RV_SENSING_POLARITY: NORMAL
MDC_IDC_SET_LEADCHNL_RV_SENSING_SENSITIVITY: 0.6 MV
MDC_IDC_SET_ZONE_DETECTION_INTERVAL: 273 MS
MDC_IDC_SET_ZONE_TYPE: NORMAL
MDC_IDC_SET_ZONE_VENDOR_TYPE: NORMAL
MDC_IDC_STAT_AT_BURDEN_PERCENT: 1 %
MDC_IDC_STAT_AT_DTM_END: NORMAL
MDC_IDC_STAT_AT_DTM_START: NORMAL
MDC_IDC_STAT_BRADY_DTM_END: NORMAL
MDC_IDC_STAT_BRADY_DTM_START: NORMAL
MDC_IDC_STAT_BRADY_RA_PERCENT_PACED: 80 %
MDC_IDC_STAT_BRADY_RV_PERCENT_PACED: 5 %
MDC_IDC_STAT_CRT_DTM_END: NORMAL
MDC_IDC_STAT_CRT_DTM_START: NORMAL
MDC_IDC_STAT_CRT_LV_PERCENT_PACED: 0 %
MDC_IDC_STAT_EPISODE_RECENT_COUNT: 0
MDC_IDC_STAT_EPISODE_RECENT_COUNT: 1
MDC_IDC_STAT_EPISODE_RECENT_COUNT: 4
MDC_IDC_STAT_EPISODE_RECENT_COUNT: 5
MDC_IDC_STAT_EPISODE_RECENT_COUNT_DTM_END: NORMAL
MDC_IDC_STAT_EPISODE_RECENT_COUNT_DTM_START: NORMAL
MDC_IDC_STAT_EPISODE_TYPE: NORMAL
MDC_IDC_STAT_EPISODE_VENDOR_TYPE: NORMAL
MDC_IDC_STAT_TACHYTHERAPY_ATP_DELIVERED_RECENT: 1
MDC_IDC_STAT_TACHYTHERAPY_ATP_DELIVERED_TOTAL: 4
MDC_IDC_STAT_TACHYTHERAPY_RECENT_DTM_END: NORMAL
MDC_IDC_STAT_TACHYTHERAPY_RECENT_DTM_START: NORMAL
MDC_IDC_STAT_TACHYTHERAPY_SHOCKS_ABORTED_RECENT: 0
MDC_IDC_STAT_TACHYTHERAPY_SHOCKS_ABORTED_TOTAL: 1
MDC_IDC_STAT_TACHYTHERAPY_SHOCKS_DELIVERED_RECENT: 2
MDC_IDC_STAT_TACHYTHERAPY_SHOCKS_DELIVERED_TOTAL: 4
MDC_IDC_STAT_TACHYTHERAPY_TOTAL_DTM_END: NORMAL
MDC_IDC_STAT_TACHYTHERAPY_TOTAL_DTM_START: NORMAL
SA 1 CELL: NORMAL
SA 1 TEST METHOD: NORMAL
SA 2 CELL: NORMAL
SA 2 TEST METHOD: NORMAL
SA1 HI RISK ABY: NORMAL
SA1 MOD RISK ABY: NORMAL
SA2 HI RISK ABY: NORMAL
SA2 MOD RISK ABY: NORMAL
UNACCEPTABLE ANTIGENS: NORMAL
UNOS CPRA: 0
ZZZSA 1  COMMENTS: NORMAL
ZZZSA 2 COMMENTS: NORMAL

## 2023-05-08 PROCEDURE — 93296 REM INTERROG EVL PM/IDS: CPT

## 2023-05-08 PROCEDURE — 93295 DEV INTERROG REMOTE 1/2/MLT: CPT | Performed by: INTERNAL MEDICINE

## 2023-05-08 NOTE — LETTER
Faxed to:  [facility]  Fax Number:  [phone]                                                                                                                                                                                                   [email or web address]   Email Order Form to [email or web address]  Phone: [phone] Fax: [phone]      Patient Name: Tej Morfin Date: 05/08/23   Facility Name: [facility]  Fax Number: [phone]    VAD Coordinator/ :   Mainor Villavicencio RN Phone: [phone]    Email Address:       [email or web address]   Patient's Primary Insurance Upon Receiving the VAD unit: same         Dressing Change Frequency: Daily X Every Other Day   Other (Specify)              Duration of Need:  DT (Lifetime) x BTT (until transplant)   Other (Specify)      NEW ORDERS ONLY: PLEASE SUBMIT PATIENT DEMOGRAPHICS, POST OP NOTES & NOTES FROM MOST RECENT CLINICAL VISIT WITH ORDER FORM.                  Fax: [phone]  Or Email Order Form to   [email or web address]     ck Product Size/ Description Quantity    Kit Options:     x Medline Driveline Management Tray [id number]    Daily Up to 30    Medline Driveline Management Tray [id number]  Sensitive Up to 30   x Medline Driveline Management Tray [id number]  Weekly/[id number] (Sub) Up to 5   x Centurion Schwertner [place]  [id number] Up to 30   x Centurion Calderon [place] [id number] Up to 30   x Aquaguard/Shower Shield 7 x 7       9  x 9      10  x 12  Up to 30    Blenderm Surgical Tape 2 inch     Sensitive Skin:     x Uni-Solve Adhesive Remover Wipes  1 box   x Freederm adhesive remover 30 wipes/box Up to 2 boxes   x FreeDerm Adhesive Remover 1 oz. Spray Bottle Up to 8    Betadine Swabs  3/pack Up to 30    Hibiclens 16 oz. Bottle Up to 3    MicroKlenz Wound Cleanser Bottle Up to 3   x Medipore H Tape 2  Roll Up to 6   x Micropore Paper Tape 2  Roll Up to 6   x CE 3M Blue Silicone  Tape  Up to 6    MC Aramis Secure  [place]  Up to 8    CathGrip [place] Med/2 Strap    Large/2 Strap Up to 10    Abdominal Binder Sm     Med     Lg      Up to 2    Simpurity Transparent Film 4 x 5 Up to 30    Individual Supplies:      Earloop Surgical Mask 100/box Up to 3    Alcohol Wipe  1 Box    Non Sterile Gloves  100/box        Size: S    M    L   XL Up to 3    Chlorascrub Swabsticks   Each     1  Up to 30    Sterile Vinyl PF gloves Sizes   6   7   8   9 Up to 30    Sterile Gauze Sponge 4  x 4   (2/pkg) Up to 30    Sterile Drain Sponge 4  x 4   (2/pkg) Up to 30    Biopatch 1  Up to 30    3M No-sting Barrier Wipes 50/box Up to 3    Sorbaview Shield  Up to 30    Silverlon 1.5cm Square disc [id number] Up to 30     My signature below certifies the medical necessity of the above approved products and I certify the patient has been trained in the use of all products. The patient is aware that [facility] will contact him/her regarding the shipment of ordered dressing supplies.     Provider Name: Dunia Hdz NPI#: [id number]   Provider Signature:                                                                                                                                                                                                                                                                                                                                                                                                                                                                                                           Provider Number: [id number]     [facility] will confirm receipt for all initial orders by sending a fax to the provider listed above.

## 2023-05-08 NOTE — PROGRESS NOTES
Patient paged VAD Coordinator on Call with ICD fire.     Patients VAD Parameters:   05/08/23 1300   Heartmate 3 LEFT VS   Flow (Lpm) 4.5 Lpm   Pulse Index (PI) 3.7 PI   Speed (rpm) 5500 rpm   Power (christiansen) 4.1 christiansen     Patient reported his stomach feeling tight and he was outside walking getting some exercise. Patient reported feeling short of breath prior to ICD fire. Patient reported feeling better after ICD fire, reported feeling he was able to catch his breath better.    Patient reported they contacted the device nurse, and is following up with VAD Coordinators.

## 2023-05-09 ENCOUNTER — CARE COORDINATION (OUTPATIENT)
Dept: CARDIOLOGY | Facility: CLINIC | Age: 58
End: 2023-05-09
Payer: MEDICARE

## 2023-05-09 DIAGNOSIS — I50.22 CHRONIC SYSTOLIC (CONGESTIVE) HEART FAILURE (H): Primary | ICD-10-CM

## 2023-05-09 NOTE — LETTER
Mechanical Circulatory Support Program  [place] [address], [facility] [address]  [address] [place] [address]  [phone] Office Phone  [phone] Fax Number    Faxed to:  [address]  Fax Number:  [phone]      Patient Name: Tej Morfin   : 1965   Diagnosis/ICD-10: Heart Failure, unspecified I50.9; LVAD Z95.811   Requesting Physician: Dr. Dunia Hdz   Date of Request: 23   Date to Draw Today                                                Please fax results to [phone]       or email to [email or web address]                              Call [phone] with Questions  ORDER TEST   X Basic Metabolic Panel   X Magnesium                     Signed,      Dunia Hdz MD  Heart Failure, Mechanical Circulatory Support and Transplant Cardiology   of Medicine,  Division of Cardiology, [facility]

## 2023-05-09 NOTE — PROGRESS NOTES
D:  Called pt to follow up to shock yesterday and appt last week.  Pt reports weights went down to 229lbs, but are trending back up to 234 lbs today, as well as abdominal fullness.  Pt reports improved cold like symptoms and drainage from drivline site.  Pt denies any further shocks.  Pt getting repeat labs today as requested at appt last week.  Orders sent.  I:  Discussed recent events and current status/weights with Dr. Hdz.  Plan: repeat labs with Iona WILIAN appointment, call Iona EP for follow up, INCREASE Furosemide to 60mg BID with Potassium 60mEq BID until he see's the [facility] team.  A:  Clinic/Shock follow up  P:  Pt verbalized understanding of the instructions given.  Will call VAD coordinator with further needs and questions.    Lab results rcvd:  TSH - 1.52  Magnesium 1.8

## 2023-05-09 NOTE — LETTER
Mechanical Circulatory Support Program  [place] [address], [facility] [address]  [address] [place] [address]  [phone] Office Phone  [phone] Fax Number    Faxed to:  [facility]  Fax Number:  [phone]      Patient Name: Tej Morfin   : 1965   Diagnosis/ICD-10: Heart Failure, unspecified I50.9; LVAD Z95.811   Requesting Physician: Dr. Dunia Hdz   Date of Request: 05/10/23   Date to Draw Please ADD ON to yesterdays specimen                                                Please fax results to [phone]       or email to [email or web address]                              Call [phone] with Questions  ORDER TEST   X TSH                         Signed,      Dunia Hdz MD  Heart Failure, Mechanical Circulatory Support and Transplant Cardiology   of Medicine,  Division of Cardiology, [facility]

## 2023-05-10 LAB — LABCORP INTERFACED MISCELLANEOUS TEST RESULT: NORMAL

## 2023-05-11 LAB — BACTERIA WND CULT: NORMAL

## 2023-05-11 RX ORDER — FUROSEMIDE 40 MG
60 TABLET ORAL 2 TIMES DAILY
Qty: 270 TABLET | Refills: 3 | Status: SHIPPED | OUTPATIENT
Start: 2023-05-11 | End: 2024-04-17

## 2023-05-11 RX ORDER — POTASSIUM CHLORIDE 1500 MG/1
60 TABLET, EXTENDED RELEASE ORAL 2 TIMES DAILY
Qty: 540 TABLET | Refills: 3 | Status: SHIPPED | OUTPATIENT
Start: 2023-05-11 | End: 2024-05-15

## 2023-05-12 ENCOUNTER — TELEPHONE (OUTPATIENT)
Dept: ANTICOAGULATION | Facility: CLINIC | Age: 58
End: 2023-05-12
Payer: MEDICARE

## 2023-05-12 NOTE — TELEPHONE ENCOUNTER
ANTICOAGULATION     Tej Morfin is overdue for INR check.      Spoke with Tej instructed to test INR with home meter and call results to home monitoring company as soon as possible. He will test on Monday 5/22.    Geneva Martinez RN

## 2023-05-14 LAB — INR HOME MONITORING: 2.5 (ref 2–3)

## 2023-05-15 ENCOUNTER — ANTICOAGULATION THERAPY VISIT (OUTPATIENT)
Dept: ANTICOAGULATION | Facility: CLINIC | Age: 58
End: 2023-05-15
Payer: MEDICARE

## 2023-05-15 DIAGNOSIS — Z95.2 S/P MITRAL VALVE REPLACEMENT: ICD-10-CM

## 2023-05-15 DIAGNOSIS — I50.22 CHRONIC SYSTOLIC CHF (CONGESTIVE HEART FAILURE) (H): ICD-10-CM

## 2023-05-15 DIAGNOSIS — Z95.811 LVAD (LEFT VENTRICULAR ASSIST DEVICE) PRESENT (H): Primary | ICD-10-CM

## 2023-05-15 DIAGNOSIS — I47.29 PAROXYSMAL VENTRICULAR TACHYCARDIA (H): ICD-10-CM

## 2023-05-15 NOTE — PROGRESS NOTES
ANTICOAGULATION MANAGEMENT     Tej SWENSON [place] 57 year old male is on warfarin with therapeutic INR result. (Goal INR 2.0-3.0)    Recent labs: (last 7 days)     05/14/23  0000   INR 2.5       ASSESSMENT       Source(s): Patient/Caregiver Call       Warfarin doses taken: Warfarin taken as instructed    Diet: No new diet changes identified    Medication/supplement changes: Continues with Doxycycline and will finish this up on 5/18    New illness, injury, or hospitalization: No    Signs or symptoms of bleeding or clotting: No    Previous result: Therapeutic last visit; previously outside of goal range    Additional findings: None         PLAN     Recommended plan for no diet, medication or health factor changes affecting INR     Dosing Instructions: Continue your current warfarin dose with next INR in 2 weeks       Summary  As of 5/15/2023    Full warfarin instructions:  5 mg every day   Next INR check:  5/30/2023             Telephone call with Tej who verbalizes understanding and agrees to plan and who agrees to plan and repeated back plan correctly    Patient to recheck with home meter    Education provided:     None required    Plan made per [facility] anticoagulation protocol and per LVAD protocol    Geneva Martinez, RN  Anticoagulation Clinic  5/15/2023    _______________________________________________________________________     Anticoagulation Episode Summary     Current INR goal:  2.0-3.0   TTR:  78.4 % (1 y)   Target end date:  Indefinite   Send INR reminders to:  ANTICOAG LVAD    Indications    LVAD (left ventricular assist device) present (H) [Z95.811]  Chronic systolic CHF (congestive heart failure) (H) [I50.22]  S/P mitral valve replacement [Z95.2]  Paroxysmal ventricular tachycardia (H) [I47.29]           Comments:  HeartMate 3   Bridging: No bridging. Reason -- new implant   Date VAD placed: 03/23/22  Hx Mitral valve repair and not replacement         Anticoagulation Care Providers     Provider Role  Specialty Phone number    Dunia Hdz MD Referring Cardiovascular Disease [phone]

## 2023-05-22 ENCOUNTER — TELEPHONE (OUTPATIENT)
Dept: TRANSPLANT | Facility: CLINIC | Age: 58
End: 2023-05-22
Payer: MEDICARE

## 2023-05-22 VITALS — BODY MASS INDEX: 33.97 KG/M2 | WEIGHT: 237 LBS

## 2023-05-30 ENCOUNTER — ANCILLARY PROCEDURE (OUTPATIENT)
Dept: CARDIOLOGY | Facility: CLINIC | Age: 58
End: 2023-05-30
Attending: INTERNAL MEDICINE
Payer: MEDICARE

## 2023-05-30 ENCOUNTER — ANTICOAGULATION THERAPY VISIT (OUTPATIENT)
Dept: ANTICOAGULATION | Facility: CLINIC | Age: 58
End: 2023-05-30
Payer: MEDICARE

## 2023-05-30 DIAGNOSIS — Z95.2 S/P MITRAL VALVE REPLACEMENT: ICD-10-CM

## 2023-05-30 DIAGNOSIS — I50.22 CHRONIC SYSTOLIC CHF (CONGESTIVE HEART FAILURE) (H): ICD-10-CM

## 2023-05-30 DIAGNOSIS — Z95.811 LVAD (LEFT VENTRICULAR ASSIST DEVICE) PRESENT (H): Primary | ICD-10-CM

## 2023-05-30 DIAGNOSIS — I42.8 VALVULAR CARDIOMYOPATHY (H): ICD-10-CM

## 2023-05-30 DIAGNOSIS — I47.29 PAROXYSMAL VENTRICULAR TACHYCARDIA (H): ICD-10-CM

## 2023-05-30 LAB — INR HOME MONITORING: 3.1 (ref 2–3)

## 2023-05-30 PROCEDURE — 99207 CARDIAC DEVICE CHECK - REMOTE: CPT | Performed by: INTERNAL MEDICINE

## 2023-06-03 ENCOUNTER — HEALTH MAINTENANCE LETTER (OUTPATIENT)
Age: 58
End: 2023-06-03

## 2023-06-04 LAB
MDC_IDC_EPISODE_DTM: NORMAL
MDC_IDC_EPISODE_ID: NORMAL
MDC_IDC_EPISODE_TYPE: NORMAL
MDC_IDC_LEAD_IMPLANT_DT: NORMAL
MDC_IDC_LEAD_LOCATION: NORMAL
MDC_IDC_LEAD_LOCATION_DETAIL_1: NORMAL
MDC_IDC_LEAD_MFG: NORMAL
MDC_IDC_LEAD_MODEL: NORMAL
MDC_IDC_LEAD_POLARITY_TYPE: NORMAL
MDC_IDC_LEAD_SERIAL: NORMAL
MDC_IDC_MSMT_BATTERY_DTM: NORMAL
MDC_IDC_MSMT_BATTERY_REMAINING_LONGEVITY: 42 MO
MDC_IDC_MSMT_BATTERY_REMAINING_PERCENTAGE: 55 %
MDC_IDC_MSMT_BATTERY_STATUS: NORMAL
MDC_IDC_MSMT_CAP_CHARGE_DTM: NORMAL
MDC_IDC_MSMT_CAP_CHARGE_DTM: NORMAL
MDC_IDC_MSMT_CAP_CHARGE_ENERGY: 31 J
MDC_IDC_MSMT_CAP_CHARGE_TIME: 12.5 S
MDC_IDC_MSMT_CAP_CHARGE_TIME: 7.8 S
MDC_IDC_MSMT_CAP_CHARGE_TYPE: NORMAL
MDC_IDC_MSMT_CAP_CHARGE_TYPE: NORMAL
MDC_IDC_MSMT_LEADCHNL_LV_IMPEDANCE_VALUE: 991 OHM
MDC_IDC_MSMT_LEADCHNL_LV_LEAD_CHANNEL_STATUS: NORMAL
MDC_IDC_MSMT_LEADCHNL_RA_IMPEDANCE_VALUE: 534 OHM
MDC_IDC_MSMT_LEADCHNL_RV_IMPEDANCE_VALUE: 419 OHM
MDC_IDC_PG_IMPLANT_DTM: NORMAL
MDC_IDC_PG_MFG: NORMAL
MDC_IDC_PG_MODEL: NORMAL
MDC_IDC_PG_SERIAL: NORMAL
MDC_IDC_PG_TYPE: NORMAL
MDC_IDC_SESS_CLINIC_NAME: NORMAL
MDC_IDC_SESS_DTM: NORMAL
MDC_IDC_SESS_TYPE: NORMAL
MDC_IDC_SET_BRADY_AT_MODE_SWITCH_MODE: NORMAL
MDC_IDC_SET_BRADY_AT_MODE_SWITCH_RATE: 170 {BEATS}/MIN
MDC_IDC_SET_BRADY_LOWRATE: 80 {BEATS}/MIN
MDC_IDC_SET_BRADY_MAX_TRACKING_RATE: 130 {BEATS}/MIN
MDC_IDC_SET_BRADY_MODE: NORMAL
MDC_IDC_SET_BRADY_PAV_DELAY_HIGH: 280 MS
MDC_IDC_SET_BRADY_PAV_DELAY_LOW: 300 MS
MDC_IDC_SET_BRADY_SAV_DELAY_HIGH: 280 MS
MDC_IDC_SET_BRADY_SAV_DELAY_LOW: 300 MS
MDC_IDC_SET_CRT_PACED_CHAMBERS: NORMAL
MDC_IDC_SET_LEADCHNL_LV_PACING_AMPLITUDE: 0.1 V
MDC_IDC_SET_LEADCHNL_LV_PACING_PULSEWIDTH: 0.1 MS
MDC_IDC_SET_LEADCHNL_LV_SENSING_ADAPTATION_MODE: NORMAL
MDC_IDC_SET_LEADCHNL_LV_SENSING_ANODE_ELECTRODE_1: NORMAL
MDC_IDC_SET_LEADCHNL_LV_SENSING_ANODE_LOCATION_1: NORMAL
MDC_IDC_SET_LEADCHNL_LV_SENSING_CATHODE_ELECTRODE_1: NORMAL
MDC_IDC_SET_LEADCHNL_LV_SENSING_CATHODE_LOCATION_1: NORMAL
MDC_IDC_SET_LEADCHNL_LV_SENSING_SENSITIVITY: 1 MV
MDC_IDC_SET_LEADCHNL_RA_PACING_AMPLITUDE: 1.5 V
MDC_IDC_SET_LEADCHNL_RA_PACING_POLARITY: NORMAL
MDC_IDC_SET_LEADCHNL_RA_PACING_PULSEWIDTH: 0.5 MS
MDC_IDC_SET_LEADCHNL_RA_SENSING_ADAPTATION_MODE: NORMAL
MDC_IDC_SET_LEADCHNL_RA_SENSING_POLARITY: NORMAL
MDC_IDC_SET_LEADCHNL_RA_SENSING_SENSITIVITY: 0.25 MV
MDC_IDC_SET_LEADCHNL_RV_PACING_AMPLITUDE: 2.8 V
MDC_IDC_SET_LEADCHNL_RV_PACING_POLARITY: NORMAL
MDC_IDC_SET_LEADCHNL_RV_PACING_PULSEWIDTH: 0.5 MS
MDC_IDC_SET_LEADCHNL_RV_SENSING_ADAPTATION_MODE: NORMAL
MDC_IDC_SET_LEADCHNL_RV_SENSING_POLARITY: NORMAL
MDC_IDC_SET_LEADCHNL_RV_SENSING_SENSITIVITY: 0.6 MV
MDC_IDC_SET_ZONE_DETECTION_INTERVAL: 273 MS
MDC_IDC_SET_ZONE_TYPE: NORMAL
MDC_IDC_SET_ZONE_VENDOR_TYPE: NORMAL
MDC_IDC_STAT_AT_BURDEN_PERCENT: 1 %
MDC_IDC_STAT_AT_DTM_END: NORMAL
MDC_IDC_STAT_AT_DTM_START: NORMAL
MDC_IDC_STAT_BRADY_DTM_END: NORMAL
MDC_IDC_STAT_BRADY_DTM_START: NORMAL
MDC_IDC_STAT_BRADY_RA_PERCENT_PACED: 79 %
MDC_IDC_STAT_BRADY_RV_PERCENT_PACED: 5 %
MDC_IDC_STAT_CRT_DTM_END: NORMAL
MDC_IDC_STAT_CRT_DTM_START: NORMAL
MDC_IDC_STAT_CRT_LV_PERCENT_PACED: 0 %
MDC_IDC_STAT_EPISODE_RECENT_COUNT: 0
MDC_IDC_STAT_EPISODE_RECENT_COUNT_DTM_END: NORMAL
MDC_IDC_STAT_EPISODE_RECENT_COUNT_DTM_START: NORMAL
MDC_IDC_STAT_EPISODE_TYPE: NORMAL
MDC_IDC_STAT_EPISODE_VENDOR_TYPE: NORMAL
MDC_IDC_STAT_TACHYTHERAPY_ATP_DELIVERED_RECENT: 0
MDC_IDC_STAT_TACHYTHERAPY_ATP_DELIVERED_TOTAL: 4
MDC_IDC_STAT_TACHYTHERAPY_RECENT_DTM_END: NORMAL
MDC_IDC_STAT_TACHYTHERAPY_RECENT_DTM_START: NORMAL
MDC_IDC_STAT_TACHYTHERAPY_SHOCKS_ABORTED_RECENT: 0
MDC_IDC_STAT_TACHYTHERAPY_SHOCKS_ABORTED_TOTAL: 1
MDC_IDC_STAT_TACHYTHERAPY_SHOCKS_DELIVERED_RECENT: 0
MDC_IDC_STAT_TACHYTHERAPY_SHOCKS_DELIVERED_TOTAL: 4
MDC_IDC_STAT_TACHYTHERAPY_TOTAL_DTM_END: NORMAL
MDC_IDC_STAT_TACHYTHERAPY_TOTAL_DTM_START: NORMAL

## 2023-06-05 NOTE — PROGRESS NOTES
Cardiovascular Surgery Progress Note  03/28/2022         Assessment and Plan:     Tej Morfin is a 56 year old male with NICM secondary to valvulopathy severe MR s/p MV ring repair, on home inotrope (Dobutamine 2.5), sustained VT s/p secondary prevention ICD, CKD stage II (baseline 1.5) who presented for LVAD placement with heartmate 3 by Dr. Rodríguez on 3/23/22       Cardiovascular:   Hx of NICM 2/2 severe MR, s/p ring repair - now s/p LVAD HM3  No arrhythmia, HD stable.  IABP removed post op   ASA study (placebo vs. aspirin), statin not yet started   HTN-Valsartan 20 mg daily, Sotalol 80 mg [id number] home med, spironolactone 20 mg daily, Isordil 10 mg s0gxptu, hydralazine 25 q8 hours.   Chest tubes: mediastinals removed 3/28, left and right pleural and leny drain remain.     Pulmonary:  - Extubated POD#1, Saturating well on RA.    - Supplemental O2 PRN to keep sats > 92%. Wean off as tolerated.  - Pulm toilet, IS, activity and deep breathing    Neurology / MSK:  - Neuro intact  - Acute post-operative pain; pain well controlled with acetaminophen, PO oxycodone PRN, IV dilaudid PRN     / Renal:  - Hx of renal disease CKD stage II (baseline 1.5). Most recent creatinine 0.96, adequate UOP.   - Pre-op weight 200 lbs, currently weight 195, diuresing well on Torsemide 20 mg daily.     GI / FEN:   - Regular diet, continue bowel regimen  - Replace electrolytes as needed, hepatic enzymes WNL.    Endocrine:  Stress induced hyperglycemia; initially managed on insulin drip postop, transitioned to sliding scale.    Infectious Disease:  - Stress induced leukocytosis; WBC WNL, tmax 100.5 3/27, no signs or symptoms of infection  - Completed perioperative antibiotics  - UA unremarkable  - COVID screen pending  - If repeat fever, pan culture.     Hematology:   Acute blood loss anemia and thrombocytopenia  Hgb 7.8; Plt WNL, no signs or symptoms of active bleeding    Anticoagulation:   - ASA   - Coumadin for LVAD, INR goal 2-3.  "Most recent INR 2.03 .    - Low intensity heparin gtt bridging to therapeutic INR. Stop 3/28    Prophylaxis:   - Stress ulcer prophylaxis: Pantoprazole 40 mg daily for 30 days  - DVT prophylaxis: Subcutaneous heparin, SCD    Disposition:   - Transferred to  on 3/27  - Therapies recommending discharge to rehab     Discussed with Dr. Marcos Abraham PA-C  Cardiothoracic Surgery  Pager [phone]  March 28, 2022          Interval History:     No overnight events.  States pain is well managed on current regimen. Slept well overnight.  Tolerating diet, is passing flatus, + BM 2 days ago. No nausea or vomiting.  Breathing well without complaints.   Working with therapies.    Denies chest pain, palpitations, dizziness, syncopal symptoms, fevers, chills, myalgias, or sternal popping/clicking.         Physical Exam:   Blood pressure 107/81, pulse 80, temperature 99  F (37.2  C), temperature source Oral, resp. rate 16, height 1.791 m (5' 10.51\"), weight 88.7 kg (195 lb 8.8 oz), SpO2 96 %.  Vitals:    03/26/22 0000 03/27/22 0000 03/28/22 0400   Weight: 94.5 kg (208 lb 5.4 oz) 93.6 kg (206 lb 6.4 oz) 88.7 kg (195 lb 8.8 oz)          Gen: A&Ox4, NAD  Neuro: no focal deficits   CV: RRR, lvad Hum   Pulm: +CTA, no wheezing or rhonchi, normal breathing on RA   Abd: nondistended, normal BS, soft, nontender  Ext: 1+ pedal peripheral edema  Incision: clean, dry, intact, no erythema, sternum stable  Tubes/drain sites: dressing clean and dry, mediastinal 24 hr serosanguinous output 190/50 cc, no air leak. Left and right pleural with leny drain 280/390cc no air leak.   Lines: driveline dressing clean and dry   LVAD: speed 5500, flow 4.1, PI 3.8, power 4.          Data:    Imaging:  reviewed recent imaging, no acute concerns    Labs:  BMP  Recent Labs   Lab 03/28/22  1158 03/28/22  0748 03/28/22  0508 03/27/22  2215 03/27/22  1734 03/27/22  1248 03/27/22  0351 03/27/22  0347 03/26/22  2335 03/26/22  2332 " 03/26/22 0330 03/26/22 0321 03/25/22  2344 03/25/22 2022   NA  --   --  140  --   --   --   --  141  --   --   --  139  --  140   POTASSIUM  --   --  3.8  --   --  3.9  --  3.8  --  3.9   < > 3.6  --  3.8   CHLORIDE  --   --  110*  --   --   --   --  111*  --   --   --  108  --  108   YESICA  --   --  8.1*  --   --   --   --  8.0*  --   --   --  8.4*  --  8.2*   CO2  --   --  25  --   --   --   --  26  --   --   --  26  --  25   BUN  --   --  22  --   --   --   --  22  --   --   --  17  --  17   CR  --   --  0.96  --   --   --   --  0.87  --   --   --  0.82  --  0.84   * 88 113* 85   < > 97   < > 98   < >  --    < > 110*   < > 125*    < > = values in this interval not displayed.     CBC  Recent Labs   Lab 03/28/22 0508 03/27/22 0347 03/26/22 0321 03/25/22  1522   WBC 8.6 9.7 12.6* 10.7   RBC 2.51* 2.46* 2.67* 2.59*   HGB 7.8* 7.8* 8.5* 8.0*   HCT 25.6* 25.1* 26.7* 25.6*   * 102* 100 99   MCH 31.1 31.7 31.8 30.9   MCHC 30.5* 31.1* 31.8 31.3*   RDW 14.4 14.6 14.1 13.8   * 116* 111* 111*     INR  Recent Labs   Lab 03/28/22 0508 03/27/22 0347 03/26/22 0321 03/25/22  0424   INR 2.03* 1.31* 1.30* 1.26*      Hepatic Panel  Recent Labs   Lab 03/28/22 0508 03/27/22 0347 03/26/22 0321 03/25/22  0424   AST 32 20 28 47*   ALT 24 12 12 14   ALKPHOS 47 41 44 40   BILITOTAL 0.3 0.4 1.3 1.7*   ALBUMIN 1.7* 1.8* 2.0* 2.2*     GLUCOSE:   Recent Labs   Lab 03/28/22  1158 03/28/22  0748 03/28/22  0508 03/27/22  2215 03/27/22  1734 03/27/22  1248   * 88 113* 85 89 97      Additional Complaints

## 2023-06-06 ENCOUNTER — ANTICOAGULATION THERAPY VISIT (OUTPATIENT)
Dept: ANTICOAGULATION | Facility: CLINIC | Age: 58
End: 2023-06-06
Payer: MEDICARE

## 2023-06-06 DIAGNOSIS — I50.22 CHRONIC SYSTOLIC CHF (CONGESTIVE HEART FAILURE) (H): ICD-10-CM

## 2023-06-06 DIAGNOSIS — I47.29 PAROXYSMAL VENTRICULAR TACHYCARDIA (H): ICD-10-CM

## 2023-06-06 DIAGNOSIS — Z95.811 LVAD (LEFT VENTRICULAR ASSIST DEVICE) PRESENT (H): Primary | ICD-10-CM

## 2023-06-06 DIAGNOSIS — Z95.2 S/P MITRAL VALVE REPLACEMENT: ICD-10-CM

## 2023-06-06 LAB — INR HOME MONITORING: 2 (ref 2–3)

## 2023-06-06 NOTE — PROGRESS NOTES
"ANTICOAGULATION MANAGEMENT     Tej Morfin 57 year old male is on warfarin with therapeutic INR result. (Goal INR 2.0-3.0)    Recent labs: (last 7 days)     06/06/23  0000   INR 2.0       ASSESSMENT       Source(s): Chart Review and Patient/Caregiver Call       Warfarin doses taken: Less warfarin taken than planned which may be affecting INR    Diet: \"Stomach bug\" diarrhea  may be affecting diet and INR    Medication/supplement changes: None noted    New illness, injury, or hospitalization: Yes: diarrhea (resolved)    Signs or symptoms of bleeding or clotting: No    Previous result: Supratherapeutic    Additional findings: None         PLAN     Recommended plan for temporary change(s) affecting INR     Dosing Instructions: Increase your warfarin dose (3% change) with next INR in 1 week       Summary  As of 6/6/2023    Full warfarin instructions:  5 mg every day   Next INR check:  6/13/2023             Telephone call with Tej who verbalizes understanding and agrees to plan and who agrees to plan and repeated back plan correctly    Patient to recheck with home meter    Education provided:     Symptom monitoring: monitoring for bleeding signs and symptoms, monitoring for clotting signs and symptoms, monitoring for stroke signs and symptoms and when to seek medical attention/emergency care    Importance of notifying anticoagulation clinic for: changes in medications; a sooner lab recheck maybe needed and diarrhea, nausea/vomiting, reduced intake, cold/flu, and/or infections; a sooner lab recheck maybe needed    Plan made per [facility] anticoagulation protocol and per LVAD protocol    Siddharth Greco, RN  Anticoagulation Clinic  6/6/2023    _______________________________________________________________________     Anticoagulation Episode Summary     Current INR goal:  2.0-3.0   TTR:  77.4 % (1 y)   Target end date:  Indefinite   Send INR reminders to:  [facility]AG LVAD    Indications    LVAD (left ventricular assist " device) present (H) [Z95.811]  Chronic systolic CHF (congestive heart failure) (H) [I50.22]  S/P mitral valve replacement [Z95.2]  Paroxysmal ventricular tachycardia (H) [I47.29]           Comments:  HeartMate 3   Bridging: No bridging. Reason -- new implant   Date VAD placed: 03/23/22  Hx Mitral valve repair and not replacement         Anticoagulation Care Providers     Provider Role Specialty Phone number    Dunia Hdz MD Referring Cardiovascular Disease [phone]

## 2023-06-13 ENCOUNTER — ANTICOAGULATION THERAPY VISIT (OUTPATIENT)
Dept: ANTICOAGULATION | Facility: CLINIC | Age: 58
End: 2023-06-13
Payer: MEDICARE

## 2023-06-13 DIAGNOSIS — I50.22 CHRONIC SYSTOLIC CHF (CONGESTIVE HEART FAILURE) (H): ICD-10-CM

## 2023-06-13 DIAGNOSIS — Z95.811 LVAD (LEFT VENTRICULAR ASSIST DEVICE) PRESENT (H): Primary | ICD-10-CM

## 2023-06-13 DIAGNOSIS — Z95.2 S/P MITRAL VALVE REPLACEMENT: ICD-10-CM

## 2023-06-13 DIAGNOSIS — I47.29 PAROXYSMAL VENTRICULAR TACHYCARDIA (H): ICD-10-CM

## 2023-06-13 LAB — INR HOME MONITORING: 1.9 (ref 2–3)

## 2023-06-13 NOTE — PROGRESS NOTES
ANTICOAGULATION MANAGEMENT     Tej Morfin 58 year old male is on warfarin with subtherapeutic INR result. (Goal INR 2.0-3.0)    Recent labs: (last 7 days)     06/13/23  0000   INR 1.9*       ASSESSMENT       Source(s): Chart Review and Patient/Caregiver Call       Warfarin doses taken: Warfarin taken as instructed    Diet: No new diet changes identified    Medication/supplement changes: pt has been taking more diuretics for some bilateral LE edema- but this is not new/ im think ing the 3.1 inr 2 weeks ago may have been a reflection of that edema. it is now resolving.     New illness, injury, or hospitalization: Yes: see above- but not entirely new    Signs or symptoms of bleeding or clotting: No    Previous result: Therapeutic last visit; previously outside of goal range    Additional findings: if pt INR still low next week he wants to add back in a 7.5mg dose one day  week          PLAN     Recommended plan for temporary change(s) affecting INR     Dosing Instructions: booster dose then continue your current warfarin dose with next INR in 1 week       Summary  As of 6/13/2023    Full warfarin instructions:  6/13: 7.5 mg; Otherwise 5 mg every day   Next INR check:  6/20/2023             Telephone call with Tej who verbalizes understanding and agrees to plan and who agrees to plan and repeated back plan correctly    Patient to recheck with home meter    Education provided:     effect of edema on INR    Plan made per [facility] anticoagulation protocol and per LVAD protocol    Judy Simental, RN  Anticoagulation Clinic  6/13/2023    _______________________________________________________________________     Anticoagulation Episode Summary     Current INR goal:  2.0-3.0   TTR:  75.9 % (1 y)   Target end date:  Indefinite   Send INR reminders to:  [facility] LVAD    Indications    LVAD (left ventricular assist device) present (H) [Z95.811]  Chronic systolic CHF (congestive heart failure) (H) [I50.22]  S/P mitral  valve replacement [Z95.2]  Paroxysmal ventricular tachycardia (H) [I47.29]           Comments:  HeartMate 3   Bridging: No bridging. Reason -- new implant   Date VAD placed: 03/23/22  Hx Mitral valve repair and not replacement         Anticoagulation Care Providers     Provider Role Specialty Phone number    Dunia Hdz MD Referring Cardiovascular Disease [phone]

## 2023-06-20 ENCOUNTER — ANTICOAGULATION THERAPY VISIT (OUTPATIENT)
Dept: ANTICOAGULATION | Facility: CLINIC | Age: 58
End: 2023-06-20
Payer: MEDICARE

## 2023-06-20 ENCOUNTER — TELEPHONE (OUTPATIENT)
Dept: TRANSPLANT | Facility: CLINIC | Age: 58
End: 2023-06-20
Payer: MEDICARE

## 2023-06-20 VITALS — BODY MASS INDEX: 32.82 KG/M2 | WEIGHT: 229 LBS

## 2023-06-20 DIAGNOSIS — I50.22 CHRONIC SYSTOLIC CHF (CONGESTIVE HEART FAILURE) (H): ICD-10-CM

## 2023-06-20 DIAGNOSIS — I47.29 PAROXYSMAL VENTRICULAR TACHYCARDIA (H): ICD-10-CM

## 2023-06-20 DIAGNOSIS — Z95.2 S/P MITRAL VALVE REPLACEMENT: ICD-10-CM

## 2023-06-20 DIAGNOSIS — Z95.811 LVAD (LEFT VENTRICULAR ASSIST DEVICE) PRESENT (H): Primary | ICD-10-CM

## 2023-06-20 LAB — INR HOME MONITORING: 2.9 (ref 2–3)

## 2023-06-20 NOTE — PROGRESS NOTES
ANTICOAGULATION MANAGEMENT     Tej Morfin 58 year old male is on warfarin with therapeutic INR result. (Goal INR 2.0-3.0)    Recent labs: (last 7 days)     06/20/23  0000   INR 2.9       ASSESSMENT       Source(s): Patient/Caregiver Call       Warfarin doses taken: Warfarin taken as instructed    Diet: No new diet changes identified    Medication/supplement changes: None noted    New illness, injury, or hospitalization: No    Signs or symptoms of bleeding or clotting: No    Previous result: Subtherapeutic    Additional findings: None         PLAN     Recommended plan for no diet, medication or health factor changes affecting INR     Dosing Instructions: Continue your current warfarin dose with next INR in 2 weeks       Summary  As of 6/20/2023    Full warfarin instructions:  5 mg every day   Next INR check:  7/5/2023             Telephone call with Tej who verbalizes understanding and agrees to plan and who agrees to plan and repeated back plan correctly    Patient to recheck with home meter    Education provided:     None required    Plan made per [facility] anticoagulation protocol and per LVAD protocol    Geneva Martinez RN  Anticoagulation Clinic  6/20/2023    _______________________________________________________________________     Anticoagulation Episode Summary     Current INR goal:  2.0-3.0   TTR:  77.7 % (1 y)   Target end date:  Indefinite   Send INR reminders to:  ANTICOAG LVAD    Indications    LVAD (left ventricular assist device) present (H) [Z95.811]  Chronic systolic CHF (congestive heart failure) (H) [I50.22]  S/P mitral valve replacement [Z95.2]  Paroxysmal ventricular tachycardia (H) [I47.29]           Comments:  HeartMate 3   Bridging: No bridging. Reason -- new implant   Date VAD placed: 03/23/22  Hx Mitral valve repair and not replacement         Anticoagulation Care Providers     Provider Role Specialty Phone number    Dunia Hdz MD Referring Cardiovascular Disease  [phone]

## 2023-07-05 ENCOUNTER — ANTICOAGULATION THERAPY VISIT (OUTPATIENT)
Dept: ANTICOAGULATION | Facility: CLINIC | Age: 58
End: 2023-07-05
Payer: MEDICARE

## 2023-07-05 ENCOUNTER — TELEPHONE (OUTPATIENT)
Dept: CARDIOLOGY | Facility: CLINIC | Age: 58
End: 2023-07-05
Payer: MEDICARE

## 2023-07-05 DIAGNOSIS — Z95.811 LVAD (LEFT VENTRICULAR ASSIST DEVICE) PRESENT (H): Primary | ICD-10-CM

## 2023-07-05 DIAGNOSIS — I50.22 CHRONIC SYSTOLIC CHF (CONGESTIVE HEART FAILURE) (H): ICD-10-CM

## 2023-07-05 DIAGNOSIS — I47.29 PAROXYSMAL VENTRICULAR TACHYCARDIA (H): ICD-10-CM

## 2023-07-05 DIAGNOSIS — Z95.2 S/P MITRAL VALVE REPLACEMENT: ICD-10-CM

## 2023-07-05 LAB — INR HOME MONITORING: 2.3 (ref 2–3)

## 2023-07-05 RX ORDER — ASPIRIN 81 MG/1
81 TABLET ORAL DAILY
Qty: 90 TABLET | Refills: 4 | Status: SHIPPED | OUTPATIENT
Start: 2023-07-05 | End: 2024-05-02

## 2023-07-05 NOTE — TELEPHONE ENCOUNTER
Trial: Antiplatelet Removal and Hemocompatibility Events with the HeartMate 3 (RICARDO HM3)    IRB Number: [id number]    PI: Dunia Hdz, [phone]    Primary :  Stacey Eaton, BRENDA [phone] (cell)      Tej has completed the RICARDO Study.  All study medication will be returned by mail and he will start taking aspirin 81 mg daily on July 6, 2023.

## 2023-07-05 NOTE — PROGRESS NOTES
ANTICOAGULATION MANAGEMENT     Tej Morfin 58 year old male is on warfarin with therapeutic INR result. (Goal INR 2.0-3.0)    Recent labs: (last 7 days)     07/05/23  0000   INR 2.3       ASSESSMENT       Source(s): Patient/Caregiver Call       Warfarin doses taken: Warfarin taken as instructed    Diet: No new diet changes identified    Medication/supplement changes: None noted    New illness, injury, or hospitalization: No    Signs or symptoms of bleeding or clotting: No    Previous result: Therapeutic last visit; previously outside of goal range    Additional findings: None         PLAN     Recommended plan for no diet, medication or health factor changes affecting INR     Dosing Instructions: Continue your current warfarin dose with next INR in 2 weeks       Summary  As of 7/5/2023    Full warfarin instructions:  5 mg every day   Next INR check:  7/19/2023             Telephone call with Tej who verbalizes understanding and agrees to plan and who agrees to plan and repeated back plan correctly    Patient to recheck with home meter    Education provided:     None required    Plan made per [facility] anticoagulation protocol and per LVAD protocol    Geneva Martinez RN  Anticoagulation Clinic  7/5/2023    _______________________________________________________________________     Anticoagulation Episode Summary     Current INR goal:  2.0-3.0   TTR:  79.8 % (1 y)   Target end date:  Indefinite   Send INR reminders to:  ANTICOAG LVAD    Indications    LVAD (left ventricular assist device) present (H) [Z95.811]  Chronic systolic CHF (congestive heart failure) (H) [I50.22]  S/P mitral valve replacement [Z95.2]  Paroxysmal ventricular tachycardia (H) [I47.29]           Comments:  HeartMate 3   Bridging: No bridging. Reason -- new implant   Date VAD placed: 03/23/22  Hx Mitral valve repair and not replacement         Anticoagulation Care Providers     Provider Role Specialty Phone number    Dunia Hdz MD  Referring Cardiovascular Disease [phone]

## 2023-07-19 ENCOUNTER — ANTICOAGULATION THERAPY VISIT (OUTPATIENT)
Dept: ANTICOAGULATION | Facility: CLINIC | Age: 58
End: 2023-07-19
Payer: MEDICARE

## 2023-07-19 DIAGNOSIS — Z95.2 S/P MITRAL VALVE REPLACEMENT: ICD-10-CM

## 2023-07-19 DIAGNOSIS — I47.29 PAROXYSMAL VENTRICULAR TACHYCARDIA (H): ICD-10-CM

## 2023-07-19 DIAGNOSIS — Z95.811 LVAD (LEFT VENTRICULAR ASSIST DEVICE) PRESENT (H): Primary | ICD-10-CM

## 2023-07-19 DIAGNOSIS — I50.22 CHRONIC SYSTOLIC CHF (CONGESTIVE HEART FAILURE) (H): ICD-10-CM

## 2023-07-19 LAB — INR HOME MONITORING: 2.6 (ref 2–3)

## 2023-07-19 NOTE — PROGRESS NOTES
ANTICOAGULATION MANAGEMENT     Tej Morfin 58 year old male is on warfarin with therapeutic INR result. (Goal INR 2.0-3.0)    Recent labs: (last 7 days)     07/19/23  0000   INR 2.6       ASSESSMENT       Source(s): Chart Review and Patient/Caregiver Call       Warfarin doses taken: Warfarin taken as instructed    Diet: No new diet changes identified    Medication/supplement changes: None noted    New illness, injury, or hospitalization: No    Signs or symptoms of bleeding or clotting: No    Previous result: Therapeutic last 2(+) visits    Additional findings: None         PLAN     Recommended plan for no diet, medication or health factor changes affecting INR     Dosing Instructions: Continue your current warfarin dose with next INR in 2 weeks       Summary  As of 7/19/2023    Full warfarin instructions:  5 mg every day   Next INR check:  8/2/2023             Telephone call with Tej who verbalizes understanding and agrees to plan    Patient to recheck with home meter    Education provided:     Goal range and lab monitoring: goal range and significance of current result    Plan made per [facility] anticoagulation protocol and per LVAD protocol    Judy Simental RN  Anticoagulation Clinic  7/19/2023    _______________________________________________________________________     Anticoagulation Episode Summary     Current INR goal:  2.0-3.0   TTR:  79.8 % (1 y)   Target end date:  Indefinite   [facility]    Indications    LVAD (left ventricular assist device) present (H) [Z95.811]  Chronic systolic CHF (congestive heart failure) (H) [I50.22]  S/P mitral valve replacement [Z95.2]  Paroxysmal ventricular tachycardia (H) [I47.29]           Comments:  HeartMate 3   Bridging: No bridging. Reason -- new implant   Date VAD placed: 03/23/22  Hx Mitral valve repair and not replacement         Anticoagulation Care Providers     Provider Role Specialty Phone number    Dunia Hdz MD  Referring Cardiovascular Disease [phone]

## 2023-07-24 ENCOUNTER — TELEPHONE (OUTPATIENT)
Dept: TRANSPLANT | Facility: CLINIC | Age: 58
End: 2023-07-24
Payer: MEDICARE

## 2023-07-24 VITALS — WEIGHT: 232 LBS | BODY MASS INDEX: 33.25 KG/M2

## 2023-07-27 ENCOUNTER — CARE COORDINATION (OUTPATIENT)
Dept: TRANSPLANT | Facility: CLINIC | Age: 58
End: 2023-07-27
Payer: MEDICARE

## 2023-07-27 NOTE — PROGRESS NOTES
Status 4 Heart Extension Complete 07/27/23    This patient meets status 4 criteria as evidenced by:     Dischargeable LVAD without discretionary 30 days     Patient's primary cardiologist and/or attending physician is in agreement with this plan.      Status 4 Extension due 10/27/23

## 2023-08-04 ENCOUNTER — TELEPHONE (OUTPATIENT)
Dept: ANTICOAGULATION | Facility: CLINIC | Age: 58
End: 2023-08-04
Payer: MEDICARE

## 2023-08-04 ENCOUNTER — CARE COORDINATION (OUTPATIENT)
Dept: CARDIOLOGY | Facility: CLINIC | Age: 58
End: 2023-08-04
Payer: MEDICARE

## 2023-08-04 DIAGNOSIS — Z95.811 LVAD (LEFT VENTRICULAR ASSIST DEVICE) PRESENT (H): ICD-10-CM

## 2023-08-04 DIAGNOSIS — I50.22 CHRONIC SYSTOLIC CONGESTIVE HEART FAILURE (H): Primary | ICD-10-CM

## 2023-08-04 NOTE — TELEPHONE ENCOUNTER
ANTICOAGULATION     Tej Morfin is overdue for INR check.       Patient will have his INR checked when he is in clinic next week.     Audelia Avalos RN

## 2023-08-06 LAB — INR HOME MONITORING: 3.5 (ref 2–3)

## 2023-08-07 ENCOUNTER — ANTICOAGULATION THERAPY VISIT (OUTPATIENT)
Dept: ANTICOAGULATION | Facility: CLINIC | Age: 58
End: 2023-08-07
Payer: MEDICARE

## 2023-08-07 DIAGNOSIS — Z95.2 S/P MITRAL VALVE REPLACEMENT: ICD-10-CM

## 2023-08-07 DIAGNOSIS — I47.29 PAROXYSMAL VENTRICULAR TACHYCARDIA (H): ICD-10-CM

## 2023-08-07 DIAGNOSIS — Z95.811 LVAD (LEFT VENTRICULAR ASSIST DEVICE) PRESENT (H): Primary | ICD-10-CM

## 2023-08-07 DIAGNOSIS — I50.22 CHRONIC SYSTOLIC CHF (CONGESTIVE HEART FAILURE) (H): ICD-10-CM

## 2023-08-07 NOTE — PROGRESS NOTES
Tej called back to report there was a change in his intake of greens.  Patient will resume previous intake.  Tej reports he may not be able to make his 8/10 appointment but will follow up with the LVAD team if he can't.

## 2023-08-07 NOTE — PROGRESS NOTES
ANTICOAGULATION MANAGEMENT     Tej Morfin 58 year old male is on warfarin with supratherapeutic INR result. (Goal INR 2.0-3.0)    Recent labs: (last 7 days)     08/06/23  0000   INR 3.5*       ASSESSMENT     Source(s): Chart Review     Warfarin doses taken: Reviewed in chart  Diet: No new diet changes identified  Medication/supplement changes: None noted  New illness, injury, or hospitalization: No  Signs or symptoms of bleeding or clotting: No  Previous result: Therapeutic last 2(+) visits  Additional findings: Patient has appointment with labs scheduled for 7/10/23       PLAN     Recommended plan for no diet, medication or health factor changes affecting INR     Dosing Instructions: partial hold then continue your current warfarin dose with next INR in 3 days       Summary  As of 8/7/2023      Full warfarin instructions:  8/7: 2.5 mg; Otherwise 5 mg every day   Next INR check:  8/10/2023               Detailed voice message left for Tej with dosing instructions and follow up date.     Check at provider office visit    Education provided:   Please call back if any changes to your diet, medications or how you've been taking warfarin  Contact [phone] with any changes, questions or concerns.     Plan made per [facility] anticoagulation protocol and per LVAD protocol    Audelia Avalos RN  Anticoagulation Clinic  8/7/2023    _______________________________________________________________________     Anticoagulation Episode Summary       Current INR goal:  2.0-3.0   TTR:  77.1 % (1 y)   Target end date:  Indefinite   Send INR reminders to:  ANT[facility]AG LVAD    Indications    LVAD (left ventricular assist device) present (H) [Z95.811]  Chronic systolic CHF (congestive heart failure) (H) [I50.22]  S/P mitral valve replacement [Z95.2]  Paroxysmal ventricular tachycardia (H) [I47.29]             Comments:  HeartMate 3   Bridging: No bridging. Reason -- new implant   Date VAD placed: 03/23/22  Hx Mitral valve repair  and not replacement             Anticoagulation Care Providers       Provider Role Specialty Phone number    Dunia Hdz MD Referring Cardiovascular Disease [phone]

## 2023-08-09 ASSESSMENT — ENCOUNTER SYMPTOMS
BLOATING: 1
NAUSEA: 0
DIARRHEA: 0
BOWEL INCONTINENCE: 0
VOMITING: 0
JAUNDICE: 0
CONSTIPATION: 0
BLOOD IN STOOL: 0
ABDOMINAL PAIN: 0
HEARTBURN: 0
RECTAL PAIN: 0

## 2023-08-10 ENCOUNTER — ANCILLARY PROCEDURE (OUTPATIENT)
Dept: CARDIOLOGY | Facility: CLINIC | Age: 58
End: 2023-08-10
Attending: INTERNAL MEDICINE
Payer: MEDICARE

## 2023-08-10 ENCOUNTER — ANTICOAGULATION THERAPY VISIT (OUTPATIENT)
Dept: ANTICOAGULATION | Facility: CLINIC | Age: 58
End: 2023-08-10

## 2023-08-10 ENCOUNTER — LAB (OUTPATIENT)
Dept: LAB | Facility: CLINIC | Age: 58
End: 2023-08-10
Attending: INTERNAL MEDICINE
Payer: MEDICARE

## 2023-08-10 VITALS
BODY MASS INDEX: 34.7 KG/M2 | WEIGHT: 242.4 LBS | SYSTOLIC BLOOD PRESSURE: 72 MMHG | HEIGHT: 70 IN | OXYGEN SATURATION: 97 %

## 2023-08-10 DIAGNOSIS — Z95.811 LVAD (LEFT VENTRICULAR ASSIST DEVICE) PRESENT (H): Primary | ICD-10-CM

## 2023-08-10 DIAGNOSIS — Z95.2 S/P MITRAL VALVE REPLACEMENT: ICD-10-CM

## 2023-08-10 DIAGNOSIS — Z95.811 LVAD (LEFT VENTRICULAR ASSIST DEVICE) PRESENT (H): ICD-10-CM

## 2023-08-10 DIAGNOSIS — I50.22 CHRONIC SYSTOLIC (CONGESTIVE) HEART FAILURE (H): ICD-10-CM

## 2023-08-10 DIAGNOSIS — I47.29 PAROXYSMAL VENTRICULAR TACHYCARDIA (H): ICD-10-CM

## 2023-08-10 DIAGNOSIS — Z95.811 LEFT VENTRICULAR ASSIST DEVICE PRESENT (H): ICD-10-CM

## 2023-08-10 DIAGNOSIS — I50.22 CHRONIC SYSTOLIC CHF (CONGESTIVE HEART FAILURE) (H): ICD-10-CM

## 2023-08-10 DIAGNOSIS — I42.8 VALVULAR CARDIOMYOPATHY (H): ICD-10-CM

## 2023-08-10 DIAGNOSIS — Z76.82 AWAITING ORGAN TRANSPLANT: ICD-10-CM

## 2023-08-10 DIAGNOSIS — I50.22 CHRONIC SYSTOLIC CONGESTIVE HEART FAILURE (H): ICD-10-CM

## 2023-08-10 LAB
ALBUMIN SERPL BCG-MCNC: 4.3 G/DL (ref 3.5–5.2)
ALP SERPL-CCNC: 74 U/L (ref 40–129)
ALT SERPL W P-5'-P-CCNC: 24 U/L (ref 0–70)
ANION GAP SERPL CALCULATED.3IONS-SCNC: 11 MMOL/L (ref 7–15)
AST SERPL W P-5'-P-CCNC: 23 U/L (ref 0–45)
BILIRUB SERPL-MCNC: 0.4 MG/DL
BUN SERPL-MCNC: 20.9 MG/DL (ref 6–20)
CALCIUM SERPL-MCNC: 9.7 MG/DL (ref 8.6–10)
CHLORIDE SERPL-SCNC: 106 MMOL/L (ref 98–107)
CREAT SERPL-MCNC: 1.56 MG/DL (ref 0.67–1.17)
DEPRECATED HCO3 PLAS-SCNC: 26 MMOL/L (ref 22–29)
ERYTHROCYTE [DISTWIDTH] IN BLOOD BY AUTOMATED COUNT: 14.6 % (ref 10–15)
GFR SERPL CREATININE-BSD FRML MDRD: 52 ML/MIN/1.73M2
GLUCOSE SERPL-MCNC: 111 MG/DL (ref 70–99)
HCT VFR BLD AUTO: 43.8 % (ref 40–53)
HGB BLD-MCNC: 14 G/DL (ref 13.3–17.7)
INR PPP: 1.96 (ref 0.85–1.15)
LDH SERPL L TO P-CCNC: 265 U/L (ref 0–250)
Lab: NORMAL
MAGNESIUM SERPL-MCNC: 1.9 MG/DL (ref 1.7–2.3)
MCH RBC QN AUTO: 30.7 PG (ref 26.5–33)
MCHC RBC AUTO-ENTMCNC: 32 G/DL (ref 31.5–36.5)
MCV RBC AUTO: 96 FL (ref 78–100)
PERFORMING LABORATORY: NORMAL
PLATELET # BLD AUTO: 199 10E3/UL (ref 150–450)
POTASSIUM SERPL-SCNC: 4.7 MMOL/L (ref 3.4–5.3)
PROT SERPL-MCNC: 7.2 G/DL (ref 6.4–8.3)
RBC # BLD AUTO: 4.56 10E6/UL (ref 4.4–5.9)
SODIUM SERPL-SCNC: 143 MMOL/L (ref 136–145)
TEST NAME: NORMAL
WBC # BLD AUTO: 4.9 10E3/UL (ref 4–11)

## 2023-08-10 PROCEDURE — G0463 HOSPITAL OUTPT CLINIC VISIT: HCPCS | Performed by: INTERNAL MEDICINE

## 2023-08-10 PROCEDURE — 83615 LACTATE (LD) (LDH) ENZYME: CPT | Performed by: PATHOLOGY

## 2023-08-10 PROCEDURE — 86832 HLA CLASS I HIGH DEFIN QUAL: CPT | Performed by: INTERNAL MEDICINE

## 2023-08-10 PROCEDURE — 99215 OFFICE O/P EST HI 40 MIN: CPT | Mod: 25 | Performed by: INTERNAL MEDICINE

## 2023-08-10 PROCEDURE — 93750 INTERROGATION VAD IN PERSON: CPT | Performed by: INTERNAL MEDICINE

## 2023-08-10 PROCEDURE — 99000 SPECIMEN HANDLING OFFICE-LAB: CPT | Performed by: PATHOLOGY

## 2023-08-10 PROCEDURE — 80307 DRUG TEST PRSMV CHEM ANLYZR: CPT | Performed by: PATHOLOGY

## 2023-08-10 PROCEDURE — 85610 PROTHROMBIN TIME: CPT | Performed by: PATHOLOGY

## 2023-08-10 PROCEDURE — 80053 COMPREHEN METABOLIC PANEL: CPT | Performed by: PATHOLOGY

## 2023-08-10 PROCEDURE — 86833 HLA CLASS II HIGH DEFIN QUAL: CPT | Performed by: INTERNAL MEDICINE

## 2023-08-10 PROCEDURE — 85027 COMPLETE CBC AUTOMATED: CPT | Performed by: PATHOLOGY

## 2023-08-10 PROCEDURE — 83735 ASSAY OF MAGNESIUM: CPT | Performed by: PATHOLOGY

## 2023-08-10 PROCEDURE — 93284 PRGRMG EVAL IMPLANTABLE DFB: CPT | Performed by: INTERNAL MEDICINE

## 2023-08-10 PROCEDURE — 36415 COLL VENOUS BLD VENIPUNCTURE: CPT | Performed by: PATHOLOGY

## 2023-08-10 RX ORDER — SOTALOL HYDROCHLORIDE 120 MG/1
120 TABLET ORAL 2 TIMES DAILY
COMMUNITY

## 2023-08-10 ASSESSMENT — PAIN SCALES - GENERAL: PAINLEVEL: NO PAIN (0)

## 2023-08-10 NOTE — LETTER
8/10/2023      RE: Tej Morfin  [address]  [place] [address]       Dear Colleague,    Thank you for the opportunity to participate in the care of your patient, Tej Morfin, at the [facility] [place] [facility]. Please see a copy of my visit note below.        August 10, 2023        HPI  57 year old male with a PMH of severe MR s/p MVR with ring (1/21/15), VT s/p ICD, nonobstructive CAD, NICM previously on home inotropes now s/p HM III LVAD implantation 3/23/22 as bridge to decision (presently not listed to recover from LVAD team wanted to see marijuana levels decline consistent with quit date), with course c/b AFib with RVR (s/p ICD shock 3/25/22) a) who presents for f/up in LVAD clinic.    Overall he had a pretty incredible recovery after his LVAD, marijuana levels have been negative, now listed as a status 4.     Overall he is felt well but he did have an ICD shock in May 2023.   He is presently on sotalol as well as a beta-blocker and is tolerating this and by ICD check today he is in remission.     He is having a lot more anxiety with this and is working out less because he is afraid he will get shocked again.  He has not yet made an appointment for counseling.     He denies dizziness or lightheadedness..     He denies PND orthopnea.  He does feel his gotten stronger since his LVAD was placed.    He denies any driveline erythema, GI bleeding symptoms or stroke symptoms.  No LVAD alarms.     He does have GI upset and feelings of fullness and is following with GI in [place]..  He is scheduled for an EGD in December.       PAST MEDICAL HISTORY:  Past Medical History:   Diagnosis Date     Chronic systolic heart failure (H) 2/7/2017     Gastroparesis      ICD (implantable cardioverter-defibrillator) in place     [employer] [place]     Non-ischemic cardiomyopathy (H)      Paroxysmal ventricular tachycardia  (H) 2017     S/P mitral valve replacement 2017     Tetrahydrocannabinol (THC) use disorder, mild, in controlled environment, abuse 2019    occasional edible THC. reportedly for sleep, anxiety     Tobacco abuse, episodic     occasional cigar - ~ 3x/wk       FAMILY HISTORY:  Family History   Problem Relation Age of Onset     Coronary Artery Disease Mother      Kidney failure Mother      Cardiomyopathy Father          age 51       Drug use: No       Social history unchanged from prior  His dog recently passed away    CURRENT MEDICATIONS:    Current Outpatient Medications   Medication Sig Dispense Refill     acetaminophen (TYLENOL) 325 MG tablet Take 2 tablets (650 mg) by mouth every 4 hours as needed for pain 100 tablet 0     allopurinol (ZYLOPRIM) 100 MG tablet Take 1 tablet (100 mg) by mouth daily 30 tablet 1     amoxicillin (AMOXIL) 500 MG capsule Take 2,000 mg by mouth once as needed Take 4 tabs 1 hour before all dental cleanings and procedures       aspirin 81 MG EC tablet Take 1 tablet (81 mg) by mouth daily 90 tablet 4     cholecalciferol 50 MCG (2000 UT) CAPS Take 50 mcg by mouth daily       furosemide (LASIX) 40 MG tablet Take 1.5 tablets (60 mg) by mouth 2 times daily 270 tablet 3     hydrOXYzine (VISTARIL) 50 MG capsule Take 50 mg by mouth every 12 hours as needed for anxiety       magnesium oxide (MAG-OX) 400 MG tablet Take 800 mg by mouth 2 times daily       metoprolol succinate ER (TOPROL XL) 25 MG 24 hr tablet Take 1 tablet (25 mg) by mouth daily 90 tablet 3     nitroGLYcerin (NITROSTAT) 0.4 MG sublingual tablet Place 0.4 mg under the tongue every 5 minutes as needed for chest pain       omeprazole (PRILOSEC) 40 MG DR capsule Take 40 mg by mouth every morning       potassium chloride ER (KLOR-CON M) 20 MEQ CR tablet Take 3 tablets (60 mEq) by mouth 2 times daily 540 tablet 3     sacubitril-valsartan (ENTRESTO)  MG per tablet Take 1 tablet by mouth 2 times daily 90 tablet 3      "sertraline (ZOLOFT) 50 MG tablet Take 50 mg by mouth daily       spironolactone (ALDACTONE) 25 MG tablet Take 12.5 mg by mouth daily       traZODone (DESYREL) 50 MG tablet Take 25-50 mg by mouth nightly as needed       warfarin ANTICOAGULANT (COUMADIN) 2.5 MG tablet TAKE 2 TABLETS (5MG) BY MOUTH DAILY OR AS DIRECTED BY THE COUMADIN CLINIC 180 tablet 1     warfarin ANTICOAGULANT (COUMADIN) 2.5 MG tablet Take 10 mg PO daily at 6 pm until next INR check, then dose per INR goal 2-3 150 tablet 0       ROS:   Constitutional: No fever, chills, or sweats. Weight gain BMI now around 35   ENT: No visual disturbance, ear ache, epistaxis, sore throat.   Allergies/Immunologic: Negative.   Respiratory: No cough, hemoptysis.   Cardiovascular: As per HPI.   GI: + nausea, no vomiting, hematemesis, melena, or hematochezia. Abdominal fullness   : No urinary frequency, dysuria, or hematuria.   Integument: Negative.   Psychiatric: Anxiety is still a problem he is working on   Neuro: Negative.   Endocrinology: Negative.   Musculoskeletal: No further knee pains    EXAM:    BP (!) 72/0 (BP Location: Right arm, Patient Position: Sitting, Cuff Size: Adult Regular)   Ht 1.779 m (5' 10.04\")   Wt 110 kg (242 lb 6.4 oz)   SpO2 97%   BMI 34.74 kg/m      GENERAL: Appears comfortable, in no distress.  HEENT: Eye symmetrical and without discharge or icterus bilaterally. Mucous membranes moist and without lesions.  NECK: Supple, JVD 8  CV: Hum of LVAD, no adventitious sounds  RESPIRATORY: Respirations regular, even, and unlabored  GI: Soft and non distended with normoactive bowel sounds present in all quadrants. No tenderness, rebound, guarding. No organomegaly.   EXTREMITIES: No LE peripheral edema. none pulsatile.   NEUROLOGIC: Alert and interacting appropriatly.  No focal deficits.   MUSCULOSKELETAL: No joint swelling or tenderness.       Data:      interpretation Summary  HM3 speed is 5500RPM.  The visual ejection fraction is 15-20% with " severe diffuse hypokinesis. The  left ventricular end diastolic diameter is 6.3 cm.  LVAD cannula was seen in the expected anatomic position in the LV apex. LVAD  inflow and outflow cannula Doppler is normal. The aortic valve does not open.  There is mild aortic regurgitation.  Global right ventricular function is moderately to severely reduced.  IVC diameter <2.1 cm collapsing >50% with sniff suggests a normal RA pressure  of 3 mmHg.  No pericardial effusion is present.     This study was compared with the study from 9/22/2022. The left ventricular  end diastolic diameter is slighly larger at 6.3 cm from 5.9 cm.      LVAD interrogation today   Occasional power disconnects when changing from battery to wall power, some PI events, no evidence of pulm function-agree with coordinator note         Latest Reference Range & Units 08/10/23 14:36   Sodium 136 - 145 mmol/L 143   Potassium 3.4 - 5.3 mmol/L 4.7   Chloride 98 - 107 mmol/L 106   Carbon Dioxide (CO2) 22 - 29 mmol/L 26   Urea Nitrogen 6.0 - 20.0 mg/dL 20.9 (H)   Creatinine 0.67 - 1.17 mg/dL 1.56 (H)   GFR Estimate >60 mL/min/1.73m2 52 (L)   Calcium 8.6 - 10.0 mg/dL 9.7   Anion Gap 7 - 15 mmol/L 11   Magnesium 1.7 - 2.3 mg/dL 1.9   Albumin 3.5 - 5.2 g/dL 4.3   Protein Total 6.4 - 8.3 g/dL 7.2   Alkaline Phosphatase 40 - 129 U/L 74   ALT 0 - 70 U/L 24   AST 0 - 45 U/L 23   Bilirubin Total <=1.2 mg/dL 0.4   Glucose 70 - 99 mg/dL 111 (H)   Lactate Dehydrogenase 0 - 250 U/L 265 (H)   (H): Data is abnormally high  (L): Data is abnormally low        Device: [employer] G148 INOGEN X4 CRT-D(LV Lead off)  Normal device function.  Mode: DDD  bpm  AP: 83%  RVP: 5%  Intrinsic rhythm: SB 40 bpm  Lead Trends Appear Stable: Yes- LV lead OFF  Estimated battery longevity to RRT = 3 years.   Atrial Arrhythmia: None  AF [place]: <1%  Anticoagulant: Warfarin  Ventricular Arrhythmia: None since remote transmission on 5/30/23.  Setting Changes: None  Patient has an  appointment to see Dr. Hdz today.   Plan: Device follow-up every 3 months.      Assessment and Plan:   Tej Morfin is a 57 year old male with a PMH of severe MR s/p MVR with ring (1/21/15), VT s/p ICD,  NICM previously on home inotropes now s/p HM III LVAD implantation 3/23/22, with course c/b sternal wound s/p Keflex treatment,     Overall he  recovered incredibly well.  Presently listed for transplant as a status 4.  He consented for hepatitis C.  Discussing DCD today.     Chronic SHCF s/p HM III LVAD. Severe MR s/p mitral valve repair with annuloplasty ring (1/21/15). Implanted 3/23/22.   Stage D, NYHA Class II     ACEi/ARB Entresto   mg po BID,    BB Sotalol 80 mg po BID-and metoprolol   Aldosterone antagonist Aldactone 25 mg daily   SGLT2i: Deferred for now given polypharmacy and lots of GI upset (do not want to add more in now)    SCD prophylaxis ICD  Fluid status  euvolemic on current diuretics     LDH: stable  Anticoagulation: INR goal 2-3  Antiplatelet: Finish the RICARDO trial now on aspirin 81 mg daily      History of VT, s/p CRT-D.  ICD shock in May 2023 now on a higher dose of sotalol and on a beta-blocker - presently in remission if he has more shocks may consider trying to upgrade for transplant  - Continue Sotatol, metoprolol for now        Postop AF with RVR. Returned to SR.   - Coumadin as above.   -We will check and device interrogation if he has no further A. fib we will have a low threshold to stop sotalol     Mild nonobstructive CAD.   On ASA 81 mg daily        Anxiety   I have recommended connecting with LVAD support group and second chance for life     MIld AI on echo will watch for now     GI upset: Will follow with GI locally    Hypomagnesia: Improved with oral magnesium    Obesity; he is going to work on diet and exercise he is transplant eligible presently    He has an appointment in 3 months here in the [facility] we will keep that for now.       Dunia Hdz,  MD            Please do not hesitate to contact me if you have any questions/concerns.     Sincerely,     Dunia Hdz MD

## 2023-08-10 NOTE — PATIENT INSTRUCTIONS
Medications:  No changes today    Instructions:  Please see your primary care doctor about your on-going anxiety  Please continue to see GI doctor in [place] about your GI symptoms of fullness and bloating.    Follow-up: (make these appointments before you leave)  1. Please follow-up with VAD WILIAN on 11/14 as previously scheduled with device check and labs prior.   2. Please follow-up with Dr. Hdz in 6 months with labs prior.    3. We will tell you when you can see Dr. Hdz in [place]. Remember, we need you to see Dr Hdz or a provider here at [facility] every three months while you are on the transplant list.      Page the VAD Coordinator on call if you gain more than 3 lb in a day or 5 in a week. Please also page if you feel unwell or have alarms.   Great to see you in clinic today. To Page the VAD Coordinator on call, dial [phone] option #4 and ask to speak to the VAD coordinator on call.

## 2023-08-10 NOTE — PATIENT INSTRUCTIONS
It was a pleasure to see you in clinic today.  Please do not hesitate to call with any questions or concerns.  We look forward to seeing you in clinic at your next device check.    Arianna Lu, RN, BSN  Electrophysiology Nurse Clinician  [facility]    During Business Hours Please Call:  [phone]  After Hours Please Call:  [phone] - select option #4 and ask for job code [phone]

## 2023-08-10 NOTE — PROGRESS NOTES
ANTICOAGULATION MANAGEMENT     Tej Morfin 58 year old male is on warfarin with therapeutic INR result. (Goal INR 2.0-3.0)    Recent labs: (last 7 days)     08/10/23  1436   INR 1.96*       ASSESSMENT     Source(s): Chart Review and Patient/Caregiver Call     Warfarin doses taken: Warfarin taken as instructed  Diet: No new diet changes identified  Medication/supplement changes: None noted  New illness, injury, or hospitalization: No  Signs or symptoms of bleeding or clotting: No  Previous result: Supratherapeutic  Additional findings: None       PLAN     Recommended plan for no diet, medication or health factor changes affecting INR     Dosing Instructions: Continue your current warfarin dose with next INR in 1 week       Summary  As of 8/10/2023      Full warfarin instructions:  5 mg every day   Next INR check:  8/17/2023               Telephone call with Tej who agrees to plan and repeated back plan correctly    Patient using outside facility for labs    Education provided:   Symptom monitoring: monitoring for bleeding signs and symptoms and monitoring for clotting signs and symptoms  Importance of notifying anticoagulation clinic for: changes in medications; a sooner lab recheck maybe needed, diarrhea, nausea/vomiting, reduced intake, cold/flu, and/or infections; a sooner lab recheck maybe needed, and if you did not receive dosing instructions on the same day as your labs were checked    Plan made per [facility] anticoagulation protocol and per LVAD protocol    Siddharth Greco, RN  Anticoagulation Clinic  8/10/2023    _______________________________________________________________________     Anticoagulation Episode Summary       Current INR goal:  2.0-3.0   TTR:  76.8 % (1 y)   Target end date:  Indefinite   Send INR reminders to:  ANTICOAG LVAD    Indications    LVAD (left ventricular assist device) present (H) [Z95.811]  Chronic systolic CHF (congestive heart failure) (H) [I50.22]  S/P mitral valve  replacement [Z95.2]  Paroxysmal ventricular tachycardia (H) [I47.29]             Comments:  HeartMate 3   Bridging: No bridging. Reason -- new implant   Date VAD placed: 03/23/22  Hx Mitral valve repair and not replacement             Anticoagulation Care Providers       Provider Role Specialty Phone number    Dunia Hdz MD Referring Cardiovascular Disease [phone]

## 2023-08-10 NOTE — PROGRESS NOTES
August 10, 2023        HPI  57 year old male with a PMH of severe MR s/p MVR with ring (1/21/15), VT s/p ICD, nonobstructive CAD, NICM previously on home inotropes now s/p HM III LVAD implantation 3/23/22 as bridge to decision (presently not listed to recover from LVAD team wanted to see marijuana levels decline consistent with quit date), with course c/b AFib with RVR (s/p ICD shock 3/25/22) a) who presents for f/up in LVAD clinic.    Overall he had a pretty incredible recovery after his LVAD, marijuana levels have been negative, now listed as a status 4.     Overall he is felt well but he did have an ICD shock in May 2023.   He is presently on sotalol as well as a beta-blocker and is tolerating this and by ICD check today he is in remission.     He is having a lot more anxiety with this and is working out less because he is afraid he will get shocked again.  He has not yet made an appointment for counseling.     He denies dizziness or lightheadedness..     He denies PND orthopnea.  He does feel his gotten stronger since his LVAD was placed.    He denies any driveline erythema, GI bleeding symptoms or stroke symptoms.  No LVAD alarms.     He does have GI upset and feelings of fullness and is following with GI in [place]..  He is scheduled for an EGD in December.       PAST MEDICAL HISTORY:  Past Medical History:   Diagnosis Date    Chronic systolic heart failure (H) 2/7/2017    Gastroparesis     ICD (implantable cardioverter-defibrillator) in place     [employer]    Non-ischemic cardiomyopathy (H)     Paroxysmal ventricular tachycardia (H) 2/7/2017    S/P mitral valve replacement 2/7/2017    Tetrahydrocannabinol (THC) use disorder, mild, in controlled environment, abuse 09/12/2019    occasional edible THC. reportedly for sleep, anxiety    Tobacco abuse, episodic     occasional cigar - ~ 3x/wk       FAMILY HISTORY:  Family History   Problem Relation Age of Onset    Coronary Artery Disease Mother      Kidney failure Mother     Cardiomyopathy Father          age 51      Drug use: No       Social history unchanged from prior  His dog recently passed away    CURRENT MEDICATIONS:    Current Outpatient Medications   Medication Sig Dispense Refill    acetaminophen (TYLENOL) 325 MG tablet Take 2 tablets (650 mg) by mouth every 4 hours as needed for pain 100 tablet 0    allopurinol (ZYLOPRIM) 100 MG tablet Take 1 tablet (100 mg) by mouth daily 30 tablet 1    amoxicillin (AMOXIL) 500 MG capsule Take 2,000 mg by mouth once as needed Take 4 tabs 1 hour before all dental cleanings and procedures      aspirin 81 MG EC tablet Take 1 tablet (81 mg) by mouth daily 90 tablet 4    cholecalciferol 50 MCG (2000 UT) CAPS Take 50 mcg by mouth daily      furosemide (LASIX) 40 MG tablet Take 1.5 tablets (60 mg) by mouth 2 times daily 270 tablet 3    hydrOXYzine (VISTARIL) 50 MG capsule Take 50 mg by mouth every 12 hours as needed for anxiety      magnesium oxide (MAG-OX) 400 MG tablet Take 800 mg by mouth 2 times daily      metoprolol succinate ER (TOPROL XL) 25 MG 24 hr tablet Take 1 tablet (25 mg) by mouth daily 90 tablet 3    nitroGLYcerin (NITROSTAT) 0.4 MG sublingual tablet Place 0.4 mg under the tongue every 5 minutes as needed for chest pain      omeprazole (PRILOSEC) 40 MG DR capsule Take 40 mg by mouth every morning      potassium chloride ER (KLOR-CON M) 20 MEQ CR tablet Take 3 tablets (60 mEq) by mouth 2 times daily 540 tablet 3    sacubitril-valsartan (ENTRESTO)  MG per tablet Take 1 tablet by mouth 2 times daily 90 tablet 3    sertraline (ZOLOFT) 50 MG tablet Take 50 mg by mouth daily      spironolactone (ALDACTONE) 25 MG tablet Take 12.5 mg by mouth daily      traZODone (DESYREL) 50 MG tablet Take 25-50 mg by mouth nightly as needed      warfarin ANTICOAGULANT (COUMADIN) 2.5 MG tablet TAKE 2 TABLETS (5MG) BY MOUTH DAILY OR AS DIRECTED BY THE COUMADIN CLINIC 180 tablet 1    warfarin ANTICOAGULANT (COUMADIN) 2.5 MG  "tablet Take 10 mg PO daily at 6 pm until next INR check, then dose per INR goal 2-3 150 tablet 0       ROS:   Constitutional: No fever, chills, or sweats. Weight gain BMI now around 35   ENT: No visual disturbance, ear ache, epistaxis, sore throat.   Allergies/Immunologic: Negative.   Respiratory: No cough, hemoptysis.   Cardiovascular: As per HPI.   GI: + nausea, no vomiting, hematemesis, melena, or hematochezia. Abdominal fullness   : No urinary frequency, dysuria, or hematuria.   Integument: Negative.   Psychiatric: Anxiety is still a problem he is working on   Neuro: Negative.   Endocrinology: Negative.   Musculoskeletal: No further knee pains    EXAM:    BP (!) 72/0 (BP Location: Right arm, Patient Position: Sitting, Cuff Size: Adult Regular)   Ht 1.779 m (5' 10.04\")   Wt 110 kg (242 lb 6.4 oz)   SpO2 97%   BMI 34.74 kg/m      GENERAL: Appears comfortable, in no distress.  HEENT: Eye symmetrical and without discharge or icterus bilaterally. Mucous membranes moist and without lesions.  NECK: Supple, JVD 8  CV: Hum of LVAD, no adventitious sounds  RESPIRATORY: Respirations regular, even, and unlabored  GI: Soft and non distended with normoactive bowel sounds present in all quadrants. No tenderness, rebound, guarding. No organomegaly.   EXTREMITIES: No LE peripheral edema. none pulsatile.   NEUROLOGIC: Alert and interacting appropriatly.  No focal deficits.   MUSCULOSKELETAL: No joint swelling or tenderness.       Data:      interpretation Summary  HM3 speed is 5500RPM.  The visual ejection fraction is 15-20% with severe diffuse hypokinesis. The  left ventricular end diastolic diameter is 6.3 cm.  LVAD cannula was seen in the expected anatomic position in the LV apex. LVAD  inflow and outflow cannula Doppler is normal. The aortic valve does not open.  There is mild aortic regurgitation.  Global right ventricular function is moderately to severely reduced.  IVC diameter <2.1 cm collapsing >50% with sniff " suggests a normal RA pressure  of 3 mmHg.  No pericardial effusion is present.     This study was compared with the study from 9/22/2022. The left ventricular  end diastolic diameter is slighly larger at 6.3 cm from 5.9 cm.      LVAD interrogation today   Occasional power disconnects when changing from battery to wall power, some PI events, no evidence of pulm function-agree with coordinator note         Latest Reference Range & Units 08/10/23 14:36   Sodium 136 - 145 mmol/L 143   Potassium 3.4 - 5.3 mmol/L 4.7   Chloride 98 - 107 mmol/L 106   Carbon Dioxide (CO2) 22 - 29 mmol/L 26   Urea Nitrogen 6.0 - 20.0 mg/dL 20.9 (H)   Creatinine 0.67 - 1.17 mg/dL 1.56 (H)   GFR Estimate >60 mL/min/1.73m2 52 (L)   Calcium 8.6 - 10.0 mg/dL 9.7   Anion Gap 7 - 15 mmol/L 11   Magnesium 1.7 - 2.3 mg/dL 1.9   Albumin 3.5 - 5.2 g/dL 4.3   Protein Total 6.4 - 8.3 g/dL 7.2   Alkaline Phosphatase 40 - 129 U/L 74   ALT 0 - 70 U/L 24   AST 0 - 45 U/L 23   Bilirubin Total <=1.2 mg/dL 0.4   Glucose 70 - 99 mg/dL 111 (H)   Lactate Dehydrogenase 0 - 250 U/L 265 (H)   (H): Data is abnormally high  (L): Data is abnormally low        Device: [employer] G148 INOGEN X4 CRT-D(LV Lead off)  Normal device function.  Mode: DDD  bpm  AP: 83%  RVP: 5%  Intrinsic rhythm: SB 40 bpm  Lead Trends Appear Stable: Yes- LV lead OFF  Estimated battery longevity to RRT = 3 years.   Atrial Arrhythmia: None  AF [place]: <1%  Anticoagulant: Warfarin  Ventricular Arrhythmia: None since remote transmission on 5/30/23.  Setting Changes: None  Patient has an appointment to see Dr. Hdz today.   Plan: Device follow-up every 3 months.      Assessment and Plan:   Tej Morfin is a 57 year old male with a PMH of severe MR s/p MVR with ring (1/21/15), VT s/p ICD,  NICM previously on home inotropes now s/p HM III LVAD implantation 3/23/22, with course c/b sternal wound s/p Keflex treatment,     Overall he  recovered incredibly well.  Presently listed for  transplant as a status 4.  He consented for hepatitis C.  Discussing DCD today.     Chronic SHCF s/p HM III LVAD. Severe MR s/p mitral valve repair with annuloplasty ring (1/21/15). Implanted 3/23/22.   Stage D, NYHA Class II     ACEi/ARB Entresto   mg po BID,    BB Sotalol 80 mg po BID-and metoprolol   Aldosterone antagonist Aldactone 25 mg daily   SGLT2i: Deferred for now given polypharmacy and lots of GI upset (do not want to add more in now)    SCD prophylaxis ICD  Fluid status  euvolemic on current diuretics     LDH: stable  Anticoagulation: INR goal 2-3  Antiplatelet: Finish the RICARDO trial now on aspirin 81 mg daily      History of VT, s/p CRT-D.  ICD shock in May 2023 now on a higher dose of sotalol and on a beta-blocker - presently in remission if he has more shocks may consider trying to upgrade for transplant  - Continue Sotatol, metoprolol for now        Postop AF with RVR. Returned to SR.   - Coumadin as above.   -We will check and device interrogation if he has no further A. fib we will have a low threshold to stop sotalol     Mild nonobstructive CAD.   On ASA 81 mg daily        Anxiety   I have recommended connecting with LVAD support group and second chance for life     MIld AI on echo will watch for now     GI upset: Will follow with GI locally    Hypomagnesia: Improved with oral magnesium    Obesity; he is going to work on diet and exercise he is transplant eligible presently    He has an appointment in 3 months here in the [facility] we will keep that for now.       Dunia Hdz MD

## 2023-08-10 NOTE — NURSING NOTE
Chief Complaint   Patient presents with    Follow Up     Return VAD       Vitals were taken, medications reconciled.    Judy Parks CMA  3:16 PM

## 2023-08-10 NOTE — NURSING NOTE
[facility]CS VAD Pump Info       Row Name 08/10/23 1500             MCS VAD Information    Implant LVAD      LVAD Pump HeartMate 3         Heartmate 3 LEFT VS    Flow (Lpm) 4.4 Lpm      Pulse Index (PI) 2.4 PI      Speed (rpm) 5500 rpm      Power (christiansen) 4.2 christiansen      Current Hct setting 43.8      Retired: Unexpected Alarms --         Heartmate 3 Right (centrifugal flow) VS    Flow (Lpm) --      Pulse Index (PI) --      Speed (rpm) --      Power (christiansen) --         Primary Controller    Serial number [id number]      Low flow alarm setting 2.5      High watt alarm setting NA      EBB: Patient use 18      Replace in 16 Months         Backup Controller    Serial number [id number]      EBB: Patient use 7      Replace EBB in 26 Months      Speed & HCT match primary controller Y         VAD Interrogation    Alarms reported by patient N      Unexpected alarms noted upon interrogation None      PI events Frequent      Damage to equipment is noted N      Action taken Reviewed proper equipment care and maintenance         Driveline Exit Site    Dressing change done N      Driveline properly secured Yes      DLES assessment c/d/i      Dressing used Weekly kit      Frequency patient changes dressing --      Dressing modifications --      Dressing change supplier --                      Education Complete: Yes   Charge the BACKUP controller s backup battery every 6 months  Perform a self test on BACKUP every 6 months  Change the MPU s batteries every 6 months:Yes  Have equipment serviced yearly (if applicable):Yes   But not today.

## 2023-08-11 NOTE — NURSING NOTE
Outpatient Clinic Note 08/11/23    Met with patient and wife with Dr. Hdz and patient's VAD coordinator, Delon.    Discussed the plan for transplant follow up:      Pt is due for transplant waitlist evaluation this winter. Will plan to schedule these appointments once his follow-up is scheduled.     Discussed DCD/OCS with patient and wife, including risks and benefits of receiving DCD donor organs, and option to decline such offers.     Pt and wife verbalized understanding. Pt has decided to accept DCD donor organ offers.     Listed for accepting DCD donors in [place].    Patient verbalized understanding of the plan and agrees to call Transplant Coordinator with any further questions or concerns at [phone].

## 2023-08-13 DIAGNOSIS — I50.22 CHRONIC SYSTOLIC CHF (CONGESTIVE HEART FAILURE) (H): ICD-10-CM

## 2023-08-13 DIAGNOSIS — Z95.811 LVAD (LEFT VENTRICULAR ASSIST DEVICE) PRESENT (H): ICD-10-CM

## 2023-08-14 ENCOUNTER — DOCUMENTATION ONLY (OUTPATIENT)
Dept: ANTICOAGULATION | Facility: CLINIC | Age: 58
End: 2023-08-14
Payer: MEDICARE

## 2023-08-14 RX ORDER — WARFARIN SODIUM 2.5 MG/1
TABLET ORAL
Qty: 180 TABLET | Refills: 1 | Status: SHIPPED | OUTPATIENT
Start: 2023-08-14 | End: 2023-12-21

## 2023-08-14 NOTE — PROGRESS NOTES
ANTICOAGULATION MANAGEMENT:  Medication Refill    Anticoagulation Summary  As of 8/10/2023      Warfarin maintenance plan:  5 mg (2.5 mg x 2) every day   Next INR check:  8/17/2023   Target end date:  Indefinite    Indications    LVAD (left ventricular assist device) present (H) [Z95.811]  Chronic systolic CHF (congestive heart failure) (H) [I50.22]  S/P mitral valve replacement [Z95.2]  Paroxysmal ventricular tachycardia (H) [I47.29]                 Anticoagulation Care Providers       Provider Role Specialty Phone number    Dunia Hdz MD Referring Cardiovascular Disease [phone]            Refill Criteria    Visit with referring provider/group: Meets criteria: office visit within referring provider group in the last 1 year on 8/10/23    [facility] referral signed last signed: 03/31/2023; within last year: Yes    Lab monitoring not exceeding 2 weeks overdue: Yes    Tej meets all criteria for refill. Rx instructions and quantity match patient's current dosing plan.  180 day supply with 1 refills granted per [facility] protocol     Geneva Martinez RN  Anticoagulation Clinic

## 2023-08-15 LAB — LABCORP INTERFACED MISCELLANEOUS TEST RESULT: NORMAL

## 2023-08-17 ENCOUNTER — ANTICOAGULATION THERAPY VISIT (OUTPATIENT)
Dept: ANTICOAGULATION | Facility: CLINIC | Age: 58
End: 2023-08-17
Payer: MEDICARE

## 2023-08-17 DIAGNOSIS — Z95.2 S/P MITRAL VALVE REPLACEMENT: ICD-10-CM

## 2023-08-17 DIAGNOSIS — I50.22 CHRONIC SYSTOLIC CHF (CONGESTIVE HEART FAILURE) (H): ICD-10-CM

## 2023-08-17 DIAGNOSIS — Z95.811 LVAD (LEFT VENTRICULAR ASSIST DEVICE) PRESENT (H): Primary | ICD-10-CM

## 2023-08-17 DIAGNOSIS — I47.29 PAROXYSMAL VENTRICULAR TACHYCARDIA (H): ICD-10-CM

## 2023-08-17 LAB — INR HOME MONITORING: 3.1 (ref 2–3)

## 2023-08-17 NOTE — PROGRESS NOTES
ANTICOAGULATION MANAGEMENT     Tej Morfin 58 year old male is on warfarin with slightly supratherapeutic INR result. (Goal INR 2.0-3.0)    Recent labs: (last 7 days)     08/17/23  0000   INR 3.1*       ASSESSMENT     Source(s): Chart Review and Patient/Caregiver Call     Warfarin doses taken: Warfarin taken as instructed  Diet: No new diet changes identified, Tej is planning to incorporate some more salads and/or veggies into his weekly routine  Medication/supplement changes: None noted  New illness, injury, or hospitalization: No  Signs or symptoms of bleeding or clotting: No  Previous result: Slightly subtherapeutic  Additional findings: None       PLAN     Recommended plan for no diet, medication or health factor changes affecting INR     Dosing Instructions: Continue your current warfarin dose with next INR in 1 week       Summary  As of 8/17/2023      Full warfarin instructions:  5 mg every day   Next INR check:  8/24/2023               Telephone call with Tej who verbalizes understanding and agrees to plan    Patient to recheck with home meter    Education provided:   Goal range and lab monitoring: goal range and significance of current result  Dietary considerations: importance of consistent vitamin K intake  Contact [phone] with any changes, questions or concerns.     Plan made per [facility] anticoagulation protocol and per LVAD protocol    Rosario Dyer RN  Anticoagulation Clinic  8/17/2023    _______________________________________________________________________     Anticoagulation Episode Summary       Current INR goal:  2.0-3.0   TTR:  77.0 % (1 y)   Target end date:  Indefinite   Send INR reminders to:  ANTICOAG LVAD    Indications    LVAD (left ventricular assist device) present (H) [Z95.811]  Chronic systolic CHF (congestive heart failure) (H) [I50.22]  S/P mitral valve replacement [Z95.2]  Paroxysmal ventricular tachycardia (H) [I47.29]             Comments:  HeartMate 3   Bridging:  No bridging. Reason -- new implant   Date VAD placed: 03/23/22  Hx Mitral valve repair and not replacement             Anticoagulation Care Providers       Provider Role Specialty Phone number    Dunia Hdz MD Referring Cardiovascular Disease [phone]

## 2023-08-24 ENCOUNTER — TELEPHONE (OUTPATIENT)
Dept: TRANSPLANT | Facility: CLINIC | Age: 58
End: 2023-08-24
Payer: MEDICARE

## 2023-08-24 VITALS — BODY MASS INDEX: 34.25 KG/M2 | WEIGHT: 239 LBS

## 2023-08-25 ENCOUNTER — ANTICOAGULATION THERAPY VISIT (OUTPATIENT)
Dept: ANTICOAGULATION | Facility: CLINIC | Age: 58
End: 2023-08-25
Payer: MEDICARE

## 2023-08-25 DIAGNOSIS — I50.22 CHRONIC SYSTOLIC CHF (CONGESTIVE HEART FAILURE) (H): ICD-10-CM

## 2023-08-25 DIAGNOSIS — I47.29 PAROXYSMAL VENTRICULAR TACHYCARDIA (H): ICD-10-CM

## 2023-08-25 DIAGNOSIS — Z95.811 LVAD (LEFT VENTRICULAR ASSIST DEVICE) PRESENT (H): Primary | ICD-10-CM

## 2023-08-25 DIAGNOSIS — Z95.2 S/P MITRAL VALVE REPLACEMENT: ICD-10-CM

## 2023-08-25 LAB — INR HOME MONITORING: 2.2 (ref 2–3)

## 2023-08-25 NOTE — PROGRESS NOTES
ANTICOAGULATION MANAGEMENT     Tej Morfin 58 year old male is on warfarin with therapeutic INR result. (Goal INR 2.0-3.0)    Recent labs: (last 7 days)     08/25/23  0000   INR 2.2       ASSESSMENT     Source(s): Chart Review and Patient/Caregiver Call     Warfarin doses taken: Warfarin taken as instructed  Diet: No new diet changes identified  Medication/supplement changes: None noted  New illness, injury, or hospitalization: No  Signs or symptoms of bleeding or clotting: No  Previous result: Supratherapeutic  Additional findings: None       PLAN     Recommended plan for no diet, medication or health factor changes affecting INR     Dosing Instructions: Continue your current warfarin dose with next INR in 2 weeks       Summary  As of 8/25/2023      Full warfarin instructions:  5 mg every day   Next INR check:  9/8/2023               Telephone call with Tej who verbalizes understanding and agrees to plan    Patient to recheck with home meter    Education provided:   Please call back if any changes to your diet, medications or how you've been taking warfarin    Plan made per [facility] anticoagulation protocol    Nanda Marin, RN  Anticoagulation Clinic  8/25/2023    _______________________________________________________________________     Anticoagulation Episode Summary       Current INR goal:  2.0-3.0   TTR:  79.0 % (1 y)   Target end date:  Indefinite   Send INR reminders to:  ANTICOAG LVAD    Indications    LVAD (left ventricular assist device) present (H) [Z95.811]  Chronic systolic CHF (congestive heart failure) (H) [I50.22]  S/P mitral valve replacement [Z95.2]  Paroxysmal ventricular tachycardia (H) [I47.29]             Comments:  HeartMate 3   Bridging: No bridging. Reason -- new implant   Date VAD placed: 03/23/22  Hx Mitral valve repair and not replacement             Anticoagulation Care Providers       Provider Role Specialty Phone number    Dunia Hdz MD Referring Cardiovascular  Disease [phone]

## 2023-09-11 ENCOUNTER — ANTICOAGULATION THERAPY VISIT (OUTPATIENT)
Dept: ANTICOAGULATION | Facility: CLINIC | Age: 58
End: 2023-09-11
Payer: MEDICARE

## 2023-09-11 DIAGNOSIS — Z95.811 LVAD (LEFT VENTRICULAR ASSIST DEVICE) PRESENT (H): Primary | ICD-10-CM

## 2023-09-11 DIAGNOSIS — Z95.2 S/P MITRAL VALVE REPLACEMENT: ICD-10-CM

## 2023-09-11 DIAGNOSIS — I47.29 PAROXYSMAL VENTRICULAR TACHYCARDIA (H): ICD-10-CM

## 2023-09-11 DIAGNOSIS — I50.22 CHRONIC SYSTOLIC CHF (CONGESTIVE HEART FAILURE) (H): ICD-10-CM

## 2023-09-11 LAB — INR HOME MONITORING: 2.6 (ref 2–3)

## 2023-09-11 NOTE — PROGRESS NOTES
ANTICOAGULATION MANAGEMENT     Tej Morfin 58 year old male is on warfarin with therapeutic INR result. (Goal INR 2.0-3.0)    Recent labs: (last 7 days)     09/11/23  0000   INR 2.6       ASSESSMENT     Source(s): Chart Review and Patient/Caregiver Call     Warfarin doses taken: Warfarin taken as instructed  Diet: No new diet changes identified  Medication/supplement changes: None noted  New illness, injury, or hospitalization: No  Signs or symptoms of bleeding or clotting: No  Previous result: Therapeutic last visit; previously outside of goal range  Additional findings: None       PLAN     Recommended plan for no diet, medication or health factor changes affecting INR     Dosing Instructions: Continue your current warfarin dose with next INR in 3 weeks       Summary  As of 9/11/2023      Full warfarin instructions:  5 mg every day   Next INR check:  10/2/2023               Telephone call with Tej who verbalizes understanding and agrees to plan and who agrees to plan and repeated back plan correctly    Patient to recheck with home meter    Education provided:   None required    Plan made per [facility] anticoagulation protocol and per LVAD protocol    Siddharth Greco, RN  Anticoagulation Clinic  9/11/2023    _______________________________________________________________________     Anticoagulation Episode Summary       Current INR goal:  2.0-3.0   TTR:  79.1 % (1 y)   Target end date:  Indefinite   Send INR reminders to:  ANTICOAG LVAD    Indications    LVAD (left ventricular assist device) present (H) [Z95.811]  Chronic systolic CHF (congestive heart failure) (H) [I50.22]  S/P mitral valve replacement [Z95.2]  Paroxysmal ventricular tachycardia (H) [I47.29]             Comments:  HeartMate 3   Bridging: No bridging. Reason -- new implant   Date VAD placed: 03/23/22  Hx Mitral valve repair and not replacement             Anticoagulation Care Providers       Provider Role Specialty Phone number    nAdreina  Dunia Hollis MD Referring Cardiovascular Disease [phone]

## 2023-10-01 LAB — INR HOME MONITORING: 2.2 (ref 2–3)

## 2023-10-02 ENCOUNTER — ANTICOAGULATION THERAPY VISIT (OUTPATIENT)
Dept: ANTICOAGULATION | Facility: CLINIC | Age: 58
End: 2023-10-02
Payer: MEDICARE

## 2023-10-02 DIAGNOSIS — I50.22 CHRONIC SYSTOLIC CHF (CONGESTIVE HEART FAILURE) (H): ICD-10-CM

## 2023-10-02 DIAGNOSIS — I47.29 PAROXYSMAL VENTRICULAR TACHYCARDIA (H): ICD-10-CM

## 2023-10-02 DIAGNOSIS — Z95.2 S/P MITRAL VALVE REPLACEMENT: ICD-10-CM

## 2023-10-02 DIAGNOSIS — Z95.811 LVAD (LEFT VENTRICULAR ASSIST DEVICE) PRESENT (H): Primary | ICD-10-CM

## 2023-10-02 NOTE — PROGRESS NOTES
ANTICOAGULATION MANAGEMENT     Tej Morfin 58 year old male is on warfarin with therapeutic INR result. (Goal INR 2.0-3.0)    Recent labs: (last 7 days)     10/01/23  0000   INR 2.2       ASSESSMENT     Source(s): Chart Review and Patient/Caregiver Call     Warfarin doses taken: Warfarin taken as instructed  Diet: No new diet changes identified  Medication/supplement changes: None noted  New illness, injury, or hospitalization: No  Signs or symptoms of bleeding or clotting: No  Previous result: Therapeutic last 2(+) visits  Additional findings: None       PLAN     Recommended plan for no diet, medication or health factor changes affecting INR     Dosing Instructions: Continue your current warfarin dose with next INR in 3 weeks       Summary  As of 10/2/2023      Full warfarin instructions:  5 mg every day   Next INR check:  10/23/2023               Telephone call with Tej who agrees to plan and repeated back plan correctly    Patient to recheck with home meter    Education provided:   Goal range and lab monitoring: goal range and significance of current result and Importance of following up at instructed interval  Contact [phone] with any changes, questions or concerns.     Plan made per [facility] anticoagulation protocol and per LVAD protocol    SAVANNA MO RN  Anticoagulation Clinic  10/2/2023    _______________________________________________________________________     Anticoagulation Episode Summary       Current INR goal:  2.0-3.0   TTR:  80.5 % (1 y)   Target end date:  Indefinite   Send INR reminders to:  ANTICOAG LVAD    Indications    LVAD (left ventricular assist device) present (H) [Z95.811]  Chronic systolic CHF (congestive heart failure) (H) [I50.22]  S/P mitral valve replacement [Z95.2]  Paroxysmal ventricular tachycardia (H) [I47.29]             Comments:  Follow VAD Anticoag protocol:Yes: HeartMate 3  Bridging: per protocol  Date VAD placed: 03/23/22  INR Goal: per referral  Hx Mitral  valve repair and not replacement             Anticoagulation Care Providers       Provider Role Specialty Phone number    Dunia Hdz MD Referring Cardiovascular Disease [phone]

## 2023-10-17 LAB — INR HOME MONITORING: 2 (ref 2–3)

## 2023-10-18 ENCOUNTER — ANTICOAGULATION THERAPY VISIT (OUTPATIENT)
Dept: ANTICOAGULATION | Facility: CLINIC | Age: 58
End: 2023-10-18
Payer: MEDICARE

## 2023-10-18 ENCOUNTER — TELEPHONE (OUTPATIENT)
Dept: TRANSPLANT | Facility: CLINIC | Age: 58
End: 2023-10-18
Payer: MEDICARE

## 2023-10-18 VITALS — WEIGHT: 236 LBS | BODY MASS INDEX: 33.82 KG/M2

## 2023-10-18 DIAGNOSIS — Z95.811 LVAD (LEFT VENTRICULAR ASSIST DEVICE) PRESENT (H): Primary | ICD-10-CM

## 2023-10-18 DIAGNOSIS — I50.22 CHRONIC SYSTOLIC CHF (CONGESTIVE HEART FAILURE) (H): ICD-10-CM

## 2023-10-18 DIAGNOSIS — I47.29 PAROXYSMAL VENTRICULAR TACHYCARDIA (H): ICD-10-CM

## 2023-10-18 DIAGNOSIS — Z95.2 S/P MITRAL VALVE REPLACEMENT: ICD-10-CM

## 2023-10-18 NOTE — PROGRESS NOTES
ANTICOAGULATION MANAGEMENT     Tej Morfin 58 year old male is on warfarin with therapeutic INR result. (Goal INR 2.0-3.0)    Recent labs: (last 7 days)     10/17/23  0000   INR 2.0       ASSESSMENT     Source(s): Chart Review and Patient/Caregiver Call     Warfarin doses taken: Missed dose(s) may be affecting INR - thinks that he missed a day last week, unsure of exact date.   Diet: Increased greens/vitamin K in diet; plans to resume previous intake - states that he had a serving of turnip greens.  Medication/supplement changes: None noted  New illness, injury, or hospitalization: No  Signs or symptoms of bleeding or clotting: No  Previous result: Therapeutic last 2(+) visits  Additional findings: None       PLAN     Recommended plan for temporary change(s) affecting INR     Dosing Instructions: Continue your current warfarin dose with next INR in 2 weeks       Summary  As of 10/18/2023      Full warfarin instructions:  5 mg every day   Next INR check:  10/31/2023               Telephone call with Tej who verbalizes understanding and agrees to plan    Patient to recheck with home meter    Education provided:   Please call back if any changes to your diet, medications or how you've been taking warfarin    Plan made per [facility] anticoagulation protocol and per LVAD protocol    Benjamin Gaspar RN  Anticoagulation Clinic  10/18/2023    _______________________________________________________________________     Anticoagulation Episode Summary       Current INR goal:  2.0-3.0   TTR:  81.2% (1 y)   Target end date:  Indefinite   Send INR reminders to:  ANTICOAG LVAD    Indications    LVAD (left ventricular assist device) present (H) [Z95.811]  Chronic systolic CHF (congestive heart failure) (H) [I50.22]  S/P mitral valve replacement [Z95.2]  Paroxysmal ventricular tachycardia (H) [I47.29]             Comments:  Follow VAD Anticoag protocol:Yes: HeartMate 3  Bridging: per protocol  Date VAD placed: 03/23/22  INR Goal: per  referral  Hx Mitral valve repair and not replacement             Anticoagulation Care Providers       Provider Role Specialty Phone number    Dunia Hdz MD Referring Cardiovascular Disease [phone]

## 2023-10-19 DIAGNOSIS — I50.22 CHRONIC SYSTOLIC (CONGESTIVE) HEART FAILURE (H): ICD-10-CM

## 2023-10-23 NOTE — TELEPHONE ENCOUNTER
spironolactone (ALDACTONE) 25 MG tablet   Last Written Prescription Date:  Historical     Last Office Visit : 8/10/2023  [facility]  Future Office visit:  11/14/2023    Routing refill request to provider for review/approval because:  Refill needs review: historical on med list  - patient reported taking 12.5 mg every day 8/12/2023  - last ordered take 25 mg every day 5/5/2022  Disp:90 R:3

## 2023-10-24 RX ORDER — SPIRONOLACTONE 25 MG/1
25 TABLET ORAL DAILY
Qty: 90 TABLET | Refills: 3 | Status: SHIPPED | OUTPATIENT
Start: 2023-10-24 | End: 2024-05-02

## 2023-10-25 ENCOUNTER — CARE COORDINATION (OUTPATIENT)
Dept: TRANSPLANT | Facility: CLINIC | Age: 58
End: 2023-10-25
Payer: MEDICARE

## 2023-10-25 NOTE — PROGRESS NOTES
Status 4 Heart Extension Complete 10/25/2023    This patient meets status 4 criteria as evidenced by:     Dischargeable LVAD without discretionary 30 days      Patient's primary cardiologist and/or attending physician is in agreement with this plan.      Status 4 Extension due 01/25/2024

## 2023-10-31 ENCOUNTER — ANTICOAGULATION THERAPY VISIT (OUTPATIENT)
Dept: ANTICOAGULATION | Facility: CLINIC | Age: 58
End: 2023-10-31
Payer: MEDICARE

## 2023-10-31 DIAGNOSIS — Z95.2 S/P MITRAL VALVE REPLACEMENT: ICD-10-CM

## 2023-10-31 DIAGNOSIS — I47.29 PAROXYSMAL VENTRICULAR TACHYCARDIA (H): ICD-10-CM

## 2023-10-31 DIAGNOSIS — Z95.811 LVAD (LEFT VENTRICULAR ASSIST DEVICE) PRESENT (H): Primary | ICD-10-CM

## 2023-10-31 DIAGNOSIS — I50.22 CHRONIC SYSTOLIC CHF (CONGESTIVE HEART FAILURE) (H): ICD-10-CM

## 2023-10-31 LAB — INR HOME MONITORING: 2.2 (ref 2–3)

## 2023-10-31 NOTE — PROGRESS NOTES
----- Message from Misti Harris [phone]               ANTICOAGULATION MANAGEMENT     Tej Morfin 58 year old male is on warfarin with therapeutic INR result. (Goal INR 2.0-3.0)    Recent labs: (last 7 days)     10/31/23  0000   INR 2.2       ASSESSMENT     Source(s): Chart Review and Patient/Caregiver Call     Warfarin doses taken: Warfarin taken as instructed  Diet: No new diet changes identified  Medication/supplement changes: None noted  New illness, injury, or hospitalization: No  Signs or symptoms of bleeding or clotting: No  Previous result: Therapeutic last 2(+) visits  Additional findings: None       PLAN     Recommended plan for no diet, medication or health factor changes affecting INR     Dosing Instructions: Continue your current warfarin dose with next INR in 2 weeks       Summary  As of 10/31/2023      Full warfarin instructions:  5 mg every day   Next INR check:  11/14/2023               Telephone call with Tej who agrees to plan and repeated back plan correctly    Patient to recheck with home meter    Education provided:   Goal range and lab monitoring: goal range and significance of current result and Importance of following up at instructed interval  Contact [phone] with any changes, questions or concerns.     Plan made per [facility] anticoagulation protocol and per LVAD protocol    SAVANNA MO RN  Anticoagulation Clinic  10/31/2023    _______________________________________________________________________     Anticoagulation Episode Summary       Current INR goal:  2.0-3.0   TTR:  82.7% (1 y)   Target end date:  Indefinite   Send INR reminders to:  ANTICOAG LVAD    Indications    LVAD (left ventricular assist device) present (H) [Z95.811]  Chronic systolic CHF (congestive heart failure) (H) [I50.22]  S/P mitral valve replacement [Z95.2]  Paroxysmal ventricular tachycardia (H) [I47.29]             Comments:  Follow VAD Anticoag protocol:Yes: HeartMate 3  Bridging: per protocol  Date VAD placed: 03/23/22  INR Goal: per referral  Hx Mitral  valve repair and not replacement             Anticoagulation Care Providers       Provider Role Specialty Phone number    Dunia Hdz MD Referring Cardiovascular Disease [phone]

## 2023-11-07 ENCOUNTER — TELEPHONE (OUTPATIENT)
Dept: ANTICOAGULATION | Facility: CLINIC | Age: 58
End: 2023-11-07
Payer: MEDICARE

## 2023-11-07 NOTE — TELEPHONE ENCOUNTER
11/7/23    Chuy calling in regards to patient's upcoming EGD.  Writer directed orders from Chuy NP to fax to LVAD office at [phone]. Writer will await confirmation from LVAD team that Tej will have this done locally, rather than at [facility].  Once confirmation is received, then proceed to plan for interrupting Warfarin.    At time of encounter, scheduled for 12/5/23.    Siddharth Greco RN

## 2023-11-09 ENCOUNTER — CARE COORDINATION (OUTPATIENT)
Dept: CARDIOLOGY | Facility: CLINIC | Age: 58
End: 2023-11-09
Payer: MEDICARE

## 2023-11-09 DIAGNOSIS — I50.22 CHRONIC SYSTOLIC (CONGESTIVE) HEART FAILURE (H): Primary | ICD-10-CM

## 2023-11-09 NOTE — PROGRESS NOTES
D:  [facility]R goal and anticoagulation plan for upcoming EGD 12/5.    I:  Called pt to discuss.  Pt did not remember upcoming procedure.  Reminded pt that any procedure requiring moderate sedation needs to be completed at the [facility].  Instructed pt to call ordering provider/team to discuss the need for EGD, since pt is reporting feeling better.  If EGD is required, pt will return call to writer to assist in arranging here.  Pt agreeable.  A:  EGD planning  P:  Pt verbalized understanding of the instructions given.  Will call VAD coordinator with further needs and questions.

## 2023-11-10 DIAGNOSIS — I50.22 CHRONIC SYSTOLIC (CONGESTIVE) HEART FAILURE (H): ICD-10-CM

## 2023-11-13 ENCOUNTER — TELEPHONE (OUTPATIENT)
Dept: CARDIOLOGY | Facility: CLINIC | Age: 58
End: 2023-11-13
Payer: MEDICARE

## 2023-11-14 ENCOUNTER — ANTICOAGULATION THERAPY VISIT (OUTPATIENT)
Dept: ANTICOAGULATION | Facility: CLINIC | Age: 58
End: 2023-11-14

## 2023-11-14 DIAGNOSIS — I50.22 CHRONIC SYSTOLIC CHF (CONGESTIVE HEART FAILURE) (H): ICD-10-CM

## 2023-11-14 DIAGNOSIS — Z95.811 LVAD (LEFT VENTRICULAR ASSIST DEVICE) PRESENT (H): Primary | ICD-10-CM

## 2023-11-14 DIAGNOSIS — I47.29 PAROXYSMAL VENTRICULAR TACHYCARDIA (H): ICD-10-CM

## 2023-11-14 DIAGNOSIS — Z95.2 S/P MITRAL VALVE REPLACEMENT: ICD-10-CM

## 2023-11-14 LAB — INR HOME MONITORING: 2.4 (ref 2–3)

## 2023-11-14 RX ORDER — METOPROLOL SUCCINATE 25 MG/1
25 TABLET, EXTENDED RELEASE ORAL DAILY
Qty: 90 TABLET | Refills: 2 | Status: SHIPPED | OUTPATIENT
Start: 2023-11-14 | End: 2024-08-13

## 2023-11-14 NOTE — TELEPHONE ENCOUNTER
Last Clinic Visit: 8/10/2023 [facility]

## 2023-11-14 NOTE — PROGRESS NOTES
ANTICOAGULATION MANAGEMENT     Tej SWENSON Shelbie 58 year old male is on warfarin with therapeutic INR result. (Goal INR 2.0-3.0)    Recent labs: (last 7 days)     11/14/23  0000   INR 2.4       ASSESSMENT     Source(s): Chart Review and Patient/Caregiver Call     Warfarin doses taken: Warfarin taken as instructed  Diet: No new diet changes identified  Medication/supplement changes: None noted  New illness, injury, or hospitalization: No  Signs or symptoms of bleeding or clotting: No  Previous result: Therapeutic last 2(+) visits  Additional findings: EGD procedure canceled 12/5/23.        PLAN     Recommended plan for no diet, medication or health factor changes affecting INR     Dosing Instructions: Continue your current warfarin dose with next INR in 2 weeks       Summary  As of 11/14/2023      Full warfarin instructions:  5 mg every day   Next INR check:  11/28/2023               Telephone call with Tej who verbalizes understanding and agrees to plan    Patient to recheck with home meter    Education provided:   Goal range and lab monitoring: goal range and significance of current result and Importance of therapeutic range  Importance of notifying anticoagulation clinic for: upcoming surgeries and procedures 2 weeks in advance    Plan made per [facility] anticoagulation protocol and per LVAD protocol    Benjamin Gaspar RN  Anticoagulation Clinic  11/14/2023    _______________________________________________________________________     Anticoagulation Episode Summary       Current INR goal:  2.0-3.0   TTR:  84.7% (1 y)   Target end date:  Indefinite   Send INR reminders to:  ANTICOAG LVAD    Indications    LVAD (left ventricular assist device) present (H) [Z95.811]  Chronic systolic CHF (congestive heart failure) (H) [I50.22]  S/P mitral valve replacement [Z95.2]  Paroxysmal ventricular tachycardia (H) [I47.29]             Comments:  Follow VAD Anticoag protocol:Yes: HeartMate 3  Bridging: per protocol  Date VAD placed:  03/23/22  INR Goal: per referral  Hx Mitral valve repair and not replacement             Anticoagulation Care Providers       Provider Role Specialty Phone number    Dunia Hdz MD Referring Cardiovascular Disease [phone]

## 2023-11-22 ENCOUNTER — DOCUMENTATION ONLY (OUTPATIENT)
Dept: CARDIOLOGY | Facility: CLINIC | Age: 58
End: 2023-11-22
Payer: MEDICARE

## 2023-11-24 ENCOUNTER — TELEPHONE (OUTPATIENT)
Dept: TRANSPLANT | Facility: CLINIC | Age: 58
End: 2023-11-24
Payer: MEDICARE

## 2023-11-24 NOTE — PROGRESS NOTES
HPI  56 year old male with a PMH of severe MR s/p MVR with ring (1/21/15), VT s/p ICD, nonobstructive CAD, NICM previously on home inotropes now s/p HM III LVAD implantation 3/23/22, with course c/b AFib with RVR (s/p ICD shock 3/25/22) and right knee effusion (s/p arthrocentesis and intraarticular steroid injection 3/30/22) who presents for f/up in LVAD clinic.  He is doing well from a functional view point and overall very please with his progress.        PAST MEDICAL HISTORY:       Past Medical History:   Diagnosis Date     Chronic systolic heart failure (H) 2017     Gastroparesis       ICD (implantable cardioverter-defibrillator) in place       [employer]     Non-ischemic cardiomyopathy (H)       Paroxysmal ventricular tachycardia (H) 2017     S/P mitral valve replacement 2017     Tetrahydrocannabinol (THC) use disorder, mild, in controlled environment, abuse 2019     occasional edible THC. reportedly for sleep, anxiety     Tobacco abuse, episodic       occasional cigar - ~ 3x/wk            FAMILY HISTORY:        Family History   Problem Relation Age of Onset     Coronary Artery Disease Mother       Kidney failure Mother       Cardiomyopathy Father            age 51            SOCIAL HISTORY:  Social History            Socioeconomic History     Marital status:    Tobacco Use     Smoking status: Former Smoker     Smokeless tobacco: Never Used   Substance and Sexual Activity     Alcohol use: No       Alcohol/week: 0.0 standard drinks     Drug use: No         CURRENT MEDICATIONS:  acetaminophen (TYLENOL) 325 MG tablet, Take 2 tablets (650 mg) by mouth every 4 hours as needed for pain  diazepam (VALIUM) 10 MG tablet, Take 10 mg by mouth every 4 hours as needed for anxiety  furosemide (LASIX) 20 MG tablet, Take 1 tablet (20 mg) by mouth daily  omeprazole (PRILOSEC) 40 MG DR capsule, Take 40 mg by mouth every morning  sacubitril-valsartan (ENTRESTO)  MG per tablet, Take 1  "tablet by mouth 2 times daily  sotalol (BETAPACE) 80 MG tablet, TAKE 1 TABLET BY MOUTH TWICE A DAY  study - aspirin or placebo, IDS# [id number], 100 MG capsule, Take 1 capsule (100 mg) by mouth daily . Swallow whole, do not crush.  warfarin ANTICOAGULANT (COUMADIN) 2.5 MG tablet, Take 10 mg PO daily at 6 pm until next INR check, then dose per INR goal 2-3     No current facility-administered medications on file prior to visit.        ROS:   10 point review of systems normal    EXAM:  BP (!) 110/0 (BP Location: Right arm, Patient Position: Chair, Cuff Size: Adult Regular)   Pulse 80   Ht 1.78 m (5' 10.08\")   Wt 82.4 kg (181 lb 9.6 oz)   SpO2 100%   BMI 26.00 kg/m       GENERAL: Appears comfortable, in no distress.  HEENT: Eye symmetrical and without discharge or icterus bilaterally. Mucous membranes moist and without lesions.  NECK: Supple, JVD 8  CV: Hum of LVAD, no adventitious sounds  RESPIRATORY: Respirations regular, even, and unlabored  GI: Soft and non distended with normoactive bowel sounds present in all quadrants. No tenderness, rebound, guarding. No organomegaly. Driveline site clean and healing well.   EXTREMITIES: No LE peripheral edema. none pulsatile.   NEUROLOGIC: Alert and interacting appropriatly.  No focal deficits.   MUSCULOSKELETAL: No joint swelling or tenderness.      Labs - as reviewed in clinic with patient today:  CBC RESULTS:        Lab Results   Component Value Date     WBC 5.2 05/02/2022     WBC 4.2 09/10/2019     RBC 3.82 (L) 05/02/2022     RBC 4.98 09/10/2019     HGB 11.2 (L) 05/02/2022     HGB 15.8 09/10/2019     HCT 36.7 (L) 05/02/2022     HCT 48.9 09/10/2019     MCV 96 05/02/2022     MCV 98 09/10/2019     MCH 29.3 05/02/2022     MCH 31.7 09/10/2019     MCHC 30.5 (L) 05/02/2022     MCHC 32.3 09/10/2019     RDW 13.8 05/02/2022     RDW 12.9 09/10/2019      05/02/2022      09/10/2019         CMP RESULTS:        Lab Results   Component Value Date      (H) 05/02/2022     "  09/10/2019     POTASSIUM 3.9 05/02/2022     POTASSIUM 3.4 09/10/2019     CHLORIDE 111 (H) 05/02/2022     CHLORIDE 108 09/10/2019     CO2 28 05/02/2022     CO2 26 09/10/2019     ANIONGAP 7 05/02/2022     ANIONGAP 4 09/10/2019     GLC 94 05/02/2022     GLC 89 09/10/2019     BUN 7 05/02/2022     BUN 18 09/10/2019     CR 0.86 05/02/2022     CR 1.23 09/10/2019     GFRESTIMATED >90 05/02/2022     GFRESTIMATED 67 09/10/2019     GFRESTBLACK 78 09/10/2019     YESICA 8.2 (L) 05/02/2022     YESICA 9.1 09/10/2019     BILITOTAL 0.5 05/02/2022     BILITOTAL 0.6 09/10/2019     ALBUMIN 2.8 (L) 05/02/2022     ALBUMIN 4.0 09/10/2019     ALKPHOS 90 05/02/2022     ALKPHOS 61 09/10/2019     ALT 21 05/02/2022     ALT 25 09/10/2019     AST 18 05/02/2022     AST 17 09/10/2019         INR RESULTS:        Lab Results   Component Value Date     INR 2.65 (H) 05/05/2022     INR 1.02 09/10/2019               Lab Results   Component Value Date     MAG 2.1 04/08/2022     MAG 1.8 09/10/2019      No results found for: NTBNPI        Lab Results   Component Value Date     NTBNP 1,513 (H) 04/14/2022     NTBNP 714 (H) 10/18/2018         Assessment and Plan:   Tej Morfin is a 56 year old male with a PMH of severe MR s/p MVR with ring (1/21/15), VT s/p ICD, nonobstructive CAD, NICM previously on home inotropes now s/p HM III LVAD implantation 3/23/22, with course c/b sternal wound s/p Keflex treatment, AFib with RVR (s/p ICD shock 3/25/22) and right knee effusion (s/p arthrocentesis and intraarticular steroid injection 3/30/22) who presents for f/up in LVAD clinic.    He is doing very well from a functional view point and I agree with current plan of management.     Adequate: hears normal conversation without difficulty

## 2023-11-24 NOTE — TELEPHONE ENCOUNTER
I spoke with Tej, and he will send an updated weight annetta ramirez on 11/27/23, and he will also do a pra and substance screening on 11/27/23.

## 2023-11-27 ENCOUNTER — LAB (OUTPATIENT)
Dept: LAB | Facility: CLINIC | Age: 58
End: 2023-11-27
Payer: MEDICARE

## 2023-11-27 DIAGNOSIS — Z76.82 AWAITING ORGAN TRANSPLANT: ICD-10-CM

## 2023-11-27 PROCEDURE — 86832 HLA CLASS I HIGH DEFIN QUAL: CPT

## 2023-11-27 PROCEDURE — 86833 HLA CLASS II HIGH DEFIN QUAL: CPT

## 2023-11-28 ENCOUNTER — ANTICOAGULATION THERAPY VISIT (OUTPATIENT)
Dept: ANTICOAGULATION | Facility: CLINIC | Age: 58
End: 2023-11-28
Payer: MEDICARE

## 2023-11-28 DIAGNOSIS — I47.29 PAROXYSMAL VENTRICULAR TACHYCARDIA (H): ICD-10-CM

## 2023-11-28 DIAGNOSIS — Z95.2 S/P MITRAL VALVE REPLACEMENT: ICD-10-CM

## 2023-11-28 DIAGNOSIS — I50.22 CHRONIC SYSTOLIC CHF (CONGESTIVE HEART FAILURE) (H): ICD-10-CM

## 2023-11-28 DIAGNOSIS — Z95.811 LVAD (LEFT VENTRICULAR ASSIST DEVICE) PRESENT (H): Primary | ICD-10-CM

## 2023-11-28 LAB — INR HOME MONITORING: 3.2 (ref 2–3)

## 2023-11-28 NOTE — PROGRESS NOTES
ANTICOAGULATION MANAGEMENT     Tej Morfin 58 year old male is on warfarin with supratherapeutic INR result. (Goal INR 2.0-3.0)    Recent labs: (last 7 days)     11/28/23  0000   INR 3.2*       ASSESSMENT     Source(s): Chart Review  Previous INR was Therapeutic last 2(+) visits  Medication, diet, health changes since last INR chart reviewed; none identified         PLAN     Recommended plan for no diet, medication or health factor changes affecting INR     Dosing Instructions: Continue your current warfarin dose with next INR in 1-2 weeks       Summary  As of 11/28/2023      Full warfarin instructions:  5 mg every day   Next INR check:  12/12/2023               Detailed voice message left for Tej with dosing instructions and follow up date.   Sent T[employer]o message with dosing and follow up instructions    Patient to recheck with home meter    Education provided:   Please call back if any changes to your diet, medications or how you've been taking warfarin  Symptom monitoring: monitoring for bleeding signs and symptoms  Contact [phone] with any changes, questions or concerns.     Plan made per [facility] anticoagulation protocol and per LVAD protocol    Lauri OSMAN RN  Anticoagulation Clinic  11/28/2023    _______________________________________________________________________     Anticoagulation Episode Summary       Current INR goal:  2.0-3.0   TTR:  84.9% (1 y)   Target end date:  Indefinite   Send INR reminders to:  ANTICOAG LVAD    Indications    LVAD (left ventricular assist device) present (H) [Z95.811]  Chronic systolic CHF (congestive heart failure) (H) [I50.22]  S/P mitral valve replacement [Z95.2]  Paroxysmal ventricular tachycardia (H) [I47.29]             Comments:  Follow VAD Anticoag protocol:Yes: HeartMate 3  Bridging: per protocol  Date VAD placed: 03/23/22  INR Goal: per referral  Hx Mitral valve repair and not replacement             Anticoagulation Care Providers       Provider Role  Specialty Phone number    Dunia Hdz MD Referring Cardiovascular Disease [phone]

## 2023-12-05 ENCOUNTER — TELEPHONE (OUTPATIENT)
Dept: TRANSPLANT | Facility: CLINIC | Age: 58
End: 2023-12-05
Payer: MEDICARE

## 2023-12-05 VITALS — WEIGHT: 238 LBS | BODY MASS INDEX: 34.11 KG/M2

## 2023-12-08 DIAGNOSIS — I50.22 CHRONIC SYSTOLIC (CONGESTIVE) HEART FAILURE (H): Primary | ICD-10-CM

## 2023-12-12 ENCOUNTER — ANTICOAGULATION THERAPY VISIT (OUTPATIENT)
Dept: ANTICOAGULATION | Facility: CLINIC | Age: 58
End: 2023-12-12
Payer: MEDICARE

## 2023-12-12 DIAGNOSIS — I47.29 PAROXYSMAL VENTRICULAR TACHYCARDIA (H): ICD-10-CM

## 2023-12-12 DIAGNOSIS — Z95.2 S/P MITRAL VALVE REPLACEMENT: ICD-10-CM

## 2023-12-12 DIAGNOSIS — I50.22 CHRONIC SYSTOLIC CHF (CONGESTIVE HEART FAILURE) (H): ICD-10-CM

## 2023-12-12 DIAGNOSIS — Z95.811 LVAD (LEFT VENTRICULAR ASSIST DEVICE) PRESENT (H): Primary | ICD-10-CM

## 2023-12-12 LAB — INR HOME MONITORING: 2.9 (ref 2–3)

## 2023-12-12 NOTE — PROGRESS NOTES
ANTICOAGULATION MANAGEMENT     Tej Morfin 58 year old male is on warfarin with therapeutic INR result. (Goal INR 2.0-3.0)    Recent labs: (last 7 days)     12/12/23  0000   INR 2.9       ASSESSMENT     Source(s): Chart Review and Patient/Caregiver Call     Warfarin doses taken: Warfarin taken as instructed  Diet: No new diet changes identified  Medication/supplement changes: None noted  New illness, injury, or hospitalization: No  Signs or symptoms of bleeding or clotting: No  Previous result: Supratherapeutic  Additional findings: None       PLAN     Recommended plan for no diet, medication or health factor changes affecting INR     Dosing Instructions: Continue your current warfarin dose with next INR in 1-2 weeks       Summary  As of 12/12/2023      Full warfarin instructions:  5 mg every day   Next INR check:  12/26/2023               Telephone call with Tej who verbalizes understanding and agrees to plan    Patient to recheck with home meter    Education provided:   Contact [phone] with any changes, questions or concerns.     Plan made per [facility] anticoagulation protocol    Rosario Dyer RN  Anticoagulation Clinic  12/12/2023    _______________________________________________________________________     Anticoagulation Episode Summary       Current INR goal:  2.0-3.0   TTR:  82.3% (1 y)   Target end date:  Indefinite   Send INR reminders to:  ANTICOAG LVAD    Indications    LVAD (left ventricular assist device) present (H) [Z95.811]  Chronic systolic CHF (congestive heart failure) (H) [I50.22]  S/P mitral valve replacement [Z95.2]  Paroxysmal ventricular tachycardia (H) [I47.29]             Comments:  Follow VAD Anticoag protocol:Yes: HeartMate 3  Bridging: per protocol  Date VAD placed: 03/23/22  INR Goal: per referral  Hx Mitral valve repair and not replacement             Anticoagulation Care Providers       Provider Role Specialty Phone number    Dunia Hdz MD Referring  Cardiovascular Disease [phone]

## 2023-12-13 DIAGNOSIS — I50.22 CHRONIC SYSTOLIC (CONGESTIVE) HEART FAILURE (H): Primary | ICD-10-CM

## 2023-12-15 ENCOUNTER — TELEPHONE (OUTPATIENT)
Dept: CARDIOLOGY | Facility: CLINIC | Age: 58
End: 2023-12-15

## 2023-12-15 ENCOUNTER — ANTICOAGULATION THERAPY VISIT (OUTPATIENT)
Dept: ANTICOAGULATION | Facility: CLINIC | Age: 58
End: 2023-12-15

## 2023-12-15 ENCOUNTER — CARE COORDINATION (OUTPATIENT)
Dept: TRANSPLANT | Facility: CLINIC | Age: 58
End: 2023-12-15

## 2023-12-15 ENCOUNTER — LAB (OUTPATIENT)
Dept: LAB | Facility: CLINIC | Age: 58
End: 2023-12-15
Attending: NURSE PRACTITIONER
Payer: MEDICARE

## 2023-12-15 ENCOUNTER — OFFICE VISIT (OUTPATIENT)
Dept: CARDIOLOGY | Facility: CLINIC | Age: 58
End: 2023-12-15
Attending: NURSE PRACTITIONER
Payer: MEDICARE

## 2023-12-15 ENCOUNTER — ANCILLARY PROCEDURE (OUTPATIENT)
Dept: CARDIOLOGY | Facility: CLINIC | Age: 58
End: 2023-12-15
Attending: INTERNAL MEDICINE
Payer: MEDICARE

## 2023-12-15 VITALS
OXYGEN SATURATION: 99 % | HEART RATE: 83 BPM | SYSTOLIC BLOOD PRESSURE: 79 MMHG | BODY MASS INDEX: 33.94 KG/M2 | WEIGHT: 236.8 LBS

## 2023-12-15 DIAGNOSIS — I42.8 VALVULAR CARDIOMYOPATHY (H): ICD-10-CM

## 2023-12-15 DIAGNOSIS — I50.22 CHRONIC SYSTOLIC CHF (CONGESTIVE HEART FAILURE) (H): ICD-10-CM

## 2023-12-15 DIAGNOSIS — I47.29 PAROXYSMAL VENTRICULAR TACHYCARDIA (H): ICD-10-CM

## 2023-12-15 DIAGNOSIS — Z95.811 LVAD (LEFT VENTRICULAR ASSIST DEVICE) PRESENT (H): Primary | ICD-10-CM

## 2023-12-15 DIAGNOSIS — Z76.82 AWAITING ORGAN TRANSPLANT: ICD-10-CM

## 2023-12-15 DIAGNOSIS — I50.22 CHRONIC SYSTOLIC (CONGESTIVE) HEART FAILURE (H): ICD-10-CM

## 2023-12-15 DIAGNOSIS — Z95.2 S/P MITRAL VALVE REPLACEMENT: ICD-10-CM

## 2023-12-15 DIAGNOSIS — R52 PAIN: Primary | ICD-10-CM

## 2023-12-15 DIAGNOSIS — I50.22 CHRONIC SYSTOLIC CONGESTIVE HEART FAILURE (H): Primary | ICD-10-CM

## 2023-12-15 DIAGNOSIS — Z95.811 LEFT VENTRICULAR ASSIST DEVICE PRESENT (H): ICD-10-CM

## 2023-12-15 LAB
ALBUMIN SERPL BCG-MCNC: 3.9 G/DL (ref 3.5–5.2)
ALP SERPL-CCNC: 69 U/L (ref 40–150)
ALT SERPL W P-5'-P-CCNC: 17 U/L (ref 0–70)
ANION GAP SERPL CALCULATED.3IONS-SCNC: 10 MMOL/L (ref 7–15)
AST SERPL W P-5'-P-CCNC: 20 U/L (ref 0–45)
BILIRUB SERPL-MCNC: 0.4 MG/DL
BUN SERPL-MCNC: 18.9 MG/DL (ref 6–20)
CALCIUM SERPL-MCNC: 9.5 MG/DL (ref 8.6–10)
CHLORIDE SERPL-SCNC: 107 MMOL/L (ref 98–107)
CREAT SERPL-MCNC: 1.42 MG/DL (ref 0.67–1.17)
DEPRECATED HCO3 PLAS-SCNC: 26 MMOL/L (ref 22–29)
EGFRCR SERPLBLD CKD-EPI 2021: 58 ML/MIN/1.73M2
ERYTHROCYTE [DISTWIDTH] IN BLOOD BY AUTOMATED COUNT: 13.9 % (ref 10–15)
GLUCOSE SERPL-MCNC: 130 MG/DL (ref 70–99)
HCT VFR BLD AUTO: 42.7 % (ref 40–53)
HGB BLD-MCNC: 13.8 G/DL (ref 13.3–17.7)
INR PPP: 2.6 (ref 0.85–1.15)
LDH SERPL L TO P-CCNC: 252 U/L (ref 0–250)
Lab: NORMAL
MCH RBC QN AUTO: 31 PG (ref 26.5–33)
MCHC RBC AUTO-ENTMCNC: 32.3 G/DL (ref 31.5–36.5)
MCV RBC AUTO: 96 FL (ref 78–100)
PERFORMING LABORATORY: NORMAL
PLATELET # BLD AUTO: 199 10E3/UL (ref 150–450)
POTASSIUM SERPL-SCNC: 4.5 MMOL/L (ref 3.4–5.3)
PROT SERPL-MCNC: 7 G/DL (ref 6.4–8.3)
RBC # BLD AUTO: 4.45 10E6/UL (ref 4.4–5.9)
SODIUM SERPL-SCNC: 143 MMOL/L (ref 135–145)
TEST NAME: NORMAL
WBC # BLD AUTO: 5.2 10E3/UL (ref 4–11)

## 2023-12-15 PROCEDURE — 93284 PRGRMG EVAL IMPLANTABLE DFB: CPT | Performed by: INTERNAL MEDICINE

## 2023-12-15 PROCEDURE — 93750 INTERROGATION VAD IN PERSON: CPT | Performed by: NURSE PRACTITIONER

## 2023-12-15 PROCEDURE — 99214 OFFICE O/P EST MOD 30 MIN: CPT | Mod: 25 | Performed by: NURSE PRACTITIONER

## 2023-12-15 PROCEDURE — 85027 COMPLETE CBC AUTOMATED: CPT | Performed by: PATHOLOGY

## 2023-12-15 PROCEDURE — G0463 HOSPITAL OUTPT CLINIC VISIT: HCPCS | Mod: 25 | Performed by: NURSE PRACTITIONER

## 2023-12-15 PROCEDURE — 85610 PROTHROMBIN TIME: CPT | Performed by: PATHOLOGY

## 2023-12-15 PROCEDURE — 80307 DRUG TEST PRSMV CHEM ANLYZR: CPT | Performed by: PATHOLOGY

## 2023-12-15 PROCEDURE — 36415 COLL VENOUS BLD VENIPUNCTURE: CPT | Performed by: PATHOLOGY

## 2023-12-15 PROCEDURE — 80053 COMPREHEN METABOLIC PANEL: CPT | Performed by: PATHOLOGY

## 2023-12-15 PROCEDURE — 83615 LACTATE (LD) (LDH) ENZYME: CPT | Performed by: PATHOLOGY

## 2023-12-15 RX ORDER — LIDOCAINE 40 MG/G
CREAM TOPICAL
Status: CANCELLED | OUTPATIENT
Start: 2023-12-15

## 2023-12-15 ASSESSMENT — PAIN SCALES - GENERAL: PAINLEVEL: NO PAIN (0)

## 2023-12-15 NOTE — TELEPHONE ENCOUNTER
12/15 spoke to patient and scheduled Device check and rescheduled labs and appt w/ Andreina to coordinate the time for pt

## 2023-12-15 NOTE — PROGRESS NOTES
ANTICOAGULATION MANAGEMENT     Tej Morfin 58 year old male is on warfarin with therapeutic INR result. (Goal INR 2.0-3.0)    Recent labs: (last 7 days)     12/15/23  1011   INR 2.60*       ASSESSMENT     Source(s): Chart Review and Patient/Caregiver Call     Warfarin doses taken: Warfarin taken as instructed  Diet: No new diet changes identified  Medication/supplement changes: None noted  New illness, injury, or hospitalization: No  Signs or symptoms of bleeding or clotting: No  Previous result: Therapeutic last visit; previously outside of goal range  Additional findings: None       PLAN     Recommended plan for no diet, medication or health factor changes affecting INR     Dosing Instructions: Continue your current warfarin dose with next INR in 2 weeks       Summary  As of 12/15/2023      Full warfarin instructions:  5 mg every day   Next INR check:  12/29/2023               Telephone call with Tej who verbalizes understanding and agrees to plan    Patient to recheck with home meter    Education provided:   Contact [phone] with any changes, questions or concerns.     Plan made per [facility] anticoagulation protocol and per LVAD protocol    Lziabeth Milton RN  Anticoagulation Clinic  12/15/2023    _______________________________________________________________________     Anticoagulation Episode Summary       Current INR goal:  2.0-3.0   TTR:  82.3% (1 y)   Target end date:  Indefinite   Send INR reminders to:  ANTICOAG LVAD    Indications    LVAD (left ventricular assist device) present (H) [Z95.811]  Chronic systolic CHF (congestive heart failure) (H) [I50.22]  S/P mitral valve replacement [Z95.2]  Paroxysmal ventricular tachycardia (H) [I47.29]             Comments:  Follow VAD Anticoag protocol:Yes: HeartMate 3  Bridging: per protocol  Date VAD placed: 03/23/22  INR Goal: per referral  Hx Mitral valve repair and not replacement             Anticoagulation Care Providers       Provider Role  Specialty Phone number    Dunia Hdz MD Referring Cardiovascular Disease [phone]

## 2023-12-15 NOTE — NURSING NOTE
[facility] VAD Pump Info       Row Name 12/15/23 1150             [facility] VAD Information    Implant LVAD      LVAD Pump HeartMate 3         Heartmate 3 LEFT VS    Flow (Lpm) 4.5 Lpm      Pulse Index (PI) 3.1 PI      Speed (rpm) 5500 rpm      Power (christiansen) 4.2 christiansen      Current Hct setting 42.7      Retired: Unexpected Alarms --         Heartmate 3 Right (centrifugal flow) VS    Flow (Lpm) --      Pulse Index (PI) --      Speed (rpm) --      Power (christiansen) --         Primary Controller    Serial number [id number]  Black and White bend reliefs worn out. Controller replaced      Low flow alarm setting 2.5      High watt alarm setting NA      EBB: Patient use 18      Replace in 12 Months         Backup Controller    Serial number [id number]      EBB: Patient use 7      Replace EBB in 22 Months      Speed & HCT match primary controller Y         VAD Interrogation    Alarms reported by patient Y      Unexpected alarms noted upon interrogation None      PI events Frequent;w/ associated speed drops      Damage to equipment is noted Y  see primary controller comment      Action taken Reviewed proper equipment care and maintenance         Driveline Exit Site    Dressing change done N      Driveline properly secured Yes      DLES assessment c/d/i      Dressing used Weekly kit      Frequency patient changes dressing Weekly      Dressing modifications --      Dressing change supplier --                      Education Complete: Yes   Charge the BACKUP controller s backup battery every 6 months  Perform a self test on BACKUP every 6 months  Change the MPU s batteries every 6 months:Yes  Have equipment serviced yearly (if applicable):No. Tej said this has never beendone. He will bring his battery charger in at the next appointment.

## 2023-12-15 NOTE — PATIENT INSTRUCTIONS
Medications:  No meds changes today    Instructions:  Please schedule your right hear cath, echo, social work, and GI referral visit for January. The GI referral may occur before January. Most likely you will then need to schedule an upper GI procedure here at the .    Follow-up: (make these appointments before you leave)  1. Please follow-up with VAD WILIAN in January coinciding with your other tests and with labs prior.   2. Please follow-up with Dr. Hdz in April /renetat labs prior.        Page the VAD Coordinator on call if you gain more than 3 lb in a day or 5 in a week. Please also page if you feel unwell or have alarms.   Great to see you in clinic today. To Page the VAD Coordinator on call, dial [phone] option #4 and ask to speak to the VAD coordinator on call.

## 2023-12-15 NOTE — PROGRESS NOTES
[facility] Cardiology   VAD Clinic      HPI:   Mr. Morfin is a 58 year old male with medical history pertinent for severe MR s/p MVR with ring (1/21/15), VT s/p ICD, nonobstructive CAD, NICM previously on home inotropes now s/p HM III LVAD implantation 3/23/22 as current bridge to transplant, with course c/b AFib with RVR (s/p ICD shock 3/25/22) a) who presents for f/up in LVAD clinic.     Overall he had a pretty incredible recovery after his LVAD. Currently listed as a status 4 for heart transplant.      He did have an ICD shock in May 2023 and is presently on sotalol as well as a beta-blocker and is tolerating this and by ICD checks has been free of shock therapies. He had been having a lot more anxiety with this and is working out less because he is afraid he will get shocked again.      He has been complaining of chronic GI discomfort with abdominal fullness and gas. He has not been evaluated by GI in several years, however he had been scheduled to have an EGD at [facility] per cardiology's recommendation. This was scheduled to occur in 11/2023, however, this was ultimately cancelled as this type of procedure needs to be done at the [place] given LVAD.     Today, Tej presents to clinic with his wife. He reports feeling well, other than ongoing abdominal discomfort. Abdomen is tight and distended. Will easily feel full after only a small meal. On occasion he described feeling diaphragmatic cramping. No N/V. Denies constipation or diarrhea. Breathing is comfortable. No shortness of breath at rest or with exertion. Will have bendopena. No lightheaded episodes, syncope, or near syncope. Denies palpitations. Tested for Covid and RVP a few days ago because he was feeling congested. No orthopnea, PND, LE edema.     No blood in the urine or blood in the stool or prolonged nosebleeds.     No fevers or chills. No driveline redness or pain.  No driveline drainage.     No headaches or vision changes. No stroke  symptoms.     No LVAD alarms.     Cardiac Medications   - ASA 81 mg daily   - Lasix 60 mg BID  - Metoprolol succinate 25 mg daily   - Entresto  mg BID  - KCL 60 mEq BID   - Sotalol 120 mg BID  - Spironolactone 25 mg daily   - Warfarin       PAST MEDICAL HISTORY:  Past Medical History:   Diagnosis Date    Chronic systolic heart failure (H) 2017    Gastroparesis     ICD (implantable cardioverter-defibrillator) in place     [employer]    Non-ischemic cardiomyopathy (H)     Paroxysmal ventricular tachycardia (H) 2017    S/P mitral valve replacement 2017    Tetrahydrocannabinol (THC) use disorder, mild, in controlled environment, abuse 2019    occasional edible THC. reportedly for sleep, anxiety    Tobacco abuse, episodic     occasional cigar - ~ 3x/wk       FAMILY HISTORY:  Family History   Problem Relation Age of Onset    Coronary Artery Disease Mother     Kidney failure Mother     Cardiomyopathy Father          age 51       SOCIAL HISTORY:  Social History     Socioeconomic History    Marital status:    Tobacco Use    Smoking status: Former    Smokeless tobacco: Never   Substance and Sexual Activity    Alcohol use: No     Alcohol/week: 0.0 standard drinks of alcohol    Drug use: No       CURRENT MEDICATIONS:  acetaminophen (TYLENOL) 325 MG tablet, Take 2 tablets (650 mg) by mouth every 4 hours as needed for pain  allopurinol (ZYLOPRIM) 100 MG tablet, Take 1 tablet (100 mg) by mouth daily  amoxicillin (AMOXIL) 500 MG capsule, Take 2,000 mg by mouth once as needed Take 4 tabs 1 hour before all dental cleanings and procedures  aspirin 81 MG EC tablet, Take 1 tablet (81 mg) by mouth daily  cholecalciferol 50 MCG (2000 UT) CAPS, Take 50 mcg by mouth daily  furosemide (LASIX) 40 MG tablet, Take 1.5 tablets (60 mg) by mouth 2 times daily  hydrOXYzine (VISTARIL) 50 MG capsule, Take 50 mg by mouth every 12 hours as needed for anxiety  magnesium oxide (MAG-OX) 400 MG tablet, Take 800 mg  by mouth 2 times daily  metoprolol succinate ER (TOPROL XL) 25 MG 24 hr tablet, Take 1 tablet (25 mg) by mouth daily  nitroGLYcerin (NITROSTAT) 0.4 MG sublingual tablet, Place 0.4 mg under the tongue every 5 minutes as needed for chest pain  omeprazole (PRILOSEC) 40 MG DR capsule, Take 40 mg by mouth every morning  potassium chloride ER (KLOR-CON M) 20 MEQ CR tablet, Take 3 tablets (60 mEq) by mouth 2 times daily  sacubitril-valsartan (ENTRESTO)  MG per tablet, Take 1 tablet by mouth 2 times daily  sertraline (ZOLOFT) 50 MG tablet, Take 50 mg by mouth daily  sotalol (BETAPACE) 120 MG tablet, Take 120 mg by mouth 2 times daily  spironolactone (ALDACTONE) 25 MG tablet, Take 1 tablet (25 mg) by mouth daily  traZODone (DESYREL) 50 MG tablet, Take 25-50 mg by mouth nightly as needed (Patient not taking: Reported on 8/10/2023)  warfarin ANTICOAGULANT (COUMADIN) 2.5 MG tablet, TAKE 2 TABLETS (5MG) BY MOUTH DAILY OR AS DIRECTED BY THE COUMADIN CLINIC  warfarin ANTICOAGULANT (COUMADIN) 2.5 MG tablet, Take 10 mg PO daily at 6 pm until next INR check, then dose per INR goal 2-3    No current facility-administered medications on file prior to visit.      ROS:   CONSTITUTIONAL: Denies fever, chills, fatigue, or weight fluctuations.   HEENT: Denies headache, vision changes, and changes in speech.   CV: Refer to HPI.   PULMONARY:Refer to HPI.   GI:Denies nausea, vomiting, diarrhea, and abdominal pain. Bowel movements are regular.   :Denies urinary alterations, dysuria, urinary frequency, hematuria, and abnormal drainage.   EXT:Denies lower extremity edema.   SKIN:Denies abnormal rashes or lesions.   MUSCULOSKELETAL:Denies upper or lower extremity weakness and pain.   NEUROLOGIC:Denies lightheadedness, dizziness, seizures, or upper or lower extremity paresthesia.     EXAM:  BP (!) 79/0 (BP Location: Right arm, Patient Position: Chair, Cuff Size: Adult Large)   Pulse 83   Wt 107.4 kg (236 lb 12.8 oz)   SpO2 99%   BMI  33.94 kg/m       GENERAL: Appears comfortable, in no distress.  HEENT: Eye symmetrical and without discharge or icterus bilaterally. Mucous membranes moist and without lesions.  NECK: Supple, JVD < 6 cm at 90 degrees.   CV: + LVAD hum  RESPIRATORY: Respirations regular, even, and unlabored. Lungs CTA throughout.   GI: Soft and non distended with normoactive bowel sounds present in all quadrants. No tenderness, rebound, guarding. No organomegaly.   EXTREMITIES: no peripheral edema. Non-pulsatile.   NEUROLOGIC: Alert and orientated x 3.  No focal deficits.   MUSCULOSKELETAL: No joint swelling or tenderness.   SKIN: No jaundice. No rashes or lesions. Driveline dressing CDI.    Labs - as reviewed in clinic with patient today:  CBC RESULTS:  Lab Results   Component Value Date    WBC 4.9 08/10/2023    WBC 4.2 09/10/2019    RBC 4.56 08/10/2023    RBC 4.98 09/10/2019    HGB 14.0 08/10/2023    HGB 15.8 09/10/2019    HCT 43.8 08/10/2023    HCT 48.9 09/10/2019    MCV 96 08/10/2023    MCV 98 09/10/2019    MCH 30.7 08/10/2023    MCH 31.7 09/10/2019    MCHC 32.0 08/10/2023    MCHC 32.3 09/10/2019    RDW 14.6 08/10/2023    RDW 12.9 09/10/2019     08/10/2023     09/10/2019       CMP RESULTS:  Lab Results   Component Value Date     08/10/2023     09/10/2019    POTASSIUM 4.7 08/10/2023    POTASSIUM 4.0 06/23/2022    POTASSIUM 3.4 09/10/2019    CHLORIDE 106 08/10/2023    CHLORIDE 105 05/09/2023    CHLORIDE 108 09/10/2019    CO2 26 08/10/2023    CO2 26 06/23/2022    CO2 26 09/10/2019    ANIONGAP 11 08/10/2023    ANIONGAP 9 06/23/2022    ANIONGAP 4 09/10/2019     (H) 08/10/2023    GLC 65 (L) 06/23/2022    GLC 89 09/10/2019    BUN 20.9 (H) 08/10/2023    BUN 18 06/23/2022    BUN 18 09/10/2019    CR 1.56 (H) 08/10/2023    CR 1.23 09/10/2019    GFRESTIMATED 52 (L) 08/10/2023    GFRESTIMATED 67 09/10/2019    GFRESTBLACK 78 09/10/2019    YESICA 9.7 08/10/2023    YESICA 9.1 09/10/2019    BILITOTAL 0.4 08/10/2023     "BILITOTAL 0.6 09/10/2019    ALBUMIN 4.3 08/10/2023    ALBUMIN 3.2 (L) 06/23/2022    ALBUMIN 4.0 09/10/2019    ALKPHOS 74 08/10/2023    ALKPHOS 61 09/10/2019    ALT 24 08/10/2023    ALT 25 09/10/2019    AST 23 08/10/2023    AST 17 09/10/2019        INR RESULTS:  Lab Results   Component Value Date    INR 2.9 12/12/2023    INR 1.02 09/10/2019       Lab Results   Component Value Date    MAG 1.9 08/10/2023    MAG 1.8 09/10/2019     No results found for: \"NTBNPI\"  Lab Results   Component Value Date    NTBNP 254 01/23/2023    NTBNP 714 (H) 10/18/2018         Cardiac Diagnostics    8/10/2023 Device Interrogation   Device: [employer] G148 INOGEN X4 CRT-D(LV Lead off)  Normal device function.  Mode: DDD  bpm  AP: 83%  RVP: 5%  Intrinsic rhythm: SB 40 bpm  Lead Trends Appear Stable: Yes- LV lead OFF  Estimated battery longevity to RRT = 3 years.   Atrial Arrhythmia: None  AF [place]: <1%  Anticoagulant: Warfarin  Ventricular Arrhythmia: None since remote transmission on 5/30/23.  Setting Changes: None  Patient has an appointment to see Dr. Hdz today.   Plan: Device follow-up every 3 months.    8/8/2022 Echo (Care Everywhere)    Left Ventricle: The left ventricle appears normal in size. There is   moderate-to-severe left ventricular hypertrophy. Severely reduced left   ventricular systolic function. HeartMate III LVAD cannula visualized.   There is no aortic valve excursion during the visualized cardiac cycles.     Right Ventricle: The right ventricle appears normal in size. Systolic   function is normal.       Assessment and Plan:   Mr. Morfin is a 58 year old male with medical history pertinent for severe MR s/p MVR with ring (1/21/15), VT s/p ICD, nonobstructive CAD, NICM previously on home inotropes now s/p HM III LVAD implantation 3/23/22 as current bridge to transplant, with course c/b AFib with RVR (s/p ICD shock 3/25/22) a) who presents for f/up in LVAD clinic.     Appearing and feeling well. " Euvolemic by exam. MAP at goal. Review of today's labs with normal lytes and stable end organ function. INR therapeutic. Discussed GI issues and given hx of gastroparesis and no recent GI follow up, we will place a GI referral. I think before rescheduling EGD he should be formally evaluated by GI.     He is currently listed status 4 for transplant. He is due for echo, RHC, and visit with SW in January. We will order RHC today and schedule testing. Tej will follow up with [facility] in January.       Chronic SHCF s/p HM III LVAD. Severe MR s/p mitral valve repair with annuloplasty ring (1/21/15). Implanted 3/23/22.   Stage D, NYHA Class II      ACEi/ARB Entresto   mg po BID   BB Sotalol 80 mg po BID and metoprolol   Aldosterone antagonist Aldactone 25 mg daily   SGLT2i: Deferred for now given polypharmacy and lots of GI upset (do not want to add more in now)    SCD prophylaxis ICD  Fluid status  euvolemic on current diuretics   MAP: 79, goal 65-85  LDH: stable  Anticoagulation: INR goal 2-3, today 2.6  Antiplatelet: Finish the RICARDO trial, now on aspirin 81 mg daily    VAD interrogation 12/15/23: VAD interrogation reviewed with VAD coordinator. Agree with findings. Frequent PI events with associated speed drops, no power spikes, or other findings suspicious of pump malfunction noted.         History of VT, s/p CRT-D.  ICD shock in May 2023 now on a higher dose of sotalol and on a beta-blocker - presently in remission if he has more shocks may consider trying to upgrade for transplant  - Continue Sotatol, metoprolol for now      Postop AF with RVR. Returned to SR.   - Coumadin as above.   - We will check and device interrogation if he has no further A. fib we will have a low threshold to stop sotalol     Mild nonobstructive CAD.   - On ASA 81 mg daily      Anxiety   - Recommended connecting with LVAD support group and second chance for life      MIld AI  - surveillance echos     GI upset   Hx gastroparesis   -  Refer to GI     Obesity  - he is going to work on diet and exercise he is transplant eligible presently        Follow up:  - Schedule RHC, ECHO, SW and GI appt for Ge[facility]  - VAD [facility] 1/2024  - Dr. Hdz 4/2024      Livia Katz DNP, NP-C  Advance Heart Failure  12/15/23

## 2023-12-15 NOTE — LETTER
12/15/2023      RE: Tej Morfin  [address]  [place] [address]       Dear Colleague,    Thank you for the opportunity to participate in the care of your patient, Tej Morfin, at the [facility] [place] at [facility]. Please see a copy of my visit note below.      [facility] Cardiology   VAD Clinic      HPI:   Mr. Morfin is a 58 year old male with medical history pertinent for severe MR s/p MVR with ring (1/21/15), VT s/p ICD, nonobstructive CAD, NICM previously on home inotropes now s/p HM III LVAD implantation 3/23/22 as current bridge to transplant, with course c/b AFib with RVR (s/p ICD shock 3/25/22) a) who presents for f/up in LVAD clinic.     Overall he had a pretty incredible recovery after his LVAD. Currently listed as a status 4 for heart transplant.      He did have an ICD shock in May 2023 and is presently on sotalol as well as a beta-blocker and is tolerating this and by ICD checks has been free of shock therapies. He had been having a lot more anxiety with this and is working out less because he is afraid he will get shocked again.      He has been complaining of chronic GI discomfort with abdominal fullness and gas. He has not been evaluated by GI in several years, however he had been scheduled to have an EGD at [facility] per cardiology's recommendation. This was scheduled to occur in 11/2023, however, this was ultimately cancelled as this type of procedure needs to be done at the [place] given LVAD.     Today, Tej presents to clinic with his wife. He reports feeling well, other than ongoing abdominal discomfort. Abdomen is tight and distended. Will easily feel full after only a small meal. On occasion he described feeling diaphragmatic cramping. No N/V. Denies constipation or diarrhea. Breathing is comfortable. No shortness of breath at rest or with exertion. Will have bendopena. No lightheaded  episodes, syncope, or near syncope. Denies palpitations. Tested for Covid and RVP a few days ago because he was feeling congested. No orthopnea, PND, LE edema.     No blood in the urine or blood in the stool or prolonged nosebleeds.     No fevers or chills. No driveline redness or pain.  No driveline drainage.     No headaches or vision changes. No stroke symptoms.     No LVAD alarms.     Cardiac Medications   - ASA 81 mg daily   - Lasix 60 mg BID  - Metoprolol succinate 25 mg daily   - Entresto  mg BID  - KCL 60 mEq BID   - Sotalol 120 mg BID  - Spironolactone 25 mg daily   - Warfarin       PAST MEDICAL HISTORY:  Past Medical History:   Diagnosis Date    Chronic systolic heart failure (H) 2017    Gastroparesis     ICD (implantable cardioverter-defibrillator) in place     [employer]    Non-ischemic cardiomyopathy (H)     Paroxysmal ventricular tachycardia (H) 2017    S/P mitral valve replacement 2017    Tetrahydrocannabinol (THC) use disorder, mild, in controlled environment, abuse 2019    occasional edible THC. reportedly for sleep, anxiety    Tobacco abuse, episodic     occasional cigar - ~ 3x/wk       FAMILY HISTORY:  Family History   Problem Relation Age of Onset    Coronary Artery Disease Mother     Kidney failure Mother     Cardiomyopathy Father          age 51       SOCIAL HISTORY:  Social History     Socioeconomic History    Marital status:    Tobacco Use    Smoking status: Former    Smokeless tobacco: Never   Substance and Sexual Activity    Alcohol use: No     Alcohol/week: 0.0 standard drinks of alcohol    Drug use: No       CURRENT MEDICATIONS:  acetaminophen (TYLENOL) 325 MG tablet, Take 2 tablets (650 mg) by mouth every 4 hours as needed for pain  allopurinol (ZYLOPRIM) 100 MG tablet, Take 1 tablet (100 mg) by mouth daily  amoxicillin (AMOXIL) 500 MG capsule, Take 2,000 mg by mouth once as needed Take 4 tabs 1 hour before all dental cleanings and  procedures  aspirin 81 MG EC tablet, Take 1 tablet (81 mg) by mouth daily  cholecalciferol 50 MCG (2000 UT) CAPS, Take 50 mcg by mouth daily  furosemide (LASIX) 40 MG tablet, Take 1.5 tablets (60 mg) by mouth 2 times daily  hydrOXYzine (VISTARIL) 50 MG capsule, Take 50 mg by mouth every 12 hours as needed for anxiety  magnesium oxide (MAG-OX) 400 MG tablet, Take 800 mg by mouth 2 times daily  metoprolol succinate ER (TOPROL XL) 25 MG 24 hr tablet, Take 1 tablet (25 mg) by mouth daily  nitroGLYcerin (NITROSTAT) 0.4 MG sublingual tablet, Place 0.4 mg under the tongue every 5 minutes as needed for chest pain  omeprazole (PRILOSEC) 40 MG DR capsule, Take 40 mg by mouth every morning  potassium chloride ER (KLOR-CON M) 20 MEQ CR tablet, Take 3 tablets (60 mEq) by mouth 2 times daily  sacubitril-valsartan (ENTRESTO)  MG per tablet, Take 1 tablet by mouth 2 times daily  sertraline (ZOLOFT) 50 MG tablet, Take 50 mg by mouth daily  sotalol (BETAPACE) 120 MG tablet, Take 120 mg by mouth 2 times daily  spironolactone (ALDACTONE) 25 MG tablet, Take 1 tablet (25 mg) by mouth daily  traZODone (DESYREL) 50 MG tablet, Take 25-50 mg by mouth nightly as needed (Patient not taking: Reported on 8/10/2023)  warfarin ANTICOAGULANT (COUMADIN) 2.5 MG tablet, TAKE 2 TABLETS (5MG) BY MOUTH DAILY OR AS DIRECTED BY THE COUMADIN CLINIC  warfarin ANTICOAGULANT (COUMADIN) 2.5 MG tablet, Take 10 mg PO daily at 6 pm until next INR check, then dose per INR goal 2-3    No current facility-administered medications on file prior to visit.      ROS:   CONSTITUTIONAL: Denies fever, chills, fatigue, or weight fluctuations.   HEENT: Denies headache, vision changes, and changes in speech.   CV: Refer to HPI.   PULMONARY:Refer to HPI.   GI:Denies nausea, vomiting, diarrhea, and abdominal pain. Bowel movements are regular.   :Denies urinary alterations, dysuria, urinary frequency, hematuria, and abnormal drainage.   EXT:Denies lower extremity edema.    SKIN:Denies abnormal rashes or lesions.   MUSCULOSKELETAL:Denies upper or lower extremity weakness and pain.   NEUROLOGIC:Denies lightheadedness, dizziness, seizures, or upper or lower extremity paresthesia.     EXAM:  BP (!) 79/0 (BP Location: Right arm, Patient Position: Chair, Cuff Size: Adult Large)   Pulse 83   Wt 107.4 kg (236 lb 12.8 oz)   SpO2 99%   BMI 33.94 kg/m       GENERAL: Appears comfortable, in no distress.  HEENT: Eye symmetrical and without discharge or icterus bilaterally. Mucous membranes moist and without lesions.  NECK: Supple, JVD < 6 cm at 90 degrees.   CV: + LVAD hum  RESPIRATORY: Respirations regular, even, and unlabored. Lungs CTA throughout.   GI: Soft and non distended with normoactive bowel sounds present in all quadrants. No tenderness, rebound, guarding. No organomegaly.   EXTREMITIES: no peripheral edema. Non-pulsatile.   NEUROLOGIC: Alert and orientated x 3.  No focal deficits.   MUSCULOSKELETAL: No joint swelling or tenderness.   SKIN: No jaundice. No rashes or lesions. Driveline dressing CDI.    Labs - as reviewed in clinic with patient today:  CBC RESULTS:  Lab Results   Component Value Date    WBC 4.9 08/10/2023    WBC 4.2 09/10/2019    RBC 4.56 08/10/2023    RBC 4.98 09/10/2019    HGB 14.0 08/10/2023    HGB 15.8 09/10/2019    HCT 43.8 08/10/2023    HCT 48.9 09/10/2019    MCV 96 08/10/2023    MCV 98 09/10/2019    MCH 30.7 08/10/2023    MCH 31.7 09/10/2019    MCHC 32.0 08/10/2023    MCHC 32.3 09/10/2019    RDW 14.6 08/10/2023    RDW 12.9 09/10/2019     08/10/2023     09/10/2019       CMP RESULTS:  Lab Results   Component Value Date     08/10/2023     09/10/2019    POTASSIUM 4.7 08/10/2023    POTASSIUM 4.0 06/23/2022    POTASSIUM 3.4 09/10/2019    CHLORIDE 106 08/10/2023    CHLORIDE 105 05/09/2023    CHLORIDE 108 09/10/2019    CO2 26 08/10/2023    CO2 26 06/23/2022    CO2 26 09/10/2019    ANIONGAP 11 08/10/2023    ANIONGAP 9 06/23/2022    ANIONGAP 4  "09/10/2019     (H) 08/10/2023    GLC 65 (L) 06/23/2022    GLC 89 09/10/2019    BUN 20.9 (H) 08/10/2023    BUN 18 06/23/2022    BUN 18 09/10/2019    CR 1.56 (H) 08/10/2023    CR 1.23 09/10/2019    GFRESTIMATED 52 (L) 08/10/2023    GFRESTIMATED 67 09/10/2019    GFRESTBLACK 78 09/10/2019    YESICA 9.7 08/10/2023    YESICA 9.1 09/10/2019    BILITOTAL 0.4 08/10/2023    BILITOTAL 0.6 09/10/2019    ALBUMIN 4.3 08/10/2023    ALBUMIN 3.2 (L) 06/23/2022    ALBUMIN 4.0 09/10/2019    ALKPHOS 74 08/10/2023    ALKPHOS 61 09/10/2019    ALT 24 08/10/2023    ALT 25 09/10/2019    AST 23 08/10/2023    AST 17 09/10/2019        INR RESULTS:  Lab Results   Component Value Date    INR 2.9 12/12/2023    INR 1.02 09/10/2019       Lab Results   Component Value Date    MAG 1.9 08/10/2023    MAG 1.8 09/10/2019     No results found for: \"NTBNPI\"  Lab Results   Component Value Date    NTBNP 254 01/23/2023    NTBNP 714 (H) 10/18/2018         Cardiac Diagnostics    8/10/2023 Device Interrogation   Device: [employer] G148 INOGEN X4 CRT-D(LV Lead off)  Normal device function.  Mode: DDD  bpm  AP: 83%  RVP: 5%  Intrinsic rhythm: SB 40 bpm  Lead Trends Appear Stable: Yes- LV lead OFF  Estimated battery longevity to RRT = 3 years.   Atrial Arrhythmia: None  AF Roma: <1%  Anticoagulant: Warfarin  Ventricular Arrhythmia: None since remote transmission on 5/30/23.  Setting Changes: None  Patient has an appointment to see Dr. Hdz today.   Plan: Device follow-up every 3 months.    8/8/2022 Echo (Care Everywhere)    Left Ventricle: The left ventricle appears normal in size. There is   moderate-to-severe left ventricular hypertrophy. Severely reduced left   ventricular systolic function. HeartMate III LVAD cannula visualized.   There is no aortic valve excursion during the visualized cardiac cycles.     Right Ventricle: The right ventricle appears normal in size. Systolic   function is normal.       Assessment and Plan:   Mr. Morfin is " a 58 year old male with medical history pertinent for severe MR s/p MVR with ring (1/21/15), VT s/p ICD, nonobstructive CAD, NICM previously on home inotropes now s/p HM III LVAD implantation 3/23/22 as current bridge to transplant, with course c/b AFib with RVR (s/p ICD shock 3/25/22) a) who presents for f/up in LVAD clinic.     Appearing and feeling well. Euvolemic by exam. MAP at goal. Review of today's labs with normal lytes and stable end organ function. INR therapeutic. Discussed GI issues and given hx of gastroparesis and no recent GI follow up, we will place a GI referral. I think before rescheduling EGD he should be formally evaluated by GI.     He is currently listed status 4 for transplant. He is due for echo, RHC, and visit with SW in January. We will order RHC today and schedule testing. Tej will follow up with VAD WILIAN in January.       Chronic SHCF s/p HM III LVAD. Severe MR s/p mitral valve repair with annuloplasty ring (1/21/15). Implanted 3/23/22.   Stage D, NYHA Class II      ACEi/ARB Entresto   mg po BID   BB Sotalol 80 mg po BID and metoprolol   Aldosterone antagonist Aldactone 25 mg daily   SGLT2i: Deferred for now given polypharmacy and lots of GI upset (do not want to add more in now)    SCD prophylaxis ICD  Fluid status  euvolemic on current diuretics   MAP: 79, goal 65-85  LDH: stable  Anticoagulation: INR goal 2-3, today 2.6  Antiplatelet: Finish the RICARDO trial, now on aspirin 81 mg daily    VAD interrogation 12/15/23: VAD interrogation reviewed with VAD coordinator. Agree with findings. Frequent PI events with associated speed drops, no power spikes, or other findings suspicious of pump malfunction noted.         History of VT, s/p CRT-D.  ICD shock in May 2023 now on a higher dose of sotalol and on a beta-blocker - presently in remission if he has more shocks may consider trying to upgrade for transplant  - Continue Sotatol, metoprolol for now      Postop AF with RVR. Returned  to SR.   - Coumadin as above.   - We will check and device interrogation if he has no further A. fib we will have a low threshold to stop sotalol     Mild nonobstructive CAD.   - On ASA 81 mg daily      Anxiety   - Recommended connecting with LVAD support group and second chance for life      MIld AI  - surveillance echos     GI upset   Hx gastroparesis   - Refer to GI     Obesity  - he is going to work on diet and exercise he is transplant eligible presently        Follow up:  - Schedule RHC, ECHO, SW and GI appt for Jan  - VAD WILIAN 1/2024  - Dr. Hdz 4/2024      Livia Katz DNP, NP-C  Advance Heart Failure  12/15/23          Please do not hesitate to contact me if you have any questions/concerns.     Sincerely,     GORGE Meza CNP

## 2023-12-17 LAB
MDC_IDC_EPISODE_DTM: NORMAL
MDC_IDC_EPISODE_ID: NORMAL
MDC_IDC_EPISODE_TYPE: NORMAL
MDC_IDC_LEAD_CONNECTION_STATUS: NORMAL
MDC_IDC_LEAD_IMPLANT_DT: NORMAL
MDC_IDC_LEAD_LOCATION: NORMAL
MDC_IDC_LEAD_LOCATION_DETAIL_1: NORMAL
MDC_IDC_LEAD_MFG: NORMAL
MDC_IDC_LEAD_MODEL: NORMAL
MDC_IDC_LEAD_POLARITY_TYPE: NORMAL
MDC_IDC_LEAD_SERIAL: NORMAL
MDC_IDC_MSMT_BATTERY_DTM: NORMAL
MDC_IDC_MSMT_BATTERY_REMAINING_LONGEVITY: 36 MO
MDC_IDC_MSMT_BATTERY_REMAINING_PERCENTAGE: 48 %
MDC_IDC_MSMT_BATTERY_STATUS: NORMAL
MDC_IDC_MSMT_CAP_CHARGE_DTM: NORMAL
MDC_IDC_MSMT_CAP_CHARGE_DTM: NORMAL
MDC_IDC_MSMT_CAP_CHARGE_ENERGY: 31 J
MDC_IDC_MSMT_CAP_CHARGE_TIME: 13.5 S
MDC_IDC_MSMT_CAP_CHARGE_TIME: 7.8 S
MDC_IDC_MSMT_CAP_CHARGE_TYPE: NORMAL
MDC_IDC_MSMT_CAP_CHARGE_TYPE: NORMAL
MDC_IDC_MSMT_LEADCHNL_LV_IMPEDANCE_VALUE: 1033 OHM
MDC_IDC_MSMT_LEADCHNL_LV_LEAD_CHANNEL_STATUS: NORMAL
MDC_IDC_MSMT_LEADCHNL_RA_IMPEDANCE_VALUE: 519 OHM
MDC_IDC_MSMT_LEADCHNL_RA_PACING_THRESHOLD_AMPLITUDE: 0.4 V
MDC_IDC_MSMT_LEADCHNL_RA_PACING_THRESHOLD_PULSEWIDTH: 0.5 MS
MDC_IDC_MSMT_LEADCHNL_RV_IMPEDANCE_VALUE: 423 OHM
MDC_IDC_MSMT_LEADCHNL_RV_PACING_THRESHOLD_AMPLITUDE: 1.1 V
MDC_IDC_MSMT_LEADCHNL_RV_PACING_THRESHOLD_PULSEWIDTH: 0.5 MS
MDC_IDC_PG_IMPLANT_DTM: NORMAL
MDC_IDC_PG_MFG: NORMAL
MDC_IDC_PG_MODEL: NORMAL
MDC_IDC_PG_SERIAL: NORMAL
MDC_IDC_PG_TYPE: NORMAL
MDC_IDC_SESS_CLINIC_NAME: NORMAL
MDC_IDC_SESS_DTM: NORMAL
MDC_IDC_SESS_TYPE: NORMAL
MDC_IDC_SET_BRADY_AT_MODE_SWITCH_MODE: NORMAL
MDC_IDC_SET_BRADY_AT_MODE_SWITCH_RATE: 170 {BEATS}/MIN
MDC_IDC_SET_BRADY_LOWRATE: 80 {BEATS}/MIN
MDC_IDC_SET_BRADY_MAX_TRACKING_RATE: 130 {BEATS}/MIN
MDC_IDC_SET_BRADY_MODE: NORMAL
MDC_IDC_SET_BRADY_PAV_DELAY_HIGH: 280 MS
MDC_IDC_SET_BRADY_PAV_DELAY_LOW: 300 MS
MDC_IDC_SET_BRADY_SAV_DELAY_HIGH: 280 MS
MDC_IDC_SET_BRADY_SAV_DELAY_LOW: 300 MS
MDC_IDC_SET_CRT_PACED_CHAMBERS: NORMAL
MDC_IDC_SET_LEADCHNL_LV_PACING_AMPLITUDE: 0.1 V
MDC_IDC_SET_LEADCHNL_LV_PACING_PULSEWIDTH: 0.1 MS
MDC_IDC_SET_LEADCHNL_LV_SENSING_ADAPTATION_MODE: NORMAL
MDC_IDC_SET_LEADCHNL_LV_SENSING_ANODE_ELECTRODE_1: NORMAL
MDC_IDC_SET_LEADCHNL_LV_SENSING_ANODE_LOCATION_1: NORMAL
MDC_IDC_SET_LEADCHNL_LV_SENSING_CATHODE_ELECTRODE_1: NORMAL
MDC_IDC_SET_LEADCHNL_LV_SENSING_CATHODE_LOCATION_1: NORMAL
MDC_IDC_SET_LEADCHNL_LV_SENSING_SENSITIVITY: 1 MV
MDC_IDC_SET_LEADCHNL_RA_PACING_AMPLITUDE: 1.5 V
MDC_IDC_SET_LEADCHNL_RA_PACING_POLARITY: NORMAL
MDC_IDC_SET_LEADCHNL_RA_PACING_PULSEWIDTH: 0.5 MS
MDC_IDC_SET_LEADCHNL_RA_SENSING_ADAPTATION_MODE: NORMAL
MDC_IDC_SET_LEADCHNL_RA_SENSING_POLARITY: NORMAL
MDC_IDC_SET_LEADCHNL_RA_SENSING_SENSITIVITY: 0.25 MV
MDC_IDC_SET_LEADCHNL_RV_PACING_AMPLITUDE: 2.8 V
MDC_IDC_SET_LEADCHNL_RV_PACING_POLARITY: NORMAL
MDC_IDC_SET_LEADCHNL_RV_PACING_PULSEWIDTH: 0.5 MS
MDC_IDC_SET_LEADCHNL_RV_SENSING_ADAPTATION_MODE: NORMAL
MDC_IDC_SET_LEADCHNL_RV_SENSING_POLARITY: NORMAL
MDC_IDC_SET_LEADCHNL_RV_SENSING_SENSITIVITY: 0.6 MV
MDC_IDC_SET_ZONE_DETECTION_INTERVAL: 273 MS
MDC_IDC_SET_ZONE_STATUS: NORMAL
MDC_IDC_SET_ZONE_TYPE: NORMAL
MDC_IDC_SET_ZONE_VENDOR_TYPE: NORMAL
MDC_IDC_STAT_AT_BURDEN_PERCENT: 0 %
MDC_IDC_STAT_AT_DTM_END: NORMAL
MDC_IDC_STAT_AT_DTM_START: NORMAL
MDC_IDC_STAT_BRADY_DTM_END: NORMAL
MDC_IDC_STAT_BRADY_DTM_START: NORMAL
MDC_IDC_STAT_BRADY_RA_PERCENT_PACED: 94 %
MDC_IDC_STAT_BRADY_RV_PERCENT_PACED: 3 %
MDC_IDC_STAT_CRT_DTM_END: NORMAL
MDC_IDC_STAT_CRT_DTM_START: NORMAL
MDC_IDC_STAT_CRT_LV_PERCENT_PACED: 0 %
MDC_IDC_STAT_EPISODE_RECENT_COUNT: 0
MDC_IDC_STAT_EPISODE_RECENT_COUNT_DTM_END: NORMAL
MDC_IDC_STAT_EPISODE_RECENT_COUNT_DTM_START: NORMAL
MDC_IDC_STAT_EPISODE_TYPE: NORMAL
MDC_IDC_STAT_EPISODE_VENDOR_TYPE: NORMAL
MDC_IDC_STAT_TACHYTHERAPY_ATP_DELIVERED_RECENT: 0
MDC_IDC_STAT_TACHYTHERAPY_ATP_DELIVERED_TOTAL: 4
MDC_IDC_STAT_TACHYTHERAPY_RECENT_DTM_END: NORMAL
MDC_IDC_STAT_TACHYTHERAPY_RECENT_DTM_START: NORMAL
MDC_IDC_STAT_TACHYTHERAPY_SHOCKS_ABORTED_RECENT: 0
MDC_IDC_STAT_TACHYTHERAPY_SHOCKS_ABORTED_TOTAL: 1
MDC_IDC_STAT_TACHYTHERAPY_SHOCKS_DELIVERED_RECENT: 0
MDC_IDC_STAT_TACHYTHERAPY_SHOCKS_DELIVERED_TOTAL: 4
MDC_IDC_STAT_TACHYTHERAPY_TOTAL_DTM_END: NORMAL
MDC_IDC_STAT_TACHYTHERAPY_TOTAL_DTM_START: NORMAL

## 2023-12-20 ENCOUNTER — TELEPHONE (OUTPATIENT)
Dept: GASTROENTEROLOGY | Facility: CLINIC | Age: 58
End: 2023-12-20
Payer: MEDICARE

## 2023-12-20 LAB — LABCORP INTERFACED MISCELLANEOUS TEST RESULT: NORMAL

## 2023-12-20 NOTE — TELEPHONE ENCOUNTER
"Endoscopy Scheduling Screen    Have you had a positive Covid test in the last 14 days?  No    Are you active on MyChart?   Yes    What insurance is in the chart?  Other:  MEDICARE    Ordering/Referring Provider: DENNYS RIGGS   (If ordering provider performs procedure, schedule with ordering provider unless otherwise instructed. )    BMI: Estimated body mass index is 33.94 kg/m  as calculated from the following:    Height as of 8/10/23: 1.779 m (5' 10.04\").    Weight as of 12/15/23: 107.4 kg (236 lb 12.8 oz).     Sedation Ordered  moderate sedation.   If patient BMI > 50 do not schedule in [facility].    If patient BMI > 45 do not schedule at [facility].    Are you taking methadone or Suboxone?  No    Are you taking any prescription medications for pain 3 or more times per week?   NO - No RN review required.    Do you have a history of malignant hyperthermia or adverse reaction to anesthesia?  No    (Females) Are you currently pregnant?   No     Have you been diagnosed or told you have pulmonary hypertension?   No    Do you have an LVAD?  Yes, MAC required. (Schedule at [facility]/OR. PAC evaluation required.)    Have you been told you have moderate to severe sleep apnea?  Yes (RN Review required for scheduling unless scheduling in Hospital.)    Have you been told you have COPD, asthma, or any other lung disease?  No    Do you have any heart conditions?  Yes     In the past 6 months, have you had any hospitalizations for heart related issues including cardiomyopathy, heart attack, or stent placement?  No    Do you have any implantable devices in your body (pacemaker, ICD)?  Pacemaker (schedule colonoscopy in Hospital Only)    Do you take nitroglycerine?  Yes, less than 1 time per week    Have you ever had an organ transplant?   No    Have you ever had or are you awaiting a heart or lung transplant?   Yes (Hospital Only)    Have you had a stroke or transient ischemic attack (TIA aka \"mini stroke\" in the last 6 months?   No    Have you " "been diagnosed with or been told you have cirrhosis of the liver?   No    Are you currently on dialysis?   No    Do you need assistance transferring?   No    BMI: Estimated body mass index is 33.94 kg/m  as calculated from the following:    Height as of 8/10/23: 1.779 m (5' 10.04\").    Weight as of 12/15/23: 107.4 kg (236 lb 12.8 oz).     Is patients BMI > 40 and scheduling location [facility]?  No    Do you take an injectable medication for weight loss or diabetes (excluding insulin)?  No    Do you take the medication Naltrexone?  No    Do you take blood thinners?  Yes     Are you taking Effient/Prasugrel?  No, you must contact your prescribing provider for direction on holding or bridging with a different medication.       Prep   Are you currently on dialysis or do you have chronic kidney disease?  Yes ([facility] Prep)    Do you have a diagnosis of diabetes?  No    Do you have a diagnosis of cystic fibrosis (CF)?  No    On a regular basis do you go 3 -5 days between bowel movements?  No    BMI > 40?  No    Preferred Pharmacy:    [facility] [id number] [place] [facility] [place] - [address]  [address] [facility] [place] [address]  Phone: [phone] Fax: [phone]    Final Scheduling Details   Colonoscopy prep sent?  N/A    Procedure scheduled  Upper endoscopy (EGD)    Surgeon:  FEDERICO     Date of procedure:  5/2/24     Pre-OP / PAC:   Yes - PAC clinic evaluation scheduled.    Location  [facility] - Per order.    Sedation   MAC/Deep Sedation - Per exclusion criteria.      Patient Reminders:   You will receive a call from a Nurse to review instructions and health history.  This assessment must be completed prior to your procedure.  Failure to complete the Nurse assessment may result in the procedure being cancelled.      On the day of your procedure, please designate an adult(s) who can drive you home stay with you for the next 24 hours. The medicines used in the exam will make you sleepy. You will not be able to drive.      " You cannot take public transportation, ride share services, or non-medical taxi service without a responsible caregiver.  Medical transport services are allowed with the requirement that a responsible caregiver will receive you at your destination.  We require that drivers and caregivers are confirmed prior to your procedure.

## 2023-12-21 DIAGNOSIS — Z95.811 LVAD (LEFT VENTRICULAR ASSIST DEVICE) PRESENT (H): ICD-10-CM

## 2023-12-21 DIAGNOSIS — I50.22 CHRONIC SYSTOLIC CHF (CONGESTIVE HEART FAILURE) (H): ICD-10-CM

## 2023-12-21 RX ORDER — WARFARIN SODIUM 2.5 MG/1
TABLET ORAL
Qty: 180 TABLET | Refills: 1 | Status: SHIPPED | OUTPATIENT
Start: 2023-12-21 | End: 2024-07-05

## 2023-12-21 NOTE — TELEPHONE ENCOUNTER
ANTICOAGULATION MANAGEMENT:  Medication Refill    Anticoagulation Summary  As of 12/15/2023      Warfarin maintenance plan:  5 mg (2.5 mg x 2) every day   Next INR check:  12/29/2023   Target end date:  Indefinite    Indications    LVAD (left ventricular assist device) present (H) [Z95.811]  Chronic systolic CHF (congestive heart failure) (H) [I50.22]  S/P mitral valve replacement [Z95.2]  Paroxysmal ventricular tachycardia (H) [I47.29]                 Anticoagulation Care Providers       Provider Role Specialty Phone number    Dunia Hdz MD Referring Cardiovascular Disease [phone]            Refill Criteria    Visit with referring provider/group: Meets criteria: office visit within referring provider group in the last 1 year on 12/15/23    [facility] referral signed last signed: 03/31/2023; within last year: Yes    Lab monitoring not exceeding 2 weeks overdue: Yes    Tej meets all criteria for refill. Rx instructions and quantity supplied updated to match patient's current dosing plan. Warfarin 90 day supply with 1 refill granted per [facility] protocol     Nanda Marin RN  Anticoagulation Clinic

## 2023-12-26 ENCOUNTER — ANTICOAGULATION THERAPY VISIT (OUTPATIENT)
Dept: ANTICOAGULATION | Facility: CLINIC | Age: 58
End: 2023-12-26
Payer: MEDICARE

## 2023-12-26 DIAGNOSIS — Z95.2 S/P MITRAL VALVE REPLACEMENT: ICD-10-CM

## 2023-12-26 DIAGNOSIS — I47.29 PAROXYSMAL VENTRICULAR TACHYCARDIA (H): ICD-10-CM

## 2023-12-26 DIAGNOSIS — Z95.811 LVAD (LEFT VENTRICULAR ASSIST DEVICE) PRESENT (H): Primary | ICD-10-CM

## 2023-12-26 DIAGNOSIS — I50.22 CHRONIC SYSTOLIC CHF (CONGESTIVE HEART FAILURE) (H): ICD-10-CM

## 2023-12-26 LAB — INR HOME MONITORING: 3.2 (ref 2–3)

## 2023-12-26 NOTE — PROGRESS NOTES
ANTICOAGULATION MANAGEMENT     Tej Morfin 58 year old male is on warfarin with supratherapeutic INR result. (Goal INR 2.0-3.0)    Recent labs: (last 7 days)     12/26/23  0000   INR 3.2*       ASSESSMENT     Source(s): Chart Review and Patient/Caregiver Call     Warfarin doses taken: Warfarin taken as instructed  Diet:  Holiday meal may be affecting diet and INR  Medication/supplement changes: None noted  New illness, injury, or hospitalization: No  Signs or symptoms of bleeding or clotting: No  Previous result: Therapeutic last 2(+) visits  Additional findings: None       PLAN     Recommended plan for temporary change(s) affecting INR     Dosing Instructions: partial hold then continue your current warfarin dose with next INR in 1-2 weeks       Summary  As of 12/26/2023      Full warfarin instructions:  12/26: 2.5 mg; Otherwise 5 mg every day   Next INR check:  1/9/2024               Telephone call with Tej who agrees to plan and repeated back plan correctly    Patient to recheck with home meter    Education provided:   Goal range and lab monitoring: goal range and significance of current result  Dietary considerations: importance of consistent vitamin K intake    Plan made per [facility] anticoagulation protocol and per LVAD protocol    Zara Don RN  Anticoagulation Clinic  12/26/2023    _______________________________________________________________________     Anticoagulation Episode Summary       Current INR goal:  2.0-3.0   TTR:  81.4% (1 y)   Target end date:  Indefinite   Send INR reminders to:  ANTICOAG LVAD    Indications    LVAD (left ventricular assist device) present (H) [Z95.811]  Chronic systolic CHF (congestive heart failure) (H) [I50.22]  S/P mitral valve replacement [Z95.2]  Paroxysmal ventricular tachycardia (H) [I47.29]             Comments:  Follow VAD Anticoag protocol:Yes: HeartMate 3  Bridging: per protocol  Date VAD placed: 03/23/22  INR Goal: per referral  Hx Mitral valve repair  and not replacement             Anticoagulation Care Providers       Provider Role Specialty Phone number    Dunia Hdz MD Referring Cardiovascular Disease [phone]

## 2024-01-02 LAB — INR HOME MONITORING: 3 (ref 2–3)

## 2024-01-03 ENCOUNTER — CARE COORDINATION (OUTPATIENT)
Dept: TRANSPLANT | Facility: CLINIC | Age: 59
End: 2024-01-03
Payer: MEDICARE

## 2024-01-03 ENCOUNTER — ANTICOAGULATION THERAPY VISIT (OUTPATIENT)
Dept: ANTICOAGULATION | Facility: CLINIC | Age: 59
End: 2024-01-03
Payer: MEDICARE

## 2024-01-03 ENCOUNTER — CARE COORDINATION (OUTPATIENT)
Dept: CARDIOLOGY | Facility: CLINIC | Age: 59
End: 2024-01-03
Payer: MEDICARE

## 2024-01-03 DIAGNOSIS — Z95.811 LVAD (LEFT VENTRICULAR ASSIST DEVICE) PRESENT (H): Primary | ICD-10-CM

## 2024-01-03 DIAGNOSIS — Z79.899 LONG TERM USE OF DRUG: ICD-10-CM

## 2024-01-03 DIAGNOSIS — Z72.89 OTHER PROBLEMS RELATED TO LIFESTYLE: ICD-10-CM

## 2024-01-03 DIAGNOSIS — I50.22 CHRONIC SYSTOLIC CHF (CONGESTIVE HEART FAILURE) (H): ICD-10-CM

## 2024-01-03 DIAGNOSIS — I47.29 PAROXYSMAL VENTRICULAR TACHYCARDIA (H): ICD-10-CM

## 2024-01-03 DIAGNOSIS — Z12.5 ENCOUNTER FOR SCREENING FOR MALIGNANT NEOPLASM OF PROSTATE: ICD-10-CM

## 2024-01-03 DIAGNOSIS — I50.22 CHRONIC SYSTOLIC (CONGESTIVE) HEART FAILURE (H): Primary | ICD-10-CM

## 2024-01-03 DIAGNOSIS — Z95.2 S/P MITRAL VALVE REPLACEMENT: ICD-10-CM

## 2024-01-03 NOTE — PROGRESS NOTES
ANTICOAGULATION MANAGEMENT     Tej Morfin 58 year old male is on warfarin with therapeutic INR result. (Goal INR 2.0-3.0)    Recent labs: (last 7 days)     01/02/24  0000   INR 3.0       ASSESSMENT     Source(s): Chart Review  Previous INR was Supratherapeutic  Medication, diet, health changes since last INR chart reviewed; none identified  I left a detailed voicemail with the orders reflected in flowsheet. I have also requested a call back if there have been any missed doses, concerns, illness, fever, or if there have been any changes in medications, activity level, or diet         PLAN     Recommended plan for no diet, medication or health factor changes affecting INR     Dosing Instructions: Continue your current warfarin dose with next INR in 2 weeks       Summary  As of 1/3/2024      Full warfarin instructions:  5 mg every day   Next INR check:  1/16/2024               Detailed voice message left for Tej with dosing instructions and follow up date.     Patient to recheck with home meter    Education provided:   Please call back if any changes to your diet, medications or how you've been taking warfarin    Plan made per [facility] anticoagulation protocol    Nanda Marin, RN  Anticoagulation Clinic  1/3/2024    _______________________________________________________________________     Anticoagulation Episode Summary       Current INR goal:  2.0-3.0   TTR:  79.4% (1 y)   Target end date:  Indefinite   Send INR reminders to:  ANTICOAG LVAD    Indications    LVAD (left ventricular assist device) present (H) [Z95.811]  Chronic systolic CHF (congestive heart failure) (H) [I50.22]  S/P mitral valve replacement [Z95.2]  Paroxysmal ventricular tachycardia (H) [I47.29]             Comments:  Follow VAD Anticoag protocol:Yes: HeartMate 3  Bridging: per protocol  Date VAD placed: 03/23/22  INR Goal: per referral  Hx Mitral valve repair and not replacement             Anticoagulation Care Providers       Provider  Role Specialty Phone number    Dunia Hdz MD Referring Cardiovascular Disease [phone]

## 2024-01-03 NOTE — PROGRESS NOTES
Pt in need of labs, echo, right heart cath, social work and cardiology appt for 2024 annual testing.  Called pt to remind him of testing/appt's needed, will attempt to arrange for March '24.  Pt did not answer.  Message left requesting pt to call scheduling to arrange these.

## 2024-01-05 ENCOUNTER — TELEPHONE (OUTPATIENT)
Dept: TRANSPLANT | Facility: CLINIC | Age: 59
End: 2024-01-05
Payer: MEDICARE

## 2024-01-05 NOTE — TELEPHONE ENCOUNTER
[facility] Call Center    Phone Message    May a detailed message be left on voicemail: no     Reason for Call: Symptoms or Concerns     Patient stated that he has a lot of anxiety about the upcoming RHC procedure on 03/13/2024.  He would like to discuss this with coordinator/care team.  Please reach out to patient.  Thank you!    Action Taken: Message routed to:  [facility] DEIRDRE SOT    Travel Screening: Not Applicable

## 2024-01-05 NOTE — TELEPHONE ENCOUNTER
Cath Lab Case Request/Order    Location: [address] [address] [facility] Waiting Room    Procedure: Right Heart Cath (RHC)    Procedure Date: 03/13/2024     Patient Arrival Time: 8:30 AM    Procedure Time: 0830 (pending emergency)    Ordering Provider: Dr. Dunia Hdz    Performing Cardiologist:  JOSY    Inpatient Bed Needed: No    Post-  Procedure WILIAN appointment scheduled (1 - 2 weeks): N/A, RHC    Communicated Patient Instructions:     NPO, nothing to eat 8 hours and drink 2 hours before arrival time: Yes     , need to arrange a ride home - unable to drive post- procedure: Yes     Adult at home, need a responsible adult to stay with patient 24 hours post- procedure: YES    Appointment was scheduled: Over the phone    Patient expressed understanding of above instructions and denied further questions at this time.    Effie Rubalcava

## 2024-01-08 LAB
MDC_IDC_LEAD_IMPLANT_DT: NORMAL
MDC_IDC_LEAD_LOCATION: NORMAL
MDC_IDC_LEAD_LOCATION_DETAIL_1: NORMAL
MDC_IDC_LEAD_MFG: NORMAL
MDC_IDC_LEAD_MODEL: NORMAL
MDC_IDC_LEAD_POLARITY_TYPE: NORMAL
MDC_IDC_LEAD_SERIAL: NORMAL
MDC_IDC_MSMT_BATTERY_DTM: NORMAL
MDC_IDC_MSMT_BATTERY_REMAINING_LONGEVITY: 42 MO
MDC_IDC_MSMT_BATTERY_REMAINING_PERCENTAGE: 56 %
MDC_IDC_MSMT_BATTERY_STATUS: NORMAL
MDC_IDC_MSMT_CAP_CHARGE_DTM: NORMAL
MDC_IDC_MSMT_CAP_CHARGE_DTM: NORMAL
MDC_IDC_MSMT_CAP_CHARGE_ENERGY: 31 J
MDC_IDC_MSMT_CAP_CHARGE_TIME: 13.1 S
MDC_IDC_MSMT_CAP_CHARGE_TIME: 7.8 S
MDC_IDC_MSMT_CAP_CHARGE_TYPE: NORMAL
MDC_IDC_MSMT_CAP_CHARGE_TYPE: NORMAL
MDC_IDC_MSMT_LEADCHNL_LV_IMPEDANCE_VALUE: 1044 OHM
MDC_IDC_MSMT_LEADCHNL_LV_LEAD_CHANNEL_STATUS: NORMAL
MDC_IDC_MSMT_LEADCHNL_LV_SENSING_INTR_AMPL: 1 MV
MDC_IDC_MSMT_LEADCHNL_RA_IMPEDANCE_VALUE: 529 OHM
MDC_IDC_MSMT_LEADCHNL_RA_PACING_THRESHOLD_AMPLITUDE: 0.5 V
MDC_IDC_MSMT_LEADCHNL_RA_PACING_THRESHOLD_PULSEWIDTH: 0.5 MS
MDC_IDC_MSMT_LEADCHNL_RV_IMPEDANCE_VALUE: 421 OHM
MDC_IDC_MSMT_LEADCHNL_RV_PACING_THRESHOLD_AMPLITUDE: 1.1 V
MDC_IDC_MSMT_LEADCHNL_RV_PACING_THRESHOLD_PULSEWIDTH: 0.5 MS
MDC_IDC_PG_IMPLANT_DTM: NORMAL
MDC_IDC_PG_MFG: NORMAL
MDC_IDC_PG_MODEL: NORMAL
MDC_IDC_PG_SERIAL: NORMAL
MDC_IDC_PG_TYPE: NORMAL
MDC_IDC_SESS_CLINIC_NAME: NORMAL
MDC_IDC_SESS_DTM: NORMAL
MDC_IDC_SESS_TYPE: NORMAL
MDC_IDC_SET_BRADY_AT_MODE_SWITCH_MODE: NORMAL
MDC_IDC_SET_BRADY_AT_MODE_SWITCH_RATE: 170 {BEATS}/MIN
MDC_IDC_SET_BRADY_LOWRATE: 80 {BEATS}/MIN
MDC_IDC_SET_BRADY_MAX_TRACKING_RATE: 130 {BEATS}/MIN
MDC_IDC_SET_BRADY_MODE: NORMAL
MDC_IDC_SET_BRADY_PAV_DELAY_HIGH: 280 MS
MDC_IDC_SET_BRADY_PAV_DELAY_LOW: 300 MS
MDC_IDC_SET_BRADY_SAV_DELAY_HIGH: 280 MS
MDC_IDC_SET_BRADY_SAV_DELAY_LOW: 300 MS
MDC_IDC_SET_CRT_PACED_CHAMBERS: NORMAL
MDC_IDC_SET_LEADCHNL_LV_PACING_AMPLITUDE: 0.1 V
MDC_IDC_SET_LEADCHNL_LV_PACING_PULSEWIDTH: 0.1 MS
MDC_IDC_SET_LEADCHNL_LV_SENSING_ADAPTATION_MODE: NORMAL
MDC_IDC_SET_LEADCHNL_LV_SENSING_ANODE_ELECTRODE_1: NORMAL
MDC_IDC_SET_LEADCHNL_LV_SENSING_ANODE_LOCATION_1: NORMAL
MDC_IDC_SET_LEADCHNL_LV_SENSING_CATHODE_ELECTRODE_1: NORMAL
MDC_IDC_SET_LEADCHNL_LV_SENSING_CATHODE_LOCATION_1: NORMAL
MDC_IDC_SET_LEADCHNL_LV_SENSING_SENSITIVITY: 1 MV
MDC_IDC_SET_LEADCHNL_RA_PACING_AMPLITUDE: 1.5 V
MDC_IDC_SET_LEADCHNL_RA_PACING_POLARITY: NORMAL
MDC_IDC_SET_LEADCHNL_RA_PACING_PULSEWIDTH: 0.5 MS
MDC_IDC_SET_LEADCHNL_RA_SENSING_ADAPTATION_MODE: NORMAL
MDC_IDC_SET_LEADCHNL_RA_SENSING_POLARITY: NORMAL
MDC_IDC_SET_LEADCHNL_RA_SENSING_SENSITIVITY: 0.25 MV
MDC_IDC_SET_LEADCHNL_RV_PACING_AMPLITUDE: 2.8 V
MDC_IDC_SET_LEADCHNL_RV_PACING_POLARITY: NORMAL
MDC_IDC_SET_LEADCHNL_RV_PACING_PULSEWIDTH: 0.5 MS
MDC_IDC_SET_LEADCHNL_RV_SENSING_ADAPTATION_MODE: NORMAL
MDC_IDC_SET_LEADCHNL_RV_SENSING_POLARITY: NORMAL
MDC_IDC_SET_LEADCHNL_RV_SENSING_SENSITIVITY: 0.6 MV
MDC_IDC_SET_ZONE_DETECTION_INTERVAL: 273 MS
MDC_IDC_SET_ZONE_TYPE: NORMAL
MDC_IDC_SET_ZONE_VENDOR_TYPE: NORMAL
MDC_IDC_STAT_AT_BURDEN_PERCENT: 1 %
MDC_IDC_STAT_AT_DTM_END: NORMAL
MDC_IDC_STAT_AT_DTM_START: NORMAL
MDC_IDC_STAT_BRADY_DTM_END: NORMAL
MDC_IDC_STAT_BRADY_DTM_START: NORMAL
MDC_IDC_STAT_BRADY_RA_PERCENT_PACED: 83 %
MDC_IDC_STAT_BRADY_RV_PERCENT_PACED: 5 %
MDC_IDC_STAT_CRT_DTM_END: NORMAL
MDC_IDC_STAT_CRT_DTM_START: NORMAL
MDC_IDC_STAT_CRT_LV_PERCENT_PACED: 0 %
MDC_IDC_STAT_EPISODE_RECENT_COUNT: 0
MDC_IDC_STAT_EPISODE_RECENT_COUNT_DTM_END: NORMAL
MDC_IDC_STAT_EPISODE_RECENT_COUNT_DTM_START: NORMAL
MDC_IDC_STAT_EPISODE_TYPE: NORMAL
MDC_IDC_STAT_EPISODE_VENDOR_TYPE: NORMAL
MDC_IDC_STAT_TACHYTHERAPY_ATP_DELIVERED_RECENT: 0
MDC_IDC_STAT_TACHYTHERAPY_ATP_DELIVERED_TOTAL: 4
MDC_IDC_STAT_TACHYTHERAPY_RECENT_DTM_END: NORMAL
MDC_IDC_STAT_TACHYTHERAPY_RECENT_DTM_START: NORMAL
MDC_IDC_STAT_TACHYTHERAPY_SHOCKS_ABORTED_RECENT: 0
MDC_IDC_STAT_TACHYTHERAPY_SHOCKS_ABORTED_TOTAL: 1
MDC_IDC_STAT_TACHYTHERAPY_SHOCKS_DELIVERED_RECENT: 0
MDC_IDC_STAT_TACHYTHERAPY_SHOCKS_DELIVERED_TOTAL: 4
MDC_IDC_STAT_TACHYTHERAPY_TOTAL_DTM_END: NORMAL
MDC_IDC_STAT_TACHYTHERAPY_TOTAL_DTM_START: NORMAL

## 2024-01-10 ENCOUNTER — TELEPHONE (OUTPATIENT)
Dept: TRANSPLANT | Facility: CLINIC | Age: 59
End: 2024-01-10
Payer: MEDICARE

## 2024-01-10 VITALS — WEIGHT: 238 LBS | BODY MASS INDEX: 34.11 KG/M2

## 2024-01-11 NOTE — TELEPHONE ENCOUNTER
Call back: Spoke to Tej, he is concerned about a reaction he had at his last heart cath. He has a history of anxiety and panic attacks and towards the end of his last procedure he became anxious and felt the need to sit up due to a sensation that he could not swallow. He is worried about how the next procedure will go.    Recommended  that he tell the provider/team his concerns prior to the procedure so that they can address his concerns. He is hoping that they can place the catheter at a different access other then his neck.     Tej understood and agrees with this plan.

## 2024-01-16 ENCOUNTER — ANTICOAGULATION THERAPY VISIT (OUTPATIENT)
Dept: ANTICOAGULATION | Facility: CLINIC | Age: 59
End: 2024-01-16
Payer: MEDICARE

## 2024-01-16 DIAGNOSIS — I47.29 PAROXYSMAL VENTRICULAR TACHYCARDIA (H): ICD-10-CM

## 2024-01-16 DIAGNOSIS — I50.22 CHRONIC SYSTOLIC CHF (CONGESTIVE HEART FAILURE) (H): ICD-10-CM

## 2024-01-16 DIAGNOSIS — Z95.811 LVAD (LEFT VENTRICULAR ASSIST DEVICE) PRESENT (H): Primary | ICD-10-CM

## 2024-01-16 DIAGNOSIS — Z95.2 S/P MITRAL VALVE REPLACEMENT: ICD-10-CM

## 2024-01-16 LAB — INR HOME MONITORING: 3.3 (ref 2–3)

## 2024-01-16 NOTE — PROGRESS NOTES
ANTICOAGULATION MANAGEMENT     Tej Morfin 58 year old male is on warfarin with supratherapeutic INR result. (Goal INR 2.0-3.0)    Recent labs: (last 7 days)     01/16/24  0000   INR 3.3*       ASSESSMENT     Source(s): Chart Review and Patient/Caregiver Call     Warfarin doses taken: Warfarin taken as instructed  Diet:  Has been eating more fruit  Medication/supplement changes: None noted  New illness, injury, or hospitalization: No  Signs or symptoms of bleeding or clotting: No  Previous result: Therapeutic last visit; previously outside of goal range  Additional findings: None       PLAN     Recommended plan for no diet, medication or health factor changes affecting INR     Dosing Instructions: decrease your warfarin dose (7.1% change) with next INR in 2 weeks       Summary  As of 1/16/2024      Full warfarin instructions:  2.5 mg every Tue; 5 mg all other days   Next INR check:  1/30/2024               Telephone call with Tej who verbalizes understanding and agrees to plan    Patient to recheck with home meter    Education provided:   Please call back if any changes to your diet, medications or how you've been taking warfarin  Goal range and lab monitoring: goal range and significance of current result  Dietary considerations: impact of vitamin K foods on INR and importance of notifying [facility] to changes in diet    Plan made per [facility] anticoagulation protocol and per LVAD protocol    Cecily Tovar RN  Anticoagulation Clinic  1/16/2024    _______________________________________________________________________     Anticoagulation Episode Summary       Current INR goal:  2.0-3.0   TTR:  75.6% (1 y)   Target end date:  Indefinite   Send INR reminders to:  ANTICOAG LVAD    Indications    LVAD (left ventricular assist device) present (H) [Z95.811]  Chronic systolic CHF (congestive heart failure) (H) [I50.22]  S/P mitral valve replacement [Z95.2]  Paroxysmal ventricular tachycardia (H) [I47.29]              Comments:  Follow VAD Anticoag protocol:Yes: HeartMate 3  Bridging: per protocol  Date VAD placed: 03/23/22  INR Goal: per referral  Hx Mitral valve repair and not replacement             Anticoagulation Care Providers       Provider Role Specialty Phone number    Dunia Hdz MD Referring Cardiovascular Disease [phone]

## 2024-01-22 ENCOUNTER — CARE COORDINATION (OUTPATIENT)
Dept: TRANSPLANT | Facility: CLINIC | Age: 59
End: 2024-01-22
Payer: MEDICARE

## 2024-01-22 NOTE — PROGRESS NOTES
Status 4 Heart Extension Complete 01/22/2024    This patient meets status 4 criteria as evidenced by:     Dischargeable LVAD without discretionary 30 days     Patient's primary cardiologist and/or attending physician is in agreement with this plan.      Status 4 Extension due 04/26/2024

## 2024-01-30 ENCOUNTER — ANTICOAGULATION THERAPY VISIT (OUTPATIENT)
Dept: ANTICOAGULATION | Facility: CLINIC | Age: 59
End: 2024-01-30
Payer: MEDICARE

## 2024-01-30 DIAGNOSIS — Z95.2 S/P MITRAL VALVE REPLACEMENT: ICD-10-CM

## 2024-01-30 DIAGNOSIS — I50.22 CHRONIC SYSTOLIC CHF (CONGESTIVE HEART FAILURE) (H): ICD-10-CM

## 2024-01-30 DIAGNOSIS — Z95.811 LVAD (LEFT VENTRICULAR ASSIST DEVICE) PRESENT (H): Primary | ICD-10-CM

## 2024-01-30 DIAGNOSIS — I47.29 PAROXYSMAL VENTRICULAR TACHYCARDIA (H): ICD-10-CM

## 2024-01-30 LAB — INR HOME MONITORING: 2.5 (ref 2–3)

## 2024-01-30 NOTE — PROGRESS NOTES
ANTICOAGULATION MANAGEMENT     eTj Morfin 58 year old male is on warfarin with therapeutic INR result. (Goal INR 2.0-3.0)    Recent labs: (last 7 days)     01/30/24  0000   INR 2.5       ASSESSMENT     Source(s): Chart Review and Patient/Caregiver Call     Warfarin doses taken: Warfarin taken as instructed  Diet: No new diet changes identified  Medication/supplement changes: None noted  New illness, injury, or hospitalization: No  Signs or symptoms of bleeding or clotting: No  Previous result: Supratherapeutic  Additional findings:  7.1% maintenance dose decrease last encounter (2 weeks ago)       PLAN     Recommended plan for no diet, medication or health factor changes affecting INR     Dosing Instructions: Continue your current warfarin dose with next INR in 2 weeks       Summary  As of 1/30/2024      Full warfarin instructions:  2.5 mg every Tue; 5 mg all other days   Next INR check:  2/13/2024               Telephone call with Tej who agrees to plan and repeated back plan correctly    Patient to recheck with home meter    Education provided:   Goal range and lab monitoring: goal range and significance of current result and Importance of following up at instructed interval  Contact [phone] with any changes, questions or concerns.     Plan made per [facility] anticoagulation protocol and per LVAD protocol    SAVANNA MO RN  Anticoagulation Clinic  1/30/2024    _______________________________________________________________________     Anticoagulation Episode Summary       Current INR goal:  2.0-3.0   TTR:  74.2% (1 y)   Target end date:  Indefinite   Send INR reminders to:  ANTICOAG LVAD    Indications    LVAD (left ventricular assist device) present (H) [Z95.811]  Chronic systolic CHF (congestive heart failure) (H) [I50.22]  S/P mitral valve replacement [Z95.2]  Paroxysmal ventricular tachycardia (H) [I47.29]             Comments:  Follow VAD Anticoag protocol:Yes: HeartMate 3  Bridging: per  protocol  Date VAD placed: 03/23/22  INR Goal: per referral  Hx Mitral valve repair and not replacement             Anticoagulation Care Providers       Provider Role Specialty Phone number    Dunia Hdz MD Referring Cardiovascular Disease [phone]

## 2024-02-13 ENCOUNTER — ANTICOAGULATION THERAPY VISIT (OUTPATIENT)
Dept: ANTICOAGULATION | Facility: CLINIC | Age: 59
End: 2024-02-13
Payer: MEDICARE

## 2024-02-13 DIAGNOSIS — Z95.2 S/P MITRAL VALVE REPLACEMENT: ICD-10-CM

## 2024-02-13 DIAGNOSIS — I50.22 CHRONIC SYSTOLIC CHF (CONGESTIVE HEART FAILURE) (H): ICD-10-CM

## 2024-02-13 DIAGNOSIS — Z95.811 LVAD (LEFT VENTRICULAR ASSIST DEVICE) PRESENT (H): Primary | ICD-10-CM

## 2024-02-13 DIAGNOSIS — I47.29 PAROXYSMAL VENTRICULAR TACHYCARDIA (H): ICD-10-CM

## 2024-02-13 LAB — INR HOME MONITORING: 2.2 (ref 2–3)

## 2024-02-13 NOTE — PROGRESS NOTES
ANTICOAGULATION MANAGEMENT     Tej SWENSON Shelbie 58 year old male is on warfarin with therapeutic INR result. (Goal INR 2.0-3.0)    Recent labs: (last 7 days)     02/13/24  0000   INR 2.2       ASSESSMENT     Source(s): Chart Review and Patient/Caregiver Call     Warfarin doses taken: Warfarin taken as instructed  Diet: No new diet changes identified  Medication/supplement changes: None noted  New illness, injury, or hospitalization: No  Signs or symptoms of bleeding or clotting: No  Previous result: Therapeutic last visit; previously outside of goal range  Additional findings: None       PLAN     Recommended plan for no diet, medication or health factor changes affecting INR     Dosing Instructions: Continue your current warfarin dose with next INR in 2 weeks       Summary  As of 2/13/2024      Full warfarin instructions:  2.5 mg every Tue; 5 mg all other days   Next INR check:  2/27/2024               Telephone call with Tej who verbalizes understanding and agrees to plan and who agrees to plan and repeated back plan correctly    Patient to recheck with home meter    Education provided:   Taking warfarin: Importance of taking warfarin as instructed  Goal range and lab monitoring: goal range and significance of current result, Importance of therapeutic range, and Importance of following up at instructed interval    Plan made per [facility] anticoagulation protocol and per LVAD protocol    Audelia Avalos RN  Anticoagulation Clinic  2/13/2024    _______________________________________________________________________     Anticoagulation Episode Summary       Current INR goal:  2.0-3.0   TTR:  76.9% (1 y)   Target end date:  Indefinite   Send INR reminders to:  ANTICOAG LVAD    Indications    LVAD (left ventricular assist device) present (H) [Z95.811]  Chronic systolic CHF (congestive heart failure) (H) [I50.22]  S/P mitral valve replacement [Z95.2]  Paroxysmal ventricular tachycardia (H) [I47.29]             Comments:   Follow VAD Anticoag protocol:Yes: HeartMate 3  Bridging: per protocol  Date VAD placed: 03/23/22  INR Goal: per referral  Hx Mitral valve repair and not replacement             Anticoagulation Care Providers       Provider Role Specialty Phone number    Dunia Hdz MD Referring Cardiovascular Disease [phone]

## 2024-02-14 ENCOUNTER — LAB (OUTPATIENT)
Dept: LAB | Facility: CLINIC | Age: 59
End: 2024-02-14
Payer: MEDICARE

## 2024-02-14 DIAGNOSIS — Z76.82 AWAITING ORGAN TRANSPLANT: ICD-10-CM

## 2024-02-14 PROCEDURE — 86833 HLA CLASS II HIGH DEFIN QUAL: CPT

## 2024-02-14 PROCEDURE — 86832 HLA CLASS I HIGH DEFIN QUAL: CPT

## 2024-02-16 DIAGNOSIS — Z76.82 AWAITING ORGAN TRANSPLANT: Primary | ICD-10-CM

## 2024-02-20 ENCOUNTER — TELEPHONE (OUTPATIENT)
Dept: TRANSPLANT | Facility: CLINIC | Age: 59
End: 2024-02-20
Payer: MEDICARE

## 2024-02-20 VITALS — WEIGHT: 238 LBS | BODY MASS INDEX: 34.11 KG/M2

## 2024-02-27 ENCOUNTER — ANTICOAGULATION THERAPY VISIT (OUTPATIENT)
Dept: ANTICOAGULATION | Facility: CLINIC | Age: 59
End: 2024-02-27
Payer: MEDICARE

## 2024-02-27 ENCOUNTER — DOCUMENTATION ONLY (OUTPATIENT)
Dept: ANTICOAGULATION | Facility: CLINIC | Age: 59
End: 2024-02-27
Payer: MEDICARE

## 2024-02-27 DIAGNOSIS — Z95.2 S/P MITRAL VALVE REPLACEMENT: ICD-10-CM

## 2024-02-27 DIAGNOSIS — I50.22 CHRONIC SYSTOLIC CHF (CONGESTIVE HEART FAILURE) (H): ICD-10-CM

## 2024-02-27 DIAGNOSIS — I47.29 PAROXYSMAL VENTRICULAR TACHYCARDIA (H): ICD-10-CM

## 2024-02-27 DIAGNOSIS — Z95.811 LVAD (LEFT VENTRICULAR ASSIST DEVICE) PRESENT (H): Primary | ICD-10-CM

## 2024-02-27 LAB — INR HOME MONITORING: 1.9 (ref 2–3)

## 2024-02-27 NOTE — PROGRESS NOTES
ANTICOAGULATION MANAGEMENT     Tej Morfin 58 year old male is on warfarin with subtherapeutic INR result. (Goal INR 2.0-3.0)    Recent labs: (last 7 days)     02/27/24  0000   INR 1.9*       ASSESSMENT     Source(s): Chart Review and Patient/Caregiver Call     Warfarin doses taken: Warfarin taken as instructed  Diet: Increased greens/vitamin K in diet; plans to resume previous intake  Medication/supplement changes: None noted  New illness, injury, or hospitalization: No  Signs or symptoms of bleeding or clotting: No  Previous result: Therapeutic last 2(+) visits  Additional findings: None       PLAN     Recommended plan for temporary change(s) affecting INR     Dosing Instructions: booster dose then continue your current warfarin dose with next INR in 1 week       Summary  As of 2/27/2024      Full warfarin instructions:  2/27: 5 mg; Otherwise 2.5 mg every Tue; 5 mg all other days   Next INR check:  3/5/2024               Telephone call with Tej who verbalizes understanding and agrees to plan    Patient to recheck with home meter    Education provided:   Please call back if any changes to your diet, medications or how you've been taking warfarin  Goal range and lab monitoring: goal range and significance of current result, Importance of therapeutic range, and Importance of following up at instructed interval  Dietary considerations: importance of consistent vitamin K intake, impact of vitamin K foods on INR, and vitamin K content of foods  Symptom monitoring: monitoring for clotting signs and symptoms and monitoring for stroke signs and symptoms    Plan made per [facility] anticoagulation protocol and per LVAD protocol    Nanda Marin, RN  Anticoagulation Clinic  2/27/2024    _______________________________________________________________________     Anticoagulation Episode Summary       Current INR goal:  2.0-3.0   TTR:  75.6% (1 y)   Target end date:  Indefinite   Send INR reminders to:  [facility]     Indications    LVAD (left ventricular assist device) present (H) [Z95.811]  Chronic systolic CHF (congestive heart failure) (H) [I50.22]  S/P mitral valve replacement [Z95.2]  Paroxysmal ventricular tachycardia (H) [I47.29]             Comments:  Follow VAD Anticoag protocol:Yes: HeartMate 3  Bridging: per protocol  Date VAD placed: 03/23/22  INR Goal: per referral  Hx Mitral valve repair and not replacement             Anticoagulation Care Providers       Provider Role Specialty Phone number    Dunia Hdz MD Referring Cardiovascular Disease [phone]

## 2024-02-27 NOTE — PROGRESS NOTES
ANTICOAGULATION CLINIC REFERRAL RENEWAL REQUEST       An annual renewal order is required for all patients referred to [facility].?  Please review and sign the pended referral order for Tej Morfin.       ANTICOAGULATION SUMMARY      Warfarin indication(s)     LVAD and Chronic systolic CHF     Mechanical heart valve present?  NO       Current goal range   INR: 2.0-3.0     Goal appropriate for indication? Goal INR 2-3, standard for indication(s) above     Time in Therapeutic Range (TTR)  (Goal > 60%) 76.9%       Office visit with referring provider's group within last year yes on 12/15/23       Nanda Marin, RN  [facility]

## 2024-03-05 ENCOUNTER — ANTICOAGULATION THERAPY VISIT (OUTPATIENT)
Dept: ANTICOAGULATION | Facility: CLINIC | Age: 59
End: 2024-03-05
Payer: MEDICARE

## 2024-03-05 DIAGNOSIS — I47.29 PAROXYSMAL VENTRICULAR TACHYCARDIA (H): ICD-10-CM

## 2024-03-05 DIAGNOSIS — Z95.2 S/P MITRAL VALVE REPLACEMENT: ICD-10-CM

## 2024-03-05 DIAGNOSIS — I50.22 CHRONIC SYSTOLIC CHF (CONGESTIVE HEART FAILURE) (H): ICD-10-CM

## 2024-03-05 DIAGNOSIS — Z95.811 LVAD (LEFT VENTRICULAR ASSIST DEVICE) PRESENT (H): Primary | ICD-10-CM

## 2024-03-05 LAB — INR HOME MONITORING: 2.8 (ref 2–3)

## 2024-03-05 NOTE — PROGRESS NOTES
ANTICOAGULATION MANAGEMENT     Tej Morfin 58 year old male is on warfarin with therapeutic INR result. (Goal INR 2.0-3.0)    Recent labs: (last 7 days)     03/05/24  0000   INR 2.8       ASSESSMENT     Source(s): Chart Review and Patient/Caregiver Call     Warfarin doses taken: Warfarin taken as instructed  Diet: No new diet changes identified  Medication/supplement changes: None noted  New illness, injury, or hospitalization: No  Signs or symptoms of bleeding or clotting: No  Previous result: Subtherapeutic  Additional findings:  last week pt took 5 mg daily, since INR is 2.8 today, I have advised this same dose.        PLAN     Recommended plan for no diet, medication or health factor changes affecting INR     Dosing Instructions: Continue your current warfarin dose with next INR in 1 week       Summary  As of 3/5/2024      Full warfarin instructions:  5 mg every day   Next INR check:  3/12/2024               Telephone call with Tej who verbalizes understanding and agrees to plan    Patient to recheck with home meter    Education provided:   Please call back if any changes to your diet, medications or how you've been taking warfarin    Plan made per [facility] anticoagulation protocol and per LVAD protocol    Nanda Marin, RN  Anticoagulation Clinic  3/5/2024    _______________________________________________________________________     Anticoagulation Episode Summary       Current INR goal:  2.0-3.0   TTR:  75.4% (1 y)   Target end date:  Indefinite   Send INR reminders to:  ANTICOAG LVAD    Indications    LVAD (left ventricular assist device) present (H) [Z95.811]  Chronic systolic CHF (congestive heart failure) (H) [I50.22]  S/P mitral valve replacement [Z95.2]  Paroxysmal ventricular tachycardia (H) [I47.29]             Comments:  Follow VAD Anticoag protocol:Yes: HeartMate 3  Bridging: per protocol  Date VAD placed: 03/23/22  INR Goal: per referral  Hx Mitral valve repair and not replacement              Anticoagulation Care Providers       Provider Role Specialty Phone number    Dunia Hdz MD Referring Cardiovascular Disease [phone]

## 2024-03-11 ENCOUNTER — TELEPHONE (OUTPATIENT)
Dept: TRANSPLANT | Facility: CLINIC | Age: 59
End: 2024-03-11
Payer: MEDICARE

## 2024-03-12 ENCOUNTER — CARE COORDINATION (OUTPATIENT)
Dept: CARDIOLOGY | Facility: CLINIC | Age: 59
End: 2024-03-12
Payer: MEDICARE

## 2024-03-12 ENCOUNTER — ANTICOAGULATION THERAPY VISIT (OUTPATIENT)
Dept: ANTICOAGULATION | Facility: CLINIC | Age: 59
End: 2024-03-12
Payer: MEDICARE

## 2024-03-12 DIAGNOSIS — Z95.2 S/P MITRAL VALVE REPLACEMENT: ICD-10-CM

## 2024-03-12 DIAGNOSIS — Z95.811 LVAD (LEFT VENTRICULAR ASSIST DEVICE) PRESENT (H): Primary | ICD-10-CM

## 2024-03-12 DIAGNOSIS — I47.29 PAROXYSMAL VENTRICULAR TACHYCARDIA (H): ICD-10-CM

## 2024-03-12 DIAGNOSIS — I50.22 CHRONIC SYSTOLIC CHF (CONGESTIVE HEART FAILURE) (H): ICD-10-CM

## 2024-03-12 LAB — INR HOME MONITORING: 1.5 (ref 2–3)

## 2024-03-12 NOTE — PROGRESS NOTES
"ANTICOAGULATION MANAGEMENT     Tej Morfin 58 year old male is on warfarin with subtherapeutic INR result. (Goal INR 2.0-3.0)    Recent labs: (last 7 days)     03/12/24  0000   INR 1.5*       ASSESSMENT     Source(s): Chart Review and Patient/Caregiver Call     Warfarin doses taken: Warfarin taken as instructed  Diet:  Covid symptoms may be affecting diet and INR  Medication/supplement changes:  Paxlovid to be filled 3/12/24 Ordered Prescriptions  Prescription Sig Dispensed Refills Start Date End Date   nirmatrelvir 150 mg-ritonavir 100 mg (PAXLOVID) tablets (eGFR >/= 60 mL/min)   Indications: COVID-19 virus detected Take 3 tablets by mouth with or without food two times a day for 5 days as outlined on blister card. 30 tablet  0 03/11/2024       New illness, injury, or hospitalization: Yes: Covid positive  Signs or symptoms of bleeding or clotting: No  Previous result: Therapeutic last visit; previously outside of goal range  Additional findings:  Patient RHC to be rescheduled.  Tej states, \"I feel miserable, I haven't been out of bed in 3 days\". Recommended patient test on Friday on his home monitor with health and medication changes.       PLAN     Recommended plan for temporary change(s) affecting INR     Dosing Instructions: booster dose then continue your current warfarin dose with next INR in 3 days       Summary  As of 3/12/2024      Full warfarin instructions:  3/12: 7.5 mg; Otherwise 5 mg every day   Next INR check:  3/15/2024               Telephone call with Tej who verbalizes understanding and agrees to plan  Sent [employer] message with dosing and follow up instructions    Patient to recheck with home meter    Education provided:   Taking warfarin: purpose of warfarin and how it works, take warfarin at same time each day; preferably in the evening, and prescribed tablet strength and color  Goal range and lab monitoring: goal range and significance of current result, Importance of therapeutic " range, and Importance of following up at instructed interval  Interaction IS anticipated between warfarin and Paxlovid  Symptom monitoring: monitoring for bleeding signs and symptoms, monitoring for clotting signs and symptoms, monitoring for stroke signs and symptoms, when to seek medical attention/emergency care, and if you hit your head or have a bad fall seek emergency care  Importance of notifying anticoagulation clinic for: changes in medications; a sooner lab recheck maybe needed, diarrhea, nausea/vomiting, reduced intake, cold/flu, and/or infections; a sooner lab recheck maybe needed, and if you did not receive dosing instructions on the same day as your labs were checked    Plan made per [facility] anticoagulation protocol and per LVAD protocol    Siddharth Greco, RN  Anticoagulation Clinic  3/12/2024    _______________________________________________________________________     Anticoagulation Episode Summary       Current INR goal:  2.0-3.0   TTR:  74.7% (1 y)   Target end date:  Indefinite   Send INR reminders to:  ANTICOAG LVAD    Indications    LVAD (left ventricular assist device) present (H) [Z95.811]  Chronic systolic CHF (congestive heart failure) (H) [I50.22]  S/P mitral valve replacement [Z95.2]  Paroxysmal ventricular tachycardia (H) [I47.29]             Comments:  Follow VAD Anticoag protocol:Yes: HeartMate 3  Bridging: per protocol  Date VAD placed: 03/23/22  INR Goal: per referral  Hx Mitral valve repair and not replacement             Anticoagulation Care Providers       Provider Role Specialty Phone number    Dunia Hdz MD Referring Cardiovascular Disease [phone]

## 2024-03-13 ENCOUNTER — CARE COORDINATION (OUTPATIENT)
Dept: CARDIOLOGY | Facility: CLINIC | Age: 59
End: 2024-03-13

## 2024-03-13 DIAGNOSIS — T82.7XXA INFECTION ASSOCIATED WITH DRIVELINE OF LEFT VENTRICULAR ASSIST DEVICE (LVAD) (H): ICD-10-CM

## 2024-03-13 DIAGNOSIS — I50.22 CHRONIC SYSTOLIC (CONGESTIVE) HEART FAILURE (H): Primary | ICD-10-CM

## 2024-03-13 RX ORDER — DOXYCYCLINE 100 MG/1
100 CAPSULE ORAL 2 TIMES DAILY
Qty: 28 CAPSULE | Refills: 0 | Status: SHIPPED | OUTPATIENT
Start: 2024-03-13 | End: 2024-03-27

## 2024-03-13 NOTE — PROGRESS NOTES
1129    Rcvd page from Dr. Camp in  ER.  Per MD, pt has had labs and cultures obtained.  Fluid bolus ordered.  Vitals stable.  MD requesting further recs.  Instructed MD to contact oncall cardiologist for recs.  MD agreeable.  Will call with further questions if needed.

## 2024-03-13 NOTE — PROGRESS NOTES
0830    D:  Pt paged to inform us that he was reporting to the [place] ER for driveline and COVID eval.  Pt reports ongoing fatigue and dehydration.  VAD parameters stable.  I:  Called ER to inform them of pt's arrival, and to request driveline cultures and labs.  Contact information for oncall cardiologist and VAD coordinator given to ER staff.  Doxycyline ordered to begin per protocol.  A:  Driveline concerns  P:  Pt verbalized understanding of the instructions given.  Will call VAD coordinator with further needs and questions.

## 2024-03-13 NOTE — PROGRESS NOTES
Jessenia and Tej called to talk to the VAD coordinator on-call about new driveline bloody drainage. Jessenia said on Sunday she changed the weekly dressing and there was a little bit of wet drainage. Normally, she said the site is completely dry. She put a weekly dressing on on Sunday. Tej said the driveline site has been sore. Monday, both Jessenia and Tej were diagnosed with COVID. He has been achy and febrile. Tej was started on Paxlovid by primary care..    Samira, (Tuesday), Jessenia noticed there is dried bloody drainage saturating the Silverlon patch. Tej thinks maybe the cat may have jumped on his abdomen causing trauma to the site but he does not remember when this was in the past few days. He has been mostly in bed for the past few days.    VAD numbers are baseline 5500, 5.0 flow, 2.2 PI, and 4.2 pwr. The PI may be a touch lower than usual.    PLAN:  I asked Tej to take in some more fluids while his body is fighting off the COVID infection and while he has a fever. Also, he should be taking some PRN tylenol to help with the fevers and aches and pains.    On Wednesday morning, wearing masks, Mara will go to the [facility] ED to have the driveline site cultured. I asked them to call the VAD Coordinator on-call when they are headed into the ED so we can call the [place] ED to ask them to do the following things per our driveline line drainage protocol.    Aerobic & anaerobic driveline culture if active drainage     2.    Labs: Blood cultures x 2, CBC, CRP inflammation.      After he is seen in the ED, our cards team will order empiric Doxycycline 100mg BID x 14 days.  Tej was put on doxy last May when he had driveline drainage.    The VAD Coord will then EPIC message Coumadin clinic

## 2024-03-14 ENCOUNTER — ANCILLARY PROCEDURE (OUTPATIENT)
Dept: CARDIOLOGY | Facility: CLINIC | Age: 59
End: 2024-03-14
Attending: INTERNAL MEDICINE
Payer: MEDICARE

## 2024-03-14 DIAGNOSIS — I42.8 VALVULAR CARDIOMYOPATHY (H): ICD-10-CM

## 2024-03-14 PROCEDURE — 93295 DEV INTERROG REMOTE 1/2/MLT: CPT | Performed by: INTERNAL MEDICINE

## 2024-03-14 PROCEDURE — 93296 REM INTERROG EVL PM/IDS: CPT

## 2024-03-15 ENCOUNTER — ANTICOAGULATION THERAPY VISIT (OUTPATIENT)
Dept: ANTICOAGULATION | Facility: CLINIC | Age: 59
End: 2024-03-15
Payer: MEDICARE

## 2024-03-15 ENCOUNTER — TELEPHONE (OUTPATIENT)
Dept: TRANSPLANT | Facility: CLINIC | Age: 59
End: 2024-03-15
Payer: MEDICARE

## 2024-03-15 DIAGNOSIS — Z95.2 S/P MITRAL VALVE REPLACEMENT: ICD-10-CM

## 2024-03-15 DIAGNOSIS — I47.29 PAROXYSMAL VENTRICULAR TACHYCARDIA (H): ICD-10-CM

## 2024-03-15 DIAGNOSIS — Z95.811 LVAD (LEFT VENTRICULAR ASSIST DEVICE) PRESENT (H): Primary | ICD-10-CM

## 2024-03-15 DIAGNOSIS — I50.22 CHRONIC SYSTOLIC CHF (CONGESTIVE HEART FAILURE) (H): ICD-10-CM

## 2024-03-15 LAB — INR HOME MONITORING: 3 (ref 2–3)

## 2024-03-15 NOTE — PROGRESS NOTES
ANTICOAGULATION MANAGEMENT     Tej Morfin 58 year old male is on warfarin with therapeutic INR result. (Goal INR 2.0-3.0)    Recent labs: (last 7 days)     03/15/24  0000   INR 3.0       ASSESSMENT     Source(s): Chart Review and Patient/Caregiver Call     Warfarin doses taken: Warfarin taken as instructed  Diet:  States his appetite is improving and he is working on staying better hydrated.  Medication/supplement changes:  Started Paxlovid 3/12/24 for 5-day course for COVID, this medication can cause variable or decreased plasma warfarin concentration. 3/13/24 Potassium Chloride and Potassium Phosphate discontinued for hyperkalemia, no interaction expected. 3/13/24 started Doxycycline BID x14 days for driveline infection, this can increase risk of bleeding when taken with Warfarin. States he is taking minimal Tylenol, and not daily.   New illness, injury, or hospitalization: Yes: Has COVID, states he has had diarrhea as a symptom. Also went to ED 3/13/24 for possible driveline infection (bloody/pus drainage at driveline site) - VAD coordinator aware. Labs and cultures taken. INR was 1.8 on 3/13/24 at the ED. CXR done and no lung abnormalities noted. Patient reported no fever today at OV, but did have some chills/sweats last night.  Signs or symptoms of bleeding or clotting: No  Previous result: Subtherapeutic - 3/12 last [facility] encounter 7.5 mg booster dose instructed - Tej states he took as instructed.  Additional findings:  5/2/24 having EGD - will need to send for procedure plan closer to this date. 5/3/24 having Right heart Cath.       PLAN     Recommended plan for temporary change(s) affecting INR     Dosing Instructions: Continue your current warfarin dose with next INR in 4-5 days       Summary  As of 3/15/2024      Full warfarin instructions:  5 mg every day   Next INR check:  3/20/2024               Telephone call with Tej who verbalizes understanding and agrees to plan  Sent [employer] message with  dosing and follow up instructions    Patient to recheck with home meter    Education provided:   Goal range and lab monitoring: goal range and significance of current result, Importance of therapeutic range, and Importance of following up at instructed interval  Healthy lifestyle considerations: Importance of staying hydrated.  Interaction IS anticipated between warfarin and Paxlovid as well as Doxycycline, is not anticipated with Potassium Chloride/Phosphate or Tylenol if taking less than 2000 mg daily.  Symptom monitoring: monitoring for bleeding signs and symptoms  Importance of notifying anticoagulation clinic for: diarrhea, nausea/vomiting, reduced intake, cold/flu, and/or infections; a sooner lab recheck maybe needed  Written instructions provided  Contact [phone] with any changes, questions or concerns.     Plan made per [facility] anticoagulation protocol and per LVAD protocol    Emma Mata RN  Anticoagulation Clinic  3/15/2024    _______________________________________________________________________     Anticoagulation Episode Summary       Current INR goal:  2.0-3.0   TTR:  74.4% (1 y)   Target end date:  Indefinite   Send INR reminders to:  ANTICOAG LVAD    Indications    LVAD (left ventricular assist device) present (H) [Z95.811]  Chronic systolic CHF (congestive heart failure) (H) [I50.22]  S/P mitral valve replacement [Z95.2]  Paroxysmal ventricular tachycardia (H) [I47.29]             Comments:  Follow VAD Anticoag protocol:Yes: HeartMate 3  Bridging: per protocol  Date VAD placed: 03/23/22  INR Goal: per referral  Hx Mitral valve repair and not replacement             Anticoagulation Care Providers       Provider Role Specialty Phone number    Dunia Hdz MD Referring Cardiovascular Disease [phone]

## 2024-03-15 NOTE — TELEPHONE ENCOUNTER
Cath Lab Case Request/Order    Location: [address] [address] [facility] Waiting Room    Procedure:  RHC    Procedure Date: 5/3/24    Patient Arrival Time: 12:30am    Procedure Time: 6th case to follow    Ordering Provider: Dr. Dunia Hdz    Performing Cardiologist: JOSY    Inpatient Bed Needed: No    Post-  Procedure WILIAN appointment scheduled (1 - 2 weeks): NO       Communicated Patient Instructions:     NPO, nothing to eat 8 hours and drink 2 hours before arrival time: Yes     , need to arrange a ride home - unable to drive post- procedure: Yes     Adult at home, need a responsible adult to stay with patient 24 hours post- procedure: Yes    Appointment was scheduled: Over the phone    Meg Palumbo

## 2024-03-20 ENCOUNTER — ANTICOAGULATION THERAPY VISIT (OUTPATIENT)
Dept: ANTICOAGULATION | Facility: CLINIC | Age: 59
End: 2024-03-20
Payer: MEDICARE

## 2024-03-20 DIAGNOSIS — I47.29 PAROXYSMAL VENTRICULAR TACHYCARDIA (H): ICD-10-CM

## 2024-03-20 DIAGNOSIS — I50.22 CHRONIC SYSTOLIC CHF (CONGESTIVE HEART FAILURE) (H): ICD-10-CM

## 2024-03-20 DIAGNOSIS — Z95.811 LVAD (LEFT VENTRICULAR ASSIST DEVICE) PRESENT (H): Primary | ICD-10-CM

## 2024-03-20 DIAGNOSIS — Z95.2 S/P MITRAL VALVE REPLACEMENT: ICD-10-CM

## 2024-03-20 LAB — INR HOME MONITORING: 2.8 (ref 2–3)

## 2024-03-20 NOTE — PROGRESS NOTES
ANTICOAGULATION MANAGEMENT     Tej Morfin 58 year old male is on warfarin with therapeutic INR result. (Goal INR 2.0-3.0)    Recent labs: (last 7 days)     03/20/24  0000   INR 2.8       ASSESSMENT     Source(s): Chart Review and Patient/Caregiver Call     Warfarin doses taken: Warfarin taken as instructed  Diet: No new diet changes identified  Medication/supplement changes:  Pt still has 7 days left of 14-day course of doxycyline for drive line infection.   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   COVID+ on 3/11/24, pt reports that all symptoms have subsided r/t COVID.   Signs or symptoms of bleeding or clotting: No  Previous result: Therapeutic last visit; previously outside of goal range  Additional findings: None       PLAN     Recommended plan for temporary change(s) affecting INR     Dosing Instructions: Continue your current warfarin dose with next INR in 5 days       Summary  As of 3/20/2024      Full warfarin instructions:  5 mg every day   Next INR check:  3/25/2024               Telephone call with Tej who verbalizes understanding and agrees to plan    Patient to recheck with home meter    Education provided:   Goal range and lab monitoring: goal range and significance of current result, Importance of therapeutic range, and Importance of following up at instructed interval  Importance of contacting VAD team with any concerns r/t VAD or driveline infection.     Plan made per [facility] anticoagulation protocol and per LVAD protocol    Benjamin Gaspar RN  Anticoagulation Clinic  3/20/2024    _______________________________________________________________________     Anticoagulation Episode Summary       Current INR goal:  2.0-3.0   TTR:  74.4% (1 y)   Target end date:  Indefinite   Send INR reminders to:  ANTICOAG LVAD    Indications    LVAD (left ventricular assist device) present (H) [Z95.811]  Chronic  systolic CHF (congestive heart failure) (H) [I50.22]  S/P mitral valve replacement [Z95.2]  Paroxysmal ventricular tachycardia (H) [I47.29]             Comments:  Follow VAD Anticoag protocol:Yes: HeartMate 3  Bridging: per protocol  Date VAD placed: 03/23/22  INR Goal: per referral  Hx Mitral valve repair and not replacement             Anticoagulation Care Providers       Provider Role Specialty Phone number    Dunia Hdz MD Referring Cardiovascular Disease [phone]

## 2024-03-25 NOTE — PROGRESS NOTES
ANTICOAGULATION MANAGEMENT     Tej SWENSON Hagaman 57 year old male is on warfarin with supratherapeutic INR result. (Goal INR 2.0-3.0)    Recent labs: (last 7 days)     05/30/23  0000   INR 3.1*       ASSESSMENT       Source(s): Chart Review and Patient/Caregiver Call       Warfarin doses taken: Warfarin taken as instructed    Diet: No new diet changes identified    Medication/supplement changes: Recent course of Doxycycline-completed 5/18/23. 5/19/23 cardiology OV: restart sotalol 120 mg twice a day (no drug drug interaction with Warfarin)    New illness, injury, or hospitalization: No    Signs or symptoms of bleeding or clotting: No    Previous result: Therapeutic last 2(+) visits-trending up    Additional findings: None         PLAN     Recommended plan for no diet, medication or health factor changes affecting INR     Dosing Instructions: decrease your warfarin dose (3.6% change) with next INR in 1 week       Summary  As of 5/30/2023    Full warfarin instructions:  3.75 mg every Tue; 5 mg all other days   Next INR check:  6/6/2023             Telephone call with Tej who agrees to plan and repeated back plan correctly    Patient to recheck with home meter    Education provided:     Symptom monitoring: monitoring for bleeding signs and symptoms    Contact [phone] with any changes, questions or concerns.     Plan made per [facility] anticoagulation protocol and per LVAD protocol    SAVANNA MO RN  Anticoagulation Clinic  5/30/2023    _______________________________________________________________________     Anticoagulation Episode Summary     Current INR goal:  2.0-3.0   TTR:  77.6 % (1 y)   Target end date:  Indefinite   Send INR reminders to:  ANTICOAG LVAD    Indications    LVAD (left ventricular assist device) present (H) [Z95.811]  Chronic systolic CHF (congestive heart failure) (H) [I50.22]  S/P mitral valve replacement [Z95.2]  Paroxysmal ventricular tachycardia (H) [I47.29]           Comments:  HeartMate  3   Bridging: No bridging. Reason -- new implant   Date VAD placed: 03/23/22  Hx Mitral valve repair and not replacement         Anticoagulation Care Providers     Provider Role Specialty Phone number    Dunia Hdz MD Referring Cardiovascular Disease [phone]                   8

## 2024-03-26 ENCOUNTER — ANTICOAGULATION THERAPY VISIT (OUTPATIENT)
Dept: ANTICOAGULATION | Facility: CLINIC | Age: 59
End: 2024-03-26
Payer: MEDICARE

## 2024-03-26 DIAGNOSIS — Z95.811 LVAD (LEFT VENTRICULAR ASSIST DEVICE) PRESENT (H): Primary | ICD-10-CM

## 2024-03-26 DIAGNOSIS — I47.29 PAROXYSMAL VENTRICULAR TACHYCARDIA (H): ICD-10-CM

## 2024-03-26 DIAGNOSIS — I50.22 CHRONIC SYSTOLIC CHF (CONGESTIVE HEART FAILURE) (H): ICD-10-CM

## 2024-03-26 DIAGNOSIS — Z95.2 S/P MITRAL VALVE REPLACEMENT: ICD-10-CM

## 2024-03-26 LAB — INR HOME MONITORING: 2.8 (ref 2–3)

## 2024-03-28 ENCOUNTER — CARE COORDINATION (OUTPATIENT)
Dept: CARDIOLOGY | Facility: CLINIC | Age: 59
End: 2024-03-28
Payer: MEDICARE

## 2024-03-28 NOTE — PROGRESS NOTES
D:  Called pt to follow up post ER visit and COVID.  Pt reports feeling significantly better and is continuing to improve.  Pt denies pain and drainage at his driveline exit site.  I:  Discussed importance of ongoing monitoring of site and to notify us if symptoms return. Advised that ID may need to be consulted if infection recurs.  Discussed upcoming appts scheduled for 5/2 and 5/3.  Pt agreeable to plan of care.  A:  Follow up  P;  Pt verbalized understanding of the instructions given.  Will call VAD coordinator with further needs and questions.

## 2024-04-01 LAB
MDC_IDC_EPISODE_DTM: NORMAL
MDC_IDC_EPISODE_DTM: NORMAL
MDC_IDC_EPISODE_DURATION: 192 S
MDC_IDC_EPISODE_ID: NORMAL
MDC_IDC_EPISODE_ID: NORMAL
MDC_IDC_EPISODE_TYPE: NORMAL
MDC_IDC_EPISODE_TYPE: NORMAL
MDC_IDC_LEAD_CONNECTION_STATUS: NORMAL
MDC_IDC_LEAD_IMPLANT_DT: NORMAL
MDC_IDC_LEAD_LOCATION: NORMAL
MDC_IDC_LEAD_LOCATION_DETAIL_1: NORMAL
MDC_IDC_LEAD_MFG: NORMAL
MDC_IDC_LEAD_MODEL: NORMAL
MDC_IDC_LEAD_POLARITY_TYPE: NORMAL
MDC_IDC_LEAD_SERIAL: NORMAL
MDC_IDC_MSMT_BATTERY_DTM: NORMAL
MDC_IDC_MSMT_BATTERY_REMAINING_LONGEVITY: 30 MO
MDC_IDC_MSMT_BATTERY_REMAINING_PERCENTAGE: 47 %
MDC_IDC_MSMT_BATTERY_STATUS: NORMAL
MDC_IDC_MSMT_CAP_CHARGE_DTM: NORMAL
MDC_IDC_MSMT_CAP_CHARGE_DTM: NORMAL
MDC_IDC_MSMT_CAP_CHARGE_ENERGY: 31 J
MDC_IDC_MSMT_CAP_CHARGE_TIME: 13.5 S
MDC_IDC_MSMT_CAP_CHARGE_TIME: 7.8 S
MDC_IDC_MSMT_CAP_CHARGE_TYPE: NORMAL
MDC_IDC_MSMT_CAP_CHARGE_TYPE: NORMAL
MDC_IDC_MSMT_LEADCHNL_LV_IMPEDANCE_VALUE: 881 OHM
MDC_IDC_MSMT_LEADCHNL_LV_LEAD_CHANNEL_STATUS: NORMAL
MDC_IDC_MSMT_LEADCHNL_LV_PACING_THRESHOLD_AMPLITUDE: 0.7 V
MDC_IDC_MSMT_LEADCHNL_LV_PACING_THRESHOLD_PULSEWIDTH: 0.1 MS
MDC_IDC_MSMT_LEADCHNL_RA_IMPEDANCE_VALUE: 490 OHM
MDC_IDC_MSMT_LEADCHNL_RA_PACING_THRESHOLD_AMPLITUDE: 0.4 V
MDC_IDC_MSMT_LEADCHNL_RA_PACING_THRESHOLD_PULSEWIDTH: 0.5 MS
MDC_IDC_MSMT_LEADCHNL_RV_IMPEDANCE_VALUE: 398 OHM
MDC_IDC_MSMT_LEADCHNL_RV_PACING_THRESHOLD_AMPLITUDE: 1.1 V
MDC_IDC_MSMT_LEADCHNL_RV_PACING_THRESHOLD_PULSEWIDTH: 0.5 MS
MDC_IDC_PG_IMPLANT_DTM: NORMAL
MDC_IDC_PG_MFG: NORMAL
MDC_IDC_PG_MODEL: NORMAL
MDC_IDC_PG_SERIAL: NORMAL
MDC_IDC_PG_TYPE: NORMAL
MDC_IDC_SESS_CLINIC_NAME: NORMAL
MDC_IDC_SESS_DTM: NORMAL
MDC_IDC_SESS_TYPE: NORMAL
MDC_IDC_SET_BRADY_AT_MODE_SWITCH_MODE: NORMAL
MDC_IDC_SET_BRADY_AT_MODE_SWITCH_RATE: 170 {BEATS}/MIN
MDC_IDC_SET_BRADY_LOWRATE: 80 {BEATS}/MIN
MDC_IDC_SET_BRADY_MAX_TRACKING_RATE: 130 {BEATS}/MIN
MDC_IDC_SET_BRADY_MODE: NORMAL
MDC_IDC_SET_BRADY_PAV_DELAY_HIGH: 280 MS
MDC_IDC_SET_BRADY_PAV_DELAY_LOW: 300 MS
MDC_IDC_SET_BRADY_SAV_DELAY_HIGH: 280 MS
MDC_IDC_SET_BRADY_SAV_DELAY_LOW: 300 MS
MDC_IDC_SET_CRT_PACED_CHAMBERS: NORMAL
MDC_IDC_SET_LEADCHNL_LV_PACING_AMPLITUDE: 0.1 V
MDC_IDC_SET_LEADCHNL_LV_PACING_PULSEWIDTH: 0.1 MS
MDC_IDC_SET_LEADCHNL_LV_SENSING_ADAPTATION_MODE: NORMAL
MDC_IDC_SET_LEADCHNL_LV_SENSING_ANODE_ELECTRODE_1: NORMAL
MDC_IDC_SET_LEADCHNL_LV_SENSING_ANODE_LOCATION_1: NORMAL
MDC_IDC_SET_LEADCHNL_LV_SENSING_CATHODE_ELECTRODE_1: NORMAL
MDC_IDC_SET_LEADCHNL_LV_SENSING_CATHODE_LOCATION_1: NORMAL
MDC_IDC_SET_LEADCHNL_LV_SENSING_SENSITIVITY: 1 MV
MDC_IDC_SET_LEADCHNL_RA_PACING_AMPLITUDE: 1.5 V
MDC_IDC_SET_LEADCHNL_RA_PACING_POLARITY: NORMAL
MDC_IDC_SET_LEADCHNL_RA_PACING_PULSEWIDTH: 0.5 MS
MDC_IDC_SET_LEADCHNL_RA_SENSING_ADAPTATION_MODE: NORMAL
MDC_IDC_SET_LEADCHNL_RA_SENSING_POLARITY: NORMAL
MDC_IDC_SET_LEADCHNL_RA_SENSING_SENSITIVITY: 0.25 MV
MDC_IDC_SET_LEADCHNL_RV_PACING_AMPLITUDE: 2.8 V
MDC_IDC_SET_LEADCHNL_RV_PACING_POLARITY: NORMAL
MDC_IDC_SET_LEADCHNL_RV_PACING_PULSEWIDTH: 0.5 MS
MDC_IDC_SET_LEADCHNL_RV_SENSING_ADAPTATION_MODE: NORMAL
MDC_IDC_SET_LEADCHNL_RV_SENSING_POLARITY: NORMAL
MDC_IDC_SET_LEADCHNL_RV_SENSING_SENSITIVITY: 0.6 MV
MDC_IDC_SET_ZONE_DETECTION_INTERVAL: 273 MS
MDC_IDC_SET_ZONE_STATUS: NORMAL
MDC_IDC_SET_ZONE_TYPE: NORMAL
MDC_IDC_SET_ZONE_VENDOR_TYPE: NORMAL
MDC_IDC_STAT_AT_BURDEN_PERCENT: 1 %
MDC_IDC_STAT_AT_DTM_END: NORMAL
MDC_IDC_STAT_AT_DTM_START: NORMAL
MDC_IDC_STAT_BRADY_DTM_END: NORMAL
MDC_IDC_STAT_BRADY_DTM_START: NORMAL
MDC_IDC_STAT_BRADY_RA_PERCENT_PACED: 89 %
MDC_IDC_STAT_BRADY_RV_PERCENT_PACED: 4 %
MDC_IDC_STAT_CRT_DTM_END: NORMAL
MDC_IDC_STAT_CRT_DTM_START: NORMAL
MDC_IDC_STAT_CRT_LV_PERCENT_PACED: 0 %
MDC_IDC_STAT_EPISODE_RECENT_COUNT: 0
MDC_IDC_STAT_EPISODE_RECENT_COUNT_DTM_END: NORMAL
MDC_IDC_STAT_EPISODE_RECENT_COUNT_DTM_START: NORMAL
MDC_IDC_STAT_EPISODE_TYPE: NORMAL
MDC_IDC_STAT_EPISODE_VENDOR_TYPE: NORMAL
MDC_IDC_STAT_TACHYTHERAPY_ATP_DELIVERED_RECENT: 0
MDC_IDC_STAT_TACHYTHERAPY_ATP_DELIVERED_TOTAL: 4
MDC_IDC_STAT_TACHYTHERAPY_RECENT_DTM_END: NORMAL
MDC_IDC_STAT_TACHYTHERAPY_RECENT_DTM_START: NORMAL
MDC_IDC_STAT_TACHYTHERAPY_SHOCKS_ABORTED_RECENT: 0
MDC_IDC_STAT_TACHYTHERAPY_SHOCKS_ABORTED_TOTAL: 1
MDC_IDC_STAT_TACHYTHERAPY_SHOCKS_DELIVERED_RECENT: 0
MDC_IDC_STAT_TACHYTHERAPY_SHOCKS_DELIVERED_TOTAL: 4
MDC_IDC_STAT_TACHYTHERAPY_TOTAL_DTM_END: NORMAL
MDC_IDC_STAT_TACHYTHERAPY_TOTAL_DTM_START: NORMAL

## 2024-04-02 ENCOUNTER — ANTICOAGULATION THERAPY VISIT (OUTPATIENT)
Dept: ANTICOAGULATION | Facility: CLINIC | Age: 59
End: 2024-04-02
Payer: MEDICARE

## 2024-04-02 DIAGNOSIS — Z95.811 LVAD (LEFT VENTRICULAR ASSIST DEVICE) PRESENT (H): Primary | ICD-10-CM

## 2024-04-02 DIAGNOSIS — Z95.2 S/P MITRAL VALVE REPLACEMENT: ICD-10-CM

## 2024-04-02 DIAGNOSIS — I47.29 PAROXYSMAL VENTRICULAR TACHYCARDIA (H): ICD-10-CM

## 2024-04-02 DIAGNOSIS — I50.22 CHRONIC SYSTOLIC CHF (CONGESTIVE HEART FAILURE) (H): ICD-10-CM

## 2024-04-02 LAB — INR HOME MONITORING: 2.4 (ref 2–3)

## 2024-04-02 NOTE — PROGRESS NOTES
ANTICOAGULATION MANAGEMENT     Tej Morfin 58 year old male is on warfarin with therapeutic INR result. (Goal INR 2.0-3.0)    Recent labs: (last 7 days)     04/02/24  0000   INR 2.4       ASSESSMENT     Source(s): Chart Review and Patient/Caregiver Call     Warfarin doses taken: Warfarin taken as instructed  Diet: No new diet changes identified  Medication/supplement changes: None noted  New illness, injury, or hospitalization: No  Signs or symptoms of bleeding or clotting: No  Previous result: Therapeutic last 2(+) visits  Additional findings:  Patient reports that he is feeling 100% better since he is done with Paxlovid and Doxycycline.        PLAN     Recommended plan for no diet, medication or health factor changes affecting INR     Dosing Instructions: Continue your current warfarin dose with next INR in 1 week       Summary  As of 4/2/2024      Full warfarin instructions:  5 mg every day   Next INR check:  4/9/2024               Telephone call with Tej who verbalizes understanding and agrees to plan and who agrees to plan and repeated back plan correctly    Patient to recheck with home meter    Education provided:   Taking warfarin: Importance of taking warfarin as instructed  Goal range and lab monitoring: goal range and significance of current result and Importance of therapeutic range    Plan made per [facility] anticoagulation protocol and per LVAD protocol    Audelia Avalos RN  Anticoagulation Clinic  4/2/2024    _______________________________________________________________________     Anticoagulation Episode Summary       Current INR goal:  2.0-3.0   TTR:  74.4% (1 y)   Target end date:  Indefinite   Send INR reminders to:  ANTICOAG LVAD    Indications    LVAD (left ventricular assist device) present (H) [Z95.811]  Chronic systolic CHF (congestive heart failure) (H) [I50.22]  S/P mitral valve replacement [Z95.2]  Paroxysmal ventricular tachycardia (H) [I47.29]             Comments:  Follow VAD  Anticoag protocol:Yes: HeartMate 3  Bridging: per protocol  Date VAD placed: 03/23/22  INR Goal: per referral  Hx Mitral valve repair and not replacement             Anticoagulation Care Providers       Provider Role Specialty Phone number    Dunia Hdz MD Referring Cardiovascular Disease [phone]

## 2024-04-03 ENCOUNTER — TELEPHONE (OUTPATIENT)
Dept: TRANSPLANT | Facility: CLINIC | Age: 59
End: 2024-04-03
Payer: MEDICARE

## 2024-04-03 VITALS — BODY MASS INDEX: 33.97 KG/M2 | WEIGHT: 237 LBS

## 2024-04-09 ENCOUNTER — TELEPHONE (OUTPATIENT)
Dept: ANTICOAGULATION | Facility: CLINIC | Age: 59
End: 2024-04-09
Payer: MEDICARE

## 2024-04-09 ENCOUNTER — ANTICOAGULATION THERAPY VISIT (OUTPATIENT)
Dept: ANTICOAGULATION | Facility: CLINIC | Age: 59
End: 2024-04-09
Payer: MEDICARE

## 2024-04-09 DIAGNOSIS — Z95.811 LVAD (LEFT VENTRICULAR ASSIST DEVICE) PRESENT (H): Primary | ICD-10-CM

## 2024-04-09 DIAGNOSIS — I50.22 CHRONIC SYSTOLIC CHF (CONGESTIVE HEART FAILURE) (H): ICD-10-CM

## 2024-04-09 DIAGNOSIS — I47.29 PAROXYSMAL VENTRICULAR TACHYCARDIA (H): ICD-10-CM

## 2024-04-09 DIAGNOSIS — Z95.2 S/P MITRAL VALVE REPLACEMENT: ICD-10-CM

## 2024-04-09 LAB — INR HOME MONITORING: 2.2 (ref 2–3)

## 2024-04-09 NOTE — TELEPHONE ENCOUNTER
HERIBERTO-PROCEDURAL ANTICOAGULATION  MANAGEMENT    Tej requesting pre-procedure hold orders for warfarin and review for bridging      Procedure date: 5/3 & 5/4       Procedure:  EGD & RHC      Procedure location and phone number (if external): [place]     Number of warfarin hold days requested and/or target INR: unknown    Pre-op date: not yet scheduled      Routing to Anticoagulation Pharmacist for review.      Cecily Tovar RN

## 2024-04-09 NOTE — PROGRESS NOTES
ANTICOAGULATION MANAGEMENT     Tej Morfin 58 year old male is on warfarin with therapeutic INR result. (Goal INR 2.0-3.0)    Recent labs: (last 7 days)     04/09/24  0000   INR 2.2       ASSESSMENT     Source(s): Chart Review and Patient/Caregiver Call     Warfarin doses taken: Warfarin taken as instructed  Diet: No new diet changes identified  Medication/supplement changes: None noted  New illness, injury, or hospitalization: No  Signs or symptoms of bleeding or clotting: No  Previous result: Therapeutic last 2(+) visits  Additional findings: None       PLAN     Recommended plan for no diet, medication or health factor changes affecting INR     Dosing Instructions: Continue your current warfarin dose with next INR in 1 week       Summary  As of 4/9/2024      Full warfarin instructions:  5 mg every day   Next INR check:  4/16/2024               Telephone call with Tej who verbalizes understanding and agrees to plan and who agrees to plan and repeated back plan correctly    Patient to recheck with home meter    Education provided:   Please call back if any changes to your diet, medications or how you've been taking warfarin  Taking warfarin: Importance of taking warfarin as instructed  Goal range and lab monitoring: goal range and significance of current result, Importance of therapeutic range, and Importance of following up at instructed interval    Plan made per [facility] anticoagulation protocol and per LVAD protocol    Cecily Tovar RN  Anticoagulation Clinic  4/9/2024    _______________________________________________________________________     Anticoagulation Episode Summary       Current INR goal:  2.0-3.0   TTR:  75.2% (1 y)   Target end date:  Indefinite   Send INR reminders to:  ANTICOAG LVAD    Indications    LVAD (left ventricular assist device) present (H) [Z95.811]  Chronic systolic CHF (congestive heart failure) (H) [I50.22]  S/P mitral valve replacement [Z95.2]  Paroxysmal ventricular  tachycardia (H) [I47.29]             Comments:  Follow VAD Anticoag protocol:Yes: HeartMate 3  Bridging: per protocol  Date VAD placed: 03/23/22  INR Goal: per referral  Hx Mitral valve repair and not replacement             Anticoagulation Care Providers       Provider Role Specialty Phone number    Dunia Hdz MD Referring Cardiovascular Disease [phone]

## 2024-04-10 DIAGNOSIS — I50.22 CHRONIC SYSTOLIC (CONGESTIVE) HEART FAILURE (H): ICD-10-CM

## 2024-04-16 ENCOUNTER — ANTICOAGULATION THERAPY VISIT (OUTPATIENT)
Dept: ANTICOAGULATION | Facility: CLINIC | Age: 59
End: 2024-04-16
Payer: MEDICARE

## 2024-04-16 DIAGNOSIS — Z95.2 S/P MITRAL VALVE REPLACEMENT: ICD-10-CM

## 2024-04-16 DIAGNOSIS — Z95.811 LVAD (LEFT VENTRICULAR ASSIST DEVICE) PRESENT (H): Primary | ICD-10-CM

## 2024-04-16 DIAGNOSIS — I47.29 PAROXYSMAL VENTRICULAR TACHYCARDIA (H): ICD-10-CM

## 2024-04-16 DIAGNOSIS — I50.22 CHRONIC SYSTOLIC CHF (CONGESTIVE HEART FAILURE) (H): ICD-10-CM

## 2024-04-16 LAB — INR HOME MONITORING: 2.6 (ref 2–3)

## 2024-04-16 NOTE — PROGRESS NOTES
ANTICOAGULATION MANAGEMENT     Tej Morfin 58 year old male is on warfarin with therapeutic INR result. (Goal INR 2.0-3.0)    Recent labs: (last 7 days)     04/16/24  0000   INR 2.6       ASSESSMENT     Source(s): Chart Review  Previous INR was Therapeutic last 2(+) visits  Medication, diet, health changes since last INR chart reviewed; none identified  EGD on 5/2/24, and RHC on 5/3/24, see TE on 4/9/24 for procedure plan.          PLAN     Recommended plan for no diet, medication or health factor changes affecting INR     Dosing Instructions: Continue your current warfarin dose with next INR in 1 week       Summary  As of 4/16/2024      Full warfarin instructions:  5 mg every day   Next INR check:  4/23/2024               Detailed voice message left for Tej with dosing instructions and follow up date.   Sent [employer] message with dosing and follow up instructions    Patient to recheck with home meter    Education provided:   Please call back or respond to [employer] message if any changes to your diet, medications or how you've been taking warfarin    Plan made per [facility] anticoagulation protocol    Benjamin Gaspar RN  Anticoagulation Clinic  4/16/2024    _______________________________________________________________________     Anticoagulation Episode Summary       Current INR goal:  2.0-3.0   TTR:  77.2% (1 y)   Target end date:  Indefinite   Send INR reminders to:  ANTICOAG LVAD    Indications    LVAD (left ventricular assist device) present (H) [Z95.811]  Chronic systolic CHF (congestive heart failure) (H) [I50.22]  S/P mitral valve replacement [Z95.2]  Paroxysmal ventricular tachycardia (H) [I47.29]             Comments:  Follow VAD Anticoag protocol:Yes: HeartMate 3  Bridging: per protocol  Date VAD placed: 03/23/22  INR Goal: per referral  Hx Mitral valve repair and not replacement             Anticoagulation Care Providers       Provider Role Specialty Phone number    Dunia Hdz MD Referring  Cardiovascular Disease [phone]

## 2024-04-17 ENCOUNTER — CARE COORDINATION (OUTPATIENT)
Dept: CARDIOLOGY | Facility: CLINIC | Age: 59
End: 2024-04-17
Payer: MEDICARE

## 2024-04-17 ENCOUNTER — TELEPHONE (OUTPATIENT)
Dept: ANTICOAGULATION | Facility: CLINIC | Age: 59
End: 2024-04-17
Payer: MEDICARE

## 2024-04-17 ENCOUNTER — TELEPHONE (OUTPATIENT)
Dept: GASTROENTEROLOGY | Facility: CLINIC | Age: 59
End: 2024-04-17
Payer: MEDICARE

## 2024-04-17 DIAGNOSIS — Z95.2 S/P MITRAL VALVE REPLACEMENT: ICD-10-CM

## 2024-04-17 DIAGNOSIS — T82.7XXA INFECTION ASSOCIATED WITH DRIVELINE OF LEFT VENTRICULAR ASSIST DEVICE (LVAD) (H): Primary | ICD-10-CM

## 2024-04-17 DIAGNOSIS — Z95.811 LVAD (LEFT VENTRICULAR ASSIST DEVICE) PRESENT (H): Primary | ICD-10-CM

## 2024-04-17 DIAGNOSIS — I50.22 CHRONIC SYSTOLIC CHF (CONGESTIVE HEART FAILURE) (H): ICD-10-CM

## 2024-04-17 DIAGNOSIS — I47.29 PAROXYSMAL VENTRICULAR TACHYCARDIA (H): ICD-10-CM

## 2024-04-17 RX ORDER — FUROSEMIDE 40 MG
60 TABLET ORAL 2 TIMES DAILY
Qty: 270 TABLET | Refills: 3 | Status: SHIPPED | OUTPATIENT
Start: 2024-04-17

## 2024-04-17 RX ORDER — DOXYCYCLINE 100 MG/1
100 CAPSULE ORAL 2 TIMES DAILY
Qty: 28 CAPSULE | Refills: 0 | Status: SHIPPED | OUTPATIENT
Start: 2024-04-17 | End: 2024-05-01

## 2024-04-17 NOTE — Clinical Note
Mr Morfin will also need a VAD show and tell scheduled.  Please contact Elena Do to arrange - [phone]

## 2024-04-17 NOTE — PROGRESS NOTES
D:  Rcvd call from pt & caregiver to report changes to DLES.  Starting about a week ago pt noticed some yellow brown drainage with pain.  Today drainage keven sized on daily dressing.  Pt reports possible granuloma at site.  Site sore at insertion site, and possibly higher & more inside.  Pain is worse when pushing w/ bowel movements & movement make it worse.  Pt doesn't remember any trauma to driveline.  Pt reporting chills twice a day for 2 days.  Pt informed me he has been in touch with [place] cardiology team regarding this concern as well.  I:  Discussed symptoms and situation with Shona BANDA NP in [place].  Plan: pt to come in today for DL cultures, labs, CT scan and assessment.  Pt will need to be referred to infectious disease & start Doxycycline per protocol.  Shona will communicate with [facility] team on results of testing.  Pt informed of plan and is agreeable.  A:  Possible driveline infection  P:  Pt verbalized understanding of the instructions given.  Will call VAD coordinator with further needs and questions.

## 2024-04-17 NOTE — TELEPHONE ENCOUNTER
Pre visit planning completed.      Procedure details:    Patient scheduled for Upper endoscopy (EGD) on 5/2/2024.     Arrival time: 0915. Procedure time 1045    Facility location: [facility]; [address] [place] [address], [address] Floor, [place] [address]. Check in location: Main entrance at registration desk.    Sedation type: MAC    Pre op exam needed? Yes. Pt needs PAC eval due to LVAD with MAC at [facility]    Indication for procedure:Ongoing gas and bloating       Chart review:     Electronic implanted devices? Yes: LVAD, ICD    Recent diagnosis of diverticulitis within the last 6 weeks? N/A    Diabetic? Yes. Not on diabetic medication      Medication review:    Anticoagulants? Yes Coumadin (Warfarin): Recommended HOLD 5 days before procedure.  Consult with your managing provider.    NSAIDS? No    Other medication HOLDING recommendations:  N/A      Prep for procedure:     Bowel prep recommendation: N/A    Prep instructions sent via [employer]     CLD x 24 hours, NPO after midnight due to gastroparesis        Talisha Knight RN  Endoscopy Procedure Pre Assessment RN  [phone] option 4

## 2024-04-17 NOTE — TELEPHONE ENCOUNTER
Pre assessment completed for upcoming procedure.   (Please see previous telephone encounter notes for complete details)     Philip was notified of LVAD    Procedure details:    Arrival time and facility location reviewed.    Pre op exam needed? Yes. Pt's wife Jessenia was warm transferred to PAC scheduling line    Designated  policy reviewed. Instructed to have someone stay 24  hours post procedure.       Medication review:    Medications reviewed. Please see supporting documentation below. Holding recommendations discussed (if applicable).       Prep for procedure:     Procedure prep instructions reviewed.        Any additional information needed:  N/A      Patient  verbalized understanding and had no questions or concerns at this time.      Talisha Knight RN  Endoscopy Procedure Pre Assessment RN  [phone] option 4

## 2024-04-17 NOTE — TELEPHONE ENCOUNTER
ANTICOAGULATION  MANAGEMENT     Interacting Medication Review    Interacting medication(s):  Doxycycline  with warfarin.    Duration: 14 days  (4/17 to 5/1/24)    Indication:  DLES infection    New medication?: Yes, interaction may increase INR and risk of bleeding. Closer INR monitoring recommended.        PLAN     No adjustment to anticoagulation plan needed; appropriate follow up already scheduled Patient already checks INR weekly with home INR monitor.         Patient was not contacted    No adjustment to anticoagulation calendar was required    Patient already has a high priority 2/2 VAD placement    Plan made per [facility] anticoagulation protocol and per LVAD protocol    Audelia Avalos RN  Anticoagulation Clinic

## 2024-04-18 NOTE — TELEPHONE ENCOUNTER
"HERIBERTO-PROCEDURAL ANTICOAGULATION  MANAGEMENT    ASSESSMENT     Warfarin interruption plan for EGD on 5/2/24 and RHC on 5/3/24.    Indication for Anticoagulation: LVAD    Mitral valve repair with annuloplasty ring  (1/21/15)  Post op afib - last remote device check 0% AF burden  He has been complaining of chronic GI discomfort with abdominal fullness and gas. He has not been evaluated by GI in several years, however he had been scheduled to have an EGD at [facility] per cardiology's recommendation. This was scheduled to occur in 11/2023, however, this was ultimately cancelled as this type of procedure needs to be done at the [facility] given LVAD.     RECOMMENDATION   Plan routed to referring provider for approval  ?   Cindy Bruce [facility]    SUBJECTIVE/OBJECTIVE     Tej LEELA Morfin, a 58 year old male    Goal INR Range: 2.0-3.0     Patient bridged in past: No      Wt Readings from Last 3 Encounters:   04/03/24 107.5 kg (237 lb)   02/14/24 108 kg (238 lb)   01/10/24 108 kg (238 lb)      Ideal body weight: 73.1 kg (161 lb 2.1 oz)  Adjusted ideal body weight: 86.9 kg (191 lb 7.7 oz)     Estimated body mass index is 33.97 kg/m  as calculated from the following:    Height as of 8/10/23: 1.779 m (5' 10.04\").    Weight as of 4/3/24: 107.5 kg (237 lb).    Lab Results   Component Value Date    INR 2.6 04/16/2024    INR 2.2 04/09/2024    INR 2.4 04/02/2024     Lab Results   Component Value Date    HGB 13.8 12/15/2023    HCT 42.7 12/15/2023     12/15/2023     Lab Results   Component Value Date    CR 1.42 (H) 12/15/2023    CR 1.56 (H) 08/10/2023    CR 1.30 (H) 05/04/2023     Estimated Creatinine Clearance: 71.4 mL/min (A) (based on SCr of 1.42 mg/dL (H)).    "

## 2024-04-18 NOTE — TELEPHONE ENCOUNTER
FUTURE VISIT INFORMATION      SURGERY INFORMATION:  Date: 24  Location:  gi  Surgeon:  Stacey Knight MD   Anesthesia Type:  mac  Procedure: Esophagoscopy, gastroscopy, duodenoscopy (EGD), combined     RECORDS REQUESTED FROM:       Primary Care Provider: Chuy    Pertinent Medical History: cardiac defibrillation, paroxysmal ventricular tachycardia    Most recent EKG+ Tracin/3/24    Most recent ECHO: 23    Most recent PFT's: 23

## 2024-04-19 ENCOUNTER — CARE COORDINATION (OUTPATIENT)
Dept: CARDIOLOGY | Facility: CLINIC | Age: 59
End: 2024-04-19
Payer: MEDICARE

## 2024-04-19 ENCOUNTER — TELEPHONE (OUTPATIENT)
Dept: ANTICOAGULATION | Facility: CLINIC | Age: 59
End: 2024-04-19
Payer: MEDICARE

## 2024-04-19 NOTE — TELEPHONE ENCOUNTER
HERIBERTO-PROCEDURAL ANTICOAGULATION  MANAGEMENT    GI  requesting pre-procedure hold orders for warfarin and review for bridging      Procedure date: 5/2/24       Procedure:  Esophagoscopy, gastroscopy, duodenoscopy (EGD), combined      Procedure location and phone number (if external): [place]     Number of warfarin hold days requested and/or target INR: 5 days    Pre-op date:  4/24/24      Routing to Anticoagulation Pharmacist for review.      SAVANNA MO RN

## 2024-04-19 NOTE — TELEPHONE ENCOUNTER
Dunia Hdz MD  You; Siena Headley, RN18 hours ago (4:10 PM)     ROLANDO Agrawal,  Thanks for reaching out.    He would not need to hold coumadin for a RHC  Can they do the EGD at 1.8 or so?    Would prefer that to lovenox    RC

## 2024-04-19 NOTE — PROGRESS NOTES
D:  pt called to discuss ER visit and ongoing symptoms.  Pt c/o pain at site that worsens with activity.  No change to drainage, or VAD parameters.  Pt has intermittently tried small amounts of tylenol.  Pt has not started antibiotics.  I:  Encouraged pt to start abx as soon as possible.  Advised pt ok to take Tylenol 1000mg Q6-8 hours for the next 2-3 days, until pain has subsided.  Encouraged rest, since movement worsens pain.  Per Jennifer Nagel NP.  Pt with positive wound culture with previous organism, staph aureus.  Pt was referred to local ID clinic and has appt 5/1.  SF team pushing images to [facility] for surgical team evaluation.  Encouraged pt to call if pain does not improve, or if drainage/infectious symptoms worsen.  If this occurs pt will likely need to be evaluated at [facility] ER.  Pt agreeable to plan.  A:  Follow up   P:  Pt verbalized understanding of the instructions given.  Will call VAD coordinator with further needs and questions.

## 2024-04-19 NOTE — TELEPHONE ENCOUNTER
Duplicate Encounter see 4/9/24 for procedure plan    Jennifer Spence, [facility]

## 2024-04-22 NOTE — TELEPHONE ENCOUNTER
Target INR for day of procedure (5/2/24): 1.8    Next INR is 4/23/24. [facility] will drift INR down to 1.8 for day of procedure as the date get closer.     Cindy Bruce, HerreraD, BCPS

## 2024-04-23 ENCOUNTER — ANTICOAGULATION THERAPY VISIT (OUTPATIENT)
Dept: ANTICOAGULATION | Facility: CLINIC | Age: 59
End: 2024-04-23
Payer: MEDICARE

## 2024-04-23 ENCOUNTER — CARE COORDINATION (OUTPATIENT)
Dept: CARDIOLOGY | Facility: CLINIC | Age: 59
End: 2024-04-23
Payer: MEDICARE

## 2024-04-23 DIAGNOSIS — I50.22 CHRONIC SYSTOLIC CHF (CONGESTIVE HEART FAILURE) (H): ICD-10-CM

## 2024-04-23 DIAGNOSIS — I47.29 PAROXYSMAL VENTRICULAR TACHYCARDIA (H): ICD-10-CM

## 2024-04-23 DIAGNOSIS — Z95.811 LVAD (LEFT VENTRICULAR ASSIST DEVICE) PRESENT (H): Primary | ICD-10-CM

## 2024-04-23 DIAGNOSIS — Z95.2 S/P MITRAL VALVE REPLACEMENT: ICD-10-CM

## 2024-04-23 LAB — INR HOME MONITORING: 2.9 (ref 2–3)

## 2024-04-23 NOTE — PROGRESS NOTES
Writer contacted Jennifer Vera, ID clinic to coordinate cares.  ID team is working on moving pt's appt up and will discuss if IV abx are needed for second staph aureus + driveline culture in 30 days.  Also contacted radiology team to push 4/17, CT scan images to our system for CVTS review.  Will await images

## 2024-04-23 NOTE — PROGRESS NOTES
ANTICOAGULATION MANAGEMENT     Tej Morfin 58 year old male is on warfarin with therapeutic INR result. (Goal INR 2.0-3.0)    Recent labs: (last 7 days)     04/23/24  0000   INR 2.9       ASSESSMENT     Source(s): Chart Review     Warfarin doses taken: Reviewed in chart  Diet: No new diet changes identified  Medication/supplement changes: None noted  New illness, injury, or hospitalization: No  Signs or symptoms of bleeding or clotting: No  Previous result: Therapeutic last 2(+) visits  Additional findings:  Patient has an EGD scheduled for 5/2/24.  Desired INR goal range 1.8 or less.        PLAN     Recommended plan for no diet, medication or health factor changes affecting INR     Dosing Instructions: partial hold then continue your current warfarin dose with next INR in 1 week       Summary  As of 4/23/2024      Full warfarin instructions:  4/23: 2.5 mg; Otherwise 5 mg every day   Next INR check:  4/30/2024               Telephone call with Tej who verbalizes understanding and agrees to plan and who agrees to plan and repeated back plan correctly    Patient to recheck with home meter    Education provided:   Taking warfarin: Importance of taking warfarin as instructed  Goal range and lab monitoring: goal range and significance of current result, Importance of therapeutic range, and Importance of following up at instructed interval    Plan made with [facility] Pharmacist Cindy Avalos RN  Anticoagulation Clinic  4/23/2024    _______________________________________________________________________     Anticoagulation Episode Summary       Current INR goal:  2.0-3.0   TTR:  79.1% (1 y)   Target end date:  Indefinite   Send INR reminders to:  ANTICOAG LVAD    Indications    LVAD (left ventricular assist device) present (H) [Z95.811]  Chronic systolic CHF (congestive heart failure) (H) [I50.22]  S/P mitral valve replacement [Z95.2]  Paroxysmal ventricular tachycardia (H) [I47.29]              Comments:  Follow VAD Anticoag protocol:Yes: HeartMate 3  Bridging: per protocol  Date VAD placed: 03/23/22  INR Goal: per referral  Hx Mitral valve repair and not replacement             Anticoagulation Care Providers       Provider Role Specialty Phone number    Dunia Hdz MD Referring Cardiovascular Disease [phone]

## 2024-04-24 ENCOUNTER — PRE VISIT (OUTPATIENT)
Dept: SURGERY | Facility: CLINIC | Age: 59
End: 2024-04-24

## 2024-04-24 ENCOUNTER — ANESTHESIA EVENT (OUTPATIENT)
Dept: GASTROENTEROLOGY | Facility: CLINIC | Age: 59
End: 2024-04-24
Payer: MEDICARE

## 2024-04-24 ENCOUNTER — HOSPITAL ENCOUNTER (OUTPATIENT)
Dept: GENERAL RADIOLOGY | Facility: CLINIC | Age: 59
Discharge: HOME | End: 2024-04-24
Attending: INTERNAL MEDICINE
Payer: MEDICARE

## 2024-04-24 ENCOUNTER — VIRTUAL VISIT (OUTPATIENT)
Dept: SURGERY | Facility: CLINIC | Age: 59
End: 2024-04-24
Payer: MEDICARE

## 2024-04-24 VITALS — BODY MASS INDEX: 33.5 KG/M2 | WEIGHT: 234 LBS | HEIGHT: 70 IN

## 2024-04-24 DIAGNOSIS — I50.22 CHRONIC SYSTOLIC (CONGESTIVE) HEART FAILURE (H): ICD-10-CM

## 2024-04-24 DIAGNOSIS — Z01.818 PRE-OP EVALUATION: Primary | ICD-10-CM

## 2024-04-24 PROCEDURE — 99204 OFFICE O/P NEW MOD 45 MIN: CPT | Mod: 95 | Performed by: PHYSICIAN ASSISTANT

## 2024-04-24 PROCEDURE — 74178 CT ABD&PLV WO CNTR FLWD CNTR: CPT | Mod: 26 | Performed by: RADIOLOGY

## 2024-04-24 PROCEDURE — 999N000122 CT OUTSIDE READ

## 2024-04-24 ASSESSMENT — ENCOUNTER SYMPTOMS
ORTHOPNEA: 0
DYSRHYTHMIAS: 1

## 2024-04-24 ASSESSMENT — PAIN SCALES - GENERAL: PAINLEVEL: SEVERE PAIN (7)

## 2024-04-24 ASSESSMENT — LIFESTYLE VARIABLES: TOBACCO_USE: 1

## 2024-04-24 NOTE — PROGRESS NOTES
Tej is a 58 year old who is being evaluated via a billable video visit.    How would you like to obtain your AVS? MyChart  If the video visit is dropped, the invitation should be resent by: Text to cell phone: [phone]    Taniya Burnham is a 58 year old, presenting for the following health issues:  Pre-Op Exam      HPI           Physical Exam

## 2024-04-24 NOTE — H&P
Pre-Operative H & P     CC:  Preoperative exam to assess for increased cardiopulmonary risk while undergoing surgery and anesthesia.    Date of Encounter: 4/24/2024  Primary Care Physician:  Halle Jones     Reason for visit:   Encounter Diagnoses   Name Primary?    Pre-op evaluation Yes    Chronic systolic (congestive) heart failure (H)        HPI  Tej Morfin is a 58 year old male who presents for pre-operative H & P in preparation for  Procedure Information       Case: [id number] Date/Time: 05/02/24 1045    Procedure: Esophagoscopy, gastroscopy, duodenoscopy (EGD), combined (Esophagus)    Anesthesia type: MAC    Diagnosis: Chronic systolic (congestive) heart failure (H) [I50.22]    Pre-op diagnosis: Chronic systolic (congestive) heart failure (H) [I50.22]    Location:  GI 03 /  GI    Providers: Stacey Knight MD            Patient is being evaluated for comorbid conditions of nonischemic cardiomyopathy, heart failure, s/p mitral valve replacement, paroxysmal ventricular tachycardia, s/p ICD, s/p LVAD, RYAN, former tobacco use, obesity, thyroid goiter, CKD, and anxiety.    He has a history of gastroparesis and GERD. He remains on omeprazole and was previously treated with Reglan but this was discontinued after LVAD placement. He has recently been experiencing GI discomfort with abdominal fullness and early satiety. An EGD was recommended for further evaluation. He is now scheduled as above.    History is obtained from the patient and chart review. He is accompanied by his wife today.     Hx of abnormal bleeding, anticoagulation, or anti-platelet use: Warfarin, Aspirin      Past Medical History  Past Medical History:   Diagnosis Date    Chronic systolic heart failure (H) 2/7/2017    Gastroparesis     ICD (implantable cardioverter-defibrillator) in place     [employer]    Non-ischemic cardiomyopathy (H)     Paroxysmal ventricular tachycardia (H) 2/7/2017    S/P mitral valve  replacement 2/7/2017    Tetrahydrocannabinol (THC) use disorder, mild, in controlled environment, abuse 09/12/2019    occasional edible THC. reportedly for sleep, anxiety    Tobacco abuse, episodic     occasional cigar - ~ 3x/wk       Past Surgical History  Past Surgical History:   Procedure Laterality Date    CV INTRA AORTIC BALLOON N/A 3/22/2022    Procedure: Intraprocedure Aortic Balloon Pump Insertion;  Surgeon: Pedro Callahan MD;  Location:  HEART CARDIAC CATH LAB    CV RIGHT HEART CATH MEASUREMENTS RECORDED N/A 3/22/2022    Procedure: Right Heart Catheterization;  Surgeon: Pedro Callahan MD;  Location:  HEART CARDIAC CATH LAB    CV RIGHT HEART CATH MEASUREMENTS RECORDED N/A 9/22/2022    Procedure: Heart Cath Right Heart Cath;  Surgeon: Luis Miguel Sparrow MD;  Location:  HEART CARDIAC CATH LAB    CV RIGHT HEART CATH MEASUREMENTS RECORDED N/A 1/24/2023    Procedure: Heart Cath Right Heart Cath;  Surgeon: Jericho Lawrence MD;  Location:  HEART CARDIAC CATH LAB    HEMORRHOIDECTOMY      ICD implantation       INSERT VENTRICULAR ASSIST DEVICE LEFT (HEARTMATE II) N/A 3/23/2022    Procedure: Redo Sterotomy, Insertion  Left Ventricular Assist Device  (HEARTMATE III) on Cardiopulmonary Bypass, Transesophageal Echocardiogram by Anesthesia;  Surgeon: Wilver Rodríguez MD;  Location:  OR    R partial medial meniscectomy      REPAIR VALVE MITRAL         Prior to Admission Medications  Current Outpatient Medications   Medication Sig Dispense Refill    acetaminophen (TYLENOL) 325 MG tablet Take 2 tablets (650 mg) by mouth every 4 hours as needed for pain 100 tablet 0    allopurinol (ZYLOPRIM) 100 MG tablet Take 1 tablet (100 mg) by mouth daily (Patient taking differently: Take 100 mg by mouth every morning) 30 tablet 1    aspirin 81 MG EC tablet Take 1 tablet (81 mg) by mouth daily (Patient taking differently: Take 81 mg by mouth every evening) 90 tablet 4    cholecalciferol 50 MCG (2000 UT)  CAPS Take 50 mcg by mouth every morning      doxycycline hyclate (VIBRAMYCIN) 100 MG capsule Take 1 capsule (100 mg) by mouth 2 times daily for 14 days 28 capsule 0    furosemide (LASIX) 40 MG tablet TAKE 1.5 TABLETS (60 MG) BY MOUTH 2 TIMES DAILY 270 tablet 3    hydrOXYzine (VISTARIL) 50 MG capsule Take 50 mg by mouth every 12 hours as needed for anxiety      magnesium oxide (MAG-OX) 400 MG tablet Take 800 mg by mouth 2 times daily      metoprolol succinate ER (TOPROL XL) 25 MG 24 hr tablet Take 1 tablet (25 mg) by mouth daily (Patient taking differently: Take 25 mg by mouth every morning) 90 tablet 2    nitroGLYcerin (NITROSTAT) 0.4 MG sublingual tablet Place 0.4 mg under the tongue every 5 minutes as needed for chest pain      omeprazole (PRILOSEC) 40 MG DR capsule Take 40 mg by mouth every morning      potassium chloride ER (KLOR-CON M) 20 MEQ CR tablet Take 3 tablets (60 mEq) by mouth 2 times daily 540 tablet 3    sacubitril-valsartan (ENTRESTO)  MG per tablet Take 1 tablet by mouth 2 times daily 90 tablet 3    sotalol (BETAPACE) 120 MG tablet Take 120 mg by mouth 2 times daily      spironolactone (ALDACTONE) 25 MG tablet Take 1 tablet (25 mg) by mouth daily (Patient taking differently: Take 25 mg by mouth every morning) 90 tablet 3    warfarin ANTICOAGULANT (COUMADIN) 2.5 MG tablet Take 2 tablets (5mg) by mouth daily or as directed by the Coumadin clinic (Patient taking differently: Take 5 mg by mouth daily (with dinner) Take 1 tablets (5mg) by mouth daily or as directed by the Coumadin clinic) 180 tablet 1    amoxicillin (AMOXIL) 500 MG capsule Take 2,000 mg by mouth once as needed Take 4 tabs 1 hour before all dental cleanings and procedures      warfarin ANTICOAGULANT (COUMADIN) 2.5 MG tablet Take 10 mg PO daily at 6 pm until next INR check, then dose per INR goal 2-3 (Patient not taking: Reported on 4/24/2024) 150 tablet 0       Allergies  Allergies   Allergen Reactions    Tilactase Diarrhea     Pt  reports intake is in moderation, but can tolerate.       Social History  Social History     Socioeconomic History    Marital status:      Spouse name: Not on file    Number of children: Not on file    Years of education: Not on file    Highest education level: Not on file   Occupational History    Not on file   Tobacco Use    Smoking status: Former    Smokeless tobacco: Never   Substance and Sexual Activity    Alcohol use: No     Alcohol/week: 0.0 standard drinks of alcohol    Drug use: No    Sexual activity: Not on file   Other Topics Concern    Not on file   Social History Narrative    Not on file     Social Determinants of Health     Financial Resource Strain: Not on file   Food Insecurity: Not on file   Transportation Needs: Not on file   Physical Activity: Insufficiently Active (10/28/2020)    [facility]    Exercise Vital Sign     Days of Exercise per Week: 7 days     Minutes of Exercise per Session: 20 min   Stress: Not on file   Social Connections: Not on file   Interpersonal Safety: Not on file   Housing Stability: Not on file       Family History  Family History   Problem Relation Age of Onset    Coronary Artery Disease Mother     Kidney failure Mother     Cardiomyopathy Father          age 51    Anesthesia Reaction No family hx of     Venous thrombosis No family hx of        Review of Systems  The complete review of systems is negative other than noted in the HPI or here.   Anesthesia Evaluation   Pt has had prior anesthetic.     No history of anesthetic complications       ROS/MED HX  ENT/Pulmonary:     (+) sleep apnea, doesn't use CPAP,              tobacco use, Past use,                    (-) recent URI   Neurologic:  - neg neurologic ROS     Cardiovascular: Comment: LVAD    (+)  - -   -  - -   Taking blood thinners   CHF etiology: NICM            ICD     dysrhythmias (paroxysmal ventricular tachycardia),   valvular  problems/murmurs  s/p MV replacement.    Previous cardiac testing   Echo: Date: 8/8/22 Results:     Left Ventricle: The left ventricle appears normal in size. There is   moderate-to-severe left ventricular hypertrophy. Severely reduced left   ventricular systolic function. HeartMate III LVAD cannula visualized.   There is no aortic valve excursion during the visualized cardiac cycles.      Right Ventricle: The right ventricle appears normal in size. Systolic   function is normal.     Stress Test:  Date: Results:    ECG Reviewed:  Date: 3/13/24 Results:  Atrial-paced rhythm   Indeterminate axis   Artifact due to LVAD     Cath:  Date: 3/22/22 Results:  Conclusion    Right sided filling pressures are moderately elevated.    Moderately elevated pulmonary artery hypertension.    Left sided filling pressures are severely elevated.    Reduced cardiac output level.    Hemodynamic data has been modified in Epic per physician review.     1. Elevated biventricular filling pressure with normal CO on chronic home inotropic support (2.5mcg/kg/min)  2. Successful IABP insertion without complication   (-) hypertension, CAD, FINNEGAN, orthopnea/PND and dyslipidemia   METS/Exercise Tolerance: 4 - Raking leaves, gardening Comment: Able to walk some distance, complete household chores, and ascend a flight of stairs.   Hematologic:     (+)       history of blood transfusion, no previous transfusion reaction,     (-) history of blood clots and anemia   Musculoskeletal: Comment: - right hip pain      GI/Hepatic: Comment: Gastroparesis    (+) GERD, Asymptomatic on medication,                  Renal/Genitourinary:     (+) renal disease, type: CRI, Pt does not require dialysis,           Endo:     (+)          thyroid problem, Thyroid disease - Other goiter,    Obesity,    (-) Type II DM and chronic steroid usage   Psychiatric/Substance Use:     (+) psychiatric history anxiety   Recreational drug usage: Cannabis.    Infectious Disease: Comment:  COVID-19 3/2024, treated with Paxlovid    Currently being treated for driveline infection    (+)   MRSA,         Malignancy:  - neg malignancy ROS     Other:            Virtual visit -  No vitals were obtained    Physical Exam  Constitutional: Pleasant male, no apparent distress, and appears stated age.  Eyes: Pupils equal  HENT: Normocephalic and atraumatic  Respiratory: Non labored breathing on room air  Neurologic: Awake, alert, oriented to name, place and time.   Neuropsychiatric: Calm, cooperative. Normal affect.        Prior Labs/Diagnostic Studies   All labs and imaging personally reviewed     EKG/ stress test - if available please see in ROS above   Echo result w/o MOPS: Interpretation SummaryHM3 speed is 5500RPM.The visual ejection fraction is 15-20% with severe diffuse hypokinesis. Theleft ventricular end diastolic diameter is 6.3 cm.LVAD cannula was seen in the expected anatomic position in the LV apex. LVADinflow and outflow cannula Doppler is normal. The aortic valve does not open.There is mild aortic regurgitation.Global right ventricular function is moderately to severely reduced.IVC diameter <2.1 cm collapsing >50% with sniff suggests a normal RA pressureof 3 mmHg.No pericardial effusion is present. This study was compared with the study from 9/22/2022. The left ventricularend diastolic diameter is slighly larger at 6.3 cm from 5.9 cm.          Latest Ref Rng & Units 1/23/2023     1:42 PM   PFT   FVC L 3.59    FEV1 L 2.35    FVC% % 79    FEV1% % 65      Cardiac Device Check 3/14/2024  Narrative & Impression    Remote ICD transmission received and reviewed. Device transmission sent per MD orders.      Device: [employer] G148 INOGEN X4 CRT-D (LV lead off)  Normal Device Function.  Mode: DDD  bpm  AP: 89%  RVP: 4%  Presenting EGM: AP/VS @ 80 bpm  Lead Trends Appear Stable.  Estimated battery longevity to RRT = 2.5 years   Atrial Arrhythmia: 1 AT/AF episode recorded, 3 min. 12 sec. in  duration.  AF [place]: <1%  Anticoagulant: Warfarin  Ventricular Arrhythmia: None  Pt Notified of Transmission Results: Rashaadt     Plan: Return to clinic 5/2/2024 as scheduled.       The patient's records and results personally reviewed by this provider.     Outside records reviewed from: Care Everywhere      Assessment  Tej Morfin is a 58 year old male seen as a PAC referral for risk assessment and optimization for anesthesia.    Plan/Recommendations  Pt will be optimized for the proposed procedure.  See below for details on the assessment, risk, and preoperative recommendations    NEUROLOGY  - No history of TIA, CVA or seizure    -Post Op delirium risk factors:  No risk identified    ENT  - No current airway concerns.  Will need to be reassessed day of surgery.  Mallampati: Unable to assess  TM: Unable to assess    CARDIAC  - Complex cardiac history including chronic systolic heart failure, HFrEF, NICM s/p HM3 LVAD implant (3/23/2033), severe MR s/p MV repair, s/p CRT-D, paroxysmal ventricular tachycardia with ICD in place. Follows with Dr. Hdz and Dr. Foy  ([place]) and is being considered for future heart transplant. 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 Otherwise, he has been feeling well and reports no changes to his cardiac status.    He will be completing routine cardiac testing on 5/3 after scheduled EGD. Per staff message from Dr. Hdz patient is stable for any upcoming procedure.   Most recent cardiac testing/ICD device check above  Will ask patient to hold lasix and spironolactone day of surgery. Otherwise continue all other cardiac medications.     - METS (Metabolic Equivalents)  Patient performs 4 or more METS exercise without symptoms             Total Score: 0      RCRI-Low risk: Class 2 0.9% complication rate             Total Score: 1    RCRI: CHF     "    PULMONARY  - Obstructive Sleep Apnea  RYAN without home CPAP use.    - Denies asthma or inhaler use  - COVID-19 infection , 3/2024  Treated with Paxlovid. Resolved.    - Tobacco History    History   Smoking Status    Former   Smokeless Tobacco    Never       GI  - GERD  Controlled on medications: Proton Pump Inhibitor  - Gastroparesis/Bloating/Early Satiety  Above procedure planned for further management    PONV Low Risk  Total Score: 1           1 AN PONV: Patient is not a current smoker        /RENAL  -CKD Baseline Creatinine  1.24    ENDOCRINE    - BMI: Estimated body mass index is 33.58 kg/m  as calculated from the following:    Height as of this encounter: 1.778 m (5' 10\").    Weight as of this encounter: 106.1 kg (234 lb).  Obesity (BMI >30)  - No history of Diabetes Mellitus  - History of thyroid goiter, not on thyroid medications    HEME  VTE Low Risk 0.5%             Total Score: 2    VTE: Male      - Coagulopathy secondary to Warfarin (Coumadin, Jantoven). Goal INR per surgical team: 1.8. Patient will work with anticoagulation clinic to achieve recommended INR prior to upcoming procedure.   - Platelet dysfunction secondary to aspirin. Okay to continue for upcoming procedure per GI note from 4/17.  - Remote history of blood transfusion. No known transfusion reaction.    MSK  - Right hip pain  Recommend consideration for careful positioning to limit patient discomfort.    PSYCH  - Anxiety  Hold hydroxyzine day of surgery  Patient reports anxiety symptoms increase with laying flat in surgical settings. He requests that when possible, his head be propped up.     Different anesthesia methods/types have been discussed with the patient, but they are aware that the final plan will be decided by the assigned anesthesia provider on the date of service.    The patient is optimized for their procedure. AVS with information on surgery time/arrival time, meds and NPO status given by nursing staff. No further " diagnostic testing indicated.    Please refer to the physical examination documented by the anesthesiologist in the anesthesia record on the day of surgery.    Video-Visit Details    Type of service:  Video Visit    Provider received verbal consent for a Video Visit from the patient? Yes   Video Start Time:  1109  Video End Time: 1124    Originating Location (pt. Location): Parked in their car    Distant Location (provider location):  Off-site  Mode of Communication:  Video Conference via Filecubed  On the day of service:     Prep time: 12 minutes  Visit time: 15 minutes  Documentation time: 20 minutes  ------------------------------------------  Total time: 47 minutes      Maddy Bazan PA-C  Preoperative Assessment Center  [facility]  Clinic and Surgery Center  Phone: [phone]  Fax: [phone]

## 2024-04-24 NOTE — PATIENT INSTRUCTIONS
Name:  Tej Morfin   MRN:  [id number]   :  1965   Today's Date:  2024     GI Lab procedures:    A representative from the GI Lab will contact you regarding arrival date and time.      You were seen today in the PAC Clinic.   (Pre-operative Anesthesia Assessment Center)  [place] [address]   phone [phone]    You had a pre-operative assessment done.    Anesthesia recommendations for medications:    Hold Aspirin for 2 days before procedure.  Hold Multivitamins for 7 days before procedure.   Hold Herbal medications and Supplements for 7 days before procedure.  Hold Ibuprofen for 2 days before procedure.   Hold Naproxen for 2 days before procedure.     No alcohol or cannabis products for 24 hours before your procedure      Special instructions for anticoagulation medications: The anticoagulation clinic will let you know when to stop Warfarin (Coumadin)        Please hold the following medications the day of procedure:    Furosemide (Lasix)  Spironolactone (Aldactone)  Hydroxyzine (Vistaril)  Vitamin D  Magnesium  Potassium      Please continue to take these medications per your usual schedule:    Aspirin  Tylenol if needed  Allopurinol  Doxycycline  Metoprolol  Omeprazole (Prilosec)  Entresto  Sotalol          For questions or appointments, call:  [facility]: [phone], option 2.  Monday through Friday, 8 a.m. to 4:30 p.m.  (If it is after hours, please reach out to the clinic or provider that scheduled your appointment)

## 2024-04-25 ENCOUNTER — CARE COORDINATION (OUTPATIENT)
Dept: CARDIOLOGY | Facility: CLINIC | Age: 59
End: 2024-04-25
Payer: MEDICARE

## 2024-04-25 ENCOUNTER — CARE COORDINATION (OUTPATIENT)
Dept: TRANSPLANT | Facility: CLINIC | Age: 59
End: 2024-04-25
Payer: MEDICARE

## 2024-04-25 NOTE — PROGRESS NOTES
Status 4 Heart Extension Complete 04/25/24    This patient meets status 4 criteria as evidenced by:     Dischargeable LVAD without discretionary 30 days     Patient's primary cardiologist and/or attending physician is in agreement with this plan.      Status 4 Extension due 07/26/24

## 2024-04-25 NOTE — PROGRESS NOTES
Called pt to discuss EOGO recall.  Pt did not answer.  Left message requesting return call.  Will discuss when pt calls back.

## 2024-04-26 NOTE — PROGRESS NOTES
Patient started a 2 week course of Cefadroxil yesterday and doxycycline was discontinued. Writer consulted with Jennifer from pharmacy and explained situation with upcoming colonoscopy on 5/3 and the need for the INR to be <1.8. Patient is instructed to take 2.5mg of warfarin on 4/28/24. Calendar is updated

## 2024-04-29 NOTE — TELEPHONE ENCOUNTER
The Pt called in to verify arrival time which I did with him.     Also reminded him of CLD and NPO timing as well (per below.) No further questions.     No Baltazar, RN   Endoscopy Procedure Pre Assessment RN

## 2024-04-29 NOTE — PROGRESS NOTES
4/29/24 1300  Pt returned call to discuss EOGO.    Informed them of recent Heartmate FDA recall regarding extrinsic outflow graft obstruction (EOGO).     Writer reviewed:  Brief background of what EOGO is.  The rate of reported cases and deaths associated with EOGO (0.24% @ 2 years, 2.06% @ 5 years, 273 injuries, and 14 deaths).  Their pump does not need to be replaced, per VAD team, FDA, and industry. There is no indication to stop implanting Heartmate 3 pumps.  The VAD team monitors for EOGO in clinic visits, assessing VAD parameters and with routine testing. It is a treatable condition and we have successfully managed it.    Low flows are the most common sign of EOGO. Patient does not report any persistent changes in flow. Reviewed standard instructions that patient should assess and document VAD parameters once daily. They should notify VAD team for persistent flow changes of 2 or more and/or new/unexpected low flow alarms, or if they generally feel unwell.   The [facility] is also sending a letter to review this information. We will keep them informed of any new information or changes.   They should page the on-call VAD Coordinator with future questions or concerns.     Patient verbalized understanding of the above.

## 2024-04-29 NOTE — PROGRESS NOTES
CT images received and read by [facility] radiology.  No concerns for abscess at this time.  Reads and images discussed with Dr. Rodríguez.  No further intervention needed at this time.  Will re-evaluate if drainage returns.  Pt advised.  Cardiology updated.  Will assess wound at appt this week.

## 2024-04-30 ENCOUNTER — ANTICOAGULATION THERAPY VISIT (OUTPATIENT)
Dept: ANTICOAGULATION | Facility: CLINIC | Age: 59
End: 2024-04-30
Payer: MEDICARE

## 2024-04-30 DIAGNOSIS — I47.29 PAROXYSMAL VENTRICULAR TACHYCARDIA (H): ICD-10-CM

## 2024-04-30 DIAGNOSIS — Z95.811 LVAD (LEFT VENTRICULAR ASSIST DEVICE) PRESENT (H): Primary | ICD-10-CM

## 2024-04-30 DIAGNOSIS — Z95.2 S/P MITRAL VALVE REPLACEMENT: ICD-10-CM

## 2024-04-30 DIAGNOSIS — I50.22 CHRONIC SYSTOLIC CHF (CONGESTIVE HEART FAILURE) (H): ICD-10-CM

## 2024-04-30 LAB — INR HOME MONITORING: 2.6 (ref 2–3)

## 2024-04-30 NOTE — PROGRESS NOTES
ANTICOAGULATION MANAGEMENT     Tej Morfin 58 year old male is on warfarin with therapeutic INR result. (Goal INR 2.0-3.0)    Recent labs: (last 7 days)     04/30/24  0000   INR 2.6       ASSESSMENT     Source(s): Chart Review and Patient/Caregiver Call     Warfarin doses taken: Warfarin taken as instructed  Diet: No new diet changes identified  Medication/supplement changes:  At Infectious Disease appointment 4/25/24 Doxycycline discontinued and started Cefadroxil 1000 mg BID x14 days and then 500 mg BID, this medication can increase bleeding risk and INR.  New illness, injury, or hospitalization: No  Signs or symptoms of bleeding or clotting: No  Previous result: Therapeutic last 2(+) visits - partial hold of 2.5 mg instructed 4/23/24 & 4/28/24.  Additional findings: Upcoming surgery/procedure EGD 5/2/24 - per 4/9/24 procedure plan TE, target INR of 1.8. Right heart cath 5/3/24, per ROBAUTO message sent to patient 4/25/24, target INR <2.5.  Per 4/25/24 Infectious Disease OV note: No fever/chills/nausea/vomiting... Went to ER 4/17/24 for abdominal pain/driveline exit site drainage - blood cultures showed no growth, CT showed no evidence of abscess, repeat culture grew MSSA and was treated with Doxycycline.       PLAN     Recommended plan for temporary change(s) and ongoing change(s) affecting INR     Dosing Instructions: hold dose then continue your current warfarin dose with next INR in 7 days after 5/3/24 procedure on 5/10/24 - Per [facility], INR will also be checked 5/2/24 and we can decide then on post-procedure dosing based off INR.      Summary  As of 4/30/2024      Full warfarin instructions:  4/30: Hold; Otherwise 5 mg every day   Next INR check:  5/10/2024               Telephone call with Tej who verbalizes understanding and agrees to plan    Patient to recheck with home meter    Education provided:   Goal range and lab monitoring: goal range and significance of current result  Interaction IS  anticipated between warfarin and Cefadroxil  Symptom monitoring: monitoring for bleeding signs and symptoms  Contact [phone] with any changes, questions or concerns.     Plan made with [facility] Pharmacist Cindy Bruce & Aniya Biswas and per LVAD protocol    Emma Mata RN  Anticoagulation Clinic  4/30/2024    _______________________________________________________________________     Anticoagulation Episode Summary       Current INR goal:  2.0-3.0   TTR:  79.2% (1 y)   Target end date:  Indefinite   Send INR reminders to:  ANTICOAG LVAD    Indications    LVAD (left ventricular assist device) present (H) [Z95.811]  Chronic systolic CHF (congestive heart failure) (H) [I50.22]  S/P mitral valve replacement [Z95.2]  Paroxysmal ventricular tachycardia (H) [I47.29]             Comments:  Follow VAD Anticoag protocol:Yes: HeartMate 3  Bridging: per protocol  Date VAD placed: 03/23/22  INR Goal: per referral  Hx Mitral valve repair and not replacement             Anticoagulation Care Providers       Provider Role Specialty Phone number    Dunia Hdz MD Referring Cardiovascular Disease [phone]

## 2024-05-02 ENCOUNTER — LAB (OUTPATIENT)
Dept: LAB | Facility: CLINIC | Age: 59
End: 2024-05-02
Attending: INTERNAL MEDICINE
Payer: MEDICARE

## 2024-05-02 ENCOUNTER — ANTICOAGULATION THERAPY VISIT (OUTPATIENT)
Dept: ANTICOAGULATION | Facility: CLINIC | Age: 59
End: 2024-05-02

## 2024-05-02 ENCOUNTER — ANCILLARY PROCEDURE (OUTPATIENT)
Dept: CARDIOLOGY | Facility: CLINIC | Age: 59
End: 2024-05-02
Attending: INTERNAL MEDICINE
Payer: MEDICARE

## 2024-05-02 ENCOUNTER — HOSPITAL ENCOUNTER (OUTPATIENT)
Facility: CLINIC | Age: 59
Discharge: HOME | End: 2024-05-02
Attending: INTERNAL MEDICINE | Admitting: INTERNAL MEDICINE
Payer: MEDICARE

## 2024-05-02 ENCOUNTER — ANESTHESIA (OUTPATIENT)
Dept: GASTROENTEROLOGY | Facility: CLINIC | Age: 59
End: 2024-05-02
Payer: MEDICARE

## 2024-05-02 VITALS
OXYGEN SATURATION: 98 % | HEART RATE: 59 BPM | BODY MASS INDEX: 35.17 KG/M2 | WEIGHT: 245.1 LBS | SYSTOLIC BLOOD PRESSURE: 78 MMHG

## 2024-05-02 VITALS — DIASTOLIC BLOOD PRESSURE: 84 MMHG | RESPIRATION RATE: 16 BRPM | SYSTOLIC BLOOD PRESSURE: 96 MMHG

## 2024-05-02 DIAGNOSIS — Z79.899 LONG TERM USE OF DRUG: ICD-10-CM

## 2024-05-02 DIAGNOSIS — E66.01 CLASS 2 SEVERE OBESITY DUE TO EXCESS CALORIES WITH SERIOUS COMORBIDITY AND BODY MASS INDEX (BMI) OF 36.0 TO 36.9 IN ADULT (H): Primary | ICD-10-CM

## 2024-05-02 DIAGNOSIS — I50.22 CHRONIC SYSTOLIC CONGESTIVE HEART FAILURE (H): ICD-10-CM

## 2024-05-02 DIAGNOSIS — Z95.811 LVAD (LEFT VENTRICULAR ASSIST DEVICE) PRESENT (H): ICD-10-CM

## 2024-05-02 DIAGNOSIS — I50.22 CHRONIC SYSTOLIC CHF (CONGESTIVE HEART FAILURE) (H): ICD-10-CM

## 2024-05-02 DIAGNOSIS — E66.812 CLASS 2 SEVERE OBESITY DUE TO EXCESS CALORIES WITH SERIOUS COMORBIDITY AND BODY MASS INDEX (BMI) OF 36.0 TO 36.9 IN ADULT (H): Primary | ICD-10-CM

## 2024-05-02 DIAGNOSIS — Z95.2 S/P MITRAL VALVE REPLACEMENT: ICD-10-CM

## 2024-05-02 DIAGNOSIS — K31.84 GASTROPARESIS: Primary | ICD-10-CM

## 2024-05-02 DIAGNOSIS — I47.29 PAROXYSMAL VENTRICULAR TACHYCARDIA (H): ICD-10-CM

## 2024-05-02 DIAGNOSIS — Z95.811 LVAD (LEFT VENTRICULAR ASSIST DEVICE) PRESENT (H): Primary | ICD-10-CM

## 2024-05-02 DIAGNOSIS — I50.22 CHRONIC SYSTOLIC (CONGESTIVE) HEART FAILURE (H): ICD-10-CM

## 2024-05-02 DIAGNOSIS — I42.8 VALVULAR CARDIOMYOPATHY (H): ICD-10-CM

## 2024-05-02 DIAGNOSIS — Z76.82 AWAITING ORGAN TRANSPLANT: ICD-10-CM

## 2024-05-02 DIAGNOSIS — Z95.811 LEFT VENTRICULAR ASSIST DEVICE PRESENT (H): ICD-10-CM

## 2024-05-02 LAB
ALBUMIN SERPL BCG-MCNC: 4 G/DL (ref 3.5–5.2)
ALP SERPL-CCNC: 79 U/L (ref 40–150)
ALT SERPL W P-5'-P-CCNC: 17 U/L (ref 0–70)
ANION GAP SERPL CALCULATED.3IONS-SCNC: 11 MMOL/L (ref 7–15)
AST SERPL W P-5'-P-CCNC: 18 U/L (ref 0–45)
BASOPHILS # BLD AUTO: 0 10E3/UL (ref 0–0.2)
BASOPHILS NFR BLD AUTO: 0 %
BILIRUB SERPL-MCNC: 0.6 MG/DL
BUN SERPL-MCNC: 20.5 MG/DL (ref 6–20)
CALCIUM SERPL-MCNC: 9.7 MG/DL (ref 8.6–10)
CHLORIDE SERPL-SCNC: 102 MMOL/L (ref 98–107)
CREAT SERPL-MCNC: 1.47 MG/DL (ref 0.67–1.17)
CREAT UR-MCNC: 120 MG/DL
DEPRECATED HCO3 PLAS-SCNC: 26 MMOL/L (ref 22–29)
EGFRCR SERPLBLD CKD-EPI 2021: 56 ML/MIN/1.73M2
EOSINOPHIL # BLD AUTO: 0.1 10E3/UL (ref 0–0.7)
EOSINOPHIL NFR BLD AUTO: 1 %
ERYTHROCYTE [DISTWIDTH] IN BLOOD BY AUTOMATED COUNT: 14.6 % (ref 10–15)
FERRITIN SERPL-MCNC: 298 NG/ML (ref 31–409)
GLUCOSE SERPL-MCNC: 114 MG/DL (ref 70–99)
HCT VFR BLD AUTO: 39.7 % (ref 40–53)
HGB BLD-MCNC: 12.5 G/DL (ref 13.3–17.7)
IMM GRANULOCYTES # BLD: 0.1 10E3/UL
IMM GRANULOCYTES NFR BLD: 1 %
INR PPP: 1.82 (ref 0.85–1.15)
IRON BINDING CAPACITY (ROCHE): 270 UG/DL (ref 240–430)
IRON SATN MFR SERPL: 21 % (ref 15–46)
IRON SERPL-MCNC: 56 UG/DL (ref 61–157)
LDH SERPL L TO P-CCNC: 196 U/L (ref 0–250)
LYMPHOCYTES # BLD AUTO: 1.2 10E3/UL (ref 0.8–5.3)
LYMPHOCYTES NFR BLD AUTO: 22 %
MCH RBC QN AUTO: 30.6 PG (ref 26.5–33)
MCHC RBC AUTO-ENTMCNC: 31.5 G/DL (ref 31.5–36.5)
MCV RBC AUTO: 97 FL (ref 78–100)
MONOCYTES # BLD AUTO: 0.3 10E3/UL (ref 0–1.3)
MONOCYTES NFR BLD AUTO: 5 %
NEUTROPHILS # BLD AUTO: 3.9 10E3/UL (ref 1.6–8.3)
NEUTROPHILS NFR BLD AUTO: 71 %
NRBC # BLD AUTO: 0 10E3/UL
NRBC BLD AUTO-RTO: 0 /100
NT-PROBNP SERPL-MCNC: 205 PG/ML (ref 0–900)
PLATELET # BLD AUTO: 207 10E3/UL (ref 150–450)
POTASSIUM SERPL-SCNC: 4.2 MMOL/L (ref 3.4–5.3)
PROT SERPL-MCNC: 7.2 G/DL (ref 6.4–8.3)
RBC # BLD AUTO: 4.09 10E6/UL (ref 4.4–5.9)
SODIUM SERPL-SCNC: 139 MMOL/L (ref 135–145)
UPPER GI ENDOSCOPY: NORMAL
URATE SERPL-MCNC: 5.4 MG/DL (ref 3.4–7)
WBC # BLD AUTO: 5.5 10E3/UL (ref 4–11)

## 2024-05-02 PROCEDURE — 36415 COLL VENOUS BLD VENIPUNCTURE: CPT | Performed by: PATHOLOGY

## 2024-05-02 PROCEDURE — 80053 COMPREHEN METABOLIC PANEL: CPT | Performed by: PATHOLOGY

## 2024-05-02 PROCEDURE — 86832 HLA CLASS I HIGH DEFIN QUAL: CPT | Performed by: INTERNAL MEDICINE

## 2024-05-02 PROCEDURE — 84466 ASSAY OF TRANSFERRIN: CPT | Performed by: INTERNAL MEDICINE

## 2024-05-02 PROCEDURE — 83540 ASSAY OF IRON: CPT | Performed by: PATHOLOGY

## 2024-05-02 PROCEDURE — 250N000011 HC RX IP 250 OP 636: Performed by: STUDENT IN AN ORGANIZED HEALTH CARE EDUCATION/TRAINING PROGRAM

## 2024-05-02 PROCEDURE — 83880 ASSAY OF NATRIURETIC PEPTIDE: CPT | Performed by: PATHOLOGY

## 2024-05-02 PROCEDURE — 250N000009 HC RX 250: Performed by: STUDENT IN AN ORGANIZED HEALTH CARE EDUCATION/TRAINING PROGRAM

## 2024-05-02 PROCEDURE — 83615 LACTATE (LD) (LDH) ENZYME: CPT | Performed by: PATHOLOGY

## 2024-05-02 PROCEDURE — 93283 PRGRMG EVAL IMPLANTABLE DFB: CPT | Performed by: INTERNAL MEDICINE

## 2024-05-02 PROCEDURE — 99215 OFFICE O/P EST HI 40 MIN: CPT | Mod: 25 | Performed by: INTERNAL MEDICINE

## 2024-05-02 PROCEDURE — 370N000017 HC ANESTHESIA TECHNICAL FEE, PER MIN: Performed by: INTERNAL MEDICINE

## 2024-05-02 PROCEDURE — 84550 ASSAY OF BLOOD/URIC ACID: CPT | Performed by: PATHOLOGY

## 2024-05-02 PROCEDURE — 93750 INTERROGATION VAD IN PERSON: CPT | Performed by: INTERNAL MEDICINE

## 2024-05-02 PROCEDURE — G0480 DRUG TEST DEF 1-7 CLASSES: HCPCS | Mod: 90 | Performed by: PATHOLOGY

## 2024-05-02 PROCEDURE — G0463 HOSPITAL OUTPT CLINIC VISIT: HCPCS | Performed by: INTERNAL MEDICINE

## 2024-05-02 PROCEDURE — 43235 EGD DIAGNOSTIC BRUSH WASH: CPT | Performed by: NURSE ANESTHETIST, CERTIFIED REGISTERED

## 2024-05-02 PROCEDURE — 86833 HLA CLASS II HIGH DEFIN QUAL: CPT | Performed by: INTERNAL MEDICINE

## 2024-05-02 PROCEDURE — 82728 ASSAY OF FERRITIN: CPT | Performed by: PATHOLOGY

## 2024-05-02 PROCEDURE — 85610 PROTHROMBIN TIME: CPT | Performed by: PATHOLOGY

## 2024-05-02 PROCEDURE — 80349 CANNABINOIDS NATURAL: CPT | Performed by: INTERNAL MEDICINE

## 2024-05-02 PROCEDURE — 43235 EGD DIAGNOSTIC BRUSH WASH: CPT | Performed by: INTERNAL MEDICINE

## 2024-05-02 PROCEDURE — 85025 COMPLETE CBC W/AUTO DIFF WBC: CPT | Performed by: PATHOLOGY

## 2024-05-02 PROCEDURE — 99000 SPECIMEN HANDLING OFFICE-LAB: CPT | Performed by: PATHOLOGY

## 2024-05-02 PROCEDURE — 43235 EGD DIAGNOSTIC BRUSH WASH: CPT | Performed by: ANESTHESIOLOGY

## 2024-05-02 PROCEDURE — 258N000003 HC RX IP 258 OP 636: Performed by: STUDENT IN AN ORGANIZED HEALTH CARE EDUCATION/TRAINING PROGRAM

## 2024-05-02 PROCEDURE — 84238 ASSAY NONENDOCRINE RECEPTOR: CPT | Mod: 90 | Performed by: PATHOLOGY

## 2024-05-02 PROCEDURE — 83550 IRON BINDING TEST: CPT | Performed by: PATHOLOGY

## 2024-05-02 RX ORDER — NALOXONE HYDROCHLORIDE 0.4 MG/ML
0.1 INJECTION, SOLUTION INTRAMUSCULAR; INTRAVENOUS; SUBCUTANEOUS
Status: DISCONTINUED | OUTPATIENT
Start: 2024-05-02 | End: 2024-05-02 | Stop reason: HOSPADM

## 2024-05-02 RX ORDER — ONDANSETRON 2 MG/ML
4 INJECTION INTRAMUSCULAR; INTRAVENOUS EVERY 30 MIN PRN
Status: DISCONTINUED | OUTPATIENT
Start: 2024-05-02 | End: 2024-05-02 | Stop reason: HOSPADM

## 2024-05-02 RX ORDER — SPIRONOLACTONE 25 MG/1
12.5 TABLET ORAL DAILY
Qty: 45 TABLET | Refills: 3 | Status: SHIPPED | OUTPATIENT
Start: 2024-05-02

## 2024-05-02 RX ORDER — ACETAMINOPHEN 325 MG/1
975 TABLET ORAL ONCE
Status: DISCONTINUED | OUTPATIENT
Start: 2024-05-02 | End: 2024-05-02 | Stop reason: HOSPADM

## 2024-05-02 RX ORDER — LIDOCAINE HYDROCHLORIDE 20 MG/ML
INJECTION, SOLUTION INFILTRATION; PERINEURAL PRN
Status: DISCONTINUED | OUTPATIENT
Start: 2024-05-02 | End: 2024-05-02

## 2024-05-02 RX ORDER — PROPOFOL 10 MG/ML
INJECTION, EMULSION INTRAVENOUS PRN
Status: DISCONTINUED | OUTPATIENT
Start: 2024-05-02 | End: 2024-05-02

## 2024-05-02 RX ORDER — CEFADROXIL 500 MG/1
500 CAPSULE ORAL 2 TIMES DAILY
COMMUNITY
End: 2024-07-24

## 2024-05-02 RX ORDER — PROPOFOL 10 MG/ML
INJECTION, EMULSION INTRAVENOUS CONTINUOUS PRN
Status: DISCONTINUED | OUTPATIENT
Start: 2024-05-02 | End: 2024-05-02

## 2024-05-02 RX ORDER — DEXAMETHASONE SODIUM PHOSPHATE 4 MG/ML
4 INJECTION, SOLUTION INTRA-ARTICULAR; INTRALESIONAL; INTRAMUSCULAR; INTRAVENOUS; SOFT TISSUE
Status: DISCONTINUED | OUTPATIENT
Start: 2024-05-02 | End: 2024-05-02 | Stop reason: HOSPADM

## 2024-05-02 RX ORDER — ONDANSETRON 2 MG/ML
4 INJECTION INTRAMUSCULAR; INTRAVENOUS
Status: DISCONTINUED | OUTPATIENT
Start: 2024-05-02 | End: 2024-05-02 | Stop reason: HOSPADM

## 2024-05-02 RX ORDER — ONDANSETRON 4 MG/1
4 TABLET, ORALLY DISINTEGRATING ORAL EVERY 30 MIN PRN
Status: DISCONTINUED | OUTPATIENT
Start: 2024-05-02 | End: 2024-05-02 | Stop reason: HOSPADM

## 2024-05-02 RX ORDER — OXYCODONE HYDROCHLORIDE 5 MG/1
5 TABLET ORAL
Status: DISCONTINUED | OUTPATIENT
Start: 2024-05-02 | End: 2024-05-02 | Stop reason: HOSPADM

## 2024-05-02 RX ORDER — FENTANYL CITRATE 50 UG/ML
25 INJECTION, SOLUTION INTRAMUSCULAR; INTRAVENOUS
Status: DISCONTINUED | OUTPATIENT
Start: 2024-05-02 | End: 2024-05-02 | Stop reason: HOSPADM

## 2024-05-02 RX ORDER — SODIUM CHLORIDE, SODIUM LACTATE, POTASSIUM CHLORIDE, CALCIUM CHLORIDE 600; 310; 30; 20 MG/100ML; MG/100ML; MG/100ML; MG/100ML
INJECTION, SOLUTION INTRAVENOUS CONTINUOUS PRN
Status: DISCONTINUED | OUTPATIENT
Start: 2024-05-02 | End: 2024-05-02

## 2024-05-02 RX ORDER — LIDOCAINE 40 MG/G
CREAM TOPICAL
Status: DISCONTINUED | OUTPATIENT
Start: 2024-05-02 | End: 2024-05-02 | Stop reason: HOSPADM

## 2024-05-02 RX ORDER — OXYCODONE HYDROCHLORIDE 10 MG/1
10 TABLET ORAL
Status: DISCONTINUED | OUTPATIENT
Start: 2024-05-02 | End: 2024-05-02 | Stop reason: HOSPADM

## 2024-05-02 RX ADMIN — PROPOFOL 20 MG: 10 INJECTION, EMULSION INTRAVENOUS at 11:30

## 2024-05-02 RX ADMIN — PHENYLEPHRINE HYDROCHLORIDE 50 MCG: 10 INJECTION INTRAVENOUS at 11:30

## 2024-05-02 RX ADMIN — LIDOCAINE HYDROCHLORIDE 60 MG: 20 INJECTION, SOLUTION INFILTRATION; PERINEURAL at 11:29

## 2024-05-02 RX ADMIN — SODIUM CHLORIDE, POTASSIUM CHLORIDE, SODIUM LACTATE AND CALCIUM CHLORIDE: 600; 310; 30; 20 INJECTION, SOLUTION INTRAVENOUS at 11:21

## 2024-05-02 RX ADMIN — PROPOFOL 150 MCG/KG/MIN: 10 INJECTION, EMULSION INTRAVENOUS at 11:30

## 2024-05-02 RX ADMIN — TOPICAL ANESTHETIC 1 EACH: 200 SPRAY DENTAL; PERIODONTAL at 11:28

## 2024-05-02 RX ADMIN — PROPOFOL 30 MG: 10 INJECTION, EMULSION INTRAVENOUS at 11:31

## 2024-05-02 RX ADMIN — PHENYLEPHRINE HYDROCHLORIDE 150 MCG: 10 INJECTION INTRAVENOUS at 11:33

## 2024-05-02 ASSESSMENT — ACTIVITIES OF DAILY LIVING (ADL)
ADLS_ACUITY_SCORE: 36
ADLS_ACUITY_SCORE: 38
ADLS_ACUITY_SCORE: 38

## 2024-05-02 ASSESSMENT — ENCOUNTER SYMPTOMS
ORTHOPNEA: 0
DYSRHYTHMIAS: 1

## 2024-05-02 ASSESSMENT — PAIN SCALES - GENERAL: PAINLEVEL: NO PAIN (0)

## 2024-05-02 ASSESSMENT — LIFESTYLE VARIABLES: TOBACCO_USE: 1

## 2024-05-02 NOTE — PROGRESS NOTES
ANTICOAGULATION MANAGEMENT     Tej Morfin 58 year old male is on warfarin with subtherapeutic INR result. (Goal INR 2.0-3.0)    Recent labs: (last 7 days)     05/02/24  1353   INR 1.82*       ASSESSMENT     Source(s): Chart Review and Patient/Caregiver Call     Warfarin doses taken: Held for EGD  recently which may be affecting INR  Diet: No new diet changes identified  Medication/supplement changes: None noted  New illness, injury, or hospitalization: No  Signs or symptoms of bleeding or clotting: No  Previous result: Therapeutic last 2(+) visits  Additional findings: Upcoming surgery/procedure RHC tomorrow, target for procedure is  <2.5       PLAN     Recommended plan for temporary change(s) affecting INR     Dosing Instructions: booster dose tomorrow after RHC then continue your current warfarin dose with next INR in 1 week       Summary  As of 5/2/2024      Full warfarin instructions:  5/3: 7.5 mg; Otherwise 5 mg every day   Next INR check:  5/10/2024               Telephone call with Tej who verbalizes understanding and agrees to plan    Patient to recheck with home meter    Education provided:   Contact [phone] with any changes, questions or concerns.     Plan made with [facility] Pharmacist Jennifer Dyer, RN  Anticoagulation Clinic  5/2/2024    _______________________________________________________________________     Anticoagulation Episode Summary       Current INR goal:  2.0-3.0   TTR:  79.0% (1 y)   Target end date:  Indefinite   Send INR reminders to:  ANTICOAG LVAD    Indications    LVAD (left ventricular assist device) present (H) [Z95.811]  Chronic systolic CHF (congestive heart failure) (H) [I50.22]  S/P mitral valve replacement [Z95.2]  Paroxysmal ventricular tachycardia (H) [I47.29]             Comments:  Follow VAD Anticoag protocol:Yes: HeartMate 3  Bridging: per protocol  Date VAD placed: 03/23/22  INR Goal: per referral  Hx Mitral valve repair and not replacement              Anticoagulation Care Providers       Provider Role Specialty Phone number    Dunia Hdz MD Referring Cardiovascular Disease [phone]

## 2024-05-02 NOTE — PROGRESS NOTES
May 2, 2024      HPI  PMH of severe MR s/p MVR with ring (1/21/15), VT s/p ICD, nonobstructive CAD, NICM previously on home inotropes now s/p HM III LVAD implantation 3/23/22 as bridge to decision (presently not listed to recover from LVAD team wanted to see marijuana levels decline consistent with quit date), with course c/b AFib with RVR (s/p ICD shock 3/25/22) a) who presents for f/up in LVAD clinic.    Overall he had a pretty incredible recovery after his LVAD, marijuana levels have been negative, now listed as a status 4.     Overall he is felt well but he did have an ICD shock in May 2023.   He is presently on sotalol as well as a beta-blocker and is tolerating this and by ICD check today he is in remission.       This visit:     The patient presents for evaluation of new MRSA driveline infection. He is accompanied by his wife.     The patient reports experiencing a burning sensation in his abdomen, particularly when transitioning from a seated to a standing position, walking, or engaging in activities such as clenching in his stomach. The onset of these symptoms coincided with a concurrent diagnosis of MRSA, gout, and COVID-19. Despite completing a course of antibiotics for MRSA, approximately two weeks later, he began to experience a burning sensation, which was subsequently cultured for Staphylococcus. Despite the administration of oral antibiotics, the pain intensified, prompting a switch to a stronger antibiotic, which he has been taking for approximately one week.     The frequency of the pain has decreased, however, the pain persists, preventing him from entering a vehicle. The drainage from the wound has improved, occurring once daily, and he denies experiencing fevers, although he does report occasional chills. He denies body aches and reports pain near his driveline site. He denies leg swelling, nocturnal breathing difficulties, or appetite issues. He experiences immediate bloating and tightness  around his stomach after taking his medication and drinking a glass of water. The accompanying adult female notes that he has abstained from food for the past 24 hours, after which he consumed only liquid.     He underwent an upper endoscopy this morning with Dr. Knight and has not recently consulted with a gastroenterologist. A recent CT scan was performed.     The infectious disease specialist in [place] informed him that the antibiotic is not in his blood nor his skin, but rather in his driveline. He was advised to continue cefadroxil 500, two capsules twice daily, for a period of 14 days, after which it will be reduced to  once capsule twice daily. He reports occasional diarrhea, which he attributes to his magnesium intake.      Other LVAD ROS:  denies stroke symptoms, GI bleeding symptoms, or pump alarms.         PAST MEDICAL HISTORY:  Past Medical History:   Diagnosis Date    Chronic systolic heart failure (H) 2017    Gastroparesis     ICD (implantable cardioverter-defibrillator) in place     Stamp.it    Non-ischemic cardiomyopathy (H)     Paroxysmal ventricular tachycardia (H) 2017    S/P mitral valve replacement 2017    Tetrahydrocannabinol (THC) use disorder, mild, in controlled environment, abuse 2019    occasional edible THC. reportedly for sleep, anxiety    Tobacco abuse, episodic     occasional cigar - ~ 3x/wk       FAMILY HISTORY:  Family History   Problem Relation Age of Onset    Coronary Artery Disease Mother     Kidney failure Mother     Cardiomyopathy Father          age 51    Anesthesia Reaction No family hx of     Venous thrombosis No family hx of       Drug use: No       Social history unchanged from prior  His dog recently passed away  New cat       CURRENT MEDICATIONS:    Current Outpatient Medications   Medication Sig Dispense Refill    acetaminophen (TYLENOL) 325 MG tablet Take 2 tablets (650 mg) by mouth every 4 hours as needed for pain 100 tablet 0     allopurinol (ZYLOPRIM) 100 MG tablet Take 1 tablet (100 mg) by mouth daily (Patient taking differently: Take 100 mg by mouth every morning) 30 tablet 1    amoxicillin (AMOXIL) 500 MG capsule Take 2,000 mg by mouth once as needed Take 4 tabs 1 hour before all dental cleanings and procedures      cefadroxil (DURICEF) 500 MG capsule Take 500 mg by mouth 2 times daily      cholecalciferol 50 MCG (2000 UT) CAPS Take 50 mcg by mouth every morning      furosemide (LASIX) 40 MG tablet TAKE 1.5 TABLETS (60 MG) BY MOUTH 2 TIMES DAILY 270 tablet 3    hydrOXYzine (VISTARIL) 50 MG capsule Take 50 mg by mouth every 12 hours as needed for anxiety      magnesium oxide (MAG-OX) 400 MG tablet Take 800 mg by mouth 2 times daily      metoprolol succinate ER (TOPROL XL) 25 MG 24 hr tablet Take 1 tablet (25 mg) by mouth daily (Patient taking differently: Take 25 mg by mouth every morning) 90 tablet 2    nitroGLYcerin (NITROSTAT) 0.4 MG sublingual tablet Place 0.4 mg under the tongue every 5 minutes as needed for chest pain      omeprazole (PRILOSEC) 40 MG DR capsule Take 40 mg by mouth every morning      potassium chloride ER (KLOR-CON M) 20 MEQ CR tablet Take 3 tablets (60 mEq) by mouth 2 times daily 540 tablet 3    sacubitril-valsartan (ENTRESTO)  MG per tablet Take 1 tablet by mouth 2 times daily 90 tablet 3    sotalol (BETAPACE) 120 MG tablet Take 120 mg by mouth 2 times daily      spironolactone (ALDACTONE) 25 MG tablet Take 0.5 tablets (12.5 mg) by mouth daily 45 tablet 3    warfarin ANTICOAGULANT (COUMADIN) 2.5 MG tablet Take 2 tablets (5mg) by mouth daily or as directed by the Coumadin clinic (Patient taking differently: Take 5 mg by mouth daily (with dinner) Take 1 tablets (5mg) by mouth daily or as directed by the Coumadin clinic) 180 tablet 1    warfarin ANTICOAGULANT (COUMADIN) 2.5 MG tablet Take 10 mg PO daily at 6 pm until next INR check, then dose per INR goal 2-3 150 tablet 0       All relevant ROS were reviewed in  the HPI.       EXAM:    BP (!) 78/0 (BP Location: Right arm, Patient Position: Chair, Cuff Size: Adult Large)   Pulse 59   Wt 111.2 kg (245 lb 1.6 oz)   SpO2 98%   BMI 35.17 kg/m        The patient is obese and is somewhat uncomfortable due to pain as he is walking.    The patient's neck veins are mildly elevated.  CV: humm of LVAD auscultated   RESPIRATORY: Respirations regular, even, and unlabored  GI: Soft and +  distended,  with normoactive bowel sounds present in all quadrants. No tenderness, rebound, guarding.   The patient's drive line shows no obvious erythema or purulence upon palpation, but there is some dried material on the dressing.  EXTREMITIES: 1+ lower extremity edema  NEUROLOGIC: Alert and interacting appropriatly.  No focal deficits.   MUSCULOSKELETAL: No joint swelling or tenderness.     Results      All lab trends, imaging, recent echos personally reviewed with the patient.     Laboratory Studies  Creatinine is about 1.4. Liver function is entirely normal. Albumin is normal. Potassium is normal. Hemoglobin is just mildly down at 12.5. White blood cell count is normal.    Testing  Device interrogation shows no atrial fibrillation and no sustained ventricular arrhythmias. LVAD interrogation showed some low PI but no device malfunction.          Assessment and Plan:   PMH of severe MR s/p MVR with ring (1/21/15), VT s/p ICD,  NICM previously on home inotropes now s/p HM III LVAD implantation 3/23/22, with course c/b sternal wound s/p Keflex treatment,     Overall he  recovered incredibly well.  Presently listed for transplant as a status 4.         Chronic SHCF s/p HM III LVAD. Severe MR s/p mitral valve repair with annuloplasty ring (1/21/15). Implanted 3/23/22.   Stage D, NYHA Class II     ACEi/ARB Entresto   mg po BID,    BB Sotalol 80 mg po BID-and metoprolol   Aldosterone antagonist Aldactone 25 mg daily   SGLT2i: Deferred for now given polypharmacy and lots of GI upset (do not want  to add more in now)    SCD prophylaxis ICD  Fluid status  euvolemic on current diuretics     LDH: stable  Anticoagulation: INR goal 2-3  Antiplatelet: Finish the RICARDO trial now on aspirin 81 mg daily      2. 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  Despite the escalation of his antibiotic regimen a week ago, the patient continues to experience persistent pain. The patient is scheduled for repeat imaging tomorrow, and a consultation with Infectious Diseases (ID) will be arranged. If further collection is detected on the imaging tomorrow, an incision and drainage (I & D) may be necessary. If the patient is on intravenous treatments or has undergone incision and drainage, he will be placed on the transplant list.    3. History of VT, s/p CRT-D.    ICD shock in May 2023 now on a higher dose of sotalol and on a beta-blocker - presently in remission if he has more shocks may consider trying to upgrade for transplant  - Continue Sotatol, metoprolol for now      4. Postop AF with RVR. Returned to SR.   - Coumadin as above.  - on coreg     5.  Mild nonobstructive CAD.   On coumadin      6. MIld AI on echo will watch for now     7. GI upset: Will follow with GI locally    8. Hypomagnesia: Improved with oral magnesium    9. Obesity; he is going to work on diet and exercise he is transplant eligible presently    10.. Gastroparesis.  A consultation with a gastroenterologist close to his residence is recommended. An esophagogastroduodenoscopy (EGD) was performed today, and the results are pending.      He has an appointment in 3 months here in the [facility] we will keep that for now.       Follow up 3 months here at the        Consent was obtained from the patient to use an AI documentation tool in the creation of this note.      Dunia Hdz MD

## 2024-05-02 NOTE — OR NURSING
Procedure: EGD no interventions  Sedation:monitored anesthesia care  Specimens: none, sent to lab.   O2: Per CRNA  Tolerated procedure: well  Pt to recovery area in stable condition accompanied by RN.   Other:  restart warfarin today    Meg Singleton RN

## 2024-05-02 NOTE — ANESTHESIA POSTPROCEDURE EVALUATION
Patient: Tej Morfin    Procedure: Procedure(s):  Esophagoscopy, gastroscopy, duodenoscopy (EGD), combined       Anesthesia Type:  MAC    Note:  Disposition: Outpatient   Postop Pain Control: Uneventful            Sign Out: Well controlled pain   PONV: No   Neuro/Psych: Uneventful            Sign Out: Acceptable/Baseline neuro status   Airway/Respiratory: Uneventful            Sign Out: Acceptable/Baseline resp. status   CV/Hemodynamics: Uneventful            Sign Out: Acceptable CV status; No obvious hypovolemia; No obvious fluid overload   Other NRE: NONE   DID A NON-ROUTINE EVENT OCCUR? No           Last vitals:  Vitals Value Taken Time   BP     Temp     Pulse     Resp     SpO2         Electronically Signed By: Johann Altamirano MD  May 2, 2024  11:54 AM

## 2024-05-02 NOTE — LETTER
5/2/2024      RE: Tej Morfin  [address]  [place] [address]       Dear Colleague,    Thank you for the opportunity to participate in the care of your patient, Tej Morfin, at the [facility] [place] [facility]. Please see a copy of my visit note below.      May 2, 2024      HPI  PMH of severe MR s/p MVR with ring (1/21/15), VT s/p ICD, nonobstructive CAD, NICM previously on home inotropes now s/p HM III LVAD implantation 3/23/22 as bridge to decision (presently not listed to recover from LVAD team wanted to see marijuana levels decline consistent with quit date), with course c/b AFib with RVR (s/p ICD shock 3/25/22) a) who presents for f/up in LVAD clinic.    Overall he had a pretty incredible recovery after his LVAD, marijuana levels have been negative, now listed as a status 4.     Overall he is felt well but he did have an ICD shock in May 2023.   He is presently on sotalol as well as a beta-blocker and is tolerating this and by ICD check today he is in remission.       This visit:     The patient presents for evaluation of new MRSA driveline infection. He is accompanied by his wife.     The patient reports experiencing a burning sensation in his abdomen, particularly when transitioning from a seated to a standing position, walking, or engaging in activities such as clenching in his stomach. The onset of these symptoms coincided with a concurrent diagnosis of MRSA, gout, and COVID-19. Despite completing a course of antibiotics for MRSA, approximately two weeks later, he began to experience a burning sensation, which was subsequently cultured for Staphylococcus. Despite the administration of oral antibiotics, the pain intensified, prompting a switch to a stronger antibiotic, which he has been taking for approximately one week.     The frequency of the pain has decreased, however, the pain persists,  preventing him from entering a vehicle. The drainage from the wound has improved, occurring once daily, and he denies experiencing fevers, although he does report occasional chills. He denies body aches and reports pain near his driveline site. He denies leg swelling, nocturnal breathing difficulties, or appetite issues. He experiences immediate bloating and tightness around his stomach after taking his medication and drinking a glass of water. The accompanying adult female notes that he has abstained from food for the past 24 hours, after which he consumed only liquid.     He underwent an upper endoscopy this morning with Dr. Kinght and has not recently consulted with a gastroenterologist. A recent CT scan was performed.     The infectious disease specialist in [place] informed him that the antibiotic is not in his blood nor his skin, but rather in his driveline. He was advised to continue cefadroxil 500, two capsules twice daily, for a period of 14 days, after which it will be reduced to  once capsule twice daily. He reports occasional diarrhea, which he attributes to his magnesium intake.      Other LVAD ROS:  denies stroke symptoms, GI bleeding symptoms, or pump alarms.         PAST MEDICAL HISTORY:  Past Medical History:   Diagnosis Date     Chronic systolic heart failure (H) 2017     Gastroparesis      ICD (implantable cardioverter-defibrillator) in place     [employer]     Non-ischemic cardiomyopathy (H)      Paroxysmal ventricular tachycardia (H) 2017     S/P mitral valve replacement 2017     Tetrahydrocannabinol (THC) use disorder, mild, in controlled environment, abuse 2019    occasional edible THC. reportedly for sleep, anxiety     Tobacco abuse, episodic     occasional cigar - ~ 3x/wk       FAMILY HISTORY:  Family History   Problem Relation Age of Onset     Coronary Artery Disease Mother      Kidney failure Mother      Cardiomyopathy Father          age 51      Anesthesia Reaction No family hx of      Venous thrombosis No family hx of        Drug use: No       Social history unchanged from prior  His dog recently passed away  New cat       CURRENT MEDICATIONS:    Current Outpatient Medications   Medication Sig Dispense Refill     acetaminophen (TYLENOL) 325 MG tablet Take 2 tablets (650 mg) by mouth every 4 hours as needed for pain 100 tablet 0     allopurinol (ZYLOPRIM) 100 MG tablet Take 1 tablet (100 mg) by mouth daily (Patient taking differently: Take 100 mg by mouth every morning) 30 tablet 1     amoxicillin (AMOXIL) 500 MG capsule Take 2,000 mg by mouth once as needed Take 4 tabs 1 hour before all dental cleanings and procedures       cefadroxil (DURICEF) 500 MG capsule Take 500 mg by mouth 2 times daily       cholecalciferol 50 MCG (2000 UT) CAPS Take 50 mcg by mouth every morning       furosemide (LASIX) 40 MG tablet TAKE 1.5 TABLETS (60 MG) BY MOUTH 2 TIMES DAILY 270 tablet 3     hydrOXYzine (VISTARIL) 50 MG capsule Take 50 mg by mouth every 12 hours as needed for anxiety       magnesium oxide (MAG-OX) 400 MG tablet Take 800 mg by mouth 2 times daily       metoprolol succinate ER (TOPROL XL) 25 MG 24 hr tablet Take 1 tablet (25 mg) by mouth daily (Patient taking differently: Take 25 mg by mouth every morning) 90 tablet 2     nitroGLYcerin (NITROSTAT) 0.4 MG sublingual tablet Place 0.4 mg under the tongue every 5 minutes as needed for chest pain       omeprazole (PRILOSEC) 40 MG DR capsule Take 40 mg by mouth every morning       potassium chloride ER (KLOR-CON M) 20 MEQ CR tablet Take 3 tablets (60 mEq) by mouth 2 times daily 540 tablet 3     sacubitril-valsartan (ENTRESTO)  MG per tablet Take 1 tablet by mouth 2 times daily 90 tablet 3     sotalol (BETAPACE) 120 MG tablet Take 120 mg by mouth 2 times daily       spironolactone (ALDACTONE) 25 MG tablet Take 0.5 tablets (12.5 mg) by mouth daily 45 tablet 3     warfarin ANTICOAGULANT (COUMADIN) 2.5 MG tablet  Take 2 tablets (5mg) by mouth daily or as directed by the Coumadin clinic (Patient taking differently: Take 5 mg by mouth daily (with dinner) Take 1 tablets (5mg) by mouth daily or as directed by the Coumadin clinic) 180 tablet 1     warfarin ANTICOAGULANT (COUMADIN) 2.5 MG tablet Take 10 mg PO daily at 6 pm until next INR check, then dose per INR goal 2-3 150 tablet 0       All relevant ROS were reviewed in the HPI.       EXAM:    BP (!) 78/0 (BP Location: Right arm, Patient Position: Chair, Cuff Size: Adult Large)   Pulse 59   Wt 111.2 kg (245 lb 1.6 oz)   SpO2 98%   BMI 35.17 kg/m        The patient is obese and is somewhat uncomfortable due to pain as he is walking.    The patient's neck veins are mildly elevated.  CV: humm of LVAD auscultated   RESPIRATORY: Respirations regular, even, and unlabored  GI: Soft and +  distended,  with normoactive bowel sounds present in all quadrants. No tenderness, rebound, guarding.   The patient's drive line shows no obvious erythema or purulence upon palpation, but there is some dried material on the dressing.  EXTREMITIES: 1+ lower extremity edema  NEUROLOGIC: Alert and interacting appropriatly.  No focal deficits.   MUSCULOSKELETAL: No joint swelling or tenderness.     Results      All lab trends, imaging, recent echos personally reviewed with the patient.     Laboratory Studies  Creatinine is about 1.4. Liver function is entirely normal. Albumin is normal. Potassium is normal. Hemoglobin is just mildly down at 12.5. White blood cell count is normal.    Testing  Device interrogation shows no atrial fibrillation and no sustained ventricular arrhythmias. LVAD interrogation showed some low PI but no device malfunction.          Assessment and Plan:   PMH of severe MR s/p MVR with ring (1/21/15), VT s/p ICD,  NICM previously on home inotropes now s/p HM III LVAD implantation 3/23/22, with course c/b sternal wound s/p Keflex treatment,     Overall he  recovered incredibly  well.  Presently listed for transplant as a status 4.         Chronic SHCF s/p HM III LVAD. Severe MR s/p mitral valve repair with annuloplasty ring (1/21/15). Implanted 3/23/22.   Stage D, NYHA Class II     ACEi/ARB Entresto   mg po BID,    BB Sotalol 80 mg po BID-and metoprolol   Aldosterone antagonist Aldactone 25 mg daily   SGLT2i: Deferred for now given polypharmacy and lots of GI upset (do not want to add more in now)    SCD prophylaxis ICD  Fluid status  euvolemic on current diuretics     LDH: stable  Anticoagulation: INR goal 2-3  Antiplatelet: Finish the RICARDO trial now on aspirin 81 mg daily      2. 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  Despite the escalation of his antibiotic regimen a week ago, the patient continues to experience persistent pain. The patient is scheduled for repeat imaging tomorrow, and a consultation with Infectious Diseases (ID) will be arranged. If further collection is detected on the imaging tomorrow, an incision and drainage (I & D) may be necessary. If the patient is on intravenous treatments or has undergone incision and drainage, he will be placed on the transplant list.    3. History of VT, s/p CRT-D.    ICD shock in May 2023 now on a higher dose of sotalol and on a beta-blocker - presently in remission if he has more shocks may consider trying to upgrade for transplant  - Continue Sotatol, metoprolol for now      4. Postop AF with RVR. Returned to SR.   - Coumadin as above.  - on coreg     5.  Mild nonobstructive CAD.   On coumadin      6. MIld AI on echo will watch for now     7. GI upset: Will follow with GI locally    8. Hypomagnesia: Improved with oral magnesium    9. Obesity; he is going to work on diet and exercise he is transplant eligible presently    10.. Gastroparesis.  A consultation with a gastroenterologist close to his residence is recommended. An esophagogastroduodenoscopy (EGD) was performed today, and the results  are pending.      He has an appointment in 3 months here in the [facility] we will keep that for now.       Follow up 3 months here at the        Consent was obtained from the patient to use an AI documentation tool in the creation of this note.      Dunia Hdz MD

## 2024-05-02 NOTE — NURSING NOTE
Chief Complaint   Patient presents with    Follow Up     Return VAD     Vitals were taken and medications reconciled.    Igor Reynoso, EMT  2:52 PM

## 2024-05-02 NOTE — PATIENT INSTRUCTIONS
It was a pleasure to see you in clinic today. Please do not hesitate to call with any questions or concerns.     Stacey Silverman, RN  Electrophysiology Nurse Clinician  [facility]  During business hours call:  [phone]  Urgent needs after hours- please call: [phone]- select option #4 and ask for job code [phone].

## 2024-05-02 NOTE — PROGRESS NOTES
VAD Coordinator in Endo throughout duration of upper GI procedure. VAD parameters were monitored in collaboration with anesthesia. Report given to 3C RN upon leaving the OR.     VAD parameters at START of procedure:  Speed: 5500 rpm  Flow: 4.7 lpm  Power: 4.2 christiansen  PI: 3.4  VAD parameters at END of procedure:  Speed: 5500 rpm  Flow: 4.6 lpm  Power: 4.1 christiansen  PI: 2.9  Speed adjustments made during OR: none  Flow range: 4.1-4.7 lpm  PI (or flow waveform peak/trough for HW) range: 2.8-3.4  Factors noted to cause a significant variation in VAD parameters: induction of MAC with propofol lowered the PI to 2.8. MAP BP remained WNL 79 - 85  Other significant events: none    Please page the VAD Coordinator on-call with any VAD related questions (* * * [phone], job code [phone] from an internal line).     Gregory Fermin RN

## 2024-05-02 NOTE — NURSING NOTE
[facility] VAD Pump Info       Row Name 05/02/24 1500 05/02/24 1210 05/02/24 1154       MCS VAD Information    Implant LVAD -- --    LVAD Pump HeartMate 3 -- --       Heartmate 3 LEFT VS    Flow (Lpm) 4.9 Lpm 4.7 Lpm 4.6 Lpm    Pulse Index (PI) 2.5 PI 3.1 PI 2.9 PI    Speed (rpm) 5500 rpm 5500 rpm 5500 rpm    Power (christiansen) 4.2 christiansen 4.1 christiansen 4.1 christiansen    Current Hct setting 39 -- --    Retired: Unexpected Alarms -- -- --       Heartmate 3 Right (centrifugal flow) VS    Flow (Lpm) -- -- --    Pulse Index (PI) -- -- --    Speed (rpm) -- -- --    Power (christiansen) -- -- --       Primary Controller    Serial number [id number] -- --    Low flow alarm setting 2.5 -- --    High watt alarm setting -- -- --    EBB: Patient use 4 -- --    Replace in 19 Months -- --       Backup Controller    Serial number [id number] -- --    EBB: Patient use 7 -- --    Replace EBB in 17 Months -- --    Speed & HCT match primary controller -- -- --       VAD Interrogation    Alarms reported by patient N -- --    Unexpected alarms noted upon interrogation None -- --    PI events Frequent;w/ associated speed drops  Hx goes back 7 hours, PI range 2.8-8.5, speed drop x2 -- --    Damage to equipment is noted N -- --    Action taken Reviewed proper equipment care and maintenance -- --       Driveline Exit Site    Dressing change done Y -- --    Driveline properly secured Yes -- --    DLES assessment drainage;tender;redness  Improved drainage amount per pt and spouse, increased pain per pt -- --    Dressing used Daily kit -- --    Frequency patient changes dressing Daily -- --    Dressing modifications -- -- --    Dressing change supplier -- -- --      Row Name 05/02/24 1000             MCS VAD Information    Implant --      LVAD Pump --         Heartmate 3 LEFT VS    Flow (Lpm) 4.5 Lpm      Pulse Index (PI) 2.7 PI      Speed (rpm) 5500 rpm      Power (christiansen) 4.2 christiansen      Current Hct setting --      Retired: Unexpected Alarms --         Heartmate 3 Right  (centrifugal flow) VS    Flow (Lpm) --      Pulse Index (PI) --      Speed (rpm) --      Power (christiansen) --         Primary Controller    Serial number --      Low flow alarm setting --      High watt alarm setting --      EBB: Patient use --      Replace in --         Backup Controller    Serial number --      EBB: Patient use --      Replace EBB in --      Speed & HCT match primary controller --         VAD Interrogation    Alarms reported by patient --      Unexpected alarms noted upon interrogation --      PI events --      Damage to equipment is noted --      Action taken --         Driveline Exit Site    Dressing change done --      Driveline properly secured --      DLES assessment --      Dressing used --      Frequency patient changes dressing --      Dressing modifications --      Dressing change supplier --                      DLES dressing changed s/t pt complaints of increased tenderness and pain with movement. Scant amount of dried drainage noted upon inspection, this has improved significantly per pt and spouse. No concerns with palpation. Advised that daily dressings be continued until further direction from the Infectious Disease team.

## 2024-05-02 NOTE — ANESTHESIA CARE TRANSFER NOTE
Patient: Tej Morfin    Procedure: Procedure(s):  Esophagoscopy, gastroscopy, duodenoscopy (EGD), combined       Diagnosis: Chronic systolic (congestive) heart failure (H) [I50.22]  Diagnosis Additional Information: No value filed.    Anesthesia Type:   MAC     Note:    Oropharynx: oropharynx clear of all foreign objects  Level of Consciousness: awake  Oxygen Supplementation: room air    Independent Airway: airway patency satisfactory and stable  Dentition: dentition unchanged  Vital Signs Stable: post-procedure vital signs reviewed and stable  Report to RN Given: handoff report given  Patient transferred to: Phase II    Handoff Report: Identifed the Patient, Identified the Reponsible Provider, Reviewed the pertinent medical history, Discussed the surgical course, Reviewed Intra-OP anesthesia mangement and issues during anesthesia, Set expectations for post-procedure period and Allowed opportunity for questions and acknowledgement of understanding      Vitals:  Vitals Value Taken Time   BP 87/72    Temp     Pulse 61    Resp 20    SpO2 98        Electronically Signed By: GORGE Pierre CRNA  May 2, 2024  11:43 AM

## 2024-05-02 NOTE — ANESTHESIA PREPROCEDURE EVALUATION
Anesthesia Pre-Procedure Evaluation    Patient: Tej Morfin   MRN: [id number] : 1965        Procedure : Procedure(s):  Esophagoscopy, gastroscopy, duodenoscopy (EGD), combined          Past Medical History:   Diagnosis Date    Chronic systolic heart failure (H) 2017    Gastroparesis     ICD (implantable cardioverter-defibrillator) in place     [employer]    Non-ischemic cardiomyopathy (H)     Paroxysmal ventricular tachycardia (H) 2017    S/P mitral valve replacement 2017    Tetrahydrocannabinol (THC) use disorder, mild, in controlled environment, abuse 2019    occasional edible THC. reportedly for sleep, anxiety    Tobacco abuse, episodic     occasional cigar - ~ 3x/wk      Past Surgical History:   Procedure Laterality Date    CV INTRA AORTIC BALLOON N/A 3/22/2022    Procedure: Intraprocedure Aortic Balloon Pump Insertion;  Surgeon: Pedro Callahan MD;  Location:  [facility]    CV RIGHT HEART CATH MEASUREMENTS RECORDED N/A 3/22/2022    Procedure: Right Heart Catheterization;  Surgeon: Pedro Callahan MD;  Location:  [facility]    CV RIGHT HEART CATH MEASUREMENTS RECORDED N/A 2022    Procedure: Heart Cath Right Heart Cath;  Surgeon: Luis Miguel Sparrow MD;  Location:  [facility]    CV RIGHT HEART CATH MEASUREMENTS RECORDED N/A 2023    Procedure: Heart Cath Right Heart Cath;  Surgeon: Jericho Lawrence MD;  Location:  [facility]    HEMORRHOIDECTOMY      ICD implantation       INSERT VENTRICULAR ASSIST DEVICE LEFT (HEARTMATE II) N/A 3/23/2022    Procedure: Redo Sterotomy, Insertion  Left Ventricular Assist Device  (HEARTMATE III) on Cardiopulmonary Bypass, Transesophageal Echocardiogram by Anesthesia;  Surgeon: Wilver Rodríguez MD;  Location: [facility] OR    R partial medial meniscectomy      REPAIR VALVE MITRAL        Allergies   Allergen Reactions    Tilactase Diarrhea     Pt reports intake is in  moderation, but can tolerate.      Social History     Tobacco Use    Smoking status: Former    Smokeless tobacco: Never   Substance Use Topics    Alcohol use: No     Alcohol/week: 0.0 standard drinks of alcohol      Wt Readings from Last 1 Encounters:   04/24/24 106.1 kg (234 lb)        Anesthesia Evaluation   Pt has had prior anesthetic. Type: General.    History of anesthetic complications       ROS/MED HX  ENT/Pulmonary:     (+) sleep apnea, doesn't use CPAP,                                      Neurologic:  - neg neurologic ROS     Cardiovascular: Comment: Will discuss plan with VAD nurse    (+)  - -  CAD -  - -      CHF             ICD              Previous cardiac testing   Echo: Date: Results:    ROS/MED HX  ENT/Pulmonary:     (+) sleep apnea, doesn't use CPAP,              tobacco use, Past use,                    (-) recent URI  Neurologic:  - neg neurologic ROS    Cardiovascular: Comment: LVAD    (+)  - -   -  - -   Taking blood thinners   CHF etiology: NICM            ICD     dysrhythmias (paroxysmal ventricular tachycardia),   valvular problems/murmurs  s/p MV replacement.    Previous cardiac testing   Echo: Date: 8/8/22 Results:     Left Ventricle: The left ventricle appears normal in size. There is   moderate-to-severe left ventricular hypertrophy. Severely reduced left   ventricular systolic function. HeartMate III LVAD cannula visualized.   There is no aortic valve excursion during the visualized cardiac cycles.      Right Ventricle: The right ventricle appears normal in size. Systolic   function is normal.      Stress Test:  Date: Results:     ECG Reviewed:  Date: 3/13/24 Results:  Atrial-paced rhythm   Indeterminate axis   Artifact due to LVAD      Cath:  Date: 3/22/22 Results:  Conclusion   Right sided filling pressures are moderately elevated.   Moderately elevated pulmonary artery hypertension.   Left sided filling pressures are severely elevated.   Reduced cardiac output level.   Hemodynamic  "data has been modified in Epic per physician review.     1. Elevated biventricular filling pressure with normal CO on chronic home inotropic support (2.5mcg/kg/min)  2. Successful IABP insertion without complication   (-) hypertension, CAD, FINNEGAN, orthopnea/PND and dyslipidemia      Stress Test:  Date: Results:    ECG Reviewed:  Date: Results:    Cath:  Date: Results:      METS/Exercise Tolerance: 4 - Raking leaves, gardening Comment: LVAD   Hematologic:     (+)      anemia,          Musculoskeletal:       GI/Hepatic:     (+) GERD,                   Renal/Genitourinary:     (+) renal disease, type: CRI,            Endo:     (+)          thyroid problem, Goiter,           Psychiatric/Substance Use:     (+)   alcohol abuse      Infectious Disease:       Malignancy:       Other:            Physical Exam    Airway        Mallampati: II   TM distance: > 3 FB   Neck ROM: full   Mouth opening: > 3 cm    Respiratory Devices and Support         Dental       (+) Minor Abnormalities - some fillings, tiny chips      Cardiovascular       Comment: LVAD in place      Pulmonary   pulmonary exam normal                OUTSIDE LABS:  CBC:   Lab Results   Component Value Date    WBC 5.2 12/15/2023    WBC 4.9 08/10/2023    HGB 13.8 12/15/2023    HGB 14.0 08/10/2023    HCT 42.7 12/15/2023    HCT 43.8 08/10/2023     12/15/2023     08/10/2023     BMP:   Lab Results   Component Value Date     12/15/2023     08/10/2023    POTASSIUM 4.5 12/15/2023    POTASSIUM 4.7 08/10/2023    CHLORIDE 107 12/15/2023    CHLORIDE 106 08/10/2023    CO2 26 12/15/2023    CO2 26 08/10/2023    BUN 18.9 12/15/2023    BUN 20.9 (H) 08/10/2023    CR 1.42 (H) 12/15/2023    CR 1.56 (H) 08/10/2023     (H) 12/15/2023     (H) 08/10/2023     COAGS:   Lab Results   Component Value Date    PTT 39 (H) 03/23/2022    INR 2.6 04/30/2024    FIBR 216 03/23/2022     POC: No results found for: \"BGM\", \"HCG\", \"HCGS\"  HEPATIC:   Lab Results " "  Component Value Date    ALBUMIN 3.9 12/15/2023    PROTTOTAL 7.0 12/15/2023    ALT 17 12/15/2023    AST 20 12/15/2023    ALKPHOS 69 12/15/2023    BILITOTAL 0.4 12/15/2023     OTHER:   Lab Results   Component Value Date    PH 7.50 (H) 03/27/2022    LACT 2.1 (H) 03/23/2022    A1C 5.9 (H) 03/03/2022    YESICA 9.5 12/15/2023    PHOS 4.1 04/08/2022    MAG 1.9 08/10/2023    TSH 1.72 03/25/2022    CRP <2.9 09/10/2019    CRP 2.0 09/10/2019       Anesthesia Plan    ASA Status:  3       Anesthesia Type: MAC.     - Reason for MAC: straight local not clinically adequate   Induction: Intravenous.           Consents    Anesthesia Plan(s) and associated risks, benefits, and realistic alternatives discussed. Questions answered and patient/representative(s) expressed understanding.     - Discussed: Risks, Benefits and Alternatives for BOTH SEDATION and the PROCEDURE were discussed     - Discussed with:  Patient      - Extended Intubation/Ventilatory Support Discussed: No.      - Patient is DNR/DNI Status: No     Use of blood products discussed: No .     Postoperative Care    Pain management: IV analgesics.   PONV prophylaxis: Ondansetron (or other 5HT-3)     Comments:               Johann Altamirano MD    I have reviewed the pertinent notes and labs in the chart from the past 30 days and (re)examined the patient.  Any updates or changes from those notes are reflected in this note.            # Drug Induced Coagulation Defect: home medication list includes an anticoagulant medication  # Drug Induced Platelet Defect: home medication list includes an antiplatelet medication  # Obesity: Estimated body mass index is 33.58 kg/m  as calculated from the following:    Height as of 4/24/24: 1.778 m (5' 10\").    Weight as of 4/24/24: 106.1 kg (234 lb).      "

## 2024-05-02 NOTE — PATIENT INSTRUCTIONS
"Medications:  NO CHANGE in medications    Instructions:  Please obtain abdominal CT tomorrow before your right heart catheterization.   Schedule your follow up with Dr. Hdz in [place] in 3 months.  Our infectious disease clinic will call to schedule a virtual appointment with you. If you do not hear from their clinic within the next 3 business days, please call the Infectious Disease Clinic scheduling line at [phone].      Equipment Maintenance Reminders:   Charge your back-up controller at least every 6 months. To charge, connect it to either batteries or wall power. The screen will read \"Charging\". Keep connected until the screen reads \"charging complete\". Disconnect power once the controller battery is charged. Also do a self-test when the controller is connected to power.  Check your battery charger for when it is due for maintenance. It requires inspection in clinic once per year. There will be a sticker stating the month and year maintenance is due. When you bring your battery charger to clinic, tell one of the schedulers you have LVAD equipment that needs maintenance. They will call [employer] You can leave your  under the LVAD sign by the  at the far end of clinic. You must drop it off before 2pm.   Replace the AA batteries in your mobile power unit every 6 months.       Page the VAD Coordinator on call if you gain more than 3 lb in a day or 5 in a week. Please also page if you feel unwell or have alarms.   Great to see you in clinic today. To Page the VAD Coordinator on call, dial [phone] option #[phone] and ask to speak to the VAD coordinator on call.    "

## 2024-05-02 NOTE — H&P
Pre-Operative H & P     CC:  Preoperative exam to assess for increased cardiopulmonary risk while undergoing surgery and anesthesia.    Date of Encounter: 4/24/2024  Primary Care Physician:  Halle Jones     Reason for visit:   Encounter Diagnoses   Name Primary?    Pre-op evaluation Yes    Chronic systolic (congestive) heart failure (H)        HPI  Tej Morfin is a 58 year old male who presents for pre-operative H & P in preparation for  Procedure Information       Case: [id number] Date/Time: 05/02/24 1045    Procedure: Esophagoscopy, gastroscopy, duodenoscopy (EGD), combined (Esophagus)    Anesthesia type: MAC    Diagnosis: Chronic systolic (congestive) heart failure (H) [I50.22]    Pre-op diagnosis: Chronic systolic (congestive) heart failure (H) [I50.22]    Location:  GI 03 /  GI    Providers: Stacey Knight MD            Patient is being evaluated for comorbid conditions of nonischemic cardiomyopathy, heart failure, s/p mitral valve replacement, paroxysmal ventricular tachycardia, s/p ICD, s/p LVAD, RYAN, former tobacco use, obesity, thyroid goiter, CKD, and anxiety.    He has a history of gastroparesis and GERD. He remains on omeprazole and was previously treated with Reglan but this was discontinued after LVAD placement. He has recently been experiencing GI discomfort with abdominal fullness and early satiety. An EGD was recommended for further evaluation. He is now scheduled as above.    History is obtained from the patient and chart review. He is accompanied by his wife today.     Hx of abnormal bleeding, anticoagulation, or anti-platelet use: Warfarin, Aspirin      Past Medical History  Past Medical History:   Diagnosis Date    Chronic systolic heart failure (H) 2/7/2017    Gastroparesis     ICD (implantable cardioverter-defibrillator) in place     [employer]    Non-ischemic cardiomyopathy (H)     Paroxysmal ventricular tachycardia (H) 2/7/2017    S/P mitral valve  replacement 2/7/2017    Tetrahydrocannabinol (THC) use disorder, mild, in controlled environment, abuse 09/12/2019    occasional edible THC. reportedly for sleep, anxiety    Tobacco abuse, episodic     occasional cigar - ~ 3x/wk       Past Surgical History  Past Surgical History:   Procedure Laterality Date    CV INTRA AORTIC BALLOON N/A 3/22/2022    Procedure: Intraprocedure Aortic Balloon Pump Insertion;  Surgeon: Pedro Callahan MD;  Location:  HEART CARDIAC CATH LAB    CV RIGHT HEART CATH MEASUREMENTS RECORDED N/A 3/22/2022    Procedure: Right Heart Catheterization;  Surgeon: Pedro Callahan MD;  Location:  HEART CARDIAC CATH LAB    CV RIGHT HEART CATH MEASUREMENTS RECORDED N/A 9/22/2022    Procedure: Heart Cath Right Heart Cath;  Surgeon: Luis Miguel Sparrow MD;  Location:  HEART CARDIAC CATH LAB    CV RIGHT HEART CATH MEASUREMENTS RECORDED N/A 1/24/2023    Procedure: Heart Cath Right Heart Cath;  Surgeon: Jericho Lawrence MD;  Location:  HEART CARDIAC CATH LAB    HEMORRHOIDECTOMY      ICD implantation       INSERT VENTRICULAR ASSIST DEVICE LEFT (HEARTMATE II) N/A 3/23/2022    Procedure: Redo Sterotomy, Insertion  Left Ventricular Assist Device  (HEARTMATE III) on Cardiopulmonary Bypass, Transesophageal Echocardiogram by Anesthesia;  Surgeon: Wilver Rodríguez MD;  Location:  OR    R partial medial meniscectomy      REPAIR VALVE MITRAL         Prior to Admission Medications  No current outpatient medications on file.       Allergies  Allergies   Allergen Reactions    Tilactase Diarrhea     Pt reports intake is in moderation, but can tolerate.       Social History  Social History     Socioeconomic History    Marital status:      Spouse name: Not on file    Number of children: Not on file    Years of education: Not on file    Highest education level: Not on file   Occupational History    Not on file   Tobacco Use    Smoking status: Former    Smokeless tobacco: Never    Substance and Sexual Activity    Alcohol use: No     Alcohol/week: 0.0 standard drinks of alcohol    Drug use: No    Sexual activity: Not on file   Other Topics Concern    Not on file   Social History Narrative    Not on file     Social Determinants of Health     Financial Resource Strain: Not on file   Food Insecurity: Not on file   Transportation Needs: Not on file   Physical Activity: Insufficiently Active (10/28/2020)    Received from [facility], [facility]    Exercise Vital Sign     Days of Exercise per Week: 7 days     Minutes of Exercise per Session: 20 min   Stress: Not on file   Social Connections: Not on file   Interpersonal Safety: Not on file   Housing Stability: Not on file       Family History  Family History   Problem Relation Age of Onset    Coronary Artery Disease Mother     Kidney failure Mother     Cardiomyopathy Father          age 51    Anesthesia Reaction No family hx of     Venous thrombosis No family hx of        Review of Systems  The complete review of systems is negative other than noted in the HPI or here.   Anesthesia Evaluation   Pt has had prior anesthetic.     No history of anesthetic complications       ROS/MED HX  ENT/Pulmonary:     (+) sleep apnea, doesn't use CPAP,              tobacco use, Past use,                    (-) recent URI   Neurologic:  - neg neurologic ROS     Cardiovascular: Comment: LVAD    (+)  - -   -  - -   Taking blood thinners   CHF etiology: NICM            ICD     dysrhythmias (paroxysmal ventricular tachycardia),   valvular problems/murmurs  s/p MV replacement.    Previous cardiac testing   Echo: Date: 22 Results:     Left Ventricle: The left ventricle appears normal in size. There is   moderate-to-severe left ventricular hypertrophy. Severely reduced left   ventricular systolic function. HeartMate III LVAD cannula visualized.   There is no aortic valve excursion during the visualized cardiac cycles.      Right  Ventricle: The right ventricle appears normal in size. Systolic   function is normal.     Stress Test:  Date: Results:    ECG Reviewed:  Date: 3/13/24 Results:  Atrial-paced rhythm   Indeterminate axis   Artifact due to LVAD     Cath:  Date: 3/22/22 Results:  Conclusion    Right sided filling pressures are moderately elevated.    Moderately elevated pulmonary artery hypertension.    Left sided filling pressures are severely elevated.    Reduced cardiac output level.    Hemodynamic data has been modified in Epic per physician review.     1. Elevated biventricular filling pressure with normal CO on chronic home inotropic support (2.5mcg/kg/min)  2. Successful IABP insertion without complication   (-) hypertension, CAD, FINNEGAN, orthopnea/PND and dyslipidemia   METS/Exercise Tolerance: 4 - Raking leaves, gardening Comment: Able to walk some distance, complete household chores, and ascend a flight of stairs.   Hematologic:     (+)       history of blood transfusion, no previous transfusion reaction,     (-) history of blood clots and anemia   Musculoskeletal: Comment: - right hip pain      GI/Hepatic: Comment: Gastroparesis    (+) GERD, Asymptomatic on medication,                  Renal/Genitourinary:     (+) renal disease, type: CRI, Pt does not require dialysis,           Endo:     (+)          thyroid problem, Thyroid disease - Other goiter,    Obesity,    (-) Type II DM and chronic steroid usage   Psychiatric/Substance Use:     (+) psychiatric history anxiety   Recreational drug usage: Cannabis.    Infectious Disease: Comment: COVID-19 3/2024, treated with Paxlovid    Currently being treated for driveline infection    (+)   MRSA,         Malignancy:  - neg malignancy ROS     Other:            Virtual visit -  No vitals were obtained    Physical Exam  Constitutional: Pleasant male, no apparent distress, and appears stated age.  Eyes: Pupils equal  HENT: Normocephalic and atraumatic  Respiratory: Non labored breathing on  room air  Neurologic: Awake, alert, oriented to name, place and time.   Neuropsychiatric: Calm, cooperative. Normal affect.        Prior Labs/Diagnostic Studies   All labs and imaging personally reviewed     EKG/ stress test - if available please see in ROS above   Echo result w/o MOPS: Interpretation SummaryHM3 speed is 5500RPM.The visual ejection fraction is 15-20% with severe diffuse hypokinesis. Theleft ventricular end diastolic diameter is 6.3 cm.LVAD cannula was seen in the expected anatomic position in the LV apex. LVADinflow and outflow cannula Doppler is normal. The aortic valve does not open.There is mild aortic regurgitation.Global right ventricular function is moderately to severely reduced.IVC diameter <2.1 cm collapsing >50% with sniff suggests a normal RA pressureof 3 mmHg.No pericardial effusion is present. This study was compared with the study from 9/22/2022. The left ventricularend diastolic diameter is slighly larger at 6.3 cm from 5.9 cm.          Latest Ref Rng & Units 1/23/2023     1:42 PM   PFT   FVC L 3.59    FEV1 L 2.35    FVC% % 79    FEV1% % 65      Cardiac Device Check 3/14/2024  Narrative & Impression    Remote ICD transmission received and reviewed. Device transmission sent per MD orders.      Device: [employer] G148 INOGEN X4 CRT-D (LV lead off)  Normal Device Function.  Mode: DDD  bpm  AP: 89%  RVP: 4%  Presenting EGM: AP/VS @ 80 bpm  Lead Trends Appear Stable.  Estimated battery longevity to RRT = 2.5 years   Atrial Arrhythmia: 1 AT/AF episode recorded, 3 min. 12 sec. in duration.  AF [place]: <1%  Anticoagulant: Warfarin  Ventricular Arrhythmia: None  Pt Notified of Transmission Results: [place]     Plan: Return to clinic 5/2/2024 as scheduled.       The patient's records and results personally reviewed by this provider.     Outside records reviewed from: Care Everywhere      Assessment  Tej Morfin is a 58 year old male seen as a PAC referral for risk assessment  and optimization for anesthesia.    Plan/Recommendations  Pt will be optimized for the proposed procedure.  See below for details on the assessment, risk, and preoperative recommendations    NEUROLOGY  - No history of TIA, CVA or seizure    -Post Op delirium risk factors:  No risk identified    ENT  - No current airway concerns.  Will need to be reassessed day of surgery.  Mallampati: Unable to assess  TM: Unable to assess    CARDIAC  - Complex cardiac history including chronic systolic heart failure, HFrEF, NICM s/p HM3 LVAD implant (3/23/2033), severe MR s/p MV repair, s/p CRT-D, paroxysmal ventricular tachycardia with ICD in place. Follows with Dr. Hdz and Dr. Foy  ([place]) and is being considered for future heart transplant. 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 Otherwise, he has been feeling well and reports no changes to his cardiac status.    He will be completing routine cardiac testing on 5/3 after scheduled EGD. Per staff message from Dr. Hdz patient is stable for any upcoming procedure.   Most recent cardiac testing/ICD device check above  Will ask patient to hold lasix and spironolactone day of surgery. Otherwise continue all other cardiac medications.     - METS (Metabolic Equivalents)  Patient performs 4 or more METS exercise without symptoms             Total Score: 0      RCRI-Low risk: Class 2 0.9% complication rate             Total Score: 1    RCRI: CHF        PULMONARY  - Obstructive Sleep Apnea  RYAN without home CPAP use.    - Denies asthma or inhaler use  - COVID-19 infection , 3/2024  Treated with Paxlovid. Resolved.    - Tobacco History    History   Smoking Status    Former   Smokeless Tobacco    Never       GI  - GERD  Controlled on medications: Proton Pump Inhibitor  - Gastroparesis/Bloating/Early Satiety  Above procedure planned for further  "management    PONV Low Risk  Total Score: 1           1 AN PONV: Patient is not a current smoker        /RENAL  -CKD Baseline Creatinine  1.24    ENDOCRINE    - BMI: Estimated body mass index is 33.58 kg/m  as calculated from the following:    Height as of 4/24/24: 1.778 m (5' 10\").    Weight as of 4/24/24: 106.1 kg (234 lb).  Obesity (BMI >30)  - No history of Diabetes Mellitus  - History of thyroid goiter, not on thyroid medications    HEME  VTE Low Risk 0.5%             Total Score: 2    VTE: Male      - Coagulopathy secondary to Warfarin (Coumadin, Jantoven). Goal INR per surgical team: 1.8. Patient will work with anticoagulation clinic to achieve recommended INR prior to upcoming procedure.   - Platelet dysfunction secondary to aspirin. Okay to continue for upcoming procedure per GI note from 4/17.  - Remote history of blood transfusion. No known transfusion reaction.    MSK  - Right hip pain  Recommend consideration for careful positioning to limit patient discomfort.    PSYCH  - Anxiety  Hold hydroxyzine day of surgery  Patient reports anxiety symptoms increase with laying flat in surgical settings. He requests that when possible, his head be propped up.     Different anesthesia methods/types have been discussed with the patient, but they are aware that the final plan will be decided by the assigned anesthesia provider on the date of service.    The patient is optimized for their procedure. AVS with information on surgery time/arrival time, meds and NPO status given by nursing staff. No further diagnostic testing indicated.    Please refer to the physical examination documented by the anesthesiologist in the anesthesia record on the day of surgery.    Video-Visit Details    Type of service:  Video Visit    Provider received verbal consent for a Video Visit from the patient? Yes   Video Start Time:  1109  Video End Time: 1124    Originating Location (pt. Location): Parked in their car    Distant Location " (provider location):  Off-site  Mode of Communication:  Video Conference via [employer]  On the day of service:     Prep time: 12 minutes  Visit time: 15 minutes  Documentation time: 20 minutes  ------------------------------------------  Total time: 47 minutes      Maddy Bazan PA-C  Preoperative Assessment Center  [facility]  [facility]  Phone: [phone]  Fax: [phone]

## 2024-05-03 ENCOUNTER — OFFICE VISIT (OUTPATIENT)
Dept: PULMONOLOGY | Facility: CLINIC | Age: 59
End: 2024-05-03
Attending: INTERNAL MEDICINE
Payer: MEDICARE

## 2024-05-03 ENCOUNTER — APPOINTMENT (OUTPATIENT)
Dept: MEDSURG UNIT | Facility: CLINIC | Age: 59
End: 2024-05-03
Attending: INTERNAL MEDICINE
Payer: MEDICARE

## 2024-05-03 ENCOUNTER — ANCILLARY PROCEDURE (OUTPATIENT)
Dept: CARDIOLOGY | Facility: CLINIC | Age: 59
End: 2024-05-03
Attending: INTERNAL MEDICINE
Payer: MEDICARE

## 2024-05-03 ENCOUNTER — TELEPHONE (OUTPATIENT)
Dept: TRANSPLANT | Facility: CLINIC | Age: 59
End: 2024-05-03

## 2024-05-03 ENCOUNTER — ANTICOAGULATION THERAPY VISIT (OUTPATIENT)
Dept: ANTICOAGULATION | Facility: CLINIC | Age: 59
End: 2024-05-03

## 2024-05-03 ENCOUNTER — HOSPITAL ENCOUNTER (OUTPATIENT)
Facility: CLINIC | Age: 59
Discharge: HOME | End: 2024-05-03
Attending: INTERNAL MEDICINE | Admitting: INTERNAL MEDICINE
Payer: MEDICARE

## 2024-05-03 ENCOUNTER — ANCILLARY PROCEDURE (OUTPATIENT)
Dept: CT IMAGING | Facility: CLINIC | Age: 59
End: 2024-05-03
Attending: INTERNAL MEDICINE
Payer: MEDICARE

## 2024-05-03 VITALS — WEIGHT: 245 LBS | BODY MASS INDEX: 35.15 KG/M2

## 2024-05-03 VITALS
WEIGHT: 244.8 LBS | TEMPERATURE: 98.4 F | HEART RATE: 57 BPM | BODY MASS INDEX: 35.13 KG/M2 | OXYGEN SATURATION: 98 % | RESPIRATION RATE: 16 BRPM

## 2024-05-03 DIAGNOSIS — I50.22 CHRONIC SYSTOLIC CHF (CONGESTIVE HEART FAILURE) (H): ICD-10-CM

## 2024-05-03 DIAGNOSIS — E66.01 CLASS 2 SEVERE OBESITY DUE TO EXCESS CALORIES WITH SERIOUS COMORBIDITY AND BODY MASS INDEX (BMI) OF 36.0 TO 36.9 IN ADULT (H): ICD-10-CM

## 2024-05-03 DIAGNOSIS — Z95.811 LVAD (LEFT VENTRICULAR ASSIST DEVICE) PRESENT (H): ICD-10-CM

## 2024-05-03 DIAGNOSIS — Z95.811 LVAD (LEFT VENTRICULAR ASSIST DEVICE) PRESENT (H): Primary | ICD-10-CM

## 2024-05-03 DIAGNOSIS — Z95.2 S/P MITRAL VALVE REPLACEMENT: ICD-10-CM

## 2024-05-03 DIAGNOSIS — F41.9 ANXIETY: Primary | ICD-10-CM

## 2024-05-03 DIAGNOSIS — I50.22 CHRONIC SYSTOLIC (CONGESTIVE) HEART FAILURE (H): ICD-10-CM

## 2024-05-03 DIAGNOSIS — I47.29 PAROXYSMAL VENTRICULAR TACHYCARDIA (H): ICD-10-CM

## 2024-05-03 DIAGNOSIS — E66.812 CLASS 2 SEVERE OBESITY DUE TO EXCESS CALORIES WITH SERIOUS COMORBIDITY AND BODY MASS INDEX (BMI) OF 36.0 TO 36.9 IN ADULT (H): ICD-10-CM

## 2024-05-03 DIAGNOSIS — I50.22 CHRONIC SYSTOLIC CONGESTIVE HEART FAILURE (H): ICD-10-CM

## 2024-05-03 LAB
6 MIN WALK (FT): 1300 FT
6 MIN WALK (M): 396 M
DLCOCOR-%PRED-PRE: 60 %
DLCOCOR-PRE: 17.17 ML/MIN/MMHG
DLCOUNC-%PRED-PRE: 56 %
DLCOUNC-PRE: 16.06 ML/MIN/MMHG
DLCOUNC-PRED: 28.55 ML/MIN/MMHG
ERV-%PRED-PRE: 14 %
ERV-PRE: 0.24 L
ERV-PRED: 1.63 L
EXPTIME-PRE: 7.38 SEC
FEF2575-%PRED-PRE: 44 %
FEF2575-PRE: 1.36 L/SEC
FEF2575-PRED: 3.05 L/SEC
FEFMAX-%PRED-PRE: 72 %
FEFMAX-PRE: 6.9 L/SEC
FEFMAX-PRED: 9.55 L/SEC
FEV1-%PRED-PRE: 64 %
FEV1-PRE: 2.26 L
FEV1FEV6-PRE: 71 %
FEV1FEV6-PRED: 79 %
FEV1FVC-PRE: 69 %
FEV1FVC-PRED: 79 %
FEV1SVC-PRE: 72 %
FEV1SVC-PRED: 76 %
FIFMAX-PRE: 5.91 L/SEC
FRCPLETH-%PRED-PRE: 79 %
FRCPLETH-PRE: 2.87 L
FRCPLETH-PRED: 3.61 L
FVC-%PRED-PRE: 73 %
FVC-PRE: 3.27 L
FVC-PRED: 4.46 L
IC-%PRED-PRE: 89 %
IC-PRE: 2.91 L
IC-PRED: 3.27 L
INR BLD: 2 (ref 2–3)
LVEF ECHO: NORMAL
RVPLETH-%PRED-PRE: 110 %
RVPLETH-PRE: 2.63 L
RVPLETH-PRED: 2.37 L
TLCPLETH-%PRED-PRE: 79 %
TLCPLETH-PRE: 5.78 L
TLCPLETH-PRED: 7.23 L
TRANSFERRIN SERPL-MCNC: 221 MG/DL (ref 200–360)
VA-%PRED-PRE: 72 %
VA-PRE: 4.82 L
VC-%PRED-PRE: 68 %
VC-PRE: 3.15 L
VC-PRED: 4.62 L

## 2024-05-03 PROCEDURE — 94729 DIFFUSING CAPACITY: CPT | Performed by: INTERNAL MEDICINE

## 2024-05-03 PROCEDURE — 93451 RIGHT HEART CATH: CPT | Performed by: INTERNAL MEDICINE

## 2024-05-03 PROCEDURE — 250N000009 HC RX 250: Performed by: INTERNAL MEDICINE

## 2024-05-03 PROCEDURE — 250N000013 HC RX MED GY IP 250 OP 250 PS 637: Performed by: PHYSICIAN ASSISTANT

## 2024-05-03 PROCEDURE — 999N000142 HC STATISTIC PROCEDURE PREP ONLY

## 2024-05-03 PROCEDURE — 93000 ELECTROCARDIOGRAM COMPLETE: CPT | Mod: XU | Performed by: INTERNAL MEDICINE

## 2024-05-03 PROCEDURE — 94618 PULMONARY STRESS TESTING: CPT | Performed by: INTERNAL MEDICINE

## 2024-05-03 PROCEDURE — 74177 CT ABD & PELVIS W/CONTRAST: CPT | Mod: MA | Performed by: RADIOLOGY

## 2024-05-03 PROCEDURE — 99000 SPECIMEN HANDLING OFFICE-LAB: CPT | Performed by: PATHOLOGY

## 2024-05-03 PROCEDURE — 94726 PLETHYSMOGRAPHY LUNG VOLUMES: CPT | Performed by: INTERNAL MEDICINE

## 2024-05-03 PROCEDURE — 85610 PROTHROMBIN TIME: CPT | Performed by: INTERNAL MEDICINE

## 2024-05-03 PROCEDURE — 999N000132 HC STATISTIC PP CARE STAGE 1

## 2024-05-03 PROCEDURE — 93451 RIGHT HEART CATH: CPT | Mod: 26 | Performed by: INTERNAL MEDICINE

## 2024-05-03 PROCEDURE — C1751 CATH, INF, PER/CENT/MIDLINE: HCPCS | Performed by: INTERNAL MEDICINE

## 2024-05-03 PROCEDURE — 93306 TTE W/DOPPLER COMPLETE: CPT | Performed by: INTERNAL MEDICINE

## 2024-05-03 PROCEDURE — 94375 RESPIRATORY FLOW VOLUME LOOP: CPT | Performed by: INTERNAL MEDICINE

## 2024-05-03 PROCEDURE — 272N000001 HC OR GENERAL SUPPLY STERILE: Performed by: INTERNAL MEDICINE

## 2024-05-03 RX ORDER — HYDROXYZINE HYDROCHLORIDE 50 MG/1
50 TABLET, FILM COATED ORAL ONCE
Status: COMPLETED | OUTPATIENT
Start: 2024-05-03 | End: 2024-05-03

## 2024-05-03 RX ORDER — LIDOCAINE 40 MG/G
CREAM TOPICAL
Status: COMPLETED | OUTPATIENT
Start: 2024-05-03 | End: 2024-05-03

## 2024-05-03 RX ORDER — IOPAMIDOL 755 MG/ML
120 INJECTION, SOLUTION INTRAVASCULAR ONCE
Status: COMPLETED | OUTPATIENT
Start: 2024-05-03 | End: 2024-05-03

## 2024-05-03 RX ADMIN — HYDROXYZINE HYDROCHLORIDE 50 MG: 50 TABLET ORAL at 13:45

## 2024-05-03 RX ADMIN — LIDOCAINE: 40 CREAM TOPICAL at 13:04

## 2024-05-03 RX ADMIN — IOPAMIDOL 120 ML: 755 INJECTION, SOLUTION INTRAVASCULAR at 15:59

## 2024-05-03 ASSESSMENT — ACTIVITIES OF DAILY LIVING (ADL)
ADLS_ACUITY_SCORE: 38

## 2024-05-03 NOTE — PROGRESS NOTES
Returned from [facility]. Alert and oriented. Site C/D/I. Discharge instructions reviewed with patient and wife. Discharged to home

## 2024-05-03 NOTE — DISCHARGE INSTRUCTIONS

## 2024-05-03 NOTE — PROGRESS NOTES
[facility]   Interventional Cardiology       Tej Morfin MRN# [id number]   YOB: 1965 Age: 58 year old       Assessment and plan:     Tej Morfin is a 58 year old  who has been referred for a right heart catheterization.    Anticoagulation: warfarin taken last evening--Plan for iSTAT INR   Renal concerns: SCr 1.47 5/2/24  Pt NPO at least 6 hours: Yes  --Pt having anxiety--requesting PRN medication. Has taken hydroxyzine 50mg in the past. Will Rx 50mg x 1.  Confirmed patient has  post procedure.     Pre procedure labs reviewed as below.     No contraindications identified, will move forward with planned procedure.   Patient plans to discharge home after post procedure orders have been met.       CONSENT:  We discussed recommendations for a right heart catheterization and reviewed risks and benefits. These include but not limited to hematoma/bleeding/vascular complications, infections, and cardiac arrhythmias. Discussed that a catheter is put into the vein of the groin/neck.  It is carefully passed along until it reaches the heart and then goes up into the blood vessels of the lungs. This is done to measure a variety of pressures in the heart and can tell us how well the heart is filling and emptying, as well as monitor fluid status.   The patient voices his/her understanding and wishes to proceed. Verbal and written consent obtained.      Physical Examination:  Vitals: Pulse 79   Temp 98.4  F (36.9  C)   Resp 14   Wt 111 kg (244 lb 12.8 oz)   SpO2 98%   BMI 35.13 kg/m      GEN:  In no acute distress. Patient accompanied by his spouse  C/V:  Reg rate and rhythm.   RESP: Respirations are unlabored.  NEURO: Alert and oriented, cooperative.     Recent Lab Results/Pertinent testing:   Latest Reference Range & Units 05/02/24 13:53   Sodium 135 - 145 mmol/L 139   Potassium 3.4 - 5.3 mmol/L 4.2   Chloride 98 - 107 mmol/L 102   Carbon Dioxide (CO2) 22 -  29 mmol/L 26   Urea Nitrogen 6.0 - 20.0 mg/dL 20.5 (H)   Creatinine 0.67 - 1.17 mg/dL 1.47 (H)   GFR Estimate >60 mL/min/1.73m2 56 (L)   Calcium 8.6 - 10.0 mg/dL 9.7   Anion Gap 7 - 15 mmol/L 11   Albumin 3.5 - 5.2 g/dL 4.0   Protein Total 6.4 - 8.3 g/dL 7.2   Alkaline Phosphatase 40 - 150 U/L 79   ALT 0 - 70 U/L 17   AST 0 - 45 U/L 18   Bilirubin Total <=1.2 mg/dL 0.6   Ferritin 31 - 409 ng/mL 298   Glucose 70 - 99 mg/dL 114 (H)   Iron 61 - 157 ug/dL 56 (L)   Iron Binding Capacity 240 - 430 ug/dL 270   Iron Sat Index 15 - 46 % 21   Lactate Dehydrogenase 0 - 250 U/L 196   N-Terminal Pro Bnp 0 - 900 pg/mL 205   Uric Acid 3.4 - 7.0 mg/dL 5.4   WBC 4.0 - 11.0 10e3/uL 5.5   Hemoglobin 13.3 - 17.7 g/dL 12.5 (L)   Hematocrit 40.0 - 53.0 % 39.7 (L)   Platelet Count 150 - 450 10e3/uL 207   RBC Count 4.40 - 5.90 10e6/uL 4.09 (L)   MCV 78 - 100 fL 97   MCH 26.5 - 33.0 pg 30.6   MCHC 31.5 - 36.5 g/dL 31.5   RDW 10.0 - 15.0 % 14.6   (H): Data is abnormally high  (L): Data is abnormally low      Mickie Waters PA-C  [facility] Heart  Pager [phone]

## 2024-05-03 NOTE — PROGRESS NOTES
RHC today target for procedure <2.5. INR today 2.0.    ANTICOAGULATION MANAGEMENT     Tej Morfin 58 year old male is on warfarin with therapeutic INR result. (Goal INR 2.0-3.0)    Recent labs: (last 7 days)     05/03/24  1337   INR 2.0       ASSESSMENT     Source(s): Chart Review     Warfarin doses taken: Held for pre-procedure INR planning  recently which may be affecting INR  Diet: No new diet changes identified  Medication/supplement changes: None noted  New illness, injury, or hospitalization: No-RHC performed today  Signs or symptoms of bleeding or clotting: None noted  Previous result: Therapeutic last 2(+) visits (subtherapeutic yesterday-anticipated due to hold Tuesday for target INR < 2.5 prior to RHC today  Additional findings: None    Tej was advised by [facility] yesterday to take a booster dose post RHC today-then continue current warfarin dose with a recheck INR 5/10/24   PLAN     Recommended plan for temporary change(s) affecting INR     Dosing Instructions: booster dose then continue your current warfarin dose with next INR in 1 week       Summary  As of 5/3/2024      Full warfarin instructions:  5/3: 7.5 mg; Otherwise 5 mg every day   Next INR check:  5/10/2024               Sent Yunzhisheng message with dosing and follow up instructions    Patient to recheck with home meter    Education provided:   Goal range and lab monitoring: goal range and significance of current result and Importance of following up at instructed interval  Contact [phone] with any changes, questions or concerns.     Plan made per [facility] anticoagulation protocol and per LVAD protocol    SAVANNA MO RN  Anticoagulation Clinic  5/3/2024    _______________________________________________________________________     Anticoagulation Episode Summary       Current INR goal:  2.0-3.0   TTR:  78.8% (1 y)   Target end date:  Indefinite   Send INR reminders to:  [facility] LVAD    Indications    LVAD (left ventricular assist device)  present (H) [Z95.811]  Chronic systolic CHF (congestive heart failure) (H) [I50.22]  S/P mitral valve replacement [Z95.2]  Paroxysmal ventricular tachycardia (H) [I47.29]             Comments:  Follow VAD Anticoag protocol:Yes: HeartMate 3  Bridging: per protocol  Date VAD placed: 03/23/22  INR Goal: per referral  Hx Mitral valve repair and not replacement               Anticoagulation Care Providers       Provider Role Specialty Phone number    Dunia Hdz MD Referring Cardiovascular Disease [phone]

## 2024-05-03 NOTE — DISCHARGE INSTRUCTIONS
[facility]                        Interventional Cardiology  Discharge Instructions   Post Right Heart Cath      AFTER YOU GO HOME:  DO drink plenty of fluids  DO resume your regular diet and medications unless otherwise instructed by your Primary Physician  Do Not scrub the procedure site vigorously  No lotion or powder to the puncture site for 3 days    CALL YOUR PRIMARY PHYSICIAN IF: You may resume all normal activity.  Monitor neck site for bleeding, swelling, or voice changes. If you notice bleeding or swelling immediately apply pressure to the site and call number below to speak with Cardiology Fellow.  If you experience any changes in your breathing you should call your doctor immediately or come to the closest Emergency Department.  Do not drive yourself.    ADDITIONAL INSTRUCTIONS: Medications: You are to resume all home medications including anticoagulation therapy unless otherwise advised by your primary cardiologist or nurse coordinator.    Follow Up: Per your primary cardiology team    If you have any questions or concerns regarding your procedure site please call [phone] at anytime and ask for Cardiology Fellow on call.  They are available 24 hours a day.  You may also contact the Cardiology Clinic after hours number at [phone].                                                       Telephone Numbers [phone] Monday-Friday 8:00 am to 4:30 pm    [phone] After 4:30 pm Monday-Friday, Weekends & Holidays  Ask for Interventional Cardiologist on call. Someone is on call 24 hours/day   [facility] toll free number [phone] Monday-Friday 8:00 am to 4:30 pm   [facility] Emergency Dept [phone]

## 2024-05-04 LAB
LABORATORY REPORT: NORMAL
PETH INTERPRETATION: NORMAL
PLPETH BLD-MCNC: <10 NG/ML
POPETH BLD-MCNC: <10 NG/ML
STFR SERPL-MCNC: 6.4 MG/L

## 2024-05-06 LAB
ATRIAL RATE - MUSE: 22 BPM
COTININE SERPL-MCNC: <5 NG/ML
DIASTOLIC BLOOD PRESSURE - MUSE: NORMAL MMHG
INTERPRETATION ECG - MUSE: NORMAL
MDC_IDC_LEAD_CONNECTION_STATUS: NORMAL
MDC_IDC_LEAD_IMPLANT_DT: NORMAL
MDC_IDC_LEAD_LOCATION: NORMAL
MDC_IDC_LEAD_LOCATION_DETAIL_1: NORMAL
MDC_IDC_LEAD_MFG: NORMAL
MDC_IDC_LEAD_MODEL: NORMAL
MDC_IDC_LEAD_POLARITY_TYPE: NORMAL
MDC_IDC_LEAD_SERIAL: NORMAL
MDC_IDC_MSMT_BATTERY_DTM: NORMAL
MDC_IDC_MSMT_BATTERY_REMAINING_LONGEVITY: 30 MO
MDC_IDC_MSMT_BATTERY_REMAINING_PERCENTAGE: 43 %
MDC_IDC_MSMT_BATTERY_STATUS: NORMAL
MDC_IDC_MSMT_CAP_CHARGE_DTM: NORMAL
MDC_IDC_MSMT_CAP_CHARGE_DTM: NORMAL
MDC_IDC_MSMT_CAP_CHARGE_ENERGY: 31 J
MDC_IDC_MSMT_CAP_CHARGE_TIME: 13.5 S
MDC_IDC_MSMT_CAP_CHARGE_TIME: 7.8 S
MDC_IDC_MSMT_CAP_CHARGE_TYPE: NORMAL
MDC_IDC_MSMT_CAP_CHARGE_TYPE: NORMAL
MDC_IDC_MSMT_LEADCHNL_LV_LEAD_CHANNEL_STATUS: NORMAL
MDC_IDC_MSMT_LEADCHNL_RA_IMPEDANCE_VALUE: 524 OHM
MDC_IDC_MSMT_LEADCHNL_RA_PACING_THRESHOLD_AMPLITUDE: 0.5 V
MDC_IDC_MSMT_LEADCHNL_RA_PACING_THRESHOLD_PULSEWIDTH: 0.5 MS
MDC_IDC_MSMT_LEADCHNL_RV_IMPEDANCE_VALUE: 434 OHM
MDC_IDC_MSMT_LEADCHNL_RV_PACING_THRESHOLD_AMPLITUDE: 1.1 V
MDC_IDC_MSMT_LEADCHNL_RV_PACING_THRESHOLD_PULSEWIDTH: 0.5 MS
MDC_IDC_PG_IMPLANT_DTM: NORMAL
MDC_IDC_PG_MFG: NORMAL
MDC_IDC_PG_MODEL: NORMAL
MDC_IDC_PG_SERIAL: NORMAL
MDC_IDC_PG_TYPE: NORMAL
MDC_IDC_SESS_CLINIC_NAME: NORMAL
MDC_IDC_SESS_DTM: NORMAL
MDC_IDC_SESS_TYPE: NORMAL
MDC_IDC_SET_BRADY_AT_MODE_SWITCH_MODE: NORMAL
MDC_IDC_SET_BRADY_AT_MODE_SWITCH_RATE: 170 {BEATS}/MIN
MDC_IDC_SET_BRADY_LOWRATE: 80 {BEATS}/MIN
MDC_IDC_SET_BRADY_MAX_SENSOR_RATE: 120 {BEATS}/MIN
MDC_IDC_SET_BRADY_MAX_TRACKING_RATE: 130 {BEATS}/MIN
MDC_IDC_SET_BRADY_MODE: NORMAL
MDC_IDC_SET_BRADY_PAV_DELAY_HIGH: 280 MS
MDC_IDC_SET_BRADY_PAV_DELAY_LOW: 300 MS
MDC_IDC_SET_BRADY_SAV_DELAY_HIGH: 280 MS
MDC_IDC_SET_BRADY_SAV_DELAY_LOW: 300 MS
MDC_IDC_SET_CRT_PACED_CHAMBERS: NORMAL
MDC_IDC_SET_LEADCHNL_LV_PACING_AMPLITUDE: 0.1 V
MDC_IDC_SET_LEADCHNL_LV_PACING_PULSEWIDTH: 0.1 MS
MDC_IDC_SET_LEADCHNL_LV_SENSING_ADAPTATION_MODE: NORMAL
MDC_IDC_SET_LEADCHNL_LV_SENSING_ANODE_ELECTRODE_1: NORMAL
MDC_IDC_SET_LEADCHNL_LV_SENSING_ANODE_LOCATION_1: NORMAL
MDC_IDC_SET_LEADCHNL_LV_SENSING_CATHODE_ELECTRODE_1: NORMAL
MDC_IDC_SET_LEADCHNL_LV_SENSING_CATHODE_LOCATION_1: NORMAL
MDC_IDC_SET_LEADCHNL_LV_SENSING_SENSITIVITY: 1 MV
MDC_IDC_SET_LEADCHNL_RA_PACING_AMPLITUDE: 1.5 V
MDC_IDC_SET_LEADCHNL_RA_PACING_POLARITY: NORMAL
MDC_IDC_SET_LEADCHNL_RA_PACING_PULSEWIDTH: 0.5 MS
MDC_IDC_SET_LEADCHNL_RA_SENSING_ADAPTATION_MODE: NORMAL
MDC_IDC_SET_LEADCHNL_RA_SENSING_POLARITY: NORMAL
MDC_IDC_SET_LEADCHNL_RA_SENSING_SENSITIVITY: 0.25 MV
MDC_IDC_SET_LEADCHNL_RV_PACING_AMPLITUDE: 2.8 V
MDC_IDC_SET_LEADCHNL_RV_PACING_POLARITY: NORMAL
MDC_IDC_SET_LEADCHNL_RV_PACING_PULSEWIDTH: 0.5 MS
MDC_IDC_SET_LEADCHNL_RV_SENSING_ADAPTATION_MODE: NORMAL
MDC_IDC_SET_LEADCHNL_RV_SENSING_POLARITY: NORMAL
MDC_IDC_SET_LEADCHNL_RV_SENSING_SENSITIVITY: 0.6 MV
MDC_IDC_SET_ZONE_DETECTION_INTERVAL: 273 MS
MDC_IDC_SET_ZONE_DETECTION_INTERVAL: 375 MS
MDC_IDC_SET_ZONE_STATUS: NORMAL
MDC_IDC_SET_ZONE_STATUS: NORMAL
MDC_IDC_SET_ZONE_TYPE: NORMAL
MDC_IDC_SET_ZONE_TYPE: NORMAL
MDC_IDC_SET_ZONE_VENDOR_TYPE: NORMAL
MDC_IDC_SET_ZONE_VENDOR_TYPE: NORMAL
MDC_IDC_STAT_AT_BURDEN_PERCENT: 1 %
MDC_IDC_STAT_AT_DTM_END: NORMAL
MDC_IDC_STAT_AT_DTM_START: NORMAL
MDC_IDC_STAT_BRADY_DTM_END: NORMAL
MDC_IDC_STAT_BRADY_DTM_START: NORMAL
MDC_IDC_STAT_BRADY_RA_PERCENT_PACED: 90 %
MDC_IDC_STAT_BRADY_RV_PERCENT_PACED: 4 %
MDC_IDC_STAT_CRT_DTM_END: NORMAL
MDC_IDC_STAT_CRT_DTM_START: NORMAL
MDC_IDC_STAT_CRT_LV_PERCENT_PACED: 0 %
MDC_IDC_STAT_EPISODE_RECENT_COUNT: 0
MDC_IDC_STAT_EPISODE_RECENT_COUNT_DTM_END: NORMAL
MDC_IDC_STAT_EPISODE_RECENT_COUNT_DTM_START: NORMAL
MDC_IDC_STAT_EPISODE_TYPE: NORMAL
MDC_IDC_STAT_EPISODE_VENDOR_TYPE: NORMAL
MDC_IDC_STAT_TACHYTHERAPY_ATP_DELIVERED_RECENT: 0
MDC_IDC_STAT_TACHYTHERAPY_ATP_DELIVERED_TOTAL: 4
MDC_IDC_STAT_TACHYTHERAPY_RECENT_DTM_END: NORMAL
MDC_IDC_STAT_TACHYTHERAPY_RECENT_DTM_START: NORMAL
MDC_IDC_STAT_TACHYTHERAPY_SHOCKS_ABORTED_RECENT: 0
MDC_IDC_STAT_TACHYTHERAPY_SHOCKS_ABORTED_TOTAL: 1
MDC_IDC_STAT_TACHYTHERAPY_SHOCKS_DELIVERED_RECENT: 0
MDC_IDC_STAT_TACHYTHERAPY_SHOCKS_DELIVERED_TOTAL: 4
MDC_IDC_STAT_TACHYTHERAPY_TOTAL_DTM_END: NORMAL
MDC_IDC_STAT_TACHYTHERAPY_TOTAL_DTM_START: NORMAL
NICOTINE SERPL-MCNC: <5 NG/ML
P AXIS - MUSE: NORMAL DEGREES
PR INTERVAL - MUSE: 188 MS
QRS DURATION - MUSE: 110 MS
QT - MUSE: 430 MS
QTC - MUSE: 495 MS
R AXIS - MUSE: 164 DEGREES
SYSTOLIC BLOOD PRESSURE - MUSE: NORMAL MMHG
T AXIS - MUSE: 123 DEGREES
VENTRICULAR RATE- MUSE: 80 BPM

## 2024-05-07 NOTE — CONFIDENTIAL NOTE
DIAGNOSIS:    LVAD (left ventricular assist device) present (H)   Chronic systolic CHF (congestive heart failure) (H)   Chronic systolic (congestive) heart failure (H)   Class 2 severe obesity due to excess calories with serious comorbidity and body mass index (BMI) of 36.0 to 36.9 in adult (H)      DATE RECEIVED: 06.03.2024     NOTES (Gather within 2 years) STATUS DETAILS   OFFICE NOTE from referring provider   Internal 05.02.2024  Dunia Hdz MD    OFFICE NOTE from other specialist Internal 04.24.2024  Maddy Bazan PA-C    DISCHARGE SUMMARY from hospital     DISCHARGE REPORT from the ER     LABS (any labs) Internal    MEDICATION LIST Internal    IMAGING  (NEED IMAGES AND REPORTS)     Osteomyelitis: Foot imaging      Liver Abscess: Abdominal imaging     Other (anything related to diagnoses Internal 05.03.2024 CT ABDOMEN PELVIS W CONTRAST

## 2024-05-09 ENCOUNTER — ANTICOAGULATION THERAPY VISIT (OUTPATIENT)
Dept: ANTICOAGULATION | Facility: CLINIC | Age: 59
End: 2024-05-09
Payer: MEDICARE

## 2024-05-09 DIAGNOSIS — I47.29 PAROXYSMAL VENTRICULAR TACHYCARDIA (H): ICD-10-CM

## 2024-05-09 DIAGNOSIS — Z95.811 LVAD (LEFT VENTRICULAR ASSIST DEVICE) PRESENT (H): Primary | ICD-10-CM

## 2024-05-09 DIAGNOSIS — Z95.2 S/P MITRAL VALVE REPLACEMENT: ICD-10-CM

## 2024-05-09 DIAGNOSIS — I50.22 CHRONIC SYSTOLIC CHF (CONGESTIVE HEART FAILURE) (H): ICD-10-CM

## 2024-05-09 LAB — INR HOME MONITORING: 2.5 (ref 2–3)

## 2024-05-09 NOTE — PROGRESS NOTES
ANTICOAGULATION MANAGEMENT     Tej SW[place] 58 year old male is on warfarin with therapeutic INR result. (Goal INR 2.0-3.0)    Recent labs: (last 7 days)     05/09/24  0000   INR 2.5       ASSESSMENT     Source(s): Chart Review and Patient/Caregiver Call     Warfarin doses taken: Booster dose(s) recently taken which may be affecting INR  Diet: No new diet changes identified  Medication/supplement changes: None noted  New illness, injury, or hospitalization: No. Had RHC last week  Signs or symptoms of bleeding or clotting: No  Previous result: Therapeutic last visit; previously outside of goal range  Additional findings: None       PLAN     Recommended plan for temporary change(s) affecting INR     Dosing Instructions: Continue your current warfarin dose with next INR in 1 week       Summary  As of 5/9/2024      Full warfarin instructions:  5 mg every day   Next INR check:  5/16/2024               Telephone call with Tej who verbalizes understanding and agrees to plan    Patient to recheck with home meter    Education provided:   Goal range and lab monitoring: goal range and significance of current result    Plan made per [facility] anticoagulation protocol    Zara Don RN  Anticoagulation Clinic  5/9/2024    _______________________________________________________________________     Anticoagulation Episode Summary       Current INR goal:  2.0-3.0   TTR:  78.7% (1 y)   Target end date:  Indefinite   Send INR reminders to:  ANTICOAG LVAD    Indications    LVAD (left ventricular assist device) present (H) [Z95.811]  Chronic systolic CHF (congestive heart failure) (H) [I50.22]  S/P mitral valve replacement [Z95.2]  Paroxysmal ventricular tachycardia (H) [I47.29]             Comments:  Follow VAD Anticoag protocol:Yes: HeartMate 3  Bridging: per protocol  Date VAD placed: 03/23/22  INR Goal: per referral  Hx Mitral valve repair and not replacement               Anticoagulation Care Providers       Provider Role  Specialty Phone number    Dunia Hdz MD Referring Cardiovascular Disease [phone]

## 2024-05-10 LAB
CANNABINOIDS UR CFM-MCNC: <5 NG/ML
CARBOXYTHC/CREAT UR: NORMAL

## 2024-05-10 NOTE — RESULT ENCOUNTER NOTE
Reviewed and within acceptable and/or expected limits for potential heart transplant recipient (testing is a part of standard waitlist management protocol).

## 2024-05-11 DIAGNOSIS — I50.22 CHRONIC SYSTOLIC (CONGESTIVE) HEART FAILURE (H): ICD-10-CM

## 2024-05-13 LAB
SA 1  COMMENTS: NORMAL
SA 1 CELL: NORMAL
SA 1 TEST METHOD: NORMAL
SA 2 CELL: NORMAL
SA 2 COMMENTS: NORMAL
SA 2 TEST METHOD: NORMAL
SA1 HI RISK ABY: NORMAL
SA1 MOD RISK ABY: NORMAL
SA2 HI RISK ABY: NORMAL
SA2 MOD RISK ABY: NORMAL
UNACCEPTABLE ANTIGENS: NORMAL
UNOS CPRA: 0

## 2024-05-15 ENCOUNTER — MYC REFILL (OUTPATIENT)
Dept: CARDIOLOGY | Facility: CLINIC | Age: 59
End: 2024-05-15
Payer: MEDICARE

## 2024-05-15 DIAGNOSIS — I50.22 CHRONIC SYSTOLIC (CONGESTIVE) HEART FAILURE (H): ICD-10-CM

## 2024-05-15 RX ORDER — POTASSIUM CHLORIDE 1500 MG/1
60 TABLET, EXTENDED RELEASE ORAL 2 TIMES DAILY
Qty: 540 TABLET | Refills: 3 | Status: SHIPPED | OUTPATIENT
Start: 2024-05-15

## 2024-05-15 NOTE — TELEPHONE ENCOUNTER
potassium chloride alfonso ER (KLOR-CON M20) 20 MEQ CR tablet         Last Written Prescription Date:  5-11-23  Last Fill Quantity: 540,   # refills: 3  Last Office Visit : 5-2-24  Future Office visit:  none  :3

## 2024-05-16 ENCOUNTER — ANTICOAGULATION THERAPY VISIT (OUTPATIENT)
Dept: ANTICOAGULATION | Facility: CLINIC | Age: 59
End: 2024-05-16
Payer: MEDICARE

## 2024-05-16 DIAGNOSIS — Z95.811 LVAD (LEFT VENTRICULAR ASSIST DEVICE) PRESENT (H): Primary | ICD-10-CM

## 2024-05-16 DIAGNOSIS — I47.29 PAROXYSMAL VENTRICULAR TACHYCARDIA (H): ICD-10-CM

## 2024-05-16 DIAGNOSIS — I50.22 CHRONIC SYSTOLIC CHF (CONGESTIVE HEART FAILURE) (H): ICD-10-CM

## 2024-05-16 DIAGNOSIS — Z95.2 S/P MITRAL VALVE REPLACEMENT: ICD-10-CM

## 2024-05-16 LAB — INR HOME MONITORING: 2.3 (ref 2–3)

## 2024-05-16 NOTE — PROGRESS NOTES
ANTICOAGULATION MANAGEMENT     Tej Morfin 58 year old male is on warfarin with therapeutic INR result. (Goal INR 2.0-3.0)    Recent labs: (last 7 days)     05/16/24  0000   INR 2.3       ASSESSMENT     Source(s): Chart Review and Patient/Caregiver Call     Warfarin doses taken: Warfarin taken as instructed  Diet: No new diet changes identified  Medication/supplement changes: None noted  New illness, injury, or hospitalization: No  Signs or symptoms of bleeding or clotting: No  Previous result: Therapeutic last 2(+) visits  Additional findings: None       PLAN     Recommended plan for no diet, medication or health factor changes affecting INR     Dosing Instructions: Continue your current warfarin dose with next INR in 1 week       Summary  As of 5/16/2024      Full warfarin instructions:  5 mg every day   Next INR check:  5/23/2024               Telephone call with Tej who verbalizes understanding and agrees to plan    Patient to recheck with home meter    Education provided:   Contact [phone] with any changes, questions or concerns.     Plan made per [facility] anticoagulation protocol and per LVAD protocol    Lizabeth Milton RN  Anticoagulation Clinic  5/16/2024    _______________________________________________________________________     Anticoagulation Episode Summary       Current INR goal:  2.0-3.0   TTR:  78.7% (1 y)   Target end date:  Indefinite   Send INR reminders to:  ANTICOAG LVAD    Indications    LVAD (left ventricular assist device) present (H) [Z95.811]  Chronic systolic CHF (congestive heart failure) (H) [I50.22]  S/P mitral valve replacement [Z95.2]  Paroxysmal ventricular tachycardia (H) [I47.29]             Comments:  Follow VAD Anticoag protocol:Yes: HeartMate 3  Bridging: per protocol  Date VAD placed: 03/23/22  INR Goal: per referral  Hx Mitral valve repair and not replacement               Anticoagulation Care Providers       Provider Role Specialty Phone number    Andreina  Dunia Hollis MD Referring Cardiovascular Disease [phone]

## 2024-05-21 RX ORDER — POTASSIUM CHLORIDE 1500 MG/1
60 TABLET, EXTENDED RELEASE ORAL 2 TIMES DAILY
Qty: 540 TABLET | Refills: 3 | OUTPATIENT
Start: 2024-05-21

## 2024-05-23 ENCOUNTER — ANTICOAGULATION THERAPY VISIT (OUTPATIENT)
Dept: ANTICOAGULATION | Facility: CLINIC | Age: 59
End: 2024-05-23
Payer: MEDICARE

## 2024-05-23 DIAGNOSIS — I50.22 CHRONIC SYSTOLIC CHF (CONGESTIVE HEART FAILURE) (H): ICD-10-CM

## 2024-05-23 DIAGNOSIS — Z95.811 LVAD (LEFT VENTRICULAR ASSIST DEVICE) PRESENT (H): Primary | ICD-10-CM

## 2024-05-23 DIAGNOSIS — Z95.2 S/P MITRAL VALVE REPLACEMENT: ICD-10-CM

## 2024-05-23 DIAGNOSIS — I47.29 PAROXYSMAL VENTRICULAR TACHYCARDIA (H): ICD-10-CM

## 2024-05-23 LAB — INR HOME MONITORING: 2.8 (ref 2–3)

## 2024-05-23 NOTE — PROGRESS NOTES
ANTICOAGULATION MANAGEMENT     Tej Morfin 58 year old male is on warfarin with therapeutic INR result. (Goal INR 2.0-3.0)    Recent labs: (last 7 days)     05/23/24  0000   INR 2.8       ASSESSMENT     Source(s): Chart Review     Warfarin doses taken: Reviewed in chart  Diet: No new diet changes identified  Medication/supplement changes: None noted  New illness, injury, or hospitalization: No  Signs or symptoms of bleeding or clotting: No  Previous result: Therapeutic last 2(+) visits  Additional findings: None       PLAN     Recommended plan for no diet, medication or health factor changes affecting INR     Dosing Instructions: Continue your current warfarin dose with next INR in 1 week       Summary  As of 5/23/2024      Full warfarin instructions:  5 mg every day   Next INR check:  5/30/2024               Detailed voice message left for Tej with dosing instructions and follow up date.     Patient to recheck with home meter    Education provided:   Please call back if any changes to your diet, medications or how you've been taking warfarin  Contact [phone] with any changes, questions or concerns.     Plan made per [facility] anticoagulation protocol and per LVAD protocol    Audelia Avalos RN  Anticoagulation Clinic  5/23/2024    _______________________________________________________________________     Anticoagulation Episode Summary       Current INR goal:  2.0-3.0   TTR:  78.7% (1 y)   Target end date:  Indefinite   Send INR reminders to:  ANTICOAG LVAD    Indications    LVAD (left ventricular assist device) present (H) [Z95.811]  Chronic systolic CHF (congestive heart failure) (H) [I50.22]  S/P mitral valve replacement [Z95.2]  Paroxysmal ventricular tachycardia (H) [I47.29]             Comments:  Follow VAD Anticoag protocol:Yes: HeartMate 3  Bridging: per protocol  Date VAD placed: 03/23/22  INR Goal: per referral  Hx Mitral valve repair and not replacement               Anticoagulation Care  Providers       Provider Role Specialty Phone number    Dnuia Hdz MD Referring Cardiovascular Disease [phone]

## 2024-05-30 ENCOUNTER — ANTICOAGULATION THERAPY VISIT (OUTPATIENT)
Dept: ANTICOAGULATION | Facility: CLINIC | Age: 59
End: 2024-05-30
Payer: MEDICARE

## 2024-05-30 DIAGNOSIS — Z95.811 LVAD (LEFT VENTRICULAR ASSIST DEVICE) PRESENT (H): Primary | ICD-10-CM

## 2024-05-30 DIAGNOSIS — I47.29 PAROXYSMAL VENTRICULAR TACHYCARDIA (H): ICD-10-CM

## 2024-05-30 DIAGNOSIS — Z95.2 S/P MITRAL VALVE REPLACEMENT: ICD-10-CM

## 2024-05-30 DIAGNOSIS — I50.22 CHRONIC SYSTOLIC CHF (CONGESTIVE HEART FAILURE) (H): ICD-10-CM

## 2024-05-30 LAB — INR HOME MONITORING: 2.4 (ref 2–3)

## 2024-05-30 NOTE — PROGRESS NOTES
ANTICOAGULATION MANAGEMENT     Tej LEELA Morfin 58 year old male is on warfarin with therapeutic INR result. (Goal INR 2.0-3.0)    Recent labs: (last 7 days)     05/30/24  0000   INR 2.4       ASSESSMENT     Source(s): Chart Review and Patient/Caregiver Call     Warfarin doses taken: Warfarin taken as instructed  Diet: No new diet changes identified  Medication/supplement changes: None noted  New illness, injury, or hospitalization: No  Signs or symptoms of bleeding or clotting: No  Previous result: Therapeutic last 2(+) visits  Additional findings: None       PLAN     Recommended plan for no diet, medication or health factor changes affecting INR     Dosing Instructions: Continue your current warfarin dose with next INR in 1 week       Summary  As of 5/30/2024      Full warfarin instructions:  5 mg every day   Next INR check:  6/6/2024               Telephone call with Tej who verbalizes understanding and agrees to plan and who agrees to plan and repeated back plan correctly    Patient to recheck with home meter    Education provided:   Taking warfarin: Importance of taking warfarin as instructed  Goal range and lab monitoring: goal range and significance of current result and Importance of therapeutic range    Plan made per [facility] anticoagulation protocol and per LVAD protocol    Audelia Avalos RN  Anticoagulation Clinic  5/30/2024    _______________________________________________________________________     Anticoagulation Episode Summary       Current INR goal:  2.0-3.0   TTR:  79.6% (1 y)   Target end date:  Indefinite   Send INR reminders to:  ANTICOAG LVAD    Indications    LVAD (left ventricular assist device) present (H) [Z95.811]  Chronic systolic CHF (congestive heart failure) (H) [I50.22]  S/P mitral valve replacement [Z95.2]  Paroxysmal ventricular tachycardia (H) [I47.29]             Comments:  Follow VAD Anticoag protocol:Yes: HeartMate 3  Bridging: per protocol  Date VAD placed: 03/23/22  INR  Goal: per referral  Hx Mitral valve repair and not replacement               Anticoagulation Care Providers       Provider Role Specialty Phone number    Dunia Hdz MD Referring Cardiovascular Disease [phone]

## 2024-06-03 ENCOUNTER — VIRTUAL VISIT (OUTPATIENT)
Dept: INFECTIOUS DISEASES | Facility: CLINIC | Age: 59
End: 2024-06-03
Attending: STUDENT IN AN ORGANIZED HEALTH CARE EDUCATION/TRAINING PROGRAM
Payer: MEDICARE

## 2024-06-03 ENCOUNTER — PRE VISIT (OUTPATIENT)
Dept: INFECTIOUS DISEASES | Facility: CLINIC | Age: 59
End: 2024-06-03
Payer: MEDICARE

## 2024-06-03 VITALS — WEIGHT: 240 LBS | HEIGHT: 70 IN | BODY MASS INDEX: 34.36 KG/M2

## 2024-06-03 DIAGNOSIS — R19.7 DIARRHEA OF PRESUMED INFECTIOUS ORIGIN: ICD-10-CM

## 2024-06-03 DIAGNOSIS — E66.01 CLASS 2 SEVERE OBESITY DUE TO EXCESS CALORIES WITH SERIOUS COMORBIDITY AND BODY MASS INDEX (BMI) OF 36.0 TO 36.9 IN ADULT (H): ICD-10-CM

## 2024-06-03 DIAGNOSIS — E66.812 CLASS 2 SEVERE OBESITY DUE TO EXCESS CALORIES WITH SERIOUS COMORBIDITY AND BODY MASS INDEX (BMI) OF 36.0 TO 36.9 IN ADULT (H): ICD-10-CM

## 2024-06-03 DIAGNOSIS — Z23 NEED FOR VACCINATION: ICD-10-CM

## 2024-06-03 DIAGNOSIS — T82.7XXA INFECTION ASSOCIATED WITH DRIVELINE OF LEFT VENTRICULAR ASSIST DEVICE (LVAD) (H): Primary | ICD-10-CM

## 2024-06-03 DIAGNOSIS — Z76.82 ORGAN TRANSPLANT CANDIDATE: ICD-10-CM

## 2024-06-03 PROCEDURE — 99204 OFFICE O/P NEW MOD 45 MIN: CPT | Mod: 95 | Performed by: STUDENT IN AN ORGANIZED HEALTH CARE EDUCATION/TRAINING PROGRAM

## 2024-06-03 ASSESSMENT — PAIN SCALES - GENERAL: PAINLEVEL: NO PAIN (0)

## 2024-06-03 NOTE — PROGRESS NOTES
Virtual Visit Details    Type of service:  Video Visit   Video Start Time:  1.22 pm  Video End Time: 1.47 pm     Originating Location (pt. Location): Car    Distant Location (provider location):  On-site  Platform used for Video Visit: Brandon  Total time including chart review, care-coordination and documentation time on the date of encounter - 47 mins            [facility] [address] [address]  Phone: [phone]  Fax: [phone]      Today's Date: 06/03/2024    Recommendations:   Continue cefadroxil 500 mg po bid, plan to continue until transplant   At [facility] lab - will check C.diff - have asked staff to fax order and receive results  Patient to send Variable pictures of driveline site   Encouraged yogurt/fermented foods, probiotic intake   Can try bulking foods, anti-diarrheal if C.diff neg   Recommend Hepatitis B vaccination, he will get locally     RTC 3 months     Thank you for involving Infectious Disease in the care of this patient.     Dr. Angela Layton  Division of Infectious Disease  [facility]      Assessment:  Tej Morfin is a 58 year old with PMH of severe MR s/p MVR with ring (1/21/15), VT s/p ICD, nonobstructive CAD, NICM previously on home inotropes now s/p HM III LVAD implantation 3/23/22 as bridge to decision (presently not listed - LVAD team wanted to see marijuana levels decline consistent with quit date), with course c/b AFib with RVR (s/p ICD shock 3/25/22 and 5/2023).     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 On cefadroxil 500 mg po bid. Symptoms better but some drainage continues.     Diarrhea: mild loose stools with magnesium oxide at baseline. Diarrhea since starting cefadroxil. Will check for c.diff     - Immunization status: due for hep B vaccine, otherwise up to  date    -------------------------------------------------------------------------------------------------------------------    Reason for consult / Chief complaint:   Consulted by Dr. Dunia Hdz for driveline infection    History of presenting illness:  Tej Morfin is a 58 year old with PMH of severe MR s/p MVR with ring (1/21/15), VT s/p ICD, nonobstructive CAD, NICM previously on home inotropes now s/p HM III LVAD implantation 3/23/22 as bridge to decision (presently not listed - LVAD team wanted to see marijuana levels decline consistent with quit date), with course c/b AFib with RVR (s/p ICD shock 3/25/22 and 5/2023).     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, he had a lot of driveline drainage and pain  He was prescribed Doxy for 2 weeks   However symptoms Recurred 2 weeks after stopping doxy  4/17 blood cx neg, CT with some skin thickening at the driveline insertion site   He took Doxy for 1 week which was changed to Cefadroxil 500 mg bid, it is ongoing since 4/25     Now the drainage is much better - he has nickel sized drainage and occasional pain   Drainage is better on cefadroxil than when on doxy   Used to do Dressing change weekly, now every 1-2 days after the driveline infection started   He has Diarrhea since starting the cefadroxil - about 2-3 times a day - more liquid than nomal     Patient Active Problem List   Diagnosis    Cardiac defibrillator in situ- Critical Media, 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    Valvular cardiomyopathy (H)    S/P mitral valve replacement    Paroxysmal ventricular tachycardia (H)    Gastroparesis    Hx MRSA infection    MR (mitral regurgitation)    Obstructive sleep apnea    Sinusitis, chronic    Chronic systolic CHF (congestive heart failure) (H)    History of tobacco use    Heart transplant candidate    Age-related nuclear cataract of both  eyes    Anxiety    Lipoma of neck    Myopia of both eyes    Presbyopia    S/P mitral valve repair    Thyroid goiter    Chronic systolic congestive heart failure (H)    LVAD (left ventricular assist device) present (H)    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         Medications:  Current Outpatient Medications   Medication Sig Dispense Refill    acetaminophen (TYLENOL) 325 MG tablet Take 2 tablets (650 mg) by mouth every 4 hours as needed for pain 100 tablet 0    allopurinol (ZYLOPRIM) 100 MG tablet Take 1 tablet (100 mg) by mouth daily (Patient taking differently: Take 100 mg by mouth every morning) 30 tablet 1    amoxicillin (AMOXIL) 500 MG capsule Take 2,000 mg by mouth once as needed Take 4 tabs 1 hour before all dental cleanings and procedures      cefadroxil (DURICEF) 500 MG capsule Take 500 mg by mouth 2 times daily      cholecalciferol 50 MCG (2000 UT) CAPS Take 50 mcg by mouth every morning      furosemide (LASIX) 40 MG tablet TAKE 1.5 TABLETS (60 MG) BY MOUTH 2 TIMES DAILY 270 tablet 3    hydrOXYzine (VISTARIL) 50 MG capsule Take 50 mg by mouth every 12 hours as needed for anxiety      magnesium oxide (MAG-OX) 400 MG tablet Take 800 mg by mouth 2 times daily      metoprolol succinate ER (TOPROL XL) 25 MG 24 hr tablet Take 1 tablet (25 mg) by mouth daily (Patient taking differently: Take 25 mg by mouth every morning) 90 tablet 2    nitroGLYcerin (NITROSTAT) 0.4 MG sublingual tablet Place 0.4 mg under the tongue every 5 minutes as needed for chest pain      omeprazole (PRILOSEC) 40 MG DR capsule Take 40 mg by mouth every morning      potassium chloride alfonso ER (KLOR-CON M20) 20 MEQ CR tablet Take 3 tablets (60 mEq) by mouth 2 times daily 540 tablet 3  "   sacubitril-valsartan (ENTRESTO)  MG per tablet Take 1 tablet by mouth 2 times daily 90 tablet 3    sotalol (BETAPACE) 120 MG tablet Take 120 mg by mouth 2 times daily      spironolactone (ALDACTONE) 25 MG tablet Take 0.5 tablets (12.5 mg) by mouth daily 45 tablet 3    warfarin ANTICOAGULANT (COUMADIN) 2.5 MG tablet Take 2 tablets (5mg) by mouth daily or as directed by the Coumadin clinic (Patient taking differently: Take 5 mg by mouth daily (with dinner) Take 1 tablets (5mg) by mouth daily or as directed by the Coumadin clinic) 180 tablet 1    warfarin ANTICOAGULANT (COUMADIN) 2.5 MG tablet Take 10 mg PO daily at 6 pm until next INR check, then dose per INR goal 2-3 (Patient not taking: Reported on 6/3/2024) 150 tablet 0         Immunizations:  Immunization History   Administered Date(s) Administered    COVID-19 MONOVALENT 12+ (Pfizer) 03/09/2021, 03/30/2021, 10/02/2021    COVID-19 Monovalent 12+ (Pfizer 2022) 05/17/2022    Flu, Unspecified 08/23/2018    [id number] Novel Flu 02/01/2010    Influenza (IIV3) PF 10/26/2010, 10/05/2015, 09/20/2017    Influenza Vaccine >6 months,quad, PF 09/28/2016, 08/23/2018, 08/30/2019, 10/02/2021    Influenza, seasonal, injectable, PF 10/03/2012, 02/03/2014    Pneumo Conj 13-V (2010&after) 01/12/2021    Pneumococcal 23 valent 02/03/2015, 12/08/2021    TDAP (Adacel,Boostrix) 03/08/2016    Zoster recombinant adjuvanted (SHINGRIX) 05/07/2018, 08/17/2018   Fall of 2023 got covid vaccine, got covid March 2024     Examination:  Vital signs:   Ht 1.778 m (5' 10\")   Wt 108.9 kg (240 lb)   BMI 34.44 kg/m    Unable to examine due to virtual visit       Laboratory:  Hematology:  Recent Labs   Lab Test 05/02/24  1353 12/15/23  1011 08/10/23  1436 05/04/23  1328 01/24/23  1500 01/23/23  1322 09/22/22  1603 09/22/22  1040 03/22/22  1343 09/10/19  0649   WBC 5.5 5.2 4.9 6.3  --  5.0  --  3.2*   < > 4.2   ANEU  --   --   --   --   --   --   --   --   --  1.9   ALYM  --   --   --   --   --   " --   --   --   --  1.5   LOUISE  --   --   --   --   --   --   --   --   --  0.5   AEOS  --   --   --   --   --   --   --   --   --  0.2   HGB 12.5* 13.8 14.0 13.7 12.9* 13.8   < > 12.9*   < > 15.8   HCT 39.7* 42.7 43.8 43.0  --  41.5  --  40.0   < > 48.9    199 199 172  --  175  --  158   < > 177    < > = values in this interval not displayed.       Chemistry:  Last Comprehensive Metabolic Panel:  Lab Results   Component Value Date     05/02/2024    POTASSIUM 4.2 05/02/2024    CHLORIDE 102 05/02/2024    CO2 26 05/02/2024    ANIONGAP 11 05/02/2024     (H) 05/02/2024    BUN 20.5 (H) 05/02/2024    CR 1.47 (H) 05/02/2024    GFRESTIMATED 56 (L) 05/02/2024    YESICA 9.7 05/02/2024         Liver Function Studies -   Recent Labs   Lab Test 05/02/24  1353   PROTTOTAL 7.2   ALBUMIN 4.0   BILITOTAL 0.6   ALKPHOS 79   AST 18   ALT 17       Microbiology:  4/17 blood cx neg     3/17 driveline cx   Staphylococcus aureus Clindamycin MELODIE Resistant   Staphylococcus aureus Erythromycin MELODIE >=8 ug/mL: Resistant   Staphylococcus aureus Oxacillin MELODIE 0.5 ug/mL: Sensitive    Comment: Oxacillin predicts susceptibility to cefazolin, cephalexin, nafcillin and dicloxacillin.   Staphylococcus aureus Tetracycline MELODIE <=1 ug/mL: Sensitive   Staphylococcus aureus Trimethoprim/Sulfamethoxazole MELODIE <=10 ug/mL: Sensitive   Staphylococcus aureus Vancomycin MELODIE <=0.5 ug/mL: Sensitive       Imaging:  CT abdomen/pelvis without contrast 4/17/24   Stable appearance of the LVAD driveline without evident complication. There is questionable skin thickening at the insertion site, correlate with physical exam.

## 2024-06-03 NOTE — NURSING NOTE
Patient confirms medications and allergies are accurate via patients echeck in completion, and or denies any changes since last reviewed/verified.     Is the patient currently in the state of [place]? YES    Visit mode:VIDEO    If the visit is dropped, the patient can be reconnected by: VIDEO VISIT: Text to cell phone:   Telephone Information:   Mobile [phone]       Will anyone else be joining the visit? Wife Jessenia  (If patient encounters technical issues they should call [phone])    How would you like to obtain your AVS? MyChart    Are changes needed to the allergy or medication list? No    Are refills needed on medications prescribed by this physician? NO    Reason for visit: Consult    Arianna GALARZAF

## 2024-06-03 NOTE — LETTER
6/3/2024       RE: Tej Morfin  [address]  [place] [address]     Dear Colleague,    Thank you for referring your patient, Tej Morfin, to the [facility] INFECTIOUS DISEASE CLIN[facility] [place] at [facility]. Please see a copy of my visit note below.    Virtual Visit Details    Type of service:  Video Visit   Video Start Time:  1.22 pm  Video End Time: 1.47 pm     Originating Location (pt. Location): Car    Distant Location (provider location):  On-site  Platform used for Video Visit: CS Disco  Total time including chart review, care-coordination and documentation time on the date of encounter - 47 mins            [facility] INFECTIOUS DISEASE CLINIC [place] [id number] [facility] [address]  Phone: [phone]  Fax: [phone]      Today's Date: 06/03/2024    Recommendations:   Continue cefadroxil 500 mg po bid, plan to continue until transplant   At [facility] lab - will check C.diff - have asked staff to fax order and receive results  Patient to send [employer] pictures of driveline site   Encouraged yogurt/fermented foods, probiotic intake   Can try bulking foods, anti-diarrheal if C.diff neg   Recommend Hepatitis B vaccination, he will get locally     RTC 3 months     Thank you for involving Infectious Disease in the care of this patient.     Dr. Angela Layton  Division of Infectious Disease  [facility]      Assessment:  Tej Morfin is a 58 year old with PMH of severe MR s/p MVR with ring (1/21/15), VT s/p ICD, nonobstructive CAD, NICM previously on home inotropes now s/p HM III LVAD implantation 3/23/22 as bridge to decision (presently not listed - LVAD team wanted to see marijuana levels decline consistent with quit date), with course c/b AFib with RVR (s/p ICD shock 3/25/22 and 5/2023).     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 On cefadroxil 500 mg po bid. Symptoms better but some drainage continues.     Diarrhea: mild loose stools with magnesium oxide at baseline. Diarrhea since starting cefadroxil. Will check for c.diff     - Immunization status: due for hep B vaccine, otherwise up to date    -------------------------------------------------------------------------------------------------------------------    Reason for consult / Chief complaint:   Consulted by Dr. Dunia Hdz for driveline infection    History of presenting illness:  Tej Morfin is a 58 year old with PMH of severe MR s/p MVR with ring (1/21/15), VT s/p ICD, nonobstructive CAD, NICM previously on home inotropes now s/p HM III LVAD implantation 3/23/22 as bridge to decision (presently not listed - LVAD team wanted to see marijuana levels decline consistent with quit date), with course c/b AFib with RVR (s/p ICD shock 3/25/22 and 5/2023).     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, he had a lot of driveline drainage and pain  He was prescribed Doxy for 2 weeks   However symptoms Recurred 2 weeks after stopping doxy  4/17 blood cx neg, CT with some skin thickening at the driveline insertion site   He took Doxy for 1 week which was changed to Cefadroxil 500 mg bid, it is ongoing since 4/25     Now the drainage is much better - he has nickel sized drainage and occasional pain   Drainage is better on cefadroxil than when on doxy   Used to do Dressing change weekly, now every 1-2 days after the driveline infection started   He has Diarrhea since starting the cefadroxil - about 2-3 times a day - more liquid than nomal     Patient Active Problem List   Diagnosis    Cardiac defibrillator in situ- [employer], 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    Valvular cardiomyopathy (H)    S/P mitral valve replacement     Paroxysmal ventricular tachycardia (H)    Gastroparesis    Hx MRSA infection    MR (mitral regurgitation)    Obstructive sleep apnea    Sinusitis, chronic    Chronic systolic CHF (congestive heart failure) (H)    History of tobacco use    Heart transplant candidate    Age-related nuclear cataract of both eyes    Anxiety    Lipoma of neck    Myopia of both eyes    Presbyopia    S/P mitral valve repair    Thyroid goiter    Chronic systolic congestive heart failure (H)    LVAD (left ventricular assist device) present (H)    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         Medications:  Current Outpatient Medications   Medication Sig Dispense Refill    acetaminophen (TYLENOL) 325 MG tablet Take 2 tablets (650 mg) by mouth every 4 hours as needed for pain 100 tablet 0    allopurinol (ZYLOPRIM) 100 MG tablet Take 1 tablet (100 mg) by mouth daily (Patient taking differently: Take 100 mg by mouth every morning) 30 tablet 1    amoxicillin (AMOXIL) 500 MG capsule Take 2,000 mg by mouth once as needed Take 4 tabs 1 hour before all dental cleanings and procedures      cefadroxil (DURICEF) 500 MG capsule Take 500 mg by mouth 2 times daily      cholecalciferol 50 MCG (2000 UT) CAPS Take 50 mcg by mouth every morning      furosemide (LASIX) 40 MG tablet TAKE 1.5 TABLETS (60 MG) BY MOUTH 2 TIMES DAILY 270 tablet 3    hydrOXYzine (VISTARIL) 50 MG capsule Take 50 mg by mouth every 12 hours as needed for anxiety      magnesium oxide (MAG-OX) 400 MG tablet Take 800 mg by mouth 2 times daily      metoprolol succinate ER (TOPROL XL) 25 MG 24 hr tablet Take 1 tablet (25 mg) by mouth daily (Patient taking differently: Take 25 mg by mouth every morning) 90 tablet 2    nitroGLYcerin  "(NITROSTAT) 0.4 MG sublingual tablet Place 0.4 mg under the tongue every 5 minutes as needed for chest pain      omeprazole (PRILOSEC) 40 MG DR capsule Take 40 mg by mouth every morning      potassium chloride alfonso ER (KLOR-CON M20) 20 MEQ CR tablet Take 3 tablets (60 mEq) by mouth 2 times daily 540 tablet 3    sacubitril-valsartan (ENTRESTO)  MG per tablet Take 1 tablet by mouth 2 times daily 90 tablet 3    sotalol (BETAPACE) 120 MG tablet Take 120 mg by mouth 2 times daily      spironolactone (ALDACTONE) 25 MG tablet Take 0.5 tablets (12.5 mg) by mouth daily 45 tablet 3    warfarin ANTICOAGULANT (COUMADIN) 2.5 MG tablet Take 2 tablets (5mg) by mouth daily or as directed by the Coumadin clinic (Patient taking differently: Take 5 mg by mouth daily (with dinner) Take 1 tablets (5mg) by mouth daily or as directed by the Coumadin clinic) 180 tablet 1    warfarin ANTICOAGULANT (COUMADIN) 2.5 MG tablet Take 10 mg PO daily at 6 pm until next INR check, then dose per INR goal 2-3 (Patient not taking: Reported on 6/3/2024) 150 tablet 0         Immunizations:  Immunization History   Administered Date(s) Administered    COVID-19 MONOVALENT 12+ (Pfizer) 03/09/2021, 03/30/2021, 10/02/2021    COVID-19 Monovalent 12+ (Pfizer 2022) 05/17/2022    Flu, Unspecified 08/23/2018    [id number] Novel Flu 02/01/2010    Influenza (IIV3) PF 10/26/2010, 10/05/2015, 09/20/2017    Influenza Vaccine >6 months,quad, PF 09/28/2016, 08/23/2018, 08/30/2019, 10/02/2021    Influenza, seasonal, injectable, PF 10/03/2012, 02/03/2014    Pneumo Conj 13-V (2010&after) 01/12/2021    Pneumococcal 23 valent 02/03/2015, 12/08/2021    TDAP (Adacel,Boostrix) 03/08/2016    Zoster recombinant adjuvanted (SHINGRIX) 05/07/2018, 08/17/2018   Fall of 2023 got covid vaccine, got covid March 2024     Examination:  Vital signs:   Ht 1.778 m (5' 10\")   Wt 108.9 kg (240 lb)   BMI 34.44 kg/m    Unable to examine due to virtual visit "       Laboratory:  Hematology:  Recent Labs   Lab Test 05/02/24  1353 12/15/23  1011 08/10/23  1436 05/04/23  1328 01/24/23  1500 01/23/23  1322 09/22/22  1603 09/22/22  1040 03/22/22  1343 09/10/19  0649   WBC 5.5 5.2 4.9 6.3  --  5.0  --  3.2*   < > 4.2   ANEU  --   --   --   --   --   --   --   --   --  1.9   ALYM  --   --   --   --   --   --   --   --   --  1.5   LOUISE  --   --   --   --   --   --   --   --   --  0.5   AEOS  --   --   --   --   --   --   --   --   --  0.2   HGB 12.5* 13.8 14.0 13.7 12.9* 13.8   < > 12.9*   < > 15.8   HCT 39.7* 42.7 43.8 43.0  --  41.5  --  40.0   < > 48.9    199 199 172  --  175  --  158   < > 177    < > = values in this interval not displayed.       Chemistry:  Last Comprehensive Metabolic Panel:  Lab Results   Component Value Date     05/02/2024    POTASSIUM 4.2 05/02/2024    CHLORIDE 102 05/02/2024    CO2 26 05/02/2024    ANIONGAP 11 05/02/2024     (H) 05/02/2024    BUN 20.5 (H) 05/02/2024    CR 1.47 (H) 05/02/2024    GFRESTIMATED 56 (L) 05/02/2024    YESICA 9.7 05/02/2024     Liver Function Studies -   Recent Labs   Lab Test 05/02/24  1353   PROTTOTAL 7.2   ALBUMIN 4.0   BILITOTAL 0.6   ALKPHOS 79   AST 18   ALT 17       Microbiology:  4/17 blood cx neg     3/17 driveline cx   Staphylococcus aureus Clindamycin MELODIE Resistant   Staphylococcus aureus Erythromycin MELODIE >=8 ug/mL: Resistant   Staphylococcus aureus Oxacillin MELODIE 0.5 ug/mL: Sensitive    Comment: Oxacillin predicts susceptibility to cefazolin, cephalexin, nafcillin and dicloxacillin.   Staphylococcus aureus Tetracycline MELODIE <=1 ug/mL: Sensitive   Staphylococcus aureus Trimethoprim/Sulfamethoxazole MELODIE <=10 ug/mL: Sensitive   Staphylococcus aureus Vancomycin MELODIE <=0.5 ug/mL: Sensitive       Imaging:  CT abdomen/pelvis without contrast 4/17/24   Stable appearance of the LVAD driveline without evident complication. There is questionable skin thickening at the insertion site, correlate with physical exam.        Angela Layton MD

## 2024-06-04 ENCOUNTER — TELEPHONE (OUTPATIENT)
Dept: TRANSPLANT | Facility: CLINIC | Age: 59
End: 2024-06-04
Payer: MEDICARE

## 2024-06-04 ENCOUNTER — TELEPHONE (OUTPATIENT)
Dept: INFECTIOUS DISEASES | Facility: CLINIC | Age: 59
End: 2024-06-04
Payer: MEDICARE

## 2024-06-04 VITALS — WEIGHT: 240 LBS | BODY MASS INDEX: 34.44 KG/M2

## 2024-06-04 NOTE — TELEPHONE ENCOUNTER
Lab orders faxed to [facility] in [place] per patient request. Patient notified via [employer].     Jessenia Thibodeaux CMA   6/4/2024 1:58 PM

## 2024-06-04 NOTE — TELEPHONE ENCOUNTER
[facility] Call Center    Phone Message    May a detailed message be left on voicemail: yes     Reason for Call: Other: Pt call regarding an Order that was suppose to be send over to his provider, fax number is . He stated that the Order was suppose to be a Lab for Stool. Please follow up with pt regarding the Stool Order. Thank you        Action Taken: Other: ID    Travel Screening: Not Applicable

## 2024-06-06 ENCOUNTER — ANTICOAGULATION THERAPY VISIT (OUTPATIENT)
Dept: ANTICOAGULATION | Facility: CLINIC | Age: 59
End: 2024-06-06
Payer: MEDICARE

## 2024-06-06 DIAGNOSIS — I47.29 PAROXYSMAL VENTRICULAR TACHYCARDIA (H): ICD-10-CM

## 2024-06-06 DIAGNOSIS — Z95.2 S/P MITRAL VALVE REPLACEMENT: ICD-10-CM

## 2024-06-06 DIAGNOSIS — Z95.811 LVAD (LEFT VENTRICULAR ASSIST DEVICE) PRESENT (H): Primary | ICD-10-CM

## 2024-06-06 DIAGNOSIS — I50.22 CHRONIC SYSTOLIC CHF (CONGESTIVE HEART FAILURE) (H): ICD-10-CM

## 2024-06-06 LAB — INR HOME MONITORING: 2.7 (ref 2–3)

## 2024-06-06 NOTE — PROGRESS NOTES
ANTICOAGULATION MANAGEMENT     Tej Morfin 58 year old male is on warfarin with therapeutic INR result. (Goal INR 2.0-3.0)    Recent labs: (last 7 days)     06/06/24  0000   INR 2.7       ASSESSMENT     Source(s): Chart Review     Warfarin doses taken: Reviewed in chart  Diet: No new diet changes identified  Medication/supplement changes: None noted  New illness, injury, or hospitalization: No  Signs or symptoms of bleeding or clotting: No  Previous result: Therapeutic last 2(+) visits  Additional findings: None       PLAN     Recommended plan for no diet, medication or health factor changes affecting INR     Dosing Instructions: Continue your current warfarin dose with next INR in 1 week       Summary  As of 6/6/2024      Full warfarin instructions:  5 mg every day   Next INR check:  6/13/2024               Detailed voice message left for Tej with dosing instructions and follow up date.     Patient to recheck with home meter    Education provided:   Please call back if any changes to your diet, medications or how you've been taking warfarin  Contact [phone] with any changes, questions or concerns.     Plan made per [facility] anticoagulation protocol and per LVAD protocol    Audelia Avalos RN  Anticoagulation Clinic  6/6/2024    _______________________________________________________________________     Anticoagulation Episode Summary       Current INR goal:  2.0-3.0   TTR:  79.9% (1 y)   Target end date:  Indefinite   Send INR reminders to:  ANTICOAG LVAD    Indications    LVAD (left ventricular assist device) present (H) [Z95.811]  Chronic systolic CHF (congestive heart failure) (H) [I50.22]  S/P mitral valve replacement [Z95.2]  Paroxysmal ventricular tachycardia (H) [I47.29]             Comments:  Follow VAD Anticoag protocol:Yes: HeartMate 3  Bridging: per protocol  Date VAD placed: 03/23/22  INR Goal: per referral  Hx Mitral valve repair and not replacement               Anticoagulation Care  Providers       Provider Role Specialty Phone number    Dunia Hdz MD Referring Cardiovascular Disease [phone]

## 2024-06-11 ENCOUNTER — CARE COORDINATION (OUTPATIENT)
Dept: TRANSPLANT | Facility: CLINIC | Age: 59
End: 2024-06-11
Payer: MEDICARE

## 2024-06-11 ENCOUNTER — TELEPHONE (OUTPATIENT)
Dept: INFECTIOUS DISEASES | Facility: CLINIC | Age: 59
End: 2024-06-11
Payer: MEDICARE

## 2024-06-11 DIAGNOSIS — Z12.5 ENCOUNTER FOR SCREENING FOR MALIGNANT NEOPLASM OF PROSTATE: ICD-10-CM

## 2024-06-11 DIAGNOSIS — Z76.82 AWAITING ORGAN TRANSPLANT: Primary | ICD-10-CM

## 2024-06-11 DIAGNOSIS — Z72.89 OTHER PROBLEMS RELATED TO LIFESTYLE: ICD-10-CM

## 2024-06-11 DIAGNOSIS — Z01.810 ENCOUNTER FOR PREPROCEDURAL CARDIOVASCULAR EXAMINATION: ICD-10-CM

## 2024-06-11 DIAGNOSIS — R09.89 OTHER SPECIFIED SYMPTOMS AND SIGNS INVOLVING THE CIRCULATORY AND RESPIRATORY SYSTEMS: ICD-10-CM

## 2024-06-11 DIAGNOSIS — Z95.811 LVAD (LEFT VENTRICULAR ASSIST DEVICE) PRESENT (H): ICD-10-CM

## 2024-06-11 DIAGNOSIS — I50.22 CHRONIC SYSTOLIC CHF (CONGESTIVE HEART FAILURE) (H): ICD-10-CM

## 2024-06-11 NOTE — TELEPHONE ENCOUNTER
EP called 6/11 to sched a 3 month follow up in the LVAD clinic per checkout notes from 6/3; Dr. Layton. Patient said [place] border [place] for this follow up.

## 2024-06-14 ENCOUNTER — ANTICOAGULATION THERAPY VISIT (OUTPATIENT)
Dept: ANTICOAGULATION | Facility: CLINIC | Age: 59
End: 2024-06-14
Payer: MEDICARE

## 2024-06-14 DIAGNOSIS — I47.29 PAROXYSMAL VENTRICULAR TACHYCARDIA (H): ICD-10-CM

## 2024-06-14 DIAGNOSIS — I50.22 CHRONIC SYSTOLIC CHF (CONGESTIVE HEART FAILURE) (H): ICD-10-CM

## 2024-06-14 DIAGNOSIS — Z95.811 LVAD (LEFT VENTRICULAR ASSIST DEVICE) PRESENT (H): Primary | ICD-10-CM

## 2024-06-14 DIAGNOSIS — Z95.2 S/P MITRAL VALVE REPLACEMENT: ICD-10-CM

## 2024-06-14 LAB — INR HOME MONITORING: 2.9 (ref 2–3)

## 2024-06-14 NOTE — PROGRESS NOTES
ANTICOAGULATION MANAGEMENT     Tej Morfin 59 year old male is on warfarin with therapeutic INR result. (Goal INR 2.0-3.0)    Recent labs: (last 7 days)     06/14/24  0000   INR 2.9       ASSESSMENT     Source(s): Chart Review and Patient/Caregiver Call     Warfarin doses taken: Warfarin taken as instructed  Diet: No new diet changes identified  Medication/supplement changes: None noted  New illness, injury, or hospitalization: No  Signs or symptoms of bleeding or clotting: No  Previous result: Therapeutic last 2(+) visits  Additional findings: None    New contact education completed     PLAN     Recommended plan for no diet, medication or health factor changes affecting INR     Dosing Instructions: Continue your current warfarin dose with next INR in 1 week       Summary  As of 6/14/2024      Full warfarin instructions:  5 mg every day   Next INR check:  6/21/2024               Telephone call with Tej who agrees to plan and repeated back plan correctly    Patient to recheck with home meter    Education provided:   Goal range and lab monitoring: goal range and significance of current result and Importance of following up at instructed interval  Contact [phone] with any changes, questions or concerns.     Plan made per [facility] anticoagulation protocol and per LVAD protocol    SAVANNA MO RN  Anticoagulation Clinic  6/14/2024    _______________________________________________________________________     Anticoagulation Episode Summary       Current INR goal:  2.0-3.0   TTR:  81.7% (1 y)   Target end date:  Indefinite   Send INR reminders to:  ANTICOAG LVAD    Indications    LVAD (left ventricular assist device) present (H) [Z95.811]  Chronic systolic CHF (congestive heart failure) (H) [I50.22]  S/P mitral valve replacement [Z95.2]  Paroxysmal ventricular tachycardia (H) [I47.29]             Comments:  Follow VAD Anticoag protocol:Yes: HeartMate 3  Bridging: per protocol  Date VAD placed: 03/23/22  INR  Goal: per referral  Hx Mitral valve repair and not replacement               Anticoagulation Care Providers       Provider Role Specialty Phone number    Dunia dHz MD Referring Cardiovascular Disease [phone]             - History of suicidality, denies any currently   - 1:1 constant observation per patient request; continue at least through this weekend  - Will need therapy while inpatient   - Child psychiatry following  - Child life consult ordered

## 2024-06-21 ENCOUNTER — ANTICOAGULATION THERAPY VISIT (OUTPATIENT)
Dept: ANTICOAGULATION | Facility: CLINIC | Age: 59
End: 2024-06-21
Payer: MEDICARE

## 2024-06-21 DIAGNOSIS — Z95.811 LVAD (LEFT VENTRICULAR ASSIST DEVICE) PRESENT (H): Primary | ICD-10-CM

## 2024-06-21 DIAGNOSIS — Z95.2 S/P MITRAL VALVE REPLACEMENT: ICD-10-CM

## 2024-06-21 DIAGNOSIS — I47.29 PAROXYSMAL VENTRICULAR TACHYCARDIA (H): ICD-10-CM

## 2024-06-21 DIAGNOSIS — I50.22 CHRONIC SYSTOLIC CHF (CONGESTIVE HEART FAILURE) (H): ICD-10-CM

## 2024-06-21 LAB — INR HOME MONITORING: 2.4 (ref 2–3)

## 2024-06-21 NOTE — PROGRESS NOTES
ANTICOAGULATION MANAGEMENT     Tej Morfin 59 year old male is on warfarin with therapeutic INR result. (Goal INR 2.0-3.0)    Recent labs: (last 7 days)     06/21/24  0000   INR 2.4       ASSESSMENT     Source(s): Chart Review and Patient/Caregiver Call     Warfarin doses taken: Warfarin taken as instructed  Diet: No new diet changes identified  Medication/supplement changes: None noted  New illness, injury, or hospitalization: No  Signs or symptoms of bleeding or clotting: No  Previous result: Therapeutic last 2(+) visits  Additional findings: None       PLAN     Recommended plan for no diet, medication or health factor changes affecting INR     Dosing Instructions: Continue your current warfarin dose with next INR in 1 week       Summary  As of 6/21/2024      Full warfarin instructions:  5 mg every day   Next INR check:  6/28/2024               Telephone call with Tej who verbalizes understanding and agrees to plan    Patient to recheck with home meter    Education provided:   Contact [phone] with any changes, questions or concerns.     Plan made per [facility] anticoagulation protocol and per LVAD protocol    Lizabeth Milton RN  Anticoagulation Clinic  6/21/2024    _______________________________________________________________________     Anticoagulation Episode Summary       Current INR goal:  2.0-3.0   TTR:  81.7% (1 y)   Target end date:  Indefinite   Send INR reminders to:  ANTICOAG LVAD    Indications    LVAD (left ventricular assist device) present (H) [Z95.811]  Chronic systolic CHF (congestive heart failure) (H) [I50.22]  S/P mitral valve replacement [Z95.2]  Paroxysmal ventricular tachycardia (H) [I47.29]             Comments:  Follow VAD Anticoag protocol:Yes: HeartMate 3  Bridging: per protocol  Date VAD placed: 03/23/22  INR Goal: per referral  Hx Mitral valve repair and not replacement               Anticoagulation Care Providers       Provider Role Specialty Phone number    Andreina  Dunia Hollis MD Referring Cardiovascular Disease [phone]

## 2024-06-26 ENCOUNTER — VIRTUAL VISIT (OUTPATIENT)
Dept: CARDIOLOGY | Facility: CLINIC | Age: 59
End: 2024-06-26
Attending: INTERNAL MEDICINE
Payer: MEDICARE

## 2024-06-26 DIAGNOSIS — Z01.810 ENCOUNTER FOR PREPROCEDURAL CARDIOVASCULAR EXAMINATION: ICD-10-CM

## 2024-06-26 DIAGNOSIS — Z76.82 AWAITING ORGAN TRANSPLANT: ICD-10-CM

## 2024-06-26 DIAGNOSIS — Z12.5 ENCOUNTER FOR SCREENING FOR MALIGNANT NEOPLASM OF PROSTATE: ICD-10-CM

## 2024-06-26 DIAGNOSIS — Z72.89 OTHER PROBLEMS RELATED TO LIFESTYLE: ICD-10-CM

## 2024-06-26 DIAGNOSIS — I50.22 CHRONIC SYSTOLIC CHF (CONGESTIVE HEART FAILURE) (H): ICD-10-CM

## 2024-06-26 DIAGNOSIS — R09.89 OTHER SPECIFIED SYMPTOMS AND SIGNS INVOLVING THE CIRCULATORY AND RESPIRATORY SYSTEMS: ICD-10-CM

## 2024-06-26 DIAGNOSIS — Z95.811 LVAD (LEFT VENTRICULAR ASSIST DEVICE) PRESENT (H): ICD-10-CM

## 2024-06-26 NOTE — PROGRESS NOTES
Patient Name: Tej Morfin  : 1965  Age: 59 year old  MRN: [id number]  Date of Initial Social Work Evaluation: 2019, and again on 3/23/2022 during his LVAD hospitalization.    Patient on transplant wait list status 4 with an LVAD. Spoke to today via telephone in order to update psychosocial assessment.      Presenting Information   Living Situation: The patient lives w/ his wife, Jessenia, and 18 yr old dtr, Salma, in a townhouse. Pt has 12 steps to his bedroom, but all needs could be met on first level.   If not local, plans for short term stay:  Pt and wife plan on staying with her sister in [place]. They did this after the LVAD implant and plan to do it again after transplant.  Functional Status: Independent with ADLs and IADLs. Able to drive. He reports going for walks, maintaining the household chores and able to cook. No functional limitations, however, he does talk about how inconvenient showers are now.  Cultural/Language/Spiritual Considerations: None mentioned    Support System  Primary Support Person Wife-Jessenia  Other support:  Patient's adult children, and his wife's adult children.  Plan for support in immediate post-transplant period: They will plan to stay in [place] again with his wife's Sister post-transplant with wife providing 24-hour caregiver support.    Health Care Directive  Decision Maker: Does not have one on file. He and his wife have talked about it. Writer will email him a copy of a blank HCD for completion  Alternate Decision Maker: Would want wife as his decision-maker  Health Care Directive: Provided education    Mental Health/Coping:   History of Mental Health: No history of mental health concerns, but endorses anxiety sometimes, for which he takes medication.  History of Chemical Health: History of THC use prior to LVAD. Reports abstinence since . Tox screens negative. Reports a beer once every 6 months. No issues with chemical health  Current status:  N/A  Coping: Good coping skills. Describes self as a positive person. Rates current quality of life as a 7/10. Wants to get back to going to the gym again for weekly workouts  Services Needed/Recommended: None identified    Financial   Income: SSDI, along with his wife's employment  Impact of transplant on income: Increased medication copays  Insurance and medication coverage: Medicare and BCBS supplement. Discussed immunosuppression medication coverage under Part B and high cost of transplant medications like Valcyte and how his part D plan will cover some.  Financial concerns: None identified  Resources needed: None identified    Education provided by SW: Social Work role within the transplant program, availability of support groups, parking information and relocation resources, and anticipated discharge planning process.    Assessment and recommendations and plan:  Patient reports being on the heart transplant waitlist as a status 4. Reports having good quality of life and being independent with his VAD.    He continues to have adequate caregiver support and adequate insurance and finances to proceed with transplant.     No issues with chemical or mental health and there are no documented or reported concerns with medical non-adherence.    Tej continues to be a good candidate for heart transplant from a psychosocial perspective.    Frailty score = 3    Leigh Perez, [place], [facility]  Heart Transplant/[facility]   ph. [phone]  Vocera/Teams

## 2024-06-26 NOTE — LETTER
2024      RE: Tej Morfin  [address]  [place] [address]       Dear Colleague,    Thank you for the opportunity to participate in the care of your patient, Tej Morfin, at the [facility] [place] at [facility]. Please see a copy of my visit note below.    Patient Name: Tej Morfin  : 1965  Age: 59 year old  MRN: [id number]  Date of Initial Social Work Evaluation: 2019, and again on 3/23/2022 during his LVAD hospitalization.    Patient on transplant wait list status 4 with an LVAD. Spoke to today via telephone in order to update psychosocial assessment.      Presenting Information   Living Situation: The patient lives w/ his wife, Jessenia, and 18 yr old dtr, Salma, in a townhouse. Pt has 12 steps to his bedroom, but all needs could be met on first level.   If not local, plans for short term stay:  Pt and wife plan on staying with her sister in [place]. They did this after the LVAD implant and plan to do it again after transplant.  Functional Status: Independent with ADLs and IADLs. Able to drive. He reports going for walks, maintaining the household chores and able to cook. No functional limitations, however, he does talk about how inconvenient showers are now.  Cultural/Language/Spiritual Considerations: None mentioned    Support System  Primary Support Person Wife-Jessenia  Other support:  Patient's adult children, and his wife's adult children.  Plan for support in immediate post-transplant period: They will plan to stay in [place] again with his wife's Sister post-transplant with wife providing 24-hour caregiver support.    Health Care Directive  Decision Maker: Does not have one on file. He and his wife have talked about it. Writer will email him a copy of a blank HCD for completion  Alternate Decision Maker: Would want wife as his decision-maker  Health Care Directive: Provided  education    Mental Health/Coping:   History of Mental Health: No history of mental health concerns, but endorses anxiety sometimes, for which he takes medication.  History of Chemical Health: History of THC use prior to LVAD. Reports abstinence since Feb of 2022. Tox screens negative. Reports a beer once every 6 months. No issues with chemical health  Current status: N/A  Coping: Good coping skills. Describes self as a positive person. Rates current quality of life as a 7/10. Wants to get back to going to the gym again for weekly workouts  Services Needed/Recommended: None identified    Financial   Income: SSDI, along with his wife's employment  Impact of transplant on income: Increased medication copays  Insurance and medication coverage: Medicare and BCBS supplement. Discussed immunosuppression medication coverage under Part B and high cost of transplant medications like Valcyte and how his part D plan will cover some.  Financial concerns: None identified  Resources needed: None identified    Education provided by SW: Social Work role within the transplant program, availability of support groups, parking information and relocation resources, and anticipated discharge planning process.    Assessment and recommendations and plan:  Patient reports being on the heart transplant waitlist as a status 4. Reports having good quality of life and being independent with his VAD.    He continues to have adequate caregiver support and adequate insurance and finances to proceed with transplant.     No issues with chemical or mental health and there are no documented or reported concerns with medical non-adherence.    Tej continues to be a good candidate for heart transplant from a psychosocial perspective.    Frailty score = 3    Leigh Perez, MSLIZ, [facility]  Heart Transplant/MCS   ph. [phone]  Vocera/Teams       Please do not hesitate to contact me if you have any questions/concerns.     Sincerely,     [place]  ZABRINA Martinez, LICSW

## 2024-06-28 ENCOUNTER — ANTICOAGULATION THERAPY VISIT (OUTPATIENT)
Dept: ANTICOAGULATION | Facility: CLINIC | Age: 59
End: 2024-06-28
Payer: MEDICARE

## 2024-06-28 DIAGNOSIS — I50.22 CHRONIC SYSTOLIC CHF (CONGESTIVE HEART FAILURE) (H): ICD-10-CM

## 2024-06-28 DIAGNOSIS — Z95.2 S/P MITRAL VALVE REPLACEMENT: ICD-10-CM

## 2024-06-28 DIAGNOSIS — I47.29 PAROXYSMAL VENTRICULAR TACHYCARDIA (H): ICD-10-CM

## 2024-06-28 DIAGNOSIS — Z95.811 LVAD (LEFT VENTRICULAR ASSIST DEVICE) PRESENT (H): Primary | ICD-10-CM

## 2024-06-28 LAB — INR HOME MONITORING: 2.4 (ref 2–3)

## 2024-06-28 NOTE — PROGRESS NOTES
ANTICOAGULATION MANAGEMENT     Tej Morfin 59 year old male is on warfarin with therapeutic INR result. (Goal INR 2.0-3.0)    Recent labs: (last 7 days)     06/28/24  0000   INR 2.4       ASSESSMENT     Source(s): Chart Review and Patient/Caregiver Call     Warfarin doses taken: Warfarin taken as instructed  Diet: No new diet changes identified  Medication/supplement changes: None noted  New illness, injury, or hospitalization: No  Signs or symptoms of bleeding or clotting: No  Previous result: Therapeutic last 2(+) visits  Additional findings: None       PLAN     Recommended plan for no diet, medication or health factor changes affecting INR     Dosing Instructions: Continue your current warfarin dose with next INR in 2 weeks       Summary  As of 6/28/2024      Full warfarin instructions:  5 mg every day   Next INR check:  7/12/2024               Telephone call with Tej who agrees to plan and repeated back plan correctly    Patient to recheck with home meter    Education provided: Goal range and lab monitoring: goal range and significance of current result and Importance of following up at instructed interval  Contact [phone] with any changes, questions or concerns.     Plan made per [facility] anticoagulation protocol and per LVAD protocol    SAVANNA MO RN  Anticoagulation Clinic  6/28/2024    _______________________________________________________________________     Anticoagulation Episode Summary       Current INR goal:  2.0-3.0   TTR:  81.7% (1 y)   Target end date:  Indefinite   Send INR reminders to:  ANTICOAG LVAD    Indications    LVAD (left ventricular assist device) present (H) [Z95.811]  Chronic systolic CHF (congestive heart failure) (H) [I50.22]  S/P mitral valve replacement [Z95.2]  Paroxysmal ventricular tachycardia (H) [I47.29]             Comments:  Follow VAD Anticoag protocol:Yes: HeartMate 3  Bridging: per protocol  Date VAD placed: 03/23/22  INR Goal: per referral  Hx Mitral valve  repair and not replacement               Anticoagulation Care Providers       Provider Role Specialty Phone number    Dunia Hdz MD Referring Cardiovascular Disease [phone]

## 2024-06-29 ENCOUNTER — ANCILLARY PROCEDURE (OUTPATIENT)
Dept: CARDIOLOGY | Facility: CLINIC | Age: 59
End: 2024-06-29
Attending: INTERNAL MEDICINE
Payer: MEDICARE

## 2024-06-29 DIAGNOSIS — I48.0 PAROXYSMAL ATRIAL FIBRILLATION (H): ICD-10-CM

## 2024-06-29 PROCEDURE — 93295 DEV INTERROG REMOTE 1/2/MLT: CPT | Performed by: INTERNAL MEDICINE

## 2024-06-29 PROCEDURE — 93296 REM INTERROG EVL PM/IDS: CPT

## 2024-07-05 ENCOUNTER — TELEPHONE (OUTPATIENT)
Dept: TRANSPLANT | Facility: CLINIC | Age: 59
End: 2024-07-05
Payer: MEDICARE

## 2024-07-05 VITALS — WEIGHT: 237 LBS | BODY MASS INDEX: 34.01 KG/M2

## 2024-07-05 DIAGNOSIS — I50.22 CHRONIC SYSTOLIC CHF (CONGESTIVE HEART FAILURE) (H): ICD-10-CM

## 2024-07-05 DIAGNOSIS — Z95.811 LVAD (LEFT VENTRICULAR ASSIST DEVICE) PRESENT (H): ICD-10-CM

## 2024-07-05 RX ORDER — WARFARIN SODIUM 2.5 MG/1
TABLET ORAL
Qty: 180 TABLET | Refills: 1 | Status: SHIPPED | OUTPATIENT
Start: 2024-07-05

## 2024-07-05 NOTE — TELEPHONE ENCOUNTER
ANTICOAGULATION MANAGEMENT:  Medication Refill    Anticoagulation Summary  As of 6/28/2024      Warfarin maintenance plan:  5 mg (2.5 mg x 2) every day   Next INR check:  7/12/2024   Target end date:  Indefinite    Indications    LVAD (left ventricular assist device) present (H) [Z95.811]  Chronic systolic CHF (congestive heart failure) (H) [I50.22]  S/P mitral valve replacement [Z95.2]  Paroxysmal ventricular tachycardia (H) [I47.29]                 Anticoagulation Care Providers       Provider Role Specialty Phone number    Dunia Hdz MD Referring Cardiovascular Disease [phone]            Refill Criteria    Visit with referring provider/group: Meets criteria: office visit within referring provider group in the last 1 year on 5/2/24    [facility] referral last signed: 02/27/2024; within last year: Yes    Lab monitoring not exceeding 2 weeks overdue: Yes    Tej meets all criteria for refill. Rx instructions and quantity supplied updated to match patient's current dosing plan. Warfarin 90 day supply with 1 refill granted per [facility] protocol     Rosario Dyer RN  Anticoagulation Clinic

## 2024-07-10 ENCOUNTER — MYC MEDICAL ADVICE (OUTPATIENT)
Dept: CARDIOLOGY | Facility: CLINIC | Age: 59
End: 2024-07-10
Payer: MEDICARE

## 2024-07-11 NOTE — TELEPHONE ENCOUNTER
Called Dr. Neri's office at [facility]. Informed them of the situation and they will consult their nurse practitioner tomorrow to see if a culture can be collected as Dr. Neri is currently out of [place]. Left direct line. Writer will plan to call back tomorrow morning by 09:00.

## 2024-07-11 NOTE — TELEPHONE ENCOUNTER
Patient called back. Noticed increase in drainage about a week ago. Went from weekly dressing to daily. Leaking outside of bandage. Has been taking his cefadroxil twice daily. Sore over wound and between chest and stomach. Went to PCP in [place] late June due to soreness on right side and got CXR, but now concerned about drainage.     Patient has gone to PCP at [place] in the past for wound culturing, writer will call their clinic and attempt to connect with care team to determine if it would be possible for patient to come in for drainage culture. If not, patient says he can go to the hospital. Will route to LVAD providers in the meantime for guidance.

## 2024-07-12 ENCOUNTER — ANTICOAGULATION THERAPY VISIT (OUTPATIENT)
Dept: ANTICOAGULATION | Facility: CLINIC | Age: 59
End: 2024-07-12
Payer: MEDICARE

## 2024-07-12 DIAGNOSIS — I47.29 PAROXYSMAL VENTRICULAR TACHYCARDIA (H): ICD-10-CM

## 2024-07-12 DIAGNOSIS — Z95.811 LVAD (LEFT VENTRICULAR ASSIST DEVICE) PRESENT (H): Primary | ICD-10-CM

## 2024-07-12 DIAGNOSIS — I50.22 CHRONIC SYSTOLIC CHF (CONGESTIVE HEART FAILURE) (H): ICD-10-CM

## 2024-07-12 DIAGNOSIS — Z95.2 S/P MITRAL VALVE REPLACEMENT: ICD-10-CM

## 2024-07-12 LAB — INR HOME MONITORING: 2.1 (ref 2–3)

## 2024-07-12 NOTE — PROGRESS NOTES
ANTICOAGULATION MANAGEMENT     Tej SWENSON Shelbie 59 year old male is on warfarin with therapeutic INR result. (Goal INR 2.0-3.0)    Recent labs: (last 7 days)     07/12/24  0000   INR 2.1       ASSESSMENT     Source(s): Chart Review and Patient/Caregiver Call     Warfarin doses taken: Warfarin taken as instructed  Diet: No new diet changes identified  Medication/supplement changes: None noted  New illness, injury, or hospitalization: No  Signs or symptoms of bleeding or clotting: No  Previous result: Therapeutic last 2(+) visits  Additional findings: None       PLAN     Recommended plan for no diet, medication or health factor changes affecting INR     Dosing Instructions: Continue your current warfarin dose with next INR in 2 weeks       Summary  As of 7/12/2024      Full warfarin instructions:  5 mg every day   Next INR check:  7/26/2024               Telephone call with Tej who verbalizes understanding and agrees to plan and who agrees to plan and repeated back plan correctly    Patient to recheck with home meter    Education provided: Please call back if any changes to your diet, medications or how you've been taking warfarin    Plan made per [facility] anticoagulation protocol and per LVAD protocol    Cecily Tovar RN  Anticoagulation Clinic  7/12/2024    _______________________________________________________________________     Anticoagulation Episode Summary       Current INR goal:  2.0-3.0   TTR:  81.7% (1 y)   Target end date:  Indefinite   Send INR reminders to:  ANTICOAG LVAD    Indications    LVAD (left ventricular assist device) present (H) [Z95.811]  Chronic systolic CHF (congestive heart failure) (H) [I50.22]  S/P mitral valve replacement [Z95.2]  Paroxysmal ventricular tachycardia (H) [I47.29]             Comments:  Follow VAD Anticoag protocol:Yes: HeartMate 3  Bridging: per protocol  Date VAD placed: 03/23/22  INR Goal: per referral  Hx Mitral valve repair and not replacement                Anticoagulation Care Providers       Provider Role Specialty Phone number    Dunia Hdz MD Referring Cardiovascular Disease [phone]

## 2024-07-12 NOTE — TELEPHONE ENCOUNTER
Called [facility] to check in and see if they are able to collect driveline cultures and blood cultures from Tej today. Their nurse Feli is going to call writer back after sending a message to NP.

## 2024-07-13 ENCOUNTER — HEALTH MAINTENANCE LETTER (OUTPATIENT)
Age: 59
End: 2024-07-13

## 2024-07-13 ENCOUNTER — ANCILLARY PROCEDURE (OUTPATIENT)
Dept: CARDIOLOGY | Facility: CLINIC | Age: 59
End: 2024-07-13
Attending: INTERNAL MEDICINE
Payer: MEDICARE

## 2024-07-13 ENCOUNTER — CARE COORDINATION (OUTPATIENT)
Dept: CARDIOLOGY | Facility: CLINIC | Age: 59
End: 2024-07-13
Payer: MEDICARE

## 2024-07-13 DIAGNOSIS — I48.0 PAROXYSMAL ATRIAL FIBRILLATION (H): ICD-10-CM

## 2024-07-13 PROCEDURE — 99207 CARDIAC DEVICE CHECK - REMOTE: CPT | Performed by: INTERNAL MEDICINE

## 2024-07-13 NOTE — PROGRESS NOTES
Pt paged VAD Coordinator on call to report he was shocked by his ICD last night. Pt feels fine now.   Instructed pt to page the device nurse on call for further instructions. Pt verbalized understanding.

## 2024-07-13 NOTE — PROGRESS NOTES
S-(situation): Patient paged reporting ICD shock last night. Called to discuss findings with patient.     B-(background): Patient reports not feeling well leading up to shock- feeling sluggish. Denies symptoms currently and endorses feeling better than before the shock.     A-(assessment): Transmission shows AF with RVR at 122-197 bpm then goes into possible dual tachycardia with ventricular rates at regular R-R intervals in VF zone of 230-244. ATP burst delivered without success followed by x1 31 j shock with successful conversion to SR  bpm and frequent ectopy.     R-(recommendations): Instructed patient that if symptoms occur or return or he receives another shock to seek emergency care. As long as he continues to feel ok and does not get another shock, we can continue to monitor from home. Patient is amenable to this plan.

## 2024-07-15 ENCOUNTER — TELEPHONE (OUTPATIENT)
Dept: ANTICOAGULATION | Facility: CLINIC | Age: 59
End: 2024-07-15
Payer: MEDICARE

## 2024-07-15 ENCOUNTER — CARE COORDINATION (OUTPATIENT)
Dept: CARDIOLOGY | Facility: CLINIC | Age: 59
End: 2024-07-15
Payer: MEDICARE

## 2024-07-15 DIAGNOSIS — Z95.811 LVAD (LEFT VENTRICULAR ASSIST DEVICE) PRESENT (H): Primary | ICD-10-CM

## 2024-07-15 DIAGNOSIS — I50.22 CHRONIC SYSTOLIC CHF (CONGESTIVE HEART FAILURE) (H): ICD-10-CM

## 2024-07-15 DIAGNOSIS — Z95.2 S/P MITRAL VALVE REPLACEMENT: ICD-10-CM

## 2024-07-15 DIAGNOSIS — I47.29 PAROXYSMAL VENTRICULAR TACHYCARDIA (H): ICD-10-CM

## 2024-07-15 NOTE — PROGRESS NOTES
D:  Called pt to follow up to recent shock.  Pt reporting no further shocks, stable VAD parameters an no other changes.  Pt informed me he rcvd a phone call from [facility], requesting he increase his Cefdroxil to 1000mg BID for 10 days, based on recent DLES cultures.  I:  Encouraged pt to re-page oncall device RN and our team if he is shocked again or has adverse symptoms r/t arrhythmias.  Message sent to [facility] informing them of pt's suggested change of abx.  Messaged scheduling to arrange for September appt in [place] with Dr. Hdz.  Pt agreeable to plan.  A:  Follow up  P:  Pt verbalized understanding of the instructions given.  Will call VAD coordinator with further needs and questions.

## 2024-07-15 NOTE — TELEPHONE ENCOUNTER
"ANTICOAGULATION MANAGEMENT     Tej Morfin 59 year old male is on warfarin.  Spoke to patient, he had a \"shock\" from his ICD.    Recent labs: (last 7 days)     07/12/24  0000   INR 2.1       ASSESSMENT     Source(s): Chart Review and Patient/Caregiver Call     Warfarin doses taken: Warfarin taken as instructed  Diet: No new diet changes identified  Medication/supplement changes:  Cefadroxil dose increased on 7/15/24 No interaction anticipated  New illness, injury, or hospitalization: Yes: Recent \"shock\" from internal defibrillator on 7/13/24  Signs or symptoms of bleeding or clotting: No  Previous result: Therapeutic last 2(+) visits  Additional findings:  Medication changes, and ICD shock.  Writer requested patient test on Home monitor earlier than recommended.  Updated calendar.       PLAN     No change to Warfarin dosing.    Patient will test an INR on Wednesday or Thursday on his home monitor.  Medication changes and recent shock.      Siddharth Greco RN      "

## 2024-07-16 DIAGNOSIS — T82.7XXA INFECTION ASSOCIATED WITH DRIVELINE OF LEFT VENTRICULAR ASSIST DEVICE (LVAD) (H): Primary | ICD-10-CM

## 2024-07-16 NOTE — TELEPHONE ENCOUNTER
Returned call to Naomie at [facility]. Naomie reports the imaging department recommends patient receive imaging with contrast. Informed Naomie that Dr. Layton provided the rationale that patient's creatinine levels are not the best so would prefer CT without contrast. Naomie states they placed the CT order as STAT. She will relay Dr. Layton's rationale re: contrast dye to the imaging department.

## 2024-07-16 NOTE — TELEPHONE ENCOUNTER
Called [facility] and spoke with Naomie. Asked if blood cultures had been obtained as well as drive line culture; she said that they hadn't. Writer documented that blood cultures were requested on 7/12. Naomie will facilitate scheduling patient for blood cultures. Informed Naomie that Dr. Layton intends to follow up with patient next week, and would like him to be on cefadroxil indefinitely. No questions.
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
## 2024-07-16 NOTE — TELEPHONE ENCOUNTER
Received VM from Naomie at [facility] requesting call back from writer.

## 2024-07-16 NOTE — TELEPHONE ENCOUNTER
Received call from Naomie from Dr. Neri's office. She reports culture results shows staph infection. Provided ID fax number and team will fax results today. Informed Naomie that Dr. Layton requests a CT abdomen/pelvis to look for deeper infection due to recent increase in ICD shocks. Naomie will send a message to the covering provider to make them aware. Writer will wait to hear back from Dr. Layton regarding CT order (clarifying if it needs contrast) before sending order to [facility].

## 2024-07-16 NOTE — TELEPHONE ENCOUNTER
Called [facility] and inquired about Tej's culture results. Team will give us a call back at some point today on writer's direct line.

## 2024-07-16 NOTE — TELEPHONE ENCOUNTER
Received call from Naomie at [facility]. She states they will facilitate scheduling patient's CT if we fax over the order. Confirmed that she did fax over patient's culture results. Will await response from provider re: CT order with or without contrast. Secure chat sent to provider at 10:18am.     [facility] fax: [phone]

## 2024-07-16 NOTE — TELEPHONE ENCOUNTER
[phone] Called clinic and informed them order has been sent.

## 2024-07-17 ENCOUNTER — ANTICOAGULATION THERAPY VISIT (OUTPATIENT)
Dept: ANTICOAGULATION | Facility: CLINIC | Age: 59
End: 2024-07-17
Payer: MEDICARE

## 2024-07-17 DIAGNOSIS — I50.22 CHRONIC SYSTOLIC CHF (CONGESTIVE HEART FAILURE) (H): ICD-10-CM

## 2024-07-17 DIAGNOSIS — I47.29 PAROXYSMAL VENTRICULAR TACHYCARDIA (H): ICD-10-CM

## 2024-07-17 DIAGNOSIS — Z95.2 S/P MITRAL VALVE REPLACEMENT: ICD-10-CM

## 2024-07-17 DIAGNOSIS — Z95.811 LVAD (LEFT VENTRICULAR ASSIST DEVICE) PRESENT (H): Primary | ICD-10-CM

## 2024-07-17 LAB
INR HOME MONITORING: 2.1 (ref 2–3)
MDC_IDC_EPISODE_DTM: NORMAL
MDC_IDC_EPISODE_DURATION: 1 S
MDC_IDC_EPISODE_DURATION: 1127 S
MDC_IDC_EPISODE_DURATION: 13 S
MDC_IDC_EPISODE_DURATION: 13 S
MDC_IDC_EPISODE_DURATION: 14 S
MDC_IDC_EPISODE_DURATION: 15 S
MDC_IDC_EPISODE_DURATION: 16 S
MDC_IDC_EPISODE_DURATION: 16 S
MDC_IDC_EPISODE_DURATION: 1705 S
MDC_IDC_EPISODE_DURATION: 21 S
MDC_IDC_EPISODE_DURATION: 2646 S
MDC_IDC_EPISODE_DURATION: 28 S
MDC_IDC_EPISODE_DURATION: 32 S
MDC_IDC_EPISODE_DURATION: 36 S
MDC_IDC_EPISODE_DURATION: 58 S
MDC_IDC_EPISODE_DURATION: 72 S
MDC_IDC_EPISODE_DURATION: 76 S
MDC_IDC_EPISODE_DURATION: 78 S
MDC_IDC_EPISODE_DURATION: 8 S
MDC_IDC_EPISODE_DURATION: 84 S
MDC_IDC_EPISODE_DURATION: NORMAL S
MDC_IDC_EPISODE_DURATION: NORMAL S
MDC_IDC_EPISODE_ID: NORMAL
MDC_IDC_EPISODE_TYPE: NORMAL
MDC_IDC_EPISODE_TYPE_INDUCED: NO
MDC_IDC_EPISODE_VENDOR_TYPE: NORMAL
MDC_IDC_EPISODE_VENDOR_TYPE: NORMAL
MDC_IDC_LEAD_CONNECTION_STATUS: NORMAL
MDC_IDC_LEAD_IMPLANT_DT: NORMAL
MDC_IDC_LEAD_LOCATION: NORMAL
MDC_IDC_LEAD_LOCATION_DETAIL_1: NORMAL
MDC_IDC_LEAD_MFG: NORMAL
MDC_IDC_LEAD_MODEL: NORMAL
MDC_IDC_LEAD_POLARITY_TYPE: NORMAL
MDC_IDC_LEAD_SERIAL: NORMAL
MDC_IDC_MSMT_BATTERY_DTM: NORMAL
MDC_IDC_MSMT_BATTERY_REMAINING_LONGEVITY: 18 MO
MDC_IDC_MSMT_BATTERY_REMAINING_PERCENTAGE: 31 %
MDC_IDC_MSMT_BATTERY_STATUS: NORMAL
MDC_IDC_MSMT_CAP_CHARGE_DTM: NORMAL
MDC_IDC_MSMT_CAP_CHARGE_DTM: NORMAL
MDC_IDC_MSMT_CAP_CHARGE_ENERGY: 31 J
MDC_IDC_MSMT_CAP_CHARGE_TIME: 13.9 S
MDC_IDC_MSMT_CAP_CHARGE_TIME: 8 S
MDC_IDC_MSMT_CAP_CHARGE_TYPE: NORMAL
MDC_IDC_MSMT_CAP_CHARGE_TYPE: NORMAL
MDC_IDC_MSMT_LEADCHNL_LV_IMPEDANCE_VALUE: 981 OHM
MDC_IDC_MSMT_LEADCHNL_LV_LEAD_CHANNEL_STATUS: NORMAL
MDC_IDC_MSMT_LEADCHNL_LV_PACING_THRESHOLD_AMPLITUDE: 0.7 V
MDC_IDC_MSMT_LEADCHNL_LV_PACING_THRESHOLD_PULSEWIDTH: 0.1 MS
MDC_IDC_MSMT_LEADCHNL_RA_IMPEDANCE_VALUE: 496 OHM
MDC_IDC_MSMT_LEADCHNL_RA_PACING_THRESHOLD_AMPLITUDE: 0.5 V
MDC_IDC_MSMT_LEADCHNL_RA_PACING_THRESHOLD_PULSEWIDTH: 0.5 MS
MDC_IDC_MSMT_LEADCHNL_RV_IMPEDANCE_VALUE: 435 OHM
MDC_IDC_MSMT_LEADCHNL_RV_PACING_THRESHOLD_AMPLITUDE: 1.1 V
MDC_IDC_MSMT_LEADCHNL_RV_PACING_THRESHOLD_PULSEWIDTH: 0.5 MS
MDC_IDC_PG_IMPLANT_DTM: NORMAL
MDC_IDC_PG_MFG: NORMAL
MDC_IDC_PG_MODEL: NORMAL
MDC_IDC_PG_SERIAL: NORMAL
MDC_IDC_PG_TYPE: NORMAL
MDC_IDC_SESS_CLINIC_NAME: NORMAL
MDC_IDC_SESS_DTM: NORMAL
MDC_IDC_SESS_TYPE: NORMAL
MDC_IDC_SET_BRADY_AT_MODE_SWITCH_MODE: NORMAL
MDC_IDC_SET_BRADY_AT_MODE_SWITCH_RATE: 170 {BEATS}/MIN
MDC_IDC_SET_BRADY_LOWRATE: 80 {BEATS}/MIN
MDC_IDC_SET_BRADY_MAX_SENSOR_RATE: 120 {BEATS}/MIN
MDC_IDC_SET_BRADY_MAX_TRACKING_RATE: 130 {BEATS}/MIN
MDC_IDC_SET_BRADY_MODE: NORMAL
MDC_IDC_SET_BRADY_PAV_DELAY_HIGH: 280 MS
MDC_IDC_SET_BRADY_PAV_DELAY_LOW: 300 MS
MDC_IDC_SET_BRADY_SAV_DELAY_HIGH: 280 MS
MDC_IDC_SET_BRADY_SAV_DELAY_LOW: 300 MS
MDC_IDC_SET_CRT_PACED_CHAMBERS: NORMAL
MDC_IDC_SET_LEADCHNL_LV_PACING_AMPLITUDE: 0.1 V
MDC_IDC_SET_LEADCHNL_LV_PACING_PULSEWIDTH: 0.1 MS
MDC_IDC_SET_LEADCHNL_LV_SENSING_ADAPTATION_MODE: NORMAL
MDC_IDC_SET_LEADCHNL_LV_SENSING_ANODE_ELECTRODE_1: NORMAL
MDC_IDC_SET_LEADCHNL_LV_SENSING_ANODE_LOCATION_1: NORMAL
MDC_IDC_SET_LEADCHNL_LV_SENSING_CATHODE_ELECTRODE_1: NORMAL
MDC_IDC_SET_LEADCHNL_LV_SENSING_CATHODE_LOCATION_1: NORMAL
MDC_IDC_SET_LEADCHNL_LV_SENSING_SENSITIVITY: 1 MV
MDC_IDC_SET_LEADCHNL_RA_PACING_AMPLITUDE: 1.5 V
MDC_IDC_SET_LEADCHNL_RA_PACING_POLARITY: NORMAL
MDC_IDC_SET_LEADCHNL_RA_PACING_PULSEWIDTH: 0.5 MS
MDC_IDC_SET_LEADCHNL_RA_SENSING_ADAPTATION_MODE: NORMAL
MDC_IDC_SET_LEADCHNL_RA_SENSING_POLARITY: NORMAL
MDC_IDC_SET_LEADCHNL_RA_SENSING_SENSITIVITY: 0.25 MV
MDC_IDC_SET_LEADCHNL_RV_PACING_AMPLITUDE: 2.8 V
MDC_IDC_SET_LEADCHNL_RV_PACING_POLARITY: NORMAL
MDC_IDC_SET_LEADCHNL_RV_PACING_PULSEWIDTH: 0.5 MS
MDC_IDC_SET_LEADCHNL_RV_SENSING_ADAPTATION_MODE: NORMAL
MDC_IDC_SET_LEADCHNL_RV_SENSING_POLARITY: NORMAL
MDC_IDC_SET_LEADCHNL_RV_SENSING_SENSITIVITY: 0.6 MV
MDC_IDC_SET_ZONE_DETECTION_INTERVAL: 273 MS
MDC_IDC_SET_ZONE_DETECTION_INTERVAL: 375 MS
MDC_IDC_SET_ZONE_STATUS: NORMAL
MDC_IDC_SET_ZONE_STATUS: NORMAL
MDC_IDC_SET_ZONE_TYPE: NORMAL
MDC_IDC_SET_ZONE_TYPE: NORMAL
MDC_IDC_SET_ZONE_VENDOR_TYPE: NORMAL
MDC_IDC_SET_ZONE_VENDOR_TYPE: NORMAL
MDC_IDC_STAT_AT_BURDEN_PERCENT: 22 %
MDC_IDC_STAT_AT_DTM_END: NORMAL
MDC_IDC_STAT_AT_DTM_START: NORMAL
MDC_IDC_STAT_BRADY_DTM_END: NORMAL
MDC_IDC_STAT_BRADY_DTM_START: NORMAL
MDC_IDC_STAT_BRADY_RA_PERCENT_PACED: 48 %
MDC_IDC_STAT_BRADY_RV_PERCENT_PACED: 4 %
MDC_IDC_STAT_CRT_DTM_END: NORMAL
MDC_IDC_STAT_CRT_DTM_START: NORMAL
MDC_IDC_STAT_CRT_LV_PERCENT_PACED: 0 %
MDC_IDC_STAT_EPISODE_RECENT_COUNT: 0
MDC_IDC_STAT_EPISODE_RECENT_COUNT: 0
MDC_IDC_STAT_EPISODE_RECENT_COUNT: 1
MDC_IDC_STAT_EPISODE_RECENT_COUNT: 37
MDC_IDC_STAT_EPISODE_RECENT_COUNT_DTM_END: NORMAL
MDC_IDC_STAT_EPISODE_RECENT_COUNT_DTM_START: NORMAL
MDC_IDC_STAT_EPISODE_TYPE: NORMAL
MDC_IDC_STAT_EPISODE_VENDOR_TYPE: NORMAL
MDC_IDC_STAT_TACHYTHERAPY_ATP_DELIVERED_RECENT: 1
MDC_IDC_STAT_TACHYTHERAPY_ATP_DELIVERED_TOTAL: 5
MDC_IDC_STAT_TACHYTHERAPY_RECENT_DTM_END: NORMAL
MDC_IDC_STAT_TACHYTHERAPY_RECENT_DTM_START: NORMAL
MDC_IDC_STAT_TACHYTHERAPY_SHOCKS_ABORTED_RECENT: 0
MDC_IDC_STAT_TACHYTHERAPY_SHOCKS_ABORTED_TOTAL: 1
MDC_IDC_STAT_TACHYTHERAPY_SHOCKS_DELIVERED_RECENT: 1
MDC_IDC_STAT_TACHYTHERAPY_SHOCKS_DELIVERED_TOTAL: 5
MDC_IDC_STAT_TACHYTHERAPY_TOTAL_DTM_END: NORMAL
MDC_IDC_STAT_TACHYTHERAPY_TOTAL_DTM_START: NORMAL

## 2024-07-17 NOTE — PROGRESS NOTES
ANTICOAGULATION MANAGEMENT     Tej Morfin 59 year old male is on warfarin with therapeutic INR result. (Goal INR 2.0-3.0)    Recent labs: (last 7 days)     07/17/24  0000   INR 2.1       ASSESSMENT     Source(s): Chart Review and Patient/Caregiver Call     Warfarin doses taken: Warfarin taken as instructed  Diet: No new diet changes identified  Medication/supplement changes:  Cefadroxil increased 7/15/24 - this medication can increase INR/bleeding risk - per chart review Dr. Layton wants patient on this indefinitely.  New illness, injury, or hospitalization: No  Signs or symptoms of bleeding or clotting: No  Previous result: Therapeutic last 2(+) visits  Additional findings:  Had ICD shock 7/13 - reported to VAD CC  and that he felt better after - transmission had shown successful conversion to SR per chart review. Today he denied further shocks and denied symptoms.       PLAN     Recommended plan for ongoing change(s) affecting INR     Dosing Instructions: Continue your current warfarin dose with next INR in 5-6 days       Summary  As of 7/17/2024      Full warfarin instructions:  5 mg every day   Next INR check:  7/23/2024               Telephone call with Tej who verbalizes understanding and agrees to plan    Patient to recheck with home meter    Education provided: Goal range and lab monitoring: goal range and significance of current result, Importance of therapeutic range, and Importance of following up at instructed interval  Interaction IS anticipated between warfarin and Cefadroxil  Symptom monitoring: monitoring for bleeding signs and symptoms  Contact [phone] with any changes, questions or concerns.     Plan made per [facility] anticoagulation protocol and per LVAD protocol    Emma Mata RN  Anticoagulation Clinic  7/17/2024    _______________________________________________________________________     Anticoagulation Episode Summary       Current INR goal:  2.0-3.0   TTR:  81.7% (1 y)    Target end date:  Indefinite   Send INR reminders to:  ANTICOAG LVAD    Indications    LVAD (left ventricular assist device) present (H) [Z95.811]  Chronic systolic CHF (congestive heart failure) (H) [I50.22]  S/P mitral valve replacement [Z95.2]  Paroxysmal ventricular tachycardia (H) [I47.29]             Comments:  Follow VAD Anticoag protocol:Yes: HeartMate 3  Bridging: per protocol  Date VAD placed: 03/23/22  INR Goal: per referral  Hx Mitral valve repair and not replacement               Anticoagulation Care Providers       Provider Role Specialty Phone number    Dunia Hdz MD Referring Cardiovascular Disease [phone]

## 2024-07-18 LAB
MDC_IDC_EPISODE_DTM: NORMAL
MDC_IDC_EPISODE_DURATION: 12 S
MDC_IDC_EPISODE_DURATION: 13 S
MDC_IDC_EPISODE_DURATION: 14 S
MDC_IDC_EPISODE_DURATION: 16 S
MDC_IDC_EPISODE_DURATION: 17 S
MDC_IDC_EPISODE_DURATION: 23 S
MDC_IDC_EPISODE_DURATION: 9132 S
MDC_IDC_EPISODE_DURATION: NORMAL S
MDC_IDC_EPISODE_ID: NORMAL
MDC_IDC_EPISODE_TYPE: NORMAL
MDC_IDC_EPISODE_TYPE_INDUCED: NO
MDC_IDC_LEAD_CONNECTION_STATUS: NORMAL
MDC_IDC_LEAD_IMPLANT_DT: NORMAL
MDC_IDC_LEAD_LOCATION: NORMAL
MDC_IDC_LEAD_LOCATION_DETAIL_1: NORMAL
MDC_IDC_LEAD_MFG: NORMAL
MDC_IDC_LEAD_MODEL: NORMAL
MDC_IDC_LEAD_POLARITY_TYPE: NORMAL
MDC_IDC_LEAD_SERIAL: NORMAL
MDC_IDC_MSMT_BATTERY_DTM: NORMAL
MDC_IDC_MSMT_BATTERY_REMAINING_LONGEVITY: 24 MO
MDC_IDC_MSMT_BATTERY_REMAINING_PERCENTAGE: 36 %
MDC_IDC_MSMT_BATTERY_STATUS: NORMAL
MDC_IDC_MSMT_CAP_CHARGE_DTM: NORMAL
MDC_IDC_MSMT_CAP_CHARGE_DTM: NORMAL
MDC_IDC_MSMT_CAP_CHARGE_ENERGY: 31 J
MDC_IDC_MSMT_CAP_CHARGE_TIME: 13.9 S
MDC_IDC_MSMT_CAP_CHARGE_TIME: 7.8 S
MDC_IDC_MSMT_CAP_CHARGE_TYPE: NORMAL
MDC_IDC_MSMT_CAP_CHARGE_TYPE: NORMAL
MDC_IDC_MSMT_LEADCHNL_LV_IMPEDANCE_VALUE: 993 OHM
MDC_IDC_MSMT_LEADCHNL_LV_LEAD_CHANNEL_STATUS: NORMAL
MDC_IDC_MSMT_LEADCHNL_LV_PACING_THRESHOLD_AMPLITUDE: 0.7 V
MDC_IDC_MSMT_LEADCHNL_LV_PACING_THRESHOLD_PULSEWIDTH: 0.1 MS
MDC_IDC_MSMT_LEADCHNL_RA_IMPEDANCE_VALUE: 511 OHM
MDC_IDC_MSMT_LEADCHNL_RA_PACING_THRESHOLD_AMPLITUDE: 0.5 V
MDC_IDC_MSMT_LEADCHNL_RA_PACING_THRESHOLD_PULSEWIDTH: 0.5 MS
MDC_IDC_MSMT_LEADCHNL_RV_IMPEDANCE_VALUE: 445 OHM
MDC_IDC_MSMT_LEADCHNL_RV_PACING_THRESHOLD_AMPLITUDE: 1.1 V
MDC_IDC_MSMT_LEADCHNL_RV_PACING_THRESHOLD_PULSEWIDTH: 0.5 MS
MDC_IDC_PG_IMPLANT_DTM: NORMAL
MDC_IDC_PG_MFG: NORMAL
MDC_IDC_PG_MODEL: NORMAL
MDC_IDC_PG_SERIAL: NORMAL
MDC_IDC_PG_TYPE: NORMAL
MDC_IDC_SESS_CLINIC_NAME: NORMAL
MDC_IDC_SESS_DTM: NORMAL
MDC_IDC_SESS_TYPE: NORMAL
MDC_IDC_SET_BRADY_AT_MODE_SWITCH_MODE: NORMAL
MDC_IDC_SET_BRADY_AT_MODE_SWITCH_RATE: 170 {BEATS}/MIN
MDC_IDC_SET_BRADY_LOWRATE: 80 {BEATS}/MIN
MDC_IDC_SET_BRADY_MAX_SENSOR_RATE: 120 {BEATS}/MIN
MDC_IDC_SET_BRADY_MAX_TRACKING_RATE: 130 {BEATS}/MIN
MDC_IDC_SET_BRADY_MODE: NORMAL
MDC_IDC_SET_BRADY_PAV_DELAY_HIGH: 280 MS
MDC_IDC_SET_BRADY_PAV_DELAY_LOW: 300 MS
MDC_IDC_SET_BRADY_SAV_DELAY_HIGH: 280 MS
MDC_IDC_SET_BRADY_SAV_DELAY_LOW: 300 MS
MDC_IDC_SET_CRT_PACED_CHAMBERS: NORMAL
MDC_IDC_SET_LEADCHNL_LV_PACING_AMPLITUDE: 0.1 V
MDC_IDC_SET_LEADCHNL_LV_PACING_PULSEWIDTH: 0.1 MS
MDC_IDC_SET_LEADCHNL_LV_SENSING_ADAPTATION_MODE: NORMAL
MDC_IDC_SET_LEADCHNL_LV_SENSING_ANODE_ELECTRODE_1: NORMAL
MDC_IDC_SET_LEADCHNL_LV_SENSING_ANODE_LOCATION_1: NORMAL
MDC_IDC_SET_LEADCHNL_LV_SENSING_CATHODE_ELECTRODE_1: NORMAL
MDC_IDC_SET_LEADCHNL_LV_SENSING_CATHODE_LOCATION_1: NORMAL
MDC_IDC_SET_LEADCHNL_LV_SENSING_SENSITIVITY: 1 MV
MDC_IDC_SET_LEADCHNL_RA_PACING_AMPLITUDE: 1.5 V
MDC_IDC_SET_LEADCHNL_RA_PACING_POLARITY: NORMAL
MDC_IDC_SET_LEADCHNL_RA_PACING_PULSEWIDTH: 0.5 MS
MDC_IDC_SET_LEADCHNL_RA_SENSING_ADAPTATION_MODE: NORMAL
MDC_IDC_SET_LEADCHNL_RA_SENSING_POLARITY: NORMAL
MDC_IDC_SET_LEADCHNL_RA_SENSING_SENSITIVITY: 0.25 MV
MDC_IDC_SET_LEADCHNL_RV_PACING_AMPLITUDE: 2.8 V
MDC_IDC_SET_LEADCHNL_RV_PACING_POLARITY: NORMAL
MDC_IDC_SET_LEADCHNL_RV_PACING_PULSEWIDTH: 0.5 MS
MDC_IDC_SET_LEADCHNL_RV_SENSING_ADAPTATION_MODE: NORMAL
MDC_IDC_SET_LEADCHNL_RV_SENSING_POLARITY: NORMAL
MDC_IDC_SET_LEADCHNL_RV_SENSING_SENSITIVITY: 0.6 MV
MDC_IDC_SET_ZONE_DETECTION_INTERVAL: 273 MS
MDC_IDC_SET_ZONE_DETECTION_INTERVAL: 375 MS
MDC_IDC_SET_ZONE_STATUS: NORMAL
MDC_IDC_SET_ZONE_STATUS: NORMAL
MDC_IDC_SET_ZONE_TYPE: NORMAL
MDC_IDC_SET_ZONE_TYPE: NORMAL
MDC_IDC_SET_ZONE_VENDOR_TYPE: NORMAL
MDC_IDC_SET_ZONE_VENDOR_TYPE: NORMAL
MDC_IDC_STAT_AT_BURDEN_PERCENT: 3 %
MDC_IDC_STAT_AT_DTM_END: NORMAL
MDC_IDC_STAT_AT_DTM_START: NORMAL
MDC_IDC_STAT_BRADY_DTM_END: NORMAL
MDC_IDC_STAT_BRADY_DTM_START: NORMAL
MDC_IDC_STAT_BRADY_RA_PERCENT_PACED: 83 %
MDC_IDC_STAT_BRADY_RV_PERCENT_PACED: 4 %
MDC_IDC_STAT_CRT_DTM_END: NORMAL
MDC_IDC_STAT_CRT_DTM_START: NORMAL
MDC_IDC_STAT_CRT_LV_PERCENT_PACED: 0 %
MDC_IDC_STAT_EPISODE_RECENT_COUNT: 0
MDC_IDC_STAT_EPISODE_RECENT_COUNT: 6
MDC_IDC_STAT_EPISODE_RECENT_COUNT_DTM_END: NORMAL
MDC_IDC_STAT_EPISODE_RECENT_COUNT_DTM_START: NORMAL
MDC_IDC_STAT_EPISODE_TYPE: NORMAL
MDC_IDC_STAT_EPISODE_VENDOR_TYPE: NORMAL
MDC_IDC_STAT_TACHYTHERAPY_ATP_DELIVERED_RECENT: 0
MDC_IDC_STAT_TACHYTHERAPY_ATP_DELIVERED_TOTAL: 4
MDC_IDC_STAT_TACHYTHERAPY_RECENT_DTM_END: NORMAL
MDC_IDC_STAT_TACHYTHERAPY_RECENT_DTM_START: NORMAL
MDC_IDC_STAT_TACHYTHERAPY_SHOCKS_ABORTED_RECENT: 0
MDC_IDC_STAT_TACHYTHERAPY_SHOCKS_ABORTED_TOTAL: 1
MDC_IDC_STAT_TACHYTHERAPY_SHOCKS_DELIVERED_RECENT: 0
MDC_IDC_STAT_TACHYTHERAPY_SHOCKS_DELIVERED_TOTAL: 4
MDC_IDC_STAT_TACHYTHERAPY_TOTAL_DTM_END: NORMAL
MDC_IDC_STAT_TACHYTHERAPY_TOTAL_DTM_START: NORMAL

## 2024-07-23 ENCOUNTER — ANTICOAGULATION THERAPY VISIT (OUTPATIENT)
Dept: ANTICOAGULATION | Facility: CLINIC | Age: 59
End: 2024-07-23
Payer: MEDICARE

## 2024-07-23 DIAGNOSIS — Z95.2 S/P MITRAL VALVE REPLACEMENT: ICD-10-CM

## 2024-07-23 DIAGNOSIS — I47.29 PAROXYSMAL VENTRICULAR TACHYCARDIA (H): ICD-10-CM

## 2024-07-23 DIAGNOSIS — Z95.811 LVAD (LEFT VENTRICULAR ASSIST DEVICE) PRESENT (H): Primary | ICD-10-CM

## 2024-07-23 DIAGNOSIS — I50.22 CHRONIC SYSTOLIC CHF (CONGESTIVE HEART FAILURE) (H): ICD-10-CM

## 2024-07-23 LAB — INR HOME MONITORING: 2.3 (ref 2–3)

## 2024-07-23 NOTE — PROGRESS NOTES
ANTICOAGULATION MANAGEMENT     Tej Morfin 59 year old male is on warfarin with therapeutic INR result. (Goal INR 2.0-3.0)    Recent labs: (last 7 days)     07/23/24  0000   INR 2.3       ASSESSMENT     Source(s): Chart Review and Patient/Caregiver Call     Warfarin doses taken: Warfarin taken as instructed  Diet: No new diet changes identified  Medication/supplement changes:  7/15/24 increase Cefadroxil.  New illness, injury, or hospitalization: 7/13/24 Shock from defibrillator.  Signs or symptoms of bleeding or clotting: No  Previous result: Therapeutic last 2(+) visits  Additional findings: None       PLAN     Recommended plan for ongoing change(s) affecting INR     Dosing Instructions: Continue your current warfarin dose with next INR in 2 weeks       Summary  As of 7/23/2024      Full warfarin instructions:  5 mg every day   Next INR check:  8/6/2024               Telephone call with Tej who agrees to plan and repeated back plan correctly    Patient to recheck with home meter    Education provided: Goal range and lab monitoring: goal range and significance of current result and Importance of following up at instructed interval  Symptom monitoring: monitoring for bleeding signs and symptoms  Contact [phone] with any changes, questions or concerns.     Plan made per [facility] anticoagulation protocol and per LVAD protocol    SAVANNA MO RN  Anticoagulation Clinic  7/23/2024    _______________________________________________________________________     Anticoagulation Episode Summary       Current INR goal:  2.0-3.0   TTR:  81.7% (1 y)   Target end date:  Indefinite   Send INR reminders to:  ANTICOAG LVAD    Indications    LVAD (left ventricular assist device) present (H) [Z95.811]  Chronic systolic CHF (congestive heart failure) (H) [I50.22]  S/P mitral valve replacement [Z95.2]  Paroxysmal ventricular tachycardia (H) [I47.29]             Comments:  Follow VAD Anticoag protocol:Yes: HeartMate  3  Bridging: per protocol  Date VAD placed: 03/23/22  INR Goal: per referral  Hx Mitral valve repair and not replacement               Anticoagulation Care Providers       Provider Role Specialty Phone number    Dunia Hdz MD Referring Cardiovascular Disease [phone]

## 2024-07-24 ENCOUNTER — VIRTUAL VISIT (OUTPATIENT)
Dept: INFECTIOUS DISEASES | Facility: CLINIC | Age: 59
End: 2024-07-24
Attending: PSYCHOLOGIST
Payer: MEDICARE

## 2024-07-24 VITALS — WEIGHT: 237 LBS | HEIGHT: 71 IN | BODY MASS INDEX: 33.18 KG/M2

## 2024-07-24 DIAGNOSIS — K52.9 CHRONIC DIARRHEA: ICD-10-CM

## 2024-07-24 DIAGNOSIS — A49.01 MSSA (METHICILLIN SUSCEPTIBLE STAPHYLOCOCCUS AUREUS) INFECTION: ICD-10-CM

## 2024-07-24 DIAGNOSIS — T82.7XXA INFECTION ASSOCIATED WITH DRIVELINE OF LEFT VENTRICULAR ASSIST DEVICE (LVAD) (H): Primary | ICD-10-CM

## 2024-07-24 PROCEDURE — 99214 OFFICE O/P EST MOD 30 MIN: CPT | Mod: 95 | Performed by: STUDENT IN AN ORGANIZED HEALTH CARE EDUCATION/TRAINING PROGRAM

## 2024-07-24 RX ORDER — CEFADROXIL 500 MG/1
1000 CAPSULE ORAL 2 TIMES DAILY
Qty: 60 CAPSULE | Refills: 1 | Status: ON HOLD | OUTPATIENT
Start: 2024-07-24 | End: 2024-08-30

## 2024-07-24 ASSESSMENT — PAIN SCALES - GENERAL: PAINLEVEL: NO PAIN (0)

## 2024-07-24 NOTE — PROGRESS NOTES
Virtual Visit Details    Type of service:  Video Visit   Video Start Time:  1.31 pm   Video End Time:1:47 PM    Originating Location (pt. Location): car     Distant Location (provider location):  Off-site  Platform used for Video Visit: Brandon    Total time including chart review, care-coordination and documentation time on the date of encounter - 30 mins            [facility] [address] [address]  Phone: [phone]  Fax: [phone]      Today's Date: 07/24/2024    Recommendations:   Continue cefadroxil 1000 mg po bid  Encouraged probiotic, fermented food intake   Recommend Hepatitis B vaccination, he will get locally     RTC 1 month     Thank you for involving Infectious Disease in the care of this patient.     Dr. Angela Layton  Division of Infectious Disease  [facility]      Assessment:  Tej Morfin is a 58 year old with PMH of severe MR s/p MVR with ring (1/21/15), VT s/p ICD, nonobstructive CAD, NICM previously on home inotropes now s/p HM III LVAD implantation 3/23/22 as bridge to decision (presently not listed - LVAD team wanted to see marijuana levels decline consistent with quit date), with course c/b AFib with RVR (s/p ICD shock 3/25/22 and 5/2023).     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 On cefadroxil 500 mg po bid. Symptoms got better but some drainage continued.     Increased in drainage first week of July, Driveline cx - MSSA, blood cx neg, CT abdomen pelvis without contrast due to creatinine did not show any collection. Increased cefadroxil from 500 mg po bid to 1000 mg po bid a week ago with some improvement in drainage.     Diarrhea: mild loose stools, taking magnesium oxide at baseline. Diarrhea since starting cefadroxil. C.diff 6/21/24 neg     - Immunization status: due for hep B vaccine, otherwise up to  "date    -------------------------------------------------------------------------------------------------------------------  Interval events:  One Shock 7/12 - record showed \"AF with RVR  then goes into possible dual tachycardia with ventricular rates at regular R-R intervals in VF zone of 230-244. ATP burst delivered without success followed by x1 31 j shock with successful conversion to SR  bpm and frequent ectopy.\"  A week prior started having increased driveline drainage - see pics in media tab   We got the following done locally - Driveline cx - MSSA, blood cx neg, CT abdomen pelvis without contrast due to creatinine did not show any collection   Increased abx from cefadroxil 500 mg bid to Cefadroxil 1g bid a week ago  With that patient reports that the drainage has started to thin a little bit. It is not smelling like a week ago - see pic from yesterday in media tab     He has Diarrhea - BM 2 per day bid - mushy mostly - once in a way is watery   C.diff 6/21/24 neg   On Magnesium supplementation since lvad - last check 6/4/24 of magnesium level was ok, he will ask for another level to be checked.       Reason for consult / Chief complaint:   Consulted by Dr. Dunia Hdz for driveline infection    History of presenting illness:  Tej Morfin is a 58 year old with PMH of severe MR s/p MVR with ring (1/21/15), VT s/p ICD, nonobstructive CAD, NICM previously on home inotropes now s/p HM III LVAD implantation 3/23/22 as bridge to decision (presently not listed - LVAD team wanted to see marijuana levels decline consistent with quit date), with course c/b AFib with RVR (s/p ICD shock 3/25/22 and 5/2023).     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, he had a lot of driveline drainage and pain  He was prescribed Doxy for 2 weeks   However symptoms Recurred 2 weeks after stopping " doxy  4/17 blood cx neg, CT with some skin thickening at the driveline insertion site   He took Doxy for 1 week which was changed to Cefadroxil 500 mg bid, it is ongoing since 4/25     Now the drainage is much better - he has nickel sized drainage and occasional pain   Drainage is better on cefadroxil than when on doxy   Used to do Dressing change weekly, now every 1-2 days after the driveline infection started   He has Diarrhea since starting the cefadroxil - about 2-3 times a day - more liquid than nomal     Patient Active Problem List   Diagnosis    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[employer], 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    Valvular cardiomyopathy (H)    S/P mitral valve replacement    Paroxysmal ventricular tachycardia (H)    Gastroparesis    Hx MRSA infection    MR (mitral regurgitation)    Obstructive sleep apnea    Sinusitis, chronic    Chronic systolic CHF (congestive heart failure) (H)    History of tobacco use    Heart transplant candidate    Age-related nuclear cataract of both eyes    Anxiety    Lipoma of neck    Myopia of both eyes    Presbyopia    S/P mitral valve repair    Thyroid goiter    Chronic systolic congestive heart failure (H)    LVAD (left ventricular assist device) present (H)    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         Medications:  Current Outpatient Medications   Medication Sig Dispense Refill    acetaminophen (TYLENOL) 325 MG tablet Take 2 tablets (650 mg) by mouth every 4 hours as needed for pain 100 tablet 0    allopurinol (ZYLOPRIM) 100 MG tablet Take 1 tablet (100 mg) by mouth daily (Patient taking differently: Take 100 mg by mouth every morning) 30 tablet 1    amoxicillin (AMOXIL) 500 MG capsule Take 2,000 mg by  mouth once as needed Take 4 tabs 1 hour before all dental cleanings and procedures      cefadroxil (DURICEF) 500 MG capsule Take 500 mg by mouth 2 times daily      cholecalciferol 50 MCG (2000 UT) CAPS Take 50 mcg by mouth every morning      furosemide (LASIX) 40 MG tablet TAKE 1.5 TABLETS (60 MG) BY MOUTH 2 TIMES DAILY 270 tablet 3    hydrOXYzine (VISTARIL) 50 MG capsule Take 50 mg by mouth every 12 hours as needed for anxiety      magnesium oxide (MAG-OX) 400 MG tablet Take 800 mg by mouth 2 times daily      metoprolol succinate ER (TOPROL XL) 25 MG 24 hr tablet Take 1 tablet (25 mg) by mouth daily (Patient taking differently: Take 25 mg by mouth every morning) 90 tablet 2    nitroGLYcerin (NITROSTAT) 0.4 MG sublingual tablet Place 0.4 mg under the tongue every 5 minutes as needed for chest pain      omeprazole (PRILOSEC) 40 MG DR capsule Take 40 mg by mouth every morning      potassium chloride alfonso ER (KLOR-CON M20) 20 MEQ CR tablet Take 3 tablets (60 mEq) by mouth 2 times daily 540 tablet 3    sacubitril-valsartan (ENTRESTO)  MG per tablet Take 1 tablet by mouth 2 times daily 90 tablet 3    sotalol (BETAPACE) 120 MG tablet Take 120 mg by mouth 2 times daily      spironolactone (ALDACTONE) 25 MG tablet Take 0.5 tablets (12.5 mg) by mouth daily 45 tablet 3    warfarin ANTICOAGULANT (COUMADIN) 2.5 MG tablet Take 5mg (2 tabs) by mouth daily OR AS DIRECTED.  Adjust dose based on INR. 180 tablet 1         Immunizations:  Immunization History   Administered Date(s) Administered    COVID-19 MONOVALENT 12+ (Pfizer) 03/09/2021, 03/30/2021, 10/02/2021    COVID-19 Monovalent 12+ (Pfizer 2022) 05/17/2022    Flu, Unspecified 08/23/2018    [id number] Novel Flu 02/01/2010    Influenza (IIV3) PF 10/26/2010, 10/05/2015, 09/20/2017    Influenza Vaccine >6 months,quad, PF 09/28/2016, 08/23/2018, 08/30/2019, 10/02/2021    Influenza, seasonal, injectable, PF 10/03/2012, 02/03/2014    Pneumo Conj 13-V (2010&after) 01/12/2021     "Pneumococcal 23 valent 02/03/2015, 12/08/2021    TDAP (Adacel,Boostrix) 03/08/2016    Zoster recombinant adjuvanted (SHINGRIX) 05/07/2018, 08/17/2018 Fall of 2023 got covid vaccine, got covid March 2024     Examination:  Vital signs:   Ht 1.791 m (5' 10.5\")   Wt 107.5 kg (237 lb)   BMI 33.53 kg/m    Unable to examine due to virtual visit       Laboratory:  Hematology:  Recent Labs   Lab Test 05/02/24  1353 12/15/23  1011 08/10/23  1436 05/04/23  1328 01/24/23  1500 01/23/23  1322 09/22/22  1603 09/22/22  1040 03/22/22  1343 09/10/19  0649   WBC 5.5 5.2 4.9 6.3  --  5.0  --  3.2*   < > 4.2   ANEU  --   --   --   --   --   --   --   --   --  1.9   ALYM  --   --   --   --   --   --   --   --   --  1.5   LOUISE  --   --   --   --   --   --   --   --   --  0.5   AEOS  --   --   --   --   --   --   --   --   --  0.2   HGB 12.5* 13.8 14.0 13.7 12.9* 13.8   < > 12.9*   < > 15.8   HCT 39.7* 42.7 43.8 43.0  --  41.5  --  40.0   < > 48.9    199 199 172  --  175  --  158   < > 177    < > = values in this interval not displayed.       Chemistry:  Last Comprehensive Metabolic Panel:  Lab Results   Component Value Date     05/02/2024    POTASSIUM 4.2 05/02/2024    CHLORIDE 102 05/02/2024    CO2 26 05/02/2024    ANIONGAP 11 05/02/2024     (H) 05/02/2024    BUN 20.5 (H) 05/02/2024    CR 1.47 (H) 05/02/2024    GFRESTIMATED 56 (L) 05/02/2024    YESICA 9.7 05/02/2024         Liver Function Studies -   Recent Labs   Lab Test 05/02/24  1353   PROTTOTAL 7.2   ALBUMIN 4.0   BILITOTAL 0.6   ALKPHOS 79   AST 18   ALT 17       Microbiology:  7/16/24 blood cx neg     7/12/24 driveline cx - MSSA   Staphylococcus aureus Clindamycin MELODIE Resistant   Staphylococcus aureus Erythromycin MELODIE >=8 ug/mL: Resistant   Staphylococcus aureus Oxacillin MELODIE <=0.25 ug/mL: Sensitive    Comment: Oxacillin predicts susceptibility to cefazolin, cephalexin, nafcillin and dicloxacillin.   Staphylococcus aureus Tetracycline MELODIE <=1 ug/mL: Sensitive "   Staphylococcus aureus Trimethoprim/Sulfamethoxazole MELODIE <=10 ug/mL: Sensitive   Staphylococcus aureus Vancomycin MELODIE <=0.5 ug/mL: Sensitive       6/21/24 C.diff neg     4/17 blood cx neg     3/17 driveline cx   Staphylococcus aureus Clindamycin MELODIE Resistant   Staphylococcus aureus Erythromycin MELODIE >=8 ug/mL: Resistant   Staphylococcus aureus Oxacillin MELODIE 0.5 ug/mL: Sensitive    Comment: Oxacillin predicts susceptibility to cefazolin, cephalexin, nafcillin and dicloxacillin.   Staphylococcus aureus Tetracycline MELODIE <=1 ug/mL: Sensitive   Staphylococcus aureus Trimethoprim/Sulfamethoxazole MELODIE <=10 ug/mL: Sensitive   Staphylococcus aureus Vancomycin MELODIE <=0.5 ug/mL: Sensitive       Imaging:  CT abdomen pelvis without contrast 7/16/24  1. The left ventricular assist device and right line is unchanged from prior exam.   2. There is no acute findings abdomen and pelvis.   3. Thickening to the wall the urinary bladder.     CT abdomen/pelvis without contrast 4/17/24   Stable appearance of the LVAD driveline without evident complication. There is questionable skin thickening at the insertion site, correlate with physical exam.

## 2024-07-24 NOTE — NURSING NOTE
Current patient location:  [place] [address].     Is the patient currently in the state of [place]? YES    Visit mode:VIDEO    If the visit is dropped, the patient can be reconnected by: VIDEO VISIT: Text to cell phone:   Telephone Information:   Mobile [phone]       Will anyone else be joining the visit? Yes  (If patient encounters technical issues they should call [phone]    How would you like to obtain your AVS? MyChart    Are changes needed to the allergy or medication list? No    Are refills needed on medications prescribed by this physician? NO    Reason for visit: MIRNA ARMENTA

## 2024-07-24 NOTE — LETTER
7/24/2024       RE: Tej Morfin  [address]  [place] [address]     Dear Colleague,    Thank you for referring your patient, Tej Morfin, to the [facility] DISE[facility] [place] at [facility]. Please see a copy of my visit note below.    Virtual Visit Details    Type of service:  Video Visit   Video Start Time:  1.31 pm   Video End Time:1:47 PM    Originating Location (pt. Location): car     Distant Location (provider location):  Off-site  Platform used for Video Visit: Omate    Total time including chart review, care-coordination and documentation time on the date of encounter - 30 mins            [facility] CL[facility] [address] [address]  Phone: [phone]  Fax: [phone]      Today's Date: 07/24/2024    Recommendations:   Continue cefadroxil 1000 mg po bid  Encouraged probiotic, fermented food intake   Recommend Hepatitis B vaccination, he will get locally     RTC 1 month     Thank you for involving Infectious Disease in the care of this patient.     Dr. Angela Layton  Division of Infectious Disease  [facility]      Assessment:  Tej Morfin is a 58 year old with PMH of severe MR s/p MVR with ring (1/21/15), VT s/p ICD, nonobstructive CAD, NICM previously on home inotropes now s/p HM III LVAD implantation 3/23/22 as bridge to decision (presently not listed - LVAD team wanted to see marijuana levels decline consistent with quit date), with course c/b AFib with RVR (s/p ICD shock 3/25/22 and 5/2023).     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 On cefadroxil 500 mg po bid. Symptoms got better but some drainage continued.     Increased in drainage first week of July, Driveline cx - MSSA, blood cx neg, CT abdomen pelvis without contrast due to  "creatinine did not show any collection. Increased cefadroxil from 500 mg po bid to 1000 mg po bid a week ago with some improvement in drainage.     Diarrhea: mild loose stools, taking magnesium oxide at baseline. Diarrhea since starting cefadroxil. C.diff 6/21/24 neg     - Immunization status: due for hep B vaccine, otherwise up to date    -------------------------------------------------------------------------------------------------------------------  Interval events:  One Shock 7/12 - record showed \"AF with RVR  then goes into possible dual tachycardia with ventricular rates at regular R-R intervals in VF zone of 230-244. ATP burst delivered without success followed by x1 31 j shock with successful conversion to SR  bpm and frequent ectopy.\"  A week prior started having increased driveline drainage - see pics in media tab   We got the following done locally - Driveline cx - MSSA, blood cx neg, CT abdomen pelvis without contrast due to creatinine did not show any collection   Increased abx from cefadroxil 500 mg bid to Cefadroxil 1g bid a week ago  With that patient reports that the drainage has started to thin a little bit. It is not smelling like a week ago - see pic from yesterday in media tab     He has Diarrhea - BM 2 per day bid - mushy mostly - once in a way is watery   C.diff 6/21/24 neg   On Magnesium supplementation since lvad - last check 6/4/24 of magnesium level was ok, he will ask for another level to be checked.       Reason for consult / Chief complaint:   Consulted by Dr. Dunia Hdz for driveline infection    History of presenting illness:  Tej Morfin is a 58 year old with PMH of severe MR s/p MVR with ring (1/21/15), VT s/p ICD, nonobstructive CAD, NICM previously on home inotropes now s/p HM III LVAD implantation 3/23/22 as bridge to decision (presently not listed - LVAD team wanted to see marijuana levels decline consistent with quit date), with course c/b AFib with RVR " (s/p ICD shock 3/25/22 and 5/2023).     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, he had a lot of driveline drainage and pain  He was prescribed Doxy for 2 weeks   However symptoms Recurred 2 weeks after stopping doxy  4/17 blood cx neg, CT with some skin thickening at the driveline insertion site   He took Doxy for 1 week which was changed to Cefadroxil 500 mg bid, it is ongoing since 4/25     Now the drainage is much better - he has nickel sized drainage and occasional pain   Drainage is better on cefadroxil than when on doxy   Used to do Dressing change weekly, now every 1-2 days after the driveline infection started   He has Diarrhea since starting the cefadroxil - about 2-3 times a day - more liquid than nomal     Patient Active Problem List   Diagnosis    Cardiac defibrillator in situ- [employer], 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    Valvular cardiomyopathy (H)    S/P mitral valve replacement    Paroxysmal ventricular tachycardia (H)    Gastroparesis    Hx MRSA infection    MR (mitral regurgitation)    Obstructive sleep apnea    Sinusitis, chronic    Chronic systolic CHF (congestive heart failure) (H)    History of tobacco use    Heart transplant candidate    Age-related nuclear cataract of both eyes    Anxiety    Lipoma of neck    Myopia of both eyes    Presbyopia    S/P mitral valve repair    Thyroid goiter    Chronic systolic congestive heart failure (H)    LVAD (left ventricular assist device) present (H)    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         Medications:  Current Outpatient Medications   Medication Sig Dispense Refill     acetaminophen (TYLENOL) 325 MG tablet Take 2 tablets (650 mg) by mouth every 4 hours as needed for pain 100 tablet 0    allopurinol (ZYLOPRIM) 100 MG tablet Take 1 tablet (100 mg) by mouth daily (Patient taking differently: Take 100 mg by mouth every morning) 30 tablet 1    amoxicillin (AMOXIL) 500 MG capsule Take 2,000 mg by mouth once as needed Take 4 tabs 1 hour before all dental cleanings and procedures      cefadroxil (DURICEF) 500 MG capsule Take 500 mg by mouth 2 times daily      cholecalciferol 50 MCG (2000 UT) CAPS Take 50 mcg by mouth every morning      furosemide (LASIX) 40 MG tablet TAKE 1.5 TABLETS (60 MG) BY MOUTH 2 TIMES DAILY 270 tablet 3    hydrOXYzine (VISTARIL) 50 MG capsule Take 50 mg by mouth every 12 hours as needed for anxiety      magnesium oxide (MAG-OX) 400 MG tablet Take 800 mg by mouth 2 times daily      metoprolol succinate ER (TOPROL XL) 25 MG 24 hr tablet Take 1 tablet (25 mg) by mouth daily (Patient taking differently: Take 25 mg by mouth every morning) 90 tablet 2    nitroGLYcerin (NITROSTAT) 0.4 MG sublingual tablet Place 0.4 mg under the tongue every 5 minutes as needed for chest pain      omeprazole (PRILOSEC) 40 MG DR capsule Take 40 mg by mouth every morning      potassium chloride alfonso ER (KLOR-CON M20) 20 MEQ CR tablet Take 3 tablets (60 mEq) by mouth 2 times daily 540 tablet 3    sacubitril-valsartan (ENTRESTO)  MG per tablet Take 1 tablet by mouth 2 times daily 90 tablet 3    sotalol (BETAPACE) 120 MG tablet Take 120 mg by mouth 2 times daily      spironolactone (ALDACTONE) 25 MG tablet Take 0.5 tablets (12.5 mg) by mouth daily 45 tablet 3    warfarin ANTICOAGULANT (COUMADIN) 2.5 MG tablet Take 5mg (2 tabs) by mouth daily OR AS DIRECTED.  Adjust dose based on INR. 180 tablet 1         Immunizations:  Immunization History   Administered Date(s) Administered    COVID-19 MONOVALENT 12+ (Pfizer) 03/09/2021, 03/30/2021, 10/02/2021    COVID-19 Monovalent 12+ (Pfizer 2022)  "05/17/2022    Flu, Unspecified 08/23/2018    [id number] Novel Flu 02/01/2010    Influenza (IIV3) PF 10/26/2010, 10/05/2015, 09/20/2017    Influenza Vaccine >6 months,quad, PF 09/28/2016, 08/23/2018, 08/30/2019, 10/02/2021    Influenza, seasonal, injectable, PF 10/03/2012, 02/03/2014    Pneumo Conj 13-V (2010&after) 01/12/2021    Pneumococcal 23 valent 02/03/2015, 12/08/2021    TDAP (Adacel,Boostrix) 03/08/2016    Zoster recombinant adjuvanted (SHINGRIX) 05/07/2018, 08/17/2018 Fall of 2023 got covid vaccine, got covid March 2024     Examination:  Vital signs:   Ht 1.791 m (5' 10.5\")   Wt 107.5 kg (237 lb)   BMI 33.53 kg/m    Unable to examine due to virtual visit       Laboratory:  Hematology:  Recent Labs   Lab Test 05/02/24  1353 12/15/23  1011 08/10/23  1436 05/04/23  1328 01/24/23  1500 01/23/23  1322 09/22/22  1603 09/22/22  1040 03/22/22  1343 09/10/19  0649   WBC 5.5 5.2 4.9 6.3  --  5.0  --  3.2*   < > 4.2   ANEU  --   --   --   --   --   --   --   --   --  1.9   ALYM  --   --   --   --   --   --   --   --   --  1.5   LOUISE  --   --   --   --   --   --   --   --   --  0.5   AEOS  --   --   --   --   --   --   --   --   --  0.2   HGB 12.5* 13.8 14.0 13.7 12.9* 13.8   < > 12.9*   < > 15.8   HCT 39.7* 42.7 43.8 43.0  --  41.5  --  40.0   < > 48.9    199 199 172  --  175  --  158   < > 177    < > = values in this interval not displayed.       Chemistry:  Last Comprehensive Metabolic Panel:  Lab Results   Component Value Date     05/02/2024    POTASSIUM 4.2 05/02/2024    CHLORIDE 102 05/02/2024    CO2 26 05/02/2024    ANIONGAP 11 05/02/2024     (H) 05/02/2024    BUN 20.5 (H) 05/02/2024    CR 1.47 (H) 05/02/2024    GFRESTIMATED 56 (L) 05/02/2024    YESICA 9.7 05/02/2024         Liver Function Studies -   Recent Labs   Lab Test 05/02/24  1353   PROTTOTAL 7.2   ALBUMIN 4.0   BILITOTAL 0.6   ALKPHOS 79   AST 18   ALT 17       Microbiology:  7/16/24 blood cx neg     7/12/24 driveline cx - MSSA "   Staphylococcus aureus Clindamycin MELODIE Resistant   Staphylococcus aureus Erythromycin MELODIE >=8 ug/mL: Resistant   Staphylococcus aureus Oxacillin MELODIE <=0.25 ug/mL: Sensitive    Comment: Oxacillin predicts susceptibility to cefazolin, cephalexin, nafcillin and dicloxacillin.   Staphylococcus aureus Tetracycline MELODIE <=1 ug/mL: Sensitive   Staphylococcus aureus Trimethoprim/Sulfamethoxazole MELODIE <=10 ug/mL: Sensitive   Staphylococcus aureus Vancomycin MELODIE <=0.5 ug/mL: Sensitive       6/21/24 C.diff neg     4/17 blood cx neg     3/17 driveline cx   Staphylococcus aureus Clindamycin MELODIE Resistant   Staphylococcus aureus Erythromycin MELODIE >=8 ug/mL: Resistant   Staphylococcus aureus Oxacillin MELODIE 0.5 ug/mL: Sensitive    Comment: Oxacillin predicts susceptibility to cefazolin, cephalexin, nafcillin and dicloxacillin.   Staphylococcus aureus Tetracycline MELODIE <=1 ug/mL: Sensitive   Staphylococcus aureus Trimethoprim/Sulfamethoxazole MELODIE <=10 ug/mL: Sensitive   Staphylococcus aureus Vancomycin MELODIE <=0.5 ug/mL: Sensitive       Imaging:  CT abdomen pelvis without contrast 7/16/24  1. The left ventricular assist device and right line is unchanged from prior exam.   2. There is no acute findings abdomen and pelvis.   3. Thickening to the wall the urinary bladder.     CT abdomen/pelvis without contrast 4/17/24   Stable appearance of the LVAD driveline without evident complication. There is questionable skin thickening at the insertion site, correlate with physical exam.       Angela Layton MD

## 2024-07-25 ENCOUNTER — CARE COORDINATION (OUTPATIENT)
Dept: TRANSPLANT | Facility: CLINIC | Age: 59
End: 2024-07-25
Payer: MEDICARE

## 2024-07-25 NOTE — PROGRESS NOTES
Status 4 Heart Extension Complete 07/25/2024    This patient meets status 4 criteria as evidenced by:     Dischargeable LVAD without discretionary 30 days     Patient's primary cardiologist and/or attending physician is in agreement with this plan.      Status 4 Extension due 10/25/2024

## 2024-08-06 ENCOUNTER — ANTICOAGULATION THERAPY VISIT (OUTPATIENT)
Dept: ANTICOAGULATION | Facility: CLINIC | Age: 59
End: 2024-08-06
Payer: MEDICARE

## 2024-08-06 DIAGNOSIS — I47.29 PAROXYSMAL VENTRICULAR TACHYCARDIA (H): ICD-10-CM

## 2024-08-06 DIAGNOSIS — Z95.2 S/P MITRAL VALVE REPLACEMENT: ICD-10-CM

## 2024-08-06 DIAGNOSIS — Z95.811 LVAD (LEFT VENTRICULAR ASSIST DEVICE) PRESENT (H): Primary | ICD-10-CM

## 2024-08-06 DIAGNOSIS — I50.22 CHRONIC SYSTOLIC CHF (CONGESTIVE HEART FAILURE) (H): ICD-10-CM

## 2024-08-06 LAB — INR HOME MONITORING: 2 (ref 2–3)

## 2024-08-06 NOTE — PROGRESS NOTES
ANTICOAGULATION MANAGEMENT     Tej SWENSON Shelbie 59 year old male is on warfarin with therapeutic INR result. 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  New illness, injury, or hospitalization: No  Signs or symptoms of bleeding or clotting: No  Previous result: Therapeutic last 2(+) visits  Additional findings: None       PLAN     Recommended plan for no diet, medication or health factor changes affecting INR     Dosing Instructions: Continue your current warfarin dose with next INR in 2 weeks       Summary  As of 8/6/2024      Full warfarin instructions:  5 mg every day   Next INR check:  8/20/2024               Telephone call with Tej who verbalizes understanding and agrees to plan    Patient to recheck with home meter    Education provided: Contact [phone] with any changes, questions or concerns.     Plan made per [facility] anticoagulation protocol and per LVAD protocol    Rosario Dyer RN  Anticoagulation Clinic  8/6/2024    _______________________________________________________________________     Anticoagulation Episode Summary       Current INR goal:  2.0-3.0   TTR:  84.8% (1 y)   Target end date:  Indefinite   Send INR reminders to:  ANTICOAG LVAD    Indications    LVAD (left ventricular assist device) present (H) [Z95.811]  Chronic systolic CHF (congestive heart failure) (H) [I50.22]  S/P mitral valve replacement [Z95.2]  Paroxysmal ventricular tachycardia (H) [I47.29]             Comments:  Follow VAD Anticoag protocol:Yes: HeartMate 3  Bridging: per protocol  Date VAD placed: 03/23/22  INR Goal: per referral  Hx Mitral valve repair and not replacement               Anticoagulation Care Providers       Provider Role Specialty Phone number     Dunia Hdz MD Referring Cardiovascular Disease [phone]

## 2024-08-07 DIAGNOSIS — I50.22 CHRONIC SYSTOLIC (CONGESTIVE) HEART FAILURE (H): ICD-10-CM

## 2024-08-13 RX ORDER — METOPROLOL SUCCINATE 25 MG/1
25 TABLET, EXTENDED RELEASE ORAL DAILY
Qty: 90 TABLET | Refills: 2 | Status: SHIPPED | OUTPATIENT
Start: 2024-08-13

## 2024-08-16 ENCOUNTER — TELEPHONE (OUTPATIENT)
Dept: TRANSPLANT | Facility: CLINIC | Age: 59
End: 2024-08-16
Payer: MEDICARE

## 2024-08-16 VITALS — WEIGHT: 237 LBS | BODY MASS INDEX: 33.53 KG/M2

## 2024-08-20 ENCOUNTER — LAB (OUTPATIENT)
Dept: LAB | Facility: CLINIC | Age: 59
End: 2024-08-20
Payer: MEDICARE

## 2024-08-20 DIAGNOSIS — Z76.82 AWAITING ORGAN TRANSPLANT: ICD-10-CM

## 2024-08-22 ENCOUNTER — ANTICOAGULATION THERAPY VISIT (OUTPATIENT)
Dept: ANTICOAGULATION | Facility: CLINIC | Age: 59
End: 2024-08-22
Payer: MEDICARE

## 2024-08-22 DIAGNOSIS — Z95.811 LVAD (LEFT VENTRICULAR ASSIST DEVICE) PRESENT (H): Primary | ICD-10-CM

## 2024-08-22 DIAGNOSIS — I47.29 PAROXYSMAL VENTRICULAR TACHYCARDIA (H): ICD-10-CM

## 2024-08-22 DIAGNOSIS — I50.22 CHRONIC SYSTOLIC CHF (CONGESTIVE HEART FAILURE) (H): ICD-10-CM

## 2024-08-22 DIAGNOSIS — Z95.2 S/P MITRAL VALVE REPLACEMENT: ICD-10-CM

## 2024-08-22 LAB — INR HOME MONITORING: 1.7 (ref 2–3)

## 2024-08-22 NOTE — PROGRESS NOTES
ANTICOAGULATION MANAGEMENT     Tej Morfin 59 year old male is on warfarin with subtherapeutic INR result. (Goal INR 2.0-3.0)    Recent labs: (last 7 days)     08/22/24  0000   INR 1.7*       ASSESSMENT     Source(s): Chart Review and Patient/Caregiver Call     Warfarin doses taken: Missed dose(s) may be affecting INR  Diet: No new diet changes identified  Medication/supplement changes: None noted  New illness, injury, or hospitalization: Yes: Tej was not feeling well earlier this week, was very fatigued starting a couple of days ago and now has developed some mild congestion. He is going to test for covid later today. He does state he feels a bit better today. Denies any VAD alarms.  Signs or symptoms of bleeding or clotting: No  Previous result: Therapeutic last 2(+) visits  Additional findings: None       PLAN     Recommended plan for temporary change(s) affecting INR     Dosing Instructions: booster dose then continue your current warfarin dose with next INR in 4-7 days depending on how symptoms progress.     Summary  As of 8/22/2024      Full warfarin instructions:  8/22: 7.5 mg; Otherwise 5 mg every day   Next INR check:  8/29/2024               Telephone call with Tej who verbalizes understanding and agrees to plan    Patient to recheck with home meter    Education provided: Symptom monitoring: when to seek medical attention/emergency care  Monitor for VAD alarms while ill, keep up with oral hydration and oral intake. Discussed contacting the VAD team right away if he tests positive for COVID or if symptoms progress to make a plan for care    Plan made per [facility] anticoagulation protocol and per LVAD protocol    Rosario Dyer, RN  Anticoagulation Clinic  8/22/2024    _______________________________________________________________________     Anticoagulation Episode Summary       Current INR goal:  2.0-3.0   TTR:  81.0% (1 y)   Target end date:  Indefinite   Send INR reminders to:  [facility] LV[facility]     Indications    LVAD (left ventricular assist device) present (H) [Z95.811]  Chronic systolic CHF (congestive heart failure) (H) [I50.22]  S/P mitral valve replacement [Z95.2]  Paroxysmal ventricular tachycardia (H) [I47.29]             Comments:  Follow VAD Anticoag protocol:Yes: HeartMate 3  Bridging: per protocol  Date VAD placed: 03/23/22  INR Goal: per referral  Hx Mitral valve repair and not replacement               Anticoagulation Care Providers       Provider Role Specialty Phone number    Dunia Hdz MD Referring Cardiovascular Disease [phone]

## 2024-08-25 ENCOUNTER — CARE COORDINATION (OUTPATIENT)
Dept: CARDIOLOGY | Facility: CLINIC | Age: 59
End: 2024-08-25
Payer: MEDICARE

## 2024-08-25 DIAGNOSIS — Z95.811 LVAD (LEFT VENTRICULAR ASSIST DEVICE) PRESENT (H): ICD-10-CM

## 2024-08-25 DIAGNOSIS — I50.22 CHRONIC SYSTOLIC CONGESTIVE HEART FAILURE (H): Primary | ICD-10-CM

## 2024-08-25 NOTE — PROGRESS NOTES
S: Patient called to discuss VAD driveline exit site.      B/A:  Patient has had a LVAD drive line exit site infection since March. Wife called to share new symptoms that started the last week or so. She reports drainage has become copious, odor has become worse, and has a fever. She also reports that there seems to be an abscess forming. 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  Per protocol: recommended patient goes to the ED as soon as possible.             Wife agrees with plan. Writer will call [facility] ED and encourage provider to get in touch with [facility] team.

## 2024-08-26 ENCOUNTER — TELEPHONE (OUTPATIENT)
Dept: INFECTIOUS DISEASES | Facility: CLINIC | Age: 59
End: 2024-08-26
Payer: MEDICARE

## 2024-08-26 ENCOUNTER — CARE COORDINATION (OUTPATIENT)
Dept: CARDIOLOGY | Facility: CLINIC | Age: 59
End: 2024-08-26
Payer: MEDICARE

## 2024-08-26 ENCOUNTER — HOSPITAL ENCOUNTER (OUTPATIENT)
Dept: GENERAL RADIOLOGY | Facility: CLINIC | Age: 59
Discharge: HOME | End: 2024-08-26
Attending: SURGERY
Payer: MEDICARE

## 2024-08-26 ENCOUNTER — HOSPITAL ENCOUNTER (INPATIENT)
Facility: CLINIC | Age: 59
LOS: 4 days | Discharge: HOME | DRG: 315 | End: 2024-08-30
Attending: EMERGENCY MEDICINE | Admitting: STUDENT IN AN ORGANIZED HEALTH CARE EDUCATION/TRAINING PROGRAM
Payer: MEDICARE

## 2024-08-26 DIAGNOSIS — T82.7XXA INFECTION ASSOCIATED WITH DRIVELINE OF LEFT VENTRICULAR ASSIST DEVICE (LVAD) (H): ICD-10-CM

## 2024-08-26 DIAGNOSIS — I50.22 CHRONIC SYSTOLIC CONGESTIVE HEART FAILURE (H): ICD-10-CM

## 2024-08-26 DIAGNOSIS — Z95.811 LVAD (LEFT VENTRICULAR ASSIST DEVICE) PRESENT (H): ICD-10-CM

## 2024-08-26 DIAGNOSIS — Z95.811 LVAD (LEFT VENTRICULAR ASSIST DEVICE) PRESENT (H): Primary | ICD-10-CM

## 2024-08-26 LAB
ALBUMIN SERPL BCG-MCNC: 3.6 G/DL (ref 3.5–5.2)
ALP SERPL-CCNC: 67 U/L (ref 40–150)
ALT SERPL W P-5'-P-CCNC: 22 U/L (ref 0–70)
ANION GAP SERPL CALCULATED.3IONS-SCNC: 12 MMOL/L (ref 7–15)
AST SERPL W P-5'-P-CCNC: 20 U/L (ref 0–45)
BASOPHILS # BLD AUTO: 0 10E3/UL (ref 0–0.2)
BASOPHILS NFR BLD AUTO: 0 %
BILIRUB SERPL-MCNC: 0.3 MG/DL
BUN SERPL-MCNC: 22.3 MG/DL (ref 6–20)
CALCIUM SERPL-MCNC: 9.3 MG/DL (ref 8.8–10.4)
CHLORIDE SERPL-SCNC: 105 MMOL/L (ref 98–107)
CREAT SERPL-MCNC: 1.4 MG/DL (ref 0.67–1.17)
EGFRCR SERPLBLD CKD-EPI 2021: 59 ML/MIN/1.73M2
EOSINOPHIL # BLD AUTO: 0.1 10E3/UL (ref 0–0.7)
EOSINOPHIL NFR BLD AUTO: 2 %
ERYTHROCYTE [DISTWIDTH] IN BLOOD BY AUTOMATED COUNT: 14.9 % (ref 10–15)
GLUCOSE SERPL-MCNC: 88 MG/DL (ref 70–99)
HCO3 SERPL-SCNC: 22 MMOL/L (ref 22–29)
HCT VFR BLD AUTO: 41.2 % (ref 40–53)
HGB BLD-MCNC: 12.8 G/DL (ref 13.3–17.7)
HOLD SPECIMEN: NORMAL
HOLD SPECIMEN: NORMAL
IMM GRANULOCYTES # BLD: 0 10E3/UL
IMM GRANULOCYTES NFR BLD: 1 %
LACTATE SERPL-SCNC: 1.5 MMOL/L (ref 0.7–2)
LYMPHOCYTES # BLD AUTO: 0.9 10E3/UL (ref 0.8–5.3)
LYMPHOCYTES NFR BLD AUTO: 18 %
MCH RBC QN AUTO: 29.4 PG (ref 26.5–33)
MCHC RBC AUTO-ENTMCNC: 31.1 G/DL (ref 31.5–36.5)
MCV RBC AUTO: 95 FL (ref 78–100)
MONOCYTES # BLD AUTO: 0.6 10E3/UL (ref 0–1.3)
MONOCYTES NFR BLD AUTO: 12 %
NEUTROPHILS # BLD AUTO: 3.5 10E3/UL (ref 1.6–8.3)
NEUTROPHILS NFR BLD AUTO: 67 %
NRBC # BLD AUTO: 0 10E3/UL
NRBC BLD AUTO-RTO: 0 /100
PLATELET # BLD AUTO: 194 10E3/UL (ref 150–450)
POTASSIUM SERPL-SCNC: 4.5 MMOL/L (ref 3.4–5.3)
PROT SERPL-MCNC: 7.3 G/DL (ref 6.4–8.3)
RBC # BLD AUTO: 4.35 10E6/UL (ref 4.4–5.9)
SODIUM SERPL-SCNC: 139 MMOL/L (ref 135–145)
WBC # BLD AUTO: 5.2 10E3/UL (ref 4–11)

## 2024-08-26 PROCEDURE — 85610 PROTHROMBIN TIME: CPT

## 2024-08-26 PROCEDURE — 85025 COMPLETE CBC W/AUTO DIFF WBC: CPT | Performed by: EMERGENCY MEDICINE

## 2024-08-26 PROCEDURE — 74177 CT ABD & PELVIS W/CONTRAST: CPT | Mod: 26 | Performed by: RADIOLOGY

## 2024-08-26 PROCEDURE — 87040 BLOOD CULTURE FOR BACTERIA: CPT | Performed by: EMERGENCY MEDICINE

## 2024-08-26 PROCEDURE — 83615 LACTATE (LD) (LDH) ENZYME: CPT

## 2024-08-26 PROCEDURE — 999N000122 CT OUTSIDE READ

## 2024-08-26 PROCEDURE — 258N000003 HC RX IP 258 OP 636: Performed by: EMERGENCY MEDICINE

## 2024-08-26 PROCEDURE — 120N000005 HC R&B MS OVERFLOW UMMC

## 2024-08-26 PROCEDURE — 36415 COLL VENOUS BLD VENIPUNCTURE: CPT | Performed by: EMERGENCY MEDICINE

## 2024-08-26 PROCEDURE — 99285 EMERGENCY DEPT VISIT HI MDM: CPT | Performed by: EMERGENCY MEDICINE

## 2024-08-26 PROCEDURE — 250N000011 HC RX IP 250 OP 636: Performed by: EMERGENCY MEDICINE

## 2024-08-26 PROCEDURE — 83735 ASSAY OF MAGNESIUM: CPT

## 2024-08-26 PROCEDURE — 80053 COMPREHEN METABOLIC PANEL: CPT | Performed by: EMERGENCY MEDICINE

## 2024-08-26 PROCEDURE — 83605 ASSAY OF LACTIC ACID: CPT | Performed by: EMERGENCY MEDICINE

## 2024-08-26 RX ORDER — ALLOPURINOL 100 MG/1
100 TABLET ORAL EVERY MORNING
COMMUNITY

## 2024-08-26 RX ORDER — TRAZODONE HYDROCHLORIDE 50 MG/1
TABLET, FILM COATED ORAL
COMMUNITY
Start: 2024-06-21

## 2024-08-26 RX ADMIN — VANCOMYCIN HYDROCHLORIDE 1750 MG: 1 INJECTION, POWDER, LYOPHILIZED, FOR SOLUTION INTRAVENOUS at 23:56

## 2024-08-26 ASSESSMENT — COLUMBIA-SUICIDE SEVERITY RATING SCALE - C-SSRS
2. HAVE YOU ACTUALLY HAD ANY THOUGHTS OF KILLING YOURSELF IN THE PAST MONTH?: NO
6. HAVE YOU EVER DONE ANYTHING, STARTED TO DO ANYTHING, OR PREPARED TO DO ANYTHING TO END YOUR LIFE?: NO
1. IN THE PAST MONTH, HAVE YOU WISHED YOU WERE DEAD OR WISHED YOU COULD GO TO SLEEP AND NOT WAKE UP?: NO

## 2024-08-26 ASSESSMENT — ACTIVITIES OF DAILY LIVING (ADL)
ADLS_ACUITY_SCORE: 38
ADLS_ACUITY_SCORE: 36

## 2024-08-26 NOTE — PROGRESS NOTES
Spoke to patient to follow up on driveline symptoms. Patient was at [facility] ED yesterday, got CT, and [facility] provider communicated with [facility] on call MD. Decision was for pt to discharge and follow up with VAD team today.    Today pt continues to feel feverish, complaining of intermittent chills and feeling hot and drainage worsening in past 2-3 weeks despite doubling PO antibx dose. Smell and volume of drainage has also been worsening and now can smell through dressing. Drainage is brown and now blood tinged.     Discussed with Dr. Rodríguez, who agrees with protocol of pt being admitted for assessment and possible IV antibx. Tx-coordinator also notified. Recommended pt report to [facility] ED ASAP. Pt and wife verbalize understanding and agreement. They will try to get things organized and come down this evening, but may not make it until tomorrow early morning. They will page with ETA.

## 2024-08-26 NOTE — TELEPHONE ENCOUNTER
----- Message from Maia Burch sent at 8/26/2024 12:13 PM CDT -----  Hi,    I spoke to his wife when she called me back. They have a solution and are packing currently to head up here.     Maia  ----- Message -----  From: Prince Joya, BRENDA  Sent: 8/26/2024  12:00 PM CDT  To: JUAN PABLO Goode,     Would you help patient with Transportation to ED?       Prince Joya RN  Infectious Disease on 8/26/2024 at 12:00 PM  ----- Message -----  From: Emily Trejo RN  Sent: 8/26/2024  11:51 AM CDT  To: Angela Layton MD; Siena Headley, RN; #    Hi ID team    Tej paged over the weekend with increased drainage, fever, chills. Per our protocol, he is to come to [facility] ED where Dr. Wilver Rodríguez would like him admitted for further workup. He will arrive this evening, 8/26 or tomorrow. Still trying to figure out transportation.     Thank you,   Emily Trejo, BRENDA BSN   VAD Coordinator   Office: [phone]  24/7 On-Call VAD Pager: [phone] opt 4, ask to page VAD coordinator on call

## 2024-08-26 NOTE — PROGRESS NOTES
Date: 8/26/2024    Time of Call: 11:35 AM     Diagnosis:  LVAD drive line exit site infection     Ordering provider: Dr. Wilver Rodríguez  Order: patient to come to [facility] ED to be admitted for full infectious workup and possible I & D.      Order received by: Emily Trejo, VAD Coordinator     Follow-up/additional notes: Please contact Dr. Wilver Rodríguez for further admission questions. ID and Cardiology team notified of patient's arrival.

## 2024-08-27 ENCOUNTER — TELEPHONE (OUTPATIENT)
Dept: INFECTIOUS DISEASES | Facility: CLINIC | Age: 59
End: 2024-08-27

## 2024-08-27 ENCOUNTER — APPOINTMENT (OUTPATIENT)
Dept: CARDIOLOGY | Facility: CLINIC | Age: 59
DRG: 315 | End: 2024-08-27
Payer: MEDICARE

## 2024-08-27 LAB
ANION GAP SERPL CALCULATED.3IONS-SCNC: 11 MMOL/L (ref 7–15)
BUN SERPL-MCNC: 23.1 MG/DL (ref 6–20)
CALCIUM SERPL-MCNC: 9.2 MG/DL (ref 8.8–10.4)
CHLORIDE SERPL-SCNC: 106 MMOL/L (ref 98–107)
CREAT SERPL-MCNC: 1.2 MG/DL (ref 0.67–1.17)
CREAT SERPL-MCNC: 1.2 MG/DL (ref 0.67–1.17)
CRP SERPL-MCNC: 83.2 MG/L
EGFRCR SERPLBLD CKD-EPI 2021: 71 ML/MIN/1.73M2
EGFRCR SERPLBLD CKD-EPI 2021: 71 ML/MIN/1.73M2
ERYTHROCYTE [DISTWIDTH] IN BLOOD BY AUTOMATED COUNT: 14.6 % (ref 10–15)
GLUCOSE SERPL-MCNC: 111 MG/DL (ref 70–99)
HCO3 SERPL-SCNC: 22 MMOL/L (ref 22–29)
HCT VFR BLD AUTO: 38.3 % (ref 40–53)
HGB BLD-MCNC: 11.9 G/DL (ref 13.3–17.7)
INR PPP: 1.54 (ref 0.85–1.15)
INR PPP: 1.59 (ref 0.85–1.15)
LDH SERPL L TO P-CCNC: 239 U/L (ref 0–250)
LVEF ECHO: NORMAL
MAGNESIUM SERPL-MCNC: 2 MG/DL (ref 1.7–2.3)
MCH RBC QN AUTO: 29.8 PG (ref 26.5–33)
MCHC RBC AUTO-ENTMCNC: 31.1 G/DL (ref 31.5–36.5)
MCV RBC AUTO: 96 FL (ref 78–100)
PLATELET # BLD AUTO: 184 10E3/UL (ref 150–450)
POTASSIUM SERPL-SCNC: 4.1 MMOL/L (ref 3.4–5.3)
POTASSIUM SERPL-SCNC: 4.1 MMOL/L (ref 3.4–5.3)
RBC # BLD AUTO: 4 10E6/UL (ref 4.4–5.9)
SODIUM SERPL-SCNC: 139 MMOL/L (ref 135–145)
UFH PPP CHRO-ACNC: 0.54 IU/ML
VANCOMYCIN SERPL-MCNC: 10.8 UG/ML
WBC # BLD AUTO: 5.1 10E3/UL (ref 4–11)

## 2024-08-27 PROCEDURE — 82565 ASSAY OF CREATININE: CPT | Performed by: STUDENT IN AN ORGANIZED HEALTH CARE EDUCATION/TRAINING PROGRAM

## 2024-08-27 PROCEDURE — 86140 C-REACTIVE PROTEIN: CPT

## 2024-08-27 PROCEDURE — 80202 ASSAY OF VANCOMYCIN: CPT | Performed by: STUDENT IN AN ORGANIZED HEALTH CARE EDUCATION/TRAINING PROGRAM

## 2024-08-27 PROCEDURE — 93750 INTERROGATION VAD IN PERSON: CPT

## 2024-08-27 PROCEDURE — 99221 1ST HOSP IP/OBS SF/LOW 40: CPT | Mod: FS | Performed by: PHYSICIAN ASSISTANT

## 2024-08-27 PROCEDURE — 85520 HEPARIN ASSAY: CPT | Performed by: STUDENT IN AN ORGANIZED HEALTH CARE EDUCATION/TRAINING PROGRAM

## 2024-08-27 PROCEDURE — 93306 TTE W/DOPPLER COMPLETE: CPT | Mod: 26 | Performed by: STUDENT IN AN ORGANIZED HEALTH CARE EDUCATION/TRAINING PROGRAM

## 2024-08-27 PROCEDURE — 93306 TTE W/DOPPLER COMPLETE: CPT

## 2024-08-27 PROCEDURE — 85018 HEMOGLOBIN: CPT

## 2024-08-27 PROCEDURE — 36415 COLL VENOUS BLD VENIPUNCTURE: CPT | Performed by: STUDENT IN AN ORGANIZED HEALTH CARE EDUCATION/TRAINING PROGRAM

## 2024-08-27 PROCEDURE — 99223 1ST HOSP IP/OBS HIGH 75: CPT | Mod: GC | Performed by: STUDENT IN AN ORGANIZED HEALTH CARE EDUCATION/TRAINING PROGRAM

## 2024-08-27 PROCEDURE — 999N000128 HC STATISTIC PERIPHERAL IV START W/O US GUIDANCE

## 2024-08-27 PROCEDURE — 85610 PROTHROMBIN TIME: CPT | Performed by: STUDENT IN AN ORGANIZED HEALTH CARE EDUCATION/TRAINING PROGRAM

## 2024-08-27 PROCEDURE — 250N000011 HC RX IP 250 OP 636

## 2024-08-27 PROCEDURE — 87070 CULTURE OTHR SPECIMN AEROBIC: CPT

## 2024-08-27 PROCEDURE — 82374 ASSAY BLOOD CARBON DIOXIDE: CPT

## 2024-08-27 PROCEDURE — 99223 1ST HOSP IP/OBS HIGH 75: CPT | Performed by: INTERNAL MEDICINE

## 2024-08-27 PROCEDURE — 120N000005 HC R&B MS OVERFLOW UMMC

## 2024-08-27 PROCEDURE — 250N000013 HC RX MED GY IP 250 OP 250 PS 637

## 2024-08-27 PROCEDURE — 36415 COLL VENOUS BLD VENIPUNCTURE: CPT

## 2024-08-27 PROCEDURE — 84132 ASSAY OF SERUM POTASSIUM: CPT

## 2024-08-27 PROCEDURE — 250N000013 HC RX MED GY IP 250 OP 250 PS 637: Performed by: STUDENT IN AN ORGANIZED HEALTH CARE EDUCATION/TRAINING PROGRAM

## 2024-08-27 RX ORDER — SPIRONOLACTONE 25 MG
12.5 TABLET ORAL DAILY
Status: DISCONTINUED | OUTPATIENT
Start: 2024-08-27 | End: 2024-08-30 | Stop reason: HOSPADM

## 2024-08-27 RX ORDER — AMOXICILLIN 250 MG
1 CAPSULE ORAL 2 TIMES DAILY PRN
Status: DISCONTINUED | OUTPATIENT
Start: 2024-08-27 | End: 2024-08-30 | Stop reason: HOSPADM

## 2024-08-27 RX ORDER — MAGNESIUM OXIDE 400 MG/1
800 TABLET ORAL 2 TIMES DAILY
Status: DISCONTINUED | OUTPATIENT
Start: 2024-08-27 | End: 2024-08-30 | Stop reason: HOSPADM

## 2024-08-27 RX ORDER — METOPROLOL SUCCINATE 25 MG/1
25 TABLET, EXTENDED RELEASE ORAL DAILY
Status: DISCONTINUED | OUTPATIENT
Start: 2024-08-27 | End: 2024-08-30 | Stop reason: HOSPADM

## 2024-08-27 RX ORDER — HEPARIN SODIUM 10000 [USP'U]/100ML
0-5000 INJECTION, SOLUTION INTRAVENOUS CONTINUOUS
Status: DISCONTINUED | OUTPATIENT
Start: 2024-08-27 | End: 2024-08-30 | Stop reason: HOSPADM

## 2024-08-27 RX ORDER — POTASSIUM CHLORIDE 1500 MG/1
60 TABLET, EXTENDED RELEASE ORAL 2 TIMES DAILY
Status: DISCONTINUED | OUTPATIENT
Start: 2024-08-27 | End: 2024-08-30 | Stop reason: HOSPADM

## 2024-08-27 RX ORDER — ACETAMINOPHEN 325 MG/1
650 TABLET ORAL EVERY 4 HOURS PRN
Status: DISCONTINUED | OUTPATIENT
Start: 2024-08-27 | End: 2024-08-30 | Stop reason: HOSPADM

## 2024-08-27 RX ORDER — AMOXICILLIN 250 MG
2 CAPSULE ORAL 2 TIMES DAILY PRN
Status: DISCONTINUED | OUTPATIENT
Start: 2024-08-27 | End: 2024-08-30 | Stop reason: HOSPADM

## 2024-08-27 RX ORDER — ALLOPURINOL 100 MG/1
100 TABLET ORAL EVERY MORNING
Status: DISCONTINUED | OUTPATIENT
Start: 2024-08-27 | End: 2024-08-30 | Stop reason: HOSPADM

## 2024-08-27 RX ORDER — MAGNESIUM HYDROXIDE/ALUMINUM HYDROXICE/SIMETHICONE 120; 1200; 1200 MG/30ML; MG/30ML; MG/30ML
30 SUSPENSION ORAL EVERY 4 HOURS PRN
Status: DISCONTINUED | OUTPATIENT
Start: 2024-08-27 | End: 2024-08-30 | Stop reason: HOSPADM

## 2024-08-27 RX ORDER — MAGNESIUM OXIDE 400 MG/1
400 TABLET ORAL EVERY 4 HOURS
Status: COMPLETED | OUTPATIENT
Start: 2024-08-27 | End: 2024-08-27

## 2024-08-27 RX ORDER — HYDROXYZINE HYDROCHLORIDE 25 MG/1
25-50 TABLET, FILM COATED ORAL EVERY 6 HOURS PRN
Status: DISCONTINUED | OUTPATIENT
Start: 2024-08-27 | End: 2024-08-30 | Stop reason: HOSPADM

## 2024-08-27 RX ORDER — SOTALOL HYDROCHLORIDE 120 MG/1
120 TABLET ORAL 2 TIMES DAILY
Status: DISCONTINUED | OUTPATIENT
Start: 2024-08-27 | End: 2024-08-30 | Stop reason: HOSPADM

## 2024-08-27 RX ORDER — CEFAZOLIN SODIUM 2 G/100ML
2 INJECTION, SOLUTION INTRAVENOUS EVERY 8 HOURS
Status: DISCONTINUED | OUTPATIENT
Start: 2024-08-27 | End: 2024-08-29 | Stop reason: ALTCHOICE

## 2024-08-27 RX ORDER — ACETAMINOPHEN 650 MG/1
650 SUPPOSITORY RECTAL EVERY 4 HOURS PRN
Status: DISCONTINUED | OUTPATIENT
Start: 2024-08-27 | End: 2024-08-30 | Stop reason: HOSPADM

## 2024-08-27 RX ORDER — VITAMIN B COMPLEX
50 TABLET ORAL EVERY MORNING
Status: DISCONTINUED | OUTPATIENT
Start: 2024-08-27 | End: 2024-08-30 | Stop reason: HOSPADM

## 2024-08-27 RX ADMIN — METOPROLOL SUCCINATE 25 MG: 25 TABLET, EXTENDED RELEASE ORAL at 08:56

## 2024-08-27 RX ADMIN — ALLOPURINOL 100 MG: 100 TABLET ORAL at 08:56

## 2024-08-27 RX ADMIN — SACUBITRIL AND VALSARTAN 1 TABLET: 97; 103 TABLET, FILM COATED ORAL at 21:14

## 2024-08-27 RX ADMIN — POTASSIUM CHLORIDE 60 MEQ: 1500 TABLET, EXTENDED RELEASE ORAL at 09:25

## 2024-08-27 RX ADMIN — FUROSEMIDE 60 MG: 40 TABLET ORAL at 15:29

## 2024-08-27 RX ADMIN — MAGNESIUM OXIDE TAB 400 MG (241.3 MG ELEMENTAL MG) 800 MG: 400 (241.3 MG) TAB at 08:56

## 2024-08-27 RX ADMIN — MAGNESIUM OXIDE TAB 400 MG (241.3 MG ELEMENTAL MG) 400 MG: 400 (241.3 MG) TAB at 17:47

## 2024-08-27 RX ADMIN — SOTALOL HYDROCHLORIDE 120 MG: 120 TABLET ORAL at 21:13

## 2024-08-27 RX ADMIN — POTASSIUM CHLORIDE 60 MEQ: 1500 TABLET, EXTENDED RELEASE ORAL at 21:13

## 2024-08-27 RX ADMIN — Medication 50 MCG: at 08:56

## 2024-08-27 RX ADMIN — SOTALOL HYDROCHLORIDE 120 MG: 120 TABLET ORAL at 09:25

## 2024-08-27 RX ADMIN — SPIRONOLACTONE 12.5 MG: 25 TABLET, FILM COATED ORAL at 09:25

## 2024-08-27 RX ADMIN — FUROSEMIDE 60 MG: 40 TABLET ORAL at 08:55

## 2024-08-27 RX ADMIN — Medication 25 MG: at 22:03

## 2024-08-27 RX ADMIN — OMEPRAZOLE 40 MG: 20 CAPSULE, DELAYED RELEASE ORAL at 09:25

## 2024-08-27 RX ADMIN — CEFAZOLIN SODIUM 2 G: 2 INJECTION, SOLUTION INTRAVENOUS at 17:47

## 2024-08-27 RX ADMIN — MAGNESIUM OXIDE TAB 400 MG (241.3 MG ELEMENTAL MG) 400 MG: 400 (241.3 MG) TAB at 14:05

## 2024-08-27 RX ADMIN — SACUBITRIL AND VALSARTAN 1 TABLET: 97; 103 TABLET, FILM COATED ORAL at 11:26

## 2024-08-27 RX ADMIN — MAGNESIUM OXIDE TAB 400 MG (241.3 MG ELEMENTAL MG) 800 MG: 400 (241.3 MG) TAB at 21:13

## 2024-08-27 RX ADMIN — HEPARIN SODIUM 1200 UNITS/HR: 10000 INJECTION, SOLUTION INTRAVENOUS at 09:21

## 2024-08-27 ASSESSMENT — ACTIVITIES OF DAILY LIVING (ADL)
ADLS_ACUITY_SCORE: 38
ADLS_ACUITY_SCORE: 22
ADLS_ACUITY_SCORE: 38
ADLS_ACUITY_SCORE: 22
ADLS_ACUITY_SCORE: 38
ADLS_ACUITY_SCORE: 22
ADLS_ACUITY_SCORE: 22
ADLS_ACUITY_SCORE: 38
ADLS_ACUITY_SCORE: 37
ADLS_ACUITY_SCORE: 24
ADLS_ACUITY_SCORE: 38
ADLS_ACUITY_SCORE: 22
ADLS_ACUITY_SCORE: 22
ADLS_ACUITY_SCORE: 24
ADLS_ACUITY_SCORE: 38
ADLS_ACUITY_SCORE: 22
ADLS_ACUITY_SCORE: 38
ADLS_ACUITY_SCORE: 38
ADLS_ACUITY_SCORE: 22

## 2024-08-27 NOTE — PHARMACY-ANTICOAGULATION SERVICE
Clinical Pharmacy - Warfarin Dosing Consult     Pharmacy has been consulted to manage this patient s warfarin therapy.  Indication: LVAD/RVAD  Therapy Goal: INR 2-3  Warfarin Prior to Admission: Yes  Warfarin PTA Regimen: 5 mg qd    INR   Date Value Ref Range Status   08/26/2024 1.54 (H) 0.85 - 1.15 Final     INR HOME MONITORING   Date Value Ref Range Status   08/22/2024 1.7 (L) 2.000 - 3.000 Final     Patient took Warfarin prior to admission.    Pharmacy will monitor Tej LEELA Morfin daily and order warfarin doses to achieve specified goal.      Please contact pharmacy as soon as possible if the warfarin needs to be held for a procedure or if the warfarin goals change.      Shona Roberson, PharmD, BCPS

## 2024-08-27 NOTE — PROGRESS NOTES
D: Stopped by to see patient. No VAD related questions or concerns at this time.   I: Discussed POC and provided support and listened to patient and caregiver's thoughts and concerns.  P: Continue to follow patient and address any questions or concerns patient and or caregiver may have.      Indication of Interrogation:  Hospital admission     Type of VAD:  Heartmate 3    Current Parameters:  Flow= 4.9 lpm, Speed= 5500 rpm, Power= 4.1 christiansen, PI (if applicable)= 2.5    Abnormal Alarm on History:  No    Abnormal Events/Parameters Notes:  No    Changes Made during Interrogation:  No

## 2024-08-27 NOTE — PHARMACY-VANCOMYCIN DOSING SERVICE
Pharmacy Vancomycin Initial Note  Date of Service 2024  Patient's  1965  59 year old, male    Indication: Skin and Soft Tissue Infection    Current estimated CrCl = Estimated Creatinine Clearance: 71.1 mL/min (A) (based on SCr of 1.4 mg/dL (H)).    Creatinine for last 3 days  No results found for requested labs within last 3 days.    Recent Vancomycin Level(s) for last 3 days  No results found for requested labs within last 3 days.      Vancomycin IV Administrations (past 72 hours)        No vancomycin orders with administrations in past 72 hours.                    Nephrotoxins and other renal medications (From now, onward)      Start     Dose/Rate Route Frequency Ordered Stop    24 2330  vancomycin (VANCOCIN) 1,750 mg in sodium chloride 0.9 % 500 mL intermittent infusion         1,750 mg  over 120 Minutes Intravenous EVERY 24 HOURS 24 2246              Contrast Orders - past 72 hours (72h ago, onward)      None            [employer] Prediction of Planned Initial Vancomycin Regimen  Loading dose: N/A  Regimen: 1750 mg IV every 24 hours.  Start time: 23:05 on 2024  Exposure target: AUC24 (range)400-600 mg/L.hr   AUC24,ss: 480 mg/L.hr  Probability of AUC24 > 400: 70 %  Ctrough,ss: 13.7 mg/L  Probability of Ctrough,ss > 20: 19 %  Probability of nephrotoxicity (Lodise IVONNE ): 9 %        Plan:  Start vancomycin  1750 mg IV q24h.   Vancomycin monitoring method: AUC  Vancomycin therapeutic monitoring goal: 400-600 mg*h/L  Pharmacy will check vancomycin levels as appropriate in 3-5 Days.    Serum creatinine levels will be ordered daily for the first week of therapy and at least twice weekly for subsequent weeks.      Livia Gaona, PharmD, BCEMP, BCPS

## 2024-08-27 NOTE — PROGRESS NOTES
GENERAL ID SERVICE CONSULTATION     Patient:  Tej Morfin   Date of birth 1965, Medical record number [id number]  Date of Visit:  08/27/2024  Date of Admission: 8/26/2024  Consult Requester:Dianne Damon MD        Physician Attestation   I, Pattie Hargrove MD, was present with the medical/WILIAN student who participated in the service and in the documentation of the note.  I have verified the history and personally performed the physical exam and medical decision making.  I agree with the assessment and plan of care as documented in the note.      Key findings: See student note, as edited by me, for details of plan. 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 Per phone images reviewed with pt's wife (who does the dressings) there has been new tissue growth at the site over the past ~3 weeks. Pt also reports a ~50 lb weight gain, primarily in the abdomen, since his LVAD was placed. We discussed with [facility] and blood cultures remain negative thus far, site culture again with MSSA. I suspect this is an anatomic issue/source control related rather than a failure of appropriate antimicrobials. The weight gain may have misplaced the line, but I suspect the acute issue is the ?granulation tissue at the exit site that prevents drainage. Appreciate CT surgery input. Recommend cefazolin for now, duration TBD.     Pattie Hargrove MD  Date of Service (when I saw the patient): 08/27/24           Assessment and Recommendations:   ASSESSMENT:  LVAD Driveline Infeciton  New/worsening purulent drainage from site  MSSA driveline colonization  HFrEF s/p LVAD (3/23/2022)  CKD III (Baseline Cr 1.3 and eGFR 60)    DISCUSSION:   Tej Morfin is a 58yr old male with a past medical history of chronic systolic heart failure, HFrEF, NICM s/p HM3 implant 3/23/2022, severe MR  s/p MV repair, s/p CRT-D, VT on sotalol, and chronic kidney disease, stage II-III, depression and anxiety    Tej's infection has been characterized by multiple hospital visits with waxing and waning infections from the exit site of his drive line starting with his first visit to outside [facility] near his home in [place] 3/2024. Pt presented to clinic with complaints of purulent drainage, pain and redness at exit site of drive line. Cultures yield MSSA, Pt prescribed doxycycline with resolution of symptoms. Pt did well for several weeks but purulent drainage returned again 4/25/2024 again, patient prescribed doxycycline with similar results on blood cultures. Pt again went several weeks and was reluctant to go back to see physician for more workup of drive site drainage. With time, the infection worsened to the point of requiring dressing changes daily with increasing pain. Pt presented to [facility] for evaluation again blood cultures again yield staph species, treated with cefadroxil resulting in alleviation of symptoms. Pt unfortunately again presented back to [facility] ED 08/25 for workup again of purulence and then referred to [facility] for evaluation of site.    CT abdomen yields no abscess or fluid collection but difficult to discern on CT if infection is truly present due to CT artifacts from tube material. Pt reported belly fullness with pain in epigastric region near exit site. Blood cultures to this point have shown no growth. Low suspicion for bacteremia.    Mr. Burnham's course of undulating purulence in the setting of multiple hospital visits and successful AB treatments to end up with the same infection again; we believe, is attributable to lack of source control. When speaking with Mr. Morfin's wife she presented pictures of exit site with drainage and associated hyperplastic, inflamed tissue. Moreover, Tej endorses weight gain in excess of 50 lbs since LVAD implantation. With such large  increases in belly circumference irritation of exit site is possible and can allow for bacterial microleakage. It is possible these two matters could be contributing to difficulty with obtaining source control. Would appreciate input from cardiology and CT surgery.    RECOMMENDATION:  Discontinue Vancomycin  Start IV Cefazolin 2g Q8H  Follow pending culture results  2.   Evaluation for possible intervention from CT surgery    Thank you for this consult. ID will continue to follow.     Patient was discussed with Dr. Beena MD.     Marquis Dony, DMD    ________________________________________________________________    Consult Question:.  Admission Diagnosis: Infection associated with driveline of left ventricular assist device (LVAD) (H24) [T82.7XXA]         History of Present Illness:     Tej Mrofin is a 58yr old male with a past medical history of chronic systolic heart failure, HFrEF, NICM s/p HM3 implant 3/23/2022, severe MR s/p MV repair, s/p CRT-D, VT on sotalol, and chronic kidney disease, stage II-III, depression and anxiety    Mr. Morfin's infection course started on 3/13/24 by noting pain with a drainage from the driveline exit site. He presented emergency room and culture were done and grew methicillin-sensitive staphylococcus aureus. This was treated with doxycycline for 2 weeks with improvement in drainage and resolution of pain. However, drainage and pain started again requiring ER visit on 4/17. Blood cultures were sent which again grew MSSA CT was treated with doxycycline which he is still on with improvement but still some residual pain and a small amount of drainage that his wife noticed. Unfortunately again 7/12/2024 Mr Morfin again presented to outside hospital in home state of [place] for evaluation of pain and drainage from drive line site. Continuing with clinical paradigm blood cultures yet again yield staph infection a Rx for cefadroxil was prescribed with alleviation of  symptoms. Reproted again to [place] [facility] ED 8/25 for cencers of drive line infection in the setting of recent pus. BC collected with no growth at this time. CT Abdomen yield no abscess collection.          Review of Systems:   CONSTITUTIONAL:  No fevers or chills  EYES: negative for icterus  ENT:  negative for hearing loss, tinnitus and sore throat  RESPIRATORY:  negative for cough with sputum and dyspnea  GASTROINTESTINAL:  Epigastric pain, firm on palpation. Rest of belly soft  GENITOURINARY:  negative for dysuria  HEME:  No easy bruising  INTEGUMENT:  Rash surrounding driveline  NEURO:  Negative for headache  OSTEO: Back pain         Past Medical History:     Past Medical History:   Diagnosis Date    Chronic systolic heart failure (H) 2/7/2017    Gastroparesis     ICD (implantable cardioverter-defibrillator) in place     [employer]    Non-ischemic cardiomyopathy (H)     Paroxysmal ventricular tachycardia (H) 2/7/2017    S/P mitral valve replacement 2/7/2017    Tetrahydrocannabinol (THC) use disorder, mild, in controlled environment, abuse 09/12/2019    occasional edible THC. reportedly for sleep, anxiety    Tobacco abuse, episodic     occasional cigar - ~ 3x/wk            Past Surgical History:     Past Surgical History:   Procedure Laterality Date    CV INTRA AORTIC BALLOON N/A 3/22/2022    Procedure: Intraprocedure Aortic Balloon Pump Insertion;  Surgeon: Pedro Callahan MD;  Location:  HEART CARDIAC CATH LAB    CV RIGHT HEART CATH MEASUREMENTS RECORDED N/A 3/22/2022    Procedure: Right Heart Catheterization;  Surgeon: Pedro Callahan MD;  Location:  HEART CARDIAC CATH LAB    CV RIGHT HEART CATH MEASUREMENTS RECORDED N/A 9/22/2022    Procedure: Heart Cath Right Heart Cath;  Surgeon: Luis Miguel Sparrow MD;  Location:  HEART CARDIAC CATH LAB    CV RIGHT HEART CATH MEASUREMENTS RECORDED N/A 1/24/2023    Procedure: Heart Cath Right Heart Cath;  Surgeon: Jericho Lawrence MD;   Location:  HEART CARDIAC CATH LAB    CV RIGHT HEART CATH MEASUREMENTS RECORDED N/A 5/3/2024    Procedure: Heart Cath Right Heart Cath;  Surgeon: Luis Miguel Sparrow MD;  Location:  HEART CARDIAC CATH LAB    ESOPHAGOSCOPY, GASTROSCOPY, DUODENOSCOPY (EGD), COMBINED N/A 2024    Procedure: Esophagoscopy, gastroscopy, duodenoscopy (EGD), combined;  Surgeon: Stacey Knight MD;  Location:  GI    HEMORRHOIDECTOMY      ICD implantation       INSERT VENTRICULAR ASSIST DEVICE LEFT (HEARTMATE II) N/A 3/23/2022    Procedure: Redo Sterotomy, Insertion  Left Ventricular Assist Device  (HEARTMATE III) on Cardiopulmonary Bypass, Transesophageal Echocardiogram by Anesthesia;  Surgeon: Wilver Rodríguez MD;  Location:  OR    R partial medial meniscectomy      REPAIR VALVE MITRAL              Family History:   Reviewed and non-contributory.   Family History   Problem Relation Age of Onset    Coronary Artery Disease Mother     Kidney failure Mother     Cardiomyopathy Father          age 51    Anesthesia Reaction No family hx of     Venous thrombosis No family hx of             Social History:   Lives with wife in [place], [place]. Not currently working.          Current Medications:     Current Facility-Administered Medications   Medication Dose Route Frequency Provider Last Rate Last Admin    allopurinol (ZYLOPRIM) tablet 100 mg  100 mg Oral Breonan Ferrari MD   100 mg at 24 0856    furosemide (LASIX) tablet 60 mg  60 mg Oral BID Breonna Gunter MD   60 mg at 24 0855    magnesium oxide (MAG-OX) tablet 400 mg  400 mg Oral Q4H Dianne Damon MD        magnesium oxide (MAG-OX) tablet 800 mg  800 mg Oral BID Breonna Gunter MD   800 mg at 24 0856    metoprolol succinate ER (TOPROL XL) 24 hr tablet 25 mg  25 mg Oral Daily Breonna Gunter MD   25 mg at 24 0856    omeprazole (PriLOSEC) CR capsule 40 mg  40 mg Oral Breonna Ferrari MD   40 mg at 24 0925    potassium chloride alfonso  "ER (KLOR-CON M20) CR tablet 60 mEq  60 mEq Oral BID Breonna Gunter MD   60 mEq at 08/27/24 0925    sacubitril-valsartan (ENTRESTO)  MG per tablet 1 tablet  1 tablet Oral BID Breonna Gunter MD   1 tablet at 08/27/24 1126    sotalol (BETAPACE) tablet 120 mg  120 mg Oral BID Breonna Gunter MD   120 mg at 08/27/24 0925    spironolactone (ALDACTONE) half-tab 12.5 mg  12.5 mg Oral Daily Breonna Gunter MD   12.5 mg at 08/27/24 0925    vancomycin (VANCOCIN) 1,750 mg in sodium chloride 0.9 % 500 mL intermittent infusion  1,750 mg Intravenous Q24H Breonna Gunter MD   Stopped at 08/27/24 0647    Vitamin D3 (CHOLECALCIFEROL) tablet 50 mcg  50 mcg Oral QAM Breonna Gunter MD   50 mcg at 08/27/24 0856            Allergies:     Allergies   Allergen Reactions    Tilactase Diarrhea     Pt reports intake is in moderation, but can tolerate.            Physical Exam:   Vitals were reviewed  Patient Vitals for the past 24 hrs:   Temp Temp src Pulse Resp SpO2 Height Weight   08/27/24 0844 -- -- 120 -- -- -- --   08/27/24 0835 97.9  F (36.6  C) Oral -- 18 98 % -- 109.1 kg (240 lb 8 oz)   08/26/24 2140 -- -- -- -- 97 % -- --   08/26/24 2138 97.5  F (36.4  C) -- 81 16 -- 1.778 m (5' 10\") 106.6 kg (235 lb)       Physical Examination:  GENERAL:  well-developed, well-nourished, in bed in no acute distress.   HEENT:  Head is normocephalic, atraumatic   EYES:  Eyes have anicteric sclerae without conjunctival injection or stigmata of endocarditis.    ENT:  Oropharynx is moist without exudates or ulcers.   NECK:  Supple.   LUNGS:  Clear to auscultation bilaterally, normal WOB on RA.   CARDIOVASCULAR:  +diffuse LVAD hum. LVAD dressing in place, not removed, appears clean. No peripheral edema  ABDOMEN:  Normal bowel sounds, soft, nontender.  SKIN:  No acute rashes.    NEUROLOGIC:  Grossly nonfocal. Active x4 extremities         Laboratory Data:   ematology Studies    Recent Labs   Lab Test 08/27/24  0829 08/26/24  2225 05/02/24  1353 12/15/23  1011 " 08/10/23  1436 05/04/23  1328 03/22/22  1343 09/10/19  0649   WBC 5.1 5.2 5.5 5.2 4.9 6.3   < > 4.2   ANEU  --   --   --   --   --   --   --  1.9   AEOS  --   --   --   --   --   --   --  0.2   HGB 11.9* 12.8* 12.5* 13.8 14.0 13.7   < > 15.8   MCV 96 95 97 96 96 97   < > 98    194 207 199 199 172   < > 177    < > = values in this interval not displayed.     Metabolic Studies     Recent Labs   Lab Test 08/27/24  0854 08/26/24  2225 05/02/24  1353 12/15/23  1011 08/10/23  1436    139 139 143 143   POTASSIUM 4.1  4.1 4.5 4.2 4.5 4.7   CHLORIDE 106 105 102 107 106   CO2 22 22 26 26 26   BUN 23.1* 22.3* 20.5* 18.9 20.9*   CR 1.20*  1.20* 1.40* 1.47* 1.42* 1.56*   GFRESTIMATED 71  71 59* 56* 58* 52*     Hepatic Studies    Recent Labs   Lab Test 08/26/24 2225 05/02/24  1353 12/15/23  1011 08/10/23  1436 05/04/23  1328 01/23/23  1322   BILITOTAL 0.3 0.6 0.4 0.4 0.2 0.3   ALKPHOS 67 79 69 74 79 66   ALBUMIN 3.6 4.0 3.9 4.3 4.0 3.9   AST 20 18 20 23 20 20   ALT 22 17 17 24 21 20

## 2024-08-27 NOTE — ED PROVIDER NOTES
[place] EMERGENCY DEPARTMENT ([facility])    8/26/24       ED PROVIDER NOTE    History     Chief Complaint   Patient presents with    Vascular Access Problem     LVAD pt. From [facility]. Pt states he was at the ER there and was told he has a drive line infection. Pt was waiting for admission but was unable to get a room so he drove here today.     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 Symptoms began in March and have been persistent since. He has been taking Cefadroxil since March without improvement. He reports burning and increased drainage from driveline site.  Symptoms worsen over the weekend.  Endorsing fevers and chills. No low-flow alarms. No fluid retention. No other symptoms noted.    CT ABDOMEN PELVIS WITH CONTRAST 8/25/24  IMPRESSION:   1. No abscess/collection or definite acute abnormalities appreciated about the LVAD.   2. Stable likely benign appearing low-attenuation hepatic and left renal lesions as described.   3. Additional chronic/incidental findings as described.     Past Medical History  Past Medical History:   Diagnosis Date    Chronic systolic heart failure (H) 2/7/2017    Gastroparesis     ICD (implantable cardioverter-defibrillator) in place     [employer]    Non-ischemic cardiomyopathy (H)     Paroxysmal ventricular tachycardia (H) 2/7/2017    S/P mitral valve replacement 2/7/2017    Tetrahydrocannabinol (THC) use disorder, mild, in controlled environment, abuse 09/12/2019    occasional edible THC. reportedly for sleep, anxiety    Tobacco abuse, episodic     occasional cigar - ~ 3x/wk     Past Surgical History:   Procedure Laterality Date    CV INTRA AORTIC BALLOON N/A 3/22/2022    Procedure:  Intraprocedure Aortic Balloon Pump Insertion;  Surgeon: Pedro Callahan MD;  Location:  HEART CARDIAC CATH LAB    CV RIGHT HEART CATH MEASUREMENTS RECORDED N/A 3/22/2022    Procedure: Right Heart Catheterization;  Surgeon: Pedro Callahan MD;  Location:  HEART CARDIAC CATH LAB    CV RIGHT HEART CATH MEASUREMENTS RECORDED N/A 9/22/2022    Procedure: Heart Cath Right Heart Cath;  Surgeon: Luis Miguel Sparrow MD;  Location:  HEART CARDIAC CATH LAB    CV RIGHT HEART CATH MEASUREMENTS RECORDED N/A 1/24/2023    Procedure: Heart Cath Right Heart Cath;  Surgeon: Jericho Lawrence MD;  Location:  HEART CARDIAC CATH LAB    CV RIGHT HEART CATH MEASUREMENTS RECORDED N/A 5/3/2024    Procedure: Heart Cath Right Heart Cath;  Surgeon: Luis Miguel Sparrow MD;  Location:  HEART CARDIAC CATH LAB    ESOPHAGOSCOPY, GASTROSCOPY, DUODENOSCOPY (EGD), COMBINED N/A 5/2/2024    Procedure: Esophagoscopy, gastroscopy, duodenoscopy (EGD), combined;  Surgeon: Stacey Knight MD;  Location:  GI    HEMORRHOIDECTOMY      ICD implantation       INSERT VENTRICULAR ASSIST DEVICE LEFT (HEARTMATE II) N/A 3/23/2022    Procedure: Redo Sterotomy, Insertion  Left Ventricular Assist Device  (HEARTMATE III) on Cardiopulmonary Bypass, Transesophageal Echocardiogram by Anesthesia;  Surgeon: Wilver Rodríguez MD;  Location: [facility] OR    R partial medial meniscectomy      REPAIR VALVE MITRAL       acetaminophen (TYLENOL) 325 MG tablet  allopurinol (ZYLOPRIM) 100 MG tablet  amoxicillin (AMOXIL) 500 MG capsule  cefadroxil (DURICEF) 500 MG capsule  cholecalciferol 50 MCG (2000 UT) CAPS  furosemide (LASIX) 40 MG tablet  hydrOXYzine (VISTARIL) 50 MG capsule  magnesium oxide (MAG-OX) 400 MG tablet  metoprolol succinate ER (TOPROL XL) 25 MG 24 hr tablet  nitroGLYcerin (NITROSTAT) 0.4 MG sublingual tablet  omeprazole (PRILOSEC) 40 MG DR capsule  potassium chloride alfonso ER (KLOR-CON M20) 20 MEQ CR  "tablet  sacubitril-valsartan (ENTRESTO)  MG per tablet  sotalol (BETAPACE) 120 MG tablet  spironolactone (ALDACTONE) 25 MG tablet  warfarin ANTICOAGULANT (COUMADIN) 2.5 MG tablet      Allergies   Allergen Reactions    Tilactase Diarrhea     Pt reports intake is in moderation, but can tolerate.     Family History  Family History   Problem Relation Age of Onset    Coronary Artery Disease Mother     Kidney failure Mother     Cardiomyopathy Father          age 51    Anesthesia Reaction No family hx of     Venous thrombosis No family hx of      Social History   Social History     Tobacco Use    Smoking status: Former     Types: Cigarettes     Passive exposure: Never    Smokeless tobacco: Never   Substance Use Topics    Alcohol use: No     Alcohol/week: 0.0 standard drinks of alcohol    Drug use: No      Past medical history, past surgical history, medications, allergies, family history, and social history were reviewed with the patient. No additional pertinent items.     A complete review of systems was performed with pertinent positives and negatives noted in the HPI, and all other systems negative.    Physical Exam   Pulse: 81  Temp: 97.5  F (36.4  C)  Resp: 16  Height: 177.8 cm (5' 10\")  Weight: 106.6 kg (235 lb)  SpO2: 97 %  Physical Exam  Vitals and nursing note reviewed.   Constitutional:       General: He is not in acute distress.     Appearance: He is well-developed. He is not diaphoretic.   HENT:      Head: Normocephalic and atraumatic.      Mouth/Throat:      Pharynx: No oropharyngeal exudate.   Eyes:      General: No scleral icterus.        Right eye: No discharge.         Left eye: No discharge.      Pupils: Pupils are equal, round, and reactive to light.   Cardiovascular:      Comments: Mechanical sound from LVAD.  Pulmonary:      Effort: Pulmonary effort is normal. No respiratory distress.      Breath sounds: Normal breath sounds. No wheezing.   Chest:      Chest wall: No tenderness.   Abdominal: "      General: Bowel sounds are normal. There is no distension.      Palpations: Abdomen is soft.      Tenderness: There is no abdominal tenderness.      Comments: Dressing in place over driveline site with some saturation of gauze   Musculoskeletal:         General: No tenderness or deformity. Normal range of motion.      Cervical back: Normal range of motion and neck supple.   Skin:     General: Skin is warm and dry.      Coloration: Skin is not pale.      Findings: No erythema or rash.   Neurological:      Mental Status: He is alert and oriented to person, place, and time.      Cranial Nerves: No cranial nerve deficit.           ED Course, Procedures, & Data      Procedures                No results found for any visits on 08/26/24.  Medications - No data to display  Labs Ordered and Resulted from Time of ED Arrival to Time of ED Departure - No data to display  No orders to display              Assessment & Plan    This 59-year-old male with LVAD who presents with driveline infection, patient was admitted since March and has not resolved with outpatient treatment.  He is worsening over the weekend.  No issues with LVAD.  He has been having purulent drainage.  On exam there is some drainage through the gauze.  Lab work shows no acute abnormalities.  Blood culture pending at this time.  Based on recent wound cultures patient was given vancomycin.  I discussed case with Cardiology will admit to their service.    I have reviewed the nursing notes. I have reviewed the findings, diagnosis, plan and need for follow up with the patient.    New Prescriptions    No medications on file       Final diagnoses:   None   Ryne NEW, am serving as a trained medical scribe to document services personally performed by Mainor Aguilar DO based on the provider's statements to me on August 26, 2024.  This document has been checked and approved by the attending provider.    I, Mainor Aguilar DO, was physically present and have  reviewed and verified the accuracy of this note documented by Ryne Moreau medical scribe.      Mainor Aguilar DO  [facility] EMERGENCY DEPARTMENT  8/26/2024     Mainor Aguilar DO  08/27/24 0126

## 2024-08-27 NOTE — PROGRESS NOTES
Care Management Follow Up    Length of Stay (days): 1    Expected Discharge Date: 08/29/2024     Concerns to be Addressed:       Patient plan of care discussed at interdisciplinary rounds: Yes    Anticipated Discharge Disposition:  Home     Anticipated Discharge Services:  None  Anticipated Discharge DME:  None    Patient/family educated on Medicare website which has current facility and service quality ratings:  N/A  Education Provided on the Discharge Plan:  N/A  Patient/Family in Agreement with the Plan: N/A     Referrals Placed by CM/SW:  None  Private pay costs discussed: Not applicable    Discussed  Partnership in Safe Discharge Planning  document with patient/family: No     Handoff Completed: No, handoff not indicated or clinically appropriate    Additional Information:  Patient well known to VAD program due to receiving VAD on 3/23/22. Patient currently on wait list for transplant as status 4. SW introduced self to patient and discussed transition in social work program. The dyad discussed patient's life post-VAD and patient reported having a good quality of life and being independent with his VAD.    He reports continuing to receive disability income and that it has been an adjustment with receiving a lower income than previously when he worked full time. Patient reports his wife brings in an income as well. He reports no immediate financial concerns and that both he and his wife have grown to learn how to navigate their reduced income. He denied concerns with chemical or mental health.    SW discussed advanced care directive. Patient denied having one and was receptive to SW dropping paper copy off for patient to review. Patient denied additional questions or concerns at this time.      Celeste Vu, MSW, LGSW, APSW  Heart Transplant/[facility]   [employer]  Ph. [phone]

## 2024-08-27 NOTE — ED TRIAGE NOTES
LVAD pt. From [facility]. Pt states he was at the ER there and was told he has a drive line infection. Pt was waiting for admission but was unable to get a room so he drove here today.      Triage Assessment (Adult)       Row Name 08/26/24 4856          Triage Assessment    Airway WDL WDL        Respiratory WDL    Respiratory WDL WDL        Skin Circulation/Temperature WDL    Skin Circulation/Temperature WDL WDL        Cardiac WDL    Cardiac WDL WDL        Peripheral/Neurovascular WDL    Peripheral Neurovascular WDL WDL        Cognitive/Neuro/Behavioral WDL    Cognitive/Neuro/Behavioral WDL WDL

## 2024-08-27 NOTE — CONSULTS
".Cardiovascular and Thoracic Surgery Consultation      Tej Morfin MRN# [id number]   YOB: 1965 Age: 59 year old   Date of Admission: 8/26/2024     Reason for consult: LVAD driveline infection           Assessment and Plan:   LVAD Driveline Infection:    CT outside hospital: official read in images-\"No abscess/collection or definite acute abnormalities appreciated about the LVAD. Appears to be similar to previous CT this past April  Has been on series of antibiotics since March   He started to notice increasing drainage with odor and burning over the course of past couple of weeks. He has been doing daily dressing changes. Photos reviewed on patient's phone of driveline site, uploaded to patient's chart.  Had an ~3 min run of dysrhythmia this am while sitting up. Stable since lying down. Plans for echo today and serial blood cultures.     Will see with surgeon after am case.              Chief Complaint:   Presented to ED on 08/26/24 from [facility] with LVAD driveline infection.     History is obtained from the patient         History of Present Illness:   This patient is a 59 year old male with PMH of chronic systolic heart failure, HFrEF, NICM s/p HM3 implant 3/23/2022, severe MR -s/p repair, s/p CRT-D, VT on sotalol, stage II chronic kidney disease who presented to ED with concerns over driveline infection. He started having symptoms in March and drainage has continued since. He has been taking Cefadroxil since March without improvement. He had noticed increased odorous drainage from his driveline site but denies fever, chills, no alarms, fluid retention or other symptoms noted.                 Past Medical History:     Past Medical History:   Diagnosis Date    Chronic systolic heart failure (H) 2/7/2017    Gastroparesis     ICD (implantable cardioverter-defibrillator) in place     [employer]    Non-ischemic cardiomyopathy (H)     Paroxysmal ventricular tachycardia (H) 2/7/2017    " S/P mitral valve replacement 2/7/2017    Tetrahydrocannabinol (THC) use disorder, mild, in controlled environment, abuse 09/12/2019    occasional edible THC. reportedly for sleep, anxiety    Tobacco abuse, episodic     occasional cigar - ~ 3x/wk             Past Surgical History:     Past Surgical History:   Procedure Laterality Date    CV INTRA AORTIC BALLOON N/A 3/22/2022    Procedure: Intraprocedure Aortic Balloon Pump Insertion;  Surgeon: Pedro Callahan MD;  Location:  HEART CARDIAC CATH LAB    CV RIGHT HEART CATH MEASUREMENTS RECORDED N/A 3/22/2022    Procedure: Right Heart Catheterization;  Surgeon: Pedro Callahan MD;  Location:  HEART CARDIAC CATH LAB    CV RIGHT HEART CATH MEASUREMENTS RECORDED N/A 9/22/2022    Procedure: Heart Cath Right Heart Cath;  Surgeon: Luis Miguel Sparrow MD;  Location:  HEART CARDIAC CATH LAB    CV RIGHT HEART CATH MEASUREMENTS RECORDED N/A 1/24/2023    Procedure: Heart Cath Right Heart Cath;  Surgeon: Jericho Lawrence MD;  Location:  HEART CARDIAC CATH LAB    CV RIGHT HEART CATH MEASUREMENTS RECORDED N/A 5/3/2024    Procedure: Heart Cath Right Heart Cath;  Surgeon: Luis Miguel Sparrow MD;  Location:  HEART CARDIAC CATH LAB    ESOPHAGOSCOPY, GASTROSCOPY, DUODENOSCOPY (EGD), COMBINED N/A 5/2/2024    Procedure: Esophagoscopy, gastroscopy, duodenoscopy (EGD), combined;  Surgeon: Stacey Knight MD;  Location:  GI    HEMORRHOIDECTOMY      ICD implantation       INSERT VENTRICULAR ASSIST DEVICE LEFT (HEARTMATE II) N/A 3/23/2022    Procedure: Redo Sterotomy, Insertion  Left Ventricular Assist Device  (HEARTMATE III) on Cardiopulmonary Bypass, Transesophageal Echocardiogram by Anesthesia;  Surgeon: Wilver Rodríguez MD;  Location:  OR    R partial medial meniscectomy      REPAIR VALVE MITRAL                 Social History:     Social History     Tobacco Use    Smoking status: Former     Types: Cigarettes     Passive exposure:  Never    Smokeless tobacco: Never   Substance Use Topics    Alcohol use: No     Alcohol/week: 0.0 standard drinks of alcohol             Family History:     Family History   Problem Relation Age of Onset    Coronary Artery Disease Mother     Kidney failure Mother     Cardiomyopathy Father          age 51    Anesthesia Reaction No family hx of     Venous thrombosis No family hx of              Immunizations:     Immunization History   Administered Date(s) Administered    COVID-19 MONOVALENT 12+ (Pfizer) 2021, 2021, 10/02/2021    COVID-19 Monovalent 12+ (Pfizer ) 2022    Flu, Unspecified 2018    S7d6-50 Novel Flu 2010    Influenza (IIV3) PF 10/26/2010, 10/05/2015, 2017    Influenza Vaccine >6 months,quad, PF 2016, 2018, 2019, 10/02/2021    Influenza, seasonal, injectable, PF 10/03/2012, 2014    Pneumo Conj 13-V (2010&after) 2021    Pneumococcal 23 valent 2015, 2021    TDAP (Adacel,Boostrix) 2016    Zoster recombinant adjuvanted (SHINGRIX) 2018, 2018             Allergies:     Allergies   Allergen Reactions    Tilactase Diarrhea     Pt reports intake is in moderation, but can tolerate.             Medications:     Current Facility-Administered Medications   Medication Dose Route Frequency Provider Last Rate Last Admin    - MEDICATION INSTRUCTIONS -   Does not apply DOES NOT GO TO Breonna Hernandez MD        acetaminophen (TYLENOL) Suppository 650 mg  650 mg Rectal Q4H PRN Breonna Gunter MD        acetaminophen (TYLENOL) tablet 650 mg  650 mg Oral Q4H PRN Breonna Gunter MD        allopurinol (ZYLOPRIM) tablet 100 mg  100 mg Oral QAM Breonna Gunter MD        alum & mag hydroxide-simethicone (MAALOX) suspension 30 mL  30 mL Oral Q4H PRN Breonna Gunter MD        Continuing ACE inhibitor/ARB/ARNI from home medication list OR ACE inhibitor/ARB/ARNI order already placed during this visit   Does not apply DOES NOT GO TO CHANDU Gunter  MD Breonna        Continuing beta blocker from home medication list OR beta blocker order already placed during this visit   Does not apply DOES NOT GO TO MAR Breonna Gunter MD        furosemide (LASIX) tablet 60 mg  60 mg Oral BID Breonna Gunter MD        hydrOXYzine HCl (ATARAX) tablet 25-50 mg  25-50 mg Oral Q6H PRN Breonna Gunter MD        magnesium oxide (MAG-OX) tablet 800 mg  800 mg Oral BID Breonna Gunter MD        metoprolol succinate ER (TOPROL XL) 24 hr tablet 25 mg  25 mg Oral Daily Breonna Gunter MD        omeprazole (PriLOSEC) CR capsule 40 mg  40 mg Oral Breonna Ferrari MD        Patient is already receiving anticoagulation with heparin, enoxaparin (LOVENOX), warfarin (COUMADIN)  or other anticoagulant medication   Does not apply Continuous PRN Breonna Gunter MD        potassium chloride alfonso ER (KLOR-CON M20) CR tablet 60 mEq  60 mEq Oral BID Breonna Gunter MD        sacubitril-valsartan (ENTRESTO)  MG per tablet 1 tablet  1 tablet Oral BID Breonna Gunter MD        senna-docusate (SENOKOT-S/PERICOLACE) 8.6-50 MG per tablet 1 tablet  1 tablet Oral BID PRN Breonna Gunter MD        Or    senna-docusate (SENOKOT-S/PERICOLACE) 8.6-50 MG per tablet 2 tablet  2 tablet Oral BID PRN Breonna Gunter MD        sotalol (BETAPACE) tablet 120 mg  120 mg Oral BID Breonna Gunter MD        spironolactone (ALDACTONE) half-tab 12.5 mg  12.5 mg Oral Daily Breonna Gunter MD        traZODone (DESYREL) half-tab 25-50 mg  25-50 mg Oral At Bedtime PRN Breonna Gunter MD        vancomycin (VANCOCIN) 1,750 mg in sodium chloride 0.9 % 500 mL intermittent infusion  1,750 mg Intravenous Q24H Breonna Gunter MD   Stopped at 08/27/24 0647    Vitamin D3 (CHOLECALCIFEROL) tablet 50 mcg  50 mcg Oral QABreonna Green MD        Warfarin Dose Required Daily - Pharmacist Managed  1 each Oral See Admin Instructions Breonna Gunter MD                 Review of Systems:     The 5 point Review of Systems is negative other than noted in the HPI            Physical Exam:    Vitals were reviewed  Temp: 97.5  F (36.4  C)     Pulse: 81   Resp: 16 SpO2: 97 %      Constitutional:   awake, alert, cooperative, no apparent distress, and appears stated age     Eyes:   Lids and lashes normal, pupils equal, round and reactive to light      Neck:   Supple, symmetrical, trachea midline      Back:   Symmetric, no curvature, spinous processes are non-tender on palpation      Lungs:   Course breath sounds, no inc WOB     Cardiovascular:   Mechanical heart tone hum from LVAD     Abdomen:   No scars, normal bowel sounds, soft, non-distended, non-tender      Skin:   LVAD driveline: drainage noted on gauze, photos uploaded to chart.           Data:     Lab Results   Component Value Date    WBC 5.2 08/26/2024    HGB 12.8 (L) 08/26/2024    HCT 41.2 08/26/2024     08/26/2024     08/26/2024    POTASSIUM 4.5 08/26/2024    CHLORIDE 105 08/26/2024    CO2 22 08/26/2024    BUN 22.3 (H) 08/26/2024    CR 1.40 (H) 08/26/2024    GLC 88 08/26/2024    DD 0.85 (H) 01/23/2023    NTBNP 205 05/02/2024    AST 20 08/26/2024    ALT 22 08/26/2024    ALKPHOS 67 08/26/2024    BILITOTAL 0.3 08/26/2024    INR 1.54 (H) 08/26/2024

## 2024-08-27 NOTE — MEDICATION SCRIBE - ADMISSION MEDICATION HISTORY
Medication Scribe Admission Medication History    Admission medication history is complete. The information provided in this note is only as accurate as the sources available at the time of the update.    Information Source(s): Patient and CareEverywhere/[employer] via in-person    Pertinent Information:   -Pt stated that he takes his Allopurinol 100 mg as 1 tab every day  -Pt stated he is currently taking his warfarin 2.5 mg tab as 2 tab every day (normally at 1800)      PHARMACIST ADDENDUM  08/27/24  8:03 AM  -Entresto  mg 1 tab BID - per Care Everywhere chart review and [facility] Discharge Liaison, confirmed that pt is receiving the drug through the 's ([employer]) patient assistance program, approved through the end of 2024, and pt may need to reapply for 2025.    Wes Pinto, PharmD, BCPS     -Per last Warfarin anticoagulation clinic      Changes made to PTA medication list:  Added: Trazodone 50 mg  Deleted: None  Changed: None    Allergies reviewed with patient and updates made in EHR: yes    Medication History Completed By: Tesha Mace 8/26/2024 11:34 PM    PTA Med List   Medication Sig Last Dose    acetaminophen (TYLENOL) 325 MG tablet Take 2 tablets (650 mg) by mouth every 4 hours as needed for pain Unknown at unknown    allopurinol (ZYLOPRIM) 100 MG tablet Take 100 mg by mouth every morning. 8/26/2024 at am    amoxicillin (AMOXIL) 500 MG capsule Take 2,000 mg by mouth once as needed Take 4 tabs 1 hour before all dental cleanings and procedures Unknown at unknown    cefadroxil (DURICEF) 500 MG capsule Take 2 capsules (1,000 mg) by mouth 2 times daily 8/26/2024 at am    cholecalciferol 50 MCG (2000 UT) CAPS Take 50 mcg by mouth every morning 8/26/2024 at am    furosemide (LASIX) 40 MG tablet TAKE 1.5 TABLETS (60 MG) BY MOUTH 2 TIMES DAILY 8/26/2024 at am    hydrOXYzine (VISTARIL) 50 MG capsule Take 50 mg by mouth every 12 hours as needed for anxiety Unknown at unknown    magnesium  oxide (MAG-OX) 400 MG tablet Take 800 mg by mouth 2 times daily 8/26/2024 at am    metoprolol succinate ER (TOPROL XL) 25 MG 24 hr tablet Take 1 tablet (25 mg) by mouth daily 8/26/2024 at am    nitroGLYcerin (NITROSTAT) 0.4 MG sublingual tablet Place 0.4 mg under the tongue every 5 minutes as needed for chest pain Unknown at unknown    omeprazole (PRILOSEC) 40 MG DR capsule Take 40 mg by mouth every morning 8/26/2024 at am    potassium chloride alfonso ER (KLOR-CON M20) 20 MEQ CR tablet Take 3 tablets (60 mEq) by mouth 2 times daily 8/26/2024 at am    sacubitril-valsartan (ENTRESTO)  MG per tablet Take 1 tablet by mouth 2 times daily 8/26/2024 at am    sotalol (BETAPACE) 120 MG tablet Take 120 mg by mouth 2 times daily 8/26/2024 at am    spironolactone (ALDACTONE) 25 MG tablet Take 0.5 tablets (12.5 mg) by mouth daily 8/26/2024 at am    traZODone (DESYREL) 50 MG tablet Take 0.5-1 tablets (25-50 mg) by mouth at bedtime as needed for insomnia Unknown at unknown    warfarin ANTICOAGULANT (COUMADIN) 2.5 MG tablet Take 5mg (2 tabs) by mouth daily OR AS DIRECTED.  Adjust dose based on INR. 8/26/2024 at 1800

## 2024-08-27 NOTE — TELEPHONE ENCOUNTER
"EP called 8/27 to let pt and wife know that 9/13 follow up in LVAD is now switched to in-person per pt's request and updated in his MyC.       ----- Message from Yamila AUGUSTIN sent at 8/27/2024  3:25 PM CDT -----  Regarding: RE: hospital follow up  We can have ID contact him or he can call them.     Viviana - you made this ID appt on 9/13 for Tej - he would like to switch to in person. Can you please call him to arrange?    Thank you,  Yamila Do  [facility]  LVAD   Mechanical Circulatory Support Program  [email or web address]  Office: [phone]  Fax: [phone]  Gender Pronouns: She/Her  ----- Message -----  From: Siena Headley RN  Sent: 8/27/2024   3:20 PM CDT  To: Yamila Do  Subject: RE: hospital follow up                           So the ID follow up should be in person, Tej is requesting this.  How can we accommodate this?  ----- Message -----  From: Yamila Do  Sent: 8/27/2024   3:10 PM CDT  To: Siena Headley RN  Subject: RE: hospital follow up                           The ID follow up is virtual so they aren't coming here... Also the only providers in clinic on 9/13 are Marisol and Tony and they are all full.     Since Tej lives 4 hours away, between the [facility] and , all I had for the next 4 weeks were 7am and 8am appts. I grabbed the first available \"later\" appt with Alton on 10/1 at 10:15am, so they don't have to leave at 5am in the morning to get here. If you want the early slots on 9/18 or 9/19, I'm happy to grab those, but Tej would have to come the night prior.     Let me know if you'd like something different!    Yamila  ----- Message -----  From: Siena Headley, BRENDA  Sent: 8/27/2024   2:54 PM CDT  To: Yamila Do  Subject: hospital follow up                               Yamila can you please arrange for a hospital follow up appt in 3-4 weeks from now.  It would be gogo if this could be arranged at " the same time as an ID follow up.    Thank you  Vandana

## 2024-08-27 NOTE — PHARMACY-VANCOMYCIN DOSING SERVICE
Pharmacy Vancomycin Note  Date of Service 2024  Patient's  1965   59 year old, male, .1 kg    Indication: Skin and Soft Tissue Infection, worsenig LVAD driveline infection (previously on cefadroxil suppressive ppx)  Day of Therapy: 2  Current vancomycin regimen:  1750 mg IV q24h  Current vancomycin monitoring method: AUC  Current vancomycin therapeutic monitoring goal: 400-600 mg*h/L    InsightRX Prediction of Current Vancomycin Regimen      Current estimated CrCl = Estimated Creatinine Clearance: 84 mL/min (A) (based on SCr of 1.2 mg/dL (H)).    Creatinine for last 3 days  2024: 10:25 PM Creatinine 1.40 mg/dL  2024:  8:54 AM Creatinine 1.20 mg/dL    Recent Vancomycin Levels (past 3 days)  2024:  8:54 AM Vancomycin 10.8 ug/mL - 9 hrs post-dose    Vancomycin IV Administrations (past 72 hours)                     vancomycin (VANCOCIN) 1,750 mg in sodium chloride 0.9 % 500 mL intermittent infusion (mg) 1,750 mg New Bag 24 2356                    Nephrotoxins and other renal medications (From now, onward)      Start     Dose/Rate Route Frequency Ordered Stop    24 0800  furosemide (LASIX) tablet 60 mg        Note to Pharmacy: PTA Sig:TAKE 1.5 TABLETS (60 MG) BY MOUTH 2 TIMES DAILY      60 mg Oral 2 TIMES DAILY 24 0001      24 2330  vancomycin (VANCOCIN) 1,750 mg in sodium chloride 0.9 % 500 mL intermittent infusion         1,750 mg  over 120 Minutes Intravenous EVERY 24 HOURS 24 2246                 Contrast Orders - past 72 hours (72h ago, onward)      None            Interpretation of levels and current regimen:  Vancomycin level is reflective of -600    Has serum creatinine changed greater than 50% in last 72 hours: No, SCR has been within pt's recent baseline range so far this admission    Urine output:  unable to determine, incomplete documentation (pt was boarding in ED for first ~11 hrs of admission)    Renal Function:  Stable      Plan:  Continue Current Dose vancomycin 1750 mg IV q24h  Vancomycin monitoring method: AUC  Vancomycin therapeutic monitoring goal: 400-600 mg*h/L  Pharmacy will check vancomycin levels as appropriate in 1-3 Days.  Serum creatinine levels will be ordered daily for the first week of therapy and at least twice weekly for subsequent weeks.    Wes Pinto, HerreraD, BCPS

## 2024-08-27 NOTE — H&P
Cardiology History and Physical   Tej Morfin MRN: [id number]  Age: 59 year old, : 1965  08/27/24    Chief Complaint: LVAD driveline infection    HPI:   Mr. Morfin is a 59 year old male with PMHx of HFrEF 2/2 NICM s/p Heartmate III LVAD (3/2022) on warfarin, severe mitral valve regurgitation s/p repair (2015), paroxysmal VT s/p biventricular CRT-D (2016), GERD, gout, anxiety, and insomnia who was admitted on 2024 with LVAD driveline infection.    Mr. Morfin notes that in 2024, he developed tenderness and drainage of pus from his LVAD driveline site. He took a course of antibiotics, but noticed that his symptoms returned two weeks after completion. He was prescribed another course of antibiotics, with ongoing symptoms. He was seen by outpatient infectious disease and his antibiotics were increased in dose consecutively with unresolved symptoms. 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     He reports that he has been taking oral antibiotics twice daily and changing his dressings daily as instructed. He has daily chills with rigors, ongoing abdominal tenderness/burning in RLQ near driveline exit, and ongoing drainage of cloudy yellow malodorous pus.    Review of Systems:    Complete 10 point review of systems was performed and negative except per HPI.      Past Medical History    Reviewed and edited as appropriate  Past Medical History:   Diagnosis Date    Chronic systolic heart failure (H) 2017    Gastroparesis     ICD (implantable cardioverter-defibrillator) in place     [employer]    Non-ischemic cardiomyopathy (H)     Paroxysmal ventricular tachycardia (H) 2017    S/P mitral valve replacement 2017    Tetrahydrocannabinol (THC) use disorder, mild, in controlled environment, abuse 2019    occasional edible THC. reportedly for sleep,  anxiety    Tobacco abuse, episodic     occasional cigar - ~ 3x/wk       Past Surgical History   Reviewed and edited as appropriate   Past Surgical History:   Procedure Laterality Date    CV INTRA AORTIC BALLOON N/A 3/22/2022    Procedure: Intraprocedure Aortic Balloon Pump Insertion;  Surgeon: Pedro Callahan MD;  Location:  HEART CARDIAC CATH LAB    CV RIGHT HEART CATH MEASUREMENTS RECORDED N/A 3/22/2022    Procedure: Right Heart Catheterization;  Surgeon: Pedro Callaahn MD;  Location:  HEART CARDIAC CATH LAB    CV RIGHT HEART CATH MEASUREMENTS RECORDED N/A 9/22/2022    Procedure: Heart Cath Right Heart Cath;  Surgeon: Luis Miguel Sparrow MD;  Location:  HEART CARDIAC CATH LAB    CV RIGHT HEART CATH MEASUREMENTS RECORDED N/A 1/24/2023    Procedure: Heart Cath Right Heart Cath;  Surgeon: Jericho Lawrence MD;  Location:  HEART CARDIAC CATH LAB    CV RIGHT HEART CATH MEASUREMENTS RECORDED N/A 5/3/2024    Procedure: Heart Cath Right Heart Cath;  Surgeon: Luis Miguel Sparrow MD;  Location:  HEART CARDIAC CATH LAB    ESOPHAGOSCOPY, GASTROSCOPY, DUODENOSCOPY (EGD), COMBINED N/A 5/2/2024    Procedure: Esophagoscopy, gastroscopy, duodenoscopy (EGD), combined;  Surgeon: Stacey Knight MD;  Location:  GI    HEMORRHOIDECTOMY      ICD implantation       INSERT VENTRICULAR ASSIST DEVICE LEFT (HEARTMATE II) N/A 3/23/2022    Procedure: Redo Sterotomy, Insertion  Left Ventricular Assist Device  (HEARTMATE III) on Cardiopulmonary Bypass, Transesophageal Echocardiogram by Anesthesia;  Surgeon: Wilver Rodríguez MD;  Location:  OR    R partial medial meniscectomy      REPAIR VALVE MITRAL         Social History   Reviewed and edited as appropriate  Social History     Socioeconomic History    Marital status:      Spouse name: Not on file    Number of children: Not on file    Years of education: Not on file    Highest education level: Not on file   Occupational History  "   Not on file   Tobacco Use    Smoking status: Former     Types: Cigarettes     Passive exposure: Never    Smokeless tobacco: Never   Substance and Sexual Activity    Alcohol use: No     Alcohol/week: 0.0 standard drinks of alcohol    Drug use: No    Sexual activity: Not on file   Other Topics Concern    Not on file   Social History Narrative    Not on file     Social Determinants of Health     Financial Resource Strain: Not on file   Food Insecurity: Not on file   Transportation Needs: Not on file   Physical Activity: Insufficiently Active (10/28/2020)    Received [facility], [facility]    Exercise Vital Sign     Days of Exercise per Week: 7 days     Minutes of Exercise per Session: 20 min   Stress: Not on file   Social Connections: Not on file   Interpersonal Safety: Not on file   Housing Stability: Not on file       Family History     Reviewed and edited as appropriate  Family History   Problem Relation Age of Onset    Coronary Artery Disease Mother     Kidney failure Mother     Cardiomyopathy Father          age 51    Anesthesia Reaction No family hx of     Venous thrombosis No family hx of        Allergies   Reviewed and edited as appropriate     Allergies   Allergen Reactions    Tilactase Diarrhea     Pt reports intake is in moderation, but can tolerate.        Prior to Admission Medications    (Not in a hospital admission)       Physical Exam   Pulse 81   Temp 97.5  F (36.4  C)   Resp 16   Ht 1.778 m (5' 10\")   Wt 106.6 kg (235 lb)   SpO2 97%   BMI 33.72 kg/m    No intake or output data in the 24 hours ending 24 0011  Wt:   Wt Readings from Last 2 Encounters:   24 106.6 kg (235 lb)   24 107.5 kg (237 lb)     GENERAL: Laying in bed, no acute distress  HEENT: Atraumatic, moist mucus membranes, PERRL  RESP: Unlabored breathing on room air, no wheezes on auscultation  CARDIO: Mechanical hum of LVAD, extremities warm and well perfused, no " peripheral edema  ABD: Soft, tender with palpation of RLQ, dressings c/d/i  NEURO: AAOx3, cranial nerves grossly intact, no focal deficits      Assessment and Recommendation:   Mr. Morfin is a 59 year old male with PMHx of HFrEF 2/2 NICM s/p Heartmate III LVAD (3/2022) on warfarin, severe mitral valve regurgitation s/p repair (1/2015), paroxysmal VT s/p biventricular CRT-D (1/2016), GERD, gout, anxiety, and insomnia who was admitted on 8/27/2024 with LVAD driveline infection.    ACTIVE PROBLEMS:  # LVAD Driveline Infection  # MRSA colonized  Started having erythema/pus drainage 03/2024. Drive line cultures with MSSA (3/17/2024, 7/12/2024). Seen by outpatient ID and treated as outpatient with oral antibiotics (doxycycline, cefadroxil) with ongoing erythema, tenderness, and persistent drainage. Admitted on 8/27/2024 for IV antibiotics, consideration of I&D per CVTS.   - Vancomycin  - Workup   Blood cultures (8/27/2024)   Wound cultures - pending collection  - CVTS consult   NPO at midnight  - Can consider ID consult     # HFrEF s/p Heartmate III LVAD (3/23/2022), AHA Stage D, NHYA Class II  # Severe mitral valve regurgitation s/p annuloplasty ring (1/21/2015)  # Heart transplant waitlist  Congestive heart failure 2/2 NICM (severe mitral regurgitation). Followed as outpatient by Dr. Hdz. LVAD was placed as bridge to heart transplant; currently on heart transplant wait list.  - GDMT   ACEi/ARB/ARNI PTA Entresto  BID   BB   PTA Sotalol 120mg BID      PTA Metoprolol succinate 25mg qd   MRA   PTA Spironolactone 12.5mg qd   SGLTi   None  - PTA potassium chloride, magnesium  - SCD ppx   CRT-D  - Anticoagulation  PTA Warfarin (INR goal 2-3)  - With LVAD, goal MAP 65 to 90    CHRONIC/STABLE PROBLEMS:  # Hx paroxysmal VT s/p single-chamber ICD (2/2/2015), biventricular CRT-D (1/8/2016)  # Hx post-op atrial fibrillation  - PTA sotalol 120mg BID  - PTA metoprolol succinate 25mg qd    # CKD 2  Baseline Cr 1.3-1.4.  "Admitted at baseline.  - Monitor BMP    # GERD  - PTA omeprazole 40mg qam    # Gout  - PTA allopurinol 100mg qam    # Anxiety  # Insomnia  - PTA hydroxyzine 50mg BID PRN  - PTA trazadone 25-50mg at bedtime PRN    ACCESS: PIV  FEN: 2gm Na, 2L fluid restriction; NPO at midnight pending potential I&D  PPX: Warfarin  CODE: Full    To be staffed in AM.     Breonna Gunter MD PGY-2  Internal Medicine  [facility]    Clinically Significant Risk Factors Present on Admission               # Drug Induced Coagulation Defect: home medication list includes an anticoagulant medication    # Obesity: Estimated body mass index is 33.72 kg/m  as calculated from the following:    Height as of this encounter: 1.778 m (5' 10\").    Weight as of this encounter: 106.6 kg (235 lb).    Data   Labs and imaging below were independently reviewed and interpreted    CBC  Recent Labs   Lab 08/26/24  2225   WBC 5.2   RBC 4.35*   HGB 12.8*   HCT 41.2   MCV 95   MCH 29.4   MCHC 31.1*   RDW 14.9         CMP  Recent Labs   Lab 08/26/24  2225      POTASSIUM 4.5   CHLORIDE 105   YESICA 9.3   CO2 22   BUN 22.3*   CR 1.40*   GLC 88   ALKPHOS 67   AST 20   ALT 22   BILITOTAL 0.3   PROTTOTAL 7.3   ALBUMIN 3.6     INR  Recent Labs   Lab 08/22/24  0000   INR 1.7*         Imaging:  CT abdomen/pelvis (8/25/2024):  FINDINGS:  Postprocedural changes relating to LVAD noted. No well-defined abnormal collection/abscess appreciated about the device. Streak artifact from the device somewhat limits the evaluation. Midline sternotomy wires and AICD leads noted. Heart appears prominent in size. No basilar consolidation. Areas of subsegmental atelectasis/scarring noted.    IMPRESSION:  1. No abscess/collection or definite acute abnormalities appreciated about the LVAD.  2. Stable likely benign appearing low-attenuation hepatic and left renal lesions as described.  3. Additional chronic/incidental findings as described.    CRT- D interrogation " (7/13/2024):  Device: [employer] G148 INOGEN X4 CRT-D  Normal Device Function  Mode: DDDR /120 bpm  AP: 48%  : 4% (LV lead OFF)  Presenting EGM: AP/VS 80 bpm with frequent PVCs  Lead Trends Appear Stable  Estimated battery longevity to RRT = 1.5 years  Atrial Arrhythmia: 34 ATR episodes since 7/8/24  AF [place]: 22%  Anticoagulant: warfarin  Ventricular Arrhythmia: 1 treated VT episode lasting 1 min 18 sec at 230-244 bpm. 37 NSVT episodes- available EGMs show AF with RVR.     Transthoracic echocardiogram (5/3/2024)  Interpretation Summary  Severe left ventricular dilation is present.The left ventricular end diastolic  diameter is 7.2 cm.  Left ventricular function is decreased. The ejection fraction is <30%  (severely reduced)..  Global right ventricular function is mildly reduced.  The inferior vena cava is normal.  LVAD inflow and outflow cannula Doppler is normal.  No significant valvular abnormalities present.  The aortic valve does not open. There is mild aortic regurgitation.    Cardiac catheterization (5/3/2024):  Conclusion:     Right sided filling pressures are normal.    Left sided filling pressures are mildly elevated.    Normal PA pressures.    Reduced cardiac output level.    Hemodynamic data has been modified in [facility] per physician review.     Hemodynamics:  RA --/--/5  RV 22/5  PA 22/14/18  PCWP --/--/14  JOANNA 4.1/1.8  TD 4.2/1.9  PVR 0.95 (TD) Right sided filling pressures are normal. Left sided filling pressures are mildly elevated. Normal PA pressures. Reduced cardiac output level. Hemodynamic data has been modified in [facility] per physician review.

## 2024-08-27 NOTE — PLAN OF CARE
Problem: Comorbidity Management  Goal: Blood Pressure in Desired Range  Outcome: Progressing  Intervention: Maintain Blood Pressure Management  Recent Flowsheet Documentation  Taken 8/27/2024 0930 by Marilee Montejo, RN  Medication Review/Management:   medications reviewed   high-risk medications identified     NURSING PROGRESS NOTE  Shift Summary  Date: August 27, 2024     Neuro/Musculoskeletal: A&Ox4. Calm and cooperative.   Cardiac: LVAD with hum, Atrial paced, VSS. Denies chest pain.  Respiratory: Sating in the 90s on RA.  GI/: Adequate urine output.  LBM: 08/27/24.  Diet/Appetite: NPO   Activity: Independent.  Pain: Denies.  Skin: Skin check completed by two RN's. Chest surgical incision. LVAD driveline- drainage with purulent discharge. Wound culture done this morning with LVAD dressing change.  LDAs + Drips/IVF: R PIV- infusing heparin @1200 units/hr and L PIV- saline locked.   Protocols/Labs: Mag and K+    Plan: Continue POC and report changes to the team.       Marilee Montejo, RN   [facility] ([facility]): [facility] ICU (Unit 6D)

## 2024-08-27 NOTE — PLAN OF CARE
Problem: Ventricular Assist Device  Goal: Optimal Adjustment to Device  Intervention: Optimize Psychosocial Response to VAD  Flowsheets (Taken 8/27/2024 1800)  Supportive Measures:   active listening utilized   decision-making supported   problem-solving facilitated   positive reinforcement provided   self-responsibility promoted   self-reflection promoted  Family/Support System Care:   self-care encouraged   involvement promoted  Goal: Absence of Bleeding  Intervention: Monitor and Manage Bleeding  Flowsheets (Taken 8/27/2024 1800)  Bleeding Precautions:   blood pressure closely monitored   monitored for signs of bleeding  Bleeding Management: dressing monitored  Goal: Absence of Infection Signs and Symptoms  Intervention: Prevent or Manage Infection  Flowsheets  Taken 8/27/2024 1800  Infection Prevention:   hand hygiene promoted   rest/sleep promoted   single patient room provided  Driveline Securement: tape  Taken 8/27/2024 1531  Driveline Securement: (LVAD securement device) other (see comments)  Taken 8/27/2024 1519  Infection Prevention:   hand hygiene promoted   rest/sleep promoted  Taken 8/27/2024 1200  Driveline Securement: (LVAD secuerment device) other (see comments)  Taken 8/27/2024 0835  Driveline Securement: (LVAD securement device) other (see comments)  Goal: Effective Right-Sided Heart Function  Intervention: Manage Decreased Right Heart Function  Flowsheets (Taken 8/27/2024 1800)  Fluid/Electrolyte Management: fluids restricted   Goal Outcome Evaluation:     Neuro: A&Ox4. Calm and pleasant.   Mobility: Stable gait and balance. SBA  Cardiac: A-paced rhythm with dysrhythmias. - Edema of LE. Denies chest pain.  PI >2.5.   Respiratory: WDL. LS clear. - Cough. No reports of SOB.  GI/: Adequate urine output. BM X2 ( loose, soft/formed)  Diet/appetite: Tolerating diet with good appetite.   Pain: Denies pain  Skin: LVAD Driveline insertion site - dressing changed today.   LDA's: Heparin drip at 1000  units/hr. PIV X2 L and R FA.   Plan: Continue with POC. Notify primary team with changes.

## 2024-08-27 NOTE — PROGRESS NOTES
[facility]    Cardiology Progress Note- Cardiology 2        Date of Admission:  8/26/2024     Assessment & Plan: HVSL   Mr. Morfin is a 59 year old male with PMHx of HFrEF 2/2 NICM s/p Heartmate III LVAD (3/2022) on warfarin, severe mitral valve regurgitation s/p repair (1/2015), paroxysmal VT s/p biventricular CRT-D (1/2016), GERD, gout, anxiety, and insomnia who was admitted on 8/27/2024 with LVAD driveline infection.     # LVAD Driveline Infection  # MRSA colonized  Started having erythema/pus drainage 03/2024. Drive line cultures with MSSA (3/17/2024, 7/12/2024). Seen by outpatient ID and treated as outpatient with oral antibiotics (doxycycline, cefadroxil) with ongoing erythema, tenderness, and persistent drainage. Admitted on 8/27/2024 for IV antibiotics, consideration of I&D per CVTS.   - Vancomycin (8/27 - present)  - Workup              Blood cultures (8/27/2024) pending              Wound cultures (8/27/2024) pending  - CVTS consult              No immediate interventions, will await ID input   Reviewed CT images from OSH, stable from images in April  - ID consulted, appreciate recs     # HFrEF s/p Heartmate III LVAD (3/23/2022), AHA Stage D, NHYA Class II  # Severe mitral valve regurgitation s/p annuloplasty ring (1/21/2015)  # Heart transplant waitlist  Congestive heart failure 2/2 NICM (severe mitral regurgitation). Followed as outpatient by Dr. Hdz. LVAD was placed as bridge to heart transplant; currently on heart transplant wait list.  - GDMT              ACEi/ARB/ARNI          PTA Entresto  BID              BB                               PTA Sotalol 120mg BID                                                  PTA Metoprolol succinate 25mg qd              MRA                            PTA Spironolactone 12.5mg qd              SGLTi                          None  - PTA potassium chloride, magnesium  - SCD ppx                             "   CRT-D  - Anticoagulation                     Heparin drip, PTA Warfarin (INR goal 2-3) held  - With LVAD, goal MAP 65 to 90     CHRONIC/STABLE PROBLEMS:  # Hx paroxysmal VT s/p single-chamber ICD (2/2/2015), biventricular CRT-D (1/8/2016)  # Hx post-op atrial fibrillation  - PTA sotalol 120mg BID  - PTA metoprolol succinate 25mg qd     # CKD 2  Baseline Cr 1.3-1.4. Admitted at baseline.  - Monitor BMP     # GERD  - PTA omeprazole 40mg qam     # Gout  - PTA allopurinol 100mg qam     # Anxiety  # Insomnia  - PTA hydroxyzine 50mg BID PRN  - PTA trazadone 25-50mg at bedtime PRN         Diet: Fluid restriction 2000 ML FLUID (and additional linked orders)  NPO per Anesthesia Guidelines for Procedure/Surgery Except for: Meds    DVT Prophylaxis: Heparin drip  [place] Catheter: Not present  Cardiac Monitoring: ACTIVE order. Indication: Acute decompensated heart failure (48 hours)  Code Status: Full Code          Clinically Significant Risk Factors Present on Admission               # Drug Induced Coagulation Defect: home medication list includes an anticoagulant medication     # End stage heart failure: Ventricular assist device (VAD) present        # Obesity: Estimated body mass index is 33.72 kg/m  as calculated from the following:    Height as of this encounter: 1.778 m (5' 10\").    Weight as of this encounter: 106.6 kg (235 lb).        # ICD device present             Disposition Plan   Expected discharge: 2 - 3 days, recommended to prior living arrangement once  infectious work-up/management is completed .    Entered: Don Street MD 08/27/2024, 7:24 AM   The patient's care was discussed with the Attending Physician, Dr. Damon .      Don Street MD  PGY3, Internal Medicine  Cardiology [address] [place] [address]  ______________________________________________________________________    Interval History   Admitted overnight. Patient feels overall well when seen this AM. No " fevers/chills, chest pain, or SOB. Has a bit of upper abdominal tenderness on the left side. Has been compliant with dressing changes for his LVAD.     Physical Exam   Vital Signs: Temp: 97.5  F (36.4  C)     Pulse: 81   Resp: 16 SpO2: 97 %      Weight: 235 lbs 0 oz    Gen: No acute distress, lying in bed  HEENT: Normocephalic, atraumatic  CV: Audible hum of LVAD, no significant peripheral edema  Pulm: CTAB, non-labored breathing on room air  Abd: Soft, mild tenderness to palpation of LUQ, dressing in place in RLQ with minimal tenderness to palpation of area  Neuro: Alert and conversant without apparent focal neurological deficits    Medical Decision Making     Please see A&P for additional details of medical decision making.      Data   ------------------------- PAST 24 HR DATA REVIEWED -----------------------------------------------

## 2024-08-28 LAB
ANION GAP SERPL CALCULATED.3IONS-SCNC: 8 MMOL/L (ref 7–15)
BUN SERPL-MCNC: 17.6 MG/DL (ref 6–20)
CALCIUM SERPL-MCNC: 8.8 MG/DL (ref 8.8–10.4)
CHLORIDE SERPL-SCNC: 108 MMOL/L (ref 98–107)
CREAT SERPL-MCNC: 1.09 MG/DL (ref 0.67–1.17)
EGFRCR SERPLBLD CKD-EPI 2021: 79 ML/MIN/1.73M2
ERYTHROCYTE [DISTWIDTH] IN BLOOD BY AUTOMATED COUNT: 14.6 % (ref 10–15)
GLUCOSE SERPL-MCNC: 112 MG/DL (ref 70–99)
HCO3 SERPL-SCNC: 23 MMOL/L (ref 22–29)
HCT VFR BLD AUTO: 35.5 % (ref 40–53)
HGB BLD-MCNC: 11 G/DL (ref 13.3–17.7)
INR PPP: 1.6 (ref 0.85–1.15)
MAGNESIUM SERPL-MCNC: 1.8 MG/DL (ref 1.7–2.3)
MCH RBC QN AUTO: 29.4 PG (ref 26.5–33)
MCHC RBC AUTO-ENTMCNC: 31 G/DL (ref 31.5–36.5)
MCV RBC AUTO: 95 FL (ref 78–100)
PLATELET # BLD AUTO: 175 10E3/UL (ref 150–450)
POTASSIUM SERPL-SCNC: 4.4 MMOL/L (ref 3.4–5.3)
RBC # BLD AUTO: 3.74 10E6/UL (ref 4.4–5.9)
SODIUM SERPL-SCNC: 139 MMOL/L (ref 135–145)
UFH PPP CHRO-ACNC: 0.19 IU/ML
UFH PPP CHRO-ACNC: 0.32 IU/ML
UFH PPP CHRO-ACNC: 0.43 IU/ML
WBC # BLD AUTO: 4 10E3/UL (ref 4–11)

## 2024-08-28 PROCEDURE — 250N000011 HC RX IP 250 OP 636: Mod: JZ

## 2024-08-28 PROCEDURE — 93750 INTERROGATION VAD IN PERSON: CPT | Performed by: STUDENT IN AN ORGANIZED HEALTH CARE EDUCATION/TRAINING PROGRAM

## 2024-08-28 PROCEDURE — 85027 COMPLETE CBC AUTOMATED: CPT

## 2024-08-28 PROCEDURE — 80048 BASIC METABOLIC PNL TOTAL CA: CPT

## 2024-08-28 PROCEDURE — 250N000013 HC RX MED GY IP 250 OP 250 PS 637: Performed by: STUDENT IN AN ORGANIZED HEALTH CARE EDUCATION/TRAINING PROGRAM

## 2024-08-28 PROCEDURE — 99232 SBSQ HOSP IP/OBS MODERATE 35: CPT | Mod: 25 | Performed by: STUDENT IN AN ORGANIZED HEALTH CARE EDUCATION/TRAINING PROGRAM

## 2024-08-28 PROCEDURE — 83735 ASSAY OF MAGNESIUM: CPT

## 2024-08-28 PROCEDURE — 36415 COLL VENOUS BLD VENIPUNCTURE: CPT | Performed by: STUDENT IN AN ORGANIZED HEALTH CARE EDUCATION/TRAINING PROGRAM

## 2024-08-28 PROCEDURE — 36415 COLL VENOUS BLD VENIPUNCTURE: CPT

## 2024-08-28 PROCEDURE — 250N000013 HC RX MED GY IP 250 OP 250 PS 637

## 2024-08-28 PROCEDURE — 99232 SBSQ HOSP IP/OBS MODERATE 35: CPT | Performed by: INTERNAL MEDICINE

## 2024-08-28 PROCEDURE — 85520 HEPARIN ASSAY: CPT | Performed by: STUDENT IN AN ORGANIZED HEALTH CARE EDUCATION/TRAINING PROGRAM

## 2024-08-28 PROCEDURE — 85610 PROTHROMBIN TIME: CPT | Performed by: STUDENT IN AN ORGANIZED HEALTH CARE EDUCATION/TRAINING PROGRAM

## 2024-08-28 PROCEDURE — 93010 ELECTROCARDIOGRAM REPORT: CPT | Performed by: INTERNAL MEDICINE

## 2024-08-28 PROCEDURE — 93005 ELECTROCARDIOGRAM TRACING: CPT

## 2024-08-28 PROCEDURE — 120N000005 HC R&B MS OVERFLOW UMMC

## 2024-08-28 RX ORDER — MAGNESIUM OXIDE 400 MG/1
400 TABLET ORAL EVERY 4 HOURS
Status: COMPLETED | OUTPATIENT
Start: 2024-08-28 | End: 2024-08-28

## 2024-08-28 RX ADMIN — SACUBITRIL AND VALSARTAN 1 TABLET: 97; 103 TABLET, FILM COATED ORAL at 20:22

## 2024-08-28 RX ADMIN — METOPROLOL SUCCINATE 25 MG: 25 TABLET, EXTENDED RELEASE ORAL at 09:23

## 2024-08-28 RX ADMIN — CEFAZOLIN SODIUM 2 G: 2 INJECTION, SOLUTION INTRAVENOUS at 09:17

## 2024-08-28 RX ADMIN — ALLOPURINOL 100 MG: 100 TABLET ORAL at 09:23

## 2024-08-28 RX ADMIN — SOTALOL HYDROCHLORIDE 120 MG: 120 TABLET ORAL at 20:22

## 2024-08-28 RX ADMIN — FUROSEMIDE 60 MG: 40 TABLET ORAL at 16:17

## 2024-08-28 RX ADMIN — HEPARIN SODIUM 1150 UNITS/HR: 10000 INJECTION, SOLUTION INTRAVENOUS at 19:25

## 2024-08-28 RX ADMIN — FUROSEMIDE 60 MG: 40 TABLET ORAL at 09:23

## 2024-08-28 RX ADMIN — Medication 25 MG: at 23:56

## 2024-08-28 RX ADMIN — MAGNESIUM OXIDE TAB 400 MG (241.3 MG ELEMENTAL MG) 800 MG: 400 (241.3 MG) TAB at 20:22

## 2024-08-28 RX ADMIN — Medication 50 MCG: at 09:22

## 2024-08-28 RX ADMIN — OMEPRAZOLE 40 MG: 20 CAPSULE, DELAYED RELEASE ORAL at 09:22

## 2024-08-28 RX ADMIN — SACUBITRIL AND VALSARTAN 1 TABLET: 97; 103 TABLET, FILM COATED ORAL at 10:51

## 2024-08-28 RX ADMIN — SPIRONOLACTONE 12.5 MG: 25 TABLET, FILM COATED ORAL at 09:21

## 2024-08-28 RX ADMIN — MAGNESIUM OXIDE TAB 400 MG (241.3 MG ELEMENTAL MG) 400 MG: 400 (241.3 MG) TAB at 12:18

## 2024-08-28 RX ADMIN — CEFAZOLIN SODIUM 2 G: 2 INJECTION, SOLUTION INTRAVENOUS at 17:16

## 2024-08-28 RX ADMIN — SOTALOL HYDROCHLORIDE 120 MG: 120 TABLET ORAL at 09:22

## 2024-08-28 RX ADMIN — HEPARIN SODIUM 1000 UNITS/HR: 10000 INJECTION, SOLUTION INTRAVENOUS at 01:00

## 2024-08-28 RX ADMIN — CEFAZOLIN SODIUM 2 G: 2 INJECTION, SOLUTION INTRAVENOUS at 01:17

## 2024-08-28 RX ADMIN — MAGNESIUM OXIDE TAB 400 MG (241.3 MG ELEMENTAL MG) 400 MG: 400 (241.3 MG) TAB at 16:17

## 2024-08-28 RX ADMIN — POTASSIUM CHLORIDE 60 MEQ: 1500 TABLET, EXTENDED RELEASE ORAL at 09:22

## 2024-08-28 RX ADMIN — MAGNESIUM OXIDE TAB 400 MG (241.3 MG ELEMENTAL MG) 800 MG: 400 (241.3 MG) TAB at 09:23

## 2024-08-28 RX ADMIN — POTASSIUM CHLORIDE 60 MEQ: 1500 TABLET, EXTENDED RELEASE ORAL at 20:22

## 2024-08-28 ASSESSMENT — ACTIVITIES OF DAILY LIVING (ADL)
ADLS_ACUITY_SCORE: 24
ADLS_ACUITY_SCORE: 26
ADLS_ACUITY_SCORE: 27
ADLS_ACUITY_SCORE: 27
ADLS_ACUITY_SCORE: 24
ADLS_ACUITY_SCORE: 24
ADLS_ACUITY_SCORE: 26
ADLS_ACUITY_SCORE: 24
ADLS_ACUITY_SCORE: 24
ADLS_ACUITY_SCORE: 26
ADLS_ACUITY_SCORE: 24
ADLS_ACUITY_SCORE: 27
ADLS_ACUITY_SCORE: 24
ADLS_ACUITY_SCORE: 24
ADLS_ACUITY_SCORE: 27
ADLS_ACUITY_SCORE: 24
ADLS_ACUITY_SCORE: 27
ADLS_ACUITY_SCORE: 24

## 2024-08-28 NOTE — PROGRESS NOTES
"CV Surgery    S: Seen lying in bed, breathing stable, tolerating diet, cultures and gram stain negative thus far    O: B/P: [MAP 96[, T: 97.6, P: 80, R: 16  General: NAD  Heart: mechanical LVAD tones  Lungs: diminished bases  Abd: s/nt/nd  Extremities: trace LE edema    Lab Results   Component Value Date    WBC 4.0 08/28/2024    WBC 4.2 09/10/2019     Lab Results   Component Value Date    RBC 3.74 08/28/2024    RBC 4.98 09/10/2019     Lab Results   Component Value Date    HGB 11.0 08/28/2024    HGB 15.8 09/10/2019     Lab Results   Component Value Date    HCT 35.5 08/28/2024    HCT 48.9 09/10/2019     No components found for: \"MCT\"  Lab Results   Component Value Date    MCV 95 08/28/2024    MCV 98 09/10/2019     Lab Results   Component Value Date    MCH 29.4 08/28/2024    MCH 31.7 09/10/2019     Lab Results   Component Value Date    MCHC 31.0 08/28/2024    MCHC 32.3 09/10/2019     Lab Results   Component Value Date    RDW 14.6 08/28/2024    RDW 12.9 09/10/2019     Lab Results   Component Value Date     08/28/2024     09/10/2019       Last Basic Metabolic Panel:  Lab Results   Component Value Date     08/28/2024     09/10/2019      Lab Results   Component Value Date    POTASSIUM 4.4 08/28/2024    POTASSIUM 4.0 06/23/2022    POTASSIUM 3.4 09/10/2019     Lab Results   Component Value Date    CHLORIDE 108 08/28/2024    CHLORIDE 105 05/09/2023    CHLORIDE 108 09/10/2019     Lab Results   Component Value Date    YESICA 8.8 08/28/2024    YESICA 9.1 09/10/2019     Lab Results   Component Value Date    CO2 23 08/28/2024    CO2 26 06/23/2022    CO2 26 09/10/2019     Lab Results   Component Value Date    BUN 17.6 08/28/2024    BUN 18 06/23/2022    BUN 18 09/10/2019     Lab Results   Component Value Date    CR 1.09 08/28/2024    CR 1.23 09/10/2019     Lab Results   Component Value Date     08/28/2024    GLC 65 06/23/2022    GLC 89 09/10/2019       A/P: Mr Morfin is a 59 year old male with PMH of " chronic systolic heart failure, HFrEF, NICM s/p HM3 implant 3/23/2022, severe MR -s/p repair, s/p CRT-D, VT on sotalol, stage II chronic kidney disease who presented to ED with concerns over driveline infection. He started having symptoms in March and drainage has continued since. He has been taking Cefadroxil since March without improvement. He had noticed increased odorous drainage from his driveline site but denies fever, chills, no alarms, fluid retention or other symptoms noted.     Will continue to follow cultures, promising that gram stain from driveline is negative thus far along with the cultures. Continue IV abx for now.     Discussed with Dr. Marcos Agustin, [facility]  Pager [phone]

## 2024-08-28 NOTE — PROGRESS NOTES
General ID Service: Follow Up Note      Patient:  Tej Morfin, Date of birth 1965, Medical record number [id number]  Date of Visit:  August 28, 2024        Physician Attestation   I, Pattie Hargrove MD, was present with the medical/WILIAN student who participated in the service and in the documentation of the note.  I have verified the history and personally performed the physical exam and medical decision making.  I agree with the assessment and plan of care as documented in the note.      Key findings: See student note, as edited by me, for details of plan. 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 Per phone images reviewed with pt's wife (who does the dressings) there has been new tissue growth at the site over the past ~3 weeks. Pt also reports a ~50 lb weight gain, primarily in the abdomen, since his LVAD was placed. We discussed with [facility] and blood cultures remain negative thus far, site culture again with MSSA. I suspect this is an anatomic issue/source control related rather than a failure of appropriate antimicrobials. The weight gain may have misplaced the line, but I suspect the acute issue is the ?granulation tissue at the exit site that prevents drainage. Appreciate CT surgery input. Recommend cefazolin for now, duration TBD, though anticipating around 4 weeks if he has continued clinical improvement.     Pattie Hargrove MD  Date of Service (when I saw the patient): 08/28/24          Assessment and Recommendations:   ID Problem List:  LVAD Driveline Infeciton  New/worsening purulent drainage from site  MSSA driveline colonization  HFrEF s/p LVAD (3/23/2022)  CKD III (Baseline Cr 1.3 and eGFR 60)    Recommendations:  Continue IV Cefazolin 2g Q8H, anticipate he will need continued IV antibiotics after discharge, likely 4 week course followed by  suppression.   Follow pending culture results  3.   Evaluation for possible intervention from CT surgery    Discussion:  Tej Morfin is a 58yr old male with a past medical history of chronic systolic heart failure, HFrEF, NICM s/p HM3 implant 3/23/2022, severe MR s/p MV repair, s/p CRT-D, VT on sotalol, and chronic kidney disease, stage II-III, depression and anxiety     Tej's infection has been characterized by multiple hospital visits with waxing and waning infections from the exit site of his drive line starting with his first visit to outside [facility] near his home in [place] 3/2024. Pt presented to clinic with complaints of purulent drainage, pain and redness at exit site of drive line. Cultures yield MSSA, Pt prescribed doxycycline with resolution of symptoms. Pt did well for several weeks but purulent drainage returned again 4/25/2024 again, patient prescribed doxycycline with similar results on blood cultures. Pt again went several weeks and was reluctant to go back to see physician for more workup of drive site drainage. With time, the infection worsened to the point of requiring dressing changes daily with increasing pain. Pt presented to [facility] for evaluation again blood cultures again yield staph species, treated with cefadroxil resulting in alleviation of symptoms. Pt unfortunately again presented back to [place] ED 08/25 for workup again of purulence and then referred to [facility] for evaluation of site.     CT abdomen yields no abscess or fluid collection but difficult to discern on CT if infection is truly present due to CT artifacts from tube material. Pt reported belly fullness with pain in epigastric region near exit site. Blood cultures to this point have shown no growth. Low suspicion for bacteremia.     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 Per phone  images reviewed with pt's wife (who does the dressings) there has been new tissue growth at the site over the past ~3 weeks. Pt also reports a ~50 lb weight gain, primarily in the abdomen, since his LVAD was placed. We discussed with [facility] and blood cultures remain negative thus far, site culture again with MSSA. I suspect this is an anatomic issue/source control related rather than a failure of appropriate antimicrobials. The weight gain may have misplaced the line, but I suspect the acute issue is the ?granulation tissue at the exit site that prevents drainage. Appreciate CT surgery input. Recommend cefazolin for now, duration planned for 4 weeks at this point.       Recs were discussed with primary team today. Don't hesitate to call with questions.            Interval History:   Mr. Morfin was visited this morning by the ID team. Tej reports no change in chills, pain, rash, nausea, vomiting, pressures or any other sign of progressing infection. He does still report epigastric pain and states that during the dressing change this morning did still have appreciable fluid collection.             Review of Systems:   Full 9 pt ROS obtained, pertinent positives and negatives as above.          Current Antimicrobials   Current: IV Cefazolin 2g Q8H    Prior:Vancomycin  Cefidroxil  Doxycycline         Physical Exam:   Ranges for vital signs:  Temp:  [97.4  F (36.3  C)-97.6  F (36.4  C)] 97.5  F (36.4  C)  Pulse:  [80-85] 80  Resp:  [16-20] 20  SpO2:  [95 %-98 %] 98 %    Intake/Output Summary (Last 24 hours) at 8/28/2024 1347  Last data filed at 8/28/2024 1007  Gross per 24 hour   Intake 1168.47 ml   Output 1010 ml   Net 158.47 ml     Exam:  GENERAL:  well-developed, well-nourished, sitting in bed in no acute distress.   ENT:  Head is normocephalic, atraumatic. Oropharynx is moist without exudates or ulcers.  EYES:  Eyes have anicteric sclerae.    NECK:  Supple.  LUNGS:  Clear to  auscultation.  CARDIOVASCULAR:  Regular rate and rhythm with no murmurs, gallops or rubs.  ABDOMEN:  Normal bowel sounds, soft, nontender.  EXT: Extremities warm and without edema.  SKIN:  No acute rashes.  Line is in place without any surrounding erythema.  NEUROLOGIC:  Grossly nonfocal.         Laboratory Data:   Reviewed.  Pertinent for: MSSA    Culture data: Cultures pending          Imaging:    EXAM: CT ABDOMEN PELVIS WITH CONTRAST     INDICATION:Concern for her LVAD driveline infection or abscess surrounding driveline     COMPARISON(S):   July 16, 2024 CT abdomen pelvis. Additional CT imaging dating back to May 1, 2017     TECHNIQUE:   Axial CT images were performed following administration of IV contrast. Coronal and sagittal reconstructions were performed.     FINDINGS:   Postprocedural changes relating to LVAD noted. No well-defined abnormal collection/abscess appreciated about the device. Streak artifact from the device somewhat limits the evaluation. Midline sternotomy wires and AICD leads noted. Heart appears prominent in size. No basilar consolidation. Areas of subsegmental atelectasis/scarring noted.     Subcentimeter low-attenuation focus in the liver on image 39. Similar to prior studies and most likely benign in nature. Additionally on image 54. No significant intrahepatic or extrahepatic biliary ductal dilation. No definite acute gallbladder pathology. Contracted appearance of the gallbladder.     No definite splenic, pancreatic, or adrenal lesion.     Kidneys enhance symmetrically. No hydronephrosis. Left upper pole renal cyst appears similar to prior exams. No significant perinephric stranding. No gross bladder lesion. Prostate measures up to 4.5 cm in diameter.     No free fluid or pneumoperitoneum. Mild atherosclerotic calcifications of a nonaneurysmal aorta. No definite lymphadenopathy.     No definite abnormal thickening or distention of the colon. Moderate volume of stool within the colon.  Fluid noted within the colon consistent with diarrheal state. No appendicitis. No small bowel obstruction.     No definite acute osseous abnormalities. Degenerative changes of the hips.     IMPRESSION:   1. No abscess/collection or definite acute abnormalities appreciated about the LVAD.   2. Stable likely benign appearing low-attenuation hepatic and left renal lesions as described.   3. Additional chronic/incidental findings as described.     Appropriate radiation dose reduction devices and/or manual techniques for    appropriate moderation of exposure were utilized.       Finalized by: Derian Klein MD on 8/25/2024 8:18 PM CDT

## 2024-08-28 NOTE — PROGRESS NOTES
[facility]    Cardiology Progress Note- Cardiology 2        Date of Admission:  8/26/2024     Assessment & Plan: HVSL   Mr. Morfin is a 59 year old male with PMHx of HFrEF 2/2 NICM s/p Heartmate III LVAD (3/2022) on warfarin, severe mitral valve regurgitation s/p repair (1/2015), paroxysmal VT s/p biventricular CRT-D (1/2016), GERD, gout, anxiety, and insomnia who was admitted on 8/27/2024 with LVAD driveline infection.    Today:  - Continue cefazolin 2 g q8h  - In discussion with CT surgery regarding potential intervention for source control     # LVAD Driveline Infection  # MRSA colonized  Started having erythema/pus drainage 03/2024. Drive line cultures with MSSA (3/17/2024, 7/12/2024). Seen by outpatient ID and treated as outpatient with oral antibiotics (doxycycline, cefadroxil) with ongoing erythema, tenderness, and persistent drainage. Admitted on 8/27/2024 for IV antibiotics, consideration of surgical intervention for source control.   - Vancomycin (8/27)  - Cefazolin (8/27 - present)  - Workup              > Blood cultures (8/27/2024) NGTD              > Wound cultures (8/27/2024) NGTD  - CVTS consult              > Will review imaging and exam for potential targets of infectious source   > Reviewed CT images from OSH, stable from images in April  - ID consulted, appreciate recs   > Wound cultures from OSH growing MSSA   > ID recommending surgical intervention for source control        # HFrEF s/p Heartmate III LVAD (3/23/2022), AHA Stage D, NHYA Class II  # Severe mitral valve regurgitation s/p annuloplasty ring (1/21/2015)  # Heart transplant waitlist  Congestive heart failure 2/2 NICM (severe mitral regurgitation). Followed as outpatient by Dr. Hzd. LVAD was placed as bridge to heart transplant; currently on heart transplant wait list.  - GDMT              ACEi/ARB/ARNI          PTA Entresto  BID              BB                             "   PTA Sotalol 120mg BID                                                  PTA Metoprolol succinate 25mg qd              MRA                            PTA Spironolactone 12.5mg qd              SGLTi                          None  - PTA potassium chloride, magnesium  - SCD ppx                               CRT-D  - Anticoagulation                     Heparin drip, PTA Warfarin (INR goal 2-3) held  - With LVAD, goal MAP 65 to 90     CHRONIC/STABLE PROBLEMS:  # Hx paroxysmal VT s/p single-chamber ICD (2/2/2015), biventricular CRT-D (1/8/2016)  # Hx post-op atrial fibrillation  - PTA sotalol 120mg BID  - PTA metoprolol succinate 25mg qd     # CKD 2  Baseline Cr 1.3-1.4. Admitted at baseline.  - Monitor BMP     # GERD  - PTA omeprazole 40mg qam     # Gout  - PTA allopurinol 100mg qam     # Anxiety  # Insomnia  - PTA hydroxyzine 50mg BID PRN  - PTA trazadone 25-50mg at bedtime PRN         Diet: Fluid restriction 2000 ML FLUID (and additional linked orders)  Regular Diet Adult    DVT Prophylaxis: Heparin drip  [place] Catheter: Not present  Cardiac Monitoring: ACTIVE order. Indication: Acute decompensated heart failure (48 hours)  Code Status: Full Code          Clinically Significant Risk Factors               # Coagulation Defect: INR = 1.60 (Ref range: 0.85 - 1.15) and/or PTT = N/A, will monitor for bleeding     # End stage heart failure: Ventricular assist device (VAD) present          # Obesity: Estimated body mass index is 34.59 kg/m  as calculated from the following:    Height as of this encounter: 1.778 m (5' 10\").    Weight as of this encounter: 109.4 kg (241 lb 1.6 oz)., PRESENT ON ADMISSION      # ICD device present             Disposition Plan   Expected discharge: 2 - 3 days, recommended to prior living arrangement once  infectious work-up/management is completed .    Entered: Don Street MD 08/28/2024, 7:32 AM   The patient's care was discussed with the Attending Physician, Dr. Damon .      Don Street, " MD  PGY3, Internal Medicine  Cardiology 2 [facility]  ______________________________________________________________________    Interval History   No acute events overnight. Patient feeling overall well when seen this AM. Has some upper abdominal fullness. No fevers/chills, SOB, or chest pain.     Physical Exam   Vital Signs: Temp: 97.6  F (36.4  C) Temp src: Oral   Pulse: 80   Resp: 16 SpO2: 96 % O2 Device: None (Room air)    Weight: 241 lbs 1.6 oz    Gen: No acute distress, lying in bed  HEENT: Normocephalic, atraumatic  CV: Audible hum of LVAD, no significant peripheral edema  Pulm: CTAB, non-labored breathing on room air  Abd: Soft, mild tenderness to palpation of epigastric region, dressing in place in RLQ with minimal tenderness to palpation of area  Neuro: Alert and conversant without apparent focal neurological deficits    Medical Decision Making     Please see A&P for additional details of medical decision making.      Data   ------------------------- PAST 24 HR DATA REVIEWED -----------------------------------------------

## 2024-08-28 NOTE — PLAN OF CARE
1900-0730    Diagnosis: LVAD driveline infection      Neuro: A&O x4, able to make needs known.   Cardiac: Atrial paced (HR 80s).  Denied chest pain, dizziness, palpitations. HM 3 LVAD WNL.   Respiratory: RA, sating >95%. Denied SOB.   GI/: WDL.   Diet/Appetite: Regular diet, good appetite. 2L FR.  Skin: No new skin deficits noted.   LDA: Left PIV SL. Right PIV infusing heparin gtt at 1150 units/hr.   Activity: SBA.  Pain: Denies pain.   Plan: Getting IV ABX. Continue with POC. Notify CVTS of pertinent changes.

## 2024-08-28 NOTE — PLAN OF CARE
"[id number] Goal Outcome Evaluation:         Overall Patient Progress: no change       VS:  Pulse 80   Temp 97.6  F (36.4  C) (Oral)   Resp 18   Ht 1.778 m (5' 10\")   Wt 109.4 kg (241 lb 1.6 oz)   SpO2 94%   BMI 34.59 kg/m      Cardiac:  A-paced w/ HR 80s, MAP 72-88 w/ Doppler, denied CP   Respiratory:  Stable on RA sating >93%, denied SOB, LSCTA   GI/:  1 BM this shift per pt, up to bathroom; urinal @ bedside, voids w/o difficulty,  mL this shift   Neuro:  A&O x4, used call light appropriately, denied new numbness or tingling   Pain:  Pt denied   Activity:  Ind, up ad theresa   Skin:  Driveline infection   Dressing:  LVAD dressing changed this shift, CDI;   R PIV dressing changed d/t old dressing peeling off, new dressing CDI;   L PIV dressing reinforced   Diet:  Reg w /thin, 2000 mL fluid restriction; 1185 mL PO intake this shift; Denied N/V   LDA:  R PIV infusing hep gtt @ 1150 units/hr;   L PIV SL   Equipment:  IV pole, IV pumps, LVAD supplies, & personal belongings   Plan:  Cont POC   Additional Info:  Mg replaced this shift, recheck in AM       "

## 2024-08-29 ENCOUNTER — HOME INFUSION (PRE-WILLOW HOME INFUSION) (OUTPATIENT)
Dept: PHARMACY | Facility: CLINIC | Age: 59
End: 2024-08-29
Payer: MEDICARE

## 2024-08-29 LAB
ANION GAP SERPL CALCULATED.3IONS-SCNC: 11 MMOL/L (ref 7–15)
ATRIAL RATE - MUSE: 80 BPM
BUN SERPL-MCNC: 14 MG/DL (ref 6–20)
CALCIUM SERPL-MCNC: 9.2 MG/DL (ref 8.8–10.4)
CHLORIDE SERPL-SCNC: 107 MMOL/L (ref 98–107)
CK SERPL-CCNC: 84 U/L (ref 39–308)
CREAT SERPL-MCNC: 1.05 MG/DL (ref 0.67–1.17)
CRP SERPL-MCNC: 40.3 MG/L
DIASTOLIC BLOOD PRESSURE - MUSE: NORMAL MMHG
EGFRCR SERPLBLD CKD-EPI 2021: 83 ML/MIN/1.73M2
ERYTHROCYTE [DISTWIDTH] IN BLOOD BY AUTOMATED COUNT: 14.7 % (ref 10–15)
GLUCOSE SERPL-MCNC: 101 MG/DL (ref 70–99)
HCO3 SERPL-SCNC: 22 MMOL/L (ref 22–29)
HCT VFR BLD AUTO: 36.7 % (ref 40–53)
HGB BLD-MCNC: 11.5 G/DL (ref 13.3–17.7)
INR PPP: 1.29 (ref 0.85–1.15)
INTERPRETATION ECG - MUSE: NORMAL
MAGNESIUM SERPL-MCNC: 1.7 MG/DL (ref 1.7–2.3)
MCH RBC QN AUTO: 29.4 PG (ref 26.5–33)
MCHC RBC AUTO-ENTMCNC: 31.3 G/DL (ref 31.5–36.5)
MCV RBC AUTO: 94 FL (ref 78–100)
P AXIS - MUSE: NORMAL DEGREES
PLATELET # BLD AUTO: 194 10E3/UL (ref 150–450)
POTASSIUM SERPL-SCNC: 4.8 MMOL/L (ref 3.4–5.3)
PR INTERVAL - MUSE: 164 MS
QRS DURATION - MUSE: 94 MS
QT - MUSE: 420 MS
QTC - MUSE: 484 MS
R AXIS - MUSE: 240 DEGREES
RBC # BLD AUTO: 3.91 10E6/UL (ref 4.4–5.9)
SODIUM SERPL-SCNC: 140 MMOL/L (ref 135–145)
SYSTOLIC BLOOD PRESSURE - MUSE: NORMAL MMHG
T AXIS - MUSE: -5 DEGREES
UFH PPP CHRO-ACNC: 0.34 IU/ML
VENTRICULAR RATE- MUSE: 80 BPM
WBC # BLD AUTO: 4.1 10E3/UL (ref 4–11)

## 2024-08-29 PROCEDURE — 85520 HEPARIN ASSAY: CPT | Performed by: STUDENT IN AN ORGANIZED HEALTH CARE EDUCATION/TRAINING PROGRAM

## 2024-08-29 PROCEDURE — 82550 ASSAY OF CK (CPK): CPT

## 2024-08-29 PROCEDURE — 85027 COMPLETE CBC AUTOMATED: CPT

## 2024-08-29 PROCEDURE — 99232 SBSQ HOSP IP/OBS MODERATE 35: CPT | Mod: FS | Performed by: PHYSICIAN ASSISTANT

## 2024-08-29 PROCEDURE — 99233 SBSQ HOSP IP/OBS HIGH 50: CPT | Performed by: INTERNAL MEDICINE

## 2024-08-29 PROCEDURE — 250N000013 HC RX MED GY IP 250 OP 250 PS 637

## 2024-08-29 PROCEDURE — 85610 PROTHROMBIN TIME: CPT | Performed by: STUDENT IN AN ORGANIZED HEALTH CARE EDUCATION/TRAINING PROGRAM

## 2024-08-29 PROCEDURE — 250N000011 HC RX IP 250 OP 636

## 2024-08-29 PROCEDURE — 86140 C-REACTIVE PROTEIN: CPT

## 2024-08-29 PROCEDURE — 36415 COLL VENOUS BLD VENIPUNCTURE: CPT

## 2024-08-29 PROCEDURE — 80048 BASIC METABOLIC PNL TOTAL CA: CPT

## 2024-08-29 PROCEDURE — 83735 ASSAY OF MAGNESIUM: CPT

## 2024-08-29 PROCEDURE — 258N000003 HC RX IP 258 OP 636

## 2024-08-29 PROCEDURE — 93750 INTERROGATION VAD IN PERSON: CPT | Performed by: STUDENT IN AN ORGANIZED HEALTH CARE EDUCATION/TRAINING PROGRAM

## 2024-08-29 PROCEDURE — 999N000128 HC STATISTIC PERIPHERAL IV START W/O US GUIDANCE

## 2024-08-29 PROCEDURE — 120N000003 HC R&B IMCU UMMC

## 2024-08-29 PROCEDURE — 999N000111 HC STATISTIC OT IP EVAL DEFER

## 2024-08-29 PROCEDURE — 99232 SBSQ HOSP IP/OBS MODERATE 35: CPT | Mod: 25 | Performed by: STUDENT IN AN ORGANIZED HEALTH CARE EDUCATION/TRAINING PROGRAM

## 2024-08-29 PROCEDURE — 250N000013 HC RX MED GY IP 250 OP 250 PS 637: Performed by: STUDENT IN AN ORGANIZED HEALTH CARE EDUCATION/TRAINING PROGRAM

## 2024-08-29 RX ORDER — WARFARIN SODIUM 5 MG/1
5 TABLET ORAL
Status: COMPLETED | OUTPATIENT
Start: 2024-08-29 | End: 2024-08-29

## 2024-08-29 RX ORDER — LIDOCAINE 40 MG/G
CREAM TOPICAL
Status: DISCONTINUED | OUTPATIENT
Start: 2024-08-29 | End: 2024-08-30 | Stop reason: HOSPADM

## 2024-08-29 RX ORDER — MAGNESIUM OXIDE 400 MG/1
400 TABLET ORAL EVERY 4 HOURS
Status: COMPLETED | OUTPATIENT
Start: 2024-08-29 | End: 2024-08-29

## 2024-08-29 RX ADMIN — POTASSIUM CHLORIDE 60 MEQ: 1500 TABLET, EXTENDED RELEASE ORAL at 20:13

## 2024-08-29 RX ADMIN — MAGNESIUM OXIDE TAB 400 MG (241.3 MG ELEMENTAL MG) 800 MG: 400 (241.3 MG) TAB at 09:24

## 2024-08-29 RX ADMIN — CEFAZOLIN SODIUM 2 G: 2 INJECTION, SOLUTION INTRAVENOUS at 09:25

## 2024-08-29 RX ADMIN — SACUBITRIL AND VALSARTAN 1 TABLET: 97; 103 TABLET, FILM COATED ORAL at 09:58

## 2024-08-29 RX ADMIN — MAGNESIUM OXIDE TAB 400 MG (241.3 MG ELEMENTAL MG) 400 MG: 400 (241.3 MG) TAB at 12:24

## 2024-08-29 RX ADMIN — FUROSEMIDE 60 MG: 40 TABLET ORAL at 17:43

## 2024-08-29 RX ADMIN — POTASSIUM CHLORIDE 60 MEQ: 1500 TABLET, EXTENDED RELEASE ORAL at 09:24

## 2024-08-29 RX ADMIN — SPIRONOLACTONE 12.5 MG: 25 TABLET, FILM COATED ORAL at 09:24

## 2024-08-29 RX ADMIN — SACUBITRIL AND VALSARTAN 1 TABLET: 97; 103 TABLET, FILM COATED ORAL at 20:13

## 2024-08-29 RX ADMIN — SOTALOL HYDROCHLORIDE 120 MG: 120 TABLET ORAL at 20:13

## 2024-08-29 RX ADMIN — Medication 50 MCG: at 09:24

## 2024-08-29 RX ADMIN — METOPROLOL SUCCINATE 25 MG: 25 TABLET, EXTENDED RELEASE ORAL at 09:24

## 2024-08-29 RX ADMIN — SOTALOL HYDROCHLORIDE 120 MG: 120 TABLET ORAL at 09:23

## 2024-08-29 RX ADMIN — WARFARIN SODIUM 5 MG: 5 TABLET ORAL at 17:43

## 2024-08-29 RX ADMIN — FUROSEMIDE 60 MG: 40 TABLET ORAL at 09:24

## 2024-08-29 RX ADMIN — DAPTOMYCIN 700 MG: 500 INJECTION, POWDER, LYOPHILIZED, FOR SOLUTION INTRAVENOUS at 20:13

## 2024-08-29 RX ADMIN — ALLOPURINOL 100 MG: 100 TABLET ORAL at 09:23

## 2024-08-29 RX ADMIN — MAGNESIUM OXIDE TAB 400 MG (241.3 MG ELEMENTAL MG) 800 MG: 400 (241.3 MG) TAB at 20:13

## 2024-08-29 RX ADMIN — OMEPRAZOLE 40 MG: 20 CAPSULE, DELAYED RELEASE ORAL at 09:23

## 2024-08-29 RX ADMIN — CEFAZOLIN SODIUM 2 G: 2 INJECTION, SOLUTION INTRAVENOUS at 01:03

## 2024-08-29 RX ADMIN — MAGNESIUM OXIDE TAB 400 MG (241.3 MG ELEMENTAL MG) 400 MG: 400 (241.3 MG) TAB at 17:43

## 2024-08-29 RX ADMIN — HEPARIN SODIUM 1200 UNITS/HR: 10000 INJECTION, SOLUTION INTRAVENOUS at 17:37

## 2024-08-29 ASSESSMENT — ACTIVITIES OF DAILY LIVING (ADL)
ADLS_ACUITY_SCORE: 27
DEPENDENT_IADLS:: INDEPENDENT
ADLS_ACUITY_SCORE: 27
ADLS_ACUITY_SCORE: 25
ADLS_ACUITY_SCORE: 27

## 2024-08-29 NOTE — PLAN OF CARE
1900-0730     Diagnosis: LVAD driveline infection      Neuro: A&O x4, able to make needs known.   Cardiac: Atrial paced (HR 80s).  Denied chest pain, dizziness, palpitations. HM 3 LVAD, low Pi 2.2-2.5 pt asymptomatic, provider notified and acknowledged. MAP doppler WNL.   Respiratory: RA, sating >95%. Denied SOB.   GI/: WDL.   Diet/Appetite: Regular diet, good appetite. 2L FR.  Skin: No new skin deficits noted.   LDA: Left PIV SL. Right PIV infusing heparin gtt at 1150 units/hr.   Activity: independent   Pain: Denies pain.   Plan: Getting IV ABX. Continue with POC. Notify CARDS 2 of pertinent changes.

## 2024-08-29 NOTE — PROGRESS NOTES
General ID Service: Follow Up Note      Patient:  Tej Morfin, Date of birth 1965, Medical record number [id number]  Date of Visit:  August 29, 2024    Physician Attestation     Key findings: See student note, as edited by me, for details of plan. 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 Per phone images reviewed with pt's wife (who does the dressings) there has been new tissue growth at the site over the past ~3 weeks. Pt also reports a ~50 lb weight gain, primarily in the abdomen, since his LVAD was placed. We discussed with [facility] and blood cultures remain negative thus far, site culture again with MSSA. I suspect this is an anatomic issue/source control related rather than a failure of appropriate antimicrobials. The weight gain may have misplaced the line, but I suspect the acute issue is the ?granulation tissue at the exit site that prevents drainage. Appreciate CT surgery input and recommendation for silver nitrate treatment.     Due to insurance issues, home IV antibiotics will not be feasible. Pt lives near multiple infusion centers locally. Recommend switching to IV daptomycin 8 mg/kg/day for the next 4 weeks, with ID follow up to determine final duration and need for PO suppression after this. See separate OPAT note for details.     Pattie Hargrove MD  Pg [phone]         Assessment and Recommendations:   ID Problem List:  LVAD Driveline Infeciton  New/worsening purulent drainage from site  MSSA driveline colonization  HFrEF s/p LVAD (3/23/2022)  CKD III (Baseline Cr 1.3 and eGFR 60)    Recommendations:  Switch to Daptomycin 8mg/kg IV daily continue for 4 weeks from starting Cefazolin  Weekly CBC, BMP and CK  Follow pending culture results  3.   Evaluation for possible intervention from CT surgery    Discussion:  Tej Morfin is a 58yr old  male with a past medical history of chronic systolic heart failure, HFrEF, NICM s/p HM3 implant 3/23/2022, severe MR s/p MV repair, s/p CRT-D, VT on sotalol, and chronic kidney disease, stage II-III, depression and anxiety     Edilberto infection has been characterized by multiple hospital visits with waxing and waning infections from the exit site of his drive line starting with his first visit to outside [facility] near his home in [place] 3/2024. Pt presented to clinic with complaints of purulent drainage, pain and redness at exit site of drive line. Cultures yield MSSA, Pt prescribed doxycycline with resolution of symptoms. Pt did well for several weeks but purulent drainage returned again 4/25/2024 again, patient prescribed doxycycline with similar results on blood cultures. Pt again went several weeks and was reluctant to go back to see physician for more workup of drive site drainage. With time, the infection worsened to the point of requiring dressing changes daily with increasing pain. Pt presented to [facility] for evaluation again blood cultures again yield staph species, treated with cefadroxil resulting in alleviation of symptoms. Pt unfortunately again presented back to [facility] 08/25 for workup again of purulence and then referred to [facility] for evaluation of site.     CT abdomen yields no abscess or fluid collection but difficult to discern on CT if infection is truly present due to CT artifacts from tube material. Pt reported belly fullness with pain in epigastric region near exit site. Blood cultures to this point have shown no growth. Low suspicion for bacteremia.     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 Per phone images reviewed with pt's wife (who does the dressings) there has been new tissue growth at the site over the past ~3 weeks. Pt also reports a ~50 lb weight  gain, primarily in the abdomen, since his LVAD was placed. We discussed with [facility] and blood cultures remain negative thus far, site culture again with MSSA. CT surgery consulted and after thorough investigation, it is deemed surgical intervention is not the best option. Thus we recommend a long course antibiotic regimen.     Pt has failed oral modifications at this point given his history of relapsing remitting infection. Ideally we would recommend a continuation of IV cefazolin; however, due to financial concerns as his insurance does not cover IV antibiotics we instead recommend daptomycin w/ weekly CBC, BMP and CK      Recs were discussed with primary team today.  ID will sign off at this time. Please page with questions/concerns.            Interval History:   CT surgery saw patient, no surgery necessary. We saw Tej in good disposition laying down, slightly uncomfortable with notable fluid accumulation appreciated on dressing.         Review of Systems:   Full 9 pt ROS obtained, pertinent positives and negatives as above.          Current Antimicrobials   Current: IV Cefazolin 2g Q8H  Switching to daptomycin 8mg/kg PO daily    Prior:Vancomycin  Cefidroxil  Doxycycline         Physical Exam:   Ranges for vital signs:  Temp:  [97.4  F (36.3  C)-98  F (36.7  C)] 97.5  F (36.4  C)  Pulse:  [79-80] 80  Resp:  [16-18] 16  SpO2:  [93 %-98 %] 93 %    Intake/Output Summary (Last 24 hours) at 8/28/2024 1347  Last data filed at 8/28/2024 1007  Gross per 24 hour   Intake 1168.47 ml   Output 1010 ml   Net 158.47 ml     Exam:  GENERAL:  well-developed, well-nourished, sitting in bed in no acute distress.   ENT:  Head is normocephalic, atraumatic. Oropharynx is moist without exudates or ulcers.  EYES:  Eyes have anicteric sclerae.    NECK:  Supple.  LUNGS:  Clear to auscultation.  CARDIOVASCULAR:  Regular rate and rhythm with no murmurs, gallops or rubs.  ABDOMEN:  Normal bowel sounds, soft, nontender.  EXT:  Extremities warm and without edema.  SKIN:  No acute rashes.  Line is in place without any surrounding erythema.  NEUROLOGIC:  Grossly nonfocal.         Laboratory Data:   Reviewed.  Pertinent for: MSSA    Culture data: Cultures pending          Imaging:    EXAM: CT ABDOMEN PELVIS WITH CONTRAST     INDICATION:Concern for her LVAD driveline infection or abscess surrounding driveline     COMPARISON(S):   July 16, 2024 CT abdomen pelvis. Additional CT imaging dating back to May 1, 2017     TECHNIQUE:   Axial CT images were performed following administration of IV contrast. Coronal and sagittal reconstructions were performed.     FINDINGS:   Postprocedural changes relating to LVAD noted. No well-defined abnormal collection/abscess appreciated about the device. Streak artifact from the device somewhat limits the evaluation. Midline sternotomy wires and AICD leads noted. Heart appears prominent in size. No basilar consolidation. Areas of subsegmental atelectasis/scarring noted.     Subcentimeter low-attenuation focus in the liver on image 39. Similar to prior studies and most likely benign in nature. Additionally on image 54. No significant intrahepatic or extrahepatic biliary ductal dilation. No definite acute gallbladder pathology. Contracted appearance of the gallbladder.     No definite splenic, pancreatic, or adrenal lesion.     Kidneys enhance symmetrically. No hydronephrosis. Left upper pole renal cyst appears similar to prior exams. No significant perinephric stranding. No gross bladder lesion. Prostate measures up to 4.5 cm in diameter.     No free fluid or pneumoperitoneum. Mild atherosclerotic calcifications of a nonaneurysmal aorta. No definite lymphadenopathy.     No definite abnormal thickening or distention of the colon. Moderate volume of stool within the colon. Fluid noted within the colon consistent with diarrheal state. No appendicitis. No small bowel obstruction.     No definite acute osseous  abnormalities. Degenerative changes of the hips.     IMPRESSION:   1. No abscess/collection or definite acute abnormalities appreciated about the LVAD.   2. Stable likely benign appearing low-attenuation hepatic and left renal lesions as described.   3. Additional chronic/incidental findings as described.     Appropriate radiation dose reduction devices and/or manual techniques for    appropriate moderation of exposure were utilized.       Finalized by: Derian Klein MD on 8/25/2024 8:18 PM CDT

## 2024-08-29 NOTE — PLAN OF CARE
Occupational Therapy: Orders received and chart reviewed, discussed with patient. Occupational therapy not indicated at this time. Per conversation with pt and RN, pt is IND in room, has support as needed, and has no concerns with home safety or I/ADL engagement. RN agreeable to pt ambulating in halls without staff, educated pt on ambulating during the day while in house and notifying RN prior to mobilizing in hallways.?Defer discharge recommendations to medical team.? Will complete orders.    Please feel free to re-consult if pt has change in status, prolonged stay, or new IP OT needs become apparent.

## 2024-08-29 NOTE — PROGRESS NOTES
"Primary team:  Place order panel  OPAT Pharmacy ([facility]) Review and ID Care Transition   Place imaging order(s) requested above prior to discharge.  Contact ID teams about missed ID clinic appointments and/or ongoing therapy needs.    Prolonged Parenteral/Oral Antibiotic Recommendations and ID Follow up  This template provides final ID recommendations as of this date.     Infectious Diseases Indication: LVAD driveline infection    Antibiotic Information  Name of Antibiotic Dose of Antibiotic1 Anticipated duration Effective start date2 End date   Daptomycin 8 mg/kg/day 4 weeks 8/27/24 TBD in ID clinic                 1.Dose of antibiotic will need to be renally adjusted if creatinine clearance changes  2.Effective start date is the date of adequate therapy with appropriate spectrum    Method of antibiotic delivery:PICC line.Is the line being used for another indication besides antimicrobials? No At the end of therapy should the line used for antimicrobials be removed or de-accessed? Yes. Selecting \"yes\" will function as written order to remove PICC line or de-access the indwelling line at the end of therapy.    Weekly labs required: Creatinine, AST/ALT, CBC with diff, and CK. Dr. Hargrove will follow labs at discharge until ID follow up. Fax labs to ID clinic.    Are there pending microbial tests: Yes Cultures     Imaging for ID follow up: ID Imaging: No.    We will attempt to make OPAT appointments within 2 weeks of discharge based on clinic availability.    Provider: Kinjal, timing of visit before this specific date 9/24/24 , and the type of clinic appointment visit Okay for in person or a virtual visit.     ID provider route note: OPAT RN Care Coordinator [facility] and [facility] ID Clinic Information:  Phone: [phone]  Fax: [phone] (Attention ID clinic nurses)   "

## 2024-08-29 NOTE — PROGRESS NOTES
Vascular Access Services Notes:    VAS requested for a RUE ultrasound to rule out DVT prior to PICC placement. Pt had a history of DVT on 3/2022 & currently has a left chest pacemaker. Dr. Don Street aware.      Jacob Faye, ROSALINON, RN [facility]-BC  Vascular Access Services  [facility]  [phone]

## 2024-08-29 NOTE — PROGRESS NOTES
"  Cardiovascular Surgery Progress Note  08/29/2024         Assessment and Plan:     Mr Morfin is a 59 year old male with PMH of chronic systolic heart failure, HFrEF, NICM s/p HM3 implant 3/23/2022, severe MR -s/p repair, s/p CRT-D, VT on sotalol, stage II chronic kidney disease who presented to ED with concerns over driveline infection. He started having symptoms in March and drainage has continued since. He has been taking Cefadroxil since March without improvement. He has noticed increased odorous drainage from his driveline site but denies fever, chills, no VAD alarms, fluid retention or other symptoms noted.       - continue to follow cultures, promising that gram stain from driveline 8/27/24 remains negative thus far along with no positive cultures.   - continue to monitor but less likely he will require surgical intervention due to no definitive abscess or fluid collection to drain.   - infections at drivelines are difficult to clear due to foreign body, a heart transplant with LVAD removal would be definitive.   - recommend driveline dressing changes BID.    - continue IV abx for treatment and suppressive therapy.   - OK to restart coumadin 8/29   - consider silver nitrate treatment of driveline exit site granuloma.      Clinically Significant Risk Factors               # Coagulation Defect: INR = 1.29 (Ref range: 0.85 - 1.15) and/or PTT = N/A, will monitor for bleeding     # End stage heart failure: Ventricular assist device (VAD) present          # Obesity: Estimated body mass index is 34.54 kg/m  as calculated from the following:    Height as of this encounter: 1.778 m (5' 10\").    Weight as of this encounter: 109.2 kg (240 lb 11.2 oz)., PRESENT ON ADMISSION      # ICD device present      Discussed with Dr Rodríguez through written communication.      Kavon Richmond PA-C  Cardiothoracic Surgery  Pager [phone]    9:30 AM   August 29, 2024        Interval History:     No overnight events.  Feeling better " "overall.   A little pain around the driveline site.          Physical Exam:   Pulse 80, temperature 98  F (36.7  C), temperature source Oral, resp. rate 16, height 1.778 m (5' 10\"), weight 109.2 kg (240 lb 11.2 oz), SpO2 97%.  Vitals:    08/27/24 0835 08/28/24 0640 08/29/24 0641   Weight: 109.1 kg (240 lb 8 oz) 109.4 kg (241 lb 1.6 oz) 109.2 kg (240 lb 11.2 oz)      Gen: A&Ox4, NAD  Neuro: no focal deficits   CV: RRR, VAD functioning w/o alarm  Pulm: no wheezing or rhonchi, normal breathing on RA  Abd: nondistended, normal BS, soft  LVAD:    - driveline dressing soiled with brownish thick drainage.    - has 1 cm pink granuloma at driveline skin exit site    - has mild induration and some tenderness inferior to xiphoid process         Data:    Imaging:  reviewed recent imaging, no acute concerns and no pocket of fluid or obvious abscess on CT    Labs:  BMP  Recent Labs   Lab 08/29/24  0606 08/28/24  0548 08/27/24  0854 08/26/24  2225    139 139 139   POTASSIUM 4.8 4.4 4.1  4.1 4.5   CHLORIDE 107 108* 106 105   YESICA 9.2 8.8 9.2 9.3   CO2 22 23 22 22   BUN 14.0 17.6 23.1* 22.3*   CR 1.05 1.09 1.20*  1.20* 1.40*   * 112* 111* 88     CBC  Recent Labs   Lab 08/29/24  0606 08/28/24  0548 08/27/24  0829 08/26/24  2225   WBC 4.1 4.0 5.1 5.2   RBC 3.91* 3.74* 4.00* 4.35*   HGB 11.5* 11.0* 11.9* 12.8*   HCT 36.7* 35.5* 38.3* 41.2   MCV 94 95 96 95   MCH 29.4 29.4 29.8 29.4   MCHC 31.3* 31.0* 31.1* 31.1*   RDW 14.7 14.6 14.6 14.9    175 184 194     INR  Recent Labs   Lab 08/29/24  0606 08/28/24  0548 08/27/24  0829 08/26/24  2225   INR 1.29* 1.60* 1.59* 1.54*      Hepatic Panel  Recent Labs   Lab 08/26/24  2225   AST 20   ALT 22   ALKPHOS 67   BILITOTAL 0.3   ALBUMIN 3.6     GLUCOSE:   Recent Labs   Lab 08/29/24  0606 08/28/24  0548 08/27/24  0854 08/26/24  2225   * 112* 111* 88       "

## 2024-08-29 NOTE — PROGRESS NOTES
Therapy: IV abx  Insurance: Medicare and BCBS Supplement     Pt does not have coverage for IV abx with their Medicare and BCBS Supplement plan. Drug would be billed to the part D and supplies would be self-pay. Based on Cefazolin 2gm q8h, total cost is $37.21/day for drug and supplies.     For nursing, patient should have coverage if homebound, however [facility] is not contracted with Medicare and an outside nursing agency would be utilized instead. If patient is not homebound, there is no coverage and [facility] can see patient if patient agrees to self-pay for $90 per visit.    In reference to admission on 8/26/24 to check IV abx coverage    Please contact Intake with any questions, [phone] or In Basket pool, [facility] ([phone]).

## 2024-08-29 NOTE — PROGRESS NOTES
I have personally seen and examined the patient on 2024. I saw the patient with the resident (or fellow) and agree with the findings and the plan of care as documented in the resident's (or fellow) note. I have personally reviewed vital signs, medications, laboratory results, available imaging and hemodynamic data.     59M s/p HM3 LVAD admitted with LVAD driveline infection that failed oral antibiotics. Currently on IV abx and setting this up for outpatient. Resumed warfarin and INR not yet therapeutic. Will need PICC line prior to discharge. Will upgrade from status 4 to status 3 on heart transplant list.    I personally interrogated the LVAD at the bedside that showed stable parameters with no significant alarms.    Heartmate 3 LEFT VS  Flow (Lpm): 4.9 Lpm  Pulse Index (PI): 2.07 PI  Speed (rpm): 5500 rpm  Power (christiansen): 4.1 christiansen  Current Hct settin      I spent 41 minutes in care of the patient today including obtaining history, personally reviewing recent testing and/or lab results, today's examination, discussion of testing results and care recommendations and counseling with patient.    Dianne Damon MD   of Medicine, [facility]   Advanced Heart Failure and Transplant Cardiology              [facility]    Cardiology Progress Note- Cardiology 2        Date of Admission:  2024     Assessment & Plan: HVSL   Mr. Morfin is a 59 year old male with PMHx of HFrEF 2/2 NICM s/p Heartmate III LVAD (3/2022) on warfarin, severe mitral valve regurgitation s/p repair (2015), paroxysmal VT s/p biventricular CRT-D (2016), GERD, gout, anxiety, and insomnia who was admitted on 2024 with LVAD driveline infection.    Today:  - Continue cefazolin 2 g q8h, CRP downtrending  - No plan for surgical intervention at present  - [facility]ADOR/HELIO in process of setting up home infusion for IV antibiotics, can discharge home once this is  set up  - Resumed PTA warfarin, continue heparin drip while INR is subtherapeutic     # MSSA LVAD Driveline Infection  # MRSA colonized  Started having erythema/pus drainage 03/2024. Drive line cultures with MSSA (3/17/2024, 7/12/2024). Seen by outpatient ID and treated as outpatient with oral antibiotics (doxycycline, cefadroxil) with ongoing erythema, tenderness, and persistent drainage. Admitted on 8/27/2024 for IV antibiotics, consideration of surgical intervention for source control.   - Vancomycin (8/27)  - Cefazolin (8/27 - present)  - Workup              > Blood cultures (8/27/2024) NGTD              > Wound cultures (8/27/2024) NGTD  - CVTS consult              > No current plan for surgical intervention   > Reviewed CT images from OSH, stable from images in April  - ID consulted, appreciate recs   > Wound cultures from OSH growing MSSA   > Plan for 4 weeks of IV antibiotics, possible need for suppresive abx after  > If home infusion is not covered, could switch to daptomycin for q24h dosing to allow for patient to receive IV abx daily at infusion center  - PICC ordered  - Discharge once home infusion is set up        # Chronic HFrEF s/p Heartmate III LVAD (3/23/2022), AHA Stage D, NHYA Class II  # Severe mitral valve regurgitation s/p annuloplasty ring (1/21/2015)  # Heart transplant waitlist, currently status 4, will be eligible to upgrade to status 3 while on IV abx  Congestive heart failure 2/2 NICM (severe mitral regurgitation). Followed as outpatient by Dr. Hdz. LVAD was placed as bridge to heart transplant; currently on heart transplant wait list.  - GDMT              ACEi/ARB/ARNI          PTA Entresto  BID              BB                               PTA Sotalol 120mg BID                                                  PTA Metoprolol succinate 25mg qd              MRA                            PTA Spironolactone 12.5mg qd              SGLTi                          None  - PTA  "potassium chloride, magnesium  - SCD ppx                               CRT-D  - Anticoagulation                     Heparin drip, resumed PTA Warfarin (INR goal 2-3)  - With LVAD, goal MAP 65 to 90     CHRONIC/STABLE PROBLEMS:  # Hx paroxysmal VT s/p single-chamber ICD (2/2/2015), biventricular CRT-D (1/8/2016)  # Hx post-op atrial fibrillation  - PTA sotalol 120mg BID  - PTA metoprolol succinate 25mg qd     # CKD 2  Baseline Cr 1.3-1.4. Admitted at baseline.  - Monitor BMP     # GERD  - PTA omeprazole 40mg qam     # Gout  - PTA allopurinol 100mg qam     # Anxiety  # Insomnia  - PTA hydroxyzine 50mg BID PRN  - PTA trazadone 25-50mg at bedtime PRN         Diet: Fluid restriction 2000 ML FLUID (and additional linked orders)  Regular Diet Adult    DVT Prophylaxis: Heparin drip, warfarin  Calderon Catheter: Not present  Cardiac Monitoring: ACTIVE order. Indication: Acute decompensated heart failure (48 hours)  Code Status: Full Code          Clinically Significant Risk Factors               # Coagulation Defect: INR = 1.29 (Ref range: 0.85 - 1.15) and/or PTT = N/A, will monitor for bleeding     # End stage heart failure: Ventricular assist device (VAD) present          # Obesity: Estimated body mass index is 34.54 kg/m  as calculated from the following:    Height as of this encounter: 1.778 m (5' 10\").    Weight as of this encounter: 109.2 kg (240 lb 11.2 oz)., PRESENT ON ADMISSION      # ICD device present             Disposition Plan   Expected discharge: 1-2 days, recommended to prior living arrangement once  home IV antibiotics are set up .    Entered: Don Street MD 08/29/2024, 9:46 AM   The patient's care was discussed with the Attending Physician, Dr. Damon .      Don Street MD  PGY3, Internal Medicine  Cardiology [address] [place] [address]  ______________________________________________________________________    Interval History   No acute events overnight. Patient " feeling well when seen this AM. Only has a bit of mild epigastric pain. Denies fevers/chills, chest pain, or SOB.     Physical Exam   Vital Signs: Temp: 98  F (36.7  C) Temp src: Oral   Pulse: 80   Resp: 16 SpO2: 97 % O2 Device: None (Room air)    Weight: 240 lbs 11.2 oz    Gen: No acute distress, lying in bed  HEENT: Normocephalic, atraumatic  CV: Audible hum of LVAD, no significant peripheral edema  Pulm: CTAB, non-labored breathing on room air  Abd: Soft, mild tenderness to palpation of epigastric region, dressing in place in RLQ with minimal tenderness to palpation of area  Neuro: Alert and conversant without apparent focal neurological deficits    Medical Decision Making     Please see A&P for additional details of medical decision making.      Data   ------------------------- PAST 24 HR DATA REVIEWED -----------------------------------------------

## 2024-08-29 NOTE — PLAN OF CARE
Goal Outcome Evaluation:      Plan of Care Reviewed With: patient    Overall Patient Progress: improvingOverall Patient Progress: improving     Arianna Cardona, RN    Nurse Coordinator     Covering for:     Phone: *    Social Work and Care Management Department    SEARCHABLE in [facility] - search CARE COORDINATOR    [place] [facility] [place] ([phone]) Saturday & Sunday; ([phone]) [facility]    Units: 5A, 5B & 5C  Pager: [phone]    Units: 6B, 6C & 6D    Pager: [phone]    Units: 7A, 7B, 7C & 7D    Pager: [phone]    Units: 6A & ICU   Pager: [phone]    Units: 5 Ortho, 5MS & WB ED Pager: [phone]    Units: 6MS, 8A & 10 ICU  Pager [phone]

## 2024-08-29 NOTE — PROGRESS NOTES
D: Pt admitted on 8/27/2024 with LVAD driveline infection.      I/A:   Neuro: A&O x4.   Cardiac: A paced, HR 80s. LVAD numbers WDL, no alarms. Doppler MAPs: 80s.   Respiratory: Sats >90% on RA, denied SOB  GI/: Adequate UOP. No BM this shift. Good appetite.   Skin/drains: Driveline dressing changed today, moderate amount of drainage. New order to change twice a day.   IV/Drips: PIV x2. Heparin gtt running at 1150 units/hr, Xa to be checked in the morning.   Activity: Up ad theresa, steady gait.        P: Discharging to home on IV antibiotics, order for PICC to be placed. Notifying Cards 2 team of changes.

## 2024-08-29 NOTE — PHARMACY-ANTICOAGULATION SERVICE
Clinical Pharmacy - Warfarin Dosing Consult     Pharmacy has been consulted to manage this patient s warfarin therapy.  Indication: LVAD/RVAD  Therapy Goal: INR 2-3  Provider/Team: Jovanny Olivares  OP Anticoag Clinic: [facility]  Warfarin Prior to Admission: Yes  Warfarin PTA Regimen: 5 mg daily  Significant drug interactions: allopurinol (PTA), ancef, LIH gtt (incr bleed risk)    INR   Date Value Ref Range Status   08/29/2024 1.29 (H) 0.85 - 1.15 Final   08/28/2024 1.60 (H) 0.85 - 1.15 Final       Recommend warfarin 5 mg today (ordered).  Pharmacy will monitor Tej Morfin daily and order warfarin doses to achieve specified goal.      Please contact pharmacy as soon as possible if the warfarin needs to be held for a procedure or if the warfarin goals change.      Jodee White, PharmD, BCPS

## 2024-08-29 NOTE — CONSULTS
Care Management Initial Consult    General Information  Assessment completed with: Patient,    Type of CM/SW Visit: Initial Assessment    Primary Care Provider verified and updated as needed: Yes   Readmission within the last 30 days: no previous admission in last 30 days      Reason for Consult: discharge planning  Advance Care Planning: Advance Care Planning Reviewed: verified with patient, no concerns identified (Patient states he has one that he is going to fill out.)          Communication Assessment  Patient's communication style: spoken language (English or Bilingual)    Hearing Difficulty or Deaf: no   Wear Glasses or Blind: yes    Cognitive  Cognitive/Neuro/Behavioral: WDL  Level of Consciousness: alert  Arousal Level: opens eyes spontaneously  Orientation: oriented x 4  Mood/Behavior: calm, cooperative  Best Language: 0 - No aphasia  Speech: clear, spontaneous, logical    Living Environment:   People in home: child(lizeth), adult, spouse  Jessenia, Spouse  Current living Arrangements: apartment      Able to return to prior arrangements: yes       Family/Social Support:  Care provided by: spouse/significant other, child(lizeth)  Provides care for: no one  Marital Status:   Support system: Wife  Jessenia       Description of Support System: Supportive, Involved    Support Assessment: Adequate family and caregiver support    Current Resources:   Patient receiving home care services: No        Community Resources: Home Infusion, OP Infusion  Equipment currently used at home: none  Supplies currently used at home: None    Employment/Financial:  Employment Status: disabled        Financial Concerns: none   Referral to Financial Worker: No       Does the patient's insurance plan have a 3 day qualifying hospital stay waiver?  No    Lifestyle & Psychosocial Needs:  Social Determinants of Health     Food Insecurity: High Risk (8/27/2024)    Food Insecurity     Within the past 12 months, did you worry that your food would  run out before you got money to buy more?: No     Within the past 12 months, did the food you bought just not last and you didn t have money to get more?: Yes   Depression: Not at risk (7/24/2024)    PHQ-2     PHQ-2 Score: 0   Housing Stability: Low Risk  (8/27/2024)    Housing Stability     Do you have housing? : Yes     Are you worried about losing your housing?: No   Tobacco Use: Medium Risk (8/25/2024)    Received from [facility]    Patient History     Smoking Tobacco Use: Former     Smokeless Tobacco Use: Never     Passive Exposure: Not on file   Financial Resource Strain: Low Risk  (8/27/2024)    Financial Resource Strain     Within the past 12 months, have you or your family members you live with been unable to get utilities (heat, electricity) when it was really needed?: No   Alcohol Use: Not At Risk (8/8/2022)    [facility]    AUDIT-C     Frequency of Alcohol Consumption: Never     Average Number of Drinks: Not on file     Frequency of Binge Drinking: Not on file   Transportation Needs: Low Risk  (8/27/2024)    Transportation Needs     Within the past 12 months, has lack of transportation kept you from medical appointments, getting your medicines, non-medical meetings or appointments, work, or from getting things that you need?: No   Physical Activity: Insufficiently Active (10/28/2020)    [facility]    Exercise Vital Sign     Days of Exercise per Week: 7 days     Minutes of Exercise per Session: 20 min   Interpersonal Safety: Low Risk  (8/27/2024)    Interpersonal Safety     Do you feel physically and emotionally safe where you currently live?: Yes     Within the past 12 months, have you been hit, slapped, kicked or otherwise physically hurt by someone?: No     Within the past 12 months, have you been humiliated or emotionally abused in other ways by your  "partner or ex-partner?: No   Stress: Not on file   Social Connections: Not on file   Health Literacy: Not on file       Functional Status:  Prior to admission patient needed assistance:   Dependent ADLs:: Independent  Dependent IADLs:: Independent  Assesssment of Functional Status: Not at baseline with ADL Functioning    Mental Health Status:  Mental Health Status: No Current Concerns       Chemical Dependency Status:  Chemical Dependency Status: No Current Concerns             Values/Beliefs:  Spiritual, Cultural Beliefs, Quaker Practices, Values that affect care: no               Discussed  Partnership in Safe Discharge Planning  document with patient/family: No    Additional Information:  Per H&P: \"59 year old male with PMH of chronic systolic heart failure, HFrEF, NICM s/p HM3 implant 3/23/2022, severe MR -s/p repair, s/p CRT-D, VT on sotalol, stage II chronic kidney disease who presented to ED with concerns over driveline infection. He started having symptoms in March and drainage has continued since. He has been taking Cefadroxil since March without improvement. He has noticed increased odorous drainage from his driveline site but denies fever, chills, no VAD alarms, fluid retention or other symptoms noted.\"    RNCC met with patient to complete initial assessment and introduce RNCC role.  Patient states he lives with his wife and adult daughter in an apartment on the main level.  Patient states he is independent in all cares and doesn't use any outside resources.     Patient denies any mental health, CD, spiritual, or financial concerns. Patient states he is on disability.     RNCC was informed patient would need IV at discharge. RNCC sent referral to [facility], [facility].      Response from [facility]: \"Pt does not have coverage for IV abx with their Medicare and BCBS Supplement plan. Drug would be billed to the part D and supplies would be self-pay. Based on Cefazolin 2gm q8h, total cost is " "$37.21/day for drug and supplies.    For nursing, patient should have coverage if homebound, however [facility] is not contracted with Medicare and an outside nursing agency would be utilized instead. If patient is not homebound, there is no coverage and I can see patient if patient agrees to self-pay for $90 per visit.\"    RNCC spoke with [facility] and patients Medicare is not covered through them.    RNCC spoke with Candi from [place] to check benefits.  Waiting for response on coverage.      RNCC called [facility] and left a message for coverage on Home Infusion.      Next Steps: RNCC to follow up with coverage on home infusion for IV abx.  If no coverage patient abx changed and OP infusions  set up in [place].  Please notify provider if no coverage so abx can be change appropriately.       Update 1520:  Candi from [place] updated RNCC on coverage for IV abx.  \"Patient would have to pay $20/day or $140/week for supplies.  Medications would be $47/week.\"\"    Total weekly- $187/Meds and Supplies  Total daily- $ 26.71/Meds and Supplies     [facility]- Tanya states, \"Okay weekly cost for drug AND supplies= $173.9\"    [facility]- home infusion  Tanya Remi  [phone]  F [phone]    [place]- home infusion  Candi   [phone]        Arianna Cardona, BRENDA    Nurse Coordinator     Covering for: Aly RISSA    Phone: [phone]    Social Work and Care Management Department    SEARCHABLE in [facility] - search CARE COORDINATOR    [place] & [place] ([phone]) Saturday & Sunday; ([phone]) [place] Recognized Holidays    Units: 5A, 5B & 5C  Pager: [phone]    Units: 6B, 6C & 6D    Pager: [phone]    Units: 7A, 7B, 7C & 7D    Pager: [phone]    Units: 6A & ICU   Pager: [phone]    Units: 5 Ortho, 5MS & WB ED Pager: [phone]    Units: 6MS, 8A & 10 ICU  Pager [phone]             "

## 2024-08-29 NOTE — PROGRESS NOTES
Care Management Follow Up    Length of Stay (days): 3    Expected Discharge Date: 08/29/2024     Concerns to be Addressed: all concerns addressed in this encounter     Patient plan of care discussed at interdisciplinary rounds: Yes    Anticipated Discharge Disposition: Outpatient Infusion Services, Home Infusion              Anticipated Discharge Services: None  Anticipated Discharge DME: None    Patient/family educated on Medicare website which has current facility and service quality ratings: no  Education Provided on the Discharge Plan:    Patient/Family in Agreement with the Plan: yes    Referrals Placed by CM/SW:    Private pay costs discussed: Not applicable    Discussed  Partnership in Safe Discharge Planning  document with patient/family: No     Handoff Completed: Yes, non-[facility] PCP: External handoff communication completed    Additional Information:  RNCC spoke with patient about the coverage he had with home infusion for IV abx.  Patient states he can't afford to pay the out of pocket cost for IV abx at home.  Patient states he would like writer to look into setting up for OP infusion with the different drug that is once a day.  RNCC paged provider informing him that patient would need to be switched to preferred abx.  Provider states he will be switched to daptomycin every 24 hours, for 4 weeks.  Patient will need labs once a week while on Daptomycin.      RNCC spoke with [facility], OP infusion.  They are going send over form to be filled out so things can get started. Patient will be getting PICC placed this evening.      Next Steps: Care Management to fill out form for OP services at [facility].  Notified provider to come fill form out. Please fax form to fax number listed when complete, and follow instructions for next steps to be done.       [facility]  P- [phone]  S-[phone]    Arianna Cardona RN    Nurse Coordinator     Covering for: Aly KWOK    Phone: [phone]    Social  Work and Care Management Department    SEARCHABLE in [facility] - search CARE COORDINATOR    [place] & [place] ([phone]) Saturday & Sunday; ([phone]) [place] Recognized Holidays    Units: 5A, 5B & 5C  Pager: [phone]    Units: 6B, 6C & 6D    Pager: [phone]    Units: 7A, 7B, 7C & 7D    Pager: [phone]    Units: 6A & ICU   Pager: [phone]    Units: 5 Ortho, 5MS & WB ED Pager: [phone]    Units: 6MS, 8A & 10 ICU  Pager [phone]

## 2024-08-30 ENCOUNTER — APPOINTMENT (OUTPATIENT)
Dept: GENERAL RADIOLOGY | Facility: CLINIC | Age: 59
DRG: 315 | End: 2024-08-30
Payer: MEDICARE

## 2024-08-30 ENCOUNTER — COMMITTEE REVIEW (OUTPATIENT)
Dept: TRANSPLANT | Facility: CLINIC | Age: 59
End: 2024-08-30
Payer: MEDICARE

## 2024-08-30 ENCOUNTER — TELEPHONE (OUTPATIENT)
Dept: CARDIOLOGY | Facility: CLINIC | Age: 59
End: 2024-08-30

## 2024-08-30 ENCOUNTER — APPOINTMENT (OUTPATIENT)
Dept: ULTRASOUND IMAGING | Facility: CLINIC | Age: 59
DRG: 315 | End: 2024-08-30
Payer: MEDICARE

## 2024-08-30 VITALS
DIASTOLIC BLOOD PRESSURE: 68 MMHG | WEIGHT: 239.7 LBS | SYSTOLIC BLOOD PRESSURE: 114 MMHG | TEMPERATURE: 97.7 F | RESPIRATION RATE: 16 BRPM | OXYGEN SATURATION: 97 % | BODY MASS INDEX: 34.32 KG/M2 | HEIGHT: 70 IN | HEART RATE: 80 BPM

## 2024-08-30 LAB
ANION GAP SERPL CALCULATED.3IONS-SCNC: 11 MMOL/L (ref 7–15)
BUN SERPL-MCNC: 14.7 MG/DL (ref 6–20)
CALCIUM SERPL-MCNC: 9.3 MG/DL (ref 8.8–10.4)
CHLORIDE SERPL-SCNC: 108 MMOL/L (ref 98–107)
CREAT SERPL-MCNC: 1.07 MG/DL (ref 0.67–1.17)
EGFRCR SERPLBLD CKD-EPI 2021: 81 ML/MIN/1.73M2
ERYTHROCYTE [DISTWIDTH] IN BLOOD BY AUTOMATED COUNT: 14.6 % (ref 10–15)
GLUCOSE SERPL-MCNC: 103 MG/DL (ref 70–99)
HCO3 SERPL-SCNC: 24 MMOL/L (ref 22–29)
HCT VFR BLD AUTO: 39.1 % (ref 40–53)
HGB BLD-MCNC: 12.1 G/DL (ref 13.3–17.7)
INR PPP: 1.12 (ref 0.85–1.15)
MAGNESIUM SERPL-MCNC: 1.8 MG/DL (ref 1.7–2.3)
MCH RBC QN AUTO: 29.2 PG (ref 26.5–33)
MCHC RBC AUTO-ENTMCNC: 30.9 G/DL (ref 31.5–36.5)
MCV RBC AUTO: 94 FL (ref 78–100)
PLATELET # BLD AUTO: 212 10E3/UL (ref 150–450)
POTASSIUM SERPL-SCNC: 4.9 MMOL/L (ref 3.4–5.3)
RBC # BLD AUTO: 4.15 10E6/UL (ref 4.4–5.9)
SODIUM SERPL-SCNC: 143 MMOL/L (ref 135–145)
UFH PPP CHRO-ACNC: 0.34 IU/ML
WBC # BLD AUTO: 4.5 10E3/UL (ref 4–11)

## 2024-08-30 PROCEDURE — 71045 X-RAY EXAM CHEST 1 VIEW: CPT | Mod: 26 | Performed by: RADIOLOGY

## 2024-08-30 PROCEDURE — 93971 EXTREMITY STUDY: CPT | Mod: RT

## 2024-08-30 PROCEDURE — 85610 PROTHROMBIN TIME: CPT | Performed by: STUDENT IN AN ORGANIZED HEALTH CARE EDUCATION/TRAINING PROGRAM

## 2024-08-30 PROCEDURE — 258N000003 HC RX IP 258 OP 636

## 2024-08-30 PROCEDURE — 85027 COMPLETE CBC AUTOMATED: CPT

## 2024-08-30 PROCEDURE — 80048 BASIC METABOLIC PNL TOTAL CA: CPT

## 2024-08-30 PROCEDURE — 36415 COLL VENOUS BLD VENIPUNCTURE: CPT

## 2024-08-30 PROCEDURE — 272N000277 HC DEVICE 4FR SECURACATH

## 2024-08-30 PROCEDURE — 250N000009 HC RX 250

## 2024-08-30 PROCEDURE — 36569 INSJ PICC 5 YR+ W/O IMAGING: CPT

## 2024-08-30 PROCEDURE — 272N000451 HC KIT SHRLOCK 5FR POWER PICC DOUBLE LUMEN

## 2024-08-30 PROCEDURE — 250N000013 HC RX MED GY IP 250 OP 250 PS 637: Performed by: INTERNAL MEDICINE

## 2024-08-30 PROCEDURE — 83735 ASSAY OF MAGNESIUM: CPT

## 2024-08-30 PROCEDURE — 999N000065 XR CHEST PORT 1 VIEW

## 2024-08-30 PROCEDURE — 85520 HEPARIN ASSAY: CPT | Performed by: STUDENT IN AN ORGANIZED HEALTH CARE EDUCATION/TRAINING PROGRAM

## 2024-08-30 PROCEDURE — 99239 HOSP IP/OBS DSCHRG MGMT >30: CPT | Mod: GC | Performed by: INTERNAL MEDICINE

## 2024-08-30 PROCEDURE — 250N000011 HC RX IP 250 OP 636: Mod: JZ

## 2024-08-30 PROCEDURE — 250N000013 HC RX MED GY IP 250 OP 250 PS 637

## 2024-08-30 PROCEDURE — 93971 EXTREMITY STUDY: CPT | Mod: 26 | Performed by: RADIOLOGY

## 2024-08-30 RX ORDER — HEPARIN SODIUM,PORCINE 10 UNIT/ML
5-20 VIAL (ML) INTRAVENOUS EVERY 24 HOURS
Status: DISCONTINUED | OUTPATIENT
Start: 2024-08-30 | End: 2024-08-30 | Stop reason: HOSPADM

## 2024-08-30 RX ORDER — HEPARIN SODIUM,PORCINE 10 UNIT/ML
5-20 VIAL (ML) INTRAVENOUS
Status: DISCONTINUED | OUTPATIENT
Start: 2024-08-30 | End: 2024-08-30 | Stop reason: HOSPADM

## 2024-08-30 RX ORDER — ENOXAPARIN SODIUM 100 MG/ML
100 INJECTION SUBCUTANEOUS 2 TIMES DAILY
Qty: 10 ML | Refills: 0 | Status: SHIPPED | OUTPATIENT
Start: 2024-08-30 | End: 2024-09-03

## 2024-08-30 RX ADMIN — SPIRONOLACTONE 12.5 MG: 25 TABLET, FILM COATED ORAL at 08:52

## 2024-08-30 RX ADMIN — MAGNESIUM OXIDE TAB 400 MG (241.3 MG ELEMENTAL MG) 800 MG: 400 (241.3 MG) TAB at 08:52

## 2024-08-30 RX ADMIN — Medication 5 ML: at 18:04

## 2024-08-30 RX ADMIN — FUROSEMIDE 60 MG: 40 TABLET ORAL at 17:37

## 2024-08-30 RX ADMIN — HEPARIN SODIUM 1150 UNITS/HR: 10000 INJECTION, SOLUTION INTRAVENOUS at 15:19

## 2024-08-30 RX ADMIN — ALLOPURINOL 100 MG: 100 TABLET ORAL at 08:52

## 2024-08-30 RX ADMIN — Medication 50 MCG: at 08:51

## 2024-08-30 RX ADMIN — OMEPRAZOLE 40 MG: 20 CAPSULE, DELAYED RELEASE ORAL at 08:52

## 2024-08-30 RX ADMIN — SACUBITRIL AND VALSARTAN 1 TABLET: 97; 103 TABLET, FILM COATED ORAL at 10:53

## 2024-08-30 RX ADMIN — FUROSEMIDE 60 MG: 40 TABLET ORAL at 08:51

## 2024-08-30 RX ADMIN — METOPROLOL SUCCINATE 25 MG: 25 TABLET, EXTENDED RELEASE ORAL at 08:52

## 2024-08-30 RX ADMIN — POTASSIUM CHLORIDE 60 MEQ: 1500 TABLET, EXTENDED RELEASE ORAL at 08:52

## 2024-08-30 RX ADMIN — Medication 5 ML: at 18:03

## 2024-08-30 RX ADMIN — DAPTOMYCIN 700 MG: 500 INJECTION, POWDER, LYOPHILIZED, FOR SOLUTION INTRAVENOUS at 17:37

## 2024-08-30 RX ADMIN — SOTALOL HYDROCHLORIDE 120 MG: 120 TABLET ORAL at 08:52

## 2024-08-30 RX ADMIN — LIDOCAINE HYDROCHLORIDE 1 ML: 10 INJECTION, SOLUTION EPIDURAL; INFILTRATION; INTRACAUDAL; PERINEURAL at 17:08

## 2024-08-30 RX ADMIN — WARFARIN SODIUM 7.5 MG: 5 TABLET ORAL at 17:36

## 2024-08-30 ASSESSMENT — ACTIVITIES OF DAILY LIVING (ADL)
ADLS_ACUITY_SCORE: 25

## 2024-08-30 NOTE — DISCHARGE INSTRUCTIONS
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 You did not appear to have any abscess or other drainable area of infection; you did have granulation tissue around the site that was treated with silver nitrate. You are being treated with antibiotics for this infection. You will need to continue these antibiotics after being discharged. You will be going to the [facility] infusion [facility] daily (starting on 8/31) to receive IV Daptomycin through your new PICC line. You will need to get weekly labs to monitor for side effects from the Daptomycin. This will include CBC (complete blood count), BMP (basic metabolic panel), and CK (creatinine kinase). You will have follow up with an infectious disease provider on 9/13/24 to determine the total duration of antibiotic therapy.     Regarding your anticoagulation. You should take 7.5mg of Warfarin on 8/30, 8/31, and 9/1. You should be taking Lovenox injections 100mg twice a day starting on 8/30. Then check your INR and send a page to [phone] with a call back number for guidance on what to do for further dosing.  Then call your anticoagulation clinic on 9/3 for further guidance.     Your suppressive cefaroxil is being held while undergoing this treatment. You will need to speak to your primary cardiologist about when to restart this.    Appointment made 10:00 a.m. on Saturday 8/31/24  [facility] [place] [address]  Ph: [phone]

## 2024-08-30 NOTE — PROGRESS NOTES
[facility]    Cardiology Progress Note- Cardiology 2        Date of Admission:  2024     Assessment & Plan: HVSL   Mr. Morfin is a 59 year old male with PMHx of HFrEF 2/2 NICM s/p Heartmate III LVAD (3/2022) on warfarin, severe mitral valve regurgitation s/p repair (2015), paroxysmal VT s/p biventricular CRT-D (2016), GERD, gout, anxiety, and insomnia who was admitted on 2024 with LVAD driveline infection.    Today:  - Had 1 silver nitrate treatment of driveline exit site granuloma  - PICC placed today  - Discharge today  - On Daptomycin 8 mg/kg which he will continue at outpatient infusion center ([facility]) starting tomorrow. Continue for at least 4 weeks; appt with ID on 24 to discuss final duration. .   - Will take Warfarin 7.5mg , , and . Then call me on  to discuss further dosing. During this time he will also be bridging with Lovenox 100mg BID.        Heartmate 3 LEFT VS  Flow (Lpm): 4.9 Lpm  Pulse Index (PI): 2.07 PI  Speed (rpm): 5500 rpm  Power (christiansen): 4.1 christiansen  Current Hct settin      # MSSA LVAD Driveline Infection  # MRSA colonized  Started having erythema/pus drainage 2024. Drive line cultures with MSSA (3/17/2024, 2024). Seen by outpatient ID and treated as outpatient with oral antibiotics (doxycycline, cefadroxil) with ongoing erythema, tenderness, and persistent drainage. Admitted on 2024 for IV antibiotics, consideration of surgical intervention for source control.   - Vancomycin ()  - Cefazolin ( - )  - Daptomycin ( - *)  - Workup              > Blood cultures (2024) NGTD              > Wound cultures (2024) NGTD  - CVTS consult              > No current plan for surgical intervention   > Reviewed CT images from OSH, stable from images in April   > Received silver nitrate for the granulation   - ID consulted, appreciate recs   > Wound cultures from OSH growing MSSA   >  Plan for 4 weeks of IV antibiotics, possible need for suppresive abx after  > IV Dapto 8mg/kg q24hr at outpt infusion set up        # Chronic HFrEF s/p Heartmate III LVAD (3/23/2022), AHA Stage D, NHYA Class II  # Severe mitral valve regurgitation s/p annuloplasty ring (1/21/2015)  # Heart transplant waitlist, currently status 4, will be eligible to upgrade to status 3 while on IV abx  Congestive heart failure 2/2 NICM (severe mitral regurgitation). Followed as outpatient by Dr. Hdz. LVAD was placed as bridge to heart transplant; currently on heart transplant wait list.  - GDMT              ACEi/ARB/ARNI          PTA Entresto  BID              BB                               PTA Sotalol 120mg BID                                                  PTA Metoprolol succinate 25mg qd              MRA                            PTA Spironolactone 12.5mg qd              SGLTi                          None  - PTA potassium chloride, magnesium  - SCD ppx                               CRT-D  - Anticoagulation                     Heparin drip, resumed PTA Warfarin (INR goal 2-3)  - With LVAD, goal MAP 65 to 90     CHRONIC/STABLE PROBLEMS:  # Hx paroxysmal VT s/p single-chamber ICD (2/2/2015), biventricular CRT-D (1/8/2016)  # Hx post-op atrial fibrillation  - PTA sotalol 120mg BID  - PTA metoprolol succinate 25mg qd     # CKD 2  Baseline Cr 1.3-1.4. Admitted at baseline.  - Monitor BMP     # GERD  - PTA omeprazole 40mg qam     # Gout  - PTA allopurinol 100mg qam     # Anxiety  # Insomnia  - PTA hydroxyzine 50mg BID PRN  - PTA trazadone 25-50mg at bedtime PRN       Diet: Fluid restriction 2000 ML FLUID (and additional linked orders)  Regular Diet Adult  Diet    DVT Prophylaxis: Heparin drip, warfarin  [place] Catheter: Not present  Cardiac Monitoring: None  Code Status: Full Code        Clinically Significant Risk Factors                   # End stage heart failure: Ventricular assist device (VAD) present          #  "Obesity: Estimated body mass index is 34.39 kg/m  as calculated from the following:    Height as of this encounter: 1.778 m (5' 10\").    Weight as of this encounter: 108.7 kg (239 lb 11.2 oz)., PRESENT ON ADMISSION     # Financial/Environmental Concerns: none   # ICD device present         Disposition Plan   Expected discharge: Today, recommended to prior living arrangement with outpt abx infusion set up.     Entered: Mejia Gonzales MD 08/30/2024, 5:51 PM   The patient's care was discussed with the Attending Physician, Dr. Schwab .      Mejia Gonzales MD  PGY1, Internal Medicine  Cardiology [address] [place] [address]  ______________________________________________________________________    Interval History   No acute events overnight. Patient feeling well when seen this AM. Only has a bit of mild epigastric pain. Denies fevers/chills, chest pain, or SOB.     Physical Exam   Vital Signs: Temp: 97.7  F (36.5  C) Temp src: Oral BP: 114/68 Pulse: 80   Resp: 16 SpO2: 97 % O2 Device: None (Room air)    Weight: 239 lbs 11.2 oz    Gen: No acute distress, lying in bed  HEENT: Normocephalic, atraumatic  CV: Audible hum of LVAD, no significant peripheral edema  Pulm: CTAB, non-labored breathing on room air  Abd: Soft, mild tenderness to palpation of R subxiphoid area, dressing in place in RLQ with minimal tenderness to palpation of area  Neuro: Alert and conversant without apparent focal neurological deficits    Medical Decision Making     Please see A&P for additional details of medical decision making.      Data   ------------------------- PAST 24 HR DATA REVIEWED -----------------------------------------------        "

## 2024-08-30 NOTE — PROGRESS NOTES
Care Management Follow Up    Length of Stay (days): 4    Expected Discharge Date: 08/30/2024     Concerns to be Addressed: all concerns addressed in this encounter     Patient plan of care discussed at interdisciplinary rounds: Yes    Anticipated Discharge Disposition: Outpatient Infusion Services, Home Infusion              Anticipated Discharge Services: None  Anticipated Discharge DME: None    Patient/family educated on Medicare website which has current facility and service quality ratings: no  Education Provided on the Discharge Plan:    Patient/Family in Agreement with the Plan: yes    Referrals Placed by CM/SW:    Private pay costs discussed: Not applicable    Discussed  Partnership in Safe Discharge Planning  document with patient/family: No     Handoff Completed: Yes, non-[facility] PCP: External handoff communication completed    Additional Information:  Patient to discharge with OP infusion, [facility]. RNCC faxed orders to [facility], also set up appointment for first infusion tomorrow at 10:00 a.m., updated patient of plan. Patient has used them in the past for infusions, knows how to get to appointment. Patient needing PICC placed prior to discharge. RNCC to cease following after discharge.    Appointment made 10:00 a.m. on Saturday 8/31/24  [facility]  [facility]  [address] [address]  Ph: [phone]    UPDATE: pt now not discharging today, RNCC called [facility] to update, patient not therapeutic for INR. Likely to stay weekend.    ROSALINO SantiagoN, BA, RN, CMSRN, RNCC  [facility]  Covering Units 6C Beds 2856-4671/OBS  Phone: [phone]  Available on Vocera search 6C 6502-14 RNCC or OBS RNCC  After Hours [phone]     6C Beds 1173-6896  Ph: [phone]     6B/CRNCC Weekend/holiday on Vocera or [phone]     [facility] RNCC ED/5 Ortho/5 Med/Surg [phone]     [facility] RNCC 6 Med/Surg  8A, 10 [facility] [phone]     Observation SW and weekend/after hours phone: [phone]

## 2024-08-30 NOTE — PLAN OF CARE
Shift: 3016-2361    Admission: Drive line exit site infection.    Neuro: A&Ox4, calm, pleasant and cooperative.  Cardiac: Atrial paced, MAPs 80-90's, afebrile.  Respiratory: Sating >95% on RA.  GI/: Adequate urine output. BM X1  Diet/appetite: Tolerating regular diet. Eating well.  Activity: Pt up ad theresa when up to chair and in halls.  Pain: Denies.  Skin: No new deficits noted. See PCS for assessment details.  LDA's: New PICC line, and PIV x2, one infusing heparin @ 1150 Units/hr. Pt to discharge home today with PICC for IV abx.    Plan: Pt to discharge home today, discharge orders placed. Would like to eat his meal prior to discharge. Notify primary team CARDS 2 with changes.    Goal Outcome Evaluation:      Plan of Care Reviewed With: patient    Overall Patient Progress: improvingOverall Patient Progress: improving

## 2024-08-30 NOTE — PLAN OF CARE
1900-0730     Diagnosis: LVAD driveline infection      Neuro: A&O x4, able to make needs known.   Cardiac: Atrial paced (HR 80s).  Denied chest pain, dizziness, palpitations. HM 3 LVAD WNL. MAP doppler WNL.   Respiratory: RA, sating >95%. Denied SOB.   GI/: WDL. 1 BM this shift, voiding via urinal.   Diet/Appetite: Regular diet, good appetite. 2L FR.  Skin: No new skin deficits noted. Driveline dressing changed per order, small drainage.   LDA: Left PIV SL. Right PIV infusing heparin gtt at 1150 units/hr.   Activity: independent   Pain: Denies pain.   Plan: Getting IV ABX and will discharge home with Abx, needs PICC line. Continue with POC. Notify CARDS 2 of pertinent changes.

## 2024-08-30 NOTE — COMMITTEE REVIEW
Thoracic Patient Discussion Note [facility]  Transplant Physician:    Dunia Hdz    Referring Physician: Yoan Felix    Committee Review Members:  Cardiology Justin Dang MD, Dianne Damon MD, Austen Haeton MD, Dunia Hdz MD, Sachin Fleming MD, Livia Katz APRN CNP, Zaheer Schwab MD   Cardiovascular Disease MIQUEL PATTERSON, BRENDA, Gregory Fermin, BRENDA, ASHLEY MCCANN, RN, Sabine Jones, BRENDA, Siena Headley, BRENDA   Nutrition Sarah Butler, RD    - Clinical Leigh Perez, [facility], Celeste Vu   Transplant Geneva Gordon, BRENDA, Aidee Lott LPN, Liberty Lazo, BRENDA, Linda Jansen, RN, Ashly Self, RN, Rosario Caceres, RN   Transplant Surgery Harpal Armstrong MD, Michael Mulvihill, MD, Wilver Rodríguez MD, Jacy Felipe MD       Additional Discussion Notes and Findings: DLES infection. He was status 4. Will go home with 4 weeks of daily ABX at an infusion center. Daptomycin is once per day. He is getting a PICC line today. Will upgrade to status 3 for MCDS infection with positive driveline (staph aureus) culture needing IV abx.  RNCC to discuss plan with patient.

## 2024-08-30 NOTE — PROCEDURES
[facility]    Double Lumen PICC Placement    Date/Time: 8/30/2024 4:55 PM    Performed by: Jacob Faye RN  Authorized by: Don Street MD  Indications: Antibiotic.      UNIVERSAL PROTOCOL   Site Marked: Yes  Prior Images Obtained and Reviewed:  Yes  Required items: Required blood products, implants, devices and special equipment available    Patient identity confirmed:  Verbally with patient, hospital-assigned identification number, arm band and provided demographic data  Patient was reevaluated immediately before administering moderate or deep sedation or anesthesia  Confirmation Checklist:  Patient's identity using two indicators, procedure was appropriate and matched the consent or emergent situation, correct equipment/implants were available and relevant allergies  Time out: Immediately prior to the procedure a time out was called    Universal Protocol: the [facility] Universal Protocol was followed    Preparation: Patient was prepped and draped in usual sterile fashion       ANESTHESIA    Anesthesia:  See MAR for details  Local Anesthetic:  Lidocaine 1% without epinephrine  Anesthetic Total (mL):  1      SEDATION    Patient Sedated: No        Preparation: skin prepped with ChloraPrep  Skin prep agent: skin prep agent completely dried prior to procedure  Sterile barriers: maximum sterile barriers were used: cap, mask, sterile gown, sterile gloves, and large sterile sheet  Hand hygiene: hand hygiene performed prior to central venous catheter insertion  Type of line used: PICC  Catheter type: double lumen  Lumen type: non-valved and power PICC  Lumen Identification: Purple and Red  Catheter size: 4 Fr  Brand: Bard  Lot number: [id number]  Placement method: venipuncture, MST, ultrasound and tip navigation system  Number of attempts: 1  Difficulty threading catheter: no  Successful placement: yes  Orientation: right    Location: basilic vein (vein diameter -  0.43 cm)  Tip Location: Other (see comment) (Waiting for CXR report)  Site rationale: Left chest pacemaker  Arm circumference: adults 10 cm  Extremity circumference: 32  Visible catheter length: 3  Total catheter length: 49  Dressing and securement: alcohol impregnated caps, chlorhexidine disc applied, transparent dressing, tissue adhesive, subcutaneous anchor securement system, sterile dressing applied and site cleansed  Post procedure assessment: blood return through all ports, free fluid flow and placement verified by x-ray  PROCEDURE   Patient Tolerance:  Patient tolerated the procedure well with no immediate complicationsDescribe Procedure: Patient tolerated well and denied pain immediately after procedure.  PICC is OK to use after radiologist report of PICC tip in Superior Vena Cava, Superior Cavoatrial Junction or High Right Atrium.    Disposal: sharps and needle count correct at the end of procedure, needles and guidewire disposed in sharps container

## 2024-08-30 NOTE — PROVIDER NOTIFICATION
08/30/24 1655   PICC 08/30/24 Double Lumen Right Basilic Antibiotic   Placement Date/Time: 08/30/24 (c) 1655   Lumens (Required): Double Lumen  Lumen Identification: Purple;Red  Catheter Brand: THEMA  Catheter Size: 4 fr  Lot #: [id number]  Full barrier precautions done: Yes, hand hygiene, sterile gown, sterile gloves, mask, c...   Site Assessment WDL   External Cath Length (cm) (S)  3 cm   Extremity Circumference (cm) 32 cm   Dressing Chlorhexidine disk;Transparent;Securement device   Dressing Status clean;dry;intact   Dressing Change Due (S)  09/06/24   Line Necessity Yes, meets criteria   Purple - Status blood return noted;saline locked   Purple - Cap Change Due 09/03/24   Purple - Intervention Flushed   Red - Status blood return noted;saline locked   Red - Cap Change Due 09/03/24   Red - Intervention Flushed   Phlebitis Scale 0-->no symptoms   Infiltration? no   PICC Comment (S)  Waiting for CXR result

## 2024-08-30 NOTE — PROGRESS NOTES
"  Cardiovascular Surgery Progress Note  08/30/2024         Assessment and Plan:     Mr Morfin is a 59 year old male with PMH of chronic systolic heart failure, HFrEF, NICM s/p HM3 implant 3/23/2022, severe MR -s/p repair, s/p CRT-D, VT on sotalol, stage II chronic kidney disease who presented to ED with concerns over driveline infection. He started having symptoms in March and drainage has continued since. He has been taking Cefadroxil since March without improvement. He has noticed increased odorous drainage from his driveline site but denies fever, chills, no VAD alarms, fluid retention or other symptoms noted.       - continue to follow cultures, promising that gram stain from driveline 8/27/24 remains negative thus far along with no positive cultures.   - continue to monitor but less likely he will require surgical intervention due to no definitive abscess or fluid collection to drain.   - infections at drivelines are difficult to clear due to foreign body, a heart transplant with LVAD removal would be definitive.   - recommend driveline dressing changes BID.    - continue IV abx for treatment and suppressive therapy.   - OK to restart coumadin 8/29   - Had one silver nitrate treatment of driveline exit site granuloma on 8/30/24. The area will turn grey and this is normal.    - Cardiology planning DCC today or tomorrow. He does not need specific CVTS clinic follow up. Continue to wait for transplant.        Clinically Significant Risk Factors                   # End stage heart failure: Ventricular assist device (VAD) present          # Obesity: Estimated body mass index is 34.39 kg/m  as calculated from the following:    Height as of this encounter: 1.778 m (5' 10\").    Weight as of this encounter: 108.7 kg (239 lb 11.2 oz)., PRESENT ON ADMISSION     # Financial/Environmental Concerns: none   # ICD device present      Discussed with Dr Rodríguez through written communication.      Kavon Richmond " "[facility]  Cardiothoracic Surgery  Pager [phone]    9:30 AM   August 29, 2024        Interval History:     No overnight events.  Feeling better overall.   A little pain around the driveline site.          Physical Exam:   Pulse 85, temperature 97.9  F (36.6  C), temperature source Oral, resp. rate 16, height 1.778 m (5' 10\"), weight 108.7 kg (239 lb 11.2 oz), SpO2 98%.  Vitals:    08/28/24 0640 08/29/24 0641 08/30/24 0442   Weight: 109.4 kg (241 lb 1.6 oz) 109.2 kg (240 lb 11.2 oz) 108.7 kg (239 lb 11.2 oz)      Gen: A&Ox4, NAD  Neuro: no focal deficits   CV: RRR, VAD functioning w/o alarm  Pulm: no wheezing or rhonchi, normal breathing on RA  Abd: nondistended, normal BS, soft  LVAD:    - driveline dressing soiled with brownish thick drainage.    - has 1 cm pink granuloma inferior to driveline skin exit site, this was treated with Silver Nitrate and the granuloma turned grey in color.   - has mild induration and some tenderness inferior to xiphoid process         Data:    Imaging:  reviewed recent imaging, no acute concerns and no pocket of fluid or obvious abscess on CT    Labs:  BMP  Recent Labs   Lab 08/30/24  0705 08/29/24  0606 08/28/24  0548 08/27/24  0854    140 139 139   POTASSIUM 4.9 4.8 4.4 4.1  4.1   CHLORIDE 108* 107 108* 106   YESICA 9.3 9.2 8.8 9.2   CO2 24 22 23 22   BUN 14.7 14.0 17.6 23.1*   CR 1.07 1.05 1.09 1.20*  1.20*   * 101* 112* 111*     CBC  Recent Labs   Lab 08/30/24  0705 08/29/24  0606 08/28/24  0548 08/27/24  0829   WBC 4.5 4.1 4.0 5.1   RBC 4.15* 3.91* 3.74* 4.00*   HGB 12.1* 11.5* 11.0* 11.9*   HCT 39.1* 36.7* 35.5* 38.3*   MCV 94 94 95 96   MCH 29.2 29.4 29.4 29.8   MCHC 30.9* 31.3* 31.0* 31.1*   RDW 14.6 14.7 14.6 14.6    194 175 184     INR  Recent Labs   Lab 08/30/24  0705 08/29/24  0606 08/28/24  0548 08/27/24  0829   INR 1.12 1.29* 1.60* 1.59*      Hepatic Panel  Recent Labs   Lab 08/26/24  2225   AST 20   ALT 22   ALKPHOS 67   BILITOTAL 0.3   ALBUMIN 3.6 "     GLUCOSE:   Recent Labs   Lab 08/30/24  0705 08/29/24  0606 08/28/24  0548 08/27/24  0854 08/26/24  2225   * 101* 112* 111* 88

## 2024-08-30 NOTE — PROGRESS NOTES
Status Change-OS 8/30/2024    Changed patient's listing status in UNOS from status 4 to status 3 for the following reasons:    MCSD with device infection.  Positive bacterial culture from driveline site requiring IV antibiotics      Patient aware of change, primary cardiology team aware.      Status Extension due 9/14/2024

## 2024-08-30 NOTE — TELEPHONE ENCOUNTER
9/4/2024 1:05PM Danyell Solares  Patient confirmed rescheduled appointment:  Date: 10/7/2024  Time: 2:15PM  Visit type: Return VAD  Provider: Dr. Dang  [place] [address] [place] [address]  Testing/imaging: Labs (1st Floor Imaging) prior at 12:45PM, Device check (3rd Floor) prior at 1PM  Additional notes: 9/4 Rescheduled Return VAD w/ Dr. Dang w/ labs and device check prior 10/7. UZIEL   Danyell Solares 9/4/2024 1:05PM        8/30/2024 5:51PM Tamra Núñez  8/30 spoke to pt's wife and attempted to reschedule pts appt due to provider not being available that day  Pt's wife stated that pt is at the hospital and doesn't have her calendar on her to accommodate the appts   Pt's wife stated that when they leave the hospital they'll call to reschedule   Tamra Núñez 8/30/2024 5:51PM

## 2024-08-31 LAB
BACTERIA BLD CULT: NO GROWTH
BACTERIA WND CULT: ABNORMAL
FLOWPRA1 CELL: NORMAL
FLOWPRA1 COMMENTS: NORMAL
FLOWPRA1 RESULT: NORMAL
FLOWPRA1 TEST METHOD: NORMAL
FLOWPRA2 CELL: NORMAL
FLOWPRA2 COMMENTS: NORMAL
FLOWPRA2 RESULT: NORMAL
FLOWPRA2 TEST METHOD: NORMAL
GRAM STAIN RESULT: ABNORMAL
GRAM STAIN RESULT: ABNORMAL
SA 1  COMMENTS: NORMAL
SA 1 CELL: NORMAL
SA 1 TEST METHOD: NORMAL
SA 2 CELL: NORMAL
SA 2 COMMENTS: NORMAL
SA 2 TEST METHOD: NORMAL
SA1 HI RISK ABY: NORMAL
SA1 MOD RISK ABY: NORMAL
SA2 HI RISK ABY: NORMAL
SA2 MOD RISK ABY: NORMAL
UNACCEPTABLE ANTIGENS: NORMAL
UNOS CPRA: 0

## 2024-08-31 NOTE — PROGRESS NOTES
DISCHARGE                         8/30/24  8:20 PM  ----------------------------------------------------------------------------  Discharged to: Home  Via: Automobile  Accompanied by: Family  Discharge Instructions: diet, activity, medications, follow up appointments, when to call the MD, aftercare instructions, and what to watchout for (i.e. s/s of infection, increasing SOB, palpitations, chest pain,)  Prescriptions: To be filled by [facility] in [place], [place] per pt's request; medication list reviewed & sent with pt  Follow Up Appointments: arranged; information given  Belongings: All sent with pt  IV: out  Telemetry: off  Pt exhibits understanding of above discharge instructions; all questions answered.    Discharge Paperwork: Signed, copied, and sent home with patient.

## 2024-08-31 NOTE — PROGRESS NOTES
[facility]  Parenteral ANtibiotic Review at Departure from Acute Care Collaborative Note     Patient: Tej Morfin  MRN: [id number]  Allergies: Tilactase    Current Location: [facility]  OPAT to be provided by: [facility]  Line Type: PICC    Diagnosis/Indications: LVAD driveline infection  Organism(s): MSSA  MRDO? No  Pending Cultures/Microbiological Tests: no      Inpatient ID involved in developing OPAT plan: Yes - discharge OPAT plan has no changes from ID provider, Dr. Pattie Hargrove, OPAT plan charted on 8/29/2024    Outpatient ID Follow-up: ID OPAT Clinic Referral Placed ([facility] Ph: [phone] and Fax: [phone]) - appointment scheduled  Designated Provider: Dr. Pattie Hargrove    Antimicrobial Regimen / Route Anticipated  Duration Start Date Stop /  Reassess Date   Daptomycin 700 mg (~8 mg/kg AdjBW) every 24 hours/IV TBD per ID provider 8/27/2024 Definitive end date to be determined by ID provider     Laboratory Tests and Monitoring Frequency: CBC with Diff, SCr, ALT, AST, CK Once Weekly    Imaging/Miscellaneous Monitoring: None    ID Pharmacist Interventions: None                          Tamie Payne, PharmD, BCIDP  Pager: [phone]

## 2024-09-01 LAB — INR HOME MONITORING: 1.3 (ref 2–3)

## 2024-09-01 NOTE — DISCHARGE SUMMARY
[facility]  Cardiology II Service / Advanced Heart Failure  Discharge Summary       Date of Admission:  8/26/2024   Date of Discharge:  8/30/2024  8:35 PM  Discharging Provider: Dr. Schwab  Discharge Service: Cardiology 2 Inpatient Service      Discharge Diagnoses:   Primary:    # MSSA LVAD Driveline Infection     For secondary diagnoses and full problem list please see hospital course.    Follow-up Planning:     Medication changes:   Warfarin 7.5mg 8/30, 8/31, and 9/1. Then call me on 9/1 to discuss further dosing. During this time he will also be bridging with Lovenox 100mg BID.   Daptomycin 8 mg/kg which he will continue at outpatient infusion center ([facility]) starting tomorrow. Continue for at least 4 weeks; appt with ID on 9/13/24 to discuss final duration. .   Follow-up appointments: With Cardiology on 9/11 and Infectious disease on 9/13  Pending diagnostics: None      Follow-up Appointments     Follow Up and recommended labs and tests      CBC, BMP, and CK weekly.     INR check at home on 9/1 and further per your anticoagulation clinic.    Follow up with Cardiology on 9/11/24 infectious disease on 9/13/24.           Unresulted Labs Ordered in the Past 30 Days of this Admission       No orders found from 7/27/2024 to 8/27/2024.            Hospital Course   Tej Morfin is a 59 year old male with PMHx of HFrEF 2/2 NICM s/p Heartmate III LVAD (3/2022) on warfarin, severe mitral valve regurgitation s/p repair (1/2015), paroxysmal VT s/p biventricular CRT-D (1/2016), GERD, gout, anxiety, and insomnia who was admitted on 8/27/2024 with LVAD driveline infection. Found to have MSSA, no pocket for surgical intervention. Some area of granulation around driveline exit site thought to be contributing to recurrence of this infection, was treated with one dose of silver nitrate. Discharged on Daptomycin. Please see H&P from 8/26/2024 for full details of presentation.  The following problems were addressed during hospitalization:      Problem based:    # MSSA LVAD Driveline Infection   #H/o MRSA Colonization  Started having erythema/pus drainage 03/2024. Drive line cultures with MSSA (3/17/2024, 7/12/2024). Seen by outpatient ID and treated as outpatient with oral antibiotics (doxycycline, cefadroxil) with ongoing erythema, tenderness, and persistent drainage. Admitted on 8/27/2024 for IV antibiotics, consideration of surgical intervention for source control.  Ultimately no pocket of infectious was found on imaging so no surgical intervention. Pt was not a candidate for PO Bactrim because of his kidney function; switched from Cefazolin to Dapto for once a day dosing at an outpatient infusion center.   - Vancomycin (8/27)  - Cefazolin (8/27 - 8/29)  - Daptomycin (8/29 - 9/24*)  - Workup              > Blood cultures (8/27/2024) NGTD              > Wound cultures (8/27/2024) NGTD  - CVTS consult              > No current plan for surgical intervention              > Reviewed CT images from OSH, stable from images in April              > Received silver nitrate for the granulation   - ID consulted, appreciate recs              > Wound cultures from OSH growing MSSA              > Plan for 4 weeks of IV antibiotics, possible need for suppresive abx after  > IV Dapto 8mg/kg q24hr at outpt infusion set up    Secondary:  # Chronic HFrEF s/p Heartmate III LVAD (3/23/2022), AHA Stage D, NHYA Class II  # Severe mitral valve regurgitation s/p annuloplasty ring (1/21/2015)  # Heart transplant waitlist, currently status 4, will be eligible to upgrade to status 3 while on IV abx  Congestive heart failure 2/2 NICM (severe mitral regurgitation). Followed as outpatient by Dr. Hdz. LVAD was placed as bridge to heart transplant; currently on heart transplant wait list.  - GDMT              ACEi/ARB/ARNI          PTA Entresto  BID              BB                               PTA Sotalol  "120mg BID                                                  PTA Metoprolol succinate 25mg qd              MRA                            PTA Spironolactone 12.5mg qd              SGLTi                          None  - PTA potassium chloride, magnesium  - SCD ppx                               CRT-D  - Anticoagulation                     Heparin drip, resumed PTA Warfarin (INR goal 2-3)  - With LVAD, goal MAP 65 to 90    CHRONIC/STABLE PROBLEMS:  # Hx paroxysmal VT s/p single-chamber ICD (2/2/2015), biventricular CRT-D (1/8/2016)  # Hx post-op atrial fibrillation  - PTA sotalol 120mg BID  - PTA metoprolol succinate 25mg qd     # CKD 2  Baseline Cr 1.3-1.4. Admitted at baseline.     # GERD  - PTA omeprazole 40mg qam     # Gout  - PTA allopurinol 100mg qam     # Anxiety  # Insomnia  - PTA hydroxyzine 50mg BID PRN  - PTA trazadone 25-50mg at bedtime PRN    Discharge Disposition   Discharged to home  Condition at discharge: Stable    Code Status on Discharge   Prior      The patient was discussed on day of discharge with Dr. Schwab.    Mejia Gonzales MD  Internal Medicine PGY-1  Cardiology II Service    =====================================================      Discharge Physical Exam   Vital Signs: /68   Pulse 80   Temp 97.7  F (36.5  C) (Oral)   Resp 16   Ht 1.778 m (5' 10\")   Wt 108.7 kg (239 lb 11.2 oz)   SpO2 97%   BMI 34.39 kg/m    Weight: 239 lbs 11.2 oz    ,    Significant Results and Procedures   Most Recent 3 CBC's:  Recent Labs   Lab Test 08/30/24  0705 08/29/24  0606 08/28/24  0548   WBC 4.5 4.1 4.0   HGB 12.1* 11.5* 11.0*   MCV 94 94 95    194 175     Most Recent 3 BMP's:  Recent Labs   Lab Test 08/30/24  0705 08/29/24  0606 08/28/24  0548    140 139   POTASSIUM 4.9 4.8 4.4   CHLORIDE 108* 107 108*   CO2 24 22 23   BUN 14.7 14.0 17.6   CR 1.07 1.05 1.09   ANIONGAP 11 11 8   YESICA 9.3 9.2 8.8   * 101* 112*     Most Recent 3 INR's:  Recent Labs   Lab Test 08/30/24  0705 " 08/29/24  0606 08/28/24  0548   INR 1.12 1.29* 1.60*     7-Day Micro Results       Collected Updated Procedure Result Status      08/27/2024 0944 08/31/2024 1051 Wound Aerobic Bacterial Culture Routine With Gram Stain [[id number]]    (Abnormal)   Wound from Abdomen    Final result Component Value   Culture 1+ Staphylococcus aureus   Gram Stain Result No organisms seen    4+ WBC seen    Predominantly PMNs        Susceptibility        Staphylococcus aureus      MELODIE      Clindamycin  Resistant  [1]       Daptomycin 0.25 ug/mL Susceptible      Doxycycline <=0.5 ug/mL Susceptible      Erythromycin 4 ug/mL Intermediate      Gentamicin <=0.5 ug/mL Susceptible      Oxacillin 1 ug/mL Susceptible  [2]       Tetracycline <=1 ug/mL Susceptible      Trimethoprim/Sulfamethoxazole <=0.5/9.5 ug/mL Susceptible      Vancomycin 1 ug/mL Susceptible                   [1]  This isolate is presumed to be clindamycin resistant based on detection of inducible clindamycin resistance. Erythromycin and clindamycin are resistant; therefore, they are not recommended for use.     [2]  Oxacillin susceptible isolates are susceptible to cephalosporins (example: cefazolin and cephalexin) and beta lactam combination agents. Oxacillin resistant isolates are resistant to these agents.                    08/26/2024 2225 08/31/2024 2316 Blood Culture Peripheral Blood [[id number]]   Peripheral Blood    Final result Component Value   Culture No Growth                       Consultations this Admission   PHARMACY TO DOSE VANCO  OCCUPATIONAL THERAPY ADULT IP CONSULT  PHARMACY TO DOSE WARFARIN  CARDIOTHORACIC SURGERY ADULT IP CONSULT  PHARMACY TO DOSE VANCO  PHARMACY IP CONSULT  NURSING TO CONSULT FOR VASCULAR ACCESS CARE IP CONSULT  INFECTIOUS DISEASE GENERAL ADULT IP CONSULT  PHARMACY TO DOSE WARFARIN  NURSING TO CONSULT FOR VASCULAR ACCESS CARE IP CONSULT  CARE MANAGEMENT / SOCIAL WORK IP CONSULT  VASCULAR ACCESS FOR PICC PLACEMENT ADULT IP  CONSULT    Discharge Orders        OPAT enrollment and ID Clinic Referral      Reason for your hospital stay    You were admitted for a MSSA (Methicillin sensitive staph aureus) driveline infection of your LVAD on 8/27/24. You did not appear to have any abscess or other drainable area of infection; you did have granulation tissue around the site that was treated with silver nitrate. You are being treated with antibiotics for this infection. You will need to continue these antibiotics after being discharged. You will be going to the [facility] infusion center daily (starting on 8/31) to receive IV Daptomycin through your new PICC line. You will need to get weekly labs to monitor for side effects from the Daptomycin. This will include CBC (complete blood count), BMP (basic metabolic panel), and CK (creatinine kinase). You will have follow up with an infectious disease provider on 9/13/24 to determine the total duration of antibiotic therapy.     Regarding your anticoagulation. You should take 7.5mg of Warfarin on 8/30, 8/31, and 9/1. You should be taking Lovenox injections 100mg twice a day starting on 8/30. Then check your INR and send a page to [phone] with a call back number for guidance on what to do for further dosing.  Then call your anticoagulation clinic on 9/3 for further guidance.     Your suppressive cefaroxil is being held while undergoing this treatment. You will need to speak to your primary cardiologist about when to restart this.     Activity    Your activity upon discharge: activity as tolerated     Follow Up and recommended labs and tests    CBC, BMP, and CK weekly.     INR check at home on 9/1 and further per your anticoagulation clinic.    Follow up with infectious disease on 9/13/24.     Diet    Follow this diet upon discharge: Current Diet:Orders Placed This Encounter      Fluid restriction 2000 ML FLUID      Regular Diet Adult       Discharge Medications     Discharge Medication  List as of 8/30/2024  7:23 PM        START taking these medications    Details   DAPTOmycin 700 mg Inject 700 mg over 30 minutes into the vein every 24 hours for 28 days.Local Print, Disp-28 days, R-0      enoxaparin ANTICOAGULANT (LOVENOX) 100 MG/ML syringe Inject 1 mL (100 mg) subcutaneously 2 times daily for 5 days., Disp-10 mL, R-0, E-PrescribeSTOP TAKING IF YOUR INR IS THERAPEUTIC           CONTINUE these medications which have NOT CHANGED    Details   acetaminophen (TYLENOL) 325 MG tablet Take 2 tablets (650 mg) by mouth every 4 hours as needed for pain, Disp-100 tablet, R-0, E-Prescribe      allopurinol (ZYLOPRIM) 100 MG tablet Take 100 mg by mouth every morning., Historical      amoxicillin (AMOXIL) 500 MG capsule Take 2,000 mg by mouth once as needed Take 4 tabs 1 hour before all dental cleanings and procedures, Historical      cholecalciferol 50 MCG (2000 UT) CAPS Take 50 mcg by mouth every morning, Historical      furosemide (LASIX) 40 MG tablet TAKE 1.5 TABLETS (60 MG) BY MOUTH 2 TIMES DAILY, Disp-270 tablet, R-3, E-Prescribe      hydrOXYzine (VISTARIL) 50 MG capsule Take 50 mg by mouth every 12 hours as needed for anxiety, Historical      magnesium oxide (MAG-OX) 400 MG tablet Take 800 mg by mouth 2 times daily, Historical      metoprolol succinate ER (TOPROL XL) 25 MG 24 hr tablet Take 1 tablet (25 mg) by mouth daily, Disp-90 tablet, R-2, E-Prescribe      nitroGLYcerin (NITROSTAT) 0.4 MG sublingual tablet Place 0.4 mg under the tongue every 5 minutes as needed for chest pain, Historical      omeprazole (PRILOSEC) 40 MG DR capsule Take 40 mg by mouth every morning, Historical      potassium chloride alfonso ER (KLOR-CON M20) 20 MEQ CR tablet Take 3 tablets (60 mEq) by mouth 2 times daily, Disp-540 tablet, R-3, E-Prescribe      sacubitril-valsartan (ENTRESTO)  MG per tablet Take 1 tablet by mouth 2 times daily, Disp-90 tablet, R-3, Historical      sotalol (BETAPACE) 120 MG tablet Take 120 mg by mouth  2 times daily, Historical      spironolactone (ALDACTONE) 25 MG tablet Take 0.5 tablets (12.5 mg) by mouth daily, Disp-45 tablet, R-3, E-Prescribe      traZODone (DESYREL) 50 MG tablet Take 0.5-1 tablets (25-50 mg) by mouth at bedtime as needed for insomnia, Historical      warfarin ANTICOAGULANT (COUMADIN) 2.5 MG tablet Take 5mg (2 tabs) by mouth daily OR AS DIRECTED.  Adjust dose based on INR., Disp-180 tablet, R-1, E-PrescribeCurrent dose is 5mg daily           STOP taking these medications       cefadroxil (DURICEF) 500 MG capsule Comments:   Reason for Stopping:                Primary Care Physician   LORNE MOREIRA

## 2024-09-03 ENCOUNTER — TELEPHONE (OUTPATIENT)
Dept: ANTICOAGULATION | Facility: CLINIC | Age: 59
End: 2024-09-03
Payer: MEDICARE

## 2024-09-03 ENCOUNTER — CARE COORDINATION (OUTPATIENT)
Dept: CARDIOLOGY | Facility: CLINIC | Age: 59
End: 2024-09-03
Payer: MEDICARE

## 2024-09-03 ENCOUNTER — TELEPHONE (OUTPATIENT)
Dept: TRANSPLANT | Facility: CLINIC | Age: 59
End: 2024-09-03
Payer: MEDICARE

## 2024-09-03 ENCOUNTER — TELEPHONE (OUTPATIENT)
Dept: SCHEDULING | Facility: CLINIC | Age: 59
End: 2024-09-03
Payer: MEDICARE

## 2024-09-03 VITALS — BODY MASS INDEX: 34.44 KG/M2 | WEIGHT: 240 LBS

## 2024-09-03 DIAGNOSIS — I47.29 PAROXYSMAL VENTRICULAR TACHYCARDIA (H): ICD-10-CM

## 2024-09-03 DIAGNOSIS — Z95.2 S/P MITRAL VALVE REPLACEMENT: Primary | ICD-10-CM

## 2024-09-03 DIAGNOSIS — Z95.2 S/P MITRAL VALVE REPLACEMENT: ICD-10-CM

## 2024-09-03 DIAGNOSIS — Z95.811 LVAD (LEFT VENTRICULAR ASSIST DEVICE) PRESENT (H): Primary | ICD-10-CM

## 2024-09-03 DIAGNOSIS — I50.22 CHRONIC SYSTOLIC CHF (CONGESTIVE HEART FAILURE) (H): ICD-10-CM

## 2024-09-03 DIAGNOSIS — I50.22 CHRONIC SYSTOLIC CONGESTIVE HEART FAILURE (H): ICD-10-CM

## 2024-09-03 DIAGNOSIS — Z95.811 LVAD (LEFT VENTRICULAR ASSIST DEVICE) PRESENT (H): ICD-10-CM

## 2024-09-03 RX ORDER — ENOXAPARIN SODIUM 100 MG/ML
100 INJECTION SUBCUTANEOUS 2 TIMES DAILY
Qty: 10 ML | Refills: 1 | Status: SHIPPED | OUTPATIENT
Start: 2024-09-03

## 2024-09-03 NOTE — TELEPHONE ENCOUNTER
ANTICOAGULATION MANAGEMENT     Tej Morfin 59 year old male is on warfarin with subtherapeutic INR result. (Goal INR 2.0-3.0)    Recent labs: (last 7 days)     09/01/24  0000   INR 1.3*       ASSESSMENT     Source(s): Chart Review and Patient/Caregiver Call     Warfarin doses taken: Held for driveline infection  recently which may be affecting INR  Diet: No new diet changes identified  Medication/supplement changes:  DAPTOmycin 700 mg-follow up with an infectious disease provider on 9/13/24 to determine the total duration of antibiotic therapy.  STOP taking: cefadroxil 500 MG capsule (DURICEF)  New illness, injury, or hospitalization: Yes: Hospital Admission on 8/26-8/30/24 for driveline infection. 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   Signs or symptoms of bleeding or clotting: No  Previous result: Therapeutic last 2(+) visits prior to hospitalization   Additional findings: Bridging with Enoxaparin until INR therapeutic x 1 (per LVAD protocol) and Refill needed today. Tej meets all criteria for refill (current [facility] referral, visit with referring provider/group in last 15 months unless directed to return in 2 years in last referring provider visit note, lab monitoring up to date or not exceeding 2 weeks overdue). Rx instructions and quantity supplied updated to match patient's current dosing plan. Enoxaparin (Lovenox) 5 day supply with 1 refills granted per [facility] protocol        PLAN     Recommended plan for temporary change(s) affecting INR     Dosing Instructions: Continue your current warfarin dose Continue bridging with Enoxaparin with next INR in 3 days       Summary  As of 9/3/2024      Full warfarin instructions:  5 mg every day   Next INR check:  9/4/2024               Telephone call with Tej who agrees to plan and repeated back plan correctly    Patient using outside facility for labs    Education provided: Goal range and lab monitoring: goal range and significance of  current result and Importance of following up at instructed interval  Interaction IS anticipated between warfarin and antibiotics  Symptom monitoring: monitoring for bleeding signs and symptoms  Lovenox/Heparin education provided: role of enoxaparin/heparin in bridge therapy, prescribed dose and frequency, and monitoring for signs and symptoms of bruising and bleeding   Contact [phone] with any changes, questions or concerns.     Plan made per [facility] anticoagulation protocol and per LVAD protocol    SAVANNA MO RN  Anticoagulation Clinic  9/3/2024    _______________________________________________________________________     Anticoagulation Episode Summary       Current INR goal:  2.0-3.0   TTR:  79.3% (11.9 mo)   Target end date:  Indefinite   Send INR reminders to:  ANTICOAG LVAD    Indications    LVAD (left ventricular assist device) present (H) [Z95.811]  Chronic systolic CHF (congestive heart failure) (H) [I50.22]  S/P mitral valve replacement [Z95.2]  Paroxysmal ventricular tachycardia (H) [I47.29]             Comments:  Follow VAD Anticoag protocol:Yes: HeartMate 3  Bridging: per protocol  Date VAD placed: 03/23/22  INR Goal: per referral  Hx Mitral valve repair and not replacement               Anticoagulation Care Providers       Provider Role Specialty Phone number    Dunia Hdz MD Referring Cardiovascular Disease [phone]               Opt out

## 2024-09-03 NOTE — TELEPHONE ENCOUNTER
Spoke with Tej who is planning to check an INR at his local clinic (waiting on more test strips for home meter). He is requesting standing INR lab orders to be faxed to local clinic.     Patient has a current Anticoag referral, however no current INR lab orders.     Standing INR lab orders placed, faxed to [facility], fax [phone].    Next anticipated lab draw 9/4/24.    SAVANNA MO RN  Anticoagulation Clinic  [phone]

## 2024-09-03 NOTE — LETTER
"Faxed to:  [facility]  Fax Number:  [phone]                                                                                                                                                                                             [email or web address]   Email Order Form to [email or web address]  Phone: [phone] Fax: [phone]  Patient Name: Tej Morfin Date: 09/03/24   Facility Name: [facility]  Fax Number: [phone]    VAD Coordinator/ :   Siena Headley RN Phone: [phone]    Email Address:  [email or web address]   Patient's Primary Insurance Upon Receiving the VAD unit: no changes         Dressing Change Frequency: Daily X Every Other Day   Other (Specify)              Duration of Need:  DT (Lifetime)  BTT (until transplant) X Other (Specify)      NEW ORDERS ONLY: PLEASE SUBMIT PATIENT DEMOGRAPHICS, POST OP NOTES & NOTES FROM MOST RECENT CLINICAL VISIT WITH ORDER FORM.                  Fax: [phone]  Or Email Order Form to   [email or web address]       ck Product Size/ Description Quantity    Kit Options     X Medline Driveline Management Tray [id number]    Daily Up to 30    Medline Driveline Management Tray [id number]  Sensitive Up to 30   X Medline Driveline Management Tray [id number]  Weekly Up to 10          [place] Options     X Centurion Upton [place] /  Calderon [place] [id number] / [id number] Up to 30   X CathGrip / CathGrip Low Profile Securement [place] Sm/1 strap  Med/2 Strap    Large/2 Strap Up to 30    MC Aramis Cath Secure Dual Tab [place] [id number] Up to 30    Abdominal Binder Sm              Med              Lg   Up to 1          Adhesive Options     X Medipore Tape 1\"     2             cloth Up to 5 rolls   X 3M Micropore S Surgical Tape (Kind Removal Silicone Tape) 1\"     2\" Up to 5 rolls   X Safe N Simple Blue silicone tape 1\"     2\" Up to 5 rolls    Blenderm Surgical " "Tape / Transpore Tape 1\"     2\" Up to 5 rolls   X Simpurity Silicone Transparent Film 4\" x 5\" Up to 30    Smith and Nephew IV 3000 Transparent Film 4\" x 5.5\" Up to 30          Alternate Cleansers      Betadine Swabsticks (Povidone Iodine) 3/pack Up to 30          Individual Supplies     X Aquaguard/Shower Shield 7 x 7       9  x 9      10  x 12  Up to 35    FreeDerm Adhesive Remover Wipes Wipes Up to 60    FreeDerm Adhesive Remover Spray 1 oz  3oz  Spray             Up to 4   X Uni-Solve Adhesive Remover Wipes Box Up to 2    BioPlus Skin Barrier Wipes Box Up to 2    3M Cavilon No-sting Barrier Swabstick Swabstick Up to 60    Sterile Vinyl PF gloves Sizes   6   7   8   9                Pair Up to 30    Non Sterile Gloves 100/box        Size: S    M    L   XL Up to 3    Biopatch 1       1.5\" Up to 30   X Silverlon 1.5cm Square disc [id number] Up to 30    Alcohol Prep Pads Box Up to 1 box     My signature below certifies the medical necessity of the above approved products and I certify the patient has been trained in the use of all products. The patient is aware that [facility] will contact him/her regarding the shipment of ordered dressing supplies.     Provider Name: Dunai Hdz NPI#: [id number]   Provider Signature:                                                                                                                                                                                                                                                                                                                                                                                                                                                                                                           Digitally signed by Dunia Hdz 09/03/24 2:54 PM Provider Number: [phone]     [facility] will confirm receipt for all initial orders by sending a fax to the provider listed above.    "

## 2024-09-03 NOTE — PROGRESS NOTES
D: Called pt to check in post hospitalization.  Pt reports improved status overall.  Pt able to go to the gym today to workout a little bit. Drainage at driveline exit site decreasing.  Pt still doing BID dressing changes.   I:  Discussed follow up plan with ID and cards. Stressed the importance of keeping follow up as intended. Added Silverlon to dressing supply order to assist with DLES infection, orders faxed.  Discussed low INR and plan, and medications in general.  A:  Post hospital follow up  P:  Pt verbalized understanding of the instructions given.  Will call VAD coordinator with further needs and questions.

## 2024-09-03 NOTE — TELEPHONE ENCOUNTER
ANTICOAGULATION  MANAGEMENT: Discharge Review    Tej Morfin chart reviewed for anticoagulation continuity of care    Hospital Admission on 8/26-8/30/24 for driveline infection. 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     Discharge disposition: Home    Results:    Recent labs: (last 7 days)     08/27/24  1534 08/28/24  0001 08/28/24  0548 08/28/24  1351 08/29/24  0606 08/30/24  0705 09/01/24  0000   INR  --   --  1.60*  --  1.29* 1.12 1.3*   AAUFH 0.54 0.19 0.43 0.32 0.34 0.34  --      Anticoagulation inpatient management:     8/29 5mg  8/30 7.5mg    Anticoagulation discharge instructions:     Warfarin dosing: take 7.5mg of Warfarin on 8/30, 8/31, and 9/1.   Bridging: bridging with enoxaparin (Lovenox)-Lovenox injections 100mg twice a day starting on 8/30.   INR goal change: No      Medication changes affecting anticoagulation:     START taking:  DAPTOmycin 700 mg-follow up with an infectious disease provider on  9/13/24 to determine the total duration of antibiotic therapy.  enoxaparin ANTICOAGULANT (LOVENOX)  STOP taking:  cefadroxil 500 MG capsule (DURICEF)    Additional factors affecting anticoagulation:     [facility] outpatient infusion center daily (starting on 8/31) to  receive IV Daptomycin through your new PICC line. You will  need to get weekly labs to monitor for side effects from the  Daptomycin.     PLAN     Agree with dosing adjustment on discharge  Agree with discharge plan for follow up on 9/1-INR was 1.3-continue current maintenance dose (post hospitalization booster doses were recommended at discharge)   Recommend to check INR on 9/4/24    Spoke with Tej    Anticoagulation Calendar updated    SAVANNA MO, RN

## 2024-09-03 NOTE — TELEPHONE ENCOUNTER
General Call      Reason for Call:  INR    What are your questions or concerns:  patient needing an order to get his INR done at [facility]. Their phone is [phone]    Date of last appointment with provider:     Could we [facility] this [facility] to you [facility] or would you prefer to receive a phone call?:   Patient would prefer a phone call   Okay to leave a detailed message?: Yes at Home number on file [phone] (home)

## 2024-09-03 NOTE — PROGRESS NOTES
"OPAT to be provided by: Other    [facility]  Line Type: PICC     Diagnosis/Indications: LVAD driveline infection  Organism(s): MSSA  MRDO? No  Pending Cultures/Microbiological Tests: no       Inpatient ID involved in developing OPAT plan: Yes - discharge OPAT plan has no changes from ID provider, Dr. Pattie Hargrove, OPAT plan charted on 8/29/2024     Outpatient ID Follow-up: ID OPAT Clinic Referral Placed ([facility] Ph: [phone] and Fax: [phone]) - appointment scheduled  Designated Provider: Dr. Pattie Hargrove     Antimicrobial Regimen / Route Anticipated  Duration Start Date Stop /  Reassess Date   Daptomycin 700 mg (~8 mg/kg AdjBW) every 24 hours/IV TBD per ID provider 8/27/2024 Definitive end date to be determined by ID provider      Laboratory Tests and Monitoring Frequency: CBC with Diff, SCr, ALT, AST, CK Once Weekly     Imaging/Miscellaneous Monitoring: None     ID Pharmacist Interventions: None                           Tamie Payne, PharmD, BCIDP    Prolonged Parenteral/Oral Antibiotic Recommendations and ID Follow up  This template provides final ID recommendations as of this date.      Infectious Diseases Indication: LVAD driveline infection     Antibiotic Information  Name of Antibiotic Dose of Antibiotic1 Anticipated duration Effective start date2 End date   Daptomycin 8 mg/kg/day 4 weeks 8/27/24 TBD in ID clinic                           1.Dose of antibiotic will need to be renally adjusted if creatinine clearance changes  2.Effective start date is the date of adequate therapy with appropriate spectrum     Method of antibiotic delivery:PICC line.Is the line being used for another indication besides antimicrobials? No At the end of therapy should the line used for antimicrobials be removed or de-accessed? Yes. Selecting \"yes\" will function as written order to remove PICC line or de-access the indwelling line at the end of therapy.     Weekly labs " required: Creatinine, AST/ALT, CBC with diff, and CK. Dr. Hargrove will follow labs at discharge until ID follow up. Fax labs to ID clinic.     Are there pending microbial tests: Yes Cultures      Imaging for ID follow up: ID Imaging: No.     We will attempt to make OPAT appointments within 2 weeks of discharge based on clinic availability.     Provider: Kinjal, timing of visit before this specific date 9/24/24 , and the type of clinic appointment visit Okay for in person or a virtual visit.      ID provider route note: OPAT RN Care Coordinator [facility] ID Clinic Information:  Phone: [phone]  Fax: [phone] (Attention ID clinic nurses)

## 2024-09-04 ENCOUNTER — ANTICOAGULATION THERAPY VISIT (OUTPATIENT)
Dept: ANTICOAGULATION | Facility: CLINIC | Age: 59
End: 2024-09-04
Payer: MEDICARE

## 2024-09-04 DIAGNOSIS — Z95.811 LVAD (LEFT VENTRICULAR ASSIST DEVICE) PRESENT (H): Primary | ICD-10-CM

## 2024-09-04 DIAGNOSIS — I47.29 PAROXYSMAL VENTRICULAR TACHYCARDIA (H): ICD-10-CM

## 2024-09-04 DIAGNOSIS — I50.22 CHRONIC SYSTOLIC CHF (CONGESTIVE HEART FAILURE) (H): ICD-10-CM

## 2024-09-04 DIAGNOSIS — Z95.2 S/P MITRAL VALVE REPLACEMENT: ICD-10-CM

## 2024-09-04 LAB — INR (EXTERNAL): 1.6 (ref 0.9–1.1)

## 2024-09-04 NOTE — PROGRESS NOTES
ANTICOAGULATION MANAGEMENT     Tej Morfin 59 year old male is on warfarin with subtherapeutic INR result. (Goal INR 2.0-3.0)    Recent labs: (last 7 days)     09/04/24  1315   INR 1.6*       ASSESSMENT     Source(s): Chart Review and Patient/Caregiver Call     Warfarin doses taken: Booster dose(s) recently taken which may be affecting INR and Resumed warfarin on 8/29/24 after interruption for surgery/procedure which may be affecting INR  Diet: No new diet changes identified  Medication/supplement changes: None noted  New illness, injury, or hospitalization: No  Signs or symptoms of bleeding or clotting: No  Previous result: Subtherapeutic  Additional findings: Bridging with Enoxaparin until INR therapeutic x 1 (per LVAD protocol)  Testing in 2 days per patient preference, Tej would like to try to get off the Enoxaparin injections before the weekend.       PLAN     Recommended plan for temporary change(s) affecting INR     Dosing Instructions: booster dose then continue your current warfarin dose with next INR in 2 days       Summary  As of 9/4/2024      Full warfarin instructions:  9/4: 7.5 mg; Otherwise 5 mg every day   Next INR check:  9/6/2024               Telephone call with Tej who verbalizes understanding and agrees to plan  Sent [email or web address] message with dosing and follow up instructions    Patient using outside facility for labs    Education provided: Please call back if any changes to your diet, medications or how you've been taking warfarin  Taking warfarin: purpose of warfarin and how it works, take warfarin at same time each day; preferably in the evening, prescribed tablet strength and color, importance of following ACC instructions vs instructions on the prescription bottle, and Importance of taking warfarin as instructed  Goal range and lab monitoring: goal range and significance of current result, Importance of therapeutic range, and Importance of following up at instructed interval  Symptom  monitoring: monitoring for bleeding signs and symptoms, monitoring for clotting signs and symptoms, monitoring for stroke signs and symptoms, when to seek medical attention/emergency care, and if you hit your head or have a bad fall seek emergency care  Importance of notifying anticoagulation clinic for: changes in medications; a sooner lab recheck maybe needed, diarrhea, nausea/vomiting, reduced intake, cold/flu, and/or infections; a sooner lab recheck maybe needed, and upcoming surgeries and procedures 2 weeks in advance    Plan made per [facility] anticoagulation protocol and per LVAD protocol    Siddharht Greco, RN  Anticoagulation Clinic  9/4/2024    _______________________________________________________________________     Anticoagulation Episode Summary       Current INR goal:  2.0-3.0   TTR:  78.4% (1 y)   Target end date:  Indefinite   Send INR reminders to:  ANTICOAG LVAD    Indications    LVAD (left ventricular assist device) present (H) [Z95.811]  Chronic systolic CHF (congestive heart failure) (H) [I50.22]  S/P mitral valve replacement [Z95.2]  Paroxysmal ventricular tachycardia (H) [I47.29]             Comments:  Follow VAD Anticoag protocol:Yes: HeartMate 3  Bridging: per protocol  Date VAD placed: 03/23/22  INR Goal: per referral  Hx Mitral valve repair and not replacement               Anticoagulation Care Providers       Provider Role Specialty Phone number    Dunia Hdz MD Referring Cardiovascular Disease [phone]

## 2024-09-05 ENCOUNTER — TELEPHONE (OUTPATIENT)
Dept: INFECTIOUS DISEASES | Facility: CLINIC | Age: 59
End: 2024-09-05
Payer: MEDICARE

## 2024-09-05 NOTE — TELEPHONE ENCOUNTER
Returned call to Michelle- she is calling to inform us that patients CK jumped from 84 on 08/29 to 214 on 09/05. Patient denies any muscle pain or weakness. Will route to Dr Hargrove who saw patient last for guidance and call Michelle back with any change in orders.

## 2024-09-05 NOTE — TELEPHONE ENCOUNTER
[facility] Call Center    Phone Message    May a detailed message be left on voicemail: yes     Reason for Call: Other: Requesting call back to speak with nurse about patients lab results      Action Taken: Other: ID     Travel Screening: Not Applicable     Date of Service:

## 2024-09-06 ENCOUNTER — ANTICOAGULATION THERAPY VISIT (OUTPATIENT)
Dept: ANTICOAGULATION | Facility: CLINIC | Age: 59
End: 2024-09-06
Payer: MEDICARE

## 2024-09-06 DIAGNOSIS — I50.22 CHRONIC SYSTOLIC CHF (CONGESTIVE HEART FAILURE) (H): ICD-10-CM

## 2024-09-06 DIAGNOSIS — I47.29 PAROXYSMAL VENTRICULAR TACHYCARDIA (H): ICD-10-CM

## 2024-09-06 DIAGNOSIS — Z95.811 LVAD (LEFT VENTRICULAR ASSIST DEVICE) PRESENT (H): Primary | ICD-10-CM

## 2024-09-06 DIAGNOSIS — Z95.2 S/P MITRAL VALVE REPLACEMENT: ICD-10-CM

## 2024-09-06 LAB — INR (EXTERNAL): 2.1 (ref 0.9–1.1)

## 2024-09-06 NOTE — PROGRESS NOTES
ANTICOAGULATION MANAGEMENT     Tej SWENSON [place] 59 year old male is on warfarin with therapeutic INR result. (Goal INR 2.0-3.0)    Recent labs: (last 7 days)     09/06/24  1434   INR 2.1*       ASSESSMENT     Source(s): Chart Review and Patient/Caregiver Call     Warfarin doses taken: Booster dose(s) recently taken which may be affecting INR  Diet: No new diet changes identified  Medication/supplement changes: None noted  New illness, injury, or hospitalization: No  Signs or symptoms of bleeding or clotting: No  Previous result: Subtherapeutic  Additional findings: None and Bridging with Enoxaparin until INR therapeutic x 1 (per LVAD protocol) Patient instructed to STOP Lovenox injections on 9/6/24       PLAN     Recommended plan for temporary change(s) affecting INR     Dosing Instructions: booster dose then continue your current warfarin dose with next INR in 1 week       Summary  As of 9/6/2024      Full warfarin instructions:  9/6: 7.5 mg; Otherwise 5 mg every day   Next INR check:  9/13/2024               Telephone call with Tej who verbalizes understanding and agrees to plan  Sent [employer] message with dosing and follow up instructions    Lab visit scheduled    Education provided: Please call back if any changes to your diet, medications or how you've been taking warfarin  Taking warfarin: purpose of warfarin and how it works, take warfarin at same time each day; preferably in the evening, prescribed tablet strength and color, and importance of following ACC instructions vs instructions on the prescription bottle  Symptom monitoring: monitoring for bleeding signs and symptoms, monitoring for clotting signs and symptoms, monitoring for stroke signs and symptoms, when to seek medical attention/emergency care, and if you hit your head or have a bad fall seek emergency care  Importance of notifying anticoagulation clinic for: changes in medications; a sooner lab recheck maybe needed, diarrhea, nausea/vomiting,  reduced intake, cold/flu, and/or infections; a sooner lab recheck maybe needed, and if you did not receive dosing instructions on the same day as your labs were checked  Lovenox/Heparin education provided: disposal of syringes     Plan made per [facility] anticoagulation protocol and per LVAD protocol    Siddharth Greco, RN  Anticoagulation Clinic  9/6/2024    _______________________________________________________________________     Anticoagulation Episode Summary       Current INR goal:  2.0-3.0   TTR:  78.0% (1 y)   Target end date:  Indefinite   Send INR reminders to:  ANTICOAG LVAD    Indications    LVAD (left ventricular assist device) present (H) [Z95.811]  Chronic systolic CHF (congestive heart failure) (H) [I50.22]  S/P mitral valve replacement [Z95.2]  Paroxysmal ventricular tachycardia (H) [I47.29]             Comments:  Follow VAD Anticoag protocol:Yes: HeartMate 3  Bridging: per protocol  Date VAD placed: 03/23/22  INR Goal: per referral  Hx Mitral valve repair and not replacement               Anticoagulation Care Providers       Provider Role Specialty Phone number    Dunia Hdz MD Referring Cardiovascular Disease [phone]

## 2024-09-06 NOTE — TELEPHONE ENCOUNTER
Called [facility]ago[facility] and relayed plan they will contact us if any concerns before next blood draw,       Prince Joya RN  Infectious Disease on 9/6/2024 at 1:33 PM

## 2024-09-06 NOTE — TELEPHONE ENCOUNTER
Pattie Hargrove MD  [facility] Infectious Disease Adult [facility] Rn23 hours ago (2:18 PM)     MK  Hi Raine,    It's not too high yet. Let's continue to trend. 1 week is fine, sooner if he develops myalgias.    Blanca Mcintosh, RN  Pattie Hargrove MD23 hours ago (2:07 PM)     BC  Nghia Hargrvoe- Please see attached message and let us know any orders you would like placed.

## 2024-09-11 ENCOUNTER — ANCILLARY PROCEDURE (OUTPATIENT)
Dept: CARDIOLOGY | Facility: CLINIC | Age: 59
End: 2024-09-11
Attending: INTERNAL MEDICINE
Payer: MEDICARE

## 2024-09-11 DIAGNOSIS — I48.0 PAROXYSMAL ATRIAL FIBRILLATION (H): ICD-10-CM

## 2024-09-11 PROCEDURE — 93295 DEV INTERROG REMOTE 1/2/MLT: CPT | Performed by: INTERNAL MEDICINE

## 2024-09-13 ENCOUNTER — OFFICE VISIT (OUTPATIENT)
Dept: INFECTIOUS DISEASES | Facility: CLINIC | Age: 59
End: 2024-09-13
Attending: STUDENT IN AN ORGANIZED HEALTH CARE EDUCATION/TRAINING PROGRAM
Payer: MEDICARE

## 2024-09-13 ENCOUNTER — LAB (OUTPATIENT)
Dept: LAB | Facility: CLINIC | Age: 59
End: 2024-09-13
Payer: MEDICARE

## 2024-09-13 ENCOUNTER — CARE COORDINATION (OUTPATIENT)
Dept: TRANSPLANT | Facility: CLINIC | Age: 59
End: 2024-09-13

## 2024-09-13 ENCOUNTER — ANTICOAGULATION THERAPY VISIT (OUTPATIENT)
Dept: ANTICOAGULATION | Facility: CLINIC | Age: 59
End: 2024-09-13

## 2024-09-13 VITALS — HEIGHT: 70 IN | BODY MASS INDEX: 35.22 KG/M2 | TEMPERATURE: 97.9 F | HEART RATE: 59 BPM | WEIGHT: 246 LBS

## 2024-09-13 DIAGNOSIS — I47.29 PAROXYSMAL VENTRICULAR TACHYCARDIA (H): ICD-10-CM

## 2024-09-13 DIAGNOSIS — Z76.82 AWAITING ORGAN TRANSPLANT: ICD-10-CM

## 2024-09-13 DIAGNOSIS — A49.01 MSSA (METHICILLIN SUSCEPTIBLE STAPHYLOCOCCUS AUREUS) INFECTION: ICD-10-CM

## 2024-09-13 DIAGNOSIS — Z12.5 ENCOUNTER FOR SCREENING FOR MALIGNANT NEOPLASM OF PROSTATE: ICD-10-CM

## 2024-09-13 DIAGNOSIS — G72.9 MYOPATHY: ICD-10-CM

## 2024-09-13 DIAGNOSIS — Z79.899 HIGH RISK MEDICATION USE: ICD-10-CM

## 2024-09-13 DIAGNOSIS — Z79.2 RECEIVING INTRAVENOUS ANTIBIOTIC TREATMENT AS OUTPATIENT: ICD-10-CM

## 2024-09-13 DIAGNOSIS — Z95.2 S/P MITRAL VALVE REPLACEMENT: ICD-10-CM

## 2024-09-13 DIAGNOSIS — Z72.89 OTHER PROBLEMS RELATED TO LIFESTYLE: ICD-10-CM

## 2024-09-13 DIAGNOSIS — I50.22 CHRONIC SYSTOLIC CONGESTIVE HEART FAILURE (H): ICD-10-CM

## 2024-09-13 DIAGNOSIS — R09.89 OTHER SPECIFIED SYMPTOMS AND SIGNS INVOLVING THE CIRCULATORY AND RESPIRATORY SYSTEMS: ICD-10-CM

## 2024-09-13 DIAGNOSIS — Z95.811 LVAD (LEFT VENTRICULAR ASSIST DEVICE) PRESENT (H): ICD-10-CM

## 2024-09-13 DIAGNOSIS — I50.22 CHRONIC SYSTOLIC CHF (CONGESTIVE HEART FAILURE) (H): ICD-10-CM

## 2024-09-13 DIAGNOSIS — Z95.811 LVAD (LEFT VENTRICULAR ASSIST DEVICE) PRESENT (H): Primary | ICD-10-CM

## 2024-09-13 DIAGNOSIS — T82.7XXA INFECTION ASSOCIATED WITH DRIVELINE OF LEFT VENTRICULAR ASSIST DEVICE (LVAD) (H): Primary | ICD-10-CM

## 2024-09-13 LAB
HCV AB SERPL QL IA: NONREACTIVE
HIV 1+2 AB+HIV1 P24 AG SERPL QL IA: NONREACTIVE
HOLD SPECIMEN: NORMAL
INR PPP: 1.8 (ref 0.85–1.15)
PREALB SERPL-MCNC: 21.5 MG/DL (ref 20–40)
PSA SERPL DL<=0.01 NG/ML-MCNC: 2.42 NG/ML (ref 0–3.5)
T PALLIDUM AB SER QL: NONREACTIVE
TSH SERPL DL<=0.005 MIU/L-ACNC: 0.95 UIU/ML (ref 0.3–4.2)

## 2024-09-13 PROCEDURE — 85610 PROTHROMBIN TIME: CPT | Performed by: PATHOLOGY

## 2024-09-13 PROCEDURE — G0463 HOSPITAL OUTPT CLINIC VISIT: HCPCS

## 2024-09-13 PROCEDURE — G0103 PSA SCREENING: HCPCS | Performed by: PATHOLOGY

## 2024-09-13 PROCEDURE — 99215 OFFICE O/P EST HI 40 MIN: CPT

## 2024-09-13 PROCEDURE — 86803 HEPATITIS C AB TEST: CPT | Performed by: INTERNAL MEDICINE

## 2024-09-13 PROCEDURE — 84134 ASSAY OF PREALBUMIN: CPT | Performed by: INTERNAL MEDICINE

## 2024-09-13 PROCEDURE — 99000 SPECIMEN HANDLING OFFICE-LAB: CPT | Performed by: PATHOLOGY

## 2024-09-13 PROCEDURE — 84443 ASSAY THYROID STIM HORMONE: CPT | Performed by: PATHOLOGY

## 2024-09-13 PROCEDURE — 36415 COLL VENOUS BLD VENIPUNCTURE: CPT | Performed by: PATHOLOGY

## 2024-09-13 PROCEDURE — 99417 PROLNG OP E/M EACH 15 MIN: CPT

## 2024-09-13 PROCEDURE — 86780 TREPONEMA PALLIDUM: CPT | Performed by: INTERNAL MEDICINE

## 2024-09-13 RX ORDER — CEFADROXIL 500 MG/1
1000 CAPSULE ORAL 2 TIMES DAILY
Qty: 28 CAPSULE | Refills: 0 | Status: SHIPPED | OUTPATIENT
Start: 2024-09-13

## 2024-09-13 ASSESSMENT — PAIN SCALES - GENERAL: PAINLEVEL: NO PAIN (0)

## 2024-09-13 NOTE — LETTER
9/13/2024       RE: Tej Morfin  [address]  [place] [address]     Dear Colleague,    Thank you for referring your patient, Tej Morfin, to the [facility] INFECTIOUS DISEASE CLINIC [place] at [facility]. Please see a copy of my visit note below.      [facility] INFECTIOUS DISEASE CLINIC [address] [address]  Phone: [phone]  Fax: [phone]    Patient:  Tej Morfin, Date of birth 1965  Date of Visit:  09/13/2024  Referring Provider Dunia Hdz  Reason for visit: Follow up     Assessment & Plan   Recommendations:   Stop daptomycin  Start ceftriaxone 2 grams IV every 24 hours (see rationale below)  Weekly CBC, AST, ALT, creatinine. Also requested CK weekly x 2 weeks to make sure it is down trending.  Faxed the above orders to [facility] at [phone]  Okay to decrease dressing changes to once daily (instead of twice a day).   He comes from [place] so will try to coordinate on 10/7 with cardiology to reasses.     Saritha Corona D.O.   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  Concern for daptomycin include myopathy: known to have MSSA driveline infection since March 2024. Received a few rounds of doxycycline and then transitioned to on cefadroxil antibiotic suppression (started April). 8/25 CT  w/o an underlying fluid collection but increased skin thickening at the entry site for the driveline in the right midabdomen as well as asymmetric thickening of the right rectus muscle.  8/27 driveline culture grew MSSA. Also had noted new tissue growth at the site over the prior 3 weeks to admission. Surgical intervention deemed not an option at this time. Planning for a prolonged course of IV antibiotics. He was not able to receive cefazllin due to financial issues so daptomycin was continued as an outpatient at an infusion center. Discharged 8/30. On 9/5 CK level was 214 and 9/12 CK level was 838. He does endorse myalgias in his legs, back, neck. Cefazolin not an option since he can't do home infusion.  He has been on treatment for ~ 2 weeks. Although there has been improvement in the drainage on exam it is actively draining. Will transition to ceftriaxone 2 grams IV daily to allow for once a day dosing an infusion center. He comes from [place] so will try to coordinate on 10/7 with cardiology to reassess. Will likely need antibiotic suppression after IV antibiotics.        90 minutes spent by me on the date of the encounter doing chart review, review of test results, interpretation of tests, patient visit, documentation, discussion with other provider(s), and discussion with [place] [facility] and faxing orders             History of Present Illness  Pertinent history obtain from: chart review and patient  Last seen by inpatient ID on 8/29/24.   3/31 driveline culture w/ MSSA s/p 2 weeks of doxycycline. Improvement but then drainage occurred again. 4/17 culture with MSSA again s/p doxycycline x 1 week followed by cefadroxil x 2 weeks (started 4/25/24). CT with possible skin thickening at driveline insertion site but not abscess. He was seen by [facility] ID and continued on cefadroxil for antibiotic suppression.  Developed increased drainage.  7/12 culture w/ MSSA. Cefadroxil dose increased to 1 gram  twice a day. CT w/o localized fluid collection. 8/25 he presented to the ED with reoccurrence of purulence. Driveline culture with MSSA ([place]). 8/25 CT A/P w/o organizing fluid collection. There was ongoing cutaneous thickening at the entry site for the driveline in the right midabdomen as well as asymmetric thickening of the right rectus muscle (increased from 7/2024 CT). Blood cultures negative. Granuloma present and surgery evaluated but surgical intervention is not an option at this time. Discharged on daptomycin. Since discharge the CK level has increased. He has also noticed increase muscle tightness in his legs, back, neck. He thought it was because he had started walking on the treadmill again. No subjective fevers, chills, night sweats. Drainage has improved with dressing changes. Currently changing the dressing twice a day.       LVAD History:  Current LVAD model: heartmate III  Date current LVAD placed: 3/23/22  Previous LVAD devices: none  Other prosthetic devices/materials: severe MR s/p MVR with ring (1/21/15), VT s/p ICD,  Primary cardiologist: Andreina  Primary LVAD coordinator: Siena Headley  Primary ID provider: LVAD ID Group (Sd Knott, Chloe, and Rajeev)  Goal of LVAD: destination    History of bacteremias (dates and organisms): none    History of driveline infections (dates and organisms):   -5/4/23 Staphylococcus lugdenesis s/p doxycycline x 2 weeks  -3/31/24 driveline culture w/ MSSA s/p 2 weeks of doxycycline.  -4/17/24 culture with MSSA again s/p doxycycline x 1 week followed by cefadroxil x 2 weeks (started 4/25/24).   -7/12/24 culture w/ MSSA. Cefadroxil dose increased to 1 gram twice a day.   -8/25 he presented to the ED with reoccurrence of purulence. Driveline culture with MSSA ([place]). 8/25 CT A/P w/o organizing fluid collection. Started IV daptomycin    History of other pertinent infections: none    History of driveline area irritation and current mitigation  strategies: currently twice a day dressing changes, anchor    Current suppressive antibiotics: none    Previous antibiotic failures/allergies/intolerances etc: numerous course of doxycycline       Physical Exam    Vital signs:  There were no vitals taken for this visit.        Picture of exit site 9/13/24:       Data  Laboratory data and imaging listed below was reviewed prior to this encounter.                        Again, thank you for allowing me to participate in the care of your patient.      Sincerely,     LVAD

## 2024-09-13 NOTE — PROGRESS NOTES
[facility] INFECTIOUS DISEASE CLINIC [address] [address]  Phone: [phone]  Fax: [phone]    Patient:  Tej Morfin, Date of birth 1965  Date of Visit:  09/13/2024  Referring Provider Dunia Hdz  Reason for visit: Follow up     Assessment & Plan    Recommendations:   Stop daptomycin  Start ceftriaxone 2 grams IV every 24 hours (see rationale below)  Weekly CBC, AST, ALT, creatinine. Also requested CK weekly x 2 weeks to make sure it is down trending.  Faxed the above orders to [facility] at [phone]  Okay to decrease dressing changes to once daily (instead of twice a day).   He comes from [place] so will try to coordinate on 10/7 with cardiology to reasses.     Saritha Corona D.O.   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  Concern for daptomycin include myopathy: known to have MSSA driveline infection since March 2024. Received a few rounds of doxycycline and then transitioned to on cefadroxil antibiotic suppression (started April). 8/25 CT w/o an underlying fluid collection but increased skin thickening at the entry site for the driveline in the right midabdomen as well as asymmetric thickening of the right rectus muscle.  8/27 driveline culture grew MSSA. Also had noted new tissue growth at the site over the prior 3 weeks to admission. Surgical intervention deemed not an option  at this time. Planning for a prolonged course of IV antibiotics. He was not able to receive cefazllin due to financial issues so daptomycin was continued as an outpatient at an infusion center. Discharged 8/30. On 9/5 CK level was 214 and 9/12 CK level was 838. He does endorse myalgias in his legs, back, neck. Cefazolin not an option since he can't do home infusion.  He has been on treatment for ~ 2 weeks. Although there has been improvement in the drainage on exam it is actively draining. Will transition to ceftriaxone 2 grams IV daily to allow for once a day dosing an infusion center. He comes from [place] so will try to coordinate on 10/7 with cardiology to reassess. Will likely need antibiotic suppression after IV antibiotics.        90 minutes spent by me on the date of the encounter doing chart review, review of test results, interpretation of tests, patient visit, documentation, discussion with other provider(s), and discussion with [place] [facility] and faxing orders             History of Present Illness   Pertinent history obtain from: chart review and patient  Last seen by inpatient ID on 8/29/24.   3/31 driveline culture w/ MSSA s/p 2 weeks of doxycycline. Improvement but then drainage occurred again. 4/17 culture with MSSA again s/p doxycycline x 1 week followed by cefadroxil x 2 weeks (started 4/25/24). CT with possible skin thickening at driveline insertion site but not abscess. He was seen by [facility] ID and continued on cefadroxil for antibiotic suppression.  Developed increased drainage.  7/12 culture w/ MSSA. Cefadroxil dose increased to 1 gram twice a day. CT w/o localized fluid collection. 8/25 he presented to the ED with reoccurrence of purulence. Driveline culture with MSSA ([place]). 8/25 CT A/P w/o organizing fluid collection. There was ongoing cutaneous thickening at the entry site for the driveline in the right midabdomen as well as asymmetric thickening of the right rectus  muscle (increased from 7/2024 CT). Blood cultures negative. Granuloma present and surgery evaluated but surgical intervention is not an option at this time. Discharged on daptomycin. Since discharge the CK level has increased. He has also noticed increase muscle tightness in his legs, back, neck. He thought it was because he had started walking on the treadmill again. No subjective fevers, chills, night sweats. Drainage has improved with dressing changes. Currently changing the dressing twice a day.       LVAD History:  Current LVAD model: heartmate III  Date current LVAD placed: 3/23/22  Previous LVAD devices: none  Other prosthetic devices/materials: severe MR s/p MVR with ring (1/21/15), VT s/p ICD,  Primary cardiologist: Andreina  Primary LVAD coordinator: Siena Headley  Primary ID provider: LVAD ID Group (Sd Knott, Chloe, and Rajeev)  Goal of LVAD: destination    History of bacteremias (dates and organisms): none    History of driveline infections (dates and organisms):   -5/4/23 Staphylococcus lugdenesis s/p doxycycline x 2 weeks  -3/31/24 driveline culture w/ MSSA s/p 2 weeks of doxycycline.  -4/17/24 culture with MSSA again s/p doxycycline x 1 week followed by cefadroxil x 2 weeks (started 4/25/24).   -7/12/24 culture w/ MSSA. Cefadroxil dose increased to 1 gram twice a day.   -8/25 he presented to the ED with reoccurrence of purulence. Driveline culture with MSSA ([place]). 8/25 CT A/P w/o organizing fluid collection. Started IV daptomycin    History of other pertinent infections: none    History of driveline area irritation and current mitigation strategies: currently twice a day dressing changes, anchor    Current suppressive antibiotics: none    Previous antibiotic failures/allergies/intolerances etc: numerous course of doxycycline       Physical Exam     Vital signs:  There were no vitals taken for this visit.        Picture of exit site 9/13/24:       Data   Laboratory data and  imaging listed below was reviewed prior to this encounter.

## 2024-09-13 NOTE — NURSING NOTE
"Chief Complaint   Patient presents with    Follow Up     LVAD       Pulse 59   Temp 97.9  F (36.6  C) (Oral)   Ht 1.778 m (5' 10\")   Wt 111.6 kg (246 lb)   BMI 35.30 kg/m    Shona Fernandes MA on 9/13/2024 at 10:59 AM    "

## 2024-09-13 NOTE — PATIENT INSTRUCTIONS
-Will change daptomycin to ceftriaxone 2 grams IV every 24 hours  -In the interim I will give you cefadroxil 1000 mg every 12 hours to bridge you to the IV ceftriaxone.   -continue to trend weekly labs and check CK level next week to make sure it is coming down  -changing dressing change frequency 1x per day

## 2024-09-13 NOTE — PROGRESS NOTES
Status 3 Heart Extension Complete 09/13/24    This patient meets status 3 criteria as evidenced by:       MCSD with one of the following:   device infection       Patient's primary cardiologist and/or attending physician is in agreement with this plan.      Status 3 Extension due 09/29/24

## 2024-09-13 NOTE — PROGRESS NOTES
ANTICOAGULATION MANAGEMENT     Tej Morfin 59 year old male is on warfarin with subtherapeutic INR result. (Goal INR 2.0-3.0)    Recent labs: (last 7 days)     09/13/24  1241   INR 1.80*       ASSESSMENT     Warfarin Lab Questionnaire    Warfarin Doses Last 7 Days      9/13/2024     7:19 AM   Dose in Tablet or Mg   TAB or MG? milligram (mg)     Pt Rptd Dose SUNDAY MONDAY TUESDAY WED THURS FRIDAY SATURDAY 9/13/2024   7:19 AM 0.5 0.5 0.5 0.5 0.5 0.5 0.5         9/13/2024   Warfarin Lab Questionnaire   Missed doses within past 14 days? No   Changes in diet or alcohol within past 14 days? No   Medication changes since last result? No   Injuries or illness since last result? No   New shortness of breath, severe headaches or sudden changes in vision since last result? No   Abnormal bleeding since last result? No   Upcoming surgery, procedure? No        Previous result: Therapeutic last visit; previously outside of goal range  Additional findings: daptomycin changed to Cefrtiazone every 24 hours, starting tomorrow- Concomitant use of ceftriaxone and warfarin should be approached with caution as this may result in increased INR and thereby increase the risk for bleeding. He will also take oral Cefadroxil fo 14 days to bridge between the antibiotics.        PLAN     Recommended plan for ongoing change(s) affecting INR     Dosing Instructions: Increase your warfarin dose (7% change) with next INR in 4 days       Summary  As of 9/13/2024      Full warfarin instructions:  7.5 mg every Fri; 5 mg all other days   Next INR check:  9/17/2024               Telephone call with Tej who verbalizes understanding and agrees to plan and who agrees to plan and repeated back plan correctly  Sent [place] message with dosing and follow up instructions    Patient to recheck with home meter    Education provided: Please call back if any changes to your diet, medications or how you've been taking warfarin    Plan made per [facility]  anticoagulation protocol and per LVAD protocol    Cecily Tovar RN  9/13/2024  Anticoagulation Clinic  [facility] for routing messages: p ANTICOAG LVAD  [facility] patient phone line: [phone]        _______________________________________________________________________     Anticoagulation Episode Summary       Current INR goal:  2.0-3.0   TTR:  76.7% (1 y)   Target end date:  Indefinite   Send INR reminders to:  ANTICOAG LVAD    Indications    LVAD (left ventricular assist device) present (H) [Z95.811]  Chronic systolic CHF (congestive heart failure) (H) [I50.22]  S/P mitral valve replacement [Z95.2]  Paroxysmal ventricular tachycardia (H) [I47.29]             Comments:  Follow VAD Anticoag protocol:Yes: HeartMate 3  Bridging: per protocol  Date VAD placed: 03/23/22  INR Goal: per referral  Hx Mitral valve repair and not replacement               Anticoagulation Care Providers       Provider Role Specialty Phone number    Dunia Hdz MD Referring Cardiovascular Disease [phone]

## 2024-09-17 ENCOUNTER — ANTICOAGULATION THERAPY VISIT (OUTPATIENT)
Dept: ANTICOAGULATION | Facility: CLINIC | Age: 59
End: 2024-09-17
Payer: MEDICARE

## 2024-09-17 DIAGNOSIS — I47.29 PAROXYSMAL VENTRICULAR TACHYCARDIA (H): ICD-10-CM

## 2024-09-17 DIAGNOSIS — Z95.2 S/P MITRAL VALVE REPLACEMENT: ICD-10-CM

## 2024-09-17 DIAGNOSIS — Z95.811 LVAD (LEFT VENTRICULAR ASSIST DEVICE) PRESENT (H): Primary | ICD-10-CM

## 2024-09-17 DIAGNOSIS — I50.22 CHRONIC SYSTOLIC CHF (CONGESTIVE HEART FAILURE) (H): ICD-10-CM

## 2024-09-17 LAB
INR HOME MONITORING: 2.1 (ref 2–3)
INR HOME MONITORING: 2.1 (ref 2–3)

## 2024-09-17 NOTE — PROGRESS NOTES
ANTICOAGULATION MANAGEMENT     Tej Morfin 59 year old male is on warfarin with therapeutic INR result. (Goal INR 2.0-3.0)    Recent labs: (last 7 days)     09/17/24  0000   INR 2.1       ASSESSMENT     Source(s): Chart Review and Patient/Caregiver Call     Warfarin doses taken: Warfarin taken as instructed  Diet: No new diet changes identified  Medication/supplement changes: None noted  New illness, injury, or hospitalization: No  Signs or symptoms of bleeding or clotting: No  Previous result: Subtherapeutic -7% increase  Additional findings:   Daptomycin discontinued, changed to Cefrtiazone IV every 24 hours, started 9/14. Concomitant use of ceftriaxone and warfarin should be approached with caution as this may result in increased INR and thereby increase the risk for bleeding. He will also take oral Cefadroxil fo 14 days to bridge between the antibiotics.        PLAN     Recommended plan for no diet, medication or health factor changes affecting INR     Dosing Instructions: Continue your current warfarin dose with next INR in 1 week       Summary  As of 9/17/2024      Full warfarin instructions:  7.5 mg every Fri; 5 mg all other days   Next INR check:  9/27/2024               Telephone call with Tej who verbalizes understanding and agrees to plan and who agrees to plan and repeated back plan correctly    Patient to recheck with home meter    Education provided: Please call back if any changes to your diet, medications or how you've been taking warfarin    Plan made per [facility] anticoagulation protocol and per LVAD protocol    Cecily Tovar RN  9/17/2024  Anticoagulation Clinic  [facility] for routing messages: jaime ANTICOAG LVAD  [facility] patient phone line: [phone]        _______________________________________________________________________     Anticoagulation Episode Summary       Current INR goal:  2.0-3.0   TTR:  76.0% (12 mo)   Target end date:  Indefinite   Send INR reminders to:  ANTICOAG LVAD     Indications    LVAD (left ventricular assist device) present (H) [Z95.811]  Chronic systolic CHF (congestive heart failure) (H) [I50.22]  S/P mitral valve replacement [Z95.2]  Paroxysmal ventricular tachycardia (H) [I47.29]             Comments:  Follow VAD Anticoag protocol:Yes: HeartMate 3  Bridging: per protocol  Date VAD placed: 03/23/22  INR Goal: per referral  Hx Mitral valve repair and not replacement               Anticoagulation Care Providers       Provider Role Specialty Phone number    Dunia Hdz MD Referring Cardiovascular Disease [phone]

## 2024-09-23 ENCOUNTER — CARE COORDINATION (OUTPATIENT)
Dept: CARDIOLOGY | Facility: CLINIC | Age: 59
End: 2024-09-23
Payer: MEDICARE

## 2024-09-23 NOTE — PROGRESS NOTES
D:  rcvd call from pt's wife, Jessenia, to inform us that their youngest daughter just passed, unexpectedly.  She voiced extreme concern for pt's mental and current health state, and also going forward.  Jessenia was asking for recommendations on how to care for her  during this difficult time.  I:  Encouraged them to enlist the help of family and friends to assist during this time.  Encouraged Jessenia to offer a check in appt with his primary care MD as a mental status/ med check in appt.  Advised if his mood worsens or he becomes a danger to himself, that she should call 911 and get him to an ER right away.  Supportive listening and emotional supportive given.  Dr. Hdz informed as requested by zeeshan.  A:  Well being check  P:  Jessenia verbalized understanding of the instructions given.  Will call VAD coordinator with further needs and questions.

## 2024-09-24 LAB
MDC_IDC_EPISODE_DTM: NORMAL
MDC_IDC_EPISODE_DURATION: 15 S
MDC_IDC_EPISODE_DURATION: 15 S
MDC_IDC_EPISODE_DURATION: 16 S
MDC_IDC_EPISODE_ID: NORMAL
MDC_IDC_EPISODE_TYPE: NORMAL
MDC_IDC_EPISODE_TYPE_INDUCED: NO
MDC_IDC_LEAD_CONNECTION_STATUS: NORMAL
MDC_IDC_LEAD_IMPLANT_DT: NORMAL
MDC_IDC_LEAD_LOCATION: NORMAL
MDC_IDC_LEAD_LOCATION_DETAIL_1: NORMAL
MDC_IDC_LEAD_MFG: NORMAL
MDC_IDC_LEAD_MODEL: NORMAL
MDC_IDC_LEAD_POLARITY_TYPE: NORMAL
MDC_IDC_LEAD_SERIAL: NORMAL
MDC_IDC_MSMT_BATTERY_DTM: NORMAL
MDC_IDC_MSMT_BATTERY_REMAINING_LONGEVITY: 24 MO
MDC_IDC_MSMT_BATTERY_REMAINING_PERCENTAGE: 37 %
MDC_IDC_MSMT_BATTERY_STATUS: NORMAL
MDC_IDC_MSMT_CAP_CHARGE_DTM: NORMAL
MDC_IDC_MSMT_CAP_CHARGE_DTM: NORMAL
MDC_IDC_MSMT_CAP_CHARGE_ENERGY: 31 J
MDC_IDC_MSMT_CAP_CHARGE_TIME: 13.9 S
MDC_IDC_MSMT_CAP_CHARGE_TIME: 8 S
MDC_IDC_MSMT_CAP_CHARGE_TYPE: NORMAL
MDC_IDC_MSMT_CAP_CHARGE_TYPE: NORMAL
MDC_IDC_MSMT_LEADCHNL_LV_IMPEDANCE_VALUE: 1001 OHM
MDC_IDC_MSMT_LEADCHNL_LV_LEAD_CHANNEL_STATUS: NORMAL
MDC_IDC_MSMT_LEADCHNL_RA_IMPEDANCE_VALUE: 525 OHM
MDC_IDC_MSMT_LEADCHNL_RV_IMPEDANCE_VALUE: 436 OHM
MDC_IDC_PG_IMPLANT_DTM: NORMAL
MDC_IDC_PG_MFG: NORMAL
MDC_IDC_PG_MODEL: NORMAL
MDC_IDC_PG_SERIAL: NORMAL
MDC_IDC_PG_TYPE: NORMAL
MDC_IDC_SESS_CLINIC_NAME: NORMAL
MDC_IDC_SESS_DTM: NORMAL
MDC_IDC_SESS_TYPE: NORMAL
MDC_IDC_SET_BRADY_AT_MODE_SWITCH_MODE: NORMAL
MDC_IDC_SET_BRADY_AT_MODE_SWITCH_RATE: 170 {BEATS}/MIN
MDC_IDC_SET_BRADY_LOWRATE: 80 {BEATS}/MIN
MDC_IDC_SET_BRADY_MAX_SENSOR_RATE: 120 {BEATS}/MIN
MDC_IDC_SET_BRADY_MAX_TRACKING_RATE: 130 {BEATS}/MIN
MDC_IDC_SET_BRADY_MODE: NORMAL
MDC_IDC_SET_BRADY_PAV_DELAY_HIGH: 280 MS
MDC_IDC_SET_BRADY_PAV_DELAY_LOW: 300 MS
MDC_IDC_SET_BRADY_SAV_DELAY_HIGH: 280 MS
MDC_IDC_SET_BRADY_SAV_DELAY_LOW: 300 MS
MDC_IDC_SET_CRT_PACED_CHAMBERS: NORMAL
MDC_IDC_SET_LEADCHNL_LV_PACING_AMPLITUDE: 0.1 V
MDC_IDC_SET_LEADCHNL_LV_PACING_PULSEWIDTH: 0.1 MS
MDC_IDC_SET_LEADCHNL_LV_SENSING_ADAPTATION_MODE: NORMAL
MDC_IDC_SET_LEADCHNL_LV_SENSING_ANODE_ELECTRODE_1: NORMAL
MDC_IDC_SET_LEADCHNL_LV_SENSING_ANODE_LOCATION_1: NORMAL
MDC_IDC_SET_LEADCHNL_LV_SENSING_CATHODE_ELECTRODE_1: NORMAL
MDC_IDC_SET_LEADCHNL_LV_SENSING_CATHODE_LOCATION_1: NORMAL
MDC_IDC_SET_LEADCHNL_LV_SENSING_SENSITIVITY: 1 MV
MDC_IDC_SET_LEADCHNL_RA_PACING_AMPLITUDE: 1.5 V
MDC_IDC_SET_LEADCHNL_RA_PACING_POLARITY: NORMAL
MDC_IDC_SET_LEADCHNL_RA_PACING_PULSEWIDTH: 0.5 MS
MDC_IDC_SET_LEADCHNL_RA_SENSING_ADAPTATION_MODE: NORMAL
MDC_IDC_SET_LEADCHNL_RA_SENSING_POLARITY: NORMAL
MDC_IDC_SET_LEADCHNL_RA_SENSING_SENSITIVITY: 0.25 MV
MDC_IDC_SET_LEADCHNL_RV_PACING_AMPLITUDE: 2.8 V
MDC_IDC_SET_LEADCHNL_RV_PACING_POLARITY: NORMAL
MDC_IDC_SET_LEADCHNL_RV_PACING_PULSEWIDTH: 0.5 MS
MDC_IDC_SET_LEADCHNL_RV_SENSING_ADAPTATION_MODE: NORMAL
MDC_IDC_SET_LEADCHNL_RV_SENSING_POLARITY: NORMAL
MDC_IDC_SET_LEADCHNL_RV_SENSING_SENSITIVITY: 0.6 MV
MDC_IDC_SET_ZONE_DETECTION_INTERVAL: 273 MS
MDC_IDC_SET_ZONE_DETECTION_INTERVAL: 375 MS
MDC_IDC_SET_ZONE_STATUS: NORMAL
MDC_IDC_SET_ZONE_STATUS: NORMAL
MDC_IDC_SET_ZONE_TYPE: NORMAL
MDC_IDC_SET_ZONE_TYPE: NORMAL
MDC_IDC_SET_ZONE_VENDOR_TYPE: NORMAL
MDC_IDC_SET_ZONE_VENDOR_TYPE: NORMAL
MDC_IDC_STAT_AT_BURDEN_PERCENT: 0 %
MDC_IDC_STAT_AT_DTM_END: NORMAL
MDC_IDC_STAT_AT_DTM_START: NORMAL
MDC_IDC_STAT_BRADY_DTM_END: NORMAL
MDC_IDC_STAT_BRADY_DTM_START: NORMAL
MDC_IDC_STAT_BRADY_RA_PERCENT_PACED: 64 %
MDC_IDC_STAT_BRADY_RV_PERCENT_PACED: 6 %
MDC_IDC_STAT_CRT_DTM_END: NORMAL
MDC_IDC_STAT_CRT_DTM_START: NORMAL
MDC_IDC_STAT_CRT_LV_PERCENT_PACED: 0 %
MDC_IDC_STAT_EPISODE_RECENT_COUNT: 0
MDC_IDC_STAT_EPISODE_RECENT_COUNT: 3
MDC_IDC_STAT_EPISODE_RECENT_COUNT_DTM_END: NORMAL
MDC_IDC_STAT_EPISODE_RECENT_COUNT_DTM_START: NORMAL
MDC_IDC_STAT_EPISODE_TYPE: NORMAL
MDC_IDC_STAT_EPISODE_VENDOR_TYPE: NORMAL
MDC_IDC_STAT_TACHYTHERAPY_ATP_DELIVERED_RECENT: 1
MDC_IDC_STAT_TACHYTHERAPY_ATP_DELIVERED_TOTAL: 5
MDC_IDC_STAT_TACHYTHERAPY_RECENT_DTM_END: NORMAL
MDC_IDC_STAT_TACHYTHERAPY_RECENT_DTM_START: NORMAL
MDC_IDC_STAT_TACHYTHERAPY_SHOCKS_ABORTED_RECENT: 0
MDC_IDC_STAT_TACHYTHERAPY_SHOCKS_ABORTED_TOTAL: 1
MDC_IDC_STAT_TACHYTHERAPY_SHOCKS_DELIVERED_RECENT: 1
MDC_IDC_STAT_TACHYTHERAPY_SHOCKS_DELIVERED_TOTAL: 5
MDC_IDC_STAT_TACHYTHERAPY_TOTAL_DTM_END: NORMAL
MDC_IDC_STAT_TACHYTHERAPY_TOTAL_DTM_START: NORMAL

## 2024-09-25 ENCOUNTER — ANTICOAGULATION THERAPY VISIT (OUTPATIENT)
Dept: ANTICOAGULATION | Facility: CLINIC | Age: 59
End: 2024-09-25
Payer: MEDICARE

## 2024-09-25 LAB — INR HOME MONITORING: 1.8 (ref 2–3)

## 2024-09-25 NOTE — PROGRESS NOTES
ANTICOAGULATION MANAGEMENT     Tej Morfin 59 year old male is on warfarin with subtherapeutic INR result. (Goal INR 2.0-3.0)    Recent labs: (last 7 days)     09/25/24  0000   INR 1.8*       ASSESSMENT     Source(s): Chart Review and Patient/Caregiver Call     Warfarin doses taken: Warfarin taken as instructed  Diet: No new diet changes identified  Medication/supplement changes: None noted  New illness, injury, or hospitalization: No  Signs or symptoms of bleeding or clotting: No  Previous result: Therapeutic last visit; previously outside of goal range  Additional findings: Pt's young daughter recently passed away unexpectedly. Pt and his family are struggling. It looks like  has advised genetic testing for the family.       PLAN     Recommended plan for ongoing change(s) affecting INR     Dosing Instructions: booster dose then Increase your warfarin dose (6.7% change) with next INR in 1 week       Summary  As of 9/25/2024      Full warfarin instructions:  9/25: 7.5 mg; Otherwise 7.5 mg every Mon, Fri; 5 mg all other days   Next INR check:  10/2/2024               Telephone call with Tej who verbalizes understanding and agrees to plan    Patient to recheck with home meter    Education provided: Contact [phone] with any changes, questions or concerns.     Plan made with [facility] Pharmacist Cindy Bruce and per LVAD protocol    Parris Slade RN  9/25/2024  Anticoagulation Clinic  [facility] for routing messages: p ANTICOAG LVAD  [facility] patient phone line: [phone]        _______________________________________________________________________     Anticoagulation Episode Summary       Current INR goal:  2.0-3.0   TTR:  74.5% (12 mo)   Target end date:  Indefinite   Send INR reminders to:  ANTICOAG LVAD    Indications    LVAD (left ventricular assist device) present (H) [Z95.811]  Chronic systolic CHF (congestive heart failure) (H) [I50.22]  S/P mitral valve replacement [Z95.2]  Paroxysmal  ventricular tachycardia (H) [I47.29]             Comments:  Follow VAD Anticoag protocol:Yes: HeartMate 3  Bridging: per protocol  Date VAD placed: 03/23/22  INR Goal: per referral  Hx Mitral valve repair and not replacement               Anticoagulation Care Providers       Provider Role Specialty Phone number    Dunia Hdz MD Referring Cardiovascular Disease [phone]

## 2024-09-27 ENCOUNTER — CARE COORDINATION (OUTPATIENT)
Dept: TRANSPLANT | Facility: CLINIC | Age: 59
End: 2024-09-27
Payer: MEDICARE

## 2024-09-27 NOTE — PROGRESS NOTES
Status 3 Heart Extension Complete 09/27/24    This patient meets status 3 criteria as evidenced by:      Non-dischargeable, surgically implanted, non-endovascular LVAD after 14 days   MCSD with one of the following:   device infection     Patient's primary cardiologist and/or attending physician is in agreement with this plan.      Status 3 Extension due 10/14/24

## 2024-10-01 ENCOUNTER — CARE COORDINATION (OUTPATIENT)
Dept: CARDIOLOGY | Facility: CLINIC | Age: 59
End: 2024-10-01

## 2024-10-01 DIAGNOSIS — I50.22 CHRONIC SYSTOLIC CONGESTIVE HEART FAILURE (H): Primary | ICD-10-CM

## 2024-10-01 NOTE — PROGRESS NOTES
D:  Called pt/caregiver to check in.  They report ongoing grief, frustrations and healing.  Tej is doing very well from an LVAD stand point.  His driveline had some increased drainage today, but is scheduled to see ID next week, as well as cardiology.    I:  Informed them of Dr. Hdz's recommendations regarding genetic testing for Tej's children.  Offered emotional support.  Pt planning to attend appointments next week.   A:  Check in/ follow up  P:  [place]'s verbalized understanding of the instructions given.  Will call VAD coordinator with further needs and questions.

## 2024-10-02 ENCOUNTER — ANTICOAGULATION THERAPY VISIT (OUTPATIENT)
Dept: ANTICOAGULATION | Facility: CLINIC | Age: 59
End: 2024-10-02
Payer: MEDICARE

## 2024-10-02 DIAGNOSIS — I50.22 CHRONIC SYSTOLIC CHF (CONGESTIVE HEART FAILURE) (H): ICD-10-CM

## 2024-10-02 DIAGNOSIS — Z95.811 LVAD (LEFT VENTRICULAR ASSIST DEVICE) PRESENT (H): Primary | ICD-10-CM

## 2024-10-02 DIAGNOSIS — I47.29 PAROXYSMAL VENTRICULAR TACHYCARDIA (H): ICD-10-CM

## 2024-10-02 DIAGNOSIS — Z95.2 S/P MITRAL VALVE REPLACEMENT: ICD-10-CM

## 2024-10-02 LAB — INR HOME MONITORING: 2.1 (ref 2–3)

## 2024-10-02 NOTE — PROGRESS NOTES
ANTICOAGULATION MANAGEMENT     Tej Morfin 59 year old male is on warfarin with therapeutic INR result. (Goal INR 2.0-3.0)    Recent labs: (last 7 days)     10/02/24  0000   INR 2.1       ASSESSMENT     Source(s): Chart Review     Warfarin doses taken: Booster dose(s) recently taken which may be affecting INR  Diet: AFRICA  Medication/supplement changes: None noted  New illness, injury, or hospitalization: Yes: Daughter recently passed away 9/25/24 unexpectedly.   Signs or symptoms of bleeding or clotting: AFRICA  Previous result: Subtherapeutic-Booster dose, 6.7% maintenance dose increase   Additional findings: None       PLAN     Recommended plan for temporary change(s) and ongoing change(s) affecting INR     Dosing Instructions: Continue your current warfarin dose with next INR in 1-2 weeks       Summary  As of 10/2/2024      Full warfarin instructions:  7.5 mg every Mon, Fri; 5 mg all other days   Next INR check:  10/16/2024               Detailed voice message left for Tej with dosing instructions and follow up date.   Sent [employer] message with dosing and follow up instructions    Patient to recheck with home meter    Education provided: Please call back if any changes to your diet, medications or how you've been taking warfarin  Goal range and lab monitoring: goal range and significance of current result and Importance of following up at instructed interval  Contact [phone] with any changes, questions or concerns.     Plan made per [facility] anticoagulation protocol and per LVAD protocol    SAVANNA MO RN  10/2/2024  Anticoagulation Clinic  [facility] for routing messages: p ANTICOAG LVAD  [facility] patient phone line: [phone]        _______________________________________________________________________     Anticoagulation Episode Summary       Current INR goal:  2.0-3.0   TTR:  73.2% (12 mo)   Target end date:  Indefinite   Send INR reminders to:  ANTICO LVAD    Indications    LVAD (left ventricular  assist device) present (H) [Z95.811]  Chronic systolic CHF (congestive heart failure) (H) [I50.22]  S/P mitral valve replacement [Z95.2]  Paroxysmal ventricular tachycardia (H) [I47.29]             Comments:  Follow VAD Anticoag protocol:Yes: HeartMate 3  Bridging: per protocol  Date VAD placed: 03/23/22  INR Goal: per referral  Hx Mitral valve repair and not replacement               Anticoagulation Care Providers       Provider Role Specialty Phone number    Dunia Hdz MD Referring Cardiovascular Disease [phone]

## 2024-10-03 ENCOUNTER — DOCUMENTATION ONLY (OUTPATIENT)
Dept: INFECTIOUS DISEASES | Facility: CLINIC | Age: 59
End: 2024-10-03
Payer: MEDICARE

## 2024-10-03 NOTE — PROGRESS NOTES
Weekly OPAT Monitoring    Last Clinic Visit: 09/13/24    Next Clinic Visit: 10/07/24    ID Provider: Ifeoma     Current Length of Therapy: 10/07/24    Infusion Company: [facility]     Labs drawn at: [facility]     Weekly Lab Orders: Creatinine, AST/ALT, CBC with diff, and CK     Last lab date: Today    Abnormal Labs: None  (If yes, see action needed documentation)    Action Needed At This Time: None

## 2024-10-07 ENCOUNTER — OFFICE VISIT (OUTPATIENT)
Dept: CARDIOLOGY | Facility: CLINIC | Age: 59
End: 2024-10-07
Attending: INTERNAL MEDICINE
Payer: MEDICARE

## 2024-10-07 ENCOUNTER — LAB (OUTPATIENT)
Dept: LAB | Facility: CLINIC | Age: 59
End: 2024-10-07
Attending: INTERNAL MEDICINE
Payer: MEDICARE

## 2024-10-07 ENCOUNTER — ANTICOAGULATION THERAPY VISIT (OUTPATIENT)
Dept: ANTICOAGULATION | Facility: CLINIC | Age: 59
End: 2024-10-07

## 2024-10-07 ENCOUNTER — OFFICE VISIT (OUTPATIENT)
Dept: INFECTIOUS DISEASES | Facility: CLINIC | Age: 59
End: 2024-10-07
Attending: STUDENT IN AN ORGANIZED HEALTH CARE EDUCATION/TRAINING PROGRAM
Payer: MEDICARE

## 2024-10-07 VITALS — OXYGEN SATURATION: 98 % | BODY MASS INDEX: 35.58 KG/M2 | SYSTOLIC BLOOD PRESSURE: 82 MMHG | WEIGHT: 248 LBS

## 2024-10-07 VITALS — BODY MASS INDEX: 35.58 KG/M2 | OXYGEN SATURATION: 98 % | WEIGHT: 248 LBS | SYSTOLIC BLOOD PRESSURE: 82 MMHG

## 2024-10-07 DIAGNOSIS — T82.7XXA INFECTION ASSOCIATED WITH DRIVELINE OF LEFT VENTRICULAR ASSIST DEVICE (LVAD) (H): ICD-10-CM

## 2024-10-07 DIAGNOSIS — Z95.2 S/P MITRAL VALVE REPLACEMENT: ICD-10-CM

## 2024-10-07 DIAGNOSIS — T82.7XXA INFECTION ASSOCIATED WITH DRIVELINE OF LEFT VENTRICULAR ASSIST DEVICE (LVAD) (H): Primary | ICD-10-CM

## 2024-10-07 DIAGNOSIS — Z76.82 ORGAN TRANSPLANT CANDIDATE: ICD-10-CM

## 2024-10-07 DIAGNOSIS — Z95.811 LVAD (LEFT VENTRICULAR ASSIST DEVICE) PRESENT (H): ICD-10-CM

## 2024-10-07 DIAGNOSIS — Z79.899 HIGH RISK MEDICATION USE: ICD-10-CM

## 2024-10-07 DIAGNOSIS — I50.22 CHRONIC SYSTOLIC CHF (CONGESTIVE HEART FAILURE) (H): ICD-10-CM

## 2024-10-07 DIAGNOSIS — Z79.01 CHRONIC ANTICOAGULATION: ICD-10-CM

## 2024-10-07 DIAGNOSIS — A49.01 MSSA (METHICILLIN SUSCEPTIBLE STAPHYLOCOCCUS AUREUS) INFECTION: ICD-10-CM

## 2024-10-07 DIAGNOSIS — I42.8 VALVULAR CARDIOMYOPATHY (H): ICD-10-CM

## 2024-10-07 DIAGNOSIS — I47.29 PAROXYSMAL VENTRICULAR TACHYCARDIA (H): ICD-10-CM

## 2024-10-07 DIAGNOSIS — I50.42 CHRONIC COMBINED SYSTOLIC AND DIASTOLIC CONGESTIVE HEART FAILURE (H): Primary | ICD-10-CM

## 2024-10-07 DIAGNOSIS — I50.22 CHRONIC SYSTOLIC CONGESTIVE HEART FAILURE (H): ICD-10-CM

## 2024-10-07 DIAGNOSIS — Z95.811 LVAD (LEFT VENTRICULAR ASSIST DEVICE) PRESENT (H): Primary | ICD-10-CM

## 2024-10-07 DIAGNOSIS — Z76.82 PRE-HEART TRANSPLANT, PATIENT ON TRANSPLANT LIST: ICD-10-CM

## 2024-10-07 DIAGNOSIS — Z79.2 RECEIVING INTRAVENOUS ANTIBIOTIC TREATMENT AS OUTPATIENT: ICD-10-CM

## 2024-10-07 LAB
ALBUMIN SERPL BCG-MCNC: 3.8 G/DL (ref 3.5–5.2)
ALP SERPL-CCNC: 81 U/L (ref 40–150)
ALT SERPL W P-5'-P-CCNC: 14 U/L (ref 0–70)
ANION GAP SERPL CALCULATED.3IONS-SCNC: 12 MMOL/L (ref 7–15)
AST SERPL W P-5'-P-CCNC: 15 U/L (ref 0–45)
BILIRUB SERPL-MCNC: 0.3 MG/DL
BUN SERPL-MCNC: 20.1 MG/DL (ref 6–20)
CALCIUM SERPL-MCNC: 9.5 MG/DL (ref 8.8–10.4)
CHLORIDE SERPL-SCNC: 107 MMOL/L (ref 98–107)
CREAT SERPL-MCNC: 1.24 MG/DL (ref 0.67–1.17)
EGFRCR SERPLBLD CKD-EPI 2021: 68 ML/MIN/1.73M2
ERYTHROCYTE [DISTWIDTH] IN BLOOD BY AUTOMATED COUNT: 15.8 % (ref 10–15)
GLUCOSE SERPL-MCNC: 106 MG/DL (ref 70–99)
HCO3 SERPL-SCNC: 24 MMOL/L (ref 22–29)
HCT VFR BLD AUTO: 36.9 % (ref 40–53)
HGB BLD-MCNC: 11.5 G/DL (ref 13.3–17.7)
INR PPP: 1.81 (ref 0.85–1.15)
LDH SERPL L TO P-CCNC: 211 U/L (ref 0–250)
MCH RBC QN AUTO: 29.6 PG (ref 26.5–33)
MCHC RBC AUTO-ENTMCNC: 31.2 G/DL (ref 31.5–36.5)
MCV RBC AUTO: 95 FL (ref 78–100)
NT-PROBNP SERPL-MCNC: 405 PG/ML (ref 0–900)
PLATELET # BLD AUTO: 172 10E3/UL (ref 150–450)
POTASSIUM SERPL-SCNC: 4.4 MMOL/L (ref 3.4–5.3)
PROT SERPL-MCNC: 6.9 G/DL (ref 6.4–8.3)
RBC # BLD AUTO: 3.88 10E6/UL (ref 4.4–5.9)
SODIUM SERPL-SCNC: 143 MMOL/L (ref 135–145)
WBC # BLD AUTO: 5.4 10E3/UL (ref 4–11)

## 2024-10-07 PROCEDURE — 84155 ASSAY OF PROTEIN SERUM: CPT | Performed by: INTERNAL MEDICINE

## 2024-10-07 PROCEDURE — G0463 HOSPITAL OUTPT CLINIC VISIT: HCPCS | Performed by: STUDENT IN AN ORGANIZED HEALTH CARE EDUCATION/TRAINING PROGRAM

## 2024-10-07 PROCEDURE — 83880 ASSAY OF NATRIURETIC PEPTIDE: CPT | Performed by: INTERNAL MEDICINE

## 2024-10-07 PROCEDURE — 87070 CULTURE OTHR SPECIMN AEROBIC: CPT | Performed by: STUDENT IN AN ORGANIZED HEALTH CARE EDUCATION/TRAINING PROGRAM

## 2024-10-07 PROCEDURE — G0463 HOSPITAL OUTPT CLINIC VISIT: HCPCS | Performed by: INTERNAL MEDICINE

## 2024-10-07 PROCEDURE — 85610 PROTHROMBIN TIME: CPT | Performed by: PATHOLOGY

## 2024-10-07 PROCEDURE — 83615 LACTATE (LD) (LDH) ENZYME: CPT | Performed by: INTERNAL MEDICINE

## 2024-10-07 PROCEDURE — 87205 SMEAR GRAM STAIN: CPT | Performed by: STUDENT IN AN ORGANIZED HEALTH CARE EDUCATION/TRAINING PROGRAM

## 2024-10-07 PROCEDURE — 99000 SPECIMEN HANDLING OFFICE-LAB: CPT | Performed by: PATHOLOGY

## 2024-10-07 PROCEDURE — 99214 OFFICE O/P EST MOD 30 MIN: CPT | Performed by: STUDENT IN AN ORGANIZED HEALTH CARE EDUCATION/TRAINING PROGRAM

## 2024-10-07 PROCEDURE — 99215 OFFICE O/P EST HI 40 MIN: CPT | Mod: 25 | Performed by: INTERNAL MEDICINE

## 2024-10-07 PROCEDURE — 36415 COLL VENOUS BLD VENIPUNCTURE: CPT | Performed by: PATHOLOGY

## 2024-10-07 PROCEDURE — 87102 FUNGUS ISOLATION CULTURE: CPT | Performed by: STUDENT IN AN ORGANIZED HEALTH CARE EDUCATION/TRAINING PROGRAM

## 2024-10-07 PROCEDURE — 87075 CULTR BACTERIA EXCEPT BLOOD: CPT | Performed by: STUDENT IN AN ORGANIZED HEALTH CARE EDUCATION/TRAINING PROGRAM

## 2024-10-07 PROCEDURE — 85027 COMPLETE CBC AUTOMATED: CPT | Performed by: PATHOLOGY

## 2024-10-07 PROCEDURE — 93284 PRGRMG EVAL IMPLANTABLE DFB: CPT | Performed by: INTERNAL MEDICINE

## 2024-10-07 ASSESSMENT — PAIN SCALES - GENERAL: PAINLEVEL: NO PAIN (0)

## 2024-10-07 NOTE — PATIENT INSTRUCTIONS
" Ventricular Assist Device Clinic  Take your medications every day, as directed!  Medication changes made today:  No changes Instructions:  Denisse will talk to Greg to see if we can move the November appointments.. Monitor your heart failure, Page the VAD Coordinator on call:  If you gain more than 3 lb in a day or 5 in a week  If you feel worsening shortness of breath, palpitations, or swelling.   If you have VAD alarms or change in parameters  If you feel dizzy or lightheaded     Keep your VAD appointments!    Your next appointment is 11/13/24 with RUTHANN Bhakta      Please see the clinic schedulers after your appointment to schedule follow-up. To contact the VAD , call [phone] To Page the VAD Coordinator on call, dial [phone] option #[phone] and ask to speak to the VAD coordinator on call. Try to maintain a heart healthy lifestyle!  Limit salt & sodium to 2000mg/day   Eat a heart healthy diet, low in saturated fats  Stay active! Aim to move at least 150 minutes every week.    Use [employer] allows you to communicate directly with your heart team through secure messaging.  [employer] can be accessed any time on your phone, computer, or tablet.  If you need assistance, we'd be happy to help!      Equipment Reminders:   Charge your back-up controller at least every 6 months. To charge, connect it to either batteries or wall power. The screen will read \"Charging\". Keep connected until the screen reads \"charging complete\". Disconnect power once the controller battery is charged. Also do a self-test when the controller is connected to power.  Replace the AA batteries in your mobile power unit every 6 months.  Check your battery charger for when it is due for maintenance. It requires inspection in clinic once per year. There will be a sticker stating the month and year maintenance is due. When you bring your battery charger to clinic, tell one of the schedulers you have LVAD equipment that needs " maintenance. They will call "[employer]". You can leave your  under the LVAD sign by the  at the far end of clinic. You must drop it off before 2pm.

## 2024-10-07 NOTE — LETTER
10/7/2024      RE: Tej Morfin  [address]  [place] [address]       Dear Colleague,    Thank you for the opportunity to participate in the care of your patient, Tej Morfin, at the [facility] [place] [facility]. Please see a copy of my visit note below.    Advanced Heart Failure Clinic   October 7, 2024      HPI  PMH of severe MR s/p MVR with ring (1/21/15), VT s/p ICD, nonobstructive CAD, NICM previously on home inotropes now s/p HM III LVAD implantation 3/23/22 as bridge to decision (presently not listed to recover from LVAD team wanted to see marijuana levels decline consistent with quit date), with course c/b AFib with RVR (s/p ICD shock 3/25/22) a) who presents for f/up in LVAD clinic.    Overall he had a pretty incredible recovery after his LVAD, marijuana levels have been negative and listed as a status 4 but then was upgraded to Status 3 when he went on IV antibiotics for driveline infection - renewal due 10/14/2024.     Overall he is felt well but he did have an ICD shock in May 2023.   He is presently on sotalol as well as a beta-blocker and is tolerating this and by ICD check today he is in remission.       Patient is doing well. He is accompanied today by his wife. He was admitted briefly and discharged August 30th, 2024 for a driveline infection. He was started on IV Daptomycin but was switched to Ceftriaxone 2g daily on 9/13 due to myopathy that was felt to be related to the Daptomycin. He has no complaints today. He has no bleeding and no stroke like symptoms. MAPs have been in the 80s. His driveline site is OK, minimal discharge. He is scheduled to follow with ID today to determine how much longer he needs to be on IV antibiotics, cultures will be repeated today.  Unfortunately his daughter passed away 2 weeks ago.   INR was 1.8 today. MAPs have been controlled - are being checked daily when he  goes for antibiotic infusion. There have been no alarms on the LVAD. His last device interrogation was reviewed and was unremarkable.     VAD  Speed 5500 - no speed drops   Flow 4.3  PI 2.6  Power 4.6          PAST MEDICAL HISTORY:  Past Medical History:   Diagnosis Date     Chronic systolic heart failure (H) 2017     Gastroparesis      ICD (implantable cardioverter-defibrillator) in place     PostRank     Non-ischemic cardiomyopathy (H)      Paroxysmal ventricular tachycardia (H) 2017     S/P mitral valve replacement 2017     Tetrahydrocannabinol (THC) use disorder, mild, in controlled environment, abuse 2019    occasional edible THC. reportedly for sleep, anxiety     Tobacco abuse, episodic     occasional cigar - ~ 3x/wk       FAMILY HISTORY:  Family History   Problem Relation Age of Onset     Coronary Artery Disease Mother      Kidney failure Mother      Cardiomyopathy Father          age 51     Anesthesia Reaction No family hx of      Venous thrombosis No family hx of        Drug use: No       Social history unchanged from prior  His dog recently passed away  New cat       CURRENT MEDICATIONS:    Current Outpatient Medications   Medication Sig Dispense Refill     acetaminophen (TYLENOL) 325 MG tablet Take 2 tablets (650 mg) by mouth every 4 hours as needed for pain 100 tablet 0     allopurinol (ZYLOPRIM) 100 MG tablet Take 100 mg by mouth every morning.       amoxicillin (AMOXIL) 500 MG capsule Take 2,000 mg by mouth once as needed Take 4 tabs 1 hour before all dental cleanings and procedures       cefadroxil (DURICEF) 500 MG capsule Take 2 capsules (1,000 mg) by mouth 2 times daily. 28 capsule 0     cholecalciferol 50 MCG (2000 UT) CAPS Take 50 mcg by mouth every morning       furosemide (LASIX) 40 MG tablet TAKE 1.5 TABLETS (60 MG) BY MOUTH 2 TIMES DAILY 270 tablet 3     hydrOXYzine (VISTARIL) 50 MG capsule Take 50 mg by mouth every 12 hours as needed for anxiety       magnesium  oxide (MAG-OX) 400 MG tablet Take 800 mg by mouth 2 times daily       metoprolol succinate ER (TOPROL XL) 25 MG 24 hr tablet Take 1 tablet (25 mg) by mouth daily 90 tablet 2     nitroGLYcerin (NITROSTAT) 0.4 MG sublingual tablet Place 0.4 mg under the tongue every 5 minutes as needed for chest pain       omeprazole (PRILOSEC) 40 MG DR capsule Take 40 mg by mouth every morning       potassium chloride alfonso ER (KLOR-CON M20) 20 MEQ CR tablet Take 3 tablets (60 mEq) by mouth 2 times daily 540 tablet 3     sacubitril-valsartan (ENTRESTO)  MG per tablet Take 1 tablet by mouth 2 times daily 90 tablet 3     sotalol (BETAPACE) 120 MG tablet Take 120 mg by mouth 2 times daily       spironolactone (ALDACTONE) 25 MG tablet Take 0.5 tablets (12.5 mg) by mouth daily 45 tablet 3     traZODone (DESYREL) 50 MG tablet Take 0.5-1 tablets (25-50 mg) by mouth at bedtime as needed for insomnia       warfarin ANTICOAGULANT (COUMADIN) 2.5 MG tablet Take 5mg (2 tabs) by mouth daily OR AS DIRECTED.  Adjust dose based on INR. 180 tablet 1     enoxaparin ANTICOAGULANT (LOVENOX) 100 MG/ML syringe Inject 1 mL (100 mg) subcutaneously 2 times daily. (Patient not taking: Reported on 10/7/2024) 10 mL 1       All relevant ROS were reviewed in the HPI.       EXAM:    BP (!) 82/0 (BP Location: Right arm, Patient Position: Chair, Cuff Size: Adult Large)   Wt 112.5 kg (248 lb)   SpO2 98%   BMI 35.58 kg/m        The patient is obese and is somewhat uncomfortable due to pain as he is walking.    The patient's neck veins are mildly elevated.  CV: humm of LVAD auscultated   RESPIRATORY: Respirations regular, even, and unlabored  Abd: The patient's drive line shows no obvious erythema or purulence upon palpation, but there is some dried material on the dressing. Dressing changed in clinic, cultures sent  EXTREMITIES: 1+ lower extremity edema      Results      All lab trends, imaging, recent echos personally reviewed with the patient.     Laboratory  Studies  Creatinine is about 1.4. Liver function is entirely normal. Albumin is normal. Potassium is normal. Hemoglobin is just mildly down at 12.5. White blood cell count is normal.    Testing  Device interrogation shows no atrial fibrillation and no sustained ventricular arrhythmias. LVAD interrogation showed some low PI but no device malfunction.    Device: [employer] G148 INOGEN X4 CRT-D  Normal device function.  Mode: DDDR  bpm  AP: 68%  : 6% - LV Lead turned OFF.  Intrinsic rhythm: SB 49 bpm  Lead Trends Appear Stable.  Estimated battery longevity to RRT = 1.5 years.  Atrial Arrhythmia: None.  Anticoagulant: Warfarin  Ventricular Arrhythmia: 2 NSVT episode lasting 5 sec @ 170-186 bpm.  Setting Changes: None.  Patient has an appointment to see Dr. Dang today.   Plan: Device follow-up every 3 months.      Assessment and Plan:   PMH of severe MR s/p MVR with ring (1/21/15), VT s/p ICD,  NICM previously on home inotropes now s/p HM III LVAD implantation 3/23/22, with course c/b sternal wound s/p Keflex treatment,     Overall he  recovered incredibly well.  Presently listed for transplant as a status 3.         Chronic SHCF s/p HM III LVAD. Severe MR s/p mitral valve repair with annuloplasty ring (1/21/15). Implanted 3/23/22.   Stage D, NYHA Class II     ACEi/ARB Entresto   mg po BID,    BB Sotalol 80 mg po BID-and metoprolol   Aldosterone antagonist Aldactone 25 mg daily   SGLT2i: Deferred for now given polypharmacy and lots of GI upset (he did not want to add more meds)    SCD prophylaxis ICD  Fluid status  euvolemic on current diuretics   - is due for renewal of his Status 3 on 10/14/2024 - will reach out to coordinators about this pending ID visit and whether they continue IV antibiotics or not. Consider switching his Entresto to hydralazine if he remains listed to minimize post-op vasoplegia.     LDH: stable  Anticoagulation: INR goal 2-3  Antiplatelet: Finish the RICARDO trial now on  aspirin 81 mg daily      2. 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  Recently admitted and treated for MSSA with Daptomycin which was switched to ceftriaxone which he is currently on. Will follow up with ID today.    3. History of VT, s/p CRT-D.    ICD shock in May 2023 now on a higher dose of sotalol and on a beta-blocker - presently in remission if he has more shocks may consider trying to upgrade for transplant  - Continue Sotatol, metoprolol for now      4. Postop AF with RVR. Returned to SR.   - Coumadin as above.  - on coreg     5.  Mild nonobstructive CAD.   On coumadin      6. MIld AI on echo will watch for now     7. GI upset: Will follow with GI locally    8. Hypomagnesia: Improved with oral magnesium    9. Obesity; he is going to work on diet and exercise he is transplant eligible presently    10.. Gastroparesis.  A consultation with a gastroenterologist close to his residence is recommended. An esophagogastroduodenoscopy (EGD) was performed today, and the results are pending.      Loren Mclaughlin PGY 7  Advanced Heart Failure Fellow       Attending attestation  I have seen and discussed the patient with Dr. Mclaughlin and edited the note above to reflect our joint history, exam, assessment and plan    I spent 46 minutes in care of the patient today including obtaining recent medical history, personally reviewing recent cardiac testing and/or lab results, today's examination, discussion of testing results and care recommendations with patient.     Thank you for the opportunity to participate in this patient's care. Please feel free to reach out with any questions or concerns.     Justin Dang MD, [facility]  Associate Professor of Medicine  Advanced Heart Failure, LVAD & Cardiac Transplant  Director, Cardio-Obstetrics  Cardio-Oncology  [facility]         Please do not hesitate to contact me if you have any questions/concerns.     Sincerely,     Justin  MD [place]

## 2024-10-07 NOTE — PROGRESS NOTES
[facility] INFECTIOUS DISEASE CLINIC [address] [address]  Phone: [phone]  Fax: [phone]    Patient:  Tej Morfin, Date of birth 1965  Date of Visit:  10/07/2024  Referring Provider Dunia Hdz  Reason for visit: Follow up     Assessment & Plan    Recommendations:   Continue ceftriaxone 2 grams IV every 24 hours (see rationale below)  Plan to extend antibiotics for at least 2 more weeks  Follow up bacterial and fungal cultures from 10/7  If we isolate only MSSA again, will discuss trying brief course of combination PO + IV agents to see if that helps drainage to completely resolve  Plan to eventually transition to oral antibiotic suppression   Weekly CBC, AST, ALT, creatinine  Plan to fax the above orders to [facility] at [phone]  Will attempt to schedule the next visit on the same day he sees Cardiology     I spent 35 mins on date of encounter between clinic visit, documentation, review of chart/labs/imaging and care coordination     SAV Pritchett  Staff Physician, Infectious Diseases  Pager [phone]        Assessment:   60 y/o male with a history of severe MR s/p MVR with ring (1/21/15), VT s/p ICD, nonobstructive CAD, NICM previously on home inotropes s/p HM III LVAD implantation 3/23/22 as bridge to decision (presently not listed - LVAD team wanted to see marijuana levels decline consistent with quit date), 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 Received a few rounds of doxycycline and then transitioned to on cefadroxil antibiotic suppression (started April). 8/25 CT w/o an underlying fluid collection but increased skin thickening at the entry  site for the driveline in the right midabdomen as well as asymmetric thickening of the right rectus muscle.  8/27 driveline culture grew MSSA. Also had noted new tissue growth at the site over the prior 3 weeks to admission. Surgical intervention deemed not an option at this time. Planning for a prolonged course of IV antibiotics. He was not able to receive cefazolin due to financial issues so daptomycin was continued as an outpatient at an infusion center. Discharged 8/30. On 9/5 CK level was 214 and 9/12 CK level was 838. He reported myalgias in his legs, back, neck. Cefazolin was not an option since he can't do home infusion. Was transitioned to ceftriaxone 2 grams IV daily to allow for once a day dosing an infusion center (~9/13/24)    Since transition to ceftriaxone, reports drainage has improved although not resolved overall. Repeat cultures obtained today - will follow up cultures and if isolates only MSSA again, will attempt combination antibiotics (PO + IV) to see if we can completely resolve drainage at which time will switch to long term oral antibiotic suppression           History of Present Illness   Pertinent history obtain from: chart review and patient    Interval Hx 10/7/24:  Since starting Ceftriaxone - overall drainage has gone down. He feels difference is most marked since switch to ceftriaxone. No issues with the medication. No skin rash, joint pains. No nausea, vomiting, abdominal pain  No fevers, chills, rigors  Cultures were obtained today - pending      Progress note 9/13/24:  3/31 driveline culture w/ MSSA s/p 2 weeks of doxycycline. Improvement but then drainage occurred again. 4/17 culture with MSSA again s/p doxycycline x 1 week followed by cefadroxil x 2 weeks (started 4/25/24). CT with possible skin thickening at driveline insertion site but not abscess. He was seen by [facility] ID and continued on cefadroxil for antibiotic suppression.  Developed increased drainage.  7/12 culture w/ MSSA.  Cefadroxil dose increased to 1 gram twice a day. CT w/o localized fluid collection. 8/25 he presented to the ED with reoccurrence of purulence. Driveline culture with MSSA (Lankin). 8/25 CT A/P w/o organizing fluid collection. There was ongoing cutaneous thickening at the entry site for the driveline in the right midabdomen as well as asymmetric thickening of the right rectus muscle (increased from 7/2024 CT). Blood cultures negative. Granuloma present and surgery evaluated but surgical intervention is not an option at this time. Discharged on daptomycin. Since discharge the CK level has increased. He has also noticed increase muscle tightness in his legs, back, neck. He thought it was because he had started walking on the treadmill again. No subjective fevers, chills, night sweats. Drainage has improved with dressing changes. Currently changing the dressing twice a day.       LVAD History:  Current LVAD model: heartmate III  Date current LVAD placed: 3/23/22  Previous LVAD devices: none  Other prosthetic devices/materials: severe MR s/p MVR with ring (1/21/15), VT s/p ICD,  Primary cardiologist: Andreina  Primary LVAD coordinator: Siena Headley  Primary ID provider: LVAD ID Group (Adam Layton, Sd, Chloe, and Rajeev)  Goal of LVAD: transplant    History of bacteremias (dates and organisms): none    History of driveline infections (dates and organisms):   -5/4/23 Staphylococcus lugdenesis s/p doxycycline x 2 weeks  -3/31/24 driveline culture w/ MSSA s/p 2 weeks of doxycycline.  -4/17/24 culture with MSSA again s/p doxycycline x 1 week followed by cefadroxil x 2 weeks (started 4/25/24).   -7/12/24 culture w/ MSSA. Cefadroxil dose increased to 1 gram twice a day.   -8/25 he presented to the ED with reoccurrence of purulence. Driveline culture with MSSA (garcia). 8/25 CT A/P w/o organizing fluid collection. Started IV daptomycin    History of other pertinent infections: none  History of driveline area  irritation and current mitigation strategies: currently daily dressing changes, anchor  Current suppressive antibiotics: N/A  Previous antibiotic failures/allergies/intolerances etc: numerous course of doxycycline   Active on transplant list -       Physical Exam   Vital signs:  There were no vitals taken for this visit.  GENERAL:  well-developed, well-nourished, in no acute distress.  HEAD:  Head is normocephalic, atraumatic   EYES:  Eyes have anicteric sclerae without conjunctival injection   ENT:  Oropharynx is moist without exudates or ulcers. Tongue is midline  LUNGS:  On room air, no use of accessory muscles  NEUROLOGIC: AAO x 3      Picture of exit site 9/13/24:       Picture of driveline 10/7/24:      Data   Laboratory data and imaging listed below was reviewed prior to this encounter.

## 2024-10-07 NOTE — NURSING NOTE
10/07/24 1600   [facility] VAD Information   Implant LVAD   LVAD Pump HeartMate 3   Heartmate 3 LEFT VS   Flow (Lpm) 4.9 Lpm   Pulse Index (PI) 2.6 PI   Speed (rpm) 5500 rpm   Power (christiansen) 4.2 christiansen   Current Hct setting 39   Primary Controller   Serial number [id number]   Low flow alarm setting 2.5   EBB: Patient use 6   Replace in 14 Months   Backup Controller   Serial number [id number]   EBB: Patient use 7   Replace EBB in 12 Months   VAD Interrogation   Alarms reported by patient N   Unexpected alarms noted upon interrogation None   PI events Occasional   Damage to equipment is noted N   Action taken Reviewed proper equipment care and maintenance   Driveline Exit Site   Dressing change done Y   Driveline properly secured Yes   DLES assessment drainage   Dressing used Daily kit   Frequency patient changes dressing Daily     4)  Education Complete: Yes   Charge the BACKUP controller s backup battery every 6 months  Perform a self test on BACKUP every 6 months  Change the MPU s batteries every 6 months:Yes  Have equipment serviced yearly (if applicable):Yes

## 2024-10-07 NOTE — PROGRESS NOTES
[facility]   October 7, 2024      HPI  PMH of severe MR s/p MVR with ring (1/21/15), VT s/p ICD, nonobstructive CAD, NICM previously on home inotropes now s/p HM III LVAD implantation 3/23/22 as bridge to decision (presently not listed to recover from LVAD team wanted to see marijuana levels decline consistent with quit date), with course c/b AFib with RVR (s/p ICD shock 3/25/22) a) who presents for f/up in LVAD clinic.    Overall he had a pretty incredible recovery after his LVAD, marijuana levels have been negative and listed as a status 4 but then was upgraded to Status 3 when he went on IV antibiotics for driveline infection - renewal due 10/14/2024.     Overall he is felt well but he did have an ICD shock in May 2023.   He is presently on sotalol as well as a beta-blocker and is tolerating this and by ICD check today he is in remission.       Patient is doing well. He is accompanied today by his wife. He was admitted briefly and discharged August 30th, 2024 for a driveline infection. He was started on IV Daptomycin but was switched to Ceftriaxone 2g daily on 9/13 due to myopathy that was felt to be related to the Daptomycin. He has no complaints today. He has no bleeding and no stroke like symptoms. MAPs have been in the 80s. His driveline site is OK, minimal discharge. He is scheduled to follow with ID today to determine how much longer he needs to be on IV antibiotics, cultures will be repeated today.  Unfortunately his daughter passed away 2 weeks ago.   INR was 1.8 today. MAPs have been controlled - are being checked daily when he goes for antibiotic infusion. There have been no alarms on the LVAD. His last device interrogation was reviewed and was unremarkable.     VAD  Speed 5500 - no speed drops   Flow 4.3  PI 2.6  Power 4.6          PAST MEDICAL HISTORY:  Past Medical History:   Diagnosis Date    Chronic systolic heart failure (H) 2/7/2017    Gastroparesis     ICD (implantable  cardioverter-defibrillator) in place     Motion Dispatch    Non-ischemic cardiomyopathy (H)     Paroxysmal ventricular tachycardia (H) 2017    S/P mitral valve replacement 2017    Tetrahydrocannabinol (THC) use disorder, mild, in controlled environment, abuse 2019    occasional edible THC. reportedly for sleep, anxiety    Tobacco abuse, episodic     occasional cigar - ~ 3x/wk       FAMILY HISTORY:  Family History   Problem Relation Age of Onset    Coronary Artery Disease Mother     Kidney failure Mother     Cardiomyopathy Father          age 51    Anesthesia Reaction No family hx of     Venous thrombosis No family hx of       Drug use: No       Social history unchanged from prior  His dog recently passed away  New cat       CURRENT MEDICATIONS:    Current Outpatient Medications   Medication Sig Dispense Refill    acetaminophen (TYLENOL) 325 MG tablet Take 2 tablets (650 mg) by mouth every 4 hours as needed for pain 100 tablet 0    allopurinol (ZYLOPRIM) 100 MG tablet Take 100 mg by mouth every morning.      amoxicillin (AMOXIL) 500 MG capsule Take 2,000 mg by mouth once as needed Take 4 tabs 1 hour before all dental cleanings and procedures      cefadroxil (DURICEF) 500 MG capsule Take 2 capsules (1,000 mg) by mouth 2 times daily. 28 capsule 0    cholecalciferol 50 MCG (2000 UT) CAPS Take 50 mcg by mouth every morning      furosemide (LASIX) 40 MG tablet TAKE 1.5 TABLETS (60 MG) BY MOUTH 2 TIMES DAILY 270 tablet 3    hydrOXYzine (VISTARIL) 50 MG capsule Take 50 mg by mouth every 12 hours as needed for anxiety      magnesium oxide (MAG-OX) 400 MG tablet Take 800 mg by mouth 2 times daily      metoprolol succinate ER (TOPROL XL) 25 MG 24 hr tablet Take 1 tablet (25 mg) by mouth daily 90 tablet 2    nitroGLYcerin (NITROSTAT) 0.4 MG sublingual tablet Place 0.4 mg under the tongue every 5 minutes as needed for chest pain      omeprazole (PRILOSEC) 40 MG DR capsule Take 40 mg by mouth every morning       potassium chloride alfonso ER (KLOR-CON M20) 20 MEQ CR tablet Take 3 tablets (60 mEq) by mouth 2 times daily 540 tablet 3    sacubitril-valsartan (ENTRESTO)  MG per tablet Take 1 tablet by mouth 2 times daily 90 tablet 3    sotalol (BETAPACE) 120 MG tablet Take 120 mg by mouth 2 times daily      spironolactone (ALDACTONE) 25 MG tablet Take 0.5 tablets (12.5 mg) by mouth daily 45 tablet 3    traZODone (DESYREL) 50 MG tablet Take 0.5-1 tablets (25-50 mg) by mouth at bedtime as needed for insomnia      warfarin ANTICOAGULANT (COUMADIN) 2.5 MG tablet Take 5mg (2 tabs) by mouth daily OR AS DIRECTED.  Adjust dose based on INR. 180 tablet 1    enoxaparin ANTICOAGULANT (LOVENOX) 100 MG/ML syringe Inject 1 mL (100 mg) subcutaneously 2 times daily. (Patient not taking: Reported on 10/7/2024) 10 mL 1       All relevant ROS were reviewed in the HPI.       EXAM:    BP (!) 82/0 (BP Location: Right arm, Patient Position: Chair, Cuff Size: Adult Large)   Wt 112.5 kg (248 lb)   SpO2 98%   BMI 35.58 kg/m        The patient is obese and is somewhat uncomfortable due to pain as he is walking.    The patient's neck veins are mildly elevated.  CV: humm of LVAD auscultated   RESPIRATORY: Respirations regular, even, and unlabored  Abd: The patient's drive line shows no obvious erythema or purulence upon palpation, but there is some dried material on the dressing. Dressing changed in clinic, cultures sent  EXTREMITIES: 1+ lower extremity edema      Results      All lab trends, imaging, recent echos personally reviewed with the patient.     Laboratory Studies  Creatinine is about 1.4. Liver function is entirely normal. Albumin is normal. Potassium is normal. Hemoglobin is just mildly down at 12.5. White blood cell count is normal.    Testing  Device interrogation shows no atrial fibrillation and no sustained ventricular arrhythmias. LVAD interrogation showed some low PI but no device malfunction.    Device: Interview Rocket G148  INOGEN X4 CRT-D  Normal device function.  Mode: DDDR  bpm  AP: 68%  : 6% - LV Lead turned OFF.  Intrinsic rhythm: SB 49 bpm  Lead Trends Appear Stable.  Estimated battery longevity to RRT = 1.5 years.  Atrial Arrhythmia: None.  Anticoagulant: Warfarin  Ventricular Arrhythmia: 2 NSVT episode lasting 5 sec @ 170-186 bpm.  Setting Changes: None.  Patient has an appointment to see Dr. Dang today.   Plan: Device follow-up every 3 months.      Assessment and Plan:   PMH of severe MR s/p MVR with ring (1/21/15), VT s/p ICD,  NICM previously on home inotropes now s/p HM III LVAD implantation 3/23/22, with course c/b sternal wound s/p Keflex treatment,     Overall he  recovered incredibly well.  Presently listed for transplant as a status 3.         Chronic SHCF s/p HM III LVAD. Severe MR s/p mitral valve repair with annuloplasty ring (1/21/15). Implanted 3/23/22.   Stage D, NYHA Class II     ACEi/ARB Entresto   mg po BID,    BB Sotalol 80 mg po BID-and metoprolol   Aldosterone antagonist Aldactone 25 mg daily   SGLT2i: Deferred for now given polypharmacy and lots of GI upset (he did not want to add more meds)    SCD prophylaxis ICD  Fluid status  euvolemic on current diuretics   - is due for renewal of his Status 3 on 10/14/2024 - will reach out to coordinators about this pending ID visit and whether they continue IV antibiotics or not. Consider switching his Entresto to hydralazine if he remains listed to minimize post-op vasoplegia.     LDH: stable  Anticoagulation: INR goal 2-3  Antiplatelet: Finish the RICARDO trial now on aspirin 81 mg daily      2. 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  Recently admitted and treated for MSSA with Daptomycin which was switched to ceftriaxone which he is currently on. Will follow up with ID today.    3. History of VT, s/p CRT-D.    ICD shock in May 2023 now on a higher dose of sotalol and on a beta-blocker - presently in remission if he  has more shocks may consider trying to upgrade for transplant  - Continue Sotatol, metoprolol for now      4. Postop AF with RVR. Returned to SR.   - Coumadin as above.  - on coreg     5.  Mild nonobstructive CAD.   On coumadin      6. MIld AI on echo will watch for now     7. GI upset: Will follow with GI locally    8. Hypomagnesia: Improved with oral magnesium    9. Obesity; he is going to work on diet and exercise he is transplant eligible presently    10.. Gastroparesis.  A consultation with a gastroenterologist close to his residence is recommended. An esophagogastroduodenoscopy (EGD) was performed today, and the results are pending.      Loren Mclaughlin PGY 7  Advanced Heart Failure Fellow       Attending attestation  I have seen and discussed the patient with Dr. Mclaughlin and edited the note above to reflect our joint history, exam, assessment and plan    I spent 46 minutes in care of the patient today including obtaining recent medical history, personally reviewing recent cardiac testing and/or lab results, today's examination, discussion of testing results and care recommendations with patient.     Thank you for the opportunity to participate in this patient's care. Please feel free to reach out with any questions or concerns.     Justin Dang MD, FACC  Associate Professor of Medicine  Advanced Heart Failure, LVAD & Cardiac Transplant  Director, Cardio-Obstetrics  Cardio-Oncology  [facility]

## 2024-10-07 NOTE — PROGRESS NOTES
ANTICOAGULATION MANAGEMENT     Tej Morfin 59 year old male is on warfarin with subtherapeutic INR result. (Goal INR 2.0-3.0)    Recent labs: (last 7 days)     10/07/24  1247   INR 1.81*       ASSESSMENT     Source(s): Chart Review and Patient/Caregiver Call     Warfarin doses taken: Warfarin taken as instructed  Diet: No new diet changes identified  Medication/supplement changes: None noted  New illness, injury, or hospitalization: No  Signs or symptoms of bleeding or clotting: No  Previous result: Subtherapeutic  Additional findings:  Tej is being seen in clinic today; he will let the [facility] know if there are any changes in his antibiotics.         PLAN     Recommended plan for no diet, medication or health factor changes affecting INR     Dosing Instructions: Increase your warfarin dose (6.2% change) with next INR in 1 week       Summary  As of 10/7/2024      Full warfarin instructions:  7.5 mg every Mon, Wed, Fri; 5 mg all other days   Next INR check:  10/14/2024               Telephone call with Tej who verbalizes understanding and agrees to plan and who agrees to plan and repeated back plan correctly  Sent [employer] message with dosing and follow up instructions    Patient to recheck with home meter    Education provided: Please call back if any changes to your diet, medications or how you've been taking warfarin  Contact [phone] with any changes, questions or concerns.     Plan made per [facility] anticoagulation protocol and per LVAD protocol    Lizabeth Milton RN  10/7/2024  Anticoagulation Clinic  [employer] for routing messages: jaime ANTICOAG LVAD  [facility] patient phone line: [phone]        _______________________________________________________________________     Anticoagulation Episode Summary       Current INR goal:  2.0-3.0   TTR:  72.3% (1 y)   Target end date:  Indefinite   Send INR reminders to:  ANTICOAG LVAD    Indications    LVAD (left ventricular assist device) present (H)  [Z95.811]  Chronic systolic CHF (congestive heart failure) (H) [I50.22]  S/P mitral valve replacement [Z95.2]  Paroxysmal ventricular tachycardia (H) [I47.29]             Comments:  Follow VAD Anticoag protocol:Yes: HeartMate 3  Bridging: per protocol  Date VAD placed: 03/23/22  INR Goal: per referral  Hx Mitral valve repair and not replacement               Anticoagulation Care Providers       Provider Role Specialty Phone number    Dunia Hdz MD Referring Cardiovascular Disease [phone]

## 2024-10-07 NOTE — NURSING NOTE
Chief Complaint   Patient presents with    Follow Up     RETURN VAD     Vitals were taken and medications reconciled.    Mainor Barbosa, EMT  2:12 PM

## 2024-10-07 NOTE — PATIENT INSTRUCTIONS
- Continue current IV antibiotics  - I will follow up your culture results and update you via Mychart  - Will discuss next steps (combination antibiotics vs transition to suppressive oral medication) based on results  - Will try to coordinate your next visit with Cardiology  - Please reach out with questions or concerns

## 2024-10-07 NOTE — LETTER
10/7/2024       RE: Tej Morfin  [address]  [place] [address]     Dear Colleague,    Thank you for referring your patient, Tej Morfin, to the [facility] [place] at [facility]. Please see a copy of my visit note below.      [facility] [address] [address]  Phone: [phone]  Fax: [phone]    Patient:  Tej Morfin, Date of birth 1965  Date of Visit:  10/07/2024  Referring Provider Dunia Hdz  Reason for visit: Follow up     Assessment & Plan   Recommendations:   Continue ceftriaxone 2 grams IV every 24 hours (see rationale below)  Plan to extend antibiotics for at least 2 more weeks  Follow up bacterial and fungal cultures from 10/7  If we isolate only MSSA again, will discuss trying brief course of combination PO + IV agents to see if that helps drainage to completely resolve  Plan to eventually transition to oral antibiotic suppression   Weekly CBC, AST, ALT, creatinine  Plan to fax the above orders to [facility] at [phone]  Will attempt to schedule the next visit on the same day he sees Cardiology     I spent 35 mins on date of encounter between clinic visit, documentation, review of chart/labs/imaging and care coordination     SAV Pritchett  Staff Physician, Infectious Diseases  Pager [phone]        Assessment:   60 y/o male with a history of severe MR s/p MVR with ring (1/21/15), VT s/p ICD, nonobstructive CAD, NICM previously on home inotropes s/p HM III LVAD implantation 3/23/22 as bridge to decision (presently not listed - LVAD team wanted to see marijuana levels decline consistent with quit date), 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     Acute on  chronic LVAD driveline infection with MSSA  Concern for daptomycin include myopathy:   Known to have MSSA driveline infection since March 2024. Received a few rounds of doxycycline and then transitioned to on cefadroxil antibiotic suppression (started April). 8/25 CT w/o an underlying fluid collection but increased skin thickening at the entry site for the driveline in the right midabdomen as well as asymmetric thickening of the right rectus muscle.  8/27 driveline culture grew MSSA. Also had noted new tissue growth at the site over the prior 3 weeks to admission. Surgical intervention deemed not an option at this time. Planning for a prolonged course of IV antibiotics. He was not able to receive cefazolin due to financial issues so daptomycin was continued as an outpatient at an infusion center. Discharged 8/30. On 9/5 CK level was 214 and 9/12 CK level was 838. He reported myalgias in his legs, back, neck. Cefazolin was not an option since he can't do home infusion. Was transitioned to ceftriaxone 2 grams IV daily to allow for once a day dosing an infusion center (~9/13/24)    Since transition to ceftriaxone, reports drainage has improved although not resolved overall. Repeat cultures obtained today - will follow up cultures and if isolates only MSSA again, will attempt combination antibiotics (PO + IV) to see if we can completely resolve drainage at which time will switch to long term oral antibiotic suppression           History of Present Illness  Pertinent history obtain from: chart review and patient    Interval Hx 10/7/24:  Since starting Ceftriaxone - overall drainage has gone down. He feels difference is most marked since switch to ceftriaxone. No issues with the medication. No skin rash, joint pains. No nausea, vomiting, abdominal pain  No fevers, chills, rigors  Cultures were obtained today - pending      Progress note 9/13/24:  3/31 driveline culture w/ MSSA s/p 2 weeks of doxycycline. Improvement but  then drainage occurred again. 4/17 culture with MSSA again s/p doxycycline x 1 week followed by cefadroxil x 2 weeks (started 4/25/24). CT with possible skin thickening at driveline insertion site but not abscess. He was seen by [facility] ID and continued on cefadroxil for antibiotic suppression.  Developed increased drainage.  7/12 culture w/ MSSA. Cefadroxil dose increased to 1 gram twice a day. CT w/o localized fluid collection. 8/25 he presented to the ED with reoccurrence of purulence. Driveline culture with MSSA ([place]). 8/25 CT A/P w/o organizing fluid collection. There was ongoing cutaneous thickening at the entry site for the driveline in the right midabdomen as well as asymmetric thickening of the right rectus muscle (increased from 7/2024 CT). Blood cultures negative. Granuloma present and surgery evaluated but surgical intervention is not an option at this time. Discharged on daptomycin. Since discharge the CK level has increased. He has also noticed increase muscle tightness in his legs, back, neck. He thought it was because he had started walking on the treadmill again. No subjective fevers, chills, night sweats. Drainage has improved with dressing changes. Currently changing the dressing twice a day.       LVAD History:  Current LVAD model: heartmate III  Date current LVAD placed: 3/23/22  Previous LVAD devices: none  Other prosthetic devices/materials: severe MR s/p MVR with ring (1/21/15), VT s/p ICD,  Primary cardiologist: Andreina  Primary LVAD coordinator: Siena Headley  Primary ID provider: [facility] (Sd Knott, Chloe, and Rajeev)  Goal of LVAD: transplant    History of bacteremias (dates and organisms): none    History of driveline infections (dates and organisms):   -5/4/23 Staphylococcus lugdenesis s/p doxycycline x 2 weeks  -3/31/24 driveline culture w/ MSSA s/p 2 weeks of doxycycline.  -4/17/24 culture with MSSA again s/p doxycycline x 1 week followed by cefadroxil x 2  weeks (started 4/25/24).   -7/12/24 culture w/ MSSA. Cefadroxil dose increased to 1 gram twice a day.   -8/25 he presented to the ED with reoccurrence of purulence. Driveline culture with MSSA ([place]). 8/25 CT A/P w/o organizing fluid collection. Started IV daptomycin    History of other pertinent infections: none  History of driveline area irritation and current mitigation strategies: currently daily dressing changes, anchor  Current suppressive antibiotics: N/A  Previous antibiotic failures/allergies/intolerances etc: numerous course of doxycycline   Active on transplant list -       Physical Exam  Vital signs:  There were no vitals taken for this visit.  GENERAL:  well-developed, well-nourished, in no acute distress.  HEAD:  Head is normocephalic, atraumatic   EYES:  Eyes have anicteric sclerae without conjunctival injection   ENT:  Oropharynx is moist without exudates or ulcers. Tongue is midline  LUNGS:  On room air, no use of accessory muscles  NEUROLOGIC: AAO x 3      Picture of exit site 9/13/24:       Picture of driveline 10/7/24:      Data  Laboratory data and imaging listed below was reviewed prior to this encounter.                       Again, thank you for allowing me to participate in the care of your patient.      Sincerely,    Octavio Beaver MD

## 2024-10-08 ENCOUNTER — TELEPHONE (OUTPATIENT)
Dept: INFECTIOUS DISEASES | Facility: CLINIC | Age: 59
End: 2024-10-08
Payer: MEDICARE

## 2024-10-08 NOTE — TELEPHONE ENCOUNTER
Health Call Center    Phone Message    May a detailed message be left on voicemail: yes     Reason for Call: Order(s): Other:   Reason for requested: [facility] Jennifer Bahena called, he states that they are providing abx therapy for pt's LVAD infection. He states they saw through Care Everywhere that at pt's visit yesterday, Dr. Beaver will continue pt's abx but they have not received any orders for the continuation of therapy. Please fax orders to [facility] at fax #: [phone], attn: [facility].    Date needed: ASAP  Provider name: Sd    Action Taken: Other: ID    Travel Screening: Not Applicable     Date of Service:

## 2024-10-08 NOTE — TELEPHONE ENCOUNTER
Faxed infusion order to [facility] at [phone]    Alisa Chaves CMA        Faxed Dr. Beaver labs to [facility] to [phone]    Alisa Chaves CMA

## 2024-10-08 NOTE — TELEPHONE ENCOUNTER
Lis with [facility] in [place] is requesting a call back, she is confused on the orders she received, she needs clarification about the orders sent, have those order already been drawn at [facility]? Also she still waiting infusion orders as of 2:35 pm 10/8/24 she has not received those.

## 2024-10-09 LAB
BACTERIA WND CULT: NO GROWTH
GRAM STAIN RESULT: NORMAL
GRAM STAIN RESULT: NORMAL

## 2024-10-11 ENCOUNTER — CARE COORDINATION (OUTPATIENT)
Dept: TRANSPLANT | Facility: CLINIC | Age: 59
End: 2024-10-11
Payer: MEDICARE

## 2024-10-11 NOTE — PROGRESS NOTES
Status 3 Heart Extension Complete 10/11/24    This patient meets status 3 criteria as evidenced by:     [facility] with one of the following:   device infection which continues to require IV antibiotics    Patient's primary cardiologist and/or attending physician is in agreement with this plan.      Status 3 Extension due 10/29/24

## 2024-10-14 LAB — BACTERIA WND CULT: NORMAL

## 2024-10-15 ENCOUNTER — TELEPHONE (OUTPATIENT)
Dept: TRANSPLANT | Facility: CLINIC | Age: 59
End: 2024-10-15
Payer: MEDICARE

## 2024-10-15 VITALS — BODY MASS INDEX: 34.58 KG/M2 | WEIGHT: 241 LBS

## 2024-10-18 ENCOUNTER — TELEPHONE (OUTPATIENT)
Dept: INFECTIOUS DISEASES | Facility: CLINIC | Age: 59
End: 2024-10-18
Payer: MEDICARE

## 2024-10-18 ENCOUNTER — ANTICOAGULATION THERAPY VISIT (OUTPATIENT)
Dept: ANTICOAGULATION | Facility: CLINIC | Age: 59
End: 2024-10-18
Payer: MEDICARE

## 2024-10-18 ENCOUNTER — TELEPHONE (OUTPATIENT)
Dept: ANTICOAGULATION | Facility: CLINIC | Age: 59
End: 2024-10-18
Payer: MEDICARE

## 2024-10-18 DIAGNOSIS — I50.22 CHRONIC SYSTOLIC CHF (CONGESTIVE HEART FAILURE) (H): ICD-10-CM

## 2024-10-18 DIAGNOSIS — Z95.2 S/P MITRAL VALVE REPLACEMENT: ICD-10-CM

## 2024-10-18 DIAGNOSIS — T82.7XXA INFECTION ASSOCIATED WITH DRIVELINE OF LEFT VENTRICULAR ASSIST DEVICE (LVAD) (H): Primary | ICD-10-CM

## 2024-10-18 DIAGNOSIS — I47.29 PAROXYSMAL VENTRICULAR TACHYCARDIA (H): ICD-10-CM

## 2024-10-18 DIAGNOSIS — Z95.811 LVAD (LEFT VENTRICULAR ASSIST DEVICE) PRESENT (H): Primary | ICD-10-CM

## 2024-10-18 LAB — INR HOME MONITORING: 2.6 (ref 2–3)

## 2024-10-18 RX ORDER — CEFADROXIL 500 MG/1
1000 CAPSULE ORAL 2 TIMES DAILY
Qty: 120 CAPSULE | Refills: 1 | Status: SHIPPED | OUTPATIENT
Start: 2024-10-18 | End: 2024-12-17

## 2024-10-18 NOTE — TELEPHONE ENCOUNTER
Called [facility] and relayed verbal orders to stop IV antibotics on 10/21 and pull PICC after final infusion. Infusion center requested writer include direct line on fax cover sheet in case of questions.     Patient care order faxed at 2:02pm.     Fax:  [phone]

## 2024-10-18 NOTE — TELEPHONE ENCOUNTER
EP called 10/18 to sched a 3 month follow up on 1/16/25 with any TID provider per Dr. Beaver. Pt is sched with Dr. Corona and is aware of who he's seeing since he's coming from out of town.     ----- Message from Octavio Beaver sent at 10/18/2024 12:09 PM CDT -----  Good afternoon,    Was unable to route my note to you so sending you a separate message    Patient with improvement in drainage on Ceftriaxone although lack of complete resolution  Has been on the same ~9/13 (and on IV antibiotics since 8/30)  Reasonable to switch to orals again  Latest culture from 10/7 with lack of growth of organisms     Plan:  - Ok to stop IV Ceftriaxone (last date 10/21/24)  - Andreia, do you mind letting home care know about EOT and to have PICC removed  - I already sent in prescription for PO Cefadroxil 1gm BID  - Have asked patient to let me know how he is doing ~ 1 week after switch to orals  - Have asked patient to let me know promptly if worsening drainage, redness, tenderness or new symptoms     Co-ordinators, can patient have ID follow up 3 months - I think he is scheduled to be in town on 1/16/25 - would schedule with either of myself, Dr Boo, Dr Corona or Dr Layton (based on who has appointment that day)    Thanks,  Octavio

## 2024-10-18 NOTE — PROGRESS NOTES
Brief ID Progress note:    Patient with improvement in drainage on Ceftriaxone although lack of complete resolution  Has been on the same ~9/13 (and on IV antibiotics since 8/30)  Reasonable to switch to orals again  Latest culture from 10/7 with lack of growth of organisms    Plan:  - Ok to stop IV Ceftriaxone (last date 10/21/24)  - Will alert home care to have PICC removed  - Sent in prescription for PO Cefadroxil 1gm BID  - Have asked patient to let me know how he is doing ~ 1 week after switch to orals  - Have asked patient to let me know promptly if worsening drainage, redness, tenderness or new symptoms    ID follow up 3 months (~1/16/25)    SAV Pritchett  Staff Physician, Infectious Diseases  Pager [phone]

## 2024-10-18 NOTE — TELEPHONE ENCOUNTER
Octavio Beaver MD Pozzani, Kaleia, RN  Cc: Siena Headley, BRENDA; [id number]  Good afternoon,    Was unable to route my note to you so sending you a separate message    Patient with improvement in drainage on Ceftriaxone although lack of complete resolution  Has been on the same ~9/13 (and on IV antibiotics since 8/30)  Reasonable to switch to orals again  Latest culture from 10/7 with lack of growth of organisms     Plan:  - Ok to stop IV Ceftriaxone (last date 10/21/24)  - Andreia, do you mind letting home care know about EOT and to have PICC removed  - I already sent in prescription for PO Cefadroxil 1gm BID  - Have asked patient to let me know how he is doing ~ 1 week after switch to orals  - Have asked patient to let me know promptly if worsening drainage, redness, tenderness or new symptoms     Co-ordinators, can patient have ID follow up 3 months - I think he is scheduled to be in town on 1/16/25 - would schedule with either of myself, Dr Boo, Dr Corona or Dr Layton (based on who has appointment that day)    Thanks,  Octavio

## 2024-10-18 NOTE — PROGRESS NOTES
ANTICOAGULATION MANAGEMENT     Tej Morfin 59 year old male is on warfarin with therapeutic INR result. (Goal INR 2.0-3.0)    Recent labs: (last 7 days)     10/18/24  0000   INR 2.6       ASSESSMENT     Source(s): Chart Review and Patient/Caregiver Call     Warfarin doses taken: Warfarin taken as instructed  Diet: No new diet changes identified  Medication/supplement changes:  Next week his IV ceftriaxone will be changing to PO cefadroxil (after 10/21/24 infusion). Both are cephalosporin abx so unclear if this will impact INR. Advised Tej to plan on continuing weekly INR checks during this transition so we can monitor  New illness, injury, or hospitalization: No  Signs or symptoms of bleeding or clotting: No  Previous result: Subtherapeutic  Additional findings: None       PLAN     Recommended plan for upcoming ongoing change(s) affecting INR     Dosing Instructions: Continue your current warfarin dose with next INR in 1 week       Summary  As of 10/18/2024      Full warfarin instructions:  7.5 mg every Mon, Wed, Fri; 5 mg all other days   Next INR check:  10/25/2024               Telephone call with Tej who verbalizes understanding and agrees to plan    Patient to recheck with home meter    Education provided: Interaction IS anticipated between warfarin and antibiotics    Plan made per [facility] anticoagulation protocol and per LVAD protocol    Rosario Dyer RN  10/18/2024  Anticoagulation Clinic  [employer] for routing messages: p ANTICOAG LVAD  [facility] patient phone line: [phone]        _______________________________________________________________________     Anticoagulation Episode Summary       Current INR goal:  2.0-3.0   TTR:  71.5% (12 mo)   Target end date:  Indefinite   Send INR reminders to:  IVONNEAG LVAD    Indications    LVAD (left ventricular assist device) present (H) [Z95.811]  Chronic systolic CHF (congestive heart failure) (H) [I50.22]  S/P mitral valve replacement [Z95.2]  Paroxysmal  ventricular tachycardia (H) [I47.29]             Comments:  Follow VAD Anticoag protocol:Yes: HeartMate 3  Bridging: per protocol  Date VAD placed: 03/23/22  INR Goal: per referral  Hx Mitral valve repair and not replacement               Anticoagulation Care Providers       Provider Role Specialty Phone number    Dunia Hdz MD Referring Cardiovascular Disease [phone]

## 2024-10-18 NOTE — TELEPHONE ENCOUNTER
ANTICOAGULATION     Tej Morfin is overdue for an INR check.     Spoke with Tej instructed to test INR with home meter and call results to home monitoring company as soon as possible.    Gertrude Vizcaino RN  10/18/2024  Anticoagulation Clinic  [facility] for routing messages: jaime NOBLES LVAD  [facility] patient phone line: [phone]

## 2024-10-21 LAB
MDC_IDC_LEAD_CONNECTION_STATUS: NORMAL
MDC_IDC_LEAD_IMPLANT_DT: NORMAL
MDC_IDC_LEAD_LOCATION: NORMAL
MDC_IDC_LEAD_LOCATION_DETAIL_1: NORMAL
MDC_IDC_LEAD_MFG: NORMAL
MDC_IDC_LEAD_MODEL: NORMAL
MDC_IDC_LEAD_POLARITY_TYPE: NORMAL
MDC_IDC_LEAD_SERIAL: NORMAL
MDC_IDC_MSMT_BATTERY_DTM: NORMAL
MDC_IDC_MSMT_BATTERY_REMAINING_LONGEVITY: 18 MO
MDC_IDC_MSMT_BATTERY_REMAINING_PERCENTAGE: 32 %
MDC_IDC_MSMT_BATTERY_STATUS: NORMAL
MDC_IDC_MSMT_CAP_CHARGE_DTM: NORMAL
MDC_IDC_MSMT_CAP_CHARGE_DTM: NORMAL
MDC_IDC_MSMT_CAP_CHARGE_ENERGY: 31 J
MDC_IDC_MSMT_CAP_CHARGE_TIME: 13.9 S
MDC_IDC_MSMT_CAP_CHARGE_TIME: 8 S
MDC_IDC_MSMT_CAP_CHARGE_TYPE: NORMAL
MDC_IDC_MSMT_CAP_CHARGE_TYPE: NORMAL
MDC_IDC_MSMT_LEADCHNL_LV_IMPEDANCE_VALUE: 910 OHM
MDC_IDC_MSMT_LEADCHNL_LV_LEAD_CHANNEL_STATUS: NORMAL
MDC_IDC_MSMT_LEADCHNL_LV_PACING_THRESHOLD_AMPLITUDE: 0.7 V
MDC_IDC_MSMT_LEADCHNL_LV_PACING_THRESHOLD_PULSEWIDTH: 0.1 MS
MDC_IDC_MSMT_LEADCHNL_RA_IMPEDANCE_VALUE: 493 OHM
MDC_IDC_MSMT_LEADCHNL_RA_PACING_THRESHOLD_AMPLITUDE: 0.5 V
MDC_IDC_MSMT_LEADCHNL_RA_PACING_THRESHOLD_PULSEWIDTH: 0.5 MS
MDC_IDC_MSMT_LEADCHNL_RV_IMPEDANCE_VALUE: 391 OHM
MDC_IDC_MSMT_LEADCHNL_RV_PACING_THRESHOLD_AMPLITUDE: 1.2 V
MDC_IDC_MSMT_LEADCHNL_RV_PACING_THRESHOLD_PULSEWIDTH: 0.5 MS
MDC_IDC_PG_IMPLANT_DTM: NORMAL
MDC_IDC_PG_MFG: NORMAL
MDC_IDC_PG_MODEL: NORMAL
MDC_IDC_PG_SERIAL: NORMAL
MDC_IDC_PG_TYPE: NORMAL
MDC_IDC_SESS_CLINIC_NAME: NORMAL
MDC_IDC_SESS_DTM: NORMAL
MDC_IDC_SESS_TYPE: NORMAL
MDC_IDC_SET_BRADY_AT_MODE_SWITCH_MODE: NORMAL
MDC_IDC_SET_BRADY_AT_MODE_SWITCH_RATE: 170 {BEATS}/MIN
MDC_IDC_SET_BRADY_LOWRATE: 80 {BEATS}/MIN
MDC_IDC_SET_BRADY_MAX_SENSOR_RATE: 120 {BEATS}/MIN
MDC_IDC_SET_BRADY_MAX_TRACKING_RATE: 130 {BEATS}/MIN
MDC_IDC_SET_BRADY_MODE: NORMAL
MDC_IDC_SET_BRADY_PAV_DELAY_HIGH: 280 MS
MDC_IDC_SET_BRADY_PAV_DELAY_LOW: 300 MS
MDC_IDC_SET_BRADY_SAV_DELAY_HIGH: 280 MS
MDC_IDC_SET_BRADY_SAV_DELAY_LOW: 300 MS
MDC_IDC_SET_CRT_PACED_CHAMBERS: NORMAL
MDC_IDC_SET_LEADCHNL_LV_PACING_AMPLITUDE: 0.1 V
MDC_IDC_SET_LEADCHNL_LV_PACING_PULSEWIDTH: 0.1 MS
MDC_IDC_SET_LEADCHNL_LV_SENSING_ADAPTATION_MODE: NORMAL
MDC_IDC_SET_LEADCHNL_LV_SENSING_ANODE_ELECTRODE_1: NORMAL
MDC_IDC_SET_LEADCHNL_LV_SENSING_ANODE_LOCATION_1: NORMAL
MDC_IDC_SET_LEADCHNL_LV_SENSING_CATHODE_ELECTRODE_1: NORMAL
MDC_IDC_SET_LEADCHNL_LV_SENSING_CATHODE_LOCATION_1: NORMAL
MDC_IDC_SET_LEADCHNL_LV_SENSING_SENSITIVITY: 1 MV
MDC_IDC_SET_LEADCHNL_RA_PACING_AMPLITUDE: 1.5 V
MDC_IDC_SET_LEADCHNL_RA_PACING_POLARITY: NORMAL
MDC_IDC_SET_LEADCHNL_RA_PACING_PULSEWIDTH: 0.5 MS
MDC_IDC_SET_LEADCHNL_RA_SENSING_ADAPTATION_MODE: NORMAL
MDC_IDC_SET_LEADCHNL_RA_SENSING_POLARITY: NORMAL
MDC_IDC_SET_LEADCHNL_RA_SENSING_SENSITIVITY: 0.25 MV
MDC_IDC_SET_LEADCHNL_RV_PACING_AMPLITUDE: 2.8 V
MDC_IDC_SET_LEADCHNL_RV_PACING_POLARITY: NORMAL
MDC_IDC_SET_LEADCHNL_RV_PACING_PULSEWIDTH: 0.5 MS
MDC_IDC_SET_LEADCHNL_RV_SENSING_ADAPTATION_MODE: NORMAL
MDC_IDC_SET_LEADCHNL_RV_SENSING_POLARITY: NORMAL
MDC_IDC_SET_LEADCHNL_RV_SENSING_SENSITIVITY: 0.6 MV
MDC_IDC_SET_ZONE_DETECTION_INTERVAL: 273 MS
MDC_IDC_SET_ZONE_DETECTION_INTERVAL: 375 MS
MDC_IDC_SET_ZONE_STATUS: NORMAL
MDC_IDC_SET_ZONE_STATUS: NORMAL
MDC_IDC_SET_ZONE_TYPE: NORMAL
MDC_IDC_SET_ZONE_TYPE: NORMAL
MDC_IDC_SET_ZONE_VENDOR_TYPE: NORMAL
MDC_IDC_SET_ZONE_VENDOR_TYPE: NORMAL
MDC_IDC_STAT_AT_BURDEN_PERCENT: 10 %
MDC_IDC_STAT_AT_DTM_END: NORMAL
MDC_IDC_STAT_AT_DTM_START: NORMAL
MDC_IDC_STAT_BRADY_DTM_END: NORMAL
MDC_IDC_STAT_BRADY_DTM_START: NORMAL
MDC_IDC_STAT_BRADY_RA_PERCENT_PACED: 68 %
MDC_IDC_STAT_BRADY_RV_PERCENT_PACED: 6 %
MDC_IDC_STAT_CRT_DTM_END: NORMAL
MDC_IDC_STAT_CRT_DTM_START: NORMAL
MDC_IDC_STAT_CRT_LV_PERCENT_PACED: 0 %
MDC_IDC_STAT_EPISODE_RECENT_COUNT: 0
MDC_IDC_STAT_EPISODE_RECENT_COUNT: 0
MDC_IDC_STAT_EPISODE_RECENT_COUNT: 1
MDC_IDC_STAT_EPISODE_RECENT_COUNT: 42
MDC_IDC_STAT_EPISODE_RECENT_COUNT_DTM_END: NORMAL
MDC_IDC_STAT_EPISODE_RECENT_COUNT_DTM_START: NORMAL
MDC_IDC_STAT_EPISODE_TYPE: NORMAL
MDC_IDC_STAT_EPISODE_VENDOR_TYPE: NORMAL
MDC_IDC_STAT_TACHYTHERAPY_ATP_DELIVERED_RECENT: 1
MDC_IDC_STAT_TACHYTHERAPY_ATP_DELIVERED_TOTAL: 5
MDC_IDC_STAT_TACHYTHERAPY_RECENT_DTM_END: NORMAL
MDC_IDC_STAT_TACHYTHERAPY_RECENT_DTM_START: NORMAL
MDC_IDC_STAT_TACHYTHERAPY_SHOCKS_ABORTED_RECENT: 0
MDC_IDC_STAT_TACHYTHERAPY_SHOCKS_ABORTED_TOTAL: 1
MDC_IDC_STAT_TACHYTHERAPY_SHOCKS_DELIVERED_RECENT: 1
MDC_IDC_STAT_TACHYTHERAPY_SHOCKS_DELIVERED_TOTAL: 5
MDC_IDC_STAT_TACHYTHERAPY_TOTAL_DTM_END: NORMAL
MDC_IDC_STAT_TACHYTHERAPY_TOTAL_DTM_START: NORMAL

## 2024-10-22 ENCOUNTER — DOCUMENTATION ONLY (OUTPATIENT)
Dept: INFECTIOUS DISEASES | Facility: CLINIC | Age: 59
End: 2024-10-22
Payer: MEDICARE

## 2024-10-22 NOTE — PROGRESS NOTES
Documentation of OPAT Completion    OPAT completed on: 10/21/2024    Date Infusion company notified on: 10/21/2024    PICC Pulled on: 10/21/2024    Episode closed: yes

## 2024-10-28 ENCOUNTER — ANTICOAGULATION THERAPY VISIT (OUTPATIENT)
Dept: ANTICOAGULATION | Facility: CLINIC | Age: 59
End: 2024-10-28
Payer: MEDICARE

## 2024-10-28 ENCOUNTER — CARE COORDINATION (OUTPATIENT)
Dept: TRANSPLANT | Facility: CLINIC | Age: 59
End: 2024-10-28
Payer: MEDICARE

## 2024-10-28 DIAGNOSIS — T82.7XXA INFECTION ASSOCIATED WITH DRIVELINE OF LEFT VENTRICULAR ASSIST DEVICE (LVAD) (H): Primary | ICD-10-CM

## 2024-10-28 DIAGNOSIS — I50.22 CHRONIC SYSTOLIC CHF (CONGESTIVE HEART FAILURE) (H): ICD-10-CM

## 2024-10-28 DIAGNOSIS — I47.29 PAROXYSMAL VENTRICULAR TACHYCARDIA (H): ICD-10-CM

## 2024-10-28 DIAGNOSIS — Z95.811 LVAD (LEFT VENTRICULAR ASSIST DEVICE) PRESENT (H): Primary | ICD-10-CM

## 2024-10-28 DIAGNOSIS — Z95.2 S/P MITRAL VALVE REPLACEMENT: ICD-10-CM

## 2024-10-28 LAB — INR HOME MONITORING: 3 (ref 2–3)

## 2024-10-28 NOTE — PROGRESS NOTES
ANTICOAGULATION MANAGEMENT     Tej Morfin 59 year old male is on warfarin with therapeutic INR result. (Goal INR 2.0-3.0)    Recent labs: (last 7 days)     10/28/24  0000   INR 3.0       ASSESSMENT     Source(s): Chart Review and Patient/Caregiver Call     Warfarin doses taken: Warfarin taken as instructed  Diet: No new diet changes identified  Medication/supplement changes:  Had last infusion of IV Ceftriaxone 10/21/24, PICC removed and then started PO Cefadroxil - this can increase bleeding risk and INR.  New illness, injury, or hospitalization: No  Signs or symptoms of bleeding or clotting: No  Previous result: Therapeutic last visit; previously outside of goal range - INR trending up  Additional findings: None       PLAN     Recommended plan for ongoing change(s) affecting INR     Dosing Instructions: Continue your current warfarin dose with next INR in 1 week       Summary  As of 10/28/2024      Full warfarin instructions:  7.5 mg every Mon, Wed, Fri; 5 mg all other days   Next INR check:  11/4/2024               Telephone call with Tej who verbalizes understanding and agrees to plan    Patient to recheck with home meter    Education provided: Goal range and lab monitoring: goal range and significance of current result and Importance of following up at instructed interval  Interaction IS anticipated between warfarin and Cefadroxil  Symptom monitoring: monitoring for bleeding signs and symptoms  Contact [phone] with any changes, questions or concerns.     Plan made with [facility] Pharmacist Jennifer Spence and per LVAD protocol    Emma Mata RN  10/28/2024  Anticoagulation Clinic  [employer] for routing messages: jaime NOBLES LVSALAZAR  [facility] patient phone line: [phone]        _______________________________________________________________________     Anticoagulation Episode Summary       Current INR goal:  2.0-3.0   TTR:  71.5% (12 mo)   Target end date:  Indefinite   Send INR reminders to:  [facility]  LVAD    Indications    LVAD (left ventricular assist device) present (H) [Z95.811]  Chronic systolic CHF (congestive heart failure) (H) [I50.22]  S/P mitral valve replacement [Z95.2]  Paroxysmal ventricular tachycardia (H) [I47.29]             Comments:  Follow VAD Anticoag protocol:Yes: HeartMate 3  Bridging: per protocol  Date VAD placed: 03/23/22  INR Goal: per referral  Hx Mitral valve repair and not replacement               Anticoagulation Care Providers       Provider Role Specialty Phone number    Dunia Hdz MD Referring Cardiovascular Disease [phone]

## 2024-10-28 NOTE — PROGRESS NOTES
Status 3 Heart Extension Complete 10/28/24    This patient meets status 3 criteria as evidenced by:     [facility] with one of the following:   device infection     Patient's primary cardiologist and/or attending physician is in agreement with this plan.      Status 3 Extension due 11/13/24

## 2024-11-04 ENCOUNTER — ANTICOAGULATION THERAPY VISIT (OUTPATIENT)
Dept: ANTICOAGULATION | Facility: CLINIC | Age: 59
End: 2024-11-04
Payer: MEDICARE

## 2024-11-04 DIAGNOSIS — I47.29 PAROXYSMAL VENTRICULAR TACHYCARDIA (H): ICD-10-CM

## 2024-11-04 DIAGNOSIS — Z95.811 LVAD (LEFT VENTRICULAR ASSIST DEVICE) PRESENT (H): Primary | ICD-10-CM

## 2024-11-04 DIAGNOSIS — Z95.2 S/P MITRAL VALVE REPLACEMENT: ICD-10-CM

## 2024-11-04 DIAGNOSIS — I50.22 CHRONIC SYSTOLIC CHF (CONGESTIVE HEART FAILURE) (H): ICD-10-CM

## 2024-11-04 LAB
BACTERIA WND CULT: NO GROWTH
INR HOME MONITORING: 3.5 (ref 2–3)

## 2024-11-04 NOTE — PROGRESS NOTES
ANTICOAGULATION MANAGEMENT     Tej Morfin 59 year old male is on warfarin with supratherapeutic INR result. (Goal INR 2.0-3.0)    Recent labs: (last 7 days)     11/04/24  0000   INR 3.5*       ASSESSMENT     Source(s): Chart Review and Patient/Caregiver Call     Warfarin doses taken: Warfarin taken as instructed  Diet: No new diet changes identified  Medication/supplement changes:  Pt was switched from IV cefadroxil to oral. Pt states that he ran out of medication over the weekend due to pharmacy only giving him 10 days, advised to call pharmacy because he is suppose to be on until 12/17/24 per chart review.    New illness, injury, or hospitalization: No  Signs or symptoms of bleeding or clotting: No  Previous result: Therapeutic last 2(+) visits  Additional findings: None       PLAN     Recommended plan for ongoing change(s) affecting INR     Dosing Instructions: partial hold then decrease your warfarin dose (11.8% change) with next INR in 1 week       Summary  As of 11/4/2024      Full warfarin instructions:  11/4: 3.75 mg; Otherwise 6.25 mg every Mon, Fri; 5 mg all other days   Next INR check:  11/11/2024               Telephone call with Tej who verbalizes understanding and agrees to plan  [employer] message with dosing and follow up instructions    Patient to recheck with home meter    Education provided: Goal range and lab monitoring: goal range and significance of current result, Importance of therapeutic range, and Importance of following up at instructed interval    Plan made with [facility] Pharmacist Cindy Bruce and per LVAD protocol    Benjamin Gaspar RN  11/4/2024  Anticoagulation Clinic  [employer] for routing messages: jaime NOBLES LVAD  [facility] patient phone line: [phone]        _______________________________________________________________________     Anticoagulation Episode Summary       Current INR goal:  2.0-3.0   TTR:  69.6% (12 mo)   Target end date:  Indefinite   Send INR reminders to:   ANTICOAG LVAD    Indications    LVAD (left ventricular assist device) present (H) [Z95.811]  Chronic systolic CHF (congestive heart failure) (H) [I50.22]  S/P mitral valve replacement [Z95.2]  Paroxysmal ventricular tachycardia (H) [I47.29]             Comments:  Follow VAD Anticoag protocol:Yes: HeartMate 3  Bridging: per protocol  Date VAD placed: 03/23/22  INR Goal: per referral  Hx Mitral valve repair and not replacement               Anticoagulation Care Providers       Provider Role Specialty Phone number    Dunia Hdz MD Referring Cardiovascular Disease [phone]

## 2024-11-11 ENCOUNTER — ANTICOAGULATION THERAPY VISIT (OUTPATIENT)
Dept: ANTICOAGULATION | Facility: CLINIC | Age: 59
End: 2024-11-11
Payer: MEDICARE

## 2024-11-11 DIAGNOSIS — I47.29 PAROXYSMAL VENTRICULAR TACHYCARDIA (H): ICD-10-CM

## 2024-11-11 DIAGNOSIS — Z95.2 S/P MITRAL VALVE REPLACEMENT: ICD-10-CM

## 2024-11-11 DIAGNOSIS — I50.22 CHRONIC SYSTOLIC CHF (CONGESTIVE HEART FAILURE) (H): ICD-10-CM

## 2024-11-11 DIAGNOSIS — Z95.811 LVAD (LEFT VENTRICULAR ASSIST DEVICE) PRESENT (H): Primary | ICD-10-CM

## 2024-11-11 LAB — INR HOME MONITORING: 2.4 (ref 2–3)

## 2024-11-11 NOTE — PROGRESS NOTES
ANTICOAGULATION MANAGEMENT     Tej Morfin 59 year old male is on warfarin with therapeutic INR result. (Goal INR 2.0-3.0)    Recent labs: (last 7 days)     11/11/24  0000   INR 2.4       ASSESSMENT     Source(s): Chart Review and Patient/Caregiver Call     Warfarin doses taken: Warfarin taken as instructed  Diet: No new diet changes identified  Medication/supplement changes: None noted  New illness, injury, or hospitalization: No  Signs or symptoms of bleeding or clotting: No  Previous result: Supratherapeutic-- partial hold & 11% MD reduction   Additional findings: None       PLAN     Recommended plan for no diet, medication or health factor changes affecting INR     Dosing Instructions: Continue your current warfarin dose with next INR in 1 week       Summary  As of 11/11/2024      Full warfarin instructions:  6.25 mg every Mon, Fri; 5 mg all other days   Next INR check:  11/18/2024               Telephone call with Tej who verbalizes understanding and agrees to plan and who agrees to plan and repeated back plan correctly    Patient to recheck with home meter    Education provided: Please call back if any changes to your diet, medications or how you've been taking warfarin    Plan made per [facility] anticoagulation protocol and per LVAD protocol    Cecily Tovar RN  11/11/2024  Anticoagulation Clinic  [email or web address] for routing messages: p ANTICOAG LVAD  [facility] patient phone line: [phone]        _______________________________________________________________________     Anticoagulation Episode Summary       Current INR goal:  2.0-3.0   TTR:  68.7% (12 mo)   Target end date:  Indefinite   Send INR reminders to:  ANTICOAG LVAD    Indications    LVAD (left ventricular assist device) present (H) [Z95.811]  Chronic systolic CHF (congestive heart failure) (H) [I50.22]  S/P mitral valve replacement [Z95.2]  Paroxysmal ventricular tachycardia (H) [I47.29]             Comments:  Follow VAD Anticoag protocol:Yes:  HeartMate 3  Bridging: per protocol  Date VAD placed: 03/23/22  INR Goal: per referral  Hx Mitral valve repair and not replacement               Anticoagulation Care Providers       Provider Role Specialty Phone number    Dunia Hdz MD Referring Cardiovascular Disease [phone]

## 2024-11-12 ENCOUNTER — CARE COORDINATION (OUTPATIENT)
Dept: TRANSPLANT | Facility: CLINIC | Age: 59
End: 2024-11-12
Payer: MEDICARE

## 2024-11-12 DIAGNOSIS — T82.7XXA INFECTION ASSOCIATED WITH DRIVELINE OF LEFT VENTRICULAR ASSIST DEVICE (LVAD) (H): Primary | ICD-10-CM

## 2024-11-12 NOTE — PROGRESS NOTES
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      Status Extension due 02/11/25

## 2024-11-18 ENCOUNTER — ANTICOAGULATION THERAPY VISIT (OUTPATIENT)
Dept: ANTICOAGULATION | Facility: CLINIC | Age: 59
End: 2024-11-18
Payer: MEDICARE

## 2024-11-18 DIAGNOSIS — I47.29 PAROXYSMAL VENTRICULAR TACHYCARDIA (H): ICD-10-CM

## 2024-11-18 DIAGNOSIS — I50.22 CHRONIC SYSTOLIC CHF (CONGESTIVE HEART FAILURE) (H): ICD-10-CM

## 2024-11-18 DIAGNOSIS — Z95.2 S/P MITRAL VALVE REPLACEMENT: ICD-10-CM

## 2024-11-18 DIAGNOSIS — Z95.811 LVAD (LEFT VENTRICULAR ASSIST DEVICE) PRESENT (H): Primary | ICD-10-CM

## 2024-11-18 LAB — INR HOME MONITORING: 2.8 (ref 2–3)

## 2024-11-18 NOTE — PROGRESS NOTES
ANTICOAGULATION MANAGEMENT     Tej Morfin 59 year old male is on warfarin with therapeutic INR result. (Goal INR 2.0-3.0)    Recent labs: (last 7 days)     11/18/24  0000   INR 2.8       ASSESSMENT     Source(s): Chart Review and Patient/Caregiver Call     Warfarin doses taken: Warfarin taken as instructed  Diet: No new diet changes identified  Medication/supplement changes:  Oral cefadroxil until 12/17/24  New illness, injury, or hospitalization: No  Signs or symptoms of bleeding or clotting: No  Previous result: Therapeutic last visit; previously outside of goal range  Additional findings: None       PLAN     Recommended plan for no diet, medication or health factor changes affecting INR     Dosing Instructions: Continue your current warfarin dose with next INR in 2 weeks       Summary  As of 11/18/2024      Full warfarin instructions:  6.25 mg every Mon, Fri; 5 mg all other days   Next INR check:  11/25/2024               Telephone call with Tej who agrees to plan and repeated back plan correctly    Patient to recheck with home meter    Education provided: Goal range and lab monitoring: goal range and significance of current result and Importance of following up at instructed interval  Interaction IS anticipated between warfarin and cefadroxil   Symptom monitoring: monitoring for bleeding signs and symptoms  Contact [phone] with any changes, questions or concerns.     Plan made per [facility] anticoagulation protocol and per LVAD protocol    SAVANNA MO RN  11/18/2024  Anticoagulation Clinic  [employer] for routing messages: jaime ANTICOAG LVAD  [facility] patient phone line: [phone]        _______________________________________________________________________     Anticoagulation Episode Summary       Current INR goal:  2.0-3.0   TTR:  68.7% (12 mo)   Target end date:  Indefinite   Send INR reminders to:  [facility] LVAD    Indications    LVAD (left ventricular assist device) present (H) [Z95.569]  Chronic  systolic CHF (congestive heart failure) (H) [I50.22]  S/P mitral valve replacement [Z95.2]  Paroxysmal ventricular tachycardia (H) [I47.29]             Comments:  Follow VAD Anticoag protocol:Yes: HeartMate 3  Bridging: per protocol  Date VAD placed: 03/23/22  INR Goal: per referral  Hx Mitral valve repair and not replacement               Anticoagulation Care Providers       Provider Role Specialty Phone number    Dunia Hdz MD Referring Cardiovascular Disease [phone]

## 2024-11-19 ENCOUNTER — LAB (OUTPATIENT)
Dept: LAB | Facility: CLINIC | Age: 59
End: 2024-11-19
Payer: MEDICARE

## 2024-11-19 DIAGNOSIS — Z76.82 AWAITING ORGAN TRANSPLANT: ICD-10-CM

## 2024-11-20 ENCOUNTER — TELEPHONE (OUTPATIENT)
Dept: TRANSPLANT | Facility: CLINIC | Age: 59
End: 2024-11-20
Payer: MEDICARE

## 2024-11-20 VITALS — WEIGHT: 242 LBS | BODY MASS INDEX: 34.72 KG/M2

## 2024-11-20 DIAGNOSIS — T82.7XXA INFECTION ASSOCIATED WITH DRIVELINE OF LEFT VENTRICULAR ASSIST DEVICE (LVAD) (H): Primary | ICD-10-CM

## 2024-11-25 ENCOUNTER — ANTICOAGULATION THERAPY VISIT (OUTPATIENT)
Dept: ANTICOAGULATION | Facility: CLINIC | Age: 59
End: 2024-11-25
Payer: MEDICARE

## 2024-11-25 DIAGNOSIS — Z95.811 LVAD (LEFT VENTRICULAR ASSIST DEVICE) PRESENT (H): Primary | ICD-10-CM

## 2024-11-25 DIAGNOSIS — Z95.2 S/P MITRAL VALVE REPLACEMENT: ICD-10-CM

## 2024-11-25 DIAGNOSIS — I47.29 PAROXYSMAL VENTRICULAR TACHYCARDIA (H): ICD-10-CM

## 2024-11-25 DIAGNOSIS — I50.22 CHRONIC SYSTOLIC CHF (CONGESTIVE HEART FAILURE) (H): ICD-10-CM

## 2024-11-25 LAB — INR HOME MONITORING: 2.7 (ref 2–3)

## 2024-11-25 NOTE — PROGRESS NOTES
ANTICOAGULATION MANAGEMENT     Tej LEELA Morfin 59 year old male is on warfarin with therapeutic INR result. (Goal INR 2.0-3.0)    Recent labs: (last 7 days)     11/25/24  0000   INR 2.7       ASSESSMENT     Source(s): Chart Review and Patient/Caregiver Call     Warfarin doses taken: Warfarin taken as instructed  Diet: No new diet changes identified  Medication/supplement changes: None noted  New illness, injury, or hospitalization: No  Signs or symptoms of bleeding or clotting: No  Previous result: Therapeutic last 2(+) visits  Additional findings: None       PLAN     Recommended plan for no diet, medication or health factor changes affecting INR     Dosing Instructions: Continue your current warfarin dose with next INR in 1 week       Summary  As of 11/25/2024      Full warfarin instructions:  6.25 mg every Mon, Fri; 5 mg all other days   Next INR check:  12/2/2024               Telephone call with Tej who verbalizes understanding and agrees to plan    Patient to recheck with home meter    Education provided: Goal range and lab monitoring: goal range and significance of current result and Importance of therapeutic range    Plan made per [facility] anticoagulation protocol and per LVAD protocol    Benjamin Gaspar RN  11/25/2024  Anticoagulation Clinic  [employer] for routing messages: p ANTICOAG LVAD  [facility] patient phone line: [phone]        _______________________________________________________________________     Anticoagulation Episode Summary       Current INR goal:  2.0-3.0   TTR:  69.1% (12 mo)   Target end date:  Indefinite   Send INR reminders to:  ANTICOAG LVAD    Indications    LVAD (left ventricular assist device) present (H) [Z95.811]  Chronic systolic CHF (congestive heart failure) (H) [I50.22]  S/P mitral valve replacement [Z95.2]  Paroxysmal ventricular tachycardia (H) [I47.29]             Comments:  Follow VAD Anticoag protocol:Yes: HeartMate 3  Bridging: per protocol  Date VAD placed: 03/23/22  INR  Goal: per referral  Hx Mitral valve repair and not replacement               Anticoagulation Care Providers       Provider Role Specialty Phone number    Dunia Hdz MD Referring Cardiovascular Disease [phone]

## 2024-11-27 NOTE — PROGRESS NOTES
ANTICOAGULATION MANAGEMENT     Tej SWENSON Shelbie 58 year old male is on warfarin with therapeutic INR result. (Goal INR 2.0-3.0)    Recent labs: (last 7 days)     03/26/24  0000   INR 2.8       ASSESSMENT     Source(s): Chart Review and Patient/Caregiver Call     Warfarin doses taken: Warfarin taken as instructed  Diet: No new diet changes identified  Medication/supplement changes:  Pt will finish 14 day course of doxycycline for drive infection today.   New illness, injury, or hospitalization:Pt states that drive infection site has improved, occasional fever and chills but states they are fewer and far between.   Signs or symptoms of bleeding or clotting: No  Previous result: Therapeutic last 2(+) visits  Additional findings: None       PLAN     Recommended plan for temporary change(s) affecting INR     Dosing Instructions: Continue your current warfarin dose with next INR in 5-7 days       Summary  As of 3/26/2024      Full warfarin instructions:  5 mg every day   Next INR check:  4/2/2024               Telephone call with Tej who verbalizes understanding and agrees to plan    Patient to recheck with home meter    Education provided:   Goal range and lab monitoring: goal range and significance of current result, Importance of therapeutic range, and Importance of following up at instructed interval    Plan made per [facility] anticoagulation protocol and per LVAD protocol    Benjamin Gaspar RN  Anticoagulation Clinic  3/26/2024    _______________________________________________________________________     Anticoagulation Episode Summary       Current INR goal:  2.0-3.0   TTR:  74.4% (1 y)   Target end date:  Indefinite   Send INR reminders to:  ANTICOAG LVAD    Indications    LVAD (left ventricular assist device) present (H) [Z95.811]  Chronic systolic CHF (congestive heart failure) (H) [I50.22]  S/P mitral valve replacement [Z95.2]  Paroxysmal ventricular tachycardia (H) [I47.29]             Comments:  Follow VAD Anticoag  We reviewed our findings today and her questions were answered.  She understands that her imaging and exams have remained stable (and show nothing concerning).  She is comfortable being followed in a conservative fashion.      She understands the importance of monthly self-breast examination and knows to report any and all changes as they occur.    NOTE:::We viewed her films together at today's visit.  We discussed the multiple views obtained and the important findings.  Even benign changes were mentioned and her questions were answered.  She is to contact us if she has questions.     protocol:Yes: HeartMate 3  Bridging: per protocol  Date VAD placed: 03/23/22  INR Goal: per referral  Hx Mitral valve repair and not replacement             Anticoagulation Care Providers       Provider Role Specialty Phone number    Dunia Hdz MD Referring Cardiovascular Disease [phone]

## 2024-12-02 ENCOUNTER — ANTICOAGULATION THERAPY VISIT (OUTPATIENT)
Dept: ANTICOAGULATION | Facility: CLINIC | Age: 59
End: 2024-12-02
Payer: MEDICARE

## 2024-12-02 DIAGNOSIS — I47.29 PAROXYSMAL VENTRICULAR TACHYCARDIA (H): ICD-10-CM

## 2024-12-02 DIAGNOSIS — Z95.2 S/P MITRAL VALVE REPLACEMENT: ICD-10-CM

## 2024-12-02 DIAGNOSIS — I50.22 CHRONIC SYSTOLIC CHF (CONGESTIVE HEART FAILURE) (H): ICD-10-CM

## 2024-12-02 DIAGNOSIS — Z95.811 LVAD (LEFT VENTRICULAR ASSIST DEVICE) PRESENT (H): Primary | ICD-10-CM

## 2024-12-02 LAB — INR HOME MONITORING: 2.5 (ref 2–3)

## 2024-12-02 NOTE — PROGRESS NOTES
ANTICOAGULATION MANAGEMENT     Tej Morfin 59 year old male is on warfarin with therapeutic INR result. (Goal INR 2.0-3.0)    Recent labs: (last 7 days)     12/02/24  0000   INR 2.5       ASSESSMENT     Source(s): Chart Review and Patient/Caregiver Call     Warfarin doses taken: Warfarin taken as instructed  Diet: No new diet changes identified  Medication/supplement changes: None noted  New illness, injury, or hospitalization: No  Signs or symptoms of bleeding or clotting: No  Previous result: Therapeutic last 2(+) visits (not 2 weeks apart), previously supratherapeutic  Additional findings: None       PLAN     Recommended plan for no diet, medication or health factor changes affecting INR     Dosing Instructions: Continue your current warfarin dose with next INR in 1 week       Summary  As of 12/2/2024      Full warfarin instructions:  6.25 mg every Mon, Fri; 5 mg all other days   Next INR check:  12/9/2024               Telephone call with Tej who verbalizes understanding and agrees to plan    Patient to recheck with home meter    Education provided: Taking warfarin: prescribed tablet strength and color  Goal range and lab monitoring: goal range and significance of current result  Contact [phone] with any changes, questions or concerns.     Plan made per [facility] anticoagulation protocol and per LVAD protocol    Emma Mata RN  12/2/2024  Anticoagulation Clinic  [employer] for routing messages: jaime ANTICOMEERA LVAD  [facility] patient phone line: [phone]        _______________________________________________________________________     Anticoagulation Episode Summary       Current INR goal:  2.0-3.0   TTR:  71.0% (12 mo)   Target end date:  Indefinite   Send INR reminders to:  [facility] LVAD    Indications    LVAD (left ventricular assist device) present (H) [Z95.811]  Chronic systolic CHF (congestive heart failure) (H) [I50.22]  S/P mitral valve replacement [Z95.2]  Paroxysmal ventricular tachycardia (H)  [I47.29]             Comments:  Follow VAD Anticoag protocol:Yes: HeartMate 3  Bridging: per protocol  Date VAD placed: 03/23/22  INR Goal: per referral  Hx Mitral valve repair and not replacement               Anticoagulation Care Providers       Provider Role Specialty Phone number    Dunia Hdz MD Referring Cardiovascular Disease [phone]

## 2024-12-04 NOTE — PROGRESS NOTES
Tej SWENSON [place]  [address] [facility]   [place] [id number] [phone] (home)     PCP:  Halle Jones  H&P completed by:  RICHIE  Admit date 9/22/22 Arrival time:  6:45am  Anticoagulation:  warfarin (COUMADIN)  Diabetic?: No  Device?: Yes  Type:  [employer] G148 INOGEN X4 CRT-D  Ambulation status: Ambulatory    Reason for Visit:  Telephone call to discuss pre-procedure education in preparation for: Right Heart Catheterization    Procedure Prep:  Hemogram results obtained: To be completed on day of cath lab procedure  Basic Metabolic Panel results obtained: To be completed on day of cath lab procedure  Pertinent cardiac test results: See chart  COVID-19 test results obtained: Pt to take at home covid test    Patient Education    1. Your arrival time is 11:30.  Location is [address] Waiting Room  2. Please plan on being at the hospital all day.  3. At any time, emergencies and/or urgent cases may come up which could delay the start of your procedure.    COVID Testing Instructions for [facility]  *Mandatory COVID Testing:   ALL Patients will need to complete a COVID test prior to procedure. This may be done with an at home test, 1-2 days prior to procedure.  If negative result, take a photo of the result and bring this with you to your procedure.  If the result is positive, please contact your coordinator.    If you would prefer to go through a lab with PCR testing, follow these instructions:    To schedule COVID testing Please call [phone]    If you want to complete this at an outside facility please call them to find out if they will have the results within the appropriate time frame and their fax number.  You will need to provide us with that information so we can send the order.    The facility completing the test will need to fax the results to [phone]    Please notify your coordinator if your  test is positive.       Pre-procedure instructions  Patient instructed to not Eat or Drink 3 hours prior to procedure.      Pre-procedure medication instructions.  Continue medications as scheduled, with a small amount of water on the day of the procedure unless indicated. (NO Diabetic Medications. NO Blood Thinners unless patient has a VAD)    Diabetic Medication Instructions:  Per PCP recommendations    Anticoagulation Medication Instructions:    If VAD patient, do not hold for coumadin for this procedure per protocol    Patient states understanding of procedure and agrees to proceed.     No Yes

## 2024-12-09 ENCOUNTER — ANTICOAGULATION THERAPY VISIT (OUTPATIENT)
Dept: ANTICOAGULATION | Facility: CLINIC | Age: 59
End: 2024-12-09
Payer: MEDICARE

## 2024-12-09 DIAGNOSIS — I50.22 CHRONIC SYSTOLIC CHF (CONGESTIVE HEART FAILURE) (H): ICD-10-CM

## 2024-12-09 DIAGNOSIS — Z95.2 S/P MITRAL VALVE REPLACEMENT: ICD-10-CM

## 2024-12-09 DIAGNOSIS — I47.29 PAROXYSMAL VENTRICULAR TACHYCARDIA (H): ICD-10-CM

## 2024-12-09 DIAGNOSIS — Z95.811 LVAD (LEFT VENTRICULAR ASSIST DEVICE) PRESENT (H): Primary | ICD-10-CM

## 2024-12-09 LAB — INR HOME MONITORING: 3.6 (ref 2–3)

## 2024-12-09 NOTE — PROGRESS NOTES
ANTICOAGULATION MANAGEMENT     Tej Morfin 59 year old male is on warfarin with supratherapeutic INR result. (Goal INR 2.0-3.0)    Recent labs: (last 7 days)     12/09/24  0000   INR 3.6*       ASSESSMENT     Source(s): Chart Review and Patient/Caregiver Call     Warfarin doses taken: Warfarin taken as instructed  Diet: No new diet changes identified  Medication/supplement changes:  Oral cefadroxil until 12/17/24- Started November 18  New illness, injury, or hospitalization: Did have a headache last week and took one dose of tylenol   Signs or symptoms of bleeding or clotting: No  Previous result: Therapeutic last 2(+) visits  Additional findings: None       PLAN     Recommended plan for no diet, medication or health factor changes affecting INR     Dosing Instructions: decrease your warfarin dose (6.7% change) with next INR in 1 week       Summary  As of 12/9/2024      Full warfarin instructions:  5 mg every day   Next INR check:  12/16/2024               Telephone call with Tej who verbalizes understanding and agrees to plan and who agrees to plan and repeated back plan correctly    Patient to recheck with home meter    Education provided: Please call back if any changes to your diet, medications or how you've been taking warfarin    Plan made per [facility] anticoagulation protocol and per LVAD protocol    Cecily Tovar RN  12/9/2024  Anticoagulation Clinic  [employer] [place] for routing messages: jaime ANTICOAG LVAD  [facility] patient phone line: [phone]        _______________________________________________________________________     Anticoagulation Episode Summary       Current INR goal:  2.0-3.0   TTR:  71.2% (12 mo)   Target end date:  Indefinite   Send INR reminders to:  [facility] LVAD    Indications    LVAD (left ventricular assist device) present (H) [Z95.811]  Chronic systolic CHF (congestive heart failure) (H) [I50.22]  S/P mitral valve replacement [Z95.2]  Paroxysmal ventricular tachycardia (H) [I47.29]              Comments:  Follow VAD Anticoag protocol:Yes: HeartMate 3  Bridging: per protocol  Date VAD placed: 03/23/22  INR Goal: per referral  Hx Mitral valve repair and not replacement               Anticoagulation Care Providers       Provider Role Specialty Phone number    Dunia Hdz MD Referring Cardiovascular Disease [phone]

## 2024-12-16 ENCOUNTER — ANTICOAGULATION THERAPY VISIT (OUTPATIENT)
Dept: ANTICOAGULATION | Facility: CLINIC | Age: 59
End: 2024-12-16
Payer: MEDICARE

## 2024-12-16 DIAGNOSIS — Z95.2 S/P MITRAL VALVE REPLACEMENT: ICD-10-CM

## 2024-12-16 DIAGNOSIS — I50.22 CHRONIC SYSTOLIC CHF (CONGESTIVE HEART FAILURE) (H): ICD-10-CM

## 2024-12-16 DIAGNOSIS — Z95.811 LVAD (LEFT VENTRICULAR ASSIST DEVICE) PRESENT (H): Primary | ICD-10-CM

## 2024-12-16 DIAGNOSIS — I47.29 PAROXYSMAL VENTRICULAR TACHYCARDIA (H): ICD-10-CM

## 2024-12-16 LAB — INR HOME MONITORING: 3 (ref 2–3)

## 2024-12-16 NOTE — PROGRESS NOTES
ANTICOAGULATION MANAGEMENT     Tej Morfin 59 year old male is on warfarin with therapeutic INR result. (Goal INR 2.0-3.0)    Recent labs: (last 7 days)     12/16/24  0000   INR 3.0       ASSESSMENT     Source(s): Chart Review and Patient/Caregiver Call     Warfarin doses taken: Warfarin taken as instructed  Diet: No new diet changes identified  Medication/supplement changes: Oral cefadroxil until 12/17/24- Started November 18   New illness, injury, or hospitalization: No  Signs or symptoms of bleeding or clotting: No  Previous result: Supratherapeutic-- 6.7%  MD reduction  Additional findings: None       PLAN     Recommended plan for no diet, medication or health factor changes affecting INR     Dosing Instructions: Continue your current warfarin dose with next INR in 1 week       Summary  As of 12/16/2024      Full warfarin instructions:  5 mg every day   Next INR check:  12/23/2024               Telephone call with Tej who verbalizes understanding and agrees to plan and who agrees to plan and repeated back plan correctly    Patient using outside facility for labs    Education provided: Please call back if any changes to your diet, medications or how you've been taking warfarin    Plan made per [facility] anticoagulation protocol and per LVAD protocol    Cecily Tovar RN  12/16/2024  Anticoagulation Clinic  [employer] for routing messages: p ANTICOAG LVAD  [facility] patient phone line: [phone]        _______________________________________________________________________     Anticoagulation Episode Summary       Current INR goal:  2.0-3.0   TTR:  69.2% (12 mo)   Target end date:  Indefinite   Send INR reminders to:  ANTICOAG LVAD    Indications    LVAD (left ventricular assist device) present (H) [Z95.811]  Chronic systolic CHF (congestive heart failure) (H) [I50.22]  S/P mitral valve replacement [Z95.2]  Paroxysmal ventricular tachycardia (H) [I47.29]             Comments:  Follow VAD Anticoag protocol:Yes:  HeartMate 3  Bridging: per protocol  Date VAD placed: 03/23/22  INR Goal: per referral  Hx Mitral valve repair and not replacement               Anticoagulation Care Providers       Provider Role Specialty Phone number    Dunia Hdz MD Referring Cardiovascular Disease [phone]

## 2024-12-23 ENCOUNTER — ANTICOAGULATION THERAPY VISIT (OUTPATIENT)
Dept: ANTICOAGULATION | Facility: CLINIC | Age: 59
End: 2024-12-23
Payer: MEDICARE

## 2024-12-23 DIAGNOSIS — I47.29 PAROXYSMAL VENTRICULAR TACHYCARDIA (H): ICD-10-CM

## 2024-12-23 DIAGNOSIS — Z95.811 LVAD (LEFT VENTRICULAR ASSIST DEVICE) PRESENT (H): Primary | ICD-10-CM

## 2024-12-23 DIAGNOSIS — I50.22 CHRONIC SYSTOLIC CHF (CONGESTIVE HEART FAILURE) (H): ICD-10-CM

## 2024-12-23 DIAGNOSIS — Z95.2 S/P MITRAL VALVE REPLACEMENT: ICD-10-CM

## 2024-12-23 LAB — INR HOME MONITORING: 2.6 (ref 2–3)

## 2024-12-23 NOTE — PROGRESS NOTES
ANTICOAGULATION MANAGEMENT     Tej Morfin 59 year old male is on warfarin with therapeutic INR result. (Goal INR 2.0-3.0)    Recent labs: (last 7 days)     12/23/24  0000   INR 2.6       ASSESSMENT     Source(s): Chart Review and Patient/Caregiver Call     Warfarin doses taken: Warfarin taken as instructed  Diet: No new diet changes identified  Medication/supplement changes:  one week of PO antibiotics left.   New illness, injury, or hospitalization: No  Signs or symptoms of bleeding or clotting: No  Previous result: Therapeutic last visit; previously outside of goal range  Additional findings: None       PLAN     Recommended plan for temporary change(s) affecting INR     Dosing Instructions: Continue your current warfarin dose with next INR in 2 weeks       Summary  As of 12/23/2024      Full warfarin instructions:  5 mg every day   Next INR check:  1/6/2025               Telephone call with Tej who agrees to plan and repeated back plan correctly    Patient to recheck with home meter    Education provided: Goal range and lab monitoring: goal range and significance of current result and Importance of following up at instructed interval  Symptom monitoring: monitoring for bleeding signs and symptoms  Contact [phone] with any changes, questions or concerns.     Plan made per [facility] anticoagulation protocol and per LVAD protocol    SAVANNA MO RN  12/23/2024  Anticoagulation Clinic  [employer] for routing messages: p ANTICOAG LVAD  [employer] patient phone line: [phone]        _______________________________________________________________________     Anticoagulation Episode Summary       Current INR goal:  2.0-3.0   TTR:  69.7% (12 mo)   Target end date:  Indefinite   Send INR reminders to:  ANTICOAG LVAD    Indications    LVAD (left ventricular assist device) present (H) [Z95.811]  Chronic systolic CHF (congestive heart failure) (H) [I50.22]  S/P mitral valve replacement [Z95.2]  Paroxysmal ventricular  tachycardia (H) [I47.29]             Comments:  Follow VAD Anticoag protocol:Yes: HeartMate 3  Bridging: per protocol  Date VAD placed: 03/23/22  INR Goal: per referral  Hx Mitral valve repair and not replacement               Anticoagulation Care Providers       Provider Role Specialty Phone number    Dunia Hdz MD Referring Cardiovascular Disease [phone]

## 2025-01-06 DIAGNOSIS — Z95.811 LVAD (LEFT VENTRICULAR ASSIST DEVICE) PRESENT (H): ICD-10-CM

## 2025-01-06 DIAGNOSIS — I50.22 CHRONIC SYSTOLIC CHF (CONGESTIVE HEART FAILURE) (H): ICD-10-CM

## 2025-01-06 RX ORDER — WARFARIN SODIUM 2.5 MG/1
TABLET ORAL
Qty: 180 TABLET | Refills: 1 | Status: SHIPPED | OUTPATIENT
Start: 2025-01-06

## 2025-01-06 NOTE — TELEPHONE ENCOUNTER
ANTICOAGULATION MANAGEMENT:  Medication Refill    Anticoagulation Summary  As of 12/23/2024      Warfarin maintenance plan:  5 mg (2.5 mg x 2) every day   Next INR check:  1/6/2025   Target end date:  Indefinite    Indications    LVAD (left ventricular assist device) present (H) [Z95.811]  Chronic systolic CHF (congestive heart failure) (H) [I50.22]  S/P mitral valve replacement [Z95.2]  Paroxysmal ventricular tachycardia (H) [I47.29]                 Anticoagulation Care Providers       Provider Role Specialty Phone number    Dunia Hdz MD Referring Cardiovascular Disease [phone]            Refill Criteria    Visit with referring provider/group: Meets criteria: visit within referring provider group in the last 15 months on 10/7/24    [facility] referral last signed: 02/27/2024; within last year:  Yes    Lab monitoring not exceeding 2 weeks overdue: Yes    Tej meets all criteria for refill. Rx instructions and quantity supplied updated to match patient's current dosing plan. Warfarin 90 day supply with 1 refill granted per [facility] protocol     Rosario Dyer RN  Anticoagulation Clinic

## 2025-01-07 DIAGNOSIS — T82.7XXA INFECTION ASSOCIATED WITH DRIVELINE OF LEFT VENTRICULAR ASSIST DEVICE (LVAD): ICD-10-CM

## 2025-01-08 ENCOUNTER — ANTICOAGULATION THERAPY VISIT (OUTPATIENT)
Dept: ANTICOAGULATION | Facility: CLINIC | Age: 60
End: 2025-01-08
Payer: MEDICARE

## 2025-01-08 DIAGNOSIS — I50.22 CHRONIC SYSTOLIC CHF (CONGESTIVE HEART FAILURE) (H): ICD-10-CM

## 2025-01-08 DIAGNOSIS — Z95.2 S/P MITRAL VALVE REPLACEMENT: ICD-10-CM

## 2025-01-08 DIAGNOSIS — I47.29 PAROXYSMAL VENTRICULAR TACHYCARDIA (H): ICD-10-CM

## 2025-01-08 DIAGNOSIS — Z95.811 LVAD (LEFT VENTRICULAR ASSIST DEVICE) PRESENT (H): Primary | ICD-10-CM

## 2025-01-08 LAB — INR HOME MONITORING: 2.2 (ref 2–3)

## 2025-01-08 RX ORDER — CEFADROXIL 500 MG/1
1000 CAPSULE ORAL 2 TIMES DAILY
Qty: 120 CAPSULE | Refills: 1 | Status: SHIPPED | OUTPATIENT
Start: 2025-01-08

## 2025-01-08 NOTE — PROGRESS NOTES
ANTICOAGULATION MANAGEMENT     Tej Morfin 59 year old male is on warfarin with therapeutic INR result. (Goal INR 2.0-3.0)    Recent labs: (last 7 days)     01/08/25  0000   INR 2.2       ASSESSMENT     Source(s): Chart Review     Warfarin doses taken: Reviewed in chart  Diet:  [place]  Medication/supplement changes:  From chart review there is note about Cefadroxil being indefinite (and see pending refill for this 1/7/25) but then also see from last [facility] encounter 12/23/24 that Tej had 1 week of PO antibiotic left. If stopped Cefadroxil, could explain decrease in INR.  New illness, injury, or hospitalization: [place]  Signs or symptoms of bleeding or clotting: [place]  Previous result: Therapeutic last 2(+) visits (not 2 weeks apart, previously supratherapeutic)  Additional findings: None       PLAN     Recommended plan for no diet, medication or health factor changes affecting INR     Dosing Instructions: Continue your current warfarin dose with next INR in 1-2 weeks (1 week if stopped antibiotic end of December)      Summary  As of 1/8/2025      Full warfarin instructions:  5 mg every day   Next INR check:  1/22/2025               Detailed voice message left for Tej with dosing instructions and follow up date.   Sent [employer] message with dosing and follow up instructions    Patient to recheck with home meter or ask for INR check with other labs 1/16/25 if stopped antibiotic end of December.    Education provided: Please call back if any changes to your diet, medications or how you've been taking warfarin  Taking warfarin: prescribed tablet strength and color  Goal range and lab monitoring: goal range and significance of current result and Importance of following up at instructed interval  Interaction IS anticipated between warfarin and Cefadroxil  Symptom monitoring: monitoring for bleeding signs and symptoms  Importance of notifying anticoagulation clinic for: changes in medications; a sooner lab recheck maybe  needed, upcoming surgeries and procedures scheduled, and health changes  Written instructions provided  Contact [phone] with any changes, questions or concerns.     Plan made per [facility] anticoagulation protocol and per LVAD protocol    Emma Mata RN  1/8/2025  Anticoagulation Clinic  [facility] for routing messages: p ANTICOAG LVAD  [facility] patient phone line: [phone]        _______________________________________________________________________     Anticoagulation Episode Summary       Current INR goal:  2.0-3.0   TTR:  74.1% (12 mo)   Target end date:  Indefinite   Send INR reminders to:  ANTICOAG LVAD    Indications    LVAD (left ventricular assist device) present (H) [Z95.811]  Chronic systolic CHF (congestive heart failure) (H) [I50.22]  S/P mitral valve replacement [Z95.2]  Paroxysmal ventricular tachycardia (H) [I47.29]             Comments:  Follow VAD Anticoag protocol:Yes: HeartMate 3  Bridging: per protocol  Date VAD placed: 03/23/22  INR Goal: per referral  Hx Mitral valve repair and not replacement               Anticoagulation Care Providers       Provider Role Specialty Phone number    Dunia Hdz MD Referring Cardiovascular Disease [phone]

## 2025-01-08 NOTE — TELEPHONE ENCOUNTER
"Medication Refill                                                     Refill request receive for: Cefadroxil    Last refill: 10/18/2024    Last Office Visit 10/7/2024  Future appt scheduled? Yes: 1/16/2025     POC for medication if indicated from last note including duration of treatment if applicable: Received a few rounds of doxycycline and then transitioned to on cefadroxil antibiotic suppression (started April).     Therapy appears to be complete.   RECENT LABS/VITALS                                                        Lab Results   Component Value Date    AST 15 10/07/2024    AST 17 09/10/2019     Lab Results   Component Value Date    ALT 14 10/07/2024    ALT 25 09/10/2019     Creatinine   Date Value Ref Range Status   10/07/2024 1.24 (H) 0.67 - 1.17 mg/dL Final   09/10/2019 1.23 0.66 - 1.25 mg/dL Final   ]  Alkaline Phosphatase   Date/Time Value Ref Range Status   10/07/2024 12:47 PM 81 40 - 150 U/L Final   09/10/2019 06:49 AM 61 40 - 150 U/L Final     No results found for: \"LABAPCBCDIFF\"                      "

## 2025-01-14 DIAGNOSIS — I50.22 CHRONIC SYSTOLIC CONGESTIVE HEART FAILURE (H): ICD-10-CM

## 2025-01-14 DIAGNOSIS — T82.7XXA INFECTION ASSOCIATED WITH DRIVELINE OF LEFT VENTRICULAR ASSIST DEVICE (LVAD): ICD-10-CM

## 2025-01-14 DIAGNOSIS — Z79.899 LONG TERM USE OF DRUG: Primary | ICD-10-CM

## 2025-01-14 DIAGNOSIS — Z95.811 LEFT VENTRICULAR ASSIST DEVICE PRESENT (H): ICD-10-CM

## 2025-01-16 ENCOUNTER — LAB (OUTPATIENT)
Dept: LAB | Facility: CLINIC | Age: 60
End: 2025-01-16
Attending: INTERNAL MEDICINE
Payer: MEDICARE

## 2025-01-16 ENCOUNTER — OFFICE VISIT (OUTPATIENT)
Dept: INFECTIOUS DISEASES | Facility: CLINIC | Age: 60
End: 2025-01-16
Attending: INTERNAL MEDICINE
Payer: MEDICARE

## 2025-01-16 ENCOUNTER — ANCILLARY PROCEDURE (OUTPATIENT)
Dept: ULTRASOUND IMAGING | Facility: CLINIC | Age: 60
End: 2025-01-16
Attending: INTERNAL MEDICINE
Payer: MEDICARE

## 2025-01-16 ENCOUNTER — TELEPHONE (OUTPATIENT)
Dept: INFECTIOUS DISEASES | Facility: CLINIC | Age: 60
End: 2025-01-16

## 2025-01-16 ENCOUNTER — VIRTUAL VISIT (OUTPATIENT)
Dept: CARDIOLOGY | Facility: CLINIC | Age: 60
End: 2025-01-16
Attending: INTERNAL MEDICINE
Payer: MEDICARE

## 2025-01-16 ENCOUNTER — ANCILLARY PROCEDURE (OUTPATIENT)
Dept: CT IMAGING | Facility: CLINIC | Age: 60
End: 2025-01-16
Attending: INTERNAL MEDICINE
Payer: MEDICARE

## 2025-01-16 ENCOUNTER — ANTICOAGULATION THERAPY VISIT (OUTPATIENT)
Dept: ANTICOAGULATION | Facility: CLINIC | Age: 60
End: 2025-01-16

## 2025-01-16 VITALS — HEART RATE: 75 BPM | OXYGEN SATURATION: 96 % | BODY MASS INDEX: 36.73 KG/M2 | WEIGHT: 256 LBS

## 2025-01-16 VITALS — WEIGHT: 256 LBS | BODY MASS INDEX: 36.73 KG/M2

## 2025-01-16 DIAGNOSIS — Z95.811 LVAD (LEFT VENTRICULAR ASSIST DEVICE) PRESENT (H): ICD-10-CM

## 2025-01-16 DIAGNOSIS — Z95.2 S/P MITRAL VALVE REPLACEMENT: ICD-10-CM

## 2025-01-16 DIAGNOSIS — I50.22 CHRONIC SYSTOLIC CHF (CONGESTIVE HEART FAILURE) (H): ICD-10-CM

## 2025-01-16 DIAGNOSIS — I50.22 CHRONIC SYSTOLIC (CONGESTIVE) HEART FAILURE (H): ICD-10-CM

## 2025-01-16 DIAGNOSIS — T82.7XXA INFECTION ASSOCIATED WITH DRIVELINE OF LEFT VENTRICULAR ASSIST DEVICE (LVAD): ICD-10-CM

## 2025-01-16 DIAGNOSIS — I47.29 PAROXYSMAL VENTRICULAR TACHYCARDIA (H): ICD-10-CM

## 2025-01-16 DIAGNOSIS — Z76.82 AWAITING ORGAN TRANSPLANT: ICD-10-CM

## 2025-01-16 DIAGNOSIS — R09.89 OTHER SPECIFIED SYMPTOMS AND SIGNS INVOLVING THE CIRCULATORY AND RESPIRATORY SYSTEMS: ICD-10-CM

## 2025-01-16 DIAGNOSIS — Z95.811 LVAD (LEFT VENTRICULAR ASSIST DEVICE) PRESENT (H): Primary | ICD-10-CM

## 2025-01-16 DIAGNOSIS — Z79.899 LONG TERM USE OF DRUG: ICD-10-CM

## 2025-01-16 DIAGNOSIS — Z01.810 ENCOUNTER FOR PREPROCEDURAL CARDIOVASCULAR EXAMINATION: ICD-10-CM

## 2025-01-16 DIAGNOSIS — I42.8 VALVULAR CARDIOMYOPATHY (H): ICD-10-CM

## 2025-01-16 DIAGNOSIS — Z95.811 LEFT VENTRICULAR ASSIST DEVICE PRESENT (H): ICD-10-CM

## 2025-01-16 DIAGNOSIS — I50.22 CHRONIC SYSTOLIC CONGESTIVE HEART FAILURE (H): ICD-10-CM

## 2025-01-16 LAB
ALBUMIN SERPL BCG-MCNC: 3.5 G/DL (ref 3.5–5.2)
ALP SERPL-CCNC: 83 U/L (ref 40–150)
ALT SERPL W P-5'-P-CCNC: 22 U/L (ref 0–70)
ANION GAP SERPL CALCULATED.3IONS-SCNC: 10 MMOL/L (ref 7–15)
AST SERPL W P-5'-P-CCNC: 16 U/L (ref 0–45)
BASOPHILS # BLD AUTO: 0 10E3/UL (ref 0–0.2)
BASOPHILS NFR BLD AUTO: 1 %
BILIRUB SERPL-MCNC: 0.2 MG/DL
BUN SERPL-MCNC: 20.3 MG/DL (ref 6–20)
CALCIUM SERPL-MCNC: 9.5 MG/DL (ref 8.8–10.4)
CHLORIDE SERPL-SCNC: 108 MMOL/L (ref 98–107)
CREAT SERPL-MCNC: 1.33 MG/DL (ref 0.67–1.17)
CRP SERPL-MCNC: 36.6 MG/L
EGFRCR SERPLBLD CKD-EPI 2021: 62 ML/MIN/1.73M2
EOSINOPHIL # BLD AUTO: 0.1 10E3/UL (ref 0–0.7)
EOSINOPHIL NFR BLD AUTO: 1 %
ERYTHROCYTE [DISTWIDTH] IN BLOOD BY AUTOMATED COUNT: 15.6 % (ref 10–15)
ERYTHROCYTE [SEDIMENTATION RATE] IN BLOOD BY WESTERGREN METHOD: 69 MM/HR (ref 0–20)
FERRITIN SERPL-MCNC: 140 NG/ML (ref 31–409)
GLUCOSE SERPL-MCNC: 96 MG/DL (ref 70–99)
HCO3 SERPL-SCNC: 23 MMOL/L (ref 22–29)
HCT VFR BLD AUTO: 37.9 % (ref 40–53)
HGB BLD-MCNC: 11.8 G/DL (ref 13.3–17.7)
IMM GRANULOCYTES # BLD: 0.1 10E3/UL
IMM GRANULOCYTES NFR BLD: 2 %
INR PPP: 1.98 (ref 0.85–1.15)
IRON BINDING CAPACITY (ROCHE): 277 UG/DL (ref 240–430)
IRON SATN MFR SERPL: 14 % (ref 15–46)
IRON SERPL-MCNC: 40 UG/DL (ref 61–157)
LDH SERPL L TO P-CCNC: 183 U/L (ref 0–250)
LYMPHOCYTES # BLD AUTO: 1 10E3/UL (ref 0.8–5.3)
LYMPHOCYTES NFR BLD AUTO: 19 %
Lab: NORMAL
MCH RBC QN AUTO: 28.4 PG (ref 26.5–33)
MCHC RBC AUTO-ENTMCNC: 31.1 G/DL (ref 31.5–36.5)
MCV RBC AUTO: 91 FL (ref 78–100)
MDC_IDC_LEAD_CONNECTION_STATUS: NORMAL
MDC_IDC_LEAD_IMPLANT_DT: NORMAL
MDC_IDC_LEAD_LOCATION: NORMAL
MDC_IDC_LEAD_LOCATION_DETAIL_1: NORMAL
MDC_IDC_LEAD_MFG: NORMAL
MDC_IDC_LEAD_MODEL: NORMAL
MDC_IDC_LEAD_POLARITY_TYPE: NORMAL
MDC_IDC_LEAD_SERIAL: NORMAL
MDC_IDC_MSMT_BATTERY_REMAINING_LONGEVITY: 24 MO
MDC_IDC_MSMT_BATTERY_REMAINING_PERCENTAGE: 32.4 %
MDC_IDC_MSMT_BATTERY_STATUS: NORMAL
MDC_IDC_MSMT_CAP_CHARGE_DTM: NORMAL
MDC_IDC_MSMT_CAP_CHARGE_ENERGY: 31 J
MDC_IDC_MSMT_CAP_CHARGE_TIME: 14.52 S
MDC_IDC_MSMT_CAP_CHARGE_TIME: 7.98 S
MDC_IDC_MSMT_CAP_CHARGE_TYPE: NORMAL
MDC_IDC_MSMT_CAP_CHARGE_TYPE: NORMAL
MDC_IDC_MSMT_LEADCHNL_LV_IMPEDANCE_VALUE: 960 OHM
MDC_IDC_MSMT_LEADCHNL_LV_PACING_THRESHOLD_AMPLITUDE: 0.7 V
MDC_IDC_MSMT_LEADCHNL_LV_PACING_THRESHOLD_PULSEWIDTH: 0.1 MS
MDC_IDC_MSMT_LEADCHNL_LV_SENSING_INTR_AMPL: 0.8 MV
MDC_IDC_MSMT_LEADCHNL_RA_IMPEDANCE_VALUE: 514 OHM
MDC_IDC_MSMT_LEADCHNL_RA_PACING_THRESHOLD_AMPLITUDE: 0.5 V
MDC_IDC_MSMT_LEADCHNL_RA_PACING_THRESHOLD_PULSEWIDTH: 0.5 MS
MDC_IDC_MSMT_LEADCHNL_RA_SENSING_INTR_AMPL: 6.5 MV
MDC_IDC_MSMT_LEADCHNL_RV_IMPEDANCE_VALUE: 383 OHM
MDC_IDC_MSMT_LEADCHNL_RV_PACING_THRESHOLD_AMPLITUDE: 1.2 V
MDC_IDC_MSMT_LEADCHNL_RV_PACING_THRESHOLD_PULSEWIDTH: 0.5 MS
MDC_IDC_MSMT_LEADCHNL_RV_SENSING_INTR_AMPL: 11.3 MV
MDC_IDC_PG_IMPLANT_DTM: NORMAL
MDC_IDC_PG_MFG: NORMAL
MDC_IDC_PG_MODEL: NORMAL
MDC_IDC_PG_SERIAL: NORMAL
MDC_IDC_PG_TYPE: NORMAL
MDC_IDC_SESS_CLINIC_NAME: NORMAL
MDC_IDC_SESS_DTM: NORMAL
MDC_IDC_SESS_TYPE: NORMAL
MDC_IDC_SET_BRADY_AT_MODE_SWITCH_MODE: NORMAL
MDC_IDC_SET_BRADY_AT_MODE_SWITCH_RATE: 170 {BEATS}/MIN
MDC_IDC_SET_BRADY_LOWRATE: 80 {BEATS}/MIN
MDC_IDC_SET_BRADY_MAX_SENSOR_RATE: 120 {BEATS}/MIN
MDC_IDC_SET_BRADY_MAX_TRACKING_RATE: 130 {BEATS}/MIN
MDC_IDC_SET_BRADY_MODE: NORMAL
MDC_IDC_SET_BRADY_PAV_DELAY_HIGH: 280 MS
MDC_IDC_SET_BRADY_PAV_DELAY_LOW: 300 MS
MDC_IDC_SET_BRADY_SAV_DELAY_HIGH: 280 MS
MDC_IDC_SET_BRADY_SAV_DELAY_LOW: 300 MS
MDC_IDC_SET_CRT_LVRV_DELAY: 80 MS
MDC_IDC_SET_CRT_PACED_CHAMBERS: NORMAL
MDC_IDC_SET_LEADCHNL_LV_PACING_AMPLITUDE: 0.1 V
MDC_IDC_SET_LEADCHNL_LV_PACING_CAPTURE_MODE: NORMAL
MDC_IDC_SET_LEADCHNL_LV_PACING_POLARITY: NORMAL
MDC_IDC_SET_LEADCHNL_LV_PACING_PULSEWIDTH: 0.1 MS
MDC_IDC_SET_LEADCHNL_LV_SENSING_ADAPTATION_MODE: NORMAL
MDC_IDC_SET_LEADCHNL_LV_SENSING_POLARITY: NORMAL
MDC_IDC_SET_LEADCHNL_LV_SENSING_SENSITIVITY: 1 MV
MDC_IDC_SET_LEADCHNL_RA_PACING_AMPLITUDE: 1.5 V
MDC_IDC_SET_LEADCHNL_RA_PACING_CAPTURE_MODE: NORMAL
MDC_IDC_SET_LEADCHNL_RA_PACING_POLARITY: NORMAL
MDC_IDC_SET_LEADCHNL_RA_PACING_PULSEWIDTH: 0.5 MS
MDC_IDC_SET_LEADCHNL_RA_SENSING_ADAPTATION_MODE: NORMAL
MDC_IDC_SET_LEADCHNL_RA_SENSING_POLARITY: NORMAL
MDC_IDC_SET_LEADCHNL_RA_SENSING_SENSITIVITY: 0.25 MV
MDC_IDC_SET_LEADCHNL_RV_PACING_AMPLITUDE: 2.8 V
MDC_IDC_SET_LEADCHNL_RV_PACING_CAPTURE_MODE: NORMAL
MDC_IDC_SET_LEADCHNL_RV_PACING_POLARITY: NORMAL
MDC_IDC_SET_LEADCHNL_RV_PACING_PULSEWIDTH: 0.5 MS
MDC_IDC_SET_LEADCHNL_RV_SENSING_ADAPTATION_MODE: NORMAL
MDC_IDC_SET_LEADCHNL_RV_SENSING_POLARITY: NORMAL
MDC_IDC_SET_LEADCHNL_RV_SENSING_SENSITIVITY: 0.6 MV
MDC_IDC_SET_ZONE_DETECTION_INTERVAL: 273 MS
MDC_IDC_SET_ZONE_DETECTION_INTERVAL: 375 MS
MDC_IDC_SET_ZONE_STATUS: NORMAL
MDC_IDC_SET_ZONE_TYPE: NORMAL
MDC_IDC_SET_ZONE_VENDOR_TYPE: NORMAL
MDC_IDC_STAT_BRADY_DTM_END: NORMAL
MDC_IDC_STAT_BRADY_DTM_START: NORMAL
MDC_IDC_STAT_BRADY_RA_PERCENT_PACED: 95 %
MDC_IDC_STAT_BRADY_RV_PERCENT_PACED: 3 %
MDC_IDC_STAT_CRT_LV_PERCENT_PACED: 1 %
MDC_IDC_STAT_EPISODE_RECENT_COUNT: 0
MDC_IDC_STAT_EPISODE_RECENT_COUNT: 0
MDC_IDC_STAT_EPISODE_RECENT_COUNT: 21
MDC_IDC_STAT_EPISODE_RECENT_COUNT_DTM_END: NORMAL
MDC_IDC_STAT_EPISODE_RECENT_COUNT_DTM_START: NORMAL
MDC_IDC_STAT_EPISODE_TOTAL_COUNT: 12
MDC_IDC_STAT_EPISODE_TOTAL_COUNT: 120
MDC_IDC_STAT_EPISODE_TOTAL_COUNT: 6
MDC_IDC_STAT_EPISODE_TOTAL_COUNT_DTM_END: NORMAL
MDC_IDC_STAT_EPISODE_TYPE: NORMAL
MDC_IDC_STAT_EPISODE_VENDOR_TYPE: NORMAL
MDC_IDC_STAT_TACHYTHERAPY_ATP_DELIVERED_RECENT: 0
MDC_IDC_STAT_TACHYTHERAPY_ATP_DELIVERED_TOTAL: 5
MDC_IDC_STAT_TACHYTHERAPY_RECENT_DTM_END: NORMAL
MDC_IDC_STAT_TACHYTHERAPY_RECENT_DTM_START: NORMAL
MDC_IDC_STAT_TACHYTHERAPY_SHOCKS_ABORTED_RECENT: 0
MDC_IDC_STAT_TACHYTHERAPY_SHOCKS_ABORTED_TOTAL: 1
MDC_IDC_STAT_TACHYTHERAPY_SHOCKS_DELIVERED_RECENT: 0
MDC_IDC_STAT_TACHYTHERAPY_SHOCKS_DELIVERED_TOTAL: 5
MDC_IDC_STAT_TACHYTHERAPY_TOTAL_DTM_END: NORMAL
MONOCYTES # BLD AUTO: 0.4 10E3/UL (ref 0–1.3)
MONOCYTES NFR BLD AUTO: 8 %
NEUTROPHILS # BLD AUTO: 3.7 10E3/UL (ref 1.6–8.3)
NEUTROPHILS NFR BLD AUTO: 70 %
NRBC # BLD AUTO: 0 10E3/UL
NRBC BLD AUTO-RTO: 0 /100
NT-PROBNP SERPL-MCNC: 648 PG/ML (ref 0–900)
PERFORMING LABORATORY: NORMAL
PLATELET # BLD AUTO: 247 10E3/UL (ref 150–450)
POTASSIUM SERPL-SCNC: 4.9 MMOL/L (ref 3.4–5.3)
PROCALCITONIN SERPL IA-MCNC: 0.08 NG/ML
PROT SERPL-MCNC: 7.4 G/DL (ref 6.4–8.3)
RBC # BLD AUTO: 4.15 10E6/UL (ref 4.4–5.9)
SODIUM SERPL-SCNC: 141 MMOL/L (ref 135–145)
SPECIMEN STATUS: NORMAL
TEST NAME: NORMAL
TRANSFERRIN SERPL-MCNC: 230 MG/DL (ref 200–360)
WBC # BLD AUTO: 5.3 10E3/UL (ref 4–11)

## 2025-01-16 PROCEDURE — 93925 LOWER EXTREMITY STUDY: CPT | Performed by: RADIOLOGY

## 2025-01-16 PROCEDURE — 84466 ASSAY OF TRANSFERRIN: CPT | Performed by: INTERNAL MEDICINE

## 2025-01-16 PROCEDURE — 99000 SPECIMEN HANDLING OFFICE-LAB: CPT | Performed by: PATHOLOGY

## 2025-01-16 PROCEDURE — 70450 CT HEAD/BRAIN W/O DYE: CPT | Mod: GC | Performed by: RADIOLOGY

## 2025-01-16 PROCEDURE — 76700 US EXAM ABDOM COMPLETE: CPT | Mod: GC | Performed by: STUDENT IN AN ORGANIZED HEALTH CARE EDUCATION/TRAINING PROGRAM

## 2025-01-16 PROCEDURE — 83540 ASSAY OF IRON: CPT | Performed by: PATHOLOGY

## 2025-01-16 PROCEDURE — 80307 DRUG TEST PRSMV CHEM ANLYZR: CPT | Performed by: PATHOLOGY

## 2025-01-16 PROCEDURE — 82728 ASSAY OF FERRITIN: CPT | Performed by: PATHOLOGY

## 2025-01-16 PROCEDURE — 87070 CULTURE OTHR SPECIMN AEROBIC: CPT | Performed by: INTERNAL MEDICINE

## 2025-01-16 PROCEDURE — 93283 PRGRMG EVAL IMPLANTABLE DFB: CPT | Performed by: INTERNAL MEDICINE

## 2025-01-16 PROCEDURE — G0480 DRUG TEST DEF 1-7 CLASSES: HCPCS | Mod: 90 | Performed by: PATHOLOGY

## 2025-01-16 PROCEDURE — 83550 IRON BINDING TEST: CPT | Performed by: PATHOLOGY

## 2025-01-16 PROCEDURE — 85652 RBC SED RATE AUTOMATED: CPT | Performed by: PATHOLOGY

## 2025-01-16 PROCEDURE — 87040 BLOOD CULTURE FOR BACTERIA: CPT | Performed by: INTERNAL MEDICINE

## 2025-01-16 PROCEDURE — 84238 ASSAY NONENDOCRINE RECEPTOR: CPT | Mod: 90 | Performed by: PATHOLOGY

## 2025-01-16 PROCEDURE — 84145 PROCALCITONIN (PCT): CPT | Performed by: INTERNAL MEDICINE

## 2025-01-16 PROCEDURE — 86140 C-REACTIVE PROTEIN: CPT | Performed by: PATHOLOGY

## 2025-01-16 PROCEDURE — 93970 EXTREMITY STUDY: CPT | Performed by: RADIOLOGY

## 2025-01-16 PROCEDURE — 93975 VASCULAR STUDY: CPT | Mod: GC | Performed by: STUDENT IN AN ORGANIZED HEALTH CARE EDUCATION/TRAINING PROGRAM

## 2025-01-16 PROCEDURE — 85610 PROTHROMBIN TIME: CPT | Performed by: PATHOLOGY

## 2025-01-16 PROCEDURE — 36415 COLL VENOUS BLD VENIPUNCTURE: CPT | Performed by: PATHOLOGY

## 2025-01-16 PROCEDURE — 85025 COMPLETE CBC W/AUTO DIFF WBC: CPT | Performed by: PATHOLOGY

## 2025-01-16 PROCEDURE — 80053 COMPREHEN METABOLIC PANEL: CPT | Performed by: PATHOLOGY

## 2025-01-16 PROCEDURE — 83880 ASSAY OF NATRIURETIC PEPTIDE: CPT | Performed by: PATHOLOGY

## 2025-01-16 PROCEDURE — 83615 LACTATE (LD) (LDH) ENZYME: CPT | Performed by: PATHOLOGY

## 2025-01-16 RX ORDER — MINOCYCLINE HYDROCHLORIDE 100 MG/1
100 TABLET ORAL 2 TIMES DAILY
Qty: 60 TABLET | Refills: 2 | Status: SHIPPED | OUTPATIENT
Start: 2025-01-16 | End: 2025-01-22

## 2025-01-16 ASSESSMENT — PAIN SCALES - GENERAL
PAINLEVEL_OUTOF10: MILD PAIN (3)
PAINLEVEL_OUTOF10: NO PAIN (0)

## 2025-01-16 NOTE — PROGRESS NOTES
"ANTICOAGULATION MANAGEMENT     Tej Morfin 59 year old male is on warfarin with subtherapeutic INR result. (Goal INR 2.0-3.0)    Recent labs: (last 7 days)     01/16/25  1335   INR 1.98*       ASSESSMENT     Source(s): Chart Review and Patient/Caregiver Call     Warfarin doses taken: Warfarin taken as instructed  Diet:  had \"stomach issues\" this past week with diarrhea, less PO intake, emesis. Reports that symptoms are resolved  Medication/supplement changes:  ID OV today: Starting today, please take both cefadroxil (1000mg BID) and doxycycline (100 mg BID). We will plan to see you in a month to determine the duration.  Minocycline ordered. Concurrent use of MINOCYCLINE and ANTICOAGULANTS may result in an increased risk of bleeding.  New illness, injury, or hospitalization: Yes: see above  Signs or symptoms of bleeding or clotting: No  Previous result: Therapeutic last 2(+) visits  Additional findings: None       PLAN     Recommended plan for ongoing change(s) affecting INR     Dosing Instructions: Continue your current warfarin dose with next INR in 5 days       Summary  As of 1/16/2025      Full warfarin instructions:  5 mg every day   Next INR check:  1/21/2025               Telephone call with Tej who agrees to plan and repeated back plan correctly    Patient to recheck with home meter    Education provided: Goal range and lab monitoring: goal range and significance of current result and Importance of following up at instructed interval  Interaction IS anticipated between warfarin and antibiotics  Symptom monitoring: monitoring for bleeding signs and symptoms and when to seek medical attention/emergency care  Contact [phone] with any changes, questions or concerns.     Plan made per [facility] anticoagulation protocol and per LVAD protocol    SAVANNA MO RN  1/16/2025  Anticoagulation Clinic  [employer] for routing messages: jaime ANTICOAG LVAD  [facility] patient phone line: " [phone]        _______________________________________________________________________     Anticoagulation Episode Summary       Current INR goal:  2.0-3.0   TTR:  76.2% (1 y)   Target end date:  Indefinite   Send INR reminders to:  ANTICOAG LVAD    Indications    LVAD (left ventricular assist device) present (H) [Z95.811]  Chronic systolic CHF (congestive heart failure) (H) [I50.22]  S/P mitral valve replacement [Z95.2]  Paroxysmal ventricular tachycardia (H) [I47.29]             Comments:  Follow VAD Anticoag protocol:Yes: HeartMate 3  Bridging: per protocol  Date VAD placed: 03/23/22  INR Goal: per referral  Hx Mitral valve repair and not replacement               Anticoagulation Care Providers       Provider Role Specialty Phone number    Dunia Hdz MD Referring Cardiovascular Disease [phone]

## 2025-01-16 NOTE — PROGRESS NOTES
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    His infection, first diagnosed in March 2024, has been recurrent and refractory despite multiple courses of antibiotics. Cultures grew MSSA multiple times, for which he received doxycycline, followed by suppressive cefadroxil therapy. He subsequently was admitted in August 2024 with concern for worsening driveline site infection and receibed a course of IV therapy from 8/30 to 10/21, which improved but did not fully resolve the drainage. No surgical procedures were performed. Suppressive cefadroxil was resumed after the completion of IV therapy, but now he presents with somewhat worsening symptoms, with increased discharge and irritation at the driveline site.     His current presentation is concerning for ongoing chronic infection at the driveline site, potentially refractory to cefadroxil. The current dose of cefadroxil is appropriate for his weight (1000MG BID), and true resistance to specific cephalosporins in MSSA is possible but extremely rare - therefore, his ongoing infection is unlikely to be caused by an antibiotic  failure. The cause is rather related to suboptimal drug penetration to the site, further complicated by the pathogen known to form biofilms. Unfortunately, the same factors will affect any other antibiotic choice.      Today, we will obtain a new culture from the driveline site to confirm whether his ongoing symptoms are still due to MSSA (seems likely), and check it s susceptibility pattern. This will also rule out the possibility of another pathogen being involved, although it seems unlikely in the setting of original S. aureus infection. We will also obtain blood cultures.     We will also obtain inflammatory markers (CRP and ESR were correlating with the severity of his presentation in the past), and procalcitonin levels (to rule out systemic bacterial infection). In light of the persistent symptoms and suboptimal response to cefadroxil, we also added minocycline (100 mg BID) to his suppression regimen today.     Plan:    Obtain bacterial cultures from the driveline site  Obtain blood Cultures   CRP, ESR, and procalcitonin levels   Continue cefadroxil 1000 MG BID as part of the oral suppression therapy  Start minocycline 100 MG BID as the second medication for the oral suppression therapy    Follow up with us in 4 weeks.     Plan was discussed with attending physician, Dr. Boo.     Dariel Larsen MD   Infectious Disease Fellow   Available on [facility]       Antimicrobials & Cultures Consolidated     Doxycycline (3/2024 - 4/2024)  Cefadroxil (4/2024 - 8/2024)  Dapto (8/2024-9/13/2024)   Ceftriaxone (9/13/2024 - 10/18/2024)  Cefadroxil (10/18/2024 - present)     8/27/2024 LVAD Insertion site Discharge fluid culture - MSSA  10/7/2024 LVAD Insertion site Discharge fluid culture - No growth        History of Present Illness & Interval Events     Tej is a 58 y/o male with a history of severe MR s/p MVR with ring (1/21/15), VT s/p ICD, nonobstructive CAD, NICM previously on home inotropes s/p HM III LVAD implantation  3/23/22 as bridge to decision (presently not listed - LVAD team wanted to see marijuana levels decline consistent with quit date), and chronic driveline infection who presents for a follow up.    His current driveline infection was first diagnosed in March 2024 after he presented with increasing discharge from the driveline site and fever. The cultures of the discharge grew MSSA (multiple times since March 2024), for which he received several courses of doxycyline and was eventually transitioned to cefadroxil antibiotic suppression therapy (April 2024).    He remained with increased discharge, and later cultures of the driveline site grew MSSA (August 2024). He was admitted and evaluated by CV team, 8/25 CT w/o an underlying fluid collection but increased skin thickening at the entry site for the driveline in the right midabdomen as well as asymmetric thickening of the right rectus muscle - LVAD was retained. He received 7 weeks (8/30-10/21) of IV antibiotics (unable to do cefazolin due to insurance issue, started on dapto but developed myalgias with CK elevation, and received ceftriaxone 2G IV daily for most of the course). After IV therapy, was again re-started on oral suppression with cefadroxil 1000 MG BID.     He reports that while he was on ceftriaxone, the drainage has improved although never resolved completely. Repeat cultures on 10/7 (while on ceftriaxone) from the site were negative. Since he transitioned to suppression, the amount of discharge again increased, and he also notes more irritated skin in the area.     He also recently felt like he had mild fever with some vague upper respiratory symptoms. This has resolved on its own after approximately a week.     LVAD History:    Current LVAD model: heartmate III  Date current LVAD placed: 3/23/22  Previous LVAD devices: none  Other prosthetic devices/materials: severe MR s/p MVR with ring (1/21/15), VT s/p ICD,  Primary cardiologist: Andreina Hayes  LVAD coordinator: Siena Headley  Primary ID provider: [facility] (Adam Layton, Sd, Chloe, and Rajeev)  Goal of LVAD: transplant     History of bacteremias (dates and organisms): none     History of driveline infections (dates and organisms):   -5/4/23 Staphylococcus lugdenesis s/p doxycycline x 2 weeks  -3/31/24 driveline culture w/ MSSA s/p 2 weeks of doxycycline.  -4/17/24 culture with MSSA again s/p doxycycline x 1 week followed by cefadroxil x 2 weeks (started 4/25/24).   -7/12/24 culture w/ MSSA. Cefadroxil dose increased to 1 gram twice a day.   -8/25 he presented to the ED with reoccurrence of purulence. Driveline culture with MSSA ([place]). 8/25 CT A/P w/o organizing fluid collection. Started IV daptomycin     History of other pertinent infections: none  History of driveline area irritation and current mitigation strategies: currently daily dressing changes, anchor  Current suppressive antibiotics: N/A  Previous antibiotic failures/allergies/intolerances etc: numerous course of doxycycline   Active on transplant list -          Physical Exam:     VITAL SIGNS:  Pulse 75   Wt 116.1 kg (256 lb)   SpO2 96%   BMI 36.73 kg/m       GENERAL APPEARANCE: Not in acute distress    PHYSICAL EXAM:   Eyes:     no ptosis, no discharge, no scleral icterus  Mouth, Throat:     mucous membranes moist, pharynx normal without lesions  Cardiovascular:    Inspection: No Cyanosis, JVD not elevated   Auscultation:  S1, S2 normal, regular rate and rhythm  Respiratory:     Inspection: Not in respiratory distress, Chest expansion symmetrical   Auscultation: 4 point auscultation done clear to auscultation bilaterally, no wheezes, no rales, and no rhonchi  Gastrointestinal:      soft, non-tender; bowel sounds normal; no masses,  no organomegaly  Neurologic:     Higher Mental Function: Conversant, AOx4   Facial asymmetry grossly absent   is ambulatory  Psychiatric:     appropriate     The driveline site with minimal  amount of serous discharge. Surrounding skin shows mild erythema and irritation without signs of significant inflammation, fluctuance, or purulent drainage.     Photograph - Driveline (01/16/2025)

## 2025-01-16 NOTE — PROGRESS NOTES
[facility]     January 16, 2025    Application:  Raj   patient location: [facility] clinic  My location [facility] remote   Start time:  3:40 pm   End time: 4:30 pm     HPI    PMH of severe MR s/p MVR with ring (1/21/15), VT s/p ICD, nonobstructive CAD, NICM previously on home inotropes now s/p HM III LVAD implantation 3/23/22 as bridge to decision (presently not listed to recover from LVAD team wanted to see marijuana levels decline consistent with quit date), with course c/b AFib with RVR (s/p ICD shock 3/25/22) a) who presents for f/up in LVAD clinic.    Overall he had a pretty incredible recovery after his LVAD, marijuana levels have been negative and listed as a status 4 but then was upgraded to Status 3 when he went on IV antibiotics for driveline infection -     He was on a prolonged course of IV antibiotics and now is on 2 oral antibiotics for some increase in drainage recently.   He has also had ICD shocks while his LVAD has been in place.     This visit :    Tej and his wife recently lost their daughter. He reports feeling run down, tired, and anxious. Tej mentions having a cramp in his left arm that occurs every couple of weeks, describing it as intense pain from his shoulder down to just above his elbow. The pain is so severe that he has to lie down, but it doesn't last very long. He denies chest pain associated with these episodes.    They have really been grappling with the fact that she had likely the same diagnosis and did not take the advice of her doctors or them to seek testing and they paid to the ultimate ojeda    Tej reports some gastrointestinal issues, likely related to his antibiotics, including bloating and diarrhea, but feels it's starting to improve. He acknowledges feeling depressed and having difficulty getting back into his exercise routine.    Tej denies leg swelling or difficulty breathing at night. He reports no dizziness or LVAD alarms.    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 He is accompanied today by his wife. He was admitted briefly and discharged 2024 for a driveline infection. He was started on IV Daptomycin but was switched to Ceftriaxone 2g daily on  due to myopathy that was felt to be related to the Daptomycin. He has no complaints today. He has no bleeding and no stroke like symptoms. MAPs have been in the 80s. His driveline site is OK, minimal discharge. He is scheduled to follow with ID today to determine how much longer he needs to be on IV antibiotics, cultures will be repeated today.  Unfortunately his daughter passed away 2 weeks ago.       INR was 1.8 today. MAPs have been controlled - are being checked daily when he goes for antibiotic infusion. There have been no alarms on the LVAD. His last device interrogation was reviewed and was unremarkable.       PAST MEDICAL HISTORY:  Past Medical History:   Diagnosis Date    Chronic systolic heart failure (H) 2017    Gastroparesis     ICD (implantable cardioverter-defibrillator) in place     [employer]    Non-ischemic cardiomyopathy (H)     Paroxysmal ventricular tachycardia (H) 2017    S/P mitral valve replacement 2017    Tetrahydrocannabinol (THC) use disorder, mild, in controlled  environment, abuse 2019    occasional edible THC. reportedly for sleep, anxiety    Tobacco abuse, episodic     occasional cigar - ~ 3x/wk       FAMILY HISTORY:  Family History   Problem Relation Age of Onset    Coronary Artery Disease Mother     Kidney failure Mother     Cardiomyopathy Father          age 51    Anesthesia Reaction No family hx of     Venous thrombosis No family hx of       Drug use: No       Social history unchanged from prior  His dog recently passed away  New cat       CURRENT MEDICATIONS:    Current Outpatient Medications   Medication Sig Dispense Refill    acetaminophen (TYLENOL) 325 MG tablet Take 2 tablets (650 mg) by mouth every 4 hours as needed for pain 100 tablet 0    allopurinol (ZYLOPRIM) 100 MG tablet Take 100 mg by mouth every morning.      amoxicillin (AMOXIL) 500 MG capsule Take 2,000 mg by mouth once as needed Take 4 tabs 1 hour before all dental cleanings and procedures      cefadroxil (DURICEF) 500 MG capsule Take 2 capsules (1,000 mg) by mouth 2 times daily. 120 capsule 1    cholecalciferol 50 MCG (2000 UT) CAPS Take 50 mcg by mouth every morning      enoxaparin ANTICOAGULANT (LOVENOX) 100 MG/ML syringe Inject 1 mL (100 mg) subcutaneously 2 times daily. (Patient not taking: Reported on 10/7/2024) 10 mL 1    furosemide (LASIX) 40 MG tablet TAKE 1.5 TABLETS (60 MG) BY MOUTH 2 TIMES DAILY 270 tablet 3    hydrOXYzine (VISTARIL) 50 MG capsule Take 50 mg by mouth every 12 hours as needed for anxiety      magnesium oxide (MAG-OX) 400 MG tablet Take 800 mg by mouth 2 times daily      metoprolol succinate ER (TOPROL XL) 25 MG 24 hr tablet Take 1 tablet (25 mg) by mouth daily 90 tablet 2    nitroGLYcerin (NITROSTAT) 0.4 MG sublingual tablet Place 0.4 mg under the tongue every 5 minutes as needed for chest pain      omeprazole (PRILOSEC) 40 MG DR capsule Take 40 mg by mouth every morning      potassium chloride alfonso ER (KLOR-CON M20) 20 MEQ CR tablet Take 3 tablets (60 mEq) by  mouth 2 times daily 540 tablet 3    sacubitril-valsartan (ENTRESTO)  MG per tablet Take 1 tablet by mouth 2 times daily 90 tablet 3    sotalol (BETAPACE) 120 MG tablet Take 120 mg by mouth 2 times daily      spironolactone (ALDACTONE) 25 MG tablet Take 0.5 tablets (12.5 mg) by mouth daily 45 tablet 3    traZODone (DESYREL) 50 MG tablet Take 0.5-1 tablets (25-50 mg) by mouth at bedtime as needed for insomnia      warfarin ANTICOAGULANT (COUMADIN) 2.5 MG tablet Take 5mg (2 tabs) by mouth daily OR AS DIRECTED.  Adjust dose based on INR. 180 tablet 1       All relevant ROS were reviewed in the HPI.       EXAM:    There were no vitals taken for this visit.      The patient is obese and is somewhat uncomfortable due to pain as he is walking.            Results      All lab trends, imaging, recent echos personally reviewed with the patient.     Laboratory Studies  Creatinine is about 1.4. Liver function is entirely normal. Albumin is normal. Potassium is normal. Hemoglobin is just mildly down at 12.5. White blood cell count is normal.    Testing  Device interrogation shows no atrial fibrillation and no sustained ventricular arrhythmias. LVAD interrogation showed some low PI but no device malfunction.      Assessment and Plan:   PMH of severe MR s/p MVR with ring (1/21/15), VT s/p ICD,  NICM previously on home inotropes now s/p HM III LVAD implantation 3/23/22, with course c/b sternal wound s/p Keflex treatment,     Overall he  recovered incredibly well.  Presently listed for transplant as a status 3.         Chronic SHCF s/p HM III LVAD. Severe MR s/p mitral valve repair with annuloplasty ring (1/21/15). Implanted 3/23/22.   Stage D, NYHA Class II     ACEi/ARB Entresto   mg po BID,    BB Sotalol 80 mg po BID-and metoprolol   Aldosterone antagonist Aldactone 25 mg daily   SGLT2i: Deferred for now given polypharmacy and lots of GI upset (he did not want to add more meds)    SCD prophylaxis ICD  Fluid status   euvolemic on current diuretics   - is due for renewal of his Status 3 on 10/14/2024 - will reach out to coordinators about this pending ID visit and whether they continue IV antibiotics or not. Consider switching his Entresto to hydralazine if he remains listed to minimize post-op vasoplegia.     LDH: stable  Anticoagulation: INR goal 2-3  Antiplatelet: Finish the RICARDO trial now on aspirin 81 mg daily    2. 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  Recently admitted and treated for MSSA with Daptomycin which was switched to ceftriaxone which he is currently on. Will follow up with ID today.      3. History of VT, s/p CRT-D.    ICD shock in May 2023 now on a higher dose of sotalol and on a beta-blocker - presently in remission if he has more shocks may consider trying to upgrade for transplant  - Continue Sotatol, metoprolol for now      4. Postop AF with RVR. Returned to SR.   - Coumadin as above.  - on coreg     5.  Mild nonobstructive CAD.   On coumadin      6. MIld AI on echo will watch for now     7. GI upset: Will follow with GI locally    8. Hypomagnesia: Improved with oral magnesium    9. Obesity; he is going to work on diet and exercise he is transplant eligible presently    10.. Gastroparesis.  A consultation with a gastroenterologist close to his residence is recommended. An esophagogastroduodenoscopy (EGD) was performed today, and the results are pending.

## 2025-01-16 NOTE — NURSING NOTE
Current patient location: [address] [place] [address]     Is the patient currently in the state of [place]? YES    Visit mode: VIDEO    If the visit is dropped, the patient can be reconnected by:VIDEO VISIT: Text to cell phone:   Telephone Information:   Mobile [phone]       Will anyone else be joining the visit? Yes  (If patient encounters technical issues they should call [phone])    Are changes needed to the allergy or medication list? Pt stated no changes to allergies and Pt stated no med changes    Are refills needed on medications prescribed by this physician? NO    Rooming Documentation:  Questionnaire(s) completed    Reason for visit: MIRNA ARMENTA

## 2025-01-16 NOTE — PATIENT INSTRUCTIONS
Deagerson Burnham,     Thank you for your visit today. As discussed, we will plan to continue then same medication as earlier to treat your infection. Given the ongoing discharge and inflammation, we sent cultures today and added the second antibiotic to take while we are waiting for the results.     Starting today, please take both cefadroxil (1000mg BID) and doxycycline (100 mg BID). We will plan to see you in a month to determine the duration.     Best regards   Infectious Disease Cinic

## 2025-01-16 NOTE — NURSING NOTE
"Chief Complaint   Patient presents with    RECHECK     Vital signs:        Pulse: 75 (LVAD)     SpO2: 96 % (LVAD)       Weight: 116.1 kg (256 lb)  Estimated body mass index is 36.73 kg/m  as calculated from the following:    Height as of 9/13/24: 1.778 m (5' 10\").    Weight as of this encounter: 116.1 kg (256 lb).      Jessenia Thibodeaux CMA   1/16/2025 9:46 AM    "

## 2025-01-16 NOTE — Clinical Note
1/16/2025       RE: Tej Morfin  [address]  [place] [address]     Dear Colleague,    Thank you for referring your patient, Tej Morfin, to the [facility] INFECTIOUS DISEASE CLINIC [place] at [facility]. Please see a copy of my visit note below.     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     Known to have MSSA driveline infection since March 2024. 8/27 driveline culture grew MSSA. Received a few rounds of doxycycline and then transitioned to on cefadroxil antibiotic suppression (in April). 8/25 CT w/o an underlying fluid collection but increased skin thickening at the entry site for the driveline in the right midabdomen as well as asymmetric thickening of the right rectus muscle.  Also had noted new tissue growth at the site over the prior 3 weeks to admission. Surgical intervention deemed not an option at this time. Planning for a prolonged course of IV antibiotics. He was not able to  receive cefazolin due to financial issues so daptomycin was continued as an outpatient at an infusion center. Discharged 8/30. On 9/5 CK level was 214 and 9/12 CK level was 838. He reported myalgias in his legs, back, neck. Cefazolin was not an option since he can't do home infusion. Was transitioned to ceftriaxone 2 grams IV daily to allow for once a day dosing an infusion center (~9/13/24).      Since transition to ceftriaxone, reports drainage has improved although not resolved overall. Repeat cultures obtained today - will follow up cultures and if isolates only MSSA again, will attempt combination antibiotics (PO + IV) to see if we can completely resolve drainage at which time will switch to long term oral antibiotic suppression.    Plan:    Area   Reports some irritation in the area of the driveline insertion   After stiopping cefrtiaxone ,disharge is worse now   Grayish brown in color     cHILLS   [place] sick recently   Feels like slightly worse       Cultures?   Imaging?   CRP - last 40 not followed up      Continue oral suppression therapy for LVAD Staph infection (cefadroxil 1000 MG BID for weight 110 kg)       Plan was discussed with attending physician, Dr. Christensen.    Dariel Larsen MD   Infectious Disease Fellow   Available on [facility]       Antimicrobials & Cultures Consolidated     Doxycycline  (3/2024 - 4/2024)  Cefadroxil (April 2024)  Dapto (8/2024-9/13/2024)   Ceftriaxone (9/13/2024 - 10/18/2024)  Cefadroxil (10/18/2024 - present)     8/27/2024 LVAD Insertion site Discharge fluid culture - MSSA  10/7/2024 LVAD Insertion site Discharge fluid culture - No growth        History of Present Illness & Interval Events     LVAD History:  Current LVAD model: heartmate III  Date current LVAD placed: 3/23/22  Previous LVAD devices: none  Other prosthetic devices/materials: severe MR s/p MVR with ring (1/21/15), VT s/p ICD,  Primary cardiologist: Andreina  Primary LVAD coordinator: Siena Headley  Primary ID  provider: LVAD ID Group (Sd Knott, Chloe, and Rajeev)  Goal of LVAD: transplant     History of bacteremias (dates and organisms): none     History of driveline infections (dates and organisms):   -5/4/23 Staphylococcus lugdenesis s/p doxycycline x 2 weeks  -3/31/24 driveline culture w/ MSSA s/p 2 weeks of doxycycline.  -4/17/24 culture with MSSA again s/p doxycycline x 1 week followed by cefadroxil x 2 weeks (started 4/25/24).   -7/12/24 culture w/ MSSA. Cefadroxil dose increased to 1 gram twice a day.   -8/25 he presented to the ED with reoccurrence of purulence. Driveline culture with MSSA ([place]). 8/25 CT A/P w/o organizing fluid collection. Started IV daptomycin     History of other pertinent infections: none  History of driveline area irritation and current mitigation strategies: currently daily dressing changes, anchor  Current suppressive antibiotics: N/A  Previous antibiotic failures/allergies/intolerances etc: numerous course of doxycycline   Active on transplant list -          Physical Exam:     VITAL SIGNS:  There were no vitals taken for this visit.     GENERAL APPEARANCE: Not in acute distress    PHYSICAL EXAM:   Eyes:     no ptosis, no discharge, no scleral icterus  Mouth, Throat:     mucous membranes moist, pharynx normal without lesions  Cardiovascular:    Inspection: No Cyanosis, JVD not elevated   Auscultation:  S1, S2 normal, regular rate and rhythm  Respiratory:     Inspection: Not in respiratory distress, Chest expansion symmetrical   Auscultation: 4 point auscultation done clear to auscultation bilaterally, no wheezes, no rales, and no rhonchi  Gastrointestinal:      soft, non-tender; bowel sounds normal; no masses,  no organomegaly  Musculoskeletal:     no elbow wrist knee or ankle tenderness, deformity or swelling, no quadriceps calf or upper arm muscular tenderness noted  Skin:     Dry and intact  Neurologic:     Higher Mental Function: Conversant, AOx4   Facial  asymmetry grossly absent   is ambulatory  Psychiatric:     appropriate      [facility]    Division of Infectious Diseases and International Medicine    CLINICAL INFECTIOUS DISEASE OUTPATIENT                     FOLLOW UP CONSULTATION NOTE     Patient:  Tej Morfin  Date of birth 1965  Medical record number [id number]  Date of Visit:  January 16, 2025  Reason for Visit: 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, follow up visit          Assessment and Plan     Problem List:    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     Known to have MSSA driveline infection since March 2024. 8/27 driveline culture grew MSSA. Received a few rounds of doxycycline and then transitioned to on cefadroxil antibiotic suppression (in April). 8/25 CT w/o an underlying fluid collection but increased skin thickening at the entry site for the driveline in the right midabdomen as well as asymmetric thickening of the right rectus muscle.  Also had noted new tissue growth at the site over the prior 3 weeks to admission. Surgical intervention deemed not an option at this time. Planning for a prolonged course of IV antibiotics. He was not able to receive cefazolin due to financial issues so daptomycin was continued as an outpatient at an infusion center. Discharged 8/30. On 9/5 CK level was 214 and 9/12 CK level was 838. He reported myalgias in his legs, back, neck. Cefazolin was not an option  since he can't do home infusion. Was transitioned to ceftriaxone 2 grams IV daily to allow for once a day dosing an infusion center (~9/13/24).      Since transition to ceftriaxone, reports drainage has improved although not resolved overall. Repeat cultures obtained today - will follow up cultures and if isolates only MSSA again, will attempt combination antibiotics (PO + IV) to see if we can completely resolve drainage at which time will switch to long term oral antibiotic suppression.    Plan:    Area   Reports some irritation in the area of the driveline insertion   After stiopping cefrtiaxone ,disharge is worse now   Grayish brown in color     cHILLS   [place] sick recently   Feels like slightly worse       Cultures?   Imaging?   CRP - last 40 not followed up      Continue oral suppression therapy for LVAD Staph infection (cefadroxil 1000 MG BID for weight 110 kg)       Plan was discussed with attending physician, Dr. Christensen.    Dariel Larsen MD   Infectious Disease Fellow   Available on [place]       Antimicrobials & Cultures Consolidated     Doxycycline  (3/2024 - 4/2024)  Cefadroxil (April 2024)  Dapto (8/2024-9/13/2024)   Ceftriaxone (9/13/2024 - 10/18/2024)  Cefadroxil (10/18/2024 - present)     8/27/2024 LVAD Insertion site Discharge fluid culture - MSSA  10/7/2024 LVAD Insertion site Discharge fluid culture - No growth        History of Present Illness & Interval Events     LVAD History:  Current LVAD model: heartmate III  Date current LVAD placed: 3/23/22  Previous LVAD devices: none  Other prosthetic devices/materials: severe MR s/p MVR with ring (1/21/15), VT s/p ICD,  Primary cardiologist: Andreina  Primary LVAD coordinator: Siena Headley  Primary ID provider: LVAD ID Group (Sd Knott, Chloe, and Rajeev)  Goal of LVAD: transplant     History of bacteremias (dates and organisms): none     History of driveline infections (dates and organisms):   -5/4/23 Staphylococcus lugdenesis s/p  doxycycline x 2 weeks  -3/31/24 driveline culture w/ MSSA s/p 2 weeks of doxycycline.  -4/17/24 culture with MSSA again s/p doxycycline x 1 week followed by cefadroxil x 2 weeks (started 4/25/24).   -7/12/24 culture w/ MSSA. Cefadroxil dose increased to 1 gram twice a day.   -8/25 he presented to the ED with reoccurrence of purulence. Driveline culture with MSSA ([place]). 8/25 CT A/P w/o organizing fluid collection. Started IV daptomycin     History of other pertinent infections: none  History of driveline area irritation and current mitigation strategies: currently daily dressing changes, anchor  Current suppressive antibiotics: N/A  Previous antibiotic failures/allergies/intolerances etc: numerous course of doxycycline   Active on transplant list -          Physical Exam:     VITAL SIGNS:  There were no vitals taken for this visit.     GENERAL APPEARANCE: Not in acute distress    PHYSICAL EXAM:   Eyes:     no ptosis, no discharge, no scleral icterus  Mouth, Throat:     mucous membranes moist, pharynx normal without lesions  Cardiovascular:    Inspection: No Cyanosis, JVD not elevated   Auscultation:  S1, S2 normal, regular rate and rhythm  Respiratory:     Inspection: Not in respiratory distress, Chest expansion symmetrical   Auscultation: 4 point auscultation done clear to auscultation bilaterally, no wheezes, no rales, and no rhonchi  Gastrointestinal:      soft, non-tender; bowel sounds normal; no masses,  no organomegaly  Musculoskeletal:     no elbow wrist knee or ankle tenderness, deformity or swelling, no quadriceps calf or upper arm muscular tenderness noted  Skin:     Dry and intact  Neurologic:     Higher Mental Function: Conversant, AOx4   Facial asymmetry grossly absent   is ambulatory  Psychiatric:     appropriate       Again, thank you for allowing me to participate in the care of your patient.      Sincerely,    Demi Boo MD

## 2025-01-16 NOTE — LETTER
1/16/2025      RE: Tej Morfin  [address]  [place] [address]       Dear Colleague,    Thank you for the opportunity to participate in the care of your patient, Tej Morfin, at the [facility] HEART CLINIC [place] at [facility]. Please see a copy of my visit note below.    Advanced Heart Failure Clinic     January 16, 2025    Application:  Raj   patient location: [facility] clinic  My location [facility] remote   Start time:  3:40 pm   End time: 4:30 pm     HPI    PMH of severe MR s/p MVR with ring (1/21/15), VT s/p ICD, nonobstructive CAD, NICM previously on home inotropes now s/p HM III LVAD implantation 3/23/22 as bridge to decision (presently not listed to recover from LVAD team wanted to see marijuana levels decline consistent with quit date), with course c/b AFib with RVR (s/p ICD shock 3/25/22) a) who presents for f/up in LVAD clinic.    Overall he had a pretty incredible recovery after his LVAD, marijuana levels have been negative and listed as a status 4 but then was upgraded to Status 3 when he went on IV antibiotics for driveline infection -     He was on a prolonged course of IV antibiotics and now is on 2 oral antibiotics for some increase in drainage recently.   He has also had ICD shocks while his LVAD has been in place.     This visit :    Tej and his wife recently lost their daughter. He reports feeling run down, tired, and anxious. Tej mentions having a cramp in his left arm that occurs every couple of weeks, describing it as intense pain from his shoulder down to just above his elbow. The pain is so severe that he has to lie down, but it doesn't last very long. He denies chest pain associated with these episodes.    They have really been grappling with the fact that she had likely the same diagnosis and did not take the advice of her doctors or them to seek testing and they paid to the ultimate price    Tej  reports some gastrointestinal issues, likely related to his antibiotics, including bloating and diarrhea, but feels it's starting to improve. He acknowledges feeling depressed and having difficulty getting back into his exercise routine.    Tej denies leg swelling or difficulty breathing at night. He reports no dizziness or LVAD alarms.    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 He is accompanied today by his wife. He was admitted briefly and discharged 2024 for a driveline infection. He was started on IV Daptomycin but was switched to Ceftriaxone 2g daily on  due to myopathy that was felt to be related to the Daptomycin. He has no complaints today. He has no bleeding and no stroke like symptoms. MAPs have been in the 80s. His driveline site is OK, minimal discharge. He is scheduled to follow with ID today to determine how much longer he needs to be on IV antibiotics, cultures will be repeated today.  Unfortunately his daughter passed away 2 weeks ago.       INR was 1.8 today. MAPs have been controlled - are being checked daily when he goes for antibiotic infusion. There have been no alarms on the LVAD. His last device interrogation was reviewed and was unremarkable.       PAST MEDICAL HISTORY:  Past Medical History:    Diagnosis Date     Chronic systolic heart failure (H) 2017     Gastroparesis      ICD (implantable cardioverter-defibrillator) in place     [email or web address]     Non-ischemic cardiomyopathy (H)      Paroxysmal ventricular tachycardia (H) 2017     S/P mitral valve replacement 2017     Tetrahydrocannabinol (THC) use disorder, mild, in controlled environment, abuse 2019    occasional edible THC. reportedly for sleep, anxiety     Tobacco abuse, episodic     occasional cigar - ~ 3x/wk       FAMILY HISTORY:  Family History   Problem Relation Age of Onset     Coronary Artery Disease Mother      Kidney failure Mother      Cardiomyopathy Father          age 51     Anesthesia Reaction No family hx of      Venous thrombosis No family hx of        Drug use: No       Social history unchanged from prior  His dog recently passed away  New cat       CURRENT MEDICATIONS:    Current Outpatient Medications   Medication Sig Dispense Refill     acetaminophen (TYLENOL) 325 MG tablet Take 2 tablets (650 mg) by mouth every 4 hours as needed for pain 100 tablet 0     allopurinol (ZYLOPRIM) 100 MG tablet Take 100 mg by mouth every morning.       amoxicillin (AMOXIL) 500 MG capsule Take 2,000 mg by mouth once as needed Take 4 tabs 1 hour before all dental cleanings and procedures       cefadroxil (DURICEF) 500 MG capsule Take 2 capsules (1,000 mg) by mouth 2 times daily. 120 capsule 1     cholecalciferol 50 MCG (2000 UT) CAPS Take 50 mcg by mouth every morning       enoxaparin ANTICOAGULANT (LOVENOX) 100 MG/ML syringe Inject 1 mL (100 mg) subcutaneously 2 times daily. (Patient not taking: Reported on 10/7/2024) 10 mL 1     furosemide (LASIX) 40 MG tablet TAKE 1.5 TABLETS (60 MG) BY MOUTH 2 TIMES DAILY 270 tablet 3     hydrOXYzine (VISTARIL) 50 MG capsule Take 50 mg by mouth every 12 hours as needed for anxiety       magnesium oxide (MAG-OX) 400 MG tablet Take 800 mg by mouth 2 times daily       metoprolol succinate ER  (TOPROL XL) 25 MG 24 hr tablet Take 1 tablet (25 mg) by mouth daily 90 tablet 2     nitroGLYcerin (NITROSTAT) 0.4 MG sublingual tablet Place 0.4 mg under the tongue every 5 minutes as needed for chest pain       omeprazole (PRILOSEC) 40 MG DR capsule Take 40 mg by mouth every morning       potassium chloride alfonso ER (KLOR-CON M20) 20 MEQ CR tablet Take 3 tablets (60 mEq) by mouth 2 times daily 540 tablet 3     sacubitril-valsartan (ENTRESTO)  MG per tablet Take 1 tablet by mouth 2 times daily 90 tablet 3     sotalol (BETAPACE) 120 MG tablet Take 120 mg by mouth 2 times daily       spironolactone (ALDACTONE) 25 MG tablet Take 0.5 tablets (12.5 mg) by mouth daily 45 tablet 3     traZODone (DESYREL) 50 MG tablet Take 0.5-1 tablets (25-50 mg) by mouth at bedtime as needed for insomnia       warfarin ANTICOAGULANT (COUMADIN) 2.5 MG tablet Take 5mg (2 tabs) by mouth daily OR AS DIRECTED.  Adjust dose based on INR. 180 tablet 1       All relevant ROS were reviewed in the HPI.       EXAM:    There were no vitals taken for this visit.      The patient is obese and is somewhat uncomfortable due to pain as he is walking.            Results      All lab trends, imaging, recent echos personally reviewed with the patient.     Laboratory Studies  Creatinine is about 1.4. Liver function is entirely normal. Albumin is normal. Potassium is normal. Hemoglobin is just mildly down at 12.5. White blood cell count is normal.    Testing  Device interrogation shows no atrial fibrillation and no sustained ventricular arrhythmias. LVAD interrogation showed some low PI but no device malfunction.      Assessment and Plan:   PMH of severe MR s/p MVR with ring (1/21/15), VT s/p ICD,  NICM previously on home inotropes now s/p HM III LVAD implantation 3/23/22, with course c/b sternal wound s/p Keflex treatment,     Overall he  recovered incredibly well.  Presently listed for transplant as a status 3.         Chronic SHCF s/p HM III LVAD.  Severe MR s/p mitral valve repair with annuloplasty ring (1/21/15). Implanted 3/23/22.   Stage D, NYHA Class II     ACEi/ARB Entresto   mg po BID,    BB Sotalol 80 mg po BID-and metoprolol   Aldosterone antagonist Aldactone 25 mg daily   SGLT2i: Deferred for now given polypharmacy and lots of GI upset (he did not want to add more meds)    SCD prophylaxis ICD  Fluid status  euvolemic on current diuretics   - is due for renewal of his Status 3 on 10/14/2024 - will reach out to coordinators about this pending ID visit and whether they continue IV antibiotics or not. Consider switching his Entresto to hydralazine if he remains listed to minimize post-op vasoplegia.     LDH: stable  Anticoagulation: INR goal 2-3  Antiplatelet: Finish the RICARDO trial now on aspirin 81 mg daily    2. 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  Recently admitted and treated for MSSA with Daptomycin which was switched to ceftriaxone which he is currently on. Will follow up with ID today.      3. History of VT, s/p CRT-D.    ICD shock in May 2023 now on a higher dose of sotalol and on a beta-blocker - presently in remission if he has more shocks may consider trying to upgrade for transplant  - Continue Sotatol, metoprolol for now      4. Postop AF with RVR. Returned to SR.   - Coumadin as above.  - on coreg     5.  Mild nonobstructive CAD.   On coumadin      6. MIld AI on echo will watch for now     7. GI upset: Will follow with GI locally    8. Hypomagnesia: Improved with oral magnesium    9. Obesity; he is going to work on diet and exercise he is transplant eligible presently    10.. Gastroparesis.  A consultation with a gastroenterologist close to his residence is recommended. An esophagogastroduodenoscopy (EGD) was performed today, and the results are pending.          Please do not hesitate to contact me if you have any questions/concerns.     Sincerely,     Dunia Hdz MD

## 2025-01-17 ENCOUNTER — TELEPHONE (OUTPATIENT)
Dept: INFECTIOUS DISEASES | Facility: CLINIC | Age: 60
End: 2025-01-17
Payer: MEDICARE

## 2025-01-17 DIAGNOSIS — T82.7XXA INFECTION ASSOCIATED WITH DRIVELINE OF LEFT VENTRICULAR ASSIST DEVICE (LVAD): ICD-10-CM

## 2025-01-18 LAB
BACTERIA FLD CULT: ABNORMAL
GRAM STAIN RESULT: ABNORMAL
GRAM STAIN RESULT: ABNORMAL
LABORATORY REPORT: NORMAL
PETH INTERPRETATION: NORMAL
PLPETH BLD-MCNC: <10 NG/ML
POPETH BLD-MCNC: <10 NG/ML
STFR SERPL-MCNC: 5.6 MG/L

## 2025-01-19 LAB
COTININE SERPL-MCNC: <5 NG/ML
NICOTINE SERPL-MCNC: <5 NG/ML

## 2025-01-20 ENCOUNTER — TELEPHONE (OUTPATIENT)
Dept: INFECTIOUS DISEASES | Facility: CLINIC | Age: 60
End: 2025-01-20
Payer: MEDICARE

## 2025-01-20 DIAGNOSIS — T82.7XXA INFECTION ASSOCIATED WITH DRIVELINE OF LEFT VENTRICULAR ASSIST DEVICE (LVAD): ICD-10-CM

## 2025-01-20 LAB
LABCORP INTERFACED MISCELLANEOUS TEST RESULT: NORMAL
MDC_IDC_LEAD_CONNECTION_STATUS: NORMAL
MDC_IDC_LEAD_IMPLANT_DT: NORMAL
MDC_IDC_LEAD_LOCATION: NORMAL
MDC_IDC_LEAD_LOCATION_DETAIL_1: NORMAL
MDC_IDC_LEAD_MFG: NORMAL
MDC_IDC_LEAD_MODEL: NORMAL
MDC_IDC_LEAD_POLARITY_TYPE: NORMAL
MDC_IDC_LEAD_SERIAL: NORMAL
MDC_IDC_MSMT_BATTERY_REMAINING_LONGEVITY: 24 MO
MDC_IDC_MSMT_BATTERY_REMAINING_PERCENTAGE: 32.4 %
MDC_IDC_MSMT_BATTERY_STATUS: NORMAL
MDC_IDC_MSMT_CAP_CHARGE_DTM: NORMAL
MDC_IDC_MSMT_CAP_CHARGE_ENERGY: 31 J
MDC_IDC_MSMT_CAP_CHARGE_TIME: 14.52 S
MDC_IDC_MSMT_CAP_CHARGE_TIME: 7.98 S
MDC_IDC_MSMT_CAP_CHARGE_TYPE: NORMAL
MDC_IDC_MSMT_CAP_CHARGE_TYPE: NORMAL
MDC_IDC_MSMT_LEADCHNL_LV_IMPEDANCE_VALUE: 960 OHM
MDC_IDC_MSMT_LEADCHNL_LV_PACING_THRESHOLD_AMPLITUDE: 0.7 V
MDC_IDC_MSMT_LEADCHNL_LV_PACING_THRESHOLD_PULSEWIDTH: 0.1 MS
MDC_IDC_MSMT_LEADCHNL_LV_SENSING_INTR_AMPL: 0.8 MV
MDC_IDC_MSMT_LEADCHNL_RA_IMPEDANCE_VALUE: 514 OHM
MDC_IDC_MSMT_LEADCHNL_RA_PACING_THRESHOLD_AMPLITUDE: 0.5 V
MDC_IDC_MSMT_LEADCHNL_RA_PACING_THRESHOLD_PULSEWIDTH: 0.5 MS
MDC_IDC_MSMT_LEADCHNL_RA_SENSING_INTR_AMPL: 6.5 MV
MDC_IDC_MSMT_LEADCHNL_RV_IMPEDANCE_VALUE: 383 OHM
MDC_IDC_MSMT_LEADCHNL_RV_PACING_THRESHOLD_AMPLITUDE: 1.2 V
MDC_IDC_MSMT_LEADCHNL_RV_PACING_THRESHOLD_PULSEWIDTH: 0.5 MS
MDC_IDC_MSMT_LEADCHNL_RV_SENSING_INTR_AMPL: 11.3 MV
MDC_IDC_PG_IMPLANT_DTM: NORMAL
MDC_IDC_PG_MFG: NORMAL
MDC_IDC_PG_MODEL: NORMAL
MDC_IDC_PG_SERIAL: NORMAL
MDC_IDC_PG_TYPE: NORMAL
MDC_IDC_SESS_CLINIC_NAME: NORMAL
MDC_IDC_SESS_DTM: NORMAL
MDC_IDC_SESS_TYPE: NORMAL
MDC_IDC_SET_BRADY_AT_MODE_SWITCH_MODE: NORMAL
MDC_IDC_SET_BRADY_AT_MODE_SWITCH_RATE: 170 {BEATS}/MIN
MDC_IDC_SET_BRADY_LOWRATE: 80 {BEATS}/MIN
MDC_IDC_SET_BRADY_MAX_SENSOR_RATE: 120 {BEATS}/MIN
MDC_IDC_SET_BRADY_MAX_TRACKING_RATE: 130 {BEATS}/MIN
MDC_IDC_SET_BRADY_MODE: NORMAL
MDC_IDC_SET_BRADY_PAV_DELAY_HIGH: 280 MS
MDC_IDC_SET_BRADY_PAV_DELAY_LOW: 300 MS
MDC_IDC_SET_BRADY_SAV_DELAY_HIGH: 280 MS
MDC_IDC_SET_BRADY_SAV_DELAY_LOW: 300 MS
MDC_IDC_SET_CRT_LVRV_DELAY: 80 MS
MDC_IDC_SET_CRT_PACED_CHAMBERS: NORMAL
MDC_IDC_SET_LEADCHNL_LV_PACING_AMPLITUDE: 0.1 V
MDC_IDC_SET_LEADCHNL_LV_PACING_CAPTURE_MODE: NORMAL
MDC_IDC_SET_LEADCHNL_LV_PACING_POLARITY: NORMAL
MDC_IDC_SET_LEADCHNL_LV_PACING_PULSEWIDTH: 0.1 MS
MDC_IDC_SET_LEADCHNL_LV_SENSING_ADAPTATION_MODE: NORMAL
MDC_IDC_SET_LEADCHNL_LV_SENSING_POLARITY: NORMAL
MDC_IDC_SET_LEADCHNL_LV_SENSING_SENSITIVITY: 1 MV
MDC_IDC_SET_LEADCHNL_RA_PACING_AMPLITUDE: 1.5 V
MDC_IDC_SET_LEADCHNL_RA_PACING_CAPTURE_MODE: NORMAL
MDC_IDC_SET_LEADCHNL_RA_PACING_POLARITY: NORMAL
MDC_IDC_SET_LEADCHNL_RA_PACING_PULSEWIDTH: 0.5 MS
MDC_IDC_SET_LEADCHNL_RA_SENSING_ADAPTATION_MODE: NORMAL
MDC_IDC_SET_LEADCHNL_RA_SENSING_POLARITY: NORMAL
MDC_IDC_SET_LEADCHNL_RA_SENSING_SENSITIVITY: 0.25 MV
MDC_IDC_SET_LEADCHNL_RV_PACING_AMPLITUDE: 2.8 V
MDC_IDC_SET_LEADCHNL_RV_PACING_CAPTURE_MODE: NORMAL
MDC_IDC_SET_LEADCHNL_RV_PACING_POLARITY: NORMAL
MDC_IDC_SET_LEADCHNL_RV_PACING_PULSEWIDTH: 0.5 MS
MDC_IDC_SET_LEADCHNL_RV_SENSING_ADAPTATION_MODE: NORMAL
MDC_IDC_SET_LEADCHNL_RV_SENSING_POLARITY: NORMAL
MDC_IDC_SET_LEADCHNL_RV_SENSING_SENSITIVITY: 0.6 MV
MDC_IDC_SET_ZONE_DETECTION_INTERVAL: 273 MS
MDC_IDC_SET_ZONE_DETECTION_INTERVAL: 375 MS
MDC_IDC_SET_ZONE_STATUS: NORMAL
MDC_IDC_SET_ZONE_TYPE: NORMAL
MDC_IDC_SET_ZONE_VENDOR_TYPE: NORMAL
MDC_IDC_STAT_BRADY_DTM_END: NORMAL
MDC_IDC_STAT_BRADY_DTM_START: NORMAL
MDC_IDC_STAT_BRADY_RA_PERCENT_PACED: 95 %
MDC_IDC_STAT_BRADY_RV_PERCENT_PACED: 3 %
MDC_IDC_STAT_CRT_LV_PERCENT_PACED: 1 %
MDC_IDC_STAT_EPISODE_RECENT_COUNT: 0
MDC_IDC_STAT_EPISODE_RECENT_COUNT: 0
MDC_IDC_STAT_EPISODE_RECENT_COUNT: 21
MDC_IDC_STAT_EPISODE_RECENT_COUNT_DTM_END: NORMAL
MDC_IDC_STAT_EPISODE_RECENT_COUNT_DTM_START: NORMAL
MDC_IDC_STAT_EPISODE_TOTAL_COUNT: 12
MDC_IDC_STAT_EPISODE_TOTAL_COUNT: 120
MDC_IDC_STAT_EPISODE_TOTAL_COUNT: 6
MDC_IDC_STAT_EPISODE_TOTAL_COUNT_DTM_END: NORMAL
MDC_IDC_STAT_EPISODE_TYPE: NORMAL
MDC_IDC_STAT_EPISODE_VENDOR_TYPE: NORMAL
MDC_IDC_STAT_TACHYTHERAPY_ATP_DELIVERED_RECENT: 0
MDC_IDC_STAT_TACHYTHERAPY_ATP_DELIVERED_TOTAL: 5
MDC_IDC_STAT_TACHYTHERAPY_RECENT_DTM_END: NORMAL
MDC_IDC_STAT_TACHYTHERAPY_RECENT_DTM_START: NORMAL
MDC_IDC_STAT_TACHYTHERAPY_SHOCKS_ABORTED_RECENT: 0
MDC_IDC_STAT_TACHYTHERAPY_SHOCKS_ABORTED_TOTAL: 1
MDC_IDC_STAT_TACHYTHERAPY_SHOCKS_DELIVERED_RECENT: 0
MDC_IDC_STAT_TACHYTHERAPY_SHOCKS_DELIVERED_TOTAL: 5
MDC_IDC_STAT_TACHYTHERAPY_TOTAL_DTM_END: NORMAL

## 2025-01-20 NOTE — TELEPHONE ENCOUNTER
EP called 1/20, spoke with wife to sched 1 month follow up with ID LVAD per checkout notes from 1/16. Pt and wife said they wanted to wait until they combine multiple appts in 1 day. Also has questions about meds/insurance. Reaching out to ID.

## 2025-01-21 ENCOUNTER — CARE COORDINATION (OUTPATIENT)
Dept: CARDIOLOGY | Facility: CLINIC | Age: 60
End: 2025-01-21
Payer: MEDICARE

## 2025-01-21 ENCOUNTER — ANTICOAGULATION THERAPY VISIT (OUTPATIENT)
Dept: ANTICOAGULATION | Facility: CLINIC | Age: 60
End: 2025-01-21
Payer: MEDICARE

## 2025-01-21 DIAGNOSIS — Z95.811 LVAD (LEFT VENTRICULAR ASSIST DEVICE) PRESENT (H): Primary | ICD-10-CM

## 2025-01-21 DIAGNOSIS — I50.22 CHRONIC SYSTOLIC CHF (CONGESTIVE HEART FAILURE) (H): ICD-10-CM

## 2025-01-21 DIAGNOSIS — I50.22 CHRONIC SYSTOLIC CHF (CONGESTIVE HEART FAILURE) (H): Primary | ICD-10-CM

## 2025-01-21 DIAGNOSIS — T82.7XXA INFECTION ASSOCIATED WITH DRIVELINE OF LEFT VENTRICULAR ASSIST DEVICE (LVAD): ICD-10-CM

## 2025-01-21 DIAGNOSIS — I47.29 PAROXYSMAL VENTRICULAR TACHYCARDIA (H): ICD-10-CM

## 2025-01-21 DIAGNOSIS — Z95.2 S/P MITRAL VALVE REPLACEMENT: ICD-10-CM

## 2025-01-21 LAB
BACTERIA BLD CULT: NO GROWTH
BACTERIA BLD CULT: NO GROWTH
INR HOME MONITORING: 2.5 (ref 2–3)

## 2025-01-21 NOTE — TELEPHONE ENCOUNTER
1/17/2025   Infectious Diseases Telephone Note:      Received notification about critical lab value for this patient this afternoon. He is growing S aureus from a sample labeled as body fluid, but it was in fact from an LVAD driveline swab and he is already being treated for an S aureus infection at that site. We will await sensitivities to ensure current therapy (cefadroxil and then we added minocycline on 1/16) provides appropriate coverage. Unless his pending blood cultures become positive, no other intervention needed at this time.      Demi Boo MD  Infectious Diseases

## 2025-01-21 NOTE — PROGRESS NOTES
D:  Called pt to follow up post virtual appt last week.    I:  Discussed instructions per Dr. Hdz: no med changes, routine cardiology follow up at 3 and 6 months, pt to have genetic testing for arrhythmiogenic cardiomyopathy and dilated cardiomyopathy genes, pt to follow up with ID as they recommend.  Orders placed as requested.  Supportive listening offered.  Pt agreeable to all instructions.  A:  Clinic follow up  P:  Pt verbalized understanding of the instructions given.  Will call VAD coordinator with further needs and questions.

## 2025-01-21 NOTE — PROGRESS NOTES
ANTICOAGULATION MANAGEMENT     Tej Morfin 59 year old male is on warfarin with therapeutic INR result. (Goal INR 2.0-3.0)    Recent labs: (last 7 days)     01/21/25  0000   INR 2.5       ASSESSMENT     Source(s): Chart Review and Patient/Caregiver Call     Warfarin doses taken: Warfarin taken as instructed  Diet: No new diet changes identified  Medication/supplement changes:  Continues on Cefadroxil. Says he is NOT taking Doxycycline, it caused upset stomachs. Has not started on Minocycline due to insurance issues. He is awaiting to hear if there is an alternative.     New illness, injury, or hospitalization: No  Signs or symptoms of bleeding or clotting: No  Previous result: Subtherapeutic  Additional findings: None       PLAN     Recommended plan for no diet, medication or health factor changes affecting INR     Dosing Instructions: Continue your current warfarin dose with next INR in 1 week       Summary  As of 1/21/2025      Full warfarin instructions:  5 mg every day   Next INR check:  1/28/2025               Telephone call with Tej who verbalizes understanding and agrees to plan and who agrees to plan and repeated back plan correctly    Patient to recheck with home meter    Education provided: Please call back if any changes to your diet, medications or how you've been taking warfarin    Plan made per [facility] anticoagulation protocol and per LVAD protocol    Ceicly Tovar RN  1/21/2025  Anticoagulation Clinic  [employer] for routing messages: jaime ANTICOAG LVAD  [facility] patient phone line: [phone]        _______________________________________________________________________     Anticoagulation Episode Summary       Current INR goal:  2.0-3.0   TTR:  77.3% (12 mo)   Target end date:  Indefinite   Send INR reminders to:  [facility]    Indications    LVAD (left ventricular assist device) present (H) [Z95.043]  Chronic systolic CHF (congestive heart failure) (H) [I50.22]  S/P mitral valve replacement  [Z95.2]  Paroxysmal ventricular tachycardia (H) [I47.29]             Comments:  Follow VAD Anticoag protocol:Yes: HeartMate 3  Bridging: per protocol  Date VAD placed: 03/23/22  INR Goal: per referral  Hx Mitral valve repair and not replacement               Anticoagulation Care Providers       Provider Role Specialty Phone number    Dunia Hdz MD Referring Cardiovascular Disease [phone]

## 2025-01-22 ENCOUNTER — TELEPHONE (OUTPATIENT)
Dept: PHARMACY | Facility: CLINIC | Age: 60
End: 2025-01-22
Payer: MEDICARE

## 2025-01-22 DIAGNOSIS — T82.7XXA INFECTION ASSOCIATED WITH DRIVELINE OF LEFT VENTRICULAR ASSIST DEVICE (LVAD): ICD-10-CM

## 2025-01-22 RX ORDER — DOXYCYCLINE 100 MG/1
100 CAPSULE ORAL 2 TIMES DAILY
Qty: 60 CAPSULE | Refills: 3 | Status: SHIPPED | OUTPATIENT
Start: 2025-01-22

## 2025-01-22 NOTE — TELEPHONE ENCOUNTER
Called patient to answer question on medication cost. He reports minocycline isn't covered by his insurance and is $30-40 per month with [employer] coupon which he doesn't think is affordable for him.     He'd like to switch back to doxycycline since it's cheaper. He reports some previous issues tolerating doxycycline but was tolerating it better when he took it with food. Advised to separate minocycline and doxycycline from magnesium supplement which he has been doing.     Will discuss with Dr. Rajeev Tirado, PharmD, [facility]  Medication Therapy Management Pharmacist

## 2025-01-28 ENCOUNTER — DOCUMENTATION ONLY (OUTPATIENT)
Dept: ANTICOAGULATION | Facility: CLINIC | Age: 60
End: 2025-01-28
Payer: MEDICARE

## 2025-01-28 ENCOUNTER — ANTICOAGULATION THERAPY VISIT (OUTPATIENT)
Dept: ANTICOAGULATION | Facility: CLINIC | Age: 60
End: 2025-01-28
Payer: MEDICARE

## 2025-01-28 DIAGNOSIS — I47.29 PAROXYSMAL VENTRICULAR TACHYCARDIA (H): ICD-10-CM

## 2025-01-28 DIAGNOSIS — I50.22 CHRONIC SYSTOLIC CHF (CONGESTIVE HEART FAILURE) (H): ICD-10-CM

## 2025-01-28 DIAGNOSIS — Z95.2 S/P MITRAL VALVE REPLACEMENT: ICD-10-CM

## 2025-01-28 DIAGNOSIS — Z95.811 LVAD (LEFT VENTRICULAR ASSIST DEVICE) PRESENT (H): Primary | ICD-10-CM

## 2025-01-28 LAB — INR HOME MONITORING: 2.6 (ref 2–3)

## 2025-01-28 NOTE — PROGRESS NOTES
ANTICOAGULATION CLINIC REFERRAL RENEWAL REQUEST       An annual renewal order is required for all patients referred to [facility] Anticoagulation [facility].?  Please review and sign the pended referral order for Tej Morfin.       ANTICOAGULATION SUMMARY      Warfarin indication(s)   Mechanical MVR and LVAD    Mechanical heart valve present?  Mechanical MVR       Current goal range   INR: 2.0-3.0     Goal appropriate for indication? Goal INR 2-3, standard for indication(s) above     Time in Therapeutic Range (TTR)  (Goal > 60%) 77.3%       Office visit with referring provider's group within last year yes on 1/16/25       SAVANNA MO RN  [facility] Anticoagulation Clinic

## 2025-01-28 NOTE — PROGRESS NOTES
ANTICOAGULATION MANAGEMENT     Tej SWENSON Shelbie 59 year old male is on warfarin with therapeutic INR result. (Goal INR 2.0-3.0)    Recent labs: (last 7 days)     01/28/25  0000   INR 2.6       ASSESSMENT     Source(s): Chart Review and Patient/Caregiver Call     Warfarin doses taken: Warfarin taken as instructed  Diet: No new diet changes identified  Medication/supplement changes:  Continues on Cefadroxil. Started on Doxycycline 1/22 - believes this will be a long term abx -- (INR) should be monitored with the addition and withdrawal of treatment with doxycycline    New illness, injury, or hospitalization: No  Signs or symptoms of bleeding or clotting: No  Previous result: Therapeutic last visit; previously outside of goal range  Additional findings: None       PLAN     Recommended plan for ongoing change(s) affecting INR     Dosing Instructions: Continue your current warfarin dose with next INR in 1 week       Summary  As of 1/28/2025      Full warfarin instructions:  5 mg every day   Next INR check:  --               Telephone call with Tej who verbalizes understanding and agrees to plan and who agrees to plan and repeated back plan correctly    Patient to recheck with home meter    Education provided: Please call back if any changes to your diet, medications or how you've been taking warfarin    Plan made per [facility] anticoagulation protocol and per LVAD protocol    Cecily Tovar RN  1/28/2025  Anticoagulation Clinic  [employer] for routing messages: jaime ANTICOAG LVAD  [facility] patient phone line: [phone]        _______________________________________________________________________     Anticoagulation Episode Summary       Current INR goal:  2.0-3.0   TTR:  77.3% (12 mo)   Target end date:  Indefinite   Send INR reminders to:  [facility]    Indications    LVAD (left ventricular assist device) present (H) [Z95.113]  Chronic systolic CHF (congestive heart failure) (H) [I50.22]  S/P mitral valve replacement  [Z95.2]  Paroxysmal ventricular tachycardia (H) [I47.29]             Comments:  Follow VAD Anticoag protocol:Yes: HeartMate 3  Bridging: per protocol  Date VAD placed: 03/23/22  INR Goal: per referral  Hx Mitral valve repair and not replacement               Anticoagulation Care Providers       Provider Role Specialty Phone number    Dunia Hdz MD Referring Cardiovascular Disease [phone]

## 2025-01-30 ENCOUNTER — TELEPHONE (OUTPATIENT)
Dept: TRANSPLANT | Facility: CLINIC | Age: 60
End: 2025-01-30
Payer: MEDICARE

## 2025-01-30 VITALS — WEIGHT: 246 LBS | BODY MASS INDEX: 35.3 KG/M2

## 2025-01-30 DIAGNOSIS — T82.7XXA INFECTION ASSOCIATED WITH DRIVELINE OF LEFT VENTRICULAR ASSIST DEVICE (LVAD): ICD-10-CM

## 2025-02-04 ENCOUNTER — ANTICOAGULATION THERAPY VISIT (OUTPATIENT)
Dept: ANTICOAGULATION | Facility: CLINIC | Age: 60
End: 2025-02-04
Payer: MEDICARE

## 2025-02-04 DIAGNOSIS — I50.22 CHRONIC SYSTOLIC CHF (CONGESTIVE HEART FAILURE) (H): ICD-10-CM

## 2025-02-04 DIAGNOSIS — Z95.811 LVAD (LEFT VENTRICULAR ASSIST DEVICE) PRESENT (H): Primary | ICD-10-CM

## 2025-02-04 DIAGNOSIS — I47.29 PAROXYSMAL VENTRICULAR TACHYCARDIA (H): ICD-10-CM

## 2025-02-04 DIAGNOSIS — Z95.2 S/P MITRAL VALVE REPLACEMENT: ICD-10-CM

## 2025-02-04 LAB — INR HOME MONITORING: 2.6 (ref 2–3)

## 2025-02-04 NOTE — PROGRESS NOTES
ANTICOAGULATION MANAGEMENT     Tej Morfin 59 year old male is on warfarin with therapeutic INR result. (Goal INR 2.0-3.0)    Recent labs: (last 7 days)     02/04/25  0000   INR 2.6       ASSESSMENT     Source(s): Chart Review and Patient/Caregiver Call     Warfarin doses taken: Warfarin taken as instructed  Diet: No new diet changes identified  Medication/supplement changes:  1/22/25 started Doxycycline - increased bleeding risk. Continues on Cefadroxil.  New illness, injury, or hospitalization: No  Signs or symptoms of bleeding or clotting: No  Previous result: Therapeutic last 2(+) visits (not 2 weeks apart, previously, barely subtherapeutic)  Additional findings: None       PLAN     Recommended plan for no diet, medication or health factor changes affecting INR     Dosing Instructions: Continue your current warfarin dose with next INR in 1 week       Summary  As of 2/4/2025      Full warfarin instructions:  5 mg every day   Next INR check:  2/11/2025               Telephone call with Tej who verbalizes understanding and agrees to plan    Patient to recheck with home meter    Education provided: Taking warfarin: prescribed tablet strength and color  Goal range and lab monitoring: goal range and significance of current result  Interaction IS anticipated between warfarin and Doxycycline  Symptom monitoring: monitoring for bleeding signs and symptoms  Contact [phone] with any changes, questions or concerns.     Plan made per [facility] anticoagulation protocol and per LVAD protocol    Emma Mata RN  2/4/2025  Anticoagulation Clinic  [employer] for routing messages: jaime ANTICOAG LVAD  [facility] patient phone line: [phone]        _______________________________________________________________________     Anticoagulation Episode Summary       Current INR goal:  2.0-3.0   TTR:  77.3% (12 mo)   Target end date:  Indefinite   Send INR reminders to:  ANTICOAG LVAD    Indications    LVAD (left ventricular assist  device) present (H) [Z95.811]  Chronic systolic CHF (congestive heart failure) (H) [I50.22]  S/P mitral valve replacement [Z95.2]  Paroxysmal ventricular tachycardia (H) [I47.29]             Comments:  Follow VAD Anticoag protocol:Yes: HeartMate 3  Bridging: per protocol  Date VAD placed: 03/23/22  INR Goal: per referral  Hx Mitral valve repair and not replacement               Anticoagulation Care Providers       Provider Role Specialty Phone number    Dunia Hdz MD Referring Cardiovascular Disease [phone]

## 2025-02-10 ENCOUNTER — CARE COORDINATION (OUTPATIENT)
Dept: TRANSPLANT | Facility: CLINIC | Age: 60
End: 2025-02-10
Payer: MEDICARE

## 2025-02-10 DIAGNOSIS — T82.7XXA INFECTION ASSOCIATED WITH DRIVELINE OF LEFT VENTRICULAR ASSIST DEVICE (LVAD): ICD-10-CM

## 2025-02-10 NOTE — PROGRESS NOTES
Status 4 Heart Extension Complete 2/10/2025    This patient meets status 4 criteria as evidenced by:     Dischargeable LVAD without discretionary 30 days     Patient's primary cardiologist and/or attending physician is in agreement with this plan.      Status 4 Extension due 5/12/2025

## 2025-02-11 ENCOUNTER — ANTICOAGULATION THERAPY VISIT (OUTPATIENT)
Dept: ANTICOAGULATION | Facility: CLINIC | Age: 60
End: 2025-02-11
Payer: MEDICARE

## 2025-02-11 DIAGNOSIS — Z95.811 LVAD (LEFT VENTRICULAR ASSIST DEVICE) PRESENT (H): Primary | ICD-10-CM

## 2025-02-11 DIAGNOSIS — I50.22 CHRONIC SYSTOLIC CHF (CONGESTIVE HEART FAILURE) (H): ICD-10-CM

## 2025-02-11 DIAGNOSIS — I47.29 PAROXYSMAL VENTRICULAR TACHYCARDIA (H): ICD-10-CM

## 2025-02-11 DIAGNOSIS — Z95.2 S/P MITRAL VALVE REPLACEMENT: ICD-10-CM

## 2025-02-11 LAB — INR HOME MONITORING: 2.7 (ref 2–3)

## 2025-02-11 NOTE — PROGRESS NOTES
ANTICOAGULATION MANAGEMENT     Tej LEELA Morfin 59 year old male is on warfarin with therapeutic INR result. (Goal INR 2.0-3.0)    Recent labs: (last 7 days)     02/11/25  0000   INR 2.7       ASSESSMENT     Source(s): Chart Review and Patient/Caregiver Call     Warfarin doses taken: Warfarin taken as instructed  Diet: No new diet changes identified  Medication/supplement changes: Continues on Cefadroxil & Doxycycline.  New illness, injury, or hospitalization: No  Signs or symptoms of bleeding or clotting: No  Previous result: Therapeutic last 2(+) visits  Additional findings: None       PLAN     Recommended plan for no diet, medication or health factor changes affecting INR     Dosing Instructions: Continue your current warfarin dose with next INR in 1 week       Summary  As of 2/11/2025      Full warfarin instructions:  5 mg every day   Next INR check:  2/18/2025               Telephone call with Tej who verbalizes understanding and agrees to plan and who agrees to plan and repeated back plan correctly    Patient to recheck with home meter    Education provided: Please call back if any changes to your diet, medications or how you've been taking warfarin    Plan made per [facility] anticoagulation protocol and per LVAD protocol    Cecily Tovar RN  2/11/2025  Anticoagulation Clinic  [employer] for routing messages: p ANTICOAG LVAD  [facility] patient phone line: [phone]        _______________________________________________________________________     Anticoagulation Episode Summary       Current INR goal:  2.0-3.0   TTR:  77.3% (12 mo)   Target end date:  Indefinite   Send INR reminders to:  ANTICOAG LVAD    Indications    LVAD (left ventricular assist device) present (H) [Z95.811]  Chronic systolic CHF (congestive heart failure) (H) [I50.22]  S/P mitral valve replacement [Z95.2]  Paroxysmal ventricular tachycardia (H) [I47.29]             Comments:  Follow VAD Anticoag protocol:Yes: HeartMate 3  Bridging: per  protocol  Date VAD placed: 03/23/22  INR Goal: per referral  Hx Mitral valve repair and not replacement               Anticoagulation Care Providers       Provider Role Specialty Phone number    Dunia Hdz MD Referring Cardiovascular Disease [phone]

## 2025-02-18 ENCOUNTER — ANTICOAGULATION THERAPY VISIT (OUTPATIENT)
Dept: ANTICOAGULATION | Facility: CLINIC | Age: 60
End: 2025-02-18
Payer: MEDICARE

## 2025-02-18 DIAGNOSIS — I47.29 PAROXYSMAL VENTRICULAR TACHYCARDIA (H): ICD-10-CM

## 2025-02-18 DIAGNOSIS — Z95.2 S/P MITRAL VALVE REPLACEMENT: ICD-10-CM

## 2025-02-18 DIAGNOSIS — Z95.811 LVAD (LEFT VENTRICULAR ASSIST DEVICE) PRESENT (H): Primary | ICD-10-CM

## 2025-02-18 DIAGNOSIS — I50.22 CHRONIC SYSTOLIC CHF (CONGESTIVE HEART FAILURE) (H): ICD-10-CM

## 2025-02-18 LAB — INR HOME MONITORING: 3.1 (ref 2–3)

## 2025-02-18 NOTE — PROGRESS NOTES
\ANTICOAGULATION MANAGEMENT     Tej Morfin 59 year old male is on warfarin with supratherapeutic INR result. (Goal INR 2.0-3.0)    Recent labs: (last 7 days)     02/18/25  0000   INR 3.1*       ASSESSMENT     Source(s): Chart Review and Patient/Caregiver Call     Warfarin doses taken: Warfarin taken as instructed  Diet: No new diet changes identified  Medication/supplement changes: None noted-Continues on Cefadroxil & Doxycycline until at least April   New illness, injury, or hospitalization: No  Signs or symptoms of bleeding or clotting: No  Previous result: Therapeutic last 2(+) visits  Additional findings: None       PLAN     Recommended plan for no diet, medication or health factor changes affecting INR     Dosing Instructions: Continue your current warfarin dose with next INR in 1 week       Summary  As of 2/18/2025      Full warfarin instructions:  5 mg every day   Next INR check:  2/25/2025               Telephone call with Tej who agrees to plan and repeated back plan correctly    Patient to recheck with home meter    Education provided: Goal range and lab monitoring: goal range and significance of current result and Importance of following up at instructed interval  Symptom monitoring: monitoring for bleeding signs and symptoms  Contact [phone] with any changes, questions or concerns.     Plan made per [facility] anticoagulation protocol and per LVAD protocol    SAVANNA MO RN  2/18/2025  Anticoagulation Clinic  [employer] for routing messages: jaime ANTICOAG LVAD  [facility] patient phone line: [phone]        _______________________________________________________________________     Anticoagulation Episode Summary       Current INR goal:  2.0-3.0   TTR:  76.8% (12 mo)   Target end date:  Indefinite   Send INR reminders to:  [facility] LVAD    Indications    LVAD (left ventricular assist device) present (H) [Z95.691]  Chronic systolic CHF (congestive heart failure) (H) [I50.22]  S/P mitral valve  replacement [Z95.2]  Paroxysmal ventricular tachycardia (H) [I47.29]             Comments:  Follow VAD Anticoag protocol:Yes: HeartMate 3  Bridging: per protocol  Date VAD placed: 03/23/22  INR Goal: per referral  Hx Mitral valve repair and not replacement               Anticoagulation Care Providers       Provider Role Specialty Phone number    Dunia Hdz MD Referring Cardiovascular Disease [phone]

## 2025-02-25 ENCOUNTER — ANTICOAGULATION THERAPY VISIT (OUTPATIENT)
Dept: ANTICOAGULATION | Facility: CLINIC | Age: 60
End: 2025-02-25
Payer: MEDICARE

## 2025-02-25 DIAGNOSIS — Z95.811 LVAD (LEFT VENTRICULAR ASSIST DEVICE) PRESENT (H): Primary | ICD-10-CM

## 2025-02-25 DIAGNOSIS — Z95.2 S/P MITRAL VALVE REPLACEMENT: ICD-10-CM

## 2025-02-25 DIAGNOSIS — I50.22 CHRONIC SYSTOLIC CHF (CONGESTIVE HEART FAILURE) (H): ICD-10-CM

## 2025-02-25 DIAGNOSIS — I47.29 PAROXYSMAL VENTRICULAR TACHYCARDIA (H): ICD-10-CM

## 2025-02-25 LAB — INR HOME MONITORING: 2.9 (ref 2–3)

## 2025-02-25 NOTE — PROGRESS NOTES
ANTICOAGULATION MANAGEMENT     Tej SWENSON Schneider 59 year old male is on warfarin with therapeutic INR result. (Goal INR 2.0-3.0)    Recent labs: (last 7 days)     02/25/25  0000   INR 2.9       ASSESSMENT     Source(s): Chart Review and Patient/Caregiver Call     Warfarin doses taken: Warfarin taken as instructed  Diet: No new diet changes identified  Medication/supplement changes: None noted  New illness, injury, or hospitalization: No  Signs or symptoms of bleeding or clotting: No  Previous result: Supratherapeutic  Additional findings: None       PLAN     Recommended plan for no diet, medication or health factor changes affecting INR     Dosing Instructions: Continue your current warfarin dose with next INR in 1 week       Summary  As of 2/25/2025      Full warfarin instructions:  5 mg every day   Next INR check:  3/4/2025               Telephone call with Tej who verbalizes understanding and agrees to plan    Patient to recheck with home meter    Education provided: Goal range and lab monitoring: goal range and significance of current result    Plan made per [facility] anticoagulation protocol    Zara Don RN  2/25/2025  Anticoagulation Clinic  [employer] for routing messages: jaime ANTICOAG LVAD  [facility] patient phone line: [phone]        _______________________________________________________________________     Anticoagulation Episode Summary       Current INR goal:  2.0-3.0   TTR:  76.8% (12 mo)   Target end date:  Indefinite   Send INR reminders to:  ANTICOAG LVAD    Indications    LVAD (left ventricular assist device) present (H) [Z95.811]  Chronic systolic CHF (congestive heart failure) (H) [I50.22]  S/P mitral valve replacement [Z95.2]  Paroxysmal ventricular tachycardia (H) [I47.29]             Comments:  Follow VAD Anticoag protocol:Yes: HeartMate 3  Bridging: per protocol  Date VAD placed: 03/23/22  INR Goal: per referral  Hx Mitral valve repair and not replacement               Anticoagulation Care  Providers       Provider Role Specialty Phone number    Dunia Hdz MD Referring Cardiovascular Disease [phone]

## 2025-03-04 ENCOUNTER — ANTICOAGULATION THERAPY VISIT (OUTPATIENT)
Dept: ANTICOAGULATION | Facility: CLINIC | Age: 60
End: 2025-03-04
Payer: MEDICARE

## 2025-03-04 DIAGNOSIS — I50.22 CHRONIC SYSTOLIC CHF (CONGESTIVE HEART FAILURE) (H): ICD-10-CM

## 2025-03-04 DIAGNOSIS — Z95.2 S/P MITRAL VALVE REPLACEMENT: ICD-10-CM

## 2025-03-04 DIAGNOSIS — Z95.811 LVAD (LEFT VENTRICULAR ASSIST DEVICE) PRESENT (H): Primary | ICD-10-CM

## 2025-03-04 DIAGNOSIS — I47.29 PAROXYSMAL VENTRICULAR TACHYCARDIA (H): ICD-10-CM

## 2025-03-04 LAB — INR HOME MONITORING: 3.2 (ref 2–3)

## 2025-03-04 NOTE — PROGRESS NOTES
ANTICOAGULATION MANAGEMENT     Tej Morfin 59 year old male is on warfarin with supratherapeutic INR result. (Goal INR 2.0-3.0)    Recent labs: (last 7 days)     03/04/25  0000   INR 3.2*       ASSESSMENT     Source(s): Chart Review  Previous INR was Therapeutic last visit; previously outside of goal range  Medication, diet, health changes since last INR chart reviewed; none identified         PLAN     Recommended plan for no diet, medication or health factor changes affecting INR     Dosing Instructions: decrease your warfarin dose (3.6% change) with next INR in 1 week       Summary  As of 3/4/2025      Full warfarin instructions:  3.75 mg every Tue; 5 mg all other days   Next INR check:  3/11/2025               Detailed voice message left for Tej with dosing instructions and follow up date.   Sent [employer] message with dosing and follow up instructions    Patient to recheck with home meter    Education provided: Please call back if any changes to your diet, medications or how you've been taking warfarin  Taking warfarin: prescribed tablet strength and color  Goal range and lab monitoring: goal range and significance of current result  Symptom monitoring: monitoring for bleeding signs and symptoms  Importance of notifying anticoagulation clinic for: upcoming surgeries and procedures scheduled and health changes/illness/diarrhea  Written instructions provided  Contact [phone] with any changes, questions or concerns.     Plan made per [facility] anticoagulation protocol and per LVAD protocol    Emma Mata RN  3/4/2025  Anticoagulation Clinic  [employer] for routing messages: p ANTICOAG LVAD  [facility] patient phone line: [phone]        _______________________________________________________________________     Anticoagulation Episode Summary       Current INR goal:  2.0-3.0   TTR:  76.0% (12 mo)   Target end date:  Indefinite   Send INR reminders to:  ANTICOAG LVAD    Indications    LVAD (left ventricular  assist device) present (H) [Z95.811]  Chronic systolic CHF (congestive heart failure) (H) [I50.22]  S/P mitral valve replacement [Z95.2]  Paroxysmal ventricular tachycardia (H) [I47.29]             Comments:  Follow VAD Anticoag protocol:Yes: HeartMate 3  Bridging: per protocol  Date VAD placed: 03/23/22  INR Goal: per referral  Hx Mitral valve repair and not replacement               Anticoagulation Care Providers       Provider Role Specialty Phone number    Dunia Hdz MD Referring Cardiovascular Disease [phone]

## 2025-03-10 NOTE — PROGRESS NOTES
Virtual Visit Details  Type of service:  Video Visit   Video Start Time:  12:30 PM  Video End Time: 1:00 PM    Originating Location (pt. Location): Other Car  Distant Location (provider location):  Off-site  Platform used for Video Visit: Brandon    Here is a copy of the progress note from your recent genetic counseling visit to the [facility] on Date: 3/12/2025.    PROGRESS NOTE: Tej was referred by Dunia Hdz MD for genetic counseling due to his history of nonischemic cardiomyopathy (NICM) and family history of sudden death due to arrhythmogenic right ventricular cardiomyopathy (ARVC) in his daughter.  I had the opportunity to talk with Tej and his wife, Jessenia today to discuss the genetic component of NICM and ARVC and testing options available to him.     MEDICAL HISTORY: Tej is a 59 year-old male with NICM. This was first diagnosed about 10 years ago when an enlarged heart was noted during a workup for pneumonia. He was also noted to have severe MR during this time and underwent MVR with ring. He has an ICD due to ventricular tachycardia and has received one shock from this. He had an LVAD placed in 2022 and is currently listed for transplant. His most recent echocardiogram from 2024 showed severely reduced left ventricular function (EF 15-20%), severe left ventricular dilation (LVIDd 7.1 cm), global right ventricular function moderately reduced, and normal RV size. Tej's daughter recently  suddenly and autopsy revealed arrhythmogenic right ventricular cardiomyopathy as cause of death. This raised suspicion for a genetic component to Peters cardiomyopathy.    FAMILY HISTORY: A detailed family history was obtained today and was significant for the following cardiac history:    Daughter, Nicol,  suddenly at age 19 this past September. Autopsy concluded ARVC as cause of death. Endocardial fibroelastosis of cardiac left atrium was  also noted. Post-mortem genetic testing was ordered through [facility] which included an ARVC panel (17 genes) and hypertrophic cardiomyopathy panel (56 genes). Both returned negative.   Son, 35, and daughter, 30, who are in good health. Tej thinks his son has had normal cardiac screening but does not believe his daughter has completed this.  Maternal half-brother (53) and sister (47) who are in good health. Unknown if they've had cardiac screening.  Father,  at 51 from cardiomyopathy, unknown what type. No information on paternal grandparents.  Mother,  at age 55 with heart disease, unknown what type.  Maternal uncle  suddenly in his 20's.   Maternal grandparents  in their 50's and 60's with heart issues, unknown what type.    There is no additional history of cardiomyopathy, arrhythmias, heart attacks, fainting, sudden cardiac death, genetic conditions, or birth defects. (Scanned pedigree may be under media tab)    DISCUSSION:  Non-ischemic cardiomyopathy results from causes other than loss of blood flow to the heart.  Cardiomyopathy can be caused by both acquired and genetic causes.  Acquired causes include exposure to toxins, injury related to coronary artery disease (ischemic), and chronic high blood pressure. When familial, it often results in dilated cardiomyopathy (DCM), where the left ventricle gets enlarged or stretched out.  There is a genetic basis found in 20-50% of DCM cases.     Arrhythmogenic cardiomyopathy (ACM), has different subtypes including arrhythmogenic right ventricular cardiomyopathy/dysplasia (ARVC/D) and left dominant arrhythmogenic cardiomyopathy (LDAC).  ACM is a difficult condition to diagnose.  It is characterized by fatty replacement and fibrosis of the heart muscle which interrupts the electrical signals being sent between the heart cells. This loss of connection between the heart cells results in arrhythmias, including palpitations, fainting, and sudden  death.  ACM is frequently diagnosed as dilated cardiomyopathy during the initial phase. Explained that a good portion of ACM cases have a genetic component. DCM and ACM share significant overlap in their genetic causes. Genetic testing currently identifies mutations in about 60% of idiopathic ACM cases. 40% of cases have an unknown genetic cause.    [facility] genetic testing for ARVC and HCM was negative. Discussed this does NOT rule out a genetic component, as 40% of ARVC cases have an unknown genetic cause.     It is most likely that the genetic cause of Edilberto cardiomyopathy is the same as Titos. Thus, it is unlikely that we will identify a mutation in Tej in one of the ARVC genes that was tested in Nicol. However, given that Peters cardiomyopathy fits with more of a dilated cardiomyopathy phenotype, it is reasonable to pursue genetic testing for a dilated cardiomyopathy panel to assess for these genes (as well as ARVC genes). It is also possible that the genetic cause of Titos ARVC was inherited from her mother and Tej has a different genetic cause for his cardiomyopathy.    Reviewed autosomal dominant (AD) inheritance pattern most commonly associated with ACM, including the 50% risk for recurrence. Briefly reviewed autosomal recessive inheritance. Explained that ACM gene mutations are associated with reduced penetrance and variable expressivity, meaning that individuals who carry a gene mutation may or may not get the disease and onset and severity can vary from one family member to the next. This explains why you may not see cardiomyopathy in each generation of a family.     Genetic testing is indicated for individuals with cardiomyopathy to better understand the cause of their heart disease, progression, the likelihood of noncardiac health risks, availability of gene therapy or enzyme replacement therapies, and risk to relatives. Genetic testing that includes a panel of genes is the most cost  effective and timely approach. Reviewed capabilities, limitations, and logistics of testing.     DNA sample via saliva or blood is collected and sent to testing lab for evaluation of selected genes. The results could directly impact care and treatment. This testing is medically necessary.     Explained three possible outcomes of genetic testing including: positive identification of a mutation, no mutation identified, and identification of a variant of unknown significance (VUS). If a mutation is identified, presymptomatic testing would be available to at risk family members. If no mutation is identified, it does not rule out the possibility of a genetic component to this disease. Family members could still be at risk for developing the same condition. If a VUS is identified, it is unclear if the mutation is disease causing or just a normal variation. It may take time and possibly additional testing to determine the meaning of a VUS result.     Test results take approximately 2-4 weeks on average. Discussed cost of testing through commercial lab. Explained that the lab works with insurance to determine coverage and contact patient if out of pocket costs are expected to exceed $100.  If so, patient has the option to pay reported price, cancel testing or elect self pay pricing (typcially around $250).     Discussed pros and cons of genetic testing. Explained that results could determine the cause for his cardiomyopathy. If a mutation is identified, presymptomatic testing is available to all at risk relatives. Reviewed possible issues associated with presymptomatic testing including genetic discrimination, current laws to prevent discrimination (ie. MARTIR), insurance issues, and emotional and psychosocial outcomes of testing.     Explained that clinical evaluation is recommended for all first degree relatives (parents, siblings, and children) of an affected individual regardless of decision to pursue genetic testing.  Clinical evaluation should be performed every two years after age 10 with frequency changing after 40 years or with endurance athletes.  Evaluation should include history, cardiac exam, cardiac MRI, signal averaged EKG, exercise stress testing and 14 day Holter monitoring.    All questions answered at this time.      PLAN: Tej elected to proceed with genetic testing.  Requisition was completed and verbal consent was obtained, due to virtual visit. A saliva sample kit will be sent directly to the patient.  Once sample is collected, kit will be mailed to [facility].  I will contact patient when results are available.     TOTAL TIME SPENT: I spent 65 minutes on the day of the encounter doing chart review, collecting medical and family history, counseling, documentation and further activities as noted above.    Petra Gaston MS, [place]  Licensed, Certified Genetic Counselor  [facility]  [facility]

## 2025-03-11 ENCOUNTER — ANTICOAGULATION THERAPY VISIT (OUTPATIENT)
Dept: ANTICOAGULATION | Facility: CLINIC | Age: 60
End: 2025-03-11
Payer: MEDICARE

## 2025-03-11 DIAGNOSIS — Z95.2 S/P MITRAL VALVE REPLACEMENT: ICD-10-CM

## 2025-03-11 DIAGNOSIS — I50.22 CHRONIC SYSTOLIC CHF (CONGESTIVE HEART FAILURE) (H): ICD-10-CM

## 2025-03-11 DIAGNOSIS — I47.29 PAROXYSMAL VENTRICULAR TACHYCARDIA (H): ICD-10-CM

## 2025-03-11 DIAGNOSIS — Z95.811 LVAD (LEFT VENTRICULAR ASSIST DEVICE) PRESENT (H): Primary | ICD-10-CM

## 2025-03-11 LAB — INR HOME MONITORING: 2.8 (ref 2–3)

## 2025-03-11 NOTE — PROGRESS NOTES
ANTICOAGULATION MANAGEMENT     Tejdash Morfin 59 year old male is on warfarin with therapeutic INR result. (Goal INR 2.0-3.0)    Recent labs: (last 7 days)     03/11/25  0000   INR 2.8       ASSESSMENT     Source(s): Chart Review and Patient/Caregiver Call     Warfarin doses taken: Warfarin taken as instructed  Diet: No new diet changes identified  Medication/supplement changes: None noted  New illness, injury, or hospitalization: No  Signs or symptoms of bleeding or clotting: No  Previous result: Supratherapeutic  Additional findings: None       PLAN     Recommended plan for no diet, medication or health factor changes affecting INR     Dosing Instructions: Continue your current warfarin dose with next INR in 1-2 week       Summary  As of 3/11/2025      Full warfarin instructions:  3.75 mg every Tue; 5 mg all other days   Next INR check:  3/25/2025               Telephone call with Tej who verbalizes understanding and agrees to plan and ; made aware of patient survey and encouraged to participate.    Patient to recheck with home meter    Education provided: Please call back if any changes to your diet, medications or how you've been taking warfarin    Plan made per [facility] anticoagulation protocol and per LVAD protocol    Cecily Tovar RN  3/11/2025  Anticoagulation Clinic  Fighters for routing messages: p ANTICOAG LVAD  [facility] patient phone line: [phone]        _______________________________________________________________________     Anticoagulation Episode Summary       Current INR goal:  2.0-3.0   TTR:  75.5% (12 mo)   Target end date:  Indefinite   Send INR reminders to:  ANTICOAG LVAD    Indications    LVAD (left ventricular assist device) present (H) [Z95.811]  Chronic systolic CHF (congestive heart failure) (H) [I50.22]  S/P mitral valve replacement [Z95.2]  Paroxysmal ventricular tachycardia (H) [I47.29]             Comments:  Follow VAD Anticoag protocol:Yes: HeartMate 3  Bridging: per  protocol  Date VAD placed: 03/23/22  INR Goal: per referral  Hx Mitral valve repair and not replacement               Anticoagulation Care Providers       Provider Role Specialty Phone number    Dunia Hdz MD Referring Cardiovascular Disease [phone]

## 2025-03-12 ENCOUNTER — VIRTUAL VISIT (OUTPATIENT)
Dept: CARDIOLOGY | Facility: CLINIC | Age: 60
End: 2025-03-12
Payer: MEDICARE

## 2025-03-12 VITALS — WEIGHT: 245 LBS | HEIGHT: 70 IN | BODY MASS INDEX: 35.07 KG/M2

## 2025-03-12 DIAGNOSIS — T82.7XXA INFECTION ASSOCIATED WITH DRIVELINE OF LEFT VENTRICULAR ASSIST DEVICE (LVAD): ICD-10-CM

## 2025-03-12 DIAGNOSIS — Z82.41 FAMILY HISTORY OF SUDDEN CARDIAC DEATH IN DAUGHTER: ICD-10-CM

## 2025-03-12 DIAGNOSIS — I50.22 CHRONIC SYSTOLIC CHF (CONGESTIVE HEART FAILURE) (H): ICD-10-CM

## 2025-03-12 DIAGNOSIS — I42.8 NONISCHEMIC CARDIOMYOPATHY (H): Primary | ICD-10-CM

## 2025-03-12 PROCEDURE — 96041 GENETIC COUNSELING SVC EA 30: CPT | Mod: GT,95

## 2025-03-12 ASSESSMENT — PAIN SCALES - GENERAL: PAINLEVEL_OUTOF10: NO PAIN (0)

## 2025-03-12 NOTE — NURSING NOTE
Current patient location: [place] per pt    Is the patient currently in the state of [place]? YES    Visit mode: VIDEO    If the visit is dropped, the patient can be reconnected by:VIDEO VISIT: Text to cell phone:   Telephone Information:   Mobile [phone]       Will anyone else be joining the visit? Yes, pt's wife is with the pt and will be joining the video visit per pt  (If patient encounters technical issues they should call [phone])    Are changes needed to the allergy or medication list? Pt stated no med changes    Are refills needed on medications prescribed by this physician? NO    Rooming Documentation:  Not applicable    Reason for visit: Consult    No other vitals to report per pt    Macy GALARZAF

## 2025-03-12 NOTE — LETTER
3/12/2025      RE: Tej Morfin  [address]  [place] [address]       Dear Colleague,    Thank you for the opportunity to participate in the care of your patient, Tej Morfin, at the [facility] [place] at [facility]. Please see a copy of my visit note below.    Virtual Visit Details  Type of service:  Video Visit   Video Start Time:  12:30 PM  Video End Time: 1:00 PM    Originating Location (pt. Location): Other Car  Distant Location (provider location):  Off-site  Platform used for Video Visit: Brandon    Here is a copy of the progress note from your recent genetic counseling visit to [facility] on Date: 3/12/2025.    PROGRESS NOTE: Tej was referred by Dunia Hdz MD for genetic counseling due to his history of nonischemic cardiomyopathy (NICM) and family history of sudden death due to arrhythmogenic right ventricular cardiomyopathy (ARVC) in his daughter.  I had the opportunity to talk with Tej and his wife, Jessenia today to discuss the genetic component of NICM and ARVC and testing options available to him.     MEDICAL HISTORY: Tej is a 59 year-old male with NICM. This was first diagnosed about 10 years ago when an enlarged heart was noted during a workup for pneumonia. He was also noted to have severe MR during this time and underwent MVR with ring. He has an ICD due to ventricular tachycardia and has received one shock from this. He had an LVAD placed in 2022 and is currently listed for transplant. His most recent echocardiogram from 2024 showed severely reduced left ventricular function (EF 15-20%), severe left ventricular dilation (LVIDd 7.1 cm), global right ventricular function moderately reduced, and normal RV size. Tej's daughter recently  suddenly and autopsy revealed arrhythmogenic right ventricular cardiomyopathy as  cause of death. This raised suspicion for a genetic component to Tej's cardiomyopathy.    FAMILY HISTORY: A detailed family history was obtained today and was significant for the following cardiac history:    Daughter, Nicol,  suddenly at age 19 this past September. Autopsy concluded ARVC as cause of death. Endocardial fibroelastosis of cardiac left atrium was also noted. Post-mortem genetic testing was ordered through [facility] which included an ARVC panel (17 genes) and hypertrophic cardiomyopathy panel (56 genes). Both returned negative.   Son, 35, and daughter, 30, who are in good health. Tej thinks his son has had normal cardiac screening but does not believe his daughter has completed this.  Maternal half-brother (53) and sister (47) who are in good health. Unknown if they've had cardiac screening.  Father,  at 51 from cardiomyopathy, unknown what type. No information on paternal grandparents.  Mother,  at age 55 with heart disease, unknown what type.  Maternal uncle  suddenly in his 20's.   Maternal grandparents  in their 50's and 60's with heart issues, unknown what type.    There is no additional history of cardiomyopathy, arrhythmias, heart attacks, fainting, sudden cardiac death, genetic conditions, or birth defects. (Scanned pedigree may be under media tab)    DISCUSSION:  Non-ischemic cardiomyopathy results from causes other than loss of blood flow to the heart.  Cardiomyopathy can be caused by both acquired and genetic causes.  Acquired causes include exposure to toxins, injury related to coronary artery disease (ischemic), and chronic high blood pressure. When familial, it often results in dilated cardiomyopathy (DCM), where the left ventricle gets enlarged or stretched out.  There is a genetic basis found in 20-50% of DCM cases.     Arrhythmogenic cardiomyopathy (ACM), has different subtypes including arrhythmogenic right ventricular cardiomyopathy/dysplasia  (ARVC/D) and left dominant arrhythmogenic cardiomyopathy (LDAC).  ACM is a difficult condition to diagnose.  It is characterized by fatty replacement and fibrosis of the heart muscle which interrupts the electrical signals being sent between the heart cells. This loss of connection between the heart cells results in arrhythmias, including palpitations, fainting, and sudden death.  ACM is frequently diagnosed as dilated cardiomyopathy during the initial phase. Explained that a good portion of ACM cases have a genetic component. DCM and ACM share significant overlap in their genetic causes. Genetic testing currently identifies mutations in about 60% of idiopathic ACM cases. 40% of cases have an unknown genetic cause.    [facility] genetic testing for ARVC and HCM was negative. Discussed this does NOT rule out a genetic component, as 40% of ARVC cases have an unknown genetic cause.     It is most likely that the genetic cause of Peters cardiomyopathy is the same as Nicol's. Thus, it is unlikely that we will identify a mutation in Tej in one of the ARVC genes that was tested in Nicol. However, given that Peters cardiomyopathy fits with more of a dilated cardiomyopathy phenotype, it is reasonable to pursue genetic testing for a dilated cardiomyopathy panel to assess for these genes (as well as ARVC genes). It is also possible that the genetic cause of Titos ARVC was inherited from her mother and Tej has a different genetic cause for his cardiomyopathy.    Reviewed autosomal dominant (AD) inheritance pattern most commonly associated with ACM, including the 50% risk for recurrence. Briefly reviewed autosomal recessive inheritance. Explained that ACM gene mutations are associated with reduced penetrance and variable expressivity, meaning that individuals who carry a gene mutation may or may not get the disease and onset and severity can vary from one family member to the next. This explains why you may not see  cardiomyopathy in each generation of a family.     Genetic testing is indicated for individuals with cardiomyopathy to better understand the cause of their heart disease, progression, the likelihood of noncardiac health risks, availability of gene therapy or enzyme replacement therapies, and risk to relatives. Genetic testing that includes a panel of genes is the most cost effective and timely approach. Reviewed capabilities, limitations, and logistics of testing.     DNA sample via saliva or blood is collected and sent to testing lab for evaluation of selected genes. The results could directly impact care and treatment. This testing is medically necessary.     Explained three possible outcomes of genetic testing including: positive identification of a mutation, no mutation identified, and identification of a variant of unknown significance (VUS). If a mutation is identified, presymptomatic testing would be available to at risk family members. If no mutation is identified, it does not rule out the possibility of a genetic component to this disease. Family members could still be at risk for developing the same condition. If a VUS is identified, it is unclear if the mutation is disease causing or just a normal variation. It may take time and possibly additional testing to determine the meaning of a VUS result.     Test results take approximately 2-4 weeks on average. Discussed cost of testing through commercial lab. Explained that the lab works with insurance to determine coverage and contact patient if out of pocket costs are expected to exceed $100.  If so, patient has the option to pay reported price, cancel testing or elect self pay pricing (typcially around $250).     Discussed pros and cons of genetic testing. Explained that results could determine the cause for his cardiomyopathy. If a mutation is identified, presymptomatic testing is available to all at risk relatives. Reviewed possible issues associated with  presymptomatic testing including genetic discrimination, current laws to prevent discrimination (ie. MARTIR), insurance issues, and emotional and psychosocial outcomes of testing.     Explained that clinical evaluation is recommended for all first degree relatives (parents, siblings, and children) of an affected individual regardless of decision to pursue genetic testing. Clinical evaluation should be performed every two years after age 10 with frequency changing after 40 years or with endurance athletes.  Evaluation should include history, cardiac exam, cardiac MRI, signal averaged EKG, exercise stress testing and 14 day Holter monitoring.    All questions answered at this time.      PLAN: Tej elected to proceed with genetic testing.  Requisition was completed and verbal consent was obtained, due to virtual visit. A saliva sample kit will be sent directly to the patient.  Once sample is collected, kit will be mailed to [facility] laboratory.  I will contact patient when results are available.     TOTAL TIME SPENT: I spent 65 minutes on the day of the encounter doing chart review, collecting medical and family history, counseling, documentation and further activities as noted above.    Petra Gaston MS, [facility]  Licensed, Certified Genetic Counselor  [facility]  [facility]        Please do not hesitate to contact me if you have any questions/concerns.     Sincerely,     Petra Gaston GC

## 2025-03-13 ENCOUNTER — TELEPHONE (OUTPATIENT)
Dept: TRANSPLANT | Facility: CLINIC | Age: 60
End: 2025-03-13
Payer: MEDICARE

## 2025-03-13 VITALS — WEIGHT: 245 LBS | BODY MASS INDEX: 35.15 KG/M2

## 2025-03-13 DIAGNOSIS — T82.7XXA INFECTION ASSOCIATED WITH DRIVELINE OF LEFT VENTRICULAR ASSIST DEVICE (LVAD): ICD-10-CM

## 2025-03-14 DIAGNOSIS — I50.22 CHRONIC SYSTOLIC (CONGESTIVE) HEART FAILURE (H): ICD-10-CM

## 2025-03-18 ENCOUNTER — MYC REFILL (OUTPATIENT)
Dept: INFECTIOUS DISEASES | Facility: CLINIC | Age: 60
End: 2025-03-18
Payer: MEDICARE

## 2025-03-18 DIAGNOSIS — T82.7XXA INFECTION ASSOCIATED WITH DRIVELINE OF LEFT VENTRICULAR ASSIST DEVICE (LVAD): ICD-10-CM

## 2025-03-18 RX ORDER — CEFADROXIL 500 MG/1
1000 CAPSULE ORAL 2 TIMES DAILY
Qty: 120 CAPSULE | Refills: 1 | Status: SHIPPED | OUTPATIENT
Start: 2025-03-18

## 2025-03-18 NOTE — TELEPHONE ENCOUNTER
"Medication Refill                                                     Refill request receive for:     Disp Refills Start End MARK   cefadroxil (DURICEF) 500 MG capsule 120 capsule 1 1/8/2025 -- No   Sig - Route: Take 2 capsules (1,000 mg) by mouth 2 times daily. - Oral        Last refill: 01/08/25    Last Office Visit 1/16/2025  Future appt scheduled? Yes: 04/18/25     POC for medication if indicated from last note including duration of treatment if applicable:   \"Continue cefadroxil 1000 MG BID as part of the oral suppression therapy\" per Dr. Boo      RECENT LABS/VITALS                                                        Lab Results   Component Value Date    AST 16 01/16/2025    AST 17 09/10/2019     Lab Results   Component Value Date    ALT 22 01/16/2025    ALT 25 09/10/2019     Creatinine   Date Value Ref Range Status   01/16/2025 1.33 (H) 0.67 - 1.17 mg/dL Final   09/10/2019 1.23 0.66 - 1.25 mg/dL Final   ]  Alkaline Phosphatase   Date/Time Value Ref Range Status   01/16/2025 01:35 PM 83 40 - 150 U/L Final   09/10/2019 06:49 AM 61 40 - 150 U/L Final     No results found for: \"LABAPCBCDIFF\"                    "

## 2025-03-18 NOTE — PATIENT INSTRUCTIONS
"Indication for Genetic Counseling:     Non-ischemic cardiomyopathy results from causes other than loss of blood flow to the heart.  Cardiomyopathy can be caused by both acquired and genetic causes.  Acquired causes include exposure to toxins, injury related to coronary artery disease (ischemic),  infections and inflammation of the heart (myocarditis), alcohol/drug abuse, stress, and chronic high blood pressure.  When familial, it often results in dilated cardiomyopathy (DCM), where the left ventricle gets enlarged or stretched out.  Dilated cardiomyopathy (DCM) is a condition that causes the heart to lose it's elasticity, which leads to stretching and dilation.  It is usually diagnosed by an echocardiogram (ECHO) or cardiac magnetic resonance imaging (MRI).  When the heart becomes dilated, it cannot pump blood as effectively, which can lead to symptoms such as congestive heart failure, fluid accumulation in the body (edema), shortness of breath, fatigue, irregular heartbeat, fainting, stroke, cardiac arrest, and sudden cardiac death (SCD).  There are at least 40 genes known to be associated with DCM.    Inheritance:   Humans have over 20,000 genes that instruct our bodies how to function.  We have two copies of each gene because we inherit one from our mother and one from our father.  In most cardiac cases with a genetic component, the condition is inherited in an autosomal dominant (AD) pattern.  This means that in order to have the condition, a person needs to inherit a mutation on one copy of a particular gene.  This mutation or pathogenic variant dominates the \"normal\" working copy of the gene.  When an affected individual has children, they can either pass on the \"normal\" copy of the gene or the mutation.  Therefore, children have a 50% chance of inheriting the mutation.  Other family members also have an increased risk but the specific risk depends on the degree of relationship.  Additional inheritance " patterns can occur within families and may alter the risk of recurrence.     Testing Options:   Genetic testing is available to assess a panel of genes known to cause this condition.  This test reads through the DNA (sequencing) of these genes to look for spelling mistakes or mutations that could cause the condition.      There are three types of results you could receive from this test.     -Positive result (mutation/pathogenic variant identified) - confirms diagnosis and provides an answer to why this happened.  In addition, identifying a mutation allows family members to have testing to determine their risk.     -Negative result (mutation not identified) - no genetic changes were identified.  This does not rule out a genetic cause for the condition as the genetic testing only identifies 40-50% of genetic causes for this condition.    -Variant of uncertain significance (VUS) - a genetic change was identified, but there is not enough information to determine whether it is disease-causing or normal human genetic variation.     Although genetic testing may identify a mutation, it cannot provide information about the severity of symptoms or the progression of disease.  We cannot predict age of onset or severity of symptoms due to reduced penetrance and variable expressivity.    Logistics:   Genetic testing involves collecting a sample of DNA, thru blood, saliva, or cheek cells.  The sample will be sent to a laboratory to extract the DNA and sequence the genes for mutations.  The laboratory will work with your insurance company to determine the out of pocket (OOP) cost and will notify you if the OOP cost is greater than $100.  Remember to ask the lab about financial assistance pricing and self pay options as well.  Sometimes those are much lower than insurance pricing.  When testing is initiated, results take about 2-4 weeks to return. I will contact you over the phone when results are available.     Genetic  Information and Nondiscrimination Act:  The Genetic Information and Nondiscrimination Act of 2008 (MARTIR) is a federal law that protects individuals from genetic discrimination in health insurance and employment. Genetic discrimination is defined as the misuse of genetic information. This law does not address potential discrimination regarding life insurance or disability insurance.      This is especially relevant for at risk individuals who are considering presymptomatic testing.    Screening Recommendations:  Explained that clinical evaluation is recommended for all first degree relatives (parents, siblings, and children) of an affected individual regardless of decision to pursue genetic testing. Based on the Heart Failure Society of [place] Practice Guidelines (Jeffry et al, 2009), clinical evaluation should be performed at least every 3-5 years beginning and childhood and should include history, cardiac exam, ECHO, and EKG.    Resources:  Cardiomyopathy  Hypertrophic Cardiomyopathy Association - 4hcm.org  Children's Cardiomyopathy Foundation - childrenscardiomyopathy.org  [place] Cardiomyopathy Association - cardiomyopathy.org  Dilated Cardiomyopathy Foundation - DCMfoundation.org    Arrhythmia  Heart Rhythm Society - HRSonline.org    General   American Heart Association - americanheart.org  Genetics Home Reference - ghr.nlm.nih.gov  Genetic Information and Nondiscrimination Act - ginahelp.org    Contact Information:  Petra Gaston MS, [facility] [place]  Licensed, Certified Genetic Counselor  [facility]  [facility]     Office:  [phone]  Appointments:  [phone]  Fax: [phone]  Email: [email or web address]

## 2025-03-19 ENCOUNTER — LAB (OUTPATIENT)
Dept: LAB | Facility: CLINIC | Age: 60
End: 2025-03-19
Payer: MEDICARE

## 2025-03-19 DIAGNOSIS — Z94.1 HEART REPLACED BY TRANSPLANT (H): ICD-10-CM

## 2025-03-19 RX ORDER — POTASSIUM CHLORIDE 1500 MG/1
60 TABLET, EXTENDED RELEASE ORAL 2 TIMES DAILY
Qty: 540 TABLET | Refills: 3 | Status: SHIPPED | OUTPATIENT
Start: 2025-03-19

## 2025-03-19 NOTE — TELEPHONE ENCOUNTER
Last Written Prescription:     potassium chloride alfonso ER (KLOR-CON M20) 20 MEQ CR tablet 540 tablet 3 5/15/2024 -- No   Sig - Route: Take 3 tablets (60 mEq) by mouth 2 times daily - Oral     ----------------------  Last Visit Date:  [facility], 1/16/25 Andreina  Future Visit Date: 4/18/25 Katz  ----------------------  Overridden by Effie Arnold RN on May 15, 2024 3:07 PM   Drug-Drug   1. SPIRONOLACTONE / POTASSIUM PREPARATIONS [Level: Major       Refill decision: Medication refilled per  Medication Refill in Ambulatory Care  policy.         Request from pharmacy:  Requested Prescriptions   Pending Prescriptions Disp Refills    KLOR-CON M20 20 MEQ CR tablet [Pharmacy Med Name: KLOR-CON M20 TABLET] 540 tablet 3     Sig: TAKE 3 TABLETS BY MOUTH 2 TIMES DAILY       Potassium Supplements Protocol Passed - 3/19/2025 11:39 AM        Passed - Medication is active on med list and the sig matches. RN to manually verify dose and sig if red X/fail.     If the protocol passes (green check), you do not need to verify med dose and sig.    A prescription matches if they are the same clinical intention.    For Example: once daily and every morning are the same.    The protocol can not identify upper and lower case letters as matching and will fail.     For Example: Take 1 tablet (50 mg) by mouth daily     TAKE 1 TABLET (50 MG) BY MOUTH DAILY    For all fails (red x), verify dose and sig.    If the refill does match what is on file, the RN can still proceed to approve the refill request.       If they do not match, route to the appropriate provider.             Passed - Medication indicated for associated diagnosis     Potassium is associated with one of the following diagnoses:    Hypokalemia    Hypokalemia prophylaxis   Hypertension   Heart failure   Edema            Passed - Recent (12 mo) or future (90 days) visit within the authorizing provider's department     The patient must have completed an in-person or virtual visit  within the past 12 months or has a future visit scheduled within the next 90 days with the authorizing provider s specialty.  Urgent care and e-visits do not qualify as an office visit for this protocol.          Passed - Patient is age 18 or older        Passed - Normal serum potassium in past 12 months     Recent Labs   Lab Test 01/16/25  1335   POTASSIUM 4.9

## 2025-03-24 ENCOUNTER — TELEPHONE (OUTPATIENT)
Dept: CARDIOLOGY | Facility: CLINIC | Age: 60
End: 2025-03-24
Payer: MEDICARE

## 2025-03-24 DIAGNOSIS — T82.7XXA INFECTION ASSOCIATED WITH DRIVELINE OF LEFT VENTRICULAR ASSIST DEVICE (LVAD): ICD-10-CM

## 2025-03-24 NOTE — TELEPHONE ENCOUNTER
Patient Contacted for the patient to call back and schedule the following:    Appointment type:  RTN VAD   Provider: PANKAJ   Return date: 08/14/25  Specialty phone number: [phone] OPT 1   Additional appointment(s) needed:  LABS   Additonal Notes: N/A

## 2025-03-25 ENCOUNTER — ANTICOAGULATION THERAPY VISIT (OUTPATIENT)
Dept: ANTICOAGULATION | Facility: CLINIC | Age: 60
End: 2025-03-25
Payer: MEDICARE

## 2025-03-25 DIAGNOSIS — Z95.2 S/P MITRAL VALVE REPLACEMENT: ICD-10-CM

## 2025-03-25 DIAGNOSIS — Z95.811 LVAD (LEFT VENTRICULAR ASSIST DEVICE) PRESENT (H): Primary | ICD-10-CM

## 2025-03-25 DIAGNOSIS — I50.22 CHRONIC SYSTOLIC CHF (CONGESTIVE HEART FAILURE) (H): ICD-10-CM

## 2025-03-25 DIAGNOSIS — I47.29 PAROXYSMAL VENTRICULAR TACHYCARDIA (H): ICD-10-CM

## 2025-03-25 LAB — INR HOME MONITORING: 2.7 (ref 2–3)

## 2025-03-25 NOTE — PROGRESS NOTES
ANTICOAGULATION MANAGEMENT     Tej SWENSON Shelbie 59 year old male is on warfarin with therapeutic INR result. (Goal INR 2.0-3.0)    Recent labs: (last 7 days)     03/25/25  0000   INR 2.7       ASSESSMENT     Source(s): Chart Review and Patient/Caregiver Call     Warfarin doses taken: Warfarin taken as instructed  Diet: No new diet changes identified  Medication/supplement changes: None noted  New illness, injury, or hospitalization: Yes: Pt states that he has had a cold for a few days, symptoms of congestion, productive cough, occasional chills, afebrile, no change to intake and reports symptoms have improved slightly. Advised to recheck INR sooner if symptoms don't continue to improve or worsen.   Signs or symptoms of bleeding or clotting: No  Previous result: Therapeutic last visit; previously outside of goal range  Additional findings: None       PLAN     Recommended plan for temporary change(s) affecting INR     Dosing Instructions: Continue your current warfarin dose with next INR in 1-2 weeks       Summary  As of 3/25/2025      Full warfarin instructions:  3.75 mg every Tue; 5 mg all other days   Next INR check:  4/8/2025               Telephone call with Tej who verbalizes understanding and agrees to plan    Patient to recheck with home meter    Education provided: Goal range and lab monitoring: goal range and significance of current result, Importance of therapeutic range, and Importance of following up at instructed interval  Importance of notifying anticoagulation clinic for: changes in medications; a sooner lab recheck maybe needed and diarrhea, nausea/vomiting, reduced intake, cold/flu, and/or infections; a sooner lab recheck maybe needed    Plan made per [facility] anticoagulation protocol and per LVAD protocol    Benjamin Gaspar RN  3/25/2025  Anticoagulation Clinic  [employer] for routing messages: jaime NOBLES LVAD  [facility] patient phone line:  [phone]        _______________________________________________________________________     Anticoagulation Episode Summary       Current INR goal:  2.0-3.0   TTR:  76.1% (12 mo)   Target end date:  Indefinite   Send INR reminders to:  ANTICOAG LVAD    Indications    LVAD (left ventricular assist device) present (H) [Z95.811]  Chronic systolic CHF (congestive heart failure) (H) [I50.22]  S/P mitral valve replacement [Z95.2]  Paroxysmal ventricular tachycardia (H) [I47.29]             Comments:  Follow VAD Anticoag protocol:Yes: HeartMate 3  Bridging: per protocol  Date VAD placed: 03/23/22  INR Goal: per referral  Hx Mitral valve repair and not replacement               Anticoagulation Care Providers       Provider Role Specialty Phone number    Dunia Hdz MD Referring Cardiovascular Disease [phone]

## 2025-04-08 ENCOUNTER — ANTICOAGULATION THERAPY VISIT (OUTPATIENT)
Dept: ANTICOAGULATION | Facility: CLINIC | Age: 60
End: 2025-04-08
Payer: MEDICARE

## 2025-04-08 DIAGNOSIS — Z95.811 LVAD (LEFT VENTRICULAR ASSIST DEVICE) PRESENT (H): Primary | ICD-10-CM

## 2025-04-08 DIAGNOSIS — I50.22 CHRONIC SYSTOLIC CHF (CONGESTIVE HEART FAILURE) (H): ICD-10-CM

## 2025-04-08 DIAGNOSIS — I47.20 PAROXYSMAL VENTRICULAR TACHYCARDIA (H): ICD-10-CM

## 2025-04-08 DIAGNOSIS — Z95.2 S/P MITRAL VALVE REPLACEMENT: ICD-10-CM

## 2025-04-08 LAB — INR HOME MONITORING: 3.4 (ref 2–3)

## 2025-04-08 NOTE — PROGRESS NOTES
ANTICOAGULATION MANAGEMENT     Tej Morfin 59 year old male is on warfarin with supratherapeutic INR result. (Goal INR 2.0-3.0)    Recent labs: (last 7 days)     04/08/25  0000   INR 3.4*       ASSESSMENT     Source(s): Chart Review and Patient/Caregiver Call     Warfarin doses taken: Warfarin taken as instructed  Diet: No new diet changes identified  Medication/supplement changes: None noted  New illness, injury, or hospitalization: No  Signs or symptoms of bleeding or clotting: No  Previous result: Therapeutic last 2(+) visits  Additional findings: None       PLAN     Recommended plan for no diet, medication or health factor changes affecting INR     Dosing Instructions: partial hold then continue your current warfarin dose with next INR in 1 week       Summary  As of 4/8/2025      Full warfarin instructions:  4/8: 2.5 mg; Otherwise 3.75 mg every Tue; 5 mg all other days   Next INR check:  4/15/2025               Telephone call with Tej who verbalizes understanding and agrees to plan    Patient to recheck with home meter    Education provided: Please call back if any changes to your diet, medications or how you've been taking warfarin  Goal range and lab monitoring: goal range and significance of current result, Importance of therapeutic range, and Importance of following up at instructed interval  Symptom monitoring: monitoring for bleeding signs and symptoms    Plan made per [facility] anticoagulation protocol and per LVAD protocol      Nanda Marin, RN  4/8/2025  Anticoagulation Clinic  [employer] for routing messages: jaime ANTICOAG LVAD  [facility] patient phone line: [phone]        _______________________________________________________________________     Anticoagulation Episode Summary       Current INR goal:  2.0-3.0   TTR:  73.8% (12 mo)   Target end date:  Indefinite   Send INR reminders to:  ANTICOAG LVAD    Indications    LVAD (left ventricular assist device) present (H) [Z95.834]  Chronic systolic  CHF (congestive heart failure) (H) [I50.22]  S/P mitral valve replacement [Z95.2]  Paroxysmal ventricular tachycardia (H) [I47.20]             Comments:  Follow VAD Anticoag protocol:Yes: HeartMate 3  Bridging: per protocol  Date VAD placed: 03/23/22  INR Goal: per referral  Hx Mitral valve repair and not replacement               Anticoagulation Care Providers       Provider Role Specialty Phone number    Dunia Hdz MD Referring Cardiovascular Disease [phone]

## 2025-04-15 ENCOUNTER — ANTICOAGULATION THERAPY VISIT (OUTPATIENT)
Dept: ANTICOAGULATION | Facility: CLINIC | Age: 60
End: 2025-04-15
Payer: MEDICARE

## 2025-04-15 ENCOUNTER — CARE COORDINATION (OUTPATIENT)
Dept: CARDIOLOGY | Facility: CLINIC | Age: 60
End: 2025-04-15
Payer: MEDICARE

## 2025-04-15 VITALS — BODY MASS INDEX: 34.87 KG/M2 | WEIGHT: 243 LBS

## 2025-04-15 DIAGNOSIS — Z95.2 S/P MITRAL VALVE REPLACEMENT: ICD-10-CM

## 2025-04-15 DIAGNOSIS — T82.7XXA INFECTION ASSOCIATED WITH DRIVELINE OF LEFT VENTRICULAR ASSIST DEVICE (LVAD): ICD-10-CM

## 2025-04-15 DIAGNOSIS — I50.22 CHRONIC SYSTOLIC CHF (CONGESTIVE HEART FAILURE) (H): ICD-10-CM

## 2025-04-15 DIAGNOSIS — I47.20 PAROXYSMAL VENTRICULAR TACHYCARDIA (H): ICD-10-CM

## 2025-04-15 DIAGNOSIS — Z95.811 LVAD (LEFT VENTRICULAR ASSIST DEVICE) PRESENT (H): Primary | ICD-10-CM

## 2025-04-15 LAB — INR HOME MONITORING: 2.7 (ref 2–3)

## 2025-04-15 NOTE — PROGRESS NOTES
"ANTICOAGULATION MANAGEMENT     Tej Morfin 59 year old male is on warfarin with therapeutic INR result. (Goal INR 2.0-3.0)    Recent labs: (last 7 days)     04/15/25  0000   INR 2.7       ASSESSMENT     Source(s): Chart Review and Patient/Caregiver Call     Warfarin doses taken: Warfarin taken as instructed  Diet: Pt reports poor PO intake \"a couple weeks ago,\" potentially impacting previous INR result (3.4). Pt reports feeling better and having his typical PO intake now for about a week.  Medication/supplement changes: None noted  New illness, injury, or hospitalization: Previous acute illness report of cold/congestion, but feeling back to baseline per pt report.  Signs or symptoms of bleeding or clotting: No  Previous result: Supratherapeutic  Additional findings: None       PLAN     Recommended plan for no diet, medication or health factor changes affecting INR     Dosing Instructions: Continue your current warfarin dose with next INR in 1 week       Summary  As of 4/15/2025      Full warfarin instructions:  3.75 mg every Tue; 5 mg all other days   Next INR check:  4/22/2025               Telephone call with Tej who verbalizes understanding and agrees to plan  Sent [employer] message with dosing and follow up instructions    Patient to recheck with home meter    Education provided: Please call back if any changes to your diet, medications or how you've been taking warfarin  Goal range and lab monitoring: goal range and significance of current result  Dietary considerations: Spoke with pt regarding importance of consistent diet.   Written instructions provided    Plan made per [facility] anticoagulation protocol and per LVAD protocol    Jenny Mendez RN  4/15/2025  Anticoagulation Clinic  [facility] for routing messages: jaime NOBLES LVAD  [facility] patient phone line: [phone]        _______________________________________________________________________     Anticoagulation Episode Summary       Current INR goal:  " 2.0-3.0   TTR:  72.7% (12 mo)   Target end date:  Indefinite   Send INR reminders to:  ANTICOAG LVAD    Indications    LVAD (left ventricular assist device) present (H) [Z95.811]  Chronic systolic CHF (congestive heart failure) (H) [I50.22]  S/P mitral valve replacement [Z95.2]  Paroxysmal ventricular tachycardia (H) [I47.20]             Comments:  Follow VAD Anticoag protocol:Yes: HeartMate 3  Bridging: per protocol  Date VAD placed: 03/23/22  INR Goal: per referral  Hx Mitral valve repair and not replacement               Anticoagulation Care Providers       Provider Role Specialty Phone number    Dunia Hdz MD Referring Cardiovascular Disease [phone]

## 2025-04-15 NOTE — PROGRESS NOTES
D:  Called pt to check in regarding cancelled visits and for 3 month check in.  Pt reports feeling well.  Current LVAD numbers, Speed: 5500, Flow: 4.9, PI: 2.7, Power: 4.2, & weight 243lbs, which are all baseline for pt.  Pt denies DLES drainage and pain, as well as heart failure symptoms. Pt informed writer that wife's employment schedule changed and they are no longer able to attend appt's on Fridays, which is why they cancelled upcoming 4/18 appts.  Pt has concerns with lipoma on his neck that's causing discomfort and headaches.  Pt has followed up with local team for this & they are recommending general anesthesia to remove.  Pt last saw local team over 1 year ago.  I:  Pt rescheduled for 5/18 with WILIAN and placed on the waitlist for earlier appt.  Instructed pt to contact local team for reassessment and updated recommendations.  Advised pt that if he needs general anesthesia, he will need to have procedure here.  Advised local anesthetic would be preferred.  Discussed importance of minimal, in person, appointment requirement of 6 months.  Pt agreeable to this requirement.  A:  Check in  P:  Pt verbalized understanding of the instructions given.  Will call VAD coordinator with further needs and questions.

## 2025-04-22 ENCOUNTER — ANTICOAGULATION THERAPY VISIT (OUTPATIENT)
Dept: ANTICOAGULATION | Facility: CLINIC | Age: 60
End: 2025-04-22
Payer: MEDICARE

## 2025-04-22 DIAGNOSIS — I47.20 PAROXYSMAL VENTRICULAR TACHYCARDIA (H): ICD-10-CM

## 2025-04-22 DIAGNOSIS — Z95.811 LVAD (LEFT VENTRICULAR ASSIST DEVICE) PRESENT (H): Primary | ICD-10-CM

## 2025-04-22 DIAGNOSIS — I50.22 CHRONIC SYSTOLIC CHF (CONGESTIVE HEART FAILURE) (H): ICD-10-CM

## 2025-04-22 DIAGNOSIS — Z95.2 S/P MITRAL VALVE REPLACEMENT: ICD-10-CM

## 2025-04-22 LAB — INR HOME MONITORING: 2.4 (ref 2–3)

## 2025-04-22 NOTE — PROGRESS NOTES
ANTICOAGULATION MANAGEMENT     Tej LEELA Morfin 59 year old male is on warfarin with therapeutic INR result. (Goal INR 2.0-3.0)    Recent labs: (last 7 days)     04/22/25  0000   INR 2.4       ASSESSMENT     Source(s): Chart Review and Patient/Caregiver Call     Warfarin doses taken: Warfarin taken as instructed  Diet: No new diet changes identified  Medication/supplement changes: None noted  New illness, injury, or hospitalization: No  Signs or symptoms of bleeding or clotting: No  Previous result: Therapeutic last visit; previously outside of goal range  Additional findings: None       PLAN     Recommended plan for no diet, medication or health factor changes affecting INR     Dosing Instructions: Continue your current warfarin dose with next INR in 1 week       Summary  As of 4/22/2025      Full warfarin instructions:  3.75 mg every Tue; 5 mg all other days   Next INR check:  4/29/2025               Telephone call with Tej who verbalizes understanding and agrees to plan    Patient to recheck with home meter    Education provided: Goal range and lab monitoring: goal range and significance of current result and Importance of therapeutic range    Plan made per [facility] anticoagulation protocol and per LVAD protocol    Benjamin Gaspar RN  4/22/2025  Anticoagulation Clinic  [employer] for routing messages: p ANTICOAG LVAD  [facility] patient phone line: [phone]        _______________________________________________________________________     Anticoagulation Episode Summary       Current INR goal:  2.0-3.0   TTR:  72.7% (12 mo)   Target end date:  Indefinite   Send INR reminders to:  ANTICOAG LVAD    Indications    LVAD (left ventricular assist device) present (H) [Z95.811]  Chronic systolic CHF (congestive heart failure) (H) [I50.22]  S/P mitral valve replacement [Z95.2]  Paroxysmal ventricular tachycardia (H) [I47.20]             Comments:  Follow VAD Anticoag protocol:Yes: HeartMate 3  Bridging: per protocol  Date VAD  placed: 03/23/22  INR Goal: per referral  Hx Mitral valve repair and not replacement               Anticoagulation Care Providers       Provider Role Specialty Phone number    Dunia Hdz MD Referring Cardiovascular Disease [phone]

## 2025-04-23 ENCOUNTER — TELEPHONE (OUTPATIENT)
Dept: TRANSPLANT | Facility: CLINIC | Age: 60
End: 2025-04-23
Payer: MEDICARE

## 2025-04-23 VITALS — BODY MASS INDEX: 35.15 KG/M2 | WEIGHT: 245 LBS

## 2025-04-23 DIAGNOSIS — T82.7XXA INFECTION ASSOCIATED WITH DRIVELINE OF LEFT VENTRICULAR ASSIST DEVICE (LVAD): ICD-10-CM

## 2025-04-24 ENCOUNTER — TELEPHONE (OUTPATIENT)
Dept: CARDIOLOGY | Facility: CLINIC | Age: 60
End: 2025-04-24
Payer: MEDICARE

## 2025-04-24 DIAGNOSIS — T82.7XXA INFECTION ASSOCIATED WITH DRIVELINE OF LEFT VENTRICULAR ASSIST DEVICE (LVAD): ICD-10-CM

## 2025-04-24 NOTE — TELEPHONE ENCOUNTER
Spoke with Tej today to review results of genetic testing. He underwent genetic testing for the  Comprehensive Cardiomyopathy  panel. DNA was collected via saliva on 3/12/25 and sent to [facility] laboratory.   No clinically actionable variants were identified in Tej.  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  A variant of unknown significance (VUS) means that there is a genetic change in which we do not have enough information to determine if it is disease causing or not. Labs review published data, functional studies, presences in population databases, similarity to normal sequence, and computer prediction models.    The TTR gene is known to be associated with hereditary transthyretin-related amyloidosis. This particular variant occurs in a position that is not well conserved (meaning that it is used to changes) and creates an amino acid with high similar properties.  This variant is found in population databases (gnomAD: <0.01% (9/415164) total alleles studied).  Computer models predict this variant will be tolerated. Although suspicion is raised, there is not enough current information to be certain if this variant alone can cause disease or not. Therefore, based on the current information it is unclear if this VUS is associated with disease or not. Of note, the TTR gene was analyzed in Tej's daughter, Nicol, who passed suddenly from ARVC. This variant was not reported on her genetic testing.  We discussed that although no mutation was identified, Peters cardiomyopathy may still be genetic. Additionally, without a known mutation, we cannot confirm that Nicol's cardiomyopathy was inherited from Tej, thus clinical screening is recommended for all first-degree family members (including Nicol's mother).    Offered option to add on genes associated with genetic arrhythmias at no additional cost. The yield for this is likely low,  however given his family history of multiple cases of sudden death it makes sense to pursue this. Tej opted for this. I will contact him when additional testing is back.  A summary letter and copy of the results will be sent to patient. All questions answered at this time.  Petra Gaston MS, [facility] [place]  Licensed, Certified Genetic Counselor  [facility]  [facility]

## 2025-04-29 ENCOUNTER — ANTICOAGULATION THERAPY VISIT (OUTPATIENT)
Dept: ANTICOAGULATION | Facility: CLINIC | Age: 60
End: 2025-04-29
Payer: MEDICARE

## 2025-04-29 DIAGNOSIS — I50.22 CHRONIC SYSTOLIC CHF (CONGESTIVE HEART FAILURE) (H): ICD-10-CM

## 2025-04-29 DIAGNOSIS — I47.20 PAROXYSMAL VENTRICULAR TACHYCARDIA (H): ICD-10-CM

## 2025-04-29 DIAGNOSIS — Z95.811 LVAD (LEFT VENTRICULAR ASSIST DEVICE) PRESENT (H): Primary | ICD-10-CM

## 2025-04-29 DIAGNOSIS — Z95.2 S/P MITRAL VALVE REPLACEMENT: ICD-10-CM

## 2025-04-29 LAB — INR HOME MONITORING: 3.2 (ref 2–3)

## 2025-04-29 NOTE — PROGRESS NOTES
ANTICOAGULATION MANAGEMENT     Tej Morfin 59 year old male is on warfarin with supratherapeutic INR result. (Goal INR 2.0-3.0)    Recent labs: (last 7 days)     04/29/25  0000   INR 3.2*       ASSESSMENT     Source(s): Chart Review and Patient/Caregiver Call     Warfarin doses taken: Warfarin taken as instructed  Diet: No new diet changes identified  Medication/supplement changes: None noted  New illness, injury, or hospitalization: No  Signs or symptoms of bleeding or clotting: No  Previous result: Therapeutic last 2(+) visits, trending on the higher side/supra of goal range.   Additional findings: None       PLAN     Recommended plan for no diet, medication or health factor changes affecting INR     Dosing Instructions: decrease your warfarin dose (3.7% change) with next INR in 1 week       Summary  As of 4/29/2025      Full warfarin instructions:  2.5 mg every Tue; 5 mg all other days   Next INR check:  5/6/2025               Telephone call with Tej who agrees to plan and repeated back plan correctly    Patient to recheck with home meter    Education provided: Goal range and lab monitoring: goal range and significance of current result and Importance of following up at instructed interval  Symptom monitoring: monitoring for bleeding signs and symptoms  Contact [phone] with any changes, questions or concerns.     Plan made per [facility] anticoagulation protocol and per LVAD protocol    SAVANNA MO RN  4/29/2025  Anticoagulation Clinic  [facility] for routing messages: jaime ANTICOAG LVAD  [facility] patient phone line: [phone]        _______________________________________________________________________     Anticoagulation Episode Summary       Current INR goal:  2.0-3.0   TTR:  72.2% (12 mo)   Target end date:  Indefinite   Send INR reminders to:  [facility] LVAD    Indications    LVAD (left ventricular assist device) present (H) [Z95.295]  Chronic systolic CHF (congestive heart failure) (H) [I50.22]  S/P  mitral valve replacement [Z95.2]  Paroxysmal ventricular tachycardia (H) [I47.20]             Comments:  Follow VAD Anticoag protocol:Yes: HeartMate 3  Bridging: per protocol  Date VAD placed: 03/23/22  INR Goal: per referral  Hx Mitral valve repair and not replacement               Anticoagulation Care Providers       Provider Role Specialty Phone number    Dunia Hdz MD Referring Cardiovascular Disease [phone]

## 2025-05-06 ENCOUNTER — ANTICOAGULATION THERAPY VISIT (OUTPATIENT)
Dept: ANTICOAGULATION | Facility: CLINIC | Age: 60
End: 2025-05-06
Payer: MEDICARE

## 2025-05-06 DIAGNOSIS — I50.22 CHRONIC SYSTOLIC CHF (CONGESTIVE HEART FAILURE) (H): ICD-10-CM

## 2025-05-06 DIAGNOSIS — Z95.2 S/P MITRAL VALVE REPLACEMENT: ICD-10-CM

## 2025-05-06 DIAGNOSIS — Z95.811 LVAD (LEFT VENTRICULAR ASSIST DEVICE) PRESENT (H): Primary | ICD-10-CM

## 2025-05-06 DIAGNOSIS — I47.20 PAROXYSMAL VENTRICULAR TACHYCARDIA (H): ICD-10-CM

## 2025-05-06 LAB — INR HOME MONITORING: 3.2 (ref 2–3)

## 2025-05-06 NOTE — PROGRESS NOTES
ANTICOAGULATION MANAGEMENT     Tej Morfin 59 year old male is on warfarin with supratherapeutic INR result. (Goal INR 2.0-3.0)    Recent labs: (last 7 days)     05/06/25  0000   INR 3.2*       ASSESSMENT     Source(s): Chart Review and Patient/Caregiver Call     Warfarin doses taken: Warfarin taken as instructed  Diet: No new diet changes identified  Medication/supplement changes: None noted  New illness, injury, or hospitalization: No  Signs or symptoms of bleeding or clotting: No  Previous result: Supratherapeutic-3.7% maintenance dose decrease last encounter   Additional findings: None       PLAN     Recommended plan for ongoing change(s) affecting INR     Dosing Instructions: decrease your warfarin dose (7.7% change) with next INR in 1 week       Summary  As of 5/6/2025      Full warfarin instructions:  2.5 mg every Tue, Fri; 5 mg all other days   Next INR check:  5/13/2025               Telephone call with Tej who agrees to plan and repeated back plan correctly    Patient to recheck with home meter    Education provided: Goal range and lab monitoring: goal range and significance of current result, Importance of therapeutic range, and Importance of following up at instructed interval  Symptom monitoring: monitoring for bleeding signs and symptoms  Contact [phone] with any changes, questions or concerns.     Plan made per [facility] anticoagulation protocol and per LVAD protocol    SAVANNA MO RN  5/6/2025  Anticoagulation Clinic  [facility] for routing messages: jaime ANTICOAG LVAD  [facility] patient phone line: [phone]        _______________________________________________________________________     Anticoagulation Episode Summary       Current INR goal:  2.0-3.0   TTR:  70.7% (12 mo)   Target end date:  Indefinite   Send INR reminders to:  ANTICOAG LVAD    Indications    LVAD (left ventricular assist device) present (H) [Z95.464]  Chronic systolic CHF (congestive heart failure) (H) [I50.22]  S/P mitral  valve replacement [Z95.2]  Paroxysmal ventricular tachycardia (H) [I47.20]             Comments:  Follow VAD Anticoag protocol:Yes: HeartMate 3  Bridging: per protocol  Date VAD placed: 03/23/22  INR Goal: per referral  Hx Mitral valve repair and not replacement               Anticoagulation Care Providers       Provider Role Specialty Phone number    Dunia Hdz MD Referring Cardiovascular Disease [phone]

## 2025-05-07 DIAGNOSIS — T82.7XXA INFECTION ASSOCIATED WITH DRIVELINE OF LEFT VENTRICULAR ASSIST DEVICE (LVAD): ICD-10-CM

## 2025-05-07 DIAGNOSIS — Z79.01 ANTICOAGULATED ON COUMADIN: ICD-10-CM

## 2025-05-07 DIAGNOSIS — I50.22 CHRONIC SYSTOLIC HEART FAILURE (H): ICD-10-CM

## 2025-05-07 DIAGNOSIS — Z95.811 LVAD (LEFT VENTRICULAR ASSIST DEVICE) PRESENT (H): ICD-10-CM

## 2025-05-07 DIAGNOSIS — I50.22 CHRONIC SYSTOLIC CONGESTIVE HEART FAILURE (H): Primary | ICD-10-CM

## 2025-05-12 ENCOUNTER — CARE COORDINATION (OUTPATIENT)
Dept: TRANSPLANT | Facility: CLINIC | Age: 60
End: 2025-05-12
Payer: MEDICARE

## 2025-05-12 DIAGNOSIS — T82.7XXA INFECTION ASSOCIATED WITH DRIVELINE OF LEFT VENTRICULAR ASSIST DEVICE (LVAD): ICD-10-CM

## 2025-05-12 NOTE — PROGRESS NOTES
Status 4 Heart Extension Complete 05/12/25    This patient meets status 4 criteria as evidenced by:     Dischargeable LVAD without discretionary 30 days     Patient's primary cardiologist and/or attending physician is in agreement with this plan.      Status 4 Extension due 08/11/25

## 2025-05-14 ENCOUNTER — RESULTS FOLLOW-UP (OUTPATIENT)
Dept: CARDIOLOGY | Facility: CLINIC | Age: 60
End: 2025-05-14

## 2025-05-14 ENCOUNTER — OFFICE VISIT (OUTPATIENT)
Dept: CARDIOLOGY | Facility: CLINIC | Age: 60
End: 2025-05-14
Attending: PHYSICIAN ASSISTANT
Payer: MEDICARE

## 2025-05-14 ENCOUNTER — ANTICOAGULATION THERAPY VISIT (OUTPATIENT)
Dept: ANTICOAGULATION | Facility: CLINIC | Age: 60
End: 2025-05-14

## 2025-05-14 ENCOUNTER — LAB (OUTPATIENT)
Dept: LAB | Facility: CLINIC | Age: 60
End: 2025-05-14
Attending: PHYSICIAN ASSISTANT
Payer: MEDICARE

## 2025-05-14 VITALS
HEART RATE: 36 BPM | BODY MASS INDEX: 36.37 KG/M2 | SYSTOLIC BLOOD PRESSURE: 70 MMHG | WEIGHT: 253.5 LBS | OXYGEN SATURATION: 97 %

## 2025-05-14 DIAGNOSIS — I50.22 CHRONIC SYSTOLIC CONGESTIVE HEART FAILURE (H): ICD-10-CM

## 2025-05-14 DIAGNOSIS — Z95.2 S/P MITRAL VALVE REPLACEMENT: ICD-10-CM

## 2025-05-14 DIAGNOSIS — I47.20 PAROXYSMAL VENTRICULAR TACHYCARDIA (H): ICD-10-CM

## 2025-05-14 DIAGNOSIS — T82.7XXA INFECTION ASSOCIATED WITH DRIVELINE OF LEFT VENTRICULAR ASSIST DEVICE (LVAD): Primary | ICD-10-CM

## 2025-05-14 DIAGNOSIS — Z95.811 LVAD (LEFT VENTRICULAR ASSIST DEVICE) PRESENT (H): Primary | ICD-10-CM

## 2025-05-14 DIAGNOSIS — I50.22 CHRONIC SYSTOLIC HEART FAILURE (H): ICD-10-CM

## 2025-05-14 DIAGNOSIS — Z95.811 LVAD (LEFT VENTRICULAR ASSIST DEVICE) PRESENT (H): ICD-10-CM

## 2025-05-14 DIAGNOSIS — Z79.01 ANTICOAGULATED ON COUMADIN: ICD-10-CM

## 2025-05-14 DIAGNOSIS — I50.22 CHRONIC SYSTOLIC (CONGESTIVE) HEART FAILURE (H): ICD-10-CM

## 2025-05-14 DIAGNOSIS — I50.22 CHRONIC SYSTOLIC CHF (CONGESTIVE HEART FAILURE) (H): ICD-10-CM

## 2025-05-14 LAB
ALBUMIN SERPL BCG-MCNC: 3.8 G/DL (ref 3.5–5.2)
ALP SERPL-CCNC: 86 U/L (ref 40–150)
ALT SERPL W P-5'-P-CCNC: 14 U/L (ref 0–70)
ANION GAP SERPL CALCULATED.3IONS-SCNC: 11 MMOL/L (ref 7–15)
AST SERPL W P-5'-P-CCNC: 17 U/L (ref 0–45)
BILIRUB SERPL-MCNC: 0.3 MG/DL
BUN SERPL-MCNC: 19.7 MG/DL (ref 6–20)
CALCIUM SERPL-MCNC: 9.5 MG/DL (ref 8.8–10.4)
CHLORIDE SERPL-SCNC: 105 MMOL/L (ref 98–107)
CREAT SERPL-MCNC: 1.47 MG/DL (ref 0.67–1.17)
EGFRCR SERPLBLD CKD-EPI 2021: 55 ML/MIN/1.73M2
ERYTHROCYTE [DISTWIDTH] IN BLOOD BY AUTOMATED COUNT: 14.8 % (ref 10–15)
FERRITIN SERPL-MCNC: 29 NG/ML (ref 31–409)
GLUCOSE SERPL-MCNC: 155 MG/DL (ref 70–99)
HCO3 SERPL-SCNC: 24 MMOL/L (ref 22–29)
HCT VFR BLD AUTO: 38.4 % (ref 40–53)
HGB BLD-MCNC: 11.7 G/DL (ref 13.3–17.7)
INR PPP: 1.71 (ref 0.85–1.15)
IRON BINDING CAPACITY (ROCHE): 330 UG/DL (ref 240–430)
IRON SATN MFR SERPL: 11 % (ref 15–46)
IRON SERPL-MCNC: 36 UG/DL (ref 61–157)
LDH SERPL L TO P-CCNC: 200 U/L (ref 0–250)
MAGNESIUM SERPL-MCNC: 1.6 MG/DL (ref 1.7–2.3)
MCH RBC QN AUTO: 27.3 PG (ref 26.5–33)
MCHC RBC AUTO-ENTMCNC: 30.5 G/DL (ref 31.5–36.5)
MCV RBC AUTO: 90 FL (ref 78–100)
NT-PROBNP SERPL-MCNC: 386 PG/ML (ref 0–177)
PLATELET # BLD AUTO: 190 10E3/UL (ref 150–450)
POTASSIUM SERPL-SCNC: 4.1 MMOL/L (ref 3.4–5.3)
PROT SERPL-MCNC: 7 G/DL (ref 6.4–8.3)
PROTHROMBIN TIME: 20.1 SECONDS (ref 11.8–14.8)
RBC # BLD AUTO: 4.29 10E6/UL (ref 4.4–5.9)
SODIUM SERPL-SCNC: 140 MMOL/L (ref 135–145)
TRANSFERRIN SERPL-MCNC: 282 MG/DL (ref 200–360)
WBC # BLD AUTO: 5.7 10E3/UL (ref 4–11)

## 2025-05-14 PROCEDURE — 85027 COMPLETE CBC AUTOMATED: CPT | Performed by: PATHOLOGY

## 2025-05-14 PROCEDURE — 83540 ASSAY OF IRON: CPT | Performed by: PATHOLOGY

## 2025-05-14 PROCEDURE — 83550 IRON BINDING TEST: CPT | Performed by: PATHOLOGY

## 2025-05-14 PROCEDURE — 83735 ASSAY OF MAGNESIUM: CPT | Performed by: PATHOLOGY

## 2025-05-14 PROCEDURE — 84466 ASSAY OF TRANSFERRIN: CPT | Performed by: PHYSICIAN ASSISTANT

## 2025-05-14 PROCEDURE — 83880 ASSAY OF NATRIURETIC PEPTIDE: CPT | Performed by: PATHOLOGY

## 2025-05-14 PROCEDURE — 80053 COMPREHEN METABOLIC PANEL: CPT | Performed by: PATHOLOGY

## 2025-05-14 PROCEDURE — G0463 HOSPITAL OUTPT CLINIC VISIT: HCPCS | Performed by: PHYSICIAN ASSISTANT

## 2025-05-14 PROCEDURE — 84238 ASSAY NONENDOCRINE RECEPTOR: CPT | Mod: 90 | Performed by: PATHOLOGY

## 2025-05-14 PROCEDURE — 93750 INTERROGATION VAD IN PERSON: CPT | Performed by: PHYSICIAN ASSISTANT

## 2025-05-14 PROCEDURE — 36415 COLL VENOUS BLD VENIPUNCTURE: CPT | Performed by: PATHOLOGY

## 2025-05-14 PROCEDURE — 99000 SPECIMEN HANDLING OFFICE-LAB: CPT | Performed by: PATHOLOGY

## 2025-05-14 PROCEDURE — 85610 PROTHROMBIN TIME: CPT | Performed by: PATHOLOGY

## 2025-05-14 PROCEDURE — 83615 LACTATE (LD) (LDH) ENZYME: CPT | Performed by: PATHOLOGY

## 2025-05-14 PROCEDURE — 82728 ASSAY OF FERRITIN: CPT | Performed by: PATHOLOGY

## 2025-05-14 RX ORDER — POTASSIUM CHLORIDE 1500 MG/1
TABLET, EXTENDED RELEASE ORAL
Qty: 450 TABLET | Refills: 3 | Status: SHIPPED | OUTPATIENT
Start: 2025-05-14

## 2025-05-14 RX ORDER — FUROSEMIDE 20 MG/1
TABLET ORAL
Qty: 450 TABLET | Refills: 3 | Status: SHIPPED | OUTPATIENT
Start: 2025-05-14

## 2025-05-14 ASSESSMENT — PAIN SCALES - GENERAL: PAINLEVEL_OUTOF10: NO PAIN (0)

## 2025-05-14 NOTE — LETTER
5/14/2025      RE: Tej Morfin  [address]  [place] [address]       Dear Colleague,    Thank you for the opportunity to participate in the care of your patient, Tej Morfin, at the [facility] [place] at [facility]. Please see a copy of my visit note below.    HPI:   Tej Morfin is a 59 year old male with a PMH of severe MR s/p MVR with ring (1/21/15), VT s/p ICD, nonobstructive CAD, NICM previously on home inotropes now s/p HM III LVAD implantation 3/23/22, with course c/b AFib with RVR (s/p ICD shock 3/25/22) and right knee effusion (s/p arthrocentesis and intraarticular steroid injection 3/30/22), driveline infection, who presents for f/up in LVAD clinic.    Today  No SOB at rest. He hasn't done a lot of exercise right now- he is moving to a new place soon with a gym and things that will help him be more active. No PND. Sleeps on 3 pillows chronically- seems to be orthopnea related. No LE edema. Some abdominal edema. No lightheadedness (unless he sits for a long time and then gets up too quick), dizziness, pre-syncope or syncope. He has had some palpitaitons with anxiety, but not other palpitations.  Appetite is good. No ICD shocks. No LVAD alarms.    No blood in the urine. He did have some bright red blood in the stool (actually only with wiping) 3 weeks ago when he was eating a lot spice food- improved with hemroid medication. No prolonged nosebleeds.    No driveline color changes, pain, or drainage (resolved on 2 antibiotics).    No headaches (*no stroke symptoms) or stroke symptomes.    Weight at home has been 245 lbs at home yesterday.    They lost their daughter (Age 19) earlier this ear to cardiomyopathy- s/p genetic testing and was completely different gene and physiology than MR. Morfin. Genetic counseling has recommended testing mom at this point.     Cardiac Medications  Coumadin   Entresto   mg BID  Aldactone 12.5 mg daily  Sotalol 120 mg BID  Lasix 60 mg BID  Kcl 60 meq BID  [id number] mg BID  Doxycycline 100 mg BID  Cefadroxil 1000 mg mg BID    PAST MEDICAL HISTORY:  Past Medical History:   Diagnosis Date     Chronic systolic heart failure (H) 2017     Gastroparesis      ICD (implantable cardioverter-defibrillator) in place     [employer]     Non-ischemic cardiomyopathy (H)      Paroxysmal ventricular tachycardia (H) 2017     S/P mitral valve replacement 2017     Tetrahydrocannabinol (THC) use disorder, mild, in controlled environment, abuse 2019    occasional edible THC. reportedly for sleep, anxiety     Tobacco abuse, episodic     occasional cigar - ~ 3x/wk       FAMILY HISTORY:  Family History   Problem Relation Age of Onset     Coronary Artery Disease Mother      Kidney failure Mother      Cardiomyopathy Father          age 51     Anesthesia Reaction No family hx of      Venous thrombosis No family hx of        SOCIAL HISTORY:  Social History     Socioeconomic History     Marital status:    Tobacco Use     Smoking status: Former Smoker     Smokeless tobacco: Never Used   Substance and Sexual Activity     Alcohol use: No     Alcohol/week: 0.0 standard drinks     Drug use: No       CURRENT MEDICATIONS:  Current Outpatient Medications   Medication Sig Dispense Refill     acetaminophen (TYLENOL) 325 MG tablet Take 2 tablets (650 mg) by mouth every 4 hours as needed for pain 100 tablet 0     allopurinol (ZYLOPRIM) 100 MG tablet Take 100 mg by mouth every morning.       amoxicillin (AMOXIL) 500 MG capsule Take 2,000 mg by mouth once as needed Take 4 tabs 1 hour before all dental cleanings and procedures       cefadroxil (DURICEF) 500 MG capsule Take 2 capsules (1,000 mg) by mouth 2 times daily. 120 capsule 1     cholecalciferol 50 MCG ( UT) CAPS Take 50 mcg by mouth every morning       doxycycline hyclate (VIBRAMYCIN) 100 MG capsule Take 1 capsule (100  mg) by mouth 2 times daily. 60 capsule 3     furosemide (LASIX) 20 MG tablet Take 3 tabs (60mg) in the morning and 2 tabs (40mg) in the afternoon 450 tablet 3     hydrOXYzine (VISTARIL) 50 MG capsule Take 50 mg by mouth every 12 hours as needed for anxiety       magnesium oxide (MAG-OX) 400 MG tablet Take 800 mg by mouth 2 times daily       metoprolol succinate ER (TOPROL XL) 25 MG 24 hr tablet Take 1 tablet (25 mg) by mouth daily 90 tablet 2     nitroGLYcerin (NITROSTAT) 0.4 MG sublingual tablet Place 0.4 mg under the tongue every 5 minutes as needed for chest pain       omeprazole (PRILOSEC) 40 MG DR capsule Take 40 mg by mouth every morning       potassium chloride alfonso ER (KLOR-CON M20) 20 MEQ CR tablet Take 3 tabs (60mEq) in the morning and 2 tabs (40mEq) in the afternoon 450 tablet 3     sacubitril-valsartan (ENTRESTO)  MG per tablet Take 1 tablet by mouth 2 times daily 90 tablet 3     sotalol (BETAPACE) 120 MG tablet Take 120 mg by mouth 2 times daily       spironolactone (ALDACTONE) 25 MG tablet Take 0.5 tablets (12.5 mg) by mouth daily 45 tablet 3     traZODone (DESYREL) 50 MG tablet Take 0.5-1 tablets (25-50 mg) by mouth at bedtime as needed for insomnia       warfarin ANTICOAGULANT (COUMADIN) 2.5 MG tablet Take 5mg (2 tabs) by mouth daily OR AS DIRECTED.  Adjust dose based on INR. 180 tablet 1     enoxaparin ANTICOAGULANT (LOVENOX) 100 MG/ML syringe Inject 1 mL (100 mg) subcutaneously 2 times daily. (Patient not taking: Reported on 5/14/2025) 10 mL 1     No current facility-administered medications for this visit.       ROS:   See HPI    EXAM:  BP (!) 70/0 (BP Location: Right arm, Patient Position: Chair, Cuff Size: Adult Large)   Pulse (!) 36   Wt 115 kg (253 lb 8 oz)   SpO2 97%   BMI 36.37 kg/m      GENERAL: Appears comfortable, in no distress.  HEENT: Eye symmetrical and without discharge or icterus bilaterally.   NECK: Supple, JVD <6.   CV: Hum of LVAD, no adventitious sounds  RESPIRATORY:  Respirations regular, even, and unlabored. Lungs CTA throughout.   GI: Soft and non distended   EXTREMITIES: No LE peripheral edema. none pulsatile.   NEUROLOGIC: Alert and interacting appropriatly.  No focal deficits.   SKIN: Driveline dressing c/d/i.     Labs - as reviewed in clinic with patient today:  CBC RESULTS:  Lab Results   Component Value Date    WBC 5.7 05/14/2025    WBC 4.2 09/10/2019    RBC 4.29 (L) 05/14/2025    RBC 4.98 09/10/2019    HGB 11.7 (L) 05/14/2025    HGB 15.8 09/10/2019    HCT 38.4 (L) 05/14/2025    HCT 48.9 09/10/2019    MCV 90 05/14/2025    MCV 98 09/10/2019    MCH 27.3 05/14/2025    MCH 31.7 09/10/2019    MCHC 30.5 (L) 05/14/2025    MCHC 32.3 09/10/2019    RDW 14.8 05/14/2025    RDW 12.9 09/10/2019     05/14/2025     09/10/2019       CMP RESULTS:  Lab Results   Component Value Date     05/14/2025     09/10/2019    POTASSIUM 4.1 05/14/2025    POTASSIUM 4.0 06/23/2022    POTASSIUM 3.4 09/10/2019    CHLORIDE 105 05/14/2025    CHLORIDE 105 05/09/2023    CHLORIDE 108 09/10/2019    CO2 24 05/14/2025    CO2 26 06/23/2022    CO2 26 09/10/2019    ANIONGAP 11 05/14/2025    ANIONGAP 9 06/23/2022    ANIONGAP 4 09/10/2019     (H) 05/14/2025    GLC 65 (L) 06/23/2022    GLC 89 09/10/2019    BUN 19.7 05/14/2025    BUN 18 06/23/2022    BUN 18 09/10/2019    CR 1.47 (H) 05/14/2025    CR 1.23 09/10/2019    GFRESTIMATED 55 (L) 05/14/2025    GFRESTIMATED 67 09/10/2019    GFRESTBLACK 78 09/10/2019    YESICA 9.5 05/14/2025    YESICA 9.1 09/10/2019    BILITOTAL 0.3 05/14/2025    BILITOTAL 0.6 09/10/2019    ALBUMIN 3.8 05/14/2025    ALBUMIN 3.2 (L) 06/23/2022    ALBUMIN 4.0 09/10/2019    ALKPHOS 86 05/14/2025    ALKPHOS 61 09/10/2019    ALT 14 05/14/2025    ALT 25 09/10/2019    AST 17 05/14/2025    AST 17 09/10/2019        INR RESULTS:  Lab Results   Component Value Date    INR 1.71 (H) 05/14/2025    INR 3.2 (H) 05/06/2025    INR 1.02 09/10/2019       Lab Results   Component Value Date  "   MAG 1.6 (L) 05/14/2025    MAG 1.8 09/10/2019     No results found for: \"NTBNPI\"  Lab Results   Component Value Date    NTBNP 386 (H) 05/14/2025    NTBNP 714 (H) 10/18/2018       Assessment and Plan:   Tej Morfin is a 59 year old male with a PMH of severe MR s/p MVR with ring (1/21/15), VT s/p ICD, nonobstructive CAD, NICM previously on home inotropes now s/p HM III LVAD implantation 3/23/22, with course c/b sternal wound s/p Keflex treatment, AFib with RVR (s/p ICD shock 3/25/22) and right knee effusion (s/p arthrocentesis and intraarticular steroid injection 3/30/22) who presents for f/up in LVAD clinic.    He is mildly hypovolemic today- will decrease lasix and kcl slightly. He also needs Iv iron which he will do locally. He is interested in talking about weight loss medications- will refer to mtm for glp-1 discussions. His exercise efforts have bbeen pretty limited by hip pain.    He does have some hemorrhoids- not new for him. Already keeping stool fairly soft. No bleeding in a bit, already off asa. Recommend he follow up with PCP or colorectal surgery for next steps and let us know if he has any futher bleeding.      Chronic SHCF s/p HM III LVAD. Severe MR s/p mitral valve repair with annuloplasty ring Implanted 3/23/2022.   Stage D, NYHA Class III   ACEi/ARB Entresto  mg BID  BB Sotalol 120 mg po BID, not gdmt  Aldosterone antagonist aldactone 12.5 mg dialy  SGLT2i: Deferred for now given polypharmacy and lots of GI upset (he did not want to add more meds)   SCD prophylaxis ICD  Fluid status mild hypovolemia decrease lasix to 60 mg in the morning and 40 mg in the afternoon. Decreask kcl to 60 mg in the morning and 40 meq in the afternoon  MAP: Within goal of 65-85  LDH: 200, stable  Anticoagulation: Coumadin per pharmacy. INR-1.71 goal 2-3.  Antiplatelet: N/A given RICARDO trial results    VAD interrogation 5/14/25: VAD interrogation reviewed with VAD coordinator. Agree with findings. Some PI " events with some associated speed drops. No power spikes,  or other findings suspicious of pump malfunction noted.     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  Follows with ID  - On doxy 100 mg BID and cefadroxil 01793 mg BID     History of VT, s/p CRT-D. ICD shock 3/25/22. EP consulted, interrogation consistent with VT with VF zone adjusted to reduce risk for recurrent shock. LV lead turned off in setting of LVAD.  ICD shock in may 2023 and sotolol was increased at that time  - Continue Sotatol 120 mg BID     Mild nonobstructive CAD.   - Not on ASA  - Continue coumadin  - Will discuss statin at next appointment     Hypomagnesemia   - Mag 1.6  - Already on mag 800 mg bid with soft stools, I think increasing would do more harm to mag than good  - Recheck next week and consider mag infusion if needed (has needed in the past)    Iron deficiency anemia  - Recommend iv iron locally    Obesity  - MTM referal for GLP-1    Hemorrhoids Has had surgical intervention for this in the past  - PRN mirilax  - No coumadin changes for now  - Recommend PCP follow-up vs colorectal surgery as he has seen them in the past. He should let us know if he has any further symptoms    Follow up   - Mag and BMP next week (decreased lasix and kcl, see mag plan above)  - RTC in 3 months for MD, 6 months for WILIAN    Billing  - I managed 2+ stable chronic conditions  - I changed a prescription medication    The longitudinal plan of care for the diagnosis(es)/condition(s) as documented were addressed during this visit. Due to the added complexity in care, I will continue to support Tej in the subsequent management and with ongoing continuity of care.        Radha White PA-C  [facility]        CC      Please do not hesitate to contact me if you have any questions/concerns.     Sincerely,     Radha White PA-C

## 2025-05-14 NOTE — NURSING NOTE
05/14/25 1000   [facility]CS VAD Information   Implant LVAD   LVAD Pump HeartMate 3   Heartmate 3 LEFT VS   Flow (Lpm) 4.8 Lpm   Pulse Index (PI) 2.6 PI  (Range 1.5 - 6.2 (mostly 2's and low 3's))   Speed (rpm) 5500 rpm   Power (christiansen) 4.2 christiansen   Current Hct setting 38  (Updated)   Primary Controller   Serial number [id number]   Low flow alarm setting 2.5   EBB: Patient use 9   Replace in 7 Months   Backup Controller   Serial number [id number]   EBB: Patient use 7   Replace EBB in 5 Months  (EBB expiring soon, replaced per protocol)   VAD Interrogation   Alarms reported by patient N   Unexpected alarms noted upon interrogation None   PI events Occasional   Damage to equipment is noted N   Action taken Reviewed proper equipment care and maintenance;Equipment replaced (see orders)   Driveline Exit Site   Dressing change done N   Driveline properly secured Yes   DLES assessment c/d/i per pt report   Dressing used Daily kit   Frequency patient changes dressing Daily  (Discussed going to Q3 days if no drainage present)     Education Complete: Yes   Charge the BACKUP controller s backup battery every 6 months  Perform a self test on BACKUP every 6 months  Change the MPU s batteries every 6 months:Yes  Have equipment serviced yearly (if applicable):Yes    Reviewed magnet with information on when to page the VAD Coordinator on call vs calling 911 for emergencies. Instructed pt to put on refrigerator or somewhere highly visible.

## 2025-05-14 NOTE — PROGRESS NOTES
ANTICOAGULATION MANAGEMENT     Tej SWENSON [place] 59 year old male is on warfarin with subtherapeutic INR result. (Goal INR 2.0-3.0)    Recent labs: (last 7 days)     05/14/25  1009   INR 1.71*       ASSESSMENT     Source(s): Chart Review     Warfarin doses taken: Reviewed in chart  Diet: No new diet changes identified  Medication/supplement changes: Cards OV today:  Decrease Lasix 60 mg in the morning and 40 mg in the afternoon  Decrease potassium to 60 meq in the morning and 40 meq in the afternoon  New illness, injury, or hospitalization: No  Signs or symptoms of bleeding or clotting: No  Previous result: Supratherapeutic-7.7% and 3.7% maintenance dose decreases.   Additional findings: venous INR today vs weekly POC testing with home meter, however maintenance dose has been decreased the past 2 weeks due to supra therapeutic results with no decrease to INR result until now.     Ear infection-on abx started on Sat-will be finished 5/15.      PLAN     Recommended plan for temporary change(s) and ongoing change(s) affecting INR     Dosing Instructions: booster dose then continue your current warfarin dose with next INR in 6 days       Summary  As of 5/14/2025      Full warfarin instructions:  5/15: 7.5 mg; Otherwise 2.5 mg every Tue, Fri; 5 mg all other days   Next INR check:  5/20/2025               Telephone call with Tej who agrees to plan and repeated back plan correctly  Sent [employer] Board message with dosing and follow up instructions    Patient to recheck with home meter    Education provided: Goal range and lab monitoring: goal range and significance of current result and Importance of following up at instructed interval  Symptom monitoring: monitoring for bleeding signs and symptoms  Contact [phone] with any changes, questions or concerns.     Plan made per [facility] anticoagulation protocol and per LVAD protocol    SAVANNA MO, RN  5/15/2025  Anticoagulation Clinic  [facility] for routing messages: jaime NOBLES  LVAD  [facility] patient phone line: [phone]        _______________________________________________________________________     Anticoagulation Episode Summary       Current INR goal:  2.0-3.0   TTR:  69.9% (12 mo)   Target end date:  Indefinite   Send INR reminders to:  ANTICOAG LVAD    Indications    LVAD (left ventricular assist device) present (H) [Z95.811]  Chronic systolic CHF (congestive heart failure) (H) [I50.22]  S/P mitral valve replacement [Z95.2]  Paroxysmal ventricular tachycardia (H) [I47.20]             Comments:  Follow VAD Anticoag protocol:Yes: HeartMate 3  Bridging: per protocol  Date VAD placed: 03/23/22  INR Goal: per referral  Hx Mitral valve repair and not replacement               Anticoagulation Care Providers       Provider Role Specialty Phone number    Dunia Hdz MD Referring Cardiovascular Disease [phone]

## 2025-05-14 NOTE — NURSING NOTE
Chief Complaint   Patient presents with    Follow Up     RETURN VAD         Vitals were taken, medications reconciled, trailmaking completed and questionnaires given    Darryl Roach, Clinic Assistant     10:24 AM

## 2025-05-14 NOTE — PATIENT INSTRUCTIONS
[facility]  Take your medications every day, as directed!  Medication changes made today:    Decrease Lasix 60 mg in the morning and 40 mg in the afternoon  Decrease potassium to 60 meq in the morning and 40 meq in the afternoon Instructions:  If you have NO drainage, its ok to change your driveline dressing once every 3 days.  We will order IV iron to get closer to home.  MTM referal to talk about weight loss medications  Please see your primary care doctor for next steps about the hemorrhoids  If you are having any trouble with constipation or hard stools- use mirilax 17 g as needed  Labs (BMP with Mag) in about 1 week  Please go home and look for your other set of batteries. Call or SiSense[employer] Vandana if you cannot find them, or they are  and she will send out 1 more box.  You should have 8 non- batteries.  Please take a peek at the LVAD maintenance schedule to help remind you what things you should be doing to take care of your LVAD equipment.   Monitor your heart failure, Page the VAD Coordinator on call:  If you gain more than 3 lb in a day or 5 in a week  If you feel worsening shortness of breath, palpitations, or swelling.   If you have VAD alarms or change in parameters  If you feel dizzy or lightheaded     Keep your VAD appointments!    Please schedule an appointment with Kori or Livia for 6 months from now, with labs prior.     See the clinic schedulers after your appointment to schedule follow-up.    If you do not have an appointment scheduled, you need to call the VAD  at [phone]. To Page the VAD Coordinator on call, dial [phone] option #4 and ask to speak to the VAD coordinator on call. Try to maintain a heart healthy lifestyle!  Limit salt & sodium to 2000mg/day   Eat a heart healthy diet, low in saturated fats  Stay active! Aim to move at least 150 minutes every week.    Use [employer]     [employer] allows you to communicate directly with your heart  "team through secure messaging.  [employer] can be accessed any time on your phone, computer, or tablet.  If you need assistance, we'd be happy to help!      Equipment Reminders:   Charge your back-up controller at least every 6 months. To charge, connect it to either batteries or wall power. The screen will read \"Charging\". Keep connected until the screen reads \"charging complete\". Disconnect power once the controller battery is charged. Also do a self-test when the controller is connected to power.  Replace the AA batteries in your mobile power unit every 6 months.  Check your battery charger for when it is due for maintenance. It requires inspection in clinic once per year. There will be a sticker stating the month and year maintenance is due. When you bring your battery charger to clinic, tell one of the schedulers you have LVAD equipment that needs maintenance. They will call [employer]. You can leave your  under the LVAD sign by the  at the far end of clinic. You must drop it off before 2pm.         "

## 2025-05-14 NOTE — PROGRESS NOTES
HPI:   Tej Morfin is a 59 year old male with a PMH of severe MR s/p MVR with ring (1/21/15), VT s/p ICD, nonobstructive CAD, NICM previously on home inotropes now s/p HM III LVAD implantation 3/23/22, with course c/b AFib with RVR (s/p ICD shock 3/25/22) and right knee effusion (s/p arthrocentesis and intraarticular steroid injection 3/30/22), driveline infection, who presents for f/up in LVAD clinic.    Today  No SOB at rest. He hasn't done a lot of exercise right now- he is moving to a new place soon with a gym and things that will help him be more active. No PND. Sleeps on 3 pillows chronically- seems to be orthopnea related. No LE edema. Some abdominal edema. No lightheadedness (unless he sits for a long time and then gets up too quick), dizziness, pre-syncope or syncope. He has had some palpitaitons with anxiety, but not other palpitations.  Appetite is good. No ICD shocks. No LVAD alarms.    No blood in the urine. He did have some bright red blood in the stool (actually only with wiping) 3 weeks ago when he was eating a lot spice food- improved with hemroid medication. No prolonged nosebleeds.    No driveline color changes, pain, or drainage (resolved on 2 antibiotics).    No headaches (*no stroke symptoms) or stroke symptomes.    Weight at home has been 245 lbs at home yesterday.    They lost their daughter (Age 19) earlier this ear to cardiomyopathy- s/p genetic testing and was completely different gene and physiology than MR. Morfin. Genetic counseling has recommended testing mom at this point.     Cardiac Medications  Coumadin   Entresto  mg BID  Aldactone 12.5 mg daily  Sotalol 120 mg BID  Lasix 60 mg BID  Kcl 60 meq BID  [id number] mg BID  Doxycycline 100 mg BID  Cefadroxil 1000 mg mg BID    PAST MEDICAL HISTORY:  Past Medical History:   Diagnosis Date    Chronic systolic heart failure (H) 2/7/2017    Gastroparesis     ICD (implantable cardioverter-defibrillator) in place     [place]  [employer]    Non-ischemic cardiomyopathy (H)     Paroxysmal ventricular tachycardia (H) 2017    S/P mitral valve replacement 2017    Tetrahydrocannabinol (THC) use disorder, mild, in controlled environment, abuse 2019    occasional edible THC. reportedly for sleep, anxiety    Tobacco abuse, episodic     occasional cigar - ~ 3x/wk       FAMILY HISTORY:  Family History   Problem Relation Age of Onset    Coronary Artery Disease Mother     Kidney failure Mother     Cardiomyopathy Father          age 51    Anesthesia Reaction No family hx of     Venous thrombosis No family hx of        SOCIAL HISTORY:  Social History     Socioeconomic History    Marital status:    Tobacco Use    Smoking status: Former Smoker    Smokeless tobacco: Never Used   Substance and Sexual Activity    Alcohol use: No     Alcohol/week: 0.0 standard drinks    Drug use: No       CURRENT MEDICATIONS:  Current Outpatient Medications   Medication Sig Dispense Refill    acetaminophen (TYLENOL) 325 MG tablet Take 2 tablets (650 mg) by mouth every 4 hours as needed for pain 100 tablet 0    allopurinol (ZYLOPRIM) 100 MG tablet Take 100 mg by mouth every morning.      amoxicillin (AMOXIL) 500 MG capsule Take 2,000 mg by mouth once as needed Take 4 tabs 1 hour before all dental cleanings and procedures      cefadroxil (DURICEF) 500 MG capsule Take 2 capsules (1,000 mg) by mouth 2 times daily. 120 capsule 1    cholecalciferol 50 MCG (2000 UT) CAPS Take 50 mcg by mouth every morning      doxycycline hyclate (VIBRAMYCIN) 100 MG capsule Take 1 capsule (100 mg) by mouth 2 times daily. 60 capsule 3    furosemide (LASIX) 20 MG tablet Take 3 tabs (60mg) in the morning and 2 tabs (40mg) in the afternoon 450 tablet 3    hydrOXYzine (VISTARIL) 50 MG capsule Take 50 mg by mouth every 12 hours as needed for anxiety      magnesium oxide (MAG-OX) 400 MG tablet Take 800 mg by mouth 2 times daily      metoprolol succinate ER (TOPROL XL) 25 MG 24 hr  tablet Take 1 tablet (25 mg) by mouth daily 90 tablet 2    nitroGLYcerin (NITROSTAT) 0.4 MG sublingual tablet Place 0.4 mg under the tongue every 5 minutes as needed for chest pain      omeprazole (PRILOSEC) 40 MG DR capsule Take 40 mg by mouth every morning      potassium chloride alfonso ER (KLOR-CON M20) 20 MEQ CR tablet Take 3 tabs (60mEq) in the morning and 2 tabs (40mEq) in the afternoon 450 tablet 3    sacubitril-valsartan (ENTRESTO)  MG per tablet Take 1 tablet by mouth 2 times daily 90 tablet 3    sotalol (BETAPACE) 120 MG tablet Take 120 mg by mouth 2 times daily      spironolactone (ALDACTONE) 25 MG tablet Take 0.5 tablets (12.5 mg) by mouth daily 45 tablet 3    traZODone (DESYREL) 50 MG tablet Take 0.5-1 tablets (25-50 mg) by mouth at bedtime as needed for insomnia      warfarin ANTICOAGULANT (COUMADIN) 2.5 MG tablet Take 5mg (2 tabs) by mouth daily OR AS DIRECTED.  Adjust dose based on INR. 180 tablet 1    enoxaparin ANTICOAGULANT (LOVENOX) 100 MG/ML syringe Inject 1 mL (100 mg) subcutaneously 2 times daily. (Patient not taking: Reported on 5/14/2025) 10 mL 1     No current facility-administered medications for this visit.       ROS:   See HPI    EXAM:  BP (!) 70/0 (BP Location: Right arm, Patient Position: Chair, Cuff Size: Adult Large)   Pulse (!) 36   Wt 115 kg (253 lb 8 oz)   SpO2 97%   BMI 36.37 kg/m      GENERAL: Appears comfortable, in no distress.  HEENT: Eye symmetrical and without discharge or icterus bilaterally.   NECK: Supple, JVD <6.   CV: Hum of LVAD, no adventitious sounds  RESPIRATORY: Respirations regular, even, and unlabored. Lungs CTA throughout.   GI: Soft and non distended   EXTREMITIES: No LE peripheral edema. none pulsatile.   NEUROLOGIC: Alert and interacting appropriatly.  No focal deficits.   SKIN: Driveline dressing c/d/i.     Labs - as reviewed in clinic with patient today:  CBC RESULTS:  Lab Results   Component Value Date    WBC 5.7 05/14/2025    WBC 4.2 09/10/2019  "   RBC 4.29 (L) 05/14/2025    RBC 4.98 09/10/2019    HGB 11.7 (L) 05/14/2025    HGB 15.8 09/10/2019    HCT 38.4 (L) 05/14/2025    HCT 48.9 09/10/2019    MCV 90 05/14/2025    MCV 98 09/10/2019    MCH 27.3 05/14/2025    MCH 31.7 09/10/2019    MCHC 30.5 (L) 05/14/2025    MCHC 32.3 09/10/2019    RDW 14.8 05/14/2025    RDW 12.9 09/10/2019     05/14/2025     09/10/2019       CMP RESULTS:  Lab Results   Component Value Date     05/14/2025     09/10/2019    POTASSIUM 4.1 05/14/2025    POTASSIUM 4.0 06/23/2022    POTASSIUM 3.4 09/10/2019    CHLORIDE 105 05/14/2025    CHLORIDE 105 05/09/2023    CHLORIDE 108 09/10/2019    CO2 24 05/14/2025    CO2 26 06/23/2022    CO2 26 09/10/2019    ANIONGAP 11 05/14/2025    ANIONGAP 9 06/23/2022    ANIONGAP 4 09/10/2019     (H) 05/14/2025    GLC 65 (L) 06/23/2022    GLC 89 09/10/2019    BUN 19.7 05/14/2025    BUN 18 06/23/2022    BUN 18 09/10/2019    CR 1.47 (H) 05/14/2025    CR 1.23 09/10/2019    GFRESTIMATED 55 (L) 05/14/2025    GFRESTIMATED 67 09/10/2019    GFRESTBLACK 78 09/10/2019    YESICA 9.5 05/14/2025    YESICA 9.1 09/10/2019    BILITOTAL 0.3 05/14/2025    BILITOTAL 0.6 09/10/2019    ALBUMIN 3.8 05/14/2025    ALBUMIN 3.2 (L) 06/23/2022    ALBUMIN 4.0 09/10/2019    ALKPHOS 86 05/14/2025    ALKPHOS 61 09/10/2019    ALT 14 05/14/2025    ALT 25 09/10/2019    AST 17 05/14/2025    AST 17 09/10/2019        INR RESULTS:  Lab Results   Component Value Date    INR 1.71 (H) 05/14/2025    INR 3.2 (H) 05/06/2025    INR 1.02 09/10/2019       Lab Results   Component Value Date    MAG 1.6 (L) 05/14/2025    MAG 1.8 09/10/2019     No results found for: \"NTBNPI\"  Lab Results   Component Value Date    NTBNP 386 (H) 05/14/2025    NTBNP 714 (H) 10/18/2018       Assessment and Plan:   Tej Morfin is a 59 year old male with a PMH of severe MR s/p MVR with ring (1/21/15), VT s/p ICD, nonobstructive CAD, NICM previously on home inotropes now s/p HM III LVAD implantation " 3/23/22, with course c/b sternal wound s/p Keflex treatment, AFib with RVR (s/p ICD shock 3/25/22) and right knee effusion (s/p arthrocentesis and intraarticular steroid injection 3/30/22) who presents for f/up in LVAD clinic.    He is mildly hypovolemic today- will decrease lasix and kcl slightly. He also needs Iv iron which he will do locally. He is interested in talking about weight loss medications- will refer to mtm for glp-1 discussions. His exercise efforts have bbeen pretty limited by hip pain.    He does have some hemorrhoids- not new for him. Already keeping stool fairly soft. No bleeding in a bit, already off asa. Recommend he follow up with PCP or colorectal surgery for next steps and let us know if he has any futher bleeding.      Chronic SHCF s/p HM III LVAD. Severe MR s/p mitral valve repair with annuloplasty ring Implanted 3/23/2022.   Stage D, NYHA Class III   ACEi/ARB Entresto  mg BID  BB Sotalol 120 mg po BID, not gdmt  Aldosterone antagonist aldactone 12.5 mg dialy  SGLT2i: Deferred for now given polypharmacy and lots of GI upset (he did not want to add more meds)   SCD prophylaxis ICD  Fluid status mild hypovolemia decrease lasix to 60 mg in the morning and 40 mg in the afternoon. Decreask kcl to 60 mg in the morning and 40 meq in the afternoon  MAP: Within goal of 65-85  LDH: 200, stable  Anticoagulation: Coumadin per pharmacy. INR-1.71 goal 2-3.  Antiplatelet: N/A given RICARDO trial results    VAD interrogation 5/14/25: VAD interrogation reviewed with VAD coordinator. Agree with findings. Some PI events with some associated speed drops. No power spikes,  or other findings suspicious of pump malfunction noted.     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  Follows with ID  - On doxy 100 mg BID and cefadroxil 41240 mg BID     History of VT, s/p CRT-D. ICD shock 3/25/22. EP consulted, interrogation consistent with VT with VF zone adjusted to reduce risk for  recurrent shock. LV lead turned off in setting of LVAD.  ICD shock in may 2023 and sotolol was increased at that time  - Continue Sotatol 120 mg BID     Mild nonobstructive CAD.   - Not on ASA  - Continue coumadin  - Will discuss statin at next appointment     Hypomagnesemia   - Mag 1.6  - Already on mag 800 mg bid with soft stools, I think increasing would do more harm to mag than good  - Recheck next week and consider mag infusion if needed (has needed in the past)    Iron deficiency anemia  - Recommend iv iron locally    Obesity  - MTM referal for GLP-1    Hemorrhoids Has had surgical intervention for this in the past  - PRN mirilax  - No coumadin changes for now  - Recommend PCP follow-up vs colorectal surgery as he has seen them in the past. He should let us know if he has any further symptoms    Follow up   - Mag and BMP next week (decreased lasix and kcl, see mag plan above)  - RTC in 3 months for MD, 6 months for WILIAN    Billing  - I managed 2+ stable chronic conditions  - I changed a prescription medication    The longitudinal plan of care for the diagnosis(es)/condition(s) as documented were addressed during this visit. Due to the added complexity in care, I will continue to support Tej in the subsequent management and with ongoing continuity of care.        EMILIA Tavarez-CARLA  [facility] Card[facility]        CC

## 2025-05-20 ENCOUNTER — ANTICOAGULATION THERAPY VISIT (OUTPATIENT)
Dept: ANTICOAGULATION | Facility: CLINIC | Age: 60
End: 2025-05-20
Payer: MEDICARE

## 2025-05-20 DIAGNOSIS — I50.22 CHRONIC SYSTOLIC CHF (CONGESTIVE HEART FAILURE) (H): ICD-10-CM

## 2025-05-20 DIAGNOSIS — Z95.2 S/P MITRAL VALVE REPLACEMENT: ICD-10-CM

## 2025-05-20 DIAGNOSIS — Z95.811 LVAD (LEFT VENTRICULAR ASSIST DEVICE) PRESENT (H): Primary | ICD-10-CM

## 2025-05-20 DIAGNOSIS — I47.20 PAROXYSMAL VENTRICULAR TACHYCARDIA (H): ICD-10-CM

## 2025-05-20 LAB — INR HOME MONITORING: 1.7 (ref 2–3)

## 2025-05-20 NOTE — PROGRESS NOTES
ANTICOAGULATION MANAGEMENT     Tej Morfin 59 year old male is on warfarin with subtherapeutic INR result. (Goal INR 2.0-3.0)    Recent labs: (last 7 days)     05/20/25  0000   INR 1.7*       ASSESSMENT     Source(s): Chart Review and Patient/Caregiver Call     Warfarin doses taken: Warfarin taken as instructed. Patient took all doses but maybe took a couple doses later than usual  Diet: No new diet changes identified  Medication/supplement changes: Patient might be starting wegovy or Ozempic within the next week.  Patient is done with antibiotics  New illness, injury, or hospitalization: No  Signs or symptoms of bleeding or clotting: No  Previous result: Subtherapeutic  Additional findings: None       PLAN     Recommended plan for no diet, medication or health factor changes affecting INR     Dosing Instructions: Increase your warfarin dose (16.7% change) with next INR in 1 week       Summary  As of 5/20/2025      Full warfarin instructions:  5 mg every day   Next INR check:  5/27/2025               Telephone call with Tej who verbalizes understanding and agrees to plan and who agrees to plan and repeated back plan correctly    Patient to recheck with home meter    Education provided: Taking warfarin: Importance of taking warfarin as instructed  Goal range and lab monitoring: goal range and significance of current result, Importance of therapeutic range, and Importance of following up at instructed interval    Plan made with [facility] Pharmacist Jennifer Spence and per LVAD protocol    Audelia Avalos, RN  5/20/2025  Anticoagulation Clinic  [employer] for routing messages: p ANTICOAG LVAD  [facility] patient phone line: [phone]        _______________________________________________________________________     Anticoagulation Episode Summary       Current INR goal:  2.0-3.0   TTR:  68.4% (12 mo)   Target end date:  Indefinite   Send INR reminders to:  ANTICOAG LVAD    Indications    LVAD (left ventricular assist  device) present (H) [Z95.811]  Chronic systolic CHF (congestive heart failure) (H) [I50.22]  S/P mitral valve replacement [Z95.2]  Paroxysmal ventricular tachycardia (H) [I47.20]             Comments:  Follow VAD Anticoag protocol:Yes: HeartMate 3  Bridging: per protocol  Date VAD placed: 03/23/22  INR Goal: per referral  Hx Mitral valve repair and not replacement               Anticoagulation Care Providers       Provider Role Specialty Phone number    Dunia Hdz MD Referring Cardiovascular Disease [phone]

## 2025-05-21 NOTE — PROGRESS NOTES
Medication Therapy Management (MTM) Encounter    ASSESSMENT:                            Medication Adherence/Access: No issues identified.    Heart Failure/LVAD/CAD  Stable     Weight Management  Given patient's BMI of 36.37 kg/m  and RYAN, he may benefit from weight loss and initiation of Zepbound 2.5 mg subcutaneous weekly. Discussed mechanism of action, side effects, warnings, and efficacy. Answered patient questions. Given history of gastroparesis, baseline diarrhea, and thyroid goiter, PharmD will confirm with referring provider they are okay with initiation of Zepbound.        Pain  Stable     Gout  Stable     Anxiety  Stable     Gerd  Stable     Supplements  Stable     Insomnia  Stable     PLAN:                            I will touch base with your cardiology team to see if they are okay with starting Zepbound 2.5 mg subcutaneous weekly. I will update you when I hear back from them.     Follow-up: 6/19/25 at 1230 pm      SUBJECTIVE/OBJECTIVE:                          Tej Morfin is a 59 year old male seen for an initial visit. He was referred to me from Radha White PA-C. Patient's wife was also present for visit.     Reason for visit: Weight managment medication options, GLP-1    Allergies/ADRs: Reviewed in chart  Past Medical History: Reviewed in chart  Tobacco: He reports that he has quit smoking. His smoking use included cigarettes. He has never been exposed to tobacco smoke. He has never used smokeless tobacco.  Alcohol: none    Medication Adherence/Access: no issues reported.    Heart Failure /LVAD/CAD  Beta Blocker: Sotalol 120 mg twice a day  Metoprolol ER 25 mg daily  ACEi/ARB/ARNI: Entresto  mg twice a day   MRA: Spironolactone 12.5 mg daily   Diuretic(s): Lasix 60 mg in AM and 40 mg in the afternoon  Potassium chloride 60 meq by mouth in morning and 40 meq in afternoon     Warfarin per [facility]  doxycycline 100 mg twice a day    cefadroxil 1000 mg twice a day  Amoxicillin before  "dental procedures   Nitroglycerin 0.4 mg SL as needed    Patient denies dizziness or lightheadedness. Reports he does get swelling in legs/feet when he's in the car, but it resolves.   No signs or symptoms of bleeding on warfarin.   MAP: within goal per cardiology note on 5/14/25         Weight Management   Not on any medications for weight loss    Nutrition/Eating Habits: trying to stop drinking soda and not eating out as much and trying to stay away from sweets     Exercise/Activity: Used to walk 5 days out of the week, but hasn't been able to do so as he has had hip pain (hasn't been walking for a month or longer).    Medications Tried/Failed:  None.     Patient reports baseline GI issues: Has history of gastroparesis. He reports diarrhea/loose stools at baseline, with only some solid stools. Denies vomiting, heartburn or nausea at baseline.    Patient denies history of pancreatitis. Patient denies personal or family history of medullary thyroid cancer or multiple endocrine neoplasia.    Per chart review, patient does have a history of thyroid goiter.    Patient reports he has been diagnosed with sleep apnea in the past and has done a sleep study before. The sleep study would been been about 6-7 years ago and may have been done at [facility].        Wt Readings from Last 4 Encounters:   05/14/25 253 lb 8 oz (115 kg)   04/23/25 245 lb (111.1 kg)   04/15/25 243 lb (110.2 kg)   03/12/25 245 lb (111.1 kg)     Estimated body mass index is 36.37 kg/m  as calculated from the following:    Height as of 3/12/25: 5' 10\" (1.778 m).    Weight as of 5/14/25: 253 lb 8 oz (115 kg).    Pain:   Acetaminophen 650 mg every 4 hours as needed   No concerns       Gout:   Allopurinol 100 mg daily   No concerns     Anxiety:   Hydroxyzine 50 mg every 12 hours as needed   No concerns     Gerd:   Omeprazole 40 mg daily   No concerns       Supplements:   Vitamin D 50 mcg daily   Magnesium 800 mg twice a day  No concerns     Insomnia: "   Trazodone 25-50 mg at bedtime as needed   No concerns       ----------------  I spent 30 minutes with this patient today. I offer these suggestions for consideration by Radha White PA-C.     A summary of these recommendations was sent via [employer].    Sunita Li, PharmD  Medication Therapy Management Pharmacist   [facility]      Telemedicine Visit Details  The patient's medications can be safely assessed via a telemedicine encounter.  Type of service:  Telephone visit  Originating Location (pt. Location): Home    Distant Location (provider location):  Off-site  Start Time: 2:30 PM  End Time: 3:00 PM

## 2025-05-22 ENCOUNTER — VIRTUAL VISIT (OUTPATIENT)
Facility: CLINIC | Age: 60
End: 2025-05-22
Attending: PHYSICIAN ASSISTANT
Payer: COMMERCIAL

## 2025-05-22 DIAGNOSIS — G47.33 OBSTRUCTIVE SLEEP APNEA: Primary | ICD-10-CM

## 2025-05-22 DIAGNOSIS — E66.812 CLASS 2 SEVERE OBESITY DUE TO EXCESS CALORIES WITH SERIOUS COMORBIDITY IN ADULT (H): ICD-10-CM

## 2025-05-22 DIAGNOSIS — E66.01 CLASS 2 SEVERE OBESITY DUE TO EXCESS CALORIES WITH SERIOUS COMORBIDITY IN ADULT (H): ICD-10-CM

## 2025-05-22 DIAGNOSIS — Z95.811 LVAD (LEFT VENTRICULAR ASSIST DEVICE) PRESENT (H): ICD-10-CM

## 2025-05-22 NOTE — Clinical Note
Nghia Garcia,   I spoke with Tej following your referral for weight loss and GLP-1. He is willing to start Zepbound 2.5 mg subcutaneous weekly. I did want to confirm you are okay with starting this medication for him given history of gastroparesis, baseline diarrhea, and thyroid goiter.   Let me know your thoughts!  Sunita Li, PharmD Medication Therapy Management Pharmacist  [facility]

## 2025-05-22 NOTE — PATIENT INSTRUCTIONS
"Recommendations from today's MTM visit:                                                      I will touch base with your cardiology team to see if they are okay with you starting Zepbound 2.5 mg subcutaneous weekly. I will update you when I hear back from them.     Follow-up: 6/19/25 at 1230 pm    It was great speaking with you today.  I value your experience and would be very thankful for your time in providing feedback in our clinic survey. In the next few days, you may receive an email or text message from [facility] with a link to a survey related to your  clinical pharmacist.\"     To schedule another MTM appointment, please call the clinic directly or you may call the MTM scheduling line at [phone].    My Clinical Pharmacist's contact information:                                                      Please feel free to contact me with any questions or concerns you have.      Sunita Li, PharmD  Medication Therapy Management Pharmacist   [facility]     "

## 2025-05-28 ENCOUNTER — ANTICOAGULATION THERAPY VISIT (OUTPATIENT)
Dept: ANTICOAGULATION | Facility: CLINIC | Age: 60
End: 2025-05-28
Payer: MEDICARE

## 2025-05-28 ENCOUNTER — RESULTS FOLLOW-UP (OUTPATIENT)
Dept: ANTICOAGULATION | Facility: CLINIC | Age: 60
End: 2025-05-28

## 2025-05-28 DIAGNOSIS — Z95.2 S/P MITRAL VALVE REPLACEMENT: ICD-10-CM

## 2025-05-28 DIAGNOSIS — I50.22 CHRONIC SYSTOLIC CHF (CONGESTIVE HEART FAILURE) (H): ICD-10-CM

## 2025-05-28 DIAGNOSIS — Z95.811 LVAD (LEFT VENTRICULAR ASSIST DEVICE) PRESENT (H): Primary | ICD-10-CM

## 2025-05-28 DIAGNOSIS — I47.20 PAROXYSMAL VENTRICULAR TACHYCARDIA (H): ICD-10-CM

## 2025-05-28 LAB — INR HOME MONITORING: 2.3 (ref 2–3)

## 2025-05-28 NOTE — PROGRESS NOTES
ANTICOAGULATION MANAGEMENT     Tej LEELA Morfin 59 year old male is on warfarin with therapeutic INR result. (Goal INR 2.0-3.0)    Recent labs: (last 7 days)     05/28/25  0000   INR 2.3       ASSESSMENT     Source(s): Chart Review and Patient/Caregiver Call     Warfarin doses taken: Warfarin taken as instructed  Diet: No new diet changes identified  Medication/supplement changes: Pt has not yet started a GLP-1 mediation but considering starting in the future. Waiting to hear back from provider.   New illness, injury, or hospitalization: No  Signs or symptoms of bleeding or clotting: No  Previous result: Subtherapeutic  Additional findings: None       PLAN     Recommended plan for no diet, medication or health factor changes affecting INR     Dosing Instructions: Continue your current warfarin dose with next INR in 1 week       Summary  As of 5/28/2025      Full warfarin instructions:  5 mg every day   Next INR check:  6/4/2025               Telephone call with Tej who verbalizes understanding and agrees to plan    Patient to recheck with home meter    Education provided: Goal range and lab monitoring: goal range and significance of current result and Importance of therapeutic range    Plan made per [facility] anticoagulation protocol and per LVAD protocol    Benjamin Gaspar RN  5/28/2025  Anticoagulation Clinic  [employer] for routing messages: p ANTICOAG LVAD  [facility] patient phone line: [phone]        _______________________________________________________________________     Anticoagulation Episode Summary       Current INR goal:  2.0-3.0   TTR:  67.3% (12 mo)   Target end date:  Indefinite   Send INR reminders to:  ANTICOAG LVAD    Indications    LVAD (left ventricular assist device) present (H) [Z95.811]  Chronic systolic CHF (congestive heart failure) (H) [I50.22]  S/P mitral valve replacement [Z95.2]  Paroxysmal ventricular tachycardia (H) [I47.20]             Comments:  Follow VAD Anticoag protocol:Yes: HeartMate  3  Bridging: per protocol  Date VAD placed: 03/23/22  INR Goal: per referral  Hx Mitral valve repair and not replacement               Anticoagulation Care Providers       Provider Role Specialty Phone number    Dunia Hdz MD Referring Cardiovascular Disease [phone]

## 2025-05-30 ENCOUNTER — TELEPHONE (OUTPATIENT)
Dept: CARDIOLOGY | Facility: CLINIC | Age: 60
End: 2025-05-30
Payer: MEDICARE

## 2025-05-30 DIAGNOSIS — T82.7XXA INFECTION ASSOCIATED WITH DRIVELINE OF LEFT VENTRICULAR ASSIST DEVICE (LVAD): Primary | ICD-10-CM

## 2025-05-30 NOTE — TELEPHONE ENCOUNTER
PA Initiation    Medication: ZEPBOUND 2.5 MG/0.5ML SC SOAJ  Insurance Company: [employer] - Phone [phone] Fax [phone]  Pharmacy Filling the Rx:    Filling Pharmacy Phone:    Filling Pharmacy Fax:    Start Date: 6/3/2025     [id number]

## 2025-05-30 NOTE — TELEPHONE ENCOUNTER
Hello team,    Forwarding encounter as a request to initiate prior authorization for Zepbound 2.5 mg subcutaneous weekly and subsequent dose titrations. Please let me know if you need any additional details or information.    Thank you for your help,  Sunita

## 2025-06-02 DIAGNOSIS — E66.812 CLASS 2 SEVERE OBESITY DUE TO EXCESS CALORIES WITH SERIOUS COMORBIDITY IN ADULT (H): ICD-10-CM

## 2025-06-02 DIAGNOSIS — G47.33 OBSTRUCTIVE SLEEP APNEA: ICD-10-CM

## 2025-06-02 DIAGNOSIS — E66.01 CLASS 2 SEVERE OBESITY DUE TO EXCESS CALORIES WITH SERIOUS COMORBIDITY IN ADULT (H): ICD-10-CM

## 2025-06-02 NOTE — TELEPHONE ENCOUNTER
Zepbound approved with high copay that is being applied towards deductible.  Prescription released to pharmacy, MyC message sent to patient with update.    Prior Authorization Approval    Medication: ZEPBOUND 2.5 MG/0.5ML SC SOAJ  Authorization Effective Date: 5/16/2025  Authorization Expiration Date: 5/16/2099  Approved Dose/Quantity:  2 per 28  Reference #: [id number]   Insurance Company: [employer] - Phone [phone] Fax [phone]  Expected CoPay: $ 515.28  CoPay Card Available:      Financial Assistance Needed:   Which Pharmacy is filling the prescription: [facility] [address] [place] [facility], [place] - [address]  Pharmacy Notified: yes  Patient Notified: yes

## 2025-06-03 DIAGNOSIS — I42.8 NONISCHEMIC CARDIOMYOPATHY (H): ICD-10-CM

## 2025-06-03 DIAGNOSIS — I50.22 CHRONIC SYSTOLIC (CONGESTIVE) HEART FAILURE (H): ICD-10-CM

## 2025-06-03 DIAGNOSIS — T82.7XXA INFECTION ASSOCIATED WITH DRIVELINE OF LEFT VENTRICULAR ASSIST DEVICE (LVAD): Primary | ICD-10-CM

## 2025-06-04 LAB — INR (EXTERNAL): 1.8 (ref 0.9–1.1)

## 2025-06-05 ENCOUNTER — ANTICOAGULATION THERAPY VISIT (OUTPATIENT)
Dept: ANTICOAGULATION | Facility: CLINIC | Age: 60
End: 2025-06-05
Payer: MEDICARE

## 2025-06-05 ENCOUNTER — RESULTS FOLLOW-UP (OUTPATIENT)
Dept: TRANSPLANT | Facility: CLINIC | Age: 60
End: 2025-06-05

## 2025-06-05 ENCOUNTER — CARE COORDINATION (OUTPATIENT)
Dept: CARDIOLOGY | Facility: CLINIC | Age: 60
End: 2025-06-05
Payer: MEDICARE

## 2025-06-05 DIAGNOSIS — T82.7XXA INFECTION ASSOCIATED WITH DRIVELINE OF LEFT VENTRICULAR ASSIST DEVICE (LVAD): Primary | ICD-10-CM

## 2025-06-05 DIAGNOSIS — I47.20 PAROXYSMAL VENTRICULAR TACHYCARDIA (H): ICD-10-CM

## 2025-06-05 DIAGNOSIS — Z95.811 LVAD (LEFT VENTRICULAR ASSIST DEVICE) PRESENT (H): Primary | ICD-10-CM

## 2025-06-05 DIAGNOSIS — Z95.2 S/P MITRAL VALVE REPLACEMENT: ICD-10-CM

## 2025-06-05 DIAGNOSIS — I50.22 CHRONIC SYSTOLIC CHF (CONGESTIVE HEART FAILURE) (H): ICD-10-CM

## 2025-06-05 NOTE — PROGRESS NOTES
ANTICOAGULATION MANAGEMENT     Tej Morfin 59 year old male is on warfarin with subtherapeutic INR result. (Goal INR 2.0-3.0)    Recent labs: (last 7 days)     06/04/25  0000   INR 1.8*       ASSESSMENT     Source(s): Chart Review and Patient/Caregiver Call     Warfarin doses taken: Warfarin taken as instructed  Diet: No new diet changes identified  Medication/supplement changes: None noted  New illness, injury, or hospitalization: No  Signs or symptoms of bleeding or clotting: No  Previous result: Therapeutic last visit; previously outside of goal range  Additional findings: None       PLAN     Recommended plan for no diet, medication or health factor changes affecting INR     Dosing Instructions: Increase your warfarin dose (7% change) with next INR in 1 week       Summary  As of 6/5/2025      Full warfarin instructions:  7.5 mg every Thu; 5 mg all other days   Next INR check:  6/11/2025               Telephone call with Tej who verbalizes understanding and agrees to plan  Sent [employer] message with dosing and follow up instructions    Patient to recheck with home meter    Education provided: Please call back if any changes to your diet, medications or how you've been taking warfarin  Taking warfarin: purpose of warfarin and how it works, take warfarin at same time each day; preferably in the evening, prescribed tablet strength and color, importance of following [facility] instructions vs instructions on the prescription bottle, and Importance of taking warfarin as instructed  Goal range and lab monitoring: goal range and significance of current result, Importance of therapeutic range, and Importance of following up at instructed interval  Symptom monitoring: monitoring for bleeding signs and symptoms, monitoring for clotting signs and symptoms, monitoring for stroke signs and symptoms, when to seek medical attention/emergency care, and if you hit your head or have a bad fall seek emergency care  Importance of  notifying anticoagulation clinic for: changes in medications; a sooner lab recheck maybe needed, diarrhea, nausea/vomiting, reduced intake, cold/flu, and/or infections; a sooner lab recheck maybe needed, and upcoming surgeries and procedures 2 weeks in advance    Plan made per [facility] anticoagulation protocol and per LVAD protocol    Siddharth Greco, RN  6/5/2025  Anticoagulation Clinic  [employer] for routing messages: p ANTICOAG LVAD  [facility] patient phone line: [phone]        _______________________________________________________________________     Anticoagulation Episode Summary       Current INR goal:  2.0-3.0   TTR:  66.4% (11.9 mo)   Target end date:  Indefinite   Send INR reminders to:  ANTICOAG LVAD    Indications    LVAD (left ventricular assist device) present (H) [Z95.811]  Chronic systolic CHF (congestive heart failure) (H) [I50.22]  S/P mitral valve replacement [Z95.2]  Paroxysmal ventricular tachycardia (H) [I47.20]             Comments:  Follow VAD Anticoag protocol:Yes: HeartMate 3  Bridging: per protocol  Date VAD placed: 03/23/22  INR Goal: per referral  Hx Mitral valve repair and not replacement               Anticoagulation Care Providers       Provider Role Specialty Phone number    Dunia Hdz MD Referring Cardiovascular Disease [phone]

## 2025-06-05 NOTE — PROGRESS NOTES
D: Follow up labs rcvd  I:  Pt notified lab values stable per Kori NIETO, no further follow up needed  A:  Clinic follow up  P:  Pt verbalized understanding.  Pt will call VAD coordinator with further needs and questions.    Reminded pt to check expiration date on alternate set of batteries, and notify writer if  in order to replace asap.

## 2025-06-12 ENCOUNTER — ANTICOAGULATION THERAPY VISIT (OUTPATIENT)
Dept: ANTICOAGULATION | Facility: CLINIC | Age: 60
End: 2025-06-12
Payer: MEDICARE

## 2025-06-12 ENCOUNTER — RESULTS FOLLOW-UP (OUTPATIENT)
Dept: ANTICOAGULATION | Facility: CLINIC | Age: 60
End: 2025-06-12
Payer: MEDICARE

## 2025-06-12 LAB — INR HOME MONITORING: 2.2 (ref 2–3)

## 2025-06-12 NOTE — PROGRESS NOTES
ANTICOAGULATION MANAGEMENT     Tej Morfin 60 year old male is on warfarin with therapeutic INR result. (Goal INR 2.0-3.0)    Recent labs: (last 7 days)     06/12/25  0000   INR 2.2       ASSESSMENT     Source(s): Chart Review  Previous INR was Subtherapeutic  Medication, diet, health changes since last INR chart reviewed; none identified         PLAN     Recommended plan for no diet, medication or health factor changes affecting INR     Dosing Instructions: Continue your current warfarin dose with next INR in 1 week       Summary  As of 6/12/2025      Full warfarin instructions:  7.5 mg every Thu; 5 mg all other days   Next INR check:  6/19/2025               Sent [facility]` message with dosing and follow up instructions    Patient to recheck with home meter    Education provided: Please call back if any changes to your diet, medications or how you've been taking warfarin    Plan made per [facility] anticoagulation protocol    Lauri OSMAN RN  6/12/2025  Anticoagulation Clinic  [employer] for routing messages: p ANTICOAG LVAD  [facility] patient phone line: [phone]        _______________________________________________________________________     Anticoagulation Episode Summary       Current INR goal:  2.0-3.0   TTR:  65.4% (12 mo)   Target end date:  Indefinite   Send INR reminders to:  ANTICOAG LVAD    Indications    LVAD (left ventricular assist device) present (H) [Z95.811]  Chronic systolic CHF (congestive heart failure) (H) [I50.22]  S/P mitral valve replacement [Z95.2]  Paroxysmal ventricular tachycardia (H) [I47.20]             Comments:  Follow VAD Anticoag protocol:Yes: HeartMate 3  Bridging: per protocol  Date VAD placed: 03/23/22  INR Goal: per referral  Hx Mitral valve repair and not replacement               Anticoagulation Care Providers       Provider Role Specialty Phone number    Dunia Hdz MD Referring Cardiovascular Disease [phone]

## 2025-06-15 DIAGNOSIS — T82.7XXA INFECTION ASSOCIATED WITH DRIVELINE OF LEFT VENTRICULAR ASSIST DEVICE (LVAD): ICD-10-CM

## 2025-06-16 ENCOUNTER — TELEPHONE (OUTPATIENT)
Dept: INFECTIOUS DISEASES | Facility: CLINIC | Age: 60
End: 2025-06-16
Payer: MEDICARE

## 2025-06-16 DIAGNOSIS — T82.7XXA INFECTION ASSOCIATED WITH DRIVELINE OF LEFT VENTRICULAR ASSIST DEVICE (LVAD): Primary | ICD-10-CM

## 2025-06-16 NOTE — TELEPHONE ENCOUNTER
[facility] 6/16 to offer to see Dr. Boo for a follow up on 8/14 at 11am (hold is placed for this pt).

## 2025-06-16 NOTE — TELEPHONE ENCOUNTER
REFILL REQUEST       Medication Sig Dispense Refill    cefadroxil (DURICEF) 500 MG capsule Take 2 capsules (1,000 mg) by mouth 2 times daily. 120 capsule 1       Date of last Rx: 3/18/25; 2 mos worth    Last appointment: 1/16/25     Last labs: N/A     Next appointment: Not scheduled. Sent staff message to schedulers to make arrangements with Dr. Ernst     Are labs overdue? N/A     Plan from last visit:  Obtain bacterial cultures from the driveline site  Obtain blood Cultures   CRP, ESR, and procalcitonin levels   Continue cefadroxil 1000 MG BID as part of the oral suppression therapy  Start minocycline 100 MG BID as the second medication for the oral suppression therapy     Follow up with us in 4 weeks.      Refill approved? Pending Dr. Ernst's review.     There is a 5/14 cardiology visit note: No driveline color changes, pain, or drainage (resolved on 2 antibiotics).      Amount: 120     Refills: 1

## 2025-06-18 ENCOUNTER — TELEPHONE (OUTPATIENT)
Dept: INFECTIOUS DISEASES | Facility: CLINIC | Age: 60
End: 2025-06-18
Payer: MEDICARE

## 2025-06-18 DIAGNOSIS — T82.7XXA INFECTION ASSOCIATED WITH DRIVELINE OF LEFT VENTRICULAR ASSIST DEVICE (LVAD): Primary | ICD-10-CM

## 2025-06-18 NOTE — TELEPHONE ENCOUNTER
EP [facility] 6/18 to offer to see Dr. Boo for a follow up on 8/14 at 11am (hold is placed for this pt). --2nd attempt.

## 2025-06-19 RX ORDER — TIRZEPATIDE 2.5 MG/.5ML
INJECTION, SOLUTION SUBCUTANEOUS
Refills: 0 | OUTPATIENT
Start: 2025-06-19

## 2025-06-20 RX ORDER — CEFADROXIL 500 MG/1
1000 CAPSULE ORAL 2 TIMES DAILY
Qty: 120 CAPSULE | Refills: 1 | Status: SHIPPED | OUTPATIENT
Start: 2025-06-20

## 2025-06-23 ENCOUNTER — ANTICOAGULATION THERAPY VISIT (OUTPATIENT)
Dept: ANTICOAGULATION | Facility: CLINIC | Age: 60
End: 2025-06-23
Payer: MEDICARE

## 2025-06-23 ENCOUNTER — RESULTS FOLLOW-UP (OUTPATIENT)
Dept: ANTICOAGULATION | Facility: CLINIC | Age: 60
End: 2025-06-23

## 2025-06-23 DIAGNOSIS — Z95.2 S/P MITRAL VALVE REPLACEMENT: ICD-10-CM

## 2025-06-23 DIAGNOSIS — Z95.811 LVAD (LEFT VENTRICULAR ASSIST DEVICE) PRESENT (H): Primary | ICD-10-CM

## 2025-06-23 DIAGNOSIS — I50.22 CHRONIC SYSTOLIC CHF (CONGESTIVE HEART FAILURE) (H): ICD-10-CM

## 2025-06-23 DIAGNOSIS — I47.20 PAROXYSMAL VENTRICULAR TACHYCARDIA (H): ICD-10-CM

## 2025-06-23 LAB — INR HOME MONITORING: 2.4 (ref 2–3)

## 2025-06-23 NOTE — PROGRESS NOTES
ANTICOAGULATION MANAGEMENT     Tej Morfin 60 year old male is on warfarin with therapeutic INR result. (Goal INR 2.0-3.0)    Recent labs: (last 7 days)     06/23/25  0000   INR 2.4       ASSESSMENT     Source(s): Chart Review and Patient/Caregiver Call     Warfarin doses taken: Warfarin taken as instructed  Diet: No new diet changes identified  Medication/supplement changes: None noted  New illness, injury, or hospitalization: No  Signs or symptoms of bleeding or clotting: No  Previous result: Therapeutic last visit; previously outside of goal range  Additional findings: None       PLAN     Recommended plan for no diet, medication or health factor changes affecting INR     Dosing Instructions: Continue your current warfarin dose with next INR in 2 weeks       Summary  As of 6/23/2025      Full warfarin instructions:  7.5 mg every Thu; 5 mg all other days   Next INR check:  7/7/2025               Telephone call with Tej who agrees to plan and repeated back plan correctly  Sent [employer] message with dosing and follow up instructions    Patient to recheck with home meter    Education provided: Goal range and lab monitoring: goal range and significance of current result and Importance of following up at instructed interval  Contact [phone] with any changes, questions or concerns.     Plan made per [facility] anticoagulation protocol and per LVAD protocol    SAVANNA MO RN  6/23/2025  Anticoagulation Clinic  [employer] for routing messages: jaime ANTICOAG LVAD  [facility] patient phone line: [phone]        _______________________________________________________________________     Anticoagulation Episode Summary       Current INR goal:  2.0-3.0   TTR:  65.4% (12 mo)   Target end date:  Indefinite   Send INR reminders to:  [facility] LVAD    Indications    LVAD (left ventricular assist device) present (H) [Z95.811]  Chronic systolic CHF (congestive heart failure) (H) [I50.22]  S/P mitral valve replacement  [Z95.2]  Paroxysmal ventricular tachycardia (H) [I47.20]             Comments:  Follow VAD Anticoag protocol:Yes: HeartMate 3  Bridging: per protocol  Date VAD placed: 03/23/22  INR Goal: per referral  Hx Mitral valve repair and not replacement               Anticoagulation Care Providers       Provider Role Specialty Phone number    Dunia Hdz MD Referring Cardiovascular Disease [phone]

## 2025-06-24 ENCOUNTER — LAB (OUTPATIENT)
Dept: LAB | Facility: CLINIC | Age: 60
End: 2025-06-24
Payer: MEDICARE

## 2025-06-24 DIAGNOSIS — Z94.1 HEART REPLACED BY TRANSPLANT (H): ICD-10-CM

## 2025-06-24 PROCEDURE — 86833 HLA CLASS II HIGH DEFIN QUAL: CPT

## 2025-06-24 PROCEDURE — 86832 HLA CLASS I HIGH DEFIN QUAL: CPT

## 2025-06-26 ENCOUNTER — TELEPHONE (OUTPATIENT)
Dept: TRANSPLANT | Facility: CLINIC | Age: 60
End: 2025-06-26
Payer: MEDICARE

## 2025-06-26 VITALS — BODY MASS INDEX: 35.58 KG/M2 | WEIGHT: 248 LBS

## 2025-06-26 DIAGNOSIS — T82.7XXA INFECTION ASSOCIATED WITH DRIVELINE OF LEFT VENTRICULAR ASSIST DEVICE (LVAD): Primary | ICD-10-CM

## 2025-06-27 ENCOUNTER — RESULTS FOLLOW-UP (OUTPATIENT)
Dept: ANTICOAGULATION | Facility: CLINIC | Age: 60
End: 2025-06-27

## 2025-07-01 ENCOUNTER — RESULTS FOLLOW-UP (OUTPATIENT)
Dept: TRANSPLANT | Facility: CLINIC | Age: 60
End: 2025-07-01

## 2025-07-01 DIAGNOSIS — T82.7XXA INFECTION ASSOCIATED WITH DRIVELINE OF LEFT VENTRICULAR ASSIST DEVICE (LVAD): Primary | ICD-10-CM

## 2025-07-02 ENCOUNTER — RESULTS FOLLOW-UP (OUTPATIENT)
Dept: TRANSPLANT | Facility: CLINIC | Age: 60
End: 2025-07-02

## 2025-07-02 DIAGNOSIS — T82.7XXA INFECTION ASSOCIATED WITH DRIVELINE OF LEFT VENTRICULAR ASSIST DEVICE (LVAD): Primary | ICD-10-CM

## 2025-07-08 ENCOUNTER — CARE COORDINATION (OUTPATIENT)
Dept: TRANSPLANT | Facility: CLINIC | Age: 60
End: 2025-07-08
Payer: MEDICARE

## 2025-07-08 ENCOUNTER — ANTICOAGULATION THERAPY VISIT (OUTPATIENT)
Dept: ANTICOAGULATION | Facility: CLINIC | Age: 60
End: 2025-07-08
Payer: MEDICARE

## 2025-07-08 DIAGNOSIS — Z95.811 LVAD (LEFT VENTRICULAR ASSIST DEVICE) PRESENT (H): ICD-10-CM

## 2025-07-08 DIAGNOSIS — Z95.811 LVAD (LEFT VENTRICULAR ASSIST DEVICE) PRESENT (H): Primary | ICD-10-CM

## 2025-07-08 DIAGNOSIS — Z95.2 S/P MITRAL VALVE REPLACEMENT: ICD-10-CM

## 2025-07-08 DIAGNOSIS — I47.20 PAROXYSMAL VENTRICULAR TACHYCARDIA (H): ICD-10-CM

## 2025-07-08 DIAGNOSIS — I50.22 CHRONIC SYSTOLIC CONGESTIVE HEART FAILURE (H): Primary | ICD-10-CM

## 2025-07-08 DIAGNOSIS — Z12.5 ENCOUNTER FOR SCREENING FOR MALIGNANT NEOPLASM OF PROSTATE: ICD-10-CM

## 2025-07-08 DIAGNOSIS — Z76.82 HEART TRANSPLANT CANDIDATE: ICD-10-CM

## 2025-07-08 DIAGNOSIS — Z72.89 OTHER PROBLEMS RELATED TO LIFESTYLE: ICD-10-CM

## 2025-07-08 DIAGNOSIS — I50.22 CHRONIC SYSTOLIC CHF (CONGESTIVE HEART FAILURE) (H): ICD-10-CM

## 2025-07-08 LAB — INR HOME MONITORING: 2.1 (ref 2–3)

## 2025-07-08 RX ORDER — LIDOCAINE 40 MG/G
CREAM TOPICAL
OUTPATIENT
Start: 2025-07-08

## 2025-07-08 NOTE — PROGRESS NOTES
ANTICOAGULATION MANAGEMENT     Tej Morfin 60 year old male is on warfarin with therapeutic INR result. (Goal INR 2.0-3.0)    Recent labs: (last 7 days)     07/08/25  0000   INR 2.1       ASSESSMENT     Source(s): Chart Review and Patient/Caregiver Call     Warfarin doses taken: Warfarin taken as instructed  Diet: No new diet changes identified  Medication/supplement changes: None noted  New illness, injury, or hospitalization: No  Signs or symptoms of bleeding or clotting: No  Previous result: Therapeutic last 2(+) visits  Additional findings: Tej is thinking about starting a protein shake-writer and patient reviewed impact of protein intake on INR. Tej agreed to check an INR a couple of days after starting/within a week of starting protein shake if he decides to start. Otherwise he will recheck in 2 weeks if he does not start. TBD.       PLAN     Recommended plan for no diet, medication or health factor changes affecting INR     Dosing Instructions: Continue your current warfarin dose with next INR in 2 weeks       Summary  As of 7/8/2025      Full warfarin instructions:  7.5 mg every Thu; 5 mg all other days   Next INR check:  7/22/2025               Telephone call with Tej who agrees to plan and repeated back plan correctly    Patient to recheck with home meter    Education provided: Goal range and lab monitoring: goal range and significance of current result, Importance of therapeutic range, and Importance of following up at instructed interval  Dietary considerations: Impact of protein intake on INR  and importance of notifying [facility] to changes in diet  Contact [phone] with any changes, questions or concerns.     Plan made per [facility] anticoagulation protocol and per LVAD protocol    SAVANNA MO RN  7/8/2025  Anticoagulation Clinic  [employer] for routing messages: jaime NOBLES LVAD  [facility] patient phone line: [phone]        _______________________________________________________________________      Anticoagulation Episode Summary       Current INR goal:  2.0-3.0   TTR:  65.4% (12 mo)   Target end date:  Indefinite   Send INR reminders to:  ANTICOAG LVAD    Indications    LVAD (left ventricular assist device) present (H) [Z95.811]  Chronic systolic CHF (congestive heart failure) (H) [I50.22]  S/P mitral valve replacement [Z95.2]  Paroxysmal ventricular tachycardia (H) [I47.20]             Comments:  Follow VAD Anticoag protocol:Yes: HeartMate 3  Bridging: per protocol  Date VAD placed: 03/23/22  INR Goal: per referral  Hx Mitral valve repair and not replacement               Anticoagulation Care Providers       Provider Role Specialty Phone number    Dunia Hdz MD Referring Cardiovascular Disease [phone]

## 2025-07-10 ENCOUNTER — CARE COORDINATION (OUTPATIENT)
Dept: CARDIOLOGY | Facility: CLINIC | Age: 60
End: 2025-07-10
Payer: MEDICARE

## 2025-07-10 DIAGNOSIS — T82.7XXA INFECTION ASSOCIATED WITH DRIVELINE OF LEFT VENTRICULAR ASSIST DEVICE (LVAD): Primary | ICD-10-CM

## 2025-07-10 NOTE — LETTER
"  Mechanical Circulatory Support Program  [place] [address], [facility] [address] [place] [address]  [phone] Office Phone  [phone] Fax Number  Preparing for your Trip  Visit this website to learn more about traveling with a Heatmate 3 LVAD   [email or web address]  Click on \"Patients and Caregivers\"  Select \"Traveling with your device\"    Who to call with questions on your trip:  Emergencies:  Call 911. If possible, ask to be taken to a VAD trained hospital   Once you have called 911, page the [facility] VAD Coordinator on-call  Routine questions:   Contact [facility] VAD Coordinator   INR and Coumadin:   The [facility] Coumadin Clinic will manage your Coumadin when you travel. Please contact the Coumadin Clinic at least a week in advance to coordinate your lab draws and Coumadin dosing while you travel. Depending on how long you are gone, you may need to find a nearby lab to get INR's drawn.     Equipment:  If you are traveling outside the country, the VAD Coordinator will talk to you about power converters and/or adapters  We will give you a letter to give the TSA  at the airport  Security cannot use the wand or metal detector  You may go through the body scanner or have a pat-down  Keep ALL of your VAD equipment in the cabin with you    Do not forget to pack:   Dressing change supplies  Medications - bring extras in case (ex: cancelled flights)  Your power module or mobile power unit   Battery Charger  Back up bag- back up controller, batteries, clips   Shower bag/supplies for showering    Tip:  Consider TSA Pre-Check for a smooth airport security experience   Contact your airline ahead of time about arranging wheelchair service   Consider getting travel insurance     Closest LVAD Center to your travel destination:     [facility]  [phone]  [address] [place] [address],  [place], [place], [address]    "

## 2025-07-10 NOTE — PROGRESS NOTES
D:  Pt called to report a small slit in the outermost silicone layer of his modular cable, for which he placed electrical tape over.  Pt also reporting controller amato worn, damaged and needing replacement.  Lastly, pt with 4  batteries, which need to be replaced.   Pt also informed writer of plans to travel to [place], [place] [address] for the weekend.  I:  Advised pt we recommend him to come to the [facility] as soon as possible to evaluate and possibly replace modular cable.  Pt states unable to come to the [place] at this time, but is RTC in mid August.  Explained that it is very important water does not get into his cords and lines and pt to page the on call coordinator if he notices further damage or alarms.  Will send pt new holster kit and 4 new batteries to replace damaged and  equipment.  Encouraged pt to bring old batteries with to next appt or recycle locally.  Discussed upcoming travel and things needed to do so.  Letters created in [employer] with details.  A:  Equipment and travel  P:  Pt verbalized understanding of the instructions given.  Will call VAD coordinator with further needs and questions.

## 2025-07-10 NOTE — LETTER
Mechanical Circulatory Support Program  [place] [address], [facility] [address]  [address] [place] [address]  [phone] Office Phone  [phone] Fax Number  7/10/2025    To Whom It May Concern,    Tej Morfin is a patient with the [facility] Advanced Heart Failure program in [place], [place]. Tej Morfin has a HeartMate 3 Left Ventricular Assist Device (LVAD) implanted into his body.      The HeartMate 3 LVAD is a specialized  heart pump  that assists the heart to pump the blood the body needs. The HeartMate 3 system consists of an implanted pump and a driveline. The driveline exits the body through the abdomen and connects the pump to a small external controller and two batteries, which provide power to the pump. The system also includes a battery charger and a power module for providing A/C power. All this equipment is safe to put through an x-ray scanner.      Tej Morfin cannot go through metal detectors. Patients with the HeartMate 3 cannot be subjected to magnetic testing, as the LVAD contains Raissa-magnetic components, and magnetic exposure could cause device failure or patient injury. A manual hand search is necessary for our patient s health and wellbeing as well as to satisfy security regulations. Body scan can be done as an option as well in areas equipped with these devices.    All of the HeartMate 3 System components are required to remain with Tej Morfin at all times, not only for product safety, but also for life sustaining emergency use. This equipment cannot be placed in the baggage area. It must accompany Tej Morfin on board the aircraft    Please do not hesitate to call with any questions or concerns regarding any safety issues that you may have. Our team of RN VAD Coordinators can answer device related questions and can be reached by calling [facility]  at [phone], selecting option #4, and asking the  to page the on-call VAD  Coordinator. Thank you for your consideration regarding our patient.    Sincerely,  Dunia Hdz MD  Heart Failure, Mechanical Circulatory Support and Transplant Cardiology  Professor of Medicine, Division of Cardiology, [facility]

## 2025-07-15 ENCOUNTER — VIRTUAL VISIT (OUTPATIENT)
Dept: CARDIOLOGY | Facility: CLINIC | Age: 60
End: 2025-07-15
Attending: INTERNAL MEDICINE
Payer: MEDICARE

## 2025-07-15 ENCOUNTER — CARE COORDINATION (OUTPATIENT)
Dept: CARDIOLOGY | Facility: CLINIC | Age: 60
End: 2025-07-15
Payer: MEDICARE

## 2025-07-15 DIAGNOSIS — I50.22 CHRONIC SYSTOLIC CONGESTIVE HEART FAILURE (H): ICD-10-CM

## 2025-07-15 DIAGNOSIS — Z95.811 LVAD (LEFT VENTRICULAR ASSIST DEVICE) PRESENT (H): ICD-10-CM

## 2025-07-15 DIAGNOSIS — T82.7XXA INFECTION ASSOCIATED WITH DRIVELINE OF LEFT VENTRICULAR ASSIST DEVICE (LVAD): Primary | ICD-10-CM

## 2025-07-15 DIAGNOSIS — Z76.82 HEART TRANSPLANT CANDIDATE: ICD-10-CM

## 2025-07-15 NOTE — PROGRESS NOTES
Heart Transplant waitlist SW evaluation completed.    Leigh Perez MSW, [facility] Center[facility]  Heart Transplant/Mechanical Circulatory Support   [facility] Acute Care Management  Phone: [phone]  Available on [employer]

## 2025-07-15 NOTE — CONSULTS
Patient Name: Tej Morfin  : 1965  Age: 60 year old  MRN: [id number]  Date of Initial Social Work Evaluation: 2019, again on 3/23/2022 during his LVAD hospitalization, 2024, and again today.     Patient on transplant wait list status 4.  Spoke with patient today via telephone in order to update psychosocial assessment.      Presenting Information   Living Situation: Tej moved recently into a 1-bedroom apartment with his wife after his youngest Dtr, Salma (age 19) passed away suddenly from an unknown heart condition. There are no stairs in this apartment. They continue to live in [place], [place].  If not local, plans for short term stay: Post-transplant, will stay with family in [place], [place]  Functional Status: Independent with ADLs and IADLs. Able to drive. He reports going for walks, maintaining the household chores and able to cook. No functional limitations, however, he does talk about how inconvenient showers are now. He is also going to the gym 5 days/week.  Cultural/Language/Spiritual Considerations: None    Support System  Primary Support Person Wife-Jessenia  Other support:  Patient has 2 other adult children-Son in [facility], and another Dtr in [place], [place]. His wife has 2 adult children and all are supportive and helpful. He also mentions having a good friend in [place], in addition to his Sister.  Plan for support in immediate post-transplant period: They will plan to stay in [place] again with his wife's Sister post-transplant with wife providing 24-hour caregiver support. They did this post-LVAD implant.    Health Care Directive  Decision Maker: Tej is able to make his own decisions currently, but does not yet have a HCD.  Alternate Decision Maker: Wife would be the most appropriate person to make decisions  Health Care Directive: Provided education and emailed Tej a copy of a blank HCD and encouraged him to complete.    Mental Health/Coping:   History of Mental Health:  No history of mental health concerns, but endorses anxiety sometimes, for which he takes medication.   History of Chemical Health: History of THC use prior to LVAD. Reports abstinence since . Tox screens negative. Reports a beer once every 3-6 months. No issues with chemical health.  Current status: N/A  Coping: Good coping skills. Describes self as a positive person. Rates current quality of life as a 8/10. Enjoys working out and glad he has been able to return to going to the gym again. Endorses emotional difficulties after Dtr. , but reports that he and his wife are doing much better. He reports difficulty with remaining in their old townhome and glad they have moved into a different apartment.   Services Needed/Recommended: [facility], links to weekly support groups    Financial   Income: SSDI, along with wife's salary/wages-Jessenia works at [employer]  Impact of transplant on income: Will most likely experience higher medication copays post-transplant, but has Medicare and a supplement and will therefore have 100%coverage for his immunosuppression medications.  Insurance and medication coverage: Medicare and true Medicare supplement through [employer]. We did talk about the benefits of immunosuppression coverage with this plan, but limitation of consecutive inpatient days.  Financial concerns: None  Resources needed: None    Education provided by SW: Social Work role within the transplant program, availability of support groups, parking information, relocation resources, need for 24-hour caregiver support, reason for completion of a health care directive, abstinence from all mind-altering substances and smoking, and managing expectations while on the list and for recovery post-transplant.     Assessment and recommendations and plan:  Patient reports being on the heart transplant waitlist as a status 4. Reports having good quality of life and being independent with his VAD.     He continues to have  adequate caregiver support and adequate insurance and finances to proceed with transplant.      No issues with chemical or mental health and there are no documented or reported concerns with medical non-adherence.     Tej continues to be a good candidate for heart transplant from a psychosocial perspective.

## 2025-07-15 NOTE — PROGRESS NOTES
D:  Pt sent photo of driveline.      I:  Called pt to check in.  Instructed pt to come to clinic for controller change.  A:  Pt verbalized understanding.  P:  Pt to come to clinic on 7/16 for nurse visit.   History  Chief Complaint   Patient presents with    Sore Throat     father states "  it is going around the whole family, stomach hurts, sore throat , headache"     Patient is a 72-year-old female presenting to the emergency department with 2 other siblings reporting the same symptoms  Patient reports a 2-3 day complaint of sore throat, generalized abdominal discomfort however more localized to the lower abdominal quadrants, and 2-3 episodes of diarrhea over the past 2-3 days  Patient denies any urinary symptoms  She reports she had her 1st menses 3 weeks ago  Patient denies any fevers  She denies any nasal congestion, ear pain  No trouble swallowing or speaking  No cough  No chest pain or tightness  Denies any rashes  Denies any other symptoms  Father reports that the patient's mother is home with similar symptoms and was diagnosed with "pharyngitis " Father reports patient receives regular pediatric care and is up-to-date on all of further vaccines  Father and patient both report no other concerns  None       History reviewed  No pertinent past medical history  History reviewed  No pertinent surgical history  History reviewed  No pertinent family history  I have reviewed and agree with the history as documented  Social History   Substance Use Topics    Smoking status: Never Smoker    Smokeless tobacco: Never Used    Alcohol use Not on file        Review of Systems   Constitutional: Negative for chills, fatigue and fever  HENT: Positive for sore throat  Negative for congestion, ear discharge, ear pain, facial swelling, nosebleeds, postnasal drip, rhinorrhea, sinus pain, sinus pressure, tinnitus, trouble swallowing and voice change  Eyes: Negative for pain, discharge, redness and itching  Respiratory: Negative for cough, chest tightness, shortness of breath and wheezing  Cardiovascular: Negative for chest pain and palpitations     Gastrointestinal: Positive for abdominal pain and diarrhea  Negative for nausea and vomiting  Musculoskeletal: Negative for arthralgias, joint swelling, neck pain and neck stiffness  Skin: Negative for rash  Allergic/Immunologic: Negative for immunocompromised state  Neurological: Negative for dizziness, light-headedness and headaches  Hematological: Negative for adenopathy  Physical Exam  Physical Exam   Constitutional: She is oriented to person, place, and time  Vital signs are normal  She appears well-developed and well-nourished  She is cooperative  Non-toxic appearance  She does not have a sickly appearance  She does not appear ill  No distress  HENT:   Right Ear: Tympanic membrane and ear canal normal    Left Ear: Tympanic membrane and ear canal normal    Nose: Nose normal    Mouth/Throat: Uvula is midline and mucous membranes are normal  No trismus in the jaw  No uvula swelling  Posterior oropharyngeal erythema (Very mild ) present  No oropharyngeal exudate, posterior oropharyngeal edema or tonsillar abscesses  Tonsils are 1+ on the right  Tonsils are 1+ on the left  No tonsillar exudate  Eyes: Conjunctivae are normal    Neck: Normal range of motion  Neck supple  Cardiovascular: Normal rate and regular rhythm  Pulmonary/Chest: Effort normal and breath sounds normal  No respiratory distress  Abdominal: Normal appearance and bowel sounds are normal  There is generalized tenderness  There is no rigidity, no rebound, no guarding and no CVA tenderness  Lymphadenopathy:     She has no cervical adenopathy  Neurological: She is alert and oriented to person, place, and time  Skin: Skin is warm and dry  No rash noted  Psychiatric: She has a normal mood and affect  Nursing note and vitals reviewed        Vital Signs  ED Triage Vitals [11/30/18 1837]   Temperature Pulse Respirations Blood Pressure SpO2   (!) 97 1 °F (36 2 °C) 97 16 118/74 100 %      Temp src Heart Rate Source Patient Position - Orthostatic VS BP Location FiO2 (%)   Tympanic Monitor Sitting Left arm --      Pain Score       --           Vitals:    11/30/18 1837   BP: 118/74   Pulse: 97   Patient Position - Orthostatic VS: Sitting       Visual Acuity      ED Medications  Medications - No data to display    Diagnostic Studies  Results Reviewed     Procedure Component Value Units Date/Time    Urine Microscopic [[id number]]  (Abnormal) Collected:  11/30/18 1938    Lab Status:  Final result Specimen:  Urine from Urine, Clean Catch Updated:  11/30/18 2023     RBC, UA 1-2 (A) /hpf      WBC, UA 0-1 (A) /hpf      Epithelial Cells Moderate (A) /hpf      Bacteria, UA Occasional /hpf      MUCUS THREADS Occasional (A)    UA w Reflex to Microscopic [[id number]]  (Abnormal) Collected:  11/30/18 1938    Lab Status:  Final result Specimen:  Urine from Urine, Clean Catch Updated:  11/30/18 2019     Color, UA Stephanie (A)     Clarity, UA Clear     Specific [place], UA 1 020     pH, UA 6 0     Leukocytes, UA Negative     Nitrite, UA Negative     Protein, UA 15 (Trace) (A) mg/dl      Glucose, UA Negative mg/dl      Ketones, UA Negative mg/dl      Bilirubin, UA Negative     Blood, UA 25 0 (A)     UROBILINOGEN UA 4 0 (A) mg/dL     Rapid Strep A Screen Throat with Reflex to Culture, Pediatrics and Compromised Adults [[id number]]  (Normal) Collected:  11/30/18 1935    Lab Status:  Final result Specimen:  Throat from Throat Updated:  11/30/18 1957     Rapid Strep A Screen Negative    Throat culture [[id number]] Collected:  11/30/18 1935    Lab Status:   In process Specimen:  Throat from Throat Updated:  11/30/18 1957    POCT pregnancy, urine [[id number]]  (Normal) Resulted:  11/30/18 1941    Lab Status:  Final result Updated:  11/30/18 1941     EXT PREG TEST UR (Ref: Negative) negative                 No orders to display              Procedures  Procedures       Phone Contacts  ED Phone Contact    ED Course                               MDM  Number of Diagnoses or Management Options  Lower abdominal pain: new and requires workup  Pharyngitis, unspecified etiology: new and requires workup  Diagnosis management comments: Rapid strep was negative  Urinalysis showed no evidence of UTI  Mother at home tested positive for strep and was placed on amoxicillin  Due to this in the patient's symptoms, will provide prescription for amoxicillin as well  Father instructed to have patient followed up with pediatrician within 2-3 days for re-evaluation  Instructed to return to the ED with any worsening symptoms or emergent concerns  Amount and/or Complexity of Data Reviewed  Clinical lab tests: ordered and reviewed    Patient Progress  Patient progress: stable    CritCare Time    Disposition  Final diagnoses:   Pharyngitis, unspecified etiology   Lower abdominal pain     Time reflects when diagnosis was documented in both MDM as applicable and the Disposition within this note     Time User Action Codes Description Comment    11/30/2018  8:39 PM Jaime Wilson Add [J02 9] Pharyngitis, unspecified etiology     11/30/2018  8:39 PM Jaime Wilson Add [R10 30] Lower abdominal pain       ED Disposition     ED Disposition Condition Comment    Discharge  [place] Becky discharge to home/self care  Condition at discharge: Stable        Follow-up Information     Follow up With Specialties Details Why Kaley Camarillo MD Family Medicine In 3 days For recheck/re-evaluation, as discussed   [address] [facility] [address]   [address] [facility] Emergency Medicine Go to As needed, If symptoms worsen [address] [facility] [address] [phone]          Patient's Medications   Discharge Prescriptions    AMOXICILLIN (AMOXIL) 500 MG CAPSULE    Take 1 capsule (500 mg total) by mouth every 12 (twelve) hours for 10 days       Start Date: 11/30/2018End Date: 12/10/2018       Order Dose: 500 mg       Quantity: 20 capsule    Refills: 0 No discharge procedures on file      ED Provider  Electronically Signed by           Britney Garcia  11/30/18 2046

## 2025-07-15 NOTE — LETTER
7/15/2025      RE: Tej Morfin  [address]  [place] [address]       Dear Colleague,    Thank you for the opportunity to participate in the care of your patient, Tej Morfin, at the [facility] [place] [facility]. Please see a copy of my visit note below.    Heart Transplant waitlist SW evaluation completed.    BARTOLO Blake, JUAN PABLO  Heart Transplant/Mechanical Circulatory Support   [facility]  Phone: [phone]  Available on [employer]     Please do not hesitate to contact me if you have any questions/concerns.     Sincerely,     Leigh Perez, JUAN PABLO

## 2025-07-16 ENCOUNTER — ALLIED HEALTH/NURSE VISIT (OUTPATIENT)
Dept: CARDIOLOGY | Facility: CLINIC | Age: 60
End: 2025-07-16
Payer: MEDICARE

## 2025-07-16 DIAGNOSIS — D17.30 LIPOMA OF SKIN AND SUBCUTANEOUS TISSUE: ICD-10-CM

## 2025-07-16 DIAGNOSIS — T82.7XXA INFECTION ASSOCIATED WITH DRIVELINE OF LEFT VENTRICULAR ASSIST DEVICE (LVAD): Primary | ICD-10-CM

## 2025-07-16 NOTE — NURSING NOTE
D:  Pt w/ damaged modular cable.  Modular cable replaced and pt connected to backup controller # [id number].  Previous primary controller is now backup #[id number].  A: Pt tolerated well.      Pt reports he has lipoma on his neck.  Physician in [place] states he will need to have general anesthesia for removal.  Pt will need to have done at [facility] to have LVAD monitored.  Pt requesting surgical referral placed.

## 2025-07-18 ENCOUNTER — ANCILLARY PROCEDURE (OUTPATIENT)
Dept: CARDIOLOGY | Facility: CLINIC | Age: 60
End: 2025-07-18
Attending: INTERNAL MEDICINE
Payer: MEDICARE

## 2025-07-18 DIAGNOSIS — I48.0 PAROXYSMAL ATRIAL FIBRILLATION (H): ICD-10-CM

## 2025-07-18 PROCEDURE — 93296 REM INTERROG EVL PM/IDS: CPT

## 2025-07-18 PROCEDURE — 93295 DEV INTERROG REMOTE 1/2/MLT: CPT | Performed by: INTERNAL MEDICINE

## 2025-07-19 ENCOUNTER — HEALTH MAINTENANCE LETTER (OUTPATIENT)
Age: 60
End: 2025-07-19

## 2025-07-21 ENCOUNTER — PATIENT OUTREACH (OUTPATIENT)
Dept: CARE COORDINATION | Facility: CLINIC | Age: 60
End: 2025-07-21
Payer: MEDICARE

## 2025-07-21 LAB
MDC_IDC_EPISODE_DTM: NORMAL
MDC_IDC_EPISODE_DURATION: 120 S
MDC_IDC_EPISODE_DURATION: 15 S
MDC_IDC_EPISODE_DURATION: 16 S
MDC_IDC_EPISODE_DURATION: 17 S
MDC_IDC_EPISODE_DURATION: 8 S
MDC_IDC_EPISODE_ID: NORMAL
MDC_IDC_EPISODE_TYPE: NORMAL
MDC_IDC_EPISODE_TYPE_INDUCED: NO
MDC_IDC_LEAD_CONNECTION_STATUS: NORMAL
MDC_IDC_LEAD_IMPLANT_DT: NORMAL
MDC_IDC_LEAD_LOCATION: NORMAL
MDC_IDC_LEAD_LOCATION_DETAIL_1: NORMAL
MDC_IDC_LEAD_MFG: NORMAL
MDC_IDC_LEAD_MODEL: NORMAL
MDC_IDC_LEAD_POLARITY_TYPE: NORMAL
MDC_IDC_LEAD_SERIAL: NORMAL
MDC_IDC_MSMT_BATTERY_DTM: NORMAL
MDC_IDC_MSMT_BATTERY_REMAINING_LONGEVITY: 12 MO
MDC_IDC_MSMT_BATTERY_REMAINING_PERCENTAGE: 20 %
MDC_IDC_MSMT_BATTERY_STATUS: NORMAL
MDC_IDC_MSMT_CAP_CHARGE_DTM: NORMAL
MDC_IDC_MSMT_CAP_CHARGE_DTM: NORMAL
MDC_IDC_MSMT_CAP_CHARGE_ENERGY: 31 J
MDC_IDC_MSMT_CAP_CHARGE_TIME: 15 S
MDC_IDC_MSMT_CAP_CHARGE_TIME: 8 S
MDC_IDC_MSMT_CAP_CHARGE_TYPE: NORMAL
MDC_IDC_MSMT_CAP_CHARGE_TYPE: NORMAL
MDC_IDC_MSMT_LEADCHNL_LV_IMPEDANCE_VALUE: 822 OHM
MDC_IDC_MSMT_LEADCHNL_LV_LEAD_CHANNEL_STATUS: NORMAL
MDC_IDC_MSMT_LEADCHNL_LV_PACING_THRESHOLD_AMPLITUDE: 0.7 V
MDC_IDC_MSMT_LEADCHNL_LV_PACING_THRESHOLD_PULSEWIDTH: 0.1 MS
MDC_IDC_MSMT_LEADCHNL_RA_IMPEDANCE_VALUE: 515 OHM
MDC_IDC_MSMT_LEADCHNL_RA_PACING_THRESHOLD_AMPLITUDE: 0.5 V
MDC_IDC_MSMT_LEADCHNL_RA_PACING_THRESHOLD_PULSEWIDTH: 0.5 MS
MDC_IDC_MSMT_LEADCHNL_RV_IMPEDANCE_VALUE: 372 OHM
MDC_IDC_MSMT_LEADCHNL_RV_PACING_THRESHOLD_AMPLITUDE: 1.2 V
MDC_IDC_MSMT_LEADCHNL_RV_PACING_THRESHOLD_PULSEWIDTH: 0.5 MS
MDC_IDC_PG_IMPLANT_DTM: NORMAL
MDC_IDC_PG_MFG: NORMAL
MDC_IDC_PG_MODEL: NORMAL
MDC_IDC_PG_SERIAL: NORMAL
MDC_IDC_PG_TYPE: NORMAL
MDC_IDC_SESS_CLINIC_NAME: NORMAL
MDC_IDC_SESS_DTM: NORMAL
MDC_IDC_SESS_TYPE: NORMAL
MDC_IDC_SET_BRADY_AT_MODE_SWITCH_MODE: NORMAL
MDC_IDC_SET_BRADY_AT_MODE_SWITCH_RATE: 170 {BEATS}/MIN
MDC_IDC_SET_BRADY_LOWRATE: 80 {BEATS}/MIN
MDC_IDC_SET_BRADY_MAX_SENSOR_RATE: 120 {BEATS}/MIN
MDC_IDC_SET_BRADY_MAX_TRACKING_RATE: 130 {BEATS}/MIN
MDC_IDC_SET_BRADY_MODE: NORMAL
MDC_IDC_SET_BRADY_PAV_DELAY_HIGH: 280 MS
MDC_IDC_SET_BRADY_PAV_DELAY_LOW: 300 MS
MDC_IDC_SET_BRADY_SAV_DELAY_HIGH: 280 MS
MDC_IDC_SET_BRADY_SAV_DELAY_LOW: 300 MS
MDC_IDC_SET_CRT_PACED_CHAMBERS: NORMAL
MDC_IDC_SET_LEADCHNL_LV_PACING_AMPLITUDE: 0.1 V
MDC_IDC_SET_LEADCHNL_LV_PACING_PULSEWIDTH: 0.1 MS
MDC_IDC_SET_LEADCHNL_LV_SENSING_ADAPTATION_MODE: NORMAL
MDC_IDC_SET_LEADCHNL_LV_SENSING_ANODE_ELECTRODE_1: NORMAL
MDC_IDC_SET_LEADCHNL_LV_SENSING_ANODE_LOCATION_1: NORMAL
MDC_IDC_SET_LEADCHNL_LV_SENSING_CATHODE_ELECTRODE_1: NORMAL
MDC_IDC_SET_LEADCHNL_LV_SENSING_CATHODE_LOCATION_1: NORMAL
MDC_IDC_SET_LEADCHNL_LV_SENSING_SENSITIVITY: 1 MV
MDC_IDC_SET_LEADCHNL_RA_PACING_AMPLITUDE: 1.5 V
MDC_IDC_SET_LEADCHNL_RA_PACING_POLARITY: NORMAL
MDC_IDC_SET_LEADCHNL_RA_PACING_PULSEWIDTH: 0.5 MS
MDC_IDC_SET_LEADCHNL_RA_SENSING_ADAPTATION_MODE: NORMAL
MDC_IDC_SET_LEADCHNL_RA_SENSING_POLARITY: NORMAL
MDC_IDC_SET_LEADCHNL_RA_SENSING_SENSITIVITY: 0.25 MV
MDC_IDC_SET_LEADCHNL_RV_PACING_AMPLITUDE: 2.8 V
MDC_IDC_SET_LEADCHNL_RV_PACING_POLARITY: NORMAL
MDC_IDC_SET_LEADCHNL_RV_PACING_PULSEWIDTH: 0.5 MS
MDC_IDC_SET_LEADCHNL_RV_SENSING_ADAPTATION_MODE: NORMAL
MDC_IDC_SET_LEADCHNL_RV_SENSING_POLARITY: NORMAL
MDC_IDC_SET_LEADCHNL_RV_SENSING_SENSITIVITY: 0.6 MV
MDC_IDC_SET_ZONE_DETECTION_INTERVAL: 273 MS
MDC_IDC_SET_ZONE_DETECTION_INTERVAL: 375 MS
MDC_IDC_SET_ZONE_STATUS: NORMAL
MDC_IDC_SET_ZONE_STATUS: NORMAL
MDC_IDC_SET_ZONE_TYPE: NORMAL
MDC_IDC_SET_ZONE_TYPE: NORMAL
MDC_IDC_SET_ZONE_VENDOR_TYPE: NORMAL
MDC_IDC_SET_ZONE_VENDOR_TYPE: NORMAL
MDC_IDC_STAT_AT_BURDEN_PERCENT: 1 %
MDC_IDC_STAT_AT_DTM_END: NORMAL
MDC_IDC_STAT_AT_DTM_START: NORMAL
MDC_IDC_STAT_BRADY_DTM_END: NORMAL
MDC_IDC_STAT_BRADY_DTM_START: NORMAL
MDC_IDC_STAT_BRADY_RA_PERCENT_PACED: 93 %
MDC_IDC_STAT_BRADY_RV_PERCENT_PACED: 7 %
MDC_IDC_STAT_CRT_DTM_END: NORMAL
MDC_IDC_STAT_CRT_DTM_START: NORMAL
MDC_IDC_STAT_CRT_LV_PERCENT_PACED: 0 %
MDC_IDC_STAT_EPISODE_RECENT_COUNT: 0
MDC_IDC_STAT_EPISODE_RECENT_COUNT: 28
MDC_IDC_STAT_EPISODE_RECENT_COUNT_DTM_END: NORMAL
MDC_IDC_STAT_EPISODE_RECENT_COUNT_DTM_START: NORMAL
MDC_IDC_STAT_EPISODE_TYPE: NORMAL
MDC_IDC_STAT_EPISODE_VENDOR_TYPE: NORMAL
MDC_IDC_STAT_TACHYTHERAPY_ATP_DELIVERED_RECENT: 0
MDC_IDC_STAT_TACHYTHERAPY_ATP_DELIVERED_TOTAL: 5
MDC_IDC_STAT_TACHYTHERAPY_RECENT_DTM_END: NORMAL
MDC_IDC_STAT_TACHYTHERAPY_RECENT_DTM_START: NORMAL
MDC_IDC_STAT_TACHYTHERAPY_SHOCKS_ABORTED_RECENT: 0
MDC_IDC_STAT_TACHYTHERAPY_SHOCKS_ABORTED_TOTAL: 1
MDC_IDC_STAT_TACHYTHERAPY_SHOCKS_DELIVERED_RECENT: 0
MDC_IDC_STAT_TACHYTHERAPY_SHOCKS_DELIVERED_TOTAL: 5
MDC_IDC_STAT_TACHYTHERAPY_TOTAL_DTM_END: NORMAL
MDC_IDC_STAT_TACHYTHERAPY_TOTAL_DTM_START: NORMAL

## 2025-07-24 ENCOUNTER — ANTICOAGULATION THERAPY VISIT (OUTPATIENT)
Dept: ANTICOAGULATION | Facility: CLINIC | Age: 60
End: 2025-07-24
Payer: MEDICARE

## 2025-07-24 DIAGNOSIS — I47.20 PAROXYSMAL VENTRICULAR TACHYCARDIA (H): ICD-10-CM

## 2025-07-24 DIAGNOSIS — Z95.811 LVAD (LEFT VENTRICULAR ASSIST DEVICE) PRESENT (H): Primary | ICD-10-CM

## 2025-07-24 DIAGNOSIS — Z95.2 S/P MITRAL VALVE REPLACEMENT: ICD-10-CM

## 2025-07-24 DIAGNOSIS — I50.22 CHRONIC SYSTOLIC CHF (CONGESTIVE HEART FAILURE) (H): ICD-10-CM

## 2025-07-24 LAB — INR HOME MONITORING: 2.1 (ref 2–3)

## 2025-07-24 NOTE — PROGRESS NOTES
ANTICOAGULATION MANAGEMENT     Tej Morfin 60 year old male is on warfarin with therapeutic INR result. (Goal INR 2.0-3.0)    Recent labs: (last 7 days)     07/24/25  0000   INR 2.1       ASSESSMENT     Source(s): Chart Review  Previous INR was Therapeutic last 2(+) visits  Medication, diet, health changes since last INR: chart reviewed; none identified         PLAN     Recommended plan for no diet, medication or health factor changes affecting INR     Dosing Instructions: Continue your current warfarin dose with next INR in 2 weeks       Summary  As of 7/24/2025      Full warfarin instructions:  7.5 mg every Thu; 5 mg all other days   Next INR check:  8/7/2025               Detailed voice message left for Tej with dosing instructions and follow up date.   Sent [employer] message with dosing and follow up instructions    Patient to recheck with home meter    Education provided: Please call back if any changes to your diet, medications or how you've been taking warfarin  Contact [phone] with any changes, questions or concerns.     Plan made per [facility] anticoagulation protocol and per LVAD protocol    Rosario Dyer RN  7/24/2025  Anticoagulation Clinic  [facility] for routing messages: p ANTICOAG LVAD  [facility] patient phone line: [phone]        _______________________________________________________________________     Anticoagulation Episode Summary       Current INR goal:  2.0-3.0   TTR:  65.4% (12 mo)   Target end date:  Indefinite   Send INR reminders to:  ANTICOAG LVAD    Indications    LVAD (left ventricular assist device) present (H) [Z95.811]  Chronic systolic CHF (congestive heart failure) (H) [I50.22]  S/P mitral valve replacement [Z95.2]  Paroxysmal ventricular tachycardia (H) [I47.20]             Comments:  Follow VAD Anticoag protocol:Yes: HeartMate 3  Bridging: per protocol  Date VAD placed: 03/23/22  INR Goal: per referral  Hx Mitral valve repair and not replacement                Anticoagulation Care Providers       Provider Role Specialty Phone number    Dunia Hdz MD Referring Cardiovascular Disease [phone]

## 2025-07-31 ENCOUNTER — TELEPHONE (OUTPATIENT)
Dept: TRANSPLANT | Facility: CLINIC | Age: 60
End: 2025-07-31
Payer: MEDICARE

## 2025-07-31 VITALS — BODY MASS INDEX: 35.3 KG/M2 | WEIGHT: 246 LBS

## 2025-07-31 DIAGNOSIS — T82.7XXA INFECTION ASSOCIATED WITH DRIVELINE OF LEFT VENTRICULAR ASSIST DEVICE (LVAD): Primary | ICD-10-CM

## 2025-08-06 DIAGNOSIS — Z95.811 LVAD (LEFT VENTRICULAR ASSIST DEVICE) PRESENT (H): ICD-10-CM

## 2025-08-06 DIAGNOSIS — I50.22 CHRONIC SYSTOLIC CHF (CONGESTIVE HEART FAILURE) (H): ICD-10-CM

## 2025-08-07 ENCOUNTER — CARE COORDINATION (OUTPATIENT)
Dept: TRANSPLANT | Facility: CLINIC | Age: 60
End: 2025-08-07
Payer: MEDICARE

## 2025-08-07 DIAGNOSIS — T82.7XXA INFECTION ASSOCIATED WITH DRIVELINE OF LEFT VENTRICULAR ASSIST DEVICE (LVAD): Primary | ICD-10-CM

## 2025-08-07 RX ORDER — WARFARIN SODIUM 2.5 MG/1
5-7.5 TABLET ORAL EVERY EVENING
Qty: 200 TABLET | Refills: 1 | Status: SHIPPED | OUTPATIENT
Start: 2025-08-07

## 2025-08-12 ENCOUNTER — CARE COORDINATION (OUTPATIENT)
Dept: TRANSPLANT | Facility: CLINIC | Age: 60
End: 2025-08-12
Payer: MEDICARE

## 2025-08-12 DIAGNOSIS — Z12.5 ENCOUNTER FOR SCREENING FOR MALIGNANT NEOPLASM OF PROSTATE: ICD-10-CM

## 2025-08-12 DIAGNOSIS — I50.22 CHRONIC SYSTOLIC CHF (CONGESTIVE HEART FAILURE) (H): ICD-10-CM

## 2025-08-12 DIAGNOSIS — Z72.89 OTHER PROBLEMS RELATED TO LIFESTYLE: ICD-10-CM

## 2025-08-12 DIAGNOSIS — T82.7XXA INFECTION ASSOCIATED WITH DRIVELINE OF LEFT VENTRICULAR ASSIST DEVICE (LVAD): Primary | ICD-10-CM

## 2025-08-12 DIAGNOSIS — Z76.82 HEART TRANSPLANT CANDIDATE: ICD-10-CM

## 2025-08-14 ENCOUNTER — LAB (OUTPATIENT)
Dept: LAB | Facility: CLINIC | Age: 60
End: 2025-08-14
Attending: INTERNAL MEDICINE
Payer: MEDICARE

## 2025-08-14 ENCOUNTER — HOSPITAL ENCOUNTER (OUTPATIENT)
Facility: CLINIC | Age: 60
Discharge: HOME | End: 2025-08-14
Attending: INTERNAL MEDICINE | Admitting: INTERNAL MEDICINE
Payer: MEDICARE

## 2025-08-14 ENCOUNTER — OFFICE VISIT (OUTPATIENT)
Dept: CARDIOLOGY | Facility: CLINIC | Age: 60
End: 2025-08-14
Attending: INTERNAL MEDICINE
Payer: MEDICARE

## 2025-08-14 ENCOUNTER — APPOINTMENT (OUTPATIENT)
Dept: MEDSURG UNIT | Facility: CLINIC | Age: 60
End: 2025-08-14
Attending: INTERNAL MEDICINE
Payer: MEDICARE

## 2025-08-14 ENCOUNTER — ANTICOAGULATION THERAPY VISIT (OUTPATIENT)
Dept: ANTICOAGULATION | Facility: CLINIC | Age: 60
End: 2025-08-14

## 2025-08-14 VITALS — OXYGEN SATURATION: 99 % | SYSTOLIC BLOOD PRESSURE: 76 MMHG | BODY MASS INDEX: 36.52 KG/M2 | WEIGHT: 254.5 LBS

## 2025-08-14 DIAGNOSIS — I50.22 CHRONIC SYSTOLIC CHF (CONGESTIVE HEART FAILURE) (H): ICD-10-CM

## 2025-08-14 DIAGNOSIS — Z79.01 ANTICOAGULATED ON COUMADIN: ICD-10-CM

## 2025-08-14 DIAGNOSIS — Z95.811 LVAD (LEFT VENTRICULAR ASSIST DEVICE) PRESENT (H): Primary | ICD-10-CM

## 2025-08-14 DIAGNOSIS — Z95.811 LVAD (LEFT VENTRICULAR ASSIST DEVICE) PRESENT (H): ICD-10-CM

## 2025-08-14 DIAGNOSIS — Z12.5 ENCOUNTER FOR SCREENING FOR MALIGNANT NEOPLASM OF PROSTATE: ICD-10-CM

## 2025-08-14 DIAGNOSIS — I50.22 CHRONIC SYSTOLIC HEART FAILURE (H): ICD-10-CM

## 2025-08-14 DIAGNOSIS — Z76.82 HEART TRANSPLANT CANDIDATE: ICD-10-CM

## 2025-08-14 DIAGNOSIS — T82.7XXA INFECTION ASSOCIATED WITH DRIVELINE OF LEFT VENTRICULAR ASSIST DEVICE (LVAD): ICD-10-CM

## 2025-08-14 DIAGNOSIS — I50.22 CHRONIC SYSTOLIC CONGESTIVE HEART FAILURE (H): ICD-10-CM

## 2025-08-14 DIAGNOSIS — I50.22 CHRONIC SYSTOLIC (CONGESTIVE) HEART FAILURE (H): ICD-10-CM

## 2025-08-14 DIAGNOSIS — Z72.89 OTHER PROBLEMS RELATED TO LIFESTYLE: ICD-10-CM

## 2025-08-14 DIAGNOSIS — I47.20 PAROXYSMAL VENTRICULAR TACHYCARDIA (H): ICD-10-CM

## 2025-08-14 DIAGNOSIS — I50.22 CHRONIC SYSTOLIC CONGESTIVE HEART FAILURE (H): Primary | ICD-10-CM

## 2025-08-14 DIAGNOSIS — Z51.81 THERAPEUTIC DRUG MONITORING: ICD-10-CM

## 2025-08-14 DIAGNOSIS — Z95.2 S/P MITRAL VALVE REPLACEMENT: ICD-10-CM

## 2025-08-14 LAB
ALBUMIN SERPL BCG-MCNC: 3.8 G/DL (ref 3.5–5.2)
ALBUMIN UR-MCNC: 30 MG/DL
ALP SERPL-CCNC: 104 U/L (ref 40–150)
ALT SERPL W P-5'-P-CCNC: 15 U/L (ref 0–70)
ANION GAP SERPL CALCULATED.3IONS-SCNC: 13 MMOL/L (ref 7–15)
APPEARANCE UR: CLEAR
AST SERPL W P-5'-P-CCNC: 15 U/L (ref 0–45)
BILIRUB SERPL-MCNC: 0.2 MG/DL
BILIRUB UR QL STRIP: NEGATIVE
BUN SERPL-MCNC: 20.7 MG/DL (ref 6–20)
CALCIUM SERPL-MCNC: 9.5 MG/DL (ref 8.8–10.4)
CHLORIDE SERPL-SCNC: 106 MMOL/L (ref 98–107)
COLOR UR AUTO: ABNORMAL
CREAT SERPL-MCNC: 1.37 MG/DL (ref 0.67–1.17)
EGFRCR SERPLBLD CKD-EPI 2021: 60 ML/MIN/1.73M2
ERYTHROCYTE [DISTWIDTH] IN BLOOD BY AUTOMATED COUNT: 19 % (ref 10–15)
FERRITIN SERPL-MCNC: 357 NG/ML (ref 31–409)
GLUCOSE SERPL-MCNC: 79 MG/DL (ref 70–99)
GLUCOSE UR STRIP-MCNC: 30 MG/DL
HCO3 SERPL-SCNC: 25 MMOL/L (ref 22–29)
HCT VFR BLD AUTO: 44 % (ref 40–53)
HCV AB SERPL QL IA: NONREACTIVE
HGB BLD-MCNC: 14 G/DL (ref 13.3–17.7)
HGB UR QL STRIP: NEGATIVE
HIV 1+2 AB+HIV1 P24 AG SERPL QL IA: NONREACTIVE
HYALINE CASTS: 1 /LPF
INR PPP: 1.88 (ref 0.85–1.15)
IRON BINDING CAPACITY (ROCHE): 251 UG/DL (ref 240–430)
IRON SATN MFR SERPL: 20 % (ref 15–46)
IRON SERPL-MCNC: 51 UG/DL (ref 61–157)
KETONES UR STRIP-MCNC: NEGATIVE MG/DL
LAB ORDER RESULT STATUS: NORMAL
LDH SERPL L TO P-CCNC: 193 U/L (ref 0–250)
LEUKOCYTE ESTERASE UR QL STRIP: NEGATIVE
MCH RBC QN AUTO: 30.1 PG (ref 26.5–33)
MCHC RBC AUTO-ENTMCNC: 31.8 G/DL (ref 31.5–36.5)
MCV RBC AUTO: 94.6 FL (ref 78–100)
MUCOUS THREADS #/AREA URNS LPF: PRESENT /LPF
NITRATE UR QL: NEGATIVE
NT-PROBNP SERPL-MCNC: 571 PG/ML (ref 0–177)
PERFORMING LABORATORY: NORMAL
PH UR STRIP: 6.5 [PH] (ref 5–7)
PLATELET # BLD AUTO: 153 10E3/UL (ref 150–450)
POTASSIUM SERPL-SCNC: 4.4 MMOL/L (ref 3.4–5.3)
PREALB SERPL-MCNC: 25.5 MG/DL (ref 20–40)
PROT SERPL-MCNC: 6.9 G/DL (ref 6.4–8.3)
PROTHROMBIN TIME: 21.3 SECONDS (ref 11.8–14.8)
PSA SERPL DL<=0.01 NG/ML-MCNC: 2.37 NG/ML (ref 0–3.5)
RBC # BLD AUTO: 4.65 10E6/UL (ref 4.4–5.9)
RBC URINE: 1 /HPF
SODIUM SERPL-SCNC: 144 MMOL/L (ref 135–145)
SP GR UR STRIP: 1.03 (ref 1–1.03)
SQUAMOUS EPITHELIAL: <1 /HPF
T PALLIDUM AB SER QL: NONREACTIVE
TEST NAME: NORMAL
TRANSFERRIN SERPL-MCNC: 218 MG/DL (ref 200–360)
TSH SERPL DL<=0.005 MIU/L-ACNC: 1.83 UIU/ML (ref 0.3–4.2)
UROBILINOGEN UR STRIP-MCNC: 2 MG/DL
WBC # BLD AUTO: 5.4 10E3/UL (ref 4–11)
WBC URINE: <1 /HPF

## 2025-08-14 PROCEDURE — 999N000142 HC STATISTIC PROCEDURE PREP ONLY

## 2025-08-14 PROCEDURE — G0463 HOSPITAL OUTPT CLINIC VISIT: HCPCS | Performed by: INTERNAL MEDICINE

## 2025-08-14 PROCEDURE — 84134 ASSAY OF PREALBUMIN: CPT | Performed by: INTERNAL MEDICINE

## 2025-08-14 PROCEDURE — 999N000132 HC STATISTIC PP CARE STAGE 1

## 2025-08-14 PROCEDURE — 93750 INTERROGATION VAD IN PERSON: CPT | Performed by: INTERNAL MEDICINE

## 2025-08-14 PROCEDURE — 84466 ASSAY OF TRANSFERRIN: CPT | Performed by: INTERNAL MEDICINE

## 2025-08-14 PROCEDURE — 85610 PROTHROMBIN TIME: CPT | Performed by: INTERNAL MEDICINE

## 2025-08-14 PROCEDURE — 86780 TREPONEMA PALLIDUM: CPT | Performed by: INTERNAL MEDICINE

## 2025-08-14 PROCEDURE — 86803 HEPATITIS C AB TEST: CPT | Performed by: INTERNAL MEDICINE

## 2025-08-14 RX ORDER — PREDNISONE 20 MG/1
20 TABLET ORAL DAILY
Qty: 5 TABLET | Refills: 0 | Status: SHIPPED | OUTPATIENT
Start: 2025-08-14

## 2025-08-14 RX ORDER — POTASSIUM CHLORIDE 1500 MG/1
40 TABLET, EXTENDED RELEASE ORAL 2 TIMES DAILY
Qty: 360 TABLET | Refills: 3 | Status: SHIPPED | OUTPATIENT
Start: 2025-08-14

## 2025-08-14 RX ORDER — POTASSIUM CHLORIDE 1500 MG/1
40 TABLET, EXTENDED RELEASE ORAL 2 TIMES DAILY
Qty: 360 TABLET | Refills: 3 | Status: CANCELLED | OUTPATIENT
Start: 2025-08-14

## 2025-08-14 RX ORDER — POTASSIUM CHLORIDE 1500 MG/1
40 TABLET, EXTENDED RELEASE ORAL 2 TIMES DAILY
Qty: 360 TABLET | Refills: 3 | Status: SHIPPED | OUTPATIENT
Start: 2025-08-14 | End: 2025-08-14

## 2025-08-14 ASSESSMENT — ACTIVITIES OF DAILY LIVING (ADL)
ADLS_ACUITY_SCORE: 52

## 2025-08-14 ASSESSMENT — PAIN SCALES - GENERAL: PAINLEVEL_OUTOF10: NO PAIN (0)

## 2025-08-16 LAB
LABORATORY REPORT: NORMAL
PETH INTERPRETATION: NORMAL
PLPETH BLD-MCNC: <10 NG/ML
POPETH BLD-MCNC: <10 NG/ML
STFR SERPL-MCNC: 5.4 MG/L

## 2025-08-18 ENCOUNTER — ANTICOAGULATION THERAPY VISIT (OUTPATIENT)
Dept: ANTICOAGULATION | Facility: CLINIC | Age: 60
End: 2025-08-18
Payer: MEDICARE

## 2025-08-18 DIAGNOSIS — I47.20 PAROXYSMAL VENTRICULAR TACHYCARDIA (H): ICD-10-CM

## 2025-08-18 DIAGNOSIS — Z95.811 LVAD (LEFT VENTRICULAR ASSIST DEVICE) PRESENT (H): Primary | ICD-10-CM

## 2025-08-18 DIAGNOSIS — I50.22 CHRONIC SYSTOLIC CHF (CONGESTIVE HEART FAILURE) (H): ICD-10-CM

## 2025-08-18 DIAGNOSIS — Z95.2 S/P MITRAL VALVE REPLACEMENT: ICD-10-CM

## 2025-08-18 LAB — INR HOME MONITORING: 2.3 (ref 2–3)

## 2025-08-19 LAB — LABCORP INTERFACED MISCELLANEOUS TEST RESULT: NORMAL

## 2025-08-20 LAB
ANABASINE UR-MCNC: <5 NG/ML
COTININE UR-MCNC: NORMAL NG/ML
NICOTINE UR-MCNC: <15 NG/ML
TRANS-3-OH-COTININE UR-MCNC: <50 NG/ML

## 2025-08-21 DIAGNOSIS — T82.7XXA INFECTION ASSOCIATED WITH DRIVELINE OF LEFT VENTRICULAR ASSIST DEVICE (LVAD): ICD-10-CM

## 2025-08-25 ENCOUNTER — ANCILLARY PROCEDURE (OUTPATIENT)
Dept: CARDIOLOGY | Facility: CLINIC | Age: 60
End: 2025-08-25
Attending: INTERNAL MEDICINE
Payer: MEDICARE

## 2025-08-25 DIAGNOSIS — I48.0 PAROXYSMAL ATRIAL FIBRILLATION (H): ICD-10-CM

## 2025-08-25 PROCEDURE — 93295 DEV INTERROG REMOTE 1/2/MLT: CPT | Performed by: INTERNAL MEDICINE

## 2025-08-27 ENCOUNTER — ANCILLARY PROCEDURE (OUTPATIENT)
Dept: CARDIOLOGY | Facility: CLINIC | Age: 60
End: 2025-08-27
Attending: INTERNAL MEDICINE
Payer: MEDICARE

## 2025-08-27 DIAGNOSIS — I48.0 PAROXYSMAL ATRIAL FIBRILLATION (H): ICD-10-CM

## 2025-08-27 LAB
MDC_IDC_EPISODE_DTM: NORMAL
MDC_IDC_EPISODE_ID: NORMAL
MDC_IDC_EPISODE_TYPE: NORMAL
MDC_IDC_LEAD_CONNECTION_STATUS: NORMAL
MDC_IDC_LEAD_IMPLANT_DT: NORMAL
MDC_IDC_LEAD_LOCATION: NORMAL
MDC_IDC_LEAD_LOCATION_DETAIL_1: NORMAL
MDC_IDC_LEAD_MFG: NORMAL
MDC_IDC_LEAD_MODEL: NORMAL
MDC_IDC_LEAD_POLARITY_TYPE: NORMAL
MDC_IDC_LEAD_SERIAL: NORMAL
MDC_IDC_MSMT_BATTERY_DTM: NORMAL
MDC_IDC_MSMT_BATTERY_REMAINING_LONGEVITY: 12 MO
MDC_IDC_MSMT_BATTERY_REMAINING_PERCENTAGE: 17 %
MDC_IDC_MSMT_BATTERY_STATUS: NORMAL
MDC_IDC_MSMT_CAP_CHARGE_DTM: NORMAL
MDC_IDC_MSMT_CAP_CHARGE_DTM: NORMAL
MDC_IDC_MSMT_CAP_CHARGE_ENERGY: 31 J
MDC_IDC_MSMT_CAP_CHARGE_TIME: 15 S
MDC_IDC_MSMT_CAP_CHARGE_TIME: 8 S
MDC_IDC_MSMT_CAP_CHARGE_TYPE: NORMAL
MDC_IDC_MSMT_CAP_CHARGE_TYPE: NORMAL
MDC_IDC_MSMT_LEADCHNL_LV_IMPEDANCE_VALUE: 913 OHM
MDC_IDC_MSMT_LEADCHNL_LV_LEAD_CHANNEL_STATUS: NORMAL
MDC_IDC_MSMT_LEADCHNL_RA_IMPEDANCE_VALUE: 519 OHM
MDC_IDC_MSMT_LEADCHNL_RV_IMPEDANCE_VALUE: 364 OHM
MDC_IDC_MSMT_LEADCHNL_RV_PACING_THRESHOLD_AMPLITUDE: 1.2 V
MDC_IDC_MSMT_LEADCHNL_RV_PACING_THRESHOLD_PULSEWIDTH: 0.5 MS
MDC_IDC_PG_IMPLANT_DTM: NORMAL
MDC_IDC_PG_MFG: NORMAL
MDC_IDC_PG_MODEL: NORMAL
MDC_IDC_PG_SERIAL: NORMAL
MDC_IDC_PG_TYPE: NORMAL
MDC_IDC_SESS_CLINIC_NAME: NORMAL
MDC_IDC_SESS_DTM: NORMAL
MDC_IDC_SESS_TYPE: NORMAL
MDC_IDC_SET_BRADY_AT_MODE_SWITCH_MODE: NORMAL
MDC_IDC_SET_BRADY_AT_MODE_SWITCH_RATE: 170 {BEATS}/MIN
MDC_IDC_SET_BRADY_LOWRATE: 80 {BEATS}/MIN
MDC_IDC_SET_BRADY_MAX_SENSOR_RATE: 120 {BEATS}/MIN
MDC_IDC_SET_BRADY_MAX_TRACKING_RATE: 130 {BEATS}/MIN
MDC_IDC_SET_BRADY_MODE: NORMAL
MDC_IDC_SET_BRADY_PAV_DELAY_HIGH: 280 MS
MDC_IDC_SET_BRADY_PAV_DELAY_LOW: 300 MS
MDC_IDC_SET_BRADY_SAV_DELAY_HIGH: 280 MS
MDC_IDC_SET_BRADY_SAV_DELAY_LOW: 300 MS
MDC_IDC_SET_CRT_PACED_CHAMBERS: NORMAL
MDC_IDC_SET_LEADCHNL_LV_PACING_AMPLITUDE: 0.1 V
MDC_IDC_SET_LEADCHNL_LV_PACING_PULSEWIDTH: 0.1 MS
MDC_IDC_SET_LEADCHNL_LV_SENSING_ADAPTATION_MODE: NORMAL
MDC_IDC_SET_LEADCHNL_LV_SENSING_ANODE_ELECTRODE_1: NORMAL
MDC_IDC_SET_LEADCHNL_LV_SENSING_ANODE_LOCATION_1: NORMAL
MDC_IDC_SET_LEADCHNL_LV_SENSING_CATHODE_ELECTRODE_1: NORMAL
MDC_IDC_SET_LEADCHNL_LV_SENSING_CATHODE_LOCATION_1: NORMAL
MDC_IDC_SET_LEADCHNL_LV_SENSING_SENSITIVITY: 1 MV
MDC_IDC_SET_LEADCHNL_RA_PACING_AMPLITUDE: 1.5 V
MDC_IDC_SET_LEADCHNL_RA_PACING_POLARITY: NORMAL
MDC_IDC_SET_LEADCHNL_RA_PACING_PULSEWIDTH: 0.5 MS
MDC_IDC_SET_LEADCHNL_RA_SENSING_ADAPTATION_MODE: NORMAL
MDC_IDC_SET_LEADCHNL_RA_SENSING_POLARITY: NORMAL
MDC_IDC_SET_LEADCHNL_RA_SENSING_SENSITIVITY: 0.25 MV
MDC_IDC_SET_LEADCHNL_RV_PACING_AMPLITUDE: 2.8 V
MDC_IDC_SET_LEADCHNL_RV_PACING_POLARITY: NORMAL
MDC_IDC_SET_LEADCHNL_RV_PACING_PULSEWIDTH: 0.5 MS
MDC_IDC_SET_LEADCHNL_RV_SENSING_ADAPTATION_MODE: NORMAL
MDC_IDC_SET_LEADCHNL_RV_SENSING_POLARITY: NORMAL
MDC_IDC_SET_LEADCHNL_RV_SENSING_SENSITIVITY: 0.6 MV
MDC_IDC_SET_ZONE_DETECTION_INTERVAL: 273 MS
MDC_IDC_SET_ZONE_DETECTION_INTERVAL: 375 MS
MDC_IDC_SET_ZONE_STATUS: NORMAL
MDC_IDC_SET_ZONE_STATUS: NORMAL
MDC_IDC_SET_ZONE_TYPE: NORMAL
MDC_IDC_SET_ZONE_TYPE: NORMAL
MDC_IDC_SET_ZONE_VENDOR_TYPE: NORMAL
MDC_IDC_SET_ZONE_VENDOR_TYPE: NORMAL
MDC_IDC_STAT_AT_BURDEN_PERCENT: 2 %
MDC_IDC_STAT_AT_DTM_END: NORMAL
MDC_IDC_STAT_AT_DTM_START: NORMAL
MDC_IDC_STAT_BRADY_DTM_END: NORMAL
MDC_IDC_STAT_BRADY_DTM_START: NORMAL
MDC_IDC_STAT_BRADY_RA_PERCENT_PACED: 84 %
MDC_IDC_STAT_BRADY_RV_PERCENT_PACED: 9 %
MDC_IDC_STAT_CRT_DTM_END: NORMAL
MDC_IDC_STAT_CRT_DTM_START: NORMAL
MDC_IDC_STAT_CRT_LV_PERCENT_PACED: 0 %
MDC_IDC_STAT_EPISODE_RECENT_COUNT: 0
MDC_IDC_STAT_EPISODE_RECENT_COUNT: 35
MDC_IDC_STAT_EPISODE_RECENT_COUNT_DTM_END: NORMAL
MDC_IDC_STAT_EPISODE_RECENT_COUNT_DTM_START: NORMAL
MDC_IDC_STAT_EPISODE_TYPE: NORMAL
MDC_IDC_STAT_EPISODE_VENDOR_TYPE: NORMAL
MDC_IDC_STAT_TACHYTHERAPY_ATP_DELIVERED_RECENT: 0
MDC_IDC_STAT_TACHYTHERAPY_ATP_DELIVERED_TOTAL: 5
MDC_IDC_STAT_TACHYTHERAPY_RECENT_DTM_END: NORMAL
MDC_IDC_STAT_TACHYTHERAPY_RECENT_DTM_START: NORMAL
MDC_IDC_STAT_TACHYTHERAPY_SHOCKS_ABORTED_RECENT: 0
MDC_IDC_STAT_TACHYTHERAPY_SHOCKS_ABORTED_TOTAL: 1
MDC_IDC_STAT_TACHYTHERAPY_SHOCKS_DELIVERED_RECENT: 0
MDC_IDC_STAT_TACHYTHERAPY_SHOCKS_DELIVERED_TOTAL: 5
MDC_IDC_STAT_TACHYTHERAPY_TOTAL_DTM_END: NORMAL
MDC_IDC_STAT_TACHYTHERAPY_TOTAL_DTM_START: NORMAL

## 2025-08-28 ENCOUNTER — TELEPHONE (OUTPATIENT)
Dept: CARDIOLOGY | Facility: CLINIC | Age: 60
End: 2025-08-28
Payer: MEDICARE

## 2025-08-28 ENCOUNTER — CARE COORDINATION (OUTPATIENT)
Dept: CARDIOLOGY | Facility: CLINIC | Age: 60
End: 2025-08-28
Payer: MEDICARE

## 2025-08-28 DIAGNOSIS — T82.7XXA INFECTION ASSOCIATED WITH DRIVELINE OF LEFT VENTRICULAR ASSIST DEVICE (LVAD): ICD-10-CM

## 2025-08-28 DIAGNOSIS — I50.22 CHRONIC SYSTOLIC (CONGESTIVE) HEART FAILURE (H): ICD-10-CM

## 2025-08-28 RX ORDER — POTASSIUM CHLORIDE 1500 MG/1
60 TABLET, EXTENDED RELEASE ORAL 2 TIMES DAILY
Qty: 360 TABLET | Refills: 3 | Status: SHIPPED | OUTPATIENT
Start: 2025-08-28

## 2025-08-28 RX ORDER — FUROSEMIDE 20 MG/1
TABLET ORAL
Status: SHIPPED
Start: 2025-08-28

## 2025-08-29 ENCOUNTER — ANCILLARY PROCEDURE (OUTPATIENT)
Dept: CARDIOLOGY | Facility: CLINIC | Age: 60
End: 2025-08-29
Attending: INTERNAL MEDICINE
Payer: MEDICARE

## 2025-08-29 ENCOUNTER — TELEPHONE (OUTPATIENT)
Dept: ANTICOAGULATION | Facility: CLINIC | Age: 60
End: 2025-08-29
Payer: MEDICARE

## 2025-08-29 DIAGNOSIS — I48.0 PAROXYSMAL ATRIAL FIBRILLATION (H): ICD-10-CM

## 2025-08-29 LAB
MDC_IDC_EPISODE_DTM: NORMAL
MDC_IDC_EPISODE_DURATION: 13 S
MDC_IDC_EPISODE_DURATION: 15 S
MDC_IDC_EPISODE_DURATION: 16 S
MDC_IDC_EPISODE_DURATION: 16 S
MDC_IDC_EPISODE_DURATION: 18 S
MDC_IDC_EPISODE_DURATION: 336 S
MDC_IDC_EPISODE_ID: NORMAL
MDC_IDC_EPISODE_TYPE: NORMAL
MDC_IDC_EPISODE_TYPE_INDUCED: NO
MDC_IDC_LEAD_CONNECTION_STATUS: NORMAL
MDC_IDC_LEAD_IMPLANT_DT: NORMAL
MDC_IDC_LEAD_LOCATION: NORMAL
MDC_IDC_LEAD_LOCATION_DETAIL_1: NORMAL
MDC_IDC_LEAD_MFG: NORMAL
MDC_IDC_LEAD_MODEL: NORMAL
MDC_IDC_LEAD_POLARITY_TYPE: NORMAL
MDC_IDC_LEAD_SERIAL: NORMAL
MDC_IDC_MSMT_BATTERY_DTM: NORMAL
MDC_IDC_MSMT_BATTERY_DTM: NORMAL
MDC_IDC_MSMT_BATTERY_REMAINING_LONGEVITY: 10 MO
MDC_IDC_MSMT_BATTERY_REMAINING_LONGEVITY: 12 MO
MDC_IDC_MSMT_BATTERY_REMAINING_PERCENTAGE: 14 %
MDC_IDC_MSMT_BATTERY_REMAINING_PERCENTAGE: 18 %
MDC_IDC_MSMT_BATTERY_STATUS: NORMAL
MDC_IDC_MSMT_BATTERY_STATUS: NORMAL
MDC_IDC_MSMT_CAP_CHARGE_DTM: NORMAL
MDC_IDC_MSMT_CAP_CHARGE_ENERGY: 31 J
MDC_IDC_MSMT_CAP_CHARGE_ENERGY: 31 J
MDC_IDC_MSMT_CAP_CHARGE_TIME: 15 S
MDC_IDC_MSMT_CAP_CHARGE_TIME: 15 S
MDC_IDC_MSMT_CAP_CHARGE_TIME: 8 S
MDC_IDC_MSMT_CAP_CHARGE_TIME: 8 S
MDC_IDC_MSMT_CAP_CHARGE_TYPE: NORMAL
MDC_IDC_MSMT_LEADCHNL_LV_IMPEDANCE_VALUE: 1035 OHM
MDC_IDC_MSMT_LEADCHNL_LV_IMPEDANCE_VALUE: 952 OHM
MDC_IDC_MSMT_LEADCHNL_LV_LEAD_CHANNEL_STATUS: NORMAL
MDC_IDC_MSMT_LEADCHNL_LV_LEAD_CHANNEL_STATUS: NORMAL
MDC_IDC_MSMT_LEADCHNL_LV_PACING_THRESHOLD_AMPLITUDE: 0.7 V
MDC_IDC_MSMT_LEADCHNL_LV_PACING_THRESHOLD_AMPLITUDE: 0.7 V
MDC_IDC_MSMT_LEADCHNL_LV_PACING_THRESHOLD_PULSEWIDTH: 0.1 MS
MDC_IDC_MSMT_LEADCHNL_LV_PACING_THRESHOLD_PULSEWIDTH: 0.1 MS
MDC_IDC_MSMT_LEADCHNL_RA_IMPEDANCE_VALUE: 515 OHM
MDC_IDC_MSMT_LEADCHNL_RA_IMPEDANCE_VALUE: 541 OHM
MDC_IDC_MSMT_LEADCHNL_RA_PACING_THRESHOLD_AMPLITUDE: 0.5 V
MDC_IDC_MSMT_LEADCHNL_RA_PACING_THRESHOLD_PULSEWIDTH: 0.5 MS
MDC_IDC_MSMT_LEADCHNL_RV_IMPEDANCE_VALUE: 380 OHM
MDC_IDC_MSMT_LEADCHNL_RV_IMPEDANCE_VALUE: 418 OHM
MDC_IDC_MSMT_LEADCHNL_RV_PACING_THRESHOLD_AMPLITUDE: 1.2 V
MDC_IDC_MSMT_LEADCHNL_RV_PACING_THRESHOLD_AMPLITUDE: 1.2 V
MDC_IDC_MSMT_LEADCHNL_RV_PACING_THRESHOLD_PULSEWIDTH: 0.5 MS
MDC_IDC_MSMT_LEADCHNL_RV_PACING_THRESHOLD_PULSEWIDTH: 0.5 MS
MDC_IDC_PG_IMPLANT_DTM: NORMAL
MDC_IDC_PG_IMPLANT_DTM: NORMAL
MDC_IDC_PG_MFG: NORMAL
MDC_IDC_PG_MFG: NORMAL
MDC_IDC_PG_MODEL: NORMAL
MDC_IDC_PG_MODEL: NORMAL
MDC_IDC_PG_SERIAL: NORMAL
MDC_IDC_PG_SERIAL: NORMAL
MDC_IDC_PG_TYPE: NORMAL
MDC_IDC_PG_TYPE: NORMAL
MDC_IDC_SESS_CLINIC_NAME: NORMAL
MDC_IDC_SESS_CLINIC_NAME: NORMAL
MDC_IDC_SESS_DTM: NORMAL
MDC_IDC_SESS_DTM: NORMAL
MDC_IDC_SESS_TYPE: NORMAL
MDC_IDC_SESS_TYPE: NORMAL
MDC_IDC_SET_BRADY_AT_MODE_SWITCH_MODE: NORMAL
MDC_IDC_SET_BRADY_AT_MODE_SWITCH_MODE: NORMAL
MDC_IDC_SET_BRADY_AT_MODE_SWITCH_RATE: 170 {BEATS}/MIN
MDC_IDC_SET_BRADY_AT_MODE_SWITCH_RATE: 170 {BEATS}/MIN
MDC_IDC_SET_BRADY_LOWRATE: 80 {BEATS}/MIN
MDC_IDC_SET_BRADY_LOWRATE: 80 {BEATS}/MIN
MDC_IDC_SET_BRADY_MAX_SENSOR_RATE: 120 {BEATS}/MIN
MDC_IDC_SET_BRADY_MAX_SENSOR_RATE: 120 {BEATS}/MIN
MDC_IDC_SET_BRADY_MAX_TRACKING_RATE: 130 {BEATS}/MIN
MDC_IDC_SET_BRADY_MAX_TRACKING_RATE: 130 {BEATS}/MIN
MDC_IDC_SET_BRADY_MODE: NORMAL
MDC_IDC_SET_BRADY_MODE: NORMAL
MDC_IDC_SET_BRADY_PAV_DELAY_HIGH: 280 MS
MDC_IDC_SET_BRADY_PAV_DELAY_HIGH: 280 MS
MDC_IDC_SET_BRADY_PAV_DELAY_LOW: 300 MS
MDC_IDC_SET_BRADY_PAV_DELAY_LOW: 300 MS
MDC_IDC_SET_BRADY_SAV_DELAY_HIGH: 280 MS
MDC_IDC_SET_BRADY_SAV_DELAY_HIGH: 280 MS
MDC_IDC_SET_BRADY_SAV_DELAY_LOW: 300 MS
MDC_IDC_SET_BRADY_SAV_DELAY_LOW: 300 MS
MDC_IDC_SET_CRT_PACED_CHAMBERS: NORMAL
MDC_IDC_SET_CRT_PACED_CHAMBERS: NORMAL
MDC_IDC_SET_LEADCHNL_LV_PACING_AMPLITUDE: 0.1 V
MDC_IDC_SET_LEADCHNL_LV_PACING_AMPLITUDE: 0.1 V
MDC_IDC_SET_LEADCHNL_LV_PACING_PULSEWIDTH: 0.1 MS
MDC_IDC_SET_LEADCHNL_LV_PACING_PULSEWIDTH: 0.1 MS
MDC_IDC_SET_LEADCHNL_LV_SENSING_ADAPTATION_MODE: NORMAL
MDC_IDC_SET_LEADCHNL_LV_SENSING_ADAPTATION_MODE: NORMAL
MDC_IDC_SET_LEADCHNL_LV_SENSING_ANODE_ELECTRODE_1: NORMAL
MDC_IDC_SET_LEADCHNL_LV_SENSING_ANODE_ELECTRODE_1: NORMAL
MDC_IDC_SET_LEADCHNL_LV_SENSING_ANODE_LOCATION_1: NORMAL
MDC_IDC_SET_LEADCHNL_LV_SENSING_ANODE_LOCATION_1: NORMAL
MDC_IDC_SET_LEADCHNL_LV_SENSING_CATHODE_ELECTRODE_1: NORMAL
MDC_IDC_SET_LEADCHNL_LV_SENSING_CATHODE_ELECTRODE_1: NORMAL
MDC_IDC_SET_LEADCHNL_LV_SENSING_CATHODE_LOCATION_1: NORMAL
MDC_IDC_SET_LEADCHNL_LV_SENSING_CATHODE_LOCATION_1: NORMAL
MDC_IDC_SET_LEADCHNL_LV_SENSING_SENSITIVITY: 1 MV
MDC_IDC_SET_LEADCHNL_LV_SENSING_SENSITIVITY: 1 MV
MDC_IDC_SET_LEADCHNL_RA_PACING_AMPLITUDE: 1.5 V
MDC_IDC_SET_LEADCHNL_RA_PACING_AMPLITUDE: 1.5 V
MDC_IDC_SET_LEADCHNL_RA_PACING_POLARITY: NORMAL
MDC_IDC_SET_LEADCHNL_RA_PACING_POLARITY: NORMAL
MDC_IDC_SET_LEADCHNL_RA_PACING_PULSEWIDTH: 0.5 MS
MDC_IDC_SET_LEADCHNL_RA_PACING_PULSEWIDTH: 0.5 MS
MDC_IDC_SET_LEADCHNL_RA_SENSING_ADAPTATION_MODE: NORMAL
MDC_IDC_SET_LEADCHNL_RA_SENSING_ADAPTATION_MODE: NORMAL
MDC_IDC_SET_LEADCHNL_RA_SENSING_POLARITY: NORMAL
MDC_IDC_SET_LEADCHNL_RA_SENSING_POLARITY: NORMAL
MDC_IDC_SET_LEADCHNL_RA_SENSING_SENSITIVITY: 0.25 MV
MDC_IDC_SET_LEADCHNL_RA_SENSING_SENSITIVITY: 0.25 MV
MDC_IDC_SET_LEADCHNL_RV_PACING_AMPLITUDE: 2.8 V
MDC_IDC_SET_LEADCHNL_RV_PACING_AMPLITUDE: 2.8 V
MDC_IDC_SET_LEADCHNL_RV_PACING_POLARITY: NORMAL
MDC_IDC_SET_LEADCHNL_RV_PACING_POLARITY: NORMAL
MDC_IDC_SET_LEADCHNL_RV_PACING_PULSEWIDTH: 0.5 MS
MDC_IDC_SET_LEADCHNL_RV_PACING_PULSEWIDTH: 0.5 MS
MDC_IDC_SET_LEADCHNL_RV_SENSING_ADAPTATION_MODE: NORMAL
MDC_IDC_SET_LEADCHNL_RV_SENSING_ADAPTATION_MODE: NORMAL
MDC_IDC_SET_LEADCHNL_RV_SENSING_POLARITY: NORMAL
MDC_IDC_SET_LEADCHNL_RV_SENSING_POLARITY: NORMAL
MDC_IDC_SET_LEADCHNL_RV_SENSING_SENSITIVITY: 0.6 MV
MDC_IDC_SET_LEADCHNL_RV_SENSING_SENSITIVITY: 0.6 MV
MDC_IDC_SET_ZONE_DETECTION_INTERVAL: 273 MS
MDC_IDC_SET_ZONE_DETECTION_INTERVAL: 273 MS
MDC_IDC_SET_ZONE_DETECTION_INTERVAL: 375 MS
MDC_IDC_SET_ZONE_DETECTION_INTERVAL: 375 MS
MDC_IDC_SET_ZONE_STATUS: NORMAL
MDC_IDC_SET_ZONE_TYPE: NORMAL
MDC_IDC_SET_ZONE_VENDOR_TYPE: NORMAL
MDC_IDC_STAT_AT_BURDEN_PERCENT: 1 %
MDC_IDC_STAT_AT_BURDEN_PERCENT: 3 %
MDC_IDC_STAT_AT_DTM_END: NORMAL
MDC_IDC_STAT_AT_DTM_END: NORMAL
MDC_IDC_STAT_AT_DTM_START: NORMAL
MDC_IDC_STAT_AT_DTM_START: NORMAL
MDC_IDC_STAT_BRADY_DTM_END: NORMAL
MDC_IDC_STAT_BRADY_DTM_END: NORMAL
MDC_IDC_STAT_BRADY_DTM_START: NORMAL
MDC_IDC_STAT_BRADY_DTM_START: NORMAL
MDC_IDC_STAT_BRADY_RA_PERCENT_PACED: 81 %
MDC_IDC_STAT_BRADY_RA_PERCENT_PACED: 86 %
MDC_IDC_STAT_BRADY_RV_PERCENT_PACED: 9 %
MDC_IDC_STAT_BRADY_RV_PERCENT_PACED: 9 %
MDC_IDC_STAT_CRT_DTM_END: NORMAL
MDC_IDC_STAT_CRT_DTM_END: NORMAL
MDC_IDC_STAT_CRT_DTM_START: NORMAL
MDC_IDC_STAT_CRT_DTM_START: NORMAL
MDC_IDC_STAT_CRT_LV_PERCENT_PACED: 0 %
MDC_IDC_STAT_CRT_LV_PERCENT_PACED: 0 %
MDC_IDC_STAT_EPISODE_RECENT_COUNT: 0
MDC_IDC_STAT_EPISODE_RECENT_COUNT: 35
MDC_IDC_STAT_EPISODE_RECENT_COUNT: 37
MDC_IDC_STAT_EPISODE_RECENT_COUNT_DTM_END: NORMAL
MDC_IDC_STAT_EPISODE_RECENT_COUNT_DTM_START: NORMAL
MDC_IDC_STAT_EPISODE_TYPE: NORMAL
MDC_IDC_STAT_EPISODE_VENDOR_TYPE: NORMAL
MDC_IDC_STAT_TACHYTHERAPY_ATP_DELIVERED_RECENT: 0
MDC_IDC_STAT_TACHYTHERAPY_ATP_DELIVERED_RECENT: 0
MDC_IDC_STAT_TACHYTHERAPY_ATP_DELIVERED_TOTAL: 5
MDC_IDC_STAT_TACHYTHERAPY_ATP_DELIVERED_TOTAL: 5
MDC_IDC_STAT_TACHYTHERAPY_RECENT_DTM_END: NORMAL
MDC_IDC_STAT_TACHYTHERAPY_RECENT_DTM_END: NORMAL
MDC_IDC_STAT_TACHYTHERAPY_RECENT_DTM_START: NORMAL
MDC_IDC_STAT_TACHYTHERAPY_RECENT_DTM_START: NORMAL
MDC_IDC_STAT_TACHYTHERAPY_SHOCKS_ABORTED_RECENT: 0
MDC_IDC_STAT_TACHYTHERAPY_SHOCKS_ABORTED_RECENT: 0
MDC_IDC_STAT_TACHYTHERAPY_SHOCKS_ABORTED_TOTAL: 1
MDC_IDC_STAT_TACHYTHERAPY_SHOCKS_ABORTED_TOTAL: 1
MDC_IDC_STAT_TACHYTHERAPY_SHOCKS_DELIVERED_RECENT: 0
MDC_IDC_STAT_TACHYTHERAPY_SHOCKS_DELIVERED_RECENT: 0
MDC_IDC_STAT_TACHYTHERAPY_SHOCKS_DELIVERED_TOTAL: 5
MDC_IDC_STAT_TACHYTHERAPY_SHOCKS_DELIVERED_TOTAL: 5
MDC_IDC_STAT_TACHYTHERAPY_TOTAL_DTM_END: NORMAL
MDC_IDC_STAT_TACHYTHERAPY_TOTAL_DTM_END: NORMAL
MDC_IDC_STAT_TACHYTHERAPY_TOTAL_DTM_START: NORMAL
MDC_IDC_STAT_TACHYTHERAPY_TOTAL_DTM_START: NORMAL

## 2025-09-02 ENCOUNTER — ANTICOAGULATION THERAPY VISIT (OUTPATIENT)
Dept: ANTICOAGULATION | Facility: CLINIC | Age: 60
End: 2025-09-02
Payer: MEDICARE

## 2025-09-02 DIAGNOSIS — Z95.2 S/P MITRAL VALVE REPLACEMENT: ICD-10-CM

## 2025-09-02 DIAGNOSIS — I47.20 PAROXYSMAL VENTRICULAR TACHYCARDIA (H): ICD-10-CM

## 2025-09-02 DIAGNOSIS — Z95.811 LVAD (LEFT VENTRICULAR ASSIST DEVICE) PRESENT (H): Primary | ICD-10-CM

## 2025-09-02 DIAGNOSIS — I50.22 CHRONIC SYSTOLIC CHF (CONGESTIVE HEART FAILURE) (H): ICD-10-CM

## 2025-09-02 LAB — INR HOME MONITORING: 2.7 (ref 2–3)

## 2025-09-03 ENCOUNTER — CARE COORDINATION (OUTPATIENT)
Dept: CARDIOLOGY | Facility: CLINIC | Age: 60
End: 2025-09-03
Payer: MEDICARE

## 2025-09-03 VITALS — WEIGHT: 243 LBS | BODY MASS INDEX: 34.87 KG/M2

## 2025-09-03 DIAGNOSIS — T82.7XXA INFECTION ASSOCIATED WITH DRIVELINE OF LEFT VENTRICULAR ASSIST DEVICE (LVAD): ICD-10-CM

## 2025-09-03 RX ORDER — CEFADROXIL 500 MG/1
1000 CAPSULE ORAL 2 TIMES DAILY
Qty: 120 CAPSULE | Refills: 1 | Status: SHIPPED | OUTPATIENT
Start: 2025-09-03

## (undated) DEVICE — SPONGE RAY-TEC 4X8" 7318

## (undated) DEVICE — DRAPE IOBAN INCISE 23X17" 6650EZ

## (undated) DEVICE — ESU ELEC BLADE 2.75" COATED/INSULATED E1455

## (undated) DEVICE — SU ETHIBOND 2-0 MHDA 36" X843H

## (undated) DEVICE — SU PLEDGET SOFT TFE 3/8"X3/26"X1/16" PCP40

## (undated) DEVICE — Device

## (undated) DEVICE — DRAIN CHEST TUBE 36FR STR 8036

## (undated) DEVICE — PACK HEART RIGHT CUSTOM SAN32RHF18

## (undated) DEVICE — NDL ANGIOCATH 14GA 1.25" 4048

## (undated) DEVICE — SU STEEL 6 CCS 4X18" M654G

## (undated) DEVICE — PREP CHLORAPREP 26ML TINTED HI-LITE ORANGE 930815

## (undated) DEVICE — SU ETHIBOND 2-0 SHDA 30" X563H

## (undated) DEVICE — SU PROLENE 6-0 C-1DA 30" 8706H

## (undated) DEVICE — SOL NACL 0.9% 10ML VIAL 0409-4888-02

## (undated) DEVICE — SU SILK 0 TIE 6X30" A306H

## (undated) DEVICE — LINEN TOWEL PACK X30 5481

## (undated) DEVICE — KIT MANIFOLD NAMIC STANDARD 72IN RIGHT HEART 2 PT 613000213

## (undated) DEVICE — COMPENSATOR PRESSURE MONITORING MANIFOLDS, THREE-VALVE HANDLES OFF, RIGHT PORTS, ROTATING ADAPTER, MEDIUM PRESSURE

## (undated) DEVICE — INTRO SHEATH 7FRX10CM PINNACLE RSS702

## (undated) DEVICE — SOL NACL 0.9% IRRIG 1000ML BOTTLE 2F7124

## (undated) DEVICE — DRSG ABDOMINAL 07 1/2X8" 7197D

## (undated) DEVICE — RX SURGIFLO HEMOSTATIC MATRIX W/THROMBIN 8ML 2994

## (undated) DEVICE — ADH SKIN CLOSURE PREMIERPRO EXOFIN 1.0ML 3470

## (undated) DEVICE — BASIN SET SINGLE STERILE 13752-624

## (undated) DEVICE — DRSG TEGADERM 8X12" 1629

## (undated) DEVICE — SU PROLENE 4-0 RB-1DA 36" 8557H

## (undated) DEVICE — CATH SWAN CCO/SV02/CEDV 8FR 110CM W/HEP 777F8

## (undated) DEVICE — SU ETHIBOND 0 CT-1 CR 8X18" CX21D

## (undated) DEVICE — SUCTION DRY CHEST DRAIN OASIS 3600-100

## (undated) DEVICE — DRSG TELFA 3X8" 1238

## (undated) DEVICE — SU STEEL MYO/WIRE II STERNOTOMY 8 BE-1 3X14" 048-217

## (undated) DEVICE — INTRO SHEATH 9FRX10CM PINNACLE RSS902

## (undated) DEVICE — SU PROLENE 4-0 SHDA 36" 8521H

## (undated) DEVICE — ESU ELEC BLADE 6" COATED/INSULATED E1455-6

## (undated) DEVICE — LINEN TOWEL PACK X6 WHITE 5487

## (undated) DEVICE — SOL NACL 0.9% IRRIG 3000ML BAG 2B7477

## (undated) DEVICE — DRAIN CHEST TUBE RIGHT ANGLED 28FR 8128

## (undated) DEVICE — BLADE CLIPPER SGL USE 9680

## (undated) DEVICE — FILTER BLOOD ULTIPOR  SQ40S

## (undated) DEVICE — PACK ADULT HEART UMMC PV15CG92D

## (undated) DEVICE — TUBING SUCTION DRAINAGE PLEURAL DUAL 8884714200

## (undated) DEVICE — GLOVE PROTEXIS POWDER FREE 7.0 ORTHOPEDIC 2D73ET70

## (undated) DEVICE — TIES BANDING T50R

## (undated) DEVICE — INTRO SHEATH MICRO PLATINUM TIP 4FRX40CM 7274

## (undated) DEVICE — KIT RIGHT HEART CATH 60130719

## (undated) DEVICE — DRSG DRAIN 4X4" 7086

## (undated) DEVICE — SUCTION CATH AIRLIFE TRI-FLO W/CONTROL PORT 14FR  T60C

## (undated) DEVICE — CONNECTOR BLAKE DRAIN SGL BCC1

## (undated) DEVICE — DRSG TEGADERM 4X10" 1627

## (undated) DEVICE — TOURNIQUET VASCULAR KIT 7 1/2" 79012

## (undated) DEVICE — PUNCH AORTIC 5.0MM LONG AP-550

## (undated) DEVICE — SU VICRYL 3-0 FS-1 27" J442H

## (undated) DEVICE — DRAPE BACK TABLE  44X90" 8377

## (undated) DEVICE — KIT INTRODUCER DL 9FR 11.5CM J-TIP 0.35"X45CM CDC-21242-1A

## (undated) DEVICE — SU VICRYL 0 CTX 36" J370H

## (undated) DEVICE — PROTECTOR ARM ONE-STEP TRENDELENBURG 40418

## (undated) DEVICE — NDL COUNTER 20CT 31142493

## (undated) DEVICE — TUBING INSUFFLATION PNEUMOCLEAR 0620050100

## (undated) DEVICE — SU ETHIBOND 3-0 BBDA 36" X588H

## (undated) DEVICE — SURGICEL HEMOSTAT 4X8" 1952

## (undated) DEVICE — SU PLEDGET SOFT TFE 13MMX7MMX1.5MM D7044

## (undated) DEVICE — SU PROLENE 3-0 SHDA 36" 8522H

## (undated) DEVICE — WIPES FOLEY CARE SURESTEP PROVON DFC100

## (undated) DEVICE — TAPE MEDIPORE 4"X2YD 2864

## (undated) DEVICE — DRAPE WARMER 66X44" ORS-300

## (undated) DEVICE — ESU PENCIL SMOKE EVAC W/ROCKER SWITCH 0703-047-000

## (undated) DEVICE — SU PROLENE 5-0 RB-2DA 30" 8710H

## (undated) DEVICE — DEFIB PRO-PADZ LVP LQD GEL ADULT 8900-2105-01

## (undated) DEVICE — NDL COUNTER 40CT  31142311

## (undated) DEVICE — PITCHER STERILE 1000ML  SSK9004A

## (undated) DEVICE — WIRE GUIDE 0.035"X150CM EMERALD J TIP 502521

## (undated) DEVICE — SUCTION MANIFOLD NEPTUNE 2 SYS 4 PORT 0702-020-000

## (undated) RX ORDER — ROCURONIUM BROMIDE 50 MG/5 ML
SYRINGE (ML) INTRAVENOUS
Status: DISPENSED
Start: 2022-03-23

## (undated) RX ORDER — HEPARIN SODIUM 1000 [USP'U]/ML
INJECTION, SOLUTION INTRAVENOUS; SUBCUTANEOUS
Status: DISPENSED
Start: 2022-03-23

## (undated) RX ORDER — LIDOCAINE 40 MG/G
CREAM TOPICAL
Status: DISPENSED
Start: 2023-01-24

## (undated) RX ORDER — FENTANYL CITRATE 50 UG/ML
INJECTION, SOLUTION INTRAMUSCULAR; INTRAVENOUS
Status: DISPENSED
Start: 2023-01-24

## (undated) RX ORDER — PROPOFOL 10 MG/ML
INJECTION, EMULSION INTRAVENOUS
Status: DISPENSED
Start: 2022-03-23

## (undated) RX ORDER — FENTANYL CITRATE 50 UG/ML
INJECTION, SOLUTION INTRAMUSCULAR; INTRAVENOUS
Status: DISPENSED
Start: 2022-03-22

## (undated) RX ORDER — LIDOCAINE HYDROCHLORIDE 10 MG/ML
INJECTION, SOLUTION EPIDURAL; INFILTRATION; INTRACAUDAL; PERINEURAL
Status: DISPENSED
Start: 2023-01-24

## (undated) RX ORDER — LIDOCAINE 40 MG/G
CREAM TOPICAL
Status: DISPENSED
Start: 2022-09-22

## (undated) RX ORDER — LIDOCAINE 40 MG/G
CREAM TOPICAL
Status: DISPENSED
Start: 2022-03-22

## (undated) RX ORDER — HYDROMORPHONE HYDROCHLORIDE 1 MG/ML
INJECTION, SOLUTION INTRAMUSCULAR; INTRAVENOUS; SUBCUTANEOUS
Status: DISPENSED
Start: 2022-03-23

## (undated) RX ORDER — LIDOCAINE HYDROCHLORIDE 10 MG/ML
INJECTION, SOLUTION EPIDURAL; INFILTRATION; INTRACAUDAL; PERINEURAL
Status: DISPENSED
Start: 2022-09-22

## (undated) RX ORDER — FENTANYL CITRATE 50 UG/ML
INJECTION, SOLUTION INTRAMUSCULAR; INTRAVENOUS
Status: DISPENSED
Start: 2022-03-23

## (undated) RX ORDER — LIDOCAINE 40 MG/G
CREAM TOPICAL
Status: DISPENSED
Start: 2024-05-03

## (undated) RX ORDER — POTASSIUM CHLORIDE 750 MG/1
TABLET, EXTENDED RELEASE ORAL
Status: DISPENSED
Start: 2022-03-22